# Patient Record
Sex: MALE | Race: WHITE | Employment: FULL TIME | URBAN - METROPOLITAN AREA
[De-identification: names, ages, dates, MRNs, and addresses within clinical notes are randomized per-mention and may not be internally consistent; named-entity substitution may affect disease eponyms.]

---

## 2019-03-12 ENCOUNTER — TRANSFERRED RECORDS (OUTPATIENT)
Dept: HEALTH INFORMATION MANAGEMENT | Facility: CLINIC | Age: 64
End: 2019-03-12

## 2019-03-12 ENCOUNTER — HOSPITAL ENCOUNTER (INPATIENT)
Facility: CLINIC | Age: 64
LOS: 34 days | Discharge: HOME | DRG: 834 | End: 2019-04-15
Admitting: INTERNAL MEDICINE
Payer: COMMERCIAL

## 2019-03-12 DIAGNOSIS — C95.00 ACUTE LEUKEMIA NOT HAVING ACHIEVED REMISSION (H): ICD-10-CM

## 2019-03-12 DIAGNOSIS — C92.00 ACUTE MYELOID LEUKEMIA NOT HAVING ACHIEVED REMISSION (H): ICD-10-CM

## 2019-03-12 LAB
ALBUMIN SERPL-MCNC: 3.5 G/DL (ref 3.4–5)
ALP SERPL-CCNC: 72 U/L (ref 40–150)
ALT SERPL W P-5'-P-CCNC: 20 U/L (ref 0–70)
ANION GAP SERPL CALCULATED.3IONS-SCNC: 8 MMOL/L (ref 3–14)
APTT PPP: 32 SEC (ref 22–37)
AST SERPL W P-5'-P-CCNC: 16 U/L (ref 0–45)
BILIRUB SERPL-MCNC: 0.5 MG/DL (ref 0.2–1.3)
BUN SERPL-MCNC: 16 MG/DL (ref 7–30)
CALCIUM SERPL-MCNC: 8.5 MG/DL (ref 8.5–10.1)
CHLORIDE SERPL-SCNC: 109 MMOL/L (ref 94–109)
CO2 SERPL-SCNC: 22 MMOL/L (ref 20–32)
CREAT SERPL-MCNC: 0.93 MG/DL (ref 0.66–1.25)
FIBRINOGEN PPP-MCNC: 488 MG/DL (ref 200–420)
GFR SERPL CREATININE-BSD FRML MDRD: 86 ML/MIN/{1.73_M2}
GLUCOSE SERPL-MCNC: 105 MG/DL (ref 70–99)
INR PPP: 1.17 (ref 0.86–1.14)
LDH SERPL L TO P-CCNC: 274 U/L (ref 85–227)
MAGNESIUM SERPL-MCNC: 2.1 MG/DL (ref 1.6–2.3)
PHOSPHATE SERPL-MCNC: 3.1 MG/DL (ref 2.5–4.5)
POTASSIUM SERPL-SCNC: 3.7 MMOL/L (ref 3.4–5.3)
PROT SERPL-MCNC: 7.4 G/DL (ref 6.8–8.8)
RETICS # AUTO: 58.4 10E9/L (ref 25–95)
RETICS/RBC NFR AUTO: 2.1 % (ref 0.5–2)
SODIUM SERPL-SCNC: 139 MMOL/L (ref 133–144)
URATE SERPL-MCNC: 4.9 MG/DL (ref 3.5–7.2)

## 2019-03-12 PROCEDURE — 80053 COMPREHEN METABOLIC PANEL: CPT | Performed by: INTERNAL MEDICINE

## 2019-03-12 PROCEDURE — 99221 1ST HOSP IP/OBS SF/LOW 40: CPT | Mod: AI | Performed by: INTERNAL MEDICINE

## 2019-03-12 PROCEDURE — 86695 HERPES SIMPLEX TYPE 1 TEST: CPT | Performed by: INTERNAL MEDICINE

## 2019-03-12 PROCEDURE — 12000001 ZZH R&B MED SURG/OB UMMC

## 2019-03-12 PROCEDURE — 85610 PROTHROMBIN TIME: CPT | Performed by: INTERNAL MEDICINE

## 2019-03-12 PROCEDURE — 85045 AUTOMATED RETICULOCYTE COUNT: CPT | Performed by: INTERNAL MEDICINE

## 2019-03-12 PROCEDURE — 81370 HLA I & II TYPING LR: CPT

## 2019-03-12 PROCEDURE — 86644 CMV ANTIBODY: CPT | Performed by: INTERNAL MEDICINE

## 2019-03-12 PROCEDURE — 83735 ASSAY OF MAGNESIUM: CPT | Performed by: INTERNAL MEDICINE

## 2019-03-12 PROCEDURE — 84550 ASSAY OF BLOOD/URIC ACID: CPT | Performed by: INTERNAL MEDICINE

## 2019-03-12 PROCEDURE — 85730 THROMBOPLASTIN TIME PARTIAL: CPT | Performed by: INTERNAL MEDICINE

## 2019-03-12 PROCEDURE — 81376 HLA II TYPING 1 LOCUS LR: CPT

## 2019-03-12 PROCEDURE — 83615 LACTATE (LD) (LDH) ENZYME: CPT | Performed by: INTERNAL MEDICINE

## 2019-03-12 PROCEDURE — 85384 FIBRINOGEN ACTIVITY: CPT | Performed by: INTERNAL MEDICINE

## 2019-03-12 PROCEDURE — 84100 ASSAY OF PHOSPHORUS: CPT | Performed by: INTERNAL MEDICINE

## 2019-03-12 PROCEDURE — 85025 COMPLETE CBC W/AUTO DIFF WBC: CPT | Performed by: INTERNAL MEDICINE

## 2019-03-12 PROCEDURE — 36415 COLL VENOUS BLD VENIPUNCTURE: CPT | Performed by: INTERNAL MEDICINE

## 2019-03-12 RX ORDER — POTASSIUM CHLORIDE 1.5 G/1.58G
20-40 POWDER, FOR SOLUTION ORAL
Status: DISCONTINUED | OUTPATIENT
Start: 2019-03-12 | End: 2019-04-03

## 2019-03-12 RX ORDER — POTASSIUM CHLORIDE 750 MG/1
20-40 TABLET, EXTENDED RELEASE ORAL
Status: DISCONTINUED | OUTPATIENT
Start: 2019-03-12 | End: 2019-04-03

## 2019-03-12 RX ORDER — ALLOPURINOL 300 MG/1
300 TABLET ORAL DAILY
Status: DISCONTINUED | OUTPATIENT
Start: 2019-03-13 | End: 2019-03-23

## 2019-03-12 RX ORDER — AMOXICILLIN 250 MG
1-2 CAPSULE ORAL
Status: DISCONTINUED | OUTPATIENT
Start: 2019-03-12 | End: 2019-03-16

## 2019-03-12 RX ORDER — ATORVASTATIN CALCIUM 40 MG/1
40 TABLET, FILM COATED ORAL DAILY
Status: ON HOLD | COMMUNITY
End: 2019-04-15

## 2019-03-12 RX ORDER — POTASSIUM CHLORIDE 7.45 MG/ML
10 INJECTION INTRAVENOUS
Status: DISCONTINUED | OUTPATIENT
Start: 2019-03-12 | End: 2019-04-03

## 2019-03-12 RX ORDER — ROPINIROLE 0.25 MG/1
0.75 TABLET, FILM COATED ORAL AT BEDTIME
Status: ON HOLD | COMMUNITY
End: 2019-04-15

## 2019-03-12 RX ORDER — ROPINIROLE 0.25 MG/1
0.75 TABLET, FILM COATED ORAL DAILY
Status: DISCONTINUED | OUTPATIENT
Start: 2019-03-13 | End: 2019-03-13

## 2019-03-12 RX ORDER — POTASSIUM CL/LIDO/0.9 % NACL 10MEQ/0.1L
10 INTRAVENOUS SOLUTION, PIGGYBACK (ML) INTRAVENOUS
Status: DISCONTINUED | OUTPATIENT
Start: 2019-03-12 | End: 2019-04-03

## 2019-03-12 RX ORDER — LORAZEPAM 0.5 MG/1
.5-1 TABLET ORAL EVERY 6 HOURS PRN
Status: DISCONTINUED | OUTPATIENT
Start: 2019-03-12 | End: 2019-04-15 | Stop reason: HOSPADM

## 2019-03-12 RX ORDER — ACYCLOVIR 400 MG/1
400 TABLET ORAL 2 TIMES DAILY
Status: DISCONTINUED | OUTPATIENT
Start: 2019-03-12 | End: 2019-04-15 | Stop reason: HOSPADM

## 2019-03-12 RX ORDER — FLUCONAZOLE 200 MG/1
200 TABLET ORAL DAILY
Status: DISCONTINUED | OUTPATIENT
Start: 2019-03-13 | End: 2019-03-23

## 2019-03-12 RX ORDER — LEVOFLOXACIN 250 MG/1
250 TABLET, FILM COATED ORAL DAILY
Status: DISCONTINUED | OUTPATIENT
Start: 2019-03-13 | End: 2019-03-22

## 2019-03-12 RX ORDER — LIDOCAINE 40 MG/G
CREAM TOPICAL
Status: DISCONTINUED | OUTPATIENT
Start: 2019-03-12 | End: 2019-03-22

## 2019-03-12 RX ORDER — HEPARIN SODIUM,PORCINE 10 UNIT/ML
2-5 VIAL (ML) INTRAVENOUS
Status: COMPLETED | OUTPATIENT
Start: 2019-03-12 | End: 2019-03-19

## 2019-03-12 RX ORDER — PENICILLIN V POTASSIUM 500 MG/1
500 TABLET, FILM COATED ORAL 3 TIMES DAILY
Status: ON HOLD | COMMUNITY
End: 2019-04-15

## 2019-03-12 RX ORDER — SODIUM CHLORIDE 9 MG/ML
INJECTION, SOLUTION INTRAVENOUS CONTINUOUS
Status: DISCONTINUED | OUTPATIENT
Start: 2019-03-12 | End: 2019-03-18

## 2019-03-12 RX ORDER — POTASSIUM CHLORIDE 29.8 MG/ML
20 INJECTION INTRAVENOUS
Status: DISCONTINUED | OUTPATIENT
Start: 2019-03-12 | End: 2019-04-03

## 2019-03-12 RX ORDER — ALFUZOSIN HYDROCHLORIDE 10 MG/1
10 TABLET, EXTENDED RELEASE ORAL DAILY
Status: ON HOLD | COMMUNITY
End: 2019-04-15

## 2019-03-12 RX ORDER — LORAZEPAM 2 MG/ML
.5-1 INJECTION INTRAMUSCULAR EVERY 6 HOURS PRN
Status: DISCONTINUED | OUTPATIENT
Start: 2019-03-12 | End: 2019-04-15 | Stop reason: HOSPADM

## 2019-03-12 RX ORDER — OXYBUTYNIN CHLORIDE 5 MG/1
5 TABLET, EXTENDED RELEASE ORAL DAILY
Status: ON HOLD | COMMUNITY
End: 2019-04-15

## 2019-03-12 RX ORDER — ACETAMINOPHEN 325 MG/1
650 TABLET ORAL EVERY 4 HOURS PRN
Status: DISCONTINUED | OUTPATIENT
Start: 2019-03-12 | End: 2019-04-15 | Stop reason: HOSPADM

## 2019-03-12 RX ORDER — MAGNESIUM SULFATE HEPTAHYDRATE 40 MG/ML
4 INJECTION, SOLUTION INTRAVENOUS EVERY 4 HOURS PRN
Status: DISCONTINUED | OUTPATIENT
Start: 2019-03-12 | End: 2019-04-02

## 2019-03-12 RX ORDER — PANTOPRAZOLE SODIUM 40 MG/1
40 TABLET, DELAYED RELEASE ORAL
Status: DISCONTINUED | OUTPATIENT
Start: 2019-03-13 | End: 2019-03-22

## 2019-03-12 RX ORDER — PROCHLORPERAZINE MALEATE 5 MG
5-10 TABLET ORAL EVERY 6 HOURS PRN
Status: DISCONTINUED | OUTPATIENT
Start: 2019-03-12 | End: 2019-03-22

## 2019-03-12 ASSESSMENT — MIFFLIN-ST. JEOR: SCORE: 1777.83

## 2019-03-12 NOTE — LETTER
Transition Communication Hand-off for Care Transitions to Next Level of Care Provider    Name: El Ace  : 1955  MRN #: [id number]  Primary Care Provider: Bre Cuadra (upcoming appointment)      Primary Clinic: [facility] [address]  [facility] [address]     Reason for Hospitalization:  acute leukemia  Acute leukemia (H)  Septic shock (H)  Admit Date/Time: 3/12/2019  9:58 PM  Discharge Date: 4/15/19  Payor Source: Payor: PREFERREDONE / Plan: PREF[employer] NON [facility] [employer] / Product Type: HMO /     Mr. Ace is a 63 year old male with new diagnosis of acute myeloid leukemia incidentally found during work up of nonspecific chest symptoms after partial root canal intervention.     He was started on induction chemotherapy with 7+3 (cytarabine and daunorubicin) with D1=3/15/19. BMBx on 3/25 (done early due to rising WBC and blast counts) demonstrated persistent disease with 95% blasts by flow, 98% by morphology. Started re-induction with decitabine on 3/26 PM and added venetoclax on 3/29. His course has been complicated by neutropenic sepsis secondary to odontic/soft tissue infection - he completed a course of broad spectrum antibiotics - with linezolid & zosyn (subsequently diagnosed with C diff infection to complete PO vanc course 19). He had extraction of teeth #18 and 19 on 4/3 but subsequently developed septic shock requiring transfer to MICU team 4/3-. Hypotension improved after fluid resuscitated and he did not require intubation or pressor support. He was transferred back to Heme Malignancy service on 19. He's continued to improve & was able to complete Decitabine ( &  had been held r/t ALT & T bili elevation).     Discharge Plan: Home with assist of spouse and follow-up in clinic as recommended.       Discharge Needs Assessment:  Needs      Most Recent Value   Equipment Currently Used at Home  grab bar, toilet           Follow-up plan:    Future Appointments   Date Time Provider Department Center   4/23/2019  8:15 AM  [facility]   4/23/2019  8:50 AM Ryann Pavon PA [facility]   4/23/2019  9:40 AM Ryann Pavon PA [facility]   4/26/2019  9:40 AM Ryann Pavon PA [facility]   5/6/2019  9:15 AM Rivera Cuadra MD [facility]       Any outstanding tests or procedures:        Referrals     Future Labs/Procedures    BMT GENERIC REFERRAL             Gayla De La Garza, RN, BSN, PHN  Care Coordinator       AVS/Discharge Summary is the source of truth; this is a helpful guide for improved communication of patient story

## 2019-03-13 ENCOUNTER — APPOINTMENT (OUTPATIENT)
Dept: CARDIOLOGY | Facility: CLINIC | Age: 64
DRG: 834 | End: 2019-03-13
Attending: INTERNAL MEDICINE
Payer: COMMERCIAL

## 2019-03-13 ENCOUNTER — APPOINTMENT (OUTPATIENT)
Dept: GENERAL RADIOLOGY | Facility: CLINIC | Age: 64
DRG: 834 | End: 2019-03-13
Payer: COMMERCIAL

## 2019-03-13 LAB
ANION GAP SERPL CALCULATED.3IONS-SCNC: 10 MMOL/L (ref 3–14)
ANION GAP SERPL CALCULATED.3IONS-SCNC: 9 MMOL/L (ref 3–14)
APTT PPP: 31 SEC (ref 22–37)
BUN SERPL-MCNC: 12 MG/DL (ref 7–30)
BUN SERPL-MCNC: 12 MG/DL (ref 7–30)
CALCIUM SERPL-MCNC: 7.8 MG/DL (ref 8.5–10.1)
CALCIUM SERPL-MCNC: 7.9 MG/DL (ref 8.5–10.1)
CHLORIDE SERPL-SCNC: 110 MMOL/L (ref 94–109)
CHLORIDE SERPL-SCNC: 111 MMOL/L (ref 94–109)
CMV IGG SERPL QL IA: <0.2 AI (ref 0–0.8)
CO2 SERPL-SCNC: 22 MMOL/L (ref 20–32)
CO2 SERPL-SCNC: 22 MMOL/L (ref 20–32)
COPATH REPORT: NORMAL
COPATH REPORT: NORMAL
CREAT SERPL-MCNC: 0.76 MG/DL (ref 0.66–1.25)
CREAT SERPL-MCNC: 0.82 MG/DL (ref 0.66–1.25)
EBV VCA IGG SER QL IA: >8 AI (ref 0–0.8)
FIBRINOGEN PPP-MCNC: 450 MG/DL (ref 200–420)
GFR SERPL CREATININE-BSD FRML MDRD: >90 ML/MIN/{1.73_M2}
GFR SERPL CREATININE-BSD FRML MDRD: >90 ML/MIN/{1.73_M2}
GLUCOSE SERPL-MCNC: 104 MG/DL (ref 70–99)
GLUCOSE SERPL-MCNC: 132 MG/DL (ref 70–99)
HBV SURFACE AB SERPL IA-ACNC: 15.7 M[IU]/ML
HBV SURFACE AG SERPL QL IA: NONREACTIVE
HCV AB SERPL QL IA: NONREACTIVE
HIV 1+2 AB+HIV1 P24 AG SERPL QL IA: NONREACTIVE
HSV1 IGG SERPL QL IA: <0.2 AI (ref 0–0.8)
HSV2 IGG SERPL QL IA: >8 AI (ref 0–0.8)
INR PPP: 1.2 (ref 0.86–1.14)
INTERPRETATION ECG - MUSE: NORMAL
PHOSPHATE SERPL-MCNC: 2.8 MG/DL (ref 2.5–4.5)
POTASSIUM SERPL-SCNC: 3.5 MMOL/L (ref 3.4–5.3)
POTASSIUM SERPL-SCNC: 3.8 MMOL/L (ref 3.4–5.3)
SODIUM SERPL-SCNC: 142 MMOL/L (ref 133–144)
SODIUM SERPL-SCNC: 142 MMOL/L (ref 133–144)
URATE SERPL-MCNC: 4.3 MG/DL (ref 3.5–7.2)

## 2019-03-13 PROCEDURE — 85610 PROTHROMBIN TIME: CPT | Performed by: STUDENT IN AN ORGANIZED HEALTH CARE EDUCATION/TRAINING PROGRAM

## 2019-03-13 PROCEDURE — 25000132 ZZH RX MED GY IP 250 OP 250 PS 637: Performed by: INTERNAL MEDICINE

## 2019-03-13 PROCEDURE — 3E04305 INTRODUCTION OF OTHER ANTINEOPLASTIC INTO CENTRAL VEIN, PERCUTANEOUS APPROACH: ICD-10-PCS | Performed by: INTERNAL MEDICINE

## 2019-03-13 PROCEDURE — 88280 CHROMOSOME KARYOTYPE STUDY: CPT | Performed by: STUDENT IN AN ORGANIZED HEALTH CARE EDUCATION/TRAINING PROGRAM

## 2019-03-13 PROCEDURE — 40000951 ZZHCL STATISTIC BONE MARROW INTERP TC 85097: Performed by: STUDENT IN AN ORGANIZED HEALTH CARE EDUCATION/TRAINING PROGRAM

## 2019-03-13 PROCEDURE — 25800030 ZZH RX IP 258 OP 636: Performed by: INTERNAL MEDICINE

## 2019-03-13 PROCEDURE — 88184 FLOWCYTOMETRY/ TC 1 MARKER: CPT | Performed by: STUDENT IN AN ORGANIZED HEALTH CARE EDUCATION/TRAINING PROGRAM

## 2019-03-13 PROCEDURE — 87340 HEPATITIS B SURFACE AG IA: CPT | Performed by: INTERNAL MEDICINE

## 2019-03-13 PROCEDURE — 88275 CYTOGENETICS 100-300: CPT | Performed by: STUDENT IN AN ORGANIZED HEALTH CARE EDUCATION/TRAINING PROGRAM

## 2019-03-13 PROCEDURE — 93306 TTE W/DOPPLER COMPLETE: CPT

## 2019-03-13 PROCEDURE — 84100 ASSAY OF PHOSPHORUS: CPT | Performed by: STUDENT IN AN ORGANIZED HEALTH CARE EDUCATION/TRAINING PROGRAM

## 2019-03-13 PROCEDURE — 93306 TTE W/DOPPLER COMPLETE: CPT | Mod: 26 | Performed by: INTERNAL MEDICINE

## 2019-03-13 PROCEDURE — 12000001 ZZH R&B MED SURG/OB UMMC

## 2019-03-13 PROCEDURE — 88185 FLOWCYTOMETRY/TC ADD-ON: CPT | Performed by: STUDENT IN AN ORGANIZED HEALTH CARE EDUCATION/TRAINING PROGRAM

## 2019-03-13 PROCEDURE — 00000058 ZZHCL STATISTIC BONE MARROW ASP PERF TC 38220: Performed by: STUDENT IN AN ORGANIZED HEALTH CARE EDUCATION/TRAINING PROGRAM

## 2019-03-13 PROCEDURE — 86706 HEP B SURFACE ANTIBODY: CPT | Performed by: INTERNAL MEDICINE

## 2019-03-13 PROCEDURE — 80048 BASIC METABOLIC PNL TOTAL CA: CPT | Performed by: INTERNAL MEDICINE

## 2019-03-13 PROCEDURE — 71046 X-RAY EXAM CHEST 2 VIEWS: CPT

## 2019-03-13 PROCEDURE — 93010 ELECTROCARDIOGRAM REPORT: CPT | Performed by: INTERNAL MEDICINE

## 2019-03-13 PROCEDURE — 85025 COMPLETE CBC W/AUTO DIFF WBC: CPT | Performed by: INTERNAL MEDICINE

## 2019-03-13 PROCEDURE — 88161 CYTOPATH SMEAR OTHER SOURCE: CPT | Performed by: STUDENT IN AN ORGANIZED HEALTH CARE EDUCATION/TRAINING PROGRAM

## 2019-03-13 PROCEDURE — 86696 HERPES SIMPLEX TYPE 2 TEST: CPT | Performed by: INTERNAL MEDICINE

## 2019-03-13 PROCEDURE — 86803 HEPATITIS C AB TEST: CPT | Performed by: INTERNAL MEDICINE

## 2019-03-13 PROCEDURE — 00000161 ZZHCL STATISTIC H-SPHEME PROCESS B/S: Performed by: STUDENT IN AN ORGANIZED HEALTH CARE EDUCATION/TRAINING PROGRAM

## 2019-03-13 PROCEDURE — 87389 HIV-1 AG W/HIV-1&-2 AB AG IA: CPT | Performed by: INTERNAL MEDICINE

## 2019-03-13 PROCEDURE — 88311 DECALCIFY TISSUE: CPT | Performed by: STUDENT IN AN ORGANIZED HEALTH CARE EDUCATION/TRAINING PROGRAM

## 2019-03-13 PROCEDURE — 80048 BASIC METABOLIC PNL TOTAL CA: CPT | Performed by: STUDENT IN AN ORGANIZED HEALTH CARE EDUCATION/TRAINING PROGRAM

## 2019-03-13 PROCEDURE — 81450 HL NEO GSAP 5-50DNA/DNA&RNA: CPT | Performed by: STUDENT IN AN ORGANIZED HEALTH CARE EDUCATION/TRAINING PROGRAM

## 2019-03-13 PROCEDURE — 88237 TISSUE CULTURE BONE MARROW: CPT | Performed by: STUDENT IN AN ORGANIZED HEALTH CARE EDUCATION/TRAINING PROGRAM

## 2019-03-13 PROCEDURE — 80076 HEPATIC FUNCTION PANEL: CPT | Performed by: STUDENT IN AN ORGANIZED HEALTH CARE EDUCATION/TRAINING PROGRAM

## 2019-03-13 PROCEDURE — 88305 TISSUE EXAM BY PATHOLOGIST: CPT | Performed by: STUDENT IN AN ORGANIZED HEALTH CARE EDUCATION/TRAINING PROGRAM

## 2019-03-13 PROCEDURE — 36415 COLL VENOUS BLD VENIPUNCTURE: CPT | Performed by: INTERNAL MEDICINE

## 2019-03-13 PROCEDURE — 88264 CHROMOSOME ANALYSIS 20-25: CPT | Performed by: STUDENT IN AN ORGANIZED HEALTH CARE EDUCATION/TRAINING PROGRAM

## 2019-03-13 PROCEDURE — 07DR3ZX EXTRACTION OF ILIAC BONE MARROW, PERCUTANEOUS APPROACH, DIAGNOSTIC: ICD-10-PCS | Performed by: INTERNAL MEDICINE

## 2019-03-13 PROCEDURE — 93005 ELECTROCARDIOGRAM TRACING: CPT

## 2019-03-13 PROCEDURE — 86665 EPSTEIN-BARR CAPSID VCA: CPT | Performed by: INTERNAL MEDICINE

## 2019-03-13 PROCEDURE — 85730 THROMBOPLASTIN TIME PARTIAL: CPT | Performed by: STUDENT IN AN ORGANIZED HEALTH CARE EDUCATION/TRAINING PROGRAM

## 2019-03-13 PROCEDURE — 36415 COLL VENOUS BLD VENIPUNCTURE: CPT | Performed by: STUDENT IN AN ORGANIZED HEALTH CARE EDUCATION/TRAINING PROGRAM

## 2019-03-13 PROCEDURE — 85384 FIBRINOGEN ACTIVITY: CPT | Performed by: STUDENT IN AN ORGANIZED HEALTH CARE EDUCATION/TRAINING PROGRAM

## 2019-03-13 PROCEDURE — 40001005 ZZHCL STATISTIC FLOW >15 ABY TC 88189: Performed by: STUDENT IN AN ORGANIZED HEALTH CARE EDUCATION/TRAINING PROGRAM

## 2019-03-13 PROCEDURE — 82310 ASSAY OF CALCIUM: CPT | Performed by: STUDENT IN AN ORGANIZED HEALTH CARE EDUCATION/TRAINING PROGRAM

## 2019-03-13 PROCEDURE — 40000424 ZZHCL STATISTIC BONE MARROW CORE PERF TC 38221: Performed by: STUDENT IN AN ORGANIZED HEALTH CARE EDUCATION/TRAINING PROGRAM

## 2019-03-13 PROCEDURE — 88271 CYTOGENETICS DNA PROBE: CPT | Performed by: STUDENT IN AN ORGANIZED HEALTH CARE EDUCATION/TRAINING PROGRAM

## 2019-03-13 PROCEDURE — 25000128 H RX IP 250 OP 636: Performed by: PHYSICIAN ASSISTANT

## 2019-03-13 PROCEDURE — 84550 ASSAY OF BLOOD/URIC ACID: CPT | Performed by: STUDENT IN AN ORGANIZED HEALTH CARE EDUCATION/TRAINING PROGRAM

## 2019-03-13 PROCEDURE — 86644 CMV ANTIBODY: CPT | Performed by: INTERNAL MEDICINE

## 2019-03-13 PROCEDURE — 88313 SPECIAL STAINS GROUP 2: CPT | Performed by: STUDENT IN AN ORGANIZED HEALTH CARE EDUCATION/TRAINING PROGRAM

## 2019-03-13 PROCEDURE — 40000611 ZZHCL STATISTIC MORPHOLOGY W/INTERP HEMEPATH TC 85060: Performed by: STUDENT IN AN ORGANIZED HEALTH CARE EDUCATION/TRAINING PROGRAM

## 2019-03-13 PROCEDURE — 86695 HERPES SIMPLEX TYPE 1 TEST: CPT | Performed by: INTERNAL MEDICINE

## 2019-03-13 PROCEDURE — 99232 SBSQ HOSP IP/OBS MODERATE 35: CPT | Mod: GC | Performed by: INTERNAL MEDICINE

## 2019-03-13 PROCEDURE — 88319 ENZYME HISTOCHEMISTRY: CPT | Performed by: STUDENT IN AN ORGANIZED HEALTH CARE EDUCATION/TRAINING PROGRAM

## 2019-03-13 RX ORDER — ATORVASTATIN CALCIUM 40 MG/1
40 TABLET, FILM COATED ORAL EVERY EVENING
Status: DISCONTINUED | OUTPATIENT
Start: 2019-03-13 | End: 2019-03-18

## 2019-03-13 RX ORDER — NALOXONE HYDROCHLORIDE 0.4 MG/ML
.1-.4 INJECTION, SOLUTION INTRAMUSCULAR; INTRAVENOUS; SUBCUTANEOUS
Status: DISCONTINUED | OUTPATIENT
Start: 2019-03-13 | End: 2019-04-03

## 2019-03-13 RX ORDER — POLYETHYLENE GLYCOL 3350 17 G/17G
17 POWDER, FOR SOLUTION ORAL DAILY PRN
Status: DISCONTINUED | OUTPATIENT
Start: 2019-03-13 | End: 2019-04-15 | Stop reason: HOSPADM

## 2019-03-13 RX ORDER — TAMSULOSIN HYDROCHLORIDE 0.4 MG/1
0.8 CAPSULE ORAL DAILY
Status: DISCONTINUED | OUTPATIENT
Start: 2019-03-13 | End: 2019-04-02

## 2019-03-13 RX ORDER — HYDROXYUREA 500 MG/1
1000 CAPSULE ORAL EVERY 6 HOURS
Status: DISCONTINUED | OUTPATIENT
Start: 2019-03-13 | End: 2019-03-16

## 2019-03-13 RX ORDER — OXYCODONE HYDROCHLORIDE 5 MG/1
5 TABLET ORAL EVERY 6 HOURS PRN
Status: DISCONTINUED | OUTPATIENT
Start: 2019-03-13 | End: 2019-04-15 | Stop reason: HOSPADM

## 2019-03-13 RX ADMIN — ACYCLOVIR 400 MG: 400 TABLET ORAL at 20:13

## 2019-03-13 RX ADMIN — MIDAZOLAM HYDROCHLORIDE 2 MG: 2 INJECTION, SOLUTION INTRAMUSCULAR; INTRAVENOUS at 12:51

## 2019-03-13 RX ADMIN — HYDROXYUREA 1000 MG: 500 CAPSULE ORAL at 20:06

## 2019-03-13 RX ADMIN — ROPINIROLE HYDROCHLORIDE 0.75 MG: 0.5 TABLET, FILM COATED ORAL at 20:13

## 2019-03-13 RX ADMIN — PANTOPRAZOLE SODIUM 40 MG: 40 TABLET, DELAYED RELEASE ORAL at 08:37

## 2019-03-13 RX ADMIN — LEVOFLOXACIN 250 MG: 250 TABLET, FILM COATED ORAL at 08:37

## 2019-03-13 RX ADMIN — ACETAMINOPHEN 650 MG: 325 TABLET, FILM COATED ORAL at 04:26

## 2019-03-13 RX ADMIN — ALLOPURINOL 300 MG: 300 TABLET ORAL at 08:37

## 2019-03-13 RX ADMIN — ATORVASTATIN CALCIUM 40 MG: 40 TABLET, FILM COATED ORAL at 20:06

## 2019-03-13 RX ADMIN — ACETAMINOPHEN 650 MG: 325 TABLET, FILM COATED ORAL at 08:33

## 2019-03-13 RX ADMIN — ACETAMINOPHEN 650 MG: 325 TABLET, FILM COATED ORAL at 18:43

## 2019-03-13 RX ADMIN — SODIUM CHLORIDE: 9 INJECTION, SOLUTION INTRAVENOUS at 00:36

## 2019-03-13 RX ADMIN — SODIUM CHLORIDE: 9 INJECTION, SOLUTION INTRAVENOUS at 18:50

## 2019-03-13 RX ADMIN — ACYCLOVIR 400 MG: 400 TABLET ORAL at 00:37

## 2019-03-13 RX ADMIN — HYDROXYUREA 1000 MG: 500 CAPSULE ORAL at 08:41

## 2019-03-13 RX ADMIN — ACYCLOVIR 400 MG: 400 TABLET ORAL at 08:37

## 2019-03-13 RX ADMIN — HYDROXYUREA 1000 MG: 500 CAPSULE ORAL at 15:03

## 2019-03-13 RX ADMIN — ACETAMINOPHEN 650 MG: 325 TABLET, FILM COATED ORAL at 14:12

## 2019-03-13 RX ADMIN — OXYCODONE HYDROCHLORIDE 5 MG: 5 TABLET ORAL at 04:26

## 2019-03-13 RX ADMIN — ACETAMINOPHEN 650 MG: 325 TABLET, FILM COATED ORAL at 00:39

## 2019-03-13 RX ADMIN — TAMSULOSIN HYDROCHLORIDE 0.8 MG: 0.4 CAPSULE ORAL at 08:39

## 2019-03-13 RX ADMIN — HYDROXYUREA 1000 MG: 500 CAPSULE ORAL at 02:51

## 2019-03-13 RX ADMIN — FLUCONAZOLE 200 MG: 200 TABLET ORAL at 08:37

## 2019-03-13 RX ADMIN — SODIUM CHLORIDE: 9 INJECTION, SOLUTION INTRAVENOUS at 08:42

## 2019-03-13 ASSESSMENT — PAIN DESCRIPTION - DESCRIPTORS
DESCRIPTORS: ACHING;THROBBING

## 2019-03-13 ASSESSMENT — ACTIVITIES OF DAILY LIVING (ADL)
ADLS_ACUITY_SCORE: 12
ADLS_ACUITY_SCORE: 12
ADLS_ACUITY_SCORE: 14
ADLS_ACUITY_SCORE: 12

## 2019-03-13 ASSESSMENT — MIFFLIN-ST. JEOR: SCORE: 1769.22

## 2019-03-13 NOTE — PROGRESS NOTES
"Hematology-Oncology Progress note    Assessment and Plan    Assessment:  Patient is a 63 year old male with history of BPH, restless legs syndrome, subclinical coronary atherosclerosis, lumbar spine DJD who was referred to [facility] from OSH for work up and management of acute leukemia incidentally found during work up of nonspecific chest symptoms. Pt also has 1 month symptoms of gen weakness, fatigue,  recent fever, night sweats.     Plan:  #Acute leukemia  Patient presented to [facility] ED in [place], [place] for complaints of nonspecific \"twinges\" of chest discomfort in the setting of root canal treatment which was performed yesterday on 3/11/19. CT C/A/P was negative for PE or other acute findings (there was minimal posterior right lower lobe basilar atelectasis versus less likely consolidation). On OSH labs, his WBC was incidentally noted to be elevated at 71.1 with Hb 9.0 and plts 97 (prior labs have been unremarkable per patient). Smear was suspicious for immature blasts and acute leukemia. No symptoms of hyperviscosity, TLS or DIC on presentation. Smear with no swapnil rods, immature cells, no granules, >90% blasts roughly. (AML vs ALL). Auto mated diff with 97% blasts.   ECHO was normal (EF 60-65%), baseline EKG normal.     - No urgent need for leukapheresis  - Smear interpretation pending.  - Viral studies - HBV-pt is immune, HCV-neg, HIV-neg, EBV IgG +, CMV IgG neg, HSV 1 neg, HSV 2 IgG +   - Ordered HLA typing complete BMT recipient and BMT consult.  - Consult placed for PICC placement (on 3/13/19)  - continue  mg BID, fluconazole 200 mg daily and levofloxacin 250 mg daily for ID prophylaxis.   - Bone marrow biopsy on 3/13/19 for flow, cytogenetics and molecular testing.  - Continue hydrea 1 gram q6h for cytoreduction  and allopurinol 300 mg daily for TLS prophy  -CBC daily  -BMP, TLS and DIC labs Q12 hrs  -Goal Hb >7, Plt >10K, Fibrinogen >100     #S/P root canal treatment  Patient had a root " "canal treatment performed on 3/11/19. Site looks unremarkable with no abscess or drainage. He was started on a 7-day course of oral  mg q6h starting one day prior to the procedure.  - No need to continue PCN (no hx of valvular heart disease).  - APAP PRN for pain control (no NSAIDs).      #Restless legs syndrome  - Continue ropinirole 0.75 mg at bedtime.     #Subclinical coronary atherosclerosis  #Hyperlipidemia  Total Agatston calcium score was elevated at 591 (91st percentile) on CT coronaries performed 6/8/2016. Nuclear stress test was negative for ischemia on 8/10/2016. Life style modification measures were recommended. Patient follows with cardiology as outpatient (last office visit on 11/12/18).   - Continue atorvastatin 40 mg daily.      #BPH  [facility] [place] [place] [place] [place]  - Continue Flomax 0.4 mg 2 tabs daily.      #History of lumbar spine degenerative disc disease  Patient is s/p laminectomy/facetectomy procedures. He is not currently in pain and does not routinely take pain meds.         FEN   - Regular diet as tolerated  - NS at 100 ml/hr  - PRN lyte replacement per standing protocol     Prophylaxis  - No pharmacologic VTE ppx due to TCP  - PRN bowel regimen for constipation ppx  - PPI for GI/PUD ppx       Code Status: FULL     Disposition: Admission to inpatient hematology service, discontinue pending evaluation and treatment of leukemia    Jesus Stephens  [facility] Hem Onc fellow  [phone]                     Subjective:  No new events overnight. No headaches, vision problems. Continues to have some pain in the tooth where he had root canal treatment.        Objective:        Vital signs:  Temp: 97.6  F (36.4  C) Temp src: Oral BP: 112/52 Pulse: 66   Resp: 16 SpO2: 95 % O2 Device: None (Room air)   Height: 177.8 cm (5' 10\") Weight: 97.3 kg (214 lb 8 oz)  Estimated body mass index is 30.78 kg/m  as calculated from the following:    Height as of this encounter: 1.778 m " "(5' 10\").    Weight as of this encounter: 97.3 kg (214 lb 8 oz).      Exam:  Gen: Well built and nourished, not in any acute distress  HEENT: MMM, no oral lesions, no pallor, icterus, some swelling in the lt lower jaw  Neck: supple,   Lymph: no cervical, sc, axillary, inguinal LAD   CV: RRR, no murmurs  Resp: CTA  Abd: Soft, NTND, no HSM   Ext: no edema   Neuro: AAOx4. Cranial nerves grossly intact. Strength 5/5 throughout.  Normal muscle tone. Sensations grossly intact. 3+ symmetric reflexes in Knee jt       Intake/Output Summary (Last 24 hours) at 3/13/2019 1401  Last data filed at 3/13/2019 1333  Gross per 24 hour   Intake 240 ml   Output --   Net 240 ml       Data:    CBC RESULTS:   Recent Labs   Lab Test 03/13/19  0553   WBC 84.3*   RBC 2.60*   HGB 8.7*   HCT 26.8*   *   MCH 33.5*   MCHC 32.5   RDW 16.2*   PLT 95*       Last Basic Metabolic Panel:  Lab Results   Component Value Date     03/13/2019      Lab Results   Component Value Date    POTASSIUM 3.8 03/13/2019     Lab Results   Component Value Date    CHLORIDE 110 03/13/2019     Lab Results   Component Value Date    DEBBIE 7.9 03/13/2019     Lab Results   Component Value Date    CO2 22 03/13/2019     Lab Results   Component Value Date    BUN 12 03/13/2019     Lab Results   Component Value Date    CR 0.82 03/13/2019     Lab Results   Component Value Date     03/13/2019       No results found for this or any previous visit.  "

## 2019-03-13 NOTE — PLAN OF CARE
2778-0620: VSS. Afebrile. Continues with left lower mouth pain where root canal was done. PRN PO Tylenol given x3. Denied nausea and vomiting. 2 mg Versed given before BMBx. Patient tolerated well and dressing is clean, dry and intact. ECHO complete. 2 view chest x-ray in process. UOP adequate. Appetite good. Up ad shelley. Plan for patient to get a PICC placed tomorrow morning and to start chemotherapy once results are back. Patient is also apart of Dr. Brown's microbiota study. Baseline stool sample collected and sent to lab. Neutropenic teaching done and handouts given to patient. Continue with POC.

## 2019-03-13 NOTE — PLAN OF CARE
Patient admitted late afternoon shift and completed  admission now on night shift.Started hydrea and acyclovir on nights.Patient has noted increased left root canal pain after stopping his PCN.To get a TTE and EKG today and BMBX.Pain increased so tylenol ineffective as d/t [probable leukemic diagnosis can not take ibuprofen so oxycodone given along with tylenol and more comfortable.Continue with monitoring

## 2019-03-13 NOTE — H&P
"Heme/Onc History and Physical    El Ace MRN# [id number]   Age: 63 year old YOB: 1955     Date of Admission:  3/12/2019    Primary care provider: No primary care provider on file.          Assessment and Plan:   Assessment:  Patient is a 63 year old male with history of BPH, restless legs syndrome, subclinical coronary atherosclerosis, lumbar spine DJD who was referred to [facility] from OSH for specialized work up and management of acute leukemia incidentally found during work up of nonspecific chest symptoms.     Plan:  #Acute leukemia  Patient presented to [facility] ED in [place], [place] for complaints of nonspecific \"twinges\" of chest discomfort in the setting of root canal treatment which was performed yesterday on 3/11/19. He describes palpitations but denies any fevers, focal chest pain, SOB, or productive cough. CT C/A/P was done which was negative for PE or other acute findings (there was minimal posterior right lower lobe basilar atelectasis versus less likely consolidation). On OSH labs, his WBC was incidentally noted to be elevated at 71.1 with Hb 9.0 and plts 97 (prior labs have been unremarkable per patient). PBS was done and suspicious for acute leukemia. Patient was then transferred to [facility] for specialized work up and management. He endorses fatigue and an episode of mild headache but no vision changes, dizziness or somnolence to suggest hyperviscosity syndrome/leukostasis.   - No urgent need for leukapheresis. WBC increased to 101.3 on admission labs from 71.1 earlier today but otherwise no evidence of TLS or DIC.  - Ordered PBS.   - Ordered viral studies - HBV, HCV, HIV, EBV, CMV, HSV 1 & 2.   - Ordered baseline EKG and TTE.   - Ordered HLA typing complete BMT recipient and BMT consult.  - Consult placed for PICC placement.  - Initiated  mg BID, fluconazole 200 mg daily and levofloxacin 250 mg daily for ID prophylaxis.   - Bone marrow biopsy to be scheduled per " primary team for flow, cytogenetics and molecular testing.  - Initiated hydrea 1 gram q6h for cytoreduction (discussed with Dr. Hutchins) and allopurinol 300 mg daily.     #S/P root canal treatment  Patient had a root canal treatment performed on 3/11/19. Site looks unremarkable with no abscess or drainage. He was started on a 7-day course of oral  mg q6h starting one day prior to the procedure.  - No need to continue PCN (no hx of valvular heart disease).  - APAP PRN for pain control (no NSAIDs).     #Restless legs syndrome  - Continue ropinirole 0.75 mg at bedtime.    #Subclinical coronary atherosclerosis  #Hyperlipidemia  Total Agatston calcium score was elevated at 591 (91st percentile) on CT coronaries performed 6/8/2016. Nuclear stress test was negative for ischemia on 8/10/2016. Life style modification measures were recommended. Patient follows with cardiology as outpatient (last office visit on 11/12/18).   - Continue atorvastatin 40 mg daily.     #BPH  [place] [place] [place]  - Continue Flomax 0.4 mg 2 tabs daily.     #History of lumbar spine degenerative disc disease  Patient is s/p laminectomy/facetectomy procedures. He is not currently in pain and does not routinely take pain meds.       FEN   - Regular diet as tolerated  - NS at 100 ml/hr  - PRN lyte replacement per standing protocol    Prophylaxis  - No pharmacologic VTE ppx due to TCP  - PRN bowel regimen for constipation ppx  - PPI for GI/PUD ppx     Code Status: FULL    Disposition: Admission to inpatient hematology service      Adi Koenig MD  Hospitalist, Hematology and Oncology  03/12/2019              Chief Complaint:   New leukemia         History of Present Illness:   Patient is a 63 year old male with history of BPH, restless legs syndrome, subclinical coronary atherosclerosis, lumbar spine DJD who was referred to [facility] from OS for specialized work up and management of acute leukemia  "incidentally found during work up of nonspecific chest symptoms.    Patient presented to [facility] ED in [place], [place] for complaints of nonspecific \"twinges\" of chest discomfort in the setting of root canal treatment which was performed yesterday on 3/11/19. He describes palpitations but denies any fevers, focal chest pain, SOB, or productive cough. CT C/A/P was done which was negative for PE or other acute findings (there was minimal posterior right lower lobe basilar atelectasis versus less likely consolidation). On OSH labs, his WBC was incidentally noted to be elevated at 71.1 with Hb 9.0 and plts 97 (prior labs have been unremarkable per patient). PBS was done and suspicious for acute leukemia. Patient was then transferred to [facility] for specialized work up and management. He endorses fatigue and an episode of mild headache but no vision changes, dizziness or somnolence to suggest hyperviscosity syndrome.            Review of Systems:     14-point review of systems was otherwise negative except as noted in HPI.          Past Medical History:   Past medical history was reviewed.   No past medical history on file.          Past Surgical History:   Past surgical history was reviewed.   No past surgical history on file.          Social History:   Social history was reviewed.   Social History     Tobacco Use     Smoking status: Not on file   Substance Use Topics     Alcohol use: Not on file             Family History:   Family history was reviewed.  No family history on file.          Allergies:   Allergies not on file          Medications:   Medications Reviewed.   Current Facility-Administered Medications   Medication     acetaminophen (TYLENOL) tablet 650 mg     acyclovir (ZOVIRAX) tablet 400 mg     [START ON 3/13/2019] allopurinol (ZYLOPRIM) tablet 300 mg     [START ON 3/13/2019] fluconazole (DIFLUCAN) tablet 200 mg     LORazepam (ATIVAN) tablet 0.5-1 mg    Or     LORazepam (ATIVAN) injection 0.5-1 mg " "    Medication Instruction     prochlorperazine (COMPAZINE) tablet 5-10 mg    Or     prochlorperazine (COMPAZINE) injection 5-10 mg             Physical Exam:   Vitals were reviewed.  Blood pressure 142/77, pulse 88, temperature 98.1  F (36.7  C), temperature source Oral, resp. rate 16, height 1.778 m (5' 10\"), weight 98.2 kg (216 lb 6.4 oz), SpO2 96 %.    Constitutional: awake, laying in bed, appears comfortable, in NAD  HEENT: MMM, EOM intact, sclerae clear and anicteric  Heart: RRR, no murmurs appreciated  Lungs: Clear to auscultation bilaterally, breathing comfortably on RA, no wheezes, rhonchi  Abd: Soft, non-tender, BS+, no masses appreciated  MSK:  Warm, FROM, no joint swelling  Neuro: CN2-12 grossly intact, no lateralizing symptoms or focal neurologic deficits  Psych: Mood and affect WNL           Data:   No intake/output data recorded.    ROUTINE LABS (Last four results)  CMPNo lab results found in last 7 days.  CBCNo lab results found in last 7 days.  INRNo lab results found in last 7 days.  Arterial Blood GasNo lab results found in last 7 days.        "

## 2019-03-13 NOTE — PROGRESS NOTES
Patient consented to the microbiota study, [id number], a biorepository protocol with no treatment component, collecting blood and stool samples. All questions were answered and the patient was given a copy of the consent.     Soto Brown

## 2019-03-14 ENCOUNTER — APPOINTMENT (OUTPATIENT)
Dept: GENERAL RADIOLOGY | Facility: CLINIC | Age: 64
DRG: 834 | End: 2019-03-14
Attending: INTERNAL MEDICINE
Payer: COMMERCIAL

## 2019-03-14 PROBLEM — C92.00 ACUTE MYELOID LEUKEMIA NOT HAVING ACHIEVED REMISSION (H): Status: ACTIVE | Noted: 2019-03-14

## 2019-03-14 LAB
ALBUMIN SERPL-MCNC: 3.1 G/DL (ref 3.4–5)
ALP SERPL-CCNC: 74 U/L (ref 40–150)
ALT SERPL W P-5'-P-CCNC: 18 U/L (ref 0–70)
AST SERPL W P-5'-P-CCNC: 12 U/L (ref 0–45)
BILIRUB DIRECT SERPL-MCNC: 0.1 MG/DL (ref 0–0.2)
BILIRUB SERPL-MCNC: 0.3 MG/DL (ref 0.2–1.3)
PROT SERPL-MCNC: 6.8 G/DL (ref 6.8–8.8)

## 2019-03-14 PROCEDURE — 25000132 ZZH RX MED GY IP 250 OP 250 PS 637: Performed by: INTERNAL MEDICINE

## 2019-03-14 PROCEDURE — 25800030 ZZH RX IP 258 OP 636: Performed by: INTERNAL MEDICINE

## 2019-03-14 PROCEDURE — 40000986 XR CHEST PORT 1 VW

## 2019-03-14 PROCEDURE — 12000001 ZZH R&B MED SURG/OB UMMC

## 2019-03-14 PROCEDURE — 36569 INSJ PICC 5 YR+ W/O IMAGING: CPT

## 2019-03-14 PROCEDURE — 99356 ZZC PROLONGED SERV,INPATIENT,1ST HR: CPT | Performed by: INTERNAL MEDICINE

## 2019-03-14 PROCEDURE — 25000125 ZZHC RX 250: Performed by: INTERNAL MEDICINE

## 2019-03-14 PROCEDURE — 85025 COMPLETE CBC W/AUTO DIFF WBC: CPT | Performed by: INTERNAL MEDICINE

## 2019-03-14 PROCEDURE — 27211417 ZZ H KIT, VPS RHYTHM STYLET

## 2019-03-14 PROCEDURE — 99233 SBSQ HOSP IP/OBS HIGH 50: CPT | Performed by: INTERNAL MEDICINE

## 2019-03-14 PROCEDURE — C1751 CATH, INF, PER/CENT/MIDLINE: HCPCS

## 2019-03-14 PROCEDURE — 36415 COLL VENOUS BLD VENIPUNCTURE: CPT | Performed by: INTERNAL MEDICINE

## 2019-03-14 RX ADMIN — ACYCLOVIR 400 MG: 400 TABLET ORAL at 20:30

## 2019-03-14 RX ADMIN — SODIUM CHLORIDE: 9 INJECTION, SOLUTION INTRAVENOUS at 15:33

## 2019-03-14 RX ADMIN — ACETAMINOPHEN 650 MG: 325 TABLET, FILM COATED ORAL at 00:59

## 2019-03-14 RX ADMIN — ACETAMINOPHEN 650 MG: 325 TABLET, FILM COATED ORAL at 07:47

## 2019-03-14 RX ADMIN — HYDROXYUREA 1000 MG: 500 CAPSULE ORAL at 03:38

## 2019-03-14 RX ADMIN — HYDROXYUREA 1000 MG: 500 CAPSULE ORAL at 15:23

## 2019-03-14 RX ADMIN — ATORVASTATIN CALCIUM 40 MG: 40 TABLET, FILM COATED ORAL at 20:30

## 2019-03-14 RX ADMIN — TAMSULOSIN HYDROCHLORIDE 0.8 MG: 0.4 CAPSULE ORAL at 07:50

## 2019-03-14 RX ADMIN — HYDROXYUREA 1000 MG: 500 CAPSULE ORAL at 08:54

## 2019-03-14 RX ADMIN — ACYCLOVIR 400 MG: 400 TABLET ORAL at 07:50

## 2019-03-14 RX ADMIN — FLUCONAZOLE 200 MG: 200 TABLET ORAL at 07:50

## 2019-03-14 RX ADMIN — ACETAMINOPHEN 650 MG: 325 TABLET, FILM COATED ORAL at 16:41

## 2019-03-14 RX ADMIN — ROPINIROLE HYDROCHLORIDE 0.75 MG: 0.5 TABLET, FILM COATED ORAL at 22:11

## 2019-03-14 RX ADMIN — ACETAMINOPHEN 650 MG: 325 TABLET, FILM COATED ORAL at 12:08

## 2019-03-14 RX ADMIN — HYDROXYUREA 1000 MG: 500 CAPSULE ORAL at 21:22

## 2019-03-14 RX ADMIN — ACETAMINOPHEN 650 MG: 325 TABLET, FILM COATED ORAL at 21:22

## 2019-03-14 RX ADMIN — PANTOPRAZOLE SODIUM 40 MG: 40 TABLET, DELAYED RELEASE ORAL at 07:50

## 2019-03-14 RX ADMIN — LIDOCAINE HYDROCHLORIDE 2 ML: 10 INJECTION, SOLUTION EPIDURAL; INFILTRATION; INTRACAUDAL; PERINEURAL at 11:20

## 2019-03-14 RX ADMIN — SODIUM CHLORIDE: 9 INJECTION, SOLUTION INTRAVENOUS at 22:14

## 2019-03-14 RX ADMIN — ALLOPURINOL 300 MG: 300 TABLET ORAL at 07:50

## 2019-03-14 RX ADMIN — LEVOFLOXACIN 250 MG: 250 TABLET, FILM COATED ORAL at 07:50

## 2019-03-14 ASSESSMENT — ACTIVITIES OF DAILY LIVING (ADL)
ADLS_ACUITY_SCORE: 12

## 2019-03-14 ASSESSMENT — PAIN DESCRIPTION - DESCRIPTORS
DESCRIPTORS: ACHING

## 2019-03-14 ASSESSMENT — MIFFLIN-ST. JEOR: SCORE: 1787.36

## 2019-03-14 NOTE — PLAN OF CARE
[id number]: VSS. Afebrile. Continues with left lower tooth pain where root canal was done. PRN PO Tylenol given x3 with relief. Denied nausea and vomiting. Appetite good. UOP adequate. Another stool sample collected and sent to lab as a baseline for Dr. Brown's microbiota study. Double lumen PICC placed in left arm without incident and okay to use. BMBx results resulted but patient hasn't heard results yet. Plan for patient to start 7+3 with Daunorubicin tomorrow morning. Showered. Up ad shelley walking the halls. Wife, Bessy at bedside. Continue with POC.

## 2019-03-14 NOTE — PROVIDER NOTIFICATION
03/14/19 1115   PICC Double Lumen 03/14/19 Right Basilic   Placement Date/Time: 03/14/19 1115   Catheter Brand: LSAT Freedom  Size (Fr): 5 Fr  Lot #: [id number]  Full barrier precautions done: Yes, hand hygiene, sterile gown, sterile gloves, mask, cap, full body drape, chlorhexidine scrub  Consent Signed: Yes  Time Ou...   Site Assessment WDL   External Cath Length (cm) 2 cm   Extremity Circumference (cm) 32 cm   Dressing Intervention Chlorhexidine patch;Transparent;Securing device;New dressing   Dressing Change Due 03/21/19   PICC Comment PICC insertion done   Lumen A - Color GRAY   Lumen A - Status blood return noted;saline locked   Lumen A - Cap Change Due 03/18/19   Lumen B - Color PURPLE   Lumen B - Status blood return noted;saline locked   Lumen B - Cap Change Due 03/18/19   Extravasation? No

## 2019-03-14 NOTE — PLAN OF CARE
[id number]:    VSS, afebrile, A&Ox4. Denies nausea, SOB. Tylenol given x 1 for mouth pain. BMBx dressing c,d,i. Up ad shelley, voiding adequately. Plan for PICC placement tomorrow morning. No acute events, continue to monitor & follow POC.

## 2019-03-15 LAB
A* LOCUS: NORMAL
A*: NORMAL
ABTEST METHOD: NORMAL
ALBUMIN SERPL-MCNC: 3.1 G/DL (ref 3.4–5)
ALP SERPL-CCNC: 71 U/L (ref 40–150)
ALT SERPL W P-5'-P-CCNC: 18 U/L (ref 0–70)
ANION GAP SERPL CALCULATED.3IONS-SCNC: 8 MMOL/L (ref 3–14)
ANION GAP SERPL CALCULATED.3IONS-SCNC: 8 MMOL/L (ref 3–14)
ANISOCYTOSIS BLD QL SMEAR: SLIGHT
ANISOCYTOSIS BLD QL SMEAR: SLIGHT
APTT PPP: 30 SEC (ref 22–37)
AST SERPL W P-5'-P-CCNC: 14 U/L (ref 0–45)
B* LOCUS: NORMAL
B*: NORMAL
BASOPHILS # BLD AUTO: 0 10E9/L (ref 0–0.2)
BASOPHILS NFR BLD AUTO: 0 %
BILIRUB DIRECT SERPL-MCNC: <0.1 MG/DL (ref 0–0.2)
BILIRUB SERPL-MCNC: 0.3 MG/DL (ref 0.2–1.3)
BLASTS # BLD: 53.7 10E9/L
BLASTS # BLD: 62.6 10E9/L
BLASTS # BLD: 81.8 10E9/L
BLASTS BLD QL SMEAR: 95.4 %
BLASTS BLD QL SMEAR: 96 %
BLASTS BLD QL SMEAR: 97 %
BUN SERPL-MCNC: 8 MG/DL (ref 7–30)
BUN SERPL-MCNC: 9 MG/DL (ref 7–30)
BW-1: NORMAL
C* LOCUS: NORMAL
C*: NORMAL
CALCIUM SERPL-MCNC: 7.8 MG/DL (ref 8.5–10.1)
CALCIUM SERPL-MCNC: 8.1 MG/DL (ref 8.5–10.1)
CHLORIDE SERPL-SCNC: 110 MMOL/L (ref 94–109)
CHLORIDE SERPL-SCNC: 111 MMOL/L (ref 94–109)
CO2 SERPL-SCNC: 22 MMOL/L (ref 20–32)
CO2 SERPL-SCNC: 22 MMOL/L (ref 20–32)
COPATH REPORT: NORMAL
COPATH REPORT: NORMAL
CREAT SERPL-MCNC: 0.73 MG/DL (ref 0.66–1.25)
CREAT SERPL-MCNC: 0.82 MG/DL (ref 0.66–1.25)
DIFFERENTIAL METHOD BLD: ABNORMAL
DPA1* LOCUS NMDP: NORMAL
DPA1* NMDP: NORMAL
DPA1*: NORMAL
DPA1*LOCUS: NORMAL
DPB1* LOCUS NMDP: NORMAL
DPB1* NMDP: NORMAL
DPB1*: NORMAL
DPB1*LOCUS: NORMAL
DQA1*: NORMAL
DQA1*LOCUS: NORMAL
DQB1* LOCUS: NORMAL
DQB1*: NORMAL
DRB1* LOCUS: NORMAL
DRB1*: NORMAL
DRB4* LOCUS: NORMAL
DRB5*: NORMAL
DRSSO TEST METHOD: NORMAL
EOSINOPHIL # BLD AUTO: 0 10E9/L (ref 0–0.7)
EOSINOPHIL NFR BLD AUTO: 0 %
ERYTHROCYTE [DISTWIDTH] IN BLOOD BY AUTOMATED COUNT: 16.1 % (ref 10–15)
ERYTHROCYTE [DISTWIDTH] IN BLOOD BY AUTOMATED COUNT: 16.2 % (ref 10–15)
ERYTHROCYTE [DISTWIDTH] IN BLOOD BY AUTOMATED COUNT: 16.4 % (ref 10–15)
ERYTHROCYTE [DISTWIDTH] IN BLOOD BY AUTOMATED COUNT: 16.5 % (ref 10–15)
FIBRINOGEN PPP-MCNC: 446 MG/DL (ref 200–420)
GFR SERPL CREATININE-BSD FRML MDRD: >90 ML/MIN/{1.73_M2}
GFR SERPL CREATININE-BSD FRML MDRD: >90 ML/MIN/{1.73_M2}
GLUCOSE SERPL-MCNC: 105 MG/DL (ref 70–99)
GLUCOSE SERPL-MCNC: 142 MG/DL (ref 70–99)
HCT VFR BLD AUTO: 25.8 % (ref 40–53)
HCT VFR BLD AUTO: 26.8 % (ref 40–53)
HCT VFR BLD AUTO: 27.7 % (ref 40–53)
HCT VFR BLD AUTO: 29 % (ref 40–53)
HGB BLD-MCNC: 8.2 G/DL (ref 13.3–17.7)
HGB BLD-MCNC: 8.7 G/DL (ref 13.3–17.7)
HGB BLD-MCNC: 8.9 G/DL (ref 13.3–17.7)
HGB BLD-MCNC: 9.4 G/DL (ref 13.3–17.7)
INR PPP: 1.13 (ref 0.86–1.14)
INR PPP: 1.17 (ref 0.86–1.14)
LDH SERPL L TO P-CCNC: 232 U/L (ref 85–227)
LYMPHOCYTES # BLD AUTO: 1.6 10E9/L (ref 0.8–5.3)
LYMPHOCYTES # BLD AUTO: 1.7 10E9/L (ref 0.8–5.3)
LYMPHOCYTES # BLD AUTO: 2 10E9/L (ref 0.8–5.3)
LYMPHOCYTES # BLD AUTO: 2 10E9/L (ref 0.8–5.3)
LYMPHOCYTES NFR BLD AUTO: 2 %
LYMPHOCYTES NFR BLD AUTO: 2 %
LYMPHOCYTES NFR BLD AUTO: 2.8 %
LYMPHOCYTES NFR BLD AUTO: 3 %
MACROCYTES BLD QL SMEAR: PRESENT
MACROCYTES BLD QL SMEAR: PRESENT
MCH RBC QN AUTO: 33.2 PG (ref 26.5–33)
MCH RBC QN AUTO: 33.3 PG (ref 26.5–33)
MCH RBC QN AUTO: 33.5 PG (ref 26.5–33)
MCH RBC QN AUTO: 33.7 PG (ref 26.5–33)
MCHC RBC AUTO-ENTMCNC: 31.8 G/DL (ref 31.5–36.5)
MCHC RBC AUTO-ENTMCNC: 32.1 G/DL (ref 31.5–36.5)
MCHC RBC AUTO-ENTMCNC: 32.4 G/DL (ref 31.5–36.5)
MCHC RBC AUTO-ENTMCNC: 32.5 G/DL (ref 31.5–36.5)
MCV RBC AUTO: 103 FL (ref 78–100)
MCV RBC AUTO: 103 FL (ref 78–100)
MCV RBC AUTO: 105 FL (ref 78–100)
MCV RBC AUTO: 105 FL (ref 78–100)
MONOCYTES # BLD AUTO: 0 10E9/L (ref 0–1.3)
MONOCYTES NFR BLD AUTO: 0 %
MYELOCYTES # BLD: 0.7 10E9/L
MYELOCYTES # BLD: 0.8 10E9/L
MYELOCYTES NFR BLD MANUAL: 1 %
MYELOCYTES NFR BLD MANUAL: 1 %
NEUTROPHILS # BLD AUTO: 0 10E9/L (ref 1.6–8.3)
NEUTROPHILS # BLD AUTO: 1 10E9/L (ref 1.6–8.3)
NEUTROPHILS # BLD AUTO: 1 10E9/L (ref 1.6–8.3)
NEUTROPHILS NFR BLD AUTO: 0 %
NEUTROPHILS NFR BLD AUTO: 1 %
NEUTROPHILS NFR BLD AUTO: 1.8 %
NRBC # BLD AUTO: 1 10*3/UL
NRBC BLD AUTO-RTO: 2 /100
OTHER CELLS # BLD MANUAL: 98.3 10E9/L
OTHER CELLS NFR BLD MANUAL: 97 %
PHOSPHATE SERPL-MCNC: 1.6 MG/DL (ref 2.5–4.5)
PHOSPHATE SERPL-MCNC: 2.5 MG/DL (ref 2.5–4.5)
PLATELET # BLD AUTO: 88 10E9/L (ref 150–450)
PLATELET # BLD AUTO: 89 10E9/L (ref 150–450)
PLATELET # BLD AUTO: 95 10E9/L (ref 150–450)
PLATELET # BLD AUTO: 99 10E9/L (ref 150–450)
PLATELET # BLD EST: ABNORMAL 10*3/UL
PLATELET # BLD EST: ABNORMAL 10*3/UL
POTASSIUM SERPL-SCNC: 3.9 MMOL/L (ref 3.4–5.3)
POTASSIUM SERPL-SCNC: 4.6 MMOL/L (ref 3.4–5.3)
PROT SERPL-MCNC: 6.7 G/DL (ref 6.8–8.8)
RBC # BLD AUTO: 2.47 10E12/L (ref 4.4–5.9)
RBC # BLD AUTO: 2.6 10E12/L (ref 4.4–5.9)
RBC # BLD AUTO: 2.64 10E12/L (ref 4.4–5.9)
RBC # BLD AUTO: 2.82 10E12/L (ref 4.4–5.9)
SODIUM SERPL-SCNC: 139 MMOL/L (ref 133–144)
SODIUM SERPL-SCNC: 140 MMOL/L (ref 133–144)
URATE SERPL-MCNC: 3.3 MG/DL (ref 3.5–7.2)
URATE SERPL-MCNC: 4.3 MG/DL (ref 3.5–7.2)
WBC # BLD AUTO: 101.3 10E9/L (ref 4–11)
WBC # BLD AUTO: 56.3 10E9/L (ref 4–11)
WBC # BLD AUTO: 65.2 10E9/L (ref 4–11)
WBC # BLD AUTO: 84.3 10E9/L (ref 4–11)

## 2019-03-15 PROCEDURE — 80076 HEPATIC FUNCTION PANEL: CPT | Performed by: INTERNAL MEDICINE

## 2019-03-15 PROCEDURE — 25000128 H RX IP 250 OP 636: Performed by: INTERNAL MEDICINE

## 2019-03-15 PROCEDURE — 84550 ASSAY OF BLOOD/URIC ACID: CPT | Performed by: INTERNAL MEDICINE

## 2019-03-15 PROCEDURE — 25000132 ZZH RX MED GY IP 250 OP 250 PS 637: Performed by: INTERNAL MEDICINE

## 2019-03-15 PROCEDURE — 25800030 ZZH RX IP 258 OP 636: Performed by: INTERNAL MEDICINE

## 2019-03-15 PROCEDURE — 99232 SBSQ HOSP IP/OBS MODERATE 35: CPT | Mod: GC | Performed by: INTERNAL MEDICINE

## 2019-03-15 PROCEDURE — 83615 LACTATE (LD) (LDH) ENZYME: CPT | Performed by: INTERNAL MEDICINE

## 2019-03-15 PROCEDURE — 25000131 ZZH RX MED GY IP 250 OP 636 PS 637: Performed by: INTERNAL MEDICINE

## 2019-03-15 PROCEDURE — 85025 COMPLETE CBC W/AUTO DIFF WBC: CPT | Performed by: INTERNAL MEDICINE

## 2019-03-15 PROCEDURE — 36592 COLLECT BLOOD FROM PICC: CPT | Performed by: INTERNAL MEDICINE

## 2019-03-15 PROCEDURE — 80048 BASIC METABOLIC PNL TOTAL CA: CPT | Performed by: INTERNAL MEDICINE

## 2019-03-15 PROCEDURE — 85610 PROTHROMBIN TIME: CPT | Performed by: INTERNAL MEDICINE

## 2019-03-15 PROCEDURE — 12000001 ZZH R&B MED SURG/OB UMMC

## 2019-03-15 PROCEDURE — 25000125 ZZHC RX 250: Performed by: INTERNAL MEDICINE

## 2019-03-15 PROCEDURE — 85730 THROMBOPLASTIN TIME PARTIAL: CPT | Performed by: INTERNAL MEDICINE

## 2019-03-15 PROCEDURE — 85384 FIBRINOGEN ACTIVITY: CPT | Performed by: INTERNAL MEDICINE

## 2019-03-15 PROCEDURE — 84100 ASSAY OF PHOSPHORUS: CPT | Performed by: INTERNAL MEDICINE

## 2019-03-15 RX ORDER — SODIUM CHLORIDE 9 MG/ML
1000 INJECTION, SOLUTION INTRAVENOUS CONTINUOUS PRN
Status: DISCONTINUED | OUTPATIENT
Start: 2019-03-15 | End: 2019-03-22

## 2019-03-15 RX ORDER — LORAZEPAM 2 MG/ML
.5-1 INJECTION INTRAMUSCULAR EVERY 6 HOURS PRN
Status: DISCONTINUED | OUTPATIENT
Start: 2019-03-15 | End: 2019-03-15

## 2019-03-15 RX ORDER — ONDANSETRON 8 MG/1
8 TABLET, FILM COATED ORAL EVERY 12 HOURS
Status: DISCONTINUED | OUTPATIENT
Start: 2019-03-15 | End: 2019-03-20

## 2019-03-15 RX ORDER — DIPHENHYDRAMINE HYDROCHLORIDE 50 MG/ML
50 INJECTION INTRAMUSCULAR; INTRAVENOUS
Status: DISCONTINUED | OUTPATIENT
Start: 2019-03-15 | End: 2019-03-22

## 2019-03-15 RX ORDER — LORAZEPAM 0.5 MG/1
.5-1 TABLET ORAL EVERY 6 HOURS PRN
Status: DISCONTINUED | OUTPATIENT
Start: 2019-03-15 | End: 2019-03-15

## 2019-03-15 RX ORDER — ALBUTEROL SULFATE 0.83 MG/ML
2.5 SOLUTION RESPIRATORY (INHALATION)
Status: DISCONTINUED | OUTPATIENT
Start: 2019-03-15 | End: 2019-03-22

## 2019-03-15 RX ORDER — DEXAMETHASONE 4 MG/1
12 TABLET ORAL EVERY 24 HOURS
Status: COMPLETED | OUTPATIENT
Start: 2019-03-15 | End: 2019-03-17

## 2019-03-15 RX ORDER — MEPERIDINE HYDROCHLORIDE 25 MG/ML
25 INJECTION INTRAMUSCULAR; INTRAVENOUS; SUBCUTANEOUS EVERY 30 MIN PRN
Status: DISCONTINUED | OUTPATIENT
Start: 2019-03-15 | End: 2019-03-22

## 2019-03-15 RX ORDER — PROCHLORPERAZINE MALEATE 10 MG
10 TABLET ORAL EVERY 6 HOURS PRN
Status: DISCONTINUED | OUTPATIENT
Start: 2019-03-15 | End: 2019-03-15

## 2019-03-15 RX ORDER — EPINEPHRINE 1 MG/ML
0.3 INJECTION, SOLUTION, CONCENTRATE INTRAVENOUS EVERY 5 MIN PRN
Status: DISCONTINUED | OUTPATIENT
Start: 2019-03-15 | End: 2019-03-22

## 2019-03-15 RX ORDER — ALBUTEROL SULFATE 90 UG/1
1-2 AEROSOL, METERED RESPIRATORY (INHALATION)
Status: DISCONTINUED | OUTPATIENT
Start: 2019-03-15 | End: 2019-03-28

## 2019-03-15 RX ORDER — METHYLPREDNISOLONE SODIUM SUCCINATE 125 MG/2ML
125 INJECTION, POWDER, LYOPHILIZED, FOR SOLUTION INTRAMUSCULAR; INTRAVENOUS
Status: DISCONTINUED | OUTPATIENT
Start: 2019-03-15 | End: 2019-03-22

## 2019-03-15 RX ADMIN — HYDROXYUREA 1000 MG: 500 CAPSULE ORAL at 21:36

## 2019-03-15 RX ADMIN — TAMSULOSIN HYDROCHLORIDE 0.8 MG: 0.4 CAPSULE ORAL at 08:10

## 2019-03-15 RX ADMIN — HYDROXYUREA 1000 MG: 500 CAPSULE ORAL at 08:10

## 2019-03-15 RX ADMIN — ACETAMINOPHEN 650 MG: 325 TABLET, FILM COATED ORAL at 13:26

## 2019-03-15 RX ADMIN — ACYCLOVIR 400 MG: 400 TABLET ORAL at 19:41

## 2019-03-15 RX ADMIN — ACYCLOVIR 400 MG: 400 TABLET ORAL at 08:09

## 2019-03-15 RX ADMIN — DAUNORUBICIN HYDROCHLORIDE 133 MG: 5 INJECTION, SOLUTION INTRAVENOUS at 14:12

## 2019-03-15 RX ADMIN — ONDANSETRON HYDROCHLORIDE 8 MG: 8 TABLET, FILM COATED ORAL at 13:26

## 2019-03-15 RX ADMIN — ROPINIROLE HYDROCHLORIDE 0.75 MG: 0.5 TABLET, FILM COATED ORAL at 19:40

## 2019-03-15 RX ADMIN — ALLOPURINOL 300 MG: 300 TABLET ORAL at 08:09

## 2019-03-15 RX ADMIN — POTASSIUM PHOSPHATE, MONOBASIC AND POTASSIUM PHOSPHATE, DIBASIC 20 MMOL: 224; 236 INJECTION, SOLUTION INTRAVENOUS at 20:38

## 2019-03-15 RX ADMIN — LEVOFLOXACIN 250 MG: 250 TABLET, FILM COATED ORAL at 08:10

## 2019-03-15 RX ADMIN — ACETAMINOPHEN 650 MG: 325 TABLET, FILM COATED ORAL at 08:10

## 2019-03-15 RX ADMIN — ATORVASTATIN CALCIUM 40 MG: 40 TABLET, FILM COATED ORAL at 19:41

## 2019-03-15 RX ADMIN — HYDROXYUREA 1000 MG: 500 CAPSULE ORAL at 02:42

## 2019-03-15 RX ADMIN — PANTOPRAZOLE SODIUM 40 MG: 40 TABLET, DELAYED RELEASE ORAL at 08:10

## 2019-03-15 RX ADMIN — FLUCONAZOLE 200 MG: 200 TABLET ORAL at 08:10

## 2019-03-15 RX ADMIN — ACETAMINOPHEN 650 MG: 325 TABLET, FILM COATED ORAL at 21:35

## 2019-03-15 RX ADMIN — HYDROXYUREA 1000 MG: 500 CAPSULE ORAL at 15:43

## 2019-03-15 RX ADMIN — ACETAMINOPHEN 650 MG: 325 TABLET, FILM COATED ORAL at 17:37

## 2019-03-15 RX ADMIN — SODIUM CHLORIDE: 9 INJECTION, SOLUTION INTRAVENOUS at 20:38

## 2019-03-15 RX ADMIN — CYTARABINE 220 MG: 100 INJECTION, SOLUTION INTRATHECAL; INTRAVENOUS; SUBCUTANEOUS at 14:51

## 2019-03-15 RX ADMIN — SODIUM CHLORIDE: 9 INJECTION, SOLUTION INTRAVENOUS at 08:10

## 2019-03-15 RX ADMIN — DEXAMETHASONE 12 MG: 4 TABLET ORAL at 13:26

## 2019-03-15 RX ADMIN — ACETAMINOPHEN 650 MG: 325 TABLET, FILM COATED ORAL at 02:43

## 2019-03-15 ASSESSMENT — ACTIVITIES OF DAILY LIVING (ADL)
ADLS_ACUITY_SCORE: 12

## 2019-03-15 ASSESSMENT — MIFFLIN-ST. JEOR: SCORE: 1781.01

## 2019-03-15 ASSESSMENT — PAIN DESCRIPTION - DESCRIPTORS
DESCRIPTORS: ACHING

## 2019-03-15 NOTE — PLAN OF CARE
11p-7a: VSS. Afebrile. Slept during the night but awoke to his jaw aching. Given prn tylenol x 1. Up independently to the bathroom. Plan to start chemo today.

## 2019-03-15 NOTE — PLAN OF CARE
El is with newly dx AML and started 7+3 this afternoon.  Chemo and side effects teaching done with pt and his wife (via speaker phone).  Written material provided.  Daunorubicin infused over 30 minutes via PICC with + blood return then CIVI Cytarabine started.  Tylenol for left mouth pain with relief.  Instructed on soda/salt rinses.  To continue Hydrea for 1 more day per Dr Cordon.

## 2019-03-15 NOTE — PROVIDER NOTIFICATION
DATE/TIME  (DOT-TD, DOT-NOW) CHEMO CHECK ACTIVITY (REGIMEN & DOSE CHECK, DAY, DOSE #, NAME OF CHEMO #1)  CHEMO DRUG #2  CHEMO DRUG #3 NAME OF RN #1 (USE DOT-ME HERE) NAME OF RN#2 (2ND RN TO LOG IN SEPARATELY)   3/15/2019  9:15 AM   Protocol check   Analy Balbuena    3/15/2019  2:03 PM   Day 1 Cytarabine  Daunorubicin   Analy Bartlett     3/16/2019   1:35 PM  Day 2 Daunorubicin and Cytarabine    Robles Balbuena    3/17/2019  2:12 PM   Day 3  Daunorubicin and Cytarabine   Robles Ford     March 18, 2019  1:25 PM   Day 4 CIVI Cytarabine Double Check   Beckie Best   (Jennifer)   March 19, 2019  10:52 AM   Day 5 CIVI Cytarabine Double Check   Beckie Bartlett     3/20/2019  5:40 PM   Day 6 CIVI Cytarabine Double Check   Beckie Guerra     3/21/2019  8: 10 PM Day 7 CIVI Cytarabine Double Check   Breanna Guerra

## 2019-03-15 NOTE — PLAN OF CARE
[id number]:     VSS, afebrile. Complains of left jaw/mouth pain, tylenol given x1, relief provided. Denies nausea. A&Ox4. CIVI Cytarbine infusing, positive blood return noted, pt tolerating well thus far. Phosphorus was 1.6, awaiting bag to hang from pharmacy. Continue to monitor and w/ POC.

## 2019-03-15 NOTE — PROGRESS NOTES
HEME MALIGNANCY PROGRESS NOTE    Mr. Ace is a 63 year old man who presented with chest discomfort after recent root canal. Also with fatigue and night sweats. Workup was negative for PE or other cardiac problems but revealed modest anemia and thrombocytopenia with marked leukocytosis and circulating blasts. Transferred to [facility] for expedited workup and management. Left jaw betterl. No new issues.    Complete ROS negative other than noted  Meds reviewed    VS reviewed. No distress. No OP lesions. Heart regular. Lungs clear. Abd soft, no TTP. No LE edema. PICC looks OK.  Labs and imaging reviewed.     Bone marrow biopsy confirmed AML. Cytogenetics and molecular studies pending. Discussed diagnosis and plan for treatment as outlined below. Cont hydroxyurea for  now leukocytosis until leukocytosis improves. Continue allopurinol. Monitor for TLS and DIC. Anemia and thrombocytopenia related to leukemia. Transfuse as necessary.  Continue prophylactic antibiotics. Anticipate 4-6 week hospital stay after start of induction chemotherapy.    Reviewed pathology results that confirm AML. Need for additional studies for risk stratification. Discussed general features and treatment approach with induction chemotherapy followed by consolidation treatment in remission that can include additional chemo and possibly BMT that would be required for cure. Reviewed standard induction with cytarabine and daunorubicin 7+3. Possibly including midostaurin if FLT3 mutations are present. Reviewed rationale and risk of side effects/toxicities that can include GI side effects, alopecia, damage to heart/lungs/liver/kidney, need for support and transfusions, prolonged hospital stay, possible fatal side effects. Greatest risk is AML. Possible need for additional treatment for remission or possibility of refractory disease. Plan for prophylactic antibiotics. Importance of keeping active and eating. Possible need for TPN. They asked many good  questions and agree with the plan. Plan to start chemo in the morning. Spent 60 minutes in addition to routine care.     Rivera Cuadra MD, PhD  Heme Malignancy Attending

## 2019-03-16 LAB
ANION GAP SERPL CALCULATED.3IONS-SCNC: 10 MMOL/L (ref 3–14)
ANION GAP SERPL CALCULATED.3IONS-SCNC: 10 MMOL/L (ref 3–14)
ANISOCYTOSIS BLD QL SMEAR: SLIGHT
APTT PPP: 27 SEC (ref 22–37)
APTT PPP: 28 SEC (ref 22–37)
BASOPHILS # BLD AUTO: 0 10E9/L (ref 0–0.2)
BASOPHILS NFR BLD AUTO: 0 %
BLASTS # BLD: 3.7 10E9/L
BLASTS BLD QL SMEAR: 65 %
BUN SERPL-MCNC: 10 MG/DL (ref 7–30)
BUN SERPL-MCNC: 13 MG/DL (ref 7–30)
CALCIUM SERPL-MCNC: 8 MG/DL (ref 8.5–10.1)
CALCIUM SERPL-MCNC: 8.1 MG/DL (ref 8.5–10.1)
CHLORIDE SERPL-SCNC: 109 MMOL/L (ref 94–109)
CHLORIDE SERPL-SCNC: 110 MMOL/L (ref 94–109)
CO2 SERPL-SCNC: 21 MMOL/L (ref 20–32)
CO2 SERPL-SCNC: 21 MMOL/L (ref 20–32)
CREAT SERPL-MCNC: 0.68 MG/DL (ref 0.66–1.25)
CREAT SERPL-MCNC: 0.72 MG/DL (ref 0.66–1.25)
DIFFERENTIAL METHOD BLD: ABNORMAL
EOSINOPHIL # BLD AUTO: 0 10E9/L (ref 0–0.7)
EOSINOPHIL NFR BLD AUTO: 0 %
ERYTHROCYTE [DISTWIDTH] IN BLOOD BY AUTOMATED COUNT: 15.9 % (ref 10–15)
FIBRINOGEN PPP-MCNC: 431 MG/DL (ref 200–420)
FIBRINOGEN PPP-MCNC: 435 MG/DL (ref 200–420)
GFR SERPL CREATININE-BSD FRML MDRD: >90 ML/MIN/{1.73_M2}
GFR SERPL CREATININE-BSD FRML MDRD: >90 ML/MIN/{1.73_M2}
GLUCOSE SERPL-MCNC: 183 MG/DL (ref 70–99)
GLUCOSE SERPL-MCNC: 187 MG/DL (ref 70–99)
HCT VFR BLD AUTO: 26.2 % (ref 40–53)
HGB BLD-MCNC: 8.7 G/DL (ref 13.3–17.7)
INR PPP: 1.11 (ref 0.86–1.14)
INR PPP: 1.12 (ref 0.86–1.14)
LYMPHOCYTES # BLD AUTO: 0.4 10E9/L (ref 0.8–5.3)
LYMPHOCYTES NFR BLD AUTO: 7 %
MCH RBC QN AUTO: 34.3 PG (ref 26.5–33)
MCHC RBC AUTO-ENTMCNC: 33.2 G/DL (ref 31.5–36.5)
MCV RBC AUTO: 103 FL (ref 78–100)
MONOCYTES # BLD AUTO: 0 10E9/L (ref 0–1.3)
MONOCYTES NFR BLD AUTO: 0 %
NEUTROPHILS # BLD AUTO: 1.6 10E9/L (ref 1.6–8.3)
NEUTROPHILS NFR BLD AUTO: 28 %
NRBC # BLD AUTO: 0.1 10*3/UL
NRBC BLD AUTO-RTO: 2 /100
PHOSPHATE SERPL-MCNC: 2 MG/DL (ref 2.5–4.5)
PHOSPHATE SERPL-MCNC: 2.8 MG/DL (ref 2.5–4.5)
PLATELET # BLD AUTO: 74 10E9/L (ref 150–450)
PLATELET # BLD EST: ABNORMAL 10*3/UL
POTASSIUM SERPL-SCNC: 3.8 MMOL/L (ref 3.4–5.3)
POTASSIUM SERPL-SCNC: 4.1 MMOL/L (ref 3.4–5.3)
RBC # BLD AUTO: 2.54 10E12/L (ref 4.4–5.9)
SODIUM SERPL-SCNC: 140 MMOL/L (ref 133–144)
SODIUM SERPL-SCNC: 141 MMOL/L (ref 133–144)
URATE SERPL-MCNC: 3.3 MG/DL (ref 3.5–7.2)
URATE SERPL-MCNC: 4 MG/DL (ref 3.5–7.2)
WBC # BLD AUTO: 5.7 10E9/L (ref 4–11)

## 2019-03-16 PROCEDURE — 85025 COMPLETE CBC W/AUTO DIFF WBC: CPT | Performed by: INTERNAL MEDICINE

## 2019-03-16 PROCEDURE — 85730 THROMBOPLASTIN TIME PARTIAL: CPT | Performed by: INTERNAL MEDICINE

## 2019-03-16 PROCEDURE — 25000131 ZZH RX MED GY IP 250 OP 636 PS 637: Performed by: INTERNAL MEDICINE

## 2019-03-16 PROCEDURE — 99232 SBSQ HOSP IP/OBS MODERATE 35: CPT | Performed by: INTERNAL MEDICINE

## 2019-03-16 PROCEDURE — 85610 PROTHROMBIN TIME: CPT | Performed by: INTERNAL MEDICINE

## 2019-03-16 PROCEDURE — 85384 FIBRINOGEN ACTIVITY: CPT | Performed by: INTERNAL MEDICINE

## 2019-03-16 PROCEDURE — 36592 COLLECT BLOOD FROM PICC: CPT | Performed by: INTERNAL MEDICINE

## 2019-03-16 PROCEDURE — 84550 ASSAY OF BLOOD/URIC ACID: CPT | Performed by: INTERNAL MEDICINE

## 2019-03-16 PROCEDURE — 12000001 ZZH R&B MED SURG/OB UMMC

## 2019-03-16 PROCEDURE — 25000128 H RX IP 250 OP 636: Performed by: INTERNAL MEDICINE

## 2019-03-16 PROCEDURE — 80048 BASIC METABOLIC PNL TOTAL CA: CPT | Performed by: INTERNAL MEDICINE

## 2019-03-16 PROCEDURE — 25000125 ZZHC RX 250: Performed by: INTERNAL MEDICINE

## 2019-03-16 PROCEDURE — 25800030 ZZH RX IP 258 OP 636: Performed by: INTERNAL MEDICINE

## 2019-03-16 PROCEDURE — 25000132 ZZH RX MED GY IP 250 OP 250 PS 637: Performed by: INTERNAL MEDICINE

## 2019-03-16 PROCEDURE — 36415 COLL VENOUS BLD VENIPUNCTURE: CPT | Performed by: INTERNAL MEDICINE

## 2019-03-16 PROCEDURE — 40000802 ZZH SITE CHECK

## 2019-03-16 PROCEDURE — 84100 ASSAY OF PHOSPHORUS: CPT | Performed by: INTERNAL MEDICINE

## 2019-03-16 RX ORDER — AMOXICILLIN 250 MG
1-2 CAPSULE ORAL 2 TIMES DAILY PRN
Status: DISCONTINUED | OUTPATIENT
Start: 2019-03-16 | End: 2019-03-20

## 2019-03-16 RX ADMIN — ACETAMINOPHEN 650 MG: 325 TABLET, FILM COATED ORAL at 02:26

## 2019-03-16 RX ADMIN — ROPINIROLE HYDROCHLORIDE 0.75 MG: 0.5 TABLET, FILM COATED ORAL at 20:58

## 2019-03-16 RX ADMIN — TAMSULOSIN HYDROCHLORIDE 0.8 MG: 0.4 CAPSULE ORAL at 08:00

## 2019-03-16 RX ADMIN — SODIUM CHLORIDE: 9 INJECTION, SOLUTION INTRAVENOUS at 22:50

## 2019-03-16 RX ADMIN — ACETAMINOPHEN 650 MG: 325 TABLET, FILM COATED ORAL at 08:13

## 2019-03-16 RX ADMIN — ONDANSETRON HYDROCHLORIDE 8 MG: 8 TABLET, FILM COATED ORAL at 13:10

## 2019-03-16 RX ADMIN — DAUNORUBICIN HYDROCHLORIDE 133 MG: 5 INJECTION, SOLUTION INTRAVENOUS at 14:51

## 2019-03-16 RX ADMIN — FLUCONAZOLE 200 MG: 200 TABLET ORAL at 08:00

## 2019-03-16 RX ADMIN — ATORVASTATIN CALCIUM 40 MG: 40 TABLET, FILM COATED ORAL at 20:58

## 2019-03-16 RX ADMIN — POTASSIUM PHOSPHATE, MONOBASIC AND POTASSIUM PHOSPHATE, DIBASIC 15 MMOL: 224; 236 INJECTION, SOLUTION INTRAVENOUS at 22:49

## 2019-03-16 RX ADMIN — ACYCLOVIR 400 MG: 400 TABLET ORAL at 08:00

## 2019-03-16 RX ADMIN — DEXAMETHASONE 12 MG: 4 TABLET ORAL at 13:10

## 2019-03-16 RX ADMIN — ACETAMINOPHEN 650 MG: 325 TABLET, FILM COATED ORAL at 12:13

## 2019-03-16 RX ADMIN — ALLOPURINOL 300 MG: 300 TABLET ORAL at 08:00

## 2019-03-16 RX ADMIN — CYTARABINE 220 MG: 100 INJECTION, SOLUTION INTRATHECAL; INTRAVENOUS; SUBCUTANEOUS at 14:50

## 2019-03-16 RX ADMIN — PANTOPRAZOLE SODIUM 40 MG: 40 TABLET, DELAYED RELEASE ORAL at 08:00

## 2019-03-16 RX ADMIN — SODIUM CHLORIDE: 9 INJECTION, SOLUTION INTRAVENOUS at 11:32

## 2019-03-16 RX ADMIN — HYDROXYUREA 1000 MG: 500 CAPSULE ORAL at 02:24

## 2019-03-16 RX ADMIN — ONDANSETRON HYDROCHLORIDE 8 MG: 8 TABLET, FILM COATED ORAL at 02:26

## 2019-03-16 RX ADMIN — ACETAMINOPHEN 650 MG: 325 TABLET, FILM COATED ORAL at 22:48

## 2019-03-16 RX ADMIN — ACYCLOVIR 400 MG: 400 TABLET ORAL at 20:58

## 2019-03-16 RX ADMIN — LEVOFLOXACIN 250 MG: 250 TABLET, FILM COATED ORAL at 08:00

## 2019-03-16 ASSESSMENT — ACTIVITIES OF DAILY LIVING (ADL)
ADLS_ACUITY_SCORE: 12

## 2019-03-16 ASSESSMENT — MIFFLIN-ST. JEOR: SCORE: 1783.25

## 2019-03-16 ASSESSMENT — PAIN DESCRIPTION - DESCRIPTORS
DESCRIPTORS: ACHING

## 2019-03-16 NOTE — PLAN OF CARE
VSS, afebrile. Left jaw pain improving per pt, tylenol given x2. Day 2 CIVI Cytarabine hung w/ brisk blood return, day 2 daunorubicin hung, infusing over 30 min, mirela shift to take down. Denies nausea. Phos 2.8 no further replacement needed. Adequate UOP. Continue to monitor & w/ POC.

## 2019-03-16 NOTE — PROGRESS NOTES
"Hematology-Oncology Progress note    Assessment and Plan    Assessment:  Patient is a 63 year old male with history of BPH, restless legs syndrome, subclinical coronary atherosclerosis, lumbar spine DJD who was referred to [facility] from OSH for work up and management of acute leukemia incidentally found during work up of nonspecific chest symptoms. Pt also has 1 month symptoms of gen weakness, fatigue,  recent fever, night sweats.       #AML-95% blasts in marrow at diagnosis  Patient presented to [facility] ED in [place], [place] for complaints of nonspecific \"twinges\" of chest discomfort in the setting of root canal treatment which was performed yesterday on 3/11/19. CT C/A/P was negative for PE or other acute findings (there was minimal posterior right lower lobe basilar atelectasis versus less likely consolidation). On OSH labs, his WBC was incidentally noted to be elevated at 71.1 with Hb 9.0 and plts 97 (prior labs have been unremarkable per patient). Smear was suspicious for immature blasts and acute leukemia. No symptoms of hyperviscosity, TLS or DIC on presentation. Smear with no swapnil rods, immature cells, no granules, >90% blasts roughly. BM biopsy confirming Acute myeloid leukemia (AML)- Hypercellular marrow (95%) with  and 95% blasts. Flow consistent with AML with 95% blasts with the following immunophenotype:  Positive for CD13, CD33, CD34, CD38, dim to negative CD45, , partial HLA-DR. Further risk stratification with NGS, FISH and cytogenetics pending. ECHO normal. PICC line placed on 3/14. Chemo started 3/15.     Started 7+3 (Day 1 was 3/15/19)  Daunorubicin 60mg/m2 daily for 3 days  Cytarabine 100 mg/m2 for 7 days      - No urgent need for leukapheresis  - Viral studies - HBV-pt is immune, HCV-neg, HIV-neg, EBV IgG +, CMV IgG neg, HSV 1 neg, HSV 2 IgG +   - Ordered HLA typing complete BMT recipient and BMT consult.  - continue  mg BID, fluconazole 200 mg daily and levofloxacin 250 mg " daily for ID prophylaxis.   - Continue hydrea 1 gram q6h for cytoreduction  and allopurinol 300 mg daily for TLS prophy  -CBC daily  -BMP, TLS and DIC labs Q12 hrs  -Goal Hb >7, Plt >10K, Fibrinogen >100     #S/P root canal treatment  Patient had a root canal treatment performed on 3/11/19. Site looks unremarkable with no abscess or drainage. He was started on a 7-day course of oral  mg q6h starting one day prior to the procedure.  - No need to continue PCN (no hx of valvular heart disease).  - APAP PRN for pain control (no NSAIDs).      #Restless legs syndrome  - Continue ropinirole 0.75 mg at bedtime.     #Subclinical coronary atherosclerosis  #Hyperlipidemia  Total Agatston calcium score was elevated at 591 (91st percentile) on CT coronaries performed 6/8/2016. Nuclear stress test was negative for ischemia on 8/10/2016. Life style modification measures were recommended. Patient follows with cardiology as outpatient (last office visit on 11/12/18).   - Continue atorvastatin 40 mg daily.      #BPH  Patient follows with urology ([facility], [place], [place]).  - Continue Flomax 0.4 mg 2 tabs daily.      #History of lumbar spine degenerative disc disease  Patient is s/p laminectomy/facetectomy procedures. He is not currently in pain and does not routinely take pain meds.         FEN   - Regular diet as tolerated  - NS at 100 ml/hr  - PRN lyte replacement per standing protocol     Prophylaxis  - No pharmacologic VTE ppx due to TCP  - PRN bowel regimen for constipation ppx  - PPI for GI/PUD ppx       Code Status: FULL     Disposition: Admission to inpatient hematology service, discontinue pending evaluation and treatment of leukemia    Jesus Stephens  [facility] Hem Onc fellow  [phone]      Subjective:  No new events overnight. Tooth pain better.       Objective:        Vital signs:  Temp: 98.2  F (36.8  C) Temp src: Oral BP: 113/73   Heart Rate: 76 Resp: 20 SpO2: 96 % O2 Device: None (Room air)   Height: 177.8 cm  "(5' 10\") Weight: 98.5 kg (217 lb 1.6 oz)  Estimated body mass index is 31.15 kg/m  as calculated from the following:    Height as of this encounter: 1.778 m (5' 10\").    Weight as of this encounter: 98.5 kg (217 lb 1.6 oz).      Exam:  Gen: Well built and nourished, not in any acute distress  HEENT: MMM, no oral lesions, no pallor, icterus, some swelling in the lt lower jaw  Neck: supple,   Lymph: no cervical, sc, axillary, inguinal LAD   CV: RRR, no murmurs  Resp: CTA  Abd: Soft, NTND, no HSM   Ext: no edema   Neuro: AAOx4. Cranial nerves grossly intact. Strength 5/5 throughout.  Normal muscle tone. Sensations grossly intact. 3+ symmetric reflexes in Knee jt       Intake/Output Summary (Last 24 hours) at 3/13/2019 1401  Last data filed at 3/13/2019 1333  Gross per 24 hour   Intake 240 ml   Output --   Net 240 ml       Data:    CBC RESULTS:   Recent Labs   Lab Test 03/13/19  0553   WBC 84.3*   RBC 2.60*   HGB 8.7*   HCT 26.8*   *   MCH 33.5*   MCHC 32.5   RDW 16.2*   PLT 95*       Last Basic Metabolic Panel:  Lab Results   Component Value Date     03/13/2019      Lab Results   Component Value Date    POTASSIUM 3.8 03/13/2019     Lab Results   Component Value Date    CHLORIDE 110 03/13/2019     Lab Results   Component Value Date    DEBBIE 7.9 03/13/2019     Lab Results   Component Value Date    CO2 22 03/13/2019     Lab Results   Component Value Date    BUN 12 03/13/2019     Lab Results   Component Value Date    CR 0.82 03/13/2019     Lab Results   Component Value Date     03/13/2019       No results found for this or any previous visit.  "

## 2019-03-16 NOTE — PROGRESS NOTES
[facility], [place]  Hematology / Oncology Progress Note    Date of Admission: 3/12/2019  Date of Service (when I saw the patient): 03/16/2019      Assessment:  Mr. Ace is a 63 year old male with new diagnosis of acute myeloid leukemia incidentally found during work up of nonspecific chest symptoms. He was started on induction chemotherapy with 7+3 (cytarabine and daunorubicin) with D1=3/15/19.    #AML  Patient presented to [facility] ED in [place], [place] for complaints of nonspecific chest discomfort after root canal treatment (done 3/11/19). CT C/A/P was negative for PE or other acute findings. On OSH labs, his WBC was incidentally noted to be elevated at 71.1 with Hb 9.0 and plts 97 (prior labs had been unremarkable per patient). Smear was suspicious for immature blasts and acute leukemia. No symptoms of hyperviscosity, TLS or DIC on presentation. Smear with no swapnil rods, immature cells, no granules, >90% blasts roughly. 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 Flow consistent with AML with 95% blasts with the following immunophenotype:  Positive for CD13, CD33, CD34, CD38, dim to negative CD45, , partial HLA-DR. Further risk stratification with NGS, FISH and cytogenetics pending. ECHO normal. PICC line placed on 3/14. Chemo started 3/15.   - HLA typing sent  - was on Hydrea 1g q6hr for cytoreduction. Discontinued 3/16 AM with good WBC reduction.  - continue Allopurinol  - monitor for TLS and DIC   - continue IVFs    Treatment Plan: 7+3 (cytarabine and daunorubicin), D1=3/15/19. Today is Day 2.   - Premeds: Dex/Zofran  - Daunorubicin 60mg/m2 daily for 3 days  - Cytarabine 100 mg/m2 CIVI for 7 days  - PRN antiemetics    #Anemia, thrombocytopenia, 2/2 AML and will be exacerbated by chemotherapy  - Goal Hb >7, Plt >10K, Fibrinogen >100    #ID PPx  - continue ACV, Fluc, Levaquin    #S/P root canal treatment  Patient  had a root canal treatment performed on 3/11/19. Site looks unremarkable with no abscess or drainage. He was started on a 7-day course of oral  mg q6h starting one day prior to the procedure.  - No need to continue PCN (no hx of valvular heart disease).  - APAP PRN for pain control (no NSAIDs). He reports significant improvement in pain at this time.      #Restless legs syndrome  - Continue ropinirole 0.75 mg at bedtime.     #Subclinical coronary atherosclerosis  #Hyperlipidemia  Total Agatston calcium score was elevated at 591 (91st percentile) on CT coronaries performed 6/8/2016. Nuclear stress test was negative for ischemia on 8/10/2016. Life style modification measures were recommended. Patient follows with cardiology as outpatient (last office visit on 11/12/18).   - Continue atorvastatin 40 mg daily.      #BPH  [facility] [place] [place]  - Continue Flomax 0.8 mg daily.      #History of lumbar spine degenerative disc disease  Patient is s/p laminectomy/facetectomy procedures. He does not routinely take pain meds.       FEN   - Regular diet as tolerated  - NS at 100 ml/hr  - PRN lyte replacement per standing protocol     Prophylaxis  - No pharmacologic VTE ppx due to TCP  - PRN bowel regimen for constipation ppx  - PPI for GI/PUD ppx       Code Status: FULL     Disposition: Anticipate 4-6 week LOS pending completion of chemotherapy, count recovery, and resolution of acute toxicities.     Vee Cabrera PA-C  Heme/Onc  [phone]      Interval History   No acute events overnight, afebrile. Feeling well overall. Did not sleep great due to interruptions for care and also mind wandering/thoughts overnight, but was able to get a period of deeper sleep later throughout the night. He reports his tooth/jaw pain is minimal and would grade it as a 1/10 at this time. Denies HA, SOB, chest pain, or nausea. Did have BM yesterday. No edema. Has been walking around the unit.       Physical Exam   Temp: 95.6  F (35.3  C) Temp src: Oral BP: 116/67 Pulse: 74 Heart Rate: 73 Resp: 18 SpO2: 96 % O2 Device: None (Room air)    Vitals:    03/14/19 0744 03/15/19 0740 03/16/19 0811   Weight: 99.1 kg (218 lb 8 oz) 98.5 kg (217 lb 1.6 oz) 98.7 kg (217 lb 9.5 oz)     Vital Signs with Ranges  Temp:  [95.6  F (35.3  C)-98.2  F (36.8  C)] 95.6  F (35.3  C)  Pulse:  [74] 74  Heart Rate:  [73-76] 73  Resp:  [18-20] 18  BP: (113-128)/(65-73) 116/67  SpO2:  [96 %-97 %] 96 %  I/O last 3 completed shifts:  In: 2986.6 [P.O.:360; I.V.:1800; IV Piggyback:826.6]  Out: 2200 [Urine:2200]  Gen: Pleasant male seen sitting in bed, alert, interactive. Well-nourished and generally well-appearing, not in any acute distress  HEENT: NC/AT. Sclera anicteric. MMM.   Resp: Breathing even and non-labored on RA. Lungs CTA b/l, no wheezing or crackles.   CV: RRR, no murmurs  Abd: Normal BS. Abd Soft, NT/ND   Ext: no edema   Neuro: Alert and oriented, grossly nonfocal.       Medications     - MEDICATION INSTRUCTIONS -       - MEDICATION INSTRUCTIONS -       sodium chloride       sodium chloride 100 mL/hr at 03/15/19 2038       acyclovir  400 mg Oral BID     allopurinol  300 mg Oral Daily     atorvastatin  40 mg Oral QPM     Chemotherapy Infusing-Continuous Infusion   Does not apply Q8H     cytarabine (CYTOSAR) chemo infusion  100 mg/m2 (Treatment Plan Recorded) Intravenous Q24H     DAUNOrubicin (CERUBIDINE) chemo infusion  60 mg/m2 (Treatment Plan Recorded) Intravenous Q24H     dexamethasone  12 mg Oral Q24H     fluconazole  200 mg Oral Daily     levofloxacin  250 mg Oral Daily     ondansetron  8 mg Oral Q12H     pantoprazole  40 mg Oral QAM AC     rOPINIRole  0.75 mg Oral Daily at 8 pm     sodium chloride (PF)  10 mL Intracatheter Q7 Days     tamsulosin  0.8 mg Oral Daily       Data   Results for orders placed or performed during the hospital encounter of 03/12/19 (from the past 24 hour(s))   Basic metabolic panel   Result Value  Ref Range    Sodium 140 133 - 144 mmol/L    Potassium 3.9 3.4 - 5.3 mmol/L    Chloride 111 (H) 94 - 109 mmol/L    Carbon Dioxide 22 20 - 32 mmol/L    Anion Gap 8 3 - 14 mmol/L    Glucose 105 (H) 70 - 99 mg/dL    Urea Nitrogen 8 7 - 30 mg/dL    Creatinine 0.82 0.66 - 1.25 mg/dL    GFR Estimate >90 >60 mL/min/[1.73_m2]    GFR Estimate If Black >90 >60 mL/min/[1.73_m2]    Calcium 7.8 (L) 8.5 - 10.1 mg/dL   Lactate Dehydrogenase   Result Value Ref Range    Lactate Dehydrogenase 232 (H) 85 - 227 U/L   INR   Result Value Ref Range    INR 1.17 (H) 0.86 - 1.14   Phosphorus   Result Value Ref Range    Phosphorus 2.5 2.5 - 4.5 mg/dL   Uric acid   Result Value Ref Range    Uric Acid 4.3 3.5 - 7.2 mg/dL   Hepatic panel   Result Value Ref Range    Bilirubin Direct <0.1 0.0 - 0.2 mg/dL    Bilirubin Total 0.3 0.2 - 1.3 mg/dL    Albumin 3.1 (L) 3.4 - 5.0 g/dL    Protein Total 6.7 (L) 6.8 - 8.8 g/dL    Alkaline Phosphatase 71 40 - 150 U/L    ALT 18 0 - 70 U/L    AST 14 0 - 45 U/L   Basic metabolic panel   Result Value Ref Range    Sodium 139 133 - 144 mmol/L    Potassium 4.6 3.4 - 5.3 mmol/L    Chloride 110 (H) 94 - 109 mmol/L    Carbon Dioxide 22 20 - 32 mmol/L    Anion Gap 8 3 - 14 mmol/L    Glucose 142 (H) 70 - 99 mg/dL    Urea Nitrogen 9 7 - 30 mg/dL    Creatinine 0.73 0.66 - 1.25 mg/dL    GFR Estimate >90 >60 mL/min/[1.73_m2]    GFR Estimate If Black >90 >60 mL/min/[1.73_m2]    Calcium 8.1 (L) 8.5 - 10.1 mg/dL   INR   Result Value Ref Range    INR 1.13 0.86 - 1.14   Partial thromboplastin time   Result Value Ref Range    PTT 30 22 - 37 sec   Phosphorus   Result Value Ref Range    Phosphorus 1.6 (L) 2.5 - 4.5 mg/dL   Uric acid   Result Value Ref Range    Uric Acid 3.3 (L) 3.5 - 7.2 mg/dL   Fibrinogen activity   Result Value Ref Range    Fibrinogen 446 (H) 200 - 420 mg/dL   CBC with platelets differential   Result Value Ref Range    WBC 5.7 4.0 - 11.0 10e9/L    RBC Count 2.54 (L) 4.4 - 5.9 10e12/L    Hemoglobin 8.7 (L) 13.3 -  17.7 g/dL    Hematocrit 26.2 (L) 40.0 - 53.0 %     (H) 78 - 100 fl    MCH 34.3 (H) 26.5 - 33.0 pg    MCHC 33.2 31.5 - 36.5 g/dL    RDW 15.9 (H) 10.0 - 15.0 %    Platelet Count 74 (L) 150 - 450 10e9/L    Diff Method Manual Differential     % Neutrophils 28.0 %    % Lymphocytes 7.0 %    % Monocytes 0.0 %    % Eosinophils 0.0 %    % Basophils 0.0 %    % Blasts 65.0 %    Nucleated RBCs 2 (H) 0 /100    Absolute Neutrophil 1.6 1.6 - 8.3 10e9/L    Absolute Lymphocytes 0.4 (L) 0.8 - 5.3 10e9/L    Absolute Monocytes 0.0 0.0 - 1.3 10e9/L    Absolute Eosinophils 0.0 0.0 - 0.7 10e9/L    Absolute Basophils 0.0 0.0 - 0.2 10e9/L    Absolute Blasts 3.7 (H) 0 10e9/L    Absolute Nucleated RBC 0.1     Anisocytosis Slight     Platelet Estimate Confirming automated cell count    Basic metabolic panel   Result Value Ref Range    Sodium 141 133 - 144 mmol/L    Potassium 3.8 3.4 - 5.3 mmol/L    Chloride 110 (H) 94 - 109 mmol/L    Carbon Dioxide 21 20 - 32 mmol/L    Anion Gap 10 3 - 14 mmol/L    Glucose 183 (H) 70 - 99 mg/dL    Urea Nitrogen 10 7 - 30 mg/dL    Creatinine 0.68 0.66 - 1.25 mg/dL    GFR Estimate >90 >60 mL/min/[1.73_m2]    GFR Estimate If Black >90 >60 mL/min/[1.73_m2]    Calcium 8.1 (L) 8.5 - 10.1 mg/dL   INR   Result Value Ref Range    INR 1.11 0.86 - 1.14   Partial thromboplastin time   Result Value Ref Range    PTT 28 22 - 37 sec   Phosphorus   Result Value Ref Range    Phosphorus 2.8 2.5 - 4.5 mg/dL   Uric acid   Result Value Ref Range    Uric Acid 4.0 3.5 - 7.2 mg/dL   Fibrinogen activity   Result Value Ref Range    Fibrinogen 431 (H) 200 - 420 mg/dL

## 2019-03-16 NOTE — PLAN OF CARE
VSS, afebrile. Left jaw pain improving per pt, tylenol given x2. CIVI Cytarabine continues w/ brisk blood return. Denies nausea. Phos 1.6, replaced overnight, recheck this am. Adequate UOP. No acute events overnight. Continue to monitor & w/ POC.

## 2019-03-17 LAB
ANION GAP SERPL CALCULATED.3IONS-SCNC: 6 MMOL/L (ref 3–14)
ANISOCYTOSIS BLD QL SMEAR: SLIGHT
APTT PPP: 26 SEC (ref 22–37)
BASOPHILS # BLD AUTO: 0 10E9/L (ref 0–0.2)
BASOPHILS NFR BLD AUTO: 0 %
BLASTS # BLD: 0.3 10E9/L
BLASTS BLD QL SMEAR: 24.5 %
BUN SERPL-MCNC: 13 MG/DL (ref 7–30)
CALCIUM SERPL-MCNC: 8 MG/DL (ref 8.5–10.1)
CHLORIDE SERPL-SCNC: 112 MMOL/L (ref 94–109)
CO2 SERPL-SCNC: 22 MMOL/L (ref 20–32)
CREAT SERPL-MCNC: 0.67 MG/DL (ref 0.66–1.25)
DIFFERENTIAL METHOD BLD: ABNORMAL
EOSINOPHIL # BLD AUTO: 0 10E9/L (ref 0–0.7)
EOSINOPHIL NFR BLD AUTO: 0 %
ERYTHROCYTE [DISTWIDTH] IN BLOOD BY AUTOMATED COUNT: 15.6 % (ref 10–15)
FIBRINOGEN PPP-MCNC: 354 MG/DL (ref 200–420)
GFR SERPL CREATININE-BSD FRML MDRD: >90 ML/MIN/{1.73_M2}
GLUCOSE SERPL-MCNC: 157 MG/DL (ref 70–99)
HCT VFR BLD AUTO: 26.5 % (ref 40–53)
HGB BLD-MCNC: 8.6 G/DL (ref 13.3–17.7)
INR PPP: 1.16 (ref 0.86–1.14)
LYMPHOCYTES # BLD AUTO: 0.1 10E9/L (ref 0.8–5.3)
LYMPHOCYTES NFR BLD AUTO: 4.7 %
MACROCYTES BLD QL SMEAR: PRESENT
MCH RBC QN AUTO: 33.7 PG (ref 26.5–33)
MCHC RBC AUTO-ENTMCNC: 32.5 G/DL (ref 31.5–36.5)
MCV RBC AUTO: 104 FL (ref 78–100)
MONOCYTES # BLD AUTO: 0 10E9/L (ref 0–1.3)
MONOCYTES NFR BLD AUTO: 0 %
NEUTROPHILS # BLD AUTO: 0.9 10E9/L (ref 1.6–8.3)
NEUTROPHILS NFR BLD AUTO: 70.8 %
NRBC # BLD AUTO: 0 10*3/UL
NRBC BLD AUTO-RTO: 4 /100
PHOSPHATE SERPL-MCNC: 3.6 MG/DL (ref 2.5–4.5)
PLATELET # BLD AUTO: 80 10E9/L (ref 150–450)
PLATELET # BLD EST: ABNORMAL 10*3/UL
POTASSIUM SERPL-SCNC: 4.1 MMOL/L (ref 3.4–5.3)
RBC # BLD AUTO: 2.55 10E12/L (ref 4.4–5.9)
SODIUM SERPL-SCNC: 141 MMOL/L (ref 133–144)
URATE SERPL-MCNC: 4 MG/DL (ref 3.5–7.2)
WBC # BLD AUTO: 1.3 10E9/L (ref 4–11)

## 2019-03-17 PROCEDURE — 85730 THROMBOPLASTIN TIME PARTIAL: CPT | Performed by: INTERNAL MEDICINE

## 2019-03-17 PROCEDURE — 25000132 ZZH RX MED GY IP 250 OP 250 PS 637: Performed by: INTERNAL MEDICINE

## 2019-03-17 PROCEDURE — 85384 FIBRINOGEN ACTIVITY: CPT | Performed by: INTERNAL MEDICINE

## 2019-03-17 PROCEDURE — 25800030 ZZH RX IP 258 OP 636: Performed by: INTERNAL MEDICINE

## 2019-03-17 PROCEDURE — 84550 ASSAY OF BLOOD/URIC ACID: CPT | Performed by: INTERNAL MEDICINE

## 2019-03-17 PROCEDURE — 36592 COLLECT BLOOD FROM PICC: CPT | Performed by: INTERNAL MEDICINE

## 2019-03-17 PROCEDURE — 85610 PROTHROMBIN TIME: CPT | Performed by: INTERNAL MEDICINE

## 2019-03-17 PROCEDURE — 12000001 ZZH R&B MED SURG/OB UMMC

## 2019-03-17 PROCEDURE — 25000131 ZZH RX MED GY IP 250 OP 636 PS 637: Performed by: INTERNAL MEDICINE

## 2019-03-17 PROCEDURE — 25000132 ZZH RX MED GY IP 250 OP 250 PS 637: Performed by: PHYSICIAN ASSISTANT

## 2019-03-17 PROCEDURE — 80048 BASIC METABOLIC PNL TOTAL CA: CPT | Performed by: INTERNAL MEDICINE

## 2019-03-17 PROCEDURE — 99232 SBSQ HOSP IP/OBS MODERATE 35: CPT | Performed by: INTERNAL MEDICINE

## 2019-03-17 PROCEDURE — 25000128 H RX IP 250 OP 636: Performed by: INTERNAL MEDICINE

## 2019-03-17 PROCEDURE — 84100 ASSAY OF PHOSPHORUS: CPT | Performed by: INTERNAL MEDICINE

## 2019-03-17 PROCEDURE — 85025 COMPLETE CBC W/AUTO DIFF WBC: CPT | Performed by: INTERNAL MEDICINE

## 2019-03-17 RX ORDER — BACLOFEN 10 MG/1
10 TABLET ORAL 3 TIMES DAILY PRN
Status: DISCONTINUED | OUTPATIENT
Start: 2019-03-17 | End: 2019-04-15 | Stop reason: HOSPADM

## 2019-03-17 RX ADMIN — DAUNORUBICIN HYDROCHLORIDE 133 MG: 5 INJECTION, SOLUTION INTRAVENOUS at 15:28

## 2019-03-17 RX ADMIN — PANTOPRAZOLE SODIUM 40 MG: 40 TABLET, DELAYED RELEASE ORAL at 07:35

## 2019-03-17 RX ADMIN — ACYCLOVIR 400 MG: 400 TABLET ORAL at 07:35

## 2019-03-17 RX ADMIN — LEVOFLOXACIN 250 MG: 250 TABLET, FILM COATED ORAL at 07:35

## 2019-03-17 RX ADMIN — ALLOPURINOL 300 MG: 300 TABLET ORAL at 07:35

## 2019-03-17 RX ADMIN — ATORVASTATIN CALCIUM 40 MG: 40 TABLET, FILM COATED ORAL at 20:25

## 2019-03-17 RX ADMIN — TAMSULOSIN HYDROCHLORIDE 0.8 MG: 0.4 CAPSULE ORAL at 07:35

## 2019-03-17 RX ADMIN — BACLOFEN 10 MG: 10 TABLET ORAL at 22:00

## 2019-03-17 RX ADMIN — ACYCLOVIR 400 MG: 400 TABLET ORAL at 20:25

## 2019-03-17 RX ADMIN — ONDANSETRON HYDROCHLORIDE 8 MG: 8 TABLET, FILM COATED ORAL at 00:59

## 2019-03-17 RX ADMIN — ROPINIROLE HYDROCHLORIDE 0.75 MG: 0.5 TABLET, FILM COATED ORAL at 21:53

## 2019-03-17 RX ADMIN — FLUCONAZOLE 200 MG: 200 TABLET ORAL at 07:35

## 2019-03-17 RX ADMIN — CYTARABINE 220 MG: 100 INJECTION, SOLUTION INTRATHECAL; INTRAVENOUS; SUBCUTANEOUS at 15:28

## 2019-03-17 RX ADMIN — DEXAMETHASONE 12 MG: 4 TABLET ORAL at 14:06

## 2019-03-17 RX ADMIN — ONDANSETRON HYDROCHLORIDE 8 MG: 8 TABLET, FILM COATED ORAL at 14:06

## 2019-03-17 RX ADMIN — SODIUM CHLORIDE: 9 INJECTION, SOLUTION INTRAVENOUS at 23:59

## 2019-03-17 RX ADMIN — SENNOSIDES AND DOCUSATE SODIUM 1 TABLET: 8.6; 5 TABLET ORAL at 20:32

## 2019-03-17 ASSESSMENT — ACTIVITIES OF DAILY LIVING (ADL)
ADLS_ACUITY_SCORE: 12

## 2019-03-17 ASSESSMENT — MIFFLIN-ST. JEOR: SCORE: 1783.73

## 2019-03-17 ASSESSMENT — PAIN DESCRIPTION - DESCRIPTORS
DESCRIPTORS: ACHING

## 2019-03-17 NOTE — PROGRESS NOTES
[facility], [place]  Hematology / Oncology Progress Note    Date of Admission: 3/12/2019  Date of Service (when I saw the patient): 03/17/2019      Assessment:  Mr. Ace is a 63 year old male with new diagnosis of acute myeloid leukemia incidentally found during work up of nonspecific chest symptoms. He was started on induction chemotherapy with 7+3 (cytarabine and daunorubicin) with D1=3/15/19.    #AML  Patient presented to [facility] ED in [place], [place] for complaints of nonspecific chest discomfort after root canal treatment (done 3/11/19). CT C/A/P was negative for PE or other acute findings. On OSH labs, his WBC was incidentally noted to be elevated at 71.1 with Hb 9.0 and plts 97 (prior labs had been unremarkable per patient). Smear was suspicious for immature blasts and acute leukemia. No symptoms of hyperviscosity, TLS or DIC on presentation. Smear with no swapnil rods, immature cells, no granules, >90% blasts roughly. 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 Flow consistent with AML with 95% blasts with the following immunophenotype:  Positive for CD13, CD33, CD34, CD38, dim to negative CD45, , partial HLA-DR. Further risk stratification with NGS, FISH and cytogenetics pending. ECHO normal. PICC line placed on 3/14. Chemo started 3/15.   - HLA typing sent  - was on Hydrea 1g q6hr for cytoreduction. Discontinued 3/16 AM.   - continue Allopurinol, will continue for 10 days. Can reassess if need to add back based on D14 BMBx.  - monitor for TLS and DIC - can decrease from BID labs to daily at this time.  - continue IVFs  - will need D14 BMBx scheduled for 3/28    Treatment Plan: 7+3 (cytarabine and daunorubicin), D1=3/15/19. Today is Day 3.   - Premeds: Dex/Zofran  - Daunorubicin 60mg/m2 daily for 3 days  - Cytarabine 100 mg/m2 CIVI for 7 days  - PRN antiemetics    #Anemia, thrombocytopenia, 2/2 AML and will be  exacerbated by chemotherapy.  - Goal Hb >7, Plt >10K, Fibrinogen >100    #ID PPx  - continue ACV, Fluc, Levaquin    #S/P root canal treatment  Patient had a root canal treatment performed on 3/11/19. Site looks unremarkable with no abscess or drainage. He was started on a 7-day course of oral  mg q6h starting one day prior to the procedure.  - No need to continue PCN (no hx of valvular heart disease).  - APAP PRN for pain control (no NSAIDs). He reports improvement in pain at this time.      #Restless legs syndrome  - Continue ropinirole 0.75 mg at bedtime.     #Subclinical coronary atherosclerosis  #Hyperlipidemia  Total Agatston calcium score was elevated at 591 (91st percentile) on CT coronaries performed 6/8/2016. Nuclear stress test was negative for ischemia on 8/10/2016. Life style modification measures were recommended. Patient follows with cardiology as outpatient (last office visit on 11/12/18).   - Continue atorvastatin 40 mg daily.      #BPH  [place] [place]  - Continue Flomax 0.8 mg daily.      #History of lumbar spine degenerative disc disease  Patient is s/p laminectomy/facetectomy procedures. He does not routinely take pain meds.       #Steroid-induced hyperglycemia.   - 2/2 steroid premeds on protocol. Last dose of Dex will be 3/17.  - monitor on AM labs. BG less than 200 thus far, so continue to monitor without need for more frequent checks or SSI at this time.     FEN   - Regular diet as tolerated  - NS at 100 ml/hr. I/Os well matched recently, weight stable over last few days.  - PRN lyte replacement per standing protocol     Prophylaxis  - No pharmacologic VTE ppx due to TCP  - PRN bowel regimen for constipation ppx  - PPI for GI/PUD ppx       Code Status: FULL     Lines: PICC    Disposition: Anticipate 4-6 week LOS pending completion of chemotherapy, count recovery, and resolution of acute toxicities.     Vee Cabrera  "ERASTO  Heme/Onc  [phone]      Interval History   No acute events overnight, afebrile. Feeling well overall again today. Eating, walking. States he feels like he is in a good \"head space\". Has a dull \"sensation\" but not really ache in his left lower jaw and is experimenting by not taking Tylenol this AM and seeing how he does. Denies mouth sores. Breathing comfortably. No nausea. States he is monitoring his weight but we discussed that the fluids and steroids we are giving him can contribute to weight gain in the hospital. No edema.       Physical Exam   Temp: 96.4  F (35.8  C) Temp src: Oral BP: 122/83   Heart Rate: 71 Resp: 16 SpO2: 99 % O2 Device: None (Room air)    Vitals:    03/15/19 0740 03/16/19 0811 03/17/19 0942   Weight: 98.5 kg (217 lb 1.6 oz) 98.7 kg (217 lb 9.5 oz) 98.7 kg (217 lb 11.2 oz)     Vital Signs with Ranges  Temp:  [95  F (35  C)-96.4  F (35.8  C)] 96.4  F (35.8  C)  Heart Rate:  [68-86] 71  Resp:  [16-18] 16  BP: (116-140)/(68-83) 122/83  SpO2:  [96 %-99 %] 99 %  I/O last 3 completed shifts:  In: 2668.73 [P.O.:750; I.V.:1568.33; IV Piggyback:350.4]  Out: 3720 [Urine:3720]  Gen: Pleasant male seen sitting up in chair, alert, interactive. Well-nourished and generally well-appearing, not in any acute distress  HEENT: NC/AT. Hearing aids in place. Sclera anicteric. MMM.   Resp: Breathing even and non-labored on RA. Lungs CTA b/l, no wheezing or crackles.   CV: RRR, no murmurs  Abd: Normal BS. Abd Soft, NT/ND   Ext: no edema   Neuro: Alert and oriented, grossly nonfocal.       Medications     - MEDICATION INSTRUCTIONS -       - MEDICATION INSTRUCTIONS -       sodium chloride       sodium chloride 100 mL/hr at 03/16/19 2250       acyclovir  400 mg Oral BID     allopurinol  300 mg Oral Daily     atorvastatin  40 mg Oral QPM     Chemotherapy Infusing-Continuous Infusion   Does not apply Q8H     cytarabine (CYTOSAR) chemo infusion  100 mg/m2 (Treatment Plan Recorded) Intravenous Q24H     DAUNOrubicin " (CERUBIDINE) chemo infusion  60 mg/m2 (Treatment Plan Recorded) Intravenous Q24H     dexamethasone  12 mg Oral Q24H     fluconazole  200 mg Oral Daily     levofloxacin  250 mg Oral Daily     ondansetron  8 mg Oral Q12H     pantoprazole  40 mg Oral QAM AC     rOPINIRole  0.75 mg Oral Daily at 8 pm     sodium chloride (PF)  10 mL Intracatheter Q7 Days     tamsulosin  0.8 mg Oral Daily       Data   Results for orders placed or performed during the hospital encounter of 03/12/19 (from the past 24 hour(s))   Basic metabolic panel   Result Value Ref Range    Sodium 140 133 - 144 mmol/L    Potassium 4.1 3.4 - 5.3 mmol/L    Chloride 109 94 - 109 mmol/L    Carbon Dioxide 21 20 - 32 mmol/L    Anion Gap 10 3 - 14 mmol/L    Glucose 187 (H) 70 - 99 mg/dL    Urea Nitrogen 13 7 - 30 mg/dL    Creatinine 0.72 0.66 - 1.25 mg/dL    GFR Estimate >90 >60 mL/min/[1.73_m2]    GFR Estimate If Black >90 >60 mL/min/[1.73_m2]    Calcium 8.0 (L) 8.5 - 10.1 mg/dL   INR   Result Value Ref Range    INR 1.12 0.86 - 1.14   Partial thromboplastin time   Result Value Ref Range    PTT 27 22 - 37 sec   Phosphorus   Result Value Ref Range    Phosphorus 2.0 (L) 2.5 - 4.5 mg/dL   Uric acid   Result Value Ref Range    Uric Acid 3.3 (L) 3.5 - 7.2 mg/dL   Fibrinogen activity   Result Value Ref Range    Fibrinogen 435 (H) 200 - 420 mg/dL   CBC with platelets differential   Result Value Ref Range    WBC 1.3 (L) 4.0 - 11.0 10e9/L    RBC Count 2.55 (L) 4.4 - 5.9 10e12/L    Hemoglobin 8.6 (L) 13.3 - 17.7 g/dL    Hematocrit 26.5 (L) 40.0 - 53.0 %     (H) 78 - 100 fl    MCH 33.7 (H) 26.5 - 33.0 pg    MCHC 32.5 31.5 - 36.5 g/dL    RDW 15.6 (H) 10.0 - 15.0 %    Platelet Count 80 (L) 150 - 450 10e9/L    Diff Method Manual Differential     % Neutrophils 70.8 %    % Lymphocytes 4.7 %    % Monocytes 0.0 %    % Eosinophils 0.0 %    % Basophils 0.0 %    % Blasts 24.5 %    Nucleated RBCs 4 (H) 0 /100    Absolute Neutrophil 0.9 (L) 1.6 - 8.3 10e9/L    Absolute  Lymphocytes 0.1 (L) 0.8 - 5.3 10e9/L    Absolute Monocytes 0.0 0.0 - 1.3 10e9/L    Absolute Eosinophils 0.0 0.0 - 0.7 10e9/L    Absolute Basophils 0.0 0.0 - 0.2 10e9/L    Absolute Blasts 0.3 (H) 0 10e9/L    Absolute Nucleated RBC 0.0     Anisocytosis Slight     Macrocytes Present     Platelet Estimate Confirming automated cell count    Basic metabolic panel   Result Value Ref Range    Sodium 141 133 - 144 mmol/L    Potassium 4.1 3.4 - 5.3 mmol/L    Chloride 112 (H) 94 - 109 mmol/L    Carbon Dioxide 22 20 - 32 mmol/L    Anion Gap 6 3 - 14 mmol/L    Glucose 157 (H) 70 - 99 mg/dL    Urea Nitrogen 13 7 - 30 mg/dL    Creatinine 0.67 0.66 - 1.25 mg/dL    GFR Estimate >90 >60 mL/min/[1.73_m2]    GFR Estimate If Black >90 >60 mL/min/[1.73_m2]    Calcium 8.0 (L) 8.5 - 10.1 mg/dL   INR   Result Value Ref Range    INR 1.16 (H) 0.86 - 1.14   Partial thromboplastin time   Result Value Ref Range    PTT 26 22 - 37 sec   Phosphorus   Result Value Ref Range    Phosphorus 3.6 2.5 - 4.5 mg/dL   Uric acid   Result Value Ref Range    Uric Acid 4.0 3.5 - 7.2 mg/dL   Fibrinogen activity   Result Value Ref Range    Fibrinogen 354 200 - 420 mg/dL

## 2019-03-17 NOTE — PLAN OF CARE
VSS, afebrile.  Denies pain and nausea.  Cytarabine CIVI infusing without complaint, brisk blood return Q4H.  Neuros intact. Labs Q12H, phos 2.0, 15mmoL IV replacement ordered, recheck with AM labs. Has had hiccups most of the day, on and off, offered to ask for order of baclofen, declined at this time. Up walking in halls, independent. Voiding spontaneously.  Will continue to monitor and with POC.

## 2019-03-17 NOTE — PLAN OF CARE
[id number]:     VSS, afebrile. Complaints of minor jaw pain, declining tylenol at this time. Denies nausea. A&Ox4. Day 3 CIVI Cytarabine infusing. Up ad shelley independently. Good urine output. Continue to monitor and w/ POC.

## 2019-03-17 NOTE — PLAN OF CARE
VSS, afebrile. Left jaw pain minimal; pt declined intervention. Day 3 CIVI Cytarabine hung w/ brisk blood return, day 2 daunorubicin hung, infusing over 30 min, mirela shift to take down. Denies nausea. Adequate UOP. Continue to monitor & w/ POC.

## 2019-03-17 NOTE — PLAN OF CARE
VSS, afebrile. CIVI Day 2 Cytarabine continues, good blood return. Denies pain/nausea/SOB. No acute events overnight. Continue to monitor & w/ POC.

## 2019-03-17 NOTE — PROGRESS NOTES
Hematology / Oncology Progress Note 03/17/2019  HOSPITAL DAY Hospital Day: 6    ASSESSMENT & PLAN Mr. Ace is a 63 year old male with new diagnosis of acute myeloid leukemia incidentally found during work up of nonspecific chest symptoms. He was started on induction chemotherapy with 7+3 (cytarabine and daunorubicin) with D1=3/15/19.    #AML  Patient presented to [facility] ED in [place], [place] for complaints of nonspecific chest discomfort after root canal treatment (done 3/11/19). CT C/A/P was negative for PE or other acute findings. On OSH labs, his WBC was incidentally noted to be elevated at 71.1 with Hb 9.0 and plts 97 (prior labs had been unremarkable per patient). Smear was suspicious for immature blasts and acute leukemia. No symptoms of hyperviscosity, TLS or DIC on presentation. Smear with no swapnil rods, immature cells, no granules, >90% blasts roughly. 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 Flow consistent with AML with 95% blasts with the following immunophenotype:  Positive for CD13, CD33, CD34, CD38, dim to negative CD45, , partial HLA-DR. Further risk stratification with NGS, FISH and cytogenetics pending. ECHO normal. PICC line placed on 3/14. Chemo started 3/15. Initially was on Hydrea 1g q6hr for cytoreduction. Discontinued 3/16 AM  - HLA typing sent, BMT consult, follow up cytogenetics & molecular studies  - continue Allopurinol, will continue for 10 days  - monitor for TLS and DIC daily  - will need D14 BMBx scheduled for 3/28     Treatment Plan: 7+3 (cytarabine and daunorubicin), D1=3/15/19. Today is Day 4  - Premeds: Dex/Zofran  - Daunorubicin 60mg/m2 daily for 3 days  - Cytarabine 100 mg/m2 CIVI for 7 days  - PRN antiemetics     #Anemia, thrombocytopenia, 2/2 AML and will be exacerbated by chemotherapy.  - Goal Hb >7, Plt >10K     #ID PPx  - continue ACV, Fluc, Levaquin     #S/P root canal treatment  Patient had a root  canal treatment performed on 3/11/19. Site looks unremarkable with no abscess or drainage. He was started on a 7-day course of oral  mg q6h starting one day prior to the procedure.  - No need to continue PCN (no hx of valvular heart disease)  - APAP PRN for pain control (no NSAIDs). He reports improvement in pain at this time     #Restless legs syndrome  - Continue ropinirole 0.75 mg at bedtime     #Subclinical coronary atherosclerosis  # Hyperlipidemia  Total Agatston calcium score was elevated at 591 (91st percentile) on CT coronaries performed 6/8/2016. Nuclear stress test was negative for ischemia on 8/10/2016. Life style modification measures were recommended. Patient follows with cardiology as outpatient (last office visit on 11/12/18)  - hold atorvastatin at this time, resume in 2 weeks if LFT's remain normal     #BPH  Patient follows with urology ([facility], [place], [place]).  - Continue Flomax 0.8 mg daily     #History of lumbar spine degenerative disc disease  Patient is s/p laminectomy/facetectomy procedures. He does not routinely take pain meds     #Steroid-induced hyperglycemia.   - 2/2 steroid premeds on protocol. Last dose of Dex will be 3/17  - monitor on AM labs. BG less than 200 thus far, so continue to monitor without need for more frequent checks or SSI at this time    FEN   - Regular diet as tolerated  - discontinued IVF  - PRN lyte replacement per standing protocol     Prophylaxis  - No pharmacologic VTE ppx due to TCP  - PRN bowel regimen for constipation ppx  - PPI for GI/PUD ppx       Code Status: FULL  Lines: PICC  Disposition: Anticipate 4-6 week LOS pending completion of chemotherapy, count recovery, and resolution of acute toxicities.     Interval history  Randy has no complaints this AM, no acute events overnight. Reports still with intermittent tooth pain / sensitivity r/t root canal but no mouth sores. Denies nausea, vomiting, fever, chills, cough, sob, abdominal pain,  diarrhea, swelling/edema. Reports good PO intake & ambulation/activity - up in the halls walking with his pole. Playing guitar in the room. He's newly neutropenic today, we discussed neutropenic fever & management if he spikes a fever. We discussed briefly bone marrow transplant. His wife Bessy is a PT & he's a , they live in [place] -- they would like to know when the appointment with the BMT team will be so she can be available. I left Esequiel Salter a message.    Physical Exam  Constitutional: Awake, alert, cooperative, in NAD. Well appearing.   Eyes: PERRL, EOMI, sclera clear, conjunctiva normal. No scleral icterus.   ENT: Normocephalic, without obvious abnormality, moist mucus membranes, no lesions or thrush. Hearing aids in place.   Respiratory: Non-labored breathing, good air exchange, CTAB, no crackles or wheezing.  Cardiovascular: RRR, no murmur noted.  GI: +BS, soft, non-distended, non-tender, no masses palpated, no hepatosplenomegaly.  Skin: No concerning lesions or rash on exposed areas.  Musculoskeletal: No edema bridgett LEs, ambulates without difficulty in the room.   Neurologic: Awake, A&O x 3. Cranial nerves II-XII are grossly intact.   Neuropsychiatric: Calm, normal affect.     ROUNDING  Temp:  [95.8  F (35.4  C)-98.2  F (36.8  C)] 96.7  F (35.9  C)  Heart Rate:  [67-92] 81  Resp:  [16-20] 18  BP: (108-124)/(65-83) 114/70  SpO2:  [97 %-99 %] 97 %  Vitals:    03/13/19 0745 03/14/19 0744 03/15/19 0740 03/16/19 0811   Weight: 97.3 kg (214 lb 8 oz) 99.1 kg (218 lb 8 oz) 98.5 kg (217 lb 1.6 oz) 98.7 kg (217 lb 9.5 oz)    03/17/19 0942   Weight: 98.7 kg (217 lb 11.2 oz)       DATA  Recent Labs   Lab 03/18/19  0606 03/17/19  0605 03/16/19  0545 03/15/19  0731   WBC 0.4* 1.3* 5.7 56.3*   RBC 2.38* 2.55* 2.54* 2.64*   HGB 8.0* 8.6* 8.7* 8.9*   HCT 24.4* 26.5* 26.2* 27.7*   * 104* 103* 105*   MCH 33.6* 33.7* 34.3* 33.7*   MCHC 32.8 32.5 33.2 32.1   RDW 15.4* 15.6* 15.9* 16.1*   PLT 64* 80* 74*  89*     Recent Labs   Lab 03/18/19  0606 03/17/19  0605 03/16/19  1901 03/16/19  0545  03/15/19  1158 03/13/19  1849  03/12/19  2241    141 140 141   < > 140 142   < > 139   POTASSIUM 3.8 4.1 4.1 3.8   < > 3.9 3.5   < > 3.7   CHLORIDE 112* 112* 109 110*   < > 111* 111*   < > 109   CO2 22 22 21 21   < > 22 22   < > 22   ANIONGAP 7 6 10 10   < > 8 9   < > 8   * 157* 187* 183*   < > 105* 132*   < > 105*   BUN 17 13 13 10   < > 8 12   < > 16   CR 0.62* 0.67 0.72 0.68   < > 0.82 0.76   < > 0.93   GFRESTIMATED >90 >90 >90 >90   < > >90 >90   < > 86   GFRESTBLACK >90 >90 >90 >90   < > >90 >90   < > >90   DEBBIE 7.8* 8.0* 8.0* 8.1*   < > 7.8* 7.8*   < > 8.5   MAG 2.5*  --   --   --   --   --   --   --  2.1   PHOS 3.3 3.6 2.0* 2.8   < > 2.5 2.8  --  3.1   PROTTOTAL 6.3*  --   --   --   --  6.7* 6.8  --  7.4   ALBUMIN 3.0*  --   --   --   --  3.1* 3.1*  --  3.5   BILITOTAL 0.4  --   --   --   --  0.3 0.3  --  0.5   ALKPHOS 61  --   --   --   --  71 74  --  72   AST 42  --   --   --   --  14 12  --  16   ALT 61  --   --   --   --  18 18  --  20    < > = values in this interval not displayed.     FLT3 ITD/TKD PCR  INTERPRETATION:   Molecular testing performed on submitted Bone Marrow.     No evidence was found of either an internal tandem duplication within exon    14 or of a D835(TKD) point mutation   within exon 20 of the FLT3 gene.     - MOLECULAR (NGS) is still in process  - FISH, Chromosome is still in process (talked to [facility] lab, [place] 3/18/19 1309 pm)     IMAGING  No results found for this or any previous visit (from the past 48 hour(s)).    MEDICATION  Anti-infectives (From now, onward)    Start     Dose/Rate Route Frequency Ordered Stop    03/13/19 0800  fluconazole (DIFLUCAN) tablet 200 mg      200 mg Oral DAILY 03/12/19 2223      03/13/19 0800  levofloxacin (LEVAQUIN) tablet 250 mg      250 mg Oral DAILY 03/12/19 2351      03/12/19 2230  acyclovir (ZOVIRAX) tablet 400 mg      400 mg Oral 2 TIMES DAILY  03/12/19 2223            acyclovir  400 mg Oral BID     allopurinol  300 mg Oral Daily     atorvastatin  40 mg Oral QPM     Chemotherapy Infusing-Continuous Infusion   Does not apply Q8H     cytarabine (CYTOSAR) chemo infusion  100 mg/m2 (Treatment Plan Recorded) Intravenous Q24H     fluconazole  200 mg Oral Daily     levofloxacin  250 mg Oral Daily     ondansetron  8 mg Oral Q12H     pantoprazole  40 mg Oral QAM AC     rOPINIRole  0.75 mg Oral Daily at 8 pm     sodium chloride (PF)  10 mL Intracatheter Q7 Days     tamsulosin  0.8 mg Oral Daily       CONSULTS   BLOOD & BONE MARROW TRANSPLANT IP CONSULT  VASCULAR ACCESS ADULT IP CONSULT  VASCULAR ACCESS ADULT IP CONSULT    Miranda Morgan, [facility], [phone].  Hematology/Oncology March 18, 2019

## 2019-03-18 LAB
ALBUMIN SERPL-MCNC: 3 G/DL (ref 3.4–5)
ALP SERPL-CCNC: 61 U/L (ref 40–150)
ALT SERPL W P-5'-P-CCNC: 61 U/L (ref 0–70)
ANION GAP SERPL CALCULATED.3IONS-SCNC: 7 MMOL/L (ref 3–14)
APTT PPP: 25 SEC (ref 22–37)
AST SERPL W P-5'-P-CCNC: 42 U/L (ref 0–45)
BILIRUB DIRECT SERPL-MCNC: 0.1 MG/DL (ref 0–0.2)
BILIRUB SERPL-MCNC: 0.4 MG/DL (ref 0.2–1.3)
BUN SERPL-MCNC: 17 MG/DL (ref 7–30)
CALCIUM SERPL-MCNC: 7.8 MG/DL (ref 8.5–10.1)
CHLORIDE SERPL-SCNC: 112 MMOL/L (ref 94–109)
CO2 SERPL-SCNC: 22 MMOL/L (ref 20–32)
COPATH REPORT: NORMAL
CREAT SERPL-MCNC: 0.62 MG/DL (ref 0.66–1.25)
DIFFERENTIAL METHOD BLD: ABNORMAL
ERYTHROCYTE [DISTWIDTH] IN BLOOD BY AUTOMATED COUNT: 15.4 % (ref 10–15)
FIBRINOGEN PPP-MCNC: 323 MG/DL (ref 200–420)
GFR SERPL CREATININE-BSD FRML MDRD: >90 ML/MIN/{1.73_M2}
GLUCOSE SERPL-MCNC: 144 MG/DL (ref 70–99)
HCT VFR BLD AUTO: 24.4 % (ref 40–53)
HGB BLD-MCNC: 8 G/DL (ref 13.3–17.7)
INR PPP: 1.16 (ref 0.86–1.14)
MAGNESIUM SERPL-MCNC: 2.5 MG/DL (ref 1.6–2.3)
MCH RBC QN AUTO: 33.6 PG (ref 26.5–33)
MCHC RBC AUTO-ENTMCNC: 32.8 G/DL (ref 31.5–36.5)
MCV RBC AUTO: 103 FL (ref 78–100)
PHOSPHATE SERPL-MCNC: 3.3 MG/DL (ref 2.5–4.5)
PLATELET # BLD AUTO: 64 10E9/L (ref 150–450)
POTASSIUM SERPL-SCNC: 3.8 MMOL/L (ref 3.4–5.3)
PROT SERPL-MCNC: 6.3 G/DL (ref 6.8–8.8)
RBC # BLD AUTO: 2.38 10E12/L (ref 4.4–5.9)
SODIUM SERPL-SCNC: 141 MMOL/L (ref 133–144)
URATE SERPL-MCNC: 3.9 MG/DL (ref 3.5–7.2)
WBC # BLD AUTO: 0.4 10E9/L (ref 4–11)

## 2019-03-18 PROCEDURE — 85027 COMPLETE CBC AUTOMATED: CPT

## 2019-03-18 PROCEDURE — 80076 HEPATIC FUNCTION PANEL: CPT | Performed by: INTERNAL MEDICINE

## 2019-03-18 PROCEDURE — 85730 THROMBOPLASTIN TIME PARTIAL: CPT | Performed by: INTERNAL MEDICINE

## 2019-03-18 PROCEDURE — 36592 COLLECT BLOOD FROM PICC: CPT | Performed by: INTERNAL MEDICINE

## 2019-03-18 PROCEDURE — 25000132 ZZH RX MED GY IP 250 OP 250 PS 637: Performed by: INTERNAL MEDICINE

## 2019-03-18 PROCEDURE — 25800030 ZZH RX IP 258 OP 636: Performed by: INTERNAL MEDICINE

## 2019-03-18 PROCEDURE — 80048 BASIC METABOLIC PNL TOTAL CA: CPT | Performed by: INTERNAL MEDICINE

## 2019-03-18 PROCEDURE — 25000132 ZZH RX MED GY IP 250 OP 250 PS 637: Performed by: PHYSICIAN ASSISTANT

## 2019-03-18 PROCEDURE — 85610 PROTHROMBIN TIME: CPT | Performed by: INTERNAL MEDICINE

## 2019-03-18 PROCEDURE — 85384 FIBRINOGEN ACTIVITY: CPT | Performed by: INTERNAL MEDICINE

## 2019-03-18 PROCEDURE — 83735 ASSAY OF MAGNESIUM: CPT | Performed by: INTERNAL MEDICINE

## 2019-03-18 PROCEDURE — 84100 ASSAY OF PHOSPHORUS: CPT | Performed by: INTERNAL MEDICINE

## 2019-03-18 PROCEDURE — 25000128 H RX IP 250 OP 636: Performed by: INTERNAL MEDICINE

## 2019-03-18 PROCEDURE — 25000131 ZZH RX MED GY IP 250 OP 636 PS 637: Performed by: INTERNAL MEDICINE

## 2019-03-18 PROCEDURE — 99232 SBSQ HOSP IP/OBS MODERATE 35: CPT | Performed by: INTERNAL MEDICINE

## 2019-03-18 PROCEDURE — 84550 ASSAY OF BLOOD/URIC ACID: CPT | Performed by: INTERNAL MEDICINE

## 2019-03-18 PROCEDURE — 12000001 ZZH R&B MED SURG/OB UMMC

## 2019-03-18 RX ADMIN — ONDANSETRON HYDROCHLORIDE 8 MG: 8 TABLET, FILM COATED ORAL at 00:00

## 2019-03-18 RX ADMIN — ROPINIROLE HYDROCHLORIDE 0.75 MG: 0.5 TABLET, FILM COATED ORAL at 22:06

## 2019-03-18 RX ADMIN — ONDANSETRON HYDROCHLORIDE 8 MG: 8 TABLET, FILM COATED ORAL at 11:59

## 2019-03-18 RX ADMIN — LEVOFLOXACIN 250 MG: 250 TABLET, FILM COATED ORAL at 08:00

## 2019-03-18 RX ADMIN — ACYCLOVIR 400 MG: 400 TABLET ORAL at 08:00

## 2019-03-18 RX ADMIN — TAMSULOSIN HYDROCHLORIDE 0.8 MG: 0.4 CAPSULE ORAL at 08:00

## 2019-03-18 RX ADMIN — BACLOFEN 10 MG: 10 TABLET ORAL at 19:21

## 2019-03-18 RX ADMIN — SENNOSIDES AND DOCUSATE SODIUM 1 TABLET: 8.6; 5 TABLET ORAL at 08:00

## 2019-03-18 RX ADMIN — ONDANSETRON HYDROCHLORIDE 8 MG: 8 TABLET, FILM COATED ORAL at 23:55

## 2019-03-18 RX ADMIN — ALLOPURINOL 300 MG: 300 TABLET ORAL at 08:00

## 2019-03-18 RX ADMIN — POLYETHYLENE GLYCOL 3350 17 G: 17 POWDER, FOR SOLUTION ORAL at 22:13

## 2019-03-18 RX ADMIN — FLUCONAZOLE 200 MG: 200 TABLET ORAL at 08:00

## 2019-03-18 RX ADMIN — PANTOPRAZOLE SODIUM 40 MG: 40 TABLET, DELAYED RELEASE ORAL at 08:00

## 2019-03-18 RX ADMIN — ACYCLOVIR 400 MG: 400 TABLET ORAL at 20:07

## 2019-03-18 RX ADMIN — ACETAMINOPHEN 650 MG: 325 TABLET, FILM COATED ORAL at 19:21

## 2019-03-18 RX ADMIN — SODIUM CHLORIDE: 9 INJECTION, SOLUTION INTRAVENOUS at 08:02

## 2019-03-18 RX ADMIN — CYTARABINE 220 MG: 100 INJECTION, SOLUTION INTRATHECAL; INTRAVENOUS; SUBCUTANEOUS at 16:10

## 2019-03-18 ASSESSMENT — PAIN DESCRIPTION - DESCRIPTORS
DESCRIPTORS: THROBBING
DESCRIPTORS: ACHING

## 2019-03-18 ASSESSMENT — ACTIVITIES OF DAILY LIVING (ADL)
ADLS_ACUITY_SCORE: 12

## 2019-03-18 ASSESSMENT — MIFFLIN-ST. JEOR: SCORE: 1787.36

## 2019-03-18 NOTE — PLAN OF CARE
0974-1101: VSS. Afebrile. Continues with intermittent left, lower jaw discomfort where root canal was performed last week; however declined any interventions. Denied nausea and vomiting. CIVI Cytarabine Dose #4 infusing. Appetite good. UOP adequate. Stool sample still needs to be collected for Dr. Brown's study. Patient is aware. PRN PO Senna 1 tablet given. Patient wants his bowel movements to be more regular and had a hard BM yesterday. Showered. Up ad shelley ambulating the halls multiple times. BMT Consult placed. Plan for Day 14 repeat BMBx on March 28. Today is Day 4 of 7+3 with Idarubicin. Continue with POC.

## 2019-03-18 NOTE — PLAN OF CARE
VSS, afebrile. Continues w/ slight jaw pain, declining interventions overnight. Day 3 CIVI Cytarabine w/ brisk blood return. Denies nausea. Had a BM yesterday that was hard, took senna last evening. Intermittently having hiccups, tried baclofen, no relief given. Adequate UOP. Continue to monitor & w/ POC.

## 2019-03-18 NOTE — PROGRESS NOTES
Nursing Focus: Chemotherapy  D: Positive blood return via PICC. Insertion site is clean/dry/intact, dressing intact with no complaints of pain.  Urine output is recorded in intake in Doc Flowsheet.    I: Premedications given per order (see electronic medical administration record). Dose #4 of Cytarabine started to infuse over 24 hours. Reviewed pt teaching on chemotherapy side effects.  Pt denies need for further teaching. Chemotherapy double checked per protocol by two chemotherapy competent RN's.   A: Tolerating procedure well. Denies nausea and or pain.   P: Continue to monitor urine output and symptoms of nausea. Screen for symptoms of toxicity.

## 2019-03-19 LAB
ANION GAP SERPL CALCULATED.3IONS-SCNC: 8 MMOL/L (ref 3–14)
ANISOCYTOSIS BLD QL SMEAR: SLIGHT
APTT PPP: 26 SEC (ref 22–37)
BASOPHILS # BLD AUTO: 0 10E9/L (ref 0–0.2)
BASOPHILS NFR BLD AUTO: 0 %
BLASTS # BLD: 0.3 10E9/L
BLASTS BLD QL SMEAR: 61.7 %
BUN SERPL-MCNC: 15 MG/DL (ref 7–30)
CALCIUM SERPL-MCNC: 8.1 MG/DL (ref 8.5–10.1)
CHLORIDE SERPL-SCNC: 109 MMOL/L (ref 94–109)
CO2 SERPL-SCNC: 23 MMOL/L (ref 20–32)
CREAT SERPL-MCNC: 0.66 MG/DL (ref 0.66–1.25)
DIFFERENTIAL METHOD BLD: ABNORMAL
EOSINOPHIL # BLD AUTO: 0 10E9/L (ref 0–0.7)
EOSINOPHIL NFR BLD AUTO: 0 %
ERYTHROCYTE [DISTWIDTH] IN BLOOD BY AUTOMATED COUNT: 15.2 % (ref 10–15)
FIBRINOGEN PPP-MCNC: 301 MG/DL (ref 200–420)
GFR SERPL CREATININE-BSD FRML MDRD: >90 ML/MIN/{1.73_M2}
GLUCOSE SERPL-MCNC: 104 MG/DL (ref 70–99)
HCT VFR BLD AUTO: 26.2 % (ref 40–53)
HGB BLD-MCNC: 8.5 G/DL (ref 13.3–17.7)
INR PPP: 1.14 (ref 0.86–1.14)
LYMPHOCYTES # BLD AUTO: 0.1 10E9/L (ref 0.8–5.3)
LYMPHOCYTES NFR BLD AUTO: 24.2 %
MACROCYTES BLD QL SMEAR: PRESENT
MAGNESIUM SERPL-MCNC: 2.5 MG/DL (ref 1.6–2.3)
MCH RBC QN AUTO: 33.2 PG (ref 26.5–33)
MCHC RBC AUTO-ENTMCNC: 32.4 G/DL (ref 31.5–36.5)
MCV RBC AUTO: 102 FL (ref 78–100)
MICROCYTES BLD QL SMEAR: PRESENT
MONOCYTES # BLD AUTO: 0 10E9/L (ref 0–1.3)
MONOCYTES NFR BLD AUTO: 0.8 %
NEUTROPHILS # BLD AUTO: 0.1 10E9/L (ref 1.6–8.3)
NEUTROPHILS NFR BLD AUTO: 13.3 %
NRBC # BLD AUTO: 0 10*3/UL
NRBC BLD AUTO-RTO: 2 /100
PHOSPHATE SERPL-MCNC: 3 MG/DL (ref 2.5–4.5)
PLATELET # BLD AUTO: 56 10E9/L (ref 150–450)
POTASSIUM SERPL-SCNC: 3.9 MMOL/L (ref 3.4–5.3)
RBC # BLD AUTO: 2.56 10E12/L (ref 4.4–5.9)
SODIUM SERPL-SCNC: 140 MMOL/L (ref 133–144)
URATE SERPL-MCNC: 3.9 MG/DL (ref 3.5–7.2)
WBC # BLD AUTO: 0.5 10E9/L (ref 4–11)

## 2019-03-19 PROCEDURE — 84550 ASSAY OF BLOOD/URIC ACID: CPT | Performed by: INTERNAL MEDICINE

## 2019-03-19 PROCEDURE — 25000131 ZZH RX MED GY IP 250 OP 636 PS 637: Performed by: INTERNAL MEDICINE

## 2019-03-19 PROCEDURE — 25000132 ZZH RX MED GY IP 250 OP 250 PS 637: Performed by: PHYSICIAN ASSISTANT

## 2019-03-19 PROCEDURE — 80048 BASIC METABOLIC PNL TOTAL CA: CPT | Performed by: INTERNAL MEDICINE

## 2019-03-19 PROCEDURE — 25000132 ZZH RX MED GY IP 250 OP 250 PS 637: Performed by: INTERNAL MEDICINE

## 2019-03-19 PROCEDURE — 85384 FIBRINOGEN ACTIVITY: CPT | Performed by: INTERNAL MEDICINE

## 2019-03-19 PROCEDURE — 25800030 ZZH RX IP 258 OP 636: Performed by: INTERNAL MEDICINE

## 2019-03-19 PROCEDURE — 36592 COLLECT BLOOD FROM PICC: CPT | Performed by: INTERNAL MEDICINE

## 2019-03-19 PROCEDURE — 25000128 H RX IP 250 OP 636: Performed by: INTERNAL MEDICINE

## 2019-03-19 PROCEDURE — 85025 COMPLETE CBC W/AUTO DIFF WBC: CPT | Performed by: INTERNAL MEDICINE

## 2019-03-19 PROCEDURE — 12000001 ZZH R&B MED SURG/OB UMMC

## 2019-03-19 PROCEDURE — 99232 SBSQ HOSP IP/OBS MODERATE 35: CPT | Performed by: INTERNAL MEDICINE

## 2019-03-19 PROCEDURE — 83735 ASSAY OF MAGNESIUM: CPT | Performed by: INTERNAL MEDICINE

## 2019-03-19 PROCEDURE — 85730 THROMBOPLASTIN TIME PARTIAL: CPT | Performed by: INTERNAL MEDICINE

## 2019-03-19 PROCEDURE — 84100 ASSAY OF PHOSPHORUS: CPT | Performed by: INTERNAL MEDICINE

## 2019-03-19 PROCEDURE — 85610 PROTHROMBIN TIME: CPT | Performed by: INTERNAL MEDICINE

## 2019-03-19 RX ADMIN — TAMSULOSIN HYDROCHLORIDE 0.8 MG: 0.4 CAPSULE ORAL at 07:47

## 2019-03-19 RX ADMIN — CYTARABINE 220 MG: 100 INJECTION, SOLUTION INTRATHECAL; INTRAVENOUS; SUBCUTANEOUS at 19:33

## 2019-03-19 RX ADMIN — LEVOFLOXACIN 250 MG: 250 TABLET, FILM COATED ORAL at 07:48

## 2019-03-19 RX ADMIN — PANTOPRAZOLE SODIUM 40 MG: 40 TABLET, DELAYED RELEASE ORAL at 07:48

## 2019-03-19 RX ADMIN — ACETAMINOPHEN 650 MG: 325 TABLET, FILM COATED ORAL at 07:47

## 2019-03-19 RX ADMIN — PROCHLORPERAZINE MALEATE 5 MG: 5 TABLET, FILM COATED ORAL at 22:04

## 2019-03-19 RX ADMIN — ONDANSETRON HYDROCHLORIDE 8 MG: 8 TABLET, FILM COATED ORAL at 12:20

## 2019-03-19 RX ADMIN — ACYCLOVIR 400 MG: 400 TABLET ORAL at 07:48

## 2019-03-19 RX ADMIN — ROPINIROLE HYDROCHLORIDE 0.75 MG: 0.5 TABLET, FILM COATED ORAL at 21:59

## 2019-03-19 RX ADMIN — PROCHLORPERAZINE MALEATE 5 MG: 5 TABLET, FILM COATED ORAL at 17:22

## 2019-03-19 RX ADMIN — SENNOSIDES AND DOCUSATE SODIUM 2 TABLET: 8.6; 5 TABLET ORAL at 07:49

## 2019-03-19 RX ADMIN — PROCHLORPERAZINE MALEATE 5 MG: 5 TABLET, FILM COATED ORAL at 14:46

## 2019-03-19 RX ADMIN — FLUCONAZOLE 200 MG: 200 TABLET ORAL at 07:48

## 2019-03-19 RX ADMIN — ALLOPURINOL 300 MG: 300 TABLET ORAL at 07:48

## 2019-03-19 RX ADMIN — Medication 5 ML: at 05:41

## 2019-03-19 RX ADMIN — ACETAMINOPHEN 650 MG: 325 TABLET, FILM COATED ORAL at 14:49

## 2019-03-19 RX ADMIN — ACYCLOVIR 400 MG: 400 TABLET ORAL at 19:24

## 2019-03-19 RX ADMIN — ACETAMINOPHEN 650 MG: 325 TABLET, FILM COATED ORAL at 19:24

## 2019-03-19 ASSESSMENT — ACTIVITIES OF DAILY LIVING (ADL)
ADLS_ACUITY_SCORE: 12

## 2019-03-19 ASSESSMENT — PAIN DESCRIPTION - DESCRIPTORS
DESCRIPTORS: ACHING
DESCRIPTORS: ACHING
DESCRIPTORS: ACHING;CONSTANT

## 2019-03-19 ASSESSMENT — MIFFLIN-ST. JEOR: SCORE: 1766.04

## 2019-03-19 NOTE — PLAN OF CARE
Patient is up independently. Regular diet. Neutropenic. VSS, afebrile. He denies pain, nausea, and SOB at this time. Pt had BM 1x tonight, collected for study.    Will continue to monitor/assess. Sleeping in room between cares.

## 2019-03-19 NOTE — PLAN OF CARE
[id number]: VSS. Afebrile. Complaining of left, lower jaw discomfort where root canal was performed last week and lower back discomfort. PRN PO Tylenol given x2 with relief. Patient starting to have nausea. PRN PO Compazine 5 mg given x2. If patient has nausea again please give 10 mg. CIVI Cytarabine Dose #5 infusing. Appetite good. UOP adequate. Patient had a BM on night shift that was still a little hard. PRN PO Senna 2 tablets given this AM. Next bowel movement to be collected for Dr. Brown's study is on Thursday (3/21) and patient is aware. Up ad shelley walking the halls multiple times. Wife, Bessy at bedside. BMT Consult placed. Plan for Day 14 BMBx on March 28. Today is Day 5. Continue with POC.

## 2019-03-19 NOTE — PLAN OF CARE
[id number]: AVSS on RA. Backache relieved with tylenol x1. Baclofen given for hiccups. Denies nausea, managed adequately with scheduled zofran. Day 4 CIVI cytarabine infusing via PICC with brisk blood return noted q4h, tolerating well. Educated further on neutropenia and expectations for count recovery. Voiding adequately. No BM, still need stool sample for Dr. Brown study. Pt complained of some abdominal discomfort and constipation, miralax given. Up independently, ambulating in halls. Continue with POC.

## 2019-03-19 NOTE — PROGRESS NOTES
Hematology / Oncology Progress Note 03/19/2019  HOSPITAL DAY Hospital Day: 8    ASSESSMENT & PLAN Mr. Ace is a 63 year old male with new diagnosis of acute myeloid leukemia incidentally found during work up of nonspecific chest symptoms. He was started on induction chemotherapy with 7+3 (cytarabine and daunorubicin) with D1=3/15/19.    #AML  Patient presented to [facility] ED in [place], [place] for complaints of nonspecific chest discomfort after root canal treatment (done 3/11/19). CT C/A/P was negative for PE or other acute findings. On OSH labs, his WBC was incidentally noted to be elevated at 71.1 with Hb 9.0 and plts 97 (prior labs had been unremarkable per patient). Smear was suspicious for immature blasts and acute leukemia. No symptoms of hyperviscosity, TLS or DIC on presentation. Smear with no swapnil rods, immature cells, no granules, >90% blasts roughly. 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 Flow consistent with AML with 95% blasts with the following immunophenotype:  Positive for CD13, CD33, CD34, CD38, dim to negative CD45, , partial HLA-DR. Further risk stratification with NGS, FISH and cytogenetics pending. ECHO normal. PICC line placed on 3/14. Chemo started 3/15. Initially was on Hydrea 1g q6hr for cytoreduction. Discontinued 3/16 AM  - HLA typing sent, BMT consult, follow up cytogenetics & molecular studies  - continue Allopurinol, will continue for 10 days  - monitor for TLS and DIC daily  - will need D14 BMBx scheduled for 3/28 (to determine if in remission) - would then either proceed with consolidation treatment or bone marrow transplant (referral has been made they are reaching out to patient)     Treatment Plan: 7+3 (cytarabine and daunorubicin), D1=3/15/19. Today is Day 5  - Premeds: Dex/Zofran  - Daunorubicin 60mg/m2 daily for 3 days  - Cytarabine 100 mg/m2 CIVI for 7 days  - PRN antiemetics     #Anemia,  thrombocytopenia, 2/2 AML and will be exacerbated by chemotherapy.  - Goal Hb >7, Plt >10K     #ID PPx  - continue ACV, Fluc, Levaquin     #S/P root canal treatment  Patient had a root canal treatment performed on 3/11/19. Site looks unremarkable with no abscess or drainage. He was started on a 7-day course of oral  mg q6h starting one day prior to the procedure.  - No need to continue PCN (no hx of valvular heart disease)  - APAP PRN for pain control (no NSAIDs). He reports improvement in pain at this time     #Restless legs syndrome  - Continue ropinirole 0.75 mg at bedtime     #Subclinical coronary atherosclerosis  # Hyperlipidemia  Total Agatston calcium score was elevated at 591 (91st percentile) on CT coronaries performed 6/8/2016. Nuclear stress test was negative for ischemia on 8/10/2016. Life style modification measures were recommended. Patient follows with cardiology as outpatient (last office visit on 11/12/18)  - hold atorvastatin at this time, resume in 2 weeks if LFT's remain normal     #BPH  Patient follows with urology ([facility], [place], [place]).  - Continue Flomax 0.8 mg daily     #History of lumbar spine degenerative disc disease  Patient is s/p laminectomy/facetectomy procedures. He does not routinely take pain meds    FEN   - Regular diet as tolerated  - discontinued IVF  - PRN lyte replacement per standing protocol     Prophylaxis  - No pharmacologic VTE ppx due to TCP  - PRN bowel regimen for constipation ppx  - PPI for GI/PUD ppx       Code Status: FULL  Lines: PICC  Disposition: Anticipate 4-6 week LOS pending completion of chemotherapy, count recovery, and resolution of acute toxicities.     Interval history  Afebrile HD stable on RA, weight stable. 3.350 L urine yesterday & 2.350 urine overnight. No new complaints or concerns. Filling out [place] paperwork. Patient will be limited by need for hospitalizations & close clinical follow up. At risk for infection & bleeding. Will be  pancytopenic requiring blood transfusions. May or may not require bone marrow transplant. Anticipate off of work / limited work schedule for next 6 months to 1 year. Up to patient on self limiting schedule with close clinical follow up, stress, and fatigue. Also will depend on response to chemotherapy. Unable to determine return back to work. Discussed with patient & paperwork faxed.     Physical Exam  Constitutional: Awake, alert, cooperative, in NAD. Well appearing.   Eyes: PERRL, EOMI, sclera clear, conjunctiva normal. No scleral icterus.   ENT: Normocephalic, without obvious abnormality, moist mucus membranes, no lesions or thrush. Hearing aids in place.   Respiratory: Non-labored breathing, good air exchange, CTAB, no crackles or wheezing.  Cardiovascular: RRR, no murmur noted.  GI: +BS, soft, non-distended, non-tender, no masses palpated, no hepatosplenomegaly.  Skin: No concerning lesions or rash on exposed areas.  Musculoskeletal: No edema bridgett LEs, ambulates without difficulty in the room.   Neurologic: Awake, A&O x 3. Cranial nerves II-XII are grossly intact.   Neuropsychiatric: Calm, normal affect.     ROUNDING  Temp:  [95.4  F (35.2  C)-98.8  F (37.1  C)] 97.1  F (36.2  C)  Pulse:  [80-90] 90  Heart Rate:  [76-90] 85  Resp:  [16-20] 20  BP: (119-152)/(65-79) 119/75  SpO2:  [98 %-99 %] 98 %  Vitals:    03/15/19 0740 03/16/19 0811 03/17/19 0942 03/18/19 1047   Weight: 98.5 kg (217 lb 1.6 oz) 98.7 kg (217 lb 9.5 oz) 98.7 kg (217 lb 11.2 oz) 99.1 kg (218 lb 8 oz)    03/19/19 0727   Weight: 97 kg (213 lb 12.8 oz)       DATA  Recent Labs   Lab 03/19/19  0542 03/18/19  0606 03/17/19  0605 03/16/19  0545   WBC 0.5* 0.4* 1.3* 5.7   RBC 2.56* 2.38* 2.55* 2.54*   HGB 8.5* 8.0* 8.6* 8.7*   HCT 26.2* 24.4* 26.5* 26.2*   * 103* 104* 103*   MCH 33.2* 33.6* 33.7* 34.3*   MCHC 32.4 32.8 32.5 33.2   RDW 15.2* 15.4* 15.6* 15.9*   PLT 56* 64* 80* 74*     Recent Labs   Lab 03/19/19  0542 03/18/19  0606 03/17/19  0605  03/16/19  1901  03/15/19  1158 03/13/19  1849  03/12/19  2241    141 141 140   < > 140 142   < > 139   POTASSIUM 3.9 3.8 4.1 4.1   < > 3.9 3.5   < > 3.7   CHLORIDE 109 112* 112* 109   < > 111* 111*   < > 109   CO2 23 22 22 21   < > 22 22   < > 22   ANIONGAP 8 7 6 10   < > 8 9   < > 8   * 144* 157* 187*   < > 105* 132*   < > 105*   BUN 15 17 13 13   < > 8 12   < > 16   CR 0.66 0.62* 0.67 0.72   < > 0.82 0.76   < > 0.93   GFRESTIMATED >90 >90 >90 >90   < > >90 >90   < > 86   GFRESTBLACK >90 >90 >90 >90   < > >90 >90   < > >90   DEBBIE 8.1* 7.8* 8.0* 8.0*   < > 7.8* 7.8*   < > 8.5   MAG 2.5* 2.5*  --   --   --   --   --   --  2.1   PHOS 3.0 3.3 3.6 2.0*   < > 2.5 2.8  --  3.1   PROTTOTAL  --  6.3*  --   --   --  6.7* 6.8  --  7.4   ALBUMIN  --  3.0*  --   --   --  3.1* 3.1*  --  3.5   BILITOTAL  --  0.4  --   --   --  0.3 0.3  --  0.5   ALKPHOS  --  61  --   --   --  71 74  --  72   AST  --  42  --   --   --  14 12  --  16   ALT  --  61  --   --   --  18 18  --  20    < > = values in this interval not displayed.     FLT3 ITD/TKD PCR  INTERPRETATION:   Molecular testing performed on submitted Bone Marrow.   No evidence was found of either an internal tandem duplication within exon    14 or of a D835(TKD) point mutation   within exon 20 of the FLT3 gene.     Chromosome  INTERPRETATION:   No clonal chromosomal abnormality was detected among the 20 metaphase   cells analyzed. As 40-50% of acute   myeloid leukemia have normal karyotypes, the present findings are not   inconsistent with that diagnosis     - MOLECULAR (NGS) is still in process  - FISH, is still in process (talked to [facility] lab, [place] 3/18/19 1309 pm)     IMAGING  No results found for this or any previous visit (from the past 48 hour(s)).    MEDICATION  Anti-infectives (From now, onward)    Start     Dose/Rate Route Frequency Ordered Stop    03/13/19 0800  fluconazole (DIFLUCAN) tablet 200 mg      200 mg Oral DAILY 03/12/19 1891      03/13/19  0800  levofloxacin (LEVAQUIN) tablet 250 mg      250 mg Oral DAILY 03/12/19 2351      03/12/19 2230  acyclovir (ZOVIRAX) tablet 400 mg      400 mg Oral 2 TIMES DAILY 03/12/19 2223            acyclovir  400 mg Oral BID     allopurinol  300 mg Oral Daily     Chemotherapy Infusing-Continuous Infusion   Does not apply Q8H     cytarabine (CYTOSAR) chemo infusion  100 mg/m2 (Treatment Plan Recorded) Intravenous Q24H     fluconazole  200 mg Oral Daily     levofloxacin  250 mg Oral Daily     ondansetron  8 mg Oral Q12H     pantoprazole  40 mg Oral QAM AC     rOPINIRole  0.75 mg Oral Daily at 8 pm     sodium chloride (PF)  10 mL Intracatheter Q7 Days     tamsulosin  0.8 mg Oral Daily       CONSULTS   BLOOD & BONE MARROW TRANSPLANT IP CONSULT  VASCULAR ACCESS ADULT IP CONSULT  VASCULAR ACCESS ADULT IP CONSULT    Miranda Morgan, [facility], [phone].  Hematology/Oncology March 19, 2019

## 2019-03-20 LAB
ANION GAP SERPL CALCULATED.3IONS-SCNC: ABNORMAL MMOL/L (ref 3–14)
ANISOCYTOSIS BLD QL SMEAR: SLIGHT
APTT PPP: 30 SEC (ref 22–37)
BASOPHILS # BLD AUTO: 0 10E9/L (ref 0–0.2)
BASOPHILS NFR BLD AUTO: 0 %
BLASTS # BLD: 0.5 10E9/L
BLASTS BLD QL SMEAR: 68 %
BUN SERPL-MCNC: 13 MG/DL (ref 7–30)
CALCIUM SERPL-MCNC: 7.7 MG/DL (ref 8.5–10.1)
CHLORIDE SERPL-SCNC: 107 MMOL/L (ref 94–109)
CO2 SERPL-SCNC: 23 MMOL/L (ref 20–32)
COPATH REPORT: NORMAL
CREAT SERPL-MCNC: 0.65 MG/DL (ref 0.66–1.25)
DIFFERENTIAL METHOD BLD: ABNORMAL
EOSINOPHIL # BLD AUTO: 0 10E9/L (ref 0–0.7)
EOSINOPHIL NFR BLD AUTO: 0 %
ERYTHROCYTE [DISTWIDTH] IN BLOOD BY AUTOMATED COUNT: 15 % (ref 10–15)
FIBRINOGEN PPP-MCNC: 377 MG/DL (ref 200–420)
GFR SERPL CREATININE-BSD FRML MDRD: >90 ML/MIN/{1.73_M2}
GLUCOSE SERPL-MCNC: 101 MG/DL (ref 70–99)
HCT VFR BLD AUTO: 25 % (ref 40–53)
HGB BLD-MCNC: 8.1 G/DL (ref 13.3–17.7)
INR PPP: 1.13 (ref 0.86–1.14)
LACTATE BLD-SCNC: 0.5 MMOL/L (ref 0.7–2)
LYMPHOCYTES # BLD AUTO: 0.2 10E9/L (ref 0.8–5.3)
LYMPHOCYTES NFR BLD AUTO: 24 %
MACROCYTES BLD QL SMEAR: PRESENT
MAGNESIUM SERPL-MCNC: 2.2 MG/DL (ref 1.6–2.3)
MCH RBC QN AUTO: 33.5 PG (ref 26.5–33)
MCHC RBC AUTO-ENTMCNC: 32.4 G/DL (ref 31.5–36.5)
MCV RBC AUTO: 103 FL (ref 78–100)
MONOCYTES # BLD AUTO: 0 10E9/L (ref 0–1.3)
MONOCYTES NFR BLD AUTO: 0 %
NEUTROPHILS # BLD AUTO: 0.1 10E9/L (ref 1.6–8.3)
NEUTROPHILS NFR BLD AUTO: 8 %
PHOSPHATE SERPL-MCNC: 3.7 MG/DL (ref 2.5–4.5)
PLATELET # BLD AUTO: 45 10E9/L (ref 150–450)
PLATELET # BLD EST: ABNORMAL 10*3/UL
POTASSIUM SERPL-SCNC: 4.5 MMOL/L (ref 3.4–5.3)
POTASSIUM SERPL-SCNC: ABNORMAL MMOL/L (ref 3.4–5.3)
RBC # BLD AUTO: 2.42 10E12/L (ref 4.4–5.9)
SODIUM SERPL-SCNC: 137 MMOL/L (ref 133–144)
URATE SERPL-MCNC: 3.1 MG/DL (ref 3.5–7.2)
WBC # BLD AUTO: 0.7 10E9/L (ref 4–11)

## 2019-03-20 PROCEDURE — 83605 ASSAY OF LACTIC ACID: CPT

## 2019-03-20 PROCEDURE — 25000128 H RX IP 250 OP 636: Performed by: INTERNAL MEDICINE

## 2019-03-20 PROCEDURE — 84295 ASSAY OF SERUM SODIUM: CPT

## 2019-03-20 PROCEDURE — 82310 ASSAY OF CALCIUM: CPT

## 2019-03-20 PROCEDURE — 25000131 ZZH RX MED GY IP 250 OP 636 PS 637: Performed by: NURSE PRACTITIONER

## 2019-03-20 PROCEDURE — 25000131 ZZH RX MED GY IP 250 OP 636 PS 637: Performed by: INTERNAL MEDICINE

## 2019-03-20 PROCEDURE — 85730 THROMBOPLASTIN TIME PARTIAL: CPT | Performed by: INTERNAL MEDICINE

## 2019-03-20 PROCEDURE — 82374 ASSAY BLOOD CARBON DIOXIDE: CPT

## 2019-03-20 PROCEDURE — 84132 ASSAY OF SERUM POTASSIUM: CPT

## 2019-03-20 PROCEDURE — 82565 ASSAY OF CREATININE: CPT

## 2019-03-20 PROCEDURE — 25800030 ZZH RX IP 258 OP 636: Performed by: INTERNAL MEDICINE

## 2019-03-20 PROCEDURE — 84100 ASSAY OF PHOSPHORUS: CPT | Performed by: INTERNAL MEDICINE

## 2019-03-20 PROCEDURE — 83735 ASSAY OF MAGNESIUM: CPT | Performed by: INTERNAL MEDICINE

## 2019-03-20 PROCEDURE — 82435 ASSAY OF BLOOD CHLORIDE: CPT

## 2019-03-20 PROCEDURE — 40000802 ZZH SITE CHECK

## 2019-03-20 PROCEDURE — 85025 COMPLETE CBC W/AUTO DIFF WBC: CPT | Performed by: INTERNAL MEDICINE

## 2019-03-20 PROCEDURE — 25000132 ZZH RX MED GY IP 250 OP 250 PS 637: Performed by: NURSE PRACTITIONER

## 2019-03-20 PROCEDURE — 84520 ASSAY OF UREA NITROGEN: CPT

## 2019-03-20 PROCEDURE — 84550 ASSAY OF BLOOD/URIC ACID: CPT | Performed by: INTERNAL MEDICINE

## 2019-03-20 PROCEDURE — 85384 FIBRINOGEN ACTIVITY: CPT | Performed by: INTERNAL MEDICINE

## 2019-03-20 PROCEDURE — 12000001 ZZH R&B MED SURG/OB UMMC

## 2019-03-20 PROCEDURE — 25000132 ZZH RX MED GY IP 250 OP 250 PS 637: Performed by: INTERNAL MEDICINE

## 2019-03-20 PROCEDURE — 82947 ASSAY GLUCOSE BLOOD QUANT: CPT

## 2019-03-20 PROCEDURE — 25000132 ZZH RX MED GY IP 250 OP 250 PS 637: Performed by: PHYSICIAN ASSISTANT

## 2019-03-20 PROCEDURE — 85610 PROTHROMBIN TIME: CPT | Performed by: INTERNAL MEDICINE

## 2019-03-20 PROCEDURE — 36592 COLLECT BLOOD FROM PICC: CPT | Performed by: INTERNAL MEDICINE

## 2019-03-20 PROCEDURE — 36592 COLLECT BLOOD FROM PICC: CPT

## 2019-03-20 RX ORDER — DEXAMETHASONE 4 MG/1
4 TABLET ORAL DAILY
Status: COMPLETED | OUTPATIENT
Start: 2019-03-20 | End: 2019-03-23

## 2019-03-20 RX ORDER — ONDANSETRON 8 MG/1
8 TABLET, FILM COATED ORAL EVERY 8 HOURS
Status: DISCONTINUED | OUTPATIENT
Start: 2019-03-20 | End: 2019-04-14

## 2019-03-20 RX ORDER — SENNOSIDES 8.6 MG
8.6 TABLET ORAL 2 TIMES DAILY
Status: DISCONTINUED | OUTPATIENT
Start: 2019-03-20 | End: 2019-03-25

## 2019-03-20 RX ADMIN — SENNOSIDES 8.6 MG: 8.6 TABLET, FILM COATED ORAL at 20:00

## 2019-03-20 RX ADMIN — PANTOPRAZOLE SODIUM 40 MG: 40 TABLET, DELAYED RELEASE ORAL at 08:31

## 2019-03-20 RX ADMIN — SENNOSIDES 8.6 MG: 8.6 TABLET, FILM COATED ORAL at 08:29

## 2019-03-20 RX ADMIN — ONDANSETRON HYDROCHLORIDE 8 MG: 8 TABLET, FILM COATED ORAL at 15:45

## 2019-03-20 RX ADMIN — ACETAMINOPHEN 650 MG: 325 TABLET, FILM COATED ORAL at 08:36

## 2019-03-20 RX ADMIN — LEVOFLOXACIN 250 MG: 250 TABLET, FILM COATED ORAL at 08:30

## 2019-03-20 RX ADMIN — ACETAMINOPHEN 650 MG: 325 TABLET, FILM COATED ORAL at 00:05

## 2019-03-20 RX ADMIN — CYTARABINE 220 MG: 100 INJECTION, SOLUTION INTRATHECAL; INTRAVENOUS; SUBCUTANEOUS at 20:28

## 2019-03-20 RX ADMIN — ACETAMINOPHEN 650 MG: 325 TABLET, FILM COATED ORAL at 14:14

## 2019-03-20 RX ADMIN — ALLOPURINOL 300 MG: 300 TABLET ORAL at 08:32

## 2019-03-20 RX ADMIN — ROPINIROLE HYDROCHLORIDE 0.75 MG: 0.5 TABLET, FILM COATED ORAL at 21:53

## 2019-03-20 RX ADMIN — ONDANSETRON HYDROCHLORIDE 8 MG: 8 TABLET, FILM COATED ORAL at 00:05

## 2019-03-20 RX ADMIN — TAMSULOSIN HYDROCHLORIDE 0.8 MG: 0.4 CAPSULE ORAL at 08:31

## 2019-03-20 RX ADMIN — FLUCONAZOLE 200 MG: 200 TABLET ORAL at 08:32

## 2019-03-20 RX ADMIN — DEXAMETHASONE 4 MG: 4 TABLET ORAL at 10:42

## 2019-03-20 RX ADMIN — ACYCLOVIR 400 MG: 400 TABLET ORAL at 20:00

## 2019-03-20 RX ADMIN — ACYCLOVIR 400 MG: 400 TABLET ORAL at 08:32

## 2019-03-20 RX ADMIN — ONDANSETRON HYDROCHLORIDE 8 MG: 8 TABLET, FILM COATED ORAL at 08:36

## 2019-03-20 ASSESSMENT — ACTIVITIES OF DAILY LIVING (ADL)
ADLS_ACUITY_SCORE: 12

## 2019-03-20 ASSESSMENT — PAIN DESCRIPTION - DESCRIPTORS: DESCRIPTORS: ACHING

## 2019-03-20 ASSESSMENT — MIFFLIN-ST. JEOR: SCORE: 1764.68

## 2019-03-20 NOTE — PLAN OF CARE
"BP 94/55 (BP Location: Left arm)   Pulse 91   Temp 96.9  F (36.1  C) (Oral)   Resp 20   Ht 1.778 m (5' 10\")   Wt 96.8 kg (213 lb 8 oz)   SpO2 98%   BMI 30.63 kg/m       HR tachy mid-shift, WBC: 0.7, sepsis protocol triggered. Lactate Acid: 0.5. Orthostatics completed, and hypotension noted, asymptomatic, MD aware. Alert and Oriented. Encouraging fluid intake. Double lumen PICC intact, continuous chemo infusing. Left mouth pain still present, PRN Tylenol given and effective. Mild nausea, new order for Decadron started. Per patient he was able to eat his breakfast without issues. Patient is able to make needs known, will continue to monitor.   "

## 2019-03-20 NOTE — PROGRESS NOTES
Hematology / Oncology Progress Note 03/20/2019  HOSPITAL DAY Hospital Day: 9    ASSESSMENT & PLAN Mr. Ace is a 63 year old male with new diagnosis of acute myeloid leukemia incidentally found during work up of nonspecific chest symptoms. He was started on induction chemotherapy with 7+3 (cytarabine and daunorubicin) with D1=3/15/19.    #AML  Patient presented to [facility] ED in [place], [place] for complaints of nonspecific chest discomfort after root canal treatment (done 3/11/19). CT C/A/P was negative for PE or other acute findings. On OSH labs, his WBC was incidentally noted to be elevated at 71.1 with Hb 9.0 and plts 97 (prior labs had been unremarkable per patient). Smear was suspicious for immature blasts and acute leukemia. No symptoms of hyperviscosity, TLS or DIC on presentation. Smear with no swapnil rods, immature cells, no granules, >90% blasts roughly. 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 Flow consistent with AML with 95% blasts with the following immunophenotype:  Positive for CD13, CD33, CD34, CD38, dim to negative CD45, , partial HLA-DR. Further risk stratification with NGS, FISH and cytogenetics pending. ECHO normal. PICC line placed on 3/14. Chemo started 3/15. Initially was on Hydrea 1g q6hr for cytoreduction. Discontinued 3/16 AM  - HLA typing sent, BMT consult, follow up cytogenetics & molecular studies  - continue Allopurinol, will continue for 10 days  - monitor for TLS and DIC daily  - will need D14 BMBx scheduled for 3/28 (to determine if in remission) - would then either proceed with consolidation treatment or bone marrow transplant (referral has been made they are reaching out to patient)     Treatment Plan: 7+3 (cytarabine and daunorubicin), D1=3/15/19. Today is Day 6  - Premeds: Dex/Zofran  - Daunorubicin 60mg/m2 daily for 3 days  - Cytarabine 100 mg/m2 CIVI for 7 days  - PRN antiemetics     # Chemotherapy induced  nausea & hard stools- used PRN compazine x 3 last evening  - added scheduled senna BID per nursing request reports having BM but they are hard  - changed q 12 hour zofran to q 8 hours  - prn compazine in place  - could consider adding zyprexa (?) at HS, or scheduling the compazine, or increasing PPI for heart burn/reflus     #Anemia, thrombocytopenia, 2/2 AML and will be exacerbated by chemotherapy. Goal Hb >7, Plt >10K, transfuse PRN     #ID PPx continue ACV, Fluc, Levaquin     #S/P root canal treatment Patient had a root canal treatment performed on 3/11/19. Site looks unremarkable with no abscess or drainage. He was started on a 7-day course of oral  mg q6h starting one day prior to the procedure. He reports improvement in pain at this time     #Restless legs syndrome Continue ropinirole 0.75 mg at bedtime     #Subclinical coronary atherosclerosis  # Hyperlipidemia Total Agatston calcium score was elevated at 591 (91st percentile) on CT coronaries performed 6/8/2016. Nuclear stress test was negative for ischemia on 8/10/2016. Life style modification measures were recommended. Patient follows with cardiology as outpatient (last office visit on 11/12/18) - hold atorvastatin at this time, resume in 2 weeks if LFT's remain normal     #BPH Patient follows with urology ([facility], [place], [place]). Continue Flomax 0.8 mg daily     #History of lumbar spine degenerative disc disease Patient is s/p laminectomy/facetectomy procedures. He does not routinely take pain meds    FEN   - Regular diet as tolerated  - discontinued IVF  - PRN lyte replacement per standing protocol     Prophylaxis  - No pharmacologic VTE ppx due to TCP  - PRN bowel regimen for constipation ppx  - PPI for GI/PUD ppx       Code Status: FULL  Lines: PICC  Disposition: Anticipate 4-6 week LOS pending completion of chemotherapy, count recovery, and resolution of acute toxicities.     Interval history  Afebrile, HD stable, walking halls  regularly, good UOP, weight down slightly but good PO intake. No complaints or concerns.     Physical Exam  Constitutional: Awake, alert, cooperative, in NAD. Well appearing.   Eyes: PERRL, EOMI, sclera clear, conjunctiva normal. No scleral icterus.   ENT: Normocephalic, without obvious abnormality, moist mucus membranes, no lesions or thrush. Hearing aids in place.   Respiratory: Non-labored breathing, good air exchange, CTAB, no crackles or wheezing.  Cardiovascular: RRR, no murmur noted.  GI: +BS, soft, non-distended, non-tender, no masses palpated, no hepatosplenomegaly.  Skin: No concerning lesions or rash on exposed areas.  Musculoskeletal: No edema bridgett LEs, ambulates without difficulty in the room.   Neurologic: Awake, A&O x 3. Cranial nerves II-XII are grossly intact.   Neuropsychiatric: Calm, normal affect.     ROUNDING  Temp:  [96.6  F (35.9  C)-99.1  F (37.3  C)] 97.2  F (36.2  C)  Pulse:  [91] 91  Heart Rate:  [81-97] 87  Resp:  [16-20] 20  BP: ()/(54-68) 93/64  SpO2:  [96 %-98 %] 98 %  Vitals:    03/17/19 0942 03/18/19 1047 03/19/19 0727 03/20/19 0843   Weight: 98.7 kg (217 lb 11.2 oz) 99.1 kg (218 lb 8 oz) 97 kg (213 lb 12.8 oz) 96.8 kg (213 lb 8 oz)    03/21/19 0741   Weight: 95.4 kg (210 lb 6.4 oz)       DATA  Recent Labs   Lab 03/20/19  0636 03/19/19  0542 03/18/19  0606 03/17/19  0605   WBC 0.7* 0.5* 0.4* 1.3*   RBC 2.42* 2.56* 2.38* 2.55*   HGB 8.1* 8.5* 8.0* 8.6*   HCT 25.0* 26.2* 24.4* 26.5*   * 102* 103* 104*   MCH 33.5* 33.2* 33.6* 33.7*   MCHC 32.4 32.4 32.8 32.5   RDW 15.0 15.2* 15.4* 15.6*   PLT 45* 56* 64* 80*     Recent Labs   Lab 03/20/19  0636 03/19/19  0542 03/18/19  0606 03/17/19  0605  03/15/19  1158 03/13/19  1849    140 141 141   < > 140 142   POTASSIUM Results questioned - new specimen has been requested 3.9 3.8 4.1   < > 3.9 3.5   CHLORIDE 107 109 112* 112*   < > 111* 111*   CO2 23 23 22 22   < > 22 22   ANIONGAP Not Calculated 8 7 6   < > 8 9   * 104*  144* 157*   < > 105* 132*   BUN 13 15 17 13   < > 8 12   CR 0.65* 0.66 0.62* 0.67   < > 0.82 0.76   GFRESTIMATED >90 >90 >90 >90   < > >90 >90   GFRESTBLACK >90 >90 >90 >90   < > >90 >90   DEBBIE 7.7* 8.1* 7.8* 8.0*   < > 7.8* 7.8*   MAG 2.2 2.5* 2.5*  --   --   --   --    PHOS 3.7 3.0 3.3 3.6   < > 2.5 2.8   PROTTOTAL  --   --  6.3*  --   --  6.7* 6.8   ALBUMIN  --   --  3.0*  --   --  3.1* 3.1*   BILITOTAL  --   --  0.4  --   --  0.3 0.3   ALKPHOS  --   --  61  --   --  71 74   AST  --   --  42  --   --  14 12   ALT  --   --  61  --   --  18 18    < > = values in this interval not displayed.     FLT3 ITD/TKD PCR  INTERPRETATION:   Molecular testing performed on submitted Bone Marrow.   No evidence was found of either an internal tandem duplication within exon    14 or of a D835(TKD) point mutation   within exon 20 of the FLT3 gene.     Chromosome  INTERPRETATION:   No clonal chromosomal abnormality was detected among the 20 metaphase   cells analyzed. As 40-50% of acute   myeloid leukemia have normal karyotypes, the present findings are not   inconsistent with that diagnosis     - MOLECULAR (NGS) is still in process  - FISH, is still in process (talked to [facility] lab, [place] 3/18/19 1309 pm)     IMAGING  No results found for this or any previous visit (from the past 48 hour(s)).    MEDICATION  Anti-infectives (From now, onward)    Start     Dose/Rate Route Frequency Ordered Stop    03/13/19 0800  fluconazole (DIFLUCAN) tablet 200 mg      200 mg Oral DAILY 03/12/19 2223 03/13/19 0800  levofloxacin (LEVAQUIN) tablet 250 mg      250 mg Oral DAILY 03/12/19 2351 03/12/19 2230  acyclovir (ZOVIRAX) tablet 400 mg      400 mg Oral 2 TIMES DAILY 03/12/19 2223            acyclovir  400 mg Oral BID     allopurinol  300 mg Oral Daily     Chemotherapy Infusing-Continuous Infusion   Does not apply Q8H     cytarabine (CYTOSAR) chemo infusion  100 mg/m2 (Treatment Plan Recorded) Intravenous Q24H     fluconazole  200 mg  Oral Daily     levofloxacin  250 mg Oral Daily     ondansetron  8 mg Oral Q12H     pantoprazole  40 mg Oral QAM AC     rOPINIRole  0.75 mg Oral Daily at 8 pm     sennosides  8.6 mg Oral BID     sodium chloride (PF)  10 mL Intracatheter Q7 Days     tamsulosin  0.8 mg Oral Daily       CONSULTS   BLOOD & BONE MARROW TRANSPLANT IP CONSULT  VASCULAR ACCESS ADULT IP CONSULT  VASCULAR ACCESS ADULT IP CONSULT  PHYSICAL THERAPY ADULT IP CONSULT    Miranda Morgan, [facility], [phone].  Hematology/Oncology March 20, 2019

## 2019-03-20 NOTE — PROGRESS NOTES
Nursing Focus: Chemotherapy  D: Positive blood return via PICC. Insertion site is clean/dry/intact, dressing intact with no complaints of pain.  Urine output is recorded in intake in Doc Flowsheet.    I: Premedications given per order (see electronic medical administration record). Dose #5 of Cytarabine started to infuse over 24 hours. Reviewed pt teaching on chemotherapy side effects.  Pt denies need for further teaching. Chemotherapy double checked per protocol by two chemotherapy competent RN's.   A: Tolerating procedure well. Denies nausea and or pain.   P: Continue to monitor urine output and symptoms of nausea. Screen for symptoms of toxicity.

## 2019-03-20 NOTE — PLAN OF CARE
7p-11p: VSS. Afebrile. Up independently. Given tylenol @ 1924 for chronic back pain. Wife here to visit. No complaints otherwise. Continues with day # 5 CIVI cytarabine infusing. Patient requests provider order scheduled senna and a PT consult.

## 2019-03-20 NOTE — PROGRESS NOTES
"SPIRITUAL HEALTH SERVICES  SPIRITUAL ASSESSMENT Progress Note  [facility] ([place] 7D     REFERRAL SOURCE: Length of stay/initial assessment    I shared a reflective conversation with El which incorporated elements of El's illness narrative and spiritual coping. I also affirmed El's spiritual strengths and offered words of peace. El recently received a diagnosis of AML and is at [facility] receiving chemotherapy. He anticipates being here \"30 plus\" days and welcomes spiritual support. El says he doesn;t question \"the whys\" of his situation. Instead he looks for things he is grateful for \"in the situation.\" Another source of coping for him is music and playing his guitar.    PLAN: I will continue to follow for on-going spiritual support while El is on unit 7D.    Debbie Grider  Oncology   Pager [phone]    [facility] remains available 24/7 for emergent requests/referrals, either by having the switchboard page the on-call  or by entering an ASAP/STAT consult in Epic (this will also page the on-call ).      "

## 2019-03-20 NOTE — PROGRESS NOTES
"CLINICAL NUTRITION SERVICES - ASSESSMENT NOTE     Nutrition Prescription    RECOMMENDATIONS FOR MDs/PROVIDERS TO ORDER:  None at this time.    Malnutrition Status:    Patient does not meet two of the above criteria necessary for diagnosing malnutrition    Future/Additional Recommendations:  If patient's intake declines < 75% of intakes, recommend offering nutritional supplements betw meals.     REASON FOR ASSESSMENT  El Ace is a/an 63 year old male assessed by the dietitian for LOS.    NUTRITION HISTORY  Information obtained from pt. El follows a normal diet at home 3 meals a day with a good appetite. A lot of his meals at home are vegetarian because of his wife, and he tends to consume a high intake of vegetables. He has never tried nutritional supplements in the past.     CURRENT NUTRITION ORDERS  Diet: Regular  Intake/Tolerance: Eating 100% BID per flowsheets, nurse reports patient eating 3 full meals a day. During the visit with the pt, he mentioned his appetite is decreasing, not hungry, but is still eating 3 times a day to optimize his nutrition. No nausea today but is concerned about relief from constipation. He is focusing on consuming adequate fiber and water to optimize hydration. Has already drank 1L of water today. Pt recently had a root canal and mentions there is slight, but progressing, pain but has not caused a decreased PO intake. He favors the other side of the mouth while chewing.     LABS  Labs reviewed    MEDICATIONS  Medications reviewed  -IV fluids discontinued 3/18    ANTHROPOMETRICS  Height: 177.8 cm (5' 10\")  Most Recent Weight: 96.8 kg (213 lb 8 oz)  (3/20)  Admin Weight: 98.2 kg   IBW: 75.5 kg  BMI: Obesity Grade I BMI 30-34.9  Weight History:   Vitals:    03/12/19 2150 03/13/19 0745 03/14/19 0744 03/15/19 0740   Weight: 98.2 kg (216 lb 6.4 oz) 97.3 kg (214 lb 8 oz) 99.1 kg (218 lb 8 oz) 98.5 kg (217 lb 1.6 oz)    03/16/19 0811 03/17/19 0942 03/18/19 1047 03/19/19 0727 "   Weight: 98.7 kg (217 lb 9.5 oz) 98.7 kg (217 lb 11.2 oz) 99.1 kg (218 lb 8 oz) 97 kg (213 lb 12.8 oz)    03/20/19 0843   Weight: 96.8 kg (213 lb 8 oz)     Note: Wt loss during admission is likely from the discontinued IV fluids.   Note: Pt reports a normal weight of 212#.     Dosing Weight: 81 kg (adjusted based on lowest wt this admin and IBW)    ASSESSED NUTRITION NEEDS  Estimated Energy Needs: 7873-2072 kcals/day (25 - 30 kcals/kg)  Justification: Maintenance  Estimated Protein Needs:  grams protein/day (1.2 - 1.5 grams of pro/kg)  Justification: Increased needs due to chemotherapy  Estimated Fluid Needs: (1 mL/kcal)   Justification: Per provider pending fluid status    PHYSICAL FINDINGS  See malnutrition section below.  Poor dentition- recent root canal on 3/11 causing tenderness     MALNUTRITION  % Intake: Decreased intake does not meet criteria  % Weight Loss: Weight loss does not meet criteria  Subcutaneous Fat Loss: None observed  Muscle Loss: None observed  Fluid Accumulation/Edema: None noted  Malnutrition Diagnosis: Patient does not meet two of the above criteria necessary for diagnosing malnutrition    NUTRITION DIAGNOSIS  Predicted inadequate energy-protein intake related to currently consuming adequate meals TID, but at risk for po to falter d/t pt noting that his appetite is beginning to decline with chemo.        INTERVENTIONS  Implementation  Nutrition Education: Provided education on nutrition supplement options    Collaboration with other providers - spoke with nurse about intake/appetite    Goals  Patient continue to consume % of nutritionally adequate meal trays TID, or the equivalent with supplements/snacks.     Monitoring/Evaluation  Progress toward goals will be monitored and evaluated per protocol.    Rosaura Srinivasan-Dietetic Intern     Tonya Hanson RD,LD  Unit pager [phone]

## 2019-03-20 NOTE — PROGRESS NOTES
HEME MALIGNANCY ATTENDING ADDENDUM:  The patient has been seen and evaluated by me, and I have reviewed today's vital signs, medications, labs, and imaging results independently. I have discussed the patient and plan with the team, and agree with the findings and plan outlined in this note. Key aspects of my evaluation, impression, and plan are as follows.     Mr. Ace is a 63 year old man with newly diagnosed AML.   Started 7=3 - days #6 today, tolerates treatment well.  No new issues, no fevers or infections.      VS reviewed. No distress. No OP lesions. Heart regular. Lungs clear. Abd soft, no TTP. No LE edema. PICC looks OK.  Labs and imaging reviewed.      AML with normal cytogenetics and no mutations of FLT3. Additional cytogenetics and molecular studies pending.    Plan is to complete cytarabine and daunorubicin 7+3 followed by bone marrow biopsy Day 14 depending on molecular studies. BMT consult pending. Continue allopurinol. Monitor for TLS and DIC.  Transfuse as necessary.  Continue prophylactic antibiotics. Encourage eating and acitivity. Anticipate 4-6 week hospital stay after start of induction chemotherapy.     Rula Hutchins MD

## 2019-03-20 NOTE — PLAN OF CARE
11p-7a: VSS. Afebrile. Given tylenol x 1 for chronic back pain. Slept well during the night. No nausea. On scheduled anti-emetics. Up to the bathroom independently to void x 3. Continues with CIVI cytarabine infusion. Blood return positive from DL picc line.

## 2019-03-21 ENCOUNTER — APPOINTMENT (OUTPATIENT)
Dept: PHYSICAL THERAPY | Facility: CLINIC | Age: 64
DRG: 834 | End: 2019-03-21
Attending: NURSE PRACTITIONER
Payer: COMMERCIAL

## 2019-03-21 LAB
ALBUMIN SERPL-MCNC: 3 G/DL (ref 3.4–5)
ALP SERPL-CCNC: 63 U/L (ref 40–150)
ALT SERPL W P-5'-P-CCNC: 43 U/L (ref 0–70)
ANION GAP SERPL CALCULATED.3IONS-SCNC: 8 MMOL/L (ref 3–14)
APTT PPP: 30 SEC (ref 22–37)
AST SERPL W P-5'-P-CCNC: 16 U/L (ref 0–45)
BILIRUB DIRECT SERPL-MCNC: 0.1 MG/DL (ref 0–0.2)
BILIRUB SERPL-MCNC: 0.4 MG/DL (ref 0.2–1.3)
BUN SERPL-MCNC: 14 MG/DL (ref 7–30)
CALCIUM SERPL-MCNC: 8.2 MG/DL (ref 8.5–10.1)
CHLORIDE SERPL-SCNC: 106 MMOL/L (ref 94–109)
CO2 SERPL-SCNC: 21 MMOL/L (ref 20–32)
CREAT SERPL-MCNC: 0.65 MG/DL (ref 0.66–1.25)
DIFFERENTIAL METHOD BLD: ABNORMAL
ERYTHROCYTE [DISTWIDTH] IN BLOOD BY AUTOMATED COUNT: 14.7 % (ref 10–15)
FIBRINOGEN PPP-MCNC: 375 MG/DL (ref 200–420)
GFR SERPL CREATININE-BSD FRML MDRD: >90 ML/MIN/{1.73_M2}
GLUCOSE SERPL-MCNC: 120 MG/DL (ref 70–99)
HCT VFR BLD AUTO: 23.2 % (ref 40–53)
HGB BLD-MCNC: 7.7 G/DL (ref 13.3–17.7)
INR PPP: 1.1 (ref 0.86–1.14)
MAGNESIUM SERPL-MCNC: 2.4 MG/DL (ref 1.6–2.3)
MCH RBC QN AUTO: 33.5 PG (ref 26.5–33)
MCHC RBC AUTO-ENTMCNC: 33.2 G/DL (ref 31.5–36.5)
MCV RBC AUTO: 101 FL (ref 78–100)
PHOSPHATE SERPL-MCNC: 3.1 MG/DL (ref 2.5–4.5)
PLATELET # BLD AUTO: 43 10E9/L (ref 150–450)
POTASSIUM SERPL-SCNC: 4.4 MMOL/L (ref 3.4–5.3)
PROT SERPL-MCNC: 6.3 G/DL (ref 6.8–8.8)
RBC # BLD AUTO: 2.3 10E12/L (ref 4.4–5.9)
SODIUM SERPL-SCNC: 135 MMOL/L (ref 133–144)
URATE SERPL-MCNC: 2.6 MG/DL (ref 3.5–7.2)
WBC # BLD AUTO: 0.4 10E9/L (ref 4–11)

## 2019-03-21 PROCEDURE — 80076 HEPATIC FUNCTION PANEL: CPT | Performed by: INTERNAL MEDICINE

## 2019-03-21 PROCEDURE — 85610 PROTHROMBIN TIME: CPT | Performed by: INTERNAL MEDICINE

## 2019-03-21 PROCEDURE — 25000132 ZZH RX MED GY IP 250 OP 250 PS 637: Performed by: NURSE PRACTITIONER

## 2019-03-21 PROCEDURE — 85384 FIBRINOGEN ACTIVITY: CPT | Performed by: INTERNAL MEDICINE

## 2019-03-21 PROCEDURE — 25000132 ZZH RX MED GY IP 250 OP 250 PS 637: Performed by: PHYSICIAN ASSISTANT

## 2019-03-21 PROCEDURE — 80048 BASIC METABOLIC PNL TOTAL CA: CPT | Performed by: INTERNAL MEDICINE

## 2019-03-21 PROCEDURE — 25000132 ZZH RX MED GY IP 250 OP 250 PS 637: Performed by: INTERNAL MEDICINE

## 2019-03-21 PROCEDURE — 40000558 ZZH STATISTIC CVC DRESSING CHANGE

## 2019-03-21 PROCEDURE — 84550 ASSAY OF BLOOD/URIC ACID: CPT | Performed by: INTERNAL MEDICINE

## 2019-03-21 PROCEDURE — 97116 GAIT TRAINING THERAPY: CPT | Mod: GP

## 2019-03-21 PROCEDURE — 12000001 ZZH R&B MED SURG/OB UMMC

## 2019-03-21 PROCEDURE — 85730 THROMBOPLASTIN TIME PARTIAL: CPT | Performed by: INTERNAL MEDICINE

## 2019-03-21 PROCEDURE — 84100 ASSAY OF PHOSPHORUS: CPT | Performed by: INTERNAL MEDICINE

## 2019-03-21 PROCEDURE — 85025 COMPLETE CBC W/AUTO DIFF WBC: CPT | Performed by: INTERNAL MEDICINE

## 2019-03-21 PROCEDURE — 25800030 ZZH RX IP 258 OP 636: Performed by: INTERNAL MEDICINE

## 2019-03-21 PROCEDURE — 97161 PT EVAL LOW COMPLEX 20 MIN: CPT | Mod: GP

## 2019-03-21 PROCEDURE — 25000131 ZZH RX MED GY IP 250 OP 636 PS 637: Performed by: NURSE PRACTITIONER

## 2019-03-21 PROCEDURE — 36592 COLLECT BLOOD FROM PICC: CPT | Performed by: INTERNAL MEDICINE

## 2019-03-21 PROCEDURE — 25000128 H RX IP 250 OP 636: Performed by: INTERNAL MEDICINE

## 2019-03-21 PROCEDURE — 83735 ASSAY OF MAGNESIUM: CPT | Performed by: INTERNAL MEDICINE

## 2019-03-21 PROCEDURE — 97110 THERAPEUTIC EXERCISES: CPT | Mod: GP

## 2019-03-21 RX ADMIN — LEVOFLOXACIN 250 MG: 250 TABLET, FILM COATED ORAL at 08:48

## 2019-03-21 RX ADMIN — ALLOPURINOL 300 MG: 300 TABLET ORAL at 08:48

## 2019-03-21 RX ADMIN — ACYCLOVIR 400 MG: 400 TABLET ORAL at 19:59

## 2019-03-21 RX ADMIN — SENNOSIDES 8.6 MG: 8.6 TABLET, FILM COATED ORAL at 08:48

## 2019-03-21 RX ADMIN — DEXAMETHASONE 4 MG: 4 TABLET ORAL at 08:48

## 2019-03-21 RX ADMIN — ROPINIROLE HYDROCHLORIDE 0.75 MG: 0.5 TABLET, FILM COATED ORAL at 22:10

## 2019-03-21 RX ADMIN — ONDANSETRON HYDROCHLORIDE 8 MG: 8 TABLET, FILM COATED ORAL at 02:03

## 2019-03-21 RX ADMIN — ACYCLOVIR 400 MG: 400 TABLET ORAL at 08:48

## 2019-03-21 RX ADMIN — PROCHLORPERAZINE MALEATE 5 MG: 5 TABLET, FILM COATED ORAL at 22:14

## 2019-03-21 RX ADMIN — ONDANSETRON HYDROCHLORIDE 8 MG: 8 TABLET, FILM COATED ORAL at 08:48

## 2019-03-21 RX ADMIN — TAMSULOSIN HYDROCHLORIDE 0.8 MG: 0.4 CAPSULE ORAL at 08:48

## 2019-03-21 RX ADMIN — CYTARABINE 220 MG: 100 INJECTION, SOLUTION INTRATHECAL; INTRAVENOUS; SUBCUTANEOUS at 21:17

## 2019-03-21 RX ADMIN — ONDANSETRON HYDROCHLORIDE 8 MG: 8 TABLET, FILM COATED ORAL at 15:48

## 2019-03-21 RX ADMIN — PANTOPRAZOLE SODIUM 40 MG: 40 TABLET, DELAYED RELEASE ORAL at 08:48

## 2019-03-21 RX ADMIN — FLUCONAZOLE 200 MG: 200 TABLET ORAL at 08:47

## 2019-03-21 RX ADMIN — ACETAMINOPHEN 650 MG: 325 TABLET, FILM COATED ORAL at 08:53

## 2019-03-21 ASSESSMENT — ACTIVITIES OF DAILY LIVING (ADL)
ADLS_ACUITY_SCORE: 12

## 2019-03-21 ASSESSMENT — MIFFLIN-ST. JEOR: SCORE: 1750.62

## 2019-03-21 ASSESSMENT — PAIN DESCRIPTION - DESCRIPTORS: DESCRIPTORS: DULL

## 2019-03-21 NOTE — PROGRESS NOTES
03/21/19 1000   Quick Adds   Type of Visit Initial PT Evaluation   Living Environment   Lives With spouse   Living Arrangements house   Home Accessibility stairs to enter home;stairs within home   Number of Stairs, Main Entrance 2   Stair Railings, Main Entrance none   Number of Stairs, Within Home, Primary other (see comments)  (14)   Stair Railings, Within Home, Primary railing on left side (ascending)   Transportation Anticipated car, drives self   Living Environment Comment Split entry home, walk-in shower, no grab bars   Self-Care   Usual Activity Tolerance excellent   Current Activity Tolerance good   Regular Exercise Yes   Activity/Exercise Type strength training;biking   Exercise Amount/Frequency 3-5 times/wk   Equipment Currently Used at Home none   Activity/Exercise/Self-Care Comment Had left hip labrum injury ~ 1 year.   Functional Level Prior   Ambulation 0-->independent   Transferring 0-->independent   Toileting 0-->independent   Bathing 0-->independent   Communication 0-->understands/communicates without difficulty   Swallowing 0-->swallows foods/liquids without difficulty   Cognition 0 - no cognition issues reported   Fall history within last six months no   Which of the above functional risks had a recent onset or change? none   General Information   Onset of Illness/Injury or Date of Surgery - Date 03/12/19   Referring Physician Miranda Morgan APRN CNP    Patient/Family Goals Statement Return home   Pertinent History of Current Problem (include personal factors and/or comorbidities that impact the POC) Patient is a 63 year old male with history of BPH, restless legs syndrome, subclinical coronary atherosclerosis, lumbar spine DJD who was referred to [facility] from OSH for specialized work up and management of acute leukemia incidentally found during work up of nonspecific chest symptoms.    Precautions/Limitations no known precautions/limitations   General Observations History of left hip  labrum injury, knee arthritis, lumbar DJD.   General Info Comments Activity: up ad shelley   Cognitive Status Examination   Orientation orientation to person, place and time   Level of Consciousness alert   Follows Commands and Answers Questions 100% of the time   Personal Safety and Judgment intact   Memory intact   Pain Assessment   Patient Currently in Pain Yes, see Vital Sign flowsheet   Integumentary/Edema   Integumentary/Edema no deficits were identifed   Posture    Posture Not impaired   Posture Comments Tends toward protracted shoulders at times.   Range of Motion (ROM)   ROM Comment LE ROM is normal bilaterally   Strength   Strength Comments LE strength is normal bilaterally   Bed Mobility   Bed Mobility Comments Independent   Transfer Skills   Transfer Comments Independent; instructed in log rolling technique to avoid stress on back during transfer   Gait   Gait Comments Pt ambulates independently without an assistive device. No increased back or left hip pain during ambulation. He ambulates independently in the hallway several times/day ~ 10 min. Pt ambulated ~ 900' and ascended/descended 16 stairs without difficulty.   Balance   Balance Comments Normal   General Therapy Interventions   Planned Therapy Interventions strengthening;risk factor education;home program guidelines;progressive activity/exercise   Clinical Impression   Criteria for Skilled Therapeutic Intervention yes, treatment indicated   PT Diagnosis Low back and left hip pain   Influenced by the following impairments Acute leukemia, lumbar DJD   Functional limitations due to impairments Decreased functional endurance   Clinical Presentation Evolving/Changing   Clinical Presentation Rationale Back pain recently increased   Clinical Decision Making (Complexity) Low complexity   Therapy Frequency` 2 times/week   Predicted Duration of Therapy Intervention (days/wks) 3 weeks   Anticipated Discharge Disposition Home with Assist   Risk & Benefits of  therapy have been explained Yes   Patient, Family & other staff in agreement with plan of care Yes   Total Evaluation Time   Total Evaluation Time (Minutes) 10

## 2019-03-21 NOTE — PLAN OF CARE
Discharge Planner PT  PT 7D  Patient plan for discharge: Home with spouse  Current status: PT order for evaluation and treatment acknowledged. PT evaluation completed and treatment initiated. Pt reports low grade low back pain without radicular symptoms. LE strength is normal. Pt transfers independently. Pt completed supine, seated, and standing LE exercises. Pt ambulates independently without an assistive device at least 900' with normal balance and gait pattern. Pt was able to ascend/descend 16 stairs with 1 handrail on the right ascending. Low back and left hip pain did not increase during this activity.  Barriers to return to prior living situation: Medical condition  Recommendations for discharge: Home with spouse and home exercise program.  Rationale for recommendations: Pt is independent with functional mobility. HEP needed for education on exercise and cancer pts and to provided a program to progress and maintain LE strength.       Entered by: Remy Pimentel 03/21/2019 12:08 PM

## 2019-03-21 NOTE — PLAN OF CARE
Tachycardic. OVSS. Denied pain, nausea and vomiting. CIVI Cytarabine Dose #6 hung. UOP adequate. Appetite good. Up ad shelley walking the halls multiple times. PT to see patient tomorrow. BMT Consult pending. Repeat BMBx scheduled for March 28th. Continue with POC.

## 2019-03-21 NOTE — PROGRESS NOTES
Nursing Focus: Chemotherapy  D: Positive blood return via PICC. Insertion site is clean/dry/intact, dressing intact with no complaints of pain.  Urine output is recorded in intake in Doc Flowsheet.    I: Premedications given per order (see electronic medical administration record). Dose #6 of Cytarabine started to infuse over 24 hours. Reviewed pt teaching on chemotherapy side effects.  Pt denies need for further teaching. Chemotherapy double checked per protocol by two chemotherapy competent RN's.   A: Tolerating procedure well. Denies nausea and or pain.   P: Continue to monitor urine output and symptoms of nausea. Screen for symptoms of toxicity.

## 2019-03-21 NOTE — PROGRESS NOTES
Hematology / Oncology Progress Note 03/21/2019  HOSPITAL DAY Hospital Day: 10    ASSESSMENT & PLAN Mr. Ace is a 63 year old male with new diagnosis of acute myeloid leukemia incidentally found during work up of nonspecific chest symptoms. He was started on induction chemotherapy with 7+3 (cytarabine and daunorubicin) with D1=3/15/19.    #AML  Patient presented to [facility] ED in [place], [place] for complaints of nonspecific chest discomfort after root canal treatment (done 3/11/19). CT C/A/P was negative for PE or other acute findings. On OSH labs, his WBC was incidentally noted to be elevated at 71.1 with Hb 9.0 and plts 97 (prior labs had been unremarkable per patient). Smear was suspicious for immature blasts and acute leukemia. No symptoms of hyperviscosity, TLS or DIC on presentation. Smear with no swapnil rods, immature cells, no granules, >90% blasts roughly. 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 Flow consistent with AML with 95% blasts with the following immunophenotype:  Positive for CD13, CD33, CD34, CD38, dim to negative CD45, , partial HLA-DR. Further risk stratification with NGS, FISH and cytogenetics pending. ECHO normal. PICC line placed on 3/14. Chemo started 3/15. Initially was on Hydrea 1g q6hr for cytoreduction. Discontinued 3/16 AM  - HLA typing sent, BMT consult, follow up cytogenetics & molecular studies  - continue Allopurinol, will continue for 10 days  - monitor for TLS and DIC daily  - will need D14 BMBx scheduled for 3/28 (to determine if in remission) - would then either proceed with consolidation treatment or bone marrow transplant (referral has been made they are reaching out to patient)     Treatment Plan: 7+3 (cytarabine and daunorubicin), D1=3/15/19. Today is Day 7 (chemo in evenings)  - Premeds: Dex/Zofran  - Daunorubicin 60mg/m2 daily for 3 days  - Cytarabine 100 mg/m2 CIVI for 7 days  - PRN antiemetics     #  Chemotherapy induced nausea & hard stools- used PRN compazine x 3 last evening  - added scheduled senna BID per nursing request reports having BM but they are hard  - changed q 12 hour zofran to q 8 hours  - prn compazine in place  - could consider adding zyprexa (?) at HS, or scheduling the compazine, or increasing PPI for heart burn/reflus     #Anemia, thrombocytopenia, 2/2 AML and will be exacerbated by chemotherapy. Goal Hb >7, Plt >10K, transfuse PRN     #ID PPx continue ACV, Fluc, Levaquin     #S/P root canal treatment Patient had a root canal treatment performed on 3/11/19. Site looks unremarkable with no abscess or drainage. He was started on a 7-day course of oral  mg q6h starting one day prior to the procedure. He reports improvement in pain at this time     #Restless legs syndrome Continue ropinirole 0.75 mg at bedtime     #Subclinical coronary atherosclerosis  # Hyperlipidemia Total Agatston calcium score was elevated at 591 (91st percentile) on CT coronaries performed 6/8/2016. Nuclear stress test was negative for ischemia on 8/10/2016. Life style modification measures were recommended. Patient follows with cardiology as outpatient (last office visit on 11/12/18) - hold atorvastatin at this time, resume in 2 weeks if LFT's remain normal     #BPH Patient follows with urology ([facility], [place], [place]). Continue Flomax 0.8 mg daily     #History of lumbar spine degenerative disc disease Patient is s/p laminectomy/facetectomy procedures. He does not routinely take pain meds    FEN   - Regular diet as tolerated  - discontinued IVF  - PRN lyte replacement per standing protocol     Prophylaxis  - No pharmacologic VTE ppx due to TCP  - PRN bowel regimen for constipation ppx  - PPI for GI/PUD ppx       Code Status: FULL  Lines: PICC  Disposition: Anticipate 4-6 week LOS pending completion of chemotherapy, count recovery, and resolution of acute toxicities.     Interval history  Afebrile HD stable,  good UOP recorded, weight trending down with out IVF but good PO intake reported, not tachycardic or dizzy, able to walk halls. WBC this AM is 400, hgb 7.7, platelet 43 (differential not ran r/t WBC < 500). No evidence of TLS (K 4.4, Cr 0.65, Mg 2.4, Ph 3.1), LFT are normal, lactic acid was 0.5. Coags normal. Met with Randy this morning, he's doing well. Still walking halls frequently, making plan with PT. No fever, chills, cough, chest pain, shortness of breath. Has 2 mouth sores one on R tongue & some ridging left cheek but still able to eat & drink, declines MMW. Having normal bowel movements. No dysuria.     Physical Exam  Constitutional: Awake, alert, cooperative, in NAD. Well appearing.   Eyes: PERRL, EOMI, sclera clear, conjunctiva normal. No scleral icterus.   ENT: Normocephalic, without obvious abnormality, moist mucus membranes, no thrush but mouth sore on R tongue & L cheek. Hearing aids in place.   Respiratory: Non-labored breathing, good air exchange, CTAB, no crackles or wheezing.  Cardiovascular: RRR, no murmur noted.  GI: +BS, soft, non-distended, non-tender, no masses palpated.  Skin: No concerning lesions or rash on exposed areas.  Musculoskeletal: No edema bridgett LEs, ambulates without difficulty in the room.   Neurologic: Awake, A&O x 3. Cranial nerves II-XII are grossly intact.   Neuropsychiatric: Calm, normal affect.     ROUNDING  Temp:  [96.7  F (35.9  C)-98.1  F (36.7  C)] 96.7  F (35.9  C)  Pulse:  [] 75  Heart Rate:  [] 92  Resp:  [16-20] 18  BP: ()/(55-77) 145/77  SpO2:  [97 %-99 %] 99 %  Vitals:    03/17/19 0942 03/18/19 1047 03/19/19 0727 03/20/19 0843   Weight: 98.7 kg (217 lb 11.2 oz) 99.1 kg (218 lb 8 oz) 97 kg (213 lb 12.8 oz) 96.8 kg (213 lb 8 oz)    03/21/19 0741   Weight: 95.4 kg (210 lb 6.4 oz)       DATA  Recent Labs   Lab 03/21/19  0511 03/20/19  0636 03/19/19  0542 03/18/19  0606   WBC 0.4* 0.7* 0.5* 0.4*   RBC 2.30* 2.42* 2.56* 2.38*   HGB 7.7* 8.1* 8.5* 8.0*    HCT 23.2* 25.0* 26.2* 24.4*   * 103* 102* 103*   MCH 33.5* 33.5* 33.2* 33.6*   MCHC 33.2 32.4 32.4 32.8   RDW 14.7 15.0 15.2* 15.4*   PLT 43* 45* 56* 64*     Recent Labs   Lab 03/21/19  0511 03/20/19  0803 03/20/19  0636 03/19/19  0542 03/18/19  0606  03/15/19  1158     --  137 140 141   < > 140   POTASSIUM 4.4 4.5 Results questioned - new specimen has been requested 3.9 3.8   < > 3.9   CHLORIDE 106  --  107 109 112*   < > 111*   CO2 21  --  23 23 22   < > 22   ANIONGAP 8  --  Not Calculated 8 7   < > 8   *  --  101* 104* 144*   < > 105*   BUN 14  --  13 15 17   < > 8   CR 0.65*  --  0.65* 0.66 0.62*   < > 0.82   GFRESTIMATED >90  --  >90 >90 >90   < > >90   GFRESTBLACK >90  --  >90 >90 >90   < > >90   DEBBIE 8.2*  --  7.7* 8.1* 7.8*   < > 7.8*   MAG 2.4*  --  2.2 2.5* 2.5*  --   --    PHOS 3.1  --  3.7 3.0 3.3   < > 2.5   PROTTOTAL 6.3*  --   --   --  6.3*  --  6.7*   ALBUMIN 3.0*  --   --   --  3.0*  --  3.1*   BILITOTAL 0.4  --   --   --  0.4  --  0.3   ALKPHOS 63  --   --   --  61  --  71   AST 16  --   --   --  42  --  14   ALT 43  --   --   --  61  --  18    < > = values in this interval not displayed.     FLT3 ITD/TKD PCR  INTERPRETATION:   Molecular testing performed on submitted Bone Marrow.   No evidence was found of either an internal tandem duplication within exon    14 or of a D835(TKD) point mutation   within exon 20 of the FLT3 gene.     Chromosome  INTERPRETATION:   No clonal chromosomal abnormality was detected among the 20 metaphase   cells analyzed. As 40-50% of acute   myeloid leukemia have normal karyotypes, the present findings are not   inconsistent with that diagnosis     FISH was performed on uncultured specimen with the following probes (200   interphase cells examined with each   probe set):   - NUP98 (11p15.4) (breakapart) - [employer]   - KMT2A (11q23) (breakapart) - [employer]   - GLIS2 (16p13.3) (breakapart) - [employer]     RESULTS:  NEGATIVE for  rearrangement of NUP98, KMT2A or GLIS2     INTERPRETATION:   None (0%) of the interphase cells examined had a signal pattern indicative    of rearrangement of NUP98, KMT2A,   or GLIS2, genes that can be involved in cryptic rearrangements in acute   myeloid leukemia.     - MOLECULAR (NGS) is still in process      IMAGING  No results found for this or any previous visit (from the past 48 hour(s)).    MEDICATION  Anti-infectives (From now, onward)    Start     Dose/Rate Route Frequency Ordered Stop    03/13/19 0800  fluconazole (DIFLUCAN) tablet 200 mg      200 mg Oral DAILY 03/12/19 2223      03/13/19 0800  levofloxacin (LEVAQUIN) tablet 250 mg      250 mg Oral DAILY 03/12/19 2351      03/12/19 2230  acyclovir (ZOVIRAX) tablet 400 mg      400 mg Oral 2 TIMES DAILY 03/12/19 2223            acyclovir  400 mg Oral BID     allopurinol  300 mg Oral Daily     Chemotherapy Infusing-Continuous Infusion   Does not apply Q8H     cytarabine (CYTOSAR) chemo infusion  100 mg/m2 (Treatment Plan Recorded) Intravenous Q24H     dexamethasone  4 mg Oral Daily     fluconazole  200 mg Oral Daily     levofloxacin  250 mg Oral Daily     ondansetron  8 mg Oral Q8H     pantoprazole  40 mg Oral QAM AC     rOPINIRole  0.75 mg Oral Daily at 8 pm     sennosides  8.6 mg Oral BID     sodium chloride (PF)  10 mL Intracatheter Q7 Days     tamsulosin  0.8 mg Oral Daily       CONSULTS   BLOOD & BONE MARROW TRANSPLANT IP CONSULT  VASCULAR ACCESS ADULT IP CONSULT  VASCULAR ACCESS ADULT IP CONSULT  PHYSICAL THERAPY ADULT IP CONSULT    Miranda Morgan, [facility], [phone].  Hematology/Oncology March 21, 2019

## 2019-03-21 NOTE — PLAN OF CARE
[id number]:  Dose #6 CIVI Cytarabine infusing without incident, brisk blood return noted ~q4hrs.  Afebrile.  VSS on RA.  C/o tooth pain r/t root canal, given PRN Tylenol x 1 with a decrease in pain.  Denies nausea/vomiting.  Good PO intake.  SANDOVAL.  Had a BM, stool sample collected for study.  Up ad shelley, ambulated hallways.  Continue to monitor and with POC.

## 2019-03-22 ENCOUNTER — APPOINTMENT (OUTPATIENT)
Dept: GENERAL RADIOLOGY | Facility: CLINIC | Age: 64
DRG: 834 | End: 2019-03-22
Attending: NURSE PRACTITIONER
Payer: COMMERCIAL

## 2019-03-22 LAB
ALBUMIN UR-MCNC: 10 MG/DL
ANION GAP SERPL CALCULATED.3IONS-SCNC: 8 MMOL/L (ref 3–14)
APPEARANCE UR: CLEAR
BASOPHILS # BLD AUTO: 0 10E9/L (ref 0–0.2)
BASOPHILS NFR BLD AUTO: 0 %
BILIRUB UR QL STRIP: NEGATIVE
BLASTS # BLD: 1.2 10E9/L
BLASTS BLD QL SMEAR: 82.6 %
BUN SERPL-MCNC: 15 MG/DL (ref 7–30)
CALCIUM SERPL-MCNC: 8.1 MG/DL (ref 8.5–10.1)
CHLORIDE SERPL-SCNC: 101 MMOL/L (ref 94–109)
CO2 SERPL-SCNC: 24 MMOL/L (ref 20–32)
COLOR UR AUTO: YELLOW
CREAT SERPL-MCNC: 0.72 MG/DL (ref 0.66–1.25)
DIFFERENTIAL METHOD BLD: ABNORMAL
EOSINOPHIL # BLD AUTO: 0 10E9/L (ref 0–0.7)
EOSINOPHIL NFR BLD AUTO: 0 %
ERYTHROCYTE [DISTWIDTH] IN BLOOD BY AUTOMATED COUNT: 14.6 % (ref 10–15)
GFR SERPL CREATININE-BSD FRML MDRD: >90 ML/MIN/{1.73_M2}
GLUCOSE SERPL-MCNC: 117 MG/DL (ref 70–99)
GLUCOSE UR STRIP-MCNC: NEGATIVE MG/DL
HCT VFR BLD AUTO: 23.6 % (ref 40–53)
HGB BLD-MCNC: 7.9 G/DL (ref 13.3–17.7)
HGB UR QL STRIP: NEGATIVE
KETONES UR STRIP-MCNC: NEGATIVE MG/DL
LACTATE BLD-SCNC: 1.1 MMOL/L (ref 0.7–2)
LEUKOCYTE ESTERASE UR QL STRIP: NEGATIVE
LYMPHOCYTES # BLD AUTO: 0.1 10E9/L (ref 0.8–5.3)
LYMPHOCYTES NFR BLD AUTO: 7.3 %
MCH RBC QN AUTO: 33.2 PG (ref 26.5–33)
MCHC RBC AUTO-ENTMCNC: 33.5 G/DL (ref 31.5–36.5)
MCV RBC AUTO: 99 FL (ref 78–100)
MONOCYTES # BLD AUTO: 0 10E9/L (ref 0–1.3)
MONOCYTES NFR BLD AUTO: 0 %
MUCOUS THREADS #/AREA URNS LPF: PRESENT /LPF
NEUTROPHILS # BLD AUTO: 0.1 10E9/L (ref 1.6–8.3)
NEUTROPHILS NFR BLD AUTO: 10.1 %
NITRATE UR QL: NEGATIVE
PH UR STRIP: 7.5 PH (ref 5–7)
PLATELET # BLD AUTO: 33 10E9/L (ref 150–450)
PLATELET # BLD EST: ABNORMAL 10*3/UL
POTASSIUM SERPL-SCNC: 3.7 MMOL/L (ref 3.4–5.3)
RBC # BLD AUTO: 2.38 10E12/L (ref 4.4–5.9)
RBC #/AREA URNS AUTO: 4 /HPF (ref 0–2)
RBC MORPH BLD: NORMAL
SODIUM SERPL-SCNC: 134 MMOL/L (ref 133–144)
SOURCE: ABNORMAL
SP GR UR STRIP: 1.02 (ref 1–1.03)
UROBILINOGEN UR STRIP-MCNC: NORMAL MG/DL (ref 0–2)
WBC # BLD AUTO: 1.4 10E9/L (ref 4–11)
WBC #/AREA URNS AUTO: 2 /HPF (ref 0–5)

## 2019-03-22 PROCEDURE — 80048 BASIC METABOLIC PNL TOTAL CA: CPT | Performed by: INTERNAL MEDICINE

## 2019-03-22 PROCEDURE — 36592 COLLECT BLOOD FROM PICC: CPT | Performed by: INTERNAL MEDICINE

## 2019-03-22 PROCEDURE — 36415 COLL VENOUS BLD VENIPUNCTURE: CPT | Performed by: INTERNAL MEDICINE

## 2019-03-22 PROCEDURE — 71046 X-RAY EXAM CHEST 2 VIEWS: CPT

## 2019-03-22 PROCEDURE — 25800030 ZZH RX IP 258 OP 636: Performed by: INTERNAL MEDICINE

## 2019-03-22 PROCEDURE — 25000131 ZZH RX MED GY IP 250 OP 636 PS 637: Performed by: NURSE PRACTITIONER

## 2019-03-22 PROCEDURE — 25000128 H RX IP 250 OP 636: Performed by: INTERNAL MEDICINE

## 2019-03-22 PROCEDURE — 85025 COMPLETE CBC W/AUTO DIFF WBC: CPT | Performed by: INTERNAL MEDICINE

## 2019-03-22 PROCEDURE — 40000802 ZZH SITE CHECK

## 2019-03-22 PROCEDURE — 25000128 H RX IP 250 OP 636: Performed by: NURSE PRACTITIONER

## 2019-03-22 PROCEDURE — 25000132 ZZH RX MED GY IP 250 OP 250 PS 637: Performed by: PHYSICIAN ASSISTANT

## 2019-03-22 PROCEDURE — 25000132 ZZH RX MED GY IP 250 OP 250 PS 637: Performed by: NURSE PRACTITIONER

## 2019-03-22 PROCEDURE — 12000001 ZZH R&B MED SURG/OB UMMC

## 2019-03-22 PROCEDURE — 36592 COLLECT BLOOD FROM PICC: CPT

## 2019-03-22 PROCEDURE — 81001 URINALYSIS AUTO W/SCOPE: CPT | Performed by: NURSE PRACTITIONER

## 2019-03-22 PROCEDURE — 83605 ASSAY OF LACTIC ACID: CPT

## 2019-03-22 PROCEDURE — 87040 BLOOD CULTURE FOR BACTERIA: CPT | Performed by: INTERNAL MEDICINE

## 2019-03-22 PROCEDURE — 25000132 ZZH RX MED GY IP 250 OP 250 PS 637: Performed by: INTERNAL MEDICINE

## 2019-03-22 RX ORDER — CALCIUM CARBONATE 500 MG/1
500 TABLET, CHEWABLE ORAL DAILY PRN
Status: DISCONTINUED | OUTPATIENT
Start: 2019-03-22 | End: 2019-04-15 | Stop reason: HOSPADM

## 2019-03-22 RX ORDER — SODIUM CHLORIDE 9 MG/ML
INJECTION, SOLUTION INTRAVENOUS CONTINUOUS
Status: DISCONTINUED | OUTPATIENT
Start: 2019-03-22 | End: 2019-03-28

## 2019-03-22 RX ORDER — OLANZAPINE 5 MG/1
5 TABLET, ORALLY DISINTEGRATING ORAL
Status: DISCONTINUED | OUTPATIENT
Start: 2019-03-22 | End: 2019-04-11

## 2019-03-22 RX ORDER — SUCRALFATE ORAL 1 G/10ML
1 SUSPENSION ORAL 3 TIMES DAILY PRN
Status: DISCONTINUED | OUTPATIENT
Start: 2019-03-22 | End: 2019-04-15 | Stop reason: HOSPADM

## 2019-03-22 RX ORDER — PANTOPRAZOLE SODIUM 40 MG/1
40 TABLET, DELAYED RELEASE ORAL
Status: DISCONTINUED | OUTPATIENT
Start: 2019-03-22 | End: 2019-04-15 | Stop reason: HOSPADM

## 2019-03-22 RX ORDER — DIPHENHYDRAMINE HYDROCHLORIDE AND LIDOCAINE HYDROCHLORIDE AND ALUMINUM HYDROXIDE AND MAGNESIUM HYDRO
10 KIT EVERY 6 HOURS PRN
Status: DISCONTINUED | OUTPATIENT
Start: 2019-03-22 | End: 2019-04-15 | Stop reason: HOSPADM

## 2019-03-22 RX ORDER — PROCHLORPERAZINE MALEATE 5 MG
5-10 TABLET ORAL EVERY 6 HOURS
Status: DISCONTINUED | OUTPATIENT
Start: 2019-03-22 | End: 2019-04-11

## 2019-03-22 RX ADMIN — PROCHLORPERAZINE MALEATE 5 MG: 5 TABLET, FILM COATED ORAL at 18:06

## 2019-03-22 RX ADMIN — CEFEPIME 2 G: 2 INJECTION, POWDER, FOR SOLUTION INTRAVENOUS at 16:10

## 2019-03-22 RX ADMIN — ONDANSETRON HYDROCHLORIDE 8 MG: 8 TABLET, FILM COATED ORAL at 16:10

## 2019-03-22 RX ADMIN — ACYCLOVIR 400 MG: 400 TABLET ORAL at 19:34

## 2019-03-22 RX ADMIN — ONDANSETRON HYDROCHLORIDE 8 MG: 8 TABLET, FILM COATED ORAL at 00:35

## 2019-03-22 RX ADMIN — LORAZEPAM 0.5 MG: 2 INJECTION INTRAMUSCULAR; INTRAVENOUS at 11:46

## 2019-03-22 RX ADMIN — FLUCONAZOLE 200 MG: 200 TABLET ORAL at 08:13

## 2019-03-22 RX ADMIN — PROCHLORPERAZINE EDISYLATE 5 MG: 5 INJECTION INTRAMUSCULAR; INTRAVENOUS at 13:39

## 2019-03-22 RX ADMIN — PANTOPRAZOLE SODIUM 40 MG: 40 TABLET, DELAYED RELEASE ORAL at 16:10

## 2019-03-22 RX ADMIN — ALLOPURINOL 300 MG: 300 TABLET ORAL at 08:13

## 2019-03-22 RX ADMIN — ACYCLOVIR 400 MG: 400 TABLET ORAL at 08:13

## 2019-03-22 RX ADMIN — ONDANSETRON HYDROCHLORIDE 8 MG: 8 TABLET, FILM COATED ORAL at 08:13

## 2019-03-22 RX ADMIN — ROPINIROLE HYDROCHLORIDE 0.75 MG: 0.5 TABLET, FILM COATED ORAL at 21:25

## 2019-03-22 RX ADMIN — SENNOSIDES 8.6 MG: 8.6 TABLET, FILM COATED ORAL at 08:13

## 2019-03-22 RX ADMIN — SENNOSIDES 8.6 MG: 8.6 TABLET, FILM COATED ORAL at 19:34

## 2019-03-22 RX ADMIN — ONDANSETRON HYDROCHLORIDE 8 MG: 8 TABLET, FILM COATED ORAL at 23:57

## 2019-03-22 RX ADMIN — SODIUM CHLORIDE: 9 INJECTION, SOLUTION INTRAVENOUS at 22:12

## 2019-03-22 RX ADMIN — CEFEPIME 2 G: 2 INJECTION, POWDER, FOR SOLUTION INTRAVENOUS at 09:22

## 2019-03-22 RX ADMIN — LORAZEPAM 0.5 MG: 2 INJECTION INTRAMUSCULAR; INTRAVENOUS at 22:12

## 2019-03-22 RX ADMIN — ACETAMINOPHEN 650 MG: 325 TABLET, FILM COATED ORAL at 08:13

## 2019-03-22 RX ADMIN — ACETAMINOPHEN 650 MG: 325 TABLET, FILM COATED ORAL at 21:25

## 2019-03-22 RX ADMIN — SODIUM CHLORIDE 500 ML: 9 INJECTION, SOLUTION INTRAVENOUS at 08:11

## 2019-03-22 RX ADMIN — TAMSULOSIN HYDROCHLORIDE 0.8 MG: 0.4 CAPSULE ORAL at 08:13

## 2019-03-22 RX ADMIN — DEXAMETHASONE 4 MG: 4 TABLET ORAL at 08:13

## 2019-03-22 RX ADMIN — PROCHLORPERAZINE MALEATE 5 MG: 5 TABLET, FILM COATED ORAL at 23:57

## 2019-03-22 RX ADMIN — PROCHLORPERAZINE MALEATE 5 MG: 5 TABLET, FILM COATED ORAL at 04:21

## 2019-03-22 RX ADMIN — PANTOPRAZOLE SODIUM 40 MG: 40 TABLET, DELAYED RELEASE ORAL at 08:13

## 2019-03-22 RX ADMIN — LORAZEPAM 0.5 MG: 2 INJECTION INTRAMUSCULAR; INTRAVENOUS at 05:13

## 2019-03-22 ASSESSMENT — ACTIVITIES OF DAILY LIVING (ADL)
ADLS_ACUITY_SCORE: 12
ADLS_ACUITY_SCORE: 14

## 2019-03-22 ASSESSMENT — MIFFLIN-ST. JEOR: SCORE: 1737.92

## 2019-03-22 ASSESSMENT — PAIN DESCRIPTION - DESCRIPTORS: DESCRIPTORS: ACHING

## 2019-03-22 NOTE — PLAN OF CARE
Randy's temp was 101.3 this am with pulse of 110.  Kristy CURRY aware.  BC, CXR, UA/UC, IVF and IVAB ordered.  Pt denies SOB, pain or burning with urination.  Tylenol given and fever down to 99.  Is c/o general fatigue and nausea.  Did have emesis and was given IV Ativan.  Scheduled Compazine started.  Cycle 1 Day #7 Cytarabine running CIVI via PICC with + blood return.

## 2019-03-22 NOTE — PLAN OF CARE
VSS. Afebrile. Day #7 CIVI Cytarabine infusing at 45cc/hr per pharmacy. See chemo note. Brisk blood return q4hrs. Pt reported loose stools x 2. Nightime senna held. PO Compazine given for nausea x 1 at bedtime. Denies pain. Up ad shelley. Voiding adequately. Continue POC.

## 2019-03-22 NOTE — PLAN OF CARE
Nursing Focus: Chemotherapy  D: Positive blood return via PICC. Insertion site is clean/dry/intact, dressing intact with no complaints of pain.  Urine output is recorded in intake in Doc Flowsheet.    I: Premedications given per order (see electronic medical administration record). Day #7 of CIVI Cytarabine started to infuse over 24hrs at 45ml/hr per pharmacy. Reviewed pt teaching on chemotherapy side effects.  Pt denies need for further teaching. Chemotherapy double checked per protocol by two chemotherapy competent RN's.   A: Tolerating procedure well. Denies nausea and or pain.   P: Continue to monitor urine output and symptoms of nausea. Screen for symptoms of toxicity.

## 2019-03-22 NOTE — PROGRESS NOTES
Hematology / Oncology Progress Note 03/22/2019  HOSPITAL DAY Hospital Day: 11    ASSESSMENT & PLAN Mr. Ace is a 63 year old male with new diagnosis of acute myeloid leukemia incidentally found during work up of nonspecific chest symptoms. He was started on induction chemotherapy with 7+3 (cytarabine and daunorubicin) with D1=3/15/19.    # Neutropenic fever was 100.2 at 5 am on 3/22, spike to 101.3 at 0700. No focal infectious symptoms, some abdominal discomfort s/p bowel movements -- soft not watery but softer than per usual on bowel regimen (will hold). Red tip tongue, mouth sore R cheek. No chest discomfort or cough, no dizziness, no dysuria, no rash, no pain at PICC site c/d/i. He reports overall just fatigued. HR up to 100 with fever, stable BP (systolic 110-120). Tooth pain stable.   - 2 view chest XR (in process)  - blood culture x 2 (in process)  - urine microscopic reflex to culture (2 wbc, 4 rbc, no LE, no nitrite)  - switch to cefepime, discontinue levofloxacin  - gave IVF bolus 500 cc    #AML  Patient presented to [facility] ED in [place], [place] for complaints of nonspecific chest discomfort after root canal treatment (done 3/11/19). CT C/A/P was negative for PE or other acute findings. On OSH labs, his WBC was incidentally noted to be elevated at 71.1 with Hb 9.0 and plts 97 (prior labs had been unremarkable per patient). Smear was suspicious for immature blasts and acute leukemia. No symptoms of hyperviscosity, TLS or DIC on presentation. Smear with no swapnil rods, immature cells, no granules, >90% blasts roughly. 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 Flow consistent with AML with 95% blasts with the following immunophenotype:  Positive for CD13, CD33, CD34, CD38, dim to negative CD45, , partial HLA-DR. Further risk stratification with NGS, FISH and cytogenetics pending. ECHO normal. PICC line placed on 3/14. Chemo started 3/15.  Initially was on Hydrea 1g q6hr for cytoreduction. Discontinued 3/16 AM  - HLA typing sent, BMT consult, follow up cytogenetics - normal & molecular studies  - continue Allopurinol, will continue for 10 days  - will need D14 BMBx scheduled for 3/28 (to determine if in remission) - would then either proceed with consolidation treatment or bone marrow transplant (referral has been made they are reaching out to patient) will schedule BMBx for Monday 3/25/19 (early, can always push back to 3/28 based on counts)  - follow up on molecular testing     Treatment Plan: 7+3 (cytarabine and daunorubicin), D1=3/15/19. Today is Day 8 (chemo in evenings)    # Chemotherapy induced nausea & hard stools-  - added scheduled senna BID now with soft stools  - changed q 12 hour zofran to q 8 hours, scheduled compazine, BID IV protonix, 4 mg PO decadron (through 3/23)  - prn ativan, prn sucralfate, prn zyprexa, prn ranitidine     #Pancytopenia, 2/2 AML and will be exacerbated by chemotherapy. Goal Hb >7, Plt >10K, transfuse PRN hgb 7.9 platelet 33 no transfusions needed today     #ID PPx continue ACV, Fluc,     #S/P root canal treatment Patient had a root canal treatment performed on 3/11/19. Site looks unremarkable with no abscess or drainage. He was started on a 7-day course of oral  mg q6h starting one day prior to the procedure. He reports improvement in pain at this time     #Restless legs syndrome Continue ropinirole 0.75 mg at bedtime     #Subclinical coronary atherosclerosis  # Hyperlipidemia Total Agatston calcium score was elevated at 591 (91st percentile) on CT coronaries performed 6/8/2016. Nuclear stress test was negative for ischemia on 8/10/2016. Life style modification measures were recommended. Patient follows with cardiology as outpatient (last office visit on 11/12/18) - hold atorvastatin at this time, resume in 2 weeks if LFT's remain normal     #B[facility]  [place] Continue Flomax 0.8 mg daily     #History of lumbar spine degenerative disc disease Patient is s/p laminectomy/facetectomy procedures. He does not routinely take pain meds    FEN   - Regular diet as tolerated  - discontinued IVF  - PRN lyte replacement per standing protocol     Prophylaxis  - No pharmacologic VTE ppx due to TCP  - PRN bowel regimen for constipation ppx  - PPI for GI/PUD ppx       Code Status: FULL  Lines: PICC  Disposition: Anticipate 4-6 week LOS pending completion of chemotherapy, count recovery, and resolution of acute toxicities.     Interval history  Spiked first NF. Fatigued this AM. No focal symptom other than abdominal discomfort & softer stools (had taken senna BID on 3/20, then daily 3/21 & this AM). Reports an emesis of bile this AM used compazine & ativan this AM. He's on scheduled dex, scheduled zofran, scheduled protonix. No cough, chest pain, shortness of breath, congestion, headache, dysuria, rash, picc line pain. Mouth sores are present new one on tip of tongue. Still able to eat & drink. Will add PRN MMW. WBC is 1.4, hgb 7.9, platelet count 33, lactic acid 1.1, his differential is pending - called lab (, absolute blast 1200).    Physical Exam  Constitutional: Awake, alert, cooperative, in NAD. Fatigued but non-toxic.   Eyes: PERRL, EOMI, sclera clear, conjunctiva normal. No scleral icterus.   ENT: Normocephalic, without obvious abnormality, moist mucus membranes, no thrush but mouth sore on R tongue side, tip of tongue (new only erythema) & L cheek. Hearing aids in place.   Respiratory: Non-labored breathing, good air exchange, CTAB, no crackles or wheezing.  Cardiovascular: RRR - tachycardic, no murmur noted.  GI: +BS, soft, non-distended, diffuse tenderness -mild, no peritoneal signs (rebound tenderness, guarding).  Skin: No concerning lesions or rash on exposed areas. PICC c/d/i. Skin is warm & dry.   Musculoskeletal: No edema bridgett LEs, ambulates without difficulty in  the room.   Neurologic: Awake, A&O x 3. Cranial nerves II-XII are grossly intact.   Neuropsychiatric: Calm, normal affect.     ROUNDING  Temp:  [96.2  F (35.7  C)-101.3  F (38.5  C)] 101.3  F (38.5  C)  Pulse:  [] 110  Heart Rate:  [88-99] 93  Resp:  [16-18] 18  BP: (107-148)/(61-73) 110/69  SpO2:  [93 %-99 %] 97 %  Vitals:    03/17/19 0942 03/18/19 1047 03/19/19 0727 03/20/19 0843   Weight: 98.7 kg (217 lb 11.2 oz) 99.1 kg (218 lb 8 oz) 97 kg (213 lb 12.8 oz) 96.8 kg (213 lb 8 oz)    03/21/19 0741   Weight: 95.4 kg (210 lb 6.4 oz)       DATA  Recent Labs   Lab 03/22/19  0548 03/21/19  0511 03/20/19  0636 03/19/19  0542   WBC 1.4* 0.4* 0.7* 0.5*   RBC 2.38* 2.30* 2.42* 2.56*   HGB 7.9* 7.7* 8.1* 8.5*   HCT 23.6* 23.2* 25.0* 26.2*   MCV 99 101* 103* 102*   MCH 33.2* 33.5* 33.5* 33.2*   MCHC 33.5 33.2 32.4 32.4   RDW 14.6 14.7 15.0 15.2*   PLT 33* 43* 45* 56*     Recent Labs   Lab 03/22/19  0548 03/21/19  0511 03/20/19  0803 03/20/19  0636 03/19/19  0542 03/18/19  0606  03/15/19  1158    135  --  137 140 141   < > 140   POTASSIUM 3.7 4.4 4.5 Results questioned - new specimen has been requested 3.9 3.8   < > 3.9   CHLORIDE 101 106  --  107 109 112*   < > 111*   CO2 24 21  --  23 23 22   < > 22   ANIONGAP 8 8  --  Not Calculated 8 7   < > 8   * 120*  --  101* 104* 144*   < > 105*   BUN 15 14  --  13 15 17   < > 8   CR 0.72 0.65*  --  0.65* 0.66 0.62*   < > 0.82   GFRESTIMATED >90 >90  --  >90 >90 >90   < > >90   GFRESTBLACK >90 >90  --  >90 >90 >90   < > >90   DEBBIE 8.1* 8.2*  --  7.7* 8.1* 7.8*   < > 7.8*   MAG  --  2.4*  --  2.2 2.5* 2.5*  --   --    PHOS  --  3.1  --  3.7 3.0 3.3   < > 2.5   PROTTOTAL  --  6.3*  --   --   --  6.3*  --  6.7*   ALBUMIN  --  3.0*  --   --   --  3.0*  --  3.1*   BILITOTAL  --  0.4  --   --   --  0.4  --  0.3   ALKPHOS  --  63  --   --   --  61  --  71   AST  --  16  --   --   --  42  --  14   ALT  --  43  --   --   --  61  --  18    < > = values in this interval not  displayed.     FLT3 ITD/TKD PCR  INTERPRETATION:   Molecular testing performed on submitted Bone Marrow.   No evidence was found of either an internal tandem duplication within exon    14 or of a D835(TKD) point mutation   within exon 20 of the FLT3 gene.     Chromosome  INTERPRETATION:   No clonal chromosomal abnormality was detected among the 20 metaphase   cells analyzed. As 40-50% of acute   myeloid leukemia have normal karyotypes, the present findings are not   inconsistent with that diagnosis     FISH was performed on uncultured specimen with the following probes (200   interphase cells examined with each   probe set):   - NUP98 (11p15.4) (breakapart) - [employer]ocell   - KMT2A (11q23) (breakapart) - [employer]   - GLIS2 (16p13.3) (breakapart) - [employer]     RESULTS:  NEGATIVE for rearrangement of NUP98, KMT2A or GLIS2     INTERPRETATION:   None (0%) of the interphase cells examined had a signal pattern indicative    of rearrangement of NUP98, KMT2A,   or GLIS2, genes that can be involved in cryptic rearrangements in acute   myeloid leukemia.     - MOLECULAR (NGS) is still in process      IMAGING  No results found for this or any previous visit (from the past 48 hour(s)).    MEDICATION  Anti-infectives (From now, onward)    Start     Dose/Rate Route Frequency Ordered Stop    03/22/19 0745  ceFEPIme (MAXIPIME) 2 g vial to attach to  ml bag for ADULTS or 50 ml bag for PEDS      2 g  over 30 Minutes Intravenous EVERY 8 HOURS 03/22/19 0739      03/13/19 0800  fluconazole (DIFLUCAN) tablet 200 mg      200 mg Oral DAILY 03/12/19 2223      03/12/19 2230  acyclovir (ZOVIRAX) tablet 400 mg      400 mg Oral 2 TIMES DAILY 03/12/19 2223            sodium chloride 0.9%  500 mL Intravenous Once     acyclovir  400 mg Oral BID     allopurinol  300 mg Oral Daily     ceFEPIme (MAXIPIME) IV  2 g Intravenous Q8H     cytarabine (CYTOSAR) chemo infusion  100 mg/m2 (Treatment Plan Recorded) Intravenous Q24H      dexamethasone  4 mg Oral Daily     fluconazole  200 mg Oral Daily     ondansetron  8 mg Oral Q8H     pantoprazole  40 mg Oral QAM AC     rOPINIRole  0.75 mg Oral Daily at 8 pm     sennosides  8.6 mg Oral BID     sodium chloride (PF)  10 mL Intracatheter Q7 Days     tamsulosin  0.8 mg Oral Daily       CONSULTS   BLOOD & BONE MARROW TRANSPLANT IP CONSULT  VASCULAR ACCESS ADULT IP CONSULT  VASCULAR ACCESS ADULT IP CONSULT  PHYSICAL THERAPY ADULT IP CONSULT    Miranda Morgan, [facility], [phone].  Hematology/Oncology March 22, 2019

## 2019-03-22 NOTE — PLAN OF CARE
Tmax: 100.2. Triggered sepsis. Lactic ordered. #7 CIVI Cytarabine infusing to R) port, good blood return. Voiding spontaneously. Scheduled Zofran. Compazine x 1/ IV ativan x 1 for nausea. Up independency. Will continue w/POC.

## 2019-03-23 ENCOUNTER — APPOINTMENT (OUTPATIENT)
Dept: PHYSICAL THERAPY | Facility: CLINIC | Age: 64
DRG: 834 | End: 2019-03-23
Payer: COMMERCIAL

## 2019-03-23 LAB
ANION GAP SERPL CALCULATED.3IONS-SCNC: 11 MMOL/L (ref 3–14)
APTT PPP: 30 SEC (ref 22–37)
BASOPHILS # BLD AUTO: 0 10E9/L (ref 0–0.2)
BASOPHILS NFR BLD AUTO: 0 %
BLASTS # BLD: 6 10E9/L
BLASTS BLD QL SMEAR: 95.7 %
BUN SERPL-MCNC: 16 MG/DL (ref 7–30)
CALCIUM SERPL-MCNC: 8.1 MG/DL (ref 8.5–10.1)
CHLORIDE SERPL-SCNC: 101 MMOL/L (ref 94–109)
CO2 SERPL-SCNC: 21 MMOL/L (ref 20–32)
CREAT SERPL-MCNC: 0.82 MG/DL (ref 0.66–1.25)
DIFFERENTIAL METHOD BLD: ABNORMAL
EOSINOPHIL # BLD AUTO: 0 10E9/L (ref 0–0.7)
EOSINOPHIL NFR BLD AUTO: 0 %
ERYTHROCYTE [DISTWIDTH] IN BLOOD BY AUTOMATED COUNT: 14.6 % (ref 10–15)
FIBRINOGEN PPP-MCNC: 596 MG/DL (ref 200–420)
GFR SERPL CREATININE-BSD FRML MDRD: >90 ML/MIN/{1.73_M2}
GLUCOSE SERPL-MCNC: 124 MG/DL (ref 70–99)
HCT VFR BLD AUTO: 23.2 % (ref 40–53)
HGB BLD-MCNC: 7.7 G/DL (ref 13.3–17.7)
INR PPP: 1.22 (ref 0.86–1.14)
LACTATE BLD-SCNC: 1.2 MMOL/L (ref 0.7–2)
LDH SERPL L TO P-CCNC: 146 U/L (ref 85–227)
LYMPHOCYTES # BLD AUTO: 0.2 10E9/L (ref 0.8–5.3)
LYMPHOCYTES NFR BLD AUTO: 3.4 %
MAGNESIUM SERPL-MCNC: 2.1 MG/DL (ref 1.6–2.3)
MCH RBC QN AUTO: 33.2 PG (ref 26.5–33)
MCHC RBC AUTO-ENTMCNC: 33.2 G/DL (ref 31.5–36.5)
MCV RBC AUTO: 100 FL (ref 78–100)
MONOCYTES # BLD AUTO: 0 10E9/L (ref 0–1.3)
MONOCYTES NFR BLD AUTO: 0 %
NEUTROPHILS # BLD AUTO: 0.1 10E9/L (ref 1.6–8.3)
NEUTROPHILS NFR BLD AUTO: 0.9 %
PHOSPHATE SERPL-MCNC: 2.9 MG/DL (ref 2.5–4.5)
PLATELET # BLD AUTO: 21 10E9/L (ref 150–450)
PLATELET # BLD EST: ABNORMAL 10*3/UL
POTASSIUM SERPL-SCNC: 3.9 MMOL/L (ref 3.4–5.3)
RBC # BLD AUTO: 2.32 10E12/L (ref 4.4–5.9)
RBC MORPH BLD: NORMAL
SODIUM SERPL-SCNC: 132 MMOL/L (ref 133–144)
WBC # BLD AUTO: 6.3 10E9/L (ref 4–11)

## 2019-03-23 PROCEDURE — 87103 BLOOD FUNGUS CULTURE: CPT | Performed by: NURSE PRACTITIONER

## 2019-03-23 PROCEDURE — 80048 BASIC METABOLIC PNL TOTAL CA: CPT | Performed by: INTERNAL MEDICINE

## 2019-03-23 PROCEDURE — 36592 COLLECT BLOOD FROM PICC: CPT

## 2019-03-23 PROCEDURE — 83735 ASSAY OF MAGNESIUM: CPT | Performed by: INTERNAL MEDICINE

## 2019-03-23 PROCEDURE — 87040 BLOOD CULTURE FOR BACTERIA: CPT

## 2019-03-23 PROCEDURE — 25000128 H RX IP 250 OP 636: Performed by: NURSE PRACTITIONER

## 2019-03-23 PROCEDURE — 25800030 ZZH RX IP 258 OP 636: Performed by: INTERNAL MEDICINE

## 2019-03-23 PROCEDURE — 36592 COLLECT BLOOD FROM PICC: CPT | Performed by: INTERNAL MEDICINE

## 2019-03-23 PROCEDURE — 84100 ASSAY OF PHOSPHORUS: CPT | Performed by: INTERNAL MEDICINE

## 2019-03-23 PROCEDURE — 25000132 ZZH RX MED GY IP 250 OP 250 PS 637: Performed by: INTERNAL MEDICINE

## 2019-03-23 PROCEDURE — 85384 FIBRINOGEN ACTIVITY: CPT | Performed by: NURSE PRACTITIONER

## 2019-03-23 PROCEDURE — 12000001 ZZH R&B MED SURG/OB UMMC

## 2019-03-23 PROCEDURE — 97110 THERAPEUTIC EXERCISES: CPT | Mod: GP

## 2019-03-23 PROCEDURE — 25000128 H RX IP 250 OP 636: Performed by: INTERNAL MEDICINE

## 2019-03-23 PROCEDURE — 85610 PROTHROMBIN TIME: CPT | Performed by: NURSE PRACTITIONER

## 2019-03-23 PROCEDURE — 25000132 ZZH RX MED GY IP 250 OP 250 PS 637: Performed by: NURSE PRACTITIONER

## 2019-03-23 PROCEDURE — 87040 BLOOD CULTURE FOR BACTERIA: CPT | Performed by: NURSE PRACTITIONER

## 2019-03-23 PROCEDURE — 85730 THROMBOPLASTIN TIME PARTIAL: CPT | Performed by: NURSE PRACTITIONER

## 2019-03-23 PROCEDURE — 85025 COMPLETE CBC W/AUTO DIFF WBC: CPT | Performed by: INTERNAL MEDICINE

## 2019-03-23 PROCEDURE — 83605 ASSAY OF LACTIC ACID: CPT

## 2019-03-23 PROCEDURE — 83615 LACTATE (LD) (LDH) ENZYME: CPT | Performed by: INTERNAL MEDICINE

## 2019-03-23 PROCEDURE — 97530 THERAPEUTIC ACTIVITIES: CPT | Mod: GP

## 2019-03-23 PROCEDURE — 25000131 ZZH RX MED GY IP 250 OP 636 PS 637: Performed by: NURSE PRACTITIONER

## 2019-03-23 PROCEDURE — 36592 COLLECT BLOOD FROM PICC: CPT | Performed by: NURSE PRACTITIONER

## 2019-03-23 PROCEDURE — 25800030 ZZH RX IP 258 OP 636: Performed by: NURSE PRACTITIONER

## 2019-03-23 RX ORDER — VORICONAZOLE 200 MG/1
200 TABLET, FILM COATED ORAL EVERY 12 HOURS SCHEDULED
Status: DISCONTINUED | OUTPATIENT
Start: 2019-03-23 | End: 2019-03-23

## 2019-03-23 RX ORDER — ALLOPURINOL 300 MG/1
300 TABLET ORAL DAILY
Status: DISCONTINUED | OUTPATIENT
Start: 2019-03-24 | End: 2019-04-07

## 2019-03-23 RX ADMIN — ROPINIROLE HYDROCHLORIDE 0.75 MG: 0.5 TABLET, FILM COATED ORAL at 22:25

## 2019-03-23 RX ADMIN — FLUCONAZOLE 200 MG: 200 TABLET ORAL at 08:41

## 2019-03-23 RX ADMIN — ACYCLOVIR 400 MG: 400 TABLET ORAL at 19:56

## 2019-03-23 RX ADMIN — LORAZEPAM 0.5 MG: 2 INJECTION INTRAMUSCULAR; INTRAVENOUS at 13:46

## 2019-03-23 RX ADMIN — DEXAMETHASONE 4 MG: 4 TABLET ORAL at 08:41

## 2019-03-23 RX ADMIN — PROCHLORPERAZINE MALEATE 10 MG: 5 TABLET, FILM COATED ORAL at 17:52

## 2019-03-23 RX ADMIN — PROCHLORPERAZINE MALEATE 10 MG: 5 TABLET, FILM COATED ORAL at 12:12

## 2019-03-23 RX ADMIN — CEFEPIME 2 G: 2 INJECTION, POWDER, FOR SOLUTION INTRAVENOUS at 17:52

## 2019-03-23 RX ADMIN — ONDANSETRON HYDROCHLORIDE 8 MG: 8 TABLET, FILM COATED ORAL at 23:58

## 2019-03-23 RX ADMIN — SODIUM CHLORIDE: 9 INJECTION, SOLUTION INTRAVENOUS at 20:37

## 2019-03-23 RX ADMIN — PROCHLORPERAZINE MALEATE 5 MG: 5 TABLET, FILM COATED ORAL at 23:58

## 2019-03-23 RX ADMIN — ONDANSETRON HYDROCHLORIDE 8 MG: 8 TABLET, FILM COATED ORAL at 15:52

## 2019-03-23 RX ADMIN — TAMSULOSIN HYDROCHLORIDE 0.8 MG: 0.4 CAPSULE ORAL at 08:42

## 2019-03-23 RX ADMIN — ACYCLOVIR 400 MG: 400 TABLET ORAL at 08:41

## 2019-03-23 RX ADMIN — ACETAMINOPHEN 650 MG: 325 TABLET, FILM COATED ORAL at 06:38

## 2019-03-23 RX ADMIN — PROCHLORPERAZINE MALEATE 5 MG: 5 TABLET, FILM COATED ORAL at 06:38

## 2019-03-23 RX ADMIN — ONDANSETRON HYDROCHLORIDE 8 MG: 8 TABLET, FILM COATED ORAL at 09:39

## 2019-03-23 RX ADMIN — PANTOPRAZOLE SODIUM 40 MG: 40 TABLET, DELAYED RELEASE ORAL at 15:52

## 2019-03-23 RX ADMIN — CEFEPIME 2 G: 2 INJECTION, POWDER, FOR SOLUTION INTRAVENOUS at 08:42

## 2019-03-23 RX ADMIN — CEFEPIME 2 G: 2 INJECTION, POWDER, FOR SOLUTION INTRAVENOUS at 00:02

## 2019-03-23 RX ADMIN — PANTOPRAZOLE SODIUM 40 MG: 40 TABLET, DELAYED RELEASE ORAL at 08:41

## 2019-03-23 RX ADMIN — MICAFUNGIN SODIUM 50 MG: 10 INJECTION, POWDER, LYOPHILIZED, FOR SOLUTION INTRAVENOUS at 15:52

## 2019-03-23 RX ADMIN — SODIUM CHLORIDE: 9 INJECTION, SOLUTION INTRAVENOUS at 07:00

## 2019-03-23 RX ADMIN — CEFEPIME 2 G: 2 INJECTION, POWDER, FOR SOLUTION INTRAVENOUS at 23:57

## 2019-03-23 ASSESSMENT — ACTIVITIES OF DAILY LIVING (ADL)
ADLS_ACUITY_SCORE: 14

## 2019-03-23 ASSESSMENT — MIFFLIN-ST. JEOR: SCORE: 1723.86

## 2019-03-23 NOTE — PLAN OF CARE
Patient remained afebrile overnight with temp max=99.6 OVSS but triggered the sepsis protocol.Patient denied pain and nausea but wishes to stay on top of nausea-and is on scheduled antiemetic of compazine and zofran.Day #7 of chemo completed.Worked up for 1st fever nearly 24 hrs ago- results thus far are negative.Continue to monitor

## 2019-03-23 NOTE — PLAN OF CARE
Tmax 101.7, tachycardic. Moonlighter notified, NS now running at 100mL/hr per orders. Tylenol given for temp, came down to 100.6. Denies pain, SOB. Continues with nausea, PRN ativan given in addition to scheduled antiemetics. Day #7 CIVI cytarabine infusing w/ +blood return. Continue to monitor.

## 2019-03-23 NOTE — PLAN OF CARE
Discharge Planner PT  PT 7D  Patient plan for discharge: Home with spouse  Current status: Pt reported nausea intermittently during the day and felt well enough to participate with this PT session. Spouse present. Pt completed supine, seated, and standing LE exercises. Pt reported mild lightheadedness sitting and standing. Pt ambulates independently without an assistive device. Pt ambulated to elevator and to 6th floor rehab gym. Pt completed NuStep exercise x 17 min at low workload and moderate speed. SpO2 97-98% and -121 during exercise. At end of exercise, BP 98/64, , and SpO2 99% with report of mild lightheadedness.Following an extended seated rest, pt ambulated back to room  Barriers to return to prior living situation: Medical condition  Recommendations for discharge: Home with spouse and home exercise program.  Rationale for recommendations: Pt is functionally independent. HEP needed for pt to progress and maintain LE strength and functional endurance.       Entered by: Remy Pimentel 03/23/2019 5:07 PM

## 2019-03-23 NOTE — PROGRESS NOTES
Hematology / Oncology Progress Note 03/22/2019  HOSPITAL DAY Hospital Day: 11    ASSESSMENT & PLAN Mr. Ace is a 63 year old male with new diagnosis of acute myeloid leukemia incidentally found during work up of nonspecific chest symptoms. He was started on induction chemotherapy with 7+3 (cytarabine and daunorubicin) with D1=3/15/19.    # Neutropenic fever was 100.2 at 5 am on 3/22, spike to 101.3 at 0700. No focal infectious symptoms, some abdominal discomfort s/p bowel movements -- soft not watery but softer than per usual on bowel regimen (will hold). Red tip tongue, mouth sore R cheek. No chest discomfort or cough, no dizziness, no dysuria, no rash, no pain at PICC site c/d/i. He reports overall just fatigued. HR up to 100 with fever, stable BP (systolic 110-120). Tooth pain stable. 3/23 continues to have fevers but HD stable on RA, no new focal symptoms, cultures are negative to date, known persistent disease, did not escalate antibiotics but could consider with new symptom.  - 2 view chest XR (small R pleural effusion, no focal airspace opacity)  - blood culture x 2 (negative to date)  - urine microscopic reflex to culture (2 wbc, 4 rbc, no LE, no nitrite)  - switch to cefepime, discontinue levofloxacin  - will schedule fungal & anaerobic culture with coags today (check for DIC with fevers & persistent AML) & AGA / 1,3 BDF >> transition from fluconazole to voriconazole    #AML  Patient presented to [facility] ED in [place], [place] for complaints of nonspecific chest discomfort after root canal treatment (done 3/11/19). CT C/A/P was negative for PE or other acute findings. On OSH labs, his WBC was incidentally noted to be elevated at 71.1 with Hb 9.0 and plts 97 (prior labs had been unremarkable per patient). Smear was suspicious for immature blasts and acute leukemia. No symptoms of hyperviscosity, TLS or DIC on presentation. Smear with no swapnil rods, immature cells, no granules, >90% blasts roughly.  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 Flow consistent with AML with 95% blasts with the following immunophenotype:  Positive for CD13, CD33, CD34, CD38, dim to negative CD45, , partial HLA-DR. Further risk stratification with NGS, FISH and cytogenetics pending. ECHO normal. PICC line placed on 3/14. Chemo started 3/15. Initially was on Hydrea 1g q6hr for cytoreduction. Discontinued 3/16 AM  - HLA typing sent, BMT consult, follow up cytogenetics - normal & molecular studies  - will need D14 BMBx scheduled for 3/28 (to determine if in remission) - would then either proceed with consolidation treatment or bone marrow transplant (referral has been made they are reaching out to patient) will schedule BMBx for Monday 3/25/19 (at 1 pm, rising WBC & blast count continues)  - add back daily DIC & TLS monitoring with WBC & blast count rising >> normal  - continue Allopurinol & IVF, will continue as likely needs reinduction  - follow up on molecular testing (still in process) - TP53 mutation (?) consider AZA / venetoclax vs MEC     Treatment Plan: 7+3 (cytarabine and daunorubicin), D1=3/15/19. Today is Day 9 (chemo in evenings)    # Chemotherapy induced nausea & hard stools-  - added scheduled senna BID now with soft stools  - changed q 12 hour zofran to q 8 hours, scheduled compazine, BID IV protonix, 4 mg PO decadron (through 3/23)  - prn ativan, prn sucralfate, prn zyprexa, prn ranitidine     #Pancytopenia, 2/2 AML and will be exacerbated by chemotherapy. Goal Hb >7, Plt >10K, transfuse PRN no transfusion needs today   #ID PPx continue ACV, ,, Voriconazole     #S/P root canal treatment Patient had a root canal treatment performed on 3/11/19. Site looks unremarkable with no abscess or drainage. He was started on a 7-day course of oral  mg q6h starting one day prior to the procedure. He reports improvement in pain at this time     #Restless legs syndrome  Continue ropinirole 0.75 mg at bedtime     #Subclinical coronary atherosclerosis  # Hyperlipidemia Total Agatston calcium score was elevated at 591 (91st percentile) on CT coronaries performed 6/8/2016. Nuclear stress test was negative for ischemia on 8/10/2016. Life style modification measures were recommended. Patient follows with cardiology as outpatient (last office visit on 11/12/18) - hold atorvastatin at this time, resume in 2 weeks if LFT's remain normal     #BPH Patient follows with urology ([facility], [place], [place]). Continue Flomax 0.8 mg daily     #History of lumbar spine degenerative disc disease Patient is s/p laminectomy/facetectomy procedures. He does not routinely take pain meds    FEN   - Regular diet as tolerated  - discontinued IVF  - PRN lyte replacement per standing protocol     Prophylaxis  - No pharmacologic VTE ppx due to TCP  - PRN bowel regimen for constipation ppx  - PPI for GI/PUD ppx       Code Status: FULL  Lines: PICC  Disposition: Anticipate 4-6 week LOS pending completion of chemotherapy, count recovery, and resolution of acute toxicities.     Interval history t max 101.8 this AM at 0630,  at that time, SBP stable 100-110, on RA sats mid 90's. His WBC lily to 6.3, hgb 7.7, platelet count of 21,  (95 % blasts) lactic acid 1.2 with fever, glucose normal. Will add back on DIC & TLS labs. Bone Marrow Biopsy Monday. Molecular studies -- are still in process from 3/13/19. Met with Randy this AM reports in a physical state of low, just feels fatigued. No new specific symptoms. No cough, chest pain, shortness of breath, nausea, having regular BM, normal voiding pattern, no increased mouth soreness from prior. We discussed bone marrow biopsy for Monday.     Physical Exam  Constitutional: Awake, alert, cooperative, in NAD. Fatigued but non-toxic. Laying in bed.   Eyes: PERRL, EOMI, sclera clear, conjunctiva normal. No scleral icterus.   ENT: Normocephalic, without obvious  abnormality, moist mucus membranes, no thrush but mouth sore on R tongue side, tip of tongue (new only erythema) & L cheek. Hearing aids in place.   Respiratory: Non-labored breathing, good air exchange, CTAB, no crackles or wheezing.  Cardiovascular: RRR - tachycardic, no murmur noted.  GI: +BS, soft, non-distended, diffuse tenderness -mild, no peritoneal signs (rebound tenderness, guarding).  Skin: No concerning lesions or rash on exposed areas. PICC c/d/i. Skin is warm & dry.   Musculoskeletal: No edema bridgett LEs, ambulates without difficulty in the room.   Neurologic: Awake, A&O x 3. Cranial nerves II-XII are grossly intact.   Neuropsychiatric: Calm, normal affect.     ROUNDING  Temp:  [97.6  F (36.4  C)-101.7  F (38.7  C)] 101.7  F (38.7  C)  Pulse:  [] 101  Heart Rate:  [] 110  Resp:  [16-20] 18  BP: (110-148)/(58-77) 113/58  SpO2:  [93 %-99 %] 95 %  Vitals:    03/18/19 1047 03/19/19 0727 03/20/19 0843 03/21/19 0741   Weight: 99.1 kg (218 lb 8 oz) 97 kg (213 lb 12.8 oz) 96.8 kg (213 lb 8 oz) 95.4 kg (210 lb 6.4 oz)    03/22/19 1100   Weight: 94.2 kg (207 lb 9.6 oz)       DATA  Recent Labs   Lab 03/22/19  0548 03/21/19  0511 03/20/19  0636 03/19/19  0542   WBC 1.4* 0.4* 0.7* 0.5*   RBC 2.38* 2.30* 2.42* 2.56*   HGB 7.9* 7.7* 8.1* 8.5*   HCT 23.6* 23.2* 25.0* 26.2*   MCV 99 101* 103* 102*   MCH 33.2* 33.5* 33.5* 33.2*   MCHC 33.5 33.2 32.4 32.4   RDW 14.6 14.7 15.0 15.2*   PLT 33* 43* 45* 56*     Recent Labs   Lab 03/22/19  0548 03/21/19  0511 03/20/19  0803 03/20/19  0636 03/19/19  0542 03/18/19  0606    135  --  137 140 141   POTASSIUM 3.7 4.4 4.5 Results questioned - new specimen has been requested 3.9 3.8   CHLORIDE 101 106  --  107 109 112*   CO2 24 21  --  23 23 22   ANIONGAP 8 8  --  Not Calculated 8 7   * 120*  --  101* 104* 144*   BUN 15 14  --  13 15 17   CR 0.72 0.65*  --  0.65* 0.66 0.62*   GFRESTIMATED >90 >90  --  >90 >90 >90   GFRESTBLACK >90 >90  --  >90 >90 >90   DEBBIE 8.1*  8.2*  --  7.7* 8.1* 7.8*   MAG  --  2.4*  --  2.2 2.5* 2.5*   PHOS  --  3.1  --  3.7 3.0 3.3   PROTTOTAL  --  6.3*  --   --   --  6.3*   ALBUMIN  --  3.0*  --   --   --  3.0*   BILITOTAL  --  0.4  --   --   --  0.4   ALKPHOS  --  63  --   --   --  61   AST  --  16  --   --   --  42   ALT  --  43  --   --   --  61     Blood culture [[id number]] Collected: 03/23/19 0715   Order Status: Completed Lab Status: In process Updated: 03/23/19 0716   Specimen: Blood    Blood culture [[id number]] Collected: 03/23/19 0708   Order Status: Completed Lab Status: In process Updated: 03/23/19 0710   Specimen: Blood    Blood culture [[id number]] Collected: 03/22/19 0845   Order Status: Completed Lab Status: Preliminary result Updated: 03/23/19 0800   Specimen: Blood     Specimen Description Blood Left Hand    Special Requests Received in aerobic bottle only    Culture Micro No growth after 20 hours   Blood culture [[id number]] Collected: 03/22/19 0841   Order Status: Completed Lab Status: Preliminary result Updated: 03/23/19 0800   Specimen: Blood     Specimen Description Blood Blue port    Special Requests Received in aerobic bottle only    Culture Micro No growth after 20 hours     FLT3 ITD/TKD PCR  INTERPRETATION:   Molecular testing performed on submitted Bone Marrow.   No evidence was found of either an internal tandem duplication within exon    14 or of a D835(TKD) point mutation within exon 20 of the FLT3 gene.     Chromosome  INTERPRETATION:   No clonal chromosomal abnormality was detected among the 20 metaphase   cells analyzed. As 40-50% of acute   myeloid leukemia have normal karyotypes, the present findings are not   inconsistent with that diagnosis     FISH was performed on uncultured specimen with the following probes (200   interphase cells examined with each   probe set):   - NUP98 (11p15.4) (breakapart) - Cytocell   - KMT2A (11q23) (breakapart) - [employer]   - GLIS2 (16p13.3) (breakapart) - [employer]     RESULTS:   NEGATIVE for rearrangement of NUP98, KMT2A or GLIS2     INTERPRETATION:   None (0%) of the interphase cells examined had a signal pattern indicative    of rearrangement of NUP98, KMT2A,   or GLIS2, genes that can be involved in cryptic rearrangements in acute   myeloid leukemia.     - MOLECULAR (NGS) is still in process      IMAGING  No results found for this or any previous visit (from the past 48 hour(s)).    MEDICATION  Anti-infectives (From now, onward)    Start     Dose/Rate Route Frequency Ordered Stop    03/23/19 2000  voriconazole (VFEND) tablet 200 mg      200 mg Oral EVERY 12 HOURS SCHEDULED 03/23/19 1211      03/22/19 0745  ceFEPIme (MAXIPIME) 2 g vial to attach to  ml bag for ADULTS or 50 ml bag for PEDS      2 g  over 30 Minutes Intravenous EVERY 8 HOURS 03/22/19 0739      03/12/19 2230  acyclovir (ZOVIRAX) tablet 400 mg      400 mg Oral 2 TIMES DAILY 03/12/19 2223            acyclovir  400 mg Oral BID     [START ON 3/24/2019] allopurinol  300 mg Oral Daily     ceFEPIme (MAXIPIME) IV  2 g Intravenous Q8H     ondansetron  8 mg Oral Q8H     pantoprazole  40 mg Oral BID AC     prochlorperazine  5-10 mg Intravenous Q6H    Or     prochlorperazine  5-10 mg Oral Q6H     rOPINIRole  0.75 mg Oral Daily at 8 pm     sennosides  8.6 mg Oral BID     sodium chloride (PF)  10 mL Intracatheter Q7 Days     tamsulosin  0.8 mg Oral Daily     voriconazole  200 mg Oral Q12H [facility]       CONSULTS   BLOOD & BONE MARROW TRANSPLANT IP CONSULT  VASCULAR ACCESS ADULT IP CONSULT  VASCULAR ACCESS ADULT IP CONSULT  PHYSICAL THERAPY ADULT IP CONSULT    Miranda Morgan, [facility], [phone].  Hematology/Oncology March 23, 2019

## 2019-03-23 NOTE — PLAN OF CARE
Temp max 99.4 this am, on IVAB and blood cultures done last 3/23 at 700.  In am felt okay and was able to eat breakfast and took a shower.  In afternoon c/o nausea again -Ativan .5mg given IV.  WBC up to 6300 with 95% blasts this am -was 1400 yesterday.  Plan for a BMBX tomorrow.  Wife here and has many questions for MD -Dr Hutchins to see pt this afternoon.

## 2019-03-24 LAB
ANION GAP SERPL CALCULATED.3IONS-SCNC: 9 MMOL/L (ref 3–14)
APTT PPP: 27 SEC (ref 22–37)
BASOPHILS # BLD AUTO: 0 10E9/L (ref 0–0.2)
BASOPHILS NFR BLD AUTO: 0 %
BLASTS # BLD: 2.6 10E9/L
BLASTS BLD QL SMEAR: 94.5 %
BUN SERPL-MCNC: 14 MG/DL (ref 7–30)
CALCIUM SERPL-MCNC: 8 MG/DL (ref 8.5–10.1)
CHLORIDE SERPL-SCNC: 103 MMOL/L (ref 94–109)
CO2 SERPL-SCNC: 21 MMOL/L (ref 20–32)
CREAT SERPL-MCNC: 0.73 MG/DL (ref 0.66–1.25)
DIFFERENTIAL METHOD BLD: ABNORMAL
EOSINOPHIL # BLD AUTO: 0 10E9/L (ref 0–0.7)
EOSINOPHIL NFR BLD AUTO: 0 %
ERYTHROCYTE [DISTWIDTH] IN BLOOD BY AUTOMATED COUNT: 14.5 % (ref 10–15)
FIBRINOGEN PPP-MCNC: 589 MG/DL (ref 200–420)
GFR SERPL CREATININE-BSD FRML MDRD: >90 ML/MIN/{1.73_M2}
GLUCOSE SERPL-MCNC: 104 MG/DL (ref 70–99)
HCT VFR BLD AUTO: 22 % (ref 40–53)
HGB BLD-MCNC: 7.3 G/DL (ref 13.3–17.7)
INR PPP: 1.22 (ref 0.86–1.14)
LACTATE BLD-SCNC: 0.7 MMOL/L (ref 0.7–2)
LDH SERPL L TO P-CCNC: 126 U/L (ref 85–227)
LYMPHOCYTES # BLD AUTO: 0.1 10E9/L (ref 0.8–5.3)
LYMPHOCYTES NFR BLD AUTO: 4.6 %
MAGNESIUM SERPL-MCNC: 2.1 MG/DL (ref 1.6–2.3)
MCH RBC QN AUTO: 32.4 PG (ref 26.5–33)
MCHC RBC AUTO-ENTMCNC: 33.2 G/DL (ref 31.5–36.5)
MCV RBC AUTO: 98 FL (ref 78–100)
MONOCYTES # BLD AUTO: 0 10E9/L (ref 0–1.3)
MONOCYTES NFR BLD AUTO: 0 %
NEUTROPHILS # BLD AUTO: 0 10E9/L (ref 1.6–8.3)
NEUTROPHILS NFR BLD AUTO: 0.9 %
PHOSPHATE SERPL-MCNC: 2.4 MG/DL (ref 2.5–4.5)
PLATELET # BLD AUTO: 12 10E9/L (ref 150–450)
PLATELET # BLD EST: ABNORMAL 10*3/UL
POIKILOCYTOSIS BLD QL SMEAR: SLIGHT
POTASSIUM SERPL-SCNC: 3.6 MMOL/L (ref 3.4–5.3)
RBC # BLD AUTO: 2.25 10E12/L (ref 4.4–5.9)
SODIUM SERPL-SCNC: 133 MMOL/L (ref 133–144)
URATE SERPL-MCNC: 2.4 MG/DL (ref 3.5–7.2)
WBC # BLD AUTO: 2.8 10E9/L (ref 4–11)

## 2019-03-24 PROCEDURE — 85384 FIBRINOGEN ACTIVITY: CPT | Performed by: INTERNAL MEDICINE

## 2019-03-24 PROCEDURE — 25000132 ZZH RX MED GY IP 250 OP 250 PS 637: Performed by: NURSE PRACTITIONER

## 2019-03-24 PROCEDURE — 25000132 ZZH RX MED GY IP 250 OP 250 PS 637: Performed by: INTERNAL MEDICINE

## 2019-03-24 PROCEDURE — 36415 COLL VENOUS BLD VENIPUNCTURE: CPT | Performed by: INTERNAL MEDICINE

## 2019-03-24 PROCEDURE — 83615 LACTATE (LD) (LDH) ENZYME: CPT | Performed by: INTERNAL MEDICINE

## 2019-03-24 PROCEDURE — 85025 COMPLETE CBC W/AUTO DIFF WBC: CPT | Performed by: INTERNAL MEDICINE

## 2019-03-24 PROCEDURE — 25000125 ZZHC RX 250: Performed by: INTERNAL MEDICINE

## 2019-03-24 PROCEDURE — 25800030 ZZH RX IP 258 OP 636: Performed by: INTERNAL MEDICINE

## 2019-03-24 PROCEDURE — 36592 COLLECT BLOOD FROM PICC: CPT

## 2019-03-24 PROCEDURE — 83605 ASSAY OF LACTIC ACID: CPT

## 2019-03-24 PROCEDURE — 83735 ASSAY OF MAGNESIUM: CPT | Performed by: INTERNAL MEDICINE

## 2019-03-24 PROCEDURE — 25000131 ZZH RX MED GY IP 250 OP 636 PS 637: Performed by: NURSE PRACTITIONER

## 2019-03-24 PROCEDURE — 85730 THROMBOPLASTIN TIME PARTIAL: CPT | Performed by: INTERNAL MEDICINE

## 2019-03-24 PROCEDURE — 84550 ASSAY OF BLOOD/URIC ACID: CPT | Performed by: INTERNAL MEDICINE

## 2019-03-24 PROCEDURE — 84100 ASSAY OF PHOSPHORUS: CPT | Performed by: INTERNAL MEDICINE

## 2019-03-24 PROCEDURE — 25800030 ZZH RX IP 258 OP 636: Performed by: NURSE PRACTITIONER

## 2019-03-24 PROCEDURE — 25000128 H RX IP 250 OP 636: Performed by: NURSE PRACTITIONER

## 2019-03-24 PROCEDURE — 85610 PROTHROMBIN TIME: CPT | Performed by: INTERNAL MEDICINE

## 2019-03-24 PROCEDURE — 12000001 ZZH R&B MED SURG/OB UMMC

## 2019-03-24 PROCEDURE — 80048 BASIC METABOLIC PNL TOTAL CA: CPT | Performed by: INTERNAL MEDICINE

## 2019-03-24 PROCEDURE — 87040 BLOOD CULTURE FOR BACTERIA: CPT

## 2019-03-24 RX ADMIN — ONDANSETRON HYDROCHLORIDE 8 MG: 8 TABLET, FILM COATED ORAL at 08:29

## 2019-03-24 RX ADMIN — PROCHLORPERAZINE MALEATE 10 MG: 5 TABLET, FILM COATED ORAL at 13:28

## 2019-03-24 RX ADMIN — ACETAMINOPHEN 650 MG: 325 TABLET, FILM COATED ORAL at 20:01

## 2019-03-24 RX ADMIN — SODIUM CHLORIDE: 9 INJECTION, SOLUTION INTRAVENOUS at 07:10

## 2019-03-24 RX ADMIN — PANTOPRAZOLE SODIUM 40 MG: 40 TABLET, DELAYED RELEASE ORAL at 15:58

## 2019-03-24 RX ADMIN — CEFEPIME 2 G: 2 INJECTION, POWDER, FOR SOLUTION INTRAVENOUS at 08:31

## 2019-03-24 RX ADMIN — PROCHLORPERAZINE MALEATE 5 MG: 5 TABLET, FILM COATED ORAL at 06:27

## 2019-03-24 RX ADMIN — ACYCLOVIR 400 MG: 400 TABLET ORAL at 08:29

## 2019-03-24 RX ADMIN — ONDANSETRON HYDROCHLORIDE 8 MG: 8 TABLET, FILM COATED ORAL at 15:58

## 2019-03-24 RX ADMIN — TAMSULOSIN HYDROCHLORIDE 0.8 MG: 0.4 CAPSULE ORAL at 08:29

## 2019-03-24 RX ADMIN — MICAFUNGIN SODIUM 50 MG: 10 INJECTION, POWDER, LYOPHILIZED, FOR SOLUTION INTRAVENOUS at 14:12

## 2019-03-24 RX ADMIN — PANTOPRAZOLE SODIUM 40 MG: 40 TABLET, DELAYED RELEASE ORAL at 08:29

## 2019-03-24 RX ADMIN — ROPINIROLE HYDROCHLORIDE 0.75 MG: 0.5 TABLET, FILM COATED ORAL at 21:31

## 2019-03-24 RX ADMIN — LORAZEPAM 0.5 MG: 0.5 TABLET ORAL at 02:37

## 2019-03-24 RX ADMIN — SODIUM CHLORIDE: 9 INJECTION, SOLUTION INTRAVENOUS at 23:52

## 2019-03-24 RX ADMIN — POTASSIUM PHOSPHATE, MONOBASIC AND POTASSIUM PHOSPHATE, DIBASIC 15 MMOL: 224; 236 INJECTION, SOLUTION INTRAVENOUS at 18:13

## 2019-03-24 RX ADMIN — PROCHLORPERAZINE MALEATE 10 MG: 5 TABLET, FILM COATED ORAL at 18:13

## 2019-03-24 RX ADMIN — CEFEPIME 2 G: 2 INJECTION, POWDER, FOR SOLUTION INTRAVENOUS at 15:58

## 2019-03-24 RX ADMIN — ACYCLOVIR 400 MG: 400 TABLET ORAL at 19:49

## 2019-03-24 RX ADMIN — ALLOPURINOL 300 MG: 300 TABLET ORAL at 08:29

## 2019-03-24 ASSESSMENT — ACTIVITIES OF DAILY LIVING (ADL)
ADLS_ACUITY_SCORE: 14

## 2019-03-24 ASSESSMENT — MIFFLIN-ST. JEOR: SCORE: 1726.58

## 2019-03-24 NOTE — PLAN OF CARE
Afebrile OVSS.Complaints of upset stomach continue despite scheduled antiemetics so oral ativan 0.5 mg given.Also complaining of feeling restless unrelated to restless legs syndrome.Denies pain.Adequate uop.Continue to monitor

## 2019-03-24 NOTE — PLAN OF CARE
AVSS, afebrile. Denies pain, SOB. Continues with intermittent nausea, scheduled antiemetics controlling. Appetite fair, ate about 50% of dinner. SW consult placed as pt's wife has questions about insurance, pt's wife is able to meet Tuesday 3/26 anytime after 2pm. Plan for possible Bmbx tomorrow. Continue to monitor.

## 2019-03-24 NOTE — PROGRESS NOTES
Hematology / Oncology Progress Note 03/24/2019  HOSPITAL DAY Hospital Day: 13    ASSESSMENT & PLAN Mr. Ace is a 63 year old male with new diagnosis of acute myeloid leukemia incidentally found during work up of nonspecific chest symptoms. He was started on induction chemotherapy with 7+3 (cytarabine and daunorubicin) with D1=3/15/19.    # Neutropenic fever was 100.2 at 5 am on 3/22, spike to 101.3 at 0700. No focal infectious symptoms, some abdominal discomfort s/p bowel movements -- soft not watery but softer than per usual on bowel regimen (will hold). Red tip tongue, mouth sore R cheek. No chest discomfort or cough, no dizziness, no dysuria, no rash, no pain at PICC site c/d/i. He reports overall just fatigued. HR up to 100 with fever, stable BP (systolic 110-120). Tooth pain stable. 3/23 continues to have fevers but HD stable on RA, no new focal symptoms, cultures are negative to date, known persistent disease, did not escalate antibiotics but could consider with new symptom. 3/24 afebrile t max 99.4, HD stable, RA, walking halls in AM, no new symptoms reported.  - 2 view chest XR (small R pleural effusion, no focal airspace opacity)  - blood culture x 2 (negative to date)  - urine microscopic reflex to culture (2 wbc, 4 rbc, no LE, no nitrite)  - switch to cefepime (3/22/19 >> ), discontinue levofloxacin  - will schedule fungal & anaerobic culture with coags today (check for DIC with fevers & persistent AML) & AGA / 1,3 BDF >> transition from fluconazole to ppx Micafungin (3/24/19 prophylactic dosing 50 mg daily >> ) with need for MEC & prolonged neutropenia, AGA & 1,3 BDF are in process, fungal culture NTD, no evidence of DIC    #AML  Patient presented to [facility] ED in [place], [place] for complaints of nonspecific chest discomfort after root canal treatment (done 3/11/19). CT C/A/P was negative for PE or other acute findings. On OSH labs, his WBC was incidentally noted to be elevated at 71.1 with  Hb 9.0 and plts 97 (prior labs had been unremarkable per patient). Smear was suspicious for immature blasts and acute leukemia. No symptoms of hyperviscosity, TLS or DIC on presentation. Smear with no swapnil rods, immature cells, no granules, >90% blasts roughly. 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 Flow consistent with AML with 95% blasts with the following immunophenotype:  Positive for CD13, CD33, CD34, CD38, dim to negative CD45, , partial HLA-DR. Further risk stratification with NGS, FISH and cytogenetics pending. ECHO normal. PICC line placed on 3/14. Chemo started 3/15. Initially was on Hydrea 1g q6hr for cytoreduction. Discontinued 3/16 AM  - HLA typing sent, BMT consult, follow up cytogenetics - normal & molecular studies  - add back daily DIC & TLS monitoring with WBC & blast count rising >> no evidence 3/24  - continue Allopurinol & IVF, will continue as likely needs reinduction >> continue  - follow up on molecular testing (still in process) - TP53 mutation (?) consider AZA / venetoclax vs MEC (favored)     Treatment Plan: 7+3 (cytarabine and daunorubicin), D1=3/15/19. Today is Day 10   - will need D14 BMBx scheduled for 3/28 (to determine if in remission) - would then either proceed with consolidation treatment or bone marrow transplant (referral has been made they are reaching out to patient) will schedule BMBx for Monday 3/25/19 (at 1 pm, rising WBC & blast count continues)  - discussed this with patient (3/23 & 24/19)     # Chemotherapy induced nausea & constipation  - added scheduled senna BID now with soft stools  - s/p additional dex with chemotherapy (3/20-3/23)  - zofran changed to q 8 hours (3/20/19 >> )  - compazine changed to q 6 hours scheduled (3/22/19 >> )  - protonix BID (3/22/19 >> )  - prn ativan, prn sucralfate, prn zyprexa, prn ranitidine, prn tums (all available)     #Pancytopenia, 2/2 AML & associated chemotherapy.  Goal Hb >7, Plt >10K, transfuse PRN no transfusion needs today (hgb 7.3, plt 12)  #ID PPx continue ACV, ,, Micafungin     #S/P root canal treatment Patient had a root canal treatment performed on 3/11/19. Site looks unremarkable with no abscess or drainage. He was started on a 7-day course of oral  mg q6h starting one day prior to the procedure.      #Restless legs syndrome Continue ropinirole 0.75 mg at bedtime, offer PRN ativan for persistent symptoms     #Subclinical coronary atherosclerosis  # Hyperlipidemia Total Agatston calcium score was elevated at 591 (91st percentile) on CT coronaries performed 6/8/2016. Nuclear stress test was negative for ischemia on 8/10/2016. Life style modification measures were recommended. Patient follows with cardiology as outpatient (last office visit on 11/12/18) - hold atorvastatin at this time, resume in 2 weeks if LFT's remain normal     #BPH Patient follows with urology ([facility], [place], [place]). Continue Flomax 0.8 mg daily (on IVF, chemotherapy, deferred voriconazole r/t drug interaction started Micafungin) - vori & flomax interact can raise flomax level in blood & cause hypotension - severe per [facility] PHD     #History of lumbar spine degenerative disc disease Patient is s/p laminectomy/facetectomy procedures. He does not routinely take pain meds    FEN   - Regular diet as tolerated  - regular IVF with TLS  - PRN lyte replacement per standing protocol     Prophylaxis  - No pharmacologic VTE ppx due to TCP  - PRN bowel regimen for constipation ppx  - PPI for GI/PUD ppx       Code Status: FULL  Lines: PICC  Disposition: Anticipate 4-6 week LOS pending completion of chemotherapy, count recovery, and resolution of acute toxicities.     Interval history t max 99.4. Cultures remain NTD. Fungal studies in process. Switched to Micafungin. Patient feeling better up walking the halls at 7 am. No new complaints. Per RN notes nausea persists -- but only used PRN ativan,  didn't try sucralfate, tums ranitidine, or zyprexa. Met with patient again this afternoon. Reviewed plan of care. Still with no new infectious symptoms other than fatigue. Appetite okay this AM.    Physical Exam  Constitutional: Awake, alert, cooperative, in NAD. Fatigued but non-toxic. Laying in bed.   Eyes: PERRL, EOMI, sclera clear, conjunctiva normal. No scleral icterus.   ENT: Normocephalic, without obvious abnormality, moist mucus membranes, no thrush.  Respiratory: Non-labored breathing, good air exchange, CTAB, no crackles or wheezing.  Cardiovascular: RRR - tachycardic, no murmur noted.  GI: +BS, soft, non-distended, no abdominal tenderness.  Skin: No concerning lesions or rash on exposed areas. PICC c/d/i. Skin is warm & dry.   Musculoskeletal: No edema bridgett LEs, ambulates without difficulty in the room & in the hallway.   Neurologic: Awake, A&O x 3. Cranial nerves II-XII are grossly intact.   Neuropsychiatric: Calm, normal affect.     ROUNDING  Temp:  [97  F (36.1  C)-99.4  F (37.4  C)] 98.7  F (37.1  C)  Pulse:  [87-93] 93  Heart Rate:  [] 87  Resp:  [16-18] 18  BP: ()/(57-75) 119/70  SpO2:  [96 %-98 %] 98 %  Vitals:    03/19/19 0727 03/20/19 0843 03/21/19 0741 03/22/19 1100   Weight: 97 kg (213 lb 12.8 oz) 96.8 kg (213 lb 8 oz) 95.4 kg (210 lb 6.4 oz) 94.2 kg (207 lb 9.6 oz)    03/23/19 1111   Weight: 92.8 kg (204 lb 8 oz)       DATA  Recent Labs   Lab 03/24/19  0638 03/23/19  0512 03/22/19  0548 03/21/19  0511   WBC 2.8* 6.3 1.4* 0.4*   RBC 2.25* 2.32* 2.38* 2.30*   HGB 7.3* 7.7* 7.9* 7.7*   HCT 22.0* 23.2* 23.6* 23.2*   MCV 98 100 99 101*   MCH 32.4 33.2* 33.2* 33.5*   MCHC 33.2 33.2 33.5 33.2   RDW 14.5 14.6 14.6 14.7   PLT 12* 21* 33* 43*     Recent Labs   Lab 03/24/19  0638 03/23/19  0512 03/22/19  0548 03/21/19  0511  03/20/19  0636  03/18/19  0606    132* 134 135  --  137   < > 141   POTASSIUM 3.6 3.9 3.7 4.4   < > Results questioned - new specimen has been requested   < > 3.8    CHLORIDE 103 101 101 106  --  107   < > 112*   CO2 21 21 24 21  --  23   < > 22   ANIONGAP 9 11 8 8  --  Not Calculated   < > 7   * 124* 117* 120*  --  101*   < > 144*   BUN 14 16 15 14  --  13   < > 17   CR 0.73 0.82 0.72 0.65*  --  0.65*   < > 0.62*   GFRESTIMATED >90 >90 >90 >90  --  >90   < > >90   GFRESTBLACK >90 >90 >90 >90  --  >90   < > >90   DEBBIE 8.0* 8.1* 8.1* 8.2*  --  7.7*   < > 7.8*   MAG 2.1 2.1  --  2.4*  --  2.2   < > 2.5*   PHOS 2.4* 2.9  --  3.1  --  3.7   < > 3.3   PROTTOTAL  --   --   --  6.3*  --   --   --  6.3*   ALBUMIN  --   --   --  3.0*  --   --   --  3.0*   BILITOTAL  --   --   --  0.4  --   --   --  0.4   ALKPHOS  --   --   --  63  --   --   --  61   AST  --   --   --  16  --   --   --  42   ALT  --   --   --  43  --   --   --  61    < > = values in this interval not displayed.     Blood culture [[id number]] Collected: 03/23/19 0927   Order Status: Completed Lab Status: Preliminary result Updated: 03/24/19 0705   Specimen: Blood     Specimen Description Blood VAD Collection    Culture Micro No growth after 19 hours   Fungus culture blood [[id number]] Collected: 03/23/19 0927   Order Status: Completed Lab Status: Preliminary result Updated: 03/23/19 1041   Specimen: Blood     Specimen Description Blood VAD Collection SPS    Culture Micro PENDING   Aspergillus Galactomannan Antigen    Order Status: No result Lab Status: No result    Specimen: Blood    1,3 Beta D glucan fungitell    Order Status: No result Lab Status: No result    Specimen: Blood    Blood culture [[id number]] Collected: 03/23/19 0715   Order Status: Completed Lab Status: Preliminary result Updated: 03/24/19 0705   Specimen: Blood     Specimen Description Blood PURPLEP    Culture Micro No growth after 22 hours   Blood culture [[id number]] Collected: 03/23/19 0708   Order Status: Completed Lab Status: Preliminary result Updated: 03/24/19 0705   Specimen: Blood     Specimen Description Blood Left Arm    Culture Micro No growth after 22  hours   Blood culture [[id number]] Collected: 03/22/19 0845   Order Status: Completed Lab Status: Preliminary result Updated: 03/24/19 0704   Specimen: Blood     Specimen Description Blood Left Hand    Special Requests Received in aerobic bottle only    Culture Micro No growth after 2 days   Blood culture [[id number]] Collected: 03/22/19 0841   Order Status: Completed Lab Status: Preliminary result Updated: 03/24/19 0704   Specimen: Blood     Specimen Description Blood Blue port    Special Requests Received in aerobic bottle only    Culture Micro No growth after 2 days     FLT3 ITD/TKD PCR  INTERPRETATION:   Molecular testing performed on submitted Bone Marrow.   No evidence was found of either an internal tandem duplication within exon    14 or of a D835(TKD) point mutation within exon 20 of the FLT3 gene.     Chromosome  INTERPRETATION:   No clonal chromosomal abnormality was detected among the 20 metaphase   cells analyzed. As 40-50% of acute   myeloid leukemia have normal karyotypes, the present findings are not   inconsistent with that diagnosis     FISH was performed on uncultured specimen with the following probes (200   interphase cells examined with each   probe set):   - NUP98 (11p15.4) (breakapart) - [employer]   - KMT2A (11q23) (breakapart) - [employer]   - GLIS2 (16p13.3) (breakapart) - [employer]     RESULTS:  NEGATIVE for rearrangement of NUP98, KMT2A or GLIS2     INTERPRETATION:   None (0%) of the interphase cells examined had a signal pattern indicative    of rearrangement of NUP98, KMT2A,   or GLIS2, genes that can be involved in cryptic rearrangements in acute   myeloid leukemia.     - MOLECULAR (NGS) is still in process (3/13/19 >> called lab 3/24/19 AM r/t wondering about TP53 r/t would influence MEC vs venetoclax / AZA)    IMAGING  No results found for this or any previous visit (from the past 48 hour(s)).    MEDICATION  Anti-infectives (From now, onward)    Start     Dose/Rate Route Frequency  Ordered Stop    03/23/19 1400  micafungin (MYCAMINE) 50 mg in sodium chloride 0.9 % 100 mL intermittent infusion      50 mg  100 mL/hr over 60 Minutes Intravenous EVERY 24 HOURS 03/23/19 1242      03/22/19 0745  ceFEPIme (MAXIPIME) 2 g vial to attach to  ml bag for ADULTS or 50 ml bag for PEDS      2 g  over 30 Minutes Intravenous EVERY 8 HOURS 03/22/19 0739      03/12/19 2230  acyclovir (ZOVIRAX) tablet 400 mg      400 mg Oral 2 TIMES DAILY 03/12/19 2223            acyclovir  400 mg Oral BID     allopurinol  300 mg Oral Daily     ceFEPIme (MAXIPIME) IV  2 g Intravenous Q8H     micafungin  50 mg Intravenous Q24H     ondansetron  8 mg Oral Q8H     pantoprazole  40 mg Oral BID AC     prochlorperazine  5-10 mg Intravenous Q6H    Or     prochlorperazine  5-10 mg Oral Q6H     rOPINIRole  0.75 mg Oral Daily at 8 pm     sennosides  8.6 mg Oral BID     sodium chloride (PF)  10 mL Intracatheter Q7 Days     tamsulosin  0.8 mg Oral Daily       CONSULTS   BLOOD & BONE MARROW TRANSPLANT IP CONSULT  VASCULAR ACCESS ADULT IP CONSULT  VASCULAR ACCESS ADULT IP CONSULT  PHYSICAL THERAPY ADULT IP CONSULT  SOCIAL WORK IP CONSULT    Miranda Morgan, [facility], [phone].  Hematology/Oncology March 24, 2019

## 2019-03-24 NOTE — PLAN OF CARE
Randy's temp max 99.2 this afternoon and is on IVAB.  Continues with occasional nausea despite anti-emetics scheduled ATC.  Also c/o fatigue-encouraged pt to take rest periods but also get in chair and walk.  Lots of treatment questions from pt and wife.  Reviewed current plans - BMBX in am and follow up with molecular lab.  He will start more chemo Monday evening, what kind of chemo depends on molecular findings.

## 2019-03-25 LAB
ALBUMIN SERPL-MCNC: 2.4 G/DL (ref 3.4–5)
ALP SERPL-CCNC: 61 U/L (ref 40–150)
ALT SERPL W P-5'-P-CCNC: 26 U/L (ref 0–70)
ANION GAP SERPL CALCULATED.3IONS-SCNC: 10 MMOL/L (ref 3–14)
APTT PPP: 34 SEC (ref 22–37)
AST SERPL W P-5'-P-CCNC: 7 U/L (ref 0–45)
BASOPHILS # BLD AUTO: 0 10E9/L (ref 0–0.2)
BASOPHILS NFR BLD AUTO: 0 %
BILIRUB DIRECT SERPL-MCNC: 0.1 MG/DL (ref 0–0.2)
BILIRUB SERPL-MCNC: 0.5 MG/DL (ref 0.2–1.3)
BLASTS # BLD: 12.3 10E9/L
BLASTS BLD QL SMEAR: 100 %
BLD PROD TYP BPU: NORMAL
BLD UNIT ID BPU: 0
BLD UNIT ID BPU: 0
BLOOD PRODUCT CODE: NORMAL
BLOOD PRODUCT CODE: NORMAL
BPU ID: NORMAL
BPU ID: NORMAL
BUN SERPL-MCNC: 12 MG/DL (ref 7–30)
CALCIUM SERPL-MCNC: 7.9 MG/DL (ref 8.5–10.1)
CHLORIDE SERPL-SCNC: 102 MMOL/L (ref 94–109)
CO2 SERPL-SCNC: 20 MMOL/L (ref 20–32)
COPATH REPORT: NORMAL
CREAT SERPL-MCNC: 0.76 MG/DL (ref 0.66–1.25)
DIFFERENTIAL METHOD BLD: ABNORMAL
EOSINOPHIL # BLD AUTO: 0 10E9/L (ref 0–0.7)
EOSINOPHIL NFR BLD AUTO: 0 %
ERYTHROCYTE [DISTWIDTH] IN BLOOD BY AUTOMATED COUNT: 14.5 % (ref 10–15)
FIBRINOGEN PPP-MCNC: 551 MG/DL (ref 200–420)
GFR SERPL CREATININE-BSD FRML MDRD: >90 ML/MIN/{1.73_M2}
GLUCOSE SERPL-MCNC: 108 MG/DL (ref 70–99)
HCT VFR BLD AUTO: 19.9 % (ref 40–53)
HGB BLD-MCNC: 6.6 G/DL (ref 13.3–17.7)
INR PPP: 1.31 (ref 0.86–1.14)
LACTATE BLD-SCNC: 0.5 MMOL/L (ref 0.7–2)
LDH SERPL L TO P-CCNC: 115 U/L (ref 85–227)
LIPASE SERPL-CCNC: 50 U/L (ref 73–393)
LYMPHOCYTES # BLD AUTO: 0 10E9/L (ref 0.8–5.3)
LYMPHOCYTES NFR BLD AUTO: 0 %
MAGNESIUM SERPL-MCNC: 2 MG/DL (ref 1.6–2.3)
MCH RBC QN AUTO: 32.7 PG (ref 26.5–33)
MCHC RBC AUTO-ENTMCNC: 33.2 G/DL (ref 31.5–36.5)
MCV RBC AUTO: 99 FL (ref 78–100)
MONOCYTES # BLD AUTO: 0 10E9/L (ref 0–1.3)
MONOCYTES NFR BLD AUTO: 0 %
NEUTROPHILS # BLD AUTO: 0 10E9/L (ref 1.6–8.3)
NEUTROPHILS NFR BLD AUTO: 0 %
NUM BPU REQUESTED: 1
PHOSPHATE SERPL-MCNC: 2.7 MG/DL (ref 2.5–4.5)
PLATELET # BLD AUTO: 5 10E9/L (ref 150–450)
PLATELET # BLD EST: ABNORMAL 10*3/UL
POIKILOCYTOSIS BLD QL SMEAR: SLIGHT
POTASSIUM SERPL-SCNC: 3.2 MMOL/L (ref 3.4–5.3)
POTASSIUM SERPL-SCNC: 3.4 MMOL/L (ref 3.4–5.3)
PROT SERPL-MCNC: 5.8 G/DL (ref 6.8–8.8)
RBC # BLD AUTO: 2.02 10E12/L (ref 4.4–5.9)
SODIUM SERPL-SCNC: 132 MMOL/L (ref 133–144)
TRANSFUSION STATUS PATIENT QL: NORMAL
URATE SERPL-MCNC: 2.4 MG/DL (ref 3.5–7.2)
WBC # BLD AUTO: 12.3 10E9/L (ref 4–11)

## 2019-03-25 PROCEDURE — 86901 BLOOD TYPING SEROLOGIC RH(D): CPT

## 2019-03-25 PROCEDURE — 25000128 H RX IP 250 OP 636: Performed by: PHYSICIAN ASSISTANT

## 2019-03-25 PROCEDURE — 83615 LACTATE (LD) (LDH) ENZYME: CPT | Performed by: INTERNAL MEDICINE

## 2019-03-25 PROCEDURE — 84100 ASSAY OF PHOSPHORUS: CPT | Performed by: INTERNAL MEDICINE

## 2019-03-25 PROCEDURE — 87449 NOS EACH ORGANISM AG IA: CPT | Performed by: PHYSICIAN ASSISTANT

## 2019-03-25 PROCEDURE — 86905 BLOOD TYPING RBC ANTIGENS: CPT

## 2019-03-25 PROCEDURE — 25800030 ZZH RX IP 258 OP 636: Performed by: INTERNAL MEDICINE

## 2019-03-25 PROCEDURE — 36592 COLLECT BLOOD FROM PICC: CPT

## 2019-03-25 PROCEDURE — 88184 FLOWCYTOMETRY/ TC 1 MARKER: CPT | Performed by: PHYSICIAN ASSISTANT

## 2019-03-25 PROCEDURE — 85384 FIBRINOGEN ACTIVITY: CPT | Performed by: INTERNAL MEDICINE

## 2019-03-25 PROCEDURE — 40001005 ZZHCL STATISTIC FLOW >15 ABY TC 88189: Performed by: PHYSICIAN ASSISTANT

## 2019-03-25 PROCEDURE — 40000951 ZZHCL STATISTIC BONE MARROW INTERP TC 85097: Performed by: PHYSICIAN ASSISTANT

## 2019-03-25 PROCEDURE — 00000161 ZZHCL STATISTIC H-SPHEME PROCESS B/S: Performed by: PHYSICIAN ASSISTANT

## 2019-03-25 PROCEDURE — P9016 RBC LEUKOCYTES REDUCED: HCPCS

## 2019-03-25 PROCEDURE — 84132 ASSAY OF SERUM POTASSIUM: CPT

## 2019-03-25 PROCEDURE — 40000611 ZZHCL STATISTIC MORPHOLOGY W/INTERP HEMEPATH TC 85060: Performed by: PHYSICIAN ASSISTANT

## 2019-03-25 PROCEDURE — 83735 ASSAY OF MAGNESIUM: CPT | Performed by: INTERNAL MEDICINE

## 2019-03-25 PROCEDURE — P9037 PLATE PHERES LEUKOREDU IRRAD: HCPCS | Performed by: INTERNAL MEDICINE

## 2019-03-25 PROCEDURE — 36592 COLLECT BLOOD FROM PICC: CPT | Performed by: PHYSICIAN ASSISTANT

## 2019-03-25 PROCEDURE — 25800030 ZZH RX IP 258 OP 636: Performed by: NURSE PRACTITIONER

## 2019-03-25 PROCEDURE — 25000131 ZZH RX MED GY IP 250 OP 636 PS 637: Performed by: NURSE PRACTITIONER

## 2019-03-25 PROCEDURE — 84550 ASSAY OF BLOOD/URIC ACID: CPT | Performed by: INTERNAL MEDICINE

## 2019-03-25 PROCEDURE — 86923 COMPATIBILITY TEST ELECTRIC: CPT

## 2019-03-25 PROCEDURE — 00000058 ZZHCL STATISTIC BONE MARROW ASP PERF TC 38220: Performed by: PHYSICIAN ASSISTANT

## 2019-03-25 PROCEDURE — 85610 PROTHROMBIN TIME: CPT | Performed by: INTERNAL MEDICINE

## 2019-03-25 PROCEDURE — 87305 ASPERGILLUS AG IA: CPT | Performed by: PHYSICIAN ASSISTANT

## 2019-03-25 PROCEDURE — 25000128 H RX IP 250 OP 636: Performed by: NURSE PRACTITIONER

## 2019-03-25 PROCEDURE — 86850 RBC ANTIBODY SCREEN: CPT

## 2019-03-25 PROCEDURE — 12000001 ZZH R&B MED SURG/OB UMMC

## 2019-03-25 PROCEDURE — 80048 BASIC METABOLIC PNL TOTAL CA: CPT | Performed by: INTERNAL MEDICINE

## 2019-03-25 PROCEDURE — 88161 CYTOPATH SMEAR OTHER SOURCE: CPT | Performed by: PHYSICIAN ASSISTANT

## 2019-03-25 PROCEDURE — 36592 COLLECT BLOOD FROM PICC: CPT | Performed by: INTERNAL MEDICINE

## 2019-03-25 PROCEDURE — 40000424 ZZHCL STATISTIC BONE MARROW CORE PERF TC 38221: Performed by: PHYSICIAN ASSISTANT

## 2019-03-25 PROCEDURE — 25000132 ZZH RX MED GY IP 250 OP 250 PS 637: Performed by: NURSE PRACTITIONER

## 2019-03-25 PROCEDURE — 85025 COMPLETE CBC W/AUTO DIFF WBC: CPT | Performed by: INTERNAL MEDICINE

## 2019-03-25 PROCEDURE — 25000132 ZZH RX MED GY IP 250 OP 250 PS 637: Performed by: INTERNAL MEDICINE

## 2019-03-25 PROCEDURE — 88311 DECALCIFY TISSUE: CPT | Performed by: PHYSICIAN ASSISTANT

## 2019-03-25 PROCEDURE — 83690 ASSAY OF LIPASE: CPT | Performed by: INTERNAL MEDICINE

## 2019-03-25 PROCEDURE — 85730 THROMBOPLASTIN TIME PARTIAL: CPT | Performed by: INTERNAL MEDICINE

## 2019-03-25 PROCEDURE — 83605 ASSAY OF LACTIC ACID: CPT

## 2019-03-25 PROCEDURE — 25000128 H RX IP 250 OP 636: Performed by: INTERNAL MEDICINE

## 2019-03-25 PROCEDURE — 86900 BLOOD TYPING SEROLOGIC ABO: CPT

## 2019-03-25 PROCEDURE — 25000131 ZZH RX MED GY IP 250 OP 636 PS 637: Performed by: PHYSICIAN ASSISTANT

## 2019-03-25 PROCEDURE — 88305 TISSUE EXAM BY PATHOLOGIST: CPT | Performed by: PHYSICIAN ASSISTANT

## 2019-03-25 PROCEDURE — 88185 FLOWCYTOMETRY/TC ADD-ON: CPT | Performed by: PHYSICIAN ASSISTANT

## 2019-03-25 PROCEDURE — 80076 HEPATIC FUNCTION PANEL: CPT | Performed by: INTERNAL MEDICINE

## 2019-03-25 PROCEDURE — 07DR3ZX EXTRACTION OF ILIAC BONE MARROW, PERCUTANEOUS APPROACH, DIAGNOSTIC: ICD-10-PCS

## 2019-03-25 RX ORDER — SENNOSIDES 8.6 MG
8.6 TABLET ORAL 2 TIMES DAILY PRN
Status: DISCONTINUED | OUTPATIENT
Start: 2019-03-25 | End: 2019-04-15 | Stop reason: HOSPADM

## 2019-03-25 RX ADMIN — ONDANSETRON HYDROCHLORIDE 8 MG: 8 TABLET, FILM COATED ORAL at 17:21

## 2019-03-25 RX ADMIN — MICAFUNGIN SODIUM 50 MG: 10 INJECTION, POWDER, LYOPHILIZED, FOR SOLUTION INTRAVENOUS at 13:00

## 2019-03-25 RX ADMIN — ROPINIROLE HYDROCHLORIDE 0.75 MG: 0.5 TABLET, FILM COATED ORAL at 21:51

## 2019-03-25 RX ADMIN — PROCHLORPERAZINE MALEATE 5 MG: 5 TABLET, FILM COATED ORAL at 00:33

## 2019-03-25 RX ADMIN — ONDANSETRON HYDROCHLORIDE 8 MG: 8 TABLET, FILM COATED ORAL at 08:39

## 2019-03-25 RX ADMIN — SODIUM CHLORIDE: 9 INJECTION, SOLUTION INTRAVENOUS at 17:21

## 2019-03-25 RX ADMIN — PROCHLORPERAZINE MALEATE 10 MG: 5 TABLET, FILM COATED ORAL at 11:57

## 2019-03-25 RX ADMIN — ACYCLOVIR 400 MG: 400 TABLET ORAL at 08:37

## 2019-03-25 RX ADMIN — POTASSIUM CHLORIDE 20 MEQ: 29.8 INJECTION, SOLUTION INTRAVENOUS at 11:56

## 2019-03-25 RX ADMIN — TAMSULOSIN HYDROCHLORIDE 0.8 MG: 0.4 CAPSULE ORAL at 08:37

## 2019-03-25 RX ADMIN — CEFEPIME 2 G: 2 INJECTION, POWDER, FOR SOLUTION INTRAVENOUS at 08:36

## 2019-03-25 RX ADMIN — ACYCLOVIR 400 MG: 400 TABLET ORAL at 19:36

## 2019-03-25 RX ADMIN — PROCHLORPERAZINE MALEATE 10 MG: 5 TABLET, FILM COATED ORAL at 19:37

## 2019-03-25 RX ADMIN — PANTOPRAZOLE SODIUM 40 MG: 40 TABLET, DELAYED RELEASE ORAL at 08:38

## 2019-03-25 RX ADMIN — CEFEPIME 2 G: 2 INJECTION, POWDER, FOR SOLUTION INTRAVENOUS at 00:33

## 2019-03-25 RX ADMIN — MIDAZOLAM 2 MG: 1 INJECTION INTRAMUSCULAR; INTRAVENOUS at 13:00

## 2019-03-25 RX ADMIN — ONDANSETRON HYDROCHLORIDE 8 MG: 8 TABLET, FILM COATED ORAL at 00:33

## 2019-03-25 RX ADMIN — ACETAMINOPHEN 650 MG: 325 TABLET, FILM COATED ORAL at 11:57

## 2019-03-25 RX ADMIN — CEFEPIME 2 G: 2 INJECTION, POWDER, FOR SOLUTION INTRAVENOUS at 17:21

## 2019-03-25 RX ADMIN — ALLOPURINOL 300 MG: 300 TABLET ORAL at 08:36

## 2019-03-25 RX ADMIN — PANTOPRAZOLE SODIUM 40 MG: 40 TABLET, DELAYED RELEASE ORAL at 17:21

## 2019-03-25 RX ADMIN — POTASSIUM CHLORIDE 20 MEQ: 29.8 INJECTION, SOLUTION INTRAVENOUS at 10:41

## 2019-03-25 RX ADMIN — PROCHLORPERAZINE MALEATE 5 MG: 5 TABLET, FILM COATED ORAL at 06:35

## 2019-03-25 ASSESSMENT — ACTIVITIES OF DAILY LIVING (ADL)
ADLS_ACUITY_SCORE: 14

## 2019-03-25 ASSESSMENT — MIFFLIN-ST. JEOR: SCORE: 1728.85

## 2019-03-25 NOTE — PROCEDURES
Bone Marrow Biopsy Procedure Note  Patient's Name: El Ace  Date of Procedure: 3/24/19     PROCEDURE:  Unilateral bone marrow biopsy and unilateral aspirate      INDICATION:  Early D14 BMBx due to rising WBC and peripheral blast counts      PERFORMED BY:  Vee Cabrera PA-C     CONSENT:  Informed consent was obtained from the patient. The risks and benefits of the procedure were explained. The patient agreed to undergo the procedure. The consent form was signed and placed in the paper chart.      PREMEDICATION:  Versed 2mg IV x once      PROCEDURE SUMMARY:  The patient's identification was positively verified by ID band. Patient was laid in the prone position. Premedication with Versed 2mg IV x once was administered. The RIGHT posterior iliac crest (RPIC) was prepped and draped in a sterile manner. The skin, deeper tissues, and periosteum of the RPIC were anesthetized with approximately 20mL 1% lidocaine. Following this, a 3mm incision was made. The trephine needle was advanced into the RPIC bone cavity and a 10 mm core biopsy was obtained. Next, bone marrow aspirates were obtained from the RPIC; approximately 10 ml of marrow were aspirated. Following the procedure, a sterile pressure dressing was applied to the bone marrow biopsy site.      COMPLICATIONS:  None. The patient tolerated the procedure very well with minimal discomfort.      RECOMMENDATIONS:  The patient was placed in the supine position to maintain pressure on the biopsy site.   The patient was instructed to lie flat for 45-60 minutes and not to remove the dressing or get it wet for 24 hours post-procedure.      TESTS ORDERED:  Morphology  Flow cytometry      Vee Cabrera PA-C  Hematology/Oncology  Pager: [phone]

## 2019-03-25 NOTE — PLAN OF CARE
Temp max 99.3, vss. Received plt tx for plt count 5k. Received 1 unit PRBCs for hgb 6.6. Potassium level 3.2 replaced per protocol. Redraw ordered. Pt up in shower. Adequate po intake. Having loose stools. BmBx done. Site c/d/I.

## 2019-03-25 NOTE — PROGRESS NOTES
[facility], [place]    Hematology / Oncology Progress Note    Date of Admission: 3/12/2019  Date of Service (when I saw the patient): 3/26/19     Assessment & Plan   Mr. Ace is a 63 year old male with new diagnosis of acute myeloid leukemia incidentally found during work up of nonspecific chest symptoms. He was started on induction chemotherapy with 7+3 (cytarabine and daunorubicin) with D1=3/15/19. BMBx on 3/25 (done early due to rising WBC and blast counts) demonstrates persistent disease with 95% blasts. Awaiting final results of NGS to determine next treatment plan, but considering venetoclax-based therapy.      Of note, this note was edited from the 3/25 note. The current note reflects the assessment and plan on 3/26.     Today:  - f/u final NGS panel (per molecular lab, should be done today)   - initiate Venetoclax approval process should we proceed with this regimen  - check C.diff given recent neutropenic fevers and loose stools with abdominal cramping in setting of being off of laxatives for >48hrs   - electrolyte repletion  - monitor PO intake, consider calorie counts     HEME:  #AML, refractory   Patient presented to [facility] ED in [place], [place] for complaints of nonspecific chest discomfort after root canal treatment (done 3/11/19). CT C/A/P was negative for PE or other acute findings. On OSH labs, his WBC was incidentally noted to be elevated at 71.1 with Hb 9.0 and plts 97 (prior labs had been unremarkable per patient). Smear was suspicious for immature blasts and acute leukemia. No symptoms of hyperviscosity, TLS or DIC on presentation.  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 Flow consistent with AML with 95% blasts with the following immunophenotype: Positive for CD13, CD33, CD34, CD38, dim to negative CD45, , partial HLA-DR. Baseline ECHO normal, PICC place.   - Initially was on Hydrea 1g q6hr  for cytoreduction. Discontinued 3/16 AM.  - HLA typing sent, BMT consult requested  - s/p induction chemotherapy with 7+3 (cytarabine and daunorubicin). Day 1=3/15/19. Today is Day 12. Unfortunately still with persistent heavy burden of disease (95% blasts) on repeat BMBx done 3/25 due to rising WBC/blast counts. Awaiting final NGS panel to then determine best treatment plan, but favoring venetoclax based therapy.   - added back daily DIC & TLS monitoring with WBC & blast count rising  - continue Allopurinol & IVF, will continue due to need for reinduction    #Pancytopenia  2/2 AML & associated chemotherapy.   - transfuse to maintain Hb >7, Plt >10K    #Mild coagulopathy, in setting of refractory AML. INR currently stable at 1.3, no bleeding.  - monitor daily coags    ID:  #ID PPx   - continue ACV- changed Fluconazole to Micafungin 50mg daily in setting of fevers. Would ultimately plan to transition to Voriconazole ppx dose (200mg q12hr) but will await final reinduction therapy plan.        #Neutropenic fever/SIRS.  First fever on 3/22 with Tmax 101.3. Last temp on 3/24 evening. Low grade temp to 100.1 since, but technically afebrile >24hrs.   - BCx (3/22-3/24) are NGTD  - 3/23 fungal BCx NGTD  - UA negative  - 3/22 CXR with small R pleural effusion, no focal airspace opacity  - continue Cefepime (x 3/22)   - galactomannan and fungitell re-ordered 3/25, pending  - will check C.diff today (3/26) due to loose stools and abd cramping    GI:  #Chemotherapy induced nausea  - scheduled Zofran q8hr  - compazine also scheduled q6hr  - PRN antiemetics    #Constipation-->Diarrhea.   - adjust scheduled senna to PRN  - now with loose stools (>3 in 24hr period) and abdominal cramping. No laxative use for >48hrs. Will check C.diff.     #S/P root canal treatment  Patient had a root canal treatment performed on 3/11/19. Site appeared unremarkable with no abscess or drainage. He was started on a 7-day course of oral  mg q6h  starting one day prior to the procedure. In retrospect, may have also had some leukemic infiltration of gums complicating his course. Pain at site initially improved during chemotherapy, but now returning.   - Tylenol PRN, oxycodone PRN     CV:  #Subclinical coronary atherosclerosis  #Hyperlipidemia  Total Agatston calcium score was elevated at 591 (91st percentile) on CT coronaries performed 6/8/2016. Nuclear stress test was negative for ischemia on 8/10/2016. Life style modification measures were recommended. Patient follows with cardiology as outpatient (last office visit on 11/12/18).  - holding atorvastatin at this time, resume in ~2-4 weeks pending treatment course if LFT's remain normal     NEURO/MSK:  #Restless legs syndrome  - Continue ropinirole 0.75 mg at bedtime, offer PRN ativan for persistent symptoms.    #History of lumbar spine degenerative disc disease   Patient is s/p laminectomy/facetectomy procedures. He does not routinely take pain meds.     :  #BPH   Patient follows with urology ([facility], [place], [place]).   - continue Flomax 0.8 mg daily  - of note, possible drug interaction between voriconazole and flomax (can raise flomax level in blood & cause hypotension). Monitor for this if change to vfend occurs.      RENAL:  #Lyte derangement. Likely 2/2 poor PO intake. Cr ok.   - continue IVFs  - lyte repletion per unit protocol        FEN   - Regular diet as tolerated. Monitor decreased intake.   - continue IVFs, bolus PRN  - PRN lyte replacement per standing protocol     Prophylaxis  - No pharmacologic VTE ppx due to TCP  - PPI for GI/PUD ppx       Code Status: FULL    Disposition: Anticipate 4-6 week LOS pending completion of chemotherapy, count recovery, and resolution of acute toxicities.     Vee Cabrera PA-C  Heme/Onc  [phone]    Interval History   No acute events overnight. Afebrile apart from low grade temp 100.1. He is fatigued. Reports diarrhea with 2 loose stools  "overnight and 1 this AM. Loose stools associated with abdominal cramping. Did have some mild nausea this AM, no vomiting. BMBx site sore but ok. No other new concerns. He is prepared for more chemotherapy, states he is \"not going to give up\" and \"keep going\".        Physical Exam   Temp: 98.3  F (36.8  C) Temp src: Oral BP: 117/63 Pulse: 84 Heart Rate: 119 Resp: 16 SpO2: 98 % O2 Device: None (Room air)    Vitals:    03/23/19 1111 03/24/19 0848 03/25/19 0909   Weight: 92.8 kg (204 lb 8 oz) 93 kg (205 lb 1.6 oz) 93.3 kg (205 lb 9.6 oz)     Vital Signs with Ranges  Temp:  [97.4  F (36.3  C)-100.9  F (38.3  C)] 98.3  F (36.8  C)  Pulse:  [79-84] 84  Heart Rate:  [] 119  Resp:  [16-18] 16  BP: (107-121)/(56-70) 117/63  SpO2:  [95 %-98 %] 98 %  I/O last 3 completed shifts:  In: 1900 [P.O.:400; I.V.:1500]  Out: 1875 [Urine:1875]    Constitutional: Awake, alert, cooperative, in NAD. Fatigued but non-toxic appearing. Sitting up in chair.   HEENT: NC/AT. Sclera clear, conjunctiva normal. No scleral icterus. MMM.   Respiratory: Non-labored breathing, good air exchange, on RA.   GI: +BS, soft, non-distended  Skin: No concerning lesions or rash on exposed areas. BMBx site (RPIC) with dressing that is c/d/i, nontender. Former BMBx site (LPIC) is well healing.   Musculoskeletal: No edema bridgett LEs.   Neurologic: Alert and oriented. Grossly nonfocal.    Neuropsychiatric: Calm, normal affect.         Medications     - MEDICATION INSTRUCTIONS -       - MEDICATION INSTRUCTIONS -       sodium chloride 100 mL/hr at 03/24/19 1792       acyclovir  400 mg Oral BID     allopurinol  300 mg Oral Daily     ceFEPIme (MAXIPIME) IV  2 g Intravenous Q8H     micafungin  50 mg Intravenous Q24H     ondansetron  8 mg Oral Q8H     pantoprazole  40 mg Oral BID AC     prochlorperazine  5-10 mg Intravenous Q6H    Or     prochlorperazine  5-10 mg Oral Q6H     rOPINIRole  0.75 mg Oral Daily at 8 pm     sodium chloride (PF)  10 mL Intracatheter Q7 Days "     tamsulosin  0.8 mg Oral Daily       Data   Results for orders placed or performed during the hospital encounter of 03/12/19 (from the past 24 hour(s))   Blood culture   Result Value Ref Range    Specimen Description Blood PICC     Special Requests Received in aerobic bottle only     Culture Micro No growth after 13 hours    Lactic acid level STAT for sepsis protocol   Result Value Ref Range    Lactate for Sepsis Protocol 0.7 0.7 - 2.0 mmol/L   Blood culture   Result Value Ref Range    Specimen Description Blood Left Arm     Special Requests Received in aerobic bottle only     Culture Micro No growth after 13 hours    CBC with platelets differential   Result Value Ref Range    WBC 12.3 (H) 4.0 - 11.0 10e9/L    RBC Count 2.02 (L) 4.4 - 5.9 10e12/L    Hemoglobin 6.6 (LL) 13.3 - 17.7 g/dL    Hematocrit 19.9 (L) 40.0 - 53.0 %    MCV 99 78 - 100 fl    MCH 32.7 26.5 - 33.0 pg    MCHC 33.2 31.5 - 36.5 g/dL    RDW 14.5 10.0 - 15.0 %    Platelet Count 5 (LL) 150 - 450 10e9/L    Diff Method Manual Differential     % Neutrophils 0.0 %    % Lymphocytes 0.0 %    % Monocytes 0.0 %    % Eosinophils 0.0 %    % Basophils 0.0 %    % Blasts 100.0 %    Absolute Neutrophil 0.0 (LL) 1.6 - 8.3 10e9/L    Absolute Lymphocytes 0.0 (L) 0.8 - 5.3 10e9/L    Absolute Monocytes 0.0 0.0 - 1.3 10e9/L    Absolute Eosinophils 0.0 0.0 - 0.7 10e9/L    Absolute Basophils 0.0 0.0 - 0.2 10e9/L    Absolute Blasts 12.3 (H) 0 10e9/L    Poikilocytosis Slight     Platelet Estimate Confirming automated cell count    Hepatic panel   Result Value Ref Range    Bilirubin Direct 0.1 0.0 - 0.2 mg/dL    Bilirubin Total 0.5 0.2 - 1.3 mg/dL    Albumin 2.4 (L) 3.4 - 5.0 g/dL    Protein Total 5.8 (L) 6.8 - 8.8 g/dL    Alkaline Phosphatase 61 40 - 150 U/L    ALT 26 0 - 70 U/L    AST 7 0 - 45 U/L   Basic metabolic panel   Result Value Ref Range    Sodium 132 (L) 133 - 144 mmol/L    Potassium 3.2 (L) 3.4 - 5.3 mmol/L    Chloride 102 94 - 109 mmol/L    Carbon Dioxide 20 20  - 32 mmol/L    Anion Gap 10 3 - 14 mmol/L    Glucose 108 (H) 70 - 99 mg/dL    Urea Nitrogen 12 7 - 30 mg/dL    Creatinine 0.76 0.66 - 1.25 mg/dL    GFR Estimate >90 >60 mL/min/[1.73_m2]    GFR Estimate If Black >90 >60 mL/min/[1.73_m2]    Calcium 7.9 (L) 8.5 - 10.1 mg/dL   INR   Result Value Ref Range    INR 1.31 (H) 0.86 - 1.14   Magnesium   Result Value Ref Range    Magnesium 2.0 1.6 - 2.3 mg/dL   Partial thromboplastin time   Result Value Ref Range    PTT 34 22 - 37 sec   Phosphorus   Result Value Ref Range    Phosphorus 2.7 2.5 - 4.5 mg/dL   Uric acid   Result Value Ref Range    Uric Acid 2.4 (L) 3.5 - 7.2 mg/dL   Fibrinogen activity   Result Value Ref Range    Fibrinogen 551 (H) 200 - 420 mg/dL   Lactate Dehydrogenase   Result Value Ref Range    Lactate Dehydrogenase 115 85 - 227 U/L   ABO/Rh type and screen   Result Value Ref Range    Units Ordered 1     ABO A     RH(D) Pos     Antibody Screen Neg     Test Valid Only At          [facility]    Specimen Expires 03/28/2019     Crossmatch Red Blood Cells    Blood component   Result Value Ref Range    Unit Number [id number]     Blood Component Type Red Blood Cells Leukocyte Reduced     Division Number 00     Status of Unit Ready for patient 03/25/2019 0939     Blood Product Code [id number]     Unit Status NICOLAS    Platelets prepare order unit conditional   Result Value Ref Range    Blood Component Type PLT Pheresis     Units Ordered 1

## 2019-03-25 NOTE — PLAN OF CARE
Afebrile OVSS.BMBX today .WBC  and blasts increasing unusually so looking into that and anticipate chemo starting today.Awaiting labs and BMBX results.Continues on antiemetic.Continue to monitor

## 2019-03-25 NOTE — PLAN OF CARE
Tmax 100.9F, blood cultures drawn, provider notified. No changes to plan of care at this time. Pt also triggered sepsis, LA 0.7. Continues with nausea, scheduled antiemetics controlling. Poor appetite, pt only able to eat a popsicle this shift. Phos replaced for level of 2.4, recheck with AM labs. Plan for bmbx tomorrow at 1pm. Continue to monitor.

## 2019-03-26 ENCOUNTER — APPOINTMENT (OUTPATIENT)
Dept: PHYSICAL THERAPY | Facility: CLINIC | Age: 64
DRG: 834 | End: 2019-03-26
Payer: COMMERCIAL

## 2019-03-26 LAB
1,3 BETA GLUCAN SER-MCNC: <31 PG/ML
ALBUMIN SERPL-MCNC: 2.3 G/DL (ref 3.4–5)
ALBUMIN SERPL-MCNC: 2.4 G/DL (ref 3.4–5)
ALP SERPL-CCNC: 71 U/L (ref 40–150)
ALP SERPL-CCNC: 73 U/L (ref 40–150)
ALT SERPL W P-5'-P-CCNC: 28 U/L (ref 0–70)
ALT SERPL W P-5'-P-CCNC: 30 U/L (ref 0–70)
ANION GAP SERPL CALCULATED.3IONS-SCNC: 7 MMOL/L (ref 3–14)
ANION GAP SERPL CALCULATED.3IONS-SCNC: 9 MMOL/L (ref 3–14)
ANISOCYTOSIS BLD QL SMEAR: SLIGHT
APTT PPP: 33 SEC (ref 22–37)
AST SERPL W P-5'-P-CCNC: 14 U/L (ref 0–45)
AST SERPL W P-5'-P-CCNC: 14 U/L (ref 0–45)
B-D GLUCAN INTERPRETATION (1,3): NEGATIVE
BASOPHILS # BLD AUTO: 0 10E9/L (ref 0–0.2)
BASOPHILS NFR BLD AUTO: 0 %
BILIRUB DIRECT SERPL-MCNC: 0.2 MG/DL (ref 0–0.2)
BILIRUB SERPL-MCNC: 0.4 MG/DL (ref 0.2–1.3)
BILIRUB SERPL-MCNC: 0.5 MG/DL (ref 0.2–1.3)
BLASTS # BLD: 15.2 10E9/L
BLASTS BLD QL SMEAR: 91 %
BUN SERPL-MCNC: 8 MG/DL (ref 7–30)
BUN SERPL-MCNC: 9 MG/DL (ref 7–30)
C DIFF TOX B STL QL: POSITIVE
CALCIUM SERPL-MCNC: 7.4 MG/DL (ref 8.5–10.1)
CALCIUM SERPL-MCNC: 7.6 MG/DL (ref 8.5–10.1)
CHLORIDE SERPL-SCNC: 101 MMOL/L (ref 94–109)
CHLORIDE SERPL-SCNC: 102 MMOL/L (ref 94–109)
CO2 SERPL-SCNC: 21 MMOL/L (ref 20–32)
CO2 SERPL-SCNC: 21 MMOL/L (ref 20–32)
COPATH REPORT: NORMAL
CREAT SERPL-MCNC: 0.69 MG/DL (ref 0.66–1.25)
CREAT SERPL-MCNC: 0.74 MG/DL (ref 0.66–1.25)
DIFFERENTIAL METHOD BLD: ABNORMAL
EOSINOPHIL # BLD AUTO: 0 10E9/L (ref 0–0.7)
EOSINOPHIL NFR BLD AUTO: 0 %
ERYTHROCYTE [DISTWIDTH] IN BLOOD BY AUTOMATED COUNT: 14.6 % (ref 10–15)
FIBRINOGEN PPP-MCNC: 523 MG/DL (ref 200–420)
GALACTOMANNAN AG SERPL QL IA: NEGATIVE
GALACTOMANNAN AG SERPL-ACNC: 0.03
GFR SERPL CREATININE-BSD FRML MDRD: >90 ML/MIN/{1.73_M2}
GFR SERPL CREATININE-BSD FRML MDRD: >90 ML/MIN/{1.73_M2}
GLUCOSE SERPL-MCNC: 125 MG/DL (ref 70–99)
GLUCOSE SERPL-MCNC: 140 MG/DL (ref 70–99)
HCT VFR BLD AUTO: 21.6 % (ref 40–53)
HGB BLD-MCNC: 7.1 G/DL (ref 13.3–17.7)
INR PPP: 1.31 (ref 0.86–1.14)
LACTATE BLD-SCNC: 0.4 MMOL/L (ref 0.7–2)
LDH SERPL L TO P-CCNC: 126 U/L (ref 85–227)
LYMPHOCYTES # BLD AUTO: 1.5 10E9/L (ref 0.8–5.3)
LYMPHOCYTES NFR BLD AUTO: 9 %
MACROCYTES BLD QL SMEAR: PRESENT
MAGNESIUM SERPL-MCNC: 1.9 MG/DL (ref 1.6–2.3)
MCH RBC QN AUTO: 33 PG (ref 26.5–33)
MCHC RBC AUTO-ENTMCNC: 32.9 G/DL (ref 31.5–36.5)
MCV RBC AUTO: 101 FL (ref 78–100)
MONOCYTES # BLD AUTO: 0 10E9/L (ref 0–1.3)
MONOCYTES NFR BLD AUTO: 0 %
NEUTROPHILS # BLD AUTO: 0 10E9/L (ref 1.6–8.3)
NEUTROPHILS NFR BLD AUTO: 0 %
PHOSPHATE SERPL-MCNC: 1.8 MG/DL (ref 2.5–4.5)
PHOSPHATE SERPL-MCNC: 1.8 MG/DL (ref 2.5–4.5)
PLATELET # BLD AUTO: 27 10E9/L (ref 150–450)
PLATELET # BLD EST: ABNORMAL 10*3/UL
POTASSIUM SERPL-SCNC: 3 MMOL/L (ref 3.4–5.3)
POTASSIUM SERPL-SCNC: 3.3 MMOL/L (ref 3.4–5.3)
PROT SERPL-MCNC: 5.7 G/DL (ref 6.8–8.8)
PROT SERPL-MCNC: 5.8 G/DL (ref 6.8–8.8)
RBC # BLD AUTO: 2.15 10E12/L (ref 4.4–5.9)
SODIUM SERPL-SCNC: 130 MMOL/L (ref 133–144)
SODIUM SERPL-SCNC: 131 MMOL/L (ref 133–144)
SPECIMEN SOURCE: ABNORMAL
URATE SERPL-MCNC: 2 MG/DL (ref 3.5–7.2)
WBC # BLD AUTO: 16.7 10E9/L (ref 4–11)

## 2019-03-26 PROCEDURE — 25000128 H RX IP 250 OP 636: Performed by: NURSE PRACTITIONER

## 2019-03-26 PROCEDURE — 83615 LACTATE (LD) (LDH) ENZYME: CPT | Performed by: INTERNAL MEDICINE

## 2019-03-26 PROCEDURE — 36592 COLLECT BLOOD FROM PICC: CPT

## 2019-03-26 PROCEDURE — 85730 THROMBOPLASTIN TIME PARTIAL: CPT | Performed by: INTERNAL MEDICINE

## 2019-03-26 PROCEDURE — 87040 BLOOD CULTURE FOR BACTERIA: CPT

## 2019-03-26 PROCEDURE — 97530 THERAPEUTIC ACTIVITIES: CPT | Mod: GP

## 2019-03-26 PROCEDURE — 36592 COLLECT BLOOD FROM PICC: CPT | Performed by: INTERNAL MEDICINE

## 2019-03-26 PROCEDURE — 25000132 ZZH RX MED GY IP 250 OP 250 PS 637: Performed by: NURSE PRACTITIONER

## 2019-03-26 PROCEDURE — 85384 FIBRINOGEN ACTIVITY: CPT | Performed by: INTERNAL MEDICINE

## 2019-03-26 PROCEDURE — 25800030 ZZH RX IP 258 OP 636: Performed by: PHYSICIAN ASSISTANT

## 2019-03-26 PROCEDURE — 25800030 ZZH RX IP 258 OP 636: Performed by: INTERNAL MEDICINE

## 2019-03-26 PROCEDURE — 25000132 ZZH RX MED GY IP 250 OP 250 PS 637: Performed by: INTERNAL MEDICINE

## 2019-03-26 PROCEDURE — 84100 ASSAY OF PHOSPHORUS: CPT

## 2019-03-26 PROCEDURE — 25800030 ZZH RX IP 258 OP 636

## 2019-03-26 PROCEDURE — 25000125 ZZHC RX 250: Performed by: INTERNAL MEDICINE

## 2019-03-26 PROCEDURE — 12000001 ZZH R&B MED SURG/OB UMMC

## 2019-03-26 PROCEDURE — 85610 PROTHROMBIN TIME: CPT | Performed by: INTERNAL MEDICINE

## 2019-03-26 PROCEDURE — 80076 HEPATIC FUNCTION PANEL: CPT | Performed by: INTERNAL MEDICINE

## 2019-03-26 PROCEDURE — 85025 COMPLETE CBC W/AUTO DIFF WBC: CPT | Performed by: INTERNAL MEDICINE

## 2019-03-26 PROCEDURE — 25000128 H RX IP 250 OP 636: Performed by: INTERNAL MEDICINE

## 2019-03-26 PROCEDURE — 87493 C DIFF AMPLIFIED PROBE: CPT | Performed by: PHYSICIAN ASSISTANT

## 2019-03-26 PROCEDURE — 83735 ASSAY OF MAGNESIUM: CPT | Performed by: INTERNAL MEDICINE

## 2019-03-26 PROCEDURE — 84550 ASSAY OF BLOOD/URIC ACID: CPT | Performed by: INTERNAL MEDICINE

## 2019-03-26 PROCEDURE — 80048 BASIC METABOLIC PNL TOTAL CA: CPT | Performed by: INTERNAL MEDICINE

## 2019-03-26 PROCEDURE — 25000128 H RX IP 250 OP 636: Performed by: PHYSICIAN ASSISTANT

## 2019-03-26 PROCEDURE — 84100 ASSAY OF PHOSPHORUS: CPT | Performed by: INTERNAL MEDICINE

## 2019-03-26 PROCEDURE — 97110 THERAPEUTIC EXERCISES: CPT | Mod: GP

## 2019-03-26 PROCEDURE — 25000128 H RX IP 250 OP 636

## 2019-03-26 PROCEDURE — 83605 ASSAY OF LACTIC ACID: CPT

## 2019-03-26 PROCEDURE — 80053 COMPREHEN METABOLIC PANEL: CPT

## 2019-03-26 PROCEDURE — 25000131 ZZH RX MED GY IP 250 OP 636 PS 637: Performed by: PHYSICIAN ASSISTANT

## 2019-03-26 RX ORDER — VANCOMYCIN HYDROCHLORIDE 50 MG/ML
125 KIT ORAL 4 TIMES DAILY
Status: DISCONTINUED | OUTPATIENT
Start: 2019-03-26 | End: 2019-04-04

## 2019-03-26 RX ORDER — DIPHENHYDRAMINE HYDROCHLORIDE 50 MG/ML
50 INJECTION INTRAMUSCULAR; INTRAVENOUS
Status: ACTIVE | OUTPATIENT
Start: 2019-03-26 | End: 2019-04-01

## 2019-03-26 RX ORDER — ALBUTEROL SULFATE 90 UG/1
1-2 AEROSOL, METERED RESPIRATORY (INHALATION)
Status: ACTIVE | OUTPATIENT
Start: 2019-03-26 | End: 2019-04-02

## 2019-03-26 RX ORDER — SODIUM CHLORIDE 9 MG/ML
1000 INJECTION, SOLUTION INTRAVENOUS CONTINUOUS PRN
Status: ACTIVE | OUTPATIENT
Start: 2019-03-26 | End: 2019-04-02

## 2019-03-26 RX ORDER — EPINEPHRINE 1 MG/ML
0.3 INJECTION, SOLUTION, CONCENTRATE INTRAVENOUS EVERY 5 MIN PRN
Status: ACTIVE | OUTPATIENT
Start: 2019-03-26 | End: 2019-04-02

## 2019-03-26 RX ORDER — LORAZEPAM 0.5 MG/1
.5-1 TABLET ORAL EVERY 6 HOURS PRN
Status: DISCONTINUED | OUTPATIENT
Start: 2019-03-26 | End: 2019-03-26

## 2019-03-26 RX ORDER — LORAZEPAM 2 MG/ML
.5-1 INJECTION INTRAMUSCULAR EVERY 6 HOURS PRN
Status: DISCONTINUED | OUTPATIENT
Start: 2019-03-26 | End: 2019-03-26

## 2019-03-26 RX ORDER — OXYMETAZOLINE HYDROCHLORIDE 0.05 G/100ML
2 SPRAY NASAL
Status: DISCONTINUED | OUTPATIENT
Start: 2019-03-26 | End: 2019-04-05

## 2019-03-26 RX ORDER — METHYLPREDNISOLONE SODIUM SUCCINATE 125 MG/2ML
125 INJECTION, POWDER, LYOPHILIZED, FOR SOLUTION INTRAMUSCULAR; INTRAVENOUS
Status: ACTIVE | OUTPATIENT
Start: 2019-03-26 | End: 2019-04-02

## 2019-03-26 RX ORDER — PROCHLORPERAZINE MALEATE 10 MG
10 TABLET ORAL EVERY 6 HOURS PRN
Status: DISCONTINUED | OUTPATIENT
Start: 2019-03-26 | End: 2019-03-26

## 2019-03-26 RX ORDER — ALBUTEROL SULFATE 0.83 MG/ML
2.5 SOLUTION RESPIRATORY (INHALATION)
Status: ACTIVE | OUTPATIENT
Start: 2019-03-26 | End: 2019-04-02

## 2019-03-26 RX ORDER — VORICONAZOLE 200 MG/1
200 TABLET, FILM COATED ORAL EVERY 12 HOURS SCHEDULED
Status: DISCONTINUED | OUTPATIENT
Start: 2019-03-26 | End: 2019-03-26

## 2019-03-26 RX ORDER — MEPERIDINE HYDROCHLORIDE 25 MG/ML
25 INJECTION INTRAMUSCULAR; INTRAVENOUS; SUBCUTANEOUS EVERY 30 MIN PRN
Status: ACTIVE | OUTPATIENT
Start: 2019-03-26 | End: 2019-04-01

## 2019-03-26 RX ADMIN — ONDANSETRON HYDROCHLORIDE 8 MG: 8 TABLET, FILM COATED ORAL at 00:03

## 2019-03-26 RX ADMIN — ONDANSETRON HYDROCHLORIDE 8 MG: 8 TABLET, FILM COATED ORAL at 23:48

## 2019-03-26 RX ADMIN — POTASSIUM CHLORIDE 20 MEQ: 29.8 INJECTION, SOLUTION INTRAVENOUS at 23:49

## 2019-03-26 RX ADMIN — PROCHLORPERAZINE MALEATE 10 MG: 5 TABLET, FILM COATED ORAL at 11:52

## 2019-03-26 RX ADMIN — CEFEPIME 2 G: 2 INJECTION, POWDER, FOR SOLUTION INTRAVENOUS at 00:03

## 2019-03-26 RX ADMIN — PANTOPRAZOLE SODIUM 40 MG: 40 TABLET, DELAYED RELEASE ORAL at 16:37

## 2019-03-26 RX ADMIN — MICAFUNGIN SODIUM 50 MG: 10 INJECTION, POWDER, LYOPHILIZED, FOR SOLUTION INTRAVENOUS at 12:56

## 2019-03-26 RX ADMIN — SODIUM CHLORIDE: 9 INJECTION, SOLUTION INTRAVENOUS at 22:02

## 2019-03-26 RX ADMIN — PROCHLORPERAZINE MALEATE 10 MG: 5 TABLET, FILM COATED ORAL at 23:48

## 2019-03-26 RX ADMIN — ROPINIROLE HYDROCHLORIDE 0.75 MG: 0.5 TABLET, FILM COATED ORAL at 22:02

## 2019-03-26 RX ADMIN — POTASSIUM CHLORIDE 20 MEQ: 29.8 INJECTION, SOLUTION INTRAVENOUS at 10:03

## 2019-03-26 RX ADMIN — ONDANSETRON HYDROCHLORIDE 8 MG: 8 TABLET, FILM COATED ORAL at 16:37

## 2019-03-26 RX ADMIN — PANTOPRAZOLE SODIUM 40 MG: 40 TABLET, DELAYED RELEASE ORAL at 08:16

## 2019-03-26 RX ADMIN — ONDANSETRON HYDROCHLORIDE 8 MG: 8 TABLET, FILM COATED ORAL at 08:16

## 2019-03-26 RX ADMIN — VANCOMYCIN HYDROCHLORIDE 125 MG: KIT at 23:49

## 2019-03-26 RX ADMIN — ACYCLOVIR 400 MG: 400 TABLET ORAL at 08:16

## 2019-03-26 RX ADMIN — POTASSIUM CHLORIDE 20 MEQ: 29.8 INJECTION, SOLUTION INTRAVENOUS at 11:22

## 2019-03-26 RX ADMIN — CEFEPIME 2 G: 2 INJECTION, POWDER, FOR SOLUTION INTRAVENOUS at 18:30

## 2019-03-26 RX ADMIN — ALLOPURINOL 300 MG: 300 TABLET ORAL at 08:16

## 2019-03-26 RX ADMIN — ALTEPLASE 2 MG: 2.2 INJECTION, POWDER, LYOPHILIZED, FOR SOLUTION INTRAVENOUS at 17:30

## 2019-03-26 RX ADMIN — ACYCLOVIR 400 MG: 400 TABLET ORAL at 20:46

## 2019-03-26 RX ADMIN — POTASSIUM CHLORIDE 20 MEQ: 29.8 INJECTION, SOLUTION INTRAVENOUS at 22:02

## 2019-03-26 RX ADMIN — CEFEPIME 2 G: 2 INJECTION, POWDER, FOR SOLUTION INTRAVENOUS at 08:13

## 2019-03-26 RX ADMIN — ACETAMINOPHEN 650 MG: 325 TABLET, FILM COATED ORAL at 08:19

## 2019-03-26 RX ADMIN — PROCHLORPERAZINE MALEATE 10 MG: 5 TABLET, FILM COATED ORAL at 00:03

## 2019-03-26 RX ADMIN — OXYMETAZOLINE HYDROCHLORIDE 2 SPRAY: 5 SPRAY NASAL at 17:30

## 2019-03-26 RX ADMIN — PROCHLORPERAZINE MALEATE 10 MG: 5 TABLET, FILM COATED ORAL at 18:26

## 2019-03-26 RX ADMIN — TAMSULOSIN HYDROCHLORIDE 0.8 MG: 0.4 CAPSULE ORAL at 08:16

## 2019-03-26 RX ADMIN — DECITABINE 43 MG: 50 INJECTION, POWDER, LYOPHILIZED, FOR SOLUTION INTRAVENOUS at 20:46

## 2019-03-26 RX ADMIN — PROCHLORPERAZINE MALEATE 10 MG: 5 TABLET, FILM COATED ORAL at 06:40

## 2019-03-26 RX ADMIN — POTASSIUM PHOSPHATE, MONOBASIC AND POTASSIUM PHOSPHATE, DIBASIC 20 MMOL: 224; 236 INJECTION, SOLUTION INTRAVENOUS at 14:17

## 2019-03-26 ASSESSMENT — ACTIVITIES OF DAILY LIVING (ADL)
ADLS_ACUITY_SCORE: 14

## 2019-03-26 ASSESSMENT — PAIN DESCRIPTION - DESCRIPTORS: DESCRIPTORS: ACHING

## 2019-03-26 ASSESSMENT — MIFFLIN-ST. JEOR: SCORE: 1745.25

## 2019-03-26 NOTE — PLAN OF CARE
[id number]:  Triggered SIRS, lactic acid 0.5, tmax 100.1 oral, tachycardic 100's, OVSS on room air.  Denies pain and nausea/vomiting.  Feeling tired, rested in between cares.  Potassium recheck level 3.4, no need for further replacements.  BMBX dressing C/D/I.  Poor appetite, ate some soup.  MIVF 100 mL/hr.  Continues on IV ABX.  Adequate urine output.  Reports BM.  Up ad shelley.  Continue to monitor and with POC.

## 2019-03-26 NOTE — PLAN OF CARE
[id number] hours: Tmax 100.6. Other vital signs stable. On room air. Patient declined tylenol at this time for fever. MD notified of fever spike. Patient does have coarse lungs sounds in upper lobes of lungs. Reports of nasal congestion. MD aware. Afrin ordered and administered for mild epistaxis of right nostril this evening.  Denies pain or nausea. Gray lumen of PICC line occluded. TPA administered with good blood return noted. Per Dr. Hutchins plan to give Decitabine only tonight. Decitabine protocol double checked. Plan to administer this evening. Venetoclax was not released from protocol as we are awaiting insurance approval. Enteric isolation ordered to rule out c.diff. Needs stool sample collected. Potassium and phosphorus rechecks scheduled for 2000 hours this evening. Up in room independently. Voiding spontaneously.

## 2019-03-26 NOTE — PLAN OF CARE
Discharge Planner PT  PT 7D  Patient plan for discharge: Home with spouse  Current status: Pt reports low energy and increased abdominal distress during activity. Hgb and platelets low today but improved since yesterday. Pt completed seated and standing LE exercises. Pt ambulated without an assistive device pushing IV stand with 1 hand ~ 900'. Pt ambulates at moderate pace with normal gait pattern and balance and needs infrequent cue to maintain an erect posture. Pt fatigued at end of walk and needed rest.  Barriers to return to prior living situation: Medical condition, decreased functional endurance  Recommendations for discharge: Home with spouse and home exercise program.  Rationale for recommendations: Pt is functionally independent but with low energy. HEP needed for pt to progress and maintain strength and functional endurance.       Entered by: Remy Pimentel 03/26/2019 10:24 AM

## 2019-03-26 NOTE — PLAN OF CARE
PT afebrile with stable vs.  Potassium level 3.0 replaced per protocol. Redraw ordered. Phos level 1.8 being replaced per protocol. No blood products required today. Loose stools. C Diff cx ordered. No stools to sent yet. Pt taking some po intake. Up in shower and worked with PT, but otherwise quite tired and fatigued.

## 2019-03-26 NOTE — PLAN OF CARE
Patient temp max= 100.1 Awaiting bmbx results and lab values and plan is to start new chemo protocol.Stool sample sent for Live study- watery and per pt report this is 2nd watery stool.Cont to monitor  .

## 2019-03-26 NOTE — CONSULTS
Social Work Services Progress Note    Hospital Day: 15  Date of Initial Social Work Evaluation:  N/A  Collaborated with:  Patient and pt's spouse, Bessy ([phone])    Data:  Pt is a 64 yo male with newly diagnosed AML.  SW received referral that pt's spouse has questions regarding insurance coverage.    Intervention:  SW met with pt and spouse at bedside.  Spouse explained questions were related to insurance CM that was offered, and SW provided education on this service through insurance provider.  She denied any questions/concerns specific to their insurance coverage.  SW explained role of SW and RN CC in case any additional needs arise, and left contact information as needed.    Assessment:  Pt and spouse both pleasant and engaged in conversation.  Pt appears to have strong family support, and appears willing to able to ask for further assistance as needed.    Plan:    Anticipated Disposition:  None anticipated at this time but pending further workup    Barriers to d/c plan:  Medical stability    Follow Up:  SW will remain available as needed.    Beatrice Sellers [address] Hematology/Oncology Social Worker  Phone: [phone]  Pager: [phone]

## 2019-03-26 NOTE — PROGRESS NOTES
"SPIRITUAL HEALTH SERVICES  SPIRITUAL ASSESSMENT Progress Note  [facility] ([place]) 7D     REFERRAL SOURCE: Follow Up Visit    El was in his room visiting with his wife and father in law. El reports \"not feeling as great\" as he would like. El says it \"ebbs and flows\" throughout the day. His  visited this morning at length which helped El process what he has been thinking and feeling. El continues to look for things he is grateful and would benefit from staff providing him encouragement.    PLAN: I will continue to visit for spiritual support during El's stay on unit 7D.    [facility]  Oncology   Pager [phone]    "

## 2019-03-26 NOTE — PROVIDER NOTIFICATION
DATE/TIME  (DOT-TD, DOT-NOW) CHEMO CHECK ACTIVITY (REGIMEN & DOSE CHECK, DAY, DOSE #, NAME OF CHEMO #1)  CHEMO DRUG #2  CHEMO DRUG #3 NAME OF RN #1 (USE DOT-ME HERE) NAME OF RN#2 (2ND RN TO LOG IN SEPARATELY)   3/26/2019  5:58 PM   Decitabine Protocol double check   Nica CHAD Alaniz RN    3/26/2019  8:28 PM   Dose #1 decitabine double check   Johanna Getting Aby R. Guerra     3/27/2019  7:33 PM   Dose #2 Decitabine   Niecy YOUNGMichelle Wilfredo   Lynn   3/28/2019  7:51 PM   Dose #3 decitabine double check   Johanna Getting   Teo Cabello     3/29/2019 5:39 PM  Venetoclax protocol reviewed.  See spring board for dose reductions.   Denise Alaniz RN  Viraphet Xaphaphyliciay Chanthavixajules     3/29/2019 8:03 PM  Decitabine dose #4/10 double check   Denise Best     3/30/2019 8:03 PM  Decitabine dose #5/10 double check   Denise Cota     3/31/2019 7:41 PM  Decitabine dose #6/10 double check   Denise Mendez     April 1, 2019  8:06 PM   Decitabine Dose #7/10 Double Check   Beckie Best   Aby R. Guerra     April 2, 2019  7:45 PM   Decitabine Dose #8/10 Double Check   Beckie Daniels

## 2019-03-27 ENCOUNTER — DOCUMENTATION ONLY (OUTPATIENT)
Dept: PHARMACY | Facility: CLINIC | Age: 64
End: 2019-03-27

## 2019-03-27 LAB
ALBUMIN SERPL-MCNC: 2.4 G/DL (ref 3.4–5)
ANION GAP SERPL CALCULATED.3IONS-SCNC: 9 MMOL/L (ref 3–14)
APTT PPP: 38 SEC (ref 22–37)
BASOPHILS # BLD AUTO: 0 10E9/L (ref 0–0.2)
BASOPHILS NFR BLD AUTO: 0 %
BLASTS # BLD: 35.5 10E9/L
BLASTS BLD QL SMEAR: 96.4 %
BUN SERPL-MCNC: 6 MG/DL (ref 7–30)
CALCIUM SERPL-MCNC: 7.4 MG/DL (ref 8.5–10.1)
CHLORIDE SERPL-SCNC: 100 MMOL/L (ref 94–109)
CO2 SERPL-SCNC: 20 MMOL/L (ref 20–32)
CREAT SERPL-MCNC: 0.71 MG/DL (ref 0.66–1.25)
CREAT UR-MCNC: 59 MG/DL
DIFFERENTIAL METHOD BLD: ABNORMAL
DIFFERENTIAL METHOD BLD: NORMAL
DIFFERENTIAL METHOD BLD: NORMAL
EOSINOPHIL # BLD AUTO: 0 10E9/L (ref 0–0.7)
EOSINOPHIL NFR BLD AUTO: 0 %
ERYTHROCYTE [DISTWIDTH] IN BLOOD BY AUTOMATED COUNT: 14.7 % (ref 10–15)
ERYTHROCYTE [DISTWIDTH] IN BLOOD BY AUTOMATED COUNT: NORMAL % (ref 10–15)
ERYTHROCYTE [DISTWIDTH] IN BLOOD BY AUTOMATED COUNT: NORMAL % (ref 10–15)
FIBRINOGEN PPP-MCNC: 557 MG/DL (ref 200–420)
FRACT EXCRET NA UR+SERPL-RTO: 1 %
GFR SERPL CREATININE-BSD FRML MDRD: >90 ML/MIN/{1.73_M2}
GLUCOSE SERPL-MCNC: 132 MG/DL (ref 70–99)
HCT VFR BLD AUTO: 22.3 % (ref 40–53)
HCT VFR BLD AUTO: NORMAL % (ref 40–53)
HCT VFR BLD AUTO: NORMAL % (ref 40–53)
HGB BLD-MCNC: 7.5 G/DL (ref 13.3–17.7)
HGB BLD-MCNC: NORMAL G/DL (ref 13.3–17.7)
HGB BLD-MCNC: NORMAL G/DL (ref 13.3–17.7)
INR PPP: 1.41 (ref 0.86–1.14)
LDH SERPL L TO P-CCNC: 136 U/L (ref 85–227)
LYMPHOCYTES # BLD AUTO: 1.3 10E9/L (ref 0.8–5.3)
LYMPHOCYTES NFR BLD AUTO: 3.6 %
MAGNESIUM SERPL-MCNC: 1.9 MG/DL (ref 1.6–2.3)
MCH RBC QN AUTO: 33.2 PG (ref 26.5–33)
MCH RBC QN AUTO: NORMAL PG (ref 26.5–33)
MCH RBC QN AUTO: NORMAL PG (ref 26.5–33)
MCHC RBC AUTO-ENTMCNC: 33.6 G/DL (ref 31.5–36.5)
MCHC RBC AUTO-ENTMCNC: NORMAL G/DL (ref 31.5–36.5)
MCHC RBC AUTO-ENTMCNC: NORMAL G/DL (ref 31.5–36.5)
MCV RBC AUTO: 99 FL (ref 78–100)
MCV RBC AUTO: NORMAL FL (ref 78–100)
MCV RBC AUTO: NORMAL FL (ref 78–100)
MONOCYTES # BLD AUTO: 0 10E9/L (ref 0–1.3)
MONOCYTES NFR BLD AUTO: 0 %
NEUTROPHILS # BLD AUTO: 0 10E9/L (ref 1.6–8.3)
NEUTROPHILS NFR BLD AUTO: 0 %
OSMOLALITY UR: 438 MMOL/KG (ref 100–1200)
PHOSPHATE SERPL-MCNC: 2.6 MG/DL (ref 2.5–4.5)
PHOSPHATE SERPL-MCNC: 2.8 MG/DL (ref 2.5–4.5)
PLATELET # BLD AUTO: 24 10E9/L (ref 150–450)
PLATELET # BLD AUTO: NORMAL 10E9/L (ref 150–450)
PLATELET # BLD AUTO: NORMAL 10E9/L (ref 150–450)
PLATELET # BLD EST: ABNORMAL 10*3/UL
POTASSIUM SERPL-SCNC: 3.3 MMOL/L (ref 3.4–5.3)
POTASSIUM SERPL-SCNC: 3.6 MMOL/L (ref 3.4–5.3)
RBC # BLD AUTO: 2.26 10E12/L (ref 4.4–5.9)
RBC # BLD AUTO: NORMAL 10E12/L (ref 4.4–5.9)
RBC # BLD AUTO: NORMAL 10E12/L (ref 4.4–5.9)
RBC MORPH BLD: NORMAL
SODIUM SERPL-SCNC: 130 MMOL/L (ref 133–144)
SODIUM UR-SCNC: 102 MMOL/L
URATE SERPL-MCNC: 2 MG/DL (ref 3.5–7.2)
WBC # BLD AUTO: 36.8 10E9/L (ref 4–11)
WBC # BLD AUTO: NORMAL 10E9/L (ref 4–11)
WBC # BLD AUTO: NORMAL 10E9/L (ref 4–11)

## 2019-03-27 PROCEDURE — 25000128 H RX IP 250 OP 636: Performed by: NURSE PRACTITIONER

## 2019-03-27 PROCEDURE — 80048 BASIC METABOLIC PNL TOTAL CA: CPT | Performed by: INTERNAL MEDICINE

## 2019-03-27 PROCEDURE — 82040 ASSAY OF SERUM ALBUMIN: CPT

## 2019-03-27 PROCEDURE — 25000128 H RX IP 250 OP 636: Performed by: INTERNAL MEDICINE

## 2019-03-27 PROCEDURE — 25000128 H RX IP 250 OP 636: Performed by: PHYSICIAN ASSISTANT

## 2019-03-27 PROCEDURE — 40000802 ZZH SITE CHECK

## 2019-03-27 PROCEDURE — 25800030 ZZH RX IP 258 OP 636

## 2019-03-27 PROCEDURE — 36415 COLL VENOUS BLD VENIPUNCTURE: CPT

## 2019-03-27 PROCEDURE — 83615 LACTATE (LD) (LDH) ENZYME: CPT | Performed by: INTERNAL MEDICINE

## 2019-03-27 PROCEDURE — 84300 ASSAY OF URINE SODIUM: CPT | Performed by: PHYSICIAN ASSISTANT

## 2019-03-27 PROCEDURE — 85610 PROTHROMBIN TIME: CPT | Performed by: INTERNAL MEDICINE

## 2019-03-27 PROCEDURE — 85384 FIBRINOGEN ACTIVITY: CPT | Performed by: INTERNAL MEDICINE

## 2019-03-27 PROCEDURE — 12000001 ZZH R&B MED SURG/OB UMMC

## 2019-03-27 PROCEDURE — 83935 ASSAY OF URINE OSMOLALITY: CPT | Performed by: PHYSICIAN ASSISTANT

## 2019-03-27 PROCEDURE — 25000128 H RX IP 250 OP 636

## 2019-03-27 PROCEDURE — 25000132 ZZH RX MED GY IP 250 OP 250 PS 637: Performed by: PHYSICIAN ASSISTANT

## 2019-03-27 PROCEDURE — 85730 THROMBOPLASTIN TIME PARTIAL: CPT | Performed by: INTERNAL MEDICINE

## 2019-03-27 PROCEDURE — 25000125 ZZHC RX 250: Performed by: INTERNAL MEDICINE

## 2019-03-27 PROCEDURE — 25000131 ZZH RX MED GY IP 250 OP 636 PS 637: Performed by: PHYSICIAN ASSISTANT

## 2019-03-27 PROCEDURE — 25800030 ZZH RX IP 258 OP 636: Performed by: INTERNAL MEDICINE

## 2019-03-27 PROCEDURE — 25000132 ZZH RX MED GY IP 250 OP 250 PS 637: Performed by: NURSE PRACTITIONER

## 2019-03-27 PROCEDURE — 82570 ASSAY OF URINE CREATININE: CPT | Performed by: PHYSICIAN ASSISTANT

## 2019-03-27 PROCEDURE — 84550 ASSAY OF BLOOD/URIC ACID: CPT | Performed by: INTERNAL MEDICINE

## 2019-03-27 PROCEDURE — 84100 ASSAY OF PHOSPHORUS: CPT | Performed by: INTERNAL MEDICINE

## 2019-03-27 PROCEDURE — 36592 COLLECT BLOOD FROM PICC: CPT | Performed by: INTERNAL MEDICINE

## 2019-03-27 PROCEDURE — 36592 COLLECT BLOOD FROM PICC: CPT

## 2019-03-27 PROCEDURE — 83735 ASSAY OF MAGNESIUM: CPT | Performed by: INTERNAL MEDICINE

## 2019-03-27 PROCEDURE — 84100 ASSAY OF PHOSPHORUS: CPT

## 2019-03-27 PROCEDURE — 25000132 ZZH RX MED GY IP 250 OP 250 PS 637: Performed by: INTERNAL MEDICINE

## 2019-03-27 PROCEDURE — 25800030 ZZH RX IP 258 OP 636: Performed by: PHYSICIAN ASSISTANT

## 2019-03-27 PROCEDURE — 85025 COMPLETE CBC W/AUTO DIFF WBC: CPT

## 2019-03-27 PROCEDURE — 84132 ASSAY OF SERUM POTASSIUM: CPT

## 2019-03-27 RX ORDER — HYDROXYUREA 500 MG/1
1000 CAPSULE ORAL 2 TIMES DAILY
Status: DISCONTINUED | OUTPATIENT
Start: 2019-03-27 | End: 2019-03-28

## 2019-03-27 RX ADMIN — HYDROXYUREA 1000 MG: 500 CAPSULE ORAL at 20:06

## 2019-03-27 RX ADMIN — CEFEPIME 2 G: 2 INJECTION, POWDER, FOR SOLUTION INTRAVENOUS at 17:44

## 2019-03-27 RX ADMIN — ALLOPURINOL 300 MG: 300 TABLET ORAL at 08:26

## 2019-03-27 RX ADMIN — PROCHLORPERAZINE MALEATE 10 MG: 5 TABLET, FILM COATED ORAL at 05:19

## 2019-03-27 RX ADMIN — SODIUM CHLORIDE 500 ML: 9 INJECTION, SOLUTION INTRAVENOUS at 16:36

## 2019-03-27 RX ADMIN — VANCOMYCIN HYDROCHLORIDE 125 MG: KIT at 20:08

## 2019-03-27 RX ADMIN — POTASSIUM PHOSPHATE, MONOBASIC AND POTASSIUM PHOSPHATE, DIBASIC 20 MMOL: 224; 236 INJECTION, SOLUTION INTRAVENOUS at 02:15

## 2019-03-27 RX ADMIN — VANCOMYCIN HYDROCHLORIDE 125 MG: KIT at 15:53

## 2019-03-27 RX ADMIN — POTASSIUM CHLORIDE 20 MEQ: 29.8 INJECTION, SOLUTION INTRAVENOUS at 10:35

## 2019-03-27 RX ADMIN — PROCHLORPERAZINE MALEATE 10 MG: 5 TABLET, FILM COATED ORAL at 12:05

## 2019-03-27 RX ADMIN — CEFEPIME 2 G: 2 INJECTION, POWDER, FOR SOLUTION INTRAVENOUS at 10:16

## 2019-03-27 RX ADMIN — VANCOMYCIN HYDROCHLORIDE 125 MG: KIT at 08:26

## 2019-03-27 RX ADMIN — ACYCLOVIR 400 MG: 400 TABLET ORAL at 08:25

## 2019-03-27 RX ADMIN — ONDANSETRON HYDROCHLORIDE 8 MG: 8 TABLET, FILM COATED ORAL at 15:53

## 2019-03-27 RX ADMIN — VANCOMYCIN HYDROCHLORIDE 125 MG: KIT at 12:05

## 2019-03-27 RX ADMIN — PANTOPRAZOLE SODIUM 40 MG: 40 TABLET, DELAYED RELEASE ORAL at 08:24

## 2019-03-27 RX ADMIN — POTASSIUM CHLORIDE 20 MEQ: 29.8 INJECTION, SOLUTION INTRAVENOUS at 08:30

## 2019-03-27 RX ADMIN — ACYCLOVIR 400 MG: 400 TABLET ORAL at 20:08

## 2019-03-27 RX ADMIN — TAMSULOSIN HYDROCHLORIDE 0.8 MG: 0.4 CAPSULE ORAL at 08:25

## 2019-03-27 RX ADMIN — SODIUM CHLORIDE: 9 INJECTION, SOLUTION INTRAVENOUS at 10:37

## 2019-03-27 RX ADMIN — DECITABINE 43 MG: 50 INJECTION, POWDER, LYOPHILIZED, FOR SOLUTION INTRAVENOUS at 20:06

## 2019-03-27 RX ADMIN — HYDROXYUREA 1000 MG: 500 CAPSULE ORAL at 10:16

## 2019-03-27 RX ADMIN — ACETAMINOPHEN 650 MG: 325 TABLET, FILM COATED ORAL at 15:53

## 2019-03-27 RX ADMIN — PROCHLORPERAZINE MALEATE 5 MG: 5 TABLET, FILM COATED ORAL at 17:44

## 2019-03-27 RX ADMIN — ACETAMINOPHEN 650 MG: 325 TABLET, FILM COATED ORAL at 05:23

## 2019-03-27 RX ADMIN — PANTOPRAZOLE SODIUM 40 MG: 40 TABLET, DELAYED RELEASE ORAL at 15:53

## 2019-03-27 RX ADMIN — CEFEPIME 2 G: 2 INJECTION, POWDER, FOR SOLUTION INTRAVENOUS at 02:15

## 2019-03-27 RX ADMIN — ONDANSETRON HYDROCHLORIDE 8 MG: 8 TABLET, FILM COATED ORAL at 08:25

## 2019-03-27 RX ADMIN — MICAFUNGIN SODIUM 50 MG: 10 INJECTION, POWDER, LYOPHILIZED, FOR SOLUTION INTRAVENOUS at 13:58

## 2019-03-27 RX ADMIN — OXYMETAZOLINE HYDROCHLORIDE 2 SPRAY: 5 SPRAY NASAL at 05:19

## 2019-03-27 ASSESSMENT — ACTIVITIES OF DAILY LIVING (ADL)
ADLS_ACUITY_SCORE: 14

## 2019-03-27 ASSESSMENT — PAIN DESCRIPTION - DESCRIPTORS
DESCRIPTORS: ACHING

## 2019-03-27 NOTE — PLAN OF CARE
Temp max 99.5 this am and remains on IVAB.  Feeling well in and -no nausea and ate breakfast.  Up to shower and chair.  In afternoon tired and not feel as well, napping in bed.  K+ 3.3 and supplemented with IV KCL, recheck will be at 1400.  WBC increased to 36.8 this am.  Hydrea restarted and Decitabine extended from 5 to 10 days.  Still waiting to see if Venetoclax is covered.    Cleared nose of clots while in shower this am and doing soda/salt rinses.   and urine sent for osmo  Blood cultures done last 3/26 at 2100

## 2019-03-27 NOTE — PROGRESS NOTES
The patient was discussed on rounds with the nurses, mid level provider, and house staff and seen and examined by me. All labs and imaging were reviewed. I reviewed events over the last 24 hours including vitals and flow sheets. I agree with the AUGIE note and have been responsible for the care plan and interpretation of progress.     Overnight events, vital signs, labs and meds reviewed.  He looks stable.    Mr. Ace is a 63 year old male with new diagnosis of acute myeloid leukemia incidentally found during work up of nonspecific chest symptoms. He was started on induction chemotherapy with 7+3 (cytarabine and daunorubicin) with D1=3/15/19. BMBx on 3/25 D+12 (done early due to rising WBC and blast counts) demonstrates persistent disease with 95% blasts by flow, 98% by morphology. Started re-induction with decitabine on 3/26 PM with plan to add venetoclax.     -Continue hydrurea  -Neutropenic fever- on cefepime  -C diff colitis- on po vanc  -continue OI ppx.     Bharat Leo MD

## 2019-03-27 NOTE — PLAN OF CARE
Pt is c-diff positive, under enteric precautions and is being administered vancomycin. Voiding adequately. Urine sample collected and sent to lab. Had one BM, educated on infection prevention and handwashing. Nausea managed with Zofran and compazine, reports relief of symptoms. Potasium replaced, recheck levels. Lung sounds sound course in RUL and STUART. Patient denies chest pain and dyspnea. No reported pain during shift. V.S.S. Patient is cooperative and pleasant.

## 2019-03-27 NOTE — PROGRESS NOTES
SPIRITUAL HEALTH SERVICES  SPIRITUAL ASSESSMENT Progress Note  [facility] ([place]) 7D     REFERRAL SOURCE: IDT Rounds    Per conversation in IDT rounds, team felt El would benefit from music therapy. I placed a consult order for Music Therapist Clau.    PLAN: I will continue to follow for spiritual support while El is hospitalized on unit 7D.    [place]  Oncology   Pager [phone]    [facility] remains available 24/7 for emergent requests/referrals, either by having the switchboard page the on-call  or by entering an ASAP/STAT consult in Epic (this will also page the on-call ).

## 2019-03-27 NOTE — PROGRESS NOTES
Nursing Focus: Chemotherapy  D: Positive blood return via PICC. Insertion site is clean/dry/intact, dressing intact with no complaints of pain.  Urine output is recorded in intake in Doc Flowsheet.    I: Premedications given per order (see eMAR). Dose #1 of decitabine started to infuse over 1 hour. Reviewed pt teaching on chemotherapy side effects.  Pt denies need for further teaching. Chemotherapy double checked per protocol by two chemotherapy competent RN's.   A: Tolerating infusion well. Denies nausea and or pain.   P: Continue to monitor urine output and symptoms of nausea. Screen for symptoms of toxicity.

## 2019-03-27 NOTE — PROGRESS NOTES
CLINICAL NUTRITION SERVICES - REASSESSMENT NOTE     Nutrition Prescription    RECOMMENDATIONS FOR MDs/PROVIDERS TO ORDER:  Recommend to encourage po intakes    Malnutrition Status:    Unable to determine at this time    Recommendations already ordered by Registered Dietitian (RD):  Order Calorie Counts starting 3/28 x 3 days to help quantify po adequacy    Future/Additional Recommendations:  Need for scheduled ONS vs alternative nutritional support pending results of calorie count collection (goal is for >1500 calories and 70 g PRO).       EVALUATION OF THE PROGRESS TOWARD GOALS   Diet: Regular   - Pt is not utilizing any nutritional supplements at this time.    Intake: Attempted to visit with pt, but very sleepy this am and unable to response to this writer's questions, except for commenting po intakes declining and consuming approximately 75% of meals x 2 per day.   - Per RN/slowsheets, pt's appetite reported to be poor at times. Has had intermittent nausea with resumption of chemo, but controlled with scheduled with compazine and zofran.    NEW FINDINGS   Weight: 94.9 kg (3/26), 96.8 kg (3/20); Wt loss of 1.9 kg (2% loss) over the past week.   - Question if wt loss d/t shifts in fluid status vs inadequate po intakes. Noted pt received bolus flush on 3/22 and MIVF's last given on 3/26.    Labs:  BUN 6 (< normal) today;lowest value this admission, suggestive of poor protein intakes.   K+ 3.3 (low) today; question d/t increase losses secondary to diarrhea. RN replacing per lytes replacement protocol.    GI: positive C.diff which will likely cause shifts in fluid status, electrolyte abnormalities and reduced po intakes.    MALNUTRITION  % Intake: Suspect decreased, but % unknown at this time  % Weight Loss: Up to 1-2% in 1 week (non-severe)  Subcutaneous Fat Loss: Unable to reassess  Muscle Loss: Unable to reassess  Fluid Accumulation/Edema: None noted per chart review  Malnutrition Diagnosis: Unable to determine due  to unable to reassess po intakes and unable to complete nutritional physical reassessment.    Previous Goals   Patient continue to consume % of nutritionally adequate meal trays TID, or the equivalent with supplements/snacks.    Evaluation: Suspect not met d/t pt only consuming 2 meals per day on average over the past week.    Previous Nutrition Diagnosis  Predicted inadequate energy-protein intake related to currently consuming adequate meals TID, but at risk for po to falter d/t pt noting that his appetite is beginning to decline with chemo.      Evaluation: No longer applicable, nutrition diagnosis changed below    CURRENT NUTRITION DIAGNOSIS  Inadequate oral intake related to appetite fluctuations and intermittent nausea likely associated with chemo as evidenced by wt loss of 1.9 kg (2% loss) over the past week and decreased meal consumption from 3 meals to 2 meals per day over the past week.      INTERVENTIONS  Implementation  Initiate calorie counts  Collaboration with Provider and RN regarding pt's po intakes    Goals  1. Ave po intakes per calorie counts x 3 days to meet at least 75% of estimated nutritional needs (>1500 calories and 70 g PRO)  2. Wt not to decline < 94 kg      Monitoring/Evaluation  Progress toward goals will be monitored and evaluated per protocol.    Tonya Hanson RD,LD  Unit pager [phone]

## 2019-03-27 NOTE — PROGRESS NOTES
[facility], [place]  Hematology / Oncology Progress Note    Date of Admission: 3/12/2019  Date of Service (when I saw the patient): 03/27/2019     Assessment & Plan   Mr. Ace is a 63 year old male with new diagnosis of acute myeloid leukemia incidentally found during work up of nonspecific chest symptoms. He was started on induction chemotherapy with 7+3 (cytarabine and daunorubicin) with D1=3/15/19. BMBx on 3/25 (done early due to rising WBC and blast counts) demonstrates persistent disease with 95% blasts by flow, 98% by morphology. Started re-induction with decitabine on 3/26 PM with plan to add venetoclax.      Today:  - continue decitabine, will plan to extend from 5-day course to 10-day course as previously discussed with Dr. Hutchins  - ongoing process to obtain Venetoclax   - WBC count doubled since yesterday and is 36K today (96% blasts). Will start Hydrea 1g BID to try to control white count while awaiting venetoclax.  - C.diff positive, continue PO Vancomycin  - d/w RD as pt due for inpt reassessment today. Monitor lytes in setting of decreased PO intake. He is on MIVFs and net positive yesterday, but consider bolus PRN       HEME:  #AML, refractory   Patient presented to [facility] ED in [place], [place] for complaints of nonspecific chest discomfort after root canal treatment (done 3/11/19). CT C/A/P was negative for PE or other acute findings. On OSH labs, his WBC was incidentally noted to be elevated at 71.1 with Hb 9.0 and plts 97 (prior labs had been unremarkable per patient). Smear was suspicious for immature blasts and acute leukemia. No symptoms of hyperviscosity, TLS or DIC on presentation.  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 Flow consistent with AML with 95% blasts with the following immunophenotype: Positive for CD13, CD33, CD34, CD38, dim to negative CD45, , partial HLA-DR. Baseline  ECHO normal, PICC place.   - Initially was on Hydrea 1g q6hr for cytoreduction. Discontinued 3/16 AM.  - HLA typing sent, BMT consult requested  - s/p induction chemotherapy with 7+3 (cytarabine and daunorubicin). Day 1=3/15/19.   - repeat BMBx done early (3/25) due to rising WBC/blast count. Still with persistent heavy burden of disease (98% blasts by morphology).   - final NGS panel report pending, but results were reviewed with Dr. Hutchins on 3/26 PM  - started reinduction with decitabine/venetoclax. Decitabine D1=3/26. Plan to add venetoclax when available.  - continue Allopurinol and IVFs  - monitor daily DIC and TLS labs.      #Pancytopenia  #Mild epistaxis  2/2 AML & associated chemotherapy.   - transfuse to maintain Hb >7, Plt >10K. If develops ongoing epistaxis, consider increasing plt parameter (though epistaxis occurred with Plt count in 20s)  - Afrin PRN, ocean spray PRN     #Mild coagulopathy, in setting of refractory AML. INR currently stable at 1.3-1.4.  - monitor daily coags  - plasma for INR >1.8, cryo for fibrinogen <100     ID:  #Neutropenic fever/SIRS.  #C.diff diarrhea/colitis.  First fever on 3/22 with Tmax 101.3. Last temp on 3/24 evening. Recurrent Tmax 100.9 on 3/26. Treating C.diff as cause currently.   - BCx (3/22-3/24) are NGTD  - 3/23 fungal BCx NGTD  - UA negative  - 3/22 CXR with small R pleural effusion, no focal airspace opacity  - 3/25 galactomannan and fungitell both negative  - C.diff (3/26) positive  - if continues with fevers over next 24-48 hrs despite C.diff directed treatment, will obtain further imaging with CT Chest +/- A/P pending symptoms    **Anti-infectives:  - continue Cefepime (x 3/22)   - PO Vancomycin 125mg four times daily x 10 days (3/26 late PM)    #ID PPx   - continue ACV  - changed Fluconazole to Micafungin 50mg daily in setting of fevers. Would ultimately plan to transition to Voriconazole ppx dose (200mg q12hr) but will await venetoclax ramp-up prior to  making this change (will need appropriate dose reduction of venetoclax to 50% of final dose once starting voriconazole)        GI:  #Chemotherapy induced nausea  - scheduled Zofran q8hr  - compazine also scheduled q6hr  - PRN antiemetics     #C.diff diarrhea   - treatment as above     #S/P root canal treatment  Patient had a root canal treatment performed on 3/11/19. Site appeared unremarkable with no abscess or drainage. He was started on a 7-day course of oral  mg q6h starting one day prior to the procedure. In retrospect, may have also had some leukemic infiltration of gums complicating his course. Pain at site initially improved during chemotherapy, but did return. Stable/controlled at this time.  - Tylenol PRN, oxycodone PRN     CV:  #Subclinical coronary atherosclerosis  #Hyperlipidemia  Total Agatston calcium score was elevated at 591 (91st percentile) on CT coronaries performed 6/8/2016. Nuclear stress test was negative for ischemia on 8/10/2016. Life style modification measures were recommended. Patient follows with cardiology as outpatient (last office visit on 11/12/18).  - holding atorvastatin at this time, resume in ~2-4 weeks pending treatment course and if LFT's remain normal     NEURO/MSK:  #Restless legs syndrome  - Continue ropinirole 0.75 mg at bedtime, offer PRN ativan for persistent symptoms.     #History of lumbar spine degenerative disc disease   Patient is s/p laminectomy/facetectomy procedures. He does not routinely take pain meds.     :  #BPH   Patient follows with urology ([facility], [place], [place]).   - continue Flomax 0.8 mg daily  - of note, possible drug interaction between voriconazole and flomax (can raise flomax level in blood & cause hypotension). Monitor for this when change to voriconazole occurs.      RENAL:  #Lyte derangement. Likely 2/2 poor PO intake. Cr stable WNL at this time.   - continue IVFs, consider bolus fluid support  - lyte repletion per unit  "protocol  - calcium corrects for albumin (3/26)     FEN   - Regular diet as tolerated. Monitor decreased intake. Appreciate RD assessment inpatient.  - continue IVFs, bolus PRN  - PRN lyte replacement per standing protocol     Prophylaxis  - No pharmacologic VTE ppx due to TCP  - PPI for GI/PUD ppx       Code Status: FULL     Disposition: Anticipate 4-6 week LOS pending completion of chemotherapy, count recovery, and resolution of acute toxicities.      Discussed with Dr. Leo.     Vee Cabrera PA-C  Heme/Onc  [phone]        Interval History   Started on decitabine last evening. Had recurrent fevers with Tmax 100.9 yesterday evening and overnight. Also had mild epistaxis which resolved spontaneously. He had a little bleeding on tissue this morning after blowing his nose and noting that there was dried bloody mucous in his nose. No active epistaxis this AM. Ongoing loose stools, found to be C.diff positive and started on oral vancomycin. This AM he is resting in bed, tired now but has already been up and walked and ate breakfast (cereal and orange). Denies HA, mouth pain sore sore, SOB, or cough. Does have some tooth discomfort but rates this as as \"1\". No extremity edema.     Physical Exam   Temp: 96  F (35.6  C) Temp src: Oral BP: 102/58   Heart Rate: 94 Resp: 17 SpO2: 98 % O2 Device: None (Room air)    Vitals:    03/24/19 0848 03/25/19 0909 03/26/19 0823   Weight: 93 kg (205 lb 1.6 oz) 93.3 kg (205 lb 9.6 oz) 94.9 kg (209 lb 3.5 oz)     Vital Signs with Ranges  Temp:  [96  F (35.6  C)-100.9  F (38.3  C)] 96  F (35.6  C)  Heart Rate:  [] 94  Resp:  [16-18] 17  BP: (102-137)/(58-71) 102/58  SpO2:  [95 %-98 %] 98 %  I/O last 3 completed shifts:  In: 3630 [P.O.:1480; I.V.:2150]  Out: 1925 [Urine:1925]  Constitutional: Awake, alert, cooperative, in NAD. Resting in bed. Fatigued and closes eyes intermittently during assessment.   HEENT: NC/AT. Sclera clear, conjunctiva normal. MMM. "   Respiratory: Non-labored breathing, good air exchange, on RA. In right lateral decubitus position lungs are clear to auscultation bilaterally, no wheezing or crackles.    GI: +BS, abd soft, non-distended, non-tender.   Skin: No concerning lesions or rash on exposed areas.  Musculoskeletal: No edema bridgett LEs.   Neurologic: Alert and oriented. Grossly nonfocal.    Neuropsychiatric: Calm, normal affect.   VAD: PICC line in RUE, c/d/i        Medications     - MEDICATION INSTRUCTIONS -       - MEDICATION INSTRUCTIONS -       - MEDICATION INSTRUCTIONS -       sodium chloride       sodium chloride 100 mL/hr at 03/27/19 1037       acyclovir  400 mg Oral BID     allopurinol  300 mg Oral Daily     ceFEPIme (MAXIPIME) IV  2 g Intravenous Q8H     decitabine (DACOGEN) chemo infusion  20 mg/m2 (Treatment Plan Recorded) Intravenous Q24H     hydroxyurea  1,000 mg Oral BID     micafungin  50 mg Intravenous Q24H     ondansetron  8 mg Oral Q8H     pantoprazole  40 mg Oral BID AC     prochlorperazine  5-10 mg Intravenous Q6H    Or     prochlorperazine  5-10 mg Oral Q6H     rOPINIRole  0.75 mg Oral Daily at 8 pm     sodium chloride (PF)  10 mL Intracatheter Q7 Days     tamsulosin  0.8 mg Oral Daily     vancomycin  125 mg Oral 4x Daily       Data   Results for orders placed or performed during the hospital encounter of 03/12/19 (from the past 24 hour(s))   Clostridium difficile toxin B PCR   Result Value Ref Range    Specimen Description Feces     C Diff Toxin B PCR Positive (A) NEG^Negative   Phosphorus   Result Value Ref Range    Phosphorus 1.8 (L) 2.5 - 4.5 mg/dL   Comprehensive metabolic panel   Result Value Ref Range    Sodium 131 (L) 133 - 144 mmol/L    Potassium 3.3 (L) 3.4 - 5.3 mmol/L    Chloride 101 94 - 109 mmol/L    Carbon Dioxide 21 20 - 32 mmol/L    Anion Gap 9 3 - 14 mmol/L    Glucose 125 (H) 70 - 99 mg/dL    Urea Nitrogen 8 7 - 30 mg/dL    Creatinine 0.69 0.66 - 1.25 mg/dL    GFR Estimate >90 >60 mL/min/[1.73_m2]    GFR  Estimate If Black >90 >60 mL/min/[1.73_m2]    Calcium 7.6 (L) 8.5 - 10.1 mg/dL    Bilirubin Total 0.4 0.2 - 1.3 mg/dL    Albumin 2.4 (L) 3.4 - 5.0 g/dL    Protein Total 5.7 (L) 6.8 - 8.8 g/dL    Alkaline Phosphatase 73 40 - 150 U/L    ALT 30 0 - 70 U/L    AST 14 0 - 45 U/L   Blood culture   Result Value Ref Range    Specimen Description Blood PICC     Special Requests Received in aerobic bottle only     Culture Micro No growth after 9 hours    Blood culture   Result Value Ref Range    Specimen Description Blood Right Hand     Special Requests Received in aerobic bottle only     Culture Micro No growth after 9 hours    Lactic acid level STAT for sepsis protocol   Result Value Ref Range    Lactate for Sepsis Protocol 0.4 (L) 0.7 - 2.0 mmol/L   CBC with platelets differential   Result Value Ref Range    WBC Test canceled - Lab  error 4.0 - 11.0 10e9/L    RBC Count Test canceled - Lab  error 4.4 - 5.9 10e12/L    Hemoglobin Test canceled - Lab  error 13.3 - 17.7 g/dL    Hematocrit Test canceled - Lab  error 40.0 - 53.0 %    MCV Test canceled - Lab  error 78 - 100 fl    MCH Test canceled - Lab  error 26.5 - 33.0 pg    MCHC Test canceled - Lab  error 31.5 - 36.5 g/dL    RDW Test canceled - Lab  error 10.0 - 15.0 %    Platelet Count Test canceled - Lab  error 150 - 450 10e9/L    Diff Method Test canceled - Lab  error    Basic metabolic panel   Result Value Ref Range    Sodium 130 (L) 133 - 144 mmol/L    Potassium 3.3 (L) 3.4 - 5.3 mmol/L    Chloride 100 94 - 109 mmol/L    Carbon Dioxide 20 20 - 32 mmol/L    Anion Gap 9 3 - 14 mmol/L    Glucose 132 (H) 70 - 99 mg/dL    Urea Nitrogen 6 (L) 7 - 30 mg/dL    Creatinine 0.71 0.66 - 1.25 mg/dL    GFR Estimate >90 >60 mL/min/[1.73_m2]    GFR Estimate If Black >90 >60 mL/min/[1.73_m2]    Calcium 7.4 (L) 8.5 - 10.1 mg/dL   INR   Result Value Ref Range    INR 1.41 (H) 0.86 -  1.14   Magnesium   Result Value Ref Range    Magnesium 1.9 1.6 - 2.3 mg/dL   Partial thromboplastin time   Result Value Ref Range    PTT 38 (H) 22 - 37 sec   Phosphorus   Result Value Ref Range    Phosphorus 2.8 2.5 - 4.5 mg/dL   Uric acid   Result Value Ref Range    Uric Acid 2.0 (L) 3.5 - 7.2 mg/dL   Fibrinogen activity   Result Value Ref Range    Fibrinogen 557 (H) 200 - 420 mg/dL   Lactate Dehydrogenase   Result Value Ref Range    Lactate Dehydrogenase 136 85 - 227 U/L   CBC with platelets differential   Result Value Ref Range    WBC  4.0 - 11.0 10e9/L     Results questioned - new specimen has been requested    RBC Count  4.4 - 5.9 10e12/L     Results questioned - new specimen has been requested    Hemoglobin  13.3 - 17.7 g/dL     Results questioned - new specimen has been requested    Hematocrit  40.0 - 53.0 %     Results questioned - new specimen has been requested    MCV  78 - 100 fl     Results questioned - new specimen has been requested    MCH  26.5 - 33.0 pg     Results questioned - new specimen has been requested    MCHC  31.5 - 36.5 g/dL     Results questioned - new specimen has been requested    RDW  10.0 - 15.0 %     Results questioned - new specimen has been requested    Platelet Count  150 - 450 10e9/L     Results questioned - new specimen has been requested    Diff Method       Results questioned - new specimen has been requested   Phosphorus   Result Value Ref Range    Phosphorus 2.6 2.5 - 4.5 mg/dL   CBC with platelets differential   Result Value Ref Range    WBC 36.8 (H) 4.0 - 11.0 10e9/L    RBC Count 2.26 (L) 4.4 - 5.9 10e12/L    Hemoglobin 7.5 (L) 13.3 - 17.7 g/dL    Hematocrit 22.3 (L) 40.0 - 53.0 %    MCV 99 78 - 100 fl    MCH 33.2 (H) 26.5 - 33.0 pg    MCHC 33.6 31.5 - 36.5 g/dL    RDW 14.7 10.0 - 15.0 %    Platelet Count 24 (LL) 150 - 450 10e9/L    Diff Method Manual Differential     % Neutrophils 0.0 %    % Lymphocytes 3.6 %    % Monocytes 0.0 %    % Eosinophils 0.0 %    % Basophils 0.0 %     % Blasts 96.4 %    Absolute Neutrophil 0.0 (LL) 1.6 - 8.3 10e9/L    Absolute Lymphocytes 1.3 0.8 - 5.3 10e9/L    Absolute Monocytes 0.0 0.0 - 1.3 10e9/L    Absolute Eosinophils 0.0 0.0 - 0.7 10e9/L    Absolute Basophils 0.0 0.0 - 0.2 10e9/L    Absolute Blasts 35.5 (H) 0 10e9/L    RBC Morphology Normal     Platelet Estimate Confirming automated cell count    Albumin level   Result Value Ref Range    Albumin 2.4 (L) 3.4 - 5.0 g/dL

## 2019-03-28 ENCOUNTER — DOCUMENTATION ONLY (OUTPATIENT)
Dept: PHARMACY | Facility: CLINIC | Age: 64
End: 2019-03-28

## 2019-03-28 ENCOUNTER — APPOINTMENT (OUTPATIENT)
Dept: PHYSICAL THERAPY | Facility: CLINIC | Age: 64
DRG: 834 | End: 2019-03-28
Payer: COMMERCIAL

## 2019-03-28 LAB
ALBUMIN SERPL-MCNC: 2.2 G/DL (ref 3.4–5)
ALP SERPL-CCNC: 84 U/L (ref 40–150)
ALT SERPL W P-5'-P-CCNC: 29 U/L (ref 0–70)
ANION GAP SERPL CALCULATED.3IONS-SCNC: 11 MMOL/L (ref 3–14)
APTT PPP: 38 SEC (ref 22–37)
AST SERPL W P-5'-P-CCNC: 14 U/L (ref 0–45)
BACTERIA SPEC CULT: NO GROWTH
BACTERIA SPEC CULT: NO GROWTH
BASOPHILS # BLD AUTO: 0 10E9/L (ref 0–0.2)
BASOPHILS NFR BLD AUTO: 0 %
BILIRUB DIRECT SERPL-MCNC: 0.2 MG/DL (ref 0–0.2)
BILIRUB SERPL-MCNC: 0.7 MG/DL (ref 0.2–1.3)
BLASTS # BLD: 49 10E9/L
BLASTS BLD QL SMEAR: 98 %
BLD PROD TYP BPU: NORMAL
BLD UNIT ID BPU: 0
BLOOD PRODUCT CODE: NORMAL
BPU ID: NORMAL
BUN SERPL-MCNC: 8 MG/DL (ref 7–30)
CALCIUM SERPL-MCNC: 7.6 MG/DL (ref 8.5–10.1)
CHLORIDE SERPL-SCNC: 103 MMOL/L (ref 94–109)
CO2 SERPL-SCNC: 19 MMOL/L (ref 20–32)
COPATH REPORT: NORMAL
CREAT SERPL-MCNC: 0.79 MG/DL (ref 0.66–1.25)
DIFFERENTIAL METHOD BLD: ABNORMAL
EOSINOPHIL # BLD AUTO: 0 10E9/L (ref 0–0.7)
EOSINOPHIL NFR BLD AUTO: 0 %
ERYTHROCYTE [DISTWIDTH] IN BLOOD BY AUTOMATED COUNT: 14.8 % (ref 10–15)
FIBRINOGEN PPP-MCNC: 625 MG/DL (ref 200–420)
GFR SERPL CREATININE-BSD FRML MDRD: >90 ML/MIN/{1.73_M2}
GLUCOSE SERPL-MCNC: 110 MG/DL (ref 70–99)
HCT VFR BLD AUTO: 20.2 % (ref 40–53)
HGB BLD-MCNC: 6.5 G/DL (ref 13.3–17.7)
INR PPP: 1.53 (ref 0.86–1.14)
LACTATE BLD-SCNC: 0.6 MMOL/L (ref 0.7–2)
LDH SERPL L TO P-CCNC: 166 U/L (ref 85–227)
LYMPHOCYTES # BLD AUTO: 0.5 10E9/L (ref 0.8–5.3)
LYMPHOCYTES NFR BLD AUTO: 1 %
Lab: NORMAL
Lab: NORMAL
MAGNESIUM SERPL-MCNC: 2 MG/DL (ref 1.6–2.3)
MCH RBC QN AUTO: 32.5 PG (ref 26.5–33)
MCHC RBC AUTO-ENTMCNC: 32.2 G/DL (ref 31.5–36.5)
MCV RBC AUTO: 101 FL (ref 78–100)
MONOCYTES # BLD AUTO: 0.5 10E9/L (ref 0–1.3)
MONOCYTES NFR BLD AUTO: 1 %
NEUTROPHILS # BLD AUTO: 0 10E9/L (ref 1.6–8.3)
NEUTROPHILS NFR BLD AUTO: 0 %
PHOSPHATE SERPL-MCNC: 1.9 MG/DL (ref 2.5–4.5)
PHOSPHATE SERPL-MCNC: 2.1 MG/DL (ref 2.5–4.5)
PLATELET # BLD AUTO: 14 10E9/L (ref 150–450)
POTASSIUM SERPL-SCNC: 3.1 MMOL/L (ref 3.4–5.3)
POTASSIUM SERPL-SCNC: 3.2 MMOL/L (ref 3.4–5.3)
PROT SERPL-MCNC: 5.9 G/DL (ref 6.8–8.8)
RBC # BLD AUTO: 2 10E12/L (ref 4.4–5.9)
RBC MORPH BLD: NORMAL
SODIUM SERPL-SCNC: 133 MMOL/L (ref 133–144)
SPECIMEN SOURCE: NORMAL
SPECIMEN SOURCE: NORMAL
TRANSFUSION STATUS PATIENT QL: NORMAL
TRANSFUSION STATUS PATIENT QL: NORMAL
URATE SERPL-MCNC: 2 MG/DL (ref 3.5–7.2)
WBC # BLD AUTO: 50 10E9/L (ref 4–11)

## 2019-03-28 PROCEDURE — 80048 BASIC METABOLIC PNL TOTAL CA: CPT | Performed by: INTERNAL MEDICINE

## 2019-03-28 PROCEDURE — 97530 THERAPEUTIC ACTIVITIES: CPT | Mod: GP

## 2019-03-28 PROCEDURE — 25000128 H RX IP 250 OP 636: Performed by: INTERNAL MEDICINE

## 2019-03-28 PROCEDURE — 25000132 ZZH RX MED GY IP 250 OP 250 PS 637: Performed by: NURSE PRACTITIONER

## 2019-03-28 PROCEDURE — 25000132 ZZH RX MED GY IP 250 OP 250 PS 637: Performed by: PHYSICIAN ASSISTANT

## 2019-03-28 PROCEDURE — 84100 ASSAY OF PHOSPHORUS: CPT | Performed by: INTERNAL MEDICINE

## 2019-03-28 PROCEDURE — 83735 ASSAY OF MAGNESIUM: CPT | Performed by: INTERNAL MEDICINE

## 2019-03-28 PROCEDURE — 36592 COLLECT BLOOD FROM PICC: CPT | Performed by: INTERNAL MEDICINE

## 2019-03-28 PROCEDURE — 84550 ASSAY OF BLOOD/URIC ACID: CPT | Performed by: INTERNAL MEDICINE

## 2019-03-28 PROCEDURE — 83615 LACTATE (LD) (LDH) ENZYME: CPT | Performed by: INTERNAL MEDICINE

## 2019-03-28 PROCEDURE — 25000128 H RX IP 250 OP 636: Performed by: NURSE PRACTITIONER

## 2019-03-28 PROCEDURE — 85025 COMPLETE CBC W/AUTO DIFF WBC: CPT | Performed by: INTERNAL MEDICINE

## 2019-03-28 PROCEDURE — 25800030 ZZH RX IP 258 OP 636: Performed by: PHYSICIAN ASSISTANT

## 2019-03-28 PROCEDURE — 83605 ASSAY OF LACTIC ACID: CPT | Performed by: INTERNAL MEDICINE

## 2019-03-28 PROCEDURE — 25000132 ZZH RX MED GY IP 250 OP 250 PS 637: Performed by: INTERNAL MEDICINE

## 2019-03-28 PROCEDURE — 25800030 ZZH RX IP 258 OP 636: Performed by: INTERNAL MEDICINE

## 2019-03-28 PROCEDURE — 80076 HEPATIC FUNCTION PANEL: CPT | Performed by: INTERNAL MEDICINE

## 2019-03-28 PROCEDURE — 36415 COLL VENOUS BLD VENIPUNCTURE: CPT | Performed by: INTERNAL MEDICINE

## 2019-03-28 PROCEDURE — P9016 RBC LEUKOCYTES REDUCED: HCPCS

## 2019-03-28 PROCEDURE — 85384 FIBRINOGEN ACTIVITY: CPT | Performed by: INTERNAL MEDICINE

## 2019-03-28 PROCEDURE — 85730 THROMBOPLASTIN TIME PARTIAL: CPT | Performed by: INTERNAL MEDICINE

## 2019-03-28 PROCEDURE — 97110 THERAPEUTIC EXERCISES: CPT | Mod: GP

## 2019-03-28 PROCEDURE — 40000558 ZZH STATISTIC CVC DRESSING CHANGE

## 2019-03-28 PROCEDURE — 87040 BLOOD CULTURE FOR BACTERIA: CPT | Performed by: INTERNAL MEDICINE

## 2019-03-28 PROCEDURE — 25000125 ZZHC RX 250: Performed by: INTERNAL MEDICINE

## 2019-03-28 PROCEDURE — 40000802 ZZH SITE CHECK

## 2019-03-28 PROCEDURE — 85610 PROTHROMBIN TIME: CPT | Performed by: INTERNAL MEDICINE

## 2019-03-28 PROCEDURE — 12000001 ZZH R&B MED SURG/OB UMMC

## 2019-03-28 PROCEDURE — 25000131 ZZH RX MED GY IP 250 OP 636 PS 637: Performed by: PHYSICIAN ASSISTANT

## 2019-03-28 PROCEDURE — 25000128 H RX IP 250 OP 636

## 2019-03-28 PROCEDURE — 84132 ASSAY OF SERUM POTASSIUM: CPT | Performed by: INTERNAL MEDICINE

## 2019-03-28 PROCEDURE — 25800030 ZZH RX IP 258 OP 636

## 2019-03-28 RX ORDER — HYDROXYUREA 500 MG/1
1000 CAPSULE ORAL ONCE
Status: COMPLETED | OUTPATIENT
Start: 2019-03-28 | End: 2019-03-28

## 2019-03-28 RX ORDER — HYDROXYUREA 500 MG/1
2000 CAPSULE ORAL 2 TIMES DAILY
Status: DISCONTINUED | OUTPATIENT
Start: 2019-03-28 | End: 2019-03-31

## 2019-03-28 RX ORDER — VORICONAZOLE 200 MG/1
200 TABLET, FILM COATED ORAL EVERY 12 HOURS SCHEDULED
Status: DISCONTINUED | OUTPATIENT
Start: 2019-03-28 | End: 2019-04-04

## 2019-03-28 RX ORDER — DEXTROSE MONOHYDRATE, SODIUM CHLORIDE, AND POTASSIUM CHLORIDE 50; 1.49; 4.5 G/1000ML; G/1000ML; G/1000ML
INJECTION, SOLUTION INTRAVENOUS CONTINUOUS
Status: DISCONTINUED | OUTPATIENT
Start: 2019-03-28 | End: 2019-03-31

## 2019-03-28 RX ADMIN — CEFEPIME 2 G: 2 INJECTION, POWDER, FOR SOLUTION INTRAVENOUS at 02:10

## 2019-03-28 RX ADMIN — VORICONAZOLE 200 MG: 200 TABLET, FILM COATED ORAL at 10:23

## 2019-03-28 RX ADMIN — ACYCLOVIR 400 MG: 400 TABLET ORAL at 08:28

## 2019-03-28 RX ADMIN — PANTOPRAZOLE SODIUM 40 MG: 40 TABLET, DELAYED RELEASE ORAL at 15:53

## 2019-03-28 RX ADMIN — HYDROXYUREA 1000 MG: 500 CAPSULE ORAL at 08:28

## 2019-03-28 RX ADMIN — PROCHLORPERAZINE MALEATE 10 MG: 5 TABLET, FILM COATED ORAL at 06:41

## 2019-03-28 RX ADMIN — VANCOMYCIN HYDROCHLORIDE 125 MG: KIT at 08:33

## 2019-03-28 RX ADMIN — POTASSIUM CHLORIDE 20 MEQ: 29.8 INJECTION, SOLUTION INTRAVENOUS at 21:54

## 2019-03-28 RX ADMIN — ACETAMINOPHEN 650 MG: 325 TABLET, FILM COATED ORAL at 06:41

## 2019-03-28 RX ADMIN — POTASSIUM CHLORIDE, DEXTROSE MONOHYDRATE AND SODIUM CHLORIDE: 150; 5; 450 INJECTION, SOLUTION INTRAVENOUS at 08:35

## 2019-03-28 RX ADMIN — POTASSIUM PHOSPHATE, MONOBASIC AND POTASSIUM PHOSPHATE, DIBASIC 15 MMOL: 224; 236 INJECTION, SOLUTION INTRAVENOUS at 13:28

## 2019-03-28 RX ADMIN — CEFEPIME 2 G: 2 INJECTION, POWDER, FOR SOLUTION INTRAVENOUS at 09:32

## 2019-03-28 RX ADMIN — VANCOMYCIN HYDROCHLORIDE 125 MG: KIT at 11:57

## 2019-03-28 RX ADMIN — POTASSIUM CHLORIDE 20 MEQ: 29.8 INJECTION, SOLUTION INTRAVENOUS at 11:22

## 2019-03-28 RX ADMIN — ALLOPURINOL 300 MG: 300 TABLET ORAL at 08:28

## 2019-03-28 RX ADMIN — HYDROXYUREA 2000 MG: 500 CAPSULE ORAL at 20:03

## 2019-03-28 RX ADMIN — ACYCLOVIR 400 MG: 400 TABLET ORAL at 20:03

## 2019-03-28 RX ADMIN — ONDANSETRON HYDROCHLORIDE 8 MG: 8 TABLET, FILM COATED ORAL at 08:29

## 2019-03-28 RX ADMIN — PROCHLORPERAZINE MALEATE 10 MG: 5 TABLET, FILM COATED ORAL at 11:57

## 2019-03-28 RX ADMIN — ACETAMINOPHEN 650 MG: 325 TABLET, FILM COATED ORAL at 16:07

## 2019-03-28 RX ADMIN — PANTOPRAZOLE SODIUM 40 MG: 40 TABLET, DELAYED RELEASE ORAL at 08:28

## 2019-03-28 RX ADMIN — ONDANSETRON HYDROCHLORIDE 8 MG: 8 TABLET, FILM COATED ORAL at 15:53

## 2019-03-28 RX ADMIN — ONDANSETRON HYDROCHLORIDE 8 MG: 8 TABLET, FILM COATED ORAL at 00:50

## 2019-03-28 RX ADMIN — VANCOMYCIN HYDROCHLORIDE 125 MG: KIT at 20:09

## 2019-03-28 RX ADMIN — POTASSIUM CHLORIDE 20 MEQ: 29.8 INJECTION, SOLUTION INTRAVENOUS at 10:23

## 2019-03-28 RX ADMIN — PROCHLORPERAZINE MALEATE 10 MG: 5 TABLET, FILM COATED ORAL at 18:02

## 2019-03-28 RX ADMIN — DECITABINE 43 MG: 50 INJECTION, POWDER, LYOPHILIZED, FOR SOLUTION INTRAVENOUS at 20:06

## 2019-03-28 RX ADMIN — VANCOMYCIN HYDROCHLORIDE 125 MG: KIT at 15:53

## 2019-03-28 RX ADMIN — GLYCERIN, PETROLATUM, PHENYLEPHRINE HCL, PRAMOXINE HCL: 144; 2.5; 10; 15 CREAM TOPICAL at 11:58

## 2019-03-28 RX ADMIN — ROPINIROLE HYDROCHLORIDE 0.75 MG: 0.5 TABLET, FILM COATED ORAL at 00:50

## 2019-03-28 RX ADMIN — VORICONAZOLE 200 MG: 200 TABLET, FILM COATED ORAL at 20:03

## 2019-03-28 RX ADMIN — ROPINIROLE HYDROCHLORIDE 0.75 MG: 0.5 TABLET, FILM COATED ORAL at 21:54

## 2019-03-28 RX ADMIN — HYDROXYUREA 1000 MG: 500 CAPSULE ORAL at 10:27

## 2019-03-28 RX ADMIN — PROCHLORPERAZINE MALEATE 10 MG: 5 TABLET, FILM COATED ORAL at 00:50

## 2019-03-28 RX ADMIN — TAMSULOSIN HYDROCHLORIDE 0.8 MG: 0.4 CAPSULE ORAL at 08:29

## 2019-03-28 RX ADMIN — CEFEPIME 2 G: 2 INJECTION, POWDER, FOR SOLUTION INTRAVENOUS at 18:02

## 2019-03-28 ASSESSMENT — PAIN DESCRIPTION - DESCRIPTORS
DESCRIPTORS: DULL;ACHING
DESCRIPTORS: ACHING

## 2019-03-28 ASSESSMENT — ACTIVITIES OF DAILY LIVING (ADL)
ADLS_ACUITY_SCORE: 14

## 2019-03-28 ASSESSMENT — MIFFLIN-ST. JEOR: SCORE: 1740.19

## 2019-03-28 NOTE — PLAN OF CARE
Discharge Planner PT  PT 7D  Patient plan for discharge: Home with spouse  Current status: Hgb was < 7 this AM and received transfusion. Pt completed supine, seated, and standing LE exercises. Short rest breaks provided. Pt ambulated ~ 1200' without assistive device at slower pace with normal gait pattern and balance. SOB during ambulation and fatigued at end of walk.  Barriers to return to prior living situation: Medical condition  Recommendations for discharge: Home with spouse and home exercise program.  Rationale for recommendations: Pt is functionally independent. HEP needed so pt can progress and maintain LE strength and functional endurance.       Entered by: Remy Pimentel 03/28/2019 1:57 PM

## 2019-03-28 NOTE — CONSULTS
Transplant Infectious Diseases Consult Note    Patient: El Ace  MRN: [id number]  Date: 03/28/19  Attending: Rohan Santiago    Impression:  63M with a history of recently diagnosed AML, now s/p induction chemotherapy with 7+3, with persistent blasts on D14 BMBx, now undergoing repeat induction with decitabine and venetoclax, with course complicated by febrile neutropenia and C difficile infection.    Main source of persistent fever appears to be C difficile infection, which is now being treated appropriately. If fever persists despite this, would recommend imaging, and consideration of expanded coverage to include anaerobes.    Recommendations:  - If persistent fever, obtain CT chest/abd/pelvis  - Continue cefepime  - Continue voriconazole  - Continue PO vancomycin    Thank you for involving ID in this patient's care. We will continue to follow along with you.    Rohan Santiago MD, PhD  [phone]    HPI  Mr Ace was diagnosed with AML earlier this month. He had initially presented to an OSH with chest discomfort after a root canal on 3/11, and labs revealed elevated WBCs at 71. Bone marrow biopsy was positive for AML, and he was admitted for induction chemo with cytarabine and daunorubicin 7+3 starting on 3/15. Induction course notable for first fever on 3/22, for which cefepime was started. He was also on fluconazole prophylaxis, which was switched to micafungin 50 mg IV every day given fevers, and then to voriconazole 200 mg BID today.    Fever work up has been notable for multiple.  negative blood cultures, clear CXR, and negative glucan and galactomannan. C difficile assay was sent given diarrhea, and returned positive. PO vancomycin was started yesterday, 3/27.    ROS:10 point ROS neg other than the symptoms noted above in the HPI.    PMH:  Past Medical History:   Diagnosis Date     Acute leukemia (H) 3/12/2019   BPH  Root canal    Current Facility-Administered Medications   Medication     acetaminophen  (TYLENOL) tablet 650 mg     acyclovir (ZOVIRAX) tablet 400 mg     albuterol (PROAIR HFA/PROVENTIL HFA/VENTOLIN HFA) 108 (90 Base) MCG/ACT inhaler 1-2 puff     albuterol (PROVENTIL) neb solution 2.5 mg     allopurinol (ZYLOPRIM) tablet 300 mg     baclofen (LIORESAL) tablet 10 mg     calcium carbonate (TUMS) chewable tablet 500 mg     ceFEPIme (MAXIPIME) 2 g vial to attach to  ml bag for ADULTS or 50 ml bag for PEDS     decitabine (DACOGEN) 43 mg in sodium chloride 0.9 % 119 mL CHEMOTHERAPY     dextrose 5% and 0.45% NaCl + KCl 20 mEq/L infusion     diphenhydrAMINE (BENADRYL) injection 50 mg     EPINEPHrine PF (ADRENALIN) injection 0.3 mg     hydroxyurea (HYDREA) capsule CHEMO 2,000 mg     lidocaine (XYLOCAINE) 2 % topical gel     LORazepam (ATIVAN) tablet 0.5-1 mg    Or     LORazepam (ATIVAN) injection 0.5-1 mg     magic mouthwash suspension (diphenhydramine, lidocaine, aluminum-magnesium & simethicone)     magnesium sulfate 4 g in 100 mL sterile water (premade)     MEDICATION INSTRUCTION     Medication Instruction     meperidine (DEMEROL) injection 25 mg     methylPREDNISolone sodium succinate (solu-MEDROL) injection 125 mg     naloxone (NARCAN) injection 0.1-0.4 mg     OLANZapine zydis (zyPREXA) ODT tab 5 mg     ondansetron (ZOFRAN) tablet 8 mg     oxyCODONE (ROXICODONE) tablet 5 mg     oxymetazoline (AFRIN) 0.05 % spray 2 spray     pantoprazole (PROTONIX) EC tablet 40 mg     polyethylene glycol (MIRALAX/GLYCOLAX) Packet 17 g     potassium chloride (KLOR-CON) Packet 20-40 mEq     potassium chloride 10 mEq in 100 mL intermittent infusion with 10 mg lidocaine     potassium chloride 10 mEq in 100 mL sterile water intermittent infusion (premix)     potassium chloride 20 mEq in 50 mL intermittent infusion     potassium chloride ER (K-DUR/KLOR-CON M) CR tablet 20-40 mEq     potassium phosphate 15 mmol in D5W 250 mL intermittent infusion     potassium phosphate 20 mmol in D5W 250 mL intermittent infusion      "potassium phosphate 20 mmol in D5W 500 mL intermittent infusion     potassium phosphate 25 mmol in D5W 500 mL intermittent infusion     pramox-pe-glycerin-petrolatum (PREPARATION H) cream     prochlorperazine (COMPAZINE) injection 5-10 mg    Or     prochlorperazine (COMPAZINE) tablet 5-10 mg     ranitidine (ZANTAC) tablet 150 mg     rOPINIRole (REQUIP) tablet 0.75 mg     sennosides (SENOKOT) tablet 8.6 mg     sodium chloride (OCEAN) 0.65 % nasal spray 1-2 spray     sodium chloride (PF) 0.9% PF flush 10 mL     sodium chloride (PF) 0.9% PF flush 10-20 mL     sodium chloride 0.9% infusion     sucralfate (CARAFATE) suspension 1 g     tamsulosin (FLOMAX) capsule 0.8 mg     vancomycin (FIRVANQ) oral solution 125 mg     voriconazole (VFEND) tablet 200 mg     Allergies No Known Allergies    Social History: nonsmoker, no IDU    Family History: reviewed and noncontributory    Physical Exam  /62   Pulse 80   Temp 96.1  F (35.6  C) (Oral)   Resp 16   Ht 1.778 m (5' 10\")   Wt 94.4 kg (208 lb 1.6 oz)   SpO2 95%   BMI 29.86 kg/m    Gen: well appearing, tired, comfortable in chair  No oral lesions, no cervical adenopathy  Chest CTAB  CV: RRR, S1S2, No MRG  Abd: soft, not tender, not distended, bowel sounds normal  Ext WWP, no rashes  Neuro: grossly intact    Labs  Last Comprehensive Metabolic Panel:  Sodium   Date Value Ref Range Status   03/28/2019 133 133 - 144 mmol/L Final     Potassium   Date Value Ref Range Status   03/28/2019 3.1 (L) 3.4 - 5.3 mmol/L Final     Chloride   Date Value Ref Range Status   03/28/2019 103 94 - 109 mmol/L Final     Carbon Dioxide   Date Value Ref Range Status   03/28/2019 19 (L) 20 - 32 mmol/L Final     Anion Gap   Date Value Ref Range Status   03/28/2019 11 3 - 14 mmol/L Final     Glucose   Date Value Ref Range Status   03/28/2019 110 (H) 70 - 99 mg/dL Final     Urea Nitrogen   Date Value Ref Range Status   03/28/2019 8 7 - 30 mg/dL Final     Creatinine   Date Value Ref Range Status "   03/28/2019 0.79 0.66 - 1.25 mg/dL Final     GFR Estimate   Date Value Ref Range Status   03/28/2019 >90 >60 mL/min/[1.73_m2] Final     Comment:     Non  GFR Calc  Starting 12/18/2018, serum creatinine based estimated GFR (eGFR) will be   calculated using the Chronic Kidney Disease Epidemiology Collaboration   (CKD-EPI) equation.       Calcium   Date Value Ref Range Status   03/28/2019 7.6 (L) 8.5 - 10.1 mg/dL Final     Lab Results   Component Value Date    WBC 50.0 03/28/2019     Lab Results   Component Value Date    RBC 2.00 03/28/2019     Lab Results   Component Value Date    HGB 6.5 03/28/2019     Lab Results   Component Value Date    HCT 20.2 03/28/2019     No components found for: MCT  Lab Results   Component Value Date     03/28/2019     Lab Results   Component Value Date    MCH 32.5 03/28/2019     Lab Results   Component Value Date    MCHC 32.2 03/28/2019     Lab Results   Component Value Date    RDW 14.8 03/28/2019     Lab Results   Component Value Date    PLT 14 03/28/2019     Radiology  3/22 CXR:  1. Small right pleural effusion.  2. No focal airspace opacity.

## 2019-03-28 NOTE — PLAN OF CARE
Randy with temp max of 98.3 this am.  Denies nausea and eating well today.  Hgb 6.5 and received 1 URBC this am.  Also received KCL and Phos supplements IV today.  C/O hemorrhoid pain - using personal wipes and Tuck pad.  Sitz, Pre-H and Lidocaine gel ordered.  Hydrea dose increased due to rising WBC and Vori started.  Pt up in shower and chair.

## 2019-03-28 NOTE — PROGRESS NOTES
[facility], [place]  Hematology / Oncology Progress Note    Date of Admission: 3/12/2019  Date of Service (when I saw the patient): 03/28/2019     Assessment & Plan   Mr. Ace is a 63 year old male with new diagnosis of acute myeloid leukemia incidentally found during work up of nonspecific chest symptoms. He was started on induction chemotherapy with 7+3 (cytarabine and daunorubicin) with D1=3/15/19. BMBx on 3/25 (done early due to rising WBC and blast counts) demonstrates persistent disease with 95% blasts by flow, 98% by morphology. Started re-induction with decitabine on 3/26 PM with plan to add venetoclax.      Today:  - continue decitabine  - ongoing process to obtain Venetoclax --> pharmacy liaison (Debbie Lilly) working on application for free drug as well (while Dr. Leo will be submitted letter of necessity)  - WBC count continues to rise. Will increase Hydrea to 2g BID today  - ID consult  - change micafungin to Voriconazole prophy dose (200mg q12hr) since we are still awaiting venetoclax   - adjust MIVFs (D5 1/2NS + KCl)        HEME:  #AML, refractory   Patient presented to [facility] ED in [place], [place] for complaints of nonspecific chest discomfort after root canal treatment (done 3/11/19). CT C/A/P was negative for PE or other acute findings. On OSH labs, his WBC was incidentally noted to be elevated at 71.1 with Hb 9.0 and plts 97 (prior labs had been unremarkable per patient). Smear was suspicious for immature blasts and acute leukemia. No symptoms of hyperviscosity, TLS or DIC on presentation.  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 Flow consistent with AML with 95% blasts with the following immunophenotype: Positive for CD13, CD33, CD34, CD38, dim to negative CD45, , partial HLA-DR. Baseline ECHO normal, PICC place.   - Initially was on Hydrea 1g q6hr for cytoreduction. Discontinued 3/16  AM.  - HLA typing sent, BMT consult requested  - s/p induction chemotherapy with 7+3 (cytarabine and daunorubicin). Day 1=3/15/19.   - repeat BMBx done early (3/25) due to rising WBC/blast count. Still with persistent heavy burden of disease (98% blasts by morphology).   - final NGS panel report pending, but results were reviewed with Dr. Hutchins on 3/26 PM  - started reinduction with decitabine/venetoclax. Decitabine D1=3/26. Plan to add venetoclax when available (unfortunately, his insurance excludes venetoclax, so letter of necessity will be submitted by Dr. Leo).  - WBC count continues to rise. Started Hydrea 1g BID (x3/27), will increase to 2g BID (x3/28)  - continue Allopurinol and IVFs  - monitor daily DIC and TLS labs     #Pancytopenia  #Mild epistaxis  2/2 AML & associated chemotherapy.   - transfuse to maintain Hb >7, Plt >10K. If develops ongoing epistaxis, consider increasing plt parameter (though epistaxis occurred with Plt count in 20s)  - Afrin PRN, ocean spray PRN     #Mild coagulopathy, in setting of refractory AML. INR gradually uptrended, toay is 1.5 but fibrinogen stable at this time  - monitor daily coags  - plasma for INR >1.8, cryo for fibrinogen <100     ID:  #Neutropenic fever/SIRS.  #C.diff diarrhea/colitis.  First fever on 3/22 with Tmax 101.3. Last temp on 3/24 evening. Recurrent Tmax 100.9 on 3/26. Treating C.diff as cause currently.   - BCx (3/22-3/24, 3/26, 3/27) are NGTD  - 3/23 fungal BCx NGTD  - UA negative  - 3/22 CXR with small R pleural effusion, no focal airspace opacity  - 3/25 galactomannan and fungitell both negative  - C.diff (3/26) positive  - He had ongoing fevers yesterday afternoon and overnight, though was only just 24hrs on appropriate C.diff treatment. He has no new symptoms and abdominal cramping/nausea currently improved. However, will consult ID to follow along in this immunocompromised pt. Currently afebrile, but if continues to spike fevers, will obtain  CT Chest +/- AP.     **Anti-infectives:  - continue Cefepime (x 3/22)   - PO Vancomycin 125mg four times daily x 10 days (3/26 late PM)    #ID PPx   - continue ACV  - changed Fluconazole to Micafungin 50mg daily in setting of fevers. Escalate antifungal prophy to Voriconazole 200mg q12hr (as still awaiting venetoclax).         GI:  #Chemotherapy induced nausea  - scheduled Zofran q8hr  - compazine also scheduled q6hr  - PRN antiemetics     #C.diff diarrhea   - treatment as above    #Hemorrhoids  - PRN management: TUCKS pads, sitz bath, topical Prep H and/or lidocaine     #S/P root canal treatment  Patient had a root canal treatment performed on 3/11/19. Site appeared unremarkable with no abscess or drainage. He was started on a 7-day course of oral  mg q6h starting one day prior to the procedure. In retrospect, may have also had some leukemic infiltration of gums complicating his course. Pain at site initially improved during chemotherapy, but did return. Stable/controlled at this time.  - Tylenol PRN, oxycodone PRN     CV:  #Subclinical coronary atherosclerosis  #Hyperlipidemia  Total Agatston calcium score was elevated at 591 (91st percentile) on CT coronaries performed 6/8/2016. Nuclear stress test was negative for ischemia on 8/10/2016. Life style modification measures were recommended. Patient follows with cardiology as outpatient (last office visit on 11/12/18).  - holding atorvastatin at this time, resume in ~2-4 weeks pending treatment course and if LFT's remain normal     NEURO/MSK:  #Restless legs syndrome  - Continue ropinirole 0.75 mg at bedtime, offer PRN ativan for persistent symptoms.     #History of lumbar spine degenerative disc disease   Patient is s/p laminectomy/facetectomy procedures. He does not routinely take pain meds.     :  #BPH   Patient follows with urology ([facility], [place], [place]).   - continue Flomax 0.8 mg daily  - of note, possible drug interaction between  "voriconazole and flomax (can raise flomax level in blood & cause hypotension). Monitor for this as changing to voriconazole.      RENAL:  #Lyte derangement. Likely 2/2 poor PO intake. Cr stable WNL at this time.   - continue IVFs, adjusted to include potassium   - lyte repletion per unit protocol  - calcium corrects for albumin (3/28)    PSYCHOSOCIAL:  #Coping.  Pt appears to coping well overall at this time, taking it \"day by day\". Appreciate SW and  involvement.   - pt had requested help with Advance Directive, SW following  - updated pt's wife (Bessy) by phone on 3/28    FEN   - Regular diet as tolerated. Monitor decreased intake. Appreciate RD reassessments inpatient.  - continue IVFs, bolus PRN  - PRN lyte replacement per standing protocol     Prophylaxis  - No pharmacologic VTE ppx due to TCP  - PPI for GI/PUD ppx       Code Status: FULL     Disposition: Anticipate 4-6 week LOS pending completion of chemotherapy, count recovery, and resolution of acute toxicities.      Discussed with Dr. Leo.     Vee Cabrera PA-C  [facility]/Onc  [phone]        Interval History   No acute events overnight, had fevers throughout afternoon and overnight with Tmax 101.3, last fever at 00:52 this AM.  He is feeling a little better today. Reports abdominal cramping and nausea is better at present. Had loose stool this morning but this was not as \"uncontrolled\"/urgent as yesterday. He does have hemorrhoid discomfort, which are more irritated with the diarrhea. Did notice a little blood with wiping last time. Reports noticing some decreased clarity in his vision but no new spots or floaters in his vision. Denies HA, sinus pain or pressure. Denies mouth pain/sores. Denies SOB or cough. Denies any new rashes or skin changes.     Physical Exam   Temp: 96  F (35.6  C) Temp src: Oral BP: 121/69 Pulse: 80 Heart Rate: 81 Resp: 18 SpO2: 95 % O2 Device: None (Room air)    Vitals:    03/25/19 0909 03/26/19 0823 03/28/19 " 0710   Weight: 93.3 kg (205 lb 9.6 oz) 94.9 kg (209 lb 3.5 oz) 94.4 kg (208 lb 1.6 oz)     Vital Signs with Ranges  Temp:  [96  F (35.6  C)-101.3  F (38.5  C)] 96  F (35.6  C)  Pulse:  [80] 80  Heart Rate:  [] 81  Resp:  [16-20] 18  BP: (108-130)/(56-69) 121/69  SpO2:  [94 %-97 %] 95 %  I/O last 3 completed shifts:  In: 3699 [P.O.:480; I.V.:2600; IV Piggyback:619]  Out: 1885 [Urine:1885]  Constitutional: Awake, alert, cooperative, in NAD. Sitting up in chair, moves around in room spontaneously. Appears to be feeling better than yesterday. Less fatigue, non-toxic appearing.    HEENT: NC/AT. Sclera clear, conjunctiva normal. OP pink and moist, no lesions or thrush.   Respiratory: Non-labored breathing, good air exchange, on RA. In upright position, lungs are clear to auscultation bilaterally, no wheezing or crackles.    CV: RRR, no murmur appreciated  GI: +BS, abd soft, non-distended, non-tender.   Skin: No concerning lesions or rash on exposed surfaces. BMBx pressure dressing still in place, c/d/i.  Musculoskeletal: No edema bridgett LEs. Gait in room is normal. No joint edema or erythema.   Neurologic: Alert and oriented. Grossly nonfocal.    Neuropsychiatric: Calm, mentation appears normal, affect congruent.   VAD: PICC line in RUE, c/d/i        Medications     dextrose 5% and 0.45% NaCl + KCl 20 mEq/L 100 mL/hr at 03/28/19 0835     - MEDICATION INSTRUCTIONS -       - MEDICATION INSTRUCTIONS -       sodium chloride         acyclovir  400 mg Oral BID     allopurinol  300 mg Oral Daily     ceFEPIme (MAXIPIME) IV  2 g Intravenous Q8H     decitabine (DACOGEN) chemo infusion  20 mg/m2 (Treatment Plan Recorded) Intravenous Q24H     hydroxyurea  2,000 mg Oral BID     ondansetron  8 mg Oral Q8H     pantoprazole  40 mg Oral BID AC     prochlorperazine  5-10 mg Intravenous Q6H    Or     prochlorperazine  5-10 mg Oral Q6H     rOPINIRole  0.75 mg Oral Daily at 8 pm     sodium chloride (PF)  10 mL Intracatheter Q7 Days      tamsulosin  0.8 mg Oral Daily     vancomycin  125 mg Oral 4x Daily     voriconazole  200 mg Oral Q12H ZUNILDA       Data   Results for orders placed or performed during the hospital encounter of 03/12/19 (from the past 24 hour(s))   Fractional excretion of sodium   Result Value Ref Range    Creatinine Urine 59 mg/dL    Sodium Urine mmol/L 102 mmol/L    %FENA 1.0 %   Osmolality urine   Result Value Ref Range    Urine Osmolality 438 100 - 1,200 mmol/kg   Potassium   Result Value Ref Range    Potassium 3.6 3.4 - 5.3 mmol/L   Lactic acid level STAT for sepsis protocol   Result Value Ref Range    Lactate for Sepsis Protocol 0.6 (L) 0.7 - 2.0 mmol/L   Blood culture   Result Value Ref Range    Specimen Description Blood Left Arm     Special Requests Received in aerobic bottle only     Culture Micro No growth after 4 hours    Blood culture   Result Value Ref Range    Specimen Description Blood PURPLE PORT     Special Requests Received in aerobic bottle only     Culture Micro No growth after 4 hours    CBC with platelets differential   Result Value Ref Range    WBC 50.0 (H) 4.0 - 11.0 10e9/L    RBC Count 2.00 (L) 4.4 - 5.9 10e12/L    Hemoglobin 6.5 (LL) 13.3 - 17.7 g/dL    Hematocrit 20.2 (L) 40.0 - 53.0 %     (H) 78 - 100 fl    MCH 32.5 26.5 - 33.0 pg    MCHC 32.2 31.5 - 36.5 g/dL    RDW 14.8 10.0 - 15.0 %    Platelet Count 14 (LL) 150 - 450 10e9/L    Diff Method Manual Differential     % Neutrophils 0.0 %    % Lymphocytes 1.0 %    % Monocytes 1.0 %    % Eosinophils 0.0 %    % Basophils 0.0 %    % Blasts 98.0 %    Absolute Neutrophil 0.0 (LL) 1.6 - 8.3 10e9/L    Absolute Lymphocytes 0.5 (L) 0.8 - 5.3 10e9/L    Absolute Monocytes 0.5 0.0 - 1.3 10e9/L    Absolute Eosinophils 0.0 0.0 - 0.7 10e9/L    Absolute Basophils 0.0 0.0 - 0.2 10e9/L    Absolute Blasts 49.0 (H) 0 10e9/L    RBC Morphology Normal    Hepatic panel   Result Value Ref Range    Bilirubin Direct 0.2 0.0 - 0.2 mg/dL    Bilirubin Total 0.7 0.2 - 1.3 mg/dL     Albumin 2.2 (L) 3.4 - 5.0 g/dL    Protein Total 5.9 (L) 6.8 - 8.8 g/dL    Alkaline Phosphatase 84 40 - 150 U/L    ALT 29 0 - 70 U/L    AST 14 0 - 45 U/L   Basic metabolic panel   Result Value Ref Range    Sodium 133 133 - 144 mmol/L    Potassium 3.1 (L) 3.4 - 5.3 mmol/L    Chloride 103 94 - 109 mmol/L    Carbon Dioxide 19 (L) 20 - 32 mmol/L    Anion Gap 11 3 - 14 mmol/L    Glucose 110 (H) 70 - 99 mg/dL    Urea Nitrogen 8 7 - 30 mg/dL    Creatinine 0.79 0.66 - 1.25 mg/dL    GFR Estimate >90 >60 mL/min/[1.73_m2]    GFR Estimate If Black >90 >60 mL/min/[1.73_m2]    Calcium 7.6 (L) 8.5 - 10.1 mg/dL   INR   Result Value Ref Range    INR 1.53 (H) 0.86 - 1.14   Magnesium   Result Value Ref Range    Magnesium 2.0 1.6 - 2.3 mg/dL   Partial thromboplastin time   Result Value Ref Range    PTT 38 (H) 22 - 37 sec   Phosphorus   Result Value Ref Range    Phosphorus 2.1 (L) 2.5 - 4.5 mg/dL   Uric acid   Result Value Ref Range    Uric Acid 2.0 (L) 3.5 - 7.2 mg/dL   Fibrinogen activity   Result Value Ref Range    Fibrinogen 625 (H) 200 - 420 mg/dL   Lactate Dehydrogenase   Result Value Ref Range    Lactate Dehydrogenase 166 85 - 227 U/L

## 2019-03-28 NOTE — PLAN OF CARE
[id number]:    Tmax 100.9, blood cultures drawn. Currently afebrile. -120s. OVSS. Sepsis triggered, lactic 0.6. Tylenol given x 1 for tooth pain. Denies nausea, SOB. Up ad shelley, voiding adequately. Continue to monitor & follow POC.

## 2019-03-28 NOTE — PROGRESS NOTES
The patient was discussed on rounds with the nurses, mid level provider, and house staff and seen and examined by me. All labs and imaging were reviewed. I reviewed events over the last 24 hours including vitals and flow sheets. I agree with the AUGIE note and have been responsible for the care plan and interpretation of progress.      Overnight events, vital signs, labs and meds reviewed.  He looks stable.     Mr. Ace is a 63 year old male with new diagnosis of acute myeloid leukemia incidentally found during work up of nonspecific chest symptoms. He was started on induction chemotherapy with 7+3 (cytarabine and daunorubicin) with D1=3/15/19. BMBx on 3/25 D+12 (done early due to rising WBC and blast counts) demonstrates persistent disease with 95% blasts by flow, 98% by morphology. Started re-induction with decitabine on 3/26 PM with plan to add venetoclax.      -Continue hydrurea + decitabine  -Neutropenic fever- on cefepime, -still spiking fevers ?, ID consult.send CMV, fungal markers; Blood cx- NGTD  -C diff colitis- on po vanc  -continue OI ppx.  -will give a letter of support for venetoclax       Bharat Leo MD

## 2019-03-28 NOTE — PROGRESS NOTES
Patient Assistance Enrollment    Pt's insurance does not cover venclexta. Pharmacy liaison provided application for free venclexta through [employer] Patient Assistance Program. Pt agreed to apply. Submitted application 3/28, awaiting determination.    Update 03/29: Pt's insurance approved coverage for venclexta. Will pursue drug from insurance instead of patient assistance program.    Debbie Lilly  Pharmacy Liaison  Ph: [phone] Page: [phone]

## 2019-03-28 NOTE — PLAN OF CARE
T-max 101.3 and HR was 114 at that time. MD notified but it wasn't time to do blood cultures. Tylenol given. Last temp 100.2. Denies nausea- still on scheduled compazine and the lower dose of 5 mg was given and pt reports that it was effective.     Day #2 of Decitabine infusing now over 1 hour via pt's right PICC with great blood return. Hydrea also administered as ordered.

## 2019-03-29 ENCOUNTER — CARE COORDINATION (OUTPATIENT)
Dept: TRANSPLANT | Facility: CLINIC | Age: 64
End: 2019-03-29

## 2019-03-29 ENCOUNTER — APPOINTMENT (OUTPATIENT)
Dept: CT IMAGING | Facility: CLINIC | Age: 64
DRG: 834 | End: 2019-03-29
Attending: STUDENT IN AN ORGANIZED HEALTH CARE EDUCATION/TRAINING PROGRAM
Payer: COMMERCIAL

## 2019-03-29 LAB
ANION GAP SERPL CALCULATED.3IONS-SCNC: 13 MMOL/L (ref 3–14)
ANION GAP SERPL CALCULATED.3IONS-SCNC: 9 MMOL/L (ref 3–14)
ANISOCYTOSIS BLD QL SMEAR: SLIGHT
APTT PPP: 38 SEC (ref 22–37)
BACTERIA SPEC CULT: NO GROWTH
BASOPHILS # BLD AUTO: 0 10E9/L (ref 0–0.2)
BASOPHILS NFR BLD AUTO: 0 %
BLASTS # BLD: 41.6 10E9/L
BLASTS BLD QL SMEAR: 99.1 %
BUN SERPL-MCNC: 6 MG/DL (ref 7–30)
BUN SERPL-MCNC: 7 MG/DL (ref 7–30)
CALCIUM SERPL-MCNC: 7.6 MG/DL (ref 8.5–10.1)
CALCIUM SERPL-MCNC: 7.9 MG/DL (ref 8.5–10.1)
CHLORIDE SERPL-SCNC: 102 MMOL/L (ref 94–109)
CHLORIDE SERPL-SCNC: 104 MMOL/L (ref 94–109)
CO2 SERPL-SCNC: 19 MMOL/L (ref 20–32)
CO2 SERPL-SCNC: 21 MMOL/L (ref 20–32)
CREAT SERPL-MCNC: 0.71 MG/DL (ref 0.66–1.25)
CREAT SERPL-MCNC: 0.73 MG/DL (ref 0.66–1.25)
DIFFERENTIAL METHOD BLD: ABNORMAL
EOSINOPHIL # BLD AUTO: 0 10E9/L (ref 0–0.7)
EOSINOPHIL NFR BLD AUTO: 0 %
ERYTHROCYTE [DISTWIDTH] IN BLOOD BY AUTOMATED COUNT: 16.1 % (ref 10–15)
FIBRINOGEN PPP-MCNC: 666 MG/DL (ref 200–420)
GFR SERPL CREATININE-BSD FRML MDRD: >90 ML/MIN/{1.73_M2}
GFR SERPL CREATININE-BSD FRML MDRD: >90 ML/MIN/{1.73_M2}
GLUCOSE SERPL-MCNC: 128 MG/DL (ref 70–99)
GLUCOSE SERPL-MCNC: 129 MG/DL (ref 70–99)
HCT VFR BLD AUTO: 22.4 % (ref 40–53)
HGB BLD-MCNC: 7.3 G/DL (ref 13.3–17.7)
INR PPP: 1.37 (ref 0.86–1.14)
LACTATE BLD-SCNC: 1.4 MMOL/L (ref 0.7–2)
LDH SERPL L TO P-CCNC: 166 U/L (ref 85–227)
LYMPHOCYTES # BLD AUTO: 0.4 10E9/L (ref 0.8–5.3)
LYMPHOCYTES NFR BLD AUTO: 0.9 %
MAGNESIUM SERPL-MCNC: 2.2 MG/DL (ref 1.6–2.3)
MCH RBC QN AUTO: 32.3 PG (ref 26.5–33)
MCHC RBC AUTO-ENTMCNC: 32.6 G/DL (ref 31.5–36.5)
MCV RBC AUTO: 99 FL (ref 78–100)
MONOCYTES # BLD AUTO: 0 10E9/L (ref 0–1.3)
MONOCYTES NFR BLD AUTO: 0 %
NEUTROPHILS # BLD AUTO: 0 10E9/L (ref 1.6–8.3)
NEUTROPHILS NFR BLD AUTO: 0 %
PHOSPHATE SERPL-MCNC: 2 MG/DL (ref 2.5–4.5)
PHOSPHATE SERPL-MCNC: 2.8 MG/DL (ref 2.5–4.5)
PLATELET # BLD AUTO: 12 10E9/L (ref 150–450)
PLATELET # BLD EST: ABNORMAL 10*3/UL
POIKILOCYTOSIS BLD QL SMEAR: SLIGHT
POTASSIUM SERPL-SCNC: 3.5 MMOL/L (ref 3.4–5.3)
POTASSIUM SERPL-SCNC: 3.5 MMOL/L (ref 3.4–5.3)
RBC # BLD AUTO: 2.26 10E12/L (ref 4.4–5.9)
SODIUM SERPL-SCNC: 134 MMOL/L (ref 133–144)
SODIUM SERPL-SCNC: 134 MMOL/L (ref 133–144)
SPECIMEN SOURCE: NORMAL
URATE SERPL-MCNC: 2 MG/DL (ref 3.5–7.2)
WBC # BLD AUTO: 42 10E9/L (ref 4–11)

## 2019-03-29 PROCEDURE — 25000125 ZZHC RX 250: Performed by: INTERNAL MEDICINE

## 2019-03-29 PROCEDURE — 25000132 ZZH RX MED GY IP 250 OP 250 PS 637: Performed by: NURSE PRACTITIONER

## 2019-03-29 PROCEDURE — 87040 BLOOD CULTURE FOR BACTERIA: CPT | Performed by: INTERNAL MEDICINE

## 2019-03-29 PROCEDURE — 25000128 H RX IP 250 OP 636: Performed by: INTERNAL MEDICINE

## 2019-03-29 PROCEDURE — 25000131 ZZH RX MED GY IP 250 OP 636 PS 637: Performed by: PHYSICIAN ASSISTANT

## 2019-03-29 PROCEDURE — 74177 CT ABD & PELVIS W/CONTRAST: CPT

## 2019-03-29 PROCEDURE — C9399 UNCLASSIFIED DRUGS OR BIOLOG: HCPCS | Performed by: INTERNAL MEDICINE

## 2019-03-29 PROCEDURE — 85384 FIBRINOGEN ACTIVITY: CPT | Performed by: INTERNAL MEDICINE

## 2019-03-29 PROCEDURE — 25000128 H RX IP 250 OP 636

## 2019-03-29 PROCEDURE — 36592 COLLECT BLOOD FROM PICC: CPT | Performed by: INTERNAL MEDICINE

## 2019-03-29 PROCEDURE — 84100 ASSAY OF PHOSPHORUS: CPT | Performed by: INTERNAL MEDICINE

## 2019-03-29 PROCEDURE — 25000132 ZZH RX MED GY IP 250 OP 250 PS 637: Performed by: INTERNAL MEDICINE

## 2019-03-29 PROCEDURE — 12000001 ZZH R&B MED SURG/OB UMMC

## 2019-03-29 PROCEDURE — 85025 COMPLETE CBC W/AUTO DIFF WBC: CPT | Performed by: INTERNAL MEDICINE

## 2019-03-29 PROCEDURE — 85610 PROTHROMBIN TIME: CPT | Performed by: INTERNAL MEDICINE

## 2019-03-29 PROCEDURE — 83605 ASSAY OF LACTIC ACID: CPT | Performed by: INTERNAL MEDICINE

## 2019-03-29 PROCEDURE — 81382 HLA II TYPING 1 LOC HR: CPT

## 2019-03-29 PROCEDURE — 25000125 ZZHC RX 250: Performed by: PHYSICIAN ASSISTANT

## 2019-03-29 PROCEDURE — 25800030 ZZH RX IP 258 OP 636: Performed by: PHYSICIAN ASSISTANT

## 2019-03-29 PROCEDURE — 84100 ASSAY OF PHOSPHORUS: CPT | Performed by: NURSE PRACTITIONER

## 2019-03-29 PROCEDURE — 83735 ASSAY OF MAGNESIUM: CPT | Performed by: INTERNAL MEDICINE

## 2019-03-29 PROCEDURE — 25000132 ZZH RX MED GY IP 250 OP 250 PS 637: Performed by: PHYSICIAN ASSISTANT

## 2019-03-29 PROCEDURE — 25800030 ZZH RX IP 258 OP 636

## 2019-03-29 PROCEDURE — 84550 ASSAY OF BLOOD/URIC ACID: CPT | Performed by: INTERNAL MEDICINE

## 2019-03-29 PROCEDURE — 83615 LACTATE (LD) (LDH) ENZYME: CPT | Performed by: INTERNAL MEDICINE

## 2019-03-29 PROCEDURE — 25800030 ZZH RX IP 258 OP 636: Performed by: INTERNAL MEDICINE

## 2019-03-29 PROCEDURE — 25000128 H RX IP 250 OP 636: Performed by: NURSE PRACTITIONER

## 2019-03-29 PROCEDURE — 80048 BASIC METABOLIC PNL TOTAL CA: CPT | Performed by: INTERNAL MEDICINE

## 2019-03-29 PROCEDURE — 25000128 H RX IP 250 OP 636: Performed by: RADIOLOGY

## 2019-03-29 PROCEDURE — 25000125 ZZHC RX 250: Performed by: RADIOLOGY

## 2019-03-29 PROCEDURE — 81378 HLA I & II TYPING HR: CPT

## 2019-03-29 PROCEDURE — 80048 BASIC METABOLIC PNL TOTAL CA: CPT | Performed by: NURSE PRACTITIONER

## 2019-03-29 PROCEDURE — 85730 THROMBOPLASTIN TIME PARTIAL: CPT | Performed by: INTERNAL MEDICINE

## 2019-03-29 PROCEDURE — 71260 CT THORAX DX C+: CPT

## 2019-03-29 PROCEDURE — 36415 COLL VENOUS BLD VENIPUNCTURE: CPT | Performed by: INTERNAL MEDICINE

## 2019-03-29 RX ORDER — METRONIDAZOLE 500 MG/1
500 TABLET ORAL EVERY 8 HOURS SCHEDULED
Status: DISCONTINUED | OUTPATIENT
Start: 2019-03-29 | End: 2019-04-01

## 2019-03-29 RX ORDER — IOPAMIDOL 755 MG/ML
127 INJECTION, SOLUTION INTRAVASCULAR ONCE
Status: COMPLETED | OUTPATIENT
Start: 2019-03-29 | End: 2019-03-29

## 2019-03-29 RX ADMIN — VANCOMYCIN HYDROCHLORIDE 125 MG: KIT at 15:59

## 2019-03-29 RX ADMIN — ACETAMINOPHEN 650 MG: 325 TABLET, FILM COATED ORAL at 16:27

## 2019-03-29 RX ADMIN — CEFEPIME 2 G: 2 INJECTION, POWDER, FOR SOLUTION INTRAVENOUS at 03:00

## 2019-03-29 RX ADMIN — DECITABINE 43 MG: 50 INJECTION, POWDER, LYOPHILIZED, FOR SOLUTION INTRAVENOUS at 20:35

## 2019-03-29 RX ADMIN — VORICONAZOLE 200 MG: 200 TABLET, FILM COATED ORAL at 20:14

## 2019-03-29 RX ADMIN — POTASSIUM CHLORIDE, DEXTROSE MONOHYDRATE AND SODIUM CHLORIDE: 150; 5; 450 INJECTION, SOLUTION INTRAVENOUS at 21:58

## 2019-03-29 RX ADMIN — ACYCLOVIR 400 MG: 400 TABLET ORAL at 08:26

## 2019-03-29 RX ADMIN — ACYCLOVIR 400 MG: 400 TABLET ORAL at 20:13

## 2019-03-29 RX ADMIN — PROCHLORPERAZINE MALEATE 10 MG: 5 TABLET, FILM COATED ORAL at 18:04

## 2019-03-29 RX ADMIN — ALLOPURINOL 300 MG: 300 TABLET ORAL at 08:26

## 2019-03-29 RX ADMIN — VANCOMYCIN HYDROCHLORIDE 125 MG: KIT at 20:13

## 2019-03-29 RX ADMIN — IOPAMIDOL 127 ML: 755 INJECTION, SOLUTION INTRAVENOUS at 12:55

## 2019-03-29 RX ADMIN — POTASSIUM CHLORIDE, DEXTROSE MONOHYDRATE AND SODIUM CHLORIDE: 150; 5; 450 INJECTION, SOLUTION INTRAVENOUS at 01:58

## 2019-03-29 RX ADMIN — SODIUM CHLORIDE, PRESERVATIVE FREE 82 ML: 5 INJECTION INTRAVENOUS at 12:55

## 2019-03-29 RX ADMIN — PROCHLORPERAZINE MALEATE 10 MG: 5 TABLET, FILM COATED ORAL at 12:08

## 2019-03-29 RX ADMIN — ONDANSETRON HYDROCHLORIDE 8 MG: 8 TABLET, FILM COATED ORAL at 00:13

## 2019-03-29 RX ADMIN — PANTOPRAZOLE SODIUM 40 MG: 40 TABLET, DELAYED RELEASE ORAL at 15:59

## 2019-03-29 RX ADMIN — CEFEPIME 2 G: 2 INJECTION, POWDER, FOR SOLUTION INTRAVENOUS at 18:03

## 2019-03-29 RX ADMIN — POTASSIUM CHLORIDE 20 MEQ: 29.8 INJECTION, SOLUTION INTRAVENOUS at 00:13

## 2019-03-29 RX ADMIN — HYDROXYUREA 2000 MG: 500 CAPSULE ORAL at 20:14

## 2019-03-29 RX ADMIN — ACETAMINOPHEN 650 MG: 325 TABLET, FILM COATED ORAL at 03:09

## 2019-03-29 RX ADMIN — PANTOPRAZOLE SODIUM 40 MG: 40 TABLET, DELAYED RELEASE ORAL at 08:26

## 2019-03-29 RX ADMIN — ONDANSETRON HYDROCHLORIDE 8 MG: 8 TABLET, FILM COATED ORAL at 15:59

## 2019-03-29 RX ADMIN — ROPINIROLE HYDROCHLORIDE 0.75 MG: 0.5 TABLET, FILM COATED ORAL at 22:04

## 2019-03-29 RX ADMIN — HYDROXYUREA 2000 MG: 500 CAPSULE ORAL at 08:26

## 2019-03-29 RX ADMIN — PROCHLORPERAZINE MALEATE 10 MG: 5 TABLET, FILM COATED ORAL at 00:13

## 2019-03-29 RX ADMIN — VENETOCLAX 10 MG: 10 TABLET, FILM COATED ORAL at 18:08

## 2019-03-29 RX ADMIN — PROCHLORPERAZINE MALEATE 10 MG: 5 TABLET, FILM COATED ORAL at 05:59

## 2019-03-29 RX ADMIN — POTASSIUM PHOSPHATE, MONOBASIC AND POTASSIUM PHOSPHATE, DIBASIC 20 MMOL: 224; 236 INJECTION, SOLUTION INTRAVENOUS at 03:01

## 2019-03-29 RX ADMIN — CEFEPIME 2 G: 2 INJECTION, POWDER, FOR SOLUTION INTRAVENOUS at 09:53

## 2019-03-29 RX ADMIN — TAMSULOSIN HYDROCHLORIDE 0.8 MG: 0.4 CAPSULE ORAL at 08:26

## 2019-03-29 RX ADMIN — VORICONAZOLE 200 MG: 200 TABLET, FILM COATED ORAL at 08:26

## 2019-03-29 RX ADMIN — VANCOMYCIN HYDROCHLORIDE 125 MG: KIT at 08:26

## 2019-03-29 RX ADMIN — ONDANSETRON HYDROCHLORIDE 8 MG: 8 TABLET, FILM COATED ORAL at 08:26

## 2019-03-29 RX ADMIN — VANCOMYCIN HYDROCHLORIDE 125 MG: KIT at 12:10

## 2019-03-29 RX ADMIN — METRONIDAZOLE 500 MG: 500 TABLET ORAL at 22:04

## 2019-03-29 RX ADMIN — LIDOCAINE HYDROCHLORIDE: 20 JELLY TOPICAL at 20:39

## 2019-03-29 ASSESSMENT — ACTIVITIES OF DAILY LIVING (ADL)
ADLS_ACUITY_SCORE: 14

## 2019-03-29 ASSESSMENT — PAIN DESCRIPTION - DESCRIPTORS
DESCRIPTORS: ACHING;CONSTANT
DESCRIPTORS: DULL;ACHING
DESCRIPTORS: ACHING;CONSTANT

## 2019-03-29 ASSESSMENT — MIFFLIN-ST. JEOR: SCORE: 1740.19

## 2019-03-29 NOTE — CONSULTS
New BMT consult, inpatient    CC: AML, allo consult   HPI: Dewayne is a 63-year-old man with a relatively unremarkable past medical history who presented with chest pain and upon investigation was found to have AML, WBC 71.  A bone marrow biopsy was performed on 3/13/2019 showing 95% cellularity with 95% blasts and 96% circulating blasts.  He had a normal G-banding, a selected FISH panel was unremarkable.  His next generation sequencing panel showed an isolated IDH 2 mutation.  He received 7+3 starting 3/15/19 but had persistently elevated blasts, confirmed on an early repeat marrow to be NR, followed by decitabine starting 3/2/6/19 now awaiting venetoclax approval. Course complicated by C diff and NF, both under control.    PMH: BPH, RLS, HLP, back DJD  FHx: 2 brothers in 50s and 60s in good health, 2-3 healthy children (youngest 20y), .   SHx: Ex-smoker, little ETOH, no drugs  Allergies: none  ROS: Low energy, diarrhea, ROS otherwise neg on a 10-point review.       acyclovir  400 mg Oral BID     allopurinol  300 mg Oral Daily     ceFEPIme (MAXIPIME) IV  2 g Intravenous Q8H     decitabine (DACOGEN) chemo infusion  20 mg/m2 (Treatment Plan Recorded) Intravenous Q24H     hydroxyurea  2,000 mg Oral BID     ondansetron  8 mg Oral Q8H     pantoprazole  40 mg Oral BID AC     prochlorperazine  5-10 mg Intravenous Q6H    Or     prochlorperazine  5-10 mg Oral Q6H     rOPINIRole  0.75 mg Oral Daily at 8 pm     sodium chloride (PF)  10 mL Intracatheter Q7 Days     tamsulosin  0.8 mg Oral Daily     vancomycin  125 mg Oral 4x Daily     voriconazole  200 mg Oral Q12H ZUNILDA       Lab Results   Component Value Date    WBC 42.0 (H) 03/29/2019    HGB 7.3 (L) 03/29/2019    HCT 22.4 (L) 03/29/2019    PLT 12 (LL) 03/29/2019     03/29/2019    POTASSIUM 3.5 03/29/2019    CHLORIDE 104 03/29/2019    CO2 21 03/29/2019    BUN 7 03/29/2019    CR 0.73 03/29/2019     (H) 03/29/2019    AST 14 03/28/2019    ALT 29 03/28/2019     "ALKPHOS 84 03/28/2019    BILITOTAL 0.7 03/28/2019    INR 1.37 (H) 03/29/2019       Ph/E:   Vitals: /68 (BP Location: Left arm)   Pulse 80   Temp 96.3  F (35.7  C) (Oral)   Resp 18   Ht 1.778 m (5' 10\")   Wt 94.4 kg (208 lb 1.6 oz)   SpO2 100%   BMI 29.86 kg/m    BMI= Body mass index is 29.86 kg/m .  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 Once in CR1, he will need an allograft. Although he has a favorable mutation, he will not likely be salvageable if he relapses, thus another reason for allograft in CR1. We discussed the diagnosis, current treatment goals, curative potential of the available treatments, important next steps, and prognosis. General requirements for a hematopoietic cell transplantation (remission status, adequate organ function, and good performance status) and the steps towards that (finding a suitable donor and the typical timeline, choosing a source for stem cells) were discussed. The concept of conditioning, stem cell collection and infusion, and expected duration of inpatient hospitalization and close outpatient follow up were reviewed. Finally, major causes of transplant failure (relapse and TRM including acute and chronic GvHD) were briefly discussed. For now, the patient will continue with current therapy and further details will be discussed in the clinic upon patient's discharge from the hospital. All questions were answered to patient's satisfaction.  I notified the BMT office today and asked for a search among his siblings, children, and cord blood. If he achieves a CR, I will be willing to use the first " acceptable donor for his transplant.

## 2019-03-29 NOTE — PLAN OF CARE
[id number]: AVSS on RA, afebrile. Dose #3 decitabine infused without incident. Tylenol given for left lower toothache, slight swelling noted on L cheek. Continues to report discomfort r/t hemorrhoids, using preparation H with some relief. Nausea adequately managed with scheduled zofran and compazine. Potassium recheck 3.2, replacement currently infusing. Phosphorus recheck 1.9, awaiting dose from pharmacy. PICC patent, MIVF + KCl infusing at 100 ml/hr. Continues on calorie counts, appetite fair. Voiding adequately. Up independently in room, continues to be fatigued. If pt spikes a fever overnight, MD will order CT CAP. Continue with POC.

## 2019-03-29 NOTE — PLAN OF CARE
[id number]:    Tmax 100.4, MD notified. Routine chest/abd/pelvic CT ordered. OVSS. Denies nausea, SOB. Continues to complain of tooth pain, Tylenol given to partial relief. Watery stool x 1 overnight. Voiding adequately. K+ and phos replaced overnight, recheck w/ AM labs. Up ad shelley, no acute events. Continue to monitor & follow POC.

## 2019-03-29 NOTE — PROGRESS NOTES
Prior Authorization Approval    venclexta 100mg tabs  Date Initiated: 3/27/2019  Date Completed: 3/29/2019  Prior Auth Type: Clinical                Status: Approved    Effective Date: 03/29/2019 - 03/29/2020  Copay: 1696.00     Raymond Filled: No: Must be filled at [facility]. Ph: [phone]    Insurance: [employer] - Phone [phone] Fax [phone]  ID: [id number]  Case Number: [id number]   Submitted Via: Covermymeds     Cost is due to not meeting family out of pocket ($3883.69/$6000).  After OOP is met, copay is zero. Gave pt copay card information to reduce copay. Card will cover up to $92805/year.     Patient name: El Ace  Member ID: [id number]  RxBIN: [id number]  PayerID: [id number]  Ph: [phone] ([phone]).    Debbie Lilly  [facility] Liaison  Ph: [phone] Page: [phone]

## 2019-03-29 NOTE — PROGRESS NOTES
Met with patient and wife. Discussed process for doing HLA typing on his two brothers and three children. His wife provided demographic information on these family members. This was given to BMT office. Discussed plan to do a unrelated cord search while waiting for sibling typing results. Had patient sign PAULINE to speak to his family and search consent. Discussed role of nurse coordinator. Provided contact information (Esequiel Salter and Jaja Mcrae). Answered questions.

## 2019-03-29 NOTE — PROGRESS NOTES
Calorie Count    Intake recorded for: 3/28/2019  Total Kcals: 974 Total Protein: 29g    Kcals from Hospital Food: 974   Protein: 29g    Kcals from Outside Food (average):0 Protein: 0g    # Meals Recorded: 2 meals (first - 100% 1 butter, 8oz orange juice, coffee, 1 boiled egg, peaches, 50% whole wheat English muffin)  (second - 100% raspberry fruit ice, unsweetened iced tea, hot dog on white bun, 50% pears)    # Supplements Recorded: no intake recorded

## 2019-03-29 NOTE — PROGRESS NOTES
[facility], [place]  Hematology / Oncology Progress Note    Date of Admission: 3/12/2019  Date of Service (when I saw the patient): 03/29/2019     Assessment & Plan   Mr. Ace is a 63 year old male with new diagnosis of acute myeloid leukemia incidentally found during work up of nonspecific chest symptoms. He was started on induction chemotherapy with 7+3 (cytarabine and daunorubicin) with D1=3/15/19. BMBx on 3/25 (done early due to rising WBC and blast counts) demonstrates persistent disease with 95% blasts by flow, 98% by morphology. Started re-induction with decitabine on 3/26 PM with plan to add venetoclax.      Today:  - continue decitabine  - ongoing process to obtain Venetoclax --> pharmacy liaison (Debbie Lilly) working on application for free drug as well Pharmacy through [email or web address] Patient Assistance Program.  Application submitted 3/28- awaiting response  - Continue hydrea 2G BID   -  CT C/A/P  - Transplant appt today      HEME:  #AML, refractory   Patient presented to [facility] ED in [place], [place] for complaints of nonspecific chest discomfort after root canal treatment (done 3/11/19). CT C/A/P was negative for PE or other acute findings. On OSH labs, his WBC was incidentally noted to be elevated at 71.1 with Hb 9.0 and plts 97 (prior labs had been unremarkable per patient). Smear was suspicious for immature blasts and acute leukemia. No symptoms of hyperviscosity, TLS or DIC on presentation.  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 Flow consistent with AML with 95% blasts with the following immunophenotype: Positive for CD13, CD33, CD34, CD38, dim to negative CD45, , partial HLA-DR. Baseline ECHO normal, PICC place.   - Initially was on Hydrea 1g q6hr for cytoreduction. Discontinued 3/16 AM.  - HLA typing sent, BMT consult requested  - s/p induction chemotherapy with 7+3 (cytarabine and daunorubicin).  Day 1=3/15/19.   - repeat BMBx done early (3/25) due to rising WBC/blast count. Still with persistent heavy burden of disease (98% blasts by morphology).   - final NGS panel report pending, but results were reviewed with Dr. Hutchins on 3/26 PM  - started reinduction with decitabine/venetoclax. Decitabine D1=3/26. Plan to add venetoclax when available working through patient assistance program  - WBC count continued to rise. Started Hydrea 1g BID (x3/27),  increased to 2g BID (x3/28)  - continue Allopurinol and IVFs  - monitor daily DIC and TLS labs     #Pancytopenia  #Mild epistaxis  2/2 AML & associated chemotherapy.   - transfuse to maintain Hb >7, Plt >10K. If develops ongoing epistaxis, consider increasing plt parameter (though epistaxis occurred with Plt count in 20s)  - Afrin PRN, ocean spray PRN     #Mild coagulopathy, in setting of refractory AML. INR gradually uptrended, today is 1.37 but fibrinogen stable at this time  - monitor daily coags  - plasma for INR >1.8, cryo for fibrinogen <100     ID:  #Neutropenic fever/SIRS.  #C.diff diarrhea/colitis.  First fever on 3/22 with Tmax 101.3. Last temp on 3/24 evening. Recurrent Tmax 100.9 on 3/26. Treating C.diff as cause currently.   - BCx (3/22-3/24, 3/26, 3/27) are NGTD  - 3/23 fungal BCx NGTD  - UA negative  - 3/22 CXR with small R pleural effusion, no focal airspace opacity  - 3/25 galactomannan and fungitell both negative  - C.diff (3/26) positive  - He had ongoing fevers yesterday afternoon and overnight, though was only just 24hrs on appropriate C.diff treatment. He has no new symptoms and abdominal cramping/nausea currently improved. However, will consult ID to follow along in this immunocompromised pt. - Was febrile last night to 100.4,  Ct C/A/P done which shows some thickening in the righ hemicolon along with adjacent fat stranding.      **Anti-infectives:  - continue Cefepime (x 3/22)   - PO Vancomycin 125mg four times daily x 10 days (3/26 late  PM)    #ID PPx   - continue ACV  - changed Fluconazole to Micafungin 50mg daily in setting of fevers. Escalate antifungal prophy to Voriconazole 200mg q12hr (as still awaiting venetoclax).         GI:  #Chemotherapy induced nausea  - scheduled Zofran q8hr  - compazine also scheduled q6hr  - PRN antiemetics     #C.diff diarrhea   - treatment as above    #Hemorrhoids  - PRN management: TUCKS pads, sitz bath, topical Prep H and/or lidocaine     #S/P root canal treatment  Patient had a root canal treatment performed on 3/11/19. Site appeared unremarkable with no abscess or drainage. He was started on a 7-day course of oral  mg q6h starting one day prior to the procedure. In retrospect, may have also had some leukemic infiltration of gums complicating his course. Pain at site initially improved during chemotherapy, but did return. Stable/controlled at this time.  - Tylenol PRN, oxycodone PRN     CV:  #Subclinical coronary atherosclerosis  #Hyperlipidemia  Total Agatston calcium score was elevated at 591 (91st percentile) on CT coronaries performed 6/8/2016. Nuclear stress test was negative for ischemia on 8/10/2016. Life style modification measures were recommended. Patient follows with cardiology as outpatient (last office visit on 11/12/18).  - holding atorvastatin at this time, resume in ~2-4 weeks pending treatment course and if LFT's remain normal     NEURO/MSK:  #Restless legs syndrome  - Continue ropinirole 0.75 mg at bedtime, offer PRN ativan for persistent symptoms.     #History of lumbar spine degenerative disc disease   Patient is s/p laminectomy/facetectomy procedures. He does not routinely take pain meds.     :  #BPH   Patient follows with urology ([facility], [place], [place]).   - continue Flomax 0.8 mg daily  - of note, possible drug interaction between voriconazole and flomax (can raise flomax level in blood & cause hypotension). Monitor for this as changing to voriconazole.  "     RENAL:  #Lyte derangement. Likely 2/2 poor PO intake. Cr stable WNL at this time.   - continue IVFs, adjusted to include potassium   - lyte repletion per unit protocol  - calcium corrects for albumin (3/28)    PSYCHOSOCIAL:  #Coping.  Pt appears to coping well overall at this time, taking it \"day by day\". Appreciate SW and  involvement.   - pt had requested help with Advance Directive, SW following      FEN   - Regular diet as tolerated. Monitor decreased intake. Appreciate RD reassessments inpatient.  - continue IVFs, bolus PRN  - PRN lyte replacement per standing protocol     Prophylaxis  - No pharmacologic VTE ppx due to TCP  - PPI for GI/PUD ppx       Code Status: FULL     Disposition: Anticipate 4-6 week LOS pending completion of chemotherapy, count recovery, and resolution of acute toxicities.      Discussed with Dr. Leo.         Interval History   No acute events overnight. Had transplant meeting today with Dr. Brown with his wife. He reports still some minimal root canal pain. Denies any chest pain, shortness of breath, cough, nausea, vomiting. Still having some slight diarrhea. CT done today.       Physical Exam   Temp: 96.3  F (35.7  C) Temp src: Oral BP: 119/68   Heart Rate: 87 Resp: 18 SpO2: 100 % O2 Device: None (Room air)    Vitals:    03/26/19 0823 03/28/19 0710 03/29/19 0940   Weight: 94.9 kg (209 lb 3.5 oz) 94.4 kg (208 lb 1.6 oz) 94.4 kg (208 lb 1.6 oz)     Vital Signs with Ranges  Temp:  [96.1  F (35.6  C)-100.4  F (38  C)] 96.3  F (35.7  C)  Heart Rate:  [] 87  Resp:  [16-20] 18  BP: (118-132)/(62-72) 119/68  SpO2:  [96 %-100 %] 100 %  I/O last 3 completed shifts:  In: 2700 [I.V.:2700]  Out: 2600 [Urine:2600]  Constitutional: Awake, alert, cooperative, in NAD. Sitting up in chair, moves around in room spontaneously. Appears to be feeling better than yesterday. Less fatigue, non-toxic appearing.    HEENT: NC/AT. Sclera clear, conjunctiva normal. OP pink and moist, no " lesions or thrush.   Respiratory: Non-labored breathing, good air exchange, on RA. In upright position, lungs are clear to auscultation bilaterally, no wheezing or crackles.    CV: RRR, no murmur appreciated  GI: +BS, abd soft, non-distended, non-tender.   Skin: No concerning lesions or rash on exposed surfaces. BMBx pressure dressing still in place, c/d/i.  Musculoskeletal: No edema bridgett LEs. Gait in room is normal. No joint edema or erythema.   Neurologic: Alert and oriented. Grossly nonfocal.    Neuropsychiatric: Calm, mentation appears normal, affect congruent.   VAD: PICC line in RUE, c/d/i        Medications     dextrose 5% and 0.45% NaCl + KCl 20 mEq/L 100 mL/hr at 03/29/19 0158     - MEDICATION INSTRUCTIONS -       - MEDICATION INSTRUCTIONS -       sodium chloride         acyclovir  400 mg Oral BID     allopurinol  300 mg Oral Daily     ceFEPIme (MAXIPIME) IV  2 g Intravenous Q8H     decitabine (DACOGEN) chemo infusion  20 mg/m2 (Treatment Plan Recorded) Intravenous Q24H     hydroxyurea  2,000 mg Oral BID     ondansetron  8 mg Oral Q8H     pantoprazole  40 mg Oral BID AC     prochlorperazine  5-10 mg Intravenous Q6H    Or     prochlorperazine  5-10 mg Oral Q6H     rOPINIRole  0.75 mg Oral Daily at 8 pm     sodium chloride (PF)  10 mL Intracatheter Q7 Days     tamsulosin  0.8 mg Oral Daily     vancomycin  125 mg Oral 4x Daily     voriconazole  200 mg Oral Q12H ZUNILDA       Data   Results for orders placed or performed during the hospital encounter of 03/12/19 (from the past 24 hour(s))   Potassium   Result Value Ref Range    Potassium 3.2 (L) 3.4 - 5.3 mmol/L   Phosphorus   Result Value Ref Range    Phosphorus 1.9 (L) 2.5 - 4.5 mg/dL   CBC with platelets differential   Result Value Ref Range    WBC 42.0 (H) 4.0 - 11.0 10e9/L    RBC Count 2.26 (L) 4.4 - 5.9 10e12/L    Hemoglobin 7.3 (L) 13.3 - 17.7 g/dL    Hematocrit 22.4 (L) 40.0 - 53.0 %    MCV 99 78 - 100 fl    MCH 32.3 26.5 - 33.0 pg    MCHC 32.6 31.5 - 36.5  g/dL    RDW 16.1 (H) 10.0 - 15.0 %    Platelet Count 12 (LL) 150 - 450 10e9/L    Diff Method Manual Differential     % Neutrophils 0.0 %    % Lymphocytes 0.9 %    % Monocytes 0.0 %    % Eosinophils 0.0 %    % Basophils 0.0 %    % Blasts 99.1 %    Absolute Neutrophil 0.0 (LL) 1.6 - 8.3 10e9/L    Absolute Lymphocytes 0.4 (L) 0.8 - 5.3 10e9/L    Absolute Monocytes 0.0 0.0 - 1.3 10e9/L    Absolute Eosinophils 0.0 0.0 - 0.7 10e9/L    Absolute Basophils 0.0 0.0 - 0.2 10e9/L    Absolute Blasts 41.6 (H) 0 10e9/L    Anisocytosis Slight     Poikilocytosis Slight     Platelet Estimate Confirming automated cell count    Basic metabolic panel   Result Value Ref Range    Sodium 134 133 - 144 mmol/L    Potassium 3.5 3.4 - 5.3 mmol/L    Chloride 104 94 - 109 mmol/L    Carbon Dioxide 21 20 - 32 mmol/L    Anion Gap 9 3 - 14 mmol/L    Glucose 129 (H) 70 - 99 mg/dL    Urea Nitrogen 7 7 - 30 mg/dL    Creatinine 0.73 0.66 - 1.25 mg/dL    GFR Estimate >90 >60 mL/min/[1.73_m2]    GFR Estimate If Black >90 >60 mL/min/[1.73_m2]    Calcium 7.9 (L) 8.5 - 10.1 mg/dL   INR   Result Value Ref Range    INR 1.37 (H) 0.86 - 1.14   Magnesium   Result Value Ref Range    Magnesium 2.2 1.6 - 2.3 mg/dL   Partial thromboplastin time   Result Value Ref Range    PTT 38 (H) 22 - 37 sec   Phosphorus   Result Value Ref Range    Phosphorus 2.8 2.5 - 4.5 mg/dL   Uric acid   Result Value Ref Range    Uric Acid 2.0 (L) 3.5 - 7.2 mg/dL   Fibrinogen activity   Result Value Ref Range    Fibrinogen 666 (H) 200 - 420 mg/dL   Lactate Dehydrogenase   Result Value Ref Range    Lactate Dehydrogenase 166 85 - 227 U/L

## 2019-03-29 NOTE — PROGRESS NOTES
Transplant Infectious Diseases Progress Note    Impression:  63M with a history of recently diagnosed AML, now s/p induction chemotherapy with 7+3, with persistent blasts on D14 BMBx, now undergoing repeat induction with decitabine and venetoclax, with course complicated by febrile neutropenia and C difficile infection.    Main source of persistent fever appears to be C difficile infection, which is being treated appropriately. Still with persistent fever today, and CT abdomen showing colitis. Would continue current antibiotics and add anaerobic coverage today with flagyl    Recommendations:  - Start flagyl 500 mg PO q8h  - Continue cefepime  - Continue voriconazole  - Continue PO vancomycin    Thank you for involving ID in this patient's care. We will continue to follow along with you.    Rohan Santiago MD, PhD  [id number]    Interval History  Mr Ace reports that he is still passing some loose stools, but these are more formed than prior to starting C diff therapy. He has a fever last night, but is currently afebrile. He denies abdominal pain.    Micro:  Bcx:   3/28 NGTD  3/26 NGTD   3/23 Negative   3/22 Negative    3/26 C difficile assay positive    3/25 glucan, galactomannan negative       ROS:10 point ROS neg other than the symptoms noted above in the HPI.    PMH:  Past Medical History:   Diagnosis Date     Acute leukemia (H) 3/12/2019   BPH  Root canal    Current Facility-Administered Medications   Medication     acetaminophen (TYLENOL) tablet 650 mg     acyclovir (ZOVIRAX) tablet 400 mg     albuterol (PROAIR HFA/PROVENTIL HFA/VENTOLIN HFA) 108 (90 Base) MCG/ACT inhaler 1-2 puff     albuterol (PROVENTIL) neb solution 2.5 mg     allopurinol (ZYLOPRIM) tablet 300 mg     baclofen (LIORESAL) tablet 10 mg     calcium carbonate (TUMS) chewable tablet 500 mg     ceFEPIme (MAXIPIME) 2 g vial to attach to  ml bag for ADULTS or 50 ml bag for PEDS     decitabine (DACOGEN) 43 mg in sodium chloride 0.9 % 119 mL  CHEMOTHERAPY     dextrose 5% and 0.45% NaCl + KCl 20 mEq/L infusion     diphenhydrAMINE (BENADRYL) injection 50 mg     EPINEPHrine PF (ADRENALIN) injection 0.3 mg     hydroxyurea (HYDREA) capsule CHEMO 2,000 mg     lidocaine (XYLOCAINE) 2 % topical gel     LORazepam (ATIVAN) tablet 0.5-1 mg    Or     LORazepam (ATIVAN) injection 0.5-1 mg     magic mouthwash suspension (diphenhydramine, lidocaine, aluminum-magnesium & simethicone)     magnesium sulfate 4 g in 100 mL sterile water (premade)     MEDICATION INSTRUCTION     Medication Instruction     meperidine (DEMEROL) injection 25 mg     methylPREDNISolone sodium succinate (solu-MEDROL) injection 125 mg     naloxone (NARCAN) injection 0.1-0.4 mg     OLANZapine zydis (zyPREXA) ODT tab 5 mg     ondansetron (ZOFRAN) tablet 8 mg     oxyCODONE (ROXICODONE) tablet 5 mg     oxymetazoline (AFRIN) 0.05 % spray 2 spray     pantoprazole (PROTONIX) EC tablet 40 mg     polyethylene glycol (MIRALAX/GLYCOLAX) Packet 17 g     potassium chloride (KLOR-CON) Packet 20-40 mEq     potassium chloride 10 mEq in 100 mL intermittent infusion with 10 mg lidocaine     potassium chloride 10 mEq in 100 mL sterile water intermittent infusion (premix)     potassium chloride 20 mEq in 50 mL intermittent infusion     potassium chloride ER (K-DUR/KLOR-CON M) CR tablet 20-40 mEq     potassium phosphate 15 mmol in D5W 250 mL intermittent infusion     potassium phosphate 20 mmol in D5W 250 mL intermittent infusion     potassium phosphate 20 mmol in D5W 500 mL intermittent infusion     potassium phosphate 25 mmol in D5W 500 mL intermittent infusion     pramox-pe-glycerin-petrolatum (PREPARATION H) cream     prochlorperazine (COMPAZINE) injection 5-10 mg    Or     prochlorperazine (COMPAZINE) tablet 5-10 mg     ranitidine (ZANTAC) tablet 150 mg     rOPINIRole (REQUIP) tablet 0.75 mg     sennosides (SENOKOT) tablet 8.6 mg     sodium chloride (OCEAN) 0.65 % nasal spray 1-2 spray     sodium chloride (PF) 0.9%  "PF flush 10 mL     sodium chloride (PF) 0.9% PF flush 10-20 mL     sodium chloride 0.9% infusion     sucralfate (CARAFATE) suspension 1 g     tamsulosin (FLOMAX) capsule 0.8 mg     vancomycin (FIRVANQ) oral solution 125 mg     voriconazole (VFEND) tablet 200 mg     Allergies No Known Allergies    Social History: nonsmoker, no IDU    Family History: reviewed and noncontributory    Physical Exam  /69 (BP Location: Left arm)   Pulse 80   Temp 97.6  F (36.4  C) (Oral)   Resp 18   Ht 1.778 m (5' 10\")   Wt 94.4 kg (208 lb 1.6 oz)   SpO2 98%   BMI 29.86 kg/m    Gen: well appearing, tired, comfortable in chair  No oral lesions, no cervical adenopathy  Chest CTAB  CV: RRR, S1S2, No MRG  Abd: soft, not tender, not distended, bowel sounds normal  Ext WWP, no rashes  Neuro: grossly intact    Labs  Last Comprehensive Metabolic Panel:  Sodium   Date Value Ref Range Status   03/29/2019 134 133 - 144 mmol/L Final     Potassium   Date Value Ref Range Status   03/29/2019 3.5 3.4 - 5.3 mmol/L Final     Chloride   Date Value Ref Range Status   03/29/2019 104 94 - 109 mmol/L Final     Carbon Dioxide   Date Value Ref Range Status   03/29/2019 21 20 - 32 mmol/L Final     Anion Gap   Date Value Ref Range Status   03/29/2019 9 3 - 14 mmol/L Final     Glucose   Date Value Ref Range Status   03/29/2019 129 (H) 70 - 99 mg/dL Final     Urea Nitrogen   Date Value Ref Range Status   03/29/2019 7 7 - 30 mg/dL Final     Creatinine   Date Value Ref Range Status   03/29/2019 0.73 0.66 - 1.25 mg/dL Final     GFR Estimate   Date Value Ref Range Status   03/29/2019 >90 >60 mL/min/[1.73_m2] Final     Comment:     Non  GFR Calc  Starting 12/18/2018, serum creatinine based estimated GFR (eGFR) will be   calculated using the Chronic Kidney Disease Epidemiology Collaboration   (CKD-EPI) equation.       Calcium   Date Value Ref Range Status   03/29/2019 7.9 (L) 8.5 - 10.1 mg/dL Final     Lab Results   Component Value Date    WBC " 50.0 03/28/2019     Lab Results   Component Value Date    RBC 2.00 03/28/2019     Lab Results   Component Value Date    HGB 6.5 03/28/2019     Lab Results   Component Value Date    HCT 20.2 03/28/2019     No components found for: MCT  Lab Results   Component Value Date     03/28/2019     Lab Results   Component Value Date    MCH 32.5 03/28/2019     Lab Results   Component Value Date    MCHC 32.2 03/28/2019     Lab Results   Component Value Date    RDW 14.8 03/28/2019     Lab Results   Component Value Date    PLT 14 03/28/2019     Radiology  3/29 CT abdomen  Thickened right hemicolon with adjacent fat stranding favored to be  infectious given history of sepsis.    3/22 CXR:  1. Small right pleural effusion.  2. No focal airspace opacity.

## 2019-03-29 NOTE — PLAN OF CARE
Randy has been afe with IVAB.  Eating well with scheduled Zofran and Compazine, monitoring belle counts.  Up in room and showered.  Wife here for BMT transplant consult. Continues with loose stools, on Vanco for CDiff.

## 2019-03-29 NOTE — PROGRESS NOTES
Nursing Focus: Chemotherapy  D: Positive blood return via PICC. Insertion site is clean/dry/intact, dressing intact with no complaints of pain.  Urine output is recorded in intake in Doc Flowsheet.    I: Premedications given per order (see eMAR). Dose #3 of decitabine started to infuse over 1 hour. Reviewed pt teaching on chemotherapy side effects.  Pt denies need for further teaching. Chemotherapy double checked per protocol by two chemotherapy competent RN's.   A: Tolerating infusion well. Denies nausea and or pain.   P: Continue to monitor urine output and symptoms of nausea. Screen for symptoms of toxicity.

## 2019-03-30 LAB
ALBUMIN UR-MCNC: NEGATIVE MG/DL
ANION GAP SERPL CALCULATED.3IONS-SCNC: 11 MMOL/L (ref 3–14)
ANION GAP SERPL CALCULATED.3IONS-SCNC: 9 MMOL/L (ref 3–14)
ANISOCYTOSIS BLD QL SMEAR: SLIGHT
APPEARANCE UR: CLEAR
APTT PPP: 37 SEC (ref 22–37)
BACTERIA SPEC CULT: NO GROWTH
BACTERIA SPEC CULT: NO GROWTH
BASOPHILS # BLD AUTO: 0 10E9/L (ref 0–0.2)
BASOPHILS NFR BLD AUTO: 0 %
BILIRUB UR QL STRIP: NEGATIVE
BLASTS # BLD: 21.7 10E9/L
BLASTS BLD QL SMEAR: 94.7 %
BLD PROD TYP BPU: NORMAL
BLD UNIT ID BPU: 0
BLD UNIT ID BPU: 0
BLOOD PRODUCT CODE: NORMAL
BLOOD PRODUCT CODE: NORMAL
BPU ID: NORMAL
BPU ID: NORMAL
BUN SERPL-MCNC: 13 MG/DL (ref 7–30)
BUN SERPL-MCNC: 7 MG/DL (ref 7–30)
CALCIUM SERPL-MCNC: 7.4 MG/DL (ref 8.5–10.1)
CALCIUM SERPL-MCNC: 7.6 MG/DL (ref 8.5–10.1)
CHLORIDE SERPL-SCNC: 101 MMOL/L (ref 94–109)
CHLORIDE SERPL-SCNC: 102 MMOL/L (ref 94–109)
CO2 SERPL-SCNC: 21 MMOL/L (ref 20–32)
CO2 SERPL-SCNC: 22 MMOL/L (ref 20–32)
COLOR UR AUTO: ABNORMAL
CREAT SERPL-MCNC: 0.76 MG/DL (ref 0.66–1.25)
CREAT SERPL-MCNC: 0.78 MG/DL (ref 0.66–1.25)
DIFFERENTIAL METHOD BLD: ABNORMAL
EOSINOPHIL # BLD AUTO: 0 10E9/L (ref 0–0.7)
EOSINOPHIL NFR BLD AUTO: 0 %
ERYTHROCYTE [DISTWIDTH] IN BLOOD BY AUTOMATED COUNT: 15.9 % (ref 10–15)
FIBRINOGEN PPP-MCNC: 583 MG/DL (ref 200–420)
GFR SERPL CREATININE-BSD FRML MDRD: >90 ML/MIN/{1.73_M2}
GFR SERPL CREATININE-BSD FRML MDRD: >90 ML/MIN/{1.73_M2}
GLUCOSE SERPL-MCNC: 128 MG/DL (ref 70–99)
GLUCOSE SERPL-MCNC: 235 MG/DL (ref 70–99)
GLUCOSE UR STRIP-MCNC: NEGATIVE MG/DL
HCT VFR BLD AUTO: 21.2 % (ref 40–53)
HGB BLD-MCNC: 6.9 G/DL (ref 13.3–17.7)
HGB UR QL STRIP: NEGATIVE
INR PPP: 1.31 (ref 0.86–1.14)
KETONES UR STRIP-MCNC: NEGATIVE MG/DL
LACTATE SERPL-SCNC: 1 MMOL/L (ref 0.4–2)
LDH SERPL L TO P-CCNC: 201 U/L (ref 85–227)
LEUKOCYTE ESTERASE UR QL STRIP: NEGATIVE
LYMPHOCYTES # BLD AUTO: 1.2 10E9/L (ref 0.8–5.3)
LYMPHOCYTES NFR BLD AUTO: 5.3 %
Lab: NORMAL
Lab: NORMAL
MAGNESIUM SERPL-MCNC: 2 MG/DL (ref 1.6–2.3)
MCH RBC QN AUTO: 32.2 PG (ref 26.5–33)
MCHC RBC AUTO-ENTMCNC: 32.5 G/DL (ref 31.5–36.5)
MCV RBC AUTO: 99 FL (ref 78–100)
MONOCYTES # BLD AUTO: 0 10E9/L (ref 0–1.3)
MONOCYTES NFR BLD AUTO: 0 %
MUCOUS THREADS #/AREA URNS LPF: PRESENT /LPF
NEUTROPHILS # BLD AUTO: 0 10E9/L (ref 1.6–8.3)
NEUTROPHILS NFR BLD AUTO: 0 %
NITRATE UR QL: NEGATIVE
NUM BPU REQUESTED: 1
NUM BPU REQUESTED: 1
PH UR STRIP: 6.5 PH (ref 5–7)
PHOSPHATE SERPL-MCNC: 2.6 MG/DL (ref 2.5–4.5)
PHOSPHATE SERPL-MCNC: 2.8 MG/DL (ref 2.5–4.5)
PLATELET # BLD AUTO: 9 10E9/L (ref 150–450)
PLATELET # BLD EST: ABNORMAL 10*3/UL
POTASSIUM SERPL-SCNC: 3.6 MMOL/L (ref 3.4–5.3)
POTASSIUM SERPL-SCNC: 4.5 MMOL/L (ref 3.4–5.3)
RBC # BLD AUTO: 2.14 10E12/L (ref 4.4–5.9)
RBC #/AREA URNS AUTO: <1 /HPF (ref 0–2)
SODIUM SERPL-SCNC: 132 MMOL/L (ref 133–144)
SODIUM SERPL-SCNC: 134 MMOL/L (ref 133–144)
SOURCE: ABNORMAL
SP GR UR STRIP: 1.01 (ref 1–1.03)
SPECIMEN SOURCE: NORMAL
SPECIMEN SOURCE: NORMAL
TRANSFUSION STATUS PATIENT QL: NORMAL
URATE SERPL-MCNC: 3.3 MG/DL (ref 3.5–7.2)
UROBILINOGEN UR STRIP-MCNC: NORMAL MG/DL (ref 0–2)
WBC # BLD AUTO: 22.9 10E9/L (ref 4–11)
WBC #/AREA URNS AUTO: <1 /HPF (ref 0–5)

## 2019-03-30 PROCEDURE — 85384 FIBRINOGEN ACTIVITY: CPT | Performed by: INTERNAL MEDICINE

## 2019-03-30 PROCEDURE — 12000001 ZZH R&B MED SURG/OB UMMC

## 2019-03-30 PROCEDURE — 84100 ASSAY OF PHOSPHORUS: CPT | Performed by: INTERNAL MEDICINE

## 2019-03-30 PROCEDURE — 36592 COLLECT BLOOD FROM PICC: CPT | Performed by: NURSE PRACTITIONER

## 2019-03-30 PROCEDURE — 83735 ASSAY OF MAGNESIUM: CPT | Performed by: INTERNAL MEDICINE

## 2019-03-30 PROCEDURE — 81001 URINALYSIS AUTO W/SCOPE: CPT | Performed by: INTERNAL MEDICINE

## 2019-03-30 PROCEDURE — 83605 ASSAY OF LACTIC ACID: CPT | Performed by: INTERNAL MEDICINE

## 2019-03-30 PROCEDURE — 80048 BASIC METABOLIC PNL TOTAL CA: CPT | Performed by: NURSE PRACTITIONER

## 2019-03-30 PROCEDURE — 87040 BLOOD CULTURE FOR BACTERIA: CPT | Performed by: INTERNAL MEDICINE

## 2019-03-30 PROCEDURE — 85730 THROMBOPLASTIN TIME PARTIAL: CPT | Performed by: INTERNAL MEDICINE

## 2019-03-30 PROCEDURE — 25000132 ZZH RX MED GY IP 250 OP 250 PS 637: Performed by: NURSE PRACTITIONER

## 2019-03-30 PROCEDURE — 25800030 ZZH RX IP 258 OP 636: Performed by: PHYSICIAN ASSISTANT

## 2019-03-30 PROCEDURE — C9399 UNCLASSIFIED DRUGS OR BIOLOG: HCPCS | Performed by: INTERNAL MEDICINE

## 2019-03-30 PROCEDURE — 86901 BLOOD TYPING SEROLOGIC RH(D): CPT

## 2019-03-30 PROCEDURE — 25000132 ZZH RX MED GY IP 250 OP 250 PS 637: Performed by: INTERNAL MEDICINE

## 2019-03-30 PROCEDURE — 36592 COLLECT BLOOD FROM PICC: CPT | Performed by: INTERNAL MEDICINE

## 2019-03-30 PROCEDURE — 25800030 ZZH RX IP 258 OP 636

## 2019-03-30 PROCEDURE — 25000131 ZZH RX MED GY IP 250 OP 636 PS 637: Performed by: PHYSICIAN ASSISTANT

## 2019-03-30 PROCEDURE — 86850 RBC ANTIBODY SCREEN: CPT

## 2019-03-30 PROCEDURE — 83615 LACTATE (LD) (LDH) ENZYME: CPT | Performed by: INTERNAL MEDICINE

## 2019-03-30 PROCEDURE — 86900 BLOOD TYPING SEROLOGIC ABO: CPT

## 2019-03-30 PROCEDURE — P9016 RBC LEUKOCYTES REDUCED: HCPCS

## 2019-03-30 PROCEDURE — 25000128 H RX IP 250 OP 636: Performed by: NURSE PRACTITIONER

## 2019-03-30 PROCEDURE — 86828 HLA CLASS I&II ANTIBODY QUAL: CPT

## 2019-03-30 PROCEDURE — 86923 COMPATIBILITY TEST ELECTRIC: CPT

## 2019-03-30 PROCEDURE — 85025 COMPLETE CBC W/AUTO DIFF WBC: CPT | Performed by: INTERNAL MEDICINE

## 2019-03-30 PROCEDURE — 25000132 ZZH RX MED GY IP 250 OP 250 PS 637: Performed by: PHYSICIAN ASSISTANT

## 2019-03-30 PROCEDURE — 80048 BASIC METABOLIC PNL TOTAL CA: CPT | Performed by: INTERNAL MEDICINE

## 2019-03-30 PROCEDURE — 84550 ASSAY OF BLOOD/URIC ACID: CPT | Performed by: INTERNAL MEDICINE

## 2019-03-30 PROCEDURE — 85610 PROTHROMBIN TIME: CPT | Performed by: INTERNAL MEDICINE

## 2019-03-30 PROCEDURE — 36415 COLL VENOUS BLD VENIPUNCTURE: CPT | Performed by: INTERNAL MEDICINE

## 2019-03-30 PROCEDURE — 87086 URINE CULTURE/COLONY COUNT: CPT | Performed by: INTERNAL MEDICINE

## 2019-03-30 PROCEDURE — 25000128 H RX IP 250 OP 636

## 2019-03-30 PROCEDURE — 84100 ASSAY OF PHOSPHORUS: CPT | Performed by: NURSE PRACTITIONER

## 2019-03-30 PROCEDURE — P9037 PLATE PHERES LEUKOREDU IRRAD: HCPCS | Performed by: INTERNAL MEDICINE

## 2019-03-30 RX ADMIN — VANCOMYCIN HYDROCHLORIDE 125 MG: KIT at 08:00

## 2019-03-30 RX ADMIN — VENETOCLAX 20 MG: 10 TABLET, FILM COATED ORAL at 18:16

## 2019-03-30 RX ADMIN — ACETAMINOPHEN 650 MG: 325 TABLET, FILM COATED ORAL at 08:00

## 2019-03-30 RX ADMIN — PANTOPRAZOLE SODIUM 40 MG: 40 TABLET, DELAYED RELEASE ORAL at 16:16

## 2019-03-30 RX ADMIN — VANCOMYCIN HYDROCHLORIDE 125 MG: KIT at 20:13

## 2019-03-30 RX ADMIN — HYDROXYUREA 2000 MG: 500 CAPSULE ORAL at 20:23

## 2019-03-30 RX ADMIN — VORICONAZOLE 200 MG: 200 TABLET, FILM COATED ORAL at 20:12

## 2019-03-30 RX ADMIN — HYDROXYUREA 2000 MG: 500 CAPSULE ORAL at 07:59

## 2019-03-30 RX ADMIN — ACETAMINOPHEN 650 MG: 325 TABLET, FILM COATED ORAL at 13:32

## 2019-03-30 RX ADMIN — CEFEPIME 2 G: 2 INJECTION, POWDER, FOR SOLUTION INTRAVENOUS at 02:27

## 2019-03-30 RX ADMIN — ACYCLOVIR 400 MG: 400 TABLET ORAL at 08:00

## 2019-03-30 RX ADMIN — PROCHLORPERAZINE MALEATE 10 MG: 5 TABLET, FILM COATED ORAL at 13:09

## 2019-03-30 RX ADMIN — ALLOPURINOL 300 MG: 300 TABLET ORAL at 08:00

## 2019-03-30 RX ADMIN — PROCHLORPERAZINE MALEATE 10 MG: 5 TABLET, FILM COATED ORAL at 18:11

## 2019-03-30 RX ADMIN — ONDANSETRON HYDROCHLORIDE 8 MG: 8 TABLET, FILM COATED ORAL at 00:43

## 2019-03-30 RX ADMIN — POTASSIUM CHLORIDE, DEXTROSE MONOHYDRATE AND SODIUM CHLORIDE: 150; 5; 450 INJECTION, SOLUTION INTRAVENOUS at 22:05

## 2019-03-30 RX ADMIN — ACYCLOVIR 400 MG: 400 TABLET ORAL at 20:12

## 2019-03-30 RX ADMIN — VORICONAZOLE 200 MG: 200 TABLET, FILM COATED ORAL at 08:00

## 2019-03-30 RX ADMIN — METRONIDAZOLE 500 MG: 500 TABLET ORAL at 15:07

## 2019-03-30 RX ADMIN — PROCHLORPERAZINE MALEATE 10 MG: 5 TABLET, FILM COATED ORAL at 00:43

## 2019-03-30 RX ADMIN — POTASSIUM CHLORIDE, DEXTROSE MONOHYDRATE AND SODIUM CHLORIDE: 150; 5; 450 INJECTION, SOLUTION INTRAVENOUS at 06:23

## 2019-03-30 RX ADMIN — ROPINIROLE HYDROCHLORIDE 0.75 MG: 0.5 TABLET, FILM COATED ORAL at 21:18

## 2019-03-30 RX ADMIN — VANCOMYCIN HYDROCHLORIDE 125 MG: KIT at 13:09

## 2019-03-30 RX ADMIN — TAMSULOSIN HYDROCHLORIDE 0.8 MG: 0.4 CAPSULE ORAL at 08:00

## 2019-03-30 RX ADMIN — CEFEPIME 2 G: 2 INJECTION, POWDER, FOR SOLUTION INTRAVENOUS at 11:01

## 2019-03-30 RX ADMIN — CEFEPIME 2 G: 2 INJECTION, POWDER, FOR SOLUTION INTRAVENOUS at 18:09

## 2019-03-30 RX ADMIN — PANTOPRAZOLE SODIUM 40 MG: 40 TABLET, DELAYED RELEASE ORAL at 08:00

## 2019-03-30 RX ADMIN — ONDANSETRON HYDROCHLORIDE 8 MG: 8 TABLET, FILM COATED ORAL at 08:13

## 2019-03-30 RX ADMIN — ACETAMINOPHEN 650 MG: 325 TABLET, FILM COATED ORAL at 21:07

## 2019-03-30 RX ADMIN — METRONIDAZOLE 500 MG: 500 TABLET ORAL at 21:18

## 2019-03-30 RX ADMIN — PROCHLORPERAZINE MALEATE 10 MG: 5 TABLET, FILM COATED ORAL at 06:23

## 2019-03-30 RX ADMIN — METRONIDAZOLE 500 MG: 500 TABLET ORAL at 06:31

## 2019-03-30 RX ADMIN — ONDANSETRON HYDROCHLORIDE 8 MG: 8 TABLET, FILM COATED ORAL at 16:16

## 2019-03-30 RX ADMIN — DECITABINE 43 MG: 50 INJECTION, POWDER, LYOPHILIZED, FOR SOLUTION INTRAVENOUS at 20:22

## 2019-03-30 RX ADMIN — VANCOMYCIN HYDROCHLORIDE 125 MG: KIT at 16:16

## 2019-03-30 ASSESSMENT — ACTIVITIES OF DAILY LIVING (ADL)
ADLS_ACUITY_SCORE: 14

## 2019-03-30 ASSESSMENT — MIFFLIN-ST. JEOR: SCORE: 1738.37

## 2019-03-30 ASSESSMENT — PAIN DESCRIPTION - DESCRIPTORS: DESCRIPTORS: DISCOMFORT

## 2019-03-30 NOTE — PROGRESS NOTES
The patient was discussed on rounds with the nurses, mid level provider, and house staff and seen and examined by me. All labs and imaging were reviewed. I reviewed events over the last 24 hours including vitals and flow sheets. I agree with the AUGIE note and have been responsible for the care plan and interpretation of progress.      Overnight events, vital signs, labs and meds reviewed.  He looks stable.     Mr. Ace is a 63 year old male with new diagnosis of acute myeloid leukemia incidentally found during work up of nonspecific chest symptoms. He was started on induction chemotherapy with 7+3 (cytarabine and daunorubicin) with D1=3/15/19. BMBx on 3/25 D+12 (done early due to rising WBC and blast counts) demonstrates persistent disease with 95% blasts by flow, 98% by morphology. Started re-induction with decitabine on 3/26 PM with plan to add venetoclax.      -Continue hydrurea + decitabine + venetoclax  -Neutropenic fever- on cefepime, -still spiking fevers ?, ID consult.send CMV, fungal markers; Blood cx- NGTD  -C diff colitis- on po vanc  -continue OI ppx.

## 2019-03-30 NOTE — PROGRESS NOTES
Calorie Count  Intake recorded for: 3/29  Total Kcals: 1460 Total Protein: 45g  Kcals from Hospital Food: 1169  Protein: 45g  Kcals from Outside Food (average):291 Protein: 0g  # Meals Recorded: 3 meals (First - 100% pancake w/ syrup, key strip, coffee, 50% banana)      (Second - 100% chicken noodle soup, cantaloupe, fruit ice, string cheese)      (Third - 100% orange, fruit ice, 75% beans & rice, 50% chicken quesadilla)      (Snack - 100% 3 Root Beers from outside the hospital)  # Supplements Recorded: 0

## 2019-03-30 NOTE — TREATMENT PLAN
Sepsis protocol triggered for 120 heart rate.  Pt reports he had just been up in walk in room prior to check.  Rechecked heart rate 92, sepsis protocol held for activity induced tachycardia.  If reoccurs will continue with protocol.

## 2019-03-30 NOTE — PLAN OF CARE
Patient received 1 unit RBC and 1 unit Platelets this shift. Hgb was 6.9, Platelets 9. Patient comfortable but c/o mild tooth pain 1/10; tylenol given; pain improved. Appetite decreased due to change in taste r/t chemo. Calorie count; 75% breakfast, 50% lunch consumed.  Up independently on unit; up in chair for most of shift. AAOx4. Calm and cooperative. Jo-Ann Reyes RN on 3/30/2019 at 2:34 PM

## 2019-03-30 NOTE — PLAN OF CARE
Neutropenic.  Temp high 100.3. Sepsis protocol triggered VSS, lactic 1.4. Oral flagyl started this evening for continued temps.  Reports wilfredo-rectal pain related to hemorrhoid, doing own wilfredo cares, refused nurse exam or assist.  Also reports left lower jaw pain that he reports is related to dental work he had prior to diagnosis, pain not new.  Took tylenol prior to supper, reporting relief of pain, eating soft foods. Calorie counts ongoing. Up and awake in recliner until HS then to bed.  Day 4 IV chemo, see chemo note.  Day 1 oral venetoclax.  Monitor for tumor lysis.

## 2019-03-30 NOTE — PLAN OF CARE
VSS. Afebrile. Continues with wilfredo-rectal discomfort related to hemorrhoid but declined any interventions. Patient does have Prep H in the bathroom. Denied nausea and vomiting. UOP adequate. Calorie counts continue. Slept between cares. Monitor for TLS due to starting Venetoclax. Continue with POC.

## 2019-03-30 NOTE — PROGRESS NOTES
[facility], [place]  Hematology / Oncology Progress Note    Date of Admission: 3/12/2019  Date of Service (when I saw the patient): 03/30/2019     Assessment & Plan   Mr. Ace is a 63 year old male with new diagnosis of acute myeloid leukemia incidentally found during work up of nonspecific chest symptoms. He was started on induction chemotherapy with 7+3 (cytarabine and daunorubicin) with D1=3/15/19. BMBx on 3/25 (done early due to rising WBC and blast counts) demonstrates persistent disease with 95% blasts by flow, 98% by morphology. Started re-induction with decitabine on 3/26 PM with plan to add venetoclax.      Today:  - continue decitabine  - Venetoclax ramp up started 3/29  - Continue hydrea 2G BID       HEME:  #AML, refractory   Patient presented to [facility] in [place], [place] for complaints of nonspecific chest discomfort after root canal treatment (done 3/11/19). CT C/A/P was negative for PE or other acute findings. On OSH labs, his WBC was incidentally noted to be elevated at 71.1 with Hb 9.0 and plts 97 (prior labs had been unremarkable per patient). Smear was suspicious for immature blasts and acute leukemia. No symptoms of hyperviscosity, TLS or DIC on presentation.  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 Flow consistent with AML with 95% blasts with the following immunophenotype: Positive for CD13, CD33, CD34, CD38, dim to negative CD45, , partial HLA-DR. Baseline ECHO normal, PICC place.   - Initially was on Hydrea 1g q6hr for cytoreduction. Discontinued 3/16 AM.  - HLA typing sent, BMT consult requested  - s/p induction chemotherapy with 7+3 (cytarabine and daunorubicin). Day 1=3/15/19.   - repeat BMBx done early (3/25) due to rising WBC/blast count. Still with persistent heavy burden of disease (98% blasts by morphology).   - final NGS panel report pending, but results were reviewed with   Bachanova on 3/26 PM  - started reinduction with decitabine/venetoclax. Decitabine D1=3/26  Added Venetoclax on 3/29( Prescription was approved with Pharmacy liason help, outpatient prescription sent to mail order pharmacy for home dose of 100mg dialy  -rise. Started Hydrea 1g BID (x3/27),  increased to 2g BID (x3/28), white count now down trending.   - continue Allopurinol and IVFs  - monitor daily DIC and TLS labs     #Pancytopenia  #Mild epistaxis  2/2 AML & associated chemotherapy.   - transfuse to maintain Hb >7, Plt >10K. If develops ongoing epistaxis, consider increasing plt parameter (though epistaxis occurred with Plt count in 20s)  - Afrin PRN, ocean spray PRN     #Mild coagulopathy, in setting of refractory AML. INR gradually uptrended, today is 1.31 but fibrinogen stable at this time  - monitor daily coags  - plasma for INR >1.8, cryo for fibrinogen <100     ID:  #Neutropenic fever/SIRS.  #C.diff diarrhea/colitis.  First fever on 3/22 with Tmax 101.3. Last temp on 3/24 evening. Recurrent Tmax 100.9 on 3/26. Treating C.diff as cause currently.   - BCx (3/22-3/24, 3/26, 3/27) are NGTD  - 3/23 fungal BCx NGTD  - UA negative  - 3/22 CXR with small R pleural effusion, no focal airspace opacity  - 3/25 galactomannan and fungitell both negative  - C.diff (3/26) positive  - He continued to have ongoing fevers on 3/28 afternoon and overnight, though was only just 24hrs on appropriate C.diff treatment. He has no new symptoms and abdominal cramping/nausea currently improved.   -  ID consulted to follow along in this immunocompromised pt. - Had low grade fevers in 24 hours Tmax 100.1  - Ct C/A/P done which shows some thickening in the righ hemicolon along with adjacent fat stranding.  Per ID recs started Flagyl (X3/29)    **Anti-infectives:  - continue Cefepime (x 3/22)   - PO Vancomycin 125mg four times daily x 10 days (3/26 late PM)  - Flagyl (X 3/29)    #ID PPx   - continue ACV  - changed Fluconazole to  Micafungin 50mg daily in setting of fevers.  - Escalate antifungal prophy to Voriconazole 200mg q12hr         GI:  #Chemotherapy induced nausea  - scheduled Zofran q8hr  - compazine also scheduled q6hr  - PRN antiemetics     #C.diff diarrhea   - treatment as above    #Hemorrhoids  - PRN management: TUCKS pads, sitz bath, topical Prep H and/or lidocaine     #S/P root canal treatment  Patient had a root canal treatment performed on 3/11/19. Site appeared unremarkable with no abscess or drainage. He was started on a 7-day course of oral  mg q6h starting one day prior to the procedure. In retrospect, may have also had some leukemic infiltration of gums complicating his course. Pain at site initially improved during chemotherapy, but did return. Stable/controlled at this time.  - Tylenol PRN, oxycodone PRN     CV:  #Subclinical coronary atherosclerosis  #Hyperlipidemia  Total Agatston calcium score was elevated at 591 (91st percentile) on CT coronaries performed 6/8/2016. Nuclear stress test was negative for ischemia on 8/10/2016. Life style modification measures were recommended. Patient follows with cardiology as outpatient (last office visit on 11/12/18).  - holding atorvastatin at this time, resume in ~2-4 weeks pending treatment course and if LFT's remain normal     NEURO/MSK:  #Restless legs syndrome  - Continue ropinirole 0.75 mg at bedtime, offer PRN ativan for persistent symptoms.     #History of lumbar spine degenerative disc disease   Patient is s/p laminectomy/facetectomy procedures. He does not routinely take pain meds.     :  #BPH   Patient follows with urology ([facility], [place], [place]).   - continue Flomax 0.8 mg daily  - of note, possible drug interaction between voriconazole and flomax (can raise flomax level in blood & cause hypotension). Monitor for this as changing to voriconazole.      RENAL:  #Lyte derangement. Likely 2/2 poor PO intake. Cr stable WNL at this time.   - continue  "IVFs, adjusted to include potassium   - lyte repletion per unit protocol  - calcium corrects for albumin (3/28)    PSYCHOSOCIAL:  #Coping.  Pt appears to coping well overall at this time, taking it \"day by day\". Appreciate SW and  involvement.   - pt had requested help with Advance Directive, SW following      FEN   - Regular diet as tolerated. Monitor decreased intake. Appreciate RD reassessments inpatient.  - continue IVFs, bolus PRN  - PRN lyte replacement per standing protocol     Prophylaxis  - No pharmacologic VTE ppx due to TCP  - PPI for GI/PUD ppx       Code Status: FULL     Disposition: Anticipate 4-6 week LOS pending completion of chemotherapy, count recovery, and resolution of acute toxicities.      Discussed with Dr. Leo.         Interval History   No acute events overnight. Encouraged that white blood count is down by almost half.  He reports still some minimal root canal pain. Denies any chest pain, shortness of breath, cough, nausea, vomiting. Diarrhea improving. Continue antibiotics as above. Continue Venetoclax ramp up.     Physical Exam   Temp: 99.7  F (37.6  C) Temp src: Oral BP: 125/68   Heart Rate: 105 Resp: 18 SpO2: 97 % O2 Device: None (Room air)    Vitals:    03/28/19 0710 03/29/19 0940 03/30/19 0838   Weight: 94.4 kg (208 lb 1.6 oz) 94.4 kg (208 lb 1.6 oz) 94.2 kg (207 lb 11.2 oz)     Vital Signs with Ranges  Temp:  [97.6  F (36.4  C)-100.3  F (37.9  C)] 99.7  F (37.6  C)  Heart Rate:  [] 105  Resp:  [18] 18  BP: (109-125)/(64-76) 125/68  SpO2:  [97 %-100 %] 97 %  I/O last 3 completed shifts:  In: 4739 [P.O.:1980; I.V.:2640; IV Piggyback:119]  Out: 3200 [Urine:3200]  Constitutional: Awake, alert, cooperative, in NAD. Sitting up in chair, moves around in room spontaneously. Appears to be feeling better than yesterday. Less fatigue, non-toxic appearing.    HEENT: NC/AT. Sclera clear, conjunctiva normal. OP pink and moist, no lesions or thrush.   Respiratory: Non-labored " breathing, good air exchange, on RA. In upright position, lungs are clear to auscultation bilaterally, no wheezing or crackles.    CV: RRR, no murmur appreciated  GI: +BS, abd soft, non-distended, non-tender.   Skin: No concerning lesions or rash on exposed surfaces.   Musculoskeletal: No edema bridgett LEs. Gait in room is normal. No joint edema or erythema.   Neurologic: Alert and oriented. Grossly nonfocal.    Neuropsychiatric: Calm, mentation appears normal, affect congruent.   VAD: PICC line in RUE, c/d/i        Medications     dextrose 5% and 0.45% NaCl + KCl 20 mEq/L 100 mL/hr at 03/30/19 0623     - MEDICATION INSTRUCTIONS -       - MEDICATION INSTRUCTIONS -       sodium chloride         acyclovir  400 mg Oral BID     allopurinol  300 mg Oral Daily     ceFEPIme (MAXIPIME) IV  2 g Intravenous Q8H     decitabine (DACOGEN) chemo infusion  20 mg/m2 (Treatment Plan Recorded) Intravenous Q24H     hydroxyurea  2,000 mg Oral BID     metroNIDAZOLE  500 mg Oral Q8H ZUNILDA     ondansetron  8 mg Oral Q8H     pantoprazole  40 mg Oral BID AC     prochlorperazine  5-10 mg Intravenous Q6H    Or     prochlorperazine  5-10 mg Oral Q6H     rOPINIRole  0.75 mg Oral Daily at 8 pm     sodium chloride (PF)  10 mL Intracatheter Q7 Days     tamsulosin  0.8 mg Oral Daily     vancomycin  125 mg Oral 4x Daily     [START ON 4/1/2019] venetoclax  100 mg Oral Daily with supper     venetoclax  20 mg Oral Daily with supper     [START ON 3/31/2019] venetoclax  50 mg Oral Daily with supper     voriconazole  200 mg Oral Q12H ZUNILDA       Data   Results for orders placed or performed during the hospital encounter of 03/12/19 (from the past 24 hour(s))   CT Chest/Abdomen/Pelvis w Contrast    Narrative    EXAMINATION: CT CHEST/ABDOMEN/PELVIS W CONTRAST, 3/29/2019 12:56 PM    TECHNIQUE:  Helical CT images from the thoracic inlet through the  symphysis pubis were obtained  with contrast. Contrast dose: 127ml  isovue 370    COMPARISON: 3/22/2018    HISTORY:  Sepsis; ; spiking fevers of unknown source. eval for  infectious fluid collection amenable to drainage    FINDINGS:    Chest: The central tracheobronchial tree is patent. No acute airspace  opacities. Mild bibasilar atelectasis. Subpleural fatty deposition,  likely from prior infections. Right fat-containing Bochdalek hernia.  No suspicious nodules or masses. Heart size is normal. At least  moderate three-vessel coronary calcifications. No pericardial  effusion. Normal caliber of the thoracic great vessels. Right upper  approach PICC with tip in the high SVC. No thoracic adenopathy.    Abdomen and pelvis: Multiple simple cysts in the liver. The  gallbladder, spleen, adrenal glands and pancreas are normal. Thickened  right hemicolon with adjacent fat stranding. The appendix is normal in  caliber. Subcentimeter renal cortical hypodensities, too small to  characterize. No stones or hydronephrosis. Prostatomegaly with  irregular enhancement. No free air/fluid in the abdomen or pelvis.  Abdominal aorta is normal in caliber.    Bones and soft tissues: Transitional L5. No worrisome osseous or soft  tissue abnormality. Bilateral vasectomy changes.      Impression    IMPRESSION:   1. Thickened right hemicolon with adjacent fat stranding favored to be  infectious given history of sepsis.  2. At least moderate three-vessel coronary artery calcifications.     I have personally reviewed the examination and initial interpretation  and I agree with the findings.    TREE CAMPA, DO   Lactic acid level STAT for sepsis protocol   Result Value Ref Range    Lactate for Sepsis Protocol 1.4 0.7 - 2.0 mmol/L   Blood culture   Result Value Ref Range    Specimen Description Blood Left Arm     Special Requests Received in aerobic bottle only     Culture Micro No growth after 9 hours    Phosphorus   Result Value Ref Range    Phosphorus 2.0 (L) 2.5 - 4.5 mg/dL   Basic metabolic panel   Result Value Ref Range    Sodium 134 133 - 144 mmol/L     Potassium 3.5 3.4 - 5.3 mmol/L    Chloride 102 94 - 109 mmol/L    Carbon Dioxide 19 (L) 20 - 32 mmol/L    Anion Gap 13 3 - 14 mmol/L    Glucose 128 (H) 70 - 99 mg/dL    Urea Nitrogen 6 (L) 7 - 30 mg/dL    Creatinine 0.71 0.66 - 1.25 mg/dL    GFR Estimate >90 >60 mL/min/[1.73_m2]    GFR Estimate If Black >90 >60 mL/min/[1.73_m2]    Calcium 7.6 (L) 8.5 - 10.1 mg/dL   Blood culture   Result Value Ref Range    Specimen Description Blood PURPLE PORT     Special Requests Received in aerobic bottle only     Culture Micro No growth after 9 hours    CBC with platelets differential   Result Value Ref Range    WBC 22.9 (H) 4.0 - 11.0 10e9/L    RBC Count 2.14 (L) 4.4 - 5.9 10e12/L    Hemoglobin 6.9 (LL) 13.3 - 17.7 g/dL    Hematocrit 21.2 (L) 40.0 - 53.0 %    MCV 99 78 - 100 fl    MCH 32.2 26.5 - 33.0 pg    MCHC 32.5 31.5 - 36.5 g/dL    RDW 15.9 (H) 10.0 - 15.0 %    Platelet Count 9 (LL) 150 - 450 10e9/L    Diff Method Manual Differential     % Neutrophils 0.0 %    % Lymphocytes 5.3 %    % Monocytes 0.0 %    % Eosinophils 0.0 %    % Basophils 0.0 %    % Blasts 94.7 %    Absolute Neutrophil 0.0 (LL) 1.6 - 8.3 10e9/L    Absolute Lymphocytes 1.2 0.8 - 5.3 10e9/L    Absolute Monocytes 0.0 0.0 - 1.3 10e9/L    Absolute Eosinophils 0.0 0.0 - 0.7 10e9/L    Absolute Basophils 0.0 0.0 - 0.2 10e9/L    Absolute Blasts 21.7 (H) 0 10e9/L    Anisocytosis Slight     Platelet Estimate Confirming automated cell count    INR   Result Value Ref Range    INR 1.31 (H) 0.86 - 1.14   Magnesium   Result Value Ref Range    Magnesium 2.0 1.6 - 2.3 mg/dL   Partial thromboplastin time   Result Value Ref Range    PTT 37 22 - 37 sec   Uric acid   Result Value Ref Range    Uric Acid 3.3 (L) 3.5 - 7.2 mg/dL   Fibrinogen activity   Result Value Ref Range    Fibrinogen 583 (H) 200 - 420 mg/dL   Lactate Dehydrogenase   Result Value Ref Range    Lactate Dehydrogenase 201 85 - 227 U/L   Phosphorus   Result Value Ref Range    Phosphorus 2.8 2.5 - 4.5 mg/dL    Basic metabolic panel   Result Value Ref Range    Sodium 134 133 - 144 mmol/L    Potassium 3.6 3.4 - 5.3 mmol/L    Chloride 102 94 - 109 mmol/L    Carbon Dioxide 21 20 - 32 mmol/L    Anion Gap 11 3 - 14 mmol/L    Glucose 128 (H) 70 - 99 mg/dL    Urea Nitrogen 7 7 - 30 mg/dL    Creatinine 0.76 0.66 - 1.25 mg/dL    GFR Estimate >90 >60 mL/min/[1.73_m2]    GFR Estimate If Black >90 >60 mL/min/[1.73_m2]    Calcium 7.6 (L) 8.5 - 10.1 mg/dL   Platelets prepare order unit conditional   Result Value Ref Range    Blood Component Type PLT Pheresis     Units Ordered 1

## 2019-03-30 NOTE — PLAN OF CARE
D: Day 4/10 Decitabine via picc.  Picc with brisk blood return.  Denies nausea on scheduled antiemetics. Reports fair appetite.  Venetoclax oral started this mirela also, teaching done.    I:  Decitabine IV over 1 hr as ordered.  Encouraged oral fluids with   increased risk of TLS with venetoclax.  IV hydration at 100 ml/hr continues. Evening phos 2.0, MD ok'd  holding replacement tonight due to TLS risk.   A: Treatment continues.  P: Continue treatment as ordered.  Pt will notify nursing if with breakthrough nausea. Monitor labs, phos recheck in am replace if continues low.

## 2019-03-31 ENCOUNTER — APPOINTMENT (OUTPATIENT)
Dept: CT IMAGING | Facility: CLINIC | Age: 64
DRG: 834 | End: 2019-03-31
Attending: NURSE PRACTITIONER
Payer: COMMERCIAL

## 2019-03-31 LAB
ANION GAP SERPL CALCULATED.3IONS-SCNC: 10 MMOL/L (ref 3–14)
ANION GAP SERPL CALCULATED.3IONS-SCNC: 9 MMOL/L (ref 3–14)
APTT PPP: 38 SEC (ref 22–37)
BACTERIA SPEC CULT: NO GROWTH
BASOPHILS # BLD AUTO: 0 10E9/L (ref 0–0.2)
BASOPHILS NFR BLD AUTO: 1 %
BLASTS # BLD: 0.9 10E9/L
BLASTS BLD QL SMEAR: 67 %
BUN SERPL-MCNC: 12 MG/DL (ref 7–30)
BUN SERPL-MCNC: 13 MG/DL (ref 7–30)
CALCIUM SERPL-MCNC: 7.3 MG/DL (ref 8.5–10.1)
CALCIUM SERPL-MCNC: 8 MG/DL (ref 8.5–10.1)
CHLORIDE SERPL-SCNC: 102 MMOL/L (ref 94–109)
CHLORIDE SERPL-SCNC: 103 MMOL/L (ref 94–109)
CO2 SERPL-SCNC: 21 MMOL/L (ref 20–32)
CO2 SERPL-SCNC: 22 MMOL/L (ref 20–32)
CREAT SERPL-MCNC: 0.75 MG/DL (ref 0.66–1.25)
CREAT SERPL-MCNC: 0.75 MG/DL (ref 0.66–1.25)
DIFFERENTIAL METHOD BLD: ABNORMAL
EOSINOPHIL # BLD AUTO: 0 10E9/L (ref 0–0.7)
EOSINOPHIL NFR BLD AUTO: 0 %
ERYTHROCYTE [DISTWIDTH] IN BLOOD BY AUTOMATED COUNT: 17 % (ref 10–15)
FIBRINOGEN PPP-MCNC: 641 MG/DL (ref 200–420)
GFR SERPL CREATININE-BSD FRML MDRD: >90 ML/MIN/{1.73_M2}
GFR SERPL CREATININE-BSD FRML MDRD: >90 ML/MIN/{1.73_M2}
GLUCOSE SERPL-MCNC: 124 MG/DL (ref 70–99)
GLUCOSE SERPL-MCNC: 129 MG/DL (ref 70–99)
HADV AG STL QL IA: NEGATIVE
HBA1C MFR BLD: 6 % (ref 0–5.6)
HCT VFR BLD AUTO: 25 % (ref 40–53)
HGB BLD-MCNC: 8.4 G/DL (ref 13.3–17.7)
INR PPP: 1.35 (ref 0.86–1.14)
LACTATE BLD-SCNC: 0.6 MMOL/L (ref 0.7–2)
LDH SERPL L TO P-CCNC: 436 U/L (ref 85–227)
LYMPHOCYTES # BLD AUTO: 0.4 10E9/L (ref 0.8–5.3)
LYMPHOCYTES NFR BLD AUTO: 30 %
Lab: NORMAL
MAGNESIUM SERPL-MCNC: 2.2 MG/DL (ref 1.6–2.3)
MCH RBC QN AUTO: 32.2 PG (ref 26.5–33)
MCHC RBC AUTO-ENTMCNC: 33.6 G/DL (ref 31.5–36.5)
MCV RBC AUTO: 96 FL (ref 78–100)
MONOCYTES # BLD AUTO: 0 10E9/L (ref 0–1.3)
MONOCYTES NFR BLD AUTO: 2 %
NEUTROPHILS # BLD AUTO: 0 10E9/L (ref 1.6–8.3)
NEUTROPHILS NFR BLD AUTO: 0 %
PHOSPHATE SERPL-MCNC: 2.7 MG/DL (ref 2.5–4.5)
PHOSPHATE SERPL-MCNC: 2.9 MG/DL (ref 2.5–4.5)
PLATELET # BLD AUTO: 27 10E9/L (ref 150–450)
POTASSIUM SERPL-SCNC: 3.6 MMOL/L (ref 3.4–5.3)
POTASSIUM SERPL-SCNC: 3.9 MMOL/L (ref 3.4–5.3)
RBC # BLD AUTO: 2.61 10E12/L (ref 4.4–5.9)
SODIUM SERPL-SCNC: 134 MMOL/L (ref 133–144)
SODIUM SERPL-SCNC: 134 MMOL/L (ref 133–144)
SPECIMEN SOURCE: NORMAL
URATE SERPL-MCNC: 3.7 MG/DL (ref 3.5–7.2)
WBC # BLD AUTO: 1.3 10E9/L (ref 4–11)

## 2019-03-31 PROCEDURE — 80048 BASIC METABOLIC PNL TOTAL CA: CPT | Performed by: INTERNAL MEDICINE

## 2019-03-31 PROCEDURE — 25000132 ZZH RX MED GY IP 250 OP 250 PS 637: Performed by: INTERNAL MEDICINE

## 2019-03-31 PROCEDURE — 93005 ELECTROCARDIOGRAM TRACING: CPT

## 2019-03-31 PROCEDURE — 25000132 ZZH RX MED GY IP 250 OP 250 PS 637: Performed by: PHYSICIAN ASSISTANT

## 2019-03-31 PROCEDURE — 80048 BASIC METABOLIC PNL TOTAL CA: CPT | Performed by: NURSE PRACTITIONER

## 2019-03-31 PROCEDURE — 85384 FIBRINOGEN ACTIVITY: CPT | Performed by: INTERNAL MEDICINE

## 2019-03-31 PROCEDURE — 85025 COMPLETE CBC W/AUTO DIFF WBC: CPT | Performed by: INTERNAL MEDICINE

## 2019-03-31 PROCEDURE — 83605 ASSAY OF LACTIC ACID: CPT | Performed by: INTERNAL MEDICINE

## 2019-03-31 PROCEDURE — 25800030 ZZH RX IP 258 OP 636

## 2019-03-31 PROCEDURE — 84550 ASSAY OF BLOOD/URIC ACID: CPT | Performed by: INTERNAL MEDICINE

## 2019-03-31 PROCEDURE — 85610 PROTHROMBIN TIME: CPT | Performed by: INTERNAL MEDICINE

## 2019-03-31 PROCEDURE — 87206 SMEAR FLUORESCENT/ACID STAI: CPT | Performed by: INTERNAL MEDICINE

## 2019-03-31 PROCEDURE — 83615 LACTATE (LD) (LDH) ENZYME: CPT | Performed by: INTERNAL MEDICINE

## 2019-03-31 PROCEDURE — 83735 ASSAY OF MAGNESIUM: CPT | Performed by: INTERNAL MEDICINE

## 2019-03-31 PROCEDURE — 36592 COLLECT BLOOD FROM PICC: CPT | Performed by: INTERNAL MEDICINE

## 2019-03-31 PROCEDURE — C9399 UNCLASSIFIED DRUGS OR BIOLOG: HCPCS | Performed by: INTERNAL MEDICINE

## 2019-03-31 PROCEDURE — 84100 ASSAY OF PHOSPHORUS: CPT | Performed by: NURSE PRACTITIONER

## 2019-03-31 PROCEDURE — 87040 BLOOD CULTURE FOR BACTERIA: CPT | Performed by: INTERNAL MEDICINE

## 2019-03-31 PROCEDURE — 25800030 ZZH RX IP 258 OP 636: Performed by: INTERNAL MEDICINE

## 2019-03-31 PROCEDURE — 87177 OVA AND PARASITES SMEARS: CPT | Performed by: INTERNAL MEDICINE

## 2019-03-31 PROCEDURE — 25000132 ZZH RX MED GY IP 250 OP 250 PS 637: Performed by: NURSE PRACTITIONER

## 2019-03-31 PROCEDURE — 36592 COLLECT BLOOD FROM PICC: CPT | Performed by: NURSE PRACTITIONER

## 2019-03-31 PROCEDURE — 87799 DETECT AGENT NOS DNA QUANT: CPT | Performed by: INTERNAL MEDICINE

## 2019-03-31 PROCEDURE — 85730 THROMBOPLASTIN TIME PARTIAL: CPT | Performed by: INTERNAL MEDICINE

## 2019-03-31 PROCEDURE — 70486 CT MAXILLOFACIAL W/O DYE: CPT

## 2019-03-31 PROCEDURE — 83036 HEMOGLOBIN GLYCOSYLATED A1C: CPT | Performed by: INTERNAL MEDICINE

## 2019-03-31 PROCEDURE — 25000128 H RX IP 250 OP 636

## 2019-03-31 PROCEDURE — 25000128 H RX IP 250 OP 636: Performed by: NURSE PRACTITIONER

## 2019-03-31 PROCEDURE — 25800030 ZZH RX IP 258 OP 636: Performed by: NURSE PRACTITIONER

## 2019-03-31 PROCEDURE — 87449 NOS EACH ORGANISM AG IA: CPT | Performed by: INTERNAL MEDICINE

## 2019-03-31 PROCEDURE — 84100 ASSAY OF PHOSPHORUS: CPT | Performed by: INTERNAL MEDICINE

## 2019-03-31 PROCEDURE — 12000001 ZZH R&B MED SURG/OB UMMC

## 2019-03-31 PROCEDURE — 87209 SMEAR COMPLEX STAIN: CPT | Performed by: INTERNAL MEDICINE

## 2019-03-31 PROCEDURE — 25000128 H RX IP 250 OP 636: Performed by: INTERNAL MEDICINE

## 2019-03-31 PROCEDURE — 25000131 ZZH RX MED GY IP 250 OP 636 PS 637: Performed by: PHYSICIAN ASSISTANT

## 2019-03-31 PROCEDURE — 93010 ELECTROCARDIOGRAM REPORT: CPT | Performed by: INTERNAL MEDICINE

## 2019-03-31 RX ORDER — SODIUM CHLORIDE 9 MG/ML
INJECTION, SOLUTION INTRAVENOUS CONTINUOUS
Status: DISCONTINUED | OUTPATIENT
Start: 2019-03-31 | End: 2019-04-03

## 2019-03-31 RX ADMIN — ACETAMINOPHEN 650 MG: 325 TABLET, FILM COATED ORAL at 20:03

## 2019-03-31 RX ADMIN — PROCHLORPERAZINE MALEATE 10 MG: 5 TABLET, FILM COATED ORAL at 00:23

## 2019-03-31 RX ADMIN — VANCOMYCIN HYDROCHLORIDE 125 MG: KIT at 16:28

## 2019-03-31 RX ADMIN — VORICONAZOLE 200 MG: 200 TABLET, FILM COATED ORAL at 08:14

## 2019-03-31 RX ADMIN — CEFEPIME 2 G: 2 INJECTION, POWDER, FOR SOLUTION INTRAVENOUS at 09:49

## 2019-03-31 RX ADMIN — DECITABINE 43 MG: 50 INJECTION, POWDER, LYOPHILIZED, FOR SOLUTION INTRAVENOUS at 20:01

## 2019-03-31 RX ADMIN — PROCHLORPERAZINE MALEATE 10 MG: 5 TABLET, FILM COATED ORAL at 11:57

## 2019-03-31 RX ADMIN — VANCOMYCIN HYDROCHLORIDE 125 MG: KIT at 08:15

## 2019-03-31 RX ADMIN — ACETAMINOPHEN 650 MG: 325 TABLET, FILM COATED ORAL at 00:23

## 2019-03-31 RX ADMIN — VENETOCLAX 50 MG: 50 TABLET, FILM COATED ORAL at 18:50

## 2019-03-31 RX ADMIN — VORICONAZOLE 200 MG: 200 TABLET, FILM COATED ORAL at 19:59

## 2019-03-31 RX ADMIN — METRONIDAZOLE 500 MG: 500 TABLET ORAL at 14:57

## 2019-03-31 RX ADMIN — ONDANSETRON HYDROCHLORIDE 8 MG: 8 TABLET, FILM COATED ORAL at 16:28

## 2019-03-31 RX ADMIN — ACETAMINOPHEN 650 MG: 325 TABLET, FILM COATED ORAL at 16:29

## 2019-03-31 RX ADMIN — SODIUM CHLORIDE: 9 INJECTION, SOLUTION INTRAVENOUS at 09:16

## 2019-03-31 RX ADMIN — VANCOMYCIN HYDROCHLORIDE 1250 MG: 10 INJECTION, POWDER, LYOPHILIZED, FOR SOLUTION INTRAVENOUS at 22:16

## 2019-03-31 RX ADMIN — VANCOMYCIN HYDROCHLORIDE 125 MG: KIT at 11:57

## 2019-03-31 RX ADMIN — CEFEPIME 2 G: 2 INJECTION, POWDER, FOR SOLUTION INTRAVENOUS at 02:23

## 2019-03-31 RX ADMIN — METRONIDAZOLE 500 MG: 500 TABLET ORAL at 06:14

## 2019-03-31 RX ADMIN — ACYCLOVIR 400 MG: 400 TABLET ORAL at 08:14

## 2019-03-31 RX ADMIN — ALLOPURINOL 300 MG: 300 TABLET ORAL at 08:14

## 2019-03-31 RX ADMIN — ROPINIROLE HYDROCHLORIDE 0.75 MG: 0.5 TABLET, FILM COATED ORAL at 22:19

## 2019-03-31 RX ADMIN — PROCHLORPERAZINE MALEATE 10 MG: 5 TABLET, FILM COATED ORAL at 06:14

## 2019-03-31 RX ADMIN — VANCOMYCIN HYDROCHLORIDE 1250 MG: 10 INJECTION, POWDER, LYOPHILIZED, FOR SOLUTION INTRAVENOUS at 11:00

## 2019-03-31 RX ADMIN — ACYCLOVIR 400 MG: 400 TABLET ORAL at 19:59

## 2019-03-31 RX ADMIN — ACETAMINOPHEN 650 MG: 325 TABLET, FILM COATED ORAL at 12:03

## 2019-03-31 RX ADMIN — VANCOMYCIN HYDROCHLORIDE 125 MG: KIT at 19:59

## 2019-03-31 RX ADMIN — ONDANSETRON HYDROCHLORIDE 8 MG: 8 TABLET, FILM COATED ORAL at 08:14

## 2019-03-31 RX ADMIN — PANTOPRAZOLE SODIUM 40 MG: 40 TABLET, DELAYED RELEASE ORAL at 16:29

## 2019-03-31 RX ADMIN — PANTOPRAZOLE SODIUM 40 MG: 40 TABLET, DELAYED RELEASE ORAL at 08:14

## 2019-03-31 RX ADMIN — ACETAMINOPHEN 650 MG: 325 TABLET, FILM COATED ORAL at 08:13

## 2019-03-31 RX ADMIN — CEFEPIME 2 G: 2 INJECTION, POWDER, FOR SOLUTION INTRAVENOUS at 18:13

## 2019-03-31 RX ADMIN — ONDANSETRON HYDROCHLORIDE 8 MG: 8 TABLET, FILM COATED ORAL at 00:23

## 2019-03-31 RX ADMIN — PROCHLORPERAZINE MALEATE 10 MG: 5 TABLET, FILM COATED ORAL at 18:13

## 2019-03-31 RX ADMIN — METRONIDAZOLE 500 MG: 500 TABLET ORAL at 22:19

## 2019-03-31 RX ADMIN — TAMSULOSIN HYDROCHLORIDE 0.8 MG: 0.4 CAPSULE ORAL at 08:14

## 2019-03-31 ASSESSMENT — PAIN DESCRIPTION - DESCRIPTORS
DESCRIPTORS: ACHING
DESCRIPTORS: ACHING
DESCRIPTORS: HEADACHE
DESCRIPTORS: ACHING
DESCRIPTORS: HEADACHE
DESCRIPTORS: ACHING

## 2019-03-31 ASSESSMENT — ACTIVITIES OF DAILY LIVING (ADL)
ADLS_ACUITY_SCORE: 14

## 2019-03-31 ASSESSMENT — MIFFLIN-ST. JEOR: SCORE: 1728.85

## 2019-03-31 NOTE — PLAN OF CARE
VSS. Afebrile. Continues with left, lower jaw discomfort. PRN PO Tylenol given. Hemorrhoids improving per pt's report. Diarrhea continues. Denied nausea and vomiting. UOP adequate. Stool sample collected and sent for ova/parasite, adenovirus and crytosporidium. Slept between cares. Today is Day 6. Continue with POC.

## 2019-03-31 NOTE — PLAN OF CARE
HR was 143, T 102.4; MD paged. Now HR down to 121 and temp 98.5. OVSS. Afebrile. Continues with left, lower jaw discomfort--tylenol given x2 (once for fever once for jaw pain) CT to jaw done. Fluids switched to NS @125ml/hr. IV vanco added to regimen  Hemorrhoids improving per pt's report. Diarrhea continues. Denied nausea and vomiting. UOP adequate. Today is Day 6. Continues on calorie counts; eating well. Hydrea discontinued. Continue with POC.

## 2019-03-31 NOTE — PLAN OF CARE
D: Day 5/10 Decitabine via picc.  Picc with brisk blood return.  Denies nausea on scheduled antiemetics. Reports fair appetite.  Venetoclax oral dose to 20 mg this evening, teaching ongoing    I:  Decitabine IV over 1 hr as ordered.  Encouraged oral fluids with   increased risk of TLS with venetoclax.  IV hydration at 100 ml/hr continues.   A: Treatment continues.  TLS labs within parameters.   P: Continue treatment as ordered.  Pt will notify nursing if with breakthrough nausea.

## 2019-03-31 NOTE — PROGRESS NOTES
[facility], [place]  Hematology / Oncology Progress Note    Date of Admission: 3/12/2019  Date of Service (when I saw the patient): 03/31/2019     Assessment & Plan   Mr. Ace is a 63 year old male with new diagnosis of acute myeloid leukemia incidentally found during work up of nonspecific chest symptoms. He was started on induction chemotherapy with 7+3 (cytarabine and daunorubicin) with D1=3/15/19. BMBx on 3/25 (done early due to rising WBC and blast counts) demonstrates persistent disease with 95% blasts by flow, 98% by morphology. Started re-induction with decitabine on 3/26 PM with plan to add venetoclax.      Today:  - Continue Decitabine day 6.   - Venetoclax ramp up started 3/29, day 3  - Discontinue Hydrea   - EKG  - CT dental- maxillofacial  - Start IV vanc     HEME:  #AML, refractory   Patient presented to [facility] ED in [place], [place] for complaints of nonspecific chest discomfort after root canal treatment (done 3/11/19). CT C/A/P was negative for PE or other acute findings. On OSH labs, his WBC was incidentally noted to be elevated at 71.1 with Hb 9.0 and plts 97 (prior labs had been unremarkable per patient). Smear was suspicious for immature blasts and acute leukemia. No symptoms of hyperviscosity, TLS or DIC on presentation.  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 Flow consistent with AML with 95% blasts with the following immunophenotype: Positive for CD13, CD33, CD34, CD38, dim to negative CD45, , partial HLA-DR. Baseline ECHO normal, PICC place.   - Initially was on Hydrea 1g q6hr for cytoreduction. Discontinued 3/16 AM.  - HLA typing sent, BMT consult requested  - s/p induction chemotherapy with 7+3 (cytarabine and daunorubicin). Day 1=3/15/19.   - repeat BMBx done early (3/25) due to rising WBC/blast count. Still with persistent heavy burden of disease (98% blasts by morphology).   - final  NGS panel report pending, but results were reviewed with Dr. Hutchins on 3/26 PM  - Started reinduction with decitabine/venetoclax. Decitabine D1=3/26  Added Venetoclax on 3/29( Prescription was approved with Pharmacy liason help, outpatient prescription sent to mail order pharmacy for home dose of 100mg daily)  -WBC continued to rise Started Hydrea 1g BID (x3/27),  increased to 2g BID (x3/28), white count now down trending to 1.0. Hydrea stopped on ( 3/31)  - continue Allopurinol and IVFs  - monitor daily DIC and TLS labs     #Pancytopenia  #Mild epistaxis  2/2 AML & associated chemotherapy.   - transfuse to maintain Hb >7, Plt >10K. If develops ongoing epistaxis, consider increasing plt parameter (though epistaxis occurred with Plt count in 20s)  - Afrin PRN, ocean spray PRN     #Mild coagulopathy, in setting of refractory AML. INR gradually uptrended, today is 1.35 but fibrinogen stable at this time  - monitor daily coags  - plasma for INR >1.8, cryo for fibrinogen <100     ID:  #Neutropenic fever/SIRS.  #C.diff diarrhea/colitis.  First fever on 3/22 with Tmax 101.3.  Now having daily fevers. Treating C.diff as cause currently.   - BCx (3/22-3/24, 3/26, 3/27) are NGTD  - 3/23 fungal BCx NGTD  - UA negative  - 3/22 CXR with small R pleural effusion, no focal airspace opacity  - 3/25 galactomannan and fungitell both negative  - C.diff (3/26) positive  - Ct C/A/P- 3/29- Right thickened hemicolon with adjacent fat stranding- Per ID recs started Flagyl (X3/29)  -  IV vanc (X3/31-)  - 3/31- Ct facial and dental CT, pain at root canal site- results pending  - 3/31 Continue High temps Tmax 102.3, tachycardia. CT facial added, IV vanc added.   -  ID consulted to follow along in this immunocompromised pt. -    **Anti-infectives:  - continue Cefepime (x 3/22)   - PO Vancomycin 125mg four times daily x 10 days (3/26 -4/4 late pm)  - Flagyl (x3/29)  - IV vanc (x 3/31)    #ID PPx   - continue ACV  - Escalate antifungal prophy  to Voriconazole 200mg q12hr         GI:  #Chemotherapy induced nausea  - scheduled Zofran q8hr  - compazine also scheduled q6hr  - PRN antiemetics     #C.diff diarrhea   - treatment as above    #Hemorrhoids  - PRN management: TUCKS pads, sitz bath, topical Prep H and/or lidocaine     #S/P root canal treatment  Patient had a root canal treatment performed on 3/11/19. Site appeared unremarkable with no abscess or drainage. He was started on a 7-day course of oral  mg q6h starting one day prior to the procedure. In retrospect, may have also had some leukemic infiltration of gums complicating his course. Pain at site initially improved during chemotherapy, but did return. Stable/controlled at this time.  - Tylenol PRN, oxycodone PRN   - Continues to have intermittent pain, given high temperatures, will check CT w contrast for abscess or signs of infection       CV:  #Subclinical coronary atherosclerosis  #Hyperlipidemia  Total Agatston calcium score was elevated at 591 (91st percentile) on CT coronaries performed 6/8/2016. Nuclear stress test was negative for ischemia on 8/10/2016. Life style modification measures were recommended. Patient follows with cardiology as outpatient (last office visit on 11/12/18).  - holding atorvastatin at this time, resume in ~2-4 weeks pending treatment course and if LFT's remain normal     NEURO/MSK:  #Restless legs syndrome  - Continue ropinirole 0.75 mg at bedtime, offer PRN ativan for persistent symptoms.     #History of lumbar spine degenerative disc disease   Patient is s/p laminectomy/facetectomy procedures. He does not routinely take pain meds.     :  #BPH   Patient follows with urology ([facility], [place], [place]).   - continue Flomax 0.8 mg daily  - of note, possible drug interaction between voriconazole and flomax (can raise flomax level in blood & cause hypotension). Monitor for this as changing to voriconazole.      RENAL:  #Lyte derangement. Likely 2/2 poor PO  "intake. Cr stable WNL at this time.   - continue IVFs, adjusted to include potassium   - lyte repletion per unit protocol  - calcium corrects for albumin (3/28)    PSYCHOSOCIAL:  #Coping.  Pt appears to coping well overall at this time, taking it \"day by day\". Appreciate SW and  involvement.   - pt had requested help with Advance Directive, SW following      FEN   - Regular diet as tolerated. Monitor decreased intake. Appreciate RD reassessments inpatient.  - continue IVFs, bolus PRN  - PRN lyte replacement per standing protocol     Prophylaxis  - No pharmacologic VTE ppx due to TCP  - PPI for GI/PUD ppx       Code Status: FULL     Disposition: Anticipate 4-6 week LOS pending completion of chemotherapy, count recovery, and resolution of acute toxicities.      Discussed with Dr. Leo.         Interval History   WBC continuing to decrease today to 1.0. Hydrea stopped. Denies any new symptoms but is still having high fevers. During my visit today he was 102.3 and slight rigors. He reports no abdominal pain, still have some loose stools but they have improved. He had been having some tenderness at where is root canal was, today he reports its actually a bit better, but given the ongoing fevers will check a CT and make sure there is no forming abscess. Also will start IV vanc.       Physical Exam   Temp: 102.4  F (39.1  C) Temp src: Oral BP: 123/69 Pulse: 98 Heart Rate: 143 Resp: 20 SpO2: 98 % O2 Device: None (Room air)    Vitals:    03/29/19 0940 03/30/19 0838 03/31/19 0744   Weight: 94.4 kg (208 lb 1.6 oz) 94.2 kg (207 lb 11.2 oz) 93.3 kg (205 lb 9.6 oz)     Vital Signs with Ranges  Temp:  [97.4  F (36.3  C)-102.4  F (39.1  C)] 102.4  F (39.1  C)  Pulse:  [] 98  Heart Rate:  [] 143  Resp:  [16-20] 20  BP: ()/(54-79) 123/69  SpO2:  [97 %-100 %] 98 %  I/O last 3 completed shifts:  In: 4114 [P.O.:1220; I.V.:2775; IV Piggyback:119]  Out: 4300 [Urine:4300]  Constitutional: Awake, alert, " cooperative, in NAD. Sitting up in chair, moves around in room spontaneously. Appears to be feeling better than yesterday. Less fatigue, non-toxic appearing.    HEENT: NC/AT. Sclera clear, conjunctiva normal. OP pink and moist, no lesions or thrush.   Respiratory: Non-labored breathing, good air exchange, on RA. In upright position, lungs are clear to auscultation bilaterally, no wheezing or crackles.    CV: tachycardic rate, regular rhythm  no murmur appreciated  GI: +BS, abd soft, non-distended, non-tender.   Skin: No concerning lesions or rash on exposed surfaces.   Musculoskeletal: No edema bridgett LEs. Gait in room is normal. No joint edema or erythema.   Neurologic: Alert and oriented. Grossly nonfocal.    Neuropsychiatric: Calm, mentation appears normal, affect congruent.   VAD: PICC line in RUE, c/d/i        Medications     dextrose 5% and 0.45% NaCl + KCl 20 mEq/L 100 mL/hr at 03/31/19 0021     - MEDICATION INSTRUCTIONS -       - MEDICATION INSTRUCTIONS -       sodium chloride         acyclovir  400 mg Oral BID     allopurinol  300 mg Oral Daily     ceFEPIme (MAXIPIME) IV  2 g Intravenous Q8H     decitabine (DACOGEN) chemo infusion  20 mg/m2 (Treatment Plan Recorded) Intravenous Q24H     metroNIDAZOLE  500 mg Oral Q8H ZUNILDA     ondansetron  8 mg Oral Q8H     pantoprazole  40 mg Oral BID AC     prochlorperazine  5-10 mg Intravenous Q6H    Or     prochlorperazine  5-10 mg Oral Q6H     rOPINIRole  0.75 mg Oral Daily at 8 pm     sodium chloride (PF)  10 mL Intracatheter Q7 Days     tamsulosin  0.8 mg Oral Daily     vancomycin  125 mg Oral 4x Daily     venetoclax  100 mg Oral Daily with supper     venetoclax  50 mg Oral Daily with supper     voriconazole  200 mg Oral Q12H ZUNILDA       Data   Results for orders placed or performed during the hospital encounter of 03/12/19 (from the past 24 hour(s))   Platelets prepare order unit conditional   Result Value Ref Range    Blood Component Type PLT Pheresis     Units Ordered 1     Blood component   Result Value Ref Range    Unit Number [id number]     Blood Component Type PlateletPheresis,LeukoRed Irrad (Part 2)     Division Number 00     Status of Unit Released to care unit     Blood Product Code [id number]     Unit Status ISS    Phosphorus   Result Value Ref Range    Phosphorus 2.6 2.5 - 4.5 mg/dL   Basic metabolic panel   Result Value Ref Range    Sodium 132 (L) 133 - 144 mmol/L    Potassium 4.5 3.4 - 5.3 mmol/L    Chloride 101 94 - 109 mmol/L    Carbon Dioxide 22 20 - 32 mmol/L    Anion Gap 9 3 - 14 mmol/L    Glucose 235 (H) 70 - 99 mg/dL    Urea Nitrogen 13 7 - 30 mg/dL    Creatinine 0.78 0.66 - 1.25 mg/dL    GFR Estimate >90 >60 mL/min/[1.73_m2]    GFR Estimate If Black >90 >60 mL/min/[1.73_m2]    Calcium 7.4 (L) 8.5 - 10.1 mg/dL   Blood culture   Result Value Ref Range    Specimen Description Blood Blue port     Special Requests Received in aerobic bottle only     Culture Micro No growth after 8 hours    Lactic acid   Result Value Ref Range    Lactic Acid 1.0 0.4 - 2.0 mmol/L   Blood culture   Result Value Ref Range    Specimen Description Blood Left Arm     Special Requests Received in aerobic bottle only     Culture Micro No growth after 8 hours    Urine Culture Aerobic Bacterial   Result Value Ref Range    Specimen Description Catheterized Urine     Special Requests Specimen received in preservative     Culture Micro PENDING    UA with Microscopic   Result Value Ref Range    Color Urine Light Yellow     Appearance Urine Clear     Glucose Urine Negative NEG^Negative mg/dL    Bilirubin Urine Negative NEG^Negative    Ketones Urine Negative NEG^Negative mg/dL    Specific Gravity Urine 1.008 1.003 - 1.035    Blood Urine Negative NEG^Negative    pH Urine 6.5 5.0 - 7.0 pH    Protein Albumin Urine Negative NEG^Negative mg/dL    Urobilinogen mg/dL Normal 0.0 - 2.0 mg/dL    Nitrite Urine Negative NEG^Negative    Leukocyte Esterase Urine Negative NEG^Negative    Source Clean catch urine      WBC Urine <1 0 - 5 /HPF    RBC Urine <1 0 - 2 /HPF    Mucous Urine Present (A) NEG^Negative /LPF   CBC with platelets differential   Result Value Ref Range    WBC 1.3 (L) 4.0 - 11.0 10e9/L    RBC Count 2.61 (L) 4.4 - 5.9 10e12/L    Hemoglobin 8.4 (L) 13.3 - 17.7 g/dL    Hematocrit 25.0 (L) 40.0 - 53.0 %    MCV 96 78 - 100 fl    MCH 32.2 26.5 - 33.0 pg    MCHC 33.6 31.5 - 36.5 g/dL    RDW 17.0 (H) 10.0 - 15.0 %    Platelet Count 27 (LL) 150 - 450 10e9/L    Diff Method PENDING    INR   Result Value Ref Range    INR 1.35 (H) 0.86 - 1.14   Magnesium   Result Value Ref Range    Magnesium 2.2 1.6 - 2.3 mg/dL   Partial thromboplastin time   Result Value Ref Range    PTT 38 (H) 22 - 37 sec   Uric acid   Result Value Ref Range    Uric Acid 3.7 3.5 - 7.2 mg/dL   Fibrinogen activity   Result Value Ref Range    Fibrinogen 641 (H) 200 - 420 mg/dL   Lactate Dehydrogenase   Result Value Ref Range    Lactate Dehydrogenase 436 (H) 85 - 227 U/L   Phosphorus   Result Value Ref Range    Phosphorus 2.9 2.5 - 4.5 mg/dL   Basic metabolic panel   Result Value Ref Range    Sodium 134 133 - 144 mmol/L    Potassium 3.9 3.4 - 5.3 mmol/L    Chloride 102 94 - 109 mmol/L    Carbon Dioxide 22 20 - 32 mmol/L    Anion Gap 9 3 - 14 mmol/L    Glucose 129 (H) 70 - 99 mg/dL    Urea Nitrogen 12 7 - 30 mg/dL    Creatinine 0.75 0.66 - 1.25 mg/dL    GFR Estimate >90 >60 mL/min/[1.73_m2]    GFR Estimate If Black >90 >60 mL/min/[1.73_m2]    Calcium 8.0 (L) 8.5 - 10.1 mg/dL   Hemoglobin A1c   Result Value Ref Range    Hemoglobin A1C 6.0 (H) 0 - 5.6 %

## 2019-03-31 NOTE — PLAN OF CARE
Neutropenic.  Temp high 101.2, other vitals within parameters.  Reported feeling cold, chilled, prior to temp spike. (Had plts and prbc day shift but beyond 6 hour of infusion). MD notified, BC x 2, lactic done (1.0), ua/uc sent. Stool cx also ordered, need collection, hat in toilet, pt aware of need. Continues to report left lower jaw pain,and hemorrhoid pain. Pt stating both at baseline, no worse. TLS labs within parameters, blood sugar noted to be elevated 235.  MD notified A1C ordered with am labs, no tx this evening.  Blood sugar with am labs. Close watch.  Day 5 chemo, see chemo note.

## 2019-03-31 NOTE — PHARMACY-VANCOMYCIN DOSING SERVICE
Pharmacy Vancomycin Initial Note  Date of Service 2019  Patient's  1955  63 year old, male    Indication: Febrile Neutropenia    Current estimated CrCl = Estimated Creatinine Clearance: 114.1 mL/min (based on SCr of 0.75 mg/dL).    Creatinine for last 3 days  3/29/2019:  7:45 AM Creatinine 0.73 mg/dL;  5:35 PM Creatinine 0.71 mg/dL  3/30/2019:  5:56 AM Creatinine 0.76 mg/dL;  5:26 PM Creatinine 0.78 mg/dL  3/31/2019:  6:26 AM Creatinine 0.75 mg/dL    Recent Vancomycin Level(s) for last 3 days  No results found for requested labs within last 72 hours.      Vancomycin IV Administrations (past 72 hours)      No vancomycin orders with administrations in past 72 hours.                Nephrotoxins and other renal medications (From now, onward)    Start     Dose/Rate Route Frequency Ordered Stop    19 1000  vancomycin 1250 mg in 0.9% NaCl 250 mL intermittent infusion 1,250 mg      1,250 mg  over 90 Minutes Intravenous EVERY 12 HOURS 19 0941      19 2230  vancomycin (FIRVANQ) oral solution 125 mg      125 mg Oral 4 TIMES DAILY 19 2224 19 0729    19 2230  acyclovir (ZOVIRAX) tablet 400 mg      400 mg Oral 2 TIMES DAILY 19 2223            Contrast Orders - past 72 hours (72h ago, onward)    Start     Dose/Rate Route Frequency Ordered Stop    19 1230  iopamidol (ISOVUE-370) solution 127 mL      127 mL Intravenous ONCE 19 1228 19 1255                Plan:  1.  Start vancomycin 1250 mg(13.5mg/kg) IV q12h.   2.  Goal Trough Level: 10-15 mg/L   3.  Pharmacy will check trough levels as appropriate in 1-3 Days.    4. Serum creatinine levels will be ordered daily for the first week of therapy and at least twice weekly for subsequent weeks.    5. [place] method utilized to dose vancomycin therapy: Method 2    Jesus BustamanteD  PGY1 Resident

## 2019-04-01 ENCOUNTER — APPOINTMENT (OUTPATIENT)
Dept: PHYSICAL THERAPY | Facility: CLINIC | Age: 64
DRG: 834 | End: 2019-04-01
Payer: COMMERCIAL

## 2019-04-01 ENCOUNTER — ALLIED HEALTH/NURSE VISIT (OUTPATIENT)
Dept: CARE COORDINATION | Facility: CLINIC | Age: 64
End: 2019-04-01

## 2019-04-01 DIAGNOSIS — Z71.9 VISIT FOR COUNSELING: Primary | ICD-10-CM

## 2019-04-01 LAB
ALBUMIN SERPL-MCNC: 2.3 G/DL (ref 3.4–5)
ALP SERPL-CCNC: 80 U/L (ref 40–150)
ALT SERPL W P-5'-P-CCNC: 31 U/L (ref 0–70)
ANION GAP SERPL CALCULATED.3IONS-SCNC: 9 MMOL/L (ref 3–14)
ANION GAP SERPL CALCULATED.3IONS-SCNC: 9 MMOL/L (ref 3–14)
ANISOCYTOSIS BLD QL SMEAR: SLIGHT
APTT PPP: 37 SEC (ref 22–37)
AST SERPL W P-5'-P-CCNC: 17 U/L (ref 0–45)
BACTERIA SPEC CULT: NO GROWTH
BACTERIA SPEC CULT: NO GROWTH
BASOPHILS # BLD AUTO: 0 10E9/L (ref 0–0.2)
BASOPHILS NFR BLD AUTO: 0 %
BILIRUB DIRECT SERPL-MCNC: 0.3 MG/DL (ref 0–0.2)
BILIRUB SERPL-MCNC: 0.8 MG/DL (ref 0.2–1.3)
BLASTS # BLD: 0.2 10E9/L
BLASTS BLD QL SMEAR: 43 %
BLD PROD TYP BPU: NORMAL
BLD PROD TYP BPU: NORMAL
BLD UNIT ID BPU: 0
BLD UNIT ID BPU: 0
BLOOD PRODUCT CODE: NORMAL
BLOOD PRODUCT CODE: NORMAL
BPU ID: NORMAL
BPU ID: NORMAL
BUN SERPL-MCNC: 11 MG/DL (ref 7–30)
BUN SERPL-MCNC: 11 MG/DL (ref 7–30)
CALCIUM SERPL-MCNC: 7.2 MG/DL (ref 8.5–10.1)
CALCIUM SERPL-MCNC: 7.5 MG/DL (ref 8.5–10.1)
CHLORIDE SERPL-SCNC: 101 MMOL/L (ref 94–109)
CHLORIDE SERPL-SCNC: 102 MMOL/L (ref 94–109)
CMV DNA SPEC NAA+PROBE-ACNC: NORMAL [IU]/ML
CMV DNA SPEC NAA+PROBE-LOG#: NORMAL {LOG_IU}/ML
CO2 SERPL-SCNC: 21 MMOL/L (ref 20–32)
CO2 SERPL-SCNC: 21 MMOL/L (ref 20–32)
CREAT SERPL-MCNC: 0.76 MG/DL (ref 0.66–1.25)
CREAT SERPL-MCNC: 0.83 MG/DL (ref 0.66–1.25)
DIFFERENTIAL METHOD BLD: ABNORMAL
EOSINOPHIL # BLD AUTO: 0 10E9/L (ref 0–0.7)
EOSINOPHIL NFR BLD AUTO: 0 %
ERYTHROCYTE [DISTWIDTH] IN BLOOD BY AUTOMATED COUNT: 16.4 % (ref 10–15)
FIBRINOGEN PPP-MCNC: 523 MG/DL (ref 200–420)
GFR SERPL CREATININE-BSD FRML MDRD: >90 ML/MIN/{1.73_M2}
GFR SERPL CREATININE-BSD FRML MDRD: >90 ML/MIN/{1.73_M2}
GLUCOSE SERPL-MCNC: 111 MG/DL (ref 70–99)
GLUCOSE SERPL-MCNC: 116 MG/DL (ref 70–99)
GRAM STN SPEC: NORMAL
GRAM STN SPEC: NORMAL
HCT VFR BLD AUTO: 22 % (ref 40–53)
HGB BLD-MCNC: 6.9 G/DL (ref 13.3–17.7)
INR PPP: 1.51 (ref 0.86–1.14)
INTERPRETATION ECG - MUSE: NORMAL
LACTATE BLD-SCNC: 0.5 MMOL/L (ref 0.7–2)
LDH SERPL L TO P-CCNC: 305 U/L (ref 85–227)
LYMPHOCYTES # BLD AUTO: 0.2 10E9/L (ref 0.8–5.3)
LYMPHOCYTES NFR BLD AUTO: 49 %
Lab: NORMAL
MAGNESIUM SERPL-MCNC: 2.1 MG/DL (ref 1.6–2.3)
MCH RBC QN AUTO: 31.5 PG (ref 26.5–33)
MCHC RBC AUTO-ENTMCNC: 31.4 G/DL (ref 31.5–36.5)
MCV RBC AUTO: 101 FL (ref 78–100)
MONOCYTES # BLD AUTO: 0 10E9/L (ref 0–1.3)
MONOCYTES NFR BLD AUTO: 8 %
NEUTROPHILS # BLD AUTO: 0 10E9/L (ref 1.6–8.3)
NEUTROPHILS NFR BLD AUTO: 0 %
O+P STL MICRO: NORMAL
O+P STL MICRO: NORMAL
PHOSPHATE SERPL-MCNC: 2.2 MG/DL (ref 2.5–4.5)
PHOSPHATE SERPL-MCNC: 2.7 MG/DL (ref 2.5–4.5)
PLATELET # BLD AUTO: 17 10E9/L (ref 150–450)
POIKILOCYTOSIS BLD QL SMEAR: SLIGHT
POTASSIUM SERPL-SCNC: 3.7 MMOL/L (ref 3.4–5.3)
POTASSIUM SERPL-SCNC: 3.8 MMOL/L (ref 3.4–5.3)
PROT SERPL-MCNC: 6 G/DL (ref 6.8–8.8)
RBC # BLD AUTO: 2.19 10E12/L (ref 4.4–5.9)
SCR 1 TEST METHOD: NORMAL
SCR1 CELL: NORMAL
SCR1 COMMENTS: NORMAL
SCR1 RESULT: NORMAL
SCR2 CELL: NORMAL
SCR2 COMMENTS: NORMAL
SCR2 RESULT: NORMAL
SCR2 TEST METHOD: NORMAL
SODIUM SERPL-SCNC: 131 MMOL/L (ref 133–144)
SODIUM SERPL-SCNC: 132 MMOL/L (ref 133–144)
SPECIMEN SOURCE: NORMAL
TRANSFUSION RXN BLOOD BANK NOTIFICATION: NORMAL
TRANSFUSION STATUS PATIENT QL: NORMAL
URATE SERPL-MCNC: 3 MG/DL (ref 3.5–7.2)
WBC # BLD AUTO: 0.5 10E9/L (ref 4–11)

## 2019-04-01 PROCEDURE — 85610 PROTHROMBIN TIME: CPT | Performed by: INTERNAL MEDICINE

## 2019-04-01 PROCEDURE — 83615 LACTATE (LD) (LDH) ENZYME: CPT | Performed by: INTERNAL MEDICINE

## 2019-04-01 PROCEDURE — 85025 COMPLETE CBC W/AUTO DIFF WBC: CPT | Performed by: INTERNAL MEDICINE

## 2019-04-01 PROCEDURE — C9399 UNCLASSIFIED DRUGS OR BIOLOG: HCPCS | Performed by: INTERNAL MEDICINE

## 2019-04-01 PROCEDURE — 80048 BASIC METABOLIC PNL TOTAL CA: CPT | Performed by: INTERNAL MEDICINE

## 2019-04-01 PROCEDURE — 25000132 ZZH RX MED GY IP 250 OP 250 PS 637: Performed by: INTERNAL MEDICINE

## 2019-04-01 PROCEDURE — 25000132 ZZH RX MED GY IP 250 OP 250 PS 637: Performed by: PHYSICIAN ASSISTANT

## 2019-04-01 PROCEDURE — P9016 RBC LEUKOCYTES REDUCED: HCPCS

## 2019-04-01 PROCEDURE — 12000001 ZZH R&B MED SURG/OB UMMC

## 2019-04-01 PROCEDURE — 36592 COLLECT BLOOD FROM PICC: CPT | Performed by: INTERNAL MEDICINE

## 2019-04-01 PROCEDURE — 36592 COLLECT BLOOD FROM PICC: CPT

## 2019-04-01 PROCEDURE — 36415 COLL VENOUS BLD VENIPUNCTURE: CPT | Performed by: INTERNAL MEDICINE

## 2019-04-01 PROCEDURE — 25000132 ZZH RX MED GY IP 250 OP 250 PS 637: Performed by: NURSE PRACTITIONER

## 2019-04-01 PROCEDURE — 87040 BLOOD CULTURE FOR BACTERIA: CPT | Performed by: PHYSICIAN ASSISTANT

## 2019-04-01 PROCEDURE — 25800030 ZZH RX IP 258 OP 636: Performed by: NURSE PRACTITIONER

## 2019-04-01 PROCEDURE — 84100 ASSAY OF PHOSPHORUS: CPT | Performed by: INTERNAL MEDICINE

## 2019-04-01 PROCEDURE — 25000125 ZZHC RX 250: Performed by: INTERNAL MEDICINE

## 2019-04-01 PROCEDURE — 80048 BASIC METABOLIC PNL TOTAL CA: CPT | Performed by: NURSE PRACTITIONER

## 2019-04-01 PROCEDURE — 40000341 ZZHCL STATISTIC BB TRANSF RXN INVEST

## 2019-04-01 PROCEDURE — 87040 BLOOD CULTURE FOR BACTERIA: CPT | Performed by: INTERNAL MEDICINE

## 2019-04-01 PROCEDURE — 83605 ASSAY OF LACTIC ACID: CPT | Performed by: INTERNAL MEDICINE

## 2019-04-01 PROCEDURE — 36592 COLLECT BLOOD FROM PICC: CPT | Performed by: PHYSICIAN ASSISTANT

## 2019-04-01 PROCEDURE — 80076 HEPATIC FUNCTION PANEL: CPT | Performed by: INTERNAL MEDICINE

## 2019-04-01 PROCEDURE — 85384 FIBRINOGEN ACTIVITY: CPT | Performed by: INTERNAL MEDICINE

## 2019-04-01 PROCEDURE — 85730 THROMBOPLASTIN TIME PARTIAL: CPT | Performed by: INTERNAL MEDICINE

## 2019-04-01 PROCEDURE — 83735 ASSAY OF MAGNESIUM: CPT | Performed by: INTERNAL MEDICINE

## 2019-04-01 PROCEDURE — 25000128 H RX IP 250 OP 636

## 2019-04-01 PROCEDURE — 25000128 H RX IP 250 OP 636: Performed by: PHYSICIAN ASSISTANT

## 2019-04-01 PROCEDURE — 25800030 ZZH RX IP 258 OP 636: Performed by: INTERNAL MEDICINE

## 2019-04-01 PROCEDURE — 25000131 ZZH RX MED GY IP 250 OP 636 PS 637: Performed by: PHYSICIAN ASSISTANT

## 2019-04-01 PROCEDURE — 25000128 H RX IP 250 OP 636: Performed by: INTERNAL MEDICINE

## 2019-04-01 PROCEDURE — 97110 THERAPEUTIC EXERCISES: CPT | Mod: GP

## 2019-04-01 PROCEDURE — 40000802 ZZH SITE CHECK

## 2019-04-01 PROCEDURE — 84550 ASSAY OF BLOOD/URIC ACID: CPT | Performed by: INTERNAL MEDICINE

## 2019-04-01 PROCEDURE — 84100 ASSAY OF PHOSPHORUS: CPT | Performed by: NURSE PRACTITIONER

## 2019-04-01 PROCEDURE — 25000128 H RX IP 250 OP 636: Performed by: NURSE PRACTITIONER

## 2019-04-01 PROCEDURE — 25800030 ZZH RX IP 258 OP 636

## 2019-04-01 RX ORDER — PIPERACILLIN SODIUM, TAZOBACTAM SODIUM 3; .375 G/15ML; G/15ML
3.38 INJECTION, POWDER, LYOPHILIZED, FOR SOLUTION INTRAVENOUS EVERY 6 HOURS
Status: DISCONTINUED | OUTPATIENT
Start: 2019-04-01 | End: 2019-04-03

## 2019-04-01 RX ADMIN — VANCOMYCIN HYDROCHLORIDE 125 MG: KIT at 12:59

## 2019-04-01 RX ADMIN — VORICONAZOLE 200 MG: 200 TABLET, FILM COATED ORAL at 08:13

## 2019-04-01 RX ADMIN — ACETAMINOPHEN 650 MG: 325 TABLET, FILM COATED ORAL at 13:43

## 2019-04-01 RX ADMIN — ACYCLOVIR 400 MG: 400 TABLET ORAL at 20:16

## 2019-04-01 RX ADMIN — DECITABINE 43 MG: 50 INJECTION, POWDER, LYOPHILIZED, FOR SOLUTION INTRAVENOUS at 20:14

## 2019-04-01 RX ADMIN — CEFEPIME 2 G: 2 INJECTION, POWDER, FOR SOLUTION INTRAVENOUS at 02:04

## 2019-04-01 RX ADMIN — ONDANSETRON HYDROCHLORIDE 8 MG: 8 TABLET, FILM COATED ORAL at 00:25

## 2019-04-01 RX ADMIN — TAMSULOSIN HYDROCHLORIDE 0.8 MG: 0.4 CAPSULE ORAL at 08:13

## 2019-04-01 RX ADMIN — PROCHLORPERAZINE MALEATE 10 MG: 5 TABLET, FILM COATED ORAL at 00:25

## 2019-04-01 RX ADMIN — SODIUM CHLORIDE: 9 INJECTION, SOLUTION INTRAVENOUS at 16:29

## 2019-04-01 RX ADMIN — PANTOPRAZOLE SODIUM 40 MG: 40 TABLET, DELAYED RELEASE ORAL at 16:27

## 2019-04-01 RX ADMIN — PROCHLORPERAZINE MALEATE 10 MG: 5 TABLET, FILM COATED ORAL at 23:59

## 2019-04-01 RX ADMIN — SODIUM CHLORIDE 1000 ML: 9 INJECTION, SOLUTION INTRAVENOUS at 10:11

## 2019-04-01 RX ADMIN — OXYCODONE HYDROCHLORIDE 5 MG: 5 TABLET ORAL at 06:14

## 2019-04-01 RX ADMIN — ONDANSETRON HYDROCHLORIDE 8 MG: 8 TABLET, FILM COATED ORAL at 08:13

## 2019-04-01 RX ADMIN — PROCHLORPERAZINE MALEATE 10 MG: 5 TABLET, FILM COATED ORAL at 17:56

## 2019-04-01 RX ADMIN — ONDANSETRON HYDROCHLORIDE 8 MG: 8 TABLET, FILM COATED ORAL at 23:59

## 2019-04-01 RX ADMIN — ALLOPURINOL 300 MG: 300 TABLET ORAL at 08:13

## 2019-04-01 RX ADMIN — ACETAMINOPHEN 650 MG: 325 TABLET, FILM COATED ORAL at 21:56

## 2019-04-01 RX ADMIN — VANCOMYCIN HYDROCHLORIDE 125 MG: KIT at 08:13

## 2019-04-01 RX ADMIN — VANCOMYCIN HYDROCHLORIDE 1250 MG: 10 INJECTION, POWDER, LYOPHILIZED, FOR SOLUTION INTRAVENOUS at 10:06

## 2019-04-01 RX ADMIN — ACETAMINOPHEN 650 MG: 325 TABLET, FILM COATED ORAL at 00:25

## 2019-04-01 RX ADMIN — PROCHLORPERAZINE MALEATE 10 MG: 5 TABLET, FILM COATED ORAL at 12:59

## 2019-04-01 RX ADMIN — METRONIDAZOLE 500 MG: 500 TABLET ORAL at 06:08

## 2019-04-01 RX ADMIN — VANCOMYCIN HYDROCHLORIDE 125 MG: KIT at 16:27

## 2019-04-01 RX ADMIN — ACETAMINOPHEN 650 MG: 325 TABLET, FILM COATED ORAL at 17:56

## 2019-04-01 RX ADMIN — PIPERACILLIN AND TAZOBACTAM 3.38 G: 3; .375 INJECTION, POWDER, FOR SOLUTION INTRAVENOUS at 21:56

## 2019-04-01 RX ADMIN — ACYCLOVIR 400 MG: 400 TABLET ORAL at 08:13

## 2019-04-01 RX ADMIN — ROPINIROLE HYDROCHLORIDE 0.75 MG: 0.5 TABLET, FILM COATED ORAL at 21:57

## 2019-04-01 RX ADMIN — POTASSIUM PHOSPHATE, MONOBASIC AND POTASSIUM PHOSPHATE, DIBASIC 15 MMOL: 224; 236 INJECTION, SOLUTION INTRAVENOUS at 12:12

## 2019-04-01 RX ADMIN — ACETAMINOPHEN 650 MG: 325 TABLET, FILM COATED ORAL at 09:40

## 2019-04-01 RX ADMIN — VENETOCLAX 100 MG: 100 TABLET, FILM COATED ORAL at 17:57

## 2019-04-01 RX ADMIN — VORICONAZOLE 200 MG: 200 TABLET, FILM COATED ORAL at 20:16

## 2019-04-01 RX ADMIN — PANTOPRAZOLE SODIUM 40 MG: 40 TABLET, DELAYED RELEASE ORAL at 08:13

## 2019-04-01 RX ADMIN — ONDANSETRON HYDROCHLORIDE 8 MG: 8 TABLET, FILM COATED ORAL at 16:27

## 2019-04-01 RX ADMIN — PROCHLORPERAZINE MALEATE 10 MG: 5 TABLET, FILM COATED ORAL at 06:08

## 2019-04-01 RX ADMIN — PIPERACILLIN AND TAZOBACTAM 3.38 G: 3; .375 INJECTION, POWDER, FOR SOLUTION INTRAVENOUS at 16:28

## 2019-04-01 RX ADMIN — PIPERACILLIN AND TAZOBACTAM 3.38 G: 3; .375 INJECTION, POWDER, FOR SOLUTION INTRAVENOUS at 09:35

## 2019-04-01 RX ADMIN — VANCOMYCIN HYDROCHLORIDE 125 MG: KIT at 20:18

## 2019-04-01 ASSESSMENT — ACTIVITIES OF DAILY LIVING (ADL)
ADLS_ACUITY_SCORE: 14

## 2019-04-01 ASSESSMENT — PAIN DESCRIPTION - DESCRIPTORS
DESCRIPTORS: ACHING
DESCRIPTORS: ACHING

## 2019-04-01 NOTE — PROGRESS NOTES
Calorie Count  Intake recorded for: 3/30/19  Total Kcals: 1034 Total Protein: 39g  Kcals from Hospital Food: 1034   Protein: 39g  Kcals from Outside Food (average):0 Protein: 0g  # Meals Recorded: 3 meals (first - 100% low-fat strawberry yogurt, 1 boiled egg)  (second - 100% 8oz apple juice, 75% sabiha food cake w/ strawberries, sweet & sour stir moura w/ chicken, vegetables & white rice)  (third - 100% chocolate ice cream, 8oz orange juice, less than 25% meatloaf, mashed potatoes w/ gravy)  # Supplements Recorded: no intake recorded

## 2019-04-01 NOTE — CONSULTS
Dental Service Consultation        El Ace MRN# [id number]   YOB: 1955 Age: 63 year old   Date of Admission: 3/12/2019     Reason for consult: I was asked by PATRICIO Duckworth to evaluate this patient for odontogenic infection in association with acute myeloid leukemia chemotherapy.           Assessment and Plan:   Assessment: Patient reports having root canal therapy completed on LL first molar (#19) third weeks prior, just days prior to hospital admission.  Reports 10/10 pain on LL which has subsided to constant 2/10 pain at this time.  Mild tenderness to palpation of left submandibular lymph node.  No drainable facial swelling noted.  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  #19 displayed very elevated pain response to percussion, indicative of possible odontogenic infection.  #18 displays elevated percussion sensitivity, but patient states that it is not as bad as #19.  UL 2nd molar (#15) and LR 1st molar (#30) no elevated response to percussion.  #30 PA radiolucency noted on CT.    Plan:  Possible odontogenic infection related to tooth #19, as well as #18.  Due to limitations of radiographs and examination in hospital, it is recommended that patient be transferred to dental clinic for full assessment and procedural planning prior to any treatment.  First option is for patient to be transferred to [facility] at [facility] [address] for full dental assessment and treatment in clinic.  If patient can not be transferred to [facility], recommend coordination for patient to be seen at [facility] ([facility] 7th floor) for dental radiographs and assessment on Thursday or Friday of the week when [facility] attendings are present in Urgent Care Clinic, Dr. Javier Pagan or Dr. Ginger Sellers.  Please call [facility] at [phone] to schedule either option.            Chief Complaint:   LL dental pain, AML         History of Present Illness:   This patient is a 63 year old male admitted to [address] with AML              Past Medical History:     Past Medical History:   Diagnosis Date     Acute leukemia (H) 3/12/2019             Past Surgical History:     Past Surgical History:   Procedure Laterality Date     PICC INSERTION Right 03/14/2019    5Fr - 42cm (2cm external), basilic vein, high SVC               Social History:     Social History     Tobacco Use     Smoking status: Not on file   Substance Use Topics     Alcohol use: Not on file             Family History:   No family history on file.          Immunizations:     There is no immunization history on file for this patient.          Allergies:   No Known Allergies          Medications:     Current Facility-Administered Medications Ordered in Epic   Medication Dose Route Frequency Last Rate Last Dose     0.9% sodium chloride BOLUS  1,000 mL Intravenous Once 250 mL/hr at 04/01/19 1011 1,000 mL at 04/01/19 1011     acetaminophen (TYLENOL) tablet 650 mg  650 mg Oral Q4H PRN   650 mg at 04/01/19 0940     acyclovir (ZOVIRAX) tablet 400 mg  400 mg Oral BID   400 mg at 04/01/19 0813     albuterol (PROAIR HFA/PROVENTIL HFA/VENTOLIN HFA) 108 (90 Base) MCG/ACT inhaler 1-2 puff  1-2 puff Inhalation Once PRN         albuterol (PROVENTIL) neb solution 2.5 mg  2.5 mg Nebulization Once PRN         allopurinol (ZYLOPRIM) tablet 300 mg  300 mg Oral Daily   300 mg at 04/01/19 0813     baclofen (LIORESAL) tablet 10 mg  10 mg Oral TID PRN   10 mg at 03/18/19 1921     calcium carbonate (TUMS) chewable tablet 500 mg  500 mg Oral Daily PRN         decitabine (DACOGEN) 43 mg in sodium chloride 0.9 % 119 mL CHEMOTHERAPY  20 mg/m2 (Treatment Plan Recorded) Intravenous Q24H 119 mL/hr at 03/31/19 2001 43 mg at 03/31/19 2001     diphenhydrAMINE (BENADRYL) injection 50 mg  50 mg Intravenous Once PRN         EPINEPHrine PF (ADRENALIN) injection  0.3 mg  0.3 mg Intramuscular Q5 Min PRN         lidocaine (XYLOCAINE) 2 % topical gel   Topical Q4H PRN         LORazepam (ATIVAN) tablet 0.5-1 mg  0.5-1 mg Oral Q6H PRN   0.5 mg at 03/24/19 0237    Or     LORazepam (ATIVAN) injection 0.5-1 mg  0.5-1 mg Intravenous Q6H PRN   0.5 mg at 03/23/19 1346     magic mouthwash suspension (diphenhydramine, lidocaine, aluminum-magnesium & simethicone)  10 mL Swish & Swallow Q6H PRN         magnesium sulfate 4 g in 100 mL sterile water (premade)  4 g Intravenous Q4H PRN         MEDICATION INSTRUCTION   Does not apply Continuous PRN         Medication Instruction   Does not apply Continuous PRN         meperidine (DEMEROL) injection 25 mg  25 mg Intravenous Q30 Min PRN         methylPREDNISolone sodium succinate (solu-MEDROL) injection 125 mg  125 mg Intravenous Once PRN         naloxone (NARCAN) injection 0.1-0.4 mg  0.1-0.4 mg Intravenous Q2 Min PRN         OLANZapine zydis (zyPREXA) ODT tab 5 mg  5 mg Oral At Bedtime PRN         ondansetron (ZOFRAN) tablet 8 mg  8 mg Oral Q8H   8 mg at 04/01/19 0813     oxyCODONE (ROXICODONE) tablet 5 mg  5 mg Oral Q6H PRN   5 mg at 04/01/19 0614     oxymetazoline (AFRIN) 0.05 % spray 2 spray  2 spray Both Nostrils Q1H PRN   2 spray at 03/27/19 0519     pantoprazole (PROTONIX) EC tablet 40 mg  40 mg Oral BID AC   40 mg at 04/01/19 0813     piperacillin-tazobactam (ZOSYN) 3.375 g vial to attach to  mL bag  3.375 g Intravenous Q6H   3.375 g at 04/01/19 0935     polyethylene glycol (MIRALAX/GLYCOLAX) Packet 17 g  17 g Oral Daily PRN   17 g at 03/18/19 2213     potassium chloride (KLOR-CON) Packet 20-40 mEq  20-40 mEq Oral or Feeding Tube Q2H PRN         potassium chloride 10 mEq in 100 mL intermittent infusion with 10 mg lidocaine  10 mEq Intravenous Q1H PRN         potassium chloride 10 mEq in 100 mL sterile water intermittent infusion (premix)  10 mEq Intravenous Q1H PRN         potassium chloride 20 mEq in 50 mL intermittent infusion   20 mEq Intravenous Q1H PRN 50 mL/hr at 03/26/19 2349 20 mEq at 03/29/19 0013     potassium chloride ER (K-DUR/KLOR-CON M) CR tablet 20-40 mEq  20-40 mEq Oral Q2H PRN         potassium phosphate 15 mmol in D5W 250 mL intermittent infusion  15 mmol Intravenous Daily PRN   15 mmol at 04/01/19 1212     potassium phosphate 20 mmol in D5W 250 mL intermittent infusion  20 mmol Intravenous Q6H PRN   20 mmol at 03/29/19 0301     potassium phosphate 20 mmol in D5W 500 mL intermittent infusion  20 mmol Intravenous Q6H PRN         potassium phosphate 25 mmol in D5W 500 mL intermittent infusion  25 mmol Intravenous Q8H PRN         pramox-pe-glycerin-petrolatum (PREPARATION H) cream   Rectal 4x Daily PRN         prochlorperazine (COMPAZINE) injection 5-10 mg  5-10 mg Intravenous Q6H   5 mg at 03/22/19 1339    Or     prochlorperazine (COMPAZINE) tablet 5-10 mg  5-10 mg Oral Q6H   10 mg at 04/01/19 0608     ranitidine (ZANTAC) tablet 150 mg  150 mg Oral BID PRN         rOPINIRole (REQUIP) tablet 0.75 mg  0.75 mg Oral Daily at 8 pm   0.75 mg at 03/31/19 2219     sennosides (SENOKOT) tablet 8.6 mg  8.6 mg Oral BID PRN         sodium chloride (OCEAN) 0.65 % nasal spray 1-2 spray  1-2 spray Both Nostrils Q1H PRN         sodium chloride (PF) 0.9% PF flush 10 mL  10 mL Intracatheter Q7 Days   10 mL at 03/21/19 1220     sodium chloride (PF) 0.9% PF flush 10-20 mL  10-20 mL Intracatheter q1 min prn   20 mL at 04/01/19 1046     sodium chloride 0.9% infusion   Intravenous Continuous 125 mL/hr at 03/31/19 0916       sodium chloride 0.9% infusion  1,000 mL Intravenous Continuous PRN         sucralfate (CARAFATE) suspension 1 g  1 g Oral TID PRN         tamsulosin (FLOMAX) capsule 0.8 mg  0.8 mg Oral Daily   0.8 mg at 04/01/19 0813     vancomycin (FIRVANQ) oral solution 125 mg  125 mg Oral 4x Daily   125 mg at 04/01/19 0813     vancomycin 1250 mg in 0.9% NaCl 250 mL intermittent infusion 1,250 mg  1,250 mg Intravenous Q12H   1,250 mg at  04/01/19 1006     venetoclax (VENCLEXTA) tablet CHEMOTHERAPY 100 mg  100 mg Oral Daily with supper         voriconazole (VFEND) tablet 200 mg  200 mg Oral Q12H ZUNILDA   200 mg at 04/01/19 0813     Current Outpatient Medications Ordered in Epic   Medication     venetoclax (VENCLEXTA) 100 MG tablet CHEMOTHERAPY     venetoclax (VENCLEXTA) 100 MG tablet CHEMOTHERAPY     venetoclax (VENCLEXTA) 100 MG tablet CHEMOTHERAPY             Review of Systems:   The 10 point Review of Systems is negative other than noted in the HPI            Physical Exam:   Vitals were reviewed  Temp: 97.1  F (36.2  C) Temp src: Oral BP: 105/57 Pulse: 102 Heart Rate: 112 Resp: 18 SpO2: 95 % O2 Device: None (Room air)      Head and neck exam: slight tenderness to palpation of L submandibular lymph node    Intraoral exam: LL buccal shelf erythema around LL molars (#18 and #19) 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  #19 displayed very elevated pain response to percussion, indicative of possible odontogenic infection.  #18 displays elevated percussion sensitivity, but patient states that it is not as bad as #19.  UL 2nd molar (#15) and LR 1st molar (#30) no elevated response to percussion.  #30 PA radiolucency noted on CT.       Data:   Radiographic interpretation: Dental CT taken on 3/12/2019  Osseous pathology: none  Pulpal Pathology: previous RCT #19  Periodontal Pathology: #19 fistula at furcation  Caries: not visible  Odontogenic pathology: PA radiolucency #30, possibly also #18 and #19    The patient was discussed with: Dr. Javier Davis, BLAIRES  PGY2  Pager: [phone]

## 2019-04-01 NOTE — PROGRESS NOTES
General acute hospital, [place]  Hematology / Oncology Progress Note    Date of Admission: 3/12/2019  Date of Service (when I saw the patient): 04/01/2019     Assessment & Plan   Mr. Ace is a 63 year old male with new diagnosis of acute myeloid leukemia incidentally found during work up of nonspecific chest symptoms. He was started on induction chemotherapy with 7+3 (cytarabine and daunorubicin) with D1=3/15/19. BMBx on 3/25 (done early due to rising WBC and blast counts) demonstrates persistent disease with 95% blasts by flow, 98% by morphology. Started re-induction with decitabine on 3/26 PM with plan to add venetoclax.      Today:  - Dental consult for cellulitis seen along L hemimandible  - change cefepime/flagyl to Zosyn  - continue PO Vanco/ IV vancomycin  - NS bolus for orthostatic BP/HR and ongoing fevers  - continue decitabine/venetoclax      HEME:  #AML, refractory   Patient presented to [facility] ED in [place], [place] for complaints of nonspecific chest discomfort after root canal treatment (done 3/11/19). CT C/A/P was negative for PE or other acute findings. On OSH labs, his WBC was incidentally noted to be elevated at 71.1 with Hb 9.0 and plts 97 (prior labs had been unremarkable per patient). Smear was suspicious for immature blasts and acute leukemia. No symptoms of hyperviscosity, TLS or DIC on presentation.  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 Flow consistent with AML with 95% blasts with the following immunophenotype: Positive for CD13, CD33, CD34, CD38, dim to negative CD45, , partial HLA-DR. Baseline ECHO normal, PICC place.   - HLA typing sent, BMT consult done 3/29 by Dr. Jeter  - s/p initial Hydrea (dc'd 3/16) followed by induction chemotherapy with 7+3 (cytarabine and daunorubicin). Day 1=3/15/19.   - repeat BMBx done early (3/25) due to rising WBC/blast count. Still with persistent heavy burden  of disease (98% blasts by morphology).   - s/p Hydrea for WBC count management; started 1g BID (3/27), incresaed to 2g BID (x3/28). Hydrea discontinued 3/31.   - Started reinduction with decitabine/venetoclax. Decitabine D1=3/26; added Venetoclax on 3/29. Ramp up dosing accounting for concomitant voriconazole. Final dose will be 100mg daily. Today is D7 from start of reinduction.  - continue Allopurinol and IVFs  - TLS labs daily with venetoclax ramp-up     #Pancytopenia  #Mild epistaxis, resolved  2/2 AML & associated chemotherapy.   - transfuse to maintain Hb >7, Plt >10K.   - Afrin PRN, ocean spray PRN     #Mild coagulopathy, in setting of refractory AML. INR gradually uptrended (ranging 1.3-1.5), fibrinogen stable at this time  - monitor daily coags  - plasma for INR >1.8, cryo for fibrinogen <100     ID:  #Neutropenic fever/SIRS.  #C.diff diarrhea/colitis.  First fever on 3/22. Now having daily fevers with Tmax into the 102s. Sources include c.diff colitis and L mandible cellulitis.  - BCx 3/22 to present are NGTD  - 3/23 fungal BCx NGTD  - 3/22 UA negative, repeat UA/UCx negative on 3/30  - 3/22 CXR with small R pleural effusion, no focal airspace opacity  - 3/25 galactomannan and fungitell both negative  - 3/26 C.diff positive  - 3/29 CT C/A/P demonstrated Right thickened hemicolon with adjacent fat stranding;  - 3/31 CT Dental demonstrated left hemimandible cellulitis  - ID following  - Dental consult for CT findings    **Anti-infectives:  - continue Cefepime (x 3/22). Will stop Cefepime and change to Zosyn (x 4/1) for better anaerobic coverage given concern for mandible cellulitis  - PO Vancomycin 125mg four times daily x 10 days (3/26 -4/4 late pm)  - PO Flagyl (x3/29). Discontinue 4/1 as changing to Zosyn.  - continue IV vanc (x 3/31), started due to ongoing fevers. If cultures negative and fevers improve with start of Zosyn, consider discontinuing in next couple days.     #ID PPx   - continue ACV  -  Escalated antifungal prophy to Voriconazole 200mg q12hr         GI:  #Chemotherapy induced nausea, controlled  - scheduled Zofran q8hr  - compazine also scheduled q6hr  - PRN antiemetics     #C.diff diarrhea   - treatment as above    #Hemorrhoids  - PRN management: TUCKS pads, sitz bath, topical Prep H and/or lidocaine     #S/P root canal treatment  Patient had a root canal treatment performed on 3/11/19. Site appeared unremarkable with no abscess or drainage. He was started on a 7-day course of oral  mg q6h starting one day prior to the procedure. In retrospect, may have also had some leukemic infiltration of gums complicating his course. Pain at site initially improved during chemotherapy, but did return. Pain worsened and with higher temps, checked CT Dental which demonstrated cellulitis along left mandible.   - Tylenol PRN, oxycodone PRN   - abx as above  - Dental consult as above     CV:  #Subclinical coronary atherosclerosis  #Hyperlipidemia  Total Agatston calcium score was elevated at 591 (91st percentile) on CT coronaries performed 6/8/2016. Nuclear stress test was negative for ischemia on 8/10/2016. Life style modification measures were recommended. Patient follows with cardiology as outpatient (last office visit on 11/12/18).  - holding atorvastatin at this time, resume in ~2-4 weeks pending treatment course and if LFT's remain normal     NEURO/MSK:  #Restless legs syndrome  - Continue ropinirole 0.75 mg at bedtime, offer PRN ativan for persistent symptoms.     #History of lumbar spine degenerative disc disease   Patient is s/p laminectomy/facetectomy procedures. He does not routinely take pain meds.     :  #BPH   Patient follows with urology ([facility], [place], [place]).   - continue Flomax 0.8 mg daily  - of note, possible drug interaction between voriconazole and flomax (can raise flomax level in blood & cause hypotension). Monitor for this as now on voriconazole.      RENAL:  #Lyte  "derangement. Likely 2/2 poor PO intake. Cr stable WNL at this time.   - continue IVFs  - lyte repletion per unit protocol  - calcium corrects for albumin (4/1)    PSYCHOSOCIAL:  #Coping.  Pt appears to coping well overall at this time, taking it \"day by day\". Appreciate SW and  involvement.   - pt had requested help with Advance Directive, SW following      FEN   - Regular diet as tolerated. Monitor for decreased intake. Recent calorie counts demonstrate decent PO intake. Appreciate RD reassessments inpatient.  - continue IVFs, bolus PRN  - PRN lyte replacement per standing protocol     Prophylaxis  - No pharmacologic VTE ppx due to TCP  - PPI for GI/PUD ppx       Code Status: FULL     Disposition: Anticipate 4-6 week LOS pending completion of chemotherapy, count recovery, and resolution of acute toxicities.      Discussed with Dr. Leo.     Vee Cabrera PA-C  Heme/Onc  [phone]      Interval History   No acute events overnight, ongoing fevers with Tmax 102.6 this AM. Randy is having more pain at his left jaw today, required oxycodone. States he recently received this and is feeling a little loopy with it, though remains conversant and appropriate. States the pain had been at around a \"1\" for the last few days but is increased at this time. Denies any facial swelling. Denies mouth pain/sores. Denies HA. Denies SOB or cough. Abdominal symptoms are improved overall; no current nausea or abd cramping, hemorrhoids improving. Denies extremity edema.       Physical Exam   Temp: 102.6  F (39.2  C) Temp src: Oral BP: 115/72 Pulse: 129 Heart Rate: 112 Resp: 18 SpO2: 95 % O2 Device: None (Room air)    Vitals:    03/29/19 0940 03/30/19 0838 03/31/19 0744   Weight: 94.4 kg (208 lb 1.6 oz) 94.2 kg (207 lb 11.2 oz) 93.3 kg (205 lb 9.6 oz)     Vital Signs with Ranges  Temp:  [98.7  F (37.1  C)-102.6  F (39.2  C)] 102.6  F (39.2  C)  Pulse:  [] 129  Heart Rate:  [] 112  Resp:  [16-20] 18  BP: " (102-123)/(61-72) 115/72  SpO2:  [95 %-99 %] 95 %  I/O last 3 completed shifts:  In: 3458 [P.O.:850; I.V.:2489; IV Piggyback:119]  Out: 1875 [Urine:1875]  Constitutional: Awake, alert, cooperative, in NAD. Sitting up in chair, appears tired/mildly sedated in setting of recent opioid dose but remains alert and clear.   HEENT: NC/AT. Some mild b/l lower facial fullness (appears slightly more full compared to 3/28 when this provider last saw pt) but no induration or erythema appreciated. Appears slightly more difficulty for pt to fully open mouth and thus difficult to appreciate left mandible area, but no overt erythema or lesions of the OP.   Respiratory: Non-labored breathing, good air exchange, on RA. Lungs are clear to auscultation bilaterally, no wheezing or crackles.    CV: Tachycardic rate, regular rhythm. No murmur appreciated  GI: +BS, abd soft, non-distended, non-tender.   Skin: No concerning lesions or rash on exposed surfaces.   Musculoskeletal: No edema bridgett LEs.   Neurologic: Alert and oriented. Grossly nonfocal.    Neuropsychiatric: Calm, affect congruent.  VAD: PICC line in RUE, c/d/i        Medications     - MEDICATION INSTRUCTIONS -       - MEDICATION INSTRUCTIONS -       sodium chloride 125 mL/hr at 03/31/19 0916     sodium chloride         sodium chloride 0.9%  1,000 mL Intravenous Once     acyclovir  400 mg Oral BID     allopurinol  300 mg Oral Daily     decitabine (DACOGEN) chemo infusion  20 mg/m2 (Treatment Plan Recorded) Intravenous Q24H     ondansetron  8 mg Oral Q8H     pantoprazole  40 mg Oral BID AC     piperacillin-tazobactam  3.375 g Intravenous Q6H     prochlorperazine  5-10 mg Intravenous Q6H    Or     prochlorperazine  5-10 mg Oral Q6H     rOPINIRole  0.75 mg Oral Daily at 8 pm     sodium chloride (PF)  10 mL Intracatheter Q7 Days     tamsulosin  0.8 mg Oral Daily     vancomycin  125 mg Oral 4x Daily     vancomycin (VANCOCIN) IV  1,250 mg Intravenous Q12H     venetoclax  100 mg Oral  Daily with supper     voriconazole  200 mg Oral Q12H ZUNILDA       Data   Results for orders placed or performed during the hospital encounter of 03/12/19 (from the past 24 hour(s))   Phosphorus   Result Value Ref Range    Phosphorus 2.7 2.5 - 4.5 mg/dL   Basic metabolic panel   Result Value Ref Range    Sodium 134 133 - 144 mmol/L    Potassium 3.6 3.4 - 5.3 mmol/L    Chloride 103 94 - 109 mmol/L    Carbon Dioxide 21 20 - 32 mmol/L    Anion Gap 10 3 - 14 mmol/L    Glucose 124 (H) 70 - 99 mg/dL    Urea Nitrogen 13 7 - 30 mg/dL    Creatinine 0.75 0.66 - 1.25 mg/dL    GFR Estimate >90 >60 mL/min/[1.73_m2]    GFR Estimate If Black >90 >60 mL/min/[1.73_m2]    Calcium 7.3 (L) 8.5 - 10.1 mg/dL   Lactic acid level STAT for sepsis protocol   Result Value Ref Range    Lactate for Sepsis Protocol 0.6 (L) 0.7 - 2.0 mmol/L   Blood culture   Result Value Ref Range    Specimen Description Blood Port     Special Requests Received in aerobic bottle only     Culture Micro No growth after 13 hours    CBC with platelets differential   Result Value Ref Range    WBC 0.5 (LL) 4.0 - 11.0 10e9/L    RBC Count 2.19 (L) 4.4 - 5.9 10e12/L    Hemoglobin 6.9 (LL) 13.3 - 17.7 g/dL    Hematocrit 22.0 (L) 40.0 - 53.0 %     (H) 78 - 100 fl    MCH 31.5 26.5 - 33.0 pg    MCHC 31.4 (L) 31.5 - 36.5 g/dL    RDW 16.4 (H) 10.0 - 15.0 %    Platelet Count 17 (LL) 150 - 450 10e9/L    Diff Method Manual Differential     % Neutrophils 0.0 %    % Lymphocytes 49.0 %    % Monocytes 8.0 %    % Eosinophils 0.0 %    % Basophils 0.0 %    % Blasts 43.0 %    Absolute Neutrophil 0.0 (LL) 1.6 - 8.3 10e9/L    Absolute Lymphocytes 0.2 (L) 0.8 - 5.3 10e9/L    Absolute Monocytes 0.0 0.0 - 1.3 10e9/L    Absolute Eosinophils 0.0 0.0 - 0.7 10e9/L    Absolute Basophils 0.0 0.0 - 0.2 10e9/L    Absolute Blasts 0.2 (H) 0 10e9/L    Anisocytosis Slight     Poikilocytosis Slight    Hepatic panel   Result Value Ref Range    Bilirubin Direct 0.3 (H) 0.0 - 0.2 mg/dL    Bilirubin Total  0.8 0.2 - 1.3 mg/dL    Albumin 2.3 (L) 3.4 - 5.0 g/dL    Protein Total 6.0 (L) 6.8 - 8.8 g/dL    Alkaline Phosphatase 80 40 - 150 U/L    ALT 31 0 - 70 U/L    AST 17 0 - 45 U/L   INR   Result Value Ref Range    INR 1.51 (H) 0.86 - 1.14   Magnesium   Result Value Ref Range    Magnesium 2.1 1.6 - 2.3 mg/dL   Partial thromboplastin time   Result Value Ref Range    PTT 37 22 - 37 sec   Uric acid   Result Value Ref Range    Uric Acid 3.0 (L) 3.5 - 7.2 mg/dL   Fibrinogen activity   Result Value Ref Range    Fibrinogen 523 (H) 200 - 420 mg/dL   Lactate Dehydrogenase   Result Value Ref Range    Lactate Dehydrogenase 305 (H) 85 - 227 U/L   Phosphorus   Result Value Ref Range    Phosphorus 2.2 (L) 2.5 - 4.5 mg/dL   Basic metabolic panel   Result Value Ref Range    Sodium 132 (L) 133 - 144 mmol/L    Potassium 3.8 3.4 - 5.3 mmol/L    Chloride 102 94 - 109 mmol/L    Carbon Dioxide 21 20 - 32 mmol/L    Anion Gap 9 3 - 14 mmol/L    Glucose 116 (H) 70 - 99 mg/dL    Urea Nitrogen 11 7 - 30 mg/dL    Creatinine 0.76 0.66 - 1.25 mg/dL    GFR Estimate >90 >60 mL/min/[1.73_m2]    GFR Estimate If Black >90 >60 mL/min/[1.73_m2]    Calcium 7.5 (L) 8.5 - 10.1 mg/dL

## 2019-04-01 NOTE — PLAN OF CARE
Discharge Planner PT  PT 7D  Patient plan for discharge: Home with spouse  Current status: Pt groggy from oxycodone but wanted to exercise. Pt completed supine and seated LE exercises. Following 5 sit to stand exercises, HR increased to 147. With sitting,  and supine 106. Following rest, orthostatic vitals taken: sitting BP 99/66, , standing BP 80/47, ; supine /71, . SpO2 95-97%. Treatment was stopped at this point and RN notified of vital sign changes. Pt did not reports dizziness or nausea during this session.  Barriers to return to prior living situation: Medical condition, decreased functional endurance  Recommendations for discharge: Home with spouse and home exercise program  Rationale for recommendations: Pt is functionally independent. HEP needed for pt to progress and maintain LE strength and functional endurance.       Entered by: Remy Pimentel 04/01/2019 9:22 AM

## 2019-04-01 NOTE — PROGRESS NOTES
BMT Clinical Social Work Inpatient New Transplant Evaluation    Information for this evaluation on 18 was collected via El (pt) and his wife's report, consultation with medical team, and medical chart review.     Present:  Patient: El Ace  Spouse: Bessy Hernandez  Clinical  (CSW):EDI Davidson, [facility]    Medical Team:   Nurse Coordinator: Tonya Atkinson RN  BMT Physician: Soto Brown MD  Referring Physician: Mike Coronado MD    Contact Information:  Pt Phone: [phone]  Pt's wife's Phone:  [phone]      With whom does pt live: His wife, Bessy.     Relocation Requirement: Pt lives in [place], [place] (35 min[place]30[place]01 miles away from [facility]). Per Dr. Brown, pt does not need to relocate. Discussed [facility] and pt expressed interest in staying there for the first few weeks after discharge from the hospital.     Diagnosis: AML    Date of Diagnosis: 19    Transplant Type: Allogeneic    Special Needs:  Pt expressed interest in staying at [facility] for the first few weeks after discharge from the hospital. He is able to discharge home, as he live 35 minutes away from [facility].    Family Constellation:   Spouse: Bessy Hernandez  Children: Jennifer (38 y/o), Manny (35 y/o), Nelli (29 y/o). All pt's children live in [place].   Parents:   Siblings: 2 brothers (Karson and Alexis). Both live in [place].     Employment  Currently employed: Yes  Employer: [employer]  Occupation: .   Length of Employment: 19 years    Source of Income: Paid time off and wife's income    Spouse/Partner Employed: Yes  Employer: [employer]  Occupation: Physical Therapist   Pt's wife also has a contract job that she is able to do from home.     Finances/Insurance:  Pt has submitted the paperwork for STD benefits (6 months) and has LTD benefits available, as well. Pt's       Pt's health insurance is through his employer. CSW encouraged pt to speak with his HR Dept about his  benefits/when Cobra kicks in. CSW provided information regarding the insurance authorization process and the role of the BMT financial case-mangers. CSW provided contact info for the BMT financial case-managers and referred pt to them for future insurance questions.     Caregiver:  CSW discussed the caregiver role and expectation at length. El is agreeable to having a full time caregiver for a minimum of 100 days until cleared by the BMT physician. El's identified caregiver is his wife. Caregiver education and information provided. No caregiver concerns identified.     Healthcare Directive:  No. CSW provided education and forms. CSW encouraged El to have discussions with their family regarding their health care wishes. Let him know that he can have the HCD notarized during this hospitalization.     Resources Provided:  BMT Information Book   BMT Resources Packet:   Healthcare Directive:   Tour of Unit NO   Transplant unit description and information provided.     Identified Concerns:   No concerns identified at this time.     Summary:   CSW met with El on 7D during his hospitalization regarding a consultation for an allogeneic stem cell transplant for a diagnosis of AML. El and his wife, Bessy, asked good questions regarding psychosocial factors related to BMT; all of which were answered. El does not need to relocate (per Dr. Brown) and there are no caregiver concerns. El was fatigued during the meeting. His affect was even and euthymic. His wife presented as supportive and engaged; taking notes throughout the meeting. Both El and Bessy talked about feeling overwhelmed by the information presented today.     Plan:   CSW provided contact information and encouraged El and Bessy to contact CSW with questions, concerns, resources and for support. CSW will continue to follow Pt to provide supportive counseling and assistance with resources as needed.      EDI Davidson,  [facility]  [phone]

## 2019-04-01 NOTE — PLAN OF CARE
Randy with temp of 102.6 this am and + orthostatic.  Vee CURRY aware.  BC done, 1L NS done and IVAB changed.  Tylenol given and temp down to 98.3  1 URBC transfused for hgb of 6.9.  Transfusion was started when pt was afe.  Temp at completion of transfusion was 100.9.  Vee CURRY and Nikhil from Blood Bank notified and a transfusion reaction was started.  Eating okay today, is having a difficult time chewing due to pain and swelling in jaw.  Assessed by dentist this am.

## 2019-04-01 NOTE — PLAN OF CARE
Patient is up independently. Regular diet. Tmax: 100.6. He reports aching jaw pain, PRN Tylenol effective for temporary partial relief. Denies intermittent nausea, scheduled Zofran and Compazine effective per pt.     Will continue to monitor/assess. Sleeping in room between cares.

## 2019-04-01 NOTE — PLAN OF CARE
"D: Day 6/10 Decitabine via picc.  Picc with brisk blood return.  Denies nausea on scheduled antiemetics. Reports diminishing appetite, \"food doesn't taste right\".  Venetoclax oral dose to 50 mg this evening, teaching ongoing    I:  Decitabine IV over 1 hr as ordered.  Increased risk of TLS with venetoclax.  IV hydration at 125 ml/hr continues.   A: Treatment continues.  TLS labs within parameters.   P: Continue treatment as ordered.  Pt will notify nursing if with breakthrough nausea.        "

## 2019-04-01 NOTE — PLAN OF CARE
Neutropenic, temp high 100.8.  IV Vanco added day shift.  Sepsis protocol triggered, VS within parameters, lactic 0.6.  BC done early when drawing blood for sepsis protocol.  Looks and reports being tired today.  Up in chair for supper then to bed, napping between cares. Pt reports diarrhea continues, 3-4 loose stools today, oral vanco continues. Headache noted with fever, tylenol given with some relief.  Jaw pain, left lower continues. Declining oxycodone, reports he may try it at HS. TLS labs within parameters, calcium trending down.  Day 6 chemo, see chemo note.

## 2019-04-02 LAB
ABO + RH BLD: NORMAL
ANION GAP SERPL CALCULATED.3IONS-SCNC: 10 MMOL/L (ref 3–14)
APTT PPP: 23 SEC (ref 22–37)
BLD GP AB SCN SERPL QL: NORMAL
BLD GP AB SCN SERPL QL: NORMAL
BLD PROD TYP BPU: NORMAL
BLD UNIT ID BPU: 0
BLOOD BANK CMNT PATIENT-IMP: NORMAL
BLOOD PRODUCT CODE: NORMAL
BPU ID: NORMAL
BUN SERPL-MCNC: 11 MG/DL (ref 7–30)
CALCIUM SERPL-MCNC: 7.3 MG/DL (ref 8.5–10.1)
CHLORIDE SERPL-SCNC: 102 MMOL/L (ref 94–109)
CO2 SERPL-SCNC: 20 MMOL/L (ref 20–32)
CREAT SERPL-MCNC: 0.95 MG/DL (ref 0.66–1.25)
FIBRINOGEN PPP-MCNC: 364 MG/DL (ref 200–420)
GFR SERPL CREATININE-BSD FRML MDRD: 84 ML/MIN/{1.73_M2}
GLUCOSE SERPL-MCNC: 106 MG/DL (ref 70–99)
GRAM STN SPEC: NORMAL
INR PPP: 1.55 (ref 0.86–1.14)
LACTATE BLD-SCNC: 1.3 MMOL/L (ref 0.7–2)
LDH SERPL L TO P-CCNC: 226 U/L (ref 85–227)
Lab: NORMAL
MAGNESIUM SERPL-MCNC: 1.8 MG/DL (ref 1.6–2.3)
NUM BPU REQUESTED: 1
NUM BPU REQUESTED: 2
NUM BPU REQUESTED: 4
O+P STL CONC: NORMAL
O+P STL CONC: NORMAL
PHOSPHATE SERPL-MCNC: 1.1 MG/DL (ref 2.5–4.5)
PHOSPHATE SERPL-MCNC: 1.6 MG/DL (ref 2.5–4.5)
POTASSIUM SERPL-SCNC: 3.6 MMOL/L (ref 3.4–5.3)
SODIUM SERPL-SCNC: 131 MMOL/L (ref 133–144)
SPECIMEN EXP DATE BLD: NORMAL
SPECIMEN EXP DATE BLD: NORMAL
SPECIMEN SOURCE: NORMAL
TRANSFUSION RXN BLOOD BANK NOTIFICATION: NORMAL
TRANSFUSION STATUS PATIENT QL: NORMAL
URATE SERPL-MCNC: 1.7 MG/DL (ref 3.5–7.2)
VANCOMYCIN SERPL-MCNC: 8.4 MG/L

## 2019-04-02 PROCEDURE — 83615 LACTATE (LD) (LDH) ENZYME: CPT | Performed by: INTERNAL MEDICINE

## 2019-04-02 PROCEDURE — 83735 ASSAY OF MAGNESIUM: CPT | Performed by: INTERNAL MEDICINE

## 2019-04-02 PROCEDURE — 25000132 ZZH RX MED GY IP 250 OP 250 PS 637: Performed by: INTERNAL MEDICINE

## 2019-04-02 PROCEDURE — 40000341 ZZHCL STATISTIC BB TRANSF RXN INVEST

## 2019-04-02 PROCEDURE — P9016 RBC LEUKOCYTES REDUCED: HCPCS

## 2019-04-02 PROCEDURE — 25000132 ZZH RX MED GY IP 250 OP 250 PS 637: Performed by: PHYSICIAN ASSISTANT

## 2019-04-02 PROCEDURE — 85730 THROMBOPLASTIN TIME PARTIAL: CPT | Performed by: INTERNAL MEDICINE

## 2019-04-02 PROCEDURE — 84100 ASSAY OF PHOSPHORUS: CPT | Performed by: INTERNAL MEDICINE

## 2019-04-02 PROCEDURE — 12000001 ZZH R&B MED SURG/OB UMMC

## 2019-04-02 PROCEDURE — 36592 COLLECT BLOOD FROM PICC: CPT | Performed by: INTERNAL MEDICINE

## 2019-04-02 PROCEDURE — 84100 ASSAY OF PHOSPHORUS: CPT | Performed by: PHYSICIAN ASSISTANT

## 2019-04-02 PROCEDURE — 87040 BLOOD CULTURE FOR BACTERIA: CPT | Performed by: PHYSICIAN ASSISTANT

## 2019-04-02 PROCEDURE — 80048 BASIC METABOLIC PNL TOTAL CA: CPT | Performed by: INTERNAL MEDICINE

## 2019-04-02 PROCEDURE — 80202 ASSAY OF VANCOMYCIN: CPT | Performed by: INTERNAL MEDICINE

## 2019-04-02 PROCEDURE — 84550 ASSAY OF BLOOD/URIC ACID: CPT | Performed by: INTERNAL MEDICINE

## 2019-04-02 PROCEDURE — 25000132 ZZH RX MED GY IP 250 OP 250 PS 637: Performed by: NURSE PRACTITIONER

## 2019-04-02 PROCEDURE — 83605 ASSAY OF LACTIC ACID: CPT | Performed by: INTERNAL MEDICINE

## 2019-04-02 PROCEDURE — 25000128 H RX IP 250 OP 636: Performed by: INTERNAL MEDICINE

## 2019-04-02 PROCEDURE — 25000125 ZZHC RX 250: Performed by: INTERNAL MEDICINE

## 2019-04-02 PROCEDURE — 25800030 ZZH RX IP 258 OP 636: Performed by: NURSE PRACTITIONER

## 2019-04-02 PROCEDURE — 83880 ASSAY OF NATRIURETIC PEPTIDE: CPT | Performed by: PHYSICIAN ASSISTANT

## 2019-04-02 PROCEDURE — 25800030 ZZH RX IP 258 OP 636: Performed by: INTERNAL MEDICINE

## 2019-04-02 PROCEDURE — P9037 PLATE PHERES LEUKOREDU IRRAD: HCPCS | Performed by: INTERNAL MEDICINE

## 2019-04-02 PROCEDURE — 85384 FIBRINOGEN ACTIVITY: CPT | Performed by: INTERNAL MEDICINE

## 2019-04-02 PROCEDURE — 25800030 ZZH RX IP 258 OP 636

## 2019-04-02 PROCEDURE — C9399 UNCLASSIFIED DRUGS OR BIOLOG: HCPCS | Performed by: INTERNAL MEDICINE

## 2019-04-02 PROCEDURE — 25000128 H RX IP 250 OP 636: Performed by: PHYSICIAN ASSISTANT

## 2019-04-02 PROCEDURE — 25000128 H RX IP 250 OP 636

## 2019-04-02 PROCEDURE — 25000125 ZZHC RX 250: Performed by: PHYSICIAN ASSISTANT

## 2019-04-02 PROCEDURE — 36415 COLL VENOUS BLD VENIPUNCTURE: CPT | Performed by: PHYSICIAN ASSISTANT

## 2019-04-02 PROCEDURE — 85610 PROTHROMBIN TIME: CPT | Performed by: INTERNAL MEDICINE

## 2019-04-02 PROCEDURE — 25000131 ZZH RX MED GY IP 250 OP 636 PS 637: Performed by: PHYSICIAN ASSISTANT

## 2019-04-02 PROCEDURE — 85025 COMPLETE CBC W/AUTO DIFF WBC: CPT | Performed by: INTERNAL MEDICINE

## 2019-04-02 PROCEDURE — 36592 COLLECT BLOOD FROM PICC: CPT | Performed by: PHYSICIAN ASSISTANT

## 2019-04-02 RX ORDER — TAMSULOSIN HYDROCHLORIDE 0.4 MG/1
0.4 CAPSULE ORAL DAILY
Status: DISCONTINUED | OUTPATIENT
Start: 2019-04-03 | End: 2019-04-15 | Stop reason: HOSPADM

## 2019-04-02 RX ORDER — MEPERIDINE HYDROCHLORIDE 25 MG/ML
25 INJECTION INTRAMUSCULAR; INTRAVENOUS; SUBCUTANEOUS
Status: DISCONTINUED | OUTPATIENT
Start: 2019-04-02 | End: 2019-04-13

## 2019-04-02 RX ORDER — MAGNESIUM SULFATE HEPTAHYDRATE 40 MG/ML
4 INJECTION, SOLUTION INTRAVENOUS EVERY 4 HOURS PRN
Status: DISCONTINUED | OUTPATIENT
Start: 2019-04-02 | End: 2019-04-15 | Stop reason: HOSPADM

## 2019-04-02 RX ORDER — TRAMADOL HYDROCHLORIDE 50 MG/1
50 TABLET ORAL EVERY 6 HOURS PRN
Status: DISCONTINUED | OUTPATIENT
Start: 2019-04-02 | End: 2019-04-15 | Stop reason: HOSPADM

## 2019-04-02 RX ADMIN — POTASSIUM PHOSPHATE, MONOBASIC AND POTASSIUM PHOSPHATE, DIBASIC 20 MMOL: 224; 236 INJECTION, SOLUTION INTRAVENOUS at 09:51

## 2019-04-02 RX ADMIN — VANCOMYCIN HYDROCHLORIDE 1250 MG: 10 INJECTION, POWDER, LYOPHILIZED, FOR SOLUTION INTRAVENOUS at 22:51

## 2019-04-02 RX ADMIN — VANCOMYCIN HYDROCHLORIDE 125 MG: KIT at 20:05

## 2019-04-02 RX ADMIN — PROCHLORPERAZINE MALEATE 5 MG: 5 TABLET, FILM COATED ORAL at 06:26

## 2019-04-02 RX ADMIN — Medication 2 G: at 13:34

## 2019-04-02 RX ADMIN — ONDANSETRON HYDROCHLORIDE 8 MG: 8 TABLET, FILM COATED ORAL at 07:47

## 2019-04-02 RX ADMIN — TAMSULOSIN HYDROCHLORIDE 0.8 MG: 0.4 CAPSULE ORAL at 07:47

## 2019-04-02 RX ADMIN — VANCOMYCIN HYDROCHLORIDE 125 MG: KIT at 11:14

## 2019-04-02 RX ADMIN — VANCOMYCIN HYDROCHLORIDE 1250 MG: 10 INJECTION, POWDER, LYOPHILIZED, FOR SOLUTION INTRAVENOUS at 11:15

## 2019-04-02 RX ADMIN — PIPERACILLIN AND TAZOBACTAM 3.38 G: 3; .375 INJECTION, POWDER, FOR SOLUTION INTRAVENOUS at 03:37

## 2019-04-02 RX ADMIN — ACETAMINOPHEN 650 MG: 325 TABLET, FILM COATED ORAL at 13:45

## 2019-04-02 RX ADMIN — VORICONAZOLE 200 MG: 200 TABLET, FILM COATED ORAL at 20:05

## 2019-04-02 RX ADMIN — PIPERACILLIN AND TAZOBACTAM 3.38 G: 3; .375 INJECTION, POWDER, FOR SOLUTION INTRAVENOUS at 22:13

## 2019-04-02 RX ADMIN — ACYCLOVIR 400 MG: 400 TABLET ORAL at 20:05

## 2019-04-02 RX ADMIN — ACYCLOVIR 400 MG: 400 TABLET ORAL at 07:47

## 2019-04-02 RX ADMIN — ACETAMINOPHEN 650 MG: 325 TABLET, FILM COATED ORAL at 06:26

## 2019-04-02 RX ADMIN — VANCOMYCIN HYDROCHLORIDE 125 MG: KIT at 07:47

## 2019-04-02 RX ADMIN — SODIUM CHLORIDE: 9 INJECTION, SOLUTION INTRAVENOUS at 15:53

## 2019-04-02 RX ADMIN — TRAMADOL HYDROCHLORIDE 50 MG: 50 TABLET, COATED ORAL at 14:01

## 2019-04-02 RX ADMIN — VORICONAZOLE 200 MG: 200 TABLET, FILM COATED ORAL at 07:47

## 2019-04-02 RX ADMIN — PIPERACILLIN AND TAZOBACTAM 3.38 G: 3; .375 INJECTION, POWDER, FOR SOLUTION INTRAVENOUS at 09:19

## 2019-04-02 RX ADMIN — ACETAMINOPHEN 650 MG: 325 TABLET, FILM COATED ORAL at 17:55

## 2019-04-02 RX ADMIN — VANCOMYCIN HYDROCHLORIDE 125 MG: KIT at 15:54

## 2019-04-02 RX ADMIN — ACETAMINOPHEN 650 MG: 325 TABLET, FILM COATED ORAL at 22:13

## 2019-04-02 RX ADMIN — ROPINIROLE HYDROCHLORIDE 0.75 MG: 0.5 TABLET, FILM COATED ORAL at 22:13

## 2019-04-02 RX ADMIN — ONDANSETRON HYDROCHLORIDE 8 MG: 8 TABLET, FILM COATED ORAL at 15:54

## 2019-04-02 RX ADMIN — SODIUM CHLORIDE 1000 ML: 9 INJECTION, SOLUTION INTRAVENOUS at 07:43

## 2019-04-02 RX ADMIN — PROCHLORPERAZINE MALEATE 10 MG: 5 TABLET, FILM COATED ORAL at 11:14

## 2019-04-02 RX ADMIN — SODIUM CHLORIDE: 9 INJECTION, SOLUTION INTRAVENOUS at 03:37

## 2019-04-02 RX ADMIN — PANTOPRAZOLE SODIUM 40 MG: 40 TABLET, DELAYED RELEASE ORAL at 15:54

## 2019-04-02 RX ADMIN — PIPERACILLIN AND TAZOBACTAM 3.38 G: 3; .375 INJECTION, POWDER, FOR SOLUTION INTRAVENOUS at 15:52

## 2019-04-02 RX ADMIN — PROCHLORPERAZINE MALEATE 10 MG: 5 TABLET, FILM COATED ORAL at 17:55

## 2019-04-02 RX ADMIN — ALLOPURINOL 300 MG: 300 TABLET ORAL at 07:47

## 2019-04-02 RX ADMIN — PANTOPRAZOLE SODIUM 40 MG: 40 TABLET, DELAYED RELEASE ORAL at 07:47

## 2019-04-02 RX ADMIN — VANCOMYCIN HYDROCHLORIDE 1250 MG: 10 INJECTION, POWDER, LYOPHILIZED, FOR SOLUTION INTRAVENOUS at 00:00

## 2019-04-02 RX ADMIN — DECITABINE 43 MG: 50 INJECTION, POWDER, LYOPHILIZED, FOR SOLUTION INTRAVENOUS at 20:11

## 2019-04-02 RX ADMIN — POTASSIUM PHOSPHATE, MONOBASIC AND POTASSIUM PHOSPHATE, DIBASIC 20 MMOL: 224; 236 INJECTION, SOLUTION INTRAVENOUS at 21:11

## 2019-04-02 RX ADMIN — VENETOCLAX 100 MG: 100 TABLET, FILM COATED ORAL at 17:56

## 2019-04-02 RX ADMIN — ACETAMINOPHEN 650 MG: 325 TABLET, FILM COATED ORAL at 10:15

## 2019-04-02 ASSESSMENT — PAIN DESCRIPTION - DESCRIPTORS
DESCRIPTORS: ACHING;SORE
DESCRIPTORS: ACHING
DESCRIPTORS: ACHING
DESCRIPTORS: ACHING;SORE
DESCRIPTORS: ACHING;SORE
DESCRIPTORS: ACHING
DESCRIPTORS: ACHING

## 2019-04-02 ASSESSMENT — ACTIVITIES OF DAILY LIVING (ADL)
ADLS_ACUITY_SCORE: 14

## 2019-04-02 NOTE — PLAN OF CARE
Tmax: 101.9 this shift, physician updated, Tylenol administered this AM. Blood cultures to be drawn w/ AM labs. Aching jaw pain continues, declines PRN interventions at this time. Jaw line remains edematous. Denies nausea, scheduled Zofran and Compazine effective per pt. PICC infusing NS and abx per MAR. Enteric and chemo precautions continued.     Will continue to monitor/assess. Sleeping in room between cares.

## 2019-04-02 NOTE — PROGRESS NOTES
[facility]  Transplant Infectious Disease Progress Note    Patient:  El Ace, Date of birth 1955, Medical record number [id number]  Date of Visit:  04/02/2019         Assessment and Recommendations:   Recommendations:  - Continue with zosyn and vancomycin for now  - Continue with voriconazole  - Voriconazole level due 4/4  - Continue with vancomycin PO for CDI  - Awaiting full dental evaluation (removal of molars) for source control     Thank you for the consultation. Transplant ID will follow along.     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     Infectious Disease issues include:  - Neutropenic fevers: Likely odontogenic source. CT c/a/p showed only colitis (patient with known CDI - see below) but CT dental showed a tiny periapical lucency involving the anterior most right mandibular molar and fat stranding with mild soft tissue thickening along the left hemimandible, most consistent with cellulitis. Patient has no neutrophils to form an abscess. Source needs to be controlled. Patient to see dental for full examination/extraction this Thursday. Will continue with zosyn and vancomycin for now. Bcx NGTD so far. UCx neg. Galactomannan and BDG neg.     - C Diff diarrhea: Improving. On vancomycin PO since 3/26.     - PCP prophylaxis: None  - Serostatus: CMV neg, EBV pos, HSV pos  - Immunization status: Needs all pre BMT vaccinations   - Gamma globulin status: Not in records   - Isolation status:  Good hand hygiene. Enteric isolation for CDI     Attestation:  I have reviewed today's vital signs, medications, labs and imaging. I personally reviewed the imaging.    Noemi Garcia MD  Transplant Infectious Diseases   [phone]         Interval History:   Febrile. Still complaining of jaw pain and  swelling, stable or unchanged from yesterday. Pain is severe enough that interferes with eating and talking/speech. He denies dyspnea, CP, cough, n/v, abdominal pain, but has some loose stools. No new skin rashes.       Transplants:  N/A    Review of Systems:   ROS: 10 point ROS neg other than the symptoms noted above in the interval history.       Current Facility-Administered Medications   Medication     acetaminophen (TYLENOL) tablet 650 mg     acyclovir (ZOVIRAX) tablet 400 mg     albuterol (PROAIR HFA/PROVENTIL HFA/VENTOLIN HFA) 108 (90 Base) MCG/ACT inhaler 1-2 puff     albuterol (PROVENTIL) neb solution 2.5 mg     allopurinol (ZYLOPRIM) tablet 300 mg     baclofen (LIORESAL) tablet 10 mg     calcium carbonate (TUMS) chewable tablet 500 mg     decitabine (DACOGEN) 43 mg in sodium chloride 0.9 % 119 mL CHEMOTHERAPY     EPINEPHrine PF (ADRENALIN) injection 0.3 mg     lidocaine (XYLOCAINE) 2 % topical gel     LORazepam (ATIVAN) tablet 0.5-1 mg    Or     LORazepam (ATIVAN) injection 0.5-1 mg     magic mouthwash suspension (diphenhydramine, lidocaine, aluminum-magnesium & simethicone)     magnesium sulfate 2 g in NS intermittent infusion (PharMEDium or FV Cmpd)     magnesium sulfate 4 g in 100 mL sterile water (premade)     MEDICATION INSTRUCTION     Medication Instruction     meperidine (DEMEROL) injection 25 mg     methylPREDNISolone sodium succinate (solu-MEDROL) injection 125 mg     naloxone (NARCAN) injection 0.1-0.4 mg     OLANZapine zydis (zyPREXA) ODT tab 5 mg     ondansetron (ZOFRAN) tablet 8 mg     oxyCODONE (ROXICODONE) tablet 5 mg     oxymetazoline (AFRIN) 0.05 % spray 2 spray     pantoprazole (PROTONIX) EC tablet 40 mg     piperacillin-tazobactam (ZOSYN) 3.375 g vial to attach to  mL bag     polyethylene glycol (MIRALAX/GLYCOLAX) Packet 17 g     potassium chloride (KLOR-CON) Packet 20-40 mEq     potassium chloride 10 mEq in 100 mL intermittent infusion with 10 mg lidocaine     potassium chloride  "10 mEq in 100 mL sterile water intermittent infusion (premix)     potassium chloride 20 mEq in 50 mL intermittent infusion     potassium chloride ER (K-DUR/KLOR-CON M) CR tablet 20-40 mEq     potassium phosphate 15 mmol in D5W 250 mL intermittent infusion     potassium phosphate 20 mmol in D5W 250 mL intermittent infusion     potassium phosphate 20 mmol in D5W 500 mL intermittent infusion     potassium phosphate 25 mmol in D5W 500 mL intermittent infusion     pramox-pe-glycerin-petrolatum (PREPARATION H) cream     prochlorperazine (COMPAZINE) injection 5-10 mg    Or     prochlorperazine (COMPAZINE) tablet 5-10 mg     ranitidine (ZANTAC) tablet 150 mg     rOPINIRole (REQUIP) tablet 0.75 mg     sennosides (SENOKOT) tablet 8.6 mg     sodium chloride (OCEAN) 0.65 % nasal spray 1-2 spray     sodium chloride (PF) 0.9% PF flush 10 mL     sodium chloride (PF) 0.9% PF flush 10-20 mL     sodium chloride 0.9% infusion     sodium chloride 0.9% infusion     sucralfate (CARAFATE) suspension 1 g     [START ON 4/3/2019] tamsulosin (FLOMAX) capsule 0.4 mg     traMADol (ULTRAM) tablet 50 mg     vancomycin (FIRVANQ) oral solution 125 mg     vancomycin 1250 mg in 0.9% NaCl 250 mL intermittent infusion 1,250 mg     venetoclax (VENCLEXTA) tablet CHEMOTHERAPY 100 mg     voriconazole (VFEND) tablet 200 mg       No Known Allergies           Physical Exam:   Vitals were reviewed.  All vitals stable  /68 (BP Location: Left arm)   Pulse 105   Temp 99.6  F (37.6  C)   Resp 20   Ht 1.778 m (5' 10\")   Wt 93.3 kg (205 lb 9.6 oz)   SpO2 100%   BMI 29.50 kg/m      Exam:  GENERAL:  well-developed, well-nourished, alert, oriented, in no acute distress.  HEENT:  Head is normocephalic, atraumatic   EYES:  Eyes have anicteric sclerae.    ENT:  Oropharynx is moist without exudates or ulcers. Bilateral jaw edema. Swollen inferior right gums. No discharge.   NECK:  Supple. No LAD  LUNGS:  Clear to auscultation. No wheezes  CARDIOVASCULAR:  " Regular rate and rhythm with no murmurs, gallops or rubs.  ABDOMEN:  Normal bowel sounds, soft, nontender.  SKIN:  No acute rashes.  Line is in place without any surrounding erythema.  NEUROLOGIC:  Grossly nonfocal.         Laboratory Data:     Metabolic Studies    Recent Labs   Lab Test 04/02/19  0552 04/01/19  1712 04/01/19  0549  03/30/19  2134   * 131* 132*   < >  --    POTASSIUM 3.6 3.7 3.8   < >  --    CHLORIDE 102 101 102   < >  --    CO2 20 21 21   < >  --    ANIONGAP 10 9 9   < >  --    BUN 11 11 11   < >  --    CR 0.95 0.83 0.76   < >  --    GFRESTIMATED 84 >90 >90   < >  --    * 111* 116*   < >  --    DEBBIE 7.3* 7.2* 7.5*   < >  --    PHOS 1.6* 2.7 2.2*   < >  --    MAG 1.8  --  2.1   < >  --    URIC 1.7*  --  3.0*   < >  --    LACT  --   --   --   --  1.0    < > = values in this interval not displayed.       Hepatic Studies    Recent Labs   Lab Test 04/02/19  0552 04/01/19  0549  03/28/19  0634 03/27/19  0736  03/26/19  2050   BILITOTAL  --  0.8  --  0.7  --   --  0.4   DBIL  --  0.3*  --  0.2  --   --   --    ALKPHOS  --  80  --  84  --   --  73   PROTTOTAL  --  6.0*  --  5.9*  --   --  5.7*   ALBUMIN  --  2.3*  --  2.2* 2.4*  --  2.4*   AST  --  17  --  14  --   --  14   ALT  --  31  --  29  --   --  30    305*   < > 166  --    < >  --     < > = values in this interval not displayed.       Hematology Studies     Recent Labs   Lab Test 04/02/19  0552 04/01/19  0549 03/31/19  0626 03/30/19  0556   WBC 0.7* 0.5* 1.3* 22.9*   ABLA 0.2* 0.2* 0.9* 21.7*   BLST 25.0 43.0 67.0 94.7   ANEU 0.0* 0.0* 0.0* 0.0*   ALYM 0.2* 0.2* 0.4* 1.2   TRACIE 0.3 0.0 0.0 0.0   AEOS 0.0 0.0 0.0 0.0   HGB 6.5* 6.9* 8.4* 6.9*   HCT 20.5* 22.0* 25.0* 21.2*   PLT 8* 17* 27* 9*       Urine Studies     Recent Labs   Lab Test 03/30/19  2206 03/22/19  0945   URINEPH 6.5 7.5*   NITRITE Negative Negative   LEUKEST Negative Negative   WBCU <1 2       Microbiology:  Last 6 Culture results with specimen source  Culture Micro    Date Value Ref Range Status   04/02/2019 No growth after 2 hours  Preliminary   04/02/2019 No growth after 2 hours  Preliminary   04/01/2019 Culture negative monitoring continues  Preliminary   04/01/2019 No growth after 1 day  Preliminary   04/01/2019 No growth after 1 day  Preliminary   03/31/2019 No growth after 2 days  Preliminary    Specimen Description   Date Value Ref Range Status   04/02/2019 Blood Right Hand  Final   04/02/2019 Blood Unspecified Site  Final   04/01/2019 Other Transfusion Reaction Donor Unit  Final   04/01/2019 Other Transfusion Reaction Donor Unit  Final   04/01/2019 Blood Left Hand  Final   04/01/2019 Blood PICC  Final          Last check of C difficile  C Diff Toxin B PCR   Date Value Ref Range Status   03/26/2019 Positive (A) NEG^Negative Final     Comment:     Positive: Toxin producing Clostridium difficile target DNA sequences detected,   presumed positive for Clostridium difficile toxin B.  Clostridium difficile (Requires Enteric Isolation)  FDA approved assay performed using [employer] GeneXpert real-time PCR.  Critical Value/Significant Value called to and read back by  JATINDER KAUR, RN [id number] 3.26.19 [facility]         Virology:  CMV viral loads    Recent Labs   Lab Test 03/31/19  0626   CSPEC Plasma   CMVLOG Not Calculated       CMV viral loads    Log IU/mL of CMVQNT   Date Value Ref Range Status   03/31/2019 Not Calculated <2.1 [Log_IU]/mL Final       Hepatitis B Testing     Recent Labs   Lab Test 03/13/19  0553   AUSAB 15.70*   HEPBANG Nonreactive     Was the last Hepatitis B E antigen positive?   No results found for: HBEAGN     Hepatitis C Antibody   Date Value Ref Range Status   03/13/2019 Nonreactive NR^Nonreactive Final     Comment:     Assay performance characteristics have not been established for newborns,   infants, and children         Imaging:  No results found for this or any previous visit (from the past 24 hour(s)).     CT dental: 3/31/19  1. Tiny periapical lucency  involving the anterior most right mandibular molar.  2. Fat stranding and mild soft tissue thickening along the left hemimandible, most consistent with cellulitis. No discreet abscess.    CT c/a/p 3/29/19:  1. Thickened right hemicolon with adjacent fat stranding favored to be infectious given history of sepsis.  2. At least moderate three-vessel coronary artery calcifications

## 2019-04-02 NOTE — CONSULTS
#### FINAL REPORT ####    Laboratory Medicine and Pathology  Transfusion Medicine- Transfusion Reaction    El Ace MRN# [id number]   YOB: 1955 Age: 63 year old   Date of Reaction: 04/01/19     Transfusion Reaction Evaluation   Impression  Definitive febrile non hemolytic transfusion reaction of possible imputability.    The patient's temperature lily 3.7F after a unit of pRBC transfusion. This meets the criteria for a definitive febrile non-hemolytic transfusion reaction (per criteria, a definitive febrile non hemolytic transfusion reaction requires an increase of 1.8F, to a peak of at least 100.4 F within 4 hours of transfusion).    Imputability is possible, as the patient has been intermittently febrile throughout hospitalization, and has been receiving acetaminophen 650 mg PO q4 hours PRN throughout hospital course.    Final culture results are pending.    Recommendation    Transfuse as needed.       ----------------------------------    History  El Ace is a 63 year old male with acute myeloid leukemia. The patient is in hospital for chemotherapy. Hospital course complicated by neutropenic fever. He had a temperature of 102.6 at 0933, and received acetaminophen 650 mg PO at 0940.    Indication for transfusion: Hgb < 7.0 (Hgb 6.9)    One RBC unit was completely transfused, from 1200 to 1330 on 04/01/19.    Post-Transfusion Events  Increase in patient's temperature was noted upon completion of transfusion, and the patient received Acetaminophen 650 mg PO at 1343.  The patient's temperature lily to Tmax 102.2F at 1656, with subsequent dosage of acetaminophen 650 mg PO at 1756 and at 2156.    Reported Symptoms (from B[facility])  Fever    Vital Signs (From EPIC Blood Administration Flowsheet)  Vitals/Pain (last day)      Date/Time Heart Rate Pulse BP Temp Resp O2 Device 0-10 Pain Scale    04/01/19 0616 126 -- 116/72 99.6  F (37.6  C) 18 None (Room air) --    04/01/19 0840 127 -- 102/62 99.8  F  (37.7  C) 18 None (Room air) --    19 0856 112 -- 119/71 -- -- None (Room air) --    19 0933 -- 129 115/72 102.6  F (39.2  C) 18 -- --   Start transfusion 19 1200 -- 106 96/55 98.5  F (36.9  C) 18 -- --    19 1217 -- 102 105/57 97.1  F (36.2  C) 18 -- --   Stop transfusion 19 1330 -- 114 110/68 100.9  F (38.3  C) 18 -- --    19 1539 -- 118 95/60 101  F (38.3  C) 18 None (Room air) --   Tmax 19 1656 123 -- 104/63 102.2  F (39  C) 18 None (Room air) --    19 1858 122 -- 99/53 101.7  F (38.7  C) 18 None (Room air) --    19 0001 121 -- 99/51 101.6  F (38.7  C) 22 None (Room air) 2         Blood Bank Investigation  Product Type: RBC  Unit Number:   [id number]  [id number]   Amount Remainin mL  Transfusion History: Numerous RBC and PLTs  Transfusion Reaction History: Negative  Type and Screen History: Negative  Post-Transfusion Clerical Check: Correct  ABO/Rh: The unit type was A pos  TINA: Negative  Post-Transfusion Plasma: Straw colored    The unit was cultured and Gram stain was performed after adding 10 ml of saline prior to inoculating the blood culture bottles. Gram stain showed no organisms and culture is no growth to date, pending final.    [facility] Hemovigilance  Case Definition: Definitive  febrile non hemolytic transfusion reaction   Severity: Non severe  Imputability: Possible    Antonio Michelle DO, BB/TM Fellow  Transfusion Medicine Service  Pager: [phone]    ATTENDING ATTESTATION  I have personally reviewed the clinical and laboratory features of the reported transfusion reaction. I have discussed the patient with Dr. Antonio Michelle, Transfusion Medicine fellow. I agree with the comments in his note.    The patient experienced a rise in temperature with a transfusion. The symptoms meet the definition of a non-severe febrile non-hemolytic transfusion reaction; however the imputability is only possible as the patient had been having fevers prior to the transfusion. A  contribution from the patient's underlying medical condition can not be excluded.  Recommend transfuse as needed.     Keila Daly M.D., Ph.D.  Attending Physician  Division of Transfusion Medicine  Department of Laboratory Medicine [place] [address]  Pager [phone]

## 2019-04-02 NOTE — PROGRESS NOTES
[facility], [place]  Hematology / Oncology Progress Note    Date of Admission: 3/12/2019  Date of Service (when I saw the patient): 04/02/2019     Assessment & Plan   Mr. Ace is a 63 year old male with new diagnosis of acute myeloid leukemia incidentally found during work up of nonspecific chest symptoms after partial root canal intervention. He was started on induction chemotherapy with 7+3 (cytarabine and daunorubicin) with D1=3/15/19. BMBx on 3/25 (done early due to rising WBC and blast counts) demonstrated persistent disease with 95% blasts by flow, 98% by morphology. Started re-induction with decitabine on 3/26 PM with plan to add venetoclax.      Today:  - continue Zosyn, change was made less than 24hrs ago so monitor for effect on fever curve. Ultimately needs source control.   - appreciate Dental consult, working to get pt in [facility] on Thursday (4/4) as do not currently want to transport him to [facility]. Talked with Dr. Pagan regarding this today. IF decompensates, would plan to call [facility] surgery team overnight.   - continue IV Vancomycin  - pain control with Tylenol vs oxycodone. If oxycodone too sedating for pt, could trial tramadol. He elects to continue Tylenol at this time.  - ice packs, supportive care for jaw swelling/fevers  - continue PO Vanco  - repeat NS bolus today for ongoing fevers/tachycardia.   - he is currently maintaining his airway well but monitor closely for any changes  - updated wife (Bessy) by phone today  - lyte repletions  - decrease flomax from 0.8mg to 0.4mg (soft BPs in setting of neutropenic sepsis and risk of further hypotension due to interaction with voriconazole)      HEME:  #AML, refractory. NGS positive for IDH2 and RUNX1 alterations.  Patient presented to [facility] ED in [place], [place] for complaints of nonspecific chest discomfort after root canal treatment (done 3/11/19). CT C/A/P was negative for PE or other  acute findings. On OSH labs, his WBC was incidentally noted to be elevated at 71.1 with Hb 9.0 and plts 97 (prior labs had been unremarkable per patient). Smear was suspicious for immature blasts and acute leukemia. No symptoms of hyperviscosity, TLS or DIC on presentation. 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 Flow consistent with AML with 95% blasts with the following immunophenotype: Positive for CD13, CD33, CD34, CD38, dim to negative CD45, , partial HLA-DR. Baseline ECHO normal, PICC place.   - HLA typing sent, BMT consult done 3/29 by Dr. Jeter  - s/p initial Hydrea (dc'd 3/16) followed by induction chemotherapy with 7+3 (cytarabine and daunorubicin). Day 1=3/15/19.   - repeat BMBx done early (3/25) due to rising WBC/blast count. Still with persistent heavy burden of disease (98% blasts by morphology).   - s/p Hydrea for WBC count management; started 1g BID (3/27), incresaed to 2g BID (x3/28). Hydrea discontinued 3/31.   - Started reinduction with decitabine/venetoclax. Decitabine D1=3/26; added Venetoclax on 3/29. Ramp up dosing accounting for concomitant voriconazole. Final dose will be 100mg daily. Today is Day 8 from start of reinduction.   - continue Allopurinol and IVFs  - can decrease frequency of TLS labs     #Pancytopenia  #Mild epistaxis, resolved  2/2 AML & associated chemotherapy.   - transfuse to maintain Hb >7, Plt >10K.   - Afrin PRN, ocean spray PRN     #Mild coagulopathy, in setting of refractory AML. INR gradually uptrended (ranging 1.3-1.5), fibrinogen stable at this time  - monitor daily coags  - plasma for INR >1.8, cryo for fibrinogen <100  - eating ok at this time but could consider Vit K if INR continues to uptrend     ID:  #Neutropenic fever/sepsis.  #C.diff diarrhea  #Neutropenic colitis.  First fever on 3/22. Now having daily fevers with Tmax into the 102s. Sources include c.diff colitis and L mandible cellulitis.  -  BCx 3/22 to present are NGTD  - 3/23 fungal BCx NGTD  - 3/22 UA negative, repeat UA/UCx negative on 3/30  - 3/22 CXR with small R pleural effusion, no focal airspace opacity  - 3/25 galactomannan and fungitell both negative  - 3/26 C.diff positive  - 3/29 CT C/A/P demonstrated Right thickened hemicolon with adjacent fat stranding;  - 3/31 CT Dental demonstrated left hemimandible cellulitis  - ID following  - Dental consulted for CT findings, appreciate recs. Working to get pt over to [facility] on 4/4    **Anti-infectives:  - continue Cefepime (x 3/22-4/1). Changed to Zosyn (x 4/1) for better anaerobic coverage given L mandible cellulitis  - PO Vancomycin 125mg four times daily x 10 days (3/26 -4/4 late pm)  - PO Flagyl (x3/29-4/1). Discontinued with change to Zosyn.  - continue IV vanc (x 3/31), started due to ongoing fevers. If cultures remain negative and fevers improve on Zosyn, consider discontinuing in next couple days.     #ID PPx   - continue ACV  - Voriconazole 200mg q12hr         GI:  #Chemotherapy induced nausea, controlled  - scheduled Zofran q8hr  - compazine also scheduled q6hr  - PRN antiemetics     #C.diff diarrhea   #Neutropenic colitis  - treatment as above  - had frequent loose stools this AM. Continue to monitor and ensure fluid balance.    #Hemorrhoids, improved  - PRN management: TUCKS pads, sitz bath, topical Prep H and/or lidocaine     #S/P root canal manipulation  Patient had start of a root canal treatment initiated on 3/11/19 with plan to complete it a few weeks later (per pt's wife and son). He was started on a 7-day course of oral  mg q6h beginning one day prior to the procedure. At time of admission, he was having pain in his left jaw but site appeared unremarkable with no abscess or drainage.  In retrospect, may have also had some leukemic infiltration of gums complicating his course. Pain at site initially improved during chemotherapy, but did return. Pain worsened and  "with higher temps, checked CT Dental which demonstrated cellulitis along left hemimandible.   - Tylenol PRN, oxycodone PRN   - abx as above  - Dental consult as above     CV:  #Subclinical coronary atherosclerosis  #Hyperlipidemia  Total Agatston calcium score was elevated at 591 (91st percentile) on CT coronaries performed 6/8/2016. Nuclear stress test was negative for ischemia on 8/10/2016. Life style modification measures were recommended. Patient follows with cardiology as outpatient (last office visit on 11/12/18).  - holding atorvastatin at this time. Could potentially resume after completion of reinduction if LFTs remain WNL.     NEURO/MSK:  #Restless legs syndrome  - Continue ropinirole 0.75 mg at bedtime, offer PRN ativan for persistent symptoms.     #History of lumbar spine degenerative disc disease   Patient is s/p laminectomy/facetectomy procedures. He does not routinely take pain meds.     :  #BPH   Patient follows with urology ([facility], [place], [place]). On PTA Flomax 0.8 mg daily.  - of note, possible drug interaction between voriconazole and flomax (can raise flomax level in blood & cause hypotension).   - decrease flomax from 0.8mg to 0.4mg today (soft BPs in setting of neutropenic sepsis and risk of further hypotension due to interaction with voriconazole)     RENAL:  #Lyte derangement. Likely to decreased PO intake, fevers. Monitor kidney function.  - lyte repletion per unit protocol  - calcium corrected for albumin (4/1)    PSYCHOSOCIAL:  #Coping.  Pt appears to coping well overall at this time, taking it \"day by day\". Appreciate SW and  involvement.   - pt had requested help with Advance Directive, SW following      FEN   - Regular diet as tolerated. Recent calorie counts (3/29, 3/30) demonstrate decent PO intake, continue to monitor. Appreciate RD reassessments inpatient.  - continue IVFs, bolus PRN  - PRN lyte replacement per standing protocol     Prophylaxis  - No " "pharmacologic VTE ppx due to TCP  - PPI for GI/PUD ppx       Code Status: FULL     Disposition: Anticipate 4-6 week LOS pending completion of chemotherapy, count recovery, and resolution of acute toxicities.      Discussed with Dr. Leo.     Vee Cabrera PA-C  Heme/Onc  [phone]      Interval History   Persistent fevers with Tmax 103.5 this AM. Ongoing left sided lower jaw pain, which is slightly better than yesterday and managed with Tylenol alone at this time. Stable b/l jaw edema, which he feels is worse compared to yesterday. His speech is altered due to not being able to open his jaw fully. He does feel able to manage his secretions and swallow pills/eat. He is breathing comfortable and able to get air in without issue. Declines ice pack to jaw today but using ice around body for high fever this AM. Denies HA. Has dry mouth but denies new lesions. Denies SOB or cough. Denies nausea or abdominal pain. Did have loose stools this morning, no blood in stools. Hemorrhoid pain has nearly \"dissipated\". Denies any other areas of pain, denies new rashes or skin changes.       Physical Exam   Temp: 103.5  F (39.7  C) Temp src: Oral BP: 95/49 Pulse: 118 Heart Rate: 147 Resp: 20 SpO2: 96 % O2 Device: None (Room air)    Vitals:    03/29/19 0940 03/30/19 0838 03/31/19 0744   Weight: 94.4 kg (208 lb 1.6 oz) 94.2 kg (207 lb 11.2 oz) 93.3 kg (205 lb 9.6 oz)     Vital Signs with Ranges  Temp:  [97.1  F (36.2  C)-103.5  F (39.7  C)] 103.5  F (39.7  C)  Pulse:  [102-129] 118  Heart Rate:  [112-147] 147  Resp:  [18-22] 20  BP: ()/(49-72) 95/49  SpO2:  [91 %-98 %] 96 %  I/O last 3 completed shifts:  In: 4359 [P.O.:240; I.V.:3000; IV Piggyback:1119]  Out: 1900 [Urine:1900]  Constitutional: Pleasant male seen reclined in bed. Alert, interactive.   HEENT: NC/AT. Hair starting to fall out on pillow. Stable b/l lower facial fullness but no areas of induration or erythema. Unable to fully open mouth and thus difficult to " appreciate left mandible area, but no overt erythema or lesions of the OP.   Respiratory: Non-labored breathing, good air exchange, on RA. Able to move to EOB for exam. Lungs are clear to auscultation bilaterally, no wheezing or crackles.    CV: Tachycardic rate, regular rhythm. No murmur appreciated  GI: +BS, abd soft, non-distended, non-tender.   Skin: Warm, mildly diaphoretic. No concerning lesions or rash on exposed surfaces.   Musculoskeletal: No edema bridgett LEs.   Neurologic: Alert and oriented. Speech slightly altered by limited jaw ROM. Grossly nonfocal.    Neuropsychiatric: Calm, mentation appears normal, affect congruent.  VAD: PICC line in RUE, c/d/i        Medications     - MEDICATION INSTRUCTIONS -       - MEDICATION INSTRUCTIONS -       sodium chloride 125 mL/hr at 04/02/19 0337     sodium chloride         sodium chloride 0.9%  1,000 mL Intravenous Once     acyclovir  400 mg Oral BID     allopurinol  300 mg Oral Daily     decitabine (DACOGEN) chemo infusion  20 mg/m2 (Treatment Plan Recorded) Intravenous Q24H     ondansetron  8 mg Oral Q8H     pantoprazole  40 mg Oral BID AC     piperacillin-tazobactam  3.375 g Intravenous Q6H     prochlorperazine  5-10 mg Intravenous Q6H    Or     prochlorperazine  5-10 mg Oral Q6H     rOPINIRole  0.75 mg Oral Daily at 8 pm     sodium chloride (PF)  10 mL Intracatheter Q7 Days     tamsulosin  0.8 mg Oral Daily     vancomycin  125 mg Oral 4x Daily     vancomycin (VANCOCIN) IV  1,250 mg Intravenous Q12H     venetoclax  100 mg Oral Daily with supper     voriconazole  200 mg Oral Q12H ZUNILDA       Data   Results for orders placed or performed during the hospital encounter of 03/12/19 (from the past 24 hour(s))   Dentistry Adult IP Consult: Patient to be seen: Routine within 24 hrs; Call back #: [phone] or [phone]; pt with AML on chemotherapy; presented after root canal done prior to diagnosis. Now with persistent left jaw pain and neutropenic fevers; found ...    Narrative     Joe Davis, ZAIRE     4/1/2019  1:06 PM  Dental Service Consultation        El Ace MRN# [id number]   YOB: 1955 Age: 63 year old   Date of Admission: 3/12/2019     Reason for consult: I was asked by PATRICIO Duckworth to   evaluate this patient for odontogenic infection in association   with acute myeloid leukemia chemotherapy.           Assessment and Plan:   Assessment: Patient reports having root canal therapy completed   on LL first molar (#19) third weeks prior, just days prior to   hospital admission.  Reports 10/10 pain on LL which has subsided   to constant 2/10 pain at this time.  Mild tenderness to palpation   of left submandibular lymph node.  No drainable facial swelling   noted.  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  #19   displayed very elevated pain response to percussion, indicative   of possible odontogenic infection.  #18 displays elevated   percussion sensitivity, but patient states that it is not as bad   as #19.  UL 2nd molar (#15) and LR 1st molar (#30) no elevated   response to percussion.  #30 PA radiolucency noted on CT.    Plan:  Possible odontogenic infection related to tooth #19, as   well as #18.  Due to limitations of radiographs and examination   in hospital, it is recommended that patient be transferred to   dental clinic for full assessment and procedural planning prior   to any treatment.  First option is for patient to be transferred   to [facility] at [facility]   [address] for full dental assessment and treatment in clinic.  If   patient can not be transferred to [facility], recommend   coordination for patient to be seen at [facility] ([facility] 7th floor) for dental radiographs and   assessment on Thursday or Friday of the week when [facility] attendings   are present in Urgent Care Clinic, Dr. Javier Pagan or Dr. Miles    Verito.  Please call [facility] at [phone] to   schedule either option.           Chief Complaint:   LL dental pain, AML         History of Present Illness:   This patient is a 63 year old male admitted to [address] with   AML              Past Medical History:     Past Medical History:   Diagnosis Date     Acute leukemia (H) 3/12/2019             Past Surgical History:     Past Surgical History:   Procedure Laterality Date     PICC INSERTION Right 03/14/2019    5Fr - 42cm (2cm external), basilic vein, high SVC               Social History:     Social History     Tobacco Use     Smoking status: Not on file   Substance Use Topics     Alcohol use: Not on file             Family History:   No family history on file.          Immunizations:     There is no immunization history on file for this patient.          Allergies:   No Known Allergies          Medications:     Current Facility-Administered Medications Ordered in Epic   Medication Dose Route Frequency Last Rate Last Dose     0.9% sodium chloride BOLUS  1,000 mL Intravenous Once 250 mL/hr   at 04/01/19 1011 1,000 mL at 04/01/19 1011     acetaminophen (TYLENOL) tablet 650 mg  650 mg Oral Q4H PRN     650 mg at 04/01/19 0940     acyclovir (ZOVIRAX) tablet 400 mg  400 mg Oral BID   400 mg at   04/01/19 0813     albuterol (PROAIR HFA/PROVENTIL HFA/VENTOLIN HFA) 108 (90 Base)   MCG/ACT inhaler 1-2 puff  1-2 puff Inhalation Once PRN         albuterol (PROVENTIL) neb solution 2.5 mg  2.5 mg Nebulization   Once PRN         allopurinol (ZYLOPRIM) tablet 300 mg  300 mg Oral Daily   300   mg at 04/01/19 0813     baclofen (LIORESAL) tablet 10 mg  10 mg Oral TID PRN   10 mg at   03/18/19 1921     calcium carbonate (TUMS) chewable tablet 500 mg  500 mg Oral   Daily PRN         decitabine (DACOGEN) 43 mg in sodium chloride 0.9 % 119 mL   CHEMOTHERAPY  20 mg/m2 (Treatment Plan Recorded) Intravenous Q24H   119 mL/hr at 03/31/19 2001 43 mg at 03/31/19 2001      diphenhydrAMINE (BENADRYL) injection 50 mg  50 mg Intravenous   Once PRN         EPINEPHrine PF (ADRENALIN) injection 0.3 mg  0.3 mg   Intramuscular Q5 Min PRN         lidocaine (XYLOCAINE) 2 % topical gel   Topical Q4H PRN         LORazepam (ATIVAN) tablet 0.5-1 mg  0.5-1 mg Oral Q6H PRN   0.5   mg at 03/24/19 0237    Or     LORazepam (ATIVAN) injection 0.5-1 mg  0.5-1 mg Intravenous Q6H   PRN   0.5 mg at 03/23/19 1346     magic mouthwash suspension (diphenhydramine, lidocaine,   aluminum-magnesium & simethicone)  10 mL Swish & Swallow Q6H PRN           magnesium sulfate 4 g in 100 mL sterile water (premade)  4 g   Intravenous Q4H PRN         MEDICATION INSTRUCTION   Does not apply Continuous PRN         Medication Instruction   Does not apply Continuous PRN         meperidine (DEMEROL) injection 25 mg  25 mg Intravenous Q30 Min   PRN         methylPREDNISolone sodium succinate (solu-MEDROL) injection 125   mg  125 mg Intravenous Once PRN         naloxone (NARCAN) injection 0.1-0.4 mg  0.1-0.4 mg Intravenous   Q2 Min PRN         OLANZapine zydis (zyPREXA) ODT tab 5 mg  5 mg Oral At Bedtime   PRN         ondansetron (ZOFRAN) tablet 8 mg  8 mg Oral Q8H   8 mg at   04/01/19 0813     oxyCODONE (ROXICODONE) tablet 5 mg  5 mg Oral Q6H PRN   5 mg at   04/01/19 0614     oxymetazoline (AFRIN) 0.05 % spray 2 spray  2 spray Both   Nostrils Q1H PRN   2 spray at 03/27/19 0519     pantoprazole (PROTONIX) EC tablet 40 mg  40 mg Oral BID AC   40   mg at 04/01/19 0813     piperacillin-tazobactam (ZOSYN) 3.375 g vial to attach to NS   100 mL bag  3.375 g Intravenous Q6H   3.375 g at 04/01/19 0935     polyethylene glycol (MIRALAX/GLYCOLAX) Packet 17 g  17 g Oral   Daily PRN   17 g at 03/18/19 2213     potassium chloride (KLOR-CON) Packet 20-40 mEq  20-40 mEq Oral   or Feeding Tube Q2H PRN         potassium chloride 10 mEq in 100 mL intermittent infusion with   10 mg lidocaine  10 mEq Intravenous Q1H PRN         potassium chloride  10 mEq in 100 mL sterile water intermittent   infusion (premix)  10 mEq Intravenous Q1H PRN         potassium chloride 20 mEq in 50 mL intermittent infusion  20   mEq Intravenous Q1H PRN 50 mL/hr at 03/26/19 2349 20 mEq at   03/29/19 0013     potassium chloride ER (K-DUR/KLOR-CON M) CR tablet 20-40 mEq    20-40 mEq Oral Q2H PRN         potassium phosphate 15 mmol in D5W 250 mL intermittent infusion    15 mmol Intravenous Daily PRN   15 mmol at 04/01/19 1212     potassium phosphate 20 mmol in D5W 250 mL intermittent infusion    20 mmol Intravenous Q6H PRN   20 mmol at 03/29/19 0301     potassium phosphate 20 mmol in D5W 500 mL intermittent infusion    20 mmol Intravenous Q6H PRN         potassium phosphate 25 mmol in D5W 500 mL intermittent infusion    25 mmol Intravenous Q8H PRN         pramox-pe-glycerin-petrolatum (PREPARATION H) cream   Rectal 4x   Daily PRN         prochlorperazine (COMPAZINE) injection 5-10 mg  5-10 mg   Intravenous Q6H   5 mg at 03/22/19 1339    Or     prochlorperazine (COMPAZINE) tablet 5-10 mg  5-10 mg Oral Q6H     10 mg at 04/01/19 0608     ranitidine (ZANTAC) tablet 150 mg  150 mg Oral BID PRN         rOPINIRole (REQUIP) tablet 0.75 mg  0.75 mg Oral Daily at 8 pm     0.75 mg at 03/31/19 2219     sennosides (SENOKOT) tablet 8.6 mg  8.6 mg Oral BID PRN         sodium chloride (OCEAN) 0.65 % nasal spray 1-2 spray  1-2 spray   Both Nostrils Q1H PRN         sodium chloride (PF) 0.9% PF flush 10 mL  10 mL Intracatheter   Q7 Days   10 mL at 03/21/19 1220     sodium chloride (PF) 0.9% PF flush 10-20 mL  10-20 mL   Intracatheter q1 min prn   20 mL at 04/01/19 1046     sodium chloride 0.9% infusion   Intravenous Continuous 125   mL/hr at 03/31/19 0916       sodium chloride 0.9% infusion  1,000 mL Intravenous Continuous   PRN         sucralfate (CARAFATE) suspension 1 g  1 g Oral TID PRN         tamsulosin (FLOMAX) capsule 0.8 mg  0.8 mg Oral Daily   0.8 mg   at 04/01/19 0813     vancomycin (FIRVANQ)  oral solution 125 mg  125 mg Oral 4x Daily     125 mg at 04/01/19 0813     vancomycin 1250 mg in 0.9% NaCl 250 mL intermittent infusion   1,250 mg  1,250 mg Intravenous Q12H   1,250 mg at 04/01/19 1006     venetoclax (VENCLEXTA) tablet CHEMOTHERAPY 100 mg  100 mg Oral   Daily with supper         voriconazole (VFEND) tablet 200 mg  200 mg Oral Q12H ZUNILDA   200   mg at 04/01/19 0813     Current Outpatient Medications Ordered in Epic   Medication     venetoclax (VENCLEXTA) 100 MG tablet CHEMOTHERAPY     venetoclax (VENCLEXTA) 100 MG tablet CHEMOTHERAPY     venetoclax (VENCLEXTA) 100 MG tablet CHEMOTHERAPY             Review of Systems:   The 10 point Review of Systems is negative other than noted in   the HPI            Physical Exam:   Vitals were reviewed  Temp: 97.1  F (36.2  C) Temp src: Oral BP: 105/57 Pulse: 102   Heart Rate: 112 Resp: 18 SpO2: 95 % O2 Device: None (Room air)      Head and neck exam: slight tenderness to palpation of L   submandibular lymph node    Intraoral exam: LL buccal shelf erythema around LL molars (#18   and #19) 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    #19 displayed very elevated pain response to percussion,   indicative of possible odontogenic infection.  #18 displays   elevated percussion sensitivity, but patient states that it is   not as bad as #19.  UL 2nd molar (#15) and LR 1st molar (#30) no   elevated response to percussion.  #30 PA radiolucency noted on   CT.       Data:   Radiographic interpretation: Dental CT taken on 3/12/2019  Osseous pathology: none  Pulpal Pathology: previous RCT #19  Periodontal Pathology: #19 fistula at furcation  Caries: not visible  Odontogenic pathology: PA radiolucency #30, possibly also #18 and   #19    The patient was discussed with: Dr. Javier Davis DDS  PGY2  Pager: [phone]   Blood culture   Result Value Ref Range    Specimen Description Blood PICC     Special Requests Received in aerobic bottle only      Culture Micro No growth after 17 hours    Blood culture   Result Value Ref Range    Specimen Description Blood Left Hand     Special Requests Received in aerobic bottle only     Culture Micro No growth after 17 hours    Transfusion reaction evaluation   Result Value Ref Range    TRX Interpretation PENDING    Blood component   Result Value Ref Range    Unit Number [id number]     Blood Component Type Red Blood Cells Leukocyte Reduced     Division Number 00     Status of Unit No longer available 04/01/2019 1528     Blood Product Code [id number]     Unit Status RET    Blood culture   Result Value Ref Range    Specimen Description Other Transfusion Reaction Donor Unit     Special Requests       MATHEW ESPINO [id number]  Aerobic and anaerobic bottles received      Culture Micro No growth after 16 hours    Gram stain   Result Value Ref Range    Specimen Description Other Transfusion Reaction Donor Unit     Special Requests       MATHEW ESPINO [id number]  Aerobic and anaerobic bottles received      Gram Stain No organisms seen     Gram Stain       Called to  Breann Ordonez [facility] and Cat Penaloza [place] at Blood Bank [facility] on 4.1.19 at   1502 by SS.     Transfusion Rxn Blood Bank Notification   Result Value Ref Range    Transfusion Rxn Blood Bank Notification Order received    Phosphorus   Result Value Ref Range    Phosphorus 2.7 2.5 - 4.5 mg/dL   Basic metabolic panel   Result Value Ref Range    Sodium 131 (L) 133 - 144 mmol/L    Potassium 3.7 3.4 - 5.3 mmol/L    Chloride 101 94 - 109 mmol/L    Carbon Dioxide 21 20 - 32 mmol/L    Anion Gap 9 3 - 14 mmol/L    Glucose 111 (H) 70 - 99 mg/dL    Urea Nitrogen 11 7 - 30 mg/dL    Creatinine 0.83 0.66 - 1.25 mg/dL    GFR Estimate >90 >60 mL/min/[1.73_m2]    GFR Estimate If Black >90 >60 mL/min/[1.73_m2]    Calcium 7.2 (L) 8.5 - 10.1 mg/dL   Lactic acid level STAT for sepsis protocol   Result Value Ref Range    Lactate for Sepsis Protocol 0.5 (L) 0.7 - 2.0 mmol/L    CBC with platelets differential   Result Value Ref Range    WBC 0.7 (LL) 4.0 - 11.0 10e9/L    RBC Count 2.08 (L) 4.4 - 5.9 10e12/L    Hemoglobin 6.5 (LL) 13.3 - 17.7 g/dL    Hematocrit 20.5 (L) 40.0 - 53.0 %    MCV 99 78 - 100 fl    MCH 31.3 26.5 - 33.0 pg    MCHC 31.7 31.5 - 36.5 g/dL    RDW 16.3 (H) 10.0 - 15.0 %    Platelet Count 8 (LL) 150 - 450 10e9/L    Diff Method PENDING    INR   Result Value Ref Range    INR 1.55 (H) 0.86 - 1.14   Magnesium   Result Value Ref Range    Magnesium 1.8 1.6 - 2.3 mg/dL   Partial thromboplastin time   Result Value Ref Range    PTT 23 22 - 37 sec   Uric acid   Result Value Ref Range    Uric Acid 1.7 (L) 3.5 - 7.2 mg/dL   Fibrinogen activity   Result Value Ref Range    Fibrinogen 364 200 - 420 mg/dL   Lactate Dehydrogenase   Result Value Ref Range    Lactate Dehydrogenase 226 85 - 227 U/L   Phosphorus   Result Value Ref Range    Phosphorus 1.6 (L) 2.5 - 4.5 mg/dL   Basic metabolic panel   Result Value Ref Range    Sodium 131 (L) 133 - 144 mmol/L    Potassium 3.6 3.4 - 5.3 mmol/L    Chloride 102 94 - 109 mmol/L    Carbon Dioxide 20 20 - 32 mmol/L    Anion Gap 10 3 - 14 mmol/L    Glucose 106 (H) 70 - 99 mg/dL    Urea Nitrogen 11 7 - 30 mg/dL    Creatinine 0.95 0.66 - 1.25 mg/dL    GFR Estimate 84 >60 mL/min/[1.73_m2]    GFR Estimate If Black >90 >60 mL/min/[1.73_m2]    Calcium 7.3 (L) 8.5 - 10.1 mg/dL

## 2019-04-02 NOTE — PLAN OF CARE
Tmax 102.2 and tachycardic. OVSS. Sepsis triggered and LA was 0.5. PRN PO Tylenol given x2 for fever and left, lower jaw discomfort. Heat packs also being used for jaw discomfort. Patient having mild rigors with fever. Warm blankets given. Denied nausea and vomiting. Dose #7 Decitabine given without incident along with PO Venetoclax. UOP adeqaute. Stool sample sent for Dr. Brown's study; next sample to be collected on Thursday. Appetite fair. Wife, son and brother-in-law visited. Son flew in from [place] today. Patient seems very tired and resting/sleeping the majority of the shift. Continue with POC.

## 2019-04-02 NOTE — PLAN OF CARE
Temp max 103.5, blood cultures obtained x 2. BP soft (91/55), received a 1 liter NS bolus over 4 hours. Tachycardic in the 140's with fever. OVSS. Denied nausea. C/O mouth discomfort, partially relieved with prn tylenol and tramadol. Jaw/face swelling slightly increased per pt report. Up ad shelley in room and halls. Fair po intake. Voiding spontaneously without difficulty, adequate urine output. Incontinent of stool x 1. Has had 4-5 diarrhea stools this shift. Received 1 unit of platelets and 2 units of pRBC. Experienced chills/rigors post last unit of pRBC. Transfusion reaction workup completed. Magnesium and phosphorus replaced. Magnesium recheck in the morning and phosphorus recheck ordered from 1800 this evening. Son at bedside and attentive to pt. Plan to go to dental clinic on Thursday.

## 2019-04-02 NOTE — PROGRESS NOTES
CLINICAL NUTRITION SERVICES - REASSESSMENT NOTE     Nutrition Prescription    RECOMMENDATIONS FOR MDs/PROVIDERS TO ORDER:  - Recommend switching PO4 sliding scale replacement protocol to high intensity in order to be more aggressive in replacing and correcting hypophosphatemia.   - Also recommend checking Vitamin D level to determine if deficiency may also be contributing to hypophosphatemia.  - Recommend initiating PN support to help ensure adequate nutritional intakes and maintain stable electrolytes until GI status improves and lower jaw swelling resolves.    Malnutrition Status:    Non-severe malnutrition in the context of acute illness based on available data    Recommendations already ordered by Registered Dietitian (RD):  Obtain wt        EVALUATION OF THE PROGRESS TOWARD GOALS   Diet: Regular     Intake: Ave po intakes per calorie counts x 3 days = 1156 kcals and 38 g PRO which is meeting 57% of estimated calorie needs and 38% of estimated protein needs. However, d/t diarrhea, suspect pt with decreased absorptive capabilities, further hindering po adequacy.   - Per RN, pt having difficulty eating d/t poor appetite, decreased ability to chew secondary to pain, lower jaw swelling and limited jaw opening. Per brief visit with pt this afternoon, pt indicated that he is struggling to eat d/t barely able to open his mouth.    NEW FINDINGS   Weight: No new wt x 2 days. Last recorded wt of 93.3 kg on 3/31. Previous wt of 94.9 kg on 3/26. - Wt has been declining despite receiving IVF's over the past week, in addition to IV bolus flush on 4/1, and 3/27 per flowsheets.    Labs:  PO4 1.6 (low); suspect d/t increased losses secondary to diarrhea. On standard PO4 replacement protocol, but may benefit from high intensity d/t risk for ongoing depletion d/t stooling.    Medications:  Receiving scheduled Zofran every 8 hrs and Compazine every 6 hrs.     GI: having frequent loose stools d/t C. Diff positive on  3/26    MALNUTRITION  (Limited d/t pt wearing street clothing)  % Intake: < 75% for > 7 days (non-severe)  % Weight Loss: Unable to reassess  Subcutaneous Fat Loss: Unable to reassess  Muscle Loss: Temporal: Mild   Fluid Accumulation/Edema: None noted per chart review  Malnutrition Diagnosis: Non-severe malnutrition in the context of acute illness based on available data    Previous Goals   1. Ave po intakes per calorie counts x 3 days to meet at least 75% of estimated nutritional needs (>1500 calories and 70 g PRO)    Evaluation: Not met    2. Wt not to decline < 94 kg    Evaluation: Unable to evaluate d/t no new wt x past 2 days.    Previous Nutrition Diagnosis  Inadequate oral intake related to appetite fluctuations and intermittent nausea likely associated with chemo as evidenced by wt loss of 1.9 kg (2% loss) over the past week and decreased meal consumption from 3 meals to 2 meals per day over the past week.      Evaluation: No change    CURRENT NUTRITION DIAGNOSIS  Inadequate oral intake related to poor appetite, chewing difficulty secondary to pain, lower jaw swelling and limited jaw opening and altered GI function secondary to diarrhea as evidenced by ave po intakes per calorie counts meeting only 57% of estimated calorie needs and 38% of estimated protein needs, wt trending downward over past week and abnormal PO4 of 1.6 despite replacement.      INTERVENTIONS  Implementation  Nutrition education for recommended modifications - provided pt with sample menu for Mechanical/Dental Soft diet to assist pt with meal ordering until jaw discomfort improves    Goals  Initiation of nutrition support within 24 to 48 hrs.     Monitoring/Evaluation  Progress toward goals will be monitored and evaluated per protocol.    Tonya Hanson RD,LD  Unit pager [phone]

## 2019-04-03 ENCOUNTER — APPOINTMENT (OUTPATIENT)
Dept: GENERAL RADIOLOGY | Facility: CLINIC | Age: 64
DRG: 834 | End: 2019-04-03
Attending: INTERNAL MEDICINE
Payer: COMMERCIAL

## 2019-04-03 ENCOUNTER — APPOINTMENT (OUTPATIENT)
Dept: CT IMAGING | Facility: CLINIC | Age: 64
DRG: 834 | End: 2019-04-03
Attending: PHYSICIAN ASSISTANT
Payer: COMMERCIAL

## 2019-04-03 PROBLEM — A41.9 SEPTIC SHOCK (H): Status: ACTIVE | Noted: 2019-04-03

## 2019-04-03 PROBLEM — R65.21 SEPTIC SHOCK (H): Status: ACTIVE | Noted: 2019-04-03

## 2019-04-03 LAB
ALBUMIN SERPL-MCNC: 2 G/DL (ref 3.4–5)
ALBUMIN SERPL-MCNC: 2 G/DL (ref 3.4–5)
ALP SERPL-CCNC: 69 U/L (ref 40–150)
ALP SERPL-CCNC: 71 U/L (ref 40–150)
ALT SERPL W P-5'-P-CCNC: 30 U/L (ref 0–70)
ALT SERPL W P-5'-P-CCNC: 32 U/L (ref 0–70)
ANION GAP SERPL CALCULATED.3IONS-SCNC: 10 MMOL/L (ref 3–14)
ANION GAP SERPL CALCULATED.3IONS-SCNC: 12 MMOL/L (ref 3–14)
ANION GAP SERPL CALCULATED.3IONS-SCNC: 12 MMOL/L (ref 3–14)
ANISOCYTOSIS BLD QL SMEAR: SLIGHT
ANISOCYTOSIS BLD QL SMEAR: SLIGHT
AST SERPL W P-5'-P-CCNC: 35 U/L (ref 0–45)
AST SERPL W P-5'-P-CCNC: 38 U/L (ref 0–45)
BACTERIA SPEC CULT: NO GROWTH
BACTERIA SPEC CULT: NO GROWTH
BACTERIA SPEC CULT: NORMAL
BACTERIA SPEC CULT: NORMAL
BASE DEFICIT BLDV-SCNC: 5.6 MMOL/L
BASE DEFICIT BLDV-SCNC: 7.3 MMOL/L
BASOPHILS # BLD AUTO: 0 10E9/L (ref 0–0.2)
BASOPHILS NFR BLD AUTO: 0 %
BILIRUB DIRECT SERPL-MCNC: 3.1 MG/DL (ref 0–0.2)
BILIRUB SERPL-MCNC: 4.1 MG/DL (ref 0.2–1.3)
BILIRUB SERPL-MCNC: 4.2 MG/DL (ref 0.2–1.3)
BLASTS # BLD: 0.1 10E9/L
BLASTS # BLD: 0.3 10E9/L
BLASTS BLD QL SMEAR: 15.9 %
BLASTS BLD QL SMEAR: 19 %
BLD PROD TYP BPU: NORMAL
BLD UNIT ID BPU: 0
BLOOD PRODUCT CODE: NORMAL
BPU ID: NORMAL
BUN SERPL-MCNC: 10 MG/DL (ref 7–30)
BUN SERPL-MCNC: 12 MG/DL (ref 7–30)
BUN SERPL-MCNC: 13 MG/DL (ref 7–30)
CA-I BLD-MCNC: 3.8 MG/DL (ref 4.4–5.2)
CA-I BLD-MCNC: 4.1 MG/DL (ref 4.4–5.2)
CALCIUM SERPL-MCNC: 6.6 MG/DL (ref 8.5–10.1)
CALCIUM SERPL-MCNC: 6.9 MG/DL (ref 8.5–10.1)
CALCIUM SERPL-MCNC: 7.1 MG/DL (ref 8.5–10.1)
CHLORIDE SERPL-SCNC: 106 MMOL/L (ref 94–109)
CHLORIDE SERPL-SCNC: 106 MMOL/L (ref 94–109)
CHLORIDE SERPL-SCNC: 107 MMOL/L (ref 94–109)
CO2 SERPL-SCNC: 16 MMOL/L (ref 20–32)
CO2 SERPL-SCNC: 16 MMOL/L (ref 20–32)
CO2 SERPL-SCNC: 18 MMOL/L (ref 20–32)
CREAT SERPL-MCNC: 1.11 MG/DL (ref 0.66–1.25)
CREAT SERPL-MCNC: 1.33 MG/DL (ref 0.66–1.25)
CREAT SERPL-MCNC: 1.45 MG/DL (ref 0.66–1.25)
DIFFERENTIAL METHOD BLD: ABNORMAL
DIFFERENTIAL METHOD BLD: ABNORMAL
EBV DNA # SPEC NAA+PROBE: 1186 {COPIES}/ML
EBV DNA SPEC NAA+PROBE-LOG#: 3.1 {LOG_COPIES}/ML
EOSINOPHIL # BLD AUTO: 0 10E9/L (ref 0–0.7)
EOSINOPHIL NFR BLD AUTO: 0 %
ERYTHROCYTE [DISTWIDTH] IN BLOOD BY AUTOMATED COUNT: 16.3 % (ref 10–15)
ERYTHROCYTE [DISTWIDTH] IN BLOOD BY AUTOMATED COUNT: 16.4 % (ref 10–15)
ERYTHROCYTE [DISTWIDTH] IN BLOOD BY AUTOMATED COUNT: 16.5 % (ref 10–15)
ERYTHROCYTE [DISTWIDTH] IN BLOOD BY AUTOMATED COUNT: 16.6 % (ref 10–15)
ERYTHROCYTE [DISTWIDTH] IN BLOOD BY AUTOMATED COUNT: 16.7 % (ref 10–15)
FIBRINOGEN PPP-MCNC: 523 MG/DL (ref 200–420)
GFR SERPL CREATININE-BSD FRML MDRD: 50 ML/MIN/{1.73_M2}
GFR SERPL CREATININE-BSD FRML MDRD: 56 ML/MIN/{1.73_M2}
GFR SERPL CREATININE-BSD FRML MDRD: 69 ML/MIN/{1.73_M2}
GLUCOSE SERPL-MCNC: 109 MG/DL (ref 70–99)
GLUCOSE SERPL-MCNC: 133 MG/DL (ref 70–99)
GLUCOSE SERPL-MCNC: 144 MG/DL (ref 70–99)
GRAM STN SPEC: NORMAL
HCO3 BLDV-SCNC: 17 MMOL/L (ref 21–28)
HCO3 BLDV-SCNC: 18 MMOL/L (ref 21–28)
HCT VFR BLD AUTO: 18.5 % (ref 40–53)
HCT VFR BLD AUTO: 19.4 % (ref 40–53)
HCT VFR BLD AUTO: 20.1 % (ref 40–53)
HCT VFR BLD AUTO: 20.5 % (ref 40–53)
HCT VFR BLD AUTO: 21.4 % (ref 40–53)
HGB BLD-MCNC: 6 G/DL (ref 13.3–17.7)
HGB BLD-MCNC: 6.2 G/DL (ref 13.3–17.7)
HGB BLD-MCNC: 6.4 G/DL (ref 13.3–17.7)
HGB BLD-MCNC: 6.5 G/DL (ref 13.3–17.7)
HGB BLD-MCNC: 6.9 G/DL (ref 13.3–17.7)
INR PPP: 1.84 (ref 0.86–1.14)
INR PPP: 1.85 (ref 0.86–1.14)
INTERPRETATION ECG - MUSE: NORMAL
LACTATE BLD-SCNC: 1.5 MMOL/L (ref 0.7–2)
LACTATE BLD-SCNC: 2 MMOL/L (ref 0.7–2)
LYMPHOCYTES # BLD AUTO: 0.2 10E9/L (ref 0.8–5.3)
LYMPHOCYTES # BLD AUTO: 0.2 10E9/L (ref 0.8–5.3)
LYMPHOCYTES NFR BLD AUTO: 25 %
LYMPHOCYTES NFR BLD AUTO: 9.7 %
Lab: NORMAL
MAGNESIUM SERPL-MCNC: 1.9 MG/DL (ref 1.6–2.3)
MAGNESIUM SERPL-MCNC: 2.1 MG/DL (ref 1.6–2.3)
MCH RBC QN AUTO: 30.9 PG (ref 26.5–33)
MCH RBC QN AUTO: 31.3 PG (ref 26.5–33)
MCH RBC QN AUTO: 31.4 PG (ref 26.5–33)
MCH RBC QN AUTO: 31.5 PG (ref 26.5–33)
MCH RBC QN AUTO: 31.5 PG (ref 26.5–33)
MCHC RBC AUTO-ENTMCNC: 31.7 G/DL (ref 31.5–36.5)
MCHC RBC AUTO-ENTMCNC: 31.8 G/DL (ref 31.5–36.5)
MCHC RBC AUTO-ENTMCNC: 32 G/DL (ref 31.5–36.5)
MCHC RBC AUTO-ENTMCNC: 32.2 G/DL (ref 31.5–36.5)
MCHC RBC AUTO-ENTMCNC: 32.4 G/DL (ref 31.5–36.5)
MCV RBC AUTO: 95 FL (ref 78–100)
MCV RBC AUTO: 98 FL (ref 78–100)
MCV RBC AUTO: 99 FL (ref 78–100)
MONOCYTES # BLD AUTO: 0.4 10E9/L (ref 0–1.3)
MONOCYTES # BLD AUTO: 1.4 10E9/L (ref 0–1.3)
MONOCYTES NFR BLD AUTO: 55 %
MONOCYTES NFR BLD AUTO: 73.5 %
MRSA DNA SPEC QL NAA+PROBE: NEGATIVE
NEUTROPHILS # BLD AUTO: 0 10E9/L (ref 1.6–8.3)
NEUTROPHILS # BLD AUTO: 0 10E9/L (ref 1.6–8.3)
NEUTROPHILS NFR BLD AUTO: 0.9 %
NEUTROPHILS NFR BLD AUTO: 1 %
NT-PROBNP SERPL-MCNC: 2031 PG/ML (ref 0–900)
NT-PROBNP SERPL-MCNC: 910 PG/ML (ref 0–900)
NUM BPU REQUESTED: 1
NUM BPU REQUESTED: 2
O2/TOTAL GAS SETTING VFR VENT: ABNORMAL %
O2/TOTAL GAS SETTING VFR VENT: ABNORMAL %
OXYHGB MFR BLDV: 61 %
PCO2 BLDV: 28 MM HG (ref 40–50)
PCO2 BLDV: 29 MM HG (ref 40–50)
PH BLDV: 7.39 PH (ref 7.32–7.43)
PH BLDV: 7.41 PH (ref 7.32–7.43)
PHOSPHATE SERPL-MCNC: 2.1 MG/DL (ref 2.5–4.5)
PHOSPHATE SERPL-MCNC: 2.3 MG/DL (ref 2.5–4.5)
PLATELET # BLD AUTO: 27 10E9/L (ref 150–450)
PLATELET # BLD AUTO: 29 10E9/L (ref 150–450)
PLATELET # BLD AUTO: 36 10E9/L (ref 150–450)
PLATELET # BLD AUTO: 38 10E9/L (ref 150–450)
PLATELET # BLD AUTO: 8 10E9/L (ref 150–450)
PLATELET # BLD EST: ABNORMAL 10*3/UL
PO2 BLDV: 33 MM HG (ref 25–47)
PO2 BLDV: 33 MM HG (ref 25–47)
POIKILOCYTOSIS BLD QL SMEAR: SLIGHT
POTASSIUM SERPL-SCNC: 3.2 MMOL/L (ref 3.4–5.3)
POTASSIUM SERPL-SCNC: 3.4 MMOL/L (ref 3.4–5.3)
POTASSIUM SERPL-SCNC: 3.5 MMOL/L (ref 3.4–5.3)
POTASSIUM SERPL-SCNC: 3.5 MMOL/L (ref 3.4–5.3)
PROCALCITONIN SERPL-MCNC: 18.31 NG/ML
PROT SERPL-MCNC: 5.2 G/DL (ref 6.8–8.8)
PROT SERPL-MCNC: 5.3 G/DL (ref 6.8–8.8)
RBC # BLD AUTO: 1.94 10E12/L (ref 4.4–5.9)
RBC # BLD AUTO: 1.97 10E12/L (ref 4.4–5.9)
RBC # BLD AUTO: 2.04 10E12/L (ref 4.4–5.9)
RBC # BLD AUTO: 2.08 10E12/L (ref 4.4–5.9)
RBC # BLD AUTO: 2.19 10E12/L (ref 4.4–5.9)
SODIUM SERPL-SCNC: 134 MMOL/L (ref 133–144)
SODIUM SERPL-SCNC: 134 MMOL/L (ref 133–144)
SODIUM SERPL-SCNC: 135 MMOL/L (ref 133–144)
SPECIMEN SOURCE: NORMAL
TRANSFUSION STATUS PATIENT QL: NORMAL
TRIGL SERPL-MCNC: 106 MG/DL
TROPONIN I SERPL-MCNC: 0.44 UG/L (ref 0–0.04)
WBC # BLD AUTO: 0.7 10E9/L (ref 4–11)
WBC # BLD AUTO: 1.9 10E9/L (ref 4–11)
WBC # BLD AUTO: 1.9 10E9/L (ref 4–11)
WBC # BLD AUTO: 3.2 10E9/L (ref 4–11)
WBC # BLD AUTO: 3.2 10E9/L (ref 4–11)

## 2019-04-03 PROCEDURE — 85384 FIBRINOGEN ACTIVITY: CPT | Performed by: INTERNAL MEDICINE

## 2019-04-03 PROCEDURE — 85610 PROTHROMBIN TIME: CPT | Performed by: STUDENT IN AN ORGANIZED HEALTH CARE EDUCATION/TRAINING PROGRAM

## 2019-04-03 PROCEDURE — 25000132 ZZH RX MED GY IP 250 OP 250 PS 637: Performed by: PHYSICIAN ASSISTANT

## 2019-04-03 PROCEDURE — 82330 ASSAY OF CALCIUM: CPT | Performed by: PHYSICIAN ASSISTANT

## 2019-04-03 PROCEDURE — 25000128 H RX IP 250 OP 636: Performed by: INTERNAL MEDICINE

## 2019-04-03 PROCEDURE — 84100 ASSAY OF PHOSPHORUS: CPT | Performed by: INTERNAL MEDICINE

## 2019-04-03 PROCEDURE — 84132 ASSAY OF SERUM POTASSIUM: CPT | Performed by: PHYSICIAN ASSISTANT

## 2019-04-03 PROCEDURE — 25000128 H RX IP 250 OP 636: Performed by: HOSPITALIST

## 2019-04-03 PROCEDURE — 83735 ASSAY OF MAGNESIUM: CPT | Performed by: STUDENT IN AN ORGANIZED HEALTH CARE EDUCATION/TRAINING PROGRAM

## 2019-04-03 PROCEDURE — 87103 BLOOD FUNGUS CULTURE: CPT | Performed by: STUDENT IN AN ORGANIZED HEALTH CARE EDUCATION/TRAINING PROGRAM

## 2019-04-03 PROCEDURE — 80053 COMPREHEN METABOLIC PANEL: CPT | Performed by: STUDENT IN AN ORGANIZED HEALTH CARE EDUCATION/TRAINING PROGRAM

## 2019-04-03 PROCEDURE — 93010 ELECTROCARDIOGRAM REPORT: CPT | Performed by: INTERNAL MEDICINE

## 2019-04-03 PROCEDURE — 84100 ASSAY OF PHOSPHORUS: CPT | Performed by: STUDENT IN AN ORGANIZED HEALTH CARE EDUCATION/TRAINING PROGRAM

## 2019-04-03 PROCEDURE — 83735 ASSAY OF MAGNESIUM: CPT | Performed by: INTERNAL MEDICINE

## 2019-04-03 PROCEDURE — 93005 ELECTROCARDIOGRAM TRACING: CPT

## 2019-04-03 PROCEDURE — 87040 BLOOD CULTURE FOR BACTERIA: CPT | Performed by: PHYSICIAN ASSISTANT

## 2019-04-03 PROCEDURE — 25000128 H RX IP 250 OP 636: Performed by: PHYSICIAN ASSISTANT

## 2019-04-03 PROCEDURE — 20000004 ZZH R&B ICU UMMC

## 2019-04-03 PROCEDURE — 86900 BLOOD TYPING SEROLOGIC ABO: CPT

## 2019-04-03 PROCEDURE — P9059 PLASMA, FRZ BETWEEN 8-24HOUR: HCPCS | Performed by: PHYSICIAN ASSISTANT

## 2019-04-03 PROCEDURE — 70491 CT SOFT TISSUE NECK W/DYE: CPT

## 2019-04-03 PROCEDURE — 40000141 ZZH STATISTIC PERIPHERAL IV START W/O US GUIDANCE

## 2019-04-03 PROCEDURE — P9040 RBC LEUKOREDUCED IRRADIATED: HCPCS

## 2019-04-03 PROCEDURE — 40000341 ZZHCL STATISTIC BB TRANSF RXN INVEST

## 2019-04-03 PROCEDURE — 25000132 ZZH RX MED GY IP 250 OP 250 PS 637: Performed by: INTERNAL MEDICINE

## 2019-04-03 PROCEDURE — 25000125 ZZHC RX 250: Performed by: INTERNAL MEDICINE

## 2019-04-03 PROCEDURE — 85027 COMPLETE CBC AUTOMATED: CPT | Performed by: STUDENT IN AN ORGANIZED HEALTH CARE EDUCATION/TRAINING PROGRAM

## 2019-04-03 PROCEDURE — 82330 ASSAY OF CALCIUM: CPT | Performed by: STUDENT IN AN ORGANIZED HEALTH CARE EDUCATION/TRAINING PROGRAM

## 2019-04-03 PROCEDURE — 84484 ASSAY OF TROPONIN QUANT: CPT | Performed by: INTERNAL MEDICINE

## 2019-04-03 PROCEDURE — 25800030 ZZH RX IP 258 OP 636: Performed by: PHYSICIAN ASSISTANT

## 2019-04-03 PROCEDURE — 80048 BASIC METABOLIC PNL TOTAL CA: CPT | Performed by: INTERNAL MEDICINE

## 2019-04-03 PROCEDURE — 83605 ASSAY OF LACTIC ACID: CPT | Performed by: INTERNAL MEDICINE

## 2019-04-03 PROCEDURE — 36592 COLLECT BLOOD FROM PICC: CPT | Performed by: PHYSICIAN ASSISTANT

## 2019-04-03 PROCEDURE — 40000556 ZZH STATISTIC PERIPHERAL IV START W US GUIDANCE

## 2019-04-03 PROCEDURE — 83605 ASSAY OF LACTIC ACID: CPT | Performed by: STUDENT IN AN ORGANIZED HEALTH CARE EDUCATION/TRAINING PROGRAM

## 2019-04-03 PROCEDURE — 85027 COMPLETE CBC AUTOMATED: CPT | Performed by: INTERNAL MEDICINE

## 2019-04-03 PROCEDURE — 85610 PROTHROMBIN TIME: CPT | Performed by: INTERNAL MEDICINE

## 2019-04-03 PROCEDURE — 40000802 ZZH SITE CHECK

## 2019-04-03 PROCEDURE — 85025 COMPLETE CBC W/AUTO DIFF WBC: CPT | Performed by: INTERNAL MEDICINE

## 2019-04-03 PROCEDURE — 82803 BLOOD GASES ANY COMBINATION: CPT | Performed by: INTERNAL MEDICINE

## 2019-04-03 PROCEDURE — 86923 COMPATIBILITY TEST ELECTRIC: CPT

## 2019-04-03 PROCEDURE — 84145 PROCALCITONIN (PCT): CPT | Performed by: INTERNAL MEDICINE

## 2019-04-03 PROCEDURE — 86901 BLOOD TYPING SEROLOGIC RH(D): CPT

## 2019-04-03 PROCEDURE — 36415 COLL VENOUS BLD VENIPUNCTURE: CPT | Performed by: INTERNAL MEDICINE

## 2019-04-03 PROCEDURE — 40000344 ZZHCL STATISTIC THAWING COMPONENT: Performed by: PHYSICIAN ASSISTANT

## 2019-04-03 PROCEDURE — 36415 COLL VENOUS BLD VENIPUNCTURE: CPT | Performed by: PHYSICIAN ASSISTANT

## 2019-04-03 PROCEDURE — 36592 COLLECT BLOOD FROM PICC: CPT | Performed by: INTERNAL MEDICINE

## 2019-04-03 PROCEDURE — 87641 MR-STAPH DNA AMP PROBE: CPT | Performed by: STUDENT IN AN ORGANIZED HEALTH CARE EDUCATION/TRAINING PROGRAM

## 2019-04-03 PROCEDURE — 83880 ASSAY OF NATRIURETIC PEPTIDE: CPT | Performed by: INTERNAL MEDICINE

## 2019-04-03 PROCEDURE — 25000131 ZZH RX MED GY IP 250 OP 636 PS 637: Performed by: PHYSICIAN ASSISTANT

## 2019-04-03 PROCEDURE — 84478 ASSAY OF TRIGLYCERIDES: CPT | Performed by: INTERNAL MEDICINE

## 2019-04-03 PROCEDURE — 86850 RBC ANTIBODY SCREEN: CPT

## 2019-04-03 PROCEDURE — 25000128 H RX IP 250 OP 636: Performed by: STUDENT IN AN ORGANIZED HEALTH CARE EDUCATION/TRAINING PROGRAM

## 2019-04-03 PROCEDURE — 71045 X-RAY EXAM CHEST 1 VIEW: CPT

## 2019-04-03 PROCEDURE — 25000125 ZZHC RX 250: Performed by: PHYSICIAN ASSISTANT

## 2019-04-03 PROCEDURE — 87640 STAPH A DNA AMP PROBE: CPT | Performed by: STUDENT IN AN ORGANIZED HEALTH CARE EDUCATION/TRAINING PROGRAM

## 2019-04-03 PROCEDURE — 80076 HEPATIC FUNCTION PANEL: CPT | Performed by: INTERNAL MEDICINE

## 2019-04-03 PROCEDURE — P9016 RBC LEUKOCYTES REDUCED: HCPCS

## 2019-04-03 PROCEDURE — 25000132 ZZH RX MED GY IP 250 OP 250 PS 637: Performed by: NURSE PRACTITIONER

## 2019-04-03 PROCEDURE — 87040 BLOOD CULTURE FOR BACTERIA: CPT | Performed by: STUDENT IN AN ORGANIZED HEALTH CARE EDUCATION/TRAINING PROGRAM

## 2019-04-03 PROCEDURE — 25800030 ZZH RX IP 258 OP 636: Performed by: INTERNAL MEDICINE

## 2019-04-03 PROCEDURE — 82805 BLOOD GASES W/O2 SATURATION: CPT | Performed by: STUDENT IN AN ORGANIZED HEALTH CARE EDUCATION/TRAINING PROGRAM

## 2019-04-03 PROCEDURE — P9037 PLATE PHERES LEUKOREDU IRRAD: HCPCS | Performed by: PHYSICIAN ASSISTANT

## 2019-04-03 PROCEDURE — 70486 CT MAXILLOFACIAL W/O DYE: CPT

## 2019-04-03 RX ORDER — POTASSIUM CHLORIDE 1.5 G/1.58G
20-40 POWDER, FOR SOLUTION ORAL
Status: DISCONTINUED | OUTPATIENT
Start: 2019-04-03 | End: 2019-04-11

## 2019-04-03 RX ORDER — DEXTROSE MONOHYDRATE 25 G/50ML
25-50 INJECTION, SOLUTION INTRAVENOUS
Status: DISCONTINUED | OUTPATIENT
Start: 2019-04-03 | End: 2019-04-14

## 2019-04-03 RX ORDER — FLUDROCORTISONE ACETATE 0.1 MG/1
100 TABLET ORAL DAILY
Status: DISCONTINUED | OUTPATIENT
Start: 2019-04-04 | End: 2019-04-04

## 2019-04-03 RX ORDER — NALOXONE HYDROCHLORIDE 0.4 MG/ML
.1-.4 INJECTION, SOLUTION INTRAMUSCULAR; INTRAVENOUS; SUBCUTANEOUS
Status: DISCONTINUED | OUTPATIENT
Start: 2019-04-03 | End: 2019-04-15 | Stop reason: HOSPADM

## 2019-04-03 RX ORDER — CLINDAMYCIN PHOSPHATE 900 MG/50ML
900 INJECTION, SOLUTION INTRAVENOUS EVERY 8 HOURS
Status: DISCONTINUED | OUTPATIENT
Start: 2019-04-03 | End: 2019-04-04

## 2019-04-03 RX ORDER — POTASSIUM CL/LIDO/0.9 % NACL 10MEQ/0.1L
10 INTRAVENOUS SOLUTION, PIGGYBACK (ML) INTRAVENOUS
Status: DISCONTINUED | OUTPATIENT
Start: 2019-04-03 | End: 2019-04-11

## 2019-04-03 RX ORDER — POTASSIUM CHLORIDE 7.45 MG/ML
10 INJECTION INTRAVENOUS
Status: DISCONTINUED | OUTPATIENT
Start: 2019-04-03 | End: 2019-04-11

## 2019-04-03 RX ORDER — POTASSIUM CHLORIDE 29.8 MG/ML
20 INJECTION INTRAVENOUS
Status: DISCONTINUED | OUTPATIENT
Start: 2019-04-03 | End: 2019-04-11

## 2019-04-03 RX ORDER — LINEZOLID 2 MG/ML
600 INJECTION, SOLUTION INTRAVENOUS EVERY 12 HOURS
Status: DISCONTINUED | OUTPATIENT
Start: 2019-04-03 | End: 2019-04-07

## 2019-04-03 RX ORDER — ONDANSETRON 2 MG/ML
4 INJECTION INTRAMUSCULAR; INTRAVENOUS EVERY 6 HOURS PRN
Status: DISCONTINUED | OUTPATIENT
Start: 2019-04-03 | End: 2019-04-11

## 2019-04-03 RX ORDER — ONDANSETRON 4 MG/1
4 TABLET, ORALLY DISINTEGRATING ORAL EVERY 6 HOURS PRN
Status: DISCONTINUED | OUTPATIENT
Start: 2019-04-03 | End: 2019-04-11

## 2019-04-03 RX ORDER — NOREPINEPHRINE BITARTRATE/D5W 16MG/250ML
.03-.4 PLASTIC BAG, INJECTION (ML) INTRAVENOUS CONTINUOUS
Status: DISCONTINUED | OUTPATIENT
Start: 2019-04-03 | End: 2019-04-04

## 2019-04-03 RX ORDER — SALIVA STIMULANT COMB. NO.3
1-2 SPRAY, NON-AEROSOL (ML) MUCOUS MEMBRANE 4 TIMES DAILY
Status: DISCONTINUED | OUTPATIENT
Start: 2019-04-03 | End: 2019-04-15 | Stop reason: HOSPADM

## 2019-04-03 RX ORDER — IMIPENEM AND CILASTATIN 500; 500 MG/1; MG/1
500 INJECTION, POWDER, FOR SOLUTION INTRAVENOUS EVERY 6 HOURS
Status: DISCONTINUED | OUTPATIENT
Start: 2019-04-03 | End: 2019-04-08

## 2019-04-03 RX ORDER — IOPAMIDOL 755 MG/ML
100 INJECTION, SOLUTION INTRAVASCULAR ONCE
Status: COMPLETED | OUTPATIENT
Start: 2019-04-03 | End: 2019-04-03

## 2019-04-03 RX ORDER — POTASSIUM CHLORIDE 750 MG/1
20-40 TABLET, EXTENDED RELEASE ORAL
Status: DISCONTINUED | OUTPATIENT
Start: 2019-04-03 | End: 2019-04-11

## 2019-04-03 RX ORDER — NICOTINE POLACRILEX 4 MG
15-30 LOZENGE BUCCAL
Status: DISCONTINUED | OUTPATIENT
Start: 2019-04-03 | End: 2019-04-14

## 2019-04-03 RX ADMIN — VORICONAZOLE 200 MG: 200 TABLET, FILM COATED ORAL at 07:55

## 2019-04-03 RX ADMIN — VORICONAZOLE 200 MG: 200 TABLET, FILM COATED ORAL at 20:30

## 2019-04-03 RX ADMIN — MICAFUNGIN SODIUM 100 MG: 10 INJECTION, POWDER, LYOPHILIZED, FOR SOLUTION INTRAVENOUS at 17:12

## 2019-04-03 RX ADMIN — POTASSIUM CHLORIDE 20 MEQ: 29.8 INJECTION, SOLUTION INTRAVENOUS at 07:53

## 2019-04-03 RX ADMIN — ALLOPURINOL 300 MG: 300 TABLET ORAL at 07:55

## 2019-04-03 RX ADMIN — ACETAMINOPHEN 650 MG: 325 TABLET, FILM COATED ORAL at 10:31

## 2019-04-03 RX ADMIN — SODIUM CHLORIDE 1000 ML: 9 INJECTION, SOLUTION INTRAVENOUS at 01:13

## 2019-04-03 RX ADMIN — VANCOMYCIN HYDROCHLORIDE 125 MG: KIT at 11:36

## 2019-04-03 RX ADMIN — VANCOMYCIN HYDROCHLORIDE 125 MG: KIT at 17:42

## 2019-04-03 RX ADMIN — POTASSIUM CHLORIDE 20 MEQ: 29.8 INJECTION, SOLUTION INTRAVENOUS at 05:20

## 2019-04-03 RX ADMIN — VANCOMYCIN HYDROCHLORIDE 125 MG: KIT at 07:55

## 2019-04-03 RX ADMIN — ACETAMINOPHEN 650 MG: 325 TABLET, FILM COATED ORAL at 02:54

## 2019-04-03 RX ADMIN — TRAMADOL HYDROCHLORIDE 50 MG: 50 TABLET, COATED ORAL at 08:57

## 2019-04-03 RX ADMIN — ACYCLOVIR 400 MG: 400 TABLET ORAL at 07:55

## 2019-04-03 RX ADMIN — POTASSIUM PHOSPHATE, MONOBASIC AND POTASSIUM PHOSPHATE, DIBASIC 15 MMOL: 224; 236 INJECTION, SOLUTION INTRAVENOUS at 10:33

## 2019-04-03 RX ADMIN — ACETAMINOPHEN 650 MG: 325 TABLET, FILM COATED ORAL at 06:52

## 2019-04-03 RX ADMIN — TRAMADOL HYDROCHLORIDE 50 MG: 50 TABLET, COATED ORAL at 03:00

## 2019-04-03 RX ADMIN — ACETAMINOPHEN 650 MG: 325 TABLET, FILM COATED ORAL at 22:10

## 2019-04-03 RX ADMIN — ACYCLOVIR 400 MG: 400 TABLET ORAL at 20:30

## 2019-04-03 RX ADMIN — ACETAMINOPHEN 650 MG: 325 TABLET, FILM COATED ORAL at 17:42

## 2019-04-03 RX ADMIN — Medication 2 SPRAY: at 11:36

## 2019-04-03 RX ADMIN — PROCHLORPERAZINE MALEATE 10 MG: 5 TABLET, FILM COATED ORAL at 05:38

## 2019-04-03 RX ADMIN — PIPERACILLIN AND TAZOBACTAM 3.38 G: 3; .375 INJECTION, POWDER, FOR SOLUTION INTRAVENOUS at 02:56

## 2019-04-03 RX ADMIN — CALCIUM GLUCONATE 2 G: 98 INJECTION, SOLUTION INTRAVENOUS at 08:52

## 2019-04-03 RX ADMIN — VANCOMYCIN HYDROCHLORIDE 125 MG: KIT at 20:48

## 2019-04-03 RX ADMIN — SODIUM CHLORIDE 1000 ML: 9 INJECTION, SOLUTION INTRAVENOUS at 20:51

## 2019-04-03 RX ADMIN — IMIPENEM AND CILASTATIN SODIUM 500 MG: 500; 500 INJECTION, POWDER, FOR SOLUTION INTRAVENOUS at 23:09

## 2019-04-03 RX ADMIN — LINEZOLID 600 MG: 600 INJECTION, SOLUTION INTRAVENOUS at 20:20

## 2019-04-03 RX ADMIN — Medication 2 G: at 07:53

## 2019-04-03 RX ADMIN — HYDROCORTISONE SODIUM SUCCINATE 100 MG: 100 INJECTION, POWDER, FOR SOLUTION INTRAMUSCULAR; INTRAVENOUS at 23:02

## 2019-04-03 RX ADMIN — ONDANSETRON HYDROCHLORIDE 8 MG: 8 TABLET, FILM COATED ORAL at 07:55

## 2019-04-03 RX ADMIN — PROCHLORPERAZINE MALEATE 10 MG: 5 TABLET, FILM COATED ORAL at 11:36

## 2019-04-03 RX ADMIN — PIPERACILLIN AND TAZOBACTAM 3.38 G: 3; .375 INJECTION, POWDER, FOR SOLUTION INTRAVENOUS at 16:53

## 2019-04-03 RX ADMIN — TAMSULOSIN HYDROCHLORIDE 0.4 MG: 0.4 CAPSULE ORAL at 07:56

## 2019-04-03 RX ADMIN — SODIUM CHLORIDE 1000 ML: 9 INJECTION, SOLUTION INTRAVENOUS at 19:42

## 2019-04-03 RX ADMIN — IOPAMIDOL 100 ML: 755 INJECTION, SOLUTION INTRAVENOUS at 16:18

## 2019-04-03 RX ADMIN — Medication 2 SPRAY: at 08:50

## 2019-04-03 RX ADMIN — Medication 10 MG: at 08:50

## 2019-04-03 RX ADMIN — PROCHLORPERAZINE MALEATE 10 MG: 5 TABLET, FILM COATED ORAL at 00:22

## 2019-04-03 RX ADMIN — PIPERACILLIN AND TAZOBACTAM 3.38 G: 3; .375 INJECTION, POWDER, FOR SOLUTION INTRAVENOUS at 08:58

## 2019-04-03 RX ADMIN — PANTOPRAZOLE SODIUM 40 MG: 40 TABLET, DELAYED RELEASE ORAL at 07:56

## 2019-04-03 RX ADMIN — VANCOMYCIN HYDROCHLORIDE 1500 MG: 10 INJECTION, POWDER, LYOPHILIZED, FOR SOLUTION INTRAVENOUS at 11:39

## 2019-04-03 RX ADMIN — CLINDAMYCIN IN 5 PERCENT DEXTROSE 900 MG: 18 INJECTION, SOLUTION INTRAVENOUS at 21:54

## 2019-04-03 RX ADMIN — ONDANSETRON HYDROCHLORIDE 8 MG: 8 TABLET, FILM COATED ORAL at 01:13

## 2019-04-03 RX ADMIN — ONDANSETRON HYDROCHLORIDE 8 MG: 8 TABLET, FILM COATED ORAL at 17:47

## 2019-04-03 RX ADMIN — PANTOPRAZOLE SODIUM 40 MG: 40 TABLET, DELAYED RELEASE ORAL at 17:48

## 2019-04-03 ASSESSMENT — ACTIVITIES OF DAILY LIVING (ADL)
ADLS_ACUITY_SCORE: 14

## 2019-04-03 ASSESSMENT — MIFFLIN-ST. JEOR
SCORE: 1820.02
SCORE: 1794.16

## 2019-04-03 ASSESSMENT — PAIN DESCRIPTION - DESCRIPTORS
DESCRIPTORS: ACHING;SORE
DESCRIPTORS: ACHING
DESCRIPTORS: ACHING;SORE

## 2019-04-03 NOTE — PROGRESS NOTES
[facility], [place]  Hematology / Oncology Progress Note    Date of Admission: 3/12/2019  Date of Service (when I saw the patient): 04/03/2019     Assessment & Plan   Mr. Ace is a 63 year old male with new diagnosis of acute myeloid leukemia incidentally found during work up of nonspecific chest symptoms after partial root canal intervention. He was started on induction chemotherapy with 7+3 (cytarabine and daunorubicin) with D1=3/15/19. BMBx on 3/25 (done early due to rising WBC and blast counts) demonstrated persistent disease with 95% blasts by flow, 98% by morphology. Started re-induction with decitabine on 3/26 PM and added venetoclax on 3/29.     Today:  - will not transport to [facility] for more urgent intervention, difficult for Dental team to get OR time in [place] today, and we do not want to wait until 4/4 for further Dental intervention given worsening of pts symptoms. Thus, discussed case with Oral Surgery team, who will take pt to surgery today.   - will give 1U plts and 2U plasma   - NPO  - he received 1U PRBCs overnight for hgb 6.4  - multiple lyte derangements, working to replete  - given ongoing colitis/diarrhea and worsening oral infection/difficulty with PO, will plan to initiate TPN tonight. Order placed. Will need to ensure lytes adequate to start. High replacement protocols ordered.   - continue IV Zosyn  - change IV Vanco to IV linezolid for more immediate tissue penetration given low level of Vanco  - new FOREST, obtain UA, monitor kidney function in setting of diarrhea, fluid balance, NAGMA  - replete calcium  - Vit K 10mg today, followed by 5mg daily x 2 days   - add Micafungin to cover candida for oral infection per ID recs  - after oral surgery intervention, will also get CT Neck with contrast (discussed with Radiology team and they would give if GRF>30); for renal consideration, we are also stopping the Vanco and adequately hydrating, so will  proceed with contrast.        HEME:  #AML, refractory. NGS positive for IDH2 and RUNX1 alterations.  Patient presented to [facility] ED in [place], [place] for complaints of nonspecific chest discomfort after root canal treatment (done 3/11/19). CT C/A/P was negative for PE or other acute findings. On OSH labs, his WBC was incidentally noted to be elevated at 71.1 with Hb 9.0 and plts 97 (prior labs had been unremarkable per patient). Smear was suspicious for immature blasts and acute leukemia. No symptoms of hyperviscosity, TLS or DIC on presentation. 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 Flow consistent with AML with 95% blasts with the following immunophenotype: Positive for CD13, CD33, CD34, CD38, dim to negative CD45, , partial HLA-DR. Baseline ECHO normal, PICC place.   - HLA typing sent, BMT consult done 3/29 by Dr. Jeter  - s/p initial Hydrea (dc'd 3/16) followed by induction chemotherapy with 7+3 (cytarabine and daunorubicin). Day 1=3/15/19.   - repeat BMBx done early (3/25) due to rising WBC/blast count. Still with persistent heavy burden of disease (98% blasts by morphology).   - s/p Hydrea for WBC count management; started 1g BID (3/27), incresaed to 2g BID (x3/28). Hydrea discontinued 3/31.   - Started reinduction with decitabine/venetoclax. Decitabine D1=3/26; added Venetoclax on 3/29. Ramp up dosing accounting for concomitant voriconazole. Final dose will be 100mg daily. Today is Day 9 from start of reinduction.   - continue Allopurinol      #Pancytopenia  #Mild epistaxis, resolved  2/2 AML & associated chemotherapy.   - transfuse to maintain Hb >7, Plt >10K.   - Afrin PRN, ocean spray PRN     #Mild coagulopathy, in setting of refractory AML. INR has recently been stable (ranging 1.3-1.5), fibrinogen stable. However, INR 1.84 today.   - monitor daily coags  - plasma for INR >1.8, cryo for fibrinogen <100  - Vit K x 3 days  (4/3-4/5)     ID:  #Neutropenic fever/sepsis.  #C.diff diarrhea  #Neutropenic colitis.  #Left mandible cellulitis/oral infection  First fever on 3/22. Now having daily fevers with Tmax into the 102s. Sources include c.diff colitis and L mandible cellulitis.  - BCx 3/22 to present are NGTD  - 3/23 fungal BCx NGTD  - 3/22 UA negative, repeat UA/UCx negative on 3/30  - 3/22 CXR with small R pleural effusion, no focal airspace opacity  - 3/25 galactomannan and fungitell both negative  - 3/26 C.diff positive  - 3/29 CT C/A/P demonstrated Right thickened hemicolon with adjacent fat stranding;  - 3/31 CT Dental demonstrated left hemimandible cellulitis  - ID following  - Dental consulted for CT findings, appreciate recs but unable to intervene as urgently as needed at this time.   - consult Oral Surgery today --> will take pt to OR today for intervention    **Anti-infectives:  - continue Cefepime (x 3/22-4/1). Changed to Zosyn (x 4/1) for better anaerobic coverage given L mandible cellulitis  - PO Vancomycin 125mg four times daily x 10 days (3/26)  - PO Flagyl (x3/29-4/1). Discontinued with change to Zosyn.  - s/p IV vanc (x 3/31-4/3). Change to IV Linezolid today per ID (4/3)  - add micafungin 100mg IV daily per ID (x4/3)    #ID PPx   - continue ACV  - Voriconazole 200mg q12hr. Check level on 4/4 per ID.         GI:  #Chemotherapy induced nausea, controlled  - scheduled Zofran q8hr  - compazine also scheduled q6hr  - PRN antiemetics     #C.diff diarrhea   #Neutropenic colitis, ongoing loose stools  - treatment as above  - will make NPO in setting of procedure/difficulty with PO intake/colitis. Start TPN when lytes adequate. Ordered on 4/3.     #Hemorrhoids, improved  - PRN management: TUCKS pads, sitz bath, topical Prep H and/or lidocaine     #S/P root canal manipulation  Patient had the start of a root canal treatment initiated on 3/11/19 with plan to complete it a few weeks later (per pt's wife and son). He was started  on a 7-day course of oral  mg q6h beginning one day prior to the procedure. At time of admission, he was having pain in his left jaw but site appeared unremarkable with no abscess or drainage.  In retrospect, may have also had some leukemic infiltration of gums complicating his course. Pain at site initially improved during chemotherapy, but did return. Pain worsened and with higher temps, checked CT Dental which demonstrated cellulitis along left hemimandible. Now with oral infection as above.  - Tylenol PRN, oxycodone PRN   - abx as above  - Dental consult as above  - OMFS consult, intervention planned for 4/3 as above     CV:  #Subclinical coronary atherosclerosis  #Hyperlipidemia  Total Agatston calcium score was elevated at 591 (91st percentile) on CT coronaries performed 6/8/2016. Nuclear stress test was negative for ischemia on 8/10/2016. Life style modification measures were recommended. Patient follows with cardiology as outpatient (last office visit on 11/12/18).  - holding atorvastatin at this time. Could potentially resume after completion of reinduction if LFTs remain WNL.     NEURO/MSK:  #Restless legs syndrome  - Continue ropinirole 0.75 mg at bedtime, offer PRN ativan for persistent symptoms.     #History of lumbar spine degenerative disc disease   Patient is s/p laminectomy/facetectomy procedures. He does not routinely take pain meds.     :  #BPH   Patient follows with urology ([facility], [place], [place]). On PTA Flomax 0.8 mg daily.  - of note, possible drug interaction between voriconazole and flomax (can raise flomax level in blood & cause hypotension).   - decreased flomax from 0.8mg to 0.4mg (soft BPs in setting of neutropenic sepsis and risk of further hypotension due to interaction with voriconazole). Continue 0.4mg dose at this time.      RENAL:  #Lyte derangement.  #NAGMA.  Multifactorial with decreased PO intake, fevers, diarrhea.  - lyte repletion per unit protocol (high  scale)  - calcium repletion    #FOREST  Baseline Cr 0.7 (GFR>90). Creatinine climbing up to 1.11 (GFR 69) today.   - Multifactorial in setting of decreased PO intake, fevers, diarrhea, and medications (IV Vancomycin, Zosyn). Will continue antibiotics at this time given significant infection. Reassess need for IV vanco daily.    PSYCHOSOCIAL:  #Coping.  Appreciate SW and  involvement. Pt had requested help with Advance Directive, SW following      FEN   - NPO, start TPN  - continue IVFs, bolus PRN  - PRN lyte replacement per standing protocol     Prophylaxis  - No pharmacologic VTE ppx due to TCP  - PPI for GI/PUD ppx       Code Status: FULL     Disposition: Anticipate 4-6 week LOS pending completion of chemotherapy, count recovery, and resolution of acute toxicities.      Discussed with Dr. Leo.     Vee Caberra PA-C  Heme/Onc  [phone]      Interval History   Ongoing persistent fevers up to 103F. He is feeling worse today with increased facial edema, more difficulty with PO intake, and SOB. He is still able to get air in, no hypoxia, and lungs clear on exam. Mouth is dry and left jaw line is tender. Denies chest pain or chest pressure. Denies abdominal pain or nausea. Ongoing loose stools with some urgency this AM. No edema.       Physical Exam   Temp: 100.4  F (38  C) Temp src: Oral BP: 112/59 Pulse: 140 Heart Rate: 128 Resp: 19 SpO2: 94 % O2 Device: None (Room air) Oxygen Delivery: 2 LPM  Vitals:    03/30/19 0838 03/31/19 0744 04/03/19 0942   Weight: 94.2 kg (207 lb 11.2 oz) 93.3 kg (205 lb 9.6 oz) 99.8 kg (220 lb)     Vital Signs with Ranges  Temp:  [97.2  F (36.2  C)-103.4  F (39.7  C)] 100.4  F (38  C)  Pulse:  [105-140] 140  Heart Rate:  [103-142] 128  Resp:  [16-22] 19  BP: ()/(52-73) 112/59  SpO2:  [92 %-100 %] 94 %  I/O last 3 completed shifts:  In: 5809 [P.O.:240; I.V.:4560; IV Piggyback:119]  Out: 2350 [Urine:2350]  Constitutional: Pleasant male seen reclined in bed. Alert,  interactive.   HEENT: NC/AT. Hair falling out on pillow. Worsened b/l lower facial fullness (L>R), still soft. Tender to palpation along left jaw line. Unable to fully open mouth and thus difficult to appreciate left mandible area, but no overt erythema or lesions of the OP. Dry mouth.    Respiratory: Mild work of breathing on RA as he is settling back into bed. Turns onto left side for exam. Good air exchange, getting breath sounds to b/l bases. No stridor. Lungs are clear to auscultation bilaterally, no wheezing or crackles.    CV: Tachycardic rate, regular rhythm. No murmur appreciated  GI: +BS, abd soft, non-distended, non-tender.   Skin: Warm. No concerning lesions or rash on exposed surfaces.   Musculoskeletal: No edema bridgett LEs.   Neurologic: Alert and oriented. Speech limited by reduced jaw ROM. Grossly nonfocal.    Neuropsychiatric: Calm, mentation appears normal, answering questions appropriately. Affect congruent with situation.  VAD: PICC line in RUE, c/d/i        Medications     - MEDICATION INSTRUCTIONS -       sodium chloride 125 mL/hr at 04/02/19 1553       acyclovir  400 mg Oral BID     allopurinol  300 mg Oral Daily     artificial saliva  1-2 spray Swish & Spit 4x Daily     decitabine (DACOGEN) chemo infusion  20 mg/m2 (Treatment Plan Recorded) Intravenous Q24H     ondansetron  8 mg Oral Q8H     pantoprazole  40 mg Oral BID AC     [START ON 4/4/2019] phytonadione  5 mg Oral Daily     piperacillin-tazobactam  3.375 g Intravenous Q6H     prochlorperazine  5-10 mg Intravenous Q6H    Or     prochlorperazine  5-10 mg Oral Q6H     rOPINIRole  0.75 mg Oral Daily at 8 pm     sodium chloride (PF)  10 mL Intracatheter Q7 Days     tamsulosin  0.4 mg Oral Daily     vancomycin  125 mg Oral 4x Daily     vancomycin (VANCOCIN) IV  1,500 mg Intravenous Q12H     venetoclax  100 mg Oral Daily with supper     voriconazole  200 mg Oral Q12H ZUNILDA       Data   Results for orders placed or performed during the hospital  encounter of 03/12/19 (from the past 24 hour(s))   Transfusion Rxn Blood Bank Notification   Result Value Ref Range    Transfusion Rxn Blood Bank Notification Order received    Transfusion reaction evaluation   Result Value Ref Range    TRX Interpretation PENDING    Transfusion reaction evaluation   Result Value Ref Range    TRX Interpretation PENDING    Blood component   Result Value Ref Range    Unit Number [id number]     Blood Component Type PlateletPheresis LeukoReduced Irradiated     Division Number 00     Status of Unit No longer available 04/02/2019 1635     Blood Product Code [id number]     Unit Status RET    Blood component   Result Value Ref Range    Unit Number [id number]     Blood Component Type Red Blood Cells Leukocyte Reduced     Division Number 00     Status of Unit No longer available 04/02/2019 1641     Blood Product Code [id number]     Unit Status RET    Blood component   Result Value Ref Range    Unit Number [id number]     Blood Component Type Red Blood Cells Leukocyte Reduced     Division Number 00     Status of Unit No longer available 04/02/2019 1642     Blood Product Code [id number]     Unit Status RET    Blood culture   Result Value Ref Range    Specimen Description Other Transfusion Reaction Donor Unit     Culture Micro No growth after 4 hours    Gram stain   Result Value Ref Range    Specimen Description Other Transfusion Reaction Donor Unit     Special Requests       MATHEW ESPINO,[id number]  Aerobic and anaerobic bottles received      Gram Stain No organisms seen     Gram Stain No WBC's seen     Gram Stain       Critical Value/Significant Value called to and read back by  Julian Reed at  on 4.2.19 @ 1617, cn.     Blood culture   Result Value Ref Range    Specimen Description Other Transfusion Reaction Donor Unit     Culture Micro Culture negative monitoring continues    Gram stain   Result Value Ref Range    Specimen Description Other Transfusion Reaction Donor Unit     Special  Requests       MATHEW ESPINO,[id number]  Aerobic and anaerobic bottles received      Gram Stain No organisms seen     Gram Stain No WBC's seen     Gram Stain       Critical Value/Significant Value called to and read back by  Julian Reed at  on 4.2.19 @ 1654, cn.     Blood culture   Result Value Ref Range    Specimen Description Other Transfusion Reaction Donor Unit     Culture Micro Culture negative monitoring continues    Gram stain   Result Value Ref Range    Specimen Description Other Transfusion Reaction Donor Unit     Special Requests       MATHEW ESPINO,[id number]  Aerobic and anaerobic bottles received      Gram Stain No organisms seen     Gram Stain No WBC's seen     Gram Stain       Critical Value/Significant Value called to and read back by  Julian Reed at  on 4.2.19 @ 1654, cn.     Lactic acid level STAT for sepsis protocol   Result Value Ref Range    Lactate for Sepsis Protocol 1.3 0.7 - 2.0 mmol/L   Phosphorus   Result Value Ref Range    Phosphorus 1.1 (L) 2.5 - 4.5 mg/dL   Nt probnp inpatient   Result Value Ref Range    N-Terminal Pro BNP Inpatient 910 (H) 0 - 900 pg/mL   Vancomycin level   Result Value Ref Range    Vancomycin Level 8.4 mg/L   EKG 12-lead, tracing only   Result Value Ref Range    Interpretation ECG Click View Image link to view waveform and result    CBC with platelets   Result Value Ref Range    WBC 1.9 (L) 4.0 - 11.0 10e9/L    RBC Count 2.19 (L) 4.4 - 5.9 10e12/L    Hemoglobin 6.9 (LL) 13.3 - 17.7 g/dL    Hematocrit 21.4 (L) 40.0 - 53.0 %    MCV 98 78 - 100 fl    MCH 31.5 26.5 - 33.0 pg    MCHC 32.2 31.5 - 36.5 g/dL    RDW 16.4 (H) 10.0 - 15.0 %    Platelet Count 29 (LL) 150 - 450 10e9/L   ABO/Rh type and screen   Result Value Ref Range    Units Ordered 1     ABO A     RH(D) Pos     Antibody Screen Neg     Test Valid Only At          [facility]    Specimen Expires 04/06/2019     Crossmatch Red Blood Cells    Blood component   Result  Value Ref Range    Unit Number [id number]     Blood Component Type Red Blood Cells LeukoReduced (Part 2)     Division Number 00     Status of Unit No longer available 04/03/2019 0501     Blood Product Code [id number]     Unit Status RET    Blood component   Result Value Ref Range    Unit Number [id number]     Blood Component Type Red Blood Cells Leukocyte Reduced     Division Number 00     Status of Unit Released to care unit 04/03/2019 0504     Blood Product Code [id number]     Unit Status ISS    CBC with platelets differential   Result Value Ref Range    WBC 1.9 (L) 4.0 - 11.0 10e9/L    RBC Count 2.04 (L) 4.4 - 5.9 10e12/L    Hemoglobin 6.4 (LL) 13.3 - 17.7 g/dL    Hematocrit 20.1 (L) 40.0 - 53.0 %    MCV 99 78 - 100 fl    MCH 31.4 26.5 - 33.0 pg    MCHC 31.8 31.5 - 36.5 g/dL    RDW 16.5 (H) 10.0 - 15.0 %    Platelet Count 27 (LL) 150 - 450 10e9/L    Diff Method PENDING    Basic metabolic panel   Result Value Ref Range    Sodium 134 133 - 144 mmol/L    Potassium 3.2 (L) 3.4 - 5.3 mmol/L    Chloride 106 94 - 109 mmol/L    Carbon Dioxide 18 (L) 20 - 32 mmol/L    Anion Gap 10 3 - 14 mmol/L    Glucose 109 (H) 70 - 99 mg/dL    Urea Nitrogen 10 7 - 30 mg/dL    Creatinine 1.11 0.66 - 1.25 mg/dL    GFR Estimate 69 >60 mL/min/[1.73_m2]    GFR Estimate If Black 81 >60 mL/min/[1.73_m2]    Calcium 6.6 (L) 8.5 - 10.1 mg/dL   Fibrinogen activity   Result Value Ref Range    Fibrinogen 523 (H) 200 - 420 mg/dL   INR   Result Value Ref Range    INR 1.84 (H) 0.86 - 1.14   Magnesium   Result Value Ref Range    Magnesium 1.9 1.6 - 2.3 mg/dL   Phosphorus   Result Value Ref Range    Phosphorus 2.1 (L) 2.5 - 4.5 mg/dL   Calcium ionized whole blood   Result Value Ref Range    Calcium Ionized Whole Blood 3.8 (L) 4.4 - 5.2 mg/dL   Blood culture   Result Value Ref Range    Specimen Description Blood PICC     Special Requests Received in aerobic bottle only     Culture Micro PENDING    Blood culture   Result Value Ref Range    Specimen  Description Blood Left Hand     Special Requests Received in aerobic bottle only     Culture Micro PENDING    Plasma prepare order unit conditional   Result Value Ref Range    Blood Component Type Plasma     Units Ordered 2    Blood component   Result Value Ref Range    Unit Number [id number]     Blood Component Type Plasma, Thawed     Division Number 00     Status of Unit Released to care unit 04/03/2019 0943     Blood Product Code [id number]     Unit Status ISS    Blood component   Result Value Ref Range    Unit Number [id number]     Blood Component Type Plasma, Thawed     Division Number 00     Status of Unit Ready for patient 04/03/2019 1014     Blood Product Code [id number]     Unit Status NICOLAS    CT Facial Bones without Contrast    Narrative    CT FACIAL BONES WITHOUT CONTRAST 4/3/2019 9:41 AM    History:  Mass, lump or swelling, maxface; neutropenic sepsis    Comparison:  3/31/2019      Technique: Using thin collimation multidetector helical acquisition  technique, axial and coronal thin section CT images were reconstructed  through the facial bones. Images were reviewed in bone and soft tissue  windows.    Findings:  On the prior examination, there was a question of  cellulitis along the left hemimandible, which is still present to a  lesser degree, but improved, without focal fluid collection. There  continues to be a tiny right apical molar mandibular lucency. There is  no evident fracture of the facial bones. The cribriform plate appears  intact. Alignment of the facial bones appears normal.   No focal abscess. Overall there does not appear to be osteomyelitis.  Incidental bone island again present left hemimandible more  anteriorly, unchanged.  There is no hematoma, soft tissue mass or gas visualized within the  orbits. Mildly debris right frontoethmoidal recess at the top of the  images ethmoid air cells. Mild sphenoid sinus inflammatory findings,  unchanged.      Impression    Impression:   1. Soft  tissue swelling along the left hemimandible is slightly  improved although still present, without focal abscess, and no  underlying osteomyelitis.  2. Continued tiny periapical lucency anterior most right mandibular  molar, and again cannot exclude early periapical abscess, grossly  unchanged.  3. Small debris within the right frontoethmoidal recesses may  represent early sinus inflammatory disease, new.          STEPHAN TERRY MD

## 2019-04-03 NOTE — PROGRESS NOTES
CLINICAL NUTRITION SERVICES - REASSESSMENT NOTE     Nutrition Prescription    RECOMMENDATIONS FOR MDs/PROVIDERS TO ORDER:  None at this time    Malnutrition Status:    Unable to determine due to at this time    Recommendations already ordered by Registered Dietitian (RD):  Orders entered to initiate CPN as follows: goal volume 1200 ml/day with 150 g Dex daily (510 kcal), 120 g AA daily (480 kcal) and 250 ml of 20% IV lipids daily = 1490 kcals/day (19 kcal/kg/day), 1.5 g PRO/kg/day, GIR 1.3 with 34 % kcals from Fat. Goal dex = 300 g (Dosing wt = 80 kg)  - Add Zinc 5 ml to PN bag d/t suspect depletion secondary to diarrhea    Future/Additional Recommendations:  None at this time     EVALUATION OF THE PROGRESS TOWARD GOALS   Diet: Previously on Regular diet, but changed to NPO this am for OR    Intake: Unable to obtain from pt secondary to off floor. Continued difficulty with po d/t increased facial edema, decreased appetite and mouth pain per chart review.     NEW FINDINGS   Provider consult received for Pharmacy/Nutrition to start & manage PN - d/t neutropenic colitis and poor po intakes insetting of oral infection with significant edema.    Weight: 99.8 kg (today), 93.3 kg (3/31); significant increase over past 2 days, suspect d/t increased in IVF's per review of MAR and flowsheets.     Labs:  K+ 3.2 (low) today; suspect d/t increased losses secondary to diarrhea. Per RN, pt received IV replacement prior to going to OR today.  PO4 2.1 (low) today, trending upward from 1.1 (low) yesterday. Replacement given per review of MAR   (WNL) today      ASSESSED NUTRITION NEEDS (Dosing Weight changed to 80 kg - adjusted based on lowest wt this admin (93.3 kg on 3/31) and IBW (75.5 kg)  Estimated Energy Needs: 0580-5659 kcals/day (25 - 30 kcals/kg)  Justification: Maintenance  Estimated Protein Needs:  grams protein/day (1.2 - 1.5 grams of pro/kg)  Justification: Increased needs due to  chemotherapy    MALNUTRITION  % Intake: < 75% for > 7 days (non-severe)  % Weight Loss: Unable to reassess d/t pt fluid wt up  Subcutaneous Fat Loss: Unable to reassess  Muscle Loss: Unable to reassess  Fluid Accumulation/Edema: None noted per chart review  Malnutrition Diagnosis: Unable to determine due to at this time    Previous Goals   Initiation of nutrition support within 24 to 48 hrs.     Evaluation: Met    Previous Nutrition Diagnosis  Inadequate oral intake related to continued difficulty with eating d/t increased facial edema, decreased appetite, mouth pain, jaw swelling and limited jaw opening and nealtered GI function secondary to diarrhea as evidenced by ave po intakes per calorie counts meeting only 57% of estimated calorie needs and 38% of estimated protein needs, wt trending downward over past week and abnormal PO4 of 1.6 despite replacement.      Evaluation: No longer applicable, nutrition diagnosis changed below    CURRENT NUTRITION DIAGNOSIS  Inadequate protein-energy intake related to neutropenic colitis and poor po intakes insetting of oral infection with significant edema, inability to take po at this time d/t surgery and PN therapy ordered, but not yet initiated as evidenced by NPO status now and no PN intakes received at this time.      INTERVENTIONS  Implementation  Parenteral Nutrition/IV Fluids - Initiate  No education for PN therapy provided to pt at this time d/t pt in OR    Goals  Total avg nutritional intake to meet a minimum of 25 kcal/kg and 1.2 g PRO/kg daily (per dosing wt 80 kg).    Monitoring/Evaluation  Progress toward goals will be monitored and evaluated per protocol.      Tonya Hanson RD,LD  Unit pager [phone]

## 2019-04-03 NOTE — PROGRESS NOTES
Nursing Focus: Chemotherapy  D: Positive blood return via PICC. Insertion site is clean/dry/intact, dressing intact with no complaints of pain.  Urine output is recorded in intake in Doc Flowsheet.    I: Premedications given per order (see electronic medical administration record). Dose #8 of Decitabine started to infuse over 1 hour. Reviewed pt teaching on chemotherapy side effects.  Pt denies need for further teaching. Chemotherapy double checked per protocol by two chemotherapy competent RN's.   A: Tolerating procedure well. Denies nausea and or pain.   P: Continue to monitor urine output and symptoms of nausea. Screen for symptoms of toxicity.

## 2019-04-03 NOTE — PROGRESS NOTES
- Called to see  for shortness of breath, temp of 103 and HR in 140s  -  Per chart review, appears he has an uncontrolled source of infection with in his jaw leading to recurrent fevers  - Upon my exam, stated that he got FERNANDES upon trying to get out of bed as he had a lot of cords and he was hurrying to the bathroom  - Lungs clear to auscaltation  Plan:  - Will check Hgb now to see if this lead to exertional dyspnea  - Will give fluids for temp of 103 and orthostatic hypotension  - CTM for now

## 2019-04-03 NOTE — CONSULTS
#### FINAL REPORT ####    This transfusion reaction investigation has been finalized.  All cultures from the unit bag were negative (no growth, final).  There is no change to the preliminary diagnosis.    Moira Chatman MD,    Transfusion Medicine Attending  Pager [phone]    Laboratory Medicine and Pathology  Transfusion Medicine- Transfusion Reaction    El Ace MRN# [id number]   YOB: 1955 Age: 63 year old   Date of Reaction: 04/02/19     Transfusion Reaction Evaluation   Impression  Definitive febrile non hemolytic transfusion reaction of possible imputability.    The patient experienced chills/rigors after transfusion. This meets the criteria for a definitive febrile non-hemolytic transfusion reaction (per criteria, a definitive febrile non hemolytic transfusion reaction requires an increase of 1.8F, to a peak of at least 100.4 F, or chills/rigors, developing within 4 hours of transfusion).    Imputability is possible, as the patient has been intermittently febrile throughout hospitalization and has been receiving acetaminophen 650 mg PO q4 hours PRN.    Final culture results are pending.    Recommendation    Transfuse as needed.       ----------------------------------    History  El Ace is a 63 year old male with history of acute myeloid leukemia (AML). The patient is in hospital for chemotherapy, with hospital course complicated by neutropenic fever. He remains on antibiotic coverage. The patient has been receiving Acetaminophen 650 mg PO q4h PRN fever, with administration at 0626, 1015, 1345, 1755, and 2213.    Indication for transfusion: Hgb < 7 (Hgb 6.5) and PLT < 10 (PLT 8)    One PLT unit and two RBC units were completely transfused in the time period of 0930 - 1334 on 04/02/19.    Post-Transfusion Events  Chills/rigors reported at 1403.    Reported Symptoms (from BAM)  Chills, rigors.    Vital Signs (From EPIC Blood Administration Flowsheet)  Vitals/Pain (last day)       Date/Time Heart Rate Pulse BP Temp Resp O2 Device    19 0600 127 -- 98/52 101.9  F (38.8  C) 20 None (Room air)    19 0725 147 -- 95/49 103.5  F (39.7  C) 20 None (Room air)   Before PLT transfusion 19 0907 118 -- 107/59 99.5  F (37.5  C) 20 None (Room air)   During PLT transfusion 19 0947 108 -- 119/64 98.9  F (37.2  C) 19 None (Room air)    19 1047 120 -- 116/64 98.2  F (36.8  C) 20 None (Room air)   After PLT completion; RBC started 19 1122 103 -- 91/55 98.8  F (37.1  C) 20 None (Room air)    19 1222 109 -- 110/60 99.6  F (37.6  C) 20 None (Room air)   After 1st RBC unit completed, 2nd RBC unit started 19 1235 -- 105 112/63 98  F (36.7  C) 16 None (Room air)    19 1332 124 -- 119/68 99.9  F (37.7  C) 20 None (Room air)   Chills / Rigors reported 19 1403 -- -- -- 99.6  F (37.6  C) -- --    19 1416 -- -- -- 100.9  F (38.3  C) -- --    19 1550 124 -- 109/59 102.2  F (39  C) 20 None (Room air)    19 1630 -- 134 118/59 103.2  F (39.6  C) 20 None (Room air)    19 1912 -- 127 106/52 102.1  F (38.9  C) 22 None (Room air)    194 -- 132 104/55 100.3  F (37.9  C) 20 None (Room air)            Blood Bank Investigation  Product Type: PLT  Unit Number:   [id number]  Amount Remainin mL  ABO/Rh: The unit type was A Positive.  Transfusion time: 1784-9494 on 19    Product Type: RBC  Unit Number:   [id number]  [id number]  Amount Remainin mL  ABO/Rh: The unit type was O Positive.  Transfusion time: 4255-1446 on 19    Product Type: RBC  Unit Number:   [id number]  [id number]  Amount Remainin mL  ABO/Rh: The unit type was O Positive.  Transfusion time: 8326-5906 on 19    Transfusion History: Numerous RBC and PLTs  Transfusion Reaction History: [facility]  Type and Screen History: Negative  Post-Transfusion Clerical Check: Correct    TINA: Negative  Post-Transfusion Plasma: Straw colored    The units were cultured and Gram stain  was performed after adding 10 ml of saline prior to inoculating the blood culture bottles. Segment from [id number] used for gram stain and culture secondary to difficulty accessing the bag.    Gram stain showed no organisms and culture is no growth to date, pending final.    [facility] Hemovigilance  Case Definition: Definitive  febrile non hemolytic transfusion reaction   Severity: Non severe  Imputability: Possible    Antonio Michelle DO, BB/TM Fellow  Transfusion Medicine Service  Pager: [phone]    Attending Attestation:  I have personally reviewed the clinical and laboratory features of the reported transfusion reactions. I have discussed the patient with Dr. Antonio Michelle, Transfusion Medicine Fellow, and I agree with the comments in his note.    The patient experienced chills/rigors after transfusion of multiple units. This alone would meet the definition of a non-severe febrile non-hemolytic transfusion reaction. The patient also had rise in temperature further supporting this diagnosis. The imputability is only possible as the patient had been having fevers immediately prior to transfusion, thus a contribution from the underlying medical condition can not be excluded.  Recommend transfuse as needed.  Keila Daly M.D., Ph.D.  Attending Physician  Division of Transfusion Medicine  Department of Laboratory Medicine [place] [address]  Pager [phone]

## 2019-04-03 NOTE — PLAN OF CARE
Temp max 103.4 axillary, blood cultures x 2 obtained. Tachycardic to 140. BP soft 90's/50's. Oxygen saturations at/above 92% on 2 liters of supplemental oxygen via oxy plus mask. Denied nausea. C/O left jaw/tooth pain, tolerable with prn tylenol and tramadol. Up ad shelley in room. Poor po intake r/t decreased appetite and mouth pain. Voiding spontaneously without difficulty. Incontinent of stool x 1, continues to have diarrhea. Potassium, calcium and magnesium replaced. Received 2 units of plasma for INR of 1.84. Platelets transfusing prior to pt leaving for dental clinic. Will need phosphorus replaced this evening. Plan to start TPN this evening if electrolytes are WDL. UA/UC needs to be obtained and sent to lab. Vancomycin switch to Zyvox. CT of neck ordered for this evening, will need to call CT when pt gets back on unit from dental clinic. Wife and son at bedside and attentive to pt.

## 2019-04-03 NOTE — PLAN OF CARE
Tmax 103.2 and tachycardic. OVSS. Sepsis triggered and LA was 1.3. PRN PO Tylenol given Q4h (2x) for fevers and left, lower jaw discomfort. Mild rigors still present with fevers. Warm blankets applied. Denied nausea and vomiting. Dose #8 Decitabine given without incident along with PO Venetoclax. UOP adequate. BMs have slowed down from this morning. Appetite fair. Phosphorus recheck was 1.1 and currently being replaced with a recheck scheduled for 0330. Plan for patient to hopefully be seen in [facility] this Thursday by the Dentist. Continue with POC.

## 2019-04-03 NOTE — PLAN OF CARE
C/O SOB and dizziness after ambulating to bathroom. Tachycardic, HR regular. Tmax 103.1, down to 97.2 with tylenol x 1 and ice packs, but suspect pt recently drank water as back up to 101.6. MD notified. Given 500 ml bolus. CBC ordered. Hgb 6.4, 1 unit pRBCs infusing, per MD ok to infuse with fever. Tramadol and tylenol given for jaw pain, with some relief. Plan for dental clinic on Thursday. Bag 1 of 2 of potassium infusing. Will need phos replaced, ordered from pharmacy. Continue with POC.

## 2019-04-03 NOTE — PROGRESS NOTES
[facility]  Transplant Infectious Disease Progress Note    Patient:  El Ace, Date of birth 1955, Medical record number [id number]  Date of Visit:  04/03/2019         Assessment and Recommendations:   Recommendations:  - Continue with zosyn for now  - Change vancomycin to linezolid 600 mg IV BID   - Continue with voriconazole  - Voriconazole level due 4/4  - If continues to be febrile, consider adding micafungin  - Continue with vancomycin PO for CDI beyond 14 days since patient is still on broad spectrum antibiotics.  - Agree with CT neck to evaluate for deep neck infection      Thank you for the consultation. Transplant ID will follow along.     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     Infectious Disease issues include:  - Neutropenic fevers: Likely odontogenic source. CT c/a/p showed only colitis (patient with known CDI - see below) but CT dental showed a tiny periapical lucency involving the anterior most right mandibular molar and fat stranding with mild soft tissue thickening along the left hemimandible, most consistent with cellulitis. Patient has no neutrophils to form abscesses. Source needs to be controlled. OMFS to remove molars today. Bcx NGTD so far. UCx neg. Galactomannan and BDG neg. Patient continues to be febrile and now with worsening face/neck swelling. Vancomycin trough was 8.4 this AM. Given rapid progression of edema/infection, will change vancomycin to linezolid since it penetrates better into deep tissues. Will continue with zosyn for now. Voriconazole level pending. If continues to be febrile with worsening symptoms in spite of source control and broad antibacterials, consider adding micafungin. Agree with CT neck to evaluate for deep neck infection.     - C Diff  diarrhea: Improving. On vancomycin PO since 3/26.     - PCP prophylaxis: None  - Serostatus: CMV neg, EBV pos, HSV pos  - Immunization status: Needs all pre BMT vaccinations   - Gamma globulin status: Not in records   - Isolation status:  Good hand hygiene. Enteric isolation for CDI     Attestation:  I have reviewed today's vital signs, medications, labs and imaging. I personally reviewed the imaging.    Noemi Garcia MD  Transplant Infectious Diseases   [phone]         Interval History:   Still febrile, getting worse, feeling more swollen, swelling interfering with his ability to talk, eat and feels short of breath. No stridors or wheezes. No cough. No n/v. No abdominal pain. Continues to have loose stools but 2-3 a day. No new skin rashes. Ill appearing.       Transplants:  N/A    Review of Systems:   ROS: 10 point ROS neg other than the symptoms noted above in the interval history.       Current Facility-Administered Medications   Medication     acetaminophen (TYLENOL) tablet 650 mg     acyclovir (ZOVIRAX) tablet 400 mg     allopurinol (ZYLOPRIM) tablet 300 mg     artificial saliva (BIOTENE MT) solution 1-2 spray     baclofen (LIORESAL) tablet 10 mg     calcium carbonate (TUMS) chewable tablet 500 mg     decitabine (DACOGEN) 43 mg in sodium chloride 0.9 % 119 mL CHEMOTHERAPY     lidocaine (XYLOCAINE) 2 % topical gel     linezolid (ZYVOX) infusion 600 mg     LORazepam (ATIVAN) tablet 0.5-1 mg    Or     LORazepam (ATIVAN) injection 0.5-1 mg     magic mouthwash suspension (diphenhydramine, lidocaine, aluminum-magnesium & simethicone)     magnesium sulfate 2 g in NS intermittent infusion (PharMEDium or [facility] Cmpd)     magnesium sulfate 4 g in 100 mL sterile water (premade)     Medication Instruction     meperidine (DEMEROL) injection 25 mg     micafungin (MYCAMINE) 100 mg in sodium chloride 0.9 % 100 mL intermittent infusion     naloxone (NARCAN) injection 0.1-0.4 mg     OLANZapine zydis (zyPREXA) ODT tab 5 mg      ondansetron (ZOFRAN) tablet 8 mg     oxyCODONE (ROXICODONE) tablet 5 mg     oxymetazoline (AFRIN) 0.05 % spray 2 spray     pantoprazole (PROTONIX) EC tablet 40 mg     [START ON 4/4/2019] phytonadione (MEPHYTON/VITAMIN K) 1 MG/ML oral suspension 5 mg     piperacillin-tazobactam (ZOSYN) 3.375 g vial to attach to  mL bag     polyethylene glycol (MIRALAX/GLYCOLAX) Packet 17 g     potassium chloride (KLOR-CON) Packet 20-40 mEq     potassium chloride 10 mEq in 100 mL intermittent infusion with 10 mg lidocaine     potassium chloride 10 mEq in 100 mL sterile water intermittent infusion (premix)     potassium chloride 20 mEq in 50 mL intermittent infusion     potassium chloride ER (K-DUR/KLOR-CON M) CR tablet 20-40 mEq     potassium phosphate 10 mmol in D5W 250 mL intermittent infusion     potassium phosphate 15 mmol in D5W 250 mL intermittent infusion     potassium phosphate 20 mmol in D5W 250 mL intermittent infusion     potassium phosphate 20 mmol in D5W 500 mL intermittent infusion     potassium phosphate 25 mmol in D5W 500 mL intermittent infusion     pramox-pe-glycerin-petrolatum (PREPARATION H) cream     prochlorperazine (COMPAZINE) injection 5-10 mg    Or     prochlorperazine (COMPAZINE) tablet 5-10 mg     ranitidine (ZANTAC) tablet 150 mg     rOPINIRole (REQUIP) tablet 0.75 mg     sennosides (SENOKOT) tablet 8.6 mg     sodium chloride (OCEAN) 0.65 % nasal spray 1-2 spray     sodium chloride (PF) 0.9% PF flush 10 mL     sodium chloride (PF) 0.9% PF flush 10-20 mL     sodium chloride 0.9% infusion     sucralfate (CARAFATE) suspension 1 g     tamsulosin (FLOMAX) capsule 0.4 mg     traMADol (ULTRAM) tablet 50 mg     vancomycin (FIRVANQ) oral solution 125 mg     vancomycin 1250 mg in 0.9% NaCl 250 mL intermittent infusion 1,250 mg     venetoclax (VENCLEXTA) tablet CHEMOTHERAPY 100 mg     voriconazole (VFEND) tablet 200 mg       No Known Allergies           Physical Exam:   Vitals were reviewed.  All vitals  "stable  BP 90/52 (BP Location: Left arm)   Pulse 130   Temp 100.7  F (38.2  C) (Oral)   Resp 25   Ht 1.778 m (5' 10\")   Wt 99.8 kg (220 lb)   SpO2 96%   BMI 31.57 kg/m      Exam:  GENERAL:  well-developed, well-nourished, ill appearing male alert, oriented, in no acute distress.  HEENT:  Head is normocephalic, atraumatic   EYES:  Eyes have anicteric sclerae.    ENT:  Oropharynx is moist without exudates or ulcers. Bilateral jaw edema. Swollen inferior right gums.  No discharge.   NECK:  Supple but swollen. No LAD  LUNGS:  Clear to auscultation. No wheezes  CARDIOVASCULAR:  Regular rate and rhythm with no murmurs, gallops or rubs.  ABDOMEN:  Normal bowel sounds, soft, nontender.  SKIN:  No acute rashes.  Line is in place without any surrounding erythema.  NEUROLOGIC:  Grossly nonfocal.         Laboratory Data:     Metabolic Studies    Recent Labs   Lab Test 04/03/19  0330  04/02/19  0552 04/01/19  1712  03/30/19  2134     --  131* 131*   < >  --    POTASSIUM 3.2*  --  3.6 3.7   < >  --    CHLORIDE 106  --  102 101   < >  --    CO2 18*  --  20 21   < >  --    ANIONGAP 10  --  10 9   < >  --    BUN 10  --  11 11   < >  --    CR 1.11  --  0.95 0.83   < >  --    GFRESTIMATED 69  --  84 >90   < >  --    *  --  106* 111*   < >  --    DEBBIE 6.6*  --  7.3* 7.2*   < >  --    PHOS 2.1*   < > 1.6* 2.7   < >  --    MAG 1.9  --  1.8  --    < >  --    URIC  --   --  1.7*  --    < >  --    LACT  --   --   --   --   --  1.0    < > = values in this interval not displayed.       Hepatic Studies    Recent Labs   Lab Test 04/02/19  0552 04/01/19  0549  03/28/19  0634 03/27/19  0736  03/26/19 2050   BILITOTAL  --  0.8  --  0.7  --   --  0.4   DBIL  --  0.3*  --  0.2  --   --   --    ALKPHOS  --  80  --  84  --   --  73   PROTTOTAL  --  6.0*  --  5.9*  --   --  5.7*   ALBUMIN  --  2.3*  --  2.2* 2.4*  --  2.4*   AST  --  17  --  14  --   --  14   ALT  --  31  --  29  --   --  30    305*   < > 166  --    < >  --  "    < > = values in this interval not displayed.       Hematology Studies     Recent Labs   Lab Test 04/03/19  0330 04/03/19  0206 04/02/19  0552 04/01/19  0549   WBC 1.9* 1.9* 0.7* 0.5*   ABLA 0.3*  --  0.1* 0.2*   BLST 15.9  --  19.0 43.0   ANEU 0.0*  --  0.0* 0.0*   ALYM 0.2*  --  0.2* 0.2*   TRACIE 1.4*  --  0.4 0.0   AEOS 0.0  --   --  0.0   HGB 6.4* 6.9* 6.5* 6.9*   HCT 20.1* 21.4* 20.5* 22.0*   PLT 27* 29* 8* 17*       Urine Studies     Recent Labs   Lab Test 03/30/19  2206 03/22/19  0945   URINEPH 6.5 7.5*   NITRITE Negative Negative   LEUKEST Negative Negative   WBCU <1 2       Microbiology:  Last 6 Culture results with specimen source  Culture Micro   Date Value Ref Range Status   04/03/2019 No growth after 4 hours  Preliminary   04/03/2019 No growth after 4 hours  Preliminary   04/02/2019 No growth after 21 hours  Preliminary   04/02/2019 Culture negative monitoring continues  Preliminary   04/02/2019 Culture negative monitoring continues  Preliminary   04/02/2019 No growth after 1 day  Preliminary    Specimen Description   Date Value Ref Range Status   04/03/2019 Blood Left Hand  Final   04/03/2019 Blood PICC  Final   04/02/2019 Other Transfusion Reaction Donor Unit  Final   04/02/2019 Other Transfusion Reaction Donor Unit  Final   04/02/2019 Other Transfusion Reaction Donor Unit  Final   04/02/2019 Other Transfusion Reaction Donor Unit  Final   04/02/2019 Other Transfusion Reaction Donor Unit  Final   04/02/2019 Other Transfusion Reaction Donor Unit  Final          Last check of C difficile  C Diff Toxin B PCR   Date Value Ref Range Status   03/26/2019 Positive (A) NEG^Negative Final     Comment:     Positive: Toxin producing Clostridium difficile target DNA sequences detected,   presumed positive for Clostridium difficile toxin B.  Clostridium difficile (Requires Enteric Isolation)  FDA approved assay performed using [place] GeneXpert real-time PCR.  Critical Value/Significant Value called to and read  back by  JATINDER KAUR, RN [id number] 3.26.19 [facility]         Virology:  CMV viral loads    Recent Labs   Lab Test 03/31/19  0626   CSPEC Plasma   CMVLOG Not Calculated       CMV viral loads    Log IU/mL of CMVQNT   Date Value Ref Range Status   03/31/2019 Not Calculated <2.1 [Log_IU]/mL Final       Hepatitis B Testing     Recent Labs   Lab Test 03/13/19  0553   AUSAB 15.70*   HEPBANG Nonreactive     Was the last Hepatitis B E antigen positive?   No results found for: HBEAGN     Hepatitis C Antibody   Date Value Ref Range Status   03/13/2019 Nonreactive NR^Nonreactive Final     Comment:     Assay performance characteristics have not been established for newborns,   infants, and children         Imaging:  Recent Results (from the past 24 hour(s))   CT Facial Bones without Contrast    Narrative    CT FACIAL BONES WITHOUT CONTRAST 4/3/2019 9:41 AM    History:  Mass, lump or swelling, maxface; neutropenic sepsis    Comparison:  3/31/2019      Technique: Using thin collimation multidetector helical acquisition  technique, axial and coronal thin section CT images were reconstructed  through the facial bones. Images were reviewed in bone and soft tissue  windows.    Findings:  On the prior examination, there was a question of  cellulitis along the left hemimandible, which is still present to a  lesser degree, but improved, without focal fluid collection. There  continues to be a tiny right apical molar mandibular lucency. There is  no evident fracture of the facial bones. The cribriform plate appears  intact. Alignment of the facial bones appears normal.   No focal abscess. Overall there does not appear to be osteomyelitis.  Incidental bone island again present left hemimandible more  anteriorly, unchanged.  There is no hematoma, soft tissue mass or gas visualized within the  orbits. Mildly debris right frontoethmoidal recess at the top of the  images ethmoid air cells. Mild sphenoid sinus inflammatory findings,  unchanged.       Impression    Impression:   1. Soft tissue swelling along the left hemimandible is slightly  improved although still present, without focal abscess, and no  underlying osteomyelitis.  2. Continued tiny periapical lucency anterior most right mandibular  molar, and again cannot exclude early periapical abscess, grossly  unchanged.  3. Small debris within the right frontoethmoidal recesses may  represent early sinus inflammatory disease, new.          STEPHAN TERRY MD        CT dental: 3/31/19  1. Tiny periapical lucency involving the anterior most right mandibular molar.  2. Fat stranding and mild soft tissue thickening along the left hemimandible, most consistent with cellulitis. No discreet abscess.    CT c/a/p 3/29/19:  1. Thickened right hemicolon with adjacent fat stranding favored to be infectious given history of sepsis.  2. At least moderate three-vessel coronary artery calcifications

## 2019-04-03 NOTE — PHARMACY-VANCOMYCIN DOSING SERVICE
Pharmacy Vancomycin Note  Date of Service April 3, 2019  Patient's  1955   63 year old, male    Indication: Febrile Neutropenia  Goal Trough Level: 10-15 mg/L  Day of Therapy: 3 days complete  Current Vancomycin regimen:  1250 mg IV q12h    Current estimated CrCl = Estimated Creatinine Clearance: 77.1 mL/min (based on SCr of 1.11 mg/dL).    Creatinine for last 3 days  3/31/2019:  5:20 PM Creatinine 0.75 mg/dL  2019:  5:49 AM Creatinine 0.76 mg/dL;  5:12 PM Creatinine 0.83 mg/dL  2019:  5:52 AM Creatinine 0.95 mg/dL  4/3/2019:  3:30 AM Creatinine 1.11 mg/dL    Recent Vancomycin Levels (past 3 days)  2019:  9:42 PM Vancomycin Level 8.4 mg/L    Vancomycin IV Administrations (past 72 hours)      No vancomycin orders with administrations in past 72 hours.                Nephrotoxins and other renal medications (From now, onward)    Start     Dose/Rate Route Frequency Ordered Stop    19 0730  vancomycin 1250 mg in 0.9% NaCl 250 mL intermittent infusion 1,250 mg      1,250 mg  over 90 Minutes Intravenous HOLD 19 0726      19 0930  piperacillin-tazobactam (ZOSYN) 3.375 g vial to attach to  mL bag      3.375 g  over 30 Minutes Intravenous EVERY 6 HOURS 19 0907      19 2230  vancomycin (FIRVANQ) oral solution 125 mg      125 mg Oral 4 TIMES DAILY 19 2224 19 0729    19 2230  acyclovir (ZOVIRAX) tablet 400 mg      400 mg Oral 2 TIMES DAILY 19 2223               Contrast Orders - past 72 hours (72h ago, onward)    None          Interpretation of levels and current regimen:  Trough level is  Subtherapeutic    Has serum creatinine changed > 50% in last 72 hours: Yes (0.75 to 1.11)  Of note, Zosyn also added on .    Urine output:  good urine output    Renal Function: Worsening    Plan:  1.  Vancomycin currently on hold due to rising Scr and worsening renal function in the last 72 hours. If plan to resume, recommend increasing dose to 1500 mg Q12 hours  to attain therapeutic levels and closely monitor Scr soon given concomitant zosyn therapy.   2.  Pharmacy will check trough levels as appropriate in 1-3 Days.    3. Serum creatinine levels will be ordered daily for the first week of therapy and at least twice weekly for subsequent weeks.      Kristen Weiler, PharmD. IV Student        .

## 2019-04-04 ENCOUNTER — APPOINTMENT (OUTPATIENT)
Dept: ULTRASOUND IMAGING | Facility: CLINIC | Age: 64
DRG: 834 | End: 2019-04-04
Payer: COMMERCIAL

## 2019-04-04 ENCOUNTER — APPOINTMENT (OUTPATIENT)
Dept: GENERAL RADIOLOGY | Facility: CLINIC | Age: 64
DRG: 834 | End: 2019-04-04
Attending: STUDENT IN AN ORGANIZED HEALTH CARE EDUCATION/TRAINING PROGRAM
Payer: COMMERCIAL

## 2019-04-04 LAB
ABO + RH BLD: NORMAL
ABO + RH BLD: NORMAL
ALBUMIN SERPL-MCNC: 2.1 G/DL (ref 3.4–5)
ALBUMIN UR-MCNC: 100 MG/DL
ALP SERPL-CCNC: 77 U/L (ref 40–150)
ALT SERPL W P-5'-P-CCNC: 78 U/L (ref 0–70)
ANION GAP SERPL CALCULATED.3IONS-SCNC: 12 MMOL/L (ref 3–14)
APPEARANCE UR: ABNORMAL
AST SERPL W P-5'-P-CCNC: 143 U/L (ref 0–45)
BACTERIA SPEC CULT: NO GROWTH
BACTERIA SPEC CULT: NO GROWTH
BASE DEFICIT BLDV-SCNC: 7.5 MMOL/L
BASOPHILS # BLD AUTO: 0 10E9/L (ref 0–0.2)
BASOPHILS NFR BLD AUTO: 0 %
BILIRUB SERPL-MCNC: 4.9 MG/DL (ref 0.2–1.3)
BILIRUB UR QL STRIP: ABNORMAL
BLASTS # BLD: 0.4 10E9/L
BLASTS BLD QL SMEAR: 10.6 %
BLD GP AB SCN SERPL QL: NORMAL
BLD PROD TYP BPU: NORMAL
BLD PROD TYP BPU: NORMAL
BLD UNIT ID BPU: 0
BLOOD BANK CMNT PATIENT-IMP: NORMAL
BLOOD PRODUCT CODE: NORMAL
BPU ID: NORMAL
BUN SERPL-MCNC: 15 MG/DL (ref 7–30)
CALCIUM SERPL-MCNC: 7.4 MG/DL (ref 8.5–10.1)
CHLORIDE SERPL-SCNC: 107 MMOL/L (ref 94–109)
CO2 SERPL-SCNC: 17 MMOL/L (ref 20–32)
COLOR UR AUTO: YELLOW
CREAT SERPL-MCNC: 1.34 MG/DL (ref 0.66–1.25)
DIFFERENTIAL METHOD BLD: ABNORMAL
EOSINOPHIL # BLD AUTO: 0 10E9/L (ref 0–0.7)
EOSINOPHIL NFR BLD AUTO: 0 %
ERYTHROCYTE [DISTWIDTH] IN BLOOD BY AUTOMATED COUNT: 16.3 % (ref 10–15)
GFR SERPL CREATININE-BSD FRML MDRD: 55 ML/MIN/{1.73_M2}
GLUCOSE SERPL-MCNC: 139 MG/DL (ref 70–99)
GLUCOSE UR STRIP-MCNC: NEGATIVE MG/DL
GRAN CASTS #/AREA URNS LPF: 1 /LPF
HCO3 BLDV-SCNC: 18 MMOL/L (ref 21–28)
HCT VFR BLD AUTO: 19.9 % (ref 40–53)
HGB BLD-MCNC: 6.4 G/DL (ref 13.3–17.7)
HGB BLD-MCNC: 7.6 G/DL (ref 13.3–17.7)
HGB BLD-MCNC: 8 G/DL (ref 13.3–17.7)
HGB UR QL STRIP: ABNORMAL
INTERPRETATION ECG - MUSE: NORMAL
KETONES UR STRIP-MCNC: NEGATIVE MG/DL
LACTATE BLD-SCNC: 2.6 MMOL/L (ref 0.7–2)
LACTATE BLD-SCNC: 3.2 MMOL/L (ref 0.7–2)
LDH SERPL L TO P-CCNC: 341 U/L (ref 85–227)
LEUKOCYTE ESTERASE UR QL STRIP: NEGATIVE
LYMPHOCYTES # BLD AUTO: 0.4 10E9/L (ref 0.8–5.3)
LYMPHOCYTES NFR BLD AUTO: 9.8 %
Lab: NORMAL
Lab: NORMAL
MAGNESIUM SERPL-MCNC: 2.4 MG/DL (ref 1.6–2.3)
MCH RBC QN AUTO: 30.6 PG (ref 26.5–33)
MCHC RBC AUTO-ENTMCNC: 32.2 G/DL (ref 31.5–36.5)
MCV RBC AUTO: 95 FL (ref 78–100)
MIXED CELL CASTS #/AREA URNS LPF: 1 /LPF
MONOCYTES # BLD AUTO: 3.1 10E9/L (ref 0–1.3)
MONOCYTES NFR BLD AUTO: 76.1 %
MUCOUS THREADS #/AREA URNS LPF: PRESENT /LPF
NEUTROPHILS # BLD AUTO: 0.1 10E9/L (ref 1.6–8.3)
NEUTROPHILS NFR BLD AUTO: 3.5 %
NITRATE UR QL: NEGATIVE
NUM BPU REQUESTED: 3
O2/TOTAL GAS SETTING VFR VENT: 4 %
OVALOCYTES BLD QL SMEAR: SLIGHT
PCO2 BLDV: 33 MM HG (ref 40–50)
PH BLDV: 7.33 PH (ref 7.32–7.43)
PH UR STRIP: 6 PH (ref 5–7)
PHOSPHATE SERPL-MCNC: 3.4 MG/DL (ref 2.5–4.5)
PLATELET # BLD AUTO: 36 10E9/L (ref 150–450)
PLATELET # BLD EST: ABNORMAL 10*3/UL
PO2 BLDV: 29 MM HG (ref 25–47)
POIKILOCYTOSIS BLD QL SMEAR: SLIGHT
POTASSIUM SERPL-SCNC: 3.7 MMOL/L (ref 3.4–5.3)
PROT SERPL-MCNC: 5.5 G/DL (ref 6.8–8.8)
RBC # BLD AUTO: 2.09 10E12/L (ref 4.4–5.9)
RBC #/AREA URNS AUTO: 2 /HPF (ref 0–2)
SODIUM SERPL-SCNC: 136 MMOL/L (ref 133–144)
SOURCE: ABNORMAL
SP GR UR STRIP: 1.02 (ref 1–1.03)
SPECIMEN EXP DATE BLD: NORMAL
SPECIMEN SOURCE: NORMAL
SPECIMEN SOURCE: NORMAL
TRANSFUSION STATUS PATIENT QL: NORMAL
TRANSFUSION STATUS PATIENT QL: NORMAL
TROPONIN I SERPL-MCNC: 0.41 UG/L (ref 0–0.04)
URATE SERPL-MCNC: 1.6 MG/DL (ref 3.5–7.2)
UROBILINOGEN UR STRIP-MCNC: NORMAL MG/DL (ref 0–2)
VORICONAZOLE SERPL-MCNC: 5.8 UG/ML (ref 1–5.5)
WBC # BLD AUTO: 4.1 10E9/L (ref 4–11)
WBC #/AREA URNS AUTO: 4 /HPF (ref 0–5)

## 2019-04-04 PROCEDURE — 25000128 H RX IP 250 OP 636: Performed by: PHYSICIAN ASSISTANT

## 2019-04-04 PROCEDURE — 99233 SBSQ HOSP IP/OBS HIGH 50: CPT | Mod: GC | Performed by: INTERNAL MEDICINE

## 2019-04-04 PROCEDURE — 25000132 ZZH RX MED GY IP 250 OP 250 PS 637: Performed by: INTERNAL MEDICINE

## 2019-04-04 PROCEDURE — 83615 LACTATE (LD) (LDH) ENZYME: CPT | Performed by: INTERNAL MEDICINE

## 2019-04-04 PROCEDURE — 71045 X-RAY EXAM CHEST 1 VIEW: CPT

## 2019-04-04 PROCEDURE — 25000131 ZZH RX MED GY IP 250 OP 636 PS 637: Performed by: PHYSICIAN ASSISTANT

## 2019-04-04 PROCEDURE — 80053 COMPREHEN METABOLIC PANEL: CPT | Performed by: INTERNAL MEDICINE

## 2019-04-04 PROCEDURE — P9040 RBC LEUKOREDUCED IRRADIATED: HCPCS

## 2019-04-04 PROCEDURE — 93005 ELECTROCARDIOGRAM TRACING: CPT

## 2019-04-04 PROCEDURE — 12000012 ZZH R&B MS OVERFLOW UMMC

## 2019-04-04 PROCEDURE — 83605 ASSAY OF LACTIC ACID: CPT | Performed by: INTERNAL MEDICINE

## 2019-04-04 PROCEDURE — 25000128 H RX IP 250 OP 636: Performed by: NURSE PRACTITIONER

## 2019-04-04 PROCEDURE — 25000125 ZZHC RX 250: Performed by: STUDENT IN AN ORGANIZED HEALTH CARE EDUCATION/TRAINING PROGRAM

## 2019-04-04 PROCEDURE — 76705 ECHO EXAM OF ABDOMEN: CPT

## 2019-04-04 PROCEDURE — 81001 URINALYSIS AUTO W/SCOPE: CPT | Performed by: PHYSICIAN ASSISTANT

## 2019-04-04 PROCEDURE — 84550 ASSAY OF BLOOD/URIC ACID: CPT | Performed by: INTERNAL MEDICINE

## 2019-04-04 PROCEDURE — 25000132 ZZH RX MED GY IP 250 OP 250 PS 637: Performed by: PHYSICIAN ASSISTANT

## 2019-04-04 PROCEDURE — 25000132 ZZH RX MED GY IP 250 OP 250 PS 637: Performed by: NURSE PRACTITIONER

## 2019-04-04 PROCEDURE — 85018 HEMOGLOBIN: CPT | Performed by: STUDENT IN AN ORGANIZED HEALTH CARE EDUCATION/TRAINING PROGRAM

## 2019-04-04 PROCEDURE — 25000125 ZZHC RX 250: Performed by: INTERNAL MEDICINE

## 2019-04-04 PROCEDURE — 25000132 ZZH RX MED GY IP 250 OP 250 PS 637: Performed by: STUDENT IN AN ORGANIZED HEALTH CARE EDUCATION/TRAINING PROGRAM

## 2019-04-04 PROCEDURE — 93010 ELECTROCARDIOGRAM REPORT: CPT | Performed by: INTERNAL MEDICINE

## 2019-04-04 PROCEDURE — 84100 ASSAY OF PHOSPHORUS: CPT | Performed by: INTERNAL MEDICINE

## 2019-04-04 PROCEDURE — 80299 QUANTITATIVE ASSAY DRUG: CPT | Performed by: INTERNAL MEDICINE

## 2019-04-04 PROCEDURE — C9399 UNCLASSIFIED DRUGS OR BIOLOG: HCPCS | Performed by: STUDENT IN AN ORGANIZED HEALTH CARE EDUCATION/TRAINING PROGRAM

## 2019-04-04 PROCEDURE — 25000128 H RX IP 250 OP 636: Performed by: STUDENT IN AN ORGANIZED HEALTH CARE EDUCATION/TRAINING PROGRAM

## 2019-04-04 PROCEDURE — 25800030 ZZH RX IP 258 OP 636: Performed by: STUDENT IN AN ORGANIZED HEALTH CARE EDUCATION/TRAINING PROGRAM

## 2019-04-04 PROCEDURE — 82803 BLOOD GASES ANY COMBINATION: CPT | Performed by: STUDENT IN AN ORGANIZED HEALTH CARE EDUCATION/TRAINING PROGRAM

## 2019-04-04 PROCEDURE — 83605 ASSAY OF LACTIC ACID: CPT | Performed by: STUDENT IN AN ORGANIZED HEALTH CARE EDUCATION/TRAINING PROGRAM

## 2019-04-04 PROCEDURE — 84484 ASSAY OF TROPONIN QUANT: CPT | Performed by: INTERNAL MEDICINE

## 2019-04-04 PROCEDURE — 83735 ASSAY OF MAGNESIUM: CPT | Performed by: INTERNAL MEDICINE

## 2019-04-04 PROCEDURE — 3E0436Z INTRODUCTION OF NUTRITIONAL SUBSTANCE INTO CENTRAL VEIN, PERCUTANEOUS APPROACH: ICD-10-PCS | Performed by: INTERNAL MEDICINE

## 2019-04-04 PROCEDURE — 85025 COMPLETE CBC W/AUTO DIFF WBC: CPT | Performed by: INTERNAL MEDICINE

## 2019-04-04 RX ORDER — VANCOMYCIN HYDROCHLORIDE 50 MG/ML
125 KIT ORAL 4 TIMES DAILY
Status: DISCONTINUED | OUTPATIENT
Start: 2019-04-04 | End: 2019-04-15 | Stop reason: HOSPADM

## 2019-04-04 RX ORDER — VORICONAZOLE 50 MG/1
150 TABLET, FILM COATED ORAL EVERY 12 HOURS SCHEDULED
Status: DISCONTINUED | OUTPATIENT
Start: 2019-04-04 | End: 2019-04-05

## 2019-04-04 RX ADMIN — VENETOCLAX 100 MG: 100 TABLET, FILM COATED ORAL at 18:37

## 2019-04-04 RX ADMIN — IMIPENEM AND CILASTATIN SODIUM 500 MG: 500; 500 INJECTION, POWDER, FOR SOLUTION INTRAVENOUS at 15:38

## 2019-04-04 RX ADMIN — IMIPENEM AND CILASTATIN SODIUM 500 MG: 500; 500 INJECTION, POWDER, FOR SOLUTION INTRAVENOUS at 23:29

## 2019-04-04 RX ADMIN — PANTOPRAZOLE SODIUM 40 MG: 40 TABLET, DELAYED RELEASE ORAL at 15:39

## 2019-04-04 RX ADMIN — CLINDAMYCIN IN 5 PERCENT DEXTROSE 900 MG: 18 INJECTION, SOLUTION INTRAVENOUS at 05:49

## 2019-04-04 RX ADMIN — CLINDAMYCIN IN 5 PERCENT DEXTROSE 900 MG: 18 INJECTION, SOLUTION INTRAVENOUS at 12:27

## 2019-04-04 RX ADMIN — ACYCLOVIR 400 MG: 400 TABLET ORAL at 08:24

## 2019-04-04 RX ADMIN — POTASSIUM CHLORIDE: 2 INJECTION, SOLUTION, CONCENTRATE INTRAVENOUS at 19:57

## 2019-04-04 RX ADMIN — VANCOMYCIN HYDROCHLORIDE 125 MG: KIT at 19:50

## 2019-04-04 RX ADMIN — ASCORBIC ACID 1500 MG: 500 INJECTION, SOLUTION INTRAMUSCULAR; INTRAVENOUS; SUBCUTANEOUS at 11:36

## 2019-04-04 RX ADMIN — LINEZOLID 600 MG: 600 INJECTION, SOLUTION INTRAVENOUS at 08:25

## 2019-04-04 RX ADMIN — ALLOPURINOL 300 MG: 300 TABLET ORAL at 08:24

## 2019-04-04 RX ADMIN — Medication 2 SPRAY: at 08:52

## 2019-04-04 RX ADMIN — Medication 1 SPRAY: at 19:51

## 2019-04-04 RX ADMIN — ONDANSETRON HYDROCHLORIDE 8 MG: 8 TABLET, FILM COATED ORAL at 08:26

## 2019-04-04 RX ADMIN — CALCIUM GLUCONATE 2 G: 98 INJECTION, SOLUTION INTRAVENOUS at 00:13

## 2019-04-04 RX ADMIN — POTASSIUM CHLORIDE: 2 INJECTION, SOLUTION, CONCENTRATE INTRAVENOUS at 09:56

## 2019-04-04 RX ADMIN — TAMSULOSIN HYDROCHLORIDE 0.4 MG: 0.4 CAPSULE ORAL at 08:24

## 2019-04-04 RX ADMIN — PROCHLORPERAZINE EDISYLATE 10 MG: 5 INJECTION INTRAMUSCULAR; INTRAVENOUS at 18:37

## 2019-04-04 RX ADMIN — THIAMINE HYDROCHLORIDE 200 MG: 100 INJECTION, SOLUTION INTRAMUSCULAR; INTRAVENOUS at 00:31

## 2019-04-04 RX ADMIN — ASCORBIC ACID 1500 MG: 500 INJECTION, SOLUTION INTRAMUSCULAR; INTRAVENOUS; SUBCUTANEOUS at 01:20

## 2019-04-04 RX ADMIN — ACYCLOVIR 400 MG: 400 TABLET ORAL at 19:50

## 2019-04-04 RX ADMIN — ONDANSETRON HYDROCHLORIDE 8 MG: 8 TABLET, FILM COATED ORAL at 15:39

## 2019-04-04 RX ADMIN — THIAMINE HYDROCHLORIDE 200 MG: 100 INJECTION, SOLUTION INTRAMUSCULAR; INTRAVENOUS at 08:25

## 2019-04-04 RX ADMIN — PANTOPRAZOLE SODIUM 40 MG: 40 TABLET, DELAYED RELEASE ORAL at 08:24

## 2019-04-04 RX ADMIN — FLUDROCORTISONE ACETATE 100 MCG: 0.1 TABLET ORAL at 08:24

## 2019-04-04 RX ADMIN — ROPINIROLE HYDROCHLORIDE 0.75 MG: 0.5 TABLET, FILM COATED ORAL at 23:09

## 2019-04-04 RX ADMIN — Medication 2 SPRAY: at 11:34

## 2019-04-04 RX ADMIN — VORICONAZOLE 200 MG: 200 TABLET, FILM COATED ORAL at 08:24

## 2019-04-04 RX ADMIN — IMIPENEM AND CILASTATIN SODIUM 500 MG: 500; 500 INJECTION, POWDER, FOR SOLUTION INTRAVENOUS at 03:46

## 2019-04-04 RX ADMIN — THIAMINE HYDROCHLORIDE 200 MG: 100 INJECTION, SOLUTION INTRAMUSCULAR; INTRAVENOUS at 20:33

## 2019-04-04 RX ADMIN — VANCOMYCIN HYDROCHLORIDE 125 MG: KIT at 15:38

## 2019-04-04 RX ADMIN — VANCOMYCIN HYDROCHLORIDE 125 MG: KIT at 08:24

## 2019-04-04 RX ADMIN — VORICONAZOLE 150 MG: 50 TABLET ORAL at 19:51

## 2019-04-04 RX ADMIN — VANCOMYCIN HYDROCHLORIDE 125 MG: KIT at 11:37

## 2019-04-04 RX ADMIN — LINEZOLID 600 MG: 600 INJECTION, SOLUTION INTRAVENOUS at 19:54

## 2019-04-04 RX ADMIN — ONDANSETRON HYDROCHLORIDE 8 MG: 8 TABLET, FILM COATED ORAL at 23:29

## 2019-04-04 RX ADMIN — PROCHLORPERAZINE EDISYLATE 5 MG: 5 INJECTION INTRAMUSCULAR; INTRAVENOUS at 01:24

## 2019-04-04 RX ADMIN — HYDROCORTISONE SODIUM SUCCINATE 50 MG: 100 INJECTION, POWDER, FOR SOLUTION INTRAMUSCULAR; INTRAVENOUS at 03:51

## 2019-04-04 RX ADMIN — Medication 2 SPRAY: at 15:38

## 2019-04-04 RX ADMIN — IMIPENEM AND CILASTATIN SODIUM 500 MG: 500; 500 INJECTION, POWDER, FOR SOLUTION INTRAVENOUS at 09:59

## 2019-04-04 RX ADMIN — I.V. FAT EMULSION 250 ML: 20 EMULSION INTRAVENOUS at 20:01

## 2019-04-04 RX ADMIN — ASCORBIC ACID 1500 MG: 500 INJECTION, SOLUTION INTRAMUSCULAR; INTRAVENOUS; SUBCUTANEOUS at 18:37

## 2019-04-04 RX ADMIN — ASCORBIC ACID 1500 MG: 500 INJECTION, SOLUTION INTRAMUSCULAR; INTRAVENOUS; SUBCUTANEOUS at 06:56

## 2019-04-04 RX ADMIN — PROCHLORPERAZINE EDISYLATE 10 MG: 5 INJECTION INTRAMUSCULAR; INTRAVENOUS at 11:36

## 2019-04-04 ASSESSMENT — ACTIVITIES OF DAILY LIVING (ADL)
ADLS_ACUITY_SCORE: 17

## 2019-04-04 ASSESSMENT — PAIN DESCRIPTION - DESCRIPTORS
DESCRIPTORS: DISCOMFORT
DESCRIPTORS: DISCOMFORT

## 2019-04-04 NOTE — PLAN OF CARE
Transfer to :  Pt with tooth extraction #18,19 left lower jaw late day shift.  When pt returned to 7d at about 16:30 (post CT after tooth extraction)   Had been febrile  and tachycardic all day (seee VS flow).  About 30 minutes after arriving on 7d temp to 104, tachycardic, BP within parameters, RR 28-30 02 sats 94-95% on room air. Platelets had just finished.  Had received prbc, and FFP earlier today.  Blood bank and MD notified, possible transfusion reaction work up done (all blood products sent).  Randy lethargic but woke easily.  Oriented to self place and situation. Sleeping between cares.  When pt up to bathroom earlier very unsteady, no longer safe to be up unassisted. Reports feeling sick.  Mid shift 02 needs increased,needing 02 4 LPM/NC to maintain sats >90% and heart rate to 140.  MD notified we also asked float RN to assess pt to see if need for higher level of care warranted.   As they arrived Randy's blood pressure began dropping <80 systolic, heart rate continued 130's to 140.  MD assessing pt 1 Liter NS infusion with BP to 90 systolic at completion .  Second fluid bolus started.    Plan transfer to .    Pt staff notified, plan to hold chemo tonight.  Pt wife with pt, aware of need to transfer to , she called her son to come and be with her.   Report called to .  Mehdi RN transferred pt.

## 2019-04-04 NOTE — PHARMACY-AMINOGLYCOSIDE DOSING SERVICE
Pharmacy Aminoglycoside Initial Note  Date of Service April 3, 2019  Patient's  1955  63 year old, male    Weight (Adjusted): 84 kg    Indication: Sepsis    Current estimated CrCl = Estimated Creatinine Clearance: 79.6 mL/min (based on SCr of 1.11 mg/dL).    Creatinine for last 3 days  2019:  5:49 AM Creatinine 0.76 mg/dL;  5:12 PM Creatinine 0.83 mg/dL  2019:  5:52 AM Creatinine 0.95 mg/dL  4/3/2019:  3:30 AM Creatinine 1.11 mg/dL     Nephrotoxins and other renal medications (From now, onward)    Start     Dose/Rate Route Frequency Ordered Stop    19 0930  piperacillin-tazobactam (ZOSYN) 3.375 g vial to attach to  mL bag      3.375 g  over 30 Minutes Intravenous EVERY 6 HOURS 19 0907      19 2230  vancomycin (FIRVANQ) oral solution 125 mg      125 mg Oral 4 TIMES DAILY 19 2224 19 0729    19 2230  acyclovir (ZOVIRAX) tablet 400 mg      400 mg Oral 2 TIMES DAILY 19 2223            Contrast Orders - past 72 hours (72h ago, onward)    Start     Dose/Rate Route Frequency Ordered Stop    19 1600  iopamidol (ISOVUE-370) solution 100 mL      100 mL Intravenous ONCE 19 1552 19 1618          Aminoglycoside Levels - past 2 days  No results found for requested labs within last 48 hours.    Aminoglycosides IV Administrations (past 72 hours)      No aminoglycosides orders with administrations in past 72 hours.                    Plan:  1.  Start Tobramycin 400 mg (4.7 mg/kg) IV q24h.   2.  Target goals based on extended interval dosing  3.  Goal peak level: 17-24 mg/L  4.  Goal trough level: <0.5mg/L  5.  Pharmacy will continue to follow and check first dose levels tomorrow.    Iva Fernando, PharmD  [phone] (text capable)

## 2019-04-04 NOTE — PLAN OF CARE
D: Pt admitted to ICU for septic shock and increased oxygen demands.  I/A: A/O x 4 with occasional confusion and reports of visual hallucinations. Pt reports that he is aware what he is seeing is not real and is easily redirectable, following all commands. Afebrile and c/o pain in jaw and discomfort with swallowing, no pain med req'd. Swelling remains throughout jaw but pt's airway is patent. Sinus rhythm and MAP's >65. Pt reported more difficulty breathing and shortness of breath with turns, remains tachypneic. Chest xray reviewed by MD with no significant changes. Pt is able to void using urinal and had one small BM, sample was collected. TPN started at 0900 at 50 mL/hr, lipids will begin tonight. Lactic acid remains elevated at 3.2, MICU team was made aware and suggested no interventions at this point.   P: Continue plan of care and prepare pt for transfer to . Begin lipids per order. Continue to closely monitor pt's work of breathing and encourage frequent rests. Pt will continue chemo regimen starting tonight.

## 2019-04-04 NOTE — CONSULTS
OMS Consultation   El Ace   : 1955 Sex: male MRN: [id number]    Consultation regarding facial infection requested by Vee Cabrera PA-C    ASSESSMENT:  63 year old male with AML who presents with left facial cellulitis, likely due to carious tooth #s 18 and 19. He underwent extraction of #s 18 and 19 on 4/3/2019 with hemostatic control with local measures.     PLAN/RECOMMENDATIONS:  - Extraction of #s 18 and 19 today with resorbable collaplug, surgicel, and 3-0 chromic gut suture.   - Appreciate transfusion services provided by Oncology  - Bite down on moist gauze with firm pressure x30 minutes if bleeding  - Recommended Amicar soaked gauze for additional anti-fibrinolytic control  - Appreciate antibiotic recommendations per ID    Please do not hesitate to contact the oral surgery resident on call with questions.    Patient's findings, assessment and plan discussed with chief resident, Dr. Marilyn Key, and the staff surgeon, Dr. Luan Garcia.     Charly Marie, BLAIRES  Oral & Maxillofacial Surgery Resident  SUBJECTIVE  History of Present Illness:  El Ace is a 63 year old male admitted on 3/12/2019 with AML, who had just completed endodontic therapy on tooth #19 on 3/11/2019. He was started on antibiotic prophylaxis per ID recommendations and treatment planned for bone marrow transplant. He was seen by dentistry who recommended comprehensive dental exam on 2019. The oncology team did not feel that he was stable enough for transport to [place]. He has been suffering from neutropenic fevers (Tmax 104.0) and swelling of the left jaw.     At the request of Bharat Leo MD, the OMS service was consulted to expedite extraction due to the concern that the patient was reporting difficulty breathing, increased swelling, and trouble swallowing. Dr. Leo specifically voiced concern that the patient would become hemodynamically unstable and  later this evening. Spoke with   Susan of the Dentistry service who confirmed tooth #s 18 and 19 to be extracted.    Medical/Surgical History :  Past Medical History:   Diagnosis Date     Acute leukemia (H) 3/12/2019     Past Surgical History:   Procedure Laterality Date     PICC INSERTION Right 03/14/2019    5Fr - 42cm (2cm external), basilic vein, high SVC     Problem List:  Patient Active Problem List   Diagnosis     Acute leukemia (H)     Acute myeloid leukemia not having achieved remission (H)     Medications:  Current Facility-Administered Medications   Medication     0.9% sodium chloride BOLUS     0.9% sodium chloride BOLUS     acetaminophen (TYLENOL) tablet 650 mg     acyclovir (ZOVIRAX) tablet 400 mg     allopurinol (ZYLOPRIM) tablet 300 mg     artificial saliva (BIOTENE MT) solution 1-2 spray     baclofen (LIORESAL) tablet 10 mg     calcium carbonate (TUMS) chewable tablet 500 mg     clindamycin (CLEOCIN) infusion 900 mg     [Rx hold ] decitabine (DACOGEN) 43 mg in sodium chloride 0.9 % 119 mL CHEMOTHERAPY     dextrose 10 % 1,000 mL infusion     HOLD MEDICATION     lidocaine (XYLOCAINE) 2 % topical gel     linezolid (ZYVOX) infusion 600 mg     lipids (INTRALIPID) 20 % infusion 250 mL     LORazepam (ATIVAN) tablet 0.5-1 mg    Or     LORazepam (ATIVAN) injection 0.5-1 mg     magic mouthwash suspension (diphenhydramine, lidocaine, aluminum-magnesium & simethicone)     magnesium sulfate 2 g in NS intermittent infusion ([facility] or [facility])     magnesium sulfate 4 g in 100 mL sterile water (premade)     Medication Instruction     meperidine (DEMEROL) injection 25 mg     micafungin (MYCAMINE) 100 mg in sodium chloride 0.9 % 100 mL intermittent infusion     naloxone (NARCAN) injection 0.1-0.4 mg     OLANZapine zydis (zyPREXA) ODT tab 5 mg     ondansetron (ZOFRAN) tablet 8 mg     oxyCODONE (ROXICODONE) tablet 5 mg     oxymetazoline (AFRIN) 0.05 % spray 2 spray     pantoprazole (PROTONIX) EC tablet 40 mg     parenteral nutrition - ADULT  compounded formula     [START ON 4/4/2019] phytonadione (MEPHYTON/VITAMIN K) 1 MG/ML oral suspension 5 mg     piperacillin-tazobactam (ZOSYN) 3.375 g vial to attach to  mL bag     polyethylene glycol (MIRALAX/GLYCOLAX) Packet 17 g     potassium chloride (KLOR-CON) Packet 20-40 mEq     potassium chloride 10 mEq in 100 mL intermittent infusion with 10 mg lidocaine     potassium chloride 10 mEq in 100 mL sterile water intermittent infusion (premix)     potassium chloride 20 mEq in 50 mL intermittent infusion     potassium chloride ER (K-DUR/KLOR-CON M) CR tablet 20-40 mEq     potassium phosphate 10 mmol in D5W 250 mL intermittent infusion     potassium phosphate 15 mmol in D5W 250 mL intermittent infusion     potassium phosphate 20 mmol in D5W 250 mL intermittent infusion     potassium phosphate 20 mmol in D5W 500 mL intermittent infusion     potassium phosphate 25 mmol in D5W 500 mL intermittent infusion     pramox-pe-glycerin-petrolatum (PREPARATION H) cream     prochlorperazine (COMPAZINE) injection 5-10 mg    Or     prochlorperazine (COMPAZINE) tablet 5-10 mg     ranitidine (ZANTAC) tablet 150 mg     rOPINIRole (REQUIP) tablet 0.75 mg     sennosides (SENOKOT) tablet 8.6 mg     sodium chloride (OCEAN) 0.65 % nasal spray 1-2 spray     sodium chloride (PF) 0.9% PF flush 10 mL     sodium chloride (PF) 0.9% PF flush 10-20 mL     sodium chloride 0.9% infusion     sucralfate (CARAFATE) suspension 1 g     tamsulosin (FLOMAX) capsule 0.4 mg     tobramycin (NEBCIN) 400 mg in sodium chloride 0.9 % intermittent infusion     traMADol (ULTRAM) tablet 50 mg     vancomycin (FIRVANQ) oral solution 125 mg     [Rx hold ] venetoclax (VENCLEXTA) tablet CHEMOTHERAPY 100 mg     voriconazole (VFEND) tablet 200 mg     Allergies:    No Known Allergies  Review of Systems:   ROS: 10 point ROS neg other than the symptoms noted above in the HPI.    OBJECTIVE    BP 90/52   Pulse 130   Temp 102.4  F (39.1  C) (Axillary)   Resp 28   Ht  "1.778 m (5' 10\")   Wt 99.8 kg (220 lb)   SpO2 92%   BMI 31.57 kg/m    Physical Exam:   Constitutional: Ill-appearing male, mild distress, somewhat somnolent  HEENT: There are minimal signs of infection associated with the head and neck, with mild swelling associated with the left mandible. The inferior border of the mandible is palpable in its entirety. Intraorally, there is erythema associated with the buccal and lingual gingiva associated with #s 18 and 19. His oral hygiene is fair. Tooth #s 18 and 19 are tender to percussion. No drainage or en edema of the floor of mouth.   Neck: Soft, supple, no lymphadenopathy.   Pulmonary: Somewhat labored respirations on 4L oxygen via NC    LABS:   CBC RESULTS:   Recent Labs   Lab Test 04/03/19  0330   WBC 1.9*   RBC 2.04*   HGB 6.4*   HCT 20.1*   MCV 99   MCH 31.4   MCHC 31.8   RDW 16.5*   PLT 27*         RADIOLOGY:  XR Chest Port 1 View   Preliminary Result   IMPRESSION: New bilateral patchy pulmonary opacities, most pronounced   in the left retrocardiac region. Findings suspicious for infection..      CT Soft Tissue Neck w Contrast   Preliminary Result   Impression:   1. Postoperative changes left mandibular molar extraction. Mild   layering secretions of the hypopharynx. Deep cervical spaces are   unremarkable.   2. Left submandibular soft tissue swelling concerning for soft tissue   infection/cellulitis. No drainable fluid collection. Associated left   predominant cervical lymphadenopathy, presumably reactive.   3. Mild unchanged pansinusitis.      CT Facial Bones without Contrast   Final Result   Impression:    1. Soft tissue swelling along the left hemimandible is slightly   improved although still present, without focal abscess, and no   underlying osteomyelitis.   2. Continued tiny periapical lucency anterior most right mandibular   molar, and again cannot exclude early periapical abscess, grossly   unchanged.   3. Small debris within the right frontoethmoidal " recesses may   represent early sinus inflammatory disease, new.              STEPHAN TERRY MD      CT Dental wo Contrast   Final Result   IMPRESSION:      1. Tiny periapical lucency involving the anterior most right   mandibular molar.   2. Fat stranding and mild soft tissue thickening along the left   hemimandible, most consistent with cellulitis.  No discreet abscess.      The preliminary report was no periapical lucency. After review by the   staff radiologist, the final interpretation of small periapical   lucency involving the anterior most right mandibular molar was   provided by Dr. Flores and communicated to Tricia Quesada CNP at   1237 on 3/31/2019.      I have personally reviewed the examination and initial interpretation   and I agree with the findings.      JUD RUCKER MD      CT Chest/Abdomen/Pelvis w Contrast   Final Result   IMPRESSION:    1. Thickened right hemicolon with adjacent fat stranding favored to be   infectious given history of sepsis.   2. At least moderate three-vessel coronary artery calcifications.       I have personally reviewed the examination and initial interpretation   and I agree with the findings.      TREE CAMPA, DO      XR Chest 2 Views   Final Result   IMPRESSION:    1. Small right pleural effusion.   2. No focal airspace opacity.      I have personally reviewed the examination and initial interpretation   and I agree with the findings.      IRIS JALLOH MD      XR Chest Port 1 View   Final Result   Impression:    1. Interval placement of right PICC, terminating in the high SVC.   2. Ill-defined airspace opacity projecting over the left lateral lung,   possibly superimposed soft tissue. Attention on follow-up.      I have personally reviewed the examination and initial interpretation   and I agree with the findings.      IRIS JALLOH MD      XR Chest 2 Views   Final Result   IMPRESSION: No focal airspace disease.      I have personally reviewed the  examination and initial interpretation   and I agree with the findings.      ROSA SPAULDING MD

## 2019-04-04 NOTE — PROGRESS NOTES
[facility]  Transplant Infectious Disease Progress Note    Patient:  El Ace, Date of birth 1955, Medical record number [id number]  Date of Visit:  04/04/2019         Assessment and Recommendations:   Recommendations:  - Continue with imipenem and linezolid  - No need for clindamycin  - Continue with micafungin for now  - Pending voriconazole level, if therapeutic discontinue micafungin   - Continue with voriconazole  - Continue with vancomycin PO for CDI    Thank you for the consultation. Transplant ID will follow along.     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     Infectious Disease issues include:  - Neutropenic fevers: Likely odontogenic source. CT c/a/p showed only colitis (patient with known CDI - see below) but CT dental showed a tiny periapical lucency involving the anterior most right mandibular molar and fat stranding with mild soft tissue thickening along the left hemimandible, most consistent with cellulitis. CT neck was negative. Patient has no neutrophils to form abscesses. OMFS removed source on 4/3. Bcx NGTD so far. UCx neg. Galactomannan and BDG neg. Patient had acute decompensation after molar extraction likely from transient bacteremia. Now improving.   Zosyn changed to Imipenem for concerns of nocardia infection, although this is less likely will continue with Imipenem for now. Will continue with linezolid as well. No need for clindamycin and patient has already C diff infection.   Will continue with micafungin until voriconazole is therapeutic given persistent febrile neutropenia.     - C Diff diarrhea: Improving. On vancomycin PO since 3/26.     - PCP prophylaxis: None  - Serostatus: CMV neg, EBV pos, HSV pos  - Immunization status: Needs all pre BMT vaccinations    - Gamma globulin status: Not in records   - Isolation status:  Good hand hygiene. Enteric isolation for CDI     Attestation:  I have reviewed today's vital signs, medications, labs and imaging. I personally reviewed the imaging.    Noemi Garcia MD  Transplant Infectious Diseases   [phone]         Interval History:   Patient was transferred to the ICU for hypotension and increased O2 requirements. Responded to fluids. No pressors were needed. Still febrile. Felt a little better this morning, still with facial swelling and difficulties with swallowing and speech but he feels that its improving. Pain has improved significantly since extraction. No new skin rashes. No BMs in the past 24 hrs. No nausea or abdominal pain.     Transplants:  N/A    Review of Systems:   ROS: 10 point ROS neg other than the symptoms noted above in the interval history.       Current Facility-Administered Medications   Medication     acetaminophen (TYLENOL) tablet 650 mg     acyclovir (ZOVIRAX) tablet 400 mg     allopurinol (ZYLOPRIM) tablet 300 mg     artificial saliva (BIOTENE MT) solution 1-2 spray     ascorbic acid 1,500 mg in sodium chloride 0.9 % 100 mL intermittent infusion     baclofen (LIORESAL) tablet 10 mg     calcium carbonate (TUMS) chewable tablet 500 mg     clindamycin (CLEOCIN) infusion 900 mg     [Rx hold ] decitabine (DACOGEN) 43 mg in sodium chloride 0.9 % 119 mL CHEMOTHERAPY     dextrose 10 % 1,000 mL infusion     glucose gel 15-30 g    Or     dextrose 50 % injection 25-50 mL    Or     glucagon injection 1 mg     HOLD MEDICATION     imipenem-cilastatin (PRIMAXIN) 500 mg vial to attach to  ml bag     lidocaine (XYLOCAINE) 2 % topical gel     linezolid (ZYVOX) infusion 600 mg     lipids (INTRALIPID) 20 % infusion 250 mL     LORazepam (ATIVAN) tablet 0.5-1 mg    Or     LORazepam (ATIVAN) injection 0.5-1 mg     magic mouthwash suspension (diphenhydramine, lidocaine, aluminum-magnesium & simethicone)     magnesium  sulfate 2 g in NS intermittent infusion ([employer] or [facility] Cmpd)     magnesium sulfate 4 g in 100 mL sterile water (premade)     Medication Instruction     meperidine (DEMEROL) injection 25 mg     micafungin (MYCAMINE) 100 mg in sodium chloride 0.9 % 100 mL intermittent infusion     naloxone (NARCAN) injection 0.1-0.4 mg     norepinephrine (LEVOPHED) 16 mg in D5W 250 mL infusion     OLANZapine zydis (zyPREXA) ODT tab 5 mg     ondansetron (ZOFRAN-ODT) ODT tab 4 mg    Or     ondansetron (ZOFRAN) injection 4 mg     ondansetron (ZOFRAN) tablet 8 mg     oxyCODONE (ROXICODONE) tablet 5 mg     oxymetazoline (AFRIN) 0.05 % spray 2 spray     pantoprazole (PROTONIX) EC tablet 40 mg     parenteral nutrition - ADULT compounded formula     phytonadione (MEPHYTON/VITAMIN K) 1 MG/ML oral suspension 5 mg     polyethylene glycol (MIRALAX/GLYCOLAX) Packet 17 g     potassium chloride (KLOR-CON) Packet 20-40 mEq     potassium chloride 10 mEq in 100 mL intermittent infusion with 10 mg lidocaine     potassium chloride 10 mEq in 100 mL sterile water intermittent infusion (premix)     potassium chloride 20 mEq in 50 mL intermittent infusion     potassium chloride ER (K-DUR/KLOR-CON M) CR tablet 20-40 mEq     potassium phosphate 10 mmol in D5W 250 mL intermittent infusion     potassium phosphate 15 mmol in D5W 250 mL intermittent infusion     potassium phosphate 20 mmol in D5W 250 mL intermittent infusion     potassium phosphate 20 mmol in D5W 500 mL intermittent infusion     potassium phosphate 25 mmol in D5W 500 mL intermittent infusion     pramox-pe-glycerin-petrolatum (PREPARATION H) cream     prochlorperazine (COMPAZINE) injection 5-10 mg    Or     prochlorperazine (COMPAZINE) tablet 5-10 mg     ranitidine (ZANTAC) tablet 150 mg     rOPINIRole (REQUIP) tablet 0.75 mg     sennosides (SENOKOT) tablet 8.6 mg     sodium chloride (OCEAN) 0.65 % nasal spray 1-2 spray     sodium chloride (PF) 0.9% PF flush 10 mL     sodium chloride (PF) 0.9%  "PF flush 10-20 mL     sucralfate (CARAFATE) suspension 1 g     tamsulosin (FLOMAX) capsule 0.4 mg     thiamine (B-1) 200 mg in sodium chloride 0.9 % 50 mL intermittent infusion     traMADol (ULTRAM) tablet 50 mg     vancomycin (FIRVANQ) oral solution 125 mg     [Rx hold ] venetoclax (VENCLEXTA) tablet CHEMOTHERAPY 100 mg     voriconazole (VFEND) tablet 200 mg       No Known Allergies           Physical Exam:   Vitals were reviewed.  All vitals stable  BP 94/72   Pulse 87   Temp 96.4  F (35.8  C) (Tympanic)   Resp 22   Ht 1.778 m (5' 10\")   Wt 102.4 kg (225 lb 11.2 oz)   SpO2 97%   BMI 32.38 kg/m      Exam:  GENERAL:  well-developed, well-nourished, ill appearing male alert, oriented, in no acute distress.  HEENT:  Head is normocephalic, atraumatic   EYES:  Eyes have anicteric sclerae.    ENT:  Oropharynx is moist without exudates or ulcers. Bilateral jaw edema. Swollen inferior right gums.  No discharge.   NECK:  Supple but swollen. No LAD  LUNGS:  Clear to auscultation. No wheezes  CARDIOVASCULAR:  Regular rate and rhythm with no murmurs, gallops or rubs.  ABDOMEN:  Normal bowel sounds, soft, nontender.  SKIN:  No acute rashes.  Line is in place without any surrounding erythema.  NEUROLOGIC:  Grossly nonfocal.         Laboratory Data:     Metabolic Studies    Recent Labs   Lab Test 04/04/19  1130  04/04/19  0400  04/03/19  2204 04/03/19  2037   NA  --   --  136  --  135 134   POTASSIUM  --   --  3.7  --  3.5 3.4   CHLORIDE  --   --  107  --  107 106   CO2  --   --  17*  --  16* 16*   ANIONGAP  --   --  12  --  12 12   BUN  --   --  15  --  13 12   CR  --   --  1.34*  --  1.33* 1.45*   GFRESTIMATED  --   --  55*  --  56* 50*   GLC  --   --  139*  --  144* 133*   DEBBIE  --   --  7.4*  --  6.9* 7.1*   PHOS  --   --  3.4  --  2.3*  --    MAG  --   --  2.4*  --  2.1  --    URIC  --   --  1.6*  --   --   --    LACT 3.2*   < >  --    < > 2.0  --     < > = values in this interval not displayed.       Hepatic Studies  "   Recent Labs   Lab Test 04/04/19 0400 04/03/19 2204 04/03/19 2037 04/02/19  0552   BILITOTAL 4.9* 4.2* 4.1*  --    DBIL  --   --  3.1*  --    ALKPHOS 77 71 69  --    PROTTOTAL 5.5* 5.3* 5.2*  --    ALBUMIN 2.1* 2.0* 2.0*  --    * 38 35  --    ALT 78* 32 30  --    *  --   --  226       Hematology Studies     Recent Labs   Lab Test 04/04/19  0848 04/04/19 0400 04/03/19 2204 04/03/19 2037 04/03/19  0330   WBC  --  4.1 3.2* 3.2* 1.9*   ABLA  --  0.4*  --   --  0.3*   BLST  --  10.6  --   --  15.9   ANEU  --  0.1*  --   --  0.0*   ALYM  --  0.4*  --   --  0.2*   TRACIE  --  3.1*  --   --  1.4*   AEOS  --  0.0  --   --  0.0   HGB 7.6* 6.4* 6.0* 6.2* 6.4*   HCT  --  19.9* 18.5* 19.4* 20.1*   PLT  --  36* 36* 38* 27*       Urine Studies     Recent Labs   Lab Test 04/04/19  0523 03/30/19 2206 03/22/19  0945   URINEPH 6.0 6.5 7.5*   NITRITE Negative Negative Negative   LEUKEST Negative Negative Negative   WBCU 4 <1 2       Microbiology:  Last 6 Culture results with specimen source  Culture Micro   Date Value Ref Range Status   04/03/2019 No growth after 12 hours  Preliminary   04/03/2019 No growth after 15 hours  Preliminary   04/03/2019 No growth after 15 hours  Preliminary   04/03/2019 No growth after 15 hours  Preliminary   04/03/2019 No growth after 13 hours  Preliminary   04/03/2019 No growth after 13 hours  Preliminary    Specimen Description   Date Value Ref Range Status   04/03/2019 Blood Left Hand  Final   04/03/2019 Nares  Final   04/03/2019 Other Transfusion Reaction Donor Unit  Final   04/03/2019 Other Transfusion Reaction Donor Unit  Final   04/03/2019 Other Transfusion Reaction Donor Unit  Final   04/03/2019 Other Transfusion Reaction Donor Unit  Final          Last check of C difficile  C Diff Toxin B PCR   Date Value Ref Range Status   03/26/2019 Positive (A) NEG^Negative Final     Comment:     Positive: Toxin producing Clostridium difficile target DNA sequences detected,   presumed  positive for Clostridium difficile toxin B.  Clostridium difficile (Requires Enteric Isolation)  FDA approved assay performed using [employer] GeneXpert real-time PCR.  Critical Value/Significant Value called to and read back by  JATINDER KAUR, RN [id number] 3.26.19 NDP         Virology:  CMV viral loads    Recent Labs   Lab Test 03/31/19  0626   CSPEC Plasma   CMVLOG Not Calculated       CMV viral loads    Log IU/mL of CMVQNT   Date Value Ref Range Status   03/31/2019 Not Calculated <2.1 [Log_IU]/mL Final       Hepatitis B Testing     Recent Labs   Lab Test 03/13/19  0553   AUSAB 15.70*   HEPBANG Nonreactive     Was the last Hepatitis B E antigen positive?   No results found for: HBEAGN     Hepatitis C Antibody   Date Value Ref Range Status   03/13/2019 Nonreactive NR^Nonreactive Final     Comment:     Assay performance characteristics have not been established for newborns,   infants, and children         Imaging:  Recent Results (from the past 24 hour(s))   CT Soft Tissue Neck w Contrast    Narrative    CT SOFT TISSUE NECK W CONTRAST 4/3/2019 4:28 PM    History:  oral infection with concern for infection extending into  neck in pt with neutropenic sepsis  ICD-10:      Comparison:  CT face 4/3/2019, CT head 3/31/2019     Technique: Following intravenous administration of nonionic iodinated  contrast medium, thin section helical CT images were obtained from the  skull base down to the level of the aortic arch.  Axial, coronal and  sagittal reformations were performed with 2-3 mm slice thickness  reconstruction. Images were reviewed in soft tissue, lung and bone  windows.    Contrast: 100ml isovue 370    Findings:   Findings: Right upper extremity PICC tip in the low SVC.    Status post left mandibular molar dental extractions x2. Packing  within the left side of the oral cavity. Layering secretions within  the hypopharynx (series 2, image 59). Unchanged lucency associated  with the right mandibular molar.      Predominantly left submandibular soft tissue thickening of the  posterior and trace ill-defined adjacent edema (series 4, image 81).  No well-formed fluid collection. Associated lymphadenopathy, for  example left level 1B lymph node measuring 8 mm (series 2, image 53)  left level 2A lymph node measuring 9 mm (series 2, image 47), left  level 2/3 lymph node measuring 8 mm (series 2, image 61). Additional  scattered cervical lymph nodes bilaterally.     Evaluation of the mucosal space demonstrates no evident abnormality in  the nasopharynx, oropharynx, hypopharynx or the glottis. The tongue  base appears normal. The major salivary glands appear unremarkable.  The thyroid gland appears normal.    The major vascular structures in the neck appear unremarkable.    Evaluation of the osseous structures demonstrate no worrisome lytic or  sclerotic lesion. Stable degenerative changes of the cervical spine  with straightening of the normal cervical lordosis. Trace grade 1 C3  on C4 retrolisthesis and C4 on C5 retrolisthesis. No significant  spinal canal narrowing. No aggressive osseous lesion.. Mild mucosal  thickening of the paranasal sinuses. The mastoid air cells are clear.     The visualized lung apices are clear. Main pulmonary artery measures  up to 3.4 cm.      Impression    Impression:  1. Postoperative changes left mandibular molar extractions. Mild  layering secretions of the hypopharynx. Deep cervical spaces are  unremarkable.  2. Left submandibular soft tissue swelling concerning for soft tissue  infection/cellulitis. No drainable fluid collection. Associated left  predominant cervical lymphadenopathy, presumably reactive.  3. Mild unchanged pansinusitis.    I have personally reviewed the examination and initial interpretation  and I agree with the findings.    JUD RUCKER MD   XR Chest Port 1 View    Narrative    EXAM: XR CHEST PORT 1 VW  4/3/2019     HISTORY: Hypoxia with sepsis    COMPARISON: Chest  radiograph 3/22/2019    FINDINGS:   Single view of the chest. Right arm PICC tip near the superior  cavoatrial junction. The cardiomediastinal silhouette is within normal  limits. New patchy pulmonary opacities, most pronounced in the left  retrocardiac region. No significant pleural effusion. No pneumothorax.        Impression    IMPRESSION: New bilateral patchy pulmonary opacities, most pronounced  in the left retrocardiac region. Findings suspicious for edema or  infection.    I have personally reviewed the examination and initial interpretation  and I agree with the findings.    SANTIAGO NAGEL MD   US Abdomen Limited    Narrative    EXAMINATION: US ABDOMEN LIMITED, 4/4/2019 1:21 AM     COMPARISON: CT chest abdomen pelvis 3/29/2019    HISTORY: Elevated bilirubin, evaluate for cholecystitis.    TECHNIQUE: The abdomen was scanned in standard fashion with  specialized ultrasound transducer(s) using both grey scale and limited  color Doppler techniques.    Findings:  Liver: The liver demonstrates normal homogeneous echotexture,  measuring 19.1 cm. No evidence of a focal hepatic solid mass or  intrahepatic biliary ductal dilatation. Multiple simple cysts  measuring 2.8 x 2.4 x 2.9 cm and 4.0 x 2.5 x 2.1 cm near the right  hepatic dome.    Gallbladder: The gallbladder is well distended and of normal  morphology. There is no wall thickening. Multiple tiny radiodense  gallstones. Trace pericholecystic fluid.    Bile Ducts: Both the intra- and extrahepatic biliary system are of  normal caliber.  The common bile duct measures 4 mm in diameter.    Pancreas: Poorly visualized due to overlying bowel gas.    Right kidney: Demonstrates normal echotexture without focal mass. No  hydronephrosis or nephrolithiasis. The craniocaudal dimensions 13.6  cm.      Impression    Impression:   1.  Cholelithiasis without evidence of cholecystitis.  2.  Simple hepatic cysts    I have personally reviewed the examination and initial  interpretation  and I agree with the findings.    SAY BECERRA MD        CT dental: 3/31/19  1. Tiny periapical lucency involving the anterior most right mandibular molar.  2. Fat stranding and mild soft tissue thickening along the left hemimandible, most consistent with cellulitis. No discreet abscess.    CT c/a/p 3/29/19:  1. Thickened right hemicolon with adjacent fat stranding favored to be infectious given history of sepsis.  2. At least moderate three-vessel coronary artery calcifications

## 2019-04-04 NOTE — PROGRESS NOTES
"HOT Hospitalist    CTSP re: Returns from OR tachycardic, increased O2 needs from RA to 4L today and satting 93%. Initially improved but now worse and hypotensive. Unsteady on feet. Lethargic. Wheezing when tries to walk.    BP (!) 79/48   Pulse 130   Temp 102.5  F (39.2  C) (Axillary)   Resp 28   Ht 1.778 m (5' 10\")   Wt 99.8 kg (220 lb)   SpO2 93%   BMI 31.57 kg/m      Appears lethargic but wakes up to voice and answers questions, sleeps through most of exam and discussion with family.  Left lungs with rhonchi right lung decreased BS  No stridor  Heart hyperdynamic tachycardic  No LE edema  Abd non tender  Cap refill <2 sec    64 yo M new dx AML, started chemo 2 weeks ago, now prolonged neutropenia, and neutropenic fever from likely dental source/mandibular cellulitis starting 4/1, now s/p OMFS procedure today in OR.   - Tachycardia, hypotension most likely from sepsis, possibly from reseeding bloodstream post operatively  - Increased O2 needs - diff dx is ARDS, fluid overload (patient had elevated BNP today, but no history of decreased EF on ECHO last month)    IV NS bolus 1L/hr, with slight improvement to lower 80s, on further fluids he continues to be lower 80s. Sats downtrending a bit to 91%.     Discussed with radiology for stat prelim read on Neck CT - no abscess, no inflammation in airway, does look like there are secretions from dental source which may explain his previous stridor/symptoms.   Check CXR - appears to have developed some pulmonary edema.  Check EKG - no significant changes  Troponin ordered  May need repeat Echo to work up elevated BNP  Lactate done earlier today was 1.5  BMP, VBG ordered  CBC ordered  Transfuse blood for Hgb likely to be under 8  Current Linezolid, Zosyn, Micafungin  Add Tobra and clindamycin  Consider Amphotericin, ID has been following  Also on prophy ACV and prophy vori   Hold venetoclax and decitabine, cleared with staff    Discussed with family he is FULL CODE. He " may need ICU for pressors and/or intubation if he develops pulmonary edema from fluid resuscitation.   Discussed with MICU staff and MICU residents over speakerphone. They will accept patient and see patient on arrival, and bed is available per 4C.    NS 2nd Liter now infusing.    Marlon Oliveira MD  [id number]

## 2019-04-04 NOTE — PROVIDER NOTIFICATION
04/03/19 2000   Call Information   Date of Call 04/03/19   Time of Call 1925   Name of person requesting the team Denise HELLER   Title of person requesting team RN   RRT Arrival time 1928   Time RRT ended 2115   Reason for call   Type of RRT Adult   Primary reason for call Cardiovascular;Sepsis suspected   Cardiovascular SBP less than 90;EKG changes   Sepsis Suspected Temperature <95 or >101;SPB <90 or MAP 40mmHG;Heart Rate > 100;RR > 20, SaO2 <90% OR increasing O2 need;WBC <4 or >12;BMT/Oncology patient with WBC<0.5 or >12 or ANC <500   Was patient transferred from the ED, ICU, or PACU within last 24 hours prior to RRT call? Yes   SBAR   Situation Pt was tooth extraction today in dental clinic for possible infected root canal. Now having increased O2 needs, hypotension, tachycardia   Background Hx: AML, partial root canal intervention. Left mandible cellulitis/oral infection   Notable History/Conditions Cancer;Recent surgery   Assessment Pt is lethargic but rousable to verbal command. Oriented x4. Blood pressures 70's-80's over 50's. Heart rates in the 130's, ST on 12 lead EKG. O2 sats in the low 90's on 4L NC. Tmax 102.5. BNP in the 2000's this morning. Lactic Acid 1.5 at 1600.   Interventions Fluid bolus;Labs;CXR;ECG;Meds  (Start Lineazolid, 1L fluid bolus, BMP, CBC, troponin, VBG)   Adjustments to Recommend Transfer to higher level of care   Patient Outcome   Patient Outcome Transferred to  (4C)   RRT Team   Date Attending Physician notified 04/03/19   Time Attending Physician notified 1925   Physician(s) Dr. Marlon Oliveira   Lead RN Mary HELLER

## 2019-04-04 NOTE — PLAN OF CARE
OT-4C: Cancel. Pt not medically appropriate for therapy today. Will reschedule and initiate as indicated.

## 2019-04-04 NOTE — PLAN OF CARE
4C: Cancel, pt with low hemoglobin this morning, upon afternoon check in pt not appropriate for therapy due to SOB with turning in bed. PT will reschedule and check back tomorrow.

## 2019-04-04 NOTE — PROGRESS NOTES
Hematology-Oncology Progress note    Assessment and Plan    Mr. Ace is a 63 year old male with new diagnosis of acute myeloid leukemia incidentally found during work up of nonspecific chest symptoms after partial root canal intervention. He was started on induction chemotherapy with 7+3 (cytarabine and daunorubicin) with D1=3/15/19. BMBx on 3/25 (done early due to rising WBC and blast counts) demonstrated persistent disease with 95% blasts by flow, 98% by morphology. Started re-induction with decitabine on 3/26 PM and added venetoclax on 3/29.    Pt transferred to ICU on 4/3 due to hemodynamically unstable (tachycardia, hypotension) after tooth extraction presumably from transient bacteremia post procedure.      Today:  - s/p tooth extraction yesterday  - Pt in ICU for hypotension and fever- continue supportive measures per ICU and   - Continue to hold decitabine (Day 9 yesterday) due to Bulirubin elevation  - Would continue Ventoclax at 100 mg daily  - Continue broad spectrum antibiotics per ICU (currently clindamycin, ACV, Imipenem, Micafungin, Linezolid, PO Vancomycin)-ID recommends stopping clindamycin    Jesus Stephens  [facility] Hem Onc fellow  [phone]          HEME:  #AML, refractory. NGS positive for IDH2 and RUNX1 alterations.  Patient presented to [facility] ED in [place], [place] for complaints of nonspecific chest discomfort after root canal treatment (done 3/11/19). CT C/A/P was negative for PE or other acute findings. On OSH labs, his WBC was incidentally noted to be elevated at 71.1 with Hb 9.0 and plts 97 (prior labs had been unremarkable per patient). Smear was suspicious for immature blasts and acute leukemia. No symptoms of hyperviscosity, TLS or DIC on presentation. 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 Flow consistent with AML with 95% blasts with the following immunophenotype: Positive for CD13, CD33, CD34, CD38, dim to negative  CD45, , partial HLA-DR. Baseline ECHO normal, PICC place.   - HLA typing sent, BMT consult done 3/29 by Dr. Jeter  - s/p initial Hydrea (dc'd 3/16) followed by induction chemotherapy with 7+3 (cytarabine and daunorubicin). Day 1=3/15/19.   - repeat BMBx done early (3/25) due to rising WBC/blast count. Still with persistent heavy burden of disease (98% blasts by morphology).   - s/p Hydrea for WBC count management; started 1g BID (3/27), incresaed to 2g BID (x3/28). Hydrea discontinued 3/31.   - Started reinduction with decitabine/venetoclax. Decitabine D1=3/26; added Venetoclax on 3/29. Ramp up dosing accounting for concomitant voriconazole. Final dose will be 100mg daily. Today is Day 10 from start of reinduction.   - continue Allopurinol   -Held Decitabine and Ventoclax on 4/3 (Day 9)     #Pancytopenia  #Mild epistaxis, resolved  2/2 AML & associated chemotherapy.   - transfuse to maintain Hb >7, Plt >10K.   - Afrin PRN, ocean spray PRN     #Mild coagulopathy, in setting of refractory AML. INR has recently been stable (ranging 1.3-1.5), fibrinogen stable. However, INR 1.84 today.   - monitor daily coags  - plasma for INR >1.8, cryo for fibrinogen <100  - Vit K x 3 days (4/3-4/5)     ID:  #Neutropenic fever/sepsis.  #C.diff diarrhea  #Neutropenic colitis.  #Left mandible cellulitis/oral infection  First fever on 3/22. Now having daily fevers with Tmax into the 102s. Sources include c.diff colitis and L mandible cellulitis.  - BCx 3/22 to present are NGTD  - 3/23 fungal BCx NGTD  - 3/22 UA negative, repeat UA/UCx negative on 3/30  - 3/22 CXR with small R pleural effusion, no focal airspace opacity  - 3/25 galactomannan and fungitell both negative  - 3/26 C.diff positive  - 3/29 CT C/A/P demonstrated Right thickened hemicolon with adjacent fat stranding;  - 3/31 CT Dental demonstrated left hemimandible cellulitis  - ID following  - Dental consulted for CT findings, appreciate recs but unable to intervene as  urgently as needed at this time.   - consult Oral Surgery today --> will take pt to OR today for intervention     **Anti-infectives:  - continue Cefepime (x 3/22-4/1). Changed to Zosyn (x 4/1) for better anaerobic coverage given L mandible cellulitis  - PO Vancomycin 125mg four times daily x 10 days (3/26)  - PO Flagyl (x3/29-4/1). Discontinued with change to Zosyn.  - s/p IV vanc (x 3/31-4/3). Change to IV Linezolid today per ID (4/3)  - add micafungin 100mg IV daily per ID (x4/3)     #ID PPx   - continue ACV  - Voriconazole 200mg q12hr. Check level on 4/4 per ID.         GI:  #Chemotherapy induced nausea, controlled  - scheduled Zofran q8hr  - compazine also scheduled q6hr  - PRN antiemetics     #C.diff diarrhea   #Neutropenic colitis, ongoing loose stools  - treatment as above  - will make NPO in setting of procedure/difficulty with PO intake/colitis. Start TPN when lytes adequate. Ordered on 4/3.      #Hemorrhoids, improved  - PRN management: TUCKS pads, sitz bath, topical Prep H and/or lidocaine     #S/P root canal manipulation  Patient had the start of a root canal treatment initiated on 3/11/19 with plan to complete it a few weeks later (per pt's wife and son). He was started on a 7-day course of oral  mg q6h beginning one day prior to the procedure. At time of admission, he was having pain in his left jaw but site appeared unremarkable with no abscess or drainage.  In retrospect, may have also had some leukemic infiltration of gums complicating his course. Pain at site initially improved during chemotherapy, but did return. Pain worsened and with higher temps, checked CT Dental which demonstrated cellulitis along left hemimandible. Now with oral infection as above.  - Tylenol PRN, oxycodone PRN   - abx as above  - Dental consult as above  - OMFS consult, intervention planned for 4/3 as above     CV:  #Subclinical coronary atherosclerosis  #Hyperlipidemia  Total Agatston calcium score was elevated at  591 (91st percentile) on CT coronaries performed 6/8/2016. Nuclear stress test was negative for ischemia on 8/10/2016. Life style modification measures were recommended. Patient follows with cardiology as outpatient (last office visit on 11/12/18).  - holding atorvastatin at this time. Could potentially resume after completion of reinduction if LFTs remain WNL.     NEURO/MSK:  #Restless legs syndrome  - Continue ropinirole 0.75 mg at bedtime, offer PRN ativan for persistent symptoms.     #History of lumbar spine degenerative disc disease   Patient is s/p laminectomy/facetectomy procedures. He does not routinely take pain meds.     :  #BPH   Patient follows with urology ([facility], [place], [place]). On PTA Flomax 0.8 mg daily.  - of note, possible drug interaction between voriconazole and flomax (can raise flomax level in blood & cause hypotension).   - decreased flomax from 0.8mg to 0.4mg (soft BPs in setting of neutropenic sepsis and risk of further hypotension due to interaction with voriconazole). Continue 0.4mg dose at this time.      RENAL:  #Lyte derangement.  #NAGMA.  Multifactorial with decreased PO intake, fevers, diarrhea.  - lyte repletion per unit protocol (high scale)  - calcium repletion     #FOREST  Baseline Cr 0.7 (GFR>90). Creatinine climbing up to 1.11 (GFR 69) today.   - Multifactorial in setting of decreased PO intake, fevers, diarrhea, and medications (IV Vancomycin, Zosyn). Will continue antibiotics at this time given significant infection. Reassess need for IV vanco daily.     PSYCHOSOCIAL:  #Coping.  Appreciate SW and  involvement. Pt had requested help with Advance Directive, SW following        FEN   - NPO, start TPN  - continue IVFs, bolus PRN  - PRN lyte replacement per standing protocol     Prophylaxis  - No pharmacologic VTE ppx due to TCP  - PPI for GI/PUD ppx       Code Status: FULL     Disposition: Anticipate 4-6 week LOS pending completion of chemotherapy, count  "recovery, and resolution of acute toxicities.     Staffed with Dr. Lambert.    Jesus Kat  [phone]    Subjective:    Pt transferred to ICU on 4/3 due to hemodynamically unstable (tachycardia, hypotension) after tooth extraction presumably from transient bacteremia post procedure. He rcd 3 L Bolus and 2 uits of PRBC this AM. No pressors were started. He was very tired today, and less interactive. Had swelling int he b/l mandible. Tongue dry. No LE edema.       Objective:        Vital signs:  Temp: 97.6  F (36.4  C) Temp src: Axillary BP: 90/69 Pulse: 89 Heart Rate: 89 Resp: 20 SpO2: 98 % O2 Device: Oxymask Oxygen Delivery: 3 LPM Height: 177.8 cm (5' 10\") Weight: 102.4 kg (225 lb 11.2 oz)  Estimated body mass index is 32.38 kg/m  as calculated from the following:    Height as of this encounter: 1.778 m (5' 10\").    Weight as of this encounter: 102.4 kg (225 lb 11.2 oz).      Exam:  Gen: Well built and nourished, not in any acute distress  HEENT: MMM, no oral lesions, no pallor, icterus  Neck: supple,   Lymph: no cervical, sc, axillary LAD   CV: RRR, no murmurs  Resp: CTA  Abd: Soft, NTND, no HSM   Ext: no edema   Neuro: AAOx4. Cranial nerves grossly intact. Strength 5/5 throughout.  Normal muscle tone. Sensations grossly intact. 3+ symmetric reflexes in Knee jt       Intake/Output Summary (Last 24 hours) at 4/4/2019 0824  Last data filed at 4/4/2019 0800  Gross per 24 hour   Intake 8003 ml   Output 720 ml   Net 7283 ml       Data:    CBC RESULTS:   Recent Labs   Lab Test 04/04/19  0400   WBC 4.1   RBC 2.09*   HGB 6.4*   HCT 19.9*   MCV 95   MCH 30.6   MCHC 32.2   RDW 16.3*   PLT 36*       Last Basic Metabolic Panel:  Lab Results   Component Value Date     04/04/2019      Lab Results   Component Value Date    POTASSIUM 3.7 04/04/2019     Lab Results   Component Value Date    CHLORIDE 107 04/04/2019     Lab Results   Component Value Date    DEBBIE 7.4 04/04/2019     Lab Results   Component Value Date    CO2 17 " 04/04/2019     Lab Results   Component Value Date    BUN 15 04/04/2019     Lab Results   Component Value Date    CR 1.34 04/04/2019     Lab Results   Component Value Date     04/04/2019       Results for orders placed or performed during the hospital encounter of 03/12/19   XR Chest 2 Views    Narrative    EXAM: XR CHEST 2 VW  3/13/2019 3:30 PM     HISTORY:  baseline CXR. Acute leukemia.       COMPARISON:  None    FINDINGS: PA and lateral radiographs of the chest.     The trachea is midline. The mediastinal border, cardiac silhouette,  and pulmonary vasculature are within normal limits. No focal airspace  opacities. No pneumothorax. No pleural effusion.      Impression    IMPRESSION: No focal airspace disease.    I have personally reviewed the examination and initial interpretation  and I agree with the findings.    ROSA SPAULDING MD   XR Chest Port 1 View    Narrative    Exam: XR CHEST PORT 1 VW, 3/14/2019 11:50 AM    Indication: S/P PICC; confirm placement    Comparison: 2/13/2019    Findings: Single AP chest radiograph. Right PICC terminating in the  high SVC. Trachea is midline. Cardiomediastinal silhouette within  normal limits. Ill-defined airspace opacity projecting over the left  lateral lung, possibly superimposed soft tissue. No pleural effusion  or pneumothorax. Upper abdomen is unremarkable.      Impression    Impression:   1. Interval placement of right PICC, terminating in the high SVC.  2. Ill-defined airspace opacity projecting over the left lateral lung,  possibly superimposed soft tissue. Attention on follow-up.    I have personally reviewed the examination and initial interpretation  and I agree with the findings.    IRIS JALLOH MD   XR Chest 2 Views    Narrative    EXAM: XR CHEST 2 VW  3/22/2019 11:05 AM     HISTORY:  new neutropenic fever, AML 1st spike       COMPARISON:  3/14/2019    FINDINGS: PA and lateral radiographs of the chest. Right PICC line tip  projects over the expected  location of the mid SVC.    The trachea is midline. The mediastinal border, cardiac silhouette,  and pulmonary vasculature are within normal limits. No focal airspace  opacities. No pneumothorax. Small right pleural effusion.      Impression    IMPRESSION:   1. Small right pleural effusion.  2. No focal airspace opacity.    I have personally reviewed the examination and initial interpretation  and I agree with the findings.    IRIS JALLOH MD   CT Chest/Abdomen/Pelvis w Contrast    Narrative    EXAMINATION: CT CHEST/ABDOMEN/PELVIS W CONTRAST, 3/29/2019 12:56 PM    TECHNIQUE:  Helical CT images from the thoracic inlet through the  symphysis pubis were obtained  with contrast. Contrast dose: 127ml  isovue 370    COMPARISON: 3/22/2018    HISTORY: Sepsis; ; spiking fevers of unknown source. eval for  infectious fluid collection amenable to drainage    FINDINGS:    Chest: The central tracheobronchial tree is patent. No acute airspace  opacities. Mild bibasilar atelectasis. Subpleural fatty deposition,  likely from prior infections. Right fat-containing Bochdalek hernia.  No suspicious nodules or masses. Heart size is normal. At least  moderate three-vessel coronary calcifications. No pericardial  effusion. Normal caliber of the thoracic great vessels. Right upper  approach PICC with tip in the high SVC. No thoracic adenopathy.    Abdomen and pelvis: Multiple simple cysts in the liver. The  gallbladder, spleen, adrenal glands and pancreas are normal. Thickened  right hemicolon with adjacent fat stranding. The appendix is normal in  caliber. Subcentimeter renal cortical hypodensities, too small to  characterize. No stones or hydronephrosis. Prostatomegaly with  irregular enhancement. No free air/fluid in the abdomen or pelvis.  Abdominal aorta is normal in caliber.    Bones and soft tissues: Transitional L5. No worrisome osseous or soft  tissue abnormality. Bilateral vasectomy changes.      Impression    IMPRESSION:   1.  Thickened right hemicolon with adjacent fat stranding favored to be  infectious given history of sepsis.  2. At least moderate three-vessel coronary artery calcifications.     I have personally reviewed the examination and initial interpretation  and I agree with the findings.    TREE CAMPA, DO   CT Dental wo Contrast    Narrative    CT DENTAL WO CONTRAST 3/31/2019 11:02 AM    History:  uncontrolled leukemia, recent root canal, high fevers , rule  out abscess. Can use contrast    Comparison:  None      TECHNIQUE: 3-D reconstruction by the technologists, with curved  multiplanar reformat of thin section imaging through the mandible and  maxilla obtained without intravenous contrast.    FINDINGS:  3-D reconstructions of a limited coverage dental CT demonstrate  Fat  stranding and mild soft tissue thickening along the left hemimandible.  Artifact from dental amalgam and numerous root canals. Tiny periapical  lucency involving the right anterior most mandibular molar (series 5  image 48). Alignment of the visualized portions of the facial bones  appears normal.   Regarding the visualized portions of the paranasal sinuses, they  appear clear. No overt abnormality of the temporomandibular joints  identified.      Impression    IMPRESSION:    1. Tiny periapical lucency involving the anterior most right  mandibular molar.  2. Fat stranding and mild soft tissue thickening along the left  hemimandible, most consistent with cellulitis.  No discreet abscess.    The preliminary report was no periapical lucency. After review by the  staff radiologist, the final interpretation of small periapical  lucency involving the anterior most right mandibular molar was  provided by Dr. Flores and communicated to Tricia Quesada CNP at  1237 on 3/31/2019.    I have personally reviewed the examination and initial interpretation  and I agree with the findings.    JUD RUCKER MD   CT Facial Bones without Contrast    Narrative    CT  FACIAL BONES WITHOUT CONTRAST 4/3/2019 9:41 AM    History:  Mass, lump or swelling, maxface; neutropenic sepsis    Comparison:  3/31/2019      Technique: Using thin collimation multidetector helical acquisition  technique, axial and coronal thin section CT images were reconstructed  through the facial bones. Images were reviewed in bone and soft tissue  windows.    Findings:  On the prior examination, there was a question of  cellulitis along the left hemimandible, which is still present to a  lesser degree, but improved, without focal fluid collection. There  continues to be a tiny right apical molar mandibular lucency. There is  no evident fracture of the facial bones. The cribriform plate appears  intact. Alignment of the facial bones appears normal.   No focal abscess. Overall there does not appear to be osteomyelitis.  Incidental bone island again present left hemimandible more  anteriorly, unchanged.  There is no hematoma, soft tissue mass or gas visualized within the  orbits. Mildly debris right frontoethmoidal recess at the top of the  images ethmoid air cells. Mild sphenoid sinus inflammatory findings,  unchanged.      Impression    Impression:   1. Soft tissue swelling along the left hemimandible is slightly  improved although still present, without focal abscess, and no  underlying osteomyelitis.  2. Continued tiny periapical lucency anterior most right mandibular  molar, and again cannot exclude early periapical abscess, grossly  unchanged.  3. Small debris within the right frontoethmoidal recesses may  represent early sinus inflammatory disease, new.          STEPHAN TERRY MD   CT Soft Tissue Neck w Contrast    Narrative    CT SOFT TISSUE NECK W CONTRAST 4/3/2019 4:28 PM    History:  oral infection with concern for infection extending into  neck in pt with neutropenic sepsis  ICD-10:      Comparison:  CT face 4/3/2019, CT head 3/31/2019     Technique: Following intravenous administration of  nonionic iodinated  contrast medium, thin section helical CT images were obtained from the  skull base down to the level of the aortic arch.  Axial, coronal and  sagittal reformations were performed with 2-3 mm slice thickness  reconstruction. Images were reviewed in soft tissue, lung and bone  windows.    Contrast: 100ml isovue 370    Findings:   Findings: Right upper extremity PICC tip in the low SVC.    Status post left mandibular molar dental extractions x2. Packing  within the left side of the oral cavity. Layering secretions within  the hypopharynx (series 2, image 59). Unchanged lucency associated  with the right mandibular molar.     Predominantly left submandibular soft tissue thickening of the  posterior and trace ill-defined adjacent edema (series 4, image 81).  No well-formed fluid collection. Associated lymphadenopathy, for  example left level 1B lymph node measuring 8 mm (series 2, image 53)  left level 2A lymph node measuring 9 mm (series 2, image 47), left  level 2/3 lymph node measuring 8 mm (series 2, image 61). Additional  scattered cervical lymph nodes bilaterally.     Evaluation of the mucosal space demonstrates no evident abnormality in  the nasopharynx, oropharynx, hypopharynx or the glottis. The tongue  base appears normal. The major salivary glands appear unremarkable.  The thyroid gland appears normal.    The major vascular structures in the neck appear unremarkable.    Evaluation of the osseous structures demonstrate no worrisome lytic or  sclerotic lesion. Stable degenerative changes of the cervical spine  with straightening of the normal cervical lordosis. Trace grade 1 C3  on C4 retrolisthesis and C4 on C5 retrolisthesis. No significant  spinal canal narrowing. No aggressive osseous lesion.. Mild mucosal  thickening of the paranasal sinuses. The mastoid air cells are clear.     The visualized lung apices are clear. Main pulmonary artery measures  up to 3.4 cm.      Impression     Impression:  1. Postoperative changes left mandibular molar extractions. Mild  layering secretions of the hypopharynx. Deep cervical spaces are  unremarkable.  2. Left submandibular soft tissue swelling concerning for soft tissue  infection/cellulitis. No drainable fluid collection. Associated left  predominant cervical lymphadenopathy, presumably reactive.  3. Mild unchanged pansinusitis.    I have personally reviewed the examination and initial interpretation  and I agree with the findings.    JUD RUCKER MD   XR Chest Port 1 View    Narrative    EXAM: XR CHEST PORT 1 VW  4/3/2019     HISTORY: Hypoxia with sepsis    COMPARISON: Chest radiograph 3/22/2019    FINDINGS:   Single view of the chest. Right arm PICC tip near the superior  cavoatrial junction. The cardiomediastinal silhouette is within normal  limits. New patchy pulmonary opacities, most pronounced in the left  retrocardiac region. No significant pleural effusion. No pneumothorax.        Impression    IMPRESSION: New bilateral patchy pulmonary opacities, most pronounced  in the left retrocardiac region. Findings suspicious for edema or  infection.    I have personally reviewed the examination and initial interpretation  and I agree with the findings.    SANTIAGO NAGEL MD   US Abdomen Limited    Narrative    EXAMINATION: US ABDOMEN LIMITED, 4/4/2019 1:21 AM     COMPARISON: CT chest abdomen pelvis 3/29/2019    HISTORY: Elevated bilirubin, evaluate for cholecystitis.    TECHNIQUE: The abdomen was scanned in standard fashion with  specialized ultrasound transducer(s) using both grey scale and limited  color Doppler techniques.    Findings:  Liver: The liver demonstrates normal homogeneous echotexture,  measuring 19.1 cm. No evidence of a focal hepatic solid mass or  intrahepatic biliary ductal dilatation. Multiple simple cysts  measuring 2.8 x 2.4 x 2.9 cm and 4.0 x 2.5 x 2.1 cm near the right  hepatic dome.    Gallbladder: The gallbladder is well  distended and of normal  morphology. There is no wall thickening. Multiple tiny radiodense  gallstones. Trace pericholecystic fluid.    Bile Ducts: Both the intra- and extrahepatic biliary system are of  normal caliber.  The common bile duct measures 4 mm in diameter.    Pancreas: Poorly visualized due to overlying bowel gas.    Right kidney: Demonstrates normal echotexture without focal mass. No  hydronephrosis or nephrolithiasis. The craniocaudal dimensions 13.6  cm.      Impression    Impression:   1.  Cholelithiasis without evidence of cholecystitis.  2.  Simple hepatic cysts    I have personally reviewed the examination and initial interpretation  and I agree with the findings.    SAY BECERRA MD

## 2019-04-04 NOTE — H&P
History and Physical  El Ace MRN: [id number]  Age: 63 year old, : 1955  Primary care provider: Bre Vizcaino           Chief Complaint:     Septic shock           History of Present Illness:     HISTORY OF PRESENT ILLNESS:  El Ace is a 63 year old with AML s/p induction chemotherapy who is being transferred from hem/onc for septic shock.    Patient has been hospitalized since 3/12. Bone marrow biopsy positive for AML. He underwent 7+3 (cytarabine and daunorubicin) D1=3/15/19 and reinduction with decitabaine and venetoclax 3/29   Induction course notable for fever on 3/22 and was started on Cefepime , he was on fluconazole and transitioned to Micafungin then voriconazole. Source of fevers is felt to be 2/2 odontogenic source (mandibular cellulitis) s/p OMFS procedure. Today he became hypotensive , hypoxic not responsive to 1 L of IVF. Patient was transferred to the MICU for further evaluation. Off note patient also tested positive for C.dfif on 3/27 and has been on oral vancomycin.      Upon arrival to the unit patient was alert and awake. Vitals were , /64, spo2 92% on 4 LPM.             Past Medical History:     Reviewed with patient on 2019   Past Medical History:   Diagnosis Date     Acute leukemia (H) 3/12/2019       Past Surgical History:   Procedure Laterality Date     PICC INSERTION Right 2019    5Fr - 42cm (2cm external), basilic vein, high SVC               Social History:     Social History     Tobacco Use     Smoking status: Not on file   Substance Use Topics     Alcohol use: Not on file              Family History:     No family history on file.  Family history           Allergies:     No Known Allergies         ROS : 10 point review of systems was performed and was negative other than HPI         Medications:     PTA Meds  Prior to Admission medications    Medication Sig Last Dose Taking? Auth Provider   alfuzosin ER (UROXATRAL) 10 MG 24 hr  tablet Take 10 mg by mouth daily  Yes Reported, Patient   atorvastatin (LIPITOR) 40 MG tablet Take 40 mg by mouth daily 3/12/2019 at Unknown time Yes Reported, Patient   oxybutynin ER (DITROPAN-XL) 5 MG 24 hr tablet Take 5 mg by mouth daily Past Month at Unknown time Yes Reported, Patient   penicillin V (VEETID) 500 MG tablet Take 500 mg by mouth 3 times daily 3/12/2019 at Unknown time Yes Reported, Patient   rOPINIRole (REQUIP) 0.25 MG tablet Take 0.75 mg by mouth At Bedtime 3/11/2019 at 2000 Yes Reported, Patient   venetoclax (VENCLEXTA) 100 MG tablet CHEMOTHERAPY Take 1 tablet (100 mg) by mouth daily  Yes Tricia Ordonez APRN CNP   venetoclax (VENCLEXTA) 100 MG tablet CHEMOTHERAPY Take 1 tablet (100 mg) by mouth daily   Bharat Leo MD   venetoclax (VENCLEXTA) 100 MG tablet CHEMOTHERAPY Take 4 tablets (400 mg) by mouth daily   Rula Hutchins MD      Current Meds    sodium chloride 0.9%  1,000 mL Intravenous Once     acyclovir  400 mg Oral BID     allopurinol  300 mg Oral Daily     artificial saliva  1-2 spray Swish & Spit 4x Daily     clindamycin  900 mg Intravenous Q8H     [Rx hold ] decitabine (DACOGEN) chemo infusion  20 mg/m2 (Treatment Plan Recorded) Intravenous Q24H     linezolid  600 mg Intravenous Q12H     lipids  250 mL Intravenous Q24H     micafungin  100 mg Intravenous Q24H     ondansetron  8 mg Oral Q8H     pantoprazole  40 mg Oral BID AC     [START ON 4/4/2019] phytonadione  5 mg Oral Daily     piperacillin-tazobactam  3.375 g Intravenous Q6H     prochlorperazine  5-10 mg Intravenous Q6H    Or     prochlorperazine  5-10 mg Oral Q6H     rOPINIRole  0.75 mg Oral Daily at 8 pm     sodium chloride (PF)  10 mL Intracatheter Q7 Days     tamsulosin  0.4 mg Oral Daily     tobramycin  400 mg Intravenous Q24H     vancomycin  125 mg Oral 4x Daily     [Rx hold ] venetoclax  100 mg Oral Daily with supper     voriconazole  200 mg Oral Q12H ZUNILDA     Infusion Meds    IV fluid REPLACEMENT ONLY       -  MEDICATION INSTRUCTIONS -       parenteral nutrition - ADULT compounded formula       sodium chloride 125 mL/hr at 04/02/19 1553       ALLERGIES:    No Known Allergies              Physical Exam:     B/P: 83/52, T: 102.5, P: 130, R: 28    Wt Readings from Last 4 Encounters:   04/03/19 99.8 kg (220 lb)         Intake/Output Summary (Last 24 hours) at 4/3/2019 2044  Last data filed at 4/3/2019 2000  Gross per 24 hour   Intake 6506 ml   Output 375 ml   Net 6131 ml     General: mildly distressed, pleasant and cooperative, AAOx3.  HEENT: NC/AT, pale Conjunctiva, anicteric sclera, EOMI; PERRL - oral / tongue swelling   Neck: Trachea midline; supple, good tone  Chest: anterior fields with bilateral expiratory wheezing   CV: tachycardic; nl S1/S2, no murmurs  Abd: Soft, distended, no HSM, (+) BS;   Ext: Warm/well perfused; no Edema.  Skin: Clear; unbroken; no new rashes  Neuro: - MS: AAO x3 - no focal deficit     Bedside US:  Hyperdynamic LV   Poor RV window             Data:       DIAGNOSTIC STUDIES  ROUTINE ICU LABS (Last four results)  CMP  Recent Labs   Lab 04/03/19  2037 04/03/19  1755 04/03/19  0330 04/02/19  1827 04/02/19  0552 04/01/19  1712 04/01/19  0549  03/31/19  0626  03/28/19  0634     --  134  --  131* 131* 132*   < > 134   < > 133   POTASSIUM 3.4 3.5 3.2*  --  3.6 3.7 3.8   < > 3.9   < > 3.1*   CHLORIDE 106  --  106  --  102 101 102   < > 102   < > 103   CO2 16*  --  18*  --  20 21 21   < > 22   < > 19*   ANIONGAP 12  --  10  --  10 9 9   < > 9   < > 11   *  --  109*  --  106* 111* 116*   < > 129*   < > 110*   BUN 12  --  10  --  11 11 11   < > 12   < > 8   CR 1.45*  --  1.11  --  0.95 0.83 0.76   < > 0.75   < > 0.79   GFRESTIMATED 50*  --  69  --  84 >90 >90   < > >90   < > >90   GFRESTBLACK 58*  --  81  --  >90 >90 >90   < > >90   < > >90   DEBBIE 7.1*  --  6.6*  --  7.3* 7.2* 7.5*   < > 8.0*   < > 7.6*   MAG  --   --  1.9  --  1.8  --  2.1  --  2.2   < > 2.0   PHOS  --   --  2.1* 1.1* 1.6* 2.7  2.2*   < > 2.9   < > 2.1*   PROTTOTAL 5.2*  --   --   --   --   --  6.0*  --   --   --  5.9*   ALBUMIN 2.0*  --   --   --   --   --  2.3*  --   --   --  2.2*   BILITOTAL 4.1*  --   --   --   --   --  0.8  --   --   --  0.7   ALKPHOS 69  --   --   --   --   --  80  --   --   --  84   AST 35  --   --   --   --   --  17  --   --   --  14   ALT 30  --   --   --   --   --  31  --   --   --  29    < > = values in this interval not displayed.     CBC  Recent Labs   Lab 04/03/19 2204 04/03/19 2037 04/03/19 0330 04/03/19  0206   WBC 3.2* 3.2* 1.9* 1.9*   RBC 1.94* 1.97* 2.04* 2.19*   HGB 6.0* 6.2* 6.4* 6.9*   HCT 18.5* 19.4* 20.1* 21.4*   MCV 95 99 99 98   MCH 30.9 31.5 31.4 31.5   MCHC 32.4 32.0 31.8 32.2   RDW 16.6* 16.7* 16.5* 16.4*   PLT 36* 38* 27* 29*     INR  Recent Labs   Lab 04/03/19 2204 04/03/19 0330 04/02/19  0552 04/01/19  0549   INR 1.85* 1.84* 1.55* 1.51*     Arterial Blood Gas  Recent Labs   Lab 04/03/19 2204 04/03/19 2037   O2PER 4L 4L       MICROBIOLOGY  Blood Culture 4/3 NGTD   Blood culture 4/2 NGTD     IMAGING  CXR: bilateral interstitial infiltrates   EKG: Sinus tachy - no change     CT soft tissue neck 4/3:   1. Postoperative changes left mandibular molar extraction. Mild  layering secretions of the hypopharynx. Deep cervical spaces are  unremarkable.  2. Left submandibular soft tissue swelling concerning for soft tissue  infection/cellulitis. No drainable fluid collection. Associated left  predominant cervical lymphadenopathy, presumably reactive.  3. Mild unchanged pansinusitis.    Echo  TTE 3/13:  Global and regional left ventricular function is normal with an EF of 60-65%.  Global peak LV longitudinal strain is averaged at -24%. This is within  reported normal limits (normal <-18%).  Right ventricular function, chamber size, wall motion, and thickness are  normal.  No pericardial effusion is present.            Assessment and Plan:     El De Oliveiramez is a 63 year old with AML s/p induction  chemotherapy who is being transferred from hem/onc for septic shock.    ===NEURO===  No active issues    #RLS  - Continue Ropinirole 0.75 mg qday    # Sedation  None     ===CARDIOVASCULAR===  # Shock   Likely septic from ongoing odontic infection / Neutropenic fevers (Calculated CI 5 , c/w desprutive shock).      - s/p 3L of NS , hold off further IVF (will also get 1 U of PRBC)  - Levophed to keep MAP goal >65   - Goal Lactate <2   - Start Hydrocortisone 100 mg once ---> 50 mg Q6h + Fludrocortisone 1mcg qday   - Start Imipenem , Clindamycin (see ID)   - UA, sputum culture, fungal/yeast blood cultures - aerobic blood cultures drawn in the AM  - If procalcitonin >2 will start Thiamine + Vitamin C     #NSTEMI , Type II   #HLD   Likely demand in the setting of shock. Patient with no non obstructive CAD , Coronary CT done on 7/2016 (score 591). Last stress test 8/2016 with no ischemia    - Trend Troponin   - Last TTE 3/13 with normal EF --> consider repeat TTE in the AM   - Hold resuming atorvastatin for now (drug drug interaction with Voriconazole)     ===PULMONARY===  # Acute hypoxic respiratory failure  Bilateral interstitial infiltrates most concerning for budding ARDS vs Cardiogenic pulmonary edema from fluid resuscitation vs infectious etiology (PJP pneumonia)    - Treat shock as above  - PJP less likely as patient has been hospitalized since chemotherapy     #NAGMA  Likely 2/2 saline     #Vent settings  N/A     ===GASTROINTESTINAL===  # Chemotherapy induced nausea  - Antiemetic PRN     #Elevated bilirubin   Likely 2/2 cholestasis in the setting of septic shock. ALT/AST normal  -Check RUQ US     # Nutrition:   Hold TPN overnight     ===RENAL===  # Acute kidney injury   Likely pre-renal in the setting of above   - Treat shock as above  - UA     #HypoCa  #HypoMg  Replace     ===HEME/ONC===  # Pancytopenia   Anemia from chemotherapy and BM suppression. Acute drop likely 2/2 OR blood loss as well as possible low grade  DIC from ongoing infection.  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   - S/P Cytarabine and daunorubicin 3/15/19 BM Bx after with heavy burden disease 98%  S/p re induction decitabine/venetoclax 3/26 (4/3 day 9 of reinduction    - Continue Allupurinol   Serious adverse effects of chemotherapy:  - Cytarabine; pericarditis, asetptic meningitis, rhabdo,   - Daunorubicin; heart failure , ST changes  - Decitabine; edema, heart murmur , headaches, hyperbili , increase Alp   -VEnetoclax: hypo Ca , increase alp     #Coagulopathy   -Daily INR   - s/p 3 day vitamin K 4/3 - 4/5   - Keep fibrinogen >100     ===ENDOCRINE===  No active issues     ===INFECTIOUS DISEASE===  # Neutropenic Fever  # Mandibular Cellulitis s/p extraction of #18,19   Patient with extensive antimicrobial coverage as  Below. Holes in coverage include Filamentous organism (Nocardia can cause cellulitis in IC patients) , ESBL otherwise patient was covered for bacterial , mold/yeast infections.    Other than oral cavity no clear source of infection.     - Broaden to Imipenem (cover Nocardia + ESBL)  - Continue Linezolid   - UA, sputum culture, fungal/yeast blood cultures - aerobic blood cultures drawn in the AM  - Check IgG levels   - Holding off Neupogen given AML     # Antimicrobials:  Clindamycin 4/3 -   Linezolid 4/3 -   Imipenem 4/3 -   Acyclovir  3/13 -   Voriconazole 3/28 -   Micafungin 4/3 -   Oral Vancomycin 3/26 -     Previous Abx:   Pip-Tazo 4/1 - 4/3   Cefepime 3/22 - 4/1   Levofloxacin 3/13 - 3/21   IV Vancomycin 3/31 - 4/3   ---  Micafungin 3/23 - 3/27  Fluconazole 3/13 - 3/23   ---  - PCP prophylaxis: None  - Serostatus: CMV neg, EBV pos, HSV pos  - Immunization status: Needs all pre BMT vaccinations   - Gamma globulin status: Not in records     #C.diff colitis  - Continue PO vancomycin while on broad spectrum Abx      ===SKIN/MSK===  #Oral swelling   Noted post operative tongue swelling.  - Hydrocortisone 100 mg ---> 50 Q6h     Prophylaxis:  DVT: SCDs GI: None   Family: Not updated   Disposition: Critically ill  Code Status: Full    Patient was seen and discussed with attending physician Dr. Stanley, who agrees with above assessment and plan.    Kyle Smith MD  Internal Medicine, PGY-3  Pager [phone]    Attestation:  I, Aleta Stanley MD, saw this patient with the resident and agree with the resident and/or medical student's findings and plan of care as documented in the note.       I personally reviewed vital signs, medications, labs and imaging.     Key findings:   Hypotensive secondary to neutropenic sepsis-2 weeks s/p chemo for AML     Evaluation and management time exclusive of procedures was 30 minutes critical care time including:  examination with the ICU team, discussion of the patient's condition with other physicians and members of the care team, reviewing all data related to the patient, and time utilizing the EMR for documentation of this patient's care.     Aleta Stanley MD  Date of Service (when I saw the patient): April 13, 2019

## 2019-04-04 NOTE — PLAN OF CARE
T transferred from 7D at approx 2145 for hypotension and increasing O2 needs. Upon arrival TMax 100.8, pt diaphoretic with increased WOB. WOB improved with temp control and BPs improved with fluids, abx and bedrest. On 4L via oxymask, sats >94%, RR initially 28-30, now 20. LS dim. Mouth/jaw swollen, however continues to protect airway. Swabs and sips of water given for comfort. Initially hypotensive upon arrival, now MAPs >70, levophed never started. Sinus tach 130s on arrival, resolving to 90s sinus rhythm with temperature control. Pt A&Ox4, however sleepy throughout shift. Difficult to understand at times d/t oral swelling. Voiding in urinal. No BMs overnight. TPN and lipids held per provider order. Wife Bessy and son Manny at bedside and updated on POC. Given 2u RBCs for hgb <7.0. Plan for K+ replacement today. Provider notified as needed throughout shift. Continue with POC.

## 2019-04-04 NOTE — PROGRESS NOTES
3/26/2019    HEME MALIGNANCY ATTENDING ADDENDUM:  The patient has been seen and evaluated by me, and I have reviewed today's vital signs, medications, labs, and imaging results independently. I have discussed the patient and plan with the team, and agree with the findings and plan outlined in this note. Key aspects of my evaluation, impression, and plan are as follows.     Mr. Ace is a 63 year old man with newly diagnosed AML.   Started 7+3 - days #11 today, tolerated treatment well.  New neutropenic fever 3/22.      Now afebrile, nausea, still feeling poorly, tired, no appetite.      VS reviewed. No distress. No OP lesions. Heart regular. Lungs clear. Abd soft, no TTP. No LE edema. PICC looks OK.  Labs and imaging reviewed. WBC rising with 96% blasts      AML with normal cytogenetics and no mutations of FLT3. NGS with IDH2 and RUNX mutations.     Patient completed cytarabine and daunorubicin 7+3;  WBC is still elevated with >90% blasts.  Marrow completely effaced.     Start decitabine x 10days /Venclexta today. I spent 30 mnutes explaining the rationale and plan going forward. BMT consult this Friday. Also should include NK cell therapy possibility.    Continue Cefepim for neutropenic fevers.   Continue allopurinol. No TLS and DIC. Encourage eating and acitivity.   I discussed these developments with El and his wife today.    Rula Hutchins MD

## 2019-04-04 NOTE — PROGRESS NOTES
Per verbal order with repeat back from Attending Ahmet, holding both PO Venetoclex and IV Decitabine this evening d/t pt decompensating. Moonlighter aware and putting order/note in.

## 2019-04-04 NOTE — PLAN OF CARE
Admitted/transferred from: 7D  Reason for admission/transfer: hypotension, increasing O2 needs  Patient status upon admission/transfer: stable  Interventions: Abx broadened, temperature control, close BP monitoring, abd ultrasound, PIV placement  Plan: Continue abx, closely monitor BP and levophed if needed  2 RN skin assessment: completed by Miriam Rao  Result of skin assessment and interventions/actions: Skin intact, mild bruising throughout.  Height, weight, drug calc weight: done  Patient belongings (see Flowsheet - Adult Profile for details): With family  MDRO education (if applicable):

## 2019-04-04 NOTE — CONSULTS
##### FINAL REPORT #####  This transfusion reaction investigation has been finalized.  All cultures from the unit bag were negative (no growth, final).  There is no change to the preliminary diagnosis.    Moira Chatman MD,    Transfusion Medicine Attending  Pager [phone]    Laboratory Medicine and Pathology  Transfusion Medicine- Transfusion Reaction    El Ace MRN# [id number]   YOB: 1955 Age: 63 year old   Date of Reaction: 4/3/2019     History    Patient is a 63 year old year old Male patient with a history of AML. He is currently admitted for chemotherapy complicated by neutropenic fever. Transfusion was platelets for plt< 50,000. At 1702 his temperature spiked to 104 F up from 101.9 F.     04/03/19 1021 -- 140 126/61 100.7  F (38.2  C) 20 -- None (Room air)   04/03/19 1041 128 -- 112/59 100.4  F (38  C) 19 -- None (Room air)   04/03/19 1129 -- 130 96/54 101.5  F (38.6  C) 20 -- --   04/03/19 1143 130 -- 98/53 101.5  F (38.6  C) 20 -- None (Room air)   04/03/19 1247 130 -- 93/53 99.9  F (37.7  C) 24 -- None (Room air)   04/03/19 1255 -- -- -- -- -- 2 LPM Oxymask   04/03/19 1300 128 -- 90/52 100.7  F (38.2  C) 25 2 LPM Oxymask   04/03/19 1636 142 -- 112/56 101.9  F (38.8  C) 22 3 LPM Nasal cannula   04/03/19 1702 134 -- 94/55 104  F (40  C) 26 3 LPM Nasal cannula   04/03/19 1735 136 -- 99/52 102.8  F (39.3  C) 22 3 LPM Nasal cannula   04/03/19 1830 140 -- 101/57 101.8  F (38.8  C) 28 -- --   04/03/19 1925 136 -- -- 102.5  F (39.2  C) 26 3 LPM Nasal cannula   04/03/19 1926 -- -- -- -- -- 4 LPM Nasal cannula   04/03/19 1938 -- -- 81/51 Abnormal  -- 28 -- --   04/03/19 1940 132 -- 82/53 Abnormal  102.5  F (39.2  C) 28 4 LPM --   04/03/19 1951 132 -- 79/48 Abnormal  -- 28 -- --   04/03/19 2002 129 -- 79/48 Abnormal  -- 28 4 LPM Nasal cannula   04/03/19 2020 128 -- 83/52 Abnormal  -- 28 4 LPM --   04/03/19 2043 130 -- 90/52 102.4  F (39.1  C) 28 4 LPM Nasal cannula   04/03/19 2118 130 -- 98/58  102.2  F (39  C) 28 4 LPM --   04/03/19 2145 133 134 101/59 100.8  F (38.2  C) -- 4 LPM Oxymask   04/03/19 2200 129 130 91/61 -- -- -- --   04/03/19 2215 135 134 116/67 -- -- -- --   04/03/19 2230 131 131 103/64 -- -- -- --   04/03/19 2245 131 125 99/60 -- -- -- --   04/03/19 2252 131 129 94/60 100.8  F (38.2  C) 26 -- --   04/03/19 2300 128 130 91/59 99.9  F (37.7  C) 26 4 LPM Oxymask   04/03/19 2315 129 126 86/56 Abnormal  -- -- -- --   04/03/19 2330 126 126 91/59 -- -- -- --   04/03/19 2345 126 126 102/63 -- -- -- --   04/04/19 0000 123 123 89/62 Abnormal  99.3  F (37.4  C) 24 4 LPM Oxymask      Patient received 2 units of plasma and 1 unit of PLT for Plt <50,000. The units of plasma ran from 1021 to 1135 and the second unit of plasma ran from 1129 to 1255  The unit of platelet was started at 1247 and was given until completion (no documented completion time)  351 mL of a total volume of 351 mL was transfused when the patient began to be febrile at 1702 with a temp of 104.  The transfusion was halted and a transfusion reaction work-up was ordered.    The patient's post-transfusion vital signs were notable for Fever of 104.  He also later developed hypotension and increase in O2 support for which he was transferred to the ICU.  Of note the patient had emergent extraction of teeth following the transfusions for suspected infection.      Unit# Platelet [id number] exp 04/03/19 Blood Type AB+  Unit# Plasma [id number] exp 04/05/19 Blood type AB-  Unit# Plasma [id number] exp 04/05/19 Blood type AB+  Patient blood type A+    Clerical inspection: The units were compatible and the intended units for the intended patient.  TINA: Neg  Plasma: straw colored  The units were cultured. Culture was drawn with 10 mL of normal saline. No organisms seen on gram stain.     The patient's temperature lily 2.1 F after a unit of PLT transfusion. This meets the criteria for a definitive febrile non-hemolytic transfusion reaction  (per criteria, a definitive febrile non hemolytic transfusion reaction requires an increase of 1.8F, to a peak of at least 100.4 F within 4 hours of transfusion). Given the timing of the symptoms, the plasma units were transfused greater than 4 hours to the symptoms, so are not clearly implicated in this reaction. The hypotension and change in respiratory occurred later and appears to have occurred during/after the removal of the teeth and may likely have caused these symptoms given an infection in the area was suspected.      Impression:  The patient has a Definitive Febrile Non Hemolytic Transfusion Reaction.  Severity: Non severe as the subsequent hypotension, change in respiratory status, and transfer to ICU occurred later and are more likely attributed to the surgical procedure.  Imputability: Possible, as a contribution from the patient's underlying medical condition likely contributed to the symptoms.    Recommendation:  Transfuse as needed.      Lamonte Medina, MS4.    ATTENDING ATTESTATION  I have personally reviewed the clinical and laboratory of the transfusion reaction. The above note was initially scribed by the medical student, Lamonte Medina, to which I have made revisions and edits to the note.      Keila Daly M.D., Ph.D.  Attending Physician  Division of Transfusion Medicine  Department of Laboratory Medicine [place] [address]  Pager [phone]       .

## 2019-04-04 NOTE — H&P
MICU Brief Update/Transfer Note  [facility], [place]  Date of Admission: April 4, 2019  Chief Complaint: shock  -----------------------------------------------------------------  Assessment & Plan     El Ace is a 63 year old with AML s/p induction chemotherapy who is being transferred from hem/onc for septic shock.     Brief Day-team Update  - Good MAPs, no AMS, ready to transfer off the unit  - Some swelling but no signs of airway compromise. No oral cavity purulence or active bleeding.  -- Stop brief stress dose steroids  -- Continue with abx broadening but touchbase with transplant ID for recs  -- ANC 1000  -- Restart tube feeds    ===NEURO===  No active issues     #RLS  - Continue Ropinirole 0.75 mg qday     # Sedation  None      ===CARDIOVASCULAR===  # Shock   Likely septic from ongoing odontic infection / Neutropenic fevers (Calculated CI 5 , c/w desprutive shock).       - s/p 3L of NS , hold off further IVF (will also get 1 U of PRBC)  - Levophed to keep MAP goal >65   - Goal Lactate <2   - Stop brief stress dose steroids  - Start Imipenem , Clindamycin (see ID)   - UA, sputum culture, fungal/yeast blood cultures - aerobic blood cultures drawn in the AM  - If procalcitonin >2 will start Thiamine + Vitamin C      #NSTEMI , Type II   #HLD   Likely demand in the setting of shock. Patient with no non obstructive CAD , Coronary CT done on 7/2016 (score 591). Last stress test 8/2016 with no ischemia     - Trend Troponin   - Last TTE 3/13 with normal EF --> consider repeat TTE in the AM   - Hold resuming atorvastatin for now (drug drug interaction with Voriconazole)      ===PULMONARY===  # Acute hypoxic respiratory failure  Bilateral interstitial infiltrates most concerning for budding ARDS vs Cardiogenic pulmonary edema from fluid resuscitation vs infectious etiology (PJP pneumonia)     - Treat shock as above  - PJP less likely as patient has been hospitalized since chemotherapy       #NAGMA  Likely 2/2 saline      #Vent settings  N/A      ===GASTROINTESTINAL===  # Chemotherapy induced nausea  - Antiemetic PRN      #Elevated bilirubin   Likely 2/2 cholestasis in the setting of septic shock. ALT/AST normal  -Check RUQ US      # Nutrition:   Hold TPN overnight      ===RENAL===  # Acute kidney injury   Likely pre-renal in the setting of above   - Treat shock as above  - UA      #HypoCa  #HypoMg  Replace      ===HEME/ONC===  # Pancytopenia   Anemia from chemotherapy and BM suppression. Acute drop likely 2/2 OR blood loss as well as possible low grade DIC from ongoing infection.  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   - S/P Cytarabine and daunorubicin 3/15/19 BM Bx after with heavy burden disease 98%  S/p re induction decitabine/venetoclax 3/26 (4/3 day 9 of reinduction     - Continue Allupurinol   Serious adverse effects of chemotherapy:  - Cytarabine; pericarditis, asetptic meningitis, rhabdo,   - Daunorubicin; heart failure , ST changes  - Decitabine; edema, heart murmur , headaches, hyperbili , increase Alp   -VEnetoclax: hypo Ca , increase alp      #Coagulopathy   -Daily INR   - s/p 3 day vitamin K 4/3 - 4/5   - Keep fibrinogen >100      ===ENDOCRINE===  No active issues      ===INFECTIOUS DISEASE===  # Neutropenic Fever  # Mandibular Cellulitis s/p extraction of #18,19   Patient with extensive antimicrobial coverage as  Below. Holes in coverage include Filamentous organism (Nocardia can cause cellulitis in IC patients) , ESBL otherwise patient was covered for bacterial , mold/yeast infections.     Other than oral cavity no clear source of infection.      - Broaden to Imipenem (cover Nocardia + ESBL)  - Continue Linezolid   - UA, sputum culture, fungal/yeast blood cultures - aerobic blood cultures drawn in the AM  - Check IgG levels   - Holding off Neupogen given AML      #  Antimicrobials:  Clindamycin 4/3 -   Linezolid 4/3 -   Imipenem 4/3 -   Acyclovir  3/13 -   Voriconazole 3/28 -   Micafungin 4/3 -   Oral Vancomycin 3/26 -      Previous Abx:   Pip-Tazo 4/1 - 4/3   Cefepime 3/22 - 4/1   Levofloxacin 3/13 - 3/21   IV Vancomycin 3/31 - 4/3   ---  Micafungin 3/23 - 3/27  Fluconazole 3/13 - 3/23   ---  - PCP prophylaxis: None  - Serostatus: CMV neg, EBV pos, HSV pos  - Immunization status: Needs all pre BMT vaccinations   - Gamma globulin status: Not in records      #C.diff colitis  - Continue PO vancomycin while on broad spectrum Abx      ===SKIN/MSK===  #Oral swelling   Noted post operative tongue swelling.  - Hydrocortisone 100 mg ---> 50 Q6h      Prophylaxis:  DVT: SCDs GI: None   Family: Not updated   Disposition: Critically ill  Code Status: Full    ===NEURO===  No active issues     #RLS  - Continue Ropinirole 0.75 mg qday     # Sedation  None      ===CARDIOVASCULAR===  # Shock   Likely septic from ongoing odontic infection / Neutropenic fevers (Calculated CI 5 , c/w desprutive shock).       - s/p 3L of NS , hold off further IVF (will also get 1 U of PRBC)  - Not needing levophed to keep MAP goal >65   - Goal Lactate <2   - Start Hydrocortisone 100 mg once ---> 50 mg Q6h + Fludrocortisone 1mcg qday   - Start Imipenem , Clindamycin (see ID)   - UA, sputum culture, fungal/yeast blood cultures - aerobic blood cultures drawn in the AM  - If procalcitonin >2 will start Thiamine + Vitamin C      #NSTEMI , Type II   #HLD   Likely demand in the setting of shock. Patient with no non obstructive CAD , Coronary CT done on 7/2016 (score 591). Last stress test 8/2016 with no ischemia     - Trend Troponin   - Last TTE 3/13 with normal EF --> consider repeat TTE in the AM   - Hold resuming atorvastatin for now (drug drug interaction with Voriconazole)      ===PULMONARY===  # Acute hypoxic respiratory failure  Bilateral interstitial infiltrates most concerning for budding ARDS vs Cardiogenic  pulmonary edema from fluid resuscitation vs infectious etiology (PJP pneumonia)     - Treat shock as above  - PJP less likely as patient has been hospitalized since chemotherapy      #NAGMA  Likely 2/2 saline      #Vent settings  N/A      ===GASTROINTESTINAL===  # Chemotherapy induced nausea  - Antiemetic PRN      #Elevated bilirubin   Likely 2/2 cholestasis in the setting of septic shock. ALT/AST normal  -Check RUQ US      # Nutrition:   Hold TPN overnight      ===RENAL===  # Acute kidney injury   Likely pre-renal in the setting of above   - Treat shock as above  - UA      #HypoCa  #HypoMg  Replace      ===HEME/ONC===  # Pancytopenia   Anemia from chemotherapy and BM suppression. Acute drop likely 2/2 OR blood loss as well as possible low grade DIC from ongoing infection.  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   - S/P Cytarabine and daunorubicin 3/15/19 BM Bx after with heavy burden disease 98%  S/p re induction decitabine/venetoclax 3/26 (4/3 day 9 of reinduction     - Continue Allupurinol   Serious adverse effects of chemotherapy:  - Cytarabine; pericarditis, asetptic meningitis, rhabdo,   - Daunorubicin; heart failure , ST changes  - Decitabine; edema, heart murmur , headaches, hyperbili , increase Alp   -VEnetoclax: hypo Ca , increase alp      #Coagulopathy   -Daily INR   - s/p 3 day vitamin K 4/3 - 4/5   - Keep fibrinogen >100      ===ENDOCRINE===  No active issues      ===INFECTIOUS DISEASE===  # Neutropenic Fever  # Mandibular Cellulitis s/p extraction of #18,19   Patient with extensive antimicrobial coverage as  Below. Holes in coverage include Filamentous organism (Nocardia can cause cellulitis in IC patients) , ESBL otherwise patient was covered for bacterial , mold/yeast infections.     Other than oral cavity no clear source of infection.      - Broaden to Imipenem  (cover Nocardia + ESBL)  - Continue Linezolid   - UA, sputum culture, fungal/yeast blood cultures - aerobic blood cultures drawn in the AM  - Check IgG levels   - Holding off Neupogen given AML      # Antimicrobials:  Clindamycin 4/3 -   Linezolid 4/3 -   Imipenem 4/3 -   Acyclovir  3/13 -   Voriconazole 3/28 -   Micafungin 4/3 -   Oral Vancomycin 3/26 -      Previous Abx:   Pip-Tazo 4/1 - 4/3   Cefepime 3/22 - 4/1   Levofloxacin 3/13 - 3/21   IV Vancomycin 3/31 - 4/3   ---  Micafungin 3/23 - 3/27  Fluconazole 3/13 - 3/23   ---  - PCP prophylaxis: None  - Serostatus: CMV neg, EBV pos, HSV pos  - Immunization status: Needs all pre BMT vaccinations   - Gamma globulin status: Not in records      #C.diff colitis  - Continue PO vancomycin while on broad spectrum Abx      ===SKIN/MSK===  #Oral swelling   Noted post operative tongue swelling.  - Hydrocortisone 100 mg ---> 50 Q6h      Prophylaxis:  DVT: SCDs GI: None   Family: Not updated   Disposition: Critically ill  Code Status: Full    Emiliano Hanson MD (PGY-1 Internal Medicine)  [facility]  Pager: [phone]  -----------------------------------------------------------------  History of Present Illness   El Ace is a 63 year old with AML s/p induction chemotherapy who is being transferred from hem/onc for septic shock.     Patient has been hospitalized since 3/12. Bone marrow biopsy positive for AML. He underwent 7+3 (cytarabine and daunorubicin) D1=3/15/19 and reinduction with decitabaine and venetoclax 3/29   Induction course notable for fever on 3/22 and was started on Cefepime , he was on fluconazole and transitioned to Micafungin then voriconazole. Source of fevers is felt to be 2/2 odontogenic source (mandibular cellulitis) s/p OMFS procedure. Today he became hypotensive , hypoxic not responsive to 1 L of IVF. Patient was transferred to the MICU for further evaluation. Off note patient also tested positive for C.dfif on 3/27 and has been on  "oral vancomycin.      Upon arrival to the unit patient was alert and awake. Vitals were , /64, spo2 92% on 4 LPM.      -----------------------------------------------------------------  Physical Exam   BP 94/74   Pulse 90   Temp 97.5  F (36.4  C) (Axillary)   Resp 22   Ht 1.778 m (5' 10\")   Wt 102.4 kg (225 lb 11.2 oz)   SpO2 97%   BMI 32.38 kg/m    General: not in distress, pleasant and cooperative, AAOx3, on oxygen mask  HEENT: NC/AT, pale Conjunctiva, anicteric sclera, EOMI; PERRL - oral / tongue swelling   Neck: Trachea midline; supple, good tone  Chest: anterior fields with bilateral expiratory wheezing   CV: tachycardic; nl S1/S2, no murmurs  Abd: Soft, distended, no HSM, (+) BS;   Ext: Warm/well perfused; no Edema.  Skin: Clear; unbroken; no new rashes  Neuro: - MS: AAO x3 - no focal deficit     Bedside US:  Hyperdynamic LV   Poor RV window     Resp: 22    -----------------------------------------------------------------  Additional Patient History  Review of Systems   The 10 point Review of Systems is negative other than noted in the HPI or here.    Past Medical History    I have reviewed this patient's medical history and updated it with pertinent information if needed.   Past Medical History:   Diagnosis Date     Acute leukemia (H) 3/12/2019       Past Surgical History   I have reviewed this patient's surgical history and updated it with pertinent information if needed.  Past Surgical History:   Procedure Laterality Date     PICC INSERTION Right 03/14/2019    5Fr - 42cm (2cm external), basilic vein, high SVC       Social History   Social History     Tobacco Use     Smoking status: Not on file   Substance Use Topics     Alcohol use: Not on file     Drug use: Not on file       Family History   I have reviewed this patient's family history and updated it with pertinent information if needed.   No family history on file.    Prior to Admission Medications     No current facility-administered " medications on file prior to encounter.   Current Outpatient Medications on File Prior to Encounter:  alfuzosin ER (UROXATRAL) 10 MG 24 hr tablet Take 10 mg by mouth daily   atorvastatin (LIPITOR) 40 MG tablet Take 40 mg by mouth daily   oxybutynin ER (DITROPAN-XL) 5 MG 24 hr tablet Take 5 mg by mouth daily   penicillin V (VEETID) 500 MG tablet Take 500 mg by mouth 3 times daily   rOPINIRole (REQUIP) 0.25 MG tablet Take 0.75 mg by mouth At Bedtime       Allergies    No Known Allergies    Data: Reviewed in Epic and commented on above.

## 2019-04-04 NOTE — PROGRESS NOTES
HEME MALIGNANCY ATTENDING ADDENDUM:  The patient has been seen and evaluated by me, and I have reviewed today's vital signs, medications, labs, and imaging results independently. I have discussed the patient and plan with the team, and agree with the findings and plan outlined in this note. Key aspects of my evaluation, impression, and plan are as follows.     Mr. Ace is a 63 year old man with newly diagnosed AML.   Started 7+3 - days #11 today, tolerated treatment well.  New neutropenic fever 3/22.      Now afebrile, nausea, still feeling poorly, tired, no appetite.      VS reviewed. No distress. No OP lesions. Heart regular. Lungs clear. Abd soft, no TTP. No LE edema. PICC looks OK.  Labs and imaging reviewed. WBC rising with 96% blasts      AML with normal cytogenetics and no mutations of FLT3. NGS with IDH2 and RUNX mutations.     Patient completed cytarabine and daunorubicin 7+3;  WBC is still elevated with >90% blasts.  Marrow completely effaced.      Start decitabine x 10days /Venclexta today. I spent 30 mnutes explaining the rationale and plan going forward. BMT consult this Friday. Also should include NK cell therapy possibility.    Continue Cefepim for neutropenic fevers.   Continue allopurinol. No TLS and DIC. Encourage eating and acitivity.   I discussed these developments with El and his wife today.    Rula Hutchins MD

## 2019-04-04 NOTE — PROGRESS NOTES
[facility], [place]  Heme/Onc Transfer Accept Note    Mr. Ace is transferring from MICU service back to Heme Malignancy service on 4/4 afternoon.     - daily progress note written by Heme/Onc Fellow, Dr. Stephens (pending) after MD rounds when pt was still in MICU  - pt stabilized and MAPs improved spontaneously, will transfer to our service at this time  - continue venetoclax with next dose 4/4 evening. Discussed with evening RPH.  - HOLD decitabine  - discussed with ID and reviewed their note:     Vfend level returned at 5.8. Will decrease dose to 150mg BID (from 200mg BID    Can stop micafungin given voriconazole level therapeutic    Discontinue clindamycin     Continue remainder of current antibiotics  - continue Vit C and Thiamine today, reassess need to continue this tomorrow  - plan to initiate TPN (not initiated due to change in status overnight 4/3-4/4)  - transfer to intermediate care when bed available    Vee Cabrera PA-C  Heme/Onc  [phone]

## 2019-04-04 NOTE — PROGRESS NOTES
CLINICAL NUTRITION SERVICES - BRIEF NOTE    Pt had teeth removed yesterday, decompensated and came to MICU after procedure. PN plan held d/t this change in pt's status. PN bag was made and is in pt's room at this time, spiked but not infusing.    INTERVENTIONS  Recommendations / Nutrition Prescription  Discussed PN plan with MD, PharmD, and RN. Plan to start this PN regimen (see RD note from 4/3 for provisions and reassessment details).    Implementation  Parenteral Nutrition/IV Fluids - Initiate      Svetlana Berg, CHELO, LD  (MICU dietitian, pgr- [id number])

## 2019-04-05 ENCOUNTER — APPOINTMENT (OUTPATIENT)
Dept: CARDIOLOGY | Facility: CLINIC | Age: 64
DRG: 834 | End: 2019-04-05
Attending: PHYSICIAN ASSISTANT
Payer: COMMERCIAL

## 2019-04-05 ENCOUNTER — APPOINTMENT (OUTPATIENT)
Dept: OCCUPATIONAL THERAPY | Facility: CLINIC | Age: 64
DRG: 834 | End: 2019-04-05
Payer: COMMERCIAL

## 2019-04-05 LAB
ALBUMIN SERPL-MCNC: 2 G/DL (ref 3.4–5)
ALP SERPL-CCNC: 75 U/L (ref 40–150)
ALT SERPL W P-5'-P-CCNC: 302 U/L (ref 0–70)
ANION GAP SERPL CALCULATED.3IONS-SCNC: 14 MMOL/L (ref 3–14)
ANISOCYTOSIS BLD QL SMEAR: SLIGHT
AST SERPL W P-5'-P-CCNC: 422 U/L (ref 0–45)
BACTERIA SPEC CULT: NO GROWTH
BACTERIA SPEC CULT: NO GROWTH
BASOPHILS # BLD AUTO: 0 10E9/L (ref 0–0.2)
BASOPHILS NFR BLD AUTO: 0 %
BILIRUB DIRECT SERPL-MCNC: 1.5 MG/DL (ref 0–0.2)
BILIRUB SERPL-MCNC: 1.8 MG/DL (ref 0.2–1.3)
BUN SERPL-MCNC: 34 MG/DL (ref 7–30)
CALCIUM SERPL-MCNC: 7.5 MG/DL (ref 8.5–10.1)
CHLORIDE SERPL-SCNC: 109 MMOL/L (ref 94–109)
CO2 SERPL-SCNC: 16 MMOL/L (ref 20–32)
CREAT SERPL-MCNC: 1.19 MG/DL (ref 0.66–1.25)
DIFFERENTIAL METHOD BLD: ABNORMAL
EOSINOPHIL # BLD AUTO: 0 10E9/L (ref 0–0.7)
EOSINOPHIL NFR BLD AUTO: 0 %
ERYTHROCYTE [DISTWIDTH] IN BLOOD BY AUTOMATED COUNT: 16.7 % (ref 10–15)
FIBRINOGEN PPP-MCNC: 596 MG/DL (ref 200–420)
GFR SERPL CREATININE-BSD FRML MDRD: 64 ML/MIN/{1.73_M2}
GLUCOSE SERPL-MCNC: 189 MG/DL (ref 70–99)
HCT VFR BLD AUTO: 24.8 % (ref 40–53)
HGB BLD-MCNC: 8.4 G/DL (ref 13.3–17.7)
INR PPP: 1.6 (ref 0.86–1.14)
INTERPRETATION ECG - MUSE: NORMAL
LDH SERPL L TO P-CCNC: 344 U/L (ref 85–227)
LYMPHOCYTES # BLD AUTO: 0.4 10E9/L (ref 0.8–5.3)
LYMPHOCYTES NFR BLD AUTO: 5.4 %
Lab: NORMAL
Lab: NORMAL
MAGNESIUM SERPL-MCNC: 2.8 MG/DL (ref 1.6–2.3)
MCH RBC QN AUTO: 31.8 PG (ref 26.5–33)
MCHC RBC AUTO-ENTMCNC: 33.9 G/DL (ref 31.5–36.5)
MCV RBC AUTO: 94 FL (ref 78–100)
MISCELLANEOUS TEST: NORMAL
MONOCYTES # BLD AUTO: 6 10E9/L (ref 0–1.3)
MONOCYTES NFR BLD AUTO: 89.2 %
NEUTROPHILS # BLD AUTO: 0.4 10E9/L (ref 1.6–8.3)
NEUTROPHILS NFR BLD AUTO: 5.4 %
PHOSPHATE SERPL-MCNC: 2.4 MG/DL (ref 2.5–4.5)
PLATELET # BLD AUTO: 34 10E9/L (ref 150–450)
PLATELET # BLD EST: ABNORMAL 10*3/UL
POIKILOCYTOSIS BLD QL SMEAR: ABNORMAL
POTASSIUM SERPL-SCNC: 3.2 MMOL/L (ref 3.4–5.3)
POTASSIUM SERPL-SCNC: 3.3 MMOL/L (ref 3.4–5.3)
PROT SERPL-MCNC: 5.6 G/DL (ref 6.8–8.8)
RBC # BLD AUTO: 2.64 10E12/L (ref 4.4–5.9)
RBC AGGLUT BLD QL: PRESENT
SODIUM SERPL-SCNC: 138 MMOL/L (ref 133–144)
SPECIMEN SOURCE: NORMAL
SPECIMEN SOURCE: NORMAL
WBC # BLD AUTO: 6.7 10E9/L (ref 4–11)

## 2019-04-05 PROCEDURE — 25000132 ZZH RX MED GY IP 250 OP 250 PS 637: Performed by: NURSE PRACTITIONER

## 2019-04-05 PROCEDURE — 25000128 H RX IP 250 OP 636: Performed by: PHYSICIAN ASSISTANT

## 2019-04-05 PROCEDURE — 85384 FIBRINOGEN ACTIVITY: CPT | Performed by: INTERNAL MEDICINE

## 2019-04-05 PROCEDURE — 25000128 H RX IP 250 OP 636: Performed by: STUDENT IN AN ORGANIZED HEALTH CARE EDUCATION/TRAINING PROGRAM

## 2019-04-05 PROCEDURE — 00000146 ZZHCL STATISTIC GLUCOSE BY METER IP

## 2019-04-05 PROCEDURE — 25000125 ZZHC RX 250: Performed by: PHYSICIAN ASSISTANT

## 2019-04-05 PROCEDURE — 40000264 ECHOCARDIOGRAM COMPLETE

## 2019-04-05 PROCEDURE — C9399 UNCLASSIFIED DRUGS OR BIOLOG: HCPCS | Performed by: STUDENT IN AN ORGANIZED HEALTH CARE EDUCATION/TRAINING PROGRAM

## 2019-04-05 PROCEDURE — 25000131 ZZH RX MED GY IP 250 OP 636 PS 637: Performed by: PHYSICIAN ASSISTANT

## 2019-04-05 PROCEDURE — 80048 BASIC METABOLIC PNL TOTAL CA: CPT | Performed by: INTERNAL MEDICINE

## 2019-04-05 PROCEDURE — 97535 SELF CARE MNGMENT TRAINING: CPT | Mod: GO

## 2019-04-05 PROCEDURE — 25500064 ZZH RX 255 OP 636: Performed by: INTERNAL MEDICINE

## 2019-04-05 PROCEDURE — 25000125 ZZHC RX 250: Performed by: STUDENT IN AN ORGANIZED HEALTH CARE EDUCATION/TRAINING PROGRAM

## 2019-04-05 PROCEDURE — 80076 HEPATIC FUNCTION PANEL: CPT | Performed by: INTERNAL MEDICINE

## 2019-04-05 PROCEDURE — 12000012 ZZH R&B MS OVERFLOW UMMC

## 2019-04-05 PROCEDURE — 81370 HLA I & II TYPING LR: CPT | Performed by: INTERNAL MEDICINE

## 2019-04-05 PROCEDURE — 93306 TTE W/DOPPLER COMPLETE: CPT | Mod: 26 | Performed by: INTERNAL MEDICINE

## 2019-04-05 PROCEDURE — 83735 ASSAY OF MAGNESIUM: CPT | Performed by: INTERNAL MEDICINE

## 2019-04-05 PROCEDURE — 25800030 ZZH RX IP 258 OP 636: Performed by: STUDENT IN AN ORGANIZED HEALTH CARE EDUCATION/TRAINING PROGRAM

## 2019-04-05 PROCEDURE — 25000125 ZZHC RX 250: Performed by: INTERNAL MEDICINE

## 2019-04-05 PROCEDURE — 85610 PROTHROMBIN TIME: CPT | Performed by: INTERNAL MEDICINE

## 2019-04-05 PROCEDURE — 25000128 H RX IP 250 OP 636: Performed by: NURSE PRACTITIONER

## 2019-04-05 PROCEDURE — 25000132 ZZH RX MED GY IP 250 OP 250 PS 637: Performed by: PHYSICIAN ASSISTANT

## 2019-04-05 PROCEDURE — 83615 LACTATE (LD) (LDH) ENZYME: CPT | Performed by: INTERNAL MEDICINE

## 2019-04-05 PROCEDURE — 99233 SBSQ HOSP IP/OBS HIGH 50: CPT | Performed by: INTERNAL MEDICINE

## 2019-04-05 PROCEDURE — 25000132 ZZH RX MED GY IP 250 OP 250 PS 637: Performed by: STUDENT IN AN ORGANIZED HEALTH CARE EDUCATION/TRAINING PROGRAM

## 2019-04-05 PROCEDURE — 40000275 ZZH STATISTIC RCP TIME EA 10 MIN

## 2019-04-05 PROCEDURE — 84100 ASSAY OF PHOSPHORUS: CPT | Performed by: INTERNAL MEDICINE

## 2019-04-05 PROCEDURE — 97165 OT EVAL LOW COMPLEX 30 MIN: CPT | Mod: GO

## 2019-04-05 PROCEDURE — 84132 ASSAY OF SERUM POTASSIUM: CPT | Performed by: INTERNAL MEDICINE

## 2019-04-05 PROCEDURE — 25800030 ZZH RX IP 258 OP 636: Performed by: PHYSICIAN ASSISTANT

## 2019-04-05 PROCEDURE — 94640 AIRWAY INHALATION TREATMENT: CPT

## 2019-04-05 PROCEDURE — 25000132 ZZH RX MED GY IP 250 OP 250 PS 637: Performed by: INTERNAL MEDICINE

## 2019-04-05 PROCEDURE — 84100 ASSAY OF PHOSPHORUS: CPT | Performed by: PHYSICIAN ASSISTANT

## 2019-04-05 PROCEDURE — 36592 COLLECT BLOOD FROM PICC: CPT | Performed by: INTERNAL MEDICINE

## 2019-04-05 PROCEDURE — 85025 COMPLETE CBC W/AUTO DIFF WBC: CPT | Performed by: INTERNAL MEDICINE

## 2019-04-05 PROCEDURE — 81376 HLA II TYPING 1 LOCUS LR: CPT | Performed by: INTERNAL MEDICINE

## 2019-04-05 PROCEDURE — 25000125 ZZHC RX 250

## 2019-04-05 RX ORDER — VORICONAZOLE 50 MG/1
150 TABLET, FILM COATED ORAL
Status: DISCONTINUED | OUTPATIENT
Start: 2019-04-05 | End: 2019-04-11

## 2019-04-05 RX ORDER — FUROSEMIDE 10 MG/ML
20 INJECTION INTRAMUSCULAR; INTRAVENOUS ONCE
Status: COMPLETED | OUTPATIENT
Start: 2019-04-05 | End: 2019-04-05

## 2019-04-05 RX ORDER — ALBUTEROL SULFATE 0.83 MG/ML
SOLUTION RESPIRATORY (INHALATION)
Status: COMPLETED
Start: 2019-04-05 | End: 2019-04-05

## 2019-04-05 RX ORDER — ALBUTEROL SULFATE 5 MG/ML
2.5 SOLUTION RESPIRATORY (INHALATION) EVERY 6 HOURS PRN
Status: DISCONTINUED | OUTPATIENT
Start: 2019-04-05 | End: 2019-04-14

## 2019-04-05 RX ADMIN — MICAFUNGIN SODIUM 100 MG: 10 INJECTION, POWDER, LYOPHILIZED, FOR SOLUTION INTRAVENOUS at 12:35

## 2019-04-05 RX ADMIN — PROCHLORPERAZINE MALEATE 10 MG: 5 TABLET, FILM COATED ORAL at 12:35

## 2019-04-05 RX ADMIN — ALLOPURINOL 300 MG: 300 TABLET ORAL at 08:58

## 2019-04-05 RX ADMIN — VANCOMYCIN HYDROCHLORIDE 125 MG: KIT at 21:07

## 2019-04-05 RX ADMIN — PROCHLORPERAZINE MALEATE 10 MG: 5 TABLET, FILM COATED ORAL at 23:39

## 2019-04-05 RX ADMIN — IMIPENEM AND CILASTATIN SODIUM 500 MG: 500; 500 INJECTION, POWDER, FOR SOLUTION INTRAVENOUS at 23:39

## 2019-04-05 RX ADMIN — ROPINIROLE HYDROCHLORIDE 0.75 MG: 0.5 TABLET, FILM COATED ORAL at 21:07

## 2019-04-05 RX ADMIN — THIAMINE HYDROCHLORIDE 200 MG: 100 INJECTION, SOLUTION INTRAMUSCULAR; INTRAVENOUS at 08:59

## 2019-04-05 RX ADMIN — POTASSIUM CHLORIDE 20 MEQ: 750 TABLET, EXTENDED RELEASE ORAL at 21:07

## 2019-04-05 RX ADMIN — ACYCLOVIR 400 MG: 400 TABLET ORAL at 08:59

## 2019-04-05 RX ADMIN — ACETAMINOPHEN 650 MG: 325 TABLET, FILM COATED ORAL at 23:41

## 2019-04-05 RX ADMIN — PROCHLORPERAZINE EDISYLATE 10 MG: 5 INJECTION INTRAMUSCULAR; INTRAVENOUS at 06:07

## 2019-04-05 RX ADMIN — ASCORBIC ACID 1500 MG: 500 INJECTION, SOLUTION INTRAMUSCULAR; INTRAVENOUS; SUBCUTANEOUS at 06:07

## 2019-04-05 RX ADMIN — ACYCLOVIR 400 MG: 400 TABLET ORAL at 21:07

## 2019-04-05 RX ADMIN — POTASSIUM PHOSPHATE, MONOBASIC AND POTASSIUM PHOSPHATE, DIBASIC 15 MMOL: 224; 236 INJECTION, SOLUTION INTRAVENOUS at 09:23

## 2019-04-05 RX ADMIN — PROCHLORPERAZINE EDISYLATE 10 MG: 5 INJECTION INTRAMUSCULAR; INTRAVENOUS at 00:30

## 2019-04-05 RX ADMIN — TAMSULOSIN HYDROCHLORIDE 0.4 MG: 0.4 CAPSULE ORAL at 08:59

## 2019-04-05 RX ADMIN — IMIPENEM AND CILASTATIN SODIUM 500 MG: 500; 500 INJECTION, POWDER, FOR SOLUTION INTRAVENOUS at 04:28

## 2019-04-05 RX ADMIN — IMIPENEM AND CILASTATIN SODIUM 500 MG: 500; 500 INJECTION, POWDER, FOR SOLUTION INTRAVENOUS at 10:12

## 2019-04-05 RX ADMIN — LINEZOLID 600 MG: 600 INJECTION, SOLUTION INTRAVENOUS at 09:10

## 2019-04-05 RX ADMIN — PANTOPRAZOLE SODIUM 40 MG: 40 TABLET, DELAYED RELEASE ORAL at 16:02

## 2019-04-05 RX ADMIN — ALBUTEROL SULFATE 2.5 MG: 2.5 SOLUTION RESPIRATORY (INHALATION) at 09:14

## 2019-04-05 RX ADMIN — VENETOCLAX 100 MG: 100 TABLET, FILM COATED ORAL at 18:15

## 2019-04-05 RX ADMIN — ONDANSETRON HYDROCHLORIDE 8 MG: 8 TABLET, FILM COATED ORAL at 16:02

## 2019-04-05 RX ADMIN — POTASSIUM CHLORIDE 20 MEQ: 29.8 INJECTION, SOLUTION INTRAVENOUS at 11:07

## 2019-04-05 RX ADMIN — VANCOMYCIN HYDROCHLORIDE 125 MG: KIT at 08:59

## 2019-04-05 RX ADMIN — PANTOPRAZOLE SODIUM 40 MG: 40 TABLET, DELAYED RELEASE ORAL at 08:58

## 2019-04-05 RX ADMIN — Medication 5 MG: at 09:31

## 2019-04-05 RX ADMIN — ONDANSETRON HYDROCHLORIDE 8 MG: 8 TABLET, FILM COATED ORAL at 23:39

## 2019-04-05 RX ADMIN — I.V. FAT EMULSION 250 ML: 20 EMULSION INTRAVENOUS at 21:07

## 2019-04-05 RX ADMIN — VANCOMYCIN HYDROCHLORIDE 125 MG: KIT at 12:35

## 2019-04-05 RX ADMIN — ALBUTEROL SULFATE 2.5 MG: 2.5 SOLUTION RESPIRATORY (INHALATION) at 16:14

## 2019-04-05 RX ADMIN — POTASSIUM CHLORIDE 40 MEQ: 750 TABLET, EXTENDED RELEASE ORAL at 18:34

## 2019-04-05 RX ADMIN — FUROSEMIDE 20 MG: 10 INJECTION, SOLUTION INTRAVENOUS at 17:29

## 2019-04-05 RX ADMIN — ONDANSETRON HYDROCHLORIDE 8 MG: 8 TABLET, FILM COATED ORAL at 08:58

## 2019-04-05 RX ADMIN — Medication 2 SPRAY: at 16:08

## 2019-04-05 RX ADMIN — HUMAN ALBUMIN MICROSPHERES AND PERFLUTREN 6 ML: 10; .22 INJECTION, SOLUTION INTRAVENOUS at 10:15

## 2019-04-05 RX ADMIN — PROCHLORPERAZINE MALEATE 10 MG: 5 TABLET, FILM COATED ORAL at 17:26

## 2019-04-05 RX ADMIN — ASCORBIC ACID 1500 MG: 500 INJECTION, SOLUTION INTRAMUSCULAR; INTRAVENOUS; SUBCUTANEOUS at 00:30

## 2019-04-05 RX ADMIN — FUROSEMIDE 20 MG: 10 INJECTION, SOLUTION INTRAVENOUS at 12:05

## 2019-04-05 RX ADMIN — VANCOMYCIN HYDROCHLORIDE 125 MG: KIT at 16:02

## 2019-04-05 RX ADMIN — POTASSIUM CHLORIDE 20 MEQ: 29.8 INJECTION, SOLUTION INTRAVENOUS at 06:41

## 2019-04-05 RX ADMIN — LINEZOLID 600 MG: 600 INJECTION, SOLUTION INTRAVENOUS at 21:07

## 2019-04-05 RX ADMIN — Medication 1 SPRAY: at 21:07

## 2019-04-05 RX ADMIN — IMIPENEM AND CILASTATIN SODIUM 500 MG: 500; 500 INJECTION, POWDER, FOR SOLUTION INTRAVENOUS at 16:05

## 2019-04-05 RX ADMIN — CALCIUM GLUCONATE: 98 INJECTION, SOLUTION INTRAVENOUS at 21:06

## 2019-04-05 ASSESSMENT — ACTIVITIES OF DAILY LIVING (ADL)
ADLS_ACUITY_SCORE: 17
PREVIOUS_RESPONSIBILITIES: MEAL PREP;HOUSEKEEPING;LAUNDRY;SHOPPING;YARDWORK;MEDICATION MANAGEMENT;FINANCES;DRIVING;WORK

## 2019-04-05 ASSESSMENT — PAIN DESCRIPTION - DESCRIPTORS: DESCRIPTORS: DISCOMFORT

## 2019-04-05 ASSESSMENT — MIFFLIN-ST. JEOR: SCORE: 1840.25

## 2019-04-05 NOTE — PROGRESS NOTES
[facility]  Transplant Infectious Disease Progress Note    Patient:  El Ace, Date of birth 1955, Medical record number [id number]  Date of Visit:  04/05/2019         Assessment and Recommendations:   Recommendations:  - Continue with imipenem and linezolid  - Agree to hold voriconazole until LFTs normalized  - Use micafungin 100 mg IV daily for prophylaxis until able to re start voriconazole  - Once LFTs normalizes, restart voriconazole at 150 mg PO BID  - If patient ok over the weekend, will likely transition to PO antibiotics on Monday  - Plan to treat 7-10 days from day of source was controlled (4/3) if patient does well in the next few days  - Continue with vancomycin PO for CDI - will plan to treat until a week after patient completes GN coverage.    Thank you for the consultation. Transplant ID will follow along.  Dr. Ugalde will be on for the weekend and I will return on Monday. Please call if any questions.    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     Infectious Disease issues include:  - Neutropenic fevers: Likely odontogenic source. CT c/a/p showed only colitis (patient with known CDI - see below) but CT dental showed a tiny periapical lucency involving the anterior most right mandibular molar and fat stranding with mild soft tissue thickening along the left hemimandible, most consistent with cellulitis. CT neck was negative. Patient had no neutrophils to form abscesses. OMFS removed source on 4/3. Bcx NGTD so far. UCx neg. Galactomannan and BDG neg. Patient had acute decompensation after molar extraction likely from transient bacteremia. Now improving.   Zosyn changed to Imipenem for concerns of nocardia infection/MDROs, although this is less likely will continue with Imipenem  for now. Will continue with linezolid as well.  Voriconazole level was supratherapeutic and patient started to have hallucinations and transaminitis. Will hold voriconazole until LFTs normalizes. Will use micafungin for prophylaxis instead.    - C Diff diarrhea: Improving. On vancomycin PO since 3/26. Will continue until a week after patient completes GN course.    - New dyspnea on exertion: Likely from volume overload. Patient has been afebrile and improving since odontogenic infection is under control. No signs of pulmonary infection at this point. If worsens, consider repeating CXR.     - PCP prophylaxis: None  - Serostatus: CMV neg, EBV pos, HSV pos  - Immunization status: Needs all pre BMT vaccinations   - Gamma globulin status: Not in records   - Isolation status:  Good hand hygiene. Enteric isolation for CDI     Attestation:  I have reviewed today's vital signs, medications, labs and imaging. I personally reviewed the imaging.    Noemi Garcia MD  Transplant Infectious Diseases   [phone]         Interval History:   Back to the BMT unit from ICU. Patient has been afebrile since the night of 4/3 after source was controlled. Feeling better, face/neck swelling is improving, pain is 100% better, able to communicate and no stridors or wheezes. He is having visual hallucinations which are not distressing to him. He can tell they are not real, but they are very vivid. He now is complaining of dyspnea on exertion and some dry cough, he has LE and UE swelling for all the fluids that were given to him on 4/3-4/4. No diarrhea. No new skin rashes. No n/v. No abdominal pain.     Transplants:  N/A    Review of Systems:   ROS: 10 point ROS neg other than the symptoms noted above in the interval history.       Current Facility-Administered Medications   Medication     acetaminophen (TYLENOL) tablet 650 mg     acyclovir (ZOVIRAX) tablet 400 mg     albuterol (PROVENTIL) neb solution 2.5 mg     allopurinol (ZYLOPRIM) tablet  300 mg     artificial saliva (BIOTENE MT) solution 1-2 spray     baclofen (LIORESAL) tablet 10 mg     calcium carbonate (TUMS) chewable tablet 500 mg     [Rx hold ] decitabine (DACOGEN) 43 mg in sodium chloride 0.9 % 119 mL CHEMOTHERAPY     dextrose 10 % 1,000 mL infusion     glucose gel 15-30 g    Or     dextrose 50 % injection 25-50 mL    Or     glucagon injection 1 mg     imipenem-cilastatin (PRIMAXIN) 500 mg vial to attach to  ml bag     lidocaine (XYLOCAINE) 2 % topical gel     linezolid (ZYVOX) infusion 600 mg     lipids (INTRALIPID) 20 % infusion 250 mL     LORazepam (ATIVAN) tablet 0.5-1 mg    Or     LORazepam (ATIVAN) injection 0.5-1 mg     magic mouthwash suspension (diphenhydramine, lidocaine, aluminum-magnesium & simethicone)     magnesium sulfate 2 g in NS intermittent infusion (PharMEDium or [employer] Cmpd)     magnesium sulfate 4 g in 100 mL sterile water (premade)     Medication Instruction     meperidine (DEMEROL) injection 25 mg     micafungin (MYCAMINE) 100 mg in sodium chloride 0.9 % 100 mL intermittent infusion     naloxone (NARCAN) injection 0.1-0.4 mg     OLANZapine zydis (zyPREXA) ODT tab 5 mg     ondansetron (ZOFRAN-ODT) ODT tab 4 mg    Or     ondansetron (ZOFRAN) injection 4 mg     ondansetron (ZOFRAN) tablet 8 mg     oxyCODONE (ROXICODONE) tablet 5 mg     oxymetazoline (AFRIN) 0.05 % spray 2 spray     pantoprazole (PROTONIX) EC tablet 40 mg     parenteral nutrition - ADULT compounded formula     parenteral nutrition - ADULT compounded formula     phytonadione (MEPHYTON/VITAMIN K) 1 MG/ML oral suspension 5 mg     polyethylene glycol (MIRALAX/GLYCOLAX) Packet 17 g     potassium chloride (KLOR-CON) Packet 20-40 mEq     potassium chloride 10 mEq in 100 mL intermittent infusion with 10 mg lidocaine     potassium chloride 10 mEq in 100 mL sterile water intermittent infusion (premix)     potassium chloride 20 mEq in 50 mL intermittent infusion     potassium chloride ER (K-DUR/KLOR-CON M) CR tablet  "20-40 mEq     potassium phosphate 10 mmol in D5W 250 mL intermittent infusion     potassium phosphate 15 mmol in D5W 250 mL intermittent infusion     potassium phosphate 20 mmol in D5W 250 mL intermittent infusion     potassium phosphate 20 mmol in D5W 500 mL intermittent infusion     potassium phosphate 25 mmol in D5W 500 mL intermittent infusion     pramox-pe-glycerin-petrolatum (PREPARATION H) cream     prochlorperazine (COMPAZINE) injection 5-10 mg    Or     prochlorperazine (COMPAZINE) tablet 5-10 mg     ranitidine (ZANTAC) tablet 150 mg     rOPINIRole (REQUIP) tablet 0.75 mg     sennosides (SENOKOT) tablet 8.6 mg     sodium chloride (OCEAN) 0.65 % nasal spray 1-2 spray     sodium chloride (PF) 0.9% PF flush 10 mL     sodium chloride (PF) 0.9% PF flush 10 mL     sodium chloride (PF) 0.9% PF flush 10-20 mL     sucralfate (CARAFATE) suspension 1 g     tamsulosin (FLOMAX) capsule 0.4 mg     traMADol (ULTRAM) tablet 50 mg     vancomycin (FIRVANQ) oral solution 125 mg     venetoclax (VENCLEXTA) tablet CHEMOTHERAPY 100 mg     voriconazole (VFEND) tablet 150 mg       No Known Allergies           Physical Exam:   Vitals were reviewed.  All vitals stable  /78 (BP Location: Right arm)   Pulse 95   Temp 96.8  F (36  C) (Axillary)   Resp 20   Ht 1.778 m (5' 10\")   Wt 104.4 kg (230 lb 2.6 oz)   SpO2 97%   BMI 33.02 kg/m      Exam:  GENERAL:  well-developed, well-nourished, ill appearing male alert, oriented, in no acute distress.  HEENT:  Head is normocephalic, atraumatic   EYES:  Eyes have anicteric sclerae.    ENT:  Oropharynx is moist without exudates or ulcers. Bilateral jaw edema - improving. Swollen inferior right gums.  No discharge.   NECK:  Supple but swollen - improved. No LAD  LUNGS:  Some crackles at bases. No wheezes  CARDIOVASCULAR:  Regular rate and rhythm with no murmurs, gallops or rubs.  ABDOMEN:  Normal bowel sounds, soft, nontender.  SKIN:  No acute rashes.  Line is in place without any " surrounding erythema.  LE: 2+ edema.   NEUROLOGIC:  Grossly nonfocal. Hallucinations but alert and oriented to self, place and year/month.          Laboratory Data:     Metabolic Studies    Recent Labs   Lab Test 04/05/19 0312 04/04/19 1526 04/04/19 0400 04/03/19 2204     --   --  136  --  135   POTASSIUM 3.3*  --   --  3.7  --  3.5   CHLORIDE 109  --   --  107  --  107   CO2 16*  --   --  17*  --  16*   ANIONGAP 14  --   --  12  --  12   BUN 34*  --   --  15  --  13   CR 1.19  --   --  1.34*  --  1.33*   GFRESTIMATED 64  --   --  55*  --  56*   *  --   --  139*  --  144*   DEBBIE 7.5*  --   --  7.4*  --  6.9*   PHOS 2.4*  --   --  3.4  --  2.3*   MAG 2.8*  --   --  2.4*  --  2.1   URIC  --   --   --  1.6*  --   --    LACT  --  3.2*   < >  --    < > 2.0    < > = values in this interval not displayed.       Hepatic Studies    Recent Labs   Lab Test 04/05/19 0312 04/04/19 0400 04/03/19 2204   BILITOTAL 1.8* 4.9* 4.2*   DBIL 1.5*  --   --    ALKPHOS 75 77 71   PROTTOTAL 5.6* 5.5* 5.3*   ALBUMIN 2.0* 2.1* 2.0*   * 143* 38   * 78* 32   * 341*  --        Hematology Studies     Recent Labs   Lab Test 04/05/19 0312 04/04/19 1526 04/04/19 0400 04/03/19 2204 04/03/19 2037   WBC 6.7  --   --  4.1 3.2* 3.2*   ABLA  --   --   --  0.4*  --   --    BLST  --   --   --  10.6  --   --    ANEU 0.4*  --   --  0.1*  --   --    ALYM 0.4*  --   --  0.4*  --   --    TRACIE 6.0*  --   --  3.1*  --   --    AEOS 0.0  --   --  0.0  --   --    HGB 8.4* 8.0*   < > 6.4* 6.0* 6.2*   HCT 24.8*  --   --  19.9* 18.5* 19.4*   PLT 34*  --   --  36* 36* 38*    < > = values in this interval not displayed.       Urine Studies     Recent Labs   Lab Test 04/04/19  0523 03/30/19  2206 03/22/19  0945   URINEPH 6.0 6.5 7.5*   NITRITE Negative Negative Negative   LEUKEST Negative Negative Negative   WBCU 4 <1 2       Microbiology:  Last 6 Culture results with specimen source  Culture Micro   Date Value Ref Range  Status   04/03/2019 No growth after 2 days  Preliminary   04/03/2019 No growth after 2 days  Preliminary   04/03/2019 Culture negative monitoring continues  Preliminary   04/03/2019 Culture negative monitoring continues  Preliminary   04/03/2019 Culture negative monitoring continues  Preliminary   04/03/2019 No growth after 2 days  Preliminary   04/03/2019 No growth after 2 days  Preliminary    Specimen Description   Date Value Ref Range Status   04/03/2019 Blood  Final   04/03/2019 Blood Left Hand  Final   04/03/2019 Nares  Final   04/03/2019 Other Transfusion Reaction Donor Unit  Final   04/03/2019 Other Transfusion Reaction Donor Unit  Final   04/03/2019 Other Transfusion Reaction Donor Unit  Final   04/03/2019 Other Transfusion Reaction Donor Unit  Final          Last check of C difficile  C Diff Toxin B PCR   Date Value Ref Range Status   03/26/2019 Positive (A) NEG^Negative Final     Comment:     Positive: Toxin producing Clostridium difficile target DNA sequences detected,   presumed positive for Clostridium difficile toxin B.  Clostridium difficile (Requires Enteric Isolation)  FDA approved assay performed using [employer] / [employer] GeneXpert real-time PCR.  Critical Value/Significant Value called to and read back by  JATINDER KAUR RN [id number] 3.26.19 ND         Virology:  CMV viral loads    Recent Labs   Lab Test 03/31/19  0626   CSPEC Plasma   CMVLOG Not Calculated       CMV viral loads    Log IU/mL of CMVQNT   Date Value Ref Range Status   03/31/2019 Not Calculated <2.1 [Log_IU]/mL Final       Hepatitis B Testing     Recent Labs   Lab Test 03/13/19  0553   AUSAB 15.70*   HEPBANG Nonreactive     Was the last Hepatitis B E antigen positive?   No results found for: HBEAGN     Hepatitis C Antibody   Date Value Ref Range Status   03/13/2019 Nonreactive NR^Nonreactive Final     Comment:     Assay performance characteristics have not been established for newborns,   infants, and children         Imaging:  Recent Results  (from the past 24 hour(s))   XR Chest Port 1 View    Narrative    EXAM: XR CHEST PORT 1 VW  4/4/2019 4:02 PM     HISTORY:  shortness of breath, dental procedure yesterday   ?62 yo  male with new diagnosis of AML found incidentally after partial root  canal intervention, s/p 7+3 induction on 3/15 and re induction with  decitabine/venetoclax 3/26 who presents with neutropenic fevers.     COMPARISON:  4/3/2019    FINDINGS: AP radiograph of the chest. Right PICC tip projects over the  mid SVC.    The trachea is midline. Low lung volumes. The mediastinal border,  cardiac silhouette, and pulmonary vasculature are within normal  limits. Decreased interstitial and bibasilar opacities. No  pneumothorax. No pleural effusion.      Impression    IMPRESSION: Decreased interstitial and bibasilar opacities.    I have personally reviewed the examination and initial interpretation  and I agree with the findings.    IRIS JALLOH MD        Abdominal US: 4/4/19:  1.  Cholelithiasis without evidence of cholecystitis.  2.  Simple hepatic cysts    CT neck 4/3/19:  1. Postoperative changes left mandibular molar extractions. Mild layering secretions of the hypopharynx. Deep cervical spaces are unremarkable.   2. Left submandibular soft tissue swelling concerning for soft tissue infection/cellulitis. No drainable fluid collection. Associated left predominant cervical lymphadenopathy, presumably reactive.  3. Mild unchanged pansinusitis.    CT dental: 3/31/19  1. Tiny periapical lucency involving the anterior most right mandibular molar.  2. Fat stranding and mild soft tissue thickening along the left hemimandible, most consistent with cellulitis. No discreet abscess.    CT c/a/p 3/29/19:  1. Thickened right hemicolon with adjacent fat stranding favored to be infectious given history of sepsis.  2. At least moderate three-vessel coronary artery calcifications

## 2019-04-05 NOTE — PROGRESS NOTES
"SPIRITUAL HEALTH SERVICES  SPIRITUAL ASSESSMENT Progress Note  [facility] 5C     REFERRAL SOURCE: Follow Up Visit    I met with El \"Randy\" Db and his son. Randy's son reports his dad is \"looking better\" and \"feeling better.\" Randy's  came to see him yesterday and at this time they have no other spiritual needs. I explained how Randy and his family could request a  visit if desired in the future.     PLAN: I will monitor spiritual needs through IDT and remain available to offer spiritual support as needed.    Debbie Grider  Oncology   Pager [phone]    "

## 2019-04-05 NOTE — PLAN OF CARE
PT5C: New Orders received for updated eval following pt's transfer to<>from ICU. Discussed with OT who saw pt for eval this am and reports pt much more fatigued and less tolerant for therapy (only able to perform static standing). Discussed this with pt who does report declined function since being in ICU. However, declines re-eval this pm 2/2 to fatigue despite PT encouragement. He is agreeable for tomorrow 4/6 as he feels tomorrow he will be moving/feeling better to allow for more accurate eval of abilities.

## 2019-04-05 NOTE — PROGRESS NOTES
04/05/19 1100   Quick Adds   Type of Visit Initial Occupational Therapy Evaluation   Living Environment   Lives With spouse   Living Arrangements house   Home Accessibility stairs to enter home;stairs within home   Number of Stairs, Main Entrance 2   Stair Railings, Main Entrance none   Number of Stairs, Within Home, Primary (7 up and 7 down)   Stair Railings, Within Home, Primary railing on left side (ascending)   Transportation Anticipated car, drives self   Living Environment Comment Pt lives in a split entry home with his spouse. He as a walk in shower with a built in shower chair and grab bars by the toilet   Self-Care   Usual Activity Tolerance good   Current Activity Tolerance poor   Regular Exercise Yes   Activity/Exercise Type strength training   Exercise Amount/Frequency 3-5 times/wk   Equipment Currently Used at Home grab bar, toilet   Activity/Exercise/Self-Care Comment pt was previously independent with ADL completion   Functional Level   Ambulation 0-->independent   Transferring 0-->independent   Toileting 0-->independent   Bathing 0-->independent   Dressing 0-->independent   Eating 0-->independent   Communication 0-->understands/communicates without difficulty   Cognition 0 - no cognition issues reported   Fall history within last six months no   Which of the above functional risks had a recent onset or change? 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     Precautions/Limitations immunosuppressed   Weight-Bearing Status - LUE full weight-bearing   Weight-Bearing Status - RUE full weight-bearing   Weight-Bearing Status - LLE full weight-bearing   Weight-Bearing Status - RLE full weight-bearing   Heart Disease Risk Factors Overweight   General Observations Pt is pleasant and agreeable to therapy   General Info Comments Activity: up ad shelley   Cognitive Status Examination   Orientation orientation to person, place and time   Level of Consciousness alert   Follows Commands (Cognition) WNL   Memory intact   Attention No deficits were identified   Organization/Problem Solving No deficits were identified   Executive Function No deficits were identified   Cognitive Comment Pt is alert, oriented and generally appropriate in conversation. Will continue to monitor   Visual Perception   Visual Perception Comments Pt stated he recently has been experiencing blurry vision due to one of his medications   Sensory Examination   Sensory Quick Adds No deficits were identified   Integumentary/Edema   Integumentary/Edema no deficits were identifed   Range of Motion (ROM)   ROM Comment BUE ROM WFL   Strength   Strength Comments Not formally assessed, per observation BUE grossly 5/5, however generalized weakness   Hand Strength   Hand Strength Comments Bilateral  strengthen WNL   Mobility   Bed Mobility Bed mobility skill: Sit to supine;Bed mobility skill: Supine to sit   Bed Mobility Skill: Sit to Supine   Level of [place]: Sit/Supine minimum assist (75% patients effort)   Physical Assist/Nonphysical Assist: Sit/Supine 1 person assist   Bed Mobility Skill: Supine to Sit   Level of [place]: Supine/Sit minimum assist (75% patients effort)   Physical Assist/Nonphysical Assist: Supine/Sit 1 person assist   Lower Body Dressing   Level of [place]: Dress Lower Body moderate assist (50% patients effort)   Physical Assist/Nonphysical Assist: Dress Lower Body 1 person assist  "  Instrumental Activities of Daily Living (IADL)   Previous Responsibilities meal prep;housekeeping;laundry;shopping;yardwork;medication management;finances;driving;work   IADL Comments Pt was previously working and independent with IADL completion   Activities of Daily Living Analysis   Impairments Contributing to Impaired Activities of Daily Living balance impaired;strength decreased   General Therapy Interventions   Planned Therapy Interventions ADL retraining;IADL retraining;ROM;strengthening;home program guidelines;progressive activity/exercise   Clinical Impression   Criteria for Skilled Therapeutic Interventions Met yes, treatment indicated   OT Diagnosis decreased activity tolerance and independence with ADLs   Influenced by the following impairments fatigue, deconditioning, weakness, SOB   Assessment of Occupational Performance 5 or more Performance Deficits   Identified Performance Deficits ADLs, IADLs, functional mobility   Clinical Decision Making (Complexity) Low complexity   Therapy Frequency other (see comments)  (6x per week)   Predicted Duration of Therapy Intervention (days/wks) 4/20/19   Anticipated Equipment Needs at Discharge other (see comments)  (TBD)   Anticipated Discharge Disposition Transitional Care Facility   Risks and Benefits of Treatment have been explained. Yes   Patient, Family & other staff in agreement with plan of care Yes   [employer] TM \"6 Clicks\"   2016, Trustees of [employer], under license to [employer].  All rights reserved.   6 Clicks Short Forms Daily Activity Inpatient Short Form   [employer] AM-PAC  \"6 Clicks\" Daily Activity Inpatient Short Form   1. Putting on and taking off regular lower body clothing? 2 - A Lot   2. Bathing (including washing, rinsing, drying)? 2 - A Lot   3. Toileting, which includes using toilet, bedpan or urinal? 2 - A Lot   4. Putting on and taking off regular upper body clothing? 3 - A Little   5. Taking care of " personal grooming such as brushing teeth? 3 - A Little   6. Eating meals? 4 - None   Daily Activity Raw Score (Score out of 24.Lower scores equate to lower levels of function) 16   Total Evaluation Time   Total Evaluation Time (Minutes) 5

## 2019-04-05 NOTE — PLAN OF CARE
OT 5C  Discharge Planner OT   Patient plan for discharge: Not stated this session  Current status: Eval complete, treatment indicated. Min A supine<>EOB with HOB slightly elevated. Mod A donning socks using figure 4 technique. Pt with increased SOB and fatigue with activity. Pt declining further activity at this time due to fatigue. RN made aware  Barriers to return to prior living situation: fatigue, weakness, deconditioning, acute medical needs  Recommendations for discharge: TCU due to limited tolerance for therapy at this time  Rationale for recommendations: Pt is below baseline and would benefit from continued skilled therapy to increase activity tolerance and independence with ADLs       Entered by: Shanthi Ryan 04/05/2019 12:02 PM

## 2019-04-05 NOTE — PROGRESS NOTES
"Attending note:    I have reviewed today's vital signs, medications, labs and imaging results. I have seen, evaluated and examined the patient independently and discussed the plan with the patient and team. The associated note has been read and corrected by me.  My independent findings and assessment are below.    Subjective:  He had some hallucinations overnight.   /78 (BP Location: Right arm)   Pulse 95   Temp 96.8  F (36  C) (Axillary)   Resp 20   Ht 1.778 m (5' 10\")   Wt 104.4 kg (230 lb 2.6 oz)   SpO2 97%   BMI 33.02 kg/m    General: pleasant man not in distress  Lungs: Clear to auscultation  Abdomen: Soft, non tender    Assessment and plan: 64 yo man with AML s/p 7+3 with refractory disease s/p re-induction with  decitabine and venetoclax, today 11. Decitabine was held on day 9 due to elevated bilirubin. Also hospital course complicated by C.dif diarrhea. Will transition to micafungin and discontinue voriconazole due to elevated transaminase. Will continue imipenem, linezolid and po vancomycin. Remainder of plan as per NP note.  Mike Lambert MD  Pager:[phone]  "

## 2019-04-05 NOTE — PLAN OF CARE
"/78 (BP Location: Right arm)   Pulse 95   Temp 96.8  F (36  C) (Axillary)   Resp 20   Ht 1.778 m (5' 10\")   Wt 104.4 kg (230 lb 2.6 oz)   SpO2 97%   BMI 33.02 kg/m       El is a 63 y.o. male dx with AML. VSS with soft BPS. Pt is on 2L NC. Dyspnea on exertion. Pt given albuterol neb X 1 due to audible wheezes. A&Ox3. Disoriented to time. Pt reported occasional visual hallucinations. Denies pain. Pt denies nausea but does have diarrhea. TPN running in purple line. Purple and grey caps changed. Right PIV in forearm C/D/I, saline locked. Potassium and Phos replaced. Thiamine discontinued. VFEND changed to micofungin. Pt is assist of one in room due to weakness. Bed and chair alarm need to be on due to pt confusion. Tolerated shower and had head and face shaved today. Ate medium amount of lunch. Continue with POC.   No Change  Adult Inpatient Plan of Care  Plan of Care Review  4/5/2019 1433 - No Change by Kym Mullins  Patient-Specific Goal (Individualization)  4/5/2019 0726 - No Change by Maryjane Back RN  Absence of Hospital-Acquired Illness or Injury  4/5/2019 1433 - No Change by Kym Mullins  4/5/2019 0726 - No Change by Maryjane Back RN  Optimal Comfort and Wellbeing  4/5/2019 1433 - No Change by Kym Mullins  4/5/2019 0726 - No Change by Maryjane Back RN  Readiness for Transition of Care  4/5/2019 0726 - No Change by Maryjane Back RN  Rounds/Family Conference  4/5/2019 0726 - No Change by Maryjane Back RN  Bleeding Risk or Actual  Absence of Bleeding  4/5/2019 1433 - No Change by Kym Mullins  4/5/2019 0726 - No Change by Maryjane Back RN  Anemia (Chemotherapy Effects)  Anemia Symptom Improvement  4/5/2019 1433 - No Change by Kym Mullins  4/5/2019 0726 - No Change by Maryjane Back, RN  Nausea and Vomiting (Chemotherapy Effects)  Fluid and Electrolyte Balance  4/5/2019 1433 - No Change by Kym Mullins  4/5/2019 0726 - No Change by Maryjane Back, " RN  Neutropenia (Chemotherapy Effects)  Absence of Infection  4/5/2019 1433 - No Change by Kym Mullins  4/5/2019 0726 - No Change by Maryjane Back RN  Oral Mucositis (Chemotherapy Effects)  Improved Oral Mucous Membrane Integrity  4/5/2019 1433 - No Change by Kym Mullins  4/5/2019 0726 - No Change by Maryjane Back RN  Thrombocytopenia Bleeding Risk (Chemotherapy Effects)  Absence of Bleeding  4/5/2019 1433 - No Change by Kym Mullins  4/5/2019 0726 - No Change by Maryjane Back RN  Hematological Disorder  Hematological Disorder  Description  Patient comorbidity will be monitored for signs and symptoms of Hematogical condition.  Problems will be absent, minimized or managed by discharge/transition of care.  4/5/2019 1433 - No Change by Kym Mullins  4/5/2019 0726 - No Change by Maryjane Back RN

## 2019-04-06 ENCOUNTER — APPOINTMENT (OUTPATIENT)
Dept: PHYSICAL THERAPY | Facility: CLINIC | Age: 64
DRG: 834 | End: 2019-04-06
Payer: COMMERCIAL

## 2019-04-06 LAB
ALBUMIN SERPL-MCNC: 1.8 G/DL (ref 3.4–5)
ALP SERPL-CCNC: 66 U/L (ref 40–150)
ALT SERPL W P-5'-P-CCNC: 185 U/L (ref 0–70)
ANION GAP SERPL CALCULATED.3IONS-SCNC: 8 MMOL/L (ref 3–14)
AST SERPL W P-5'-P-CCNC: 113 U/L (ref 0–45)
BACTERIA SPEC CULT: NO GROWTH
BASOPHILS # BLD AUTO: 0 10E9/L (ref 0–0.2)
BASOPHILS NFR BLD AUTO: 0 %
BILIRUB DIRECT SERPL-MCNC: 0.7 MG/DL (ref 0–0.2)
BILIRUB SERPL-MCNC: 0.9 MG/DL (ref 0.2–1.3)
BUN SERPL-MCNC: 35 MG/DL (ref 7–30)
CALCIUM SERPL-MCNC: 7.3 MG/DL (ref 8.5–10.1)
CHLORIDE SERPL-SCNC: 113 MMOL/L (ref 94–109)
CO2 SERPL-SCNC: 20 MMOL/L (ref 20–32)
CREAT SERPL-MCNC: 0.98 MG/DL (ref 0.66–1.25)
DIFFERENTIAL METHOD BLD: ABNORMAL
EOSINOPHIL # BLD AUTO: 0 10E9/L (ref 0–0.7)
EOSINOPHIL NFR BLD AUTO: 0 %
ERYTHROCYTE [DISTWIDTH] IN BLOOD BY AUTOMATED COUNT: 16.2 % (ref 10–15)
GFR SERPL CREATININE-BSD FRML MDRD: 81 ML/MIN/{1.73_M2}
GLUCOSE BLDC GLUCOMTR-MCNC: 148 MG/DL (ref 70–99)
GLUCOSE SERPL-MCNC: 134 MG/DL (ref 70–99)
HCT VFR BLD AUTO: 26.1 % (ref 40–53)
HGB BLD-MCNC: 8.8 G/DL (ref 13.3–17.7)
LYMPHOCYTES # BLD AUTO: 1.2 10E9/L (ref 0.8–5.3)
LYMPHOCYTES NFR BLD AUTO: 39.1 %
Lab: NORMAL
MAGNESIUM SERPL-MCNC: 2.2 MG/DL (ref 1.6–2.3)
MCH RBC QN AUTO: 31.4 PG (ref 26.5–33)
MCHC RBC AUTO-ENTMCNC: 33.7 G/DL (ref 31.5–36.5)
MCV RBC AUTO: 93 FL (ref 78–100)
MONOCYTES # BLD AUTO: 1.3 10E9/L (ref 0–1.3)
MONOCYTES NFR BLD AUTO: 42.7 %
NEUTROPHILS # BLD AUTO: 0.5 10E9/L (ref 1.6–8.3)
NEUTROPHILS NFR BLD AUTO: 18.2 %
NRBC # BLD AUTO: 0.1 10*3/UL
NRBC BLD AUTO-RTO: 3 /100
PHOSPHATE SERPL-MCNC: 3.3 MG/DL (ref 2.5–4.5)
PLATELET # BLD AUTO: 23 10E9/L (ref 150–450)
POTASSIUM SERPL-SCNC: 3.5 MMOL/L (ref 3.4–5.3)
POTASSIUM SERPL-SCNC: 3.8 MMOL/L (ref 3.4–5.3)
PROT SERPL-MCNC: 5 G/DL (ref 6.8–8.8)
RBC # BLD AUTO: 2.8 10E12/L (ref 4.4–5.9)
SODIUM SERPL-SCNC: 141 MMOL/L (ref 133–144)
SPECIMEN SOURCE: NORMAL
WBC # BLD AUTO: 3 10E9/L (ref 4–11)

## 2019-04-06 PROCEDURE — 94640 AIRWAY INHALATION TREATMENT: CPT

## 2019-04-06 PROCEDURE — 25000132 ZZH RX MED GY IP 250 OP 250 PS 637: Performed by: INTERNAL MEDICINE

## 2019-04-06 PROCEDURE — 80076 HEPATIC FUNCTION PANEL: CPT | Performed by: INTERNAL MEDICINE

## 2019-04-06 PROCEDURE — 25800030 ZZH RX IP 258 OP 636: Performed by: PHYSICIAN ASSISTANT

## 2019-04-06 PROCEDURE — 97164 PT RE-EVAL EST PLAN CARE: CPT | Mod: GP

## 2019-04-06 PROCEDURE — 25000128 H RX IP 250 OP 636: Performed by: STUDENT IN AN ORGANIZED HEALTH CARE EDUCATION/TRAINING PROGRAM

## 2019-04-06 PROCEDURE — 84100 ASSAY OF PHOSPHORUS: CPT | Performed by: INTERNAL MEDICINE

## 2019-04-06 PROCEDURE — 80048 BASIC METABOLIC PNL TOTAL CA: CPT | Performed by: INTERNAL MEDICINE

## 2019-04-06 PROCEDURE — C9399 UNCLASSIFIED DRUGS OR BIOLOG: HCPCS | Performed by: INTERNAL MEDICINE

## 2019-04-06 PROCEDURE — 25000132 ZZH RX MED GY IP 250 OP 250 PS 637: Performed by: NURSE PRACTITIONER

## 2019-04-06 PROCEDURE — 97116 GAIT TRAINING THERAPY: CPT | Mod: GP

## 2019-04-06 PROCEDURE — 99233 SBSQ HOSP IP/OBS HIGH 50: CPT | Mod: GC | Performed by: INTERNAL MEDICINE

## 2019-04-06 PROCEDURE — 85025 COMPLETE CBC W/AUTO DIFF WBC: CPT | Performed by: INTERNAL MEDICINE

## 2019-04-06 PROCEDURE — 25000131 ZZH RX MED GY IP 250 OP 636 PS 637: Performed by: PHYSICIAN ASSISTANT

## 2019-04-06 PROCEDURE — 25000125 ZZHC RX 250: Performed by: INTERNAL MEDICINE

## 2019-04-06 PROCEDURE — 94640 AIRWAY INHALATION TREATMENT: CPT | Mod: 76

## 2019-04-06 PROCEDURE — 25000132 ZZH RX MED GY IP 250 OP 250 PS 637: Performed by: PHYSICIAN ASSISTANT

## 2019-04-06 PROCEDURE — 12000012 ZZH R&B MS OVERFLOW UMMC

## 2019-04-06 PROCEDURE — 40000275 ZZH STATISTIC RCP TIME EA 10 MIN

## 2019-04-06 PROCEDURE — 83735 ASSAY OF MAGNESIUM: CPT | Performed by: INTERNAL MEDICINE

## 2019-04-06 PROCEDURE — 25000128 H RX IP 250 OP 636: Performed by: PHYSICIAN ASSISTANT

## 2019-04-06 PROCEDURE — 97530 THERAPEUTIC ACTIVITIES: CPT | Mod: GP

## 2019-04-06 PROCEDURE — 25000125 ZZHC RX 250: Performed by: STUDENT IN AN ORGANIZED HEALTH CARE EDUCATION/TRAINING PROGRAM

## 2019-04-06 RX ORDER — IPRATROPIUM BROMIDE AND ALBUTEROL SULFATE 2.5; .5 MG/3ML; MG/3ML
3 SOLUTION RESPIRATORY (INHALATION) 2 TIMES DAILY
Status: DISCONTINUED | OUTPATIENT
Start: 2019-04-06 | End: 2019-04-09

## 2019-04-06 RX ORDER — FUROSEMIDE 10 MG/ML
20 INJECTION INTRAMUSCULAR; INTRAVENOUS ONCE
Status: COMPLETED | OUTPATIENT
Start: 2019-04-06 | End: 2019-04-06

## 2019-04-06 RX ADMIN — TAMSULOSIN HYDROCHLORIDE 0.4 MG: 0.4 CAPSULE ORAL at 08:59

## 2019-04-06 RX ADMIN — ACYCLOVIR 400 MG: 400 TABLET ORAL at 08:59

## 2019-04-06 RX ADMIN — PANTOPRAZOLE SODIUM 40 MG: 40 TABLET, DELAYED RELEASE ORAL at 08:58

## 2019-04-06 RX ADMIN — PROCHLORPERAZINE MALEATE 10 MG: 5 TABLET, FILM COATED ORAL at 18:10

## 2019-04-06 RX ADMIN — ONDANSETRON HYDROCHLORIDE 8 MG: 8 TABLET, FILM COATED ORAL at 23:03

## 2019-04-06 RX ADMIN — PROCHLORPERAZINE MALEATE 10 MG: 5 TABLET, FILM COATED ORAL at 05:27

## 2019-04-06 RX ADMIN — ONDANSETRON HYDROCHLORIDE 8 MG: 8 TABLET, FILM COATED ORAL at 16:19

## 2019-04-06 RX ADMIN — VANCOMYCIN HYDROCHLORIDE 125 MG: KIT at 16:19

## 2019-04-06 RX ADMIN — PROCHLORPERAZINE MALEATE 10 MG: 5 TABLET, FILM COATED ORAL at 13:42

## 2019-04-06 RX ADMIN — ACETAMINOPHEN 650 MG: 325 TABLET, FILM COATED ORAL at 09:09

## 2019-04-06 RX ADMIN — ALLOPURINOL 300 MG: 300 TABLET ORAL at 08:59

## 2019-04-06 RX ADMIN — ACYCLOVIR 400 MG: 400 TABLET ORAL at 20:05

## 2019-04-06 RX ADMIN — VANCOMYCIN HYDROCHLORIDE 125 MG: KIT at 20:05

## 2019-04-06 RX ADMIN — FUROSEMIDE 20 MG: 10 INJECTION, SOLUTION INTRAVENOUS at 13:43

## 2019-04-06 RX ADMIN — IPRATROPIUM BROMIDE AND ALBUTEROL SULFATE 3 ML: .5; 3 SOLUTION RESPIRATORY (INHALATION) at 09:00

## 2019-04-06 RX ADMIN — CALCIUM GLUCONATE: 98 INJECTION, SOLUTION INTRAVENOUS at 20:05

## 2019-04-06 RX ADMIN — ACETAMINOPHEN 650 MG: 325 TABLET, FILM COATED ORAL at 13:51

## 2019-04-06 RX ADMIN — IMIPENEM AND CILASTATIN SODIUM 500 MG: 500; 500 INJECTION, POWDER, FOR SOLUTION INTRAVENOUS at 16:19

## 2019-04-06 RX ADMIN — LINEZOLID 600 MG: 600 INJECTION, SOLUTION INTRAVENOUS at 08:59

## 2019-04-06 RX ADMIN — IPRATROPIUM BROMIDE AND ALBUTEROL SULFATE 3 ML: .5; 3 SOLUTION RESPIRATORY (INHALATION) at 20:24

## 2019-04-06 RX ADMIN — IMIPENEM AND CILASTATIN SODIUM 500 MG: 500; 500 INJECTION, POWDER, FOR SOLUTION INTRAVENOUS at 10:25

## 2019-04-06 RX ADMIN — VANCOMYCIN HYDROCHLORIDE 125 MG: KIT at 08:59

## 2019-04-06 RX ADMIN — IMIPENEM AND CILASTATIN SODIUM 500 MG: 500; 500 INJECTION, POWDER, FOR SOLUTION INTRAVENOUS at 23:03

## 2019-04-06 RX ADMIN — MICAFUNGIN SODIUM 100 MG: 10 INJECTION, POWDER, LYOPHILIZED, FOR SOLUTION INTRAVENOUS at 10:25

## 2019-04-06 RX ADMIN — POTASSIUM CHLORIDE 20 MEQ: 29.8 INJECTION, SOLUTION INTRAVENOUS at 06:08

## 2019-04-06 RX ADMIN — VENETOCLAX 200 MG: 100 TABLET, FILM COATED ORAL at 18:14

## 2019-04-06 RX ADMIN — ROPINIROLE HYDROCHLORIDE 0.75 MG: 0.5 TABLET, FILM COATED ORAL at 23:05

## 2019-04-06 RX ADMIN — ONDANSETRON HYDROCHLORIDE 8 MG: 8 TABLET, FILM COATED ORAL at 08:59

## 2019-04-06 RX ADMIN — PANTOPRAZOLE SODIUM 40 MG: 40 TABLET, DELAYED RELEASE ORAL at 16:19

## 2019-04-06 RX ADMIN — IMIPENEM AND CILASTATIN SODIUM 500 MG: 500; 500 INJECTION, POWDER, FOR SOLUTION INTRAVENOUS at 05:27

## 2019-04-06 RX ADMIN — VANCOMYCIN HYDROCHLORIDE 125 MG: KIT at 13:43

## 2019-04-06 RX ADMIN — LINEZOLID 600 MG: 600 INJECTION, SOLUTION INTRAVENOUS at 20:05

## 2019-04-06 RX ADMIN — I.V. FAT EMULSION 250 ML: 20 EMULSION INTRAVENOUS at 20:05

## 2019-04-06 RX ADMIN — PROCHLORPERAZINE MALEATE 10 MG: 5 TABLET, FILM COATED ORAL at 23:03

## 2019-04-06 RX ADMIN — POTASSIUM CHLORIDE 20 MEQ: 29.8 INJECTION, SOLUTION INTRAVENOUS at 02:30

## 2019-04-06 ASSESSMENT — ACTIVITIES OF DAILY LIVING (ADL)
ADLS_ACUITY_SCORE: 17

## 2019-04-06 ASSESSMENT — MIFFLIN-ST. JEOR: SCORE: 1816.84

## 2019-04-06 NOTE — PLAN OF CARE
Afebrile, all other vital signs stable thus far. On 2L O2 via NC w/sats in the mid 90's. Disoriented to time. No complaints of dizziness or N/V. Some SOB/dyspnea on exertion noted. Complained of jaw/tooth pain- PRN Tylenol x1 given x/relief, see MAR. PRN Potassium being replaced throughout the night/this AM, see recheck results and MAR. TPN and lipids infusing. Bed alarm in place for safety. Patient using urinal at bedside. 1 loose, watery BM noted overnight. Appears to have slept well. No further complaints. Will continue to monitor.     Adult Inpatient Plan of Care  Plan of Care Review  4/6/2019 0606 - No Change by Bruna Villarreal, RN     Adult Inpatient Plan of Care  Optimal Comfort and Wellbeing  4/6/2019 0606 - No Change by Bruna Villarreal, RN     Adult Inpatient Plan of Care  Readiness for Transition of Care  4/6/2019 0606 - No Change by Bruna Villarreal, RN     Bleeding Risk or Actual  Absence of Bleeding  4/6/2019 0606 - No Change by Bruna Villarreal, RN     Nausea and Vomiting (Chemotherapy Effects)  Fluid and Electrolyte Balance  4/6/2019 0606 - No Change by Bruna Villarreal, RN     Neutropenia (Chemotherapy Effects)  Absence of Infection  4/6/2019 0606 - No Change by Bruna Villarreal, RN

## 2019-04-06 NOTE — PROGRESS NOTES
[facility], [place]    Hematology / Oncology Progress Note    Date of Admission: 3/12/2019    Date of Service (when I saw the patient): 04/06/2019     Assessment & Plan    Mr. Ace is a 63 year old male with new diagnosis of acute myeloid leukemia incidentally found during work up of nonspecific chest symptoms after partial root canal intervention. He was started on induction chemotherapy with 7+3 (cytarabine and daunorubicin) with D1=3/15/19. BMBx on 3/25 (done early due to rising WBC and blast counts) demonstrated persistent disease with 95% blasts by flow, 98% by morphology. Started re-induction with decitabine on 3/26 PM and added venetoclax on 3/29. His course has been complicated by neutropenic sepsis secondary to odontic/soft tissue infection. He had extraction of teeth #18 and 19 on 4/3 but subsequently developed septic shock requiring transfer to MICU team 4/3-4/4. Hypotension improved after fluid resuscitated and he did not require intubation or pressor support. He was transferred back to Heme Malignancy service on 4/4/19.     Changes today:  Increase Ventoclax to 200 mg dose (not on V fend, CYP interaction)  Continue Linezolid, Imipenem, PO Vancomycin, Micafungin  Hold Decitabine  1 dose of 20 mg Iv lasix  Consider stopping TPN tomorrow       HEME:  #AML, refractory. NGS positive for IDH2 and RUNX1 alterations.  Patient presented to [facility] ED in [place], [place] for complaints of nonspecific chest discomfort after partial root canal treatment (done 3/11/19). CT C/A/P was negative for PE or other acute findings. On OSH labs, his WBC was incidentally noted to be elevated at 71.1 with Hb 9.0 and plts 97K (prior labs had been unremarkable per patient). Smear was suspicious for immature blasts and acute leukemia. No symptoms of hyperviscosity, TLS or DIC on presentation. 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  Flow consistent with AML with 95% blasts with the following immunophenotype: Positive for CD13, CD33, CD34, CD38, dim to negative CD45, , partial HLA-DR. Baseline ECHO normal, PICC place.   - HLA typing sent, BMT consult done 3/29 by Dr. Brown  - s/p initial Hydrea (dc'd 3/16)   - received induction chemotherapy with 7+3 (cytarabine and daunorubicin). Day 1=3/15/19.   - repeat BMBx done early (3/25) due to rising WBC/blast count. Still with persistent heavy burden of disease (98% blasts by morphology).   - s/p Hydrea for WBC count management; started 1g BID (3/27), incresaed to 2g BID (x3/28). Hydrea discontinued 3/31.   - Started reinduction with decitabine/venetoclax. Decitabine D1=3/26; added Venetoclax on 3/29. Ramp up dosing accounting for concomitant voriconazole. Final dose will be 100mg daily. Today is Day 12 from start of reinduction.  Of note, decitabine D9 and D10 held 4/3 and 4/4, respectively, due to acute illness and elevated Tbili/ALT. Now improving LFT. Will consider completing the final two doses when LFTs further recover (<2UNL).   Continue Venetoclax at this time, now at 100 mg dose, as Voriconazole is off, will consider increasing to 200 mg today and then 400 mg until pt on micafungin,   - continue Allopurinol      #Pancytopenia  2/2 AML & associated chemotherapy.   - transfuse to maintain Hb >7, Plt >10K.      #Coagulopathy, in setting of refractory AML and likely exacerbated by poor PO intake. INR elevated but fibrinogen stable.  - plasma for INR >1.8, cryo for fibrinogen <100  - s/p Vit K x 3 days (4/3-4/5)  - started TPN (x4/4)     ID:  #Neutropenic fever/sepsis with septic shock (4/3-4/4)  #C.diff diarrhea  #Neutropenic colitis.  #Left mandible cellulitis/odontic infection. S/p extraction of teeth #18 and 19 on 4/3.   First fever on 3/22. Sources include c.diff colitis and odontic/soft tissue infection.   - ID following  - BCx 3/22 to present are NGTD  - 3/23 fungal BCx NGTD  - 3/22  UA negative, repeat UA/UCx negative on 3/30  - 3/22 CXR with small R pleural effusion, no focal airspace opacity  - 3/25 galactomannan and fungitell both negative  - 3/26 C.diff positive  - 3/29 CT C/A/P demonstrated Right thickened hemicolon with adjacent fat stranding  - 3/31 CT Dental demonstrated left hemimandible cellulitis  - Dental consulted for CT findings, unable to intervene as urgently as needed at this time due to acute worsening of clinic status. Thus, ultimately consulted Oral Surgery on 4/3; they performed extraction of teeth #18 and 19 on 4/3.   - pt has now been afebrile since 4/4 with significant improvement in his left jaw pain and swelling  - in MICU, started on stress dose steroids, Vit C, and thiamine. D/c'd steroids 4/4, discontinued Vit C and thiamine on 4/5.      **Anti-infectives:  - s/p Cefepime (x 3/22-4/1). Changed to Zosyn (x 4/1-4/3). With septic shock changed to clindamycin (4/3-4/4) and imipenem (x4/3-present)-plan for total 10 days per ID  - s/p PO Flagyl (x3/29-4/1).   - s/p IV vanc (x 3/31-4/3). Changed to IV Linezolid (x 4/3- present)-plan for total 10 days per ID   - PO Vancomycin 125mg four times daily. Plan to continue for an additional week after antibiotic therapy is completed.   - will continue to f/u with ID regarding plan for de-escalating IV antibiotics; see ID 4/5 note- ON Monday switch to PO antibiotics if afebrile over weekend    #ID PPx   - continue ACV  - prophy Voriconazole 200mg q12hr. Level on 4/4 was elevated. Pt also having visual hallucinations. Dose reduced voriconazole to 150mg q12hr (x 4/4 PM). Acute elevation in transaminases on 4/5 AM, so HOLDING Voriconazole today. Trend LFTs and add back Vfend when LFTs normalize. Cover with IV micafungin 100mg daily until can resume Vfend.         GI:  #Hyperbilirubinemia, improving.  #Transaminitis.  Acute elevation in Tbili (primarily direct) on 4/3 PM, improving by 4/5. Likely related to septic shock. As Tbili  improving, noted acute elevation in transaminases on 4/5. Again likely related to sequelae from shock, but Vfend could be contributing. No acute finding on 4/4 Abd US. Denies abdominal/RUQ pain.  - held decitabine chemo as above  - hold Vfend starting 4/5, restart when LFTs improve    #C.diff diarrhea   #Neutropenic colitis, ongoing loose stools  - c.diff treatment as above  - started TPN (x4/4). ADAT, but if ongoing loose stools/abdominal bloating, may need to back off to CLD or even NPO.      #Hemorrhoids, improved  - PRN management: TUCKS pads, sitz bath, topical Prep H and/or lidocaine    #Chemotherapy induced nausea, controlled  - scheduled Zofran q8hr  - compazine also scheduled q6hr  - PRN antiemetics     CV:  #Type 2 NSTEMI 2/2 demand ischemia/septic shock.   Troponin elevated, stable at 0.436-->0.405. Will not further trend. Repeat ECHO (4/5) demonstrates stable EF 55-60%. LV/RV size and function normal. New mild-mod mitral insufficiency. Trivial pericardial effusion. IVC dilated c/w overload.     #Hypervolemia. 2/2 aggressive fluid resuscitation. Weight up 25 lbs (since 3/31)  - will give Lasix 20mg IV x once this AM. Tolerated this well, so will repeat additional 20mg IV dose later today.     #Subclinical coronary atherosclerosis  #Hyperlipidemia  Total Agatston calcium score was elevated at 591 (91st percentile) on CT coronaries performed 6/8/2016. Nuclear stress test was negative for ischemia on 8/10/2016. Life style modification measures were recommended. Patient follows with cardiology as outpatient (last office visit on 11/12/18).  - holding atorvastatin at this time. Could potentially resume after completion of reinduction if LFTs remain WNL.    PULM:  #Acute hypoxic respiratory failure.   Most likely related to edema.   - PRN supplemental O2  - repeat ECHO as above  - diuresis PRN     RENAL:  #Lyte derangement.  #NAGMA.  Multifactorial with decreased PO intake, fevers, diarrhea. Now with slightly  worse NAGMA.  - lyte repletion per unit protocol (high scale)  - calcium repletion PRN  - d/w RPH, will increase bicarb in TPN (4/5)     #FOREST, improving  Baseline Cr 0.7 (GFR>90). Creatinine uptrended with Cr max 1.45 (GFR 50), now downtrending  - Multifactorial in setting of decreased PO intake, fevers, diarrhea, and medications (IV Vancomycin, Zosyn, contrast), and development of septic shock.      :  #BPH   Patient follows with urology ([facility], [place], [place]). On PTA Flomax 0.8 mg daily.  - of note, possible drug interaction between voriconazole and flomax (can raise flomax level in blood & cause hypotension).   - decreased flomax from 0.8mg to 0.4mg (soft BPs in setting of neutropenic sepsis and risk of further hypotension due to interaction with voriconazole). Continue 0.4mg dose at this time.       NEURO/MSK:  #Restless legs syndrome  - Continue ropinirole 0.75 mg at bedtime, offer PRN ativan for persistent symptoms.     #History of lumbar spine degenerative disc disease   Patient is s/p laminectomy/facetectomy procedures. He does not routinely take pain meds.     DENTAL:  #S/P root canal manipulation  #Odontic infection as above  Patient had the start of a root canal treatment initiated on 3/11/19 with plan to complete it a few weeks later (per pt's wife and son). He was started on a 7-day course of oral  mg q6h beginning one day prior to the procedure. At time of admission, he was having pain in his left jaw but site appeared unremarkable with no abscess or drainage.  In retrospect, may have also had some leukemic infiltration of gums complicating his course. Pain at site initially improved during chemotherapy, but did return. Pain worsened and with higher temps, checked CT Dental which demonstrated cellulitis along left hemimandible. Now with oral infection as above.  - Tylenol PRN, tramadol vs oxycodone PRN   - abx as above  - Dental consult as above  - OMFS consult, intervention on 4/3  as above     PSYCHOSOCIAL:  #Coping.  Appreciate SW and  involvement. Pt had requested help with Advance Directive, SW following     FEN   - no current MIVFs.   - continue IVFs, bolus PRN  - PRN lyte replacement per standing protocol     Prophylaxis  - No pharmacologic VTE ppx due to TCP  - PPI for GI/PUD ppx       Code Status: FULL     Disposition: Anticipate 4-6 week LOS pending completion of chemotherapy, count recovery, and resolution of acute toxicities.      Discussed with Dr. Lambert.     Vee Cabrera PA-C  Heme/Onc  [phone]        Interval History   Afebrile, all other vital signs stable thus far. On 2L O2 via NC w/sats in the mid 90's. Disoriented to time. No complaints of dizziness or N/V. Some SOB/dyspnea on exertion noted. Complained of jaw/tooth pain- PRN Tylenol x1 given x/relief, see MAR. PRN Potassium being replaced throughout the night/this AM, see recheck results and MAR. TPN and lipids infusing. Bed alarm in place for safety. Patient using urinal at bedside. 1 loose, watery BM noted overnight.          Physical Exam   Temp: 97.5  F (36.4  C) Temp src: Axillary BP: 104/74 Pulse: 99 Heart Rate: 81 Resp: 20 SpO2: 99 % O2 Device: Nasal cannula Oxygen Delivery: 2 LPM  Vitals:    04/03/19 0942 04/03/19 2145 04/05/19 1159   Weight: 99.8 kg (220 lb) 102.4 kg (225 lb 11.2 oz) 104.4 kg (230 lb 2.6 oz)     Vital Signs with Ranges  Temp:  [96.4  F (35.8  C)-98.5  F (36.9  C)] 97.5  F (36.4  C)  Pulse:  [92-99] 99  Heart Rate:  [81-90] 81  Resp:  [18-22] 20  BP: (104-122)/(74-80) 104/74  SpO2:  [97 %-99 %] 99 %  I/O last 3 completed shifts:  In: 3849.6 [P.O.:1200; I.V.:1300]  Out: 4650 [Urine:3950; Other:500; Stool:200]    Constitutional: Seen lying in bed, on 2L via oxymask. Pleasant, alert, interactive. Answering questions appropriately.   HEENT: NC/AT. Facial edema improved, particularly on left side.  Respiratory: work of breathing with O2 sat 89% was placed on 2L oxygen via oxymask. Lungs clear  anterolaterally without crackles. No wheezing.   CV: RRR. No murmur appreciated  GI: +BS. Abdomen is distended but soft. No tenderness to palpation, particularly over RUQ.   Skin: Warm. No concerning lesions or rash on exposed surfaces.   Musculoskeletal: Trace to mild edema of b/l LEs.   Neurologic: Alert and oriented. Grossly nonfocal.  Neuropsychiatric: Calm, mentation appears normal, answering questions appropriately. Describes visual hallucinations he has been having but acknowledges that he understands these are not actually present.   VAD: PICC line in RUE, c/d/i       Medications     IV fluid REPLACEMENT ONLY       - MEDICATION INSTRUCTIONS -       parenteral nutrition - ADULT compounded formula 50 mL/hr at 19       acyclovir  400 mg Oral BID     allopurinol  300 mg Oral Daily     artificial saliva  1-2 spray Swish & Spit 4x Daily     imipenem-cilastatin (PRIMAXIN) IV  500 mg Intravenous Q6H     linezolid  600 mg Intravenous Q12H     lipids  250 mL Intravenous Q24H     micafungin  100 mg Intravenous Q24H     ondansetron  8 mg Oral Q8H     pantoprazole  40 mg Oral BID AC     prochlorperazine  5-10 mg Intravenous Q6H    Or     prochlorperazine  5-10 mg Oral Q6H     rOPINIRole  0.75 mg Oral Daily at 8 pm     sodium chloride (PF)  10 mL Intravenous Once     sodium chloride (PF)  10 mL Intracatheter Q7 Days     tamsulosin  0.4 mg Oral Daily     vancomycin  125 mg Oral 4x Daily     venetoclax  100 mg Oral Daily with supper       Data   Results for orders placed or performed during the hospital encounter of 19 (from the past 24 hour(s))   Echocardiogram Complete    Narrative    [id number]  [id number]  [id number]  [id number]^SHABBIR^MORIAH^[facility],[place]  Echocardiography Laboratory  [address] [place] [id number]     Name: MATHEW ESPINO  MRN: [id number]  : 1955  Study Date: 2019 09:38 AM  Age: 63 yrs  Gender: Male  Patient  [facility]: [facility]  Reason For Study: Shock, Dyspnea  Ordering Physician: MORIAH SHEA  Referring Physician: SELF, REFERRED  Performed By: El Bateman RDCS     BSA: 2.2 m2  Height: 70 in  Weight: 225 lb  _____________________________________________________________________________  __        Procedure  Complete Portable Echo Adult. Contrast Optison. Optison (NDC #2081-2533-53)  given intravenously. Patient was given 6 ml mixture of 3 ml Optison and 6 ml  saline. 3 ml wasted.  _____________________________________________________________________________  __        Interpretation Summary  Global and regional left ventricular function is normal with an EF of 55-60%.  Biplane 56%.  The right ventricle is normal size. Global right ventricular function is  normal.  The inferior vena cava was dilated at 3.0 cm without respiratory variability,  consistent with increased right atrial pressure.  Estimated mean right atrial pressure is 15 mmHg (significantly elevated).  Mild to moderate mitral insufficiency is present (2 seperate jets).  Trivial pericardial effusion is present.     This study was compared with the study from 3/13/2019. IVC is dilated and RA  pressure has increased. Mitral insufficiency is new finding in this study.        _____________________________________________________________________________  __        Left Ventricle  Left ventricular size is normal. Left ventricular wall thickness is normal.  Global and regional left ventricular function is normal with an EF of 55-60%.  Left ventricular diastolic function is normal.     Right Ventricle  The right ventricle is normal size. Global right ventricular function is  normal.     Atria  The right atria appears normal. Severe left atrial enlargement is present.        Mitral Valve  Mild to moderate mitral insufficiency is present.     Aortic Valve  Aortic valve is normal in structure and function. The aortic valve is  tricuspid. Trace aortic insufficiency  is present.     Tricuspid Valve  The tricuspid valve is normal. Trace tricuspid insufficiency is present. The  right ventricular systolic pressure is approximated at 20.8 mmHg plus the  right atrial pressure. Pulmonary artery systolic pressure is normal.     Pulmonic Valve  The pulmonic valve is normal. Trace pulmonic insufficiency is present.     Vessels  The aorta root is normal. The inferior vena cava was dilated at 3.0 cm without  respiratory variability, consistent with increased right atrial pressure.  Estimated mean right atrial pressure is 15 mmHg (significantly elevated).     Pericardium  Trivial pericardial effusion is present.        Compared to Previous Study  This study was compared with the study from 3/13/2019 . IVC is dilated and  central venous pressure has increased. Mitral insufficiency is new finding in  this study.  _____________________________________________________________________________  __     MMode/2D Measurements & Calculations  asc Aorta Diam: 3.3 cm     EF(MOD-bp): 55.9 %  LA Volume Index (BP): 73.4 ml/m2  TAPSE: 1.6 cm        Doppler Measurements & Calculations  MV E max jenny: 79.0 cm/sec  MV A max jenny: 53.3 cm/sec  MV E/A: 1.5  MV dec slope: 529.5 cm/sec2  MV dec time: 0.15 sec  TV max P.8 mmHg     TR max jenny: 228.1 cm/sec  TR max P.8 mmHg  E/E' av.1  Lateral E/e': 5.9  Medial E/e': 10.2     _____________________________________________________________________________  __           Report approved by: Saray DUKES 2019 11:08 AM      Potassium   Result Value Ref Range    Potassium 3.2 (L) 3.4 - 5.3 mmol/L   Potassium   Result Value Ref Range    Potassium 3.5 3.4 - 5.3 mmol/L   Glucose by meter   Result Value Ref Range    Glucose 148 (H) 70 - 99 mg/dL   Hepatic panel   Result Value Ref Range    Bilirubin Direct 0.7 (H) 0.0 - 0.2 mg/dL    Bilirubin Total 0.9 0.2 - 1.3 mg/dL    Albumin 1.8 (L) 3.4 - 5.0 g/dL    Protein Total 5.0 (L) 6.8 - 8.8 g/dL    Alkaline  Phosphatase 66 40 - 150 U/L     (H) 0 - 70 U/L     (H) 0 - 45 U/L   CBC with platelets differential   Result Value Ref Range    WBC 3.0 (L) 4.0 - 11.0 10e9/L    RBC Count 2.80 (L) 4.4 - 5.9 10e12/L    Hemoglobin 8.8 (L) 13.3 - 17.7 g/dL    Hematocrit 26.1 (L) 40.0 - 53.0 %    MCV 93 78 - 100 fl    MCH 31.4 26.5 - 33.0 pg    MCHC 33.7 31.5 - 36.5 g/dL    RDW 16.2 (H) 10.0 - 15.0 %    Platelet Count 23 (LL) 150 - 450 10e9/L    Diff Method Manual Differential     % Neutrophils 18.2 %    % Lymphocytes 39.1 %    % Monocytes 42.7 %    % Eosinophils 0.0 %    % Basophils 0.0 %    Nucleated RBCs 3 (H) 0 /100    Absolute Neutrophil 0.5 (L) 1.6 - 8.3 10e9/L    Absolute Lymphocytes 1.2 0.8 - 5.3 10e9/L    Absolute Monocytes 1.3 0.0 - 1.3 10e9/L    Absolute Eosinophils 0.0 0.0 - 0.7 10e9/L    Absolute Basophils 0.0 0.0 - 0.2 10e9/L    Absolute Nucleated RBC 0.1    Basic metabolic panel   Result Value Ref Range    Sodium 141 133 - 144 mmol/L    Potassium 3.8 3.4 - 5.3 mmol/L    Chloride 113 (H) 94 - 109 mmol/L    Carbon Dioxide 20 20 - 32 mmol/L    Anion Gap 8 3 - 14 mmol/L    Glucose 134 (H) 70 - 99 mg/dL    Urea Nitrogen 35 (H) 7 - 30 mg/dL    Creatinine 0.98 0.66 - 1.25 mg/dL    GFR Estimate 81 >60 mL/min/[1.73_m2]    GFR Estimate If Black >90 >60 mL/min/[1.73_m2]    Calcium 7.3 (L) 8.5 - 10.1 mg/dL   Phosphorus   Result Value Ref Range    Phosphorus 3.3 2.5 - 4.5 mg/dL   Magnesium   Result Value Ref Range    Magnesium 2.2 1.6 - 2.3 mg/dL

## 2019-04-06 NOTE — PLAN OF CARE
Afebrile, VSS. Intermittently confused, disoriented to time. Veclexta oral chemo given. 20mg Lasix given for a total of 40mg lasix today, having good urine response to lasix. Has urinary urgency, incontinent of urine and stool multiple times. Pt is assist of 1, unsteady on feet, bed alarm on. Given 40mEq oral potassium for potassium level of 3.2, will need 20 mEq Potassium at 2030 and recheck at 0030.

## 2019-04-06 NOTE — PLAN OF CARE
Afebrile, VSS, weaned down to 1L O2. Continues to have dyspnea on exertion, given 20 mg IV Lasix to good urine output. Alert and oriented x4, reports no visual hallucinations. Left lower gums continue to be swollen, complaints of pain with chewing, given tylenol before meals. No nausea with eating, pt wondering if it is possible to stop TPN as he reports it decreases his appetite and desire to eat. Plan to increase ventoclax chemo dose to 200 mg tonight. Two loose bowel movements. Up with stand by assist, pt is more steady on his feet than previous day but requires assistance with IV lines. Continue plan of care.         Adult Inpatient Plan of Care  Plan of Care Review  4/6/2019 1709 - Improving by Debbie Chance RN     Nausea and Vomiting (Chemotherapy Effects)  Fluid and Electrolyte Balance  4/6/2019 1709 - Improving by Debbie Chanec RN     Neutropenia (Chemotherapy Effects)  Absence of Infection  4/6/2019 1709 - Improving by Debbie Chance, RN

## 2019-04-07 ENCOUNTER — APPOINTMENT (OUTPATIENT)
Dept: OCCUPATIONAL THERAPY | Facility: CLINIC | Age: 64
DRG: 834 | End: 2019-04-07
Payer: COMMERCIAL

## 2019-04-07 LAB
ALBUMIN SERPL-MCNC: 1.9 G/DL (ref 3.4–5)
ALP SERPL-CCNC: 76 U/L (ref 40–150)
ALT SERPL W P-5'-P-CCNC: 141 U/L (ref 0–70)
ANION GAP SERPL CALCULATED.3IONS-SCNC: 8 MMOL/L (ref 3–14)
ANISOCYTOSIS BLD QL SMEAR: SLIGHT
ASBT COMMENTS: NORMAL
ASBTTEST METHOD: NORMAL
AST SERPL W P-5'-P-CCNC: 56 U/L (ref 0–45)
BACTERIA SPEC CULT: NO GROWTH
BACTERIA SPEC CULT: NO GROWTH
BASOPHILS # BLD AUTO: 0 10E9/L (ref 0–0.2)
BASOPHILS NFR BLD AUTO: 0 %
BILIRUB DIRECT SERPL-MCNC: 0.6 MG/DL (ref 0–0.2)
BILIRUB SERPL-MCNC: 1.1 MG/DL (ref 0.2–1.3)
BUN SERPL-MCNC: 24 MG/DL (ref 7–30)
CALCIUM SERPL-MCNC: 7.3 MG/DL (ref 8.5–10.1)
CHLORIDE SERPL-SCNC: 108 MMOL/L (ref 94–109)
CO2 SERPL-SCNC: 24 MMOL/L (ref 20–32)
CREAT SERPL-MCNC: 0.68 MG/DL (ref 0.66–1.25)
DIFFERENTIAL METHOD BLD: ABNORMAL
DRSBT COMMENTS: NORMAL
DRSBTTEST METHOD: NORMAL
EOSINOPHIL # BLD AUTO: 0 10E9/L (ref 0–0.7)
EOSINOPHIL NFR BLD AUTO: 0 %
ERYTHROCYTE [DISTWIDTH] IN BLOOD BY AUTOMATED COUNT: 15.8 % (ref 10–15)
GFR SERPL CREATININE-BSD FRML MDRD: >90 ML/MIN/{1.73_M2}
GLUCOSE SERPL-MCNC: 139 MG/DL (ref 70–99)
HCT VFR BLD AUTO: 26.9 % (ref 40–53)
HGB BLD-MCNC: 8.9 G/DL (ref 13.3–17.7)
LYMPHOCYTES # BLD AUTO: 1.1 10E9/L (ref 0.8–5.3)
LYMPHOCYTES NFR BLD AUTO: 58.8 %
Lab: NORMAL
Lab: NORMAL
MCH RBC QN AUTO: 31.4 PG (ref 26.5–33)
MCHC RBC AUTO-ENTMCNC: 33.1 G/DL (ref 31.5–36.5)
MCV RBC AUTO: 95 FL (ref 78–100)
MONOCYTES # BLD AUTO: 0.2 10E9/L (ref 0–1.3)
MONOCYTES NFR BLD AUTO: 10.5 %
NEUTROPHILS # BLD AUTO: 0.6 10E9/L (ref 1.6–8.3)
NEUTROPHILS NFR BLD AUTO: 30.7 %
NRBC # BLD AUTO: 0 10*3/UL
NRBC BLD AUTO-RTO: 1 /100
PLATELET # BLD AUTO: 17 10E9/L (ref 150–450)
PLATELET # BLD EST: ABNORMAL 10*3/UL
POIKILOCYTOSIS BLD QL SMEAR: SLIGHT
POTASSIUM SERPL-SCNC: 3.9 MMOL/L (ref 3.4–5.3)
PROT SERPL-MCNC: 5.3 G/DL (ref 6.8–8.8)
RBC # BLD AUTO: 2.83 10E12/L (ref 4.4–5.9)
SBTA* LOCUS: NORMAL
SBTA* NMDP - FCIS: NORMAL
SBTA*: NORMAL
SBTB* LOCUS: NORMAL
SBTB*: NORMAL
SBTC* LOCUS: NORMAL
SBTC*: NORMAL
SBTDQB1*: NORMAL
SBTDQB1*LOCUS: NORMAL
SBTDRB1* LOCUS - FCIS: NORMAL
SBTDRB1*: NORMAL
SBTDRB4*LOCUS NMDP: NORMAL
SBTDRB4*LOCUS: NORMAL
SBTDRB5*LOCUS NMDP: NORMAL
SBTDRB5*LOCUS: NORMAL
SODIUM SERPL-SCNC: 140 MMOL/L (ref 133–144)
SPECIMEN SOURCE: NORMAL
SPECIMEN SOURCE: NORMAL
WBC # BLD AUTO: 1.8 10E9/L (ref 4–11)

## 2019-04-07 PROCEDURE — 25000132 ZZH RX MED GY IP 250 OP 250 PS 637: Performed by: INTERNAL MEDICINE

## 2019-04-07 PROCEDURE — 25000128 H RX IP 250 OP 636: Performed by: STUDENT IN AN ORGANIZED HEALTH CARE EDUCATION/TRAINING PROGRAM

## 2019-04-07 PROCEDURE — 25000132 ZZH RX MED GY IP 250 OP 250 PS 637: Performed by: STUDENT IN AN ORGANIZED HEALTH CARE EDUCATION/TRAINING PROGRAM

## 2019-04-07 PROCEDURE — 25000132 ZZH RX MED GY IP 250 OP 250 PS 637: Performed by: NURSE PRACTITIONER

## 2019-04-07 PROCEDURE — 80048 BASIC METABOLIC PNL TOTAL CA: CPT | Performed by: INTERNAL MEDICINE

## 2019-04-07 PROCEDURE — 97535 SELF CARE MNGMENT TRAINING: CPT | Mod: GO | Performed by: OCCUPATIONAL THERAPIST

## 2019-04-07 PROCEDURE — 25800030 ZZH RX IP 258 OP 636: Performed by: PHYSICIAN ASSISTANT

## 2019-04-07 PROCEDURE — C9399 UNCLASSIFIED DRUGS OR BIOLOG: HCPCS | Performed by: INTERNAL MEDICINE

## 2019-04-07 PROCEDURE — 25000131 ZZH RX MED GY IP 250 OP 636 PS 637: Performed by: PHYSICIAN ASSISTANT

## 2019-04-07 PROCEDURE — 99233 SBSQ HOSP IP/OBS HIGH 50: CPT | Mod: GC | Performed by: INTERNAL MEDICINE

## 2019-04-07 PROCEDURE — 85025 COMPLETE CBC W/AUTO DIFF WBC: CPT | Performed by: INTERNAL MEDICINE

## 2019-04-07 PROCEDURE — 80076 HEPATIC FUNCTION PANEL: CPT | Performed by: INTERNAL MEDICINE

## 2019-04-07 PROCEDURE — 25000128 H RX IP 250 OP 636: Performed by: PHYSICIAN ASSISTANT

## 2019-04-07 PROCEDURE — 25000132 ZZH RX MED GY IP 250 OP 250 PS 637: Performed by: PHYSICIAN ASSISTANT

## 2019-04-07 PROCEDURE — 12000012 ZZH R&B MS OVERFLOW UMMC

## 2019-04-07 RX ORDER — LINEZOLID 600 MG/1
600 TABLET, FILM COATED ORAL EVERY 12 HOURS SCHEDULED
Status: COMPLETED | OUTPATIENT
Start: 2019-04-07 | End: 2019-04-13

## 2019-04-07 RX ADMIN — ONDANSETRON HYDROCHLORIDE 8 MG: 8 TABLET, FILM COATED ORAL at 16:50

## 2019-04-07 RX ADMIN — VANCOMYCIN HYDROCHLORIDE 125 MG: KIT at 20:30

## 2019-04-07 RX ADMIN — TAMSULOSIN HYDROCHLORIDE 0.4 MG: 0.4 CAPSULE ORAL at 08:43

## 2019-04-07 RX ADMIN — VANCOMYCIN HYDROCHLORIDE 125 MG: KIT at 08:43

## 2019-04-07 RX ADMIN — LINEZOLID 600 MG: 600 TABLET, FILM COATED ORAL at 20:30

## 2019-04-07 RX ADMIN — IMIPENEM AND CILASTATIN SODIUM 500 MG: 500; 500 INJECTION, POWDER, FOR SOLUTION INTRAVENOUS at 23:05

## 2019-04-07 RX ADMIN — PANTOPRAZOLE SODIUM 40 MG: 40 TABLET, DELAYED RELEASE ORAL at 08:43

## 2019-04-07 RX ADMIN — POTASSIUM CHLORIDE 20 MEQ: 29.8 INJECTION, SOLUTION INTRAVENOUS at 06:47

## 2019-04-07 RX ADMIN — PROCHLORPERAZINE MALEATE 10 MG: 5 TABLET, FILM COATED ORAL at 23:05

## 2019-04-07 RX ADMIN — IMIPENEM AND CILASTATIN SODIUM 500 MG: 500; 500 INJECTION, POWDER, FOR SOLUTION INTRAVENOUS at 16:48

## 2019-04-07 RX ADMIN — PANTOPRAZOLE SODIUM 40 MG: 40 TABLET, DELAYED RELEASE ORAL at 16:49

## 2019-04-07 RX ADMIN — PROCHLORPERAZINE MALEATE 5 MG: 5 TABLET, FILM COATED ORAL at 11:32

## 2019-04-07 RX ADMIN — ONDANSETRON HYDROCHLORIDE 8 MG: 8 TABLET, FILM COATED ORAL at 08:43

## 2019-04-07 RX ADMIN — LINEZOLID 600 MG: 600 INJECTION, SOLUTION INTRAVENOUS at 08:41

## 2019-04-07 RX ADMIN — VANCOMYCIN HYDROCHLORIDE 125 MG: KIT at 16:49

## 2019-04-07 RX ADMIN — IMIPENEM AND CILASTATIN SODIUM 500 MG: 500; 500 INJECTION, POWDER, FOR SOLUTION INTRAVENOUS at 04:30

## 2019-04-07 RX ADMIN — ALLOPURINOL 300 MG: 300 TABLET ORAL at 08:43

## 2019-04-07 RX ADMIN — IMIPENEM AND CILASTATIN SODIUM 500 MG: 500; 500 INJECTION, POWDER, FOR SOLUTION INTRAVENOUS at 10:04

## 2019-04-07 RX ADMIN — ROPINIROLE HYDROCHLORIDE 0.75 MG: 0.5 TABLET, FILM COATED ORAL at 23:05

## 2019-04-07 RX ADMIN — ACYCLOVIR 400 MG: 400 TABLET ORAL at 20:30

## 2019-04-07 RX ADMIN — VENETOCLAX 200 MG: 100 TABLET, FILM COATED ORAL at 18:12

## 2019-04-07 RX ADMIN — VANCOMYCIN HYDROCHLORIDE 125 MG: KIT at 11:32

## 2019-04-07 RX ADMIN — ACYCLOVIR 400 MG: 400 TABLET ORAL at 08:43

## 2019-04-07 RX ADMIN — MICAFUNGIN SODIUM 100 MG: 10 INJECTION, POWDER, LYOPHILIZED, FOR SOLUTION INTRAVENOUS at 10:05

## 2019-04-07 RX ADMIN — PROCHLORPERAZINE MALEATE 10 MG: 5 TABLET, FILM COATED ORAL at 06:47

## 2019-04-07 RX ADMIN — ACETAMINOPHEN 650 MG: 325 TABLET, FILM COATED ORAL at 08:39

## 2019-04-07 RX ADMIN — ACETAMINOPHEN 650 MG: 325 TABLET, FILM COATED ORAL at 13:42

## 2019-04-07 RX ADMIN — ONDANSETRON HYDROCHLORIDE 8 MG: 8 TABLET, FILM COATED ORAL at 23:05

## 2019-04-07 ASSESSMENT — ACTIVITIES OF DAILY LIVING (ADL)
ADLS_ACUITY_SCORE: 17

## 2019-04-07 ASSESSMENT — MIFFLIN-ST. JEOR: SCORE: 1782.82

## 2019-04-07 ASSESSMENT — PAIN DESCRIPTION - DESCRIPTORS: DESCRIPTORS: DISCOMFORT

## 2019-04-07 NOTE — PROGRESS NOTES
[facility], [place]    Hematology / Oncology Progress Note    Date of Admission: 3/12/2019    Date of Service (when I saw the patient): 04/07/2019     Assessment & Plan    Mr. Ace is a 63 year old male with new diagnosis of acute myeloid leukemia incidentally found during work up of nonspecific chest symptoms after partial root canal intervention. He was started on induction chemotherapy with 7+3 (cytarabine and daunorubicin) with D1=3/15/19. BMBx on 3/25 (done early due to rising WBC and blast counts) demonstrated persistent disease with 95% blasts by flow, 98% by morphology. Started re-induction with decitabine on 3/26 PM and added venetoclax on 3/29. His course has been complicated by neutropenic sepsis secondary to odontic/soft tissue infection. He had extraction of teeth #18 and 19 on 4/3 but subsequently developed septic shock requiring transfer to MICU team 4/3-4/4. Hypotension improved after fluid resuscitated and he did not require intubation or pressor support. He was transferred back to [facility] Mal[facility] service on 4/4/19.     Changes today:  Continue Ventoclax to 200 mg dose (not on V fend, CYP interaction)  Continue Linezolid (iv to PO today), Imipenem, PO Vancomycin, Micafungin  Decitabine hold (until ALT<2ULN), today is 141 (normal UL 70)  Stopping TPN today   discontinue allopurinol      HEME:  #AML, refractory. NGS positive for IDH2 and RUNX1 alterations.  Patient presented to [facility] ED in [place], [place] for complaints of nonspecific chest discomfort after partial root canal treatment (done 3/11/19). CT C/A/P was negative for PE or other acute findings. On OSH labs, his WBC was incidentally noted to be elevated at 71.1 with Hb 9.0 and plts 97K (prior labs had been unremarkable per patient). Smear was suspicious for immature blasts and acute leukemia. No symptoms of hyperviscosity, TLS or DIC on presentation. Underwent BM biopsy (3/13) which confirmed acute  myeloid leukemia 95% cellularity with 95% blasts. Flow consistent with AML with 95% blasts with the following immunophenotype: Positive for CD13, CD33, CD34, CD38, dim to negative CD45, , partial HLA-DR. Baseline ECHO normal, PICC place.   - HLA typing sent, BMT consult done 3/29 by Dr. Brown  - s/p initial Hydrea (dc'd 3/16)   - received induction chemotherapy with 7+3 (cytarabine and daunorubicin). Day 1=3/15/19.   - repeat BMBx done early (3/25) due to rising WBC/blast count. Still with persistent heavy burden of disease (98% blasts by morphology).   - s/p Hydrea for WBC count management; started 1g BID (3/27), incresaed to 2g BID (x3/28). Hydrea discontinued 3/31.   - Started reinduction with decitabine/venetoclax. Decitabine D1=3/26; added Venetoclax on 3/29. Ramp up dosing accounting for concomitant voriconazole. Final dose will be 100mg daily. Today is Day 13 from start of reinduction.  Of note, decitabine D9 and D10 held 4/3 and 4/4, respectively, due to acute illness and elevated Tbili/ALT. Now improving LFT. Will consider completing the final two doses when LFTs further recover (<2UNL).   Continue Venetoclax at this time, initially at 100 mg dose, as Voriconazole is off, increased to 200 mg (on 4/6)-will probably stay at 200 mg as effect of CYP inhibition lasts for 1 week   - DC Allopurinol      #Pancytopenia  2/2 AML & associated chemotherapy.   - transfuse to maintain Hb >7, Plt >10K (s/p tooth excraction-minor bleeding but dried blood now)       #Coagulopathy, in setting of refractory AML and likely exacerbated by poor PO intake. INR elevated but fibrinogen stable.  - plasma for INR >1.8, cryo for fibrinogen <100  - s/p Vit K x 3 days (4/3-4/5)     ID:  #Neutropenic fever/sepsis with septic shock (4/3-4/4)  #C.diff diarrhea  #Neutropenic colitis.  #Left mandible cellulitis/odontic infection. S/p extraction of teeth #18 and 19 on 4/3.   First fever on 3/22. Sources include c.diff colitis and  odontic/soft tissue infection.   - ID following  - BCx 3/22 to present are NGTD  - 3/23 fungal BCx NGTD  - 3/22 UA negative, repeat UA/UCx negative on 3/30  - 3/22 CXR with small R pleural effusion, no focal airspace opacity  - 3/25 galactomannan and fungitell both negative  - 3/26 C.diff positive  - 3/29 CT C/A/P demonstrated Right thickened hemicolon with adjacent fat stranding  - 3/31 CT Dental demonstrated left hemimandible cellulitis  - Dental consulted for CT findings, unable to intervene as urgently as needed at this time due to acute worsening of clinic status. Thus, ultimately consulted Oral Surgery on 4/3; they performed extraction of teeth #18 and 19 on 4/3.   - pt has now been afebrile since 4/4 with significant improvement in his left jaw pain and swelling  - in MICU, started on stress dose steroids, Vit C, and thiamine. D/c'd steroids 4/4, discontinued Vit C and thiamine on 4/5.      **Anti-infectives:  - s/p Cefepime (x 3/22-4/1). Changed to Zosyn (x 4/1-4/3). With septic shock changed to clindamycin (4/3-4/4) and imipenem (x4/3-present)-plan for total 10 days per ID  - s/p PO Flagyl (x3/29-4/1).   - s/p IV vanc (x 3/31-4/3). Changed to IV Linezolid (x 4/3- present)-plan for total 10 days per ID , will convert to PO Linezolid today 4/7  - PO Vancomycin 125mg four times daily. Plan to continue for an additional week after antibiotic therapy is completed.   - will continue to f/u with ID regarding plan for de-escalating IV antibiotics; see ID 4/5 note- ON Monday switch to PO antibiotics if afebrile over weekend    #ID PPx   - continue ACV  - prophy Voriconazole 200mg q12hr. Level on 4/4 was elevated. Pt also having visual hallucinations. Dose reduced voriconazole to 150mg q12hr (x 4/4 PM). Acute elevation in transaminases on 4/5 AM, so HOLDING Voriconazole today. Trend LFTs and add back Vfend when LFTs normalize. Cover with IV micafungin 100mg daily until can resume Vfend.          GI:  #Hyperbilirubinemia, improving.  #Transaminitis.  Acute elevation in Tbili (primarily direct) on 4/3 PM, improving by 4/5. Likely related to septic shock. As Tbili improving, noted acute elevation in transaminases on 4/5. Again likely related to sequelae from shock, but Vfend could be contributing. No acute finding on 4/4 Abd US. Denies abdominal/RUQ pain.  - held decitabine chemo as above  - hold Vfend starting 4/5, restart when LFTs improve    #C.diff diarrhea   #Neutropenic colitis, ongoing loose stools  - c.diff treatment as above  - started TPN (x4/4). ADAT, but if ongoing loose stools/abdominal bloating, may need to back off to CLD or even NPO.      #Hemorrhoids, improved  - PRN management: TUCKS pads, sitz bath, topical Prep H and/or lidocaine    #Chemotherapy induced nausea, controlled  - scheduled Zofran q8hr  - compazine also scheduled q6hr  - PRN antiemetics     CV:  #Type 2 NSTEMI 2/2 demand ischemia/septic shock.   Troponin elevated, stable at 0.436-->0.405. Will not further trend. Repeat ECHO (4/5) demonstrates stable EF 55-60%. LV/RV size and function normal. New mild-mod mitral insufficiency. Trivial pericardial effusion. IVC dilated c/w overload.     #Hypervolemia. 2/2 aggressive fluid resuscitation. Weight up 25 lbs (since 3/31)  - will give Lasix 20mg IV x once this AM. Tolerated this well, so will repeat additional 20mg IV dose later today.     #Subclinical coronary atherosclerosis  #Hyperlipidemia  Total Agatston calcium score was elevated at 591 (91st percentile) on CT coronaries performed 6/8/2016. Nuclear stress test was negative for ischemia on 8/10/2016. Life style modification measures were recommended. Patient follows with cardiology as outpatient (last office visit on 11/12/18).  - holding atorvastatin at this time. Could potentially resume after completion of reinduction if LFTs remain WNL.    PULM:  #Acute hypoxic respiratory failure.   Most likely related to edema.   -  PRN supplemental O2  - repeat ECHO as above  - diuresis PRN     RENAL:  #Lyte derangement.  #NAGMA.  Multifactorial with decreased PO intake, fevers, diarrhea. Now with slightly worse NAGMA.  - lyte repletion per unit protocol (high scale)  - calcium repletion PRN  - d/w RPH, will increase bicarb in TPN (4/5)     #FOREST, improving  Baseline Cr 0.7 (GFR>90). Creatinine uptrended with Cr max 1.45 (GFR 50), now downtrending  - Multifactorial in setting of decreased PO intake, fevers, diarrhea, and medications (IV Vancomycin, Zosyn, contrast), and development of septic shock.      :  #BPH   Patient follows with urology ([facility], [place], [place]). On PTA Flomax 0.8 mg daily.  - of note, possible drug interaction between voriconazole and flomax (can raise flomax level in blood & cause hypotension).   - decreased flomax from 0.8mg to 0.4mg (soft BPs in setting of neutropenic sepsis and risk of further hypotension due to interaction with voriconazole). Continue 0.4mg dose at this time.       NEURO/MSK:  #Restless legs syndrome  - Continue ropinirole 0.75 mg at bedtime, offer PRN ativan for persistent symptoms.     #History of lumbar spine degenerative disc disease   Patient is s/p laminectomy/facetectomy procedures. He does not routinely take pain meds.     DENTAL:  #S/P root canal manipulation  #Odontic infection as above  Patient had the start of a root canal treatment initiated on 3/11/19 with plan to complete it a few weeks later (per pt's wife and son). He was started on a 7-day course of oral  mg q6h beginning one day prior to the procedure. At time of admission, he was having pain in his left jaw but site appeared unremarkable with no abscess or drainage.  In retrospect, may have also had some leukemic infiltration of gums complicating his course. Pain at site initially improved during chemotherapy, but did return. Pain worsened and with higher temps, checked CT Dental which demonstrated cellulitis  along left hemimandible. Now with oral infection as above.  - Tylenol PRN, tramadol vs oxycodone PRN   - abx as above  - Dental consult as above  - OMFS consult, intervention on 4/3 as above     PSYCHOSOCIAL:  #Coping.  Appreciate SW and  involvement. Pt had requested help with Advance Directive, SW following     FEN   - no current MIVFs.   - continue IVFs, bolus PRN  - PRN lyte replacement per standing protocol     Prophylaxis  - No pharmacologic VTE ppx due to TCP  - PPI for GI/PUD ppx       Code Status: FULL     Disposition: Anticipate 4-6 week LOS pending completion of chemotherapy, count recovery, and resolution of acute toxicities.      Discussed with Dr. Lambert.     Jesus Stephens  Heme/Onc  [phone]        Interval History   Afebrile, VSS, weaned down to 1L O2. Yesterday given 20 mg IV Lasix to good urine output. Alert and oriented x4. Left lower gums continue to be swollen, complaints of pain with chewing, relief with tylenol. No nausea with eating, Two loose bowel movements.             Physical Exam   Temp: 97.8  F (36.6  C) Temp src: Axillary BP: 120/86 Pulse: 72 Heart Rate: 96 Resp: 20 SpO2: 98 % O2 Device: Nasal cannula Oxygen Delivery: 1 LPM  Vitals:    04/03/19 2145 04/05/19 1159 04/06/19 0738   Weight: 102.4 kg (225 lb 11.2 oz) 104.4 kg (230 lb 2.6 oz) 102.1 kg (225 lb)     Vital Signs with Ranges  Temp:  [96.7  F (35.9  C)-97.8  F (36.6  C)] 97.8  F (36.6  C)  Pulse:  [72-87] 72  Heart Rate:  [66-99] 96  Resp:  [18-20] 20  BP: (108-138)/(78-96) 120/86  SpO2:  [98 %-99 %] 98 %  I/O last 3 completed shifts:  In: 3740.4 [P.O.:1200; I.V.:1120]  Out: 4975 [Urine:4975]    Constitutional: Seen lying in bed, on 2L via oxymask. Pleasant, alert, interactive. Answering questions appropriately.   HEENT: NC/AT. Facial edema improved, particularly on left side.  Respiratory: work of breathing with O2 sat 89% was placed on 2L oxygen via oxymask. Lungs clear anterolaterally without crackles. No wheezing.    CV: RRR. No murmur appreciated  GI: +BS. Abdomen is distended but soft. No tenderness to palpation, particularly over RUQ.   Skin: Warm. No concerning lesions or rash on exposed surfaces.   Musculoskeletal: Trace to mild edema of b/l LEs.   Neurologic: Alert and oriented. Grossly nonfocal.  Neuropsychiatric: Calm, mentation appears normal, answering questions appropriately. Describes visual hallucinations he has been having but acknowledges that he understands these are not actually present.   VAD: PICC line in RUE, c/d/i       Medications     IV fluid REPLACEMENT ONLY       - MEDICATION INSTRUCTIONS -       parenteral nutrition - ADULT compounded formula 50 mL/hr at 04/06/19 2005       acyclovir  400 mg Oral BID     allopurinol  300 mg Oral Daily     artificial saliva  1-2 spray Swish & Spit 4x Daily     imipenem-cilastatin (PRIMAXIN) IV  500 mg Intravenous Q6H     ipratropium - albuterol 0.5 mg/2.5 mg/3 mL  3 mL Nebulization BID     linezolid  600 mg Intravenous Q12H     lipids  250 mL Intravenous Q24H     micafungin  100 mg Intravenous Q24H     ondansetron  8 mg Oral Q8H     pantoprazole  40 mg Oral BID AC     prochlorperazine  5-10 mg Intravenous Q6H    Or     prochlorperazine  5-10 mg Oral Q6H     rOPINIRole  0.75 mg Oral Daily at 8 pm     sodium chloride (PF)  10 mL Intravenous Once     sodium chloride (PF)  10 mL Intracatheter Q7 Days     tamsulosin  0.4 mg Oral Daily     vancomycin  125 mg Oral 4x Daily     venetoclax  200 mg Oral Daily with supper       Data   Results for orders placed or performed during the hospital encounter of 03/12/19 (from the past 24 hour(s))   Hepatic panel   Result Value Ref Range    Bilirubin Direct 0.6 (H) 0.0 - 0.2 mg/dL    Bilirubin Total 1.1 0.2 - 1.3 mg/dL    Albumin 1.9 (L) 3.4 - 5.0 g/dL    Protein Total 5.3 (L) 6.8 - 8.8 g/dL    Alkaline Phosphatase 76 40 - 150 U/L     (H) 0 - 70 U/L    AST 56 (H) 0 - 45 U/L   CBC with platelets differential   Result Value Ref  Range    WBC 1.8 (L) 4.0 - 11.0 10e9/L    RBC Count 2.83 (L) 4.4 - 5.9 10e12/L    Hemoglobin 8.9 (L) 13.3 - 17.7 g/dL    Hematocrit 26.9 (L) 40.0 - 53.0 %    MCV 95 78 - 100 fl    MCH 31.4 26.5 - 33.0 pg    MCHC 33.1 31.5 - 36.5 g/dL    RDW 15.8 (H) 10.0 - 15.0 %    Platelet Count 17 (LL) 150 - 450 10e9/L    Diff Method Manual Differential     % Neutrophils 30.7 %    % Lymphocytes 58.8 %    % Monocytes 10.5 %    % Eosinophils 0.0 %    % Basophils 0.0 %    Nucleated RBCs 1 (H) 0 /100    Absolute Neutrophil 0.6 (L) 1.6 - 8.3 10e9/L    Absolute Lymphocytes 1.1 0.8 - 5.3 10e9/L    Absolute Monocytes 0.2 0.0 - 1.3 10e9/L    Absolute Eosinophils 0.0 0.0 - 0.7 10e9/L    Absolute Basophils 0.0 0.0 - 0.2 10e9/L    Absolute Nucleated RBC 0.0     Anisocytosis Slight     Poikilocytosis Slight     Platelet Estimate Confirming automated cell count    Basic metabolic panel   Result Value Ref Range    Sodium 140 133 - 144 mmol/L    Potassium 3.9 3.4 - 5.3 mmol/L    Chloride 108 94 - 109 mmol/L    Carbon Dioxide 24 20 - 32 mmol/L    Anion Gap 8 3 - 14 mmol/L    Glucose 139 (H) 70 - 99 mg/dL    Urea Nitrogen 24 7 - 30 mg/dL    Creatinine 0.68 0.66 - 1.25 mg/dL    GFR Estimate >90 >60 mL/min/[1.73_m2]    GFR Estimate If Black >90 >60 mL/min/[1.73_m2]    Calcium 7.3 (L) 8.5 - 10.1 mg/dL

## 2019-04-07 NOTE — PLAN OF CARE
Afebrile, VSS on room air with O2 saturation in upper 90%. Dyspnea with exertion. Pt more steady on feet, up walking in hallway x2. Calling out appropriately for help. Tylenol x2 for pain in left jaw related to teeth extraction. Linezolid switched to PO, stopped TPN, and discontinued allopurinol. Continue plan of care.           Problem: Adult Inpatient Plan of Care  Goal: Plan of Care Review  4/7/2019 1820 by Debbie Chance RN  Outcome: No Change     Problem: Adult Inpatient Plan of Care  Goal: Rounds/Family Conference  Outcome: No Change     Problem: Anemia (Chemotherapy Effects)  Goal: Anemia Symptom Improvement  Outcome: No Change

## 2019-04-07 NOTE — PLAN OF CARE
Discharge Planner PT   Patient plan for discharge: home with wife  Current status: PT Re-eval completed with pt after change in medical status and stay in ICU, see MD notes. Pt transfers with min A sit<>stand and requires assist for stability. Pt ambulates 300' with heavy min A and frequent lateral deviations, mild to moderate LOBs requiring mod A for recovery at times. While walking pt expresses minimal concern for unsteadiness, but when agreeable to trial 4 wh walker with PT does report feels better using walker at this time. Pt ambulates 150' min A with 4ww and some deviations but not overt LOBs. Pt quite fatigued after gait and room management, seated in chair with all needs in reach, chair alarm on and eating breakfast.   Barriers to return to prior living situation: functional safety, medical status  Recommendations for discharge: home with PT vs TCU depending on course during stay  Rationale for recommendations: based on functional evaluation today, will continue to assess       Entered by: Rosemary Dumas 04/07/2019 6:35 PM

## 2019-04-07 NOTE — PLAN OF CARE
Discharge Planner OT   Patient plan for discharge: not stated  Current status: OT educated pt on PLT/HGB/WBC levels and implications for ADLS/therex. OT provided pt w/ handout   Barriers to return to prior living situation: medical status  Recommendations for discharge: TCU  Rationale for recommendations: to increase ind in ADLS/IADLS       Entered by: Sue Gallagher 04/07/2019 11:20 AM

## 2019-04-07 NOTE — PLAN OF CARE
Afebrile, all other vital signs stable thus far. On 1L O2 via NC, sats in the mid to upper 90's- more so being used for comfort. TPN and lipids infusing, see MAR. Using urinal at bedside- voiding adequately. No BM's noted overnight thus far. Needing 20 mEq of Potassium replaced this AM. Will continue to monitor closely.     Problem: Adult Inpatient Plan of Care  Goal: Plan of Care Review  4/7/2019 0606 by Bruna Villarreal, RN  Outcome: No Change     Problem: Adult Inpatient Plan of Care  Goal: Readiness for Transition of Care  Outcome: No Change     Problem: Bleeding Risk or Actual  Goal: Absence of Bleeding  Outcome: No Change     Problem: Nausea and Vomiting (Chemotherapy Effects)  Goal: Fluid and Electrolyte Balance  4/7/2019 0606 by Bruna Villarreal, RN  Outcome: No Change     Problem: Neutropenia (Chemotherapy Effects)  Goal: Absence of Infection  4/7/2019 0606 by Bruna Villarreal, RN  Outcome: No Change     Problem: Oral Mucositis (Chemotherapy Effects)  Goal: Improved Oral Mucous Membrane Integrity  Outcome: No Change

## 2019-04-08 ENCOUNTER — APPOINTMENT (OUTPATIENT)
Dept: PHYSICAL THERAPY | Facility: CLINIC | Age: 64
DRG: 834 | End: 2019-04-08
Payer: COMMERCIAL

## 2019-04-08 LAB
ALBUMIN SERPL-MCNC: 2.1 G/DL (ref 3.4–5)
ALP SERPL-CCNC: 85 U/L (ref 40–150)
ALT SERPL W P-5'-P-CCNC: 117 U/L (ref 0–70)
ANION GAP SERPL CALCULATED.3IONS-SCNC: 5 MMOL/L (ref 3–14)
AST SERPL W P-5'-P-CCNC: 42 U/L (ref 0–45)
BACTERIA SPEC CULT: NO GROWTH
BASOPHILS # BLD AUTO: 0 10E9/L (ref 0–0.2)
BASOPHILS NFR BLD AUTO: 0 %
BILIRUB DIRECT SERPL-MCNC: 0.8 MG/DL (ref 0–0.2)
BILIRUB SERPL-MCNC: 1.8 MG/DL (ref 0.2–1.3)
BUN SERPL-MCNC: 19 MG/DL (ref 7–30)
CALCIUM SERPL-MCNC: 7.6 MG/DL (ref 8.5–10.1)
CHLORIDE SERPL-SCNC: 107 MMOL/L (ref 94–109)
CO2 SERPL-SCNC: 26 MMOL/L (ref 20–32)
CREAT SERPL-MCNC: 0.67 MG/DL (ref 0.66–1.25)
DIFFERENTIAL METHOD BLD: ABNORMAL
EOSINOPHIL # BLD AUTO: 0 10E9/L (ref 0–0.7)
EOSINOPHIL NFR BLD AUTO: 0 %
ERYTHROCYTE [DISTWIDTH] IN BLOOD BY AUTOMATED COUNT: 15.4 % (ref 10–15)
FIBRINOGEN PPP-MCNC: 442 MG/DL (ref 200–420)
GFR SERPL CREATININE-BSD FRML MDRD: >90 ML/MIN/{1.73_M2}
GLUCOSE SERPL-MCNC: 97 MG/DL (ref 70–99)
HCT VFR BLD AUTO: 27.3 % (ref 40–53)
HGB BLD-MCNC: 8.7 G/DL (ref 13.3–17.7)
INR PPP: 1.22 (ref 0.86–1.14)
LDH SERPL L TO P-CCNC: 215 U/L (ref 85–227)
LYMPHOCYTES # BLD AUTO: 1 10E9/L (ref 0.8–5.3)
LYMPHOCYTES NFR BLD AUTO: 60 %
Lab: NORMAL
Lab: NORMAL
MAGNESIUM SERPL-MCNC: 2.1 MG/DL (ref 1.6–2.3)
MCH RBC QN AUTO: 30.7 PG (ref 26.5–33)
MCHC RBC AUTO-ENTMCNC: 31.9 G/DL (ref 31.5–36.5)
MCV RBC AUTO: 97 FL (ref 78–100)
MONOCYTES # BLD AUTO: 0.1 10E9/L (ref 0–1.3)
MONOCYTES NFR BLD AUTO: 5 %
NEUTROPHILS # BLD AUTO: 0.6 10E9/L (ref 1.6–8.3)
NEUTROPHILS NFR BLD AUTO: 35 %
PHOSPHATE SERPL-MCNC: 3.2 MG/DL (ref 2.5–4.5)
PLATELET # BLD AUTO: 12 10E9/L (ref 150–450)
POTASSIUM SERPL-SCNC: 4 MMOL/L (ref 3.4–5.3)
PROT SERPL-MCNC: 5.3 G/DL (ref 6.8–8.8)
RBC # BLD AUTO: 2.83 10E12/L (ref 4.4–5.9)
RBC MORPH BLD: NORMAL
SODIUM SERPL-SCNC: 139 MMOL/L (ref 133–144)
SPECIMEN SOURCE: NORMAL
TRIGL SERPL-MCNC: 203 MG/DL
WBC # BLD AUTO: 1.7 10E9/L (ref 4–11)

## 2019-04-08 PROCEDURE — 25800030 ZZH RX IP 258 OP 636: Performed by: INTERNAL MEDICINE

## 2019-04-08 PROCEDURE — 85610 PROTHROMBIN TIME: CPT | Performed by: INTERNAL MEDICINE

## 2019-04-08 PROCEDURE — 97110 THERAPEUTIC EXERCISES: CPT | Mod: GP | Performed by: REHABILITATION PRACTITIONER

## 2019-04-08 PROCEDURE — 84478 ASSAY OF TRIGLYCERIDES: CPT | Performed by: INTERNAL MEDICINE

## 2019-04-08 PROCEDURE — 25800030 ZZH RX IP 258 OP 636: Performed by: PHYSICIAN ASSISTANT

## 2019-04-08 PROCEDURE — 25000128 H RX IP 250 OP 636: Performed by: PHYSICIAN ASSISTANT

## 2019-04-08 PROCEDURE — 85384 FIBRINOGEN ACTIVITY: CPT | Performed by: INTERNAL MEDICINE

## 2019-04-08 PROCEDURE — 25000131 ZZH RX MED GY IP 250 OP 636 PS 637: Performed by: PHYSICIAN ASSISTANT

## 2019-04-08 PROCEDURE — 83735 ASSAY OF MAGNESIUM: CPT | Performed by: INTERNAL MEDICINE

## 2019-04-08 PROCEDURE — 25000132 ZZH RX MED GY IP 250 OP 250 PS 637: Performed by: STUDENT IN AN ORGANIZED HEALTH CARE EDUCATION/TRAINING PROGRAM

## 2019-04-08 PROCEDURE — 25000132 ZZH RX MED GY IP 250 OP 250 PS 637: Performed by: INTERNAL MEDICINE

## 2019-04-08 PROCEDURE — 40000275 ZZH STATISTIC RCP TIME EA 10 MIN

## 2019-04-08 PROCEDURE — 25000132 ZZH RX MED GY IP 250 OP 250 PS 637: Performed by: PHYSICIAN ASSISTANT

## 2019-04-08 PROCEDURE — 80076 HEPATIC FUNCTION PANEL: CPT | Performed by: INTERNAL MEDICINE

## 2019-04-08 PROCEDURE — 25000128 H RX IP 250 OP 636: Performed by: INTERNAL MEDICINE

## 2019-04-08 PROCEDURE — 80048 BASIC METABOLIC PNL TOTAL CA: CPT | Performed by: INTERNAL MEDICINE

## 2019-04-08 PROCEDURE — 97116 GAIT TRAINING THERAPY: CPT | Mod: GP | Performed by: REHABILITATION PRACTITIONER

## 2019-04-08 PROCEDURE — C9399 UNCLASSIFIED DRUGS OR BIOLOG: HCPCS | Performed by: INTERNAL MEDICINE

## 2019-04-08 PROCEDURE — 84100 ASSAY OF PHOSPHORUS: CPT | Performed by: INTERNAL MEDICINE

## 2019-04-08 PROCEDURE — 12000012 ZZH R&B MS OVERFLOW UMMC

## 2019-04-08 PROCEDURE — 25000128 H RX IP 250 OP 636: Performed by: STUDENT IN AN ORGANIZED HEALTH CARE EDUCATION/TRAINING PROGRAM

## 2019-04-08 PROCEDURE — 99233 SBSQ HOSP IP/OBS HIGH 50: CPT | Performed by: INTERNAL MEDICINE

## 2019-04-08 PROCEDURE — 83615 LACTATE (LD) (LDH) ENZYME: CPT | Performed by: INTERNAL MEDICINE

## 2019-04-08 PROCEDURE — 25000132 ZZH RX MED GY IP 250 OP 250 PS 637: Performed by: NURSE PRACTITIONER

## 2019-04-08 PROCEDURE — 85025 COMPLETE CBC W/AUTO DIFF WBC: CPT | Performed by: INTERNAL MEDICINE

## 2019-04-08 RX ORDER — PIPERACILLIN SODIUM, TAZOBACTAM SODIUM 3; .375 G/15ML; G/15ML
3.38 INJECTION, POWDER, LYOPHILIZED, FOR SOLUTION INTRAVENOUS EVERY 6 HOURS
Status: COMPLETED | OUTPATIENT
Start: 2019-04-08 | End: 2019-04-13

## 2019-04-08 RX ADMIN — VANCOMYCIN HYDROCHLORIDE 125 MG: KIT at 08:20

## 2019-04-08 RX ADMIN — ONDANSETRON HYDROCHLORIDE 8 MG: 8 TABLET, FILM COATED ORAL at 22:53

## 2019-04-08 RX ADMIN — PROCHLORPERAZINE MALEATE 10 MG: 5 TABLET, FILM COATED ORAL at 22:53

## 2019-04-08 RX ADMIN — LINEZOLID 600 MG: 600 TABLET, FILM COATED ORAL at 08:27

## 2019-04-08 RX ADMIN — PIPERACILLIN AND TAZOBACTAM 3.38 G: 3; .375 INJECTION, POWDER, FOR SOLUTION INTRAVENOUS at 17:20

## 2019-04-08 RX ADMIN — ONDANSETRON HYDROCHLORIDE 8 MG: 8 TABLET, FILM COATED ORAL at 08:20

## 2019-04-08 RX ADMIN — DECITABINE 43 MG: 50 INJECTION, POWDER, LYOPHILIZED, FOR SOLUTION INTRAVENOUS at 15:53

## 2019-04-08 RX ADMIN — ROPINIROLE HYDROCHLORIDE 0.75 MG: 0.5 TABLET, FILM COATED ORAL at 21:35

## 2019-04-08 RX ADMIN — ACYCLOVIR 400 MG: 400 TABLET ORAL at 08:19

## 2019-04-08 RX ADMIN — PANTOPRAZOLE SODIUM 40 MG: 40 TABLET, DELAYED RELEASE ORAL at 16:00

## 2019-04-08 RX ADMIN — POTASSIUM CHLORIDE 20 MEQ: 29.8 INJECTION, SOLUTION INTRAVENOUS at 06:46

## 2019-04-08 RX ADMIN — LINEZOLID 600 MG: 600 TABLET, FILM COATED ORAL at 19:42

## 2019-04-08 RX ADMIN — IMIPENEM AND CILASTATIN SODIUM 500 MG: 500; 500 INJECTION, POWDER, FOR SOLUTION INTRAVENOUS at 10:02

## 2019-04-08 RX ADMIN — PANTOPRAZOLE SODIUM 40 MG: 40 TABLET, DELAYED RELEASE ORAL at 08:20

## 2019-04-08 RX ADMIN — PROCHLORPERAZINE MALEATE 10 MG: 5 TABLET, FILM COATED ORAL at 11:37

## 2019-04-08 RX ADMIN — TAMSULOSIN HYDROCHLORIDE 0.4 MG: 0.4 CAPSULE ORAL at 08:20

## 2019-04-08 RX ADMIN — Medication 1 SPRAY: at 08:28

## 2019-04-08 RX ADMIN — VANCOMYCIN HYDROCHLORIDE 125 MG: KIT at 19:42

## 2019-04-08 RX ADMIN — ACYCLOVIR 400 MG: 400 TABLET ORAL at 19:42

## 2019-04-08 RX ADMIN — VANCOMYCIN HYDROCHLORIDE 125 MG: KIT at 16:00

## 2019-04-08 RX ADMIN — PROCHLORPERAZINE MALEATE 10 MG: 5 TABLET, FILM COATED ORAL at 06:46

## 2019-04-08 RX ADMIN — VENETOCLAX 200 MG: 100 TABLET, FILM COATED ORAL at 17:23

## 2019-04-08 RX ADMIN — MICAFUNGIN SODIUM 100 MG: 10 INJECTION, POWDER, LYOPHILIZED, FOR SOLUTION INTRAVENOUS at 08:19

## 2019-04-08 RX ADMIN — VANCOMYCIN HYDROCHLORIDE 125 MG: KIT at 11:37

## 2019-04-08 RX ADMIN — IMIPENEM AND CILASTATIN SODIUM 500 MG: 500; 500 INJECTION, POWDER, FOR SOLUTION INTRAVENOUS at 03:44

## 2019-04-08 RX ADMIN — ONDANSETRON HYDROCHLORIDE 8 MG: 8 TABLET, FILM COATED ORAL at 16:00

## 2019-04-08 RX ADMIN — PIPERACILLIN AND TAZOBACTAM 3.38 G: 3; .375 INJECTION, POWDER, FOR SOLUTION INTRAVENOUS at 22:53

## 2019-04-08 RX ADMIN — Medication 2 SPRAY: at 19:42

## 2019-04-08 ASSESSMENT — ACTIVITIES OF DAILY LIVING (ADL)
ADLS_ACUITY_SCORE: 16

## 2019-04-08 ASSESSMENT — MIFFLIN-ST. JEOR: SCORE: 1758.33

## 2019-04-08 NOTE — PROGRESS NOTES
[facility], [place]    Hematology / Oncology Progress Note    Date of Admission: 3/12/2019    Date of Service (when I saw the patient): 04/08/2019     Assessment & Plan    Mr. Ace is a 63 year old male with new diagnosis of acute myeloid leukemia incidentally found during work up of nonspecific chest symptoms after partial root canal intervention. He was started on induction chemotherapy with 7+3 (cytarabine and daunorubicin) with D1=3/15/19. BMBx on 3/25 (done early due to rising WBC and blast counts) demonstrated persistent disease with 95% blasts by flow, 98% by morphology. Started re-induction with decitabine on 3/26 PM and added venetoclax on 3/29. His course has been complicated by neutropenic sepsis secondary to odontic/soft tissue infection. He had extraction of teeth #18 and 19 on 4/3 but subsequently developed septic shock requiring transfer to MICU team 4/3-4/4. Hypotension improved after fluid resuscitated and he did not require intubation or pressor support. He was transferred back to [facility] Malignancy service on 4/4/19.     Changes today:  - change imipenem to Zosyn per d/w ID. Continue PO Linezolid. Plan for 10 days from source management (from 4/4; teeth removed late on 4/3)   - resume decitabine for last 2 doses (4/8 and 4/9) given further improvement in ALT  - continue Micafungin, holding Vfend until LFTs further normalize.   - pt's wife (Bessy) updated by phone today      HEME:  #AML, refractory. NGS positive for IDH2 and RUNX1 alterations.  Patient presented to [facility] ED in [place], [place] for complaints of nonspecific chest discomfort after partial root canal treatment (done 3/11/19). CT C/A/P was negative for PE or other acute findings. On OSH labs, his WBC was incidentally noted to be elevated at 71.1 with Hb 9.0 and plts 97K (prior labs had been unremarkable per patient). Smear was suspicious for immature blasts and acute leukemia. No symptoms of  hyperviscosity, TLS or DIC on presentation. 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 Flow consistent with AML with 95% blasts with the following immunophenotype: Positive for CD13, CD33, CD34, CD38, dim to negative CD45, , partial HLA-DR. Baseline ECHO normal, PICC place.   - HLA typing sent, BMT consult done 3/29 by Dr. Brown  - s/p initial Hydrea (dc'd 3/16)   - received induction chemotherapy with 7+3 (cytarabine and daunorubicin). Day 1=3/15/19.   - repeat BMBx done early (3/25) due to rising WBC/blast count. Still with persistent heavy burden of disease (98% blasts by morphology).   - s/p Hydrea for WBC count management; started 1g BID (3/27), incresaed to 2g BID (x3/28). Hydrea discontinued 3/31.   - Started reinduction with decitabine/venetoclax. Decitabine D1=3/26; added Venetoclax on 3/29. Ramp up dosing accounting for concomitant voriconazole. Final dose will be 100mg daily. Today is Day 14 from start of reinduction.  Of note, decitabine D9 and D10 held 4/3 and 4/4, respectively, due to acute illness and elevated Tbili/ALT. With improvement in ALT and Tbili, will resume decitabine for last 2 doses (4/8 and 4/9).   - Continue Venetoclax at this time, initially at 100 mg dose but with Voriconazole now on hold, venetoclax dose, increased to 200 mg (on 4/6) -will stay at 200 mg as effect of CYP inhibition lasts for 1 week. When resuming Vfend, will reduce venetoclax back to 100mg dose.   - now off Allopurinol   - peripheral blasts have not been identified on diff since 4/5. WBC/ANC down appropriately.     #Pancytopenia  2/2 AML & associated chemotherapy.   - transfuse to maintain Hb >7, Plt >10K     #Coagulopathy, in setting of refractory AML and likely exacerbated by poor PO intake. INR elevated, now downtrending, fibrinogen stable.  - plasma for INR >1.8, cryo for fibrinogen <100  - s/p Vit K x 3 days (4/3-4/5)     ID:  #Neutropenic  fever/sepsis with septic shock (4/3-4/4)  #C.diff diarrhea  #Neutropenic colitis.  #Left mandible cellulitis/odontic infection. S/p extraction of teeth #18 and 19 on 4/3.   First fever on 3/22. Sources include c.diff colitis and odontic/soft tissue infection.   - ID following  - BCx 3/22 to present are NGTD  - 3/23 fungal BCx NGTD  - 3/22 UA negative, repeat UA/UCx negative on 3/30  - 3/22 CXR with small R pleural effusion, no focal airspace opacity  - 3/25 galactomannan and fungitell both negative  - 3/26 C.diff positive  - 3/29 CT C/A/P demonstrated Right thickened hemicolon with adjacent fat stranding  - 3/31 CT Dental demonstrated left hemimandible cellulitis  - Dental consulted for CT findings, unable to intervene as urgently as needed at this time due to acute worsening of clinic status. Thus, ultimately consulted Oral Surgery on 4/3; they performed extraction of teeth #18 and 19 on 4/3.   - pt has now been afebrile since 4/4 with significant improvement in his left jaw pain and swelling  - in MICU, started on stress dose steroids, Vit C, and thiamine. D/c'd steroids 4/4, discontinued Vit C and thiamine on 4/5.      **Anti-infectives:  - s/p Cefepime (x 3/22-4/1). Changed to Zosyn (x 4/1-4/3). With septic shock changed to clindamycin (4/3-4/4) and imipenem (x4/3-4/8. Change back to IV Zosyn (x4/8) per d/w ID.  - s/p PO Flagyl (x3/29-4/1).   - s/p IV vanc (x 3/31-4/3). Changed to IV Linezolid (x 4/3- present); changed to PO dosing on 4/7.   - continue PO Vancomycin 125mg four times daily. Plan to continue for an additional week after Zosyn/Linezolid antibiotic therapy is completed.     #ID PPx   - continue ACV  - prophy Voriconazole 200mg q12hr. Level on 4/4 was elevated. Pt also having visual hallucinations (now resolved). Dose reduced voriconazole to 150mg q12hr (x 4/4 PM). Acute elevation in transaminases on 4/5 AM, so HOLDING Voriconazole. Trend LFTs and add back Vfend when LFTs normalize. Cover with IV  micafungin 100mg daily until can resume Vfend.         GI:  #Hyperbilirubinemia, improving.  #Transaminitis, improving.  Acute elevation in Tbili (primarily direct) on 4/3 PM, improving by 4/5. Likely related to septic shock. As Tbili improving, noted acute elevation in transaminases on 4/5. Again likely related to sequelae from shock, but Vfend could be contributing. No acute finding on 4/4 Abd US. Denies abdominal/RUQ pain.  - held decitabine chemo, resume as above  - holding Vfend starting 4/5, restart when LFTs improve    #C.diff diarrhea   #Neutropenic colitis, ongoing loose stools  - c.diff treatment as above  - s/p TPN (x4/4-4/7). With improvement in clinical status and stable GI symptoms, ADAT.      #Hemorrhoids, improved  - PRN management: TUCKS pads, sitz bath, topical Prep H and/or lidocaine    #Chemotherapy induced nausea, controlled  - scheduled Zofran q8hr  - compazine also scheduled q6hr  - PRN antiemetics     CV:  #Type 2 NSTEMI 2/2 demand ischemia/septic shock.   Troponin elevated, stable at 0.436-->0.405. Will not further trend. Repeat ECHO (4/5) demonstrates stable EF 55-60%. LV/RV size and function normal. New mild-mod mitral insufficiency. Trivial pericardial effusion. IVC dilated c/w overload.     #Hypervolemia, improving. 2/2 aggressive fluid resuscitation. Weight up max 25 lbs (since 3/31).  - s/p Lasix total 40mg IV (4/5), 20mg IV (4/6). Weight now downtrending, so hold off on further Lasix at this time.     #Subclinical coronary atherosclerosis  #Hyperlipidemia  Total Agatston calcium score was elevated at 591 (91st percentile) on CT coronaries performed 6/8/2016. Nuclear stress test was negative for ischemia on 8/10/2016. Life style modification measures were recommended. Patient follows with cardiology as outpatient (last office visit on 11/12/18).  - holding atorvastatin at this time. Could potentially resume after completion of reinduction if LFTs improve and remain  WNL.    PULM:  #Acute hypoxic respiratory failure. Improved.   Most likely related to edema.   - PRN supplemental O2  - repeat ECHO as above  - diuresis PRN     RENAL:  #Lyte derangement, improved.   Multifactorial with decreased PO intake, fevers, diarrhea.    - lyte repletion per unit protocol (high scale with plan for TPN). If remain stable, consider backing down to standard replacement.  - calcium repletion PRN     #FOREST, resolved.  Baseline Cr 0.7 (GFR>90). Creatinine uptrended with Cr max 1.45 (GFR 50), now improved/resolved. Multifactorial in setting of decreased PO intake, fevers, diarrhea, and medications (IV Vancomycin, Zosyn, contrast), and development of septic shock.       :  #BPH   Patient follows with urology ([facility], [place], [place]). On PTA Flomax 0.8 mg daily.  - of note, possible drug interaction between voriconazole and flomax (can raise flomax level in blood & cause hypotension).   - decreased flomax from 0.8mg to 0.4mg (soft BPs in setting of neutropenic sepsis and risk of further hypotension due to interaction with voriconazole). Continue 0.4mg dose at this time.       NEURO/MSK:  #Restless legs syndrome  - Continue ropinirole 0.75 mg at bedtime, offer PRN ativan for persistent symptoms.     #History of lumbar spine degenerative disc disease   Patient is s/p laminectomy/facetectomy procedures. He does not routinely take pain meds.     DENTAL:  #S/P root canal manipulation  #Odontic infection as above  Patient had the start of a root canal treatment initiated on 3/11/19 with plan to complete it a few weeks later (per pt's wife and son). He was started on a 7-day course of oral  mg q6h beginning one day prior to the procedure. At time of admission, he was having pain in his left jaw but site appeared unremarkable with no abscess or drainage.  In retrospect, may have also had some leukemic infiltration of gums complicating his course. Pain at site initially improved during  chemotherapy, but did return. Pain worsened and with higher temps, checked CT Dental which demonstrated cellulitis along left hemimandible. Now with oral infection as above.  - Tylenol PRN, tramadol vs oxycodone PRN   - abx as above  - Dental consult as above  - OMFS consult, intervention on 4/3 as above     PSYCHOSOCIAL:  #Coping.  Appreciate SW and  involvement. Pt had requested help with Advance Directive, SW following     FEN   - no current MIVFs.   - PRN lyte replacement per standing protocol     Prophylaxis  - No pharmacologic VTE ppx due to TCP  - PPI for GI/PUD ppx       Code Status: FULL     Disposition: Anticipate 4-6 week LOS pending completion of chemotherapy, count recovery, and resolution of acute toxicities.      Discussed with Dr. Lambert.     Vee Cabrera PA-C  Heme/Onc  [phone]        Interval History   Afebrile, VSS, currently on RA. Left lower gum line at site of teeth extraction painful (particularly with eating/biting down) and with ongoing edema, but getting better overall. He was able to eat breakfast and states his appetite is returning. Denies any nausea or increase in bloating/loose stools with increase in PO intake. States his breathing feels ok, no cough. Denies HA, vision change, visual hallucinations, or oral lesions/sores.        Physical Exam   Temp: 96.2  F (35.7  C) Temp src: Axillary BP: (!) 132/94   Heart Rate: 99 Resp: 18 SpO2: 98 % O2 Device: None (Room air)    Vitals:    04/06/19 0738 04/07/19 0824 04/08/19 0824   Weight: 102.1 kg (225 lb) 98.7 kg (217 lb 8 oz) 96.2 kg (212 lb 1.6 oz)     Vital Signs with Ranges  Temp:  [96.2  F (35.7  C)-97.7  F (36.5  C)] 96.2  F (35.7  C)  Heart Rate:  [80-99] 99  Resp:  [18] 18  BP: (120-135)/(88-96) 132/94  SpO2:  [96 %-98 %] 98 %  I/O last 3 completed shifts:  In: 1704.1 [P.O.:720; I.V.:800]  Out: 3625 [Urine:3625]    Constitutional: Seen lying in bed, on RA. Pleasant. Appears tired/run down but alert and answering questions  appropriately.   HEENT: NC/AT. Facial edema present but overall improved, particularly on left side.  Respiratory: Breathing even and non-labored on RA. Able to sit up to EOB. Lungs clear  without crackles. No wheezing.   CV: RRR. No murmur appreciated.  GI: +BS. Abdomen is mildly distended (improved) but soft. No tenderness to palpation throughout.  Skin: Warm. No concerning lesions or rash on exposed surfaces.   Musculoskeletal: No edema.   Neurologic: Alert and oriented. Grossly nonfocal.  Neuropsychiatric: Calm, mentation appears normal, answering questions appropriately. VAD: PICC line in RUE, c/d/i       Medications     IV fluid REPLACEMENT ONLY       - MEDICATION INSTRUCTIONS -         acyclovir  400 mg Oral BID     artificial saliva  1-2 spray Swish & Spit 4x Daily     decitabine (DACOGEN) chemo infusion  20 mg/m2 (Treatment Plan Recorded) Intravenous Q24H     ipratropium - albuterol 0.5 mg/2.5 mg/3 mL  3 mL Nebulization BID     linezolid  600 mg Oral Q12H ZNUILDA     micafungin  100 mg Intravenous Q24H     ondansetron  8 mg Oral Q8H     pantoprazole  40 mg Oral BID AC     piperacillin-tazobactam  3.375 g Intravenous Q6H     prochlorperazine  5-10 mg Intravenous Q6H    Or     prochlorperazine  5-10 mg Oral Q6H     Resume a HELD Medication   Does not apply See Admin Instructions     rOPINIRole  0.75 mg Oral Daily at 8 pm     sodium chloride (PF)  10 mL Intravenous Once     sodium chloride (PF)  10 mL Intracatheter Q7 Days     tamsulosin  0.4 mg Oral Daily     vancomycin  125 mg Oral 4x Daily     venetoclax  200 mg Oral Daily with supper       Data   Results for orders placed or performed during the hospital encounter of 03/12/19 (from the past 24 hour(s))   Triglycerides   Result Value Ref Range    Triglycerides 203 (H) <150 mg/dL   Phosphorus   Result Value Ref Range    Phosphorus 3.2 2.5 - 4.5 mg/dL   Magnesium   Result Value Ref Range    Magnesium 2.1 1.6 - 2.3 mg/dL   Lactate Dehydrogenase   Result Value Ref  Range    Lactate Dehydrogenase 215 85 - 227 U/L   INR   Result Value Ref Range    INR 1.22 (H) 0.86 - 1.14   Hepatic panel   Result Value Ref Range    Bilirubin Direct 0.8 (H) 0.0 - 0.2 mg/dL    Bilirubin Total 1.8 (H) 0.2 - 1.3 mg/dL    Albumin 2.1 (L) 3.4 - 5.0 g/dL    Protein Total 5.3 (L) 6.8 - 8.8 g/dL    Alkaline Phosphatase 85 40 - 150 U/L     (H) 0 - 70 U/L    AST 42 0 - 45 U/L   Fibrinogen activity   Result Value Ref Range    Fibrinogen 442 (H) 200 - 420 mg/dL   CBC with platelets differential   Result Value Ref Range    WBC 1.7 (L) 4.0 - 11.0 10e9/L    RBC Count 2.83 (L) 4.4 - 5.9 10e12/L    Hemoglobin 8.7 (L) 13.3 - 17.7 g/dL    Hematocrit 27.3 (L) 40.0 - 53.0 %    MCV 97 78 - 100 fl    MCH 30.7 26.5 - 33.0 pg    MCHC 31.9 31.5 - 36.5 g/dL    RDW 15.4 (H) 10.0 - 15.0 %    Platelet Count 12 (LL) 150 - 450 10e9/L    Diff Method Manual Differential     % Neutrophils 35.0 %    % Lymphocytes 60.0 %    % Monocytes 5.0 %    % Eosinophils 0.0 %    % Basophils 0.0 %    Absolute Neutrophil 0.6 (L) 1.6 - 8.3 10e9/L    Absolute Lymphocytes 1.0 0.8 - 5.3 10e9/L    Absolute Monocytes 0.1 0.0 - 1.3 10e9/L    Absolute Eosinophils 0.0 0.0 - 0.7 10e9/L    Absolute Basophils 0.0 0.0 - 0.2 10e9/L    RBC Morphology Normal    Basic metabolic panel   Result Value Ref Range    Sodium 139 133 - 144 mmol/L    Potassium 4.0 3.4 - 5.3 mmol/L    Chloride 107 94 - 109 mmol/L    Carbon Dioxide 26 20 - 32 mmol/L    Anion Gap 5 3 - 14 mmol/L    Glucose 97 70 - 99 mg/dL    Urea Nitrogen 19 7 - 30 mg/dL    Creatinine 0.67 0.66 - 1.25 mg/dL    GFR Estimate >90 >60 mL/min/[1.73_m2]    GFR Estimate If Black >90 >60 mL/min/[1.73_m2]    Calcium 7.6 (L) 8.5 - 10.1 mg/dL

## 2019-04-08 NOTE — PLAN OF CARE
PT -   Discharge Planner PT   Patient plan for discharge: home with wife  Current status: transfers with SBA to CGA.  Gait with CGA without AD without LOB with balance challenges, thus improved from last session.  Fatigued and SOB with standing LE ex  Barriers to return to prior living situation: Stairs  Recommendations for discharge: TCU at the moment due to fatigue, but anticipate pt will disch home with A of wife, possible ambulatory AD such as cane and likely home or outpatient PT to progresss activity and decrease fall risk  Rationale for recommendations: to progress transfers and gait.  Entered by: Angelita Zurita 04/08/2019 4:41 PM

## 2019-04-08 NOTE — PLAN OF CARE
"Vital signs:  Temp: 96.2  F (35.7  C) Temp src: Axillary BP: (!) 132/94   Heart Rate: 99 Resp: 18 SpO2: 98 % O2 Device: None (Room air) Oxygen Delivery: 1 LPM Height: 177.8 cm (5' 10\") Weight: 96.2 kg (212 lb 1.6 oz)  Estimated body mass index is 30.43 kg/m  as calculated from the following:    Height as of this encounter: 1.778 m (5' 10\").    Weight as of this encounter: 96.2 kg (212 lb 1.6 oz).     Randy has had an uneventful shift.  He has denied having any nausea and has denied feeling short of breath at rest or with activity.  His cheek is still feeling swollen and tender but he denied having any pain with swallowing.  Team would like to restart his decitabine this afternoon.  Currently he is saline locked and has been up and independent in his room.  His overall mood is quiet.  To continue his plan of care.       Problem: Adult Inpatient Plan of Care  Goal: Plan of Care Review  4/8/2019 1300 by Valentina Mendez RN  Flowsheets (Taken 4/8/2019 1300)  Plan of Care Reviewed With: patient  Progress: no change     Problem: Adult Inpatient Plan of Care  Goal: Optimal Comfort and Wellbeing  4/8/2019 1300 by Valentina Mendez RN  Outcome: No Change     Problem: Bleeding Risk or Actual  Goal: Absence of Bleeding  4/8/2019 1300 by Valentina Mendez RN  Outcome: No Change     Problem: Anemia (Chemotherapy Effects)  Goal: Anemia Symptom Improvement  4/8/2019 1300 by Valentina Mendez RN  Outcome: No Change     Problem: Nausea and Vomiting (Chemotherapy Effects)  Goal: Fluid and Electrolyte Balance  4/8/2019 1300 by Valentina Mendez RN  Outcome: No Change     Problem: Neutropenia (Chemotherapy Effects)  Goal: Absence of Infection  4/8/2019 1300 by Valentina Mendez RN  Outcome: No Change     Problem: Oral Mucositis (Chemotherapy Effects)  Goal: Improved Oral Mucous Membrane Integrity  4/8/2019 1300 by Valentina Mendez RN  Outcome: No Change     Problem: Thrombocytopenia Bleeding Risk " (Chemotherapy Effects)  Goal: Absence of Bleeding  Outcome: No Change

## 2019-04-08 NOTE — PROGRESS NOTES
[facility]  Transplant Infectious Disease Progress Note    Patient:  El Ace, Date of birth 1955, Medical record number [id number]  Date of Visit:  04/08/2019         Assessment and Recommendations:   Recommendations:  - Change Imipenem to Zosyn (for anaerobic coverage given odontogenic source of infection), will continue for 10 days counting from day of source control, patient with ANC >500 for more than 48 hrs.   - Continue with linezolid, will treat for 10 days counting from day of source control (4/3)  - Continue with micafungin prophylaxis for now and agree to hold voriconazole until LFTs normalized  - Once LFTs normalizes, restart voriconazole at 150 mg PO BID, repeat a voriconazole level 7 days after restarting the medication.  - Continue with vancomycin PO for CDI - will plan to treat until a week after patient completes GN coverage.     Thank you for the consultation. Transplant ID will sign off. Please call if any questions or concerns arise.    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     Infectious Disease issues include:  - Neutropenic fevers: Likely odontogenic source. CT c/a/p showed only colitis (patient with known CDI - see below) but CT dental showed a tiny periapical lucency involving the anterior most right mandibular molar and fat stranding with mild soft tissue thickening along the left hemimandible, most consistent with cellulitis. CT neck was negative. Patient had no neutrophils to form abscesses. OMFS removed source on 4/3. Bcx NGTD so far. UCx neg. Galactomannan and BDG neg. Patient had acute decompensation after molar extraction likely from transient bacteremia. Now improving.   Zosyn changed to Imipenem for concerns of nocardia infection/MDROs, although this is  less likely will continue with Imipenem for now. Will continue with linezolid as well.  Voriconazole level was supratherapeutic and patient started to have hallucinations and transaminitis. Will hold voriconazole until LFTs normalizes. Will use micafungin for prophylaxis instead.    - C Diff diarrhea: Improving. On vancomycin PO since 3/26. Will continue until a week after patient completes GN course.    - PCP prophylaxis: None  - Serostatus: CMV neg, EBV pos, HSV pos  - Immunization status: Needs all pre BMT vaccinations   - Gamma globulin status: Not in records   - Isolation status:  Good hand hygiene. Enteric isolation for CDI     Attestation:  I have reviewed today's vital signs, medications, labs and imaging. I personally reviewed the imaging.    Noemi Garcia MD  Transplant Infectious Diseases   [phone]         Interval History:   Since last seen by ID, El feels much better, facial swelling has come down significantly, still having some discomfort while eating but much better. Afebrile since 4/4. Hallucinations have also resolved. No HAs. Still slightly confused but overall improving. No diarrhea. No n/v. No abdominal pain.     Transplants:  N/A    Review of Systems:   ROS: 10 point ROS neg other than the symptoms noted above in the interval history.       Current Facility-Administered Medications   Medication     acetaminophen (TYLENOL) tablet 650 mg     acyclovir (ZOVIRAX) tablet 400 mg     albuterol (PROVENTIL) neb solution 2.5 mg     artificial saliva (BIOTENE MT) solution 1-2 spray     baclofen (LIORESAL) tablet 10 mg     calcium carbonate (TUMS) chewable tablet 500 mg     decitabine (DACOGEN) 43 mg in sodium chloride 0.9 % 119 mL CHEMOTHERAPY     dextrose 10 % 1,000 mL infusion     glucose gel 15-30 g    Or     dextrose 50 % injection 25-50 mL    Or     glucagon injection 1 mg     ipratropium - albuterol 0.5 mg/2.5 mg/3 mL (DUONEB) neb solution 3 mL     lidocaine (XYLOCAINE) 2 % topical gel      linezolid (ZYVOX) tablet 600 mg     LORazepam (ATIVAN) tablet 0.5-1 mg    Or     LORazepam (ATIVAN) injection 0.5-1 mg     magic mouthwash suspension (diphenhydramine, lidocaine, aluminum-magnesium & simethicone)     magnesium sulfate 2 g in NS intermittent infusion ([employer]arMEDium or [facility] Cmpd)     magnesium sulfate 4 g in 100 mL sterile water (premade)     Medication Instruction     meperidine (DEMEROL) injection 25 mg     micafungin (MYCAMINE) 100 mg in sodium chloride 0.9 % 100 mL intermittent infusion     naloxone (NARCAN) injection 0.1-0.4 mg     OLANZapine zydis (zyPREXA) ODT tab 5 mg     ondansetron (ZOFRAN-ODT) ODT tab 4 mg    Or     ondansetron (ZOFRAN) injection 4 mg     ondansetron (ZOFRAN) tablet 8 mg     oxyCODONE (ROXICODONE) tablet 5 mg     pantoprazole (PROTONIX) EC tablet 40 mg     piperacillin-tazobactam (ZOSYN) 3.375 g vial to attach to  mL bag     polyethylene glycol (MIRALAX/GLYCOLAX) Packet 17 g     potassium chloride (KLOR-CON) Packet 20-40 mEq     potassium chloride 10 mEq in 100 mL intermittent infusion with 10 mg lidocaine     potassium chloride 10 mEq in 100 mL sterile water intermittent infusion (premix)     potassium chloride 20 mEq in 50 mL intermittent infusion     potassium chloride ER (K-DUR/KLOR-CON M) CR tablet 20-40 mEq     potassium phosphate 10 mmol in D5W 250 mL intermittent infusion     potassium phosphate 15 mmol in D5W 250 mL intermittent infusion     potassium phosphate 20 mmol in D5W 250 mL intermittent infusion     potassium phosphate 20 mmol in D5W 500 mL intermittent infusion     potassium phosphate 25 mmol in D5W 500 mL intermittent infusion     pramox-pe-glycerin-petrolatum (PREPARATION H) cream     prochlorperazine (COMPAZINE) injection 5-10 mg    Or     prochlorperazine (COMPAZINE) tablet 5-10 mg     RESUME A HELD MEDICATION     rOPINIRole (REQUIP) tablet 0.75 mg     sennosides (SENOKOT) tablet 8.6 mg     sodium chloride (OCEAN) 0.65 % nasal spray 1-2 spray      "sodium chloride (PF) 0.9% PF flush 10 mL     sodium chloride (PF) 0.9% PF flush 10 mL     sodium chloride (PF) 0.9% PF flush 10-20 mL     sucralfate (CARAFATE) suspension 1 g     tamsulosin (FLOMAX) capsule 0.4 mg     traMADol (ULTRAM) tablet 50 mg     vancomycin (FIRVANQ) oral solution 125 mg     venetoclax (VENCLEXTA) tablet CHEMOTHERAPY 200 mg     voriconazole (VFEND) tablet 150 mg       No Known Allergies           Physical Exam:   Vitals were reviewed.  All vitals stable  BP (!) 132/94   Pulse 72   Temp 96.2  F (35.7  C) (Axillary)   Resp 18   Ht 1.778 m (5' 10\")   Wt 96.2 kg (212 lb 1.6 oz)   SpO2 98%   BMI 30.43 kg/m      Exam:  GENERAL:  well-developed, well-nourished, ill appearing male alert, oriented, in no acute distress.  HEENT:  Head is normocephalic, atraumatic   EYES:  Eyes have anicteric sclerae.    ENT:  Oropharynx is moist without exudates or ulcers. Bilateral jaw edema - improving.  No discharge.   NECK:  Supple but swollen - improved. No LAD  LUNGS:  CTA bilaterally. No wheezes  CARDIOVASCULAR:  Regular rate and rhythm with no murmurs, gallops or rubs.  ABDOMEN:  Normal bowel sounds, soft, nontender.  SKIN:  No acute rashes.  Line is in place without any surrounding erythema.  LE: 1+ edema.   NEUROLOGIC:  Grossly nonfocal. Alert and oriented to self, place but no time.          Laboratory Data:     Metabolic Studies    Recent Labs   Lab Test 04/08/19  0352 04/07/19  0432 04/06/19  0402  04/04/19  1526  04/04/19  0400    140 141   < >  --   --  136   POTASSIUM 4.0 3.9 3.8   < >  --   --  3.7   CHLORIDE 107 108 113*   < >  --   --  107   CO2 26 24 20   < >  --   --  17*   ANIONGAP 5 8 8   < >  --   --  12   BUN 19 24 35*   < >  --   --  15   CR 0.67 0.68 0.98   < >  --   --  1.34*   GFRESTIMATED >90 >90 81   < >  --   --  55*   GLC 97 139* 134*   < >  --   --  139*   DEBBIE 7.6* 7.3* 7.3*   < >  --   --  7.4*   PHOS 3.2  --  3.3   < >  --   --  3.4   MAG 2.1  --  2.2   < >  --   --  2.4* "   URIC  --   --   --   --   --   --  1.6*   LACT  --   --   --   --  3.2*   < >  --     < > = values in this interval not displayed.       Hepatic Studies    Recent Labs   Lab Test 04/08/19 0352 04/07/19 0432 04/06/19 0402 04/05/19 0312   BILITOTAL 1.8* 1.1 0.9 1.8*   DBIL 0.8* 0.6* 0.7* 1.5*   ALKPHOS 85 76 66 75   PROTTOTAL 5.3* 5.3* 5.0* 5.6*   ALBUMIN 2.1* 1.9* 1.8* 2.0*   AST 42 56* 113* 422*   * 141* 185* 302*     --   --  344*       Hematology Studies     Recent Labs   Lab Test 04/08/19 0352 04/07/19 0432 04/06/19 0402 04/05/19 0312 04/04/19  0400   WBC 1.7* 1.8* 3.0* 6.7  --  4.1   ABLA  --   --   --   --   --  0.4*   BLST  --   --   --   --   --  10.6   ANEU 0.6* 0.6* 0.5* 0.4*  --  0.1*   ALYM 1.0 1.1 1.2 0.4*  --  0.4*   TRACIE 0.1 0.2 1.3 6.0*  --  3.1*   AEOS 0.0 0.0 0.0 0.0  --  0.0   HGB 8.7* 8.9* 8.8* 8.4*   < > 6.4*   HCT 27.3* 26.9* 26.1* 24.8*  --  19.9*   PLT 12* 17* 23* 34*  --  36*    < > = values in this interval not displayed.       Urine Studies     Recent Labs   Lab Test 04/04/19  0523 03/30/19  2206 03/22/19  0945   URINEPH 6.0 6.5 7.5*   NITRITE Negative Negative Negative   LEUKEST Negative Negative Negative   WBCU 4 <1 2       Microbiology:  Last 6 Culture results with specimen source  Culture Micro   Date Value Ref Range Status   04/03/2019 No growth after 2 days  Preliminary   04/03/2019 No growth after 5 days  Preliminary   04/03/2019 Culture negative monitoring continues  Preliminary   04/03/2019 Culture negative monitoring continues  Preliminary   04/03/2019 Culture negative monitoring continues  Preliminary   04/03/2019 No growth after 5 days  Preliminary   04/03/2019 No growth after 5 days  Preliminary    Specimen Description   Date Value Ref Range Status   04/03/2019 Blood  Final   04/03/2019 Blood Left Hand  Final   04/03/2019 Nares  Final   04/03/2019 Other Transfusion Reaction Donor Unit  Final   04/03/2019 Other Transfusion Reaction Donor Unit  Final    04/03/2019 Other Transfusion Reaction Donor Unit  Final   04/03/2019 Other Transfusion Reaction Donor Unit  Final          Last check of C difficile  C Diff Toxin B PCR   Date Value Ref Range Status   03/26/2019 Positive (A) NEG^Negative Final     Comment:     Positive: Toxin producing Clostridium difficile target DNA sequences detected,   presumed positive for Clostridium difficile toxin B.  Clostridium difficile (Requires Enteric Isolation)  FDA approved assay performed using [facility] GeneXpert real-time PCR.  Critical Value/Significant Value called to and read back by  JATINDER KAUR RN [id number] 3.26.19 ND         Virology:  CMV viral loads    Recent Labs   Lab Test 03/31/19  0626   CSPEC Plasma   CMVLOG Not Calculated       CMV viral loads    Log IU/mL of CMVQNT   Date Value Ref Range Status   03/31/2019 Not Calculated <2.1 [Log_IU]/mL Final       Hepatitis B Testing     Recent Labs   Lab Test 03/13/19  0553   AUSAB 15.70*   HEPBANG Nonreactive     Was the last Hepatitis B E antigen positive?   No results found for: HBEAGN     Hepatitis C Antibody   Date Value Ref Range Status   03/13/2019 Nonreactive NR^Nonreactive Final     Comment:     Assay performance characteristics have not been established for newborns,   infants, and children         Imaging:  No results found for this or any previous visit (from the past 24 hour(s)).     Abdominal US: 4/4/19:  1.  Cholelithiasis without evidence of cholecystitis.  2.  Simple hepatic cysts    CT neck 4/3/19:  1. Postoperative changes left mandibular molar extractions. Mild layering secretions of the hypopharynx. Deep cervical spaces are unremarkable.   2. Left submandibular soft tissue swelling concerning for soft tissue infection/cellulitis. No drainable fluid collection. Associated left predominant cervical lymphadenopathy, presumably reactive.  3. Mild unchanged pansinusitis.    CT dental: 3/31/19  1. Tiny periapical lucency involving the anterior most right  mandibular molar.  2. Fat stranding and mild soft tissue thickening along the left hemimandible, most consistent with cellulitis. No discreet abscess.    CT c/a/p 3/29/19:  1. Thickened right hemicolon with adjacent fat stranding favored to be infectious given history of sepsis.  2. At least moderate three-vessel coronary artery calcifications

## 2019-04-08 NOTE — PROGRESS NOTES
"  Attending note:    I have reviewed today's vital signs, medications, labs and imaging results. I have seen, evaluated and examined the patient independently and discussed the plan with the patient and team. The associated note has been read and corrected by me.  My independent findings and assessment are below.     Subjective:  He feels well and denies any complaints  BP (!) 122/96   Pulse 72   Temp 97.3  F (36.3  C) (Axillary)   Resp 18   Ht 1.778 m (5' 10\")   Wt 96.2 kg (212 lb 1.6 oz)   SpO2 98%   BMI 30.43 kg/m    General: pleasant man not in distress  Lungs: Clear to auscultation  Abdomen: Soft, non tender     Assessment and plan: 62 yo man with AML s/p 7+3 with refractory disease s/p re-induction with  decitabine and venetoclax, today 14. Also hospital course complicated by C.dif diarrhea and neutropenic fever.  Decitabine was held on day 9 due to elevated liver enzymes. Will continue venetoclax 200 mg daily and will re-start on decitabine given LFT <2UNL with plan to complete the remaining 2 days of decitabine treatment.  Will continue micafungin, linezolid and po vancomycin. Will discuss with ID about switching of imipenem to cefepime. Remainder of supportive care as per note below.  Mike Lambert MD  Pager:[phone]  "

## 2019-04-09 ENCOUNTER — APPOINTMENT (OUTPATIENT)
Dept: OCCUPATIONAL THERAPY | Facility: CLINIC | Age: 64
DRG: 834 | End: 2019-04-09
Payer: COMMERCIAL

## 2019-04-09 LAB
ALBUMIN SERPL-MCNC: 2.3 G/DL (ref 3.4–5)
ALP SERPL-CCNC: 98 U/L (ref 40–150)
ALT SERPL W P-5'-P-CCNC: 96 U/L (ref 0–70)
ANION GAP SERPL CALCULATED.3IONS-SCNC: 8 MMOL/L (ref 3–14)
AST SERPL W P-5'-P-CCNC: 36 U/L (ref 0–45)
BACTERIA SPEC CULT: NO GROWTH
BACTERIA SPEC CULT: NO GROWTH
BASOPHILS # BLD AUTO: 0 10E9/L (ref 0–0.2)
BASOPHILS NFR BLD AUTO: 0 %
BILIRUB DIRECT SERPL-MCNC: 0.7 MG/DL (ref 0–0.2)
BILIRUB SERPL-MCNC: 1.8 MG/DL (ref 0.2–1.3)
BLD PROD TYP BPU: NORMAL
BLD PROD TYP BPU: NORMAL
BLD UNIT ID BPU: 0
BLOOD PRODUCT CODE: NORMAL
BPU ID: NORMAL
BUN SERPL-MCNC: 13 MG/DL (ref 7–30)
CALCIUM SERPL-MCNC: 8 MG/DL (ref 8.5–10.1)
CHLORIDE SERPL-SCNC: 106 MMOL/L (ref 94–109)
CO2 SERPL-SCNC: 23 MMOL/L (ref 20–32)
CREAT SERPL-MCNC: 0.64 MG/DL (ref 0.66–1.25)
DIFFERENTIAL METHOD BLD: ABNORMAL
EOSINOPHIL # BLD AUTO: 0 10E9/L (ref 0–0.7)
EOSINOPHIL NFR BLD AUTO: 0 %
ERYTHROCYTE [DISTWIDTH] IN BLOOD BY AUTOMATED COUNT: 14.9 % (ref 10–15)
GFR SERPL CREATININE-BSD FRML MDRD: >90 ML/MIN/{1.73_M2}
GLUCOSE SERPL-MCNC: 107 MG/DL (ref 70–99)
HCT VFR BLD AUTO: 25.8 % (ref 40–53)
HGB BLD-MCNC: 8.1 G/DL (ref 13.3–17.7)
IMM GRANULOCYTES # BLD: 0.1 10E9/L (ref 0–0.4)
IMM GRANULOCYTES NFR BLD: 4.4 %
LYMPHOCYTES # BLD AUTO: 0.7 10E9/L (ref 0.8–5.3)
LYMPHOCYTES NFR BLD AUTO: 45.6 %
Lab: NORMAL
Lab: NORMAL
MCH RBC QN AUTO: 30.7 PG (ref 26.5–33)
MCHC RBC AUTO-ENTMCNC: 31.4 G/DL (ref 31.5–36.5)
MCV RBC AUTO: 98 FL (ref 78–100)
MONOCYTES # BLD AUTO: 0.1 10E9/L (ref 0–1.3)
MONOCYTES NFR BLD AUTO: 8.9 %
NEUTROPHILS # BLD AUTO: 0.7 10E9/L (ref 1.6–8.3)
NEUTROPHILS NFR BLD AUTO: 41.1 %
NRBC # BLD AUTO: 0 10*3/UL
NRBC BLD AUTO-RTO: 0 /100
NUM BPU REQUESTED: 1
PLATELET # BLD AUTO: 7 10E9/L (ref 150–450)
POTASSIUM SERPL-SCNC: 4 MMOL/L (ref 3.4–5.3)
PROT SERPL-MCNC: 5.6 G/DL (ref 6.8–8.8)
RBC # BLD AUTO: 2.64 10E12/L (ref 4.4–5.9)
SODIUM SERPL-SCNC: 138 MMOL/L (ref 133–144)
SPECIMEN SOURCE: NORMAL
SPECIMEN SOURCE: NORMAL
TRANSFUSION STATUS PATIENT QL: NORMAL
TRANSFUSION STATUS PATIENT QL: NORMAL
WBC # BLD AUTO: 1.6 10E9/L (ref 4–11)

## 2019-04-09 PROCEDURE — 25000132 ZZH RX MED GY IP 250 OP 250 PS 637: Performed by: INTERNAL MEDICINE

## 2019-04-09 PROCEDURE — C9399 UNCLASSIFIED DRUGS OR BIOLOG: HCPCS | Performed by: INTERNAL MEDICINE

## 2019-04-09 PROCEDURE — 80076 HEPATIC FUNCTION PANEL: CPT | Performed by: INTERNAL MEDICINE

## 2019-04-09 PROCEDURE — P9037 PLATE PHERES LEUKOREDU IRRAD: HCPCS | Performed by: INTERNAL MEDICINE

## 2019-04-09 PROCEDURE — 25000128 H RX IP 250 OP 636: Performed by: INTERNAL MEDICINE

## 2019-04-09 PROCEDURE — 25800030 ZZH RX IP 258 OP 636: Performed by: PHYSICIAN ASSISTANT

## 2019-04-09 PROCEDURE — 25800030 ZZH RX IP 258 OP 636: Performed by: INTERNAL MEDICINE

## 2019-04-09 PROCEDURE — 80048 BASIC METABOLIC PNL TOTAL CA: CPT | Performed by: INTERNAL MEDICINE

## 2019-04-09 PROCEDURE — 25000132 ZZH RX MED GY IP 250 OP 250 PS 637: Performed by: STUDENT IN AN ORGANIZED HEALTH CARE EDUCATION/TRAINING PROGRAM

## 2019-04-09 PROCEDURE — 85025 COMPLETE CBC W/AUTO DIFF WBC: CPT | Performed by: INTERNAL MEDICINE

## 2019-04-09 PROCEDURE — 25000128 H RX IP 250 OP 636: Performed by: PHYSICIAN ASSISTANT

## 2019-04-09 PROCEDURE — 25000128 H RX IP 250 OP 636: Performed by: NURSE PRACTITIONER

## 2019-04-09 PROCEDURE — 25000132 ZZH RX MED GY IP 250 OP 250 PS 637: Performed by: PHYSICIAN ASSISTANT

## 2019-04-09 PROCEDURE — 25000132 ZZH RX MED GY IP 250 OP 250 PS 637: Performed by: NURSE PRACTITIONER

## 2019-04-09 PROCEDURE — 12000012 ZZH R&B MS OVERFLOW UMMC

## 2019-04-09 PROCEDURE — 97535 SELF CARE MNGMENT TRAINING: CPT | Mod: GO

## 2019-04-09 PROCEDURE — 25000131 ZZH RX MED GY IP 250 OP 636 PS 637: Performed by: PHYSICIAN ASSISTANT

## 2019-04-09 PROCEDURE — 99232 SBSQ HOSP IP/OBS MODERATE 35: CPT | Performed by: INTERNAL MEDICINE

## 2019-04-09 PROCEDURE — 97110 THERAPEUTIC EXERCISES: CPT | Mod: GO

## 2019-04-09 RX ORDER — IPRATROPIUM BROMIDE AND ALBUTEROL SULFATE 2.5; .5 MG/3ML; MG/3ML
3 SOLUTION RESPIRATORY (INHALATION) EVERY 4 HOURS PRN
Status: DISCONTINUED | OUTPATIENT
Start: 2019-04-09 | End: 2019-04-15 | Stop reason: HOSPADM

## 2019-04-09 RX ADMIN — VANCOMYCIN HYDROCHLORIDE 125 MG: KIT at 19:43

## 2019-04-09 RX ADMIN — LINEZOLID 600 MG: 600 TABLET, FILM COATED ORAL at 19:43

## 2019-04-09 RX ADMIN — Medication 2 SPRAY: at 07:41

## 2019-04-09 RX ADMIN — VENETOCLAX 200 MG: 100 TABLET, FILM COATED ORAL at 17:24

## 2019-04-09 RX ADMIN — ACYCLOVIR 400 MG: 400 TABLET ORAL at 19:43

## 2019-04-09 RX ADMIN — PIPERACILLIN AND TAZOBACTAM 3.38 G: 3; .375 INJECTION, POWDER, FOR SOLUTION INTRAVENOUS at 04:46

## 2019-04-09 RX ADMIN — TAMSULOSIN HYDROCHLORIDE 0.4 MG: 0.4 CAPSULE ORAL at 07:38

## 2019-04-09 RX ADMIN — PROCHLORPERAZINE MALEATE 10 MG: 5 TABLET, FILM COATED ORAL at 04:46

## 2019-04-09 RX ADMIN — ACETAMINOPHEN 650 MG: 325 TABLET, FILM COATED ORAL at 19:44

## 2019-04-09 RX ADMIN — DECITABINE 43 MG: 50 INJECTION, POWDER, LYOPHILIZED, FOR SOLUTION INTRAVENOUS at 15:17

## 2019-04-09 RX ADMIN — PROCHLORPERAZINE EDISYLATE 10 MG: 5 INJECTION INTRAMUSCULAR; INTRAVENOUS at 11:17

## 2019-04-09 RX ADMIN — ONDANSETRON HYDROCHLORIDE 8 MG: 8 TABLET, FILM COATED ORAL at 07:38

## 2019-04-09 RX ADMIN — ACYCLOVIR 400 MG: 400 TABLET ORAL at 07:38

## 2019-04-09 RX ADMIN — VANCOMYCIN HYDROCHLORIDE 125 MG: KIT at 11:17

## 2019-04-09 RX ADMIN — PIPERACILLIN AND TAZOBACTAM 3.38 G: 3; .375 INJECTION, POWDER, FOR SOLUTION INTRAVENOUS at 23:27

## 2019-04-09 RX ADMIN — VANCOMYCIN HYDROCHLORIDE 125 MG: KIT at 15:19

## 2019-04-09 RX ADMIN — MICAFUNGIN SODIUM 100 MG: 10 INJECTION, POWDER, LYOPHILIZED, FOR SOLUTION INTRAVENOUS at 07:45

## 2019-04-09 RX ADMIN — LINEZOLID 600 MG: 600 TABLET, FILM COATED ORAL at 07:38

## 2019-04-09 RX ADMIN — PANTOPRAZOLE SODIUM 40 MG: 40 TABLET, DELAYED RELEASE ORAL at 07:38

## 2019-04-09 RX ADMIN — ROPINIROLE HYDROCHLORIDE 0.75 MG: 0.5 TABLET, FILM COATED ORAL at 19:43

## 2019-04-09 RX ADMIN — ONDANSETRON HYDROCHLORIDE 8 MG: 8 TABLET, FILM COATED ORAL at 15:19

## 2019-04-09 RX ADMIN — PIPERACILLIN AND TAZOBACTAM 3.38 G: 3; .375 INJECTION, POWDER, FOR SOLUTION INTRAVENOUS at 16:23

## 2019-04-09 RX ADMIN — VANCOMYCIN HYDROCHLORIDE 125 MG: KIT at 07:38

## 2019-04-09 RX ADMIN — PANTOPRAZOLE SODIUM 40 MG: 40 TABLET, DELAYED RELEASE ORAL at 15:32

## 2019-04-09 RX ADMIN — PIPERACILLIN AND TAZOBACTAM 3.38 G: 3; .375 INJECTION, POWDER, FOR SOLUTION INTRAVENOUS at 11:17

## 2019-04-09 RX ADMIN — PROCHLORPERAZINE MALEATE 10 MG: 5 TABLET, FILM COATED ORAL at 23:27

## 2019-04-09 RX ADMIN — PROCHLORPERAZINE EDISYLATE 10 MG: 5 INJECTION INTRAMUSCULAR; INTRAVENOUS at 17:26

## 2019-04-09 RX ADMIN — ONDANSETRON HYDROCHLORIDE 8 MG: 8 TABLET, FILM COATED ORAL at 23:27

## 2019-04-09 RX ADMIN — ACETAMINOPHEN 650 MG: 325 TABLET, FILM COATED ORAL at 05:52

## 2019-04-09 ASSESSMENT — ACTIVITIES OF DAILY LIVING (ADL)
ADLS_ACUITY_SCORE: 16

## 2019-04-09 ASSESSMENT — MIFFLIN-ST. JEOR: SCORE: 1730.21

## 2019-04-09 NOTE — PLAN OF CARE
No complaints overnight, slept soundly. He is getting platelets as ordered this morning. Continue with POC.

## 2019-04-09 NOTE — PROGRESS NOTES
Social Work Services Progress Note    Hospital Day: 29  Date of Initial Social Work Evaluation:  N/A  Collaborated with:  Patient and pt's spouse, Bessy ([phone])    Data:  Pt is a 62 yo male with AML.  SW received call from pt's spouse requesting to meet regarding a HCD.    Intervention:  SW met with pt and pt's spouse, Bessy, at bedside.  Pt momentarily participated in conversation, and fell asleep for most of it.  SW provided blank HCD and explained how to complete.  Bessy reported that she will discuss with pt when he wakes up.    SW checked in regarding support system, coping, and other needs Bessy might have, and offered encouragement and support.    Assessment:  Pt minimally engaged in conversation, but spouse conversant, and appears to be coping well.  Pt appears to have strong pt support, and Bessy seems hopeful about pt's brother's visiting soon followed by her daughter in [place] the following week.    Plan:    Anticipated Disposition:  HHC vs. TCU.  Current PT recs indicate TCU, however it is anticipated that pt will likely progress to d/c home.     Barriers to d/c plan:  Medical stabilty    Follow Up:  SW will continue to follow, and as pt becomes closer to discharge, will initiate TCU referrals if appropriate.    Beatrice Sellers, [address] Hematology/Oncology Social Worker  Phone: [phone]  Pager: [phone]

## 2019-04-09 NOTE — PROGRESS NOTES
[facility], [place]    Hematology / Oncology Progress Note    Date of Admission: 3/12/2019    Date of Service (when I saw the patient): 04/09/2019     Assessment & Plan    Mr. Ace is a 63 year old male with new diagnosis of acute myeloid leukemia incidentally found during work up of nonspecific chest symptoms after partial root canal intervention. He was started on induction chemotherapy with 7+3 (cytarabine and daunorubicin) with D1=3/15/19. BMBx on 3/25 (done early due to rising WBC and blast counts) demonstrated persistent disease with 95% blasts by flow, 98% by morphology. Started re-induction with decitabine on 3/26 PM and added venetoclax on 3/29. His course has been complicated by neutropenic sepsis secondary to odontic/soft tissue infection. He had extraction of teeth #18 and 19 on 4/3 but subsequently developed septic shock requiring transfer to MICU team 4/3-4/4. Hypotension improved after fluid resuscitated and he did not require intubation or pressor support. He was transferred back to Heme Malignancy service on 4/4/19.     Today:  - continue Zosyn, PO Linezolid (plan through 4/13)  - continue PO Vancomycin  - last dose of decitabine due today  - continue venetoclax  - continue micafungin and allow further normalization of transaminases      HEME:  #AML, refractory. NGS positive for IDH2 and RUNX1 alterations.  Patient presented to [facility] ED in [place], [place] for complaints of nonspecific chest discomfort after partial root canal treatment (done 3/11/19). CT C/A/P was negative for PE or other acute findings. On OSH labs, his WBC was incidentally noted to be elevated at 71.1 with Hb 9.0 and plts 97K (prior labs had been unremarkable per patient). Smear was suspicious for immature blasts and acute leukemia. No symptoms of hyperviscosity, TLS or DIC on presentation. 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  Flow consistent with AML with 95% blasts with the following immunophenotype: Positive for CD13, CD33, CD34, CD38, dim to negative CD45, , partial HLA-DR. Baseline ECHO normal, PICC place.   - HLA typing sent, BMT consult done 3/29 by Dr. Brown  - s/p initial Hydrea (dc'd 3/16)   - received induction chemotherapy with 7+3 (cytarabine and daunorubicin). Day 1=3/15/19.   - repeat BMBx done early (3/25) due to rising WBC/blast count. Still with persistent heavy burden of disease (98% blasts by morphology).   - s/p Hydrea for WBC count management; started 1g BID (3/27), incresaed to 2g BID (x3/28). Hydrea discontinued 3/31.   - Started reinduction with decitabine/venetoclax. Decitabine D1=3/26; added Venetoclax on 3/29. Ramp up dosing accounting for concomitant voriconazole. Final dose will be 100mg daily. Today is Day 15 from start of reinduction.  Of note, decitabine D9 and D10 held 4/3 and 4/4, respectively, due to acute illness and elevated Tbili/ALT. With improvement in ALT and Tbili, we resumed decitabine for last 2 doses (4/8 and 4/9).   - Continue Venetoclax at this time, initially at 100 mg dose but with Voriconazole now on hold, venetoclax dose, increased to 200 mg (on 4/6) - currently we will stay at 200 mg as effect of CYP inhibition lasts for 1 week. If/when resuming Vfend, will reduce venetoclax back to 100mg dose.   - now off Allopurinol   - peripheral blasts have not been identified on diff since 4/5.      #Pancytopenia  2/2 AML & associated chemotherapy.   - transfuse to maintain Hb >7, Plt >10K. Did have mild epistaxis today with Plt count 7, currently receiving 1U Plts     #Coagulopathy, in setting of refractory AML and likely exacerbated by poor PO intake. INR elevated, but now downtrending, fibrinogen stable.  - plasma for INR >1.8, cryo for fibrinogen <100  - s/p Vit K x 3 days (4/3-4/5)     ID:  #Neutropenic fever/sepsis with septic shock (4/3-4/4)  #C.diff diarrhea  #Neutropenic  colitis.  #Left mandible cellulitis/odontic infection. S/p extraction of teeth #18 and 19 on 4/3.   First fever on 3/22. Sources include c.diff colitis and odontic/soft tissue infection.   - ID following  - BCx 3/22 to present are NGTD  - 3/23 fungal BCx NGTD  - 3/22 UA negative, repeat UA/UCx negative on 3/30  - 3/22 CXR with small R pleural effusion, no focal airspace opacity  - 3/25 galactomannan and fungitell both negative  - 3/26 C.diff positive  - 3/29 CT C/A/P demonstrated Right thickened hemicolon with adjacent fat stranding  - 3/31 CT Dental demonstrated left hemimandible cellulitis  - Dental consulted for CT findings, unable to intervene as urgently as needed at this time due to acute worsening of clinic status. Thus, ultimately consulted Oral Surgery on 4/3; they performed extraction of teeth #18 and 19 on 4/3.   - pt has now been afebrile since 4/4   - in MICU, started on stress dose steroids, Vit C, and thiamine. D/c'd steroids 4/4, discontinued Vit C and thiamine on 4/5.      **Anti-infectives:  - s/p Cefepime (x 3/22-4/1). Changed to Zosyn (x 4/1-4/3). With septic shock changed to clindamycin (4/3-4/4) and imipenem (x4/3-4/8). Changed back to IV Zosyn (x4/8). Continue through 4/13.  - s/p PO Flagyl (x3/29-4/1).   - s/p IV vanc (x 3/31-4/3). Changed to IV Linezolid (x 4/3- present); changed to PO dosing on 4/7. Continue through 4/13  - continue PO Vancomycin 125mg four times daily. Plan to continue for an additional week after Zosyn/Linezolid antibiotic therapy is completed.     #ID PPx   - continue ACV  - prophy Voriconazole 200mg q12hr. Level on 4/4 was elevated. Pt also having visual hallucinations (now resolved). Dose reduced voriconazole to 150mg q12hr (x 4/4 PM). Acute elevation in transaminases on 4/5 AM, so HOLDING Voriconazole. Trend LFTs and add back Vfend when LFTs normalize. Cover with IV micafungin 100mg daily until can resume Vfend.         GI:  #Hyperbilirubinemia,  improving.  #Transaminitis, improving.  Acute elevation in Tbili (primarily direct) on 4/3 PM, improving by 4/5. Likely related to septic shock. As Tbili improving, noted acute elevation in transaminases on 4/5. Again likely related to sequelae from shock, but Vfend could be contributing. No acute finding on 4/4 Abd US. Denies abdominal/RUQ pain.  - held decitabine chemo, resume as above  - holding Vfend starting 4/5, restart when LFTs improve    #C.diff diarrhea   #Neutropenic colitis  - c.diff treatment as above  - s/p TPN (x4/4-4/7). With improvement in clinical status and stable GI symptoms, ADAT.   - pt reports stool urgency and consistency are slightly improved (4/9)     #Hemorrhoids, improved  - PRN management: TUCKS pads, sitz bath, topical Prep H and/or lidocaine    #Chemotherapy induced nausea, controlled. Denies nausea today (4/9)  - scheduled Zofran q8hr  - compazine also scheduled q6hr  - PRN antiemetics     CV:  #Type 2 NSTEMI 2/2 demand ischemia/septic shock.   Troponin elevated, stable at 0.436-->0.405. Will not further trend. Repeat ECHO (4/5) demonstrates stable EF 55-60%. LV/RV size and function normal. New mild-mod mitral insufficiency. Trivial pericardial effusion. IVC dilated c/w overload.     #Hypervolemia, improving. 2/2 aggressive fluid resuscitation. Weight up max 25 lbs (since 3/31).  - s/p Lasix total 40mg IV (4/5), 20mg IV (4/6). Weight now downtrended, so hold off on further Lasix at this time.     #Subclinical coronary atherosclerosis  #Hyperlipidemia  Total Agatston calcium score was elevated at 591 (91st percentile) on CT coronaries performed 6/8/2016. Nuclear stress test was negative for ischemia on 8/10/2016. Life style modification measures were recommended. Patient follows with cardiology as outpatient (last office visit on 11/12/18).  - holding atorvastatin at this time. Could potentially resume after completion of reinduction if LFTs improve and remain WNL.    PULM:  #Acute  hypoxic respiratory failure. Improved.   Most likely related to edema.   - PRN supplemental O2  - repeat ECHO as above  - diuresis PRN     RENAL:  #Lyte derangement, improved.   Multifactorial with decreased PO intake, fevers, diarrhea.    - lyte repletion per unit protocol (high scale with plan for TPN). If stable tomorrow (4/10), consider backing down to standard replacement.  - calcium repletion PRN     #FOREST, resolved.  Baseline Cr 0.7 (GFR>90). Creatinine uptrended with Cr max 1.45 (GFR 50), now improved/resolved. Multifactorial in setting of decreased PO intake, fevers, diarrhea, and medications (IV Vancomycin, Zosyn, contrast), and development of septic shock.  He describes significant urine output with some urgency, which may related to autodiuresis with kidney recovery, monitor.       :  #BPH   Patient follows with urology ([facility], [place], [place]). On PTA Flomax 0.8 mg daily.  - of note, possible drug interaction between voriconazole and flomax (can raise flomax level in blood & cause hypotension).   - decreased flomax from 0.8mg to 0.4mg (soft BPs in setting of neutropenic sepsis and risk of further hypotension due to interaction with voriconazole). Continue 0.4mg dose at this time.       NEURO/MSK:  #Restless legs syndrome  - Continue ropinirole 0.75 mg at bedtime, offer PRN ativan for persistent symptoms.     #History of lumbar spine degenerative disc disease   Patient is s/p laminectomy/facetectomy procedures. He does not routinely take pain meds.     DENTAL:  #S/P root canal manipulation  #Odontic infection as above  Patient had the start of a root canal treatment initiated on 3/11/19 with plan to complete it a few weeks later (per pt's wife and son). He was started on a 7-day course of oral  mg q6h beginning one day prior to the procedure. At time of admission, he was having pain in his left jaw but site appeared unremarkable with no abscess or drainage.  In retrospect, may have also  had some leukemic infiltration of gums complicating his course. Pain at site initially improved during chemotherapy, but did return. Pain worsened and with higher temps, checked CT Dental which demonstrated cellulitis along left hemimandible. Now with oral infection as above.  - Tylenol PRN, tramadol vs oxycodone PRN   - abx as above  - Dental consult as above  - OMFS consult, intervention on 4/3 as above     PSYCHOSOCIAL:  #Coping.  Appreciate SW and  involvement. Pt had requested help with Advance Directive, SW following     FEN   - no current MIVFs.   - PRN lyte replacement per protocol  - regular diet as tolerated     Prophylaxis  - No pharmacologic VTE ppx due to TCP  - PPI for GI/PUD ppx       Code Status: FULL     Disposition: Anticipate 4-6 week LOS pending completion of chemotherapy, count recovery, and resolution of acute toxicities. Anticipate pt will remain admitted for at least another 1 week for ongoing monitoring of blood counts/transfusion needs, IV antibiotics, and further therapy needs for acute deconditioning.       Discussed with Dr. Lambert.     Vee Cabrera PA-C  Heme/Onc  [phone]        Interval History   Afebrile, VSS, currently on RA. He reported not sleeping well last night due to pain of his left lower jaw area. Did not ask for anything for pain, but reports a throbbing pain. Pain is better this AM (appears he did receive Tylenol at 05:52 AM). Thinks the site just needs time to heal. Planning to order breakfast this AM. He is up and moving about his room, planning to read the paper. He reports breathing comfortably and does not feel winded with moving around his room. Had a mild cough yesterday but does not note this today. Did have start of mild epistaxis this AM, wiped away with tissue and did not continue. Getting Plt unit during this assessment. Denies HA, vision changes, oral lesions, feeling rapid heart beat/palpitations, chest pain, or nausea. He thinks stool urgency and  consistency are getting a little better. He does not increased UOP with some urgency. We discussed this could be related to his kidney function recovering or the recent change in his Flomax dose. Notes feeling a little edematous overall but weight is downtrending now since the aggressive fluid resuscitation.        Physical Exam   Temp: 97.8  F (36.6  C) Temp src: Axillary BP: 126/89 Pulse: 95 Heart Rate: 94 Resp: 16 SpO2: 98 % O2 Device: None (Room air)    Vitals:    04/07/19 0824 04/08/19 0824 04/09/19 0826   Weight: 98.7 kg (217 lb 8 oz) 96.2 kg (212 lb 1.6 oz) 93.4 kg (205 lb 14.4 oz)     Vital Signs with Ranges  Temp:  [96.2  F (35.7  C)-98.2  F (36.8  C)] 97.8  F (36.6  C)  Pulse:  [95] 95  Heart Rate:  [88-99] 94  Resp:  [16-18] 16  BP: (120-132)/(76-94) 126/89  SpO2:  [95 %-98 %] 98 %  I/O last 3 completed shifts:  In: 1268 [I.V.:710; IV Piggyback:119]  Out: 4125 [Urine:4125]    Constitutional: Seen up ad shelley in room, on RA. Pleasant. Appears less tired and to be feeling better today compared to yesterday.   HEENT: NC/AT. Lower jaw edema present but overall improved on left side. OP pink and moist, no additional lesions or thrush seen. Difficult to visualize back of left gum line due to difficulty opening mouth.   Respiratory: Breathing even and non-labored on RA. Lungs clear  without crackles. No wheezing.   CV: RRR. No murmur appreciated.  GI: +BS. Abdomen is mildly distended (further improved today) but remains soft. No tenderness to palpation.  Skin: Warm. No concerning lesions or rash on exposed surfaces.   Musculoskeletal: Trace b/l LE edema.   Neurologic: Alert and oriented. Grossly nonfocal.  Neuropsychiatric: Calm, mentation appears normal, answering questions appropriately.        Medications     IV fluid REPLACEMENT ONLY       - MEDICATION INSTRUCTIONS -         acyclovir  400 mg Oral BID     artificial saliva  1-2 spray Swish & Spit 4x Daily     decitabine (DACOGEN) chemo infusion  20 mg/m2  (Treatment Plan Recorded) Intravenous Q24H     linezolid  600 mg Oral Q12H ZUNILDA     micafungin  100 mg Intravenous Q24H     ondansetron  8 mg Oral Q8H     pantoprazole  40 mg Oral BID AC     piperacillin-tazobactam  3.375 g Intravenous Q6H     prochlorperazine  5-10 mg Intravenous Q6H    Or     prochlorperazine  5-10 mg Oral Q6H     Resume a HELD Medication   Does not apply See Admin Instructions     rOPINIRole  0.75 mg Oral Daily at 8 pm     sodium chloride (PF)  10 mL Intravenous Once     sodium chloride (PF)  10 mL Intracatheter Q7 Days     tamsulosin  0.4 mg Oral Daily     vancomycin  125 mg Oral 4x Daily     venetoclax  200 mg Oral Daily with supper       Data   Results for orders placed or performed during the hospital encounter of 03/12/19 (from the past 24 hour(s))   Hepatic panel   Result Value Ref Range    Bilirubin Direct 0.7 (H) 0.0 - 0.2 mg/dL    Bilirubin Total 1.8 (H) 0.2 - 1.3 mg/dL    Albumin 2.3 (L) 3.4 - 5.0 g/dL    Protein Total 5.6 (L) 6.8 - 8.8 g/dL    Alkaline Phosphatase 98 40 - 150 U/L    ALT 96 (H) 0 - 70 U/L    AST 36 0 - 45 U/L   CBC with platelets differential   Result Value Ref Range    WBC 1.6 (L) 4.0 - 11.0 10e9/L    RBC Count 2.64 (L) 4.4 - 5.9 10e12/L    Hemoglobin 8.1 (L) 13.3 - 17.7 g/dL    Hematocrit 25.8 (L) 40.0 - 53.0 %    MCV 98 78 - 100 fl    MCH 30.7 26.5 - 33.0 pg    MCHC 31.4 (L) 31.5 - 36.5 g/dL    RDW 14.9 10.0 - 15.0 %    Platelet Count 7 (LL) 150 - 450 10e9/L    Diff Method Automated Method     % Neutrophils 41.1 %    % Lymphocytes 45.6 %    % Monocytes 8.9 %    % Eosinophils 0.0 %    % Basophils 0.0 %    % Immature Granulocytes 4.4 %    Nucleated RBCs 0 0 /100    Absolute Neutrophil 0.7 (L) 1.6 - 8.3 10e9/L    Absolute Lymphocytes 0.7 (L) 0.8 - 5.3 10e9/L    Absolute Monocytes 0.1 0.0 - 1.3 10e9/L    Absolute Eosinophils 0.0 0.0 - 0.7 10e9/L    Absolute Basophils 0.0 0.0 - 0.2 10e9/L    Abs Immature Granulocytes 0.1 0 - 0.4 10e9/L    Absolute Nucleated RBC 0.0     Basic metabolic panel   Result Value Ref Range    Sodium 138 133 - 144 mmol/L    Potassium 4.0 3.4 - 5.3 mmol/L    Chloride 106 94 - 109 mmol/L    Carbon Dioxide 23 20 - 32 mmol/L    Anion Gap 8 3 - 14 mmol/L    Glucose 107 (H) 70 - 99 mg/dL    Urea Nitrogen 13 7 - 30 mg/dL    Creatinine 0.64 (L) 0.66 - 1.25 mg/dL    GFR Estimate >90 >60 mL/min/[1.73_m2]    GFR Estimate If Black >90 >60 mL/min/[1.73_m2]    Calcium 8.0 (L) 8.5 - 10.1 mg/dL   Platelets prepare order unit conditional   Result Value Ref Range    Blood Component Type PLT Pheresis     Units Ordered 1    Blood component   Result Value Ref Range    Unit Number [id number]     Blood Component Type PlateletPheresis,LeukoRed Irrad (Part 2)     Division Number 00     Status of Unit Released to care unit 04/09/2019 0615     Blood Product Code [id number]     Unit Status ISS

## 2019-04-09 NOTE — PLAN OF CARE
Problem: Adult Inpatient Plan of Care  Goal: Plan of Care Review  4/9/2019 1757 by Tonya Moon RN  Outcome: No Change     Problem: Adult Inpatient Plan of Care  Goal: Patient-Specific Goal (Individualization)  4/9/2019 1757 by Tonya Moon RN  Outcome: No Change     Problem: Adult Inpatient Plan of Care  Goal: Absence of Hospital-Acquired Illness or Injury  4/9/2019 1757 by Tonya Moon RN  Outcome: No Change     Problem: Adult Inpatient Plan of Care  Goal: Optimal Comfort and Wellbeing  4/9/2019 1757 by Tonya Moon RN  Outcome: No Change     Problem: Nausea and Vomiting (Chemotherapy Effects)  Goal: Fluid and Electrolyte Balance  4/9/2019 1757 by Tonya Moon RN  Outcome: No Change     Problem: Neutropenia (Chemotherapy Effects)  Goal: Absence of Infection  4/9/2019 1757 by Tonya Moon RN  Outcome: No Change   Patient has no complaints of pain or nausea today.  He worked with physical therapy today but has sleeping most of the day.  He is eating 75 % of his meals. Blood pressure increased with activity but returned to baseline shortly after.  Continue to encourage activity.

## 2019-04-09 NOTE — PLAN OF CARE
Discharge Planner OT   Patient plan for discharge: Home w/A  Current status: Pt ambulated ~400' with FWW and SBA, pt with increased fatigue and therefore used assistive device. Pt toileted I. Pt unable to recall lab values and implications for activity, re-educated. Plan to attempt shower w/OT tomorrow. BP was hypertensive pre/post, informed RN (149/97 pre, 159/110 post).  Barriers to return to prior living situation: Medical status, cognition.   Recommendations for discharge: Anticipate home w/A  Rationale for recommendations: For heavy ADLs, supervision with IADLs.        Entered by: Tita Rojas 04/09/2019 3:04 PM     OT 5C

## 2019-04-10 ENCOUNTER — APPOINTMENT (OUTPATIENT)
Dept: OCCUPATIONAL THERAPY | Facility: CLINIC | Age: 64
DRG: 834 | End: 2019-04-10
Payer: COMMERCIAL

## 2019-04-10 ENCOUNTER — APPOINTMENT (OUTPATIENT)
Dept: PHYSICAL THERAPY | Facility: CLINIC | Age: 64
DRG: 834 | End: 2019-04-10
Payer: COMMERCIAL

## 2019-04-10 LAB
ALBUMIN SERPL-MCNC: 2.4 G/DL (ref 3.4–5)
ALP SERPL-CCNC: 104 U/L (ref 40–150)
ALT SERPL W P-5'-P-CCNC: 84 U/L (ref 0–70)
ANION GAP SERPL CALCULATED.3IONS-SCNC: 8 MMOL/L (ref 3–14)
AST SERPL W P-5'-P-CCNC: 25 U/L (ref 0–45)
BASOPHILS # BLD AUTO: 0 10E9/L (ref 0–0.2)
BASOPHILS NFR BLD AUTO: 0 %
BILIRUB DIRECT SERPL-MCNC: 0.6 MG/DL (ref 0–0.2)
BILIRUB SERPL-MCNC: 1.2 MG/DL (ref 0.2–1.3)
BUN SERPL-MCNC: 11 MG/DL (ref 7–30)
CALCIUM SERPL-MCNC: 8.2 MG/DL (ref 8.5–10.1)
CHLORIDE SERPL-SCNC: 108 MMOL/L (ref 94–109)
CO2 SERPL-SCNC: 24 MMOL/L (ref 20–32)
CREAT SERPL-MCNC: 0.69 MG/DL (ref 0.66–1.25)
DIFFERENTIAL METHOD BLD: ABNORMAL
EOSINOPHIL # BLD AUTO: 0 10E9/L (ref 0–0.7)
EOSINOPHIL NFR BLD AUTO: 0 %
ERYTHROCYTE [DISTWIDTH] IN BLOOD BY AUTOMATED COUNT: 14.8 % (ref 10–15)
FIBRINOGEN PPP-MCNC: 357 MG/DL (ref 200–420)
GFR SERPL CREATININE-BSD FRML MDRD: >90 ML/MIN/{1.73_M2}
GLUCOSE SERPL-MCNC: 100 MG/DL (ref 70–99)
HCT VFR BLD AUTO: 24.6 % (ref 40–53)
HGB BLD-MCNC: 7.8 G/DL (ref 13.3–17.7)
INR PPP: 1.15 (ref 0.86–1.14)
LYMPHOCYTES # BLD AUTO: 1.1 10E9/L (ref 0.8–5.3)
LYMPHOCYTES NFR BLD AUTO: 61.7 %
MAGNESIUM SERPL-MCNC: 2.2 MG/DL (ref 1.6–2.3)
MCH RBC QN AUTO: 30.8 PG (ref 26.5–33)
MCHC RBC AUTO-ENTMCNC: 31.7 G/DL (ref 31.5–36.5)
MCV RBC AUTO: 97 FL (ref 78–100)
MONOCYTES # BLD AUTO: 0 10E9/L (ref 0–1.3)
MONOCYTES NFR BLD AUTO: 0.9 %
NEUTROPHILS # BLD AUTO: 0.7 10E9/L (ref 1.6–8.3)
NEUTROPHILS NFR BLD AUTO: 37.4 %
PHOSPHATE SERPL-MCNC: 3.8 MG/DL (ref 2.5–4.5)
PLATELET # BLD AUTO: 17 10E9/L (ref 150–450)
PLATELET # BLD EST: ABNORMAL 10*3/UL
POTASSIUM SERPL-SCNC: 4.1 MMOL/L (ref 3.4–5.3)
PROT SERPL-MCNC: 5.8 G/DL (ref 6.8–8.8)
RBC # BLD AUTO: 2.53 10E12/L (ref 4.4–5.9)
RBC MORPH BLD: NORMAL
SODIUM SERPL-SCNC: 140 MMOL/L (ref 133–144)
WBC # BLD AUTO: 1.8 10E9/L (ref 4–11)

## 2019-04-10 PROCEDURE — 25000132 ZZH RX MED GY IP 250 OP 250 PS 637: Performed by: PHYSICIAN ASSISTANT

## 2019-04-10 PROCEDURE — 97116 GAIT TRAINING THERAPY: CPT | Mod: GP

## 2019-04-10 PROCEDURE — 25000132 ZZH RX MED GY IP 250 OP 250 PS 637: Performed by: INTERNAL MEDICINE

## 2019-04-10 PROCEDURE — 25000132 ZZH RX MED GY IP 250 OP 250 PS 637: Performed by: STUDENT IN AN ORGANIZED HEALTH CARE EDUCATION/TRAINING PROGRAM

## 2019-04-10 PROCEDURE — 83735 ASSAY OF MAGNESIUM: CPT | Performed by: INTERNAL MEDICINE

## 2019-04-10 PROCEDURE — 84100 ASSAY OF PHOSPHORUS: CPT | Performed by: INTERNAL MEDICINE

## 2019-04-10 PROCEDURE — 80048 BASIC METABOLIC PNL TOTAL CA: CPT | Performed by: INTERNAL MEDICINE

## 2019-04-10 PROCEDURE — 97750 PHYSICAL PERFORMANCE TEST: CPT | Mod: GP

## 2019-04-10 PROCEDURE — 85384 FIBRINOGEN ACTIVITY: CPT | Performed by: INTERNAL MEDICINE

## 2019-04-10 PROCEDURE — C9399 UNCLASSIFIED DRUGS OR BIOLOG: HCPCS | Performed by: INTERNAL MEDICINE

## 2019-04-10 PROCEDURE — 12000012 ZZH R&B MS OVERFLOW UMMC

## 2019-04-10 PROCEDURE — 99232 SBSQ HOSP IP/OBS MODERATE 35: CPT | Performed by: INTERNAL MEDICINE

## 2019-04-10 PROCEDURE — 85025 COMPLETE CBC W/AUTO DIFF WBC: CPT | Performed by: INTERNAL MEDICINE

## 2019-04-10 PROCEDURE — 80076 HEPATIC FUNCTION PANEL: CPT | Performed by: INTERNAL MEDICINE

## 2019-04-10 PROCEDURE — 25000131 ZZH RX MED GY IP 250 OP 636 PS 637: Performed by: PHYSICIAN ASSISTANT

## 2019-04-10 PROCEDURE — 25000132 ZZH RX MED GY IP 250 OP 250 PS 637: Performed by: NURSE PRACTITIONER

## 2019-04-10 PROCEDURE — 85610 PROTHROMBIN TIME: CPT | Performed by: INTERNAL MEDICINE

## 2019-04-10 PROCEDURE — 25800030 ZZH RX IP 258 OP 636: Performed by: PHYSICIAN ASSISTANT

## 2019-04-10 PROCEDURE — 97535 SELF CARE MNGMENT TRAINING: CPT | Mod: GO

## 2019-04-10 PROCEDURE — 97530 THERAPEUTIC ACTIVITIES: CPT | Mod: GO

## 2019-04-10 PROCEDURE — 25000128 H RX IP 250 OP 636: Performed by: PHYSICIAN ASSISTANT

## 2019-04-10 RX ADMIN — ONDANSETRON HYDROCHLORIDE 8 MG: 8 TABLET, FILM COATED ORAL at 17:11

## 2019-04-10 RX ADMIN — TAMSULOSIN HYDROCHLORIDE 0.4 MG: 0.4 CAPSULE ORAL at 08:21

## 2019-04-10 RX ADMIN — VENETOCLAX 200 MG: 100 TABLET, FILM COATED ORAL at 18:38

## 2019-04-10 RX ADMIN — PROCHLORPERAZINE MALEATE 5 MG: 5 TABLET, FILM COATED ORAL at 04:39

## 2019-04-10 RX ADMIN — PIPERACILLIN AND TAZOBACTAM 3.38 G: 3; .375 INJECTION, POWDER, FOR SOLUTION INTRAVENOUS at 17:11

## 2019-04-10 RX ADMIN — Medication 2 SPRAY: at 11:51

## 2019-04-10 RX ADMIN — LINEZOLID 600 MG: 600 TABLET, FILM COATED ORAL at 20:00

## 2019-04-10 RX ADMIN — VANCOMYCIN HYDROCHLORIDE 125 MG: KIT at 17:13

## 2019-04-10 RX ADMIN — MICAFUNGIN SODIUM 100 MG: 10 INJECTION, POWDER, LYOPHILIZED, FOR SOLUTION INTRAVENOUS at 09:21

## 2019-04-10 RX ADMIN — LINEZOLID 600 MG: 600 TABLET, FILM COATED ORAL at 08:21

## 2019-04-10 RX ADMIN — PIPERACILLIN AND TAZOBACTAM 3.38 G: 3; .375 INJECTION, POWDER, FOR SOLUTION INTRAVENOUS at 04:39

## 2019-04-10 RX ADMIN — ACYCLOVIR 400 MG: 400 TABLET ORAL at 20:00

## 2019-04-10 RX ADMIN — ACYCLOVIR 400 MG: 400 TABLET ORAL at 08:21

## 2019-04-10 RX ADMIN — PANTOPRAZOLE SODIUM 40 MG: 40 TABLET, DELAYED RELEASE ORAL at 08:21

## 2019-04-10 RX ADMIN — VANCOMYCIN HYDROCHLORIDE 125 MG: KIT at 08:21

## 2019-04-10 RX ADMIN — PROCHLORPERAZINE MALEATE 5 MG: 5 TABLET, FILM COATED ORAL at 11:53

## 2019-04-10 RX ADMIN — Medication 2 SPRAY: at 08:22

## 2019-04-10 RX ADMIN — VANCOMYCIN HYDROCHLORIDE 125 MG: KIT at 20:00

## 2019-04-10 RX ADMIN — PIPERACILLIN AND TAZOBACTAM 3.38 G: 3; .375 INJECTION, POWDER, FOR SOLUTION INTRAVENOUS at 23:23

## 2019-04-10 RX ADMIN — VANCOMYCIN HYDROCHLORIDE 125 MG: KIT at 11:50

## 2019-04-10 RX ADMIN — PANTOPRAZOLE SODIUM 40 MG: 40 TABLET, DELAYED RELEASE ORAL at 17:11

## 2019-04-10 RX ADMIN — ROPINIROLE HYDROCHLORIDE 0.75 MG: 0.5 TABLET, FILM COATED ORAL at 20:00

## 2019-04-10 RX ADMIN — ONDANSETRON HYDROCHLORIDE 8 MG: 8 TABLET, FILM COATED ORAL at 08:21

## 2019-04-10 RX ADMIN — PROCHLORPERAZINE MALEATE 5 MG: 5 TABLET, FILM COATED ORAL at 17:11

## 2019-04-10 RX ADMIN — PIPERACILLIN AND TAZOBACTAM 3.38 G: 3; .375 INJECTION, POWDER, FOR SOLUTION INTRAVENOUS at 11:10

## 2019-04-10 RX ADMIN — PROCHLORPERAZINE MALEATE 5 MG: 5 TABLET, FILM COATED ORAL at 08:21

## 2019-04-10 ASSESSMENT — ACTIVITIES OF DAILY LIVING (ADL)
ADLS_ACUITY_SCORE: 16

## 2019-04-10 ASSESSMENT — MIFFLIN-ST. JEOR: SCORE: 1701.63

## 2019-04-10 NOTE — PROGRESS NOTES
[facility] [place]    Hematology / Oncology Progress Note    Date of Admission: 3/12/2019    Date of Service (when I saw the patient): 04/10/2019     Assessment & Plan    Mr. Ace is a 63 year old male with new diagnosis of acute myeloid leukemia incidentally found during work up of nonspecific chest symptoms after partial root canal intervention. He was started on induction chemotherapy with 7+3 (cytarabine and daunorubicin) with D1=3/15/19. BMBx on 3/25 (done early due to rising WBC and blast counts) demonstrated persistent disease with 95% blasts by flow, 98% by morphology. Started re-induction with decitabine on 3/26 PM and added venetoclax on 3/29. His course has been complicated by neutropenic sepsis secondary to odontic/soft tissue infection. He had extraction of teeth #18 and 19 on 4/3 but subsequently developed septic shock requiring transfer to MICU team 4/3-4/4. Hypotension improved after fluid resuscitated and he did not require intubation or pressor support. He was transferred back to [facility] service on 4/4/19.     Today:  - continue Zosyn, PO Linezolid (plan through 4/13)  - continue PO Vancomycin  - continue venetoclax at current dose  - continue micafungin and allow further normalization of transaminases before restarting voriconazole  - called and left message for pt's wife (Bessy)      HEME:  #AML, refractory. NGS positive for IDH2 and RUNX1 alterations.  Patient presented to [facility] ED in [place], [place] for complaints of nonspecific chest discomfort after partial root canal treatment (done 3/11/19). CT C/A/P was negative for PE or other acute findings. On OSH labs, his WBC was incidentally noted to be elevated at 71.1 with Hb 9.0 and plts 97K (prior labs had been unremarkable per patient). Smear was suspicious for immature blasts and acute leukemia. No symptoms of hyperviscosity, TLS or DIC on presentation. Underwent BM biopsy (3/13) which confirmed  acute myeloid leukemia 95% cellularity with 95% blasts. Flow consistent with AML with 95% blasts with the following immunophenotype: Positive for CD13, CD33, CD34, CD38, dim to negative CD45, , partial HLA-DR. Baseline ECHO normal, PICC place.   - HLA typing sent, BMT consult done 3/29 by Dr. Brown  - s/p initial Hydrea (dc'd 3/16)   - received induction chemotherapy with 7+3 (cytarabine and daunorubicin). Day 1=3/15/19.   - repeat BMBx done early (3/25) due to rising WBC/blast count. Still with persistent heavy burden of disease (98% blasts by morphology).   - s/p Hydrea for WBC count management; started 1g BID (3/27), incresaed to 2g BID (x3/28). Hydrea discontinued 3/31.   - Started reinduction with decitabine/venetoclax. Decitabine D1=3/26; added Venetoclax on 3/29. Ramp up dosing accounting for concomitant voriconazole. Final dose will be 100mg daily. Today is Day 16 from start of reinduction.  Of note, decitabine D9 and D10 held 4/3 and 4/4, respectively, due to acute illness and elevated Tbili/ALT. With improvement in ALT and Tbili, we resumed decitabine for last 2 doses (4/8 and 4/9).   - Continue Venetoclax at this time, initially at 100 mg dose but with Voriconazole now on hold, venetoclax dose, increased to 200 mg (on 4/6) - currently we will stay at 200 mg as effect of CYP inhibition lasts for 1 week. If/when resuming Vfend, will reduce venetoclax back to 100mg dose.   - now off Allopurinol   - peripheral blasts have not been identified on diff since 4/5.      #Pancytopenia  2/2 AML & associated chemotherapy.   - transfuse to maintain Hb >7, Plt >10K. Did have mild epistaxis on 4/9 with Plt count 7. None further after 1U Plts.     #Coagulopathy, improving.  in setting of refractory AML and likely exacerbated by prior poor PO intake. INR elevated, but now downtrending. Fibrinogen stable.  - plasma for INR >1.8, cryo for fibrinogen <100  - s/p Vit K x 3 days (4/3-4/5)     ID:  #Neutropenic  fever/sepsis with septic shock (4/3-4/4)  #C.diff diarrhea  #Neutropenic colitis.  #Left mandible cellulitis/odontic infection. S/p extraction of teeth #18 and 19 on 4/3.   First fever on 3/22. Sources include c.diff colitis and odontic/soft tissue infection.   - ID following  - BCx 3/22 to present are NGTD  - 3/23 fungal BCx NGTD  - 3/22 UA negative, repeat UA/UCx negative on 3/30  - 3/22 CXR with small R pleural effusion, no focal airspace opacity  - 3/25 galactomannan and fungitell both negative  - 3/26 C.diff positive  - 3/29 CT C/A/P demonstrated Right thickened hemicolon with adjacent fat stranding  - 3/31 CT Dental demonstrated left hemimandible cellulitis  - Dental consulted for CT findings, unable to intervene as urgently as needed at this time due to acute worsening of clinic status. Thus, ultimately consulted Oral Surgery on 4/3; they performed extraction of teeth #18 and 19 on 4/3.   - pt has now been afebrile since 4/4   - in MICU, started on stress dose steroids, Vit C, and thiamine. D/c'd steroids 4/4, discontinued Vit C and thiamine on 4/5.      **Anti-infectives:  - s/p Cefepime (x 3/22-4/1). Changed to Zosyn (x 4/1-4/3). With septic shock changed to clindamycin (4/3-4/4) and imipenem (x4/3-4/8). Changed back to IV Zosyn (x4/8). Continue through 4/13.  - s/p PO Flagyl (x3/29-4/1).   - s/p IV vanc (x 3/31-4/3). Changed to IV Linezolid (x 4/3- present); changed to PO dosing on 4/7. Continue through 4/13  - continue PO Vancomycin 125mg four times daily. Plan to continue for an additional week after Zosyn/Linezolid antibiotic therapy is completed.     #ID PPx   - continue ACV  - prophy Voriconazole 200mg q12hr. Level on 4/4 was elevated. Pt also having visual hallucinations (now resolved). Dose reduced voriconazole to 150mg q12hr (x 4/4 PM). Acute elevation in transaminases on 4/5 AM, so HOLDING Voriconazole. Trend LFTs and add back Vfend when LFTs normalize. Cover with IV micafungin 100mg daily until  can resume Vfend.         GI:  #Hyperbilirubinemia, improving.  #Transaminitis, improving.  Acute elevation in Tbili (primarily direct) on 4/3 PM, improving by 4/5. Likely related to septic shock. As Tbili improving, noted acute elevation in transaminases on 4/5. Again likely related to sequelae from shock, but Vfend could be contributing. No acute finding on 4/4 Abd US. Denies abdominal/RUQ pain.  - held decitabine chemo, resume as above  - holding Vfend starting 4/5, restart when LFTs improve    #C.diff diarrhea   #Neutropenic colitis  - c.diff treatment as above  - s/p TPN (x4/4-4/7). With improvement in clinical status and stable GI symptoms, ADAT.   - improving     #Hemorrhoids, improved  - PRN management: TUCKS pads, sitz bath, topical Prep H and/or lidocaine    #Chemotherapy induced nausea, controlled. Denies nausea today (4/9)  - scheduled Zofran q8hr  - compazine also scheduled q6hr  - PRN antiemetics     CV:  #Type 2 NSTEMI 2/2 demand ischemia/septic shock.   Troponin elevated, stable at 0.436-->0.405. Will not further trend. Repeat ECHO (4/5) demonstrates stable EF 55-60%. LV/RV size and function normal. New mild-mod mitral insufficiency. Trivial pericardial effusion. IVC dilated c/w overload.     #Hypervolemia, improving. 2/2 aggressive fluid resuscitation. Weight up max 25 lbs (since 3/31).  - s/p Lasix total 40mg IV (4/5), 20mg IV (4/6). Weight now downtrended, so hold off on further Lasix at this time.     #Subclinical coronary atherosclerosis  #Hyperlipidemia  Total Agatston calcium score was elevated at 591 (91st percentile) on CT coronaries performed 6/8/2016. Nuclear stress test was negative for ischemia on 8/10/2016. Life style modification measures were recommended. Patient follows with cardiology as outpatient (last office visit on 11/12/18).  - holding atorvastatin at this time. Could potentially resume after completion of reinduction if LFTs improve and remain WNL.    PULM:  #Acute hypoxic  respiratory failure. Resolved.   Most likely related to edema.   - PRN supplemental O2  - repeat ECHO as above  - diuresis PRN     RENAL:  #Lyte derangement, improved.   Multifactorial with decreased PO intake, fevers, diarrhea.    - lyte repletion per unit protocol   - calcium repletion PRN     #FOREST, resolved.  Baseline Cr 0.7 (GFR>90). Creatinine uptrended with Cr max 1.45 (GFR 50), now improved/resolved. Multifactorial in setting of decreased PO intake, fevers, diarrhea, and medications (IV Vancomycin, Zosyn, contrast), and development of septic shock.    - Ongoing increased urine output with some urgency, which may be related to autodiuresis with kidney recovery, monitor.       :  #BPH   Patient follows with urology ([facility], [place], [place]). On PTA Flomax 0.8 mg daily.  - of note, possible drug interaction between voriconazole and flomax (can raise flomax level in blood & cause hypotension).   - decreased flomax from 0.8mg to 0.4mg (soft BPs in setting of neutropenic sepsis and risk of further hypotension due to interaction with voriconazole). Continue 0.4mg dose at this time.       NEURO/MSK:  #Restless legs syndrome  - Continue ropinirole 0.75 mg at bedtime, offer PRN ativan for persistent symptoms.     #History of lumbar spine degenerative disc disease   Patient is s/p laminectomy/facetectomy procedures. He does not routinely take pain meds.     DENTAL:  #S/P root canal manipulation  #Odontic infection as above  Patient had the start of a root canal treatment initiated on 3/11/19 with plan to complete it a few weeks later (per pt's wife and son). He was started on a 7-day course of oral  mg q6h beginning one day prior to the procedure. At time of admission, he was having pain in his left jaw but site appeared unremarkable with no abscess or drainage.  In retrospect, may have also had some leukemic infiltration of gums complicating his course. Pain at site initially improved during  "chemotherapy, but did return. Pain worsened and with higher temps, checked CT Dental which demonstrated cellulitis along left hemimandible. Developed worsening oral infection as above.  - Tylenol PRN, tramadol vs oxycodone PRN   - abx as above  - Dental consult as above  - OMFS consult, intervention on 4/3 as above     PSYCHOSOCIAL:  #Coping.  Appreciate SW and  involvement. Pt had requested help with Advance Directive, SW following     FEN   - no current MIVFs.   - PRN lyte replacement per protocol  - regular diet as tolerated     Prophylaxis  - No pharmacologic VTE ppx due to TCP  - PPI for GI/PUD ppx       Code Status: FULL     Disposition: Anticipate 4-6 week LOS pending completion of chemotherapy, count recovery, and resolution of acute toxicities. Anticipate pt will remain admitted for at least another 1 week for ongoing monitoring of blood counts/transfusion needs, IV antibiotics, and further therapy needs for acute deconditioning.       Discussed with Dr. Lambert.     Vee Cabrera PA-C  Heme/Onc  [phone]        Interval History   Afebrile. No acute events overnight. Reports left lower jaw pain is gradually improving, not throbbing today. Has been able to eat some more substantial foods. No further epistaxis but does have some rhinorrhea. He is otherwise doing ok, feeling a little \"bored\". Anticipating visit from more family members (brothers) coming from out of town tomorrow. Asks that his wife, Bessy, be updated today.          Physical Exam   Temp: 97.3  F (36.3  C) Temp src: Axillary BP: 118/87 Pulse: 97 Heart Rate: 94 Resp: 14 SpO2: 99 % O2 Device: None (Room air)    Vitals:    04/08/19 0824 04/09/19 0826 04/10/19 0903   Weight: 96.2 kg (212 lb 1.6 oz) 93.4 kg (205 lb 14.4 oz) 90.5 kg (199 lb 9.6 oz)     Vital Signs with Ranges  Temp:  [96.4  F (35.8  C)-98.2  F (36.8  C)] 97.3  F (36.3  C)  Pulse:  [91-97] 97  Heart Rate:  [] 94  Resp:  [14-16] 14  BP: (118-149)/(86-96) 118/87  SpO2:  [96 " %-99 %] 99 %  I/O last 3 completed shifts:  In: 1660 [P.O.:1020; I.V.:220]  Out: 4550 [Urine:4550]    Constitutional: Seen up ad shelley in room, on RA. Pleasant. Appears less tired and to be feeling better today compared to yesterday.   HEENT: NC/AT. Lower jaw edema present but overall improved on left side. OP pink and moist, no additional lesions or thrush seen. Difficult to visualize back of left gum line due to difficulty opening mouth.   Respiratory: Breathing even and non-labored on RA. Lungs clear  without crackles. No wheezing.   CV: RRR. No murmur appreciated.  GI: +BS. Abdomen is mildly distended (further improved today) but remains soft. No tenderness to palpation.  Skin: Warm. No concerning lesions or rash on exposed surfaces.   Musculoskeletal: Trace b/l LE edema.   Neurologic: Alert and oriented. Grossly nonfocal.  Neuropsychiatric: Calm, mentation appears normal, answering questions appropriately.        Medications     IV fluid REPLACEMENT ONLY       - MEDICATION INSTRUCTIONS -         acyclovir  400 mg Oral BID     artificial saliva  1-2 spray Swish & Spit 4x Daily     linezolid  600 mg Oral Q12H ZUNILDA     micafungin  100 mg Intravenous Q24H     ondansetron  8 mg Oral Q8H     pantoprazole  40 mg Oral BID AC     piperacillin-tazobactam  3.375 g Intravenous Q6H     prochlorperazine  5-10 mg Intravenous Q6H    Or     prochlorperazine  5-10 mg Oral Q6H     Resume a HELD Medication   Does not apply See Admin Instructions     rOPINIRole  0.75 mg Oral Daily at 8 pm     sodium chloride (PF)  10 mL Intravenous Once     sodium chloride (PF)  10 mL Intracatheter Q7 Days     tamsulosin  0.4 mg Oral Daily     vancomycin  125 mg Oral 4x Daily     venetoclax  200 mg Oral Daily with supper       Data   Results for orders placed or performed during the hospital encounter of 03/12/19 (from the past 24 hour(s))   Phosphorus   Result Value Ref Range    Phosphorus 3.8 2.5 - 4.5 mg/dL   Magnesium   Result Value Ref Range     Magnesium 2.2 1.6 - 2.3 mg/dL   INR   Result Value Ref Range    INR 1.15 (H) 0.86 - 1.14   Hepatic panel   Result Value Ref Range    Bilirubin Direct 0.6 (H) 0.0 - 0.2 mg/dL    Bilirubin Total 1.2 0.2 - 1.3 mg/dL    Albumin 2.4 (L) 3.4 - 5.0 g/dL    Protein Total 5.8 (L) 6.8 - 8.8 g/dL    Alkaline Phosphatase 104 40 - 150 U/L    ALT 84 (H) 0 - 70 U/L    AST 25 0 - 45 U/L   Fibrinogen activity   Result Value Ref Range    Fibrinogen 357 200 - 420 mg/dL   CBC with platelets differential   Result Value Ref Range    WBC 1.8 (L) 4.0 - 11.0 10e9/L    RBC Count 2.53 (L) 4.4 - 5.9 10e12/L    Hemoglobin 7.8 (L) 13.3 - 17.7 g/dL    Hematocrit 24.6 (L) 40.0 - 53.0 %    MCV 97 78 - 100 fl    MCH 30.8 26.5 - 33.0 pg    MCHC 31.7 31.5 - 36.5 g/dL    RDW 14.8 10.0 - 15.0 %    Platelet Count 17 (LL) 150 - 450 10e9/L    Diff Method Manual Differential     % Neutrophils 37.4 %    % Lymphocytes 61.7 %    % Monocytes 0.9 %    % Eosinophils 0.0 %    % Basophils 0.0 %    Absolute Neutrophil 0.7 (L) 1.6 - 8.3 10e9/L    Absolute Lymphocytes 1.1 0.8 - 5.3 10e9/L    Absolute Monocytes 0.0 0.0 - 1.3 10e9/L    Absolute Eosinophils 0.0 0.0 - 0.7 10e9/L    Absolute Basophils 0.0 0.0 - 0.2 10e9/L    RBC Morphology Normal     Platelet Estimate Confirming automated cell count    Basic metabolic panel   Result Value Ref Range    Sodium 140 133 - 144 mmol/L    Potassium 4.1 3.4 - 5.3 mmol/L    Chloride 108 94 - 109 mmol/L    Carbon Dioxide 24 20 - 32 mmol/L    Anion Gap 8 3 - 14 mmol/L    Glucose 100 (H) 70 - 99 mg/dL    Urea Nitrogen 11 7 - 30 mg/dL    Creatinine 0.69 0.66 - 1.25 mg/dL    GFR Estimate >90 >60 mL/min/[1.73_m2]    GFR Estimate If Black >90 >60 mL/min/[1.73_m2]    Calcium 8.2 (L) 8.5 - 10.1 mg/dL

## 2019-04-10 NOTE — PLAN OF CARE
OT 5C:  Discharge Planner OT   Patient plan for discharge: home  Current status: Pt completed full shower and dressing routine with set-up A and supervision.  Pt alternating sit<>stand, needs grab bar for balance when attempting LB dressing from standing; encouraged pt to complete seated for increase safety and pt then able to complete mod I.  Pt receptive to EC strategies for bathing.  Barriers to return to prior living situation: medical status, deconditioning, cognition  Recommendations for discharge: Home with A, recommend shower chair for bathing  Rationale for recommendations: Pt demonstrating improvement with ADL, anticipate will be safe to discharge home with supervision 2/2 cognition and A for heavy IADL pending lab values       Entered by: Shoshana Strickland 04/10/2019 9:20 AM

## 2019-04-10 NOTE — PROGRESS NOTES
"Attending note:    I have reviewed today's vital signs, medications, labs and imaging results. I have seen, evaluated and examined the patient independently and discussed the plan with the patient and team. The associated note has been read and corrected by me.  My independent findings and assessment are below.      Subjective: The left lower jaw pain is better today. He feels weak. He denies other complaints.     /87 (BP Location: Left arm)   Pulse 97   Temp 97.3  F (36.3  C) (Axillary)   Resp 14   Ht 1.778 m (5' 10\")   Wt 90.5 kg (199 lb 9.6 oz)   SpO2 99%   BMI 28.64 kg/m       General: frail man, not in distress  Lungs: Clear to auscultation  Abdomen: Soft, non tender      Assessment and plan: 64 yo man with AML s/p 7+3 with refractory disease s/p re-induction with  decitabine and venetoclax, today 16. Also hospital course complicated by C.dif diarrhea and neutropenic fever.  Decitabine was held on on 4/3-4 due to elevated liver enzymes and was restarted 4/8-9. Will continue venetoclax 200 mg daily.  Will continue micafungin, PO linezolid, zosyn and po vancomycin. Will switch micafungin to voriconazole when LFT normalize. Remainder of supportive care as per note below.  Mike Lambert MD  Pager:[phone]    "

## 2019-04-10 NOTE — PLAN OF CARE
Discharge Planner PT   Patient plan for discharge: home with spouse assist  Current status: pt IND sit<>stand, IND ambulation in room, ambulates x800' with supervision and noted mild high level balance deficits, navigates x12 steps with single R rail Td. Balance tested with BAEZA assessment, scoring 53/56 indicated reduced falls risk, pt okay to be up IND in room.  Barriers to return to prior living situation: medical needs  Recommendations for discharge: home with assist  Rationale for recommendations: pt slightly below baseline with high level balance, strength, and activity tolerance deficits compared to baseline, but demos adequate safe mobility for return home without AD and wife assist.       Entered by: Ashley Narayanan 04/10/2019 11:41 AM       Baeza Balance Scale (BBS) Cutoff Scores: A score of ? 45/56 indicates an increased risk for falls.   Patient Score: 53/56    The BBS is a measure of static and dynamic standing balance that has been validated in community dwelling elderly individuals and individuals who have Parkinson's Disease, MS, and those who are s/p CVA and TBI. The test is administered without an assistive device. Scores from the Baeza are used to determine the probability of falling based on the patient's previous history of falls and their test performance.     Minimal Detectable Change = 6.5   & Minimal Detectable Change (Parkinson's Disease) = 5 according to [employer] 2008    Assessment (rationale for performing, application to patient?s function & care plan): impaired balance during admission, determining need for AD at discharge  Minutes billed as physical performance test: 8

## 2019-04-10 NOTE — PLAN OF CARE
"/90 (BP Location: Right arm)   Pulse 100   Temp 97.5  F (36.4  C) (Axillary)   Resp 18   Ht 1.778 m (5' 10\")   Wt 90.5 kg (199 lb 9.6 oz)   SpO2 98%   BMI 28.64 kg/m    VSS, AOx4, offered no complaints this shift, up with SBA and strong on his feet.  Streaks of blood in his PICC line that appear stringy, possibly need TPA? Lines infuse fine.  Continue POC.  Problem: Adult Inpatient Plan of Care  Goal: Plan of Care Review  4/10/2019 1831 by Elan Helms, RN  Outcome: No Change     Problem: Nausea and Vomiting (Chemotherapy Effects)  Goal: Fluid and Electrolyte Balance  4/10/2019 1831 by Elan Helms, RN  Outcome: No Change     "

## 2019-04-10 NOTE — PROGRESS NOTES
CLINICAL NUTRITION SERVICES - REASSESSMENT NOTE     Nutrition Prescription    RECOMMENDATIONS FOR MDs/PROVIDERS TO ORDER:  None at this time     Malnutrition Status:    Patient does not meet two of the criteria necessary for diagnosing malnutrition but is at risk for malnutrition    Recommendations already ordered by Registered Dietitian (RD):  None at this time     Future/Additional Recommendations:  1. Continue regular diet as ordered; encouraged continued good oral intakes at meals. Patient declines wanting to receive snacks/supplements between meals at this time.     2. Continue to monitor intakes/weight trends as available, to assess need for future nutrition intervention if warranted.      EVALUATION OF THE PROGRESS TOWARD GOALS   Diet: Regular     Intake: Per flow-sheet, patient has been consuming % of meals over the past week. Per review of room-service orders, he has been consistently ordering three meals per day, which have range between 3549-7957 calories per day, assuming 100% of each meal is being consumed. Pt reports that his appetite has been improving since his tooth extraction. He reports having no nutrition-related concerns or questions at this time.     NEW FINDINGS   -Tooth extraction on 4/3 (2 teeth from lower left jaw); pt was transferred to ICU s/p extraction d/t acute decompensation (neutropenic sepsis)    -Planned to initiate PN on 4/3, but did not initiate until 4/4 d/t change in patient's status overnight s/p tooth extraction. PN discontinued on 4/6, as pt thought PN was decreasing his appetite/desire to eat.     -Weight down 9 kg over the past week (likely at least somewhat attributed to changes in fluid status); weight is overall down 4.8 kg since admission (4.9% loss over ~1 month).      MALNUTRITION  % Intake: Decreased intake does not meet criteria  % Weight Loss: Up to 5% in 1 month (non-severe)  Subcutaneous Fat Loss: None observed  Muscle Loss: None observed  Fluid  Accumulation/Edema: None noted  Malnutrition Diagnosis: Patient does not meet two of the above criteria necessary for diagnosing malnutrition but is at risk for malnutrition    Previous Goals (4/3)   Total avg nutritional intake to meet a minimum of 25 kcal/kg and 1.2 g PRO/kg daily (per dosing wt 80 kg).  Evaluation: Likely met with consistent intakes of % of meals TID.     Previous Nutrition Diagnosis (4/3)   Inadequate protein-energy intake related to neutropenic colitis and poor po intakes insetting of oral infection with significant edema, inability to take po at this time d/t surgery and PN therapy ordered, but not yet initiated as evidenced by NPO status now and no PN intakes received at this time.    Evaluation: Improving; No longer applicable, nutrition diagnosis changed below    CURRENT NUTRITION DIAGNOSIS  Inadequate oral intake related to reduced oral intakes s/p tooth extraction followed by acute decompensation requiring tx to ICU and initiation of PN  as evidenced by short-term PN during the course of hospitalization now discontinued with now meeting nutrition needs via oral intakes only.       INTERVENTIONS  Implementation  No nutrition interventions at this time; pt wishes to meet nutrition needs by ordering TID meals and declined a desire to receive snacks/supplements between meals at this time.      Goals  Patient to consume % of nutritionally adequate meal trays TID, or the equivalent with supplements/snacks.    Monitoring/Evaluation  Progress toward goals will be monitored and evaluated per protocol.    Carrie Galicia, Dietetic Intern    I agree with the above recommendations, goals, and interventions.    Abraham Puga RD, LD  5C/BMT Dietitian  Pager: [phone]

## 2019-04-11 ENCOUNTER — APPOINTMENT (OUTPATIENT)
Dept: PHYSICAL THERAPY | Facility: CLINIC | Age: 64
DRG: 834 | End: 2019-04-11
Payer: COMMERCIAL

## 2019-04-11 LAB
ALBUMIN SERPL-MCNC: 2.5 G/DL (ref 3.4–5)
ALP SERPL-CCNC: 106 U/L (ref 40–150)
ALT SERPL W P-5'-P-CCNC: 74 U/L (ref 0–70)
ANION GAP SERPL CALCULATED.3IONS-SCNC: 5 MMOL/L (ref 3–14)
AST SERPL W P-5'-P-CCNC: 24 U/L (ref 0–45)
BACTERIA SPEC CULT: NO GROWTH
BASOPHILS # BLD AUTO: 0 10E9/L (ref 0–0.2)
BASOPHILS NFR BLD AUTO: 0 %
BILIRUB DIRECT SERPL-MCNC: 0.6 MG/DL (ref 0–0.2)
BILIRUB SERPL-MCNC: 1.3 MG/DL (ref 0.2–1.3)
BLASTS # BLD: 0.2 10E9/L
BLASTS BLD QL SMEAR: 13 %
BUN SERPL-MCNC: 11 MG/DL (ref 7–30)
CALCIUM SERPL-MCNC: 8.3 MG/DL (ref 8.5–10.1)
CHLORIDE SERPL-SCNC: 107 MMOL/L (ref 94–109)
CO2 SERPL-SCNC: 25 MMOL/L (ref 20–32)
CREAT SERPL-MCNC: 0.68 MG/DL (ref 0.66–1.25)
DIFFERENTIAL METHOD BLD: ABNORMAL
EOSINOPHIL # BLD AUTO: 0 10E9/L (ref 0–0.7)
EOSINOPHIL NFR BLD AUTO: 0 %
ERYTHROCYTE [DISTWIDTH] IN BLOOD BY AUTOMATED COUNT: 14.6 % (ref 10–15)
GFR SERPL CREATININE-BSD FRML MDRD: >90 ML/MIN/{1.73_M2}
GLUCOSE SERPL-MCNC: 100 MG/DL (ref 70–99)
HCT VFR BLD AUTO: 24.2 % (ref 40–53)
HGB BLD-MCNC: 7.8 G/DL (ref 13.3–17.7)
LYMPHOCYTES # BLD AUTO: 1 10E9/L (ref 0.8–5.3)
LYMPHOCYTES NFR BLD AUTO: 54 %
Lab: NORMAL
MCH RBC QN AUTO: 31.3 PG (ref 26.5–33)
MCHC RBC AUTO-ENTMCNC: 32.2 G/DL (ref 31.5–36.5)
MCV RBC AUTO: 97 FL (ref 78–100)
MONOCYTES # BLD AUTO: 0.1 10E9/L (ref 0–1.3)
MONOCYTES NFR BLD AUTO: 5 %
NEUTROPHILS # BLD AUTO: 0.5 10E9/L (ref 1.6–8.3)
NEUTROPHILS NFR BLD AUTO: 28 %
PLATELET # BLD AUTO: 12 10E9/L (ref 150–450)
POTASSIUM SERPL-SCNC: 3.8 MMOL/L (ref 3.4–5.3)
PROT SERPL-MCNC: 6.2 G/DL (ref 6.8–8.8)
RBC # BLD AUTO: 2.49 10E12/L (ref 4.4–5.9)
SODIUM SERPL-SCNC: 137 MMOL/L (ref 133–144)
SPECIMEN SOURCE: NORMAL
WBC # BLD AUTO: 1.8 10E9/L (ref 4–11)

## 2019-04-11 PROCEDURE — 85025 COMPLETE CBC W/AUTO DIFF WBC: CPT | Performed by: INTERNAL MEDICINE

## 2019-04-11 PROCEDURE — 25000128 H RX IP 250 OP 636: Performed by: NURSE PRACTITIONER

## 2019-04-11 PROCEDURE — 97116 GAIT TRAINING THERAPY: CPT | Mod: GP

## 2019-04-11 PROCEDURE — 80076 HEPATIC FUNCTION PANEL: CPT | Performed by: INTERNAL MEDICINE

## 2019-04-11 PROCEDURE — 12000012 ZZH R&B MS OVERFLOW UMMC

## 2019-04-11 PROCEDURE — 25000132 ZZH RX MED GY IP 250 OP 250 PS 637: Performed by: NURSE PRACTITIONER

## 2019-04-11 PROCEDURE — 99232 SBSQ HOSP IP/OBS MODERATE 35: CPT | Performed by: INTERNAL MEDICINE

## 2019-04-11 PROCEDURE — 25000132 ZZH RX MED GY IP 250 OP 250 PS 637: Performed by: INTERNAL MEDICINE

## 2019-04-11 PROCEDURE — 97110 THERAPEUTIC EXERCISES: CPT | Mod: GP

## 2019-04-11 PROCEDURE — C9399 UNCLASSIFIED DRUGS OR BIOLOG: HCPCS | Performed by: PHYSICIAN ASSISTANT

## 2019-04-11 PROCEDURE — 25000132 ZZH RX MED GY IP 250 OP 250 PS 637: Performed by: PHYSICIAN ASSISTANT

## 2019-04-11 PROCEDURE — 25000132 ZZH RX MED GY IP 250 OP 250 PS 637: Performed by: STUDENT IN AN ORGANIZED HEALTH CARE EDUCATION/TRAINING PROGRAM

## 2019-04-11 PROCEDURE — 80048 BASIC METABOLIC PNL TOTAL CA: CPT | Performed by: INTERNAL MEDICINE

## 2019-04-11 PROCEDURE — 25000128 H RX IP 250 OP 636: Performed by: PHYSICIAN ASSISTANT

## 2019-04-11 PROCEDURE — 25800030 ZZH RX IP 258 OP 636: Performed by: PHYSICIAN ASSISTANT

## 2019-04-11 PROCEDURE — 25000131 ZZH RX MED GY IP 250 OP 636 PS 637: Performed by: PHYSICIAN ASSISTANT

## 2019-04-11 RX ORDER — POTASSIUM CHLORIDE 7.45 MG/ML
10 INJECTION INTRAVENOUS
Status: DISCONTINUED | OUTPATIENT
Start: 2019-04-11 | End: 2019-04-15 | Stop reason: HOSPADM

## 2019-04-11 RX ORDER — PROCHLORPERAZINE MALEATE 5 MG
5-10 TABLET ORAL EVERY 6 HOURS PRN
Status: DISCONTINUED | OUTPATIENT
Start: 2019-04-11 | End: 2019-04-15 | Stop reason: HOSPADM

## 2019-04-11 RX ORDER — POTASSIUM CL/LIDO/0.9 % NACL 10MEQ/0.1L
10 INTRAVENOUS SOLUTION, PIGGYBACK (ML) INTRAVENOUS
Status: DISCONTINUED | OUTPATIENT
Start: 2019-04-11 | End: 2019-04-15 | Stop reason: HOSPADM

## 2019-04-11 RX ORDER — POTASSIUM CHLORIDE 750 MG/1
20-40 TABLET, EXTENDED RELEASE ORAL
Status: DISCONTINUED | OUTPATIENT
Start: 2019-04-11 | End: 2019-04-15 | Stop reason: HOSPADM

## 2019-04-11 RX ORDER — POTASSIUM CHLORIDE 1.5 G/1.58G
20-40 POWDER, FOR SOLUTION ORAL
Status: DISCONTINUED | OUTPATIENT
Start: 2019-04-11 | End: 2019-04-15 | Stop reason: HOSPADM

## 2019-04-11 RX ORDER — VORICONAZOLE 50 MG/1
150 TABLET, FILM COATED ORAL EVERY 12 HOURS SCHEDULED
Status: DISCONTINUED | OUTPATIENT
Start: 2019-04-11 | End: 2019-04-15 | Stop reason: HOSPADM

## 2019-04-11 RX ORDER — POTASSIUM CHLORIDE 29.8 MG/ML
20 INJECTION INTRAVENOUS
Status: DISCONTINUED | OUTPATIENT
Start: 2019-04-11 | End: 2019-04-15 | Stop reason: HOSPADM

## 2019-04-11 RX ADMIN — ONDANSETRON HYDROCHLORIDE 8 MG: 8 TABLET, FILM COATED ORAL at 15:52

## 2019-04-11 RX ADMIN — MICAFUNGIN SODIUM 100 MG: 10 INJECTION, POWDER, LYOPHILIZED, FOR SOLUTION INTRAVENOUS at 08:53

## 2019-04-11 RX ADMIN — ROPINIROLE HYDROCHLORIDE 0.75 MG: 0.5 TABLET, FILM COATED ORAL at 19:59

## 2019-04-11 RX ADMIN — PANTOPRAZOLE SODIUM 40 MG: 40 TABLET, DELAYED RELEASE ORAL at 15:52

## 2019-04-11 RX ADMIN — VANCOMYCIN HYDROCHLORIDE 125 MG: KIT at 19:58

## 2019-04-11 RX ADMIN — PROCHLORPERAZINE EDISYLATE 10 MG: 5 INJECTION INTRAMUSCULAR; INTRAVENOUS at 04:54

## 2019-04-11 RX ADMIN — VORICONAZOLE 150 MG: 50 TABLET ORAL at 19:58

## 2019-04-11 RX ADMIN — ONDANSETRON HYDROCHLORIDE 8 MG: 8 TABLET, FILM COATED ORAL at 08:55

## 2019-04-11 RX ADMIN — VANCOMYCIN HYDROCHLORIDE 125 MG: KIT at 12:03

## 2019-04-11 RX ADMIN — TAMSULOSIN HYDROCHLORIDE 0.4 MG: 0.4 CAPSULE ORAL at 08:55

## 2019-04-11 RX ADMIN — PIPERACILLIN AND TAZOBACTAM 3.38 G: 3; .375 INJECTION, POWDER, FOR SOLUTION INTRAVENOUS at 12:03

## 2019-04-11 RX ADMIN — PIPERACILLIN AND TAZOBACTAM 3.38 G: 3; .375 INJECTION, POWDER, FOR SOLUTION INTRAVENOUS at 04:54

## 2019-04-11 RX ADMIN — ONDANSETRON HYDROCHLORIDE 8 MG: 8 TABLET, FILM COATED ORAL at 23:25

## 2019-04-11 RX ADMIN — PIPERACILLIN AND TAZOBACTAM 3.38 G: 3; .375 INJECTION, POWDER, FOR SOLUTION INTRAVENOUS at 23:25

## 2019-04-11 RX ADMIN — VANCOMYCIN HYDROCHLORIDE 125 MG: KIT at 15:52

## 2019-04-11 RX ADMIN — ACYCLOVIR 400 MG: 400 TABLET ORAL at 19:58

## 2019-04-11 RX ADMIN — PANTOPRAZOLE SODIUM 40 MG: 40 TABLET, DELAYED RELEASE ORAL at 08:55

## 2019-04-11 RX ADMIN — LINEZOLID 600 MG: 600 TABLET, FILM COATED ORAL at 08:55

## 2019-04-11 RX ADMIN — PIPERACILLIN AND TAZOBACTAM 3.38 G: 3; .375 INJECTION, POWDER, FOR SOLUTION INTRAVENOUS at 15:53

## 2019-04-11 RX ADMIN — ACYCLOVIR 400 MG: 400 TABLET ORAL at 08:55

## 2019-04-11 RX ADMIN — VANCOMYCIN HYDROCHLORIDE 125 MG: KIT at 08:55

## 2019-04-11 RX ADMIN — POTASSIUM CHLORIDE 20 MEQ: 750 TABLET, EXTENDED RELEASE ORAL at 08:54

## 2019-04-11 RX ADMIN — LINEZOLID 600 MG: 600 TABLET, FILM COATED ORAL at 19:58

## 2019-04-11 RX ADMIN — VENETOCLAX 100 MG: 100 TABLET, FILM COATED ORAL at 17:32

## 2019-04-11 RX ADMIN — PROCHLORPERAZINE MALEATE 10 MG: 5 TABLET, FILM COATED ORAL at 08:55

## 2019-04-11 ASSESSMENT — MIFFLIN-ST. JEOR: SCORE: 1683.03

## 2019-04-11 ASSESSMENT — ACTIVITIES OF DAILY LIVING (ADL)
ADLS_ACUITY_SCORE: 16

## 2019-04-11 NOTE — PLAN OF CARE
Problem: Adult Inpatient Plan of Care  Goal: Plan of Care Review  4/11/2019 1854 by Tonya Moon RN  Outcome: No Change     Problem: Adult Inpatient Plan of Care  Goal: Patient-Specific Goal (Individualization)  4/11/2019 1854 by Tonya Moon RN  Outcome: No Change     Problem: Adult Inpatient Plan of Care  Goal: Absence of Hospital-Acquired Illness or Injury  4/11/2019 1854 by Tonya Moon RN  Outcome: No Change     Problem: Adult Inpatient Plan of Care  Goal: Optimal Comfort and Wellbeing  4/11/2019 1854 by Tonya Moon RN  Outcome: No Change     Problem: Adult Inpatient Plan of Care  Goal: Readiness for Transition of Care  4/11/2019 1854 by Tonya Moon RN  Outcome: No Change   Patient has no complaints of pain or nausea.  He is eating full meals.  He showered today independently and walked in the halls.  AVSS

## 2019-04-11 NOTE — PLAN OF CARE
Discharge Planner PT 5C  Patient plan for discharge: home  Current status: pt encountered up IND in room. Ambulates 2 x 200' with unilateral support on IV pole; tolerates x 10 minutes Nustep activity and standing LE HEP in room. VSS on RA throughout.   Barriers to return to prior living situation: medical status  Recommendations for discharge: home with assist; potential benefit of OP CR  Rationale for recommendations: pt below baseline with regard to activity tolerance, balance and strength; may benefit from ongoing therapy in OP setting to improve deficits vs use of HEP.        Entered by: Brian Thomason 04/11/2019 9:42 AM

## 2019-04-11 NOTE — PROGRESS NOTES
[facility], [place]    Hematology / Oncology Progress Note    Date of Admission: 3/12/2019    Date of Service (when I saw the patient): 04/11/2019     Assessment & Plan    Mr. Ace is a 63 year old male with new diagnosis of acute myeloid leukemia incidentally found during work up of nonspecific chest symptoms after partial root canal intervention. He was started on induction chemotherapy with 7+3 (cytarabine and daunorubicin) with D1=3/15/19. BMBx on 3/25 (done early due to rising WBC and blast counts) demonstrated persistent disease with 95% blasts by flow, 98% by morphology. Started re-induction with decitabine on 3/26 PM and added venetoclax on 3/29. His course has been complicated by neutropenic sepsis secondary to odontic/soft tissue infection. He had extraction of teeth #18 and 19 on 4/3 but subsequently developed septic shock requiring transfer to MICU team 4/3-4/4. Hypotension improved after fluid resuscitated and he did not require intubation or pressor support. He was transferred back to [facility] service on 4/4/19.     Today:  - continue Zosyn, PO Linezolid (plan through 4/13)  - continue PO Vancomycin  - discontinue micafungin, resume voriconazole ppx (150mg BID)  - continue venetoclax, reduce to 100mg dose with resumption of voriconazole  - recurrent blasts in peripheral blood (13%, 0.2 absolute). Monitor with next AM labs but will reach out to outpt provider about possible next steps.      HEME:  #AML, refractory. NGS positive for IDH2 and RUNX1 alterations.  Patient presented to [facility] ED in [place], [place] for complaints of nonspecific chest discomfort after partial root canal treatment (done 3/11/19). CT C/A/P was negative for PE or other acute findings. On OSH labs, his WBC was incidentally noted to be elevated at 71.1 with Hb 9.0 and plts 97K (prior labs had been unremarkable per patient). Smear was suspicious for immature blasts and acute leukemia.  No symptoms of hyperviscosity, TLS or DIC on presentation. 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 Flow consistent with AML with 95% blasts with the following immunophenotype: Positive for CD13, CD33, CD34, CD38, dim to negative CD45, , partial HLA-DR. Baseline ECHO normal, PICC place.   - HLA typing sent, BMT consult done 3/29 by Dr. Brown  - s/p initial Hydrea (dc'd 3/16)   - received induction chemotherapy with 7+3 (cytarabine and daunorubicin). Day 1=3/15/19.   - repeat BMBx done early (3/25) due to rising WBC/blast count. Still with persistent heavy burden of disease (98% blasts by morphology).   - s/p Hydrea for WBC count management; started 1g BID (3/27), incresaed to 2g BID (x3/28). Hydrea discontinued 3/31.   - Started reinduction with decitabine/venetoclax. Decitabine D1=3/26; added Venetoclax on 3/29. Ramp up dosing accounting for concomitant voriconazole. Final dose will be 100mg daily. Today is Day 17 from start of reinduction.  Of note, decitabine D9 and D10 held 4/3 and 4/4, respectively, due to acute illness and elevated Tbili/ALT. With improvement in ALT and Tbili, we resumed decitabine for last 2 doses (4/8 and 4/9).   - Continue Venetoclax at this time  - now off Allopurinol, monitor blasts and may need to add back pending trend.  - peripheral blasts absent on diff since 4/5. However, today pt has 13% (abs 0.2) peripheral blasts.      #Pancytopenia  2/2 AML & associated chemotherapy.   - transfuse to maintain Hb >7, Plt >10K.      #Coagulopathy, improved.  in setting of refractory AML and likely exacerbated by prior poor PO intake. INR elevated, but now downtrended to WNL. Fibrinogen stable.  - plasma for INR >1.8, cryo for fibrinogen <100  - s/p Vit K x 3 days (4/3-4/5)     ID:  #Neutropenic fever/sepsis with septic shock (4/3-4/4), improved  #C.diff diarrhea, improved  #Neutropenic colitis, improved  #Left mandible  cellulitis/odontic infection. S/p extraction of teeth #18 and 19 on 4/3.   First fever on 3/22. Sources include c.diff colitis and odontic/soft tissue infection.   - ID following  - BCx 3/22 to present are NGTD  - 3/23 fungal BCx NGTD  - 3/22 UA negative, repeat UA/UCx negative on 3/30  - 3/22 CXR with small R pleural effusion, no focal airspace opacity  - 3/25 galactomannan and fungitell both negative  - 3/26 C.diff positive  - 3/29 CT C/A/P demonstrated Right thickened hemicolon with adjacent fat stranding  - 3/31 CT Dental demonstrated left hemimandible cellulitis  - Dental consulted for CT findings, unable to intervene as urgently as needed at this time due to acute worsening of clinic status. Thus, ultimately consulted Oral Surgery on 4/3; they performed extraction of teeth #18 and 19 on 4/3.   - pt has now been afebrile since 4/4   - in MICU, started on stress dose steroids, Vit C, and thiamine. D/c'd steroids 4/4, discontinued Vit C and thiamine on 4/5.      **Anti-infectives:  - s/p Cefepime (x 3/22-4/1). Changed to Zosyn (x 4/1-4/3). With septic shock changed to clindamycin (4/3-4/4) and imipenem (x4/3-4/8). Changed back to IV Zosyn (x4/8). Continue through 4/13.  - s/p PO Flagyl (x3/29-4/1).   - s/p IV vanc (x 3/31-4/3). Changed to IV Linezolid (x 4/3- present); changed to PO dosing on 4/7. Continue through 4/13  - continue PO Vancomycin 125mg four times daily. Plan to continue for an additional week after Zosyn/Linezolid antibiotic therapy is completed.     #ID PPx   - continue ACV  - resume ppx Vfend 150mg BID        GI:  #Hyperbilirubinemia, improving.  #Transaminitis, improving.  Acute elevation in Tbili (primarily direct) on 4/3 PM, improving by 4/5. Likely related to septic shock. As Tbili improving, noted acute elevation in transaminases on 4/5. Again likely related to sequelae from shock, but Vfend could be contributing. No acute finding on 4/4 Abd US. Denies abdominal/RUQ pain.  - LFTs  improving    #C.diff diarrhea   #Neutropenic colitis  - c.diff treatment as above  - s/p TPN (x4/4-4/7). With improvement in clinical status and stable GI symptoms, ADAT.   - stools improving     #Hemorrhoids, improved  - PRN management: TUCKS pads, sitz bath, topical Prep H and/or lidocaine    #Chemotherapy induced nausea, controlled/improved.  - scheduled Zofran q8hr  - will change scheduled compazine to PRN and monitor  - PRN antiemetics     CV:  #Type 2 NSTEMI 2/2 demand ischemia/septic shock.   Troponin elevated, stable at 0.436-->0.405. Will not further trend. Repeat ECHO (4/5) demonstrates stable EF 55-60%. LV/RV size and function normal. New mild-mod mitral insufficiency. Trivial pericardial effusion. IVC dilated c/w overload.     #Hypervolemia, improved. 2/2 aggressive fluid resuscitation. Weight up max 25 lbs (since 3/31). Now downtrended.  - s/p Lasix total 40mg IV (4/5), 20mg IV (4/6). Now with autodiuresis.     #Subclinical coronary atherosclerosis  #Hyperlipidemia  Total Agatston calcium score was elevated at 591 (91st percentile) on CT coronaries performed 6/8/2016. Nuclear stress test was negative for ischemia on 8/10/2016. Life style modification measures were recommended. Patient follows with cardiology as outpatient (last office visit on 11/12/18).  - holding atorvastatin at this time. Could potentially resume after completion of reinduction if LFTs improve and remain WNL.    PULM:  #Acute hypoxic respiratory failure. Resolved.   Most likely related to edema.   - PRN supplemental O2  - repeat ECHO as above  - diuresis PRN     RENAL:  #Lyte derangement, improved.   Multifactorial with decreased PO intake, fevers, diarrhea.    - lyte repletion per unit protocol   - calcium repletion PRN     #FOREST, resolved.  Baseline Cr 0.7 (GFR>90). Creatinine uptrended with Cr max 1.45 (GFR 50), now improved/resolved. Multifactorial in setting of decreased PO intake, fevers, diarrhea, and medications (IV  Vancomycin, Zosyn, contrast), and development of septic shock.    - Ongoing increased urine output with some urgency, which may be related to autodiuresis with kidney recovery, monitor.       :  #BPH   Patient follows with urology ([facility], [place], [place]). On PTA Flomax 0.8 mg daily.  - of note, possible drug interaction between voriconazole and flomax (can raise flomax level in blood & cause hypotension).   - decreased flomax from 0.8mg to 0.4mg (soft BPs in setting of neutropenic sepsis and risk of further hypotension due to interaction with voriconazole). Continue 0.4mg dose at this time.       NEURO/MSK:  #Restless legs syndrome  - Continue ropinirole 0.75 mg at bedtime, offer PRN ativan for persistent symptoms.     #History of lumbar spine degenerative disc disease   Patient is s/p laminectomy/facetectomy procedures. He does not routinely take pain meds.     DENTAL:  #S/P root canal manipulation  #Odontic infection as above  Patient had the start of a root canal treatment initiated on 3/11/19 with plan to complete it a few weeks later (per pt's wife and son). He was started on a 7-day course of oral  mg q6h beginning one day prior to the procedure. At time of admission, he was having pain in his left jaw but site appeared unremarkable with no abscess or drainage.  In retrospect, may have also had some leukemic infiltration of gums complicating his course. Pain at site initially improved during chemotherapy, but did return. Pain worsened and with higher temps, checked CT Dental which demonstrated cellulitis along left hemimandible. Developed worsening oral infection as above.  - Tylenol PRN, tramadol vs oxycodone PRN   - abx as above  - Dental consult as above  - OMFS consult, intervention on 4/3 as above     PSYCHOSOCIAL:  #Coping.  Appreciate SW and  involvement. Pt had requested help with Advance Directive, SW following     FEN   - no current MIVFs.   - PRN lyte replacement per  protocol  - regular diet as tolerated     Prophylaxis  - No pharmacologic VTE ppx due to TCP  - PPI for GI/PUD ppx       Code Status: FULL     Disposition: Pending at this time. Anticipate pt will remain admitted for at least another 1 week for ongoing monitoring of blood counts/transfusion needs, IV antibiotics, and further therapy needs for acute deconditioning.       Discussed with Dr. Lambert.     Vee Cabrera PA-C  Heme/Onc  [phone]        Interval History   Afebrile. No acute events overnight. Randy is awake, ordering breakfast. He is feeling ok. Denies HA, vision changes, throbbing left lower gum pain, SOB, cough, or nausea. He is ok with stopping one of the scheduled antiemetics and seeing how he feels. Loose stools improving, states there is maybe some form to them, having 1-2 per day. Ongoing high UOP. Noticed some LE edema yesterday but states this looks a little better today.       Physical Exam   Temp: 96.3  F (35.7  C) Temp src: Axillary BP: 115/82 Pulse: 88 Heart Rate: 88 Resp: 18 SpO2: 99 % O2 Device: None (Room air)    Vitals:    04/09/19 0826 04/10/19 0903 04/11/19 0901   Weight: 93.4 kg (205 lb 14.4 oz) 90.5 kg (199 lb 9.6 oz) 88.7 kg (195 lb 8 oz)     Vital Signs with Ranges  Temp:  [96.3  F (35.7  C)-98  F (36.7  C)] 96.3  F (35.7  C)  Pulse:  [] 88  Heart Rate:  [] 88  Resp:  [16-18] 18  BP: (115-134)/(78-92) 115/82  SpO2:  [95 %-99 %] 99 %  I/O last 3 completed shifts:  In: 200 [I.V.:200]  Out: 3100 [Urine:3100]    Constitutional: Seen up ad shelley in room, ordering breakfast, on RA. Pleasant. Deconditioned.   HEENT: NC/AT. OP pink and moist, no additional lesions or thrush seen. Difficult to visualize back of left gum line.   Respiratory: Breathing even and non-labored on RA. Lungs clear  without crackles. No wheezing.   CV: RRR. No murmur appreciated.  GI: +BS. Abdomen soft. No tenderness to palpation.  Skin: Warm. No concerning lesions or rash on exposed surfaces.    Musculoskeletal: Trace nonpitting b/l LE edema.   Neurologic: Alert and oriented. Grossly nonfocal.  Neuropsychiatric: Calm, mentation appears normal, answering questions appropriately.        Medications     IV fluid REPLACEMENT ONLY       - MEDICATION INSTRUCTIONS -         acyclovir  400 mg Oral BID     artificial saliva  1-2 spray Swish & Spit 4x Daily     linezolid  600 mg Oral Q12H ZUNILDA     ondansetron  8 mg Oral Q8H     pantoprazole  40 mg Oral BID AC     piperacillin-tazobactam  3.375 g Intravenous Q6H     Resume a HELD Medication   Does not apply See Admin Instructions     rOPINIRole  0.75 mg Oral Daily at 8 pm     sodium chloride (PF)  10 mL Intravenous Once     sodium chloride (PF)  10 mL Intracatheter Q7 Days     tamsulosin  0.4 mg Oral Daily     vancomycin  125 mg Oral 4x Daily     venetoclax  100 mg Oral Daily with supper     voriconazole  150 mg Oral Q12H ZUNILDA       Data   Results for orders placed or performed during the hospital encounter of 03/12/19 (from the past 24 hour(s))   Hepatic panel   Result Value Ref Range    Bilirubin Direct 0.6 (H) 0.0 - 0.2 mg/dL    Bilirubin Total 1.3 0.2 - 1.3 mg/dL    Albumin 2.5 (L) 3.4 - 5.0 g/dL    Protein Total 6.2 (L) 6.8 - 8.8 g/dL    Alkaline Phosphatase 106 40 - 150 U/L    ALT 74 (H) 0 - 70 U/L    AST 24 0 - 45 U/L   CBC with platelets differential   Result Value Ref Range    WBC 1.8 (L) 4.0 - 11.0 10e9/L    RBC Count 2.49 (L) 4.4 - 5.9 10e12/L    Hemoglobin 7.8 (L) 13.3 - 17.7 g/dL    Hematocrit 24.2 (L) 40.0 - 53.0 %    MCV 97 78 - 100 fl    MCH 31.3 26.5 - 33.0 pg    MCHC 32.2 31.5 - 36.5 g/dL    RDW 14.6 10.0 - 15.0 %    Platelet Count 12 (LL) 150 - 450 10e9/L    Diff Method Manual Differential     % Neutrophils 28.0 %    % Lymphocytes 54.0 %    % Monocytes 5.0 %    % Eosinophils 0.0 %    % Basophils 0.0 %    % Blasts 13.0 %    Absolute Neutrophil 0.5 (L) 1.6 - 8.3 10e9/L    Absolute Lymphocytes 1.0 0.8 - 5.3 10e9/L    Absolute Monocytes 0.1 0.0 - 1.3  10e9/L    Absolute Eosinophils 0.0 0.0 - 0.7 10e9/L    Absolute Basophils 0.0 0.0 - 0.2 10e9/L    Absolute Blasts 0.2 (H) 0 10e9/L   Basic metabolic panel   Result Value Ref Range    Sodium 137 133 - 144 mmol/L    Potassium 3.8 3.4 - 5.3 mmol/L    Chloride 107 94 - 109 mmol/L    Carbon Dioxide 25 20 - 32 mmol/L    Anion Gap 5 3 - 14 mmol/L    Glucose 100 (H) 70 - 99 mg/dL    Urea Nitrogen 11 7 - 30 mg/dL    Creatinine 0.68 0.66 - 1.25 mg/dL    GFR Estimate >90 >60 mL/min/[1.73_m2]    GFR Estimate If Black >90 >60 mL/min/[1.73_m2]    Calcium 8.3 (L) 8.5 - 10.1 mg/dL

## 2019-04-11 NOTE — PLAN OF CARE
No complaints overnight, he seemed to sleep well. He will get some oral potasium this morning, other labs were ok. Continue to monitor and with POC.

## 2019-04-12 ENCOUNTER — APPOINTMENT (OUTPATIENT)
Dept: PHYSICAL THERAPY | Facility: CLINIC | Age: 64
DRG: 834 | End: 2019-04-12
Payer: COMMERCIAL

## 2019-04-12 ENCOUNTER — APPOINTMENT (OUTPATIENT)
Dept: OCCUPATIONAL THERAPY | Facility: CLINIC | Age: 64
DRG: 834 | End: 2019-04-12
Payer: COMMERCIAL

## 2019-04-12 LAB
ALBUMIN SERPL-MCNC: 2.7 G/DL (ref 3.4–5)
ALP SERPL-CCNC: 116 U/L (ref 40–150)
ALT SERPL W P-5'-P-CCNC: 70 U/L (ref 0–70)
ANION GAP SERPL CALCULATED.3IONS-SCNC: 8 MMOL/L (ref 3–14)
AST SERPL W P-5'-P-CCNC: 25 U/L (ref 0–45)
BACTERIA SPEC CULT: NO GROWTH
BACTERIA SPEC CULT: NO GROWTH
BASOPHILS # BLD AUTO: 0 10E9/L (ref 0–0.2)
BASOPHILS NFR BLD AUTO: 0.9 %
BILIRUB DIRECT SERPL-MCNC: 0.7 MG/DL (ref 0–0.2)
BILIRUB SERPL-MCNC: 1.3 MG/DL (ref 0.2–1.3)
BLD PROD TYP BPU: NORMAL
BLD PROD TYP BPU: NORMAL
BLD UNIT ID BPU: 0
BLOOD PRODUCT CODE: NORMAL
BPU ID: NORMAL
BUN SERPL-MCNC: 14 MG/DL (ref 7–30)
CALCIUM SERPL-MCNC: 8.6 MG/DL (ref 8.5–10.1)
CHLORIDE SERPL-SCNC: 105 MMOL/L (ref 94–109)
CO2 SERPL-SCNC: 23 MMOL/L (ref 20–32)
CREAT SERPL-MCNC: 0.68 MG/DL (ref 0.66–1.25)
DIFFERENTIAL METHOD BLD: ABNORMAL
EOSINOPHIL # BLD AUTO: 0 10E9/L (ref 0–0.7)
EOSINOPHIL NFR BLD AUTO: 0 %
ERYTHROCYTE [DISTWIDTH] IN BLOOD BY AUTOMATED COUNT: 14.4 % (ref 10–15)
FIBRINOGEN PPP-MCNC: 386 MG/DL (ref 200–420)
GFR SERPL CREATININE-BSD FRML MDRD: >90 ML/MIN/{1.73_M2}
GLUCOSE SERPL-MCNC: 103 MG/DL (ref 70–99)
HCT VFR BLD AUTO: 24.3 % (ref 40–53)
HGB BLD-MCNC: 7.9 G/DL (ref 13.3–17.7)
INR PPP: 1.18 (ref 0.86–1.14)
LACTATE BLD-SCNC: 0.6 MMOL/L (ref 0.7–2)
LYMPHOCYTES # BLD AUTO: 1 10E9/L (ref 0.8–5.3)
LYMPHOCYTES NFR BLD AUTO: 46 %
MAGNESIUM SERPL-MCNC: 2.1 MG/DL (ref 1.6–2.3)
MCH RBC QN AUTO: 31.2 PG (ref 26.5–33)
MCHC RBC AUTO-ENTMCNC: 32.5 G/DL (ref 31.5–36.5)
MCV RBC AUTO: 96 FL (ref 78–100)
MONOCYTES # BLD AUTO: 0 10E9/L (ref 0–1.3)
MONOCYTES NFR BLD AUTO: 0 %
NEUTROPHILS # BLD AUTO: 1.2 10E9/L (ref 1.6–8.3)
NEUTROPHILS NFR BLD AUTO: 53.1 %
NUM BPU REQUESTED: 1
PHOSPHATE SERPL-MCNC: 4.3 MG/DL (ref 2.5–4.5)
PLATELET # BLD AUTO: 8 10E9/L (ref 150–450)
PLATELET # BLD EST: ABNORMAL 10*3/UL
POTASSIUM SERPL-SCNC: 4.1 MMOL/L (ref 3.4–5.3)
PROT SERPL-MCNC: 6.2 G/DL (ref 6.8–8.8)
RBC # BLD AUTO: 2.53 10E12/L (ref 4.4–5.9)
RBC MORPH BLD: NORMAL
SODIUM SERPL-SCNC: 136 MMOL/L (ref 133–144)
SPECIMEN SOURCE: NORMAL
SPECIMEN SOURCE: NORMAL
TRANSFUSION STATUS PATIENT QL: NORMAL
TRANSFUSION STATUS PATIENT QL: NORMAL
URATE SERPL-MCNC: 1.6 MG/DL (ref 3.5–7.2)
WBC # BLD AUTO: 2.2 10E9/L (ref 4–11)

## 2019-04-12 PROCEDURE — 85610 PROTHROMBIN TIME: CPT | Performed by: INTERNAL MEDICINE

## 2019-04-12 PROCEDURE — 25000132 ZZH RX MED GY IP 250 OP 250 PS 637: Performed by: STUDENT IN AN ORGANIZED HEALTH CARE EDUCATION/TRAINING PROGRAM

## 2019-04-12 PROCEDURE — 84100 ASSAY OF PHOSPHORUS: CPT | Performed by: INTERNAL MEDICINE

## 2019-04-12 PROCEDURE — 83605 ASSAY OF LACTIC ACID: CPT | Performed by: INTERNAL MEDICINE

## 2019-04-12 PROCEDURE — 25000132 ZZH RX MED GY IP 250 OP 250 PS 637: Performed by: PHYSICIAN ASSISTANT

## 2019-04-12 PROCEDURE — 80048 BASIC METABOLIC PNL TOTAL CA: CPT | Performed by: INTERNAL MEDICINE

## 2019-04-12 PROCEDURE — 97530 THERAPEUTIC ACTIVITIES: CPT | Mod: GP

## 2019-04-12 PROCEDURE — 12000012 ZZH R&B MS OVERFLOW UMMC

## 2019-04-12 PROCEDURE — 84550 ASSAY OF BLOOD/URIC ACID: CPT | Performed by: INTERNAL MEDICINE

## 2019-04-12 PROCEDURE — 25000132 ZZH RX MED GY IP 250 OP 250 PS 637: Performed by: INTERNAL MEDICINE

## 2019-04-12 PROCEDURE — 25000128 H RX IP 250 OP 636: Performed by: PHYSICIAN ASSISTANT

## 2019-04-12 PROCEDURE — 97110 THERAPEUTIC EXERCISES: CPT | Mod: GP

## 2019-04-12 PROCEDURE — P9037 PLATE PHERES LEUKOREDU IRRAD: HCPCS | Performed by: INTERNAL MEDICINE

## 2019-04-12 PROCEDURE — 25000132 ZZH RX MED GY IP 250 OP 250 PS 637: Performed by: NURSE PRACTITIONER

## 2019-04-12 PROCEDURE — C9399 UNCLASSIFIED DRUGS OR BIOLOG: HCPCS | Performed by: PHYSICIAN ASSISTANT

## 2019-04-12 PROCEDURE — 80076 HEPATIC FUNCTION PANEL: CPT | Performed by: INTERNAL MEDICINE

## 2019-04-12 PROCEDURE — 25000131 ZZH RX MED GY IP 250 OP 636 PS 637: Performed by: PHYSICIAN ASSISTANT

## 2019-04-12 PROCEDURE — 99232 SBSQ HOSP IP/OBS MODERATE 35: CPT | Performed by: INTERNAL MEDICINE

## 2019-04-12 PROCEDURE — 83735 ASSAY OF MAGNESIUM: CPT | Performed by: INTERNAL MEDICINE

## 2019-04-12 PROCEDURE — 85025 COMPLETE CBC W/AUTO DIFF WBC: CPT | Performed by: INTERNAL MEDICINE

## 2019-04-12 PROCEDURE — 97110 THERAPEUTIC EXERCISES: CPT | Mod: GO

## 2019-04-12 PROCEDURE — 85384 FIBRINOGEN ACTIVITY: CPT | Performed by: INTERNAL MEDICINE

## 2019-04-12 RX ADMIN — ROPINIROLE HYDROCHLORIDE 0.75 MG: 0.5 TABLET, FILM COATED ORAL at 19:38

## 2019-04-12 RX ADMIN — VORICONAZOLE 150 MG: 50 TABLET ORAL at 19:39

## 2019-04-12 RX ADMIN — PIPERACILLIN AND TAZOBACTAM 3.38 G: 3; .375 INJECTION, POWDER, FOR SOLUTION INTRAVENOUS at 12:12

## 2019-04-12 RX ADMIN — ACYCLOVIR 400 MG: 400 TABLET ORAL at 19:39

## 2019-04-12 RX ADMIN — PANTOPRAZOLE SODIUM 40 MG: 40 TABLET, DELAYED RELEASE ORAL at 16:21

## 2019-04-12 RX ADMIN — ACETAMINOPHEN 650 MG: 325 TABLET, FILM COATED ORAL at 05:23

## 2019-04-12 RX ADMIN — VANCOMYCIN HYDROCHLORIDE 125 MG: KIT at 16:21

## 2019-04-12 RX ADMIN — Medication 2 SPRAY: at 16:22

## 2019-04-12 RX ADMIN — ONDANSETRON HYDROCHLORIDE 8 MG: 8 TABLET, FILM COATED ORAL at 16:21

## 2019-04-12 RX ADMIN — VORICONAZOLE 150 MG: 50 TABLET ORAL at 08:37

## 2019-04-12 RX ADMIN — Medication 2 SPRAY: at 12:17

## 2019-04-12 RX ADMIN — ACYCLOVIR 400 MG: 400 TABLET ORAL at 08:37

## 2019-04-12 RX ADMIN — VANCOMYCIN HYDROCHLORIDE 125 MG: KIT at 12:13

## 2019-04-12 RX ADMIN — PIPERACILLIN AND TAZOBACTAM 3.38 G: 3; .375 INJECTION, POWDER, FOR SOLUTION INTRAVENOUS at 23:46

## 2019-04-12 RX ADMIN — Medication 1 SPRAY: at 19:40

## 2019-04-12 RX ADMIN — PIPERACILLIN AND TAZOBACTAM 3.38 G: 3; .375 INJECTION, POWDER, FOR SOLUTION INTRAVENOUS at 04:40

## 2019-04-12 RX ADMIN — PANTOPRAZOLE SODIUM 40 MG: 40 TABLET, DELAYED RELEASE ORAL at 08:37

## 2019-04-12 RX ADMIN — VANCOMYCIN HYDROCHLORIDE 125 MG: KIT at 08:37

## 2019-04-12 RX ADMIN — VENETOCLAX 100 MG: 100 TABLET, FILM COATED ORAL at 18:08

## 2019-04-12 RX ADMIN — LINEZOLID 600 MG: 600 TABLET, FILM COATED ORAL at 19:39

## 2019-04-12 RX ADMIN — ONDANSETRON HYDROCHLORIDE 8 MG: 8 TABLET, FILM COATED ORAL at 08:37

## 2019-04-12 RX ADMIN — VANCOMYCIN HYDROCHLORIDE 125 MG: KIT at 19:40

## 2019-04-12 RX ADMIN — Medication 2 SPRAY: at 08:43

## 2019-04-12 RX ADMIN — LINEZOLID 600 MG: 600 TABLET, FILM COATED ORAL at 08:37

## 2019-04-12 RX ADMIN — ONDANSETRON HYDROCHLORIDE 8 MG: 8 TABLET, FILM COATED ORAL at 23:46

## 2019-04-12 RX ADMIN — PIPERACILLIN AND TAZOBACTAM 3.38 G: 3; .375 INJECTION, POWDER, FOR SOLUTION INTRAVENOUS at 18:11

## 2019-04-12 RX ADMIN — TAMSULOSIN HYDROCHLORIDE 0.4 MG: 0.4 CAPSULE ORAL at 08:37

## 2019-04-12 ASSESSMENT — ACTIVITIES OF DAILY LIVING (ADL)
ADLS_ACUITY_SCORE: 14
ADLS_ACUITY_SCORE: 14
ADLS_ACUITY_SCORE: 16
ADLS_ACUITY_SCORE: 16
ADLS_ACUITY_SCORE: 14
ADLS_ACUITY_SCORE: 16

## 2019-04-12 ASSESSMENT — MIFFLIN-ST. JEOR: SCORE: 1681.67

## 2019-04-12 NOTE — PLAN OF CARE
AVSS. Patient denies pain overnight, sleeping intermittently. Reported some shortness of breath when up to the bathroom, called RN for standby assist when up after this incident. Did not reoccur with assistance. Receiving one unit of platelets, received tylenol premeds for this. No other replacements required. Will continue to monitor and follow plan of care.  Problem: Adult Inpatient Plan of Care  Goal: Plan of Care Review  4/12/2019 0602 by Colleen Tilley, RN  Outcome: No Change

## 2019-04-12 NOTE — PLAN OF CARE
Discharge Planner PT   Patient plan for discharge: Home  Current status: Patient seen for progression of strength and activity tolerance. Patient performs standing TE with rest breaks throughout 2/2 fatigue. Ambulates 2x150' with no AD, IND. Engaged in standing balance activities showing good tolerance and stability. Patient rode NuStep for 10 minutes, level 1. VSS on RA. Patient self-limits activity.Patient encouraged to ambulated with family later today.  Barriers to return to prior living situation: Medical, decreased activity tolerance, fatigue, LE weakness.  Recommendations for discharge: Home , possibly with OP CR  Rationale for recommendations: Patient may benefit from OP CR to progress activity tolerance  and other deficits.       Entered by: Wilma Treviño 04/12/2019 12:35 PM

## 2019-04-12 NOTE — PLAN OF CARE
Discharge Planner OT   Patient plan for discharge: Home  Current status: Facilitated BUE exercises for increased functional strength to complete ADLs. Pt completed x 9 BUE exercises with VCs for proper positioning, good tolerance of activity. Handout left with pt for carryover, educated on signs/symptoms of physical activity intolerance and exercise schedule, pt verbalized understanding of all education.  Barriers to return to prior living situation: medical status, deconditioning, fatigue  Recommendations for discharge: Home with assist as needed, shower chair for bathing as needed  Rationale for recommendations: Pt demonstrating all basic ADLs with SBA including functional transfers and mobility, may benefit from shower chair for fatigue management and energy conservation at discharge.       Entered by: Marielena Paul 04/12/2019 2:37 PM

## 2019-04-12 NOTE — PLAN OF CARE
A/VSS, afebrile. Pt denies pain. Randy is eating well, denies nausea. IV abx as ordered. Up ad shelley in room, makes needs known. PICC is saline locked. Will continue to monitor per plan of care.       Problem: Adult Inpatient Plan of Care  Goal: Plan of Care Review  4/12/2019 1350 by Odette Grey, RN  Outcome: No Change     Problem: Bleeding Risk or Actual  Goal: Absence of Bleeding  4/12/2019 1350 by Odette Grey, RN  Outcome: No Change     Problem: Oral Mucositis (Chemotherapy Effects)  Goal: Improved Oral Mucous Membrane Integrity  4/12/2019 1350 by Odette Grey, RN  Outcome: Improving

## 2019-04-12 NOTE — PROGRESS NOTES
[facility], [place]    Hematology / Oncology Progress Note    Date of Admission: 3/12/2019    Date of Service (when I saw the patient): 04/12/2019     Assessment & Plan    Mr. Ace is a 63 year old male with new diagnosis of acute myeloid leukemia incidentally found during work up of nonspecific chest symptoms after partial root canal intervention. He was started on induction chemotherapy with 7+3 (cytarabine and daunorubicin) with D1=3/15/19. BMBx on 3/25 (done early due to rising WBC and blast counts) demonstrated persistent disease with 95% blasts by flow, 98% by morphology. Started re-induction with decitabine on 3/26 PM and added venetoclax on 3/29. His course has been complicated by neutropenic sepsis secondary to odontic/soft tissue infection. He had extraction of teeth #18 and 19 on 4/3 but subsequently developed septic shock requiring transfer to MICU team 4/3-4/4. Hypotension improved after fluid resuscitated and he did not require intubation or pressor support. He was transferred back to Heme Malignancy service on 4/4/19.     Today:  - no blasts on diff today, continue to monitor counts  - continue all anti-infectives at this time      HEME:  #AML, refractory. NGS positive for IDH2 and RUNX1 alterations.  Patient presented to [facility] ED in [place], [place] for complaints of nonspecific chest discomfort after partial root canal treatment (done 3/11/19). CT C/A/P was negative for PE or other acute findings. On OSH labs, his WBC was incidentally noted to be elevated at 71.1 with Hb 9.0 and plts 97K (prior labs had been unremarkable per patient). Smear was suspicious for immature blasts and acute leukemia. No symptoms of hyperviscosity, TLS or DIC on presentation. 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 Flow consistent with AML with 95% blasts with the following immunophenotype: Positive for CD13, CD33, CD34,  CD38, dim to negative CD45, , partial HLA-DR. Baseline ECHO normal, PICC place.   - HLA typing sent, BMT consult done 3/29 by Dr. Brown  - s/p initial Hydrea (dc'd 3/16)   - received induction chemotherapy with 7+3 (cytarabine and daunorubicin). Day 1=3/15/19.   - repeat BMBx done early (3/25) due to rising WBC/blast count. Still with persistent heavy burden of disease (98% blasts by morphology).   - s/p Hydrea for WBC count management; started 1g BID (3/27), incresaed to 2g BID (x3/28). Hydrea discontinued 3/31.   - Started reinduction with decitabine/venetoclax. Decitabine D1=3/26; added Venetoclax on 3/29. Ramp up dosing accounting for concomitant voriconazole. Final dose will be 100mg daily. Today is Day 18 from start of reinduction.  Of note, decitabine D9 and D10 held 4/3 and 4/4, respectively, due to acute illness and elevated Tbili/ALT. With improvement in ALT and Tbili, we resumed decitabine for last 2 doses (4/8 and 4/9).   - Continue Venetoclax at this time, dose now at 100mg daily (due to interaction to Vfend)  - now off Allopurinol, monitor blasts and may need to add back pending trend.  - peripheral blasts absent on diff since 4/5. Did have 13% (abs 0.2) on 4/11, none on 4/12     #Pancytopenia  2/2 AML & associated chemotherapy.   - transfuse to maintain Hb >7, Plt >10K.      #Coagulopathy, improved.  in setting of refractory AML and likely exacerbated by prior poor PO intake. INR elevated, but now downtrended to WNL. Fibrinogen stable.  - plasma for INR >1.8, cryo for fibrinogen <100  - s/p Vit K x 3 days (4/3-4/5)     ID:  #Neutropenic fever/sepsis with septic shock (4/3-4/4), improved  #C.diff diarrhea, improved  #Neutropenic colitis, improved  #Left mandible cellulitis/odontic infection. S/p extraction of teeth #18 and 19 on 4/3.   First fever on 3/22. Sources include c.diff colitis and odontic/soft tissue infection.   - ID following  - BCx 3/22 to present are NGTD  - 3/23 fungal BCx NGTD  -  3/22 UA negative, repeat UA/UCx negative on 3/30  - 3/22 CXR with small R pleural effusion, no focal airspace opacity  - 3/25 galactomannan and fungitell both negative  - 3/26 C.diff positive  - 3/29 CT C/A/P demonstrated Right thickened hemicolon with adjacent fat stranding  - 3/31 CT Dental demonstrated left hemimandible cellulitis  - Dental consulted for CT findings, unable to intervene as urgently as needed at this time due to acute worsening of clinic status. Thus, ultimately consulted Oral Surgery on 4/3; they performed extraction of teeth #18 and 19 on 4/3.   - pt has now been afebrile since 4/4   - in MICU, started on stress dose steroids, Vit C, and thiamine. D/c'd steroids 4/4, discontinued Vit C and thiamine on 4/5.      **Anti-infectives:  - s/p Cefepime (x 3/22-4/1). Changed to Zosyn (x 4/1-4/3). With septic shock changed to clindamycin (4/3-4/4) and imipenem (x4/3-4/8). Changed back to IV Zosyn (x4/8). Continue through 4/13.  - s/p PO Flagyl (x3/29-4/1).   - s/p IV vanc (x 3/31-4/3). Changed to IV Linezolid (x 4/3- present); changed to PO dosing on 4/7. Continue through 4/13  - continue PO Vancomycin 125mg four times daily. Plan to continue for an additional week after Zosyn/Linezolid antibiotic therapy is completed.     #ID PPx   - continue ACV  - resumed ppx Vfend 150mg BID        GI:  #Hyperbilirubinemia, improving.  #Transaminitis, improving.  Acute elevation in Tbili (primarily direct) on 4/3 PM, improving by 4/5. Likely related to septic shock. As Tbili improving, noted acute elevation in transaminases on 4/5. Again likely related to sequelae from shock, but Vfend could be contributing. No acute finding on 4/4 Abd US. Denies abdominal/RUQ pain.  - LFTs improving    #C.diff diarrhea   #Neutropenic colitis  - c.diff treatment as above  - s/p TPN (x4/4-4/7). With improvement in clinical status and stable GI symptoms, ADAT.   - stools improving     #Hemorrhoids, improved  - PRN management: TUCKS  pads, sitz bath, topical Prep H and/or lidocaine    #Chemotherapy induced nausea, controlled/improved.  - scheduled Zofran q8hr, consider adjusting to PRN in next 1-2 days as nausea remains absent after discontinuation of scheduled compazine  - PRN antiemetics     CV:  #Type 2 NSTEMI 2/2 demand ischemia/septic shock.   Troponin elevated, stable at 0.436-->0.405. Will not further trend. Repeat ECHO (4/5) demonstrates stable EF 55-60%. LV/RV size and function normal. New mild-mod mitral insufficiency. Trivial pericardial effusion. IVC dilated c/w overload.     #Hypervolemia, improved. 2/2 aggressive fluid resuscitation. Weight up max 25 lbs (since 3/31). Now downtrended.  - s/p Lasix total 40mg IV (4/5), 20mg IV (4/6). Now with autodiuresis.     #Subclinical coronary atherosclerosis  #Hyperlipidemia  Total Agatston calcium score was elevated at 591 (91st percentile) on CT coronaries performed 6/8/2016. Nuclear stress test was negative for ischemia on 8/10/2016. Life style modification measures were recommended. Patient follows with cardiology as outpatient (last office visit on 11/12/18).  - holding atorvastatin at this time. Could potentially resume after completion of reinduction if LFTs further improve and remain WNL.    PULM:  #Acute hypoxic respiratory failure. Resolved.   Most likely related to edema.   - PRN supplemental O2  - repeat ECHO as above  - diuresis PRN     RENAL:  #Lyte derangement, improved.   Multifactorial with decreased PO intake, fevers, diarrhea.    - lyte repletion per unit protocol   - calcium repletion PRN     #FOREST, resolved.  Baseline Cr 0.7 (GFR>90). Creatinine uptrended with Cr max 1.45 (GFR 50), now improved/resolved. Multifactorial in setting of decreased PO intake, fevers, diarrhea, and medications (IV Vancomycin, Zosyn, contrast), and development of septic shock. Now resolved.      :  #BPH   Patient follows with urology ([facility], [place], [place]). On PTA Flomax 0.8 mg  daily.  - of note, possible drug interaction between voriconazole and flomax (can raise flomax level in blood & cause hypotension).   - decreased flomax from 0.8mg to 0.4mg (soft BPs in setting of neutropenic sepsis and risk of further hypotension due to interaction with voriconazole). Continue 0.4mg dose at this time.       NEURO/MSK:  #Restless legs syndrome  - Continue ropinirole 0.75 mg at bedtime, offer PRN ativan for persistent symptoms.     #History of lumbar spine degenerative disc disease   Patient is s/p laminectomy/facetectomy procedures. He does not routinely take pain meds.     DENTAL:  #S/P root canal manipulation  #Odontic infection as above  Patient had the start of a root canal treatment initiated on 3/11/19 with plan to complete it a few weeks later (per pt's wife and son). He was started on a 7-day course of oral  mg q6h beginning one day prior to the procedure. At time of admission, he was having pain in his left jaw but site appeared unremarkable with no abscess or drainage.  In retrospect, may have also had some leukemic infiltration of gums complicating his course. Pain at site initially improved during chemotherapy, but did return. Pain worsened and with higher temps, checked CT Dental which demonstrated cellulitis along left hemimandible. Developed worsening oral infection as above.  - Tylenol PRN, tramadol vs oxycodone PRN   - abx as above  - Dental consult as above  - OMFS consult, intervention on 4/3 as above     PSYCHOSOCIAL:  #Coping.  Appreciate SW and  involvement. Pt had requested help with Advance Directive, SW following     FEN   - no current MIVFs.   - PRN lyte replacement per protocol  - regular diet as tolerated     Prophylaxis  - No pharmacologic VTE ppx due to TCP  - PPI for GI/PUD ppx       Code Status: FULL     Disposition: Pending at this time. Anticipate pt will remain admitted through the weekend for ongoing monitoring of blood counts/transfusion needs, IV  antibiotics, and further therapy needs for acute deconditioning.  Pending progress over weekend and trend of counts, could potentially discharge to home next week.      Discussed with Dr. Lambert.     Vee Cabrera PA-C  Heme/Onc  [phone]        Interval History   Afebrile. No acute events overnight. Randy is awake, eating breakfast. He is feeling ok. Did have some SOB overnight when getting up to the bathroom. Felt like he couldn't catch his breath. No hypoxia. Did not require supplemental O2. This AM his breathing is feeling better. Denies HA, vision changes, cough, or nausea. Stool frequency/consistency improved. Brothers in town and will visit today.    Physical Exam   Temp: 97.5  F (36.4  C) Temp src: Axillary BP: 115/82 Pulse: 98 Heart Rate: 98 Resp: 18 SpO2: 98 % O2 Device: None (Room air)    Vitals:    04/10/19 0903 04/11/19 0901 04/12/19 0836   Weight: 90.5 kg (199 lb 9.6 oz) 88.7 kg (195 lb 8 oz) 88.5 kg (195 lb 3.2 oz)     Vital Signs with Ranges  Temp:  [96.7  F (35.9  C)-97.5  F (36.4  C)] 97.5  F (36.4  C)  Pulse:  [85-98] 98  Heart Rate:  [98] 98  Resp:  [16-20] 18  BP: ()/(65-88) 115/82  SpO2:  [96 %-100 %] 98 %  I/O last 3 completed shifts:  In: 1040 [P.O.:840; I.V.:200]  Out: 2525 [Urine:1725; Stool:800]    Constitutional: Seen sitting up in chair, eating breakfast, on RA. Pleasant. Deconditioned.   HEENT: NC/AT. MMM.   Respiratory: Breathing even and non-labored on RA. Lungs clear without crackles. No wheezing.   CV: RRR. No murmur appreciated.  GI: +BS. Abdomen soft. No tenderness to palpation.  Skin: Warm. No concerning lesions or rash on exposed surfaces.   Musculoskeletal: No LE edema.  Neurologic: Alert and oriented. Grossly nonfocal.  Neuropsychiatric: Calm, mentation appears normal, answering questions appropriately.        Medications     IV fluid REPLACEMENT ONLY       - MEDICATION INSTRUCTIONS -         acyclovir  400 mg Oral BID     artificial saliva  1-2 spray Swish & Spit 4x  Daily     linezolid  600 mg Oral Q12H ZUNILDA     ondansetron  8 mg Oral Q8H     pantoprazole  40 mg Oral BID AC     piperacillin-tazobactam  3.375 g Intravenous Q6H     Resume a HELD Medication   Does not apply See Admin Instructions     rOPINIRole  0.75 mg Oral Daily at 8 pm     sodium chloride (PF)  10 mL Intracatheter Q7 Days     tamsulosin  0.4 mg Oral Daily     vancomycin  125 mg Oral 4x Daily     venetoclax  100 mg Oral Daily with supper     voriconazole  150 mg Oral Q12H [facility]       Data   Results for orders placed or performed during the hospital encounter of 03/12/19 (from the past 24 hour(s))   Phosphorus   Result Value Ref Range    Phosphorus 4.3 2.5 - 4.5 mg/dL   Magnesium   Result Value Ref Range    Magnesium 2.1 1.6 - 2.3 mg/dL   INR   Result Value Ref Range    INR 1.18 (H) 0.86 - 1.14   Hepatic panel   Result Value Ref Range    Bilirubin Direct 0.7 (H) 0.0 - 0.2 mg/dL    Bilirubin Total 1.3 0.2 - 1.3 mg/dL    Albumin 2.7 (L) 3.4 - 5.0 g/dL    Protein Total 6.2 (L) 6.8 - 8.8 g/dL    Alkaline Phosphatase 116 40 - 150 U/L    ALT 70 0 - 70 U/L    AST 25 0 - 45 U/L   Fibrinogen activity   Result Value Ref Range    Fibrinogen 386 200 - 420 mg/dL   CBC with platelets differential   Result Value Ref Range    WBC 2.2 (L) 4.0 - 11.0 10e9/L    RBC Count 2.53 (L) 4.4 - 5.9 10e12/L    Hemoglobin 7.9 (L) 13.3 - 17.7 g/dL    Hematocrit 24.3 (L) 40.0 - 53.0 %    MCV 96 78 - 100 fl    MCH 31.2 26.5 - 33.0 pg    MCHC 32.5 31.5 - 36.5 g/dL    RDW 14.4 10.0 - 15.0 %    Platelet Count 8 (LL) 150 - 450 10e9/L    Diff Method Manual Differential     % Neutrophils 53.1 %    % Lymphocytes 46.0 %    % Monocytes 0.0 %    % Eosinophils 0.0 %    % Basophils 0.9 %    Absolute Neutrophil 1.2 (L) 1.6 - 8.3 10e9/L    Absolute Lymphocytes 1.0 0.8 - 5.3 10e9/L    Absolute Monocytes 0.0 0.0 - 1.3 10e9/L    Absolute Eosinophils 0.0 0.0 - 0.7 10e9/L    Absolute Basophils 0.0 0.0 - 0.2 10e9/L    RBC Morphology Normal     Platelet Estimate  Confirming automated cell count    Basic metabolic panel   Result Value Ref Range    Sodium 136 133 - 144 mmol/L    Potassium 4.1 3.4 - 5.3 mmol/L    Chloride 105 94 - 109 mmol/L    Carbon Dioxide 23 20 - 32 mmol/L    Anion Gap 8 3 - 14 mmol/L    Glucose 103 (H) 70 - 99 mg/dL    Urea Nitrogen 14 7 - 30 mg/dL    Creatinine 0.68 0.66 - 1.25 mg/dL    GFR Estimate >90 >60 mL/min/[1.73_m2]    GFR Estimate If Black >90 >60 mL/min/[1.73_m2]    Calcium 8.6 8.5 - 10.1 mg/dL   Uric acid   Result Value Ref Range    Uric Acid 1.6 (L) 3.5 - 7.2 mg/dL   Platelets prepare order unit conditional   Result Value Ref Range    Blood Component Type PLT Pheresis     Units Ordered 1    Blood component   Result Value Ref Range    Unit Number [id number]     Blood Component Type PlateletPheresis,LeukoRed Irrad (Part 3)     Division Number 00     Status of Unit Released to care unit 04/12/2019 0546     Blood Product Code [id number]     Unit Status ISS    Lactic acid level STAT for sepsis protocol   Result Value Ref Range    Lactate for Sepsis Protocol 0.6 (L) 0.7 - 2.0 mmol/L

## 2019-04-13 ENCOUNTER — APPOINTMENT (OUTPATIENT)
Dept: PHYSICAL THERAPY | Facility: CLINIC | Age: 64
DRG: 834 | End: 2019-04-13
Payer: COMMERCIAL

## 2019-04-13 LAB
ALBUMIN SERPL-MCNC: 2.7 G/DL (ref 3.4–5)
ALP SERPL-CCNC: 122 U/L (ref 40–150)
ALT SERPL W P-5'-P-CCNC: 64 U/L (ref 0–70)
ANION GAP SERPL CALCULATED.3IONS-SCNC: 8 MMOL/L (ref 3–14)
AST SERPL W P-5'-P-CCNC: 20 U/L (ref 0–45)
BACTERIA SPEC CULT: NO GROWTH
BASOPHILS # BLD AUTO: 0 10E9/L (ref 0–0.2)
BASOPHILS NFR BLD AUTO: 0 %
BILIRUB DIRECT SERPL-MCNC: 0.5 MG/DL (ref 0–0.2)
BILIRUB SERPL-MCNC: 0.9 MG/DL (ref 0.2–1.3)
BLASTS # BLD: 0.1 10E9/L
BLASTS BLD QL SMEAR: 5.4 %
BUN SERPL-MCNC: 16 MG/DL (ref 7–30)
CALCIUM SERPL-MCNC: 8.4 MG/DL (ref 8.5–10.1)
CHLORIDE SERPL-SCNC: 106 MMOL/L (ref 94–109)
CO2 SERPL-SCNC: 23 MMOL/L (ref 20–32)
CREAT SERPL-MCNC: 0.77 MG/DL (ref 0.66–1.25)
DIFFERENTIAL METHOD BLD: ABNORMAL
EOSINOPHIL # BLD AUTO: 0 10E9/L (ref 0–0.7)
EOSINOPHIL NFR BLD AUTO: 0 %
ERYTHROCYTE [DISTWIDTH] IN BLOOD BY AUTOMATED COUNT: 14.3 % (ref 10–15)
GFR SERPL CREATININE-BSD FRML MDRD: >90 ML/MIN/{1.73_M2}
GLUCOSE SERPL-MCNC: 96 MG/DL (ref 70–99)
HCT VFR BLD AUTO: 23.2 % (ref 40–53)
HGB BLD-MCNC: 7.7 G/DL (ref 13.3–17.7)
LACTATE BLD-SCNC: 1 MMOL/L (ref 0.7–2)
LYMPHOCYTES # BLD AUTO: 1.2 10E9/L (ref 0.8–5.3)
LYMPHOCYTES NFR BLD AUTO: 51.4 %
Lab: NORMAL
MCH RBC QN AUTO: 31.6 PG (ref 26.5–33)
MCHC RBC AUTO-ENTMCNC: 33.2 G/DL (ref 31.5–36.5)
MCV RBC AUTO: 95 FL (ref 78–100)
MONOCYTES # BLD AUTO: 0 10E9/L (ref 0–1.3)
MONOCYTES NFR BLD AUTO: 0.9 %
NEUTROPHILS # BLD AUTO: 1 10E9/L (ref 1.6–8.3)
NEUTROPHILS NFR BLD AUTO: 42.3 %
PLATELET # BLD AUTO: 23 10E9/L (ref 150–450)
POTASSIUM SERPL-SCNC: 3.9 MMOL/L (ref 3.4–5.3)
PROT SERPL-MCNC: 6.4 G/DL (ref 6.8–8.8)
RBC # BLD AUTO: 2.44 10E12/L (ref 4.4–5.9)
RBC MORPH BLD: NORMAL
SODIUM SERPL-SCNC: 138 MMOL/L (ref 133–144)
SPECIMEN SOURCE: NORMAL
WBC # BLD AUTO: 2.4 10E9/L (ref 4–11)

## 2019-04-13 PROCEDURE — 97110 THERAPEUTIC EXERCISES: CPT | Mod: GP

## 2019-04-13 PROCEDURE — 25000132 ZZH RX MED GY IP 250 OP 250 PS 637: Performed by: STUDENT IN AN ORGANIZED HEALTH CARE EDUCATION/TRAINING PROGRAM

## 2019-04-13 PROCEDURE — 25000128 H RX IP 250 OP 636: Performed by: PHYSICIAN ASSISTANT

## 2019-04-13 PROCEDURE — 85025 COMPLETE CBC W/AUTO DIFF WBC: CPT | Performed by: INTERNAL MEDICINE

## 2019-04-13 PROCEDURE — 25000132 ZZH RX MED GY IP 250 OP 250 PS 637: Performed by: PHYSICIAN ASSISTANT

## 2019-04-13 PROCEDURE — 80048 BASIC METABOLIC PNL TOTAL CA: CPT | Performed by: INTERNAL MEDICINE

## 2019-04-13 PROCEDURE — C9399 UNCLASSIFIED DRUGS OR BIOLOG: HCPCS | Performed by: PHYSICIAN ASSISTANT

## 2019-04-13 PROCEDURE — 12000012 ZZH R&B MS OVERFLOW UMMC

## 2019-04-13 PROCEDURE — 80076 HEPATIC FUNCTION PANEL: CPT | Performed by: INTERNAL MEDICINE

## 2019-04-13 PROCEDURE — 83605 ASSAY OF LACTIC ACID: CPT | Performed by: INTERNAL MEDICINE

## 2019-04-13 PROCEDURE — 25000132 ZZH RX MED GY IP 250 OP 250 PS 637: Performed by: NURSE PRACTITIONER

## 2019-04-13 PROCEDURE — 25000131 ZZH RX MED GY IP 250 OP 636 PS 637: Performed by: PHYSICIAN ASSISTANT

## 2019-04-13 PROCEDURE — 99232 SBSQ HOSP IP/OBS MODERATE 35: CPT | Performed by: INTERNAL MEDICINE

## 2019-04-13 PROCEDURE — 25000132 ZZH RX MED GY IP 250 OP 250 PS 637: Performed by: INTERNAL MEDICINE

## 2019-04-13 RX ADMIN — VANCOMYCIN HYDROCHLORIDE 125 MG: KIT at 09:04

## 2019-04-13 RX ADMIN — PIPERACILLIN AND TAZOBACTAM 3.38 G: 3; .375 INJECTION, POWDER, FOR SOLUTION INTRAVENOUS at 06:51

## 2019-04-13 RX ADMIN — ROPINIROLE HYDROCHLORIDE 0.75 MG: 0.5 TABLET, FILM COATED ORAL at 19:47

## 2019-04-13 RX ADMIN — VANCOMYCIN HYDROCHLORIDE 125 MG: KIT at 15:35

## 2019-04-13 RX ADMIN — TAMSULOSIN HYDROCHLORIDE 0.4 MG: 0.4 CAPSULE ORAL at 09:04

## 2019-04-13 RX ADMIN — PANTOPRAZOLE SODIUM 40 MG: 40 TABLET, DELAYED RELEASE ORAL at 15:35

## 2019-04-13 RX ADMIN — LINEZOLID 600 MG: 600 TABLET, FILM COATED ORAL at 09:04

## 2019-04-13 RX ADMIN — VANCOMYCIN HYDROCHLORIDE 125 MG: KIT at 19:47

## 2019-04-13 RX ADMIN — Medication 2 SPRAY: at 09:05

## 2019-04-13 RX ADMIN — VORICONAZOLE 150 MG: 50 TABLET ORAL at 09:04

## 2019-04-13 RX ADMIN — VANCOMYCIN HYDROCHLORIDE 125 MG: KIT at 12:07

## 2019-04-13 RX ADMIN — PIPERACILLIN AND TAZOBACTAM 3.38 G: 3; .375 INJECTION, POWDER, FOR SOLUTION INTRAVENOUS at 17:46

## 2019-04-13 RX ADMIN — Medication 2 SPRAY: at 15:36

## 2019-04-13 RX ADMIN — ACYCLOVIR 400 MG: 400 TABLET ORAL at 09:04

## 2019-04-13 RX ADMIN — PANTOPRAZOLE SODIUM 40 MG: 40 TABLET, DELAYED RELEASE ORAL at 09:04

## 2019-04-13 RX ADMIN — ONDANSETRON HYDROCHLORIDE 8 MG: 8 TABLET, FILM COATED ORAL at 23:07

## 2019-04-13 RX ADMIN — LINEZOLID 600 MG: 600 TABLET, FILM COATED ORAL at 19:47

## 2019-04-13 RX ADMIN — VENETOCLAX 100 MG: 100 TABLET, FILM COATED ORAL at 17:43

## 2019-04-13 RX ADMIN — VORICONAZOLE 150 MG: 50 TABLET ORAL at 19:47

## 2019-04-13 RX ADMIN — ACYCLOVIR 400 MG: 400 TABLET ORAL at 19:47

## 2019-04-13 RX ADMIN — PIPERACILLIN AND TAZOBACTAM 3.38 G: 3; .375 INJECTION, POWDER, FOR SOLUTION INTRAVENOUS at 23:08

## 2019-04-13 RX ADMIN — ONDANSETRON HYDROCHLORIDE 8 MG: 8 TABLET, FILM COATED ORAL at 09:04

## 2019-04-13 RX ADMIN — ONDANSETRON HYDROCHLORIDE 8 MG: 8 TABLET, FILM COATED ORAL at 15:36

## 2019-04-13 RX ADMIN — Medication 1 SPRAY: at 19:47

## 2019-04-13 RX ADMIN — PIPERACILLIN AND TAZOBACTAM 3.38 G: 3; .375 INJECTION, POWDER, FOR SOLUTION INTRAVENOUS at 12:07

## 2019-04-13 ASSESSMENT — ACTIVITIES OF DAILY LIVING (ADL)
ADLS_ACUITY_SCORE: 14

## 2019-04-13 ASSESSMENT — MIFFLIN-ST. JEOR: SCORE: 1660.81

## 2019-04-13 NOTE — PLAN OF CARE
"/85 (BP Location: Left arm)   Pulse 103   Temp 96.7  F (35.9  C) (Axillary)   Resp 16   Ht 1.778 m (5' 10\")   Wt 88.5 kg (195 lb 3.2 oz)   SpO2 99%   BMI 28.01 kg/m      VSS, no complaints of pain or N/V/D. Pt is AOx4 and up ad shelley. Abx infusing per orders.       Problem: Adult Inpatient Plan of Care  Goal: Absence of Hospital-Acquired Illness or Injury  4/13/2019 0639 by Beckie Ferrara, RN  Outcome: No Change  4/12/2019 2225 by Liberty Baker, RN  Outcome: No Change    Problem: Adult Inpatient Plan of Care  Goal: Optimal Comfort and Wellbeing  Outcome: No Change        "

## 2019-04-13 NOTE — PLAN OF CARE
A/VSS, afebrile. El had several visitors today. El ate well for all three meals, and showered this am. Will continue to monitor per plan of care.       Problem: Adult Inpatient Plan of Care  Goal: Plan of Care Review  4/13/2019 1815 by Odette Grey, RN  Outcome: No Change     Problem: Adult Inpatient Plan of Care  Goal: Patient-Specific Goal (Individualization)  4/13/2019 1815 by Odette Grey RN  Outcome: No Change     Problem: Adult Inpatient Plan of Care  Goal: Absence of Hospital-Acquired Illness or Injury  4/13/2019 1815 by Odette Grey, RN  Outcome: No Change     Problem: Bleeding Risk or Actual  Goal: Absence of Bleeding  4/13/2019 1815 by Odette Grey, RN  Outcome: No Change

## 2019-04-13 NOTE — PROGRESS NOTES
[facility], [place]    Hematology / Oncology Progress Note    Date of Admission: 3/12/2019    Date of Service (when I saw the patient): 04/13/2019     Assessment & Plan    Mr. Ace is a 63 year old male with new diagnosis of acute myeloid leukemia incidentally found during work up of nonspecific chest symptoms after partial root canal intervention. He was started on induction chemotherapy with 7+3 (cytarabine and daunorubicin) with D1=3/15/19. BMBx on 3/25 (done early due to rising WBC and blast counts) demonstrated persistent disease with 95% blasts by flow, 98% by morphology. Started re-induction with decitabine on 3/26 PM and added venetoclax on 3/29. His course has been complicated by neutropenic sepsis secondary to odontic/soft tissue infection. He had extraction of teeth #18 and 19 on 4/3 but subsequently developed septic shock requiring transfer to MICU team 4/3-4/4. Hypotension improved after fluid resuscitated and he did not require intubation or pressor support. He was transferred back to [facility] service on 4/4/19.     Today:  - continue venetoclax  - trending counts daily, ANC currently 1.0, intermittent minimal blasts (0.1) on diff but not increasing and not unexpected at this time  - no transfusion needs today  - continue Linezolid and Zosyn through tonight. Pending ANC tomorrow, will determine need for ppx antibiotics        HEME:  #AML, refractory. NGS positive for IDH2 and RUNX1 alterations.  Patient presented to [facility] ED in [place], [place] for complaints of nonspecific chest discomfort after partial root canal treatment (done 3/11/19). CT C/A/P was negative for PE or other acute findings. On OSH labs, his WBC was incidentally noted to be elevated at 71.1 with Hb 9.0 and plts 97K (prior labs had been unremarkable per patient). Smear was suspicious for immature blasts and acute leukemia. No symptoms of hyperviscosity, TLS or DIC on presentation.  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 Flow consistent with AML with 95% blasts with the following immunophenotype: Positive for CD13, CD33, CD34, CD38, dim to negative CD45, , partial HLA-DR. Baseline ECHO normal, PICC place.   - HLA typing sent, BMT consult done 3/29 by Dr. Brown  - s/p initial Hydrea (dc'd 3/16)   - received induction chemotherapy with 7+3 (cytarabine and daunorubicin). Day 1=3/15/19.   - repeat BMBx done early (3/25) due to rising WBC/blast count. Still with persistent heavy burden of disease (98% blasts by morphology).   - s/p Hydrea for WBC count management; started 1g BID (3/27), incresaed to 2g BID (x3/28). Hydrea discontinued 3/31.   - Started reinduction with decitabine/venetoclax. Decitabine D1=3/26; added Venetoclax on 3/29. Ramp up dosing accounting for concomitant voriconazole. Final dose will be 100mg daily. Today is Day 19 from start of reinduction.  Of note, decitabine D9 and D10 held 4/3 and 4/4, respectively, due to acute illness and elevated Tbili/ALT. With improvement in ALT and Tbili, we resumed decitabine for last 2 doses (4/8 and 4/9).   - Continue Venetoclax at this time, dose now at 100mg daily (due to interaction to Vfend)  - now off Allopurinol    #Pancytopenia  2/2 AML & associated chemotherapy.   - transfuse to maintain Hb >7, Plt >10K. Needed Plts 4/9 and 4/12, so not needing daily transfusions. Could likely have labs/transfusions twice weekly in clinic.     #Coagulopathy, improved.  In setting of refractory AML and likely exacerbated by prior poor PO intake. INR elevated, but now downtrended to WNL. Fibrinogen stable.  - s/p Vit K x 3 days (4/3-4/5)     ID:  #Neutropenic fever/sepsis with septic shock (4/3-4/4), improved  #C.diff diarrhea, improved  #Neutropenic colitis, improved  #Left mandible cellulitis/odontic infection. S/p extraction of teeth #18 and 19 on 4/3.   First fever on 3/22. Sources  include c.diff colitis and odontic/soft tissue infection.   - ID following  - BCx 3/22 to present are NGTD  - 3/23 fungal BCx NGTD  - 3/22 UA negative, repeat UA/UCx negative on 3/30  - 3/22 CXR with small R pleural effusion, no focal airspace opacity  - 3/25 galactomannan and fungitell both negative  - 3/26 C.diff positive  - 3/29 CT C/A/P demonstrated Right thickened hemicolon with adjacent fat stranding  - 3/31 CT Dental demonstrated left hemimandible cellulitis  - Dental consulted for CT findings, unable to intervene as urgently as needed at this time due to acute worsening of clinic status. Thus, ultimately consulted Oral Surgery on 4/3; they performed extraction of teeth #18 and 19 on 4/3.   - pt has now been afebrile since 4/4   - in MICU, started on stress dose steroids, Vit C, and thiamine. D/c'd steroids 4/4, discontinued Vit C and thiamine on 4/5.      **Anti-infectives:  - s/p Cefepime (x 3/22-4/1). Changed to Zosyn (x 4/1-4/3). With septic shock changed to clindamycin (4/3-4/4) and imipenem (x4/3-4/8). Changed back to IV Zosyn (x4/8). Continue through 4/13.  - s/p PO Flagyl (x3/29-4/1).   - s/p IV vanc (x 3/31-4/3). Changed to IV Linezolid (x 4/3- present); changed to PO dosing on 4/7. Continue through 4/13.  - continue PO Vancomycin 125mg four times daily. Plan to continue for an additional week after Zosyn/Linezolid antibiotic therapy is completed.     #ID PPx   - continue ACV  - resumed ppx Vfend 150mg BID        GI:  #Hyperbilirubinemia, improving.  #Transaminitis, improving.  Acute elevation in Tbili (primarily direct) on 4/3 PM, improving by 4/5. Likely related to septic shock. As Tbili improving, noted acute elevation in transaminases on 4/5. Again likely related to sequelae from shock, but Vfend could be contributing. No acute finding on 4/4 Abd US. Denies abdominal/RUQ pain.  - LFTs improving    #C.diff diarrhea, improving  #Neutropenic colitis, improving  - c.diff treatment as above  - s/p TPN  (x4/4-4/7). With improvement in clinical status and stable GI symptoms, ADAT.   - stools improving     #Hemorrhoids, improved  - PRN management: TUCKS pads, sitz bath, topical Prep H and/or lidocaine    #Chemotherapy induced nausea, controlled/improved.  - scheduled Zofran q8hr, consider adjusting to PRN in next 1-2 days as nausea remains absent after discontinuation of scheduled compazine  - PRN antiemetics     CV:  #Type 2 NSTEMI 2/2 demand ischemia/septic shock.   Troponin elevated, stable at 0.436-->0.405. Will not further trend. Repeat ECHO (4/5) demonstrates stable EF 55-60%. LV/RV size and function normal. New mild-mod mitral insufficiency. Trivial pericardial effusion. IVC dilated c/w overload.     #Hypervolemia, resolved. 2/2 aggressive fluid resuscitation.   - s/p Lasix total 40mg IV (4/5), 20mg IV (4/6). Now with autodiuresis.     #Subclinical coronary atherosclerosis  #Hyperlipidemia  Total Agatston calcium score was elevated at 591 (91st percentile) on CT coronaries performed 6/8/2016. Nuclear stress test was negative for ischemia on 8/10/2016. Life style modification measures were recommended. Patient follows with cardiology as outpatient (last office visit on 11/12/18).  - holding atorvastatin at this time. Could potentially resume after completion of reinduction if LFTs remain WNL.    PULM:  #Acute hypoxic respiratory failure. Resolved.   Most likely related to edema.   - PRN supplemental O2  - repeat ECHO as above  - diuresis PRN     RENAL:  #Lyte derangement, improved.   Multifactorial with decreased PO intake, fevers, diarrhea.    - lyte repletion per unit protocol  - calcium repletion PRN     #FOREST, resolved.  Baseline Cr 0.7 (GFR>90). Creatinine uptrended with Cr max 1.45 (GFR 50), now improved/resolved. Multifactorial in setting of decreased PO intake, fevers, diarrhea, and medications (IV Vancomycin, Zosyn, contrast), and development of septic shock. Now resolved.      :  #BPH   Patient  follows with urology ([facility], [place], [place]). On PTA Flomax 0.8 mg daily.  - of note, possible drug interaction between voriconazole and flomax (can raise flomax level in blood & cause hypotension).   - decreased flomax from 0.8mg to 0.4mg (soft BPs in setting of neutropenic sepsis and risk of further hypotension due to interaction with voriconazole). Continue 0.4mg dose at this time.       NEURO/MSK:  #Restless legs syndrome  - Continue ropinirole 0.75 mg at bedtime, offer PRN ativan for persistent symptoms.     #History of lumbar spine degenerative disc disease   Patient is s/p laminectomy/facetectomy procedures. He does not routinely take pain meds.     DENTAL:  #S/P root canal manipulation  #Odontic infection as above  Patient had the start of a root canal treatment initiated on 3/11/19 with plan to complete it a few weeks later (per pt's wife and son). He was started on a 7-day course of oral  mg q6h beginning one day prior to the procedure. At time of admission, he was having pain in his left jaw but site appeared unremarkable with no abscess or drainage.  In retrospect, may have also had some leukemic infiltration of gums complicating his course. Pain at site initially improved during chemotherapy, but did return. Pain worsened and with higher temps, checked CT Dental which demonstrated cellulitis along left hemimandible. Developed worsening oral infection as above.  - Tylenol PRN, tramadol vs oxycodone PRN   - abx as above  - Dental consult as above  - OMFS consult, intervention on 4/3 as above     PSYCHOSOCIAL:  #Coping.  Appreciate SW and  involvement. Pt had requested help with Advance Directive, SW following     FEN   - no current MIVFs.   - PRN lyte replacement per protocol  - regular diet as tolerated     Prophylaxis  - No pharmacologic VTE ppx due to TCP  - PPI for GI/PUD ppx       Code Status: FULL     Disposition: Pending at this time. Anticipate pt will remain admitted  through the weekend for ongoing monitoring of blood counts/transfusion needs and completion of IV antibiotics. PT currently rec home and possible OP Cancer rehab.  Pending progress over weekend and trend of counts, could potentially discharge to home next week.   - would discuss final discharge plan with Dr. Cuadra  - would anticipate twice weekly labs/transfusions      Discussed with Dr. Lambert.     Vee Cabrera PA-C  Heme/Onc  [phone]        Interval History   Afebrile. No acute events overnight. Randy is awake but still resting in bed. Did not sleep great as he states he is thinking about when he might possibly discharge. Had an episode overnight where he felt like he couldn't quite catch his breath when getting up to BR. Again, no hypoxia, did not require supplemental O2. No current c/o SOB.  Denies HA, vision changes, cough, or nausea. Stools overall improved. Today he is anticipating a shower and working with PT/OT.      Physical Exam   Temp: 96.6  F (35.9  C) Temp src: Axillary BP: 105/76 Pulse: 103 Heart Rate: 94 Resp: 16 SpO2: 98 % O2 Device: None (Room air)    Vitals:    04/11/19 0901 04/12/19 0836 04/13/19 0858   Weight: 88.7 kg (195 lb 8 oz) 88.5 kg (195 lb 3.2 oz) 86.5 kg (190 lb 9.6 oz)     Vital Signs with Ranges  Temp:  [96.6  F (35.9  C)-97.9  F (36.6  C)] 96.6  F (35.9  C)  Pulse:  [103] 103  Heart Rate:  [] 94  Resp:  [16-18] 16  BP: (103-124)/(71-85) 105/76  SpO2:  [98 %-99 %] 98 %  I/O last 3 completed shifts:  In: 512 [I.V.:320]  Out: 500 [Urine:500]    Constitutional: Seen resting in bed. Pleasant, alert, interactive.  HEENT: NC/AT. MMM.   Respiratory: Breathing even and non-labored on RA.   GI: Abd ND.  Skin: No concerning lesions or rash on exposed surfaces.   Musculoskeletal: No LE edema.  Neurologic: Alert and oriented. Grossly nonfocal.  Neuropsychiatric: Calm, mentation appears normal, answering questions appropriately.        Medications     IV fluid REPLACEMENT ONLY       -  MEDICATION INSTRUCTIONS -         acyclovir  400 mg Oral BID     artificial saliva  1-2 spray Swish & Spit 4x Daily     linezolid  600 mg Oral Q12H ZUNILDA     ondansetron  8 mg Oral Q8H     pantoprazole  40 mg Oral BID AC     piperacillin-tazobactam  3.375 g Intravenous Q6H     Resume a HELD Medication   Does not apply See Admin Instructions     rOPINIRole  0.75 mg Oral Daily at 8 pm     sodium chloride (PF)  10 mL Intracatheter Q7 Days     tamsulosin  0.4 mg Oral Daily     vancomycin  125 mg Oral 4x Daily     venetoclax  100 mg Oral Daily with supper     voriconazole  150 mg Oral Q12H ZUNILDA       Data   Results for orders placed or performed during the hospital encounter of 03/12/19 (from the past 24 hour(s))   Hepatic panel   Result Value Ref Range    Bilirubin Direct 0.5 (H) 0.0 - 0.2 mg/dL    Bilirubin Total 0.9 0.2 - 1.3 mg/dL    Albumin 2.7 (L) 3.4 - 5.0 g/dL    Protein Total 6.4 (L) 6.8 - 8.8 g/dL    Alkaline Phosphatase 122 40 - 150 U/L    ALT 64 0 - 70 U/L    AST 20 0 - 45 U/L   CBC with platelets differential   Result Value Ref Range    WBC 2.4 (L) 4.0 - 11.0 10e9/L    RBC Count 2.44 (L) 4.4 - 5.9 10e12/L    Hemoglobin 7.7 (L) 13.3 - 17.7 g/dL    Hematocrit 23.2 (L) 40.0 - 53.0 %    MCV 95 78 - 100 fl    MCH 31.6 26.5 - 33.0 pg    MCHC 33.2 31.5 - 36.5 g/dL    RDW 14.3 10.0 - 15.0 %    Platelet Count 23 (LL) 150 - 450 10e9/L    Diff Method Manual Differential     % Neutrophils 42.3 %    % Lymphocytes 51.4 %    % Monocytes 0.9 %    % Eosinophils 0.0 %    % Basophils 0.0 %    % Blasts 5.4 %    Absolute Neutrophil 1.0 (L) 1.6 - 8.3 10e9/L    Absolute Lymphocytes 1.2 0.8 - 5.3 10e9/L    Absolute Monocytes 0.0 0.0 - 1.3 10e9/L    Absolute Eosinophils 0.0 0.0 - 0.7 10e9/L    Absolute Basophils 0.0 0.0 - 0.2 10e9/L    Absolute Blasts 0.1 (H) 0 10e9/L    RBC Morphology Normal    Basic metabolic panel   Result Value Ref Range    Sodium 138 133 - 144 mmol/L    Potassium 3.9 3.4 - 5.3 mmol/L    Chloride 106 94 - 109  mmol/L    Carbon Dioxide 23 20 - 32 mmol/L    Anion Gap 8 3 - 14 mmol/L    Glucose 96 70 - 99 mg/dL    Urea Nitrogen 16 7 - 30 mg/dL    Creatinine 0.77 0.66 - 1.25 mg/dL    GFR Estimate >90 >60 mL/min/[1.73_m2]    GFR Estimate If Black >90 >60 mL/min/[1.73_m2]    Calcium 8.4 (L) 8.5 - 10.1 mg/dL

## 2019-04-13 NOTE — PLAN OF CARE
Pt is feb, vital signs are stable; denied any pain ; no nausea and eating well; up in the room independently; PICC line is saline lock after his abx; monitor pt closely and continue plan of care.      Problem: Adult Inpatient Plan of Care  Goal: Plan of Care Review  4/12/2019 2225 by Liberty Baker, RN  Outcome: No Change     Problem: Adult Inpatient Plan of Care  Goal: Patient-Specific Goal (Individualization)  Outcome: No Change     Problem: Adult Inpatient Plan of Care  Goal: Absence of Hospital-Acquired Illness or Injury  Outcome: No Change     Problem: Neutropenia (Chemotherapy Effects)  Goal: Absence of Infection  Outcome: No Change     Problem: Oral Mucositis (Chemotherapy Effects)  Goal: Improved Oral Mucous Membrane Integrity  4/12/2019 2225 by Liberty Baker, RN  Outcome: No Change

## 2019-04-13 NOTE — PLAN OF CARE
Discharge Planner PT   Patient plan for discharge: home  Current status: Pt IND in room, performed standing LE exercises and issued HEP with intention of IND performance in room. Pt progressed in Nustep, tolerating 10 min at Lvl2 w/ 4/10 OMNI scale fatigue. Vitals stable throughout session.  Barriers to return to prior living situation: Medical, decreased activity tolerance, fatigue, LE weakness.  Recommendations for discharge: Home , possibly with OP CR  Rationale for recommendations: Patient may benefit from OP CR to progress activity tolerance  and other deficits. Pt agreeable to CR w/ education this session.         Entered by: Lluvia Ordonez 04/13/2019 11:56 AM

## 2019-04-14 LAB
ABO + RH BLD: NORMAL
ABO + RH BLD: NORMAL
ALBUMIN SERPL-MCNC: 2.8 G/DL (ref 3.4–5)
ALP SERPL-CCNC: 130 U/L (ref 40–150)
ALT SERPL W P-5'-P-CCNC: 61 U/L (ref 0–70)
ANION GAP SERPL CALCULATED.3IONS-SCNC: 8 MMOL/L (ref 3–14)
AST SERPL W P-5'-P-CCNC: 22 U/L (ref 0–45)
BASOPHILS # BLD AUTO: 0 10E9/L (ref 0–0.2)
BASOPHILS NFR BLD AUTO: 0 %
BILIRUB DIRECT SERPL-MCNC: 0.4 MG/DL (ref 0–0.2)
BILIRUB SERPL-MCNC: 0.9 MG/DL (ref 0.2–1.3)
BLD GP AB SCN SERPL QL: NORMAL
BLD PROD TYP BPU: NORMAL
BLD PROD TYP BPU: NORMAL
BLD UNIT ID BPU: 0
BLOOD BANK CMNT PATIENT-IMP: NORMAL
BLOOD PRODUCT CODE: NORMAL
BPU ID: NORMAL
BUN SERPL-MCNC: 13 MG/DL (ref 7–30)
CALCIUM SERPL-MCNC: 8.6 MG/DL (ref 8.5–10.1)
CHLORIDE SERPL-SCNC: 104 MMOL/L (ref 94–109)
CO2 SERPL-SCNC: 23 MMOL/L (ref 20–32)
CREAT SERPL-MCNC: 0.74 MG/DL (ref 0.66–1.25)
DIFFERENTIAL METHOD BLD: ABNORMAL
EOSINOPHIL # BLD AUTO: 0 10E9/L (ref 0–0.7)
EOSINOPHIL NFR BLD AUTO: 0 %
ERYTHROCYTE [DISTWIDTH] IN BLOOD BY AUTOMATED COUNT: 14.1 % (ref 10–15)
GFR SERPL CREATININE-BSD FRML MDRD: >90 ML/MIN/{1.73_M2}
GLUCOSE SERPL-MCNC: 99 MG/DL (ref 70–99)
HCT VFR BLD AUTO: 23.7 % (ref 40–53)
HGB BLD-MCNC: 7.7 G/DL (ref 13.3–17.7)
LYMPHOCYTES # BLD AUTO: 1.3 10E9/L (ref 0.8–5.3)
LYMPHOCYTES NFR BLD AUTO: 65 %
MCH RBC QN AUTO: 30.9 PG (ref 26.5–33)
MCHC RBC AUTO-ENTMCNC: 32.5 G/DL (ref 31.5–36.5)
MCV RBC AUTO: 95 FL (ref 78–100)
MONOCYTES # BLD AUTO: 0 10E9/L (ref 0–1.3)
MONOCYTES NFR BLD AUTO: 0 %
NEUTROPHILS # BLD AUTO: 0.7 10E9/L (ref 1.6–8.3)
NEUTROPHILS NFR BLD AUTO: 35 %
NRBC # BLD AUTO: 0 10*3/UL
NRBC BLD AUTO-RTO: 1 /100
NUM BPU REQUESTED: 1
PLATELET # BLD AUTO: 19 10E9/L (ref 150–450)
POTASSIUM SERPL-SCNC: 3.9 MMOL/L (ref 3.4–5.3)
PROT SERPL-MCNC: 6.4 G/DL (ref 6.8–8.8)
RBC # BLD AUTO: 2.49 10E12/L (ref 4.4–5.9)
SODIUM SERPL-SCNC: 135 MMOL/L (ref 133–144)
SPECIMEN EXP DATE BLD: NORMAL
TRANSFUSION STATUS PATIENT QL: NORMAL
TRANSFUSION STATUS PATIENT QL: NORMAL
WBC # BLD AUTO: 2 10E9/L (ref 4–11)

## 2019-04-14 PROCEDURE — 85025 COMPLETE CBC W/AUTO DIFF WBC: CPT | Performed by: INTERNAL MEDICINE

## 2019-04-14 PROCEDURE — C9399 UNCLASSIFIED DRUGS OR BIOLOG: HCPCS | Performed by: PHYSICIAN ASSISTANT

## 2019-04-14 PROCEDURE — 25000132 ZZH RX MED GY IP 250 OP 250 PS 637: Performed by: NURSE PRACTITIONER

## 2019-04-14 PROCEDURE — 25000132 ZZH RX MED GY IP 250 OP 250 PS 637: Performed by: INTERNAL MEDICINE

## 2019-04-14 PROCEDURE — 80076 HEPATIC FUNCTION PANEL: CPT | Performed by: INTERNAL MEDICINE

## 2019-04-14 PROCEDURE — 80048 BASIC METABOLIC PNL TOTAL CA: CPT | Performed by: INTERNAL MEDICINE

## 2019-04-14 PROCEDURE — 86850 RBC ANTIBODY SCREEN: CPT | Performed by: PHYSICIAN ASSISTANT

## 2019-04-14 PROCEDURE — 99232 SBSQ HOSP IP/OBS MODERATE 35: CPT | Performed by: INTERNAL MEDICINE

## 2019-04-14 PROCEDURE — 25000131 ZZH RX MED GY IP 250 OP 636 PS 637: Performed by: PHYSICIAN ASSISTANT

## 2019-04-14 PROCEDURE — 12000012 ZZH R&B MS OVERFLOW UMMC

## 2019-04-14 PROCEDURE — P9040 RBC LEUKOREDUCED IRRADIATED: HCPCS | Performed by: PHYSICIAN ASSISTANT

## 2019-04-14 PROCEDURE — 86901 BLOOD TYPING SEROLOGIC RH(D): CPT | Performed by: PHYSICIAN ASSISTANT

## 2019-04-14 PROCEDURE — 86900 BLOOD TYPING SEROLOGIC ABO: CPT | Performed by: PHYSICIAN ASSISTANT

## 2019-04-14 PROCEDURE — 25000132 ZZH RX MED GY IP 250 OP 250 PS 637: Performed by: PHYSICIAN ASSISTANT

## 2019-04-14 PROCEDURE — 86923 COMPATIBILITY TEST ELECTRIC: CPT | Performed by: PHYSICIAN ASSISTANT

## 2019-04-14 RX ORDER — ONDANSETRON 8 MG/1
8 TABLET, FILM COATED ORAL EVERY 8 HOURS PRN
Status: DISCONTINUED | OUTPATIENT
Start: 2019-04-14 | End: 2019-04-15 | Stop reason: HOSPADM

## 2019-04-14 RX ORDER — LEVOFLOXACIN 250 MG/1
250 TABLET, FILM COATED ORAL
Status: DISCONTINUED | OUTPATIENT
Start: 2019-04-14 | End: 2019-04-15 | Stop reason: HOSPADM

## 2019-04-14 RX ADMIN — Medication 1 SPRAY: at 19:36

## 2019-04-14 RX ADMIN — VENETOCLAX 100 MG: 100 TABLET, FILM COATED ORAL at 17:45

## 2019-04-14 RX ADMIN — VANCOMYCIN HYDROCHLORIDE 125 MG: KIT at 19:36

## 2019-04-14 RX ADMIN — VANCOMYCIN HYDROCHLORIDE 125 MG: KIT at 08:00

## 2019-04-14 RX ADMIN — VORICONAZOLE 150 MG: 50 TABLET ORAL at 07:59

## 2019-04-14 RX ADMIN — ROPINIROLE HYDROCHLORIDE 0.75 MG: 0.5 TABLET, FILM COATED ORAL at 19:36

## 2019-04-14 RX ADMIN — VANCOMYCIN HYDROCHLORIDE 125 MG: KIT at 16:03

## 2019-04-14 RX ADMIN — Medication 2 SPRAY: at 16:03

## 2019-04-14 RX ADMIN — VANCOMYCIN HYDROCHLORIDE 125 MG: KIT at 11:45

## 2019-04-14 RX ADMIN — LEVOFLOXACIN 250 MG: 250 TABLET, FILM COATED ORAL at 11:45

## 2019-04-14 RX ADMIN — PANTOPRAZOLE SODIUM 40 MG: 40 TABLET, DELAYED RELEASE ORAL at 07:59

## 2019-04-14 RX ADMIN — ACETAMINOPHEN 650 MG: 325 TABLET, FILM COATED ORAL at 10:34

## 2019-04-14 RX ADMIN — VORICONAZOLE 150 MG: 50 TABLET ORAL at 19:36

## 2019-04-14 RX ADMIN — ONDANSETRON HYDROCHLORIDE 8 MG: 8 TABLET, FILM COATED ORAL at 08:00

## 2019-04-14 RX ADMIN — ACYCLOVIR 400 MG: 400 TABLET ORAL at 19:36

## 2019-04-14 RX ADMIN — TAMSULOSIN HYDROCHLORIDE 0.4 MG: 0.4 CAPSULE ORAL at 08:00

## 2019-04-14 RX ADMIN — ACYCLOVIR 400 MG: 400 TABLET ORAL at 07:59

## 2019-04-14 RX ADMIN — PANTOPRAZOLE SODIUM 40 MG: 40 TABLET, DELAYED RELEASE ORAL at 16:02

## 2019-04-14 ASSESSMENT — MIFFLIN-ST. JEOR: SCORE: 1664.89

## 2019-04-14 ASSESSMENT — ACTIVITIES OF DAILY LIVING (ADL)
ADLS_ACUITY_SCORE: 14

## 2019-04-14 NOTE — PROGRESS NOTES
"Attending note:    I have reviewed today's vital signs, medications, labs and imaging results. I have seen, evaluated and examined the patient independently and discussed the plan with the patient and team. The associated note has been read and corrected by me.  My independent findings and assessment are below.    Subjective: He feels well today. He reports mild SOB and FERNANDES and denies any cough    /81   Pulse 97   Temp 97  F (36.1  C) (Axillary)   Resp 18   Ht 1.778 m (5' 10\")   Wt 86.9 kg (191 lb 8 oz)   SpO2 99%   BMI 27.48 kg/m      General: frail man, not in distress  Lungs: Clear to auscultation  Abdomen: Soft, non tender      Assessment and plan: 62 yo man with AML s/p 7+3 with refractory disease s/p re-induction with  decitabine and venetoclax, today 20. Also hospital course complicated by C.dif diarrhea and neutropenic fever. He is currently afebrile on po vancomycin, acyclovir and voriconazole. Today will start on prophylactic levaquin given the neutropenia. Bone marrow biopsy will be repeated on day 28.  Plan for discharge early this week depending on count recovery and presence of blasts.   Remainder of supportive care as per PA/NP  note.    Mike Lambert MD  Pager:[phone]  "

## 2019-04-14 NOTE — PLAN OF CARE
"/85 (BP Location: Left arm)   Pulse 97   Temp 96.6  F (35.9  C) (Axillary)   Resp 18   Ht 1.778 m (5' 10\")   Wt 86.5 kg (190 lb 9.6 oz)   SpO2 97%   BMI 27.35 kg/m      Pt afebrile, alert and oriented. Denies pain or nausea. After using bathroom before bed pt dyspneic. O2 sats % on room air. Relief of symptoms after about 3 minutes of slow deep breathing sitting on side of bed. Pt calling appropriately for SBA to bathroom. Pt tolerating subsequent trips to bathroom with marked improvement. No replacements required this AM. Continue to monitor and plan of care.    Problem: Adult Inpatient Plan of Care  Goal: Plan of Care Review  4/14/2019 0540 by Vee Luu RN  Outcome: No Change     Problem: Bleeding Risk or Actual  Goal: Absence of Bleeding  4/14/2019 0540 by Vee Luu RN  Outcome: No Change     Problem: Anemia (Chemotherapy Effects)  Goal: Anemia Symptom Improvement  Outcome: No Change     Problem: Nausea and Vomiting (Chemotherapy Effects)  Goal: Fluid and Electrolyte Balance  4/14/2019 0540 by Vee Luu, RN  Outcome: No Change     Problem: Neutropenia (Chemotherapy Effects)  Goal: Absence of Infection  Outcome: No Change     Problem: Oral Mucositis (Chemotherapy Effects)  Goal: Improved Oral Mucous Membrane Integrity  Outcome: No Change     Problem: Thrombocytopenia Bleeding Risk (Chemotherapy Effects)  Goal: Absence of Bleeding  Outcome: No Change     Problem: Hematological Disorder  Goal: Hematological Disorder  Description  Patient comorbidity will be monitored for signs and symptoms of Hematogical condition.  Problems will be absent, minimized or managed by discharge/transition of care.  4/14/2019 0540 by Vee Luu, RN  Outcome: No Change     "

## 2019-04-14 NOTE — PROGRESS NOTES
[facility], [place]    Hematology / Oncology Progress Note    Date of Admission: 3/12/2019    Date of Service (when I saw the patient): 04/14/2019     Assessment & Plan    Mr. Ace is a 63 year old male with new diagnosis of acute myeloid leukemia incidentally found during work up of nonspecific chest symptoms after partial root canal intervention. He was started on induction chemotherapy with 7+3 (cytarabine and daunorubicin) with D1=3/15/19. BMBx on 3/25 (done early due to rising WBC and blast counts) demonstrated persistent disease with 95% blasts by flow, 98% by morphology. Started re-induction with decitabine on 3/26 PM and added venetoclax on 3/29. His course has been complicated by neutropenic sepsis secondary to odontic infection. He had extraction of teeth #18 and 19 on 4/3 but subsequently developed septic shock requiring transfer to MICU team 4/3-4/4. Hypotension improved after fluid resuscitated and he did not require intubation or pressor support. He was transferred back to [facility] Mal[facility] service on 4/4/19.     Today:  - continue venetoclax  - trending counts daily: ANC 0.7 today. Intermittent minimal blasts on diff (4/11 and 4/13) but none today. Not rapidly increasing and not unexpected at this time  - we discussed giving 1U PRBCs for Hgb 7.7 (has been stable in mid-upper 7s) to see if this helps with intermittent mild tachycardia and dyspnea.   - has now completed course of Linezolid and Zosyn. Will start prophylactic Levaquin 250mg daily (4/14) given ANC <1.0 today.  - continue prophylactic voriconazole given ANC <1.0  - continue PO Vancomycin for 1 week after completion of treatment-antibiotic course (through 4/20), then can discontinue  - anticipate discharge to home early this coming week. We have started to set the expectation for discharge to home on Tuesday (4/16):     Primary team to touch base with Dr. Cuadra on Monday 4/15 to finalize plan. Per   Venecia's staff message to this provider, he would prefer a BMBx around D28 and will then figure out a date to see pt after timing of marrow is finalized.    Anticipate removing the PICC line on discharge (no daily IV needs and to limit care at home/risk of infection)    If discharge Tuesday, anticipate labs/possible transfusion and possibly AUGIE visit on Friday (4/19)    Anticipate BMBx on Monday (4/22) with appointment with Dr. Cuadra a few days after to review results    D/w pt and wife that we will have these first few appointments scheduled prior to discharge (they have not yet been requested as plan to confirm the plan with Dr. Cuadra).    D/w pt and wife that we will provide all needed prescriptions (from Discharge Rx) on discharge, the exception being venetoclax which will come from a specialty Rx    For pt's Venetoclax: will need to call [facility] to schedule shipment of drug on discharge. I spoke with [facility] covering today and she states we did not get a shipment of drug sent to hospital for this patient; we have only been using hospital supply. Thus, he will need a prescription called into [facility] as soon as possible to get a home supply sent to him. This provider called today but the Specialty group is not open on Sunday. Team to call on Monday; [facility] phone number is [phone] (extension [phone]) per the [facility].         HEME:  #AML, refractory. NGS positive for IDH2 and RUNX1 alterations.  Patient presented to [facility] ED in [place], [place] for complaints of nonspecific chest discomfort after partial root canal treatment (done 3/11/19). CT C/A/P was negative for PE or other acute findings. On OSH labs, his WBC was incidentally noted to be elevated at 71.1 with Hb 9.0 and plts 97K (prior labs had been unremarkable per patient). Smear was suspicious for immature blasts and acute leukemia. No symptoms of hyperviscosity, TLS or DIC on  presentation. 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 Flow consistent with AML with 95% blasts with the following immunophenotype: Positive for CD13, CD33, CD34, CD38, dim to negative CD45, , partial HLA-DR. Baseline ECHO normal, PICC place.   - HLA typing sent, BMT consult done 3/29 by Dr. Brown  - s/p initial Hydrea (dc'd 3/16)   - received induction chemotherapy with 7+3 (cytarabine and daunorubicin). Day 1=3/15/19.   - repeat BMBx done early (3/25) due to rising WBC/blast count. Still with persistent heavy burden of disease (98% blasts by morphology).   - s/p Hydrea for WBC count management; started 1g BID (3/27), incresaed to 2g BID (x3/28). Hydrea discontinued 3/31.   - Started reinduction with decitabine/venetoclax. Decitabine D1=3/26; added Venetoclax on 3/29. Today is Day 20 from start of reinduction.  Of note, decitabine D9 and D10 held 4/3 and 4/4, respectively, due to acute illness and elevated Tbili/ALT. With improvement in ALT and Tbili, we resumed decitabine for last 2 doses (4/8 and 4/9).   - Continue Venetoclax at this time, dose now at 100mg daily (due to interaction to voriconazole). Note, if ANC recovers enough that pt can be off of voriconazole, could increase venetoclax dose to 400mg daily.   - now off Allopurinol    #Pancytopenia  2/2 AML & associated chemotherapy.   - transfuse to maintain Hb >7, Plt >10K. He is not needing daily transfusions so could likely have labs/transfusions twice weekly in clinic.   - 4/14: Will give 1U PRBCs for Hgb 7.7 (has recently been stable in upper 7s) to see if this helps symptoms (intermittent tachycardia and dyspnea).      #Coagulopathy, improved.  In setting of refractory AML and likely exacerbated by prior poor PO intake. INR now downtrended, fibrinogen stable.  - s/p Vit K x 3 days (4/3-4/5)     ID:  #Neutropenic fever/sepsis with septic shock (4/3-4/4), improved  #C.diff diarrhea,  improved  #Neutropenic colitis, improved  #Left mandible cellulitis/odontic infection. Improved s/p extraction of teeth #18 and 19 on 4/3.   First fever on 3/22. Sources include c.diff colitis and odontic/soft tissue infection.   - ID following  - BCx 3/22 to present are NGTD  - 3/23 fungal BCx NGTD  - 3/22 UA negative, repeat UA/UCx negative on 3/30  - 3/22 CXR with small R pleural effusion, no focal airspace opacity  - 3/25 galactomannan and fungitell both negative  - 3/26 C.diff positive  - 3/29 CT C/A/P demonstrated Right thickened hemicolon with adjacent fat stranding  - 3/31 CT Dental demonstrated left hemimandible cellulitis  - Dental consulted for CT findings, unable to intervene as urgently as needed at this time due to acute worsening of clinic status. Thus, ultimately consulted Oral Surgery on 4/3; they performed extraction of teeth #18 and 19 on 4/3.   - pt has now been afebrile since 4/4   - in MICU, started on stress dose steroids, Vit C, and thiamine. D/c'd steroids 4/4, discontinued Vit C and thiamine on 4/5.      **Anti-infectives:  - s/p Cefepime (x 3/22-4/1). Changed to Zosyn (x 4/1-4/3). With septic shock changed to clindamycin (4/3-4/4) and imipenem (x4/3-4/8). Changed back to IV Zosyn (4/8-4/13).  - s/p PO Flagyl (x3/29-4/1).   - s/p IV vanc (x 3/31-4/3). Changed to IV Linezolid (4/3-4/13).  - continue PO Vancomycin 125mg four times daily. Plan to continue for an additional week after Zosyn/Linezolid antibiotic therapy is completed (thus, through 4/20).     #ID PPx   - continue ACV  - ppx Vfend 150mg BID. Continue with ANC <1.0 today (4/14)  - resume prophylactic Levaquin 250mg daily (x4/14), given than ANC is <1.0        GI:  #Hyperbilirubinemia, resolved.  #Transaminitis, resolved.  Acute elevation in Tbili (primarily direct) on 4/3 PM, improving by 4/5. Likely related to septic shock. As Tbili improving, noted acute elevation in transaminases on 4/5. Again likely related to sequelae from  shock, but Vfend could be contributing. No acute finding on 4/4 Abd US. Denies abdominal/RUQ pain.  - LFTs improved, can adjust monitoring of hepatic panel to qM/Th    #C.diff diarrhea, improved  #Neutropenic colitis, improved  - c.diff treatment as above  - s/p TPN (x4/4-4/7). With improvement in clinical status and stable GI symptoms, ADAT.      #Chemotherapy induced nausea, resolved.  - s/p scheduled Zofran and compazine, now PRN antiemetics     CV:  #Type 2 NSTEMI 2/2 demand ischemia/septic shock.   Troponin elevated, stable at 0.436-->0.405. Will not further trend. Repeat ECHO (4/5) demonstrates stable EF 55-60%. LV/RV size and function normal. New mild-mod mitral insufficiency. Trivial pericardial effusion. IVC dilated c/w overload.     #Hypervolemia, resolved.  2/2 aggressive fluid resuscitation. S/p Lasix total 40mg IV (4/5), 20mg IV (4/6). Then autodiuresed.     #Subclinical coronary atherosclerosis  #Hyperlipidemia  Total Agatston calcium score was elevated at 591 (91st percentile) on CT coronaries performed 6/8/2016. Nuclear stress test was negative for ischemia on 8/10/2016. Life style modification measures were recommended. Patient follows with cardiology as outpatient (last office visit on 11/12/18).  - holding atorvastatin at this time. Could potentially resume for discharge if LFTs remain WNL (vs continue to hold until further treatment plan is made).     PULM:  #Acute hypoxic respiratory failure. Resolved.   Most likely related to edema.   - PRN supplemental O2  - repeated ECHO as above  - diuresis PRN     RENAL:  #Lyte derangement, resolved.   Multifactorial with decreased PO intake, fevers, diarrhea.    - lyte repletion per unit protocol     #FOREST, resolved.  Baseline Cr 0.7 (GFR>90). Creatinine uptrended with Cr max 1.45 (GFR 50), now improved/resolved. Multifactorial in setting of decreased PO intake, fevers, diarrhea, and medications (IV Vancomycin, Zosyn, contrast), and development of septic  shock. Now resolved.      :  #BPH   Patient follows with urology ([facility], [place], [place]). On PTA Flomax 0.8 mg daily.  - of note, possible drug interaction between voriconazole and flomax (can raise flomax level in blood & cause hypotension).   - decreased flomax from 0.8mg to 0.4mg (soft BPs in setting of neutropenic sepsis and risk of further hypotension due to interaction with voriconazole). Continue 0.4mg dose at this time; if able to stop voriconazole pending ANC, could increase back to home dose.       NEURO/MSK:  #Restless legs syndrome  - Continue ropinirole 0.75 mg at bedtime, offer PRN ativan for persistent symptoms.     #History of lumbar spine degenerative disc disease   Patient is s/p laminectomy/facetectomy procedures. He does not routinely take pain meds.     DENTAL:  #S/P root canal manipulation  #Odontic infection as above  Patient had the start of a root canal treatment initiated on 3/11/19 with plan to complete it a few weeks later (per pt's wife and son). He was started on a 7-day course of oral  mg q6h beginning one day prior to the procedure. At time of admission, he was having pain in his left jaw but site appeared unremarkable with no abscess or drainage.  In retrospect, may have also had some leukemic infiltration of gums complicating his course. Pain at site initially improved during chemotherapy, but did return. Pain worsened and with higher temps, checked CT Dental which demonstrated cellulitis along left hemimandible. Developed worsening oral infection as above.  - Tylenol PRN, tramadol vs oxycodone PRN   - abx as above  - Dental consult as above  - OMFS consult, intervention on 4/3 as above     PSYCHOSOCIAL:  #Coping.  Appreciate SW and  involvement. Pt had requested help with Advance Directive, SW following     FEN   - no current MIVFs.   - PRN lyte replacement per protocol  - regular diet as tolerated     Prophylaxis  - No pharmacologic VTE ppx due to TCP  -  PPI for GI/PUD ppx       Code Status: FULL     Disposition: Anticipate discharge to home early this coming week, likely Tuesday (4/16).     Discussed with Dr. Lambert.     Vee Cabrera PA-C  Heme/Onc  [phone]        Interval History   Afebrile. No acute events overnight. Randy is sitting up, reading the paper. Reports feeling pretty well this morning. He is more talkative and interactive, closer to his baseline self known to this provider. Still having episodes of SOB when he gets up overnight to go to the bathroom. States it feels like he can't catch his breath, but he reminds himself to breathe and re-centers himself and is then ok. No documented hypoxia, has not required any supplemental O2. No c/o SOB this AM or during the day. He knows he is deconditioned but plans to continue his PT/OT exercises. Denies nausea. Denies abdominal discomfort, stools have overall improved. We talked about giving a unit of blood to see how he feels after this. We also discussed with his family the general plan for likely discharge early this week, anticipating Tuesday. Randy is excited about the prospect of going home and particularly celebrating [facility] out of the hospital.       Physical Exam   Temp: 96.6  F (35.9  C) Temp src: Axillary BP: 104/80 Pulse: 96 Heart Rate: 115 Resp: 18 SpO2: 97 % O2 Device: None (Room air)    Vitals:    04/12/19 0836 04/13/19 0858 04/14/19 0800   Weight: 88.5 kg (195 lb 3.2 oz) 86.5 kg (190 lb 9.6 oz) 86.9 kg (191 lb 8 oz)     Vital Signs with Ranges  Temp:  [96  F (35.6  C)-96.9  F (36.1  C)] 96.6  F (35.9  C)  Pulse:  [] 96  Heart Rate:  [115] 115  Resp:  [16-18] 18  BP: ()/(65-93) 104/80  SpO2:  [97 %-99 %] 97 %  I/O last 3 completed shifts:  In: 1750 [P.O.:1350; I.V.:400]  Out: 1250 [Urine:1250]    Constitutional: Seen sitting up in room reading the paper, gets up ad shelley during assessment. Pleasant, alert, interactive.  HEENT: NC/AT. OP pink and moist, no lesions or  thrush.  Respiratory: Breathing even and non-labored on RA. Lungs CTA b/l, no wheezing or crackles.  CV: RRR  GI: Normal BS. Abd soft, NT/ND. .  Skin: Dry. No concerning lesions or rash on exposed surfaces.   Musculoskeletal: Extremities grossly normal in appearance, freely moves about in room. No LE edema.  Neurologic: Alert and oriented. Grossly nonfocal.  Neuropsychiatric: Calm, mentation appears normal, answering questions appropriately.   VAD: PICC in RUE, c/d/i.        Medications     IV fluid REPLACEMENT ONLY       - MEDICATION INSTRUCTIONS -         acyclovir  400 mg Oral BID     artificial saliva  1-2 spray Swish & Spit 4x Daily     levofloxacin  250 mg Oral Daily     ondansetron  8 mg Oral Q8H     pantoprazole  40 mg Oral BID AC     Resume a HELD Medication   Does not apply See Admin Instructions     rOPINIRole  0.75 mg Oral Daily at 8 pm     sodium chloride (PF)  10 mL Intracatheter Q7 Days     tamsulosin  0.4 mg Oral Daily     vancomycin  125 mg Oral 4x Daily     venetoclax  100 mg Oral Daily with supper     voriconazole  150 mg Oral Q12H ZUNILDA       Data   Results for orders placed or performed during the hospital encounter of 03/12/19 (from the past 24 hour(s))   Lactic acid level STAT for sepsis protocol   Result Value Ref Range    Lactate for Sepsis Protocol 1.0 0.7 - 2.0 mmol/L   Hepatic panel   Result Value Ref Range    Bilirubin Direct 0.4 (H) 0.0 - 0.2 mg/dL    Bilirubin Total 0.9 0.2 - 1.3 mg/dL    Albumin 2.8 (L) 3.4 - 5.0 g/dL    Protein Total 6.4 (L) 6.8 - 8.8 g/dL    Alkaline Phosphatase 130 40 - 150 U/L    ALT 61 0 - 70 U/L    AST 22 0 - 45 U/L   CBC with platelets differential   Result Value Ref Range    WBC 2.0 (L) 4.0 - 11.0 10e9/L    RBC Count 2.49 (L) 4.4 - 5.9 10e12/L    Hemoglobin 7.7 (L) 13.3 - 17.7 g/dL    Hematocrit 23.7 (L) 40.0 - 53.0 %    MCV 95 78 - 100 fl    MCH 30.9 26.5 - 33.0 pg    MCHC 32.5 31.5 - 36.5 g/dL    RDW 14.1 10.0 - 15.0 %    Platelet Count 19 (LL) 150 - 450  10e9/L    Diff Method Manual Differential     % Neutrophils 35.0 %    % Lymphocytes 65.0 %    % Monocytes 0.0 %    % Eosinophils 0.0 %    % Basophils 0.0 %    Nucleated RBCs 1 (H) 0 /100    Absolute Neutrophil 0.7 (L) 1.6 - 8.3 10e9/L    Absolute Lymphocytes 1.3 0.8 - 5.3 10e9/L    Absolute Monocytes 0.0 0.0 - 1.3 10e9/L    Absolute Eosinophils 0.0 0.0 - 0.7 10e9/L    Absolute Basophils 0.0 0.0 - 0.2 10e9/L    Absolute Nucleated RBC 0.0    Basic metabolic panel   Result Value Ref Range    Sodium 135 133 - 144 mmol/L    Potassium 3.9 3.4 - 5.3 mmol/L    Chloride 104 94 - 109 mmol/L    Carbon Dioxide 23 20 - 32 mmol/L    Anion Gap 8 3 - 14 mmol/L    Glucose 99 70 - 99 mg/dL    Urea Nitrogen 13 7 - 30 mg/dL    Creatinine 0.74 0.66 - 1.25 mg/dL    GFR Estimate >90 >60 mL/min/[1.73_m2]    GFR Estimate If Black >90 >60 mL/min/[1.73_m2]    Calcium 8.6 8.5 - 10.1 mg/dL   ABO/Rh type and screen   Result Value Ref Range    Units Ordered 1     ABO A     RH(D) Pos     Antibody Screen Neg     Test Valid Only At          [facility]    Specimen Expires 04/17/2019     Crossmatch Red Blood Cells    Blood component   Result Value Ref Range    Unit Number [id number]     Blood Component Type Red Blood Cells LeukoReduced Irradiated     Division Number 00     Status of Unit Released to care unit 04/14/2019 1037     Blood Product Code [id number]     Unit Status ISS

## 2019-04-14 NOTE — PLAN OF CARE
A/VSS, afebrile. Pt doing well, family visiting all day. 1 unit of RBC given today. Plan for discharge Monday.     Problem: Adult Inpatient Plan of Care  Goal: Patient-Specific Goal (Individualization)  4/14/2019 1702 by Odette Grey, RN  Outcome: Improving     Problem: Adult Inpatient Plan of Care  Goal: Plan of Care Review  4/14/2019 1702 by Odette Grey, RN  Outcome: Improving     Problem: Adult Inpatient Plan of Care  Goal: Optimal Comfort and Wellbeing  4/14/2019 1702 by Odette Grey, RN  Outcome: Improving

## 2019-04-15 ENCOUNTER — APPOINTMENT (OUTPATIENT)
Dept: OCCUPATIONAL THERAPY | Facility: CLINIC | Age: 64
DRG: 834 | End: 2019-04-15
Payer: COMMERCIAL

## 2019-04-15 VITALS
OXYGEN SATURATION: 100 % | TEMPERATURE: 96.6 F | WEIGHT: 189.2 LBS | SYSTOLIC BLOOD PRESSURE: 112 MMHG | BODY MASS INDEX: 27.09 KG/M2 | HEIGHT: 70 IN | RESPIRATION RATE: 18 BRPM | DIASTOLIC BLOOD PRESSURE: 80 MMHG | HEART RATE: 86 BPM

## 2019-04-15 LAB
ALBUMIN SERPL-MCNC: 2.9 G/DL (ref 3.4–5)
ALP SERPL-CCNC: 138 U/L (ref 40–150)
ALT SERPL W P-5'-P-CCNC: 56 U/L (ref 0–70)
ANION GAP SERPL CALCULATED.3IONS-SCNC: 9 MMOL/L (ref 3–14)
AST SERPL W P-5'-P-CCNC: 22 U/L (ref 0–45)
BASOPHILS # BLD AUTO: 0 10E9/L (ref 0–0.2)
BASOPHILS NFR BLD AUTO: 0 %
BILIRUB DIRECT SERPL-MCNC: 0.5 MG/DL (ref 0–0.2)
BILIRUB SERPL-MCNC: 1.1 MG/DL (ref 0.2–1.3)
BLD PROD TYP BPU: NORMAL
BLD PROD TYP BPU: NORMAL
BLD UNIT ID BPU: 0
BLOOD PRODUCT CODE: NORMAL
BPU ID: NORMAL
BUN SERPL-MCNC: 17 MG/DL (ref 7–30)
CALCIUM SERPL-MCNC: 8.8 MG/DL (ref 8.5–10.1)
CHLORIDE SERPL-SCNC: 106 MMOL/L (ref 94–109)
CO2 SERPL-SCNC: 23 MMOL/L (ref 20–32)
CREAT SERPL-MCNC: 0.72 MG/DL (ref 0.66–1.25)
DIFFERENTIAL METHOD BLD: ABNORMAL
EOSINOPHIL # BLD AUTO: 0 10E9/L (ref 0–0.7)
EOSINOPHIL NFR BLD AUTO: 0 %
ERYTHROCYTE [DISTWIDTH] IN BLOOD BY AUTOMATED COUNT: 13.9 % (ref 10–15)
GFR SERPL CREATININE-BSD FRML MDRD: >90 ML/MIN/{1.73_M2}
GLUCOSE SERPL-MCNC: 94 MG/DL (ref 70–99)
HCT VFR BLD AUTO: 26.6 % (ref 40–53)
HGB BLD-MCNC: 8.7 G/DL (ref 13.3–17.7)
LACTATE BLD-SCNC: 1 MMOL/L (ref 0.7–2)
LACTATE BLD-SCNC: 2 MMOL/L (ref 0.7–2)
LYMPHOCYTES # BLD AUTO: 1.3 10E9/L (ref 0.8–5.3)
LYMPHOCYTES NFR BLD AUTO: 57 %
MAGNESIUM SERPL-MCNC: 2 MG/DL (ref 1.6–2.3)
MCH RBC QN AUTO: 30.9 PG (ref 26.5–33)
MCHC RBC AUTO-ENTMCNC: 32.7 G/DL (ref 31.5–36.5)
MCV RBC AUTO: 94 FL (ref 78–100)
MONOCYTES # BLD AUTO: 0 10E9/L (ref 0–1.3)
MONOCYTES NFR BLD AUTO: 1 %
NEUTROPHILS # BLD AUTO: 0.9 10E9/L (ref 1.6–8.3)
NEUTROPHILS NFR BLD AUTO: 42 %
NUM BPU REQUESTED: 1
PHOSPHATE SERPL-MCNC: 4.3 MG/DL (ref 2.5–4.5)
PLATELET # BLD AUTO: 11 10E9/L (ref 150–450)
POTASSIUM SERPL-SCNC: 3.8 MMOL/L (ref 3.4–5.3)
PROT SERPL-MCNC: 6.4 G/DL (ref 6.8–8.8)
RBC # BLD AUTO: 2.82 10E12/L (ref 4.4–5.9)
RBC MORPH BLD: NORMAL
SODIUM SERPL-SCNC: 138 MMOL/L (ref 133–144)
TRANSFUSION STATUS PATIENT QL: NORMAL
TRANSFUSION STATUS PATIENT QL: NORMAL
WBC # BLD AUTO: 2.2 10E9/L (ref 4–11)

## 2019-04-15 PROCEDURE — P9037 PLATE PHERES LEUKOREDU IRRAD: HCPCS | Performed by: NURSE PRACTITIONER

## 2019-04-15 PROCEDURE — 85025 COMPLETE CBC W/AUTO DIFF WBC: CPT | Performed by: INTERNAL MEDICINE

## 2019-04-15 PROCEDURE — 25000132 ZZH RX MED GY IP 250 OP 250 PS 637: Performed by: PHYSICIAN ASSISTANT

## 2019-04-15 PROCEDURE — 83605 ASSAY OF LACTIC ACID: CPT | Performed by: INTERNAL MEDICINE

## 2019-04-15 PROCEDURE — 83605 ASSAY OF LACTIC ACID: CPT | Performed by: NURSE PRACTITIONER

## 2019-04-15 PROCEDURE — 25000132 ZZH RX MED GY IP 250 OP 250 PS 637: Performed by: NURSE PRACTITIONER

## 2019-04-15 PROCEDURE — 97110 THERAPEUTIC EXERCISES: CPT | Mod: GO

## 2019-04-15 PROCEDURE — 80076 HEPATIC FUNCTION PANEL: CPT | Performed by: INTERNAL MEDICINE

## 2019-04-15 PROCEDURE — 99239 HOSP IP/OBS DSCHRG MGMT >30: CPT | Performed by: INTERNAL MEDICINE

## 2019-04-15 PROCEDURE — 97530 THERAPEUTIC ACTIVITIES: CPT | Mod: GO

## 2019-04-15 PROCEDURE — 25000132 ZZH RX MED GY IP 250 OP 250 PS 637: Performed by: INTERNAL MEDICINE

## 2019-04-15 PROCEDURE — 25000128 H RX IP 250 OP 636: Performed by: NURSE PRACTITIONER

## 2019-04-15 PROCEDURE — 36592 COLLECT BLOOD FROM PICC: CPT | Performed by: INTERNAL MEDICINE

## 2019-04-15 PROCEDURE — 80048 BASIC METABOLIC PNL TOTAL CA: CPT | Performed by: INTERNAL MEDICINE

## 2019-04-15 PROCEDURE — 84100 ASSAY OF PHOSPHORUS: CPT | Performed by: INTERNAL MEDICINE

## 2019-04-15 PROCEDURE — 83735 ASSAY OF MAGNESIUM: CPT | Performed by: INTERNAL MEDICINE

## 2019-04-15 RX ORDER — ROPINIROLE 0.25 MG/1
0.75 TABLET, FILM COATED ORAL
Qty: 60 TABLET | Refills: 1 | Status: ON HOLD | OUTPATIENT
Start: 2019-04-15 | End: 2019-06-19

## 2019-04-15 RX ORDER — ATORVASTATIN CALCIUM 20 MG/1
20 TABLET, FILM COATED ORAL EVERY EVENING
Qty: 30 TABLET | Refills: 1 | Status: ON HOLD | OUTPATIENT
Start: 2019-04-15 | End: 2019-06-19

## 2019-04-15 RX ORDER — ONDANSETRON 8 MG/1
8 TABLET, FILM COATED ORAL EVERY 8 HOURS PRN
Qty: 30 TABLET | Refills: 1 | Status: ON HOLD | OUTPATIENT
Start: 2019-04-15 | End: 2019-06-19

## 2019-04-15 RX ORDER — POLYETHYLENE GLYCOL 3350 17 G/17G
17 POWDER, FOR SOLUTION ORAL DAILY PRN
COMMUNITY
Start: 2019-04-15 | End: 2019-04-26

## 2019-04-15 RX ORDER — ACETAMINOPHEN 325 MG/1
650 TABLET ORAL EVERY 4 HOURS PRN
COMMUNITY
Start: 2019-04-15

## 2019-04-15 RX ORDER — VORICONAZOLE 50 MG/1
150 TABLET, FILM COATED ORAL EVERY 12 HOURS
Qty: 180 TABLET | Refills: 0 | Status: ON HOLD | OUTPATIENT
Start: 2019-04-15 | End: 2019-06-19

## 2019-04-15 RX ORDER — HEPARIN SODIUM,PORCINE 10 UNIT/ML
5-10 VIAL (ML) INTRAVENOUS EVERY 24 HOURS
Status: DISCONTINUED | OUTPATIENT
Start: 2019-04-15 | End: 2019-04-15 | Stop reason: CLARIF

## 2019-04-15 RX ORDER — ACYCLOVIR 400 MG/1
400 TABLET ORAL 2 TIMES DAILY
Qty: 60 TABLET | Refills: 1 | Status: ON HOLD | OUTPATIENT
Start: 2019-04-15 | End: 2019-06-19

## 2019-04-15 RX ORDER — TAMSULOSIN HYDROCHLORIDE 0.4 MG/1
0.4 CAPSULE ORAL DAILY
Qty: 30 CAPSULE | Refills: 1 | Status: ON HOLD | OUTPATIENT
Start: 2019-04-16 | End: 2019-06-19

## 2019-04-15 RX ORDER — SENNOSIDES 8.6 MG
2 TABLET ORAL 2 TIMES DAILY PRN
Status: ON HOLD | COMMUNITY
Start: 2019-04-15 | End: 2019-06-19

## 2019-04-15 RX ORDER — PANTOPRAZOLE SODIUM 40 MG/1
40 TABLET, DELAYED RELEASE ORAL
Qty: 60 TABLET | Refills: 1 | Status: ON HOLD | OUTPATIENT
Start: 2019-04-15 | End: 2019-06-19

## 2019-04-15 RX ORDER — ATORVASTATIN CALCIUM 10 MG/1
20 TABLET, FILM COATED ORAL EVERY EVENING
Status: DISCONTINUED | OUTPATIENT
Start: 2019-04-15 | End: 2019-04-15 | Stop reason: HOSPADM

## 2019-04-15 RX ORDER — VANCOMYCIN HYDROCHLORIDE 50 MG/ML
125 KIT ORAL 4 TIMES DAILY
Qty: 50 ML | Refills: 0 | Status: SHIPPED | OUTPATIENT
Start: 2019-04-15 | End: 2019-04-26

## 2019-04-15 RX ORDER — PROCHLORPERAZINE MALEATE 5 MG
5-10 TABLET ORAL EVERY 6 HOURS PRN
Qty: 30 TABLET | Refills: 1 | Status: SHIPPED | OUTPATIENT
Start: 2019-04-15 | End: 2021-04-19

## 2019-04-15 RX ORDER — HEPARIN SODIUM,PORCINE 10 UNIT/ML
5-10 VIAL (ML) INTRAVENOUS
Status: DISCONTINUED | OUTPATIENT
Start: 2019-04-15 | End: 2019-04-15 | Stop reason: CLARIF

## 2019-04-15 RX ORDER — LEVOFLOXACIN 250 MG/1
250 TABLET, FILM COATED ORAL DAILY
Qty: 30 TABLET | Refills: 1 | Status: ON HOLD | OUTPATIENT
Start: 2019-04-15 | End: 2019-05-07

## 2019-04-15 RX ADMIN — LEVOFLOXACIN 250 MG: 250 TABLET, FILM COATED ORAL at 10:13

## 2019-04-15 RX ADMIN — PANTOPRAZOLE SODIUM 40 MG: 40 TABLET, DELAYED RELEASE ORAL at 08:03

## 2019-04-15 RX ADMIN — VANCOMYCIN HYDROCHLORIDE 125 MG: KIT at 08:03

## 2019-04-15 RX ADMIN — VANCOMYCIN HYDROCHLORIDE 125 MG: KIT at 12:33

## 2019-04-15 RX ADMIN — Medication 2 SPRAY: at 08:04

## 2019-04-15 RX ADMIN — ACYCLOVIR 400 MG: 400 TABLET ORAL at 08:03

## 2019-04-15 RX ADMIN — VORICONAZOLE 150 MG: 50 TABLET ORAL at 08:03

## 2019-04-15 RX ADMIN — TAMSULOSIN HYDROCHLORIDE 0.4 MG: 0.4 CAPSULE ORAL at 08:03

## 2019-04-15 RX ADMIN — VANCOMYCIN HYDROCHLORIDE 125 MG: KIT at 15:38

## 2019-04-15 RX ADMIN — SODIUM CHLORIDE, PRESERVATIVE FREE 1000 ML: 5 INJECTION INTRAVENOUS at 10:15

## 2019-04-15 RX ADMIN — PANTOPRAZOLE SODIUM 40 MG: 40 TABLET, DELAYED RELEASE ORAL at 15:38

## 2019-04-15 ASSESSMENT — ACTIVITIES OF DAILY LIVING (ADL)
ADLS_ACUITY_SCORE: 14
ADLS_ACUITY_SCORE: 15

## 2019-04-15 ASSESSMENT — MIFFLIN-ST. JEOR: SCORE: 1654.46

## 2019-04-15 NOTE — DISCHARGE SUMMARY
[facility], [place] -- Discharge Summary -- Hematology / Oncology  Date of Admission:  3/12/2019  Date of Discharge:  4/15/2019  Discharging Provider: Miranda Morgan  Date of Service (when I saw the patient): 04/15/19    DISCHARGE DIAGNOSES  Active Problems:    Acute leukemia (H)    Acute myeloid leukemia not having achieved remission (H)    Septic shock (H)    HISTORY OF PRESENT ILLNESS Mr. Ace is a 63 year old male with new diagnosis of acute myeloid leukemia incidentally found during work up of nonspecific chest symptoms after partial root canal intervention.    HOSPITAL COURSE El Ace was started on induction chemotherapy with 7+3 (cytarabine and daunorubicin) with D1=3/15/19. BMBx on 3/25 (done early due to rising WBC and blast counts) demonstrated persistent disease with 95% blasts by flow, 98% by morphology. Started re-induction with decitabine on 3/26 PM and added venetoclax on 3/29. His course has been complicated by neutropenic sepsis secondary to odontic/soft tissue infection - he completed a course of broad spectrum antibiotics - with linezolid & zosyn (subsequently diagnosed with C diff infection to complete PO vanc course 4/20/19). He had extraction of teeth #18 and 19 on 4/3 but subsequently developed septic shock requiring transfer to MICU team 4/3-4/4. Hypotension improved after fluid resuscitated and he did not require intubation or pressor support. He was transferred back to Heme Malignancy service on 4/4/19. He's continued to improve & was able to complete Decitabine (4/8 & 4/9 had been held r/t ALT & T bili elevation).     The following problems were addressed:   #AML, refractory. NGS positive for IDH2 and RUNX1 alterations.  Patient presented to [facility] ED in [place], [place] for complaints of nonspecific chest discomfort after partial root canal treatment (done 3/11/19). CT C/A/P was negative for PE or other acute findings. On OSH labs, his  WBC was incidentally noted to be elevated at 71.1 with Hb 9.0 and plts 97K (prior labs had been unremarkable per patient). Smear was suspicious for immature blasts and acute leukemia. No symptoms of hyperviscosity, TLS or DIC on presentation. 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 Flow consistent with AML with 95% blasts with the following immunophenotype: Positive for CD13, CD33, CD34, CD38, dim to negative CD45, , partial HLA-DR. Baseline ECHO normal, PICC place.   - HLA typing sent, BMT consult done 3/29 by Dr. Brown  - s/p initial Hydrea (dc'd 3/16)   - received induction chemotherapy with 7+3 (cytarabine and daunorubicin). Day 1=3/15/19.   - repeat BMBx done early (3/25) due to rising WBC/blast count. Still with persistent heavy burden of disease (98% blasts by morphology).   - s/p Hydrea for WBC count management; started 1g BID (3/27), incresaed to 2g BID (x3/28). Hydrea discontinued 3/31.   - Started reinduction with decitabine/venetoclax. Decitabine D1=3/26; added Venetoclax on 3/29. Ramp up dosing accounting for concomitant voriconazole. Final dose will be 100mg daily. Today is Day 20from start of reinduction.  Of note, decitabine D9 and D10 held 4/3 and 4/4, respectively, due to acute illness and elevated Tbili/ALT. With improvement in ALT and Tbili, we resumed decitabine for last 2 doses (4/8 and 4/9).   - Continue Venetoclax at this time, dose now at 100mg daily (due to interaction to Vfend)    #Pancytopenia  2/2 AML & associated chemotherapy.   - transfuse to maintain Hb >7, Plt >10K. Needed Plts 4/9 and 4/12, so not needing daily transfusions. Could likely have labs/transfusions twice weekly in clinic. Arranged this & faxed orders to [facility] in [place] (5 minutes from patients house)     ID:  #Neutropenic fever/sepsis with septic shock (4/3-4/4), improved  #C.diff diarrhea, improved  #Neutropenic colitis, improved  #Left  mandible cellulitis/odontic infection. S/p extraction of teeth #18 and 19 on 4/3.   First fever on 3/22. Sources include c.diff colitis and odontic/soft tissue infection.   - ID following  - BCx 3/22 to present are NGTD  - 3/23 fungal BCx NGTD  - 3/22 UA negative, repeat UA/UCx negative on 3/30  - 3/22 CXR with small R pleural effusion, no focal airspace opacity  - 3/25 galactomannan and fungitell both negative  - 3/26 C.diff positive  - 3/29 CT C/A/P demonstrated Right thickened hemicolon with adjacent fat stranding  - 3/31 CT Dental demonstrated left hemimandible cellulitis  - Dental consulted for CT findings, unable to intervene as urgently as needed at this time due to acute worsening of clinic status. Thus, ultimately consulted Oral Surgery on 4/3; they performed extraction of teeth #18 and 19 on 4/3.   - pt has now been afebrile since 4/4   - in MICU, started on stress dose steroids, Vit C, and thiamine. D/c'd steroids 4/4, discontinued Vit C and thiamine on 4/5.      **Anti-infectives:  - s/p Cefepime (x 3/22-4/1). Changed to Zosyn (x 4/1-4/3). With septic shock changed to clindamycin (4/3-4/4) and imipenem (x4/3-4/8). Changed back to IV Zosyn (x4/8). Continue through 4/13  - s/p PO Flagyl (x3/29-4/1)  - s/p IV vanc (x 3/31-4/3). Changed to IV Linezolid (x 4/3- present); changed to PO dosing on 4/7. Continue through 4/13  - continue PO Vancomycin 125mg four times daily. Plan to continue for an additional week after Zosyn/Linezolid antibiotic therapy is completed (to complete 4/20/19)     #ID PPx   - continue ACV  - resumed ppx Vfend 150mg BID (will hold dose in AM, & check level 4/22/19)  - follow LFTs on 4/22/19        GI:  #Hyperbilirubinemia, improving.  #Transaminitis, improving.  Acute elevation in Tbili (primarily direct) on 4/3 PM, improving by 4/5. Likely related to septic shock. As Tbili improving, noted acute elevation in transaminases on 4/5. Again likely related to sequelae from shock, but Vfend  could be contributing. No acute finding on 4/4 Abd US. Denies abdominal/RUQ pain  - LFTs improving (repeat 4/22/19 as added back atorvastatin)     #C.diff diarrhea, improving  #Neutropenic colitis, improving  - c.diff treatment as above (through 4/20/19)  - s/p TPN (x4/4-4/7). With improvement in clinical status and stable GI symptoms, ADAT  - stools improving     #Hemorrhoids, improved  - PRN management: TUCKS pads, sitz bath, topical Prep H and/or lidocaine     #Chemotherapy induced nausea, controlled/improved.  - scheduled Zofran q8hr, consider adjusting to PRN in next 1-2 days as nausea remains absent after discontinuation of scheduled compazine  - PRN antiemetics     CV:  #Type 2 NSTEMI 2/2 demand ischemia/septic shock.   Troponin elevated, stable at 0.436-->0.405. Will not further trend. Repeat ECHO (4/5) demonstrates stable EF 55-60%. LV/RV size and function normal. New mild-mod mitral insufficiency. Trivial pericardial effusion. IVC dilated c/w overload. s.      #Subclinical coronary atherosclerosis  #Hyperlipidemia  Total Agatston calcium score was elevated at 591 (91st percentile) on CT coronaries performed 6/8/2016. Nuclear stress test was negative for ischemia on 8/10/2016. Life style modification measures were recommended. Patient follows with cardiology as outpatient (last office visit on 11/12/18).  - holding atorvastatin at this time. Could potentially resume after completion of reinduction if LFTs remain WNL (added back 20 mg atorvastatin at discharge, repeat LFT next Monday)     RENAL:   #FOREST, resolved.  Baseline Cr 0.7 (GFR>90). Creatinine uptrended with Cr max 1.45 (GFR 50), now improved/resolved. Multifactorial in setting of decreased PO intake, fevers, diarrhea, and medications (IV Vancomycin, Zosyn, contrast), and development of septic shock. Now resolved.      :  #BPH   [place] [place] On PTA Flomax 0.8 mg daily.  - of note, possible drug  interaction between voriconazole and flomax (can raise flomax level in blood & cause hypotension).   - decreased flomax from 0.8mg to 0.4mg (soft BPs in setting of neutropenic sepsis and risk of further hypotension due to interaction with voriconazole). Continue 0.4mg dose at this time.       NEURO/MSK:  #Restless legs syndrome  - Continue ropinirole 0.75 mg at bedtime, offer PRN ativan for persistent symptoms.     #History of lumbar spine degenerative disc disease   Patient is s/p laminectomy/facetectomy procedures. He does not routinely take pain meds.      DENTAL:  #S/P root canal manipulation  #Odontic infection as above  Patient had the start of a root canal treatment initiated on 3/11/19 with plan to complete it a few weeks later (per pt's wife and son). He was started on a 7-day course of oral  mg q6h beginning one day prior to the procedure. At time of admission, he was having pain in his left jaw but site appeared unremarkable with no abscess or drainage.  In retrospect, may have also had some leukemic infiltration of gums complicating his course. Pain at site initially improved during chemotherapy, but did return. Pain worsened and with higher temps, checked CT Dental which demonstrated cellulitis along left hemimandible. Developed worsening oral infection as above.  - Tylenol PRN, tramadol vs oxycodone PRN   - abx as above  - Dental consult as above  - OMFS consult, intervention on 4/3 as above     PSYCHOSOCIAL:  #Coping.  Appreciate SW and  involvement. Pt had requested help with Advance Directive, SW following     Code Status: FULL     Disposition:   - [facility] for labs & transfusions twice weekly (to start this Thursday as giving him platelets prior to discharge)  - AUGIE visit & bone marrow biopsy at [facility] (4/23/19) will incelsaet Ryann r/t labs need to be placed for the marrow  - AUGIE visit to discuss bone marrow biopsy results & tentatively set up Decitabine if needed on  4/26/19 (decitabine on 28 day cycle due 4/23/19)  - First available with Dr Cuadra was 5/6/19  - Follow up voriconazole level & LFT's (r/t history) on 4/22/19 also started back on atorvastatin 20 mg at discharge  -  at discharge (will discharge on levaquin - despite c diff history - on PO vanc through 4/20? And voriconazole) so Venclexta dose is still at 100 mg daily (this will need to be followed) I ordered 60 tablets from specialty mail order pharmacy to be delivered 4/16/19 in case dose is increased by the team  - outpatient transfusion orders 1 unit to keep hgb > 7, 1 unit to keep platelet > 10  - for now he's going  Home with his picc line r/t getting blood / platelets at [place] (consider port placement? Once counts improve)    Miranda Morgan  [facility]  [phone]  Hematology/Oncology  April 15, 2019    CODE STATUS Full  PCP Bre Vizcaino    PHYSICAL EXAM  Vital Signs with Ranges  Temp:  [96.5  F (35.8  C)-97.5  F (36.4  C)] 97.1  F (36.2  C)  Pulse:  [] 99  Resp:  [16-18] 18  BP: ()/(69-89) 121/75  SpO2:  [95 %-100 %] 99 %  Constitutional: Seen resting in bed. Pleasant, alert, interactive.  HEENT: NC/AT. MMM.   Respiratory: Breathing even and non-labored on RA.   GI: Abd ND.  Skin: No concerning lesions or rash on exposed surfaces.   Musculoskeletal: No LE edema.  Neurologic: Alert and oriented. Grossly nonfocal.  Neuropsychiatric: Calm, mentation appears normal, answering questions appropriately.      DISCHARGE DISPOSITION  Home & in stable condition    DISCHARGE ORDERS     CBC with platelets differential    Last Lab Result: Hemoglobin (g/dL)       Date                     Value                 04/15/2019               8.7 (L)          ----------     Basic metabolic panel     Voriconazole Level     Hepatic panel     BMT GENERIC REFERRAL      Activity    Your activity upon discharge: activity as tolerated - see educational sheet on low platelets &  neutropenia     When to contact  your care team    Please call the [facility] Triage RN Line [phone] ([facility] RN available M-F 8-5, after 5 pm the RN Advisor will page the On-Call Oncology Fellow who will return your call) for temperature greater than 100.4 F, uncontrolled nausea/vomiting/diarrhea or unrelieved constipation, pain not relieved by medications, bleeding not stopped by pressure, dizziness, chest pain, shortness of breath, changes in level of consciousness, or any other new concerning symptoms.     Discharge Instructions    - Please continue Venclexta at 100 mg PO daily (until ANC > 1000, at this time you will be directed by your health care provider to stop voriconazole & levofloxacin & they will likely increase your dose)  - We will be following your labs & transfusion needs at [facility]  - AUGIE visit for 4/26/18 is to check in post bone marrow biopsy & see if meds need to be adjusted     Reason for your hospital stay    Admitted for treatment of AML & associated toxicities     Follow Up and recommended labs and tests    The team would like you to get labs with possible platelet transfusions on 4/18 and 4/22 at:    [place] [address] [place] [address]  Phn: [phone]     Adult [facility] Follow-up and recommended labs and tests    - Please follow up twice weekly at [facility] for labs (CBC + BMP) & possible red blood cell (if hgb < 7) & platelets (if platelet < 10)  - Please follow up for a bone marrow biopsy on 4/22/19 with AUGIE check in, additional AUGIE visit 4/26/19 r/t BMBx results & if more decitabine needed  - Please follow up with Dr Cuadra 5/6/19 (Christina Braxton & Dr Cuadra aware, otherwise AUGIE follow up scheduled for 4/22 & 4/26)  - Please HOLD AM voriconazole dose on 4/22/19 (will be drawing a drug level)  - We will check LFT 4/22/19 (have been normal / stabilized) but still on voriconazole & atorvastatin (lower dose than prior 20 mg every evening)    Appointments on [place]  and/or [facility] (with [facility] or [facility] provider or service). Call [phone] if you haven't heard regarding these appointments within 7 days of discharge.     Full Code     Diet    Follow this diet upon discharge: Orders Placed This Encounter      Calorie Counts      Advance Diet as Tolerated: Regular Diet Adult     Discharge Medications   Current Discharge Medication List      START taking these medications    Details   acetaminophen (TYLENOL) 325 MG tablet Take 2 tablets (650 mg) by mouth every 4 hours as needed for mild pain or fever (pre-med before blood products)    Associated Diagnoses: Acute leukemia not having achieved remission (H)      acyclovir (ZOVIRAX) 400 MG tablet Take 1 tablet (400 mg) by mouth 2 times daily  Qty: 60 tablet, Refills: 1    Associated Diagnoses: Acute leukemia not having achieved remission (H)      levofloxacin (LEVAQUIN) 250 MG tablet Take 1 tablet (250 mg) by mouth daily  Qty: 30 tablet, Refills: 1    Associated Diagnoses: Acute leukemia not having achieved remission (H)      ondansetron (ZOFRAN) 8 MG tablet Take 1 tablet (8 mg) by mouth every 8 hours as needed for nausea  Qty: 30 tablet, Refills: 1    Associated Diagnoses: Acute myeloid leukemia not having achieved remission (H)      pantoprazole (PROTONIX) 40 MG EC tablet Take 1 tablet (40 mg) by mouth 2 times daily (before meals)  Qty: 60 tablet, Refills: 1    Associated Diagnoses: Acute leukemia not having achieved remission (H)      polyethylene glycol (MIRALAX/GLYCOLAX) packet Take 17 g by mouth daily as needed for constipation    Associated Diagnoses: Acute leukemia not having achieved remission (H)      prochlorperazine (COMPAZINE) 5 MG tablet Take 1-2 tablets (5-10 mg) by mouth every 6 hours as needed for nausea or vomiting  Qty: 30 tablet, Refills: 1    Associated Diagnoses: Acute leukemia not having achieved remission (H)      sennosides (SENOKOT) 8.6 MG tablet Take 2 tablets by mouth 2 times daily as needed for  constipation    Associated Diagnoses: Acute leukemia not having achieved remission (H)      tamsulosin (FLOMAX) 0.4 MG capsule Take 1 capsule (0.4 mg) by mouth daily  Qty: 30 capsule, Refills: 1    Associated Diagnoses: Acute leukemia not having achieved remission (H)      vancomycin (FIRVANQ) 50 MG/ML oral solution Take 2.5 mLs (125 mg) by mouth 4 times daily for 5 days  Qty: 50 mL, Refills: 0    Associated Diagnoses: Acute leukemia not having achieved remission (H)      venetoclax (VENCLEXTA) 100 MG tablet CHEMOTHERAPY Take 1 tablet (100 mg) by mouth daily (with dinner)    Comments: Verbally called in script to [facility] pharmacy - they are filling & mailing out on 4/16/19  Associated Diagnoses: Acute myeloid leukemia not having achieved remission (H)      voriconazole (VFEND) 50 MG tablet Take 3 tablets (150 mg) by mouth every 12 hours  Qty: 180 tablet, Refills: 0    Associated Diagnoses: Acute leukemia not having achieved remission (H)         CONTINUE these medications which have CHANGED    Details   atorvastatin (LIPITOR) 20 MG tablet Take 1 tablet (20 mg) by mouth every evening  Qty: 30 tablet, Refills: 1    Associated Diagnoses: Acute leukemia not having achieved remission (H)      rOPINIRole (REQUIP) 0.25 MG tablet Take 3 tablets (0.75 mg) by mouth nightly as needed (restless leg syndrome)  Qty: 60 tablet, Refills: 1    Associated Diagnoses: Acute leukemia not having achieved remission (H)         STOP taking these medications       alfuzosin ER (UROXATRAL) 10 MG 24 hr tablet Comments:   Reason for Stopping:         oxybutynin ER (DITROPAN-XL) 5 MG 24 hr tablet Comments:   Reason for Stopping:         penicillin V (VEETID) 500 MG tablet Comments:   Reason for Stopping:             DATA  CBC RECENT  Recent Labs   Lab Test 04/15/19  0413 04/14/19  0315 04/13/19  0401   WBC 2.2* 2.0* 2.4*   HGB 8.7* 7.7* 7.7*   MCV 94 95 95   PLT 11* 19* 23*      BMP RECENT   Recent Labs   Lab Test 04/15/19  0413 04/14/19  0315  04/13/19  0401    135 138   POTASSIUM 3.8 3.9 3.9   CHLORIDE 106 104 106   CO2 23 23 23   BUN 17 13 16   CR 0.72 0.74 0.77   ANIONGAP 9 8 8   DEBBIE 8.8 8.6 8.4*   GLC 94 99 96     LFT RECENT  Recent Labs   Lab Test 04/15/19  0413 04/14/19  0315   AST 22 22   ALT 56 61   ALKPHOS 138 130   BILITOTAL 1.1 0.9     Most Recent 6 Bacteria Isolates From Any Culture   Recent Labs   Lab Test 04/03/19  2323 04/03/19  2207 04/03/19  1823 04/03/19  1821 04/03/19  1810 04/03/19  0833   CULT No growth after 12 days No growth after 12 days No growth No growth No growth No growth after 12 days  No growth     IMAGING No results found.    ALLERGIES  No Known Allergies

## 2019-04-15 NOTE — PROGRESS NOTES
Care Coordinator Progress Note    Admission Date/Time:  3/12/2019  Attending MD:  Bharat Leo MD    Data  Chart reviewed, discussed with interdisciplinary team.   Patient was admitted for:    Acute leukemia not having achieved remission (H)  Acute myeloid leukemia not having achieved remission (H).    Writer was asked by AUGIE to assist with scheduling patient a lab and possible platelet transfusion appointment on 4/18 and 4/22 at [facility], as this is close to patient's home.     Writer called [facility] ([phone]) and was told that they can accomodate this, but would need patient's [facility] PCP to co-sign the orders on an order form, along with the patient's PICC placement report and fax back to ([phone]).     Writer received [facility]'s order form (consent, physician order sheet, and PLT- blood transfusion form) and AUGIE filled this out. Writer then faxed the form to patient's PCP, Dr. Vizcaino ([phone]) and also left a message with his team regarding the incoming fax.     Writer will send the requested documentation to [facility] once the co-signed documents are returned back from patient's PCP.     Plan  Anticipated Discharge Date:  4/15/19  Anticipated Discharge Plan:  Home with assist of spouse and follow-up in clinic as recommended.     Gayla De La Garza, RN, BSN, PHN  Care Coordinator

## 2019-04-15 NOTE — PLAN OF CARE
Cancel, pt declines any therapy prior to disch  Physical Therapy Discharge Summary  Reason for discharge:   Discharge from hospital to home  Progress towards goals: See goals on Care Plan in [facility] electronic health record for goal details.  Goals partially met. Barriers to achieving goals: limited tolerance,  fatigue, balance, weakness  Recommendation(s):   Continued therapy is recommended. Rationale/Recommendations: (However, pt declines any formal PT upon disch).  Outpatient PT to increase safety and independence with basic functional mobility, and to address unmet goals.

## 2019-04-15 NOTE — PLAN OF CARE
Discharge Planner OT   Patient plan for discharge: home with assist from spouse   Current status: Pt performed sit<>stand transfer with SBA, ambulated with SBA and no AE from room to kitchenette on unit. Pt practiced functional high and low reaching to simulate home environment as much as possible. Pt ambulated a total of ~800 feet around 5C unit with SBA and no AE. Pt also ascended/descended 12 stairs with SBA while holding 6lb weight to simulate carrying objects up and down stairs. Pt was very steady feet with no LOB noted. Th went over 9 different dowel exercises for BUE strengthening, x10 reps of each with rest breaks taken in between. Pt tolerated activity well, does not endorse any major concerns with returning home and has all AE needed.   Barriers to return to prior living situation: none  Recommendations for discharge: home with assist from spouse   Rationale for recommendations: Assist as needed for heavier IADLs pending lab values.        Entered by: Sera Askew 04/15/2019 3:53 PM     Occupational Therapy Discharge Summary    Reason for therapy discharge:    Discharged to home.    Progress towards therapy goal(s). See goals on Care Plan in [facility] electronic health record for goal details.  Goals partially met.  Barriers to achieving goals:   discharge from facility.    Therapy recommendation(s):    Continued therapy is recommended.  Rationale/Recommendations:  Pt would benefit from further therapy to progress functional strength and endurance, however pt declining this therefore REC home with assist for heavier IADLs and continued home exercise program. .

## 2019-04-15 NOTE — DISCHARGE SUMMARY
"Discharge attending note.  I examined the patient and reviewed his vital signs, laboratory results, assessment and plan and discharge instructions. With the team of midlevel provider, a fellow, a nurse and discharge coordinator, we spent over 30 minutes coordinating discharge care and discussing the plan with the patient and his family.       Attending note:    I have reviewed today's vital signs, medications, labs and imaging results. I have seen, evaluated and examined the patient independently and discussed the plan with the patient and team. The associated note has been read and corrected by me.  My independent findings and assessment are below.     Subjective: He feels well today. He denies any complaints     /85 (BP Location: Left arm)   Pulse 104   Temp 97.1  F (36.2  C) (Axillary)   Resp 18   Ht 1.778 m (5' 10\")   Wt 85.8 kg (189 lb 3.2 oz)   SpO2 100%   BMI 27.15 kg/m     General: Pleasant man, not in distress  Lungs: Clear to auscultation  Abdomen: Soft, non tender     Assessment and plan: 64 yo man with AML s/p 7+3 with refractory disease s/p re-induction with  decitabine and venetoclax, today 21. Also hospital course complicated by C.dif diarrhea and neutropenic fever. He is currently afebrile on po vancomycin, levaquin, acyclovir and voriconazole.  Bone marrow biopsy will be repeated on day 28.  Plan for discharge today.  We will contact Dr. Rogers to facilitate a follow-up appointment for next week. Remainder of supportive care as per PA/NP  note.     Mike Lambert MD  Pager:[phone]      "

## 2019-04-15 NOTE — PLAN OF CARE
Pt discharged to: Home  Via:Car  Accompanied by: Brothers and taken by RN in   Belongings: All belongings were packed and carried out on a cart by patient and family  Teaching: Discharge teaching completed, including: review of medications and their administration times and precautions, thrombocytopenia precautions, neutropenic precautions for home.   Clinic appointment: Pt will have F/U in clinic on 4/23 w/ PA (labs and BM bx to be completed. Will be seen at infusion clinic at [facility] in [place] 2x weekly staring 4/18.  Report called/faxed: Pt to F/U, schedule times for infusion at [place], verbalizes understanding  Local housing:Lives @ home with wife in [place].    Mildly tachy in low 100's, triggering lactic acid (2.0 mml/L): given 1L bolus over 2.5 hours and rechecked w/ improvement (1.0 mmol/L). OVSS on RA. Denies pain, N/V. Continues to have loose stools, but smaller and less frequent w/ vanco tx for c.diff. Given 1 unit plts w/out complication for count of 11. Appetite good. Good UOP. Given Venclexa x2 to take home for use. Restarting lipitor @ lower dose on discharge. Provided research kits by research assistant earlier in day.     Problem: Adult Inpatient Plan of Care  Goal: Plan of Care Review  4/15/2019 1553 by Sima Thomason RN  Outcome: Adequate for Discharge     Problem: Adult Inpatient Plan of Care  Goal: Patient-Specific Goal (Individualization)  4/15/2019 1553 by Sima Thomason, RN  Outcome: Adequate for Discharge     Problem: Adult Inpatient Plan of Care  Goal: Absence of Hospital-Acquired Illness or Injury  4/15/2019 1553 by Sima Thomason, RN  Outcome: Adequate for Discharge     Problem: Adult Inpatient Plan of Care  Goal: Optimal Comfort and Wellbeing  4/15/2019 1553 by Sima Thomason, RN  Outcome: Adequate for Discharge     Problem: Adult Inpatient Plan of Care  Goal: Readiness for Transition of Care  4/15/2019 1553 by Sima Thomason, RN  Outcome: Adequate for Discharge      Problem: Bleeding Risk or Actual  Goal: Absence of Bleeding  4/15/2019 1553 by Sima Thomason RN  Outcome: Adequate for Discharge     Problem: Anemia (Chemotherapy Effects)  Goal: Anemia Symptom Improvement  4/15/2019 1553 by Sima Thomason RN  Outcome: Adequate for Discharge     Problem: Nausea and Vomiting (Chemotherapy Effects)  Goal: Fluid and Electrolyte Balance  4/15/2019 1553 by Sima Thomason RN  Outcome: Adequate for Discharge     Problem: Neutropenia (Chemotherapy Effects)  Goal: Absence of Infection  4/15/2019 1553 by Sima Thomason RN  Outcome: Adequate for Discharge     Problem: Oral Mucositis (Chemotherapy Effects)  Goal: Improved Oral Mucous Membrane Integrity  4/15/2019 1553 by Sima Thomason RN  Outcome: Adequate for Discharge     Problem: Thrombocytopenia Bleeding Risk (Chemotherapy Effects)  Goal: Absence of Bleeding  4/15/2019 1553 by Sima Thomason RN  Outcome: Adequate for Discharge     Problem: Hematological Disorder  Goal: Hematological Disorder  Description  Patient comorbidity will be monitored for signs and symptoms of Hematogical condition.  Problems will be absent, minimized or managed by discharge/transition of care.  4/15/2019 1553 by Sima Thomason RN  Outcome: Adequate for Discharge

## 2019-04-16 ENCOUNTER — TELEPHONE (OUTPATIENT)
Dept: ONCOLOGY | Facility: CLINIC | Age: 64
End: 2019-04-16

## 2019-04-16 DIAGNOSIS — C92.00 ACUTE MYELOID LEUKEMIA NOT HAVING ACHIEVED REMISSION (H): Primary | ICD-10-CM

## 2019-04-16 DIAGNOSIS — Z79.899 ENCOUNTER FOR LONG-TERM (CURRENT) USE OF MEDICATIONS: ICD-10-CM

## 2019-04-16 NOTE — ORAL ONC MGMT
"Oral Chemotherapy Monitoring Program    Primary Oncologist: Dr. Cuadra  Primary Oncology Clinic: [facility]  Cancer Diagnosis: AML    Drug: Venclexta  Start Date: started inpatient at the end of 2019  Dose is appropriate for patient. Dose was reduced for interaction with Voriconazole.  Expected duration of therapy: Until disease progression or unacceptable toxicity    Drug Interaction Assessment: Dose of Venclexta was reduced to 100mg daily due to CY inhibition with Voriconazole. Randy is aware. Dose will need to be increased if Voriconazole is ever discontinued.    Drugs checked include: Acetaminophen -Acyclovir -AtorvaSTATin -LevoFLOXacin -Ondansetron -Pantoprazole -Polyethylene Glycol 3350 -Prochlorperazine -ROPINIRole -Senna -Tamsulosin -Vancomycin -Venetoclax -Voriconazole -Decitabine -LORazepam    Lab Monitoring Plan  CMP and CBC weekly x 4 then monthly  Subjective/Objective:  El Ace is a 63 year old male contacted by phone for an initial visit for oral chemotherapy education.     ORAL CHEMOTHERAPY 2019   Drug Name Venclexta (Venetoclax)   Current Dosage 100mg   Current Schedule Daily   Cycle Details Continuous   Start Date of Last Cycle 2019   Planned next cycle start date 2019   Doses missed in last 2 weeks 0   Adherence Assessment Adherent   Adverse Effects No AE identified during assessment   Any new drug interactions? Yes   Pharmacist Intervention? Yes   Intervention(s) Patient Education;Dose decreased (chemo)   Is the dose as ordered appropriate for the patient? Yes       Vitals:  BP:   BP Readings from Last 1 Encounters:   04/15/19 112/80     Wt Readings from Last 1 Encounters:   04/15/19 85.8 kg (189 lb 3.2 oz)     Estimated body surface area is 2.06 meters squared as calculated from the following:    Height as of 3/12/19: 1.778 m (5' 10\").    Weight as of 4/15/19: 85.8 kg (189 lb 3.2 oz).      Labs:  _  Result Component Current Result Ref Range   Sodium " 138 (4/15/2019) 133 - 144 mmol/L     _  Result Component Current Result Ref Range   Potassium 3.8 (4/15/2019) 3.4 - 5.3 mmol/L     _  Result Component Current Result Ref Range   Calcium 8.8 (4/15/2019) 8.5 - 10.1 mg/dL     _  Result Component Current Result Ref Range   Magnesium 2.0 (4/15/2019) 1.6 - 2.3 mg/dL     _  Result Component Current Result Ref Range   Phosphorus 4.3 (4/15/2019) 2.5 - 4.5 mg/dL     _  Result Component Current Result Ref Range   Albumin 2.9 (L) (4/15/2019) 3.4 - 5.0 g/dL     _  Result Component Current Result Ref Range   Urea Nitrogen 17 (4/15/2019) 7 - 30 mg/dL     _  Result Component Current Result Ref Range   Creatinine 0.72 (4/15/2019) 0.66 - 1.25 mg/dL       _  Result Component Current Result Ref Range   AST 22 (4/15/2019) 0 - 45 U/L     _  Result Component Current Result Ref Range   ALT 56 (4/15/2019) 0 - 70 U/L     _  Result Component Current Result Ref Range   Bilirubin Total 1.1 (4/15/2019) 0.2 - 1.3 mg/dL       _  Result Component Current Result Ref Range   WBC 2.2 (L) (4/15/2019) 4.0 - 11.0 10e9/L     _  Result Component Current Result Ref Range   Hemoglobin 8.7 (L) (4/15/2019) 13.3 - 17.7 g/dL     _  Result Component Current Result Ref Range   Platelet Count 11 (LL) (4/15/2019) 150 - 450 10e9/L     _  Result Component Current Result Ref Range   Absolute Neutrophil 0.9 (L) (4/15/2019) 1.6 - 8.3 10e9/L       Assessment:  Patient is appropriate to start therapy.    Plan:  Basic chemotherapy teaching was reviewed with the patient including indication, start date of therapy, dose, administration, adverse effects, missed doses, food and drug interactions, monitoring, side effect management, office contact information, and safe handling. Written materials were mailed and all questions answered.    Follow-Up:  In about one week pending office visits next week.     Brian Bernard PharmD  [facility]  [phone]  April 16, 2019

## 2019-04-17 LAB
BACTERIA SPEC CULT: NORMAL
BACTERIA SPEC CULT: NORMAL
Lab: NORMAL
SPECIMEN SOURCE: NORMAL
YEAST SPEC QL CULT: NO GROWTH

## 2019-04-22 LAB
BACTERIA SPEC CULT: NORMAL
SPECIMEN SOURCE: NORMAL

## 2019-04-23 ENCOUNTER — RESEARCH ENCOUNTER (OUTPATIENT)
Dept: TRANSPLANT | Facility: CLINIC | Age: 64
End: 2019-04-23

## 2019-04-23 ENCOUNTER — ONCOLOGY VISIT (OUTPATIENT)
Dept: ONCOLOGY | Facility: CLINIC | Age: 64
End: 2019-04-23
Attending: PHYSICIAN ASSISTANT
Payer: COMMERCIAL

## 2019-04-23 ENCOUNTER — OFFICE VISIT (OUTPATIENT)
Dept: ONCOLOGY | Facility: CLINIC | Age: 64
End: 2019-04-23
Attending: INTERNAL MEDICINE
Payer: COMMERCIAL

## 2019-04-23 VITALS
OXYGEN SATURATION: 99 % | SYSTOLIC BLOOD PRESSURE: 115 MMHG | TEMPERATURE: 97.9 F | HEIGHT: 70 IN | RESPIRATION RATE: 16 BRPM | BODY MASS INDEX: 28.15 KG/M2 | WEIGHT: 196.6 LBS | DIASTOLIC BLOOD PRESSURE: 72 MMHG | HEART RATE: 92 BPM

## 2019-04-23 VITALS
WEIGHT: 196.6 LBS | SYSTOLIC BLOOD PRESSURE: 97 MMHG | HEIGHT: 70 IN | RESPIRATION RATE: 14 BRPM | OXYGEN SATURATION: 98 % | HEART RATE: 75 BPM | TEMPERATURE: 97.9 F | DIASTOLIC BLOOD PRESSURE: 64 MMHG | BODY MASS INDEX: 28.15 KG/M2

## 2019-04-23 DIAGNOSIS — R07.9 CHEST PAIN, UNSPECIFIED TYPE: ICD-10-CM

## 2019-04-23 DIAGNOSIS — C92.00 ACUTE MYELOID LEUKEMIA NOT HAVING ACHIEVED REMISSION (H): Primary | ICD-10-CM

## 2019-04-23 DIAGNOSIS — C92.00 ACUTE MYELOID LEUKEMIA NOT HAVING ACHIEVED REMISSION (H): ICD-10-CM

## 2019-04-23 LAB
ABO + RH BLD: NORMAL
ABO + RH BLD: NORMAL
ALBUMIN SERPL-MCNC: 3.3 G/DL (ref 3.4–5)
ALP SERPL-CCNC: 220 U/L (ref 40–150)
ALT SERPL W P-5'-P-CCNC: 38 U/L (ref 0–70)
ANION GAP SERPL CALCULATED.3IONS-SCNC: 6 MMOL/L (ref 3–14)
AST SERPL W P-5'-P-CCNC: 19 U/L (ref 0–45)
BASOPHILS # BLD AUTO: 0 10E9/L (ref 0–0.2)
BASOPHILS NFR BLD AUTO: 0 %
BILIRUB SERPL-MCNC: 0.5 MG/DL (ref 0.2–1.3)
BLD GP AB SCN SERPL QL: NORMAL
BLOOD BANK CMNT PATIENT-IMP: NORMAL
BUN SERPL-MCNC: 11 MG/DL (ref 7–30)
CALCIUM SERPL-MCNC: 8.7 MG/DL (ref 8.5–10.1)
CHLORIDE SERPL-SCNC: 108 MMOL/L (ref 94–109)
CO2 SERPL-SCNC: 24 MMOL/L (ref 20–32)
CREAT SERPL-MCNC: 0.74 MG/DL (ref 0.66–1.25)
DIFFERENTIAL METHOD BLD: ABNORMAL
EOSINOPHIL # BLD AUTO: 0 10E9/L (ref 0–0.7)
EOSINOPHIL NFR BLD AUTO: 1.4 %
ERYTHROCYTE [DISTWIDTH] IN BLOOD BY AUTOMATED COUNT: 14.1 % (ref 10–15)
GFR SERPL CREATININE-BSD FRML MDRD: >90 ML/MIN/{1.73_M2}
GLUCOSE SERPL-MCNC: 133 MG/DL (ref 70–99)
HCT VFR BLD AUTO: 25 % (ref 40–53)
HGB BLD-MCNC: 8.4 G/DL (ref 13.3–17.7)
LYMPHOCYTES # BLD AUTO: 0.6 10E9/L (ref 0.8–5.3)
LYMPHOCYTES NFR BLD AUTO: 67.4 %
MCH RBC QN AUTO: 31.1 PG (ref 26.5–33)
MCHC RBC AUTO-ENTMCNC: 33.6 G/DL (ref 31.5–36.5)
MCV RBC AUTO: 93 FL (ref 78–100)
MONOCYTES # BLD AUTO: 0 10E9/L (ref 0–1.3)
MONOCYTES NFR BLD AUTO: 0.4 %
NEUTROPHILS # BLD AUTO: 0.3 10E9/L (ref 1.6–8.3)
NEUTROPHILS NFR BLD AUTO: 30.8 %
NRBC # BLD AUTO: 0 10*3/UL
NRBC BLD AUTO-RTO: 1 /100
PLATELET # BLD AUTO: 201 10E9/L (ref 150–450)
PLATELET # BLD EST: ABNORMAL 10*3/UL
POTASSIUM SERPL-SCNC: 3.4 MMOL/L (ref 3.4–5.3)
PROT SERPL-MCNC: 6.9 G/DL (ref 6.8–8.8)
RBC # BLD AUTO: 2.7 10E12/L (ref 4.4–5.9)
RBC MORPH BLD: NORMAL
SODIUM SERPL-SCNC: 138 MMOL/L (ref 133–144)
SPECIMEN EXP DATE BLD: NORMAL
TROPONIN I SERPL-MCNC: <0.015 UG/L (ref 0–0.04)
VORICONAZOLE SERPL-MCNC: 0.3 UG/ML (ref 1–5.5)
WBC # BLD AUTO: 0.9 10E9/L (ref 4–11)

## 2019-04-23 PROCEDURE — 80299 QUANTITATIVE ASSAY DRUG: CPT | Performed by: PHYSICIAN ASSISTANT

## 2019-04-23 PROCEDURE — 40000951 ZZHCL STATISTIC BONE MARROW INTERP TC 85097: Performed by: PHYSICIAN ASSISTANT

## 2019-04-23 PROCEDURE — 38222 DX BONE MARROW BX & ASPIR: CPT | Mod: ZF | Performed by: PHYSICIAN ASSISTANT

## 2019-04-23 PROCEDURE — 86850 RBC ANTIBODY SCREEN: CPT | Performed by: PHYSICIAN ASSISTANT

## 2019-04-23 PROCEDURE — 00000161 ZZHCL STATISTIC H-SPHEME PROCESS B/S: Performed by: PHYSICIAN ASSISTANT

## 2019-04-23 PROCEDURE — 88311 DECALCIFY TISSUE: CPT | Performed by: PHYSICIAN ASSISTANT

## 2019-04-23 PROCEDURE — 99214 OFFICE O/P EST MOD 30 MIN: CPT | Mod: ZP | Performed by: PHYSICIAN ASSISTANT

## 2019-04-23 PROCEDURE — 88275 CYTOGENETICS 100-300: CPT | Performed by: PHYSICIAN ASSISTANT

## 2019-04-23 PROCEDURE — 00000058 ZZHCL STATISTIC BONE MARROW ASP PERF TC 38220: Performed by: PHYSICIAN ASSISTANT

## 2019-04-23 PROCEDURE — 88237 TISSUE CULTURE BONE MARROW: CPT | Performed by: PHYSICIAN ASSISTANT

## 2019-04-23 PROCEDURE — 88184 FLOWCYTOMETRY/ TC 1 MARKER: CPT | Performed by: PHYSICIAN ASSISTANT

## 2019-04-23 PROCEDURE — 93010 ELECTROCARDIOGRAM REPORT: CPT | Mod: ZP | Performed by: INTERNAL MEDICINE

## 2019-04-23 PROCEDURE — 86900 BLOOD TYPING SEROLOGIC ABO: CPT | Performed by: PHYSICIAN ASSISTANT

## 2019-04-23 PROCEDURE — G0463 HOSPITAL OUTPT CLINIC VISIT: HCPCS | Mod: ZF

## 2019-04-23 PROCEDURE — 88185 FLOWCYTOMETRY/TC ADD-ON: CPT | Performed by: PHYSICIAN ASSISTANT

## 2019-04-23 PROCEDURE — 81450 HL NEO GSAP 5-50DNA/DNA&RNA: CPT | Performed by: PHYSICIAN ASSISTANT

## 2019-04-23 PROCEDURE — 88280 CHROMOSOME KARYOTYPE STUDY: CPT | Performed by: PHYSICIAN ASSISTANT

## 2019-04-23 PROCEDURE — 40001005 ZZHCL STATISTIC FLOW >15 ABY TC 88189: Performed by: PHYSICIAN ASSISTANT

## 2019-04-23 PROCEDURE — 88271 CYTOGENETICS DNA PROBE: CPT | Performed by: PHYSICIAN ASSISTANT

## 2019-04-23 PROCEDURE — 88305 TISSUE EXAM BY PATHOLOGIST: CPT | Performed by: PHYSICIAN ASSISTANT

## 2019-04-23 PROCEDURE — 80053 COMPREHEN METABOLIC PANEL: CPT | Performed by: PHYSICIAN ASSISTANT

## 2019-04-23 PROCEDURE — 88161 CYTOPATH SMEAR OTHER SOURCE: CPT | Performed by: PHYSICIAN ASSISTANT

## 2019-04-23 PROCEDURE — 40000424 ZZHCL STATISTIC BONE MARROW CORE PERF TC 38221: Performed by: PHYSICIAN ASSISTANT

## 2019-04-23 PROCEDURE — 84484 ASSAY OF TROPONIN QUANT: CPT | Performed by: PHYSICIAN ASSISTANT

## 2019-04-23 PROCEDURE — 85025 COMPLETE CBC W/AUTO DIFF WBC: CPT | Performed by: PHYSICIAN ASSISTANT

## 2019-04-23 PROCEDURE — 88264 CHROMOSOME ANALYSIS 20-25: CPT | Performed by: PHYSICIAN ASSISTANT

## 2019-04-23 PROCEDURE — 40000611 ZZHCL STATISTIC MORPHOLOGY W/INTERP HEMEPATH TC 85060: Performed by: PHYSICIAN ASSISTANT

## 2019-04-23 PROCEDURE — 86901 BLOOD TYPING SEROLOGIC RH(D): CPT | Performed by: PHYSICIAN ASSISTANT

## 2019-04-23 ASSESSMENT — MIFFLIN-ST. JEOR
SCORE: 1688.02
SCORE: 1688.02

## 2019-04-23 ASSESSMENT — PAIN SCALES - GENERAL
PAINLEVEL: NO PAIN (0)
PAINLEVEL: NO PAIN (0)

## 2019-04-23 NOTE — PROGRESS NOTES
"BMT ONC Adult Bone Marrow Biopsy Procedure Note  April 23, 2019  /72 (BP Location: Left arm, Patient Position: Left side, Cuff Size: Adult Regular)   Pulse 92   Temp 97.9  F (36.6  C) (Oral)   Resp 16   Ht 1.778 m (5' 10\")   Wt 89.2 kg (196 lb 9.6 oz)   SpO2 99%   BMI 28.21 kg/m       Learning needs assessment complete within 12 months? YES    DIAGNOSIS: AML     PROCEDURE: Unilateral Bone Marrow Biopsy and Unilateral Aspirate    [facility]    Patient s identification was positively verified by verbal identification and invasive procedure safety checklist was completed. Informed consent was obtained. Patient declined pre-medication. Patient   was placed in the prone position and prepped and draped in a sterile manner. Approximately 10 cc of 1% Lidocaine was used over the left posterior iliac spine. Following this a 3 mm incision was made. Trephine bone marrow core(s) was (were) obtained from the [facility]. Bone marrow aspirates were obtained from the [facility]. Aspirates were sent for morphology, immunophenotyping, cytogenetics, NGS and research. A total of approximately 50 ml of marrow was aspirated. Following this procedure a sterile dressing was applied to the bone marrow biopsy site(s). The patient was placed in the supine position to maintain pressure on the biopsy site. Post-procedure wound care instructions were given.     Complications: NO    Post-procedural pain assessment: 0 out of 10 on the numeric pain rating scale.     Interventions: NO    Length of procedure:21 minutes to 45 minutes    Procedure performed by: Ryann Pavon PA-C    "

## 2019-04-23 NOTE — NURSING NOTE
"Oncology Rooming Note    April 23, 2019 8:41 AM   El Ace is a 63 year old male who presents for:    Chief Complaint   Patient presents with     Oncology Clinic Visit     BMBX     Initial Vitals: /72 (BP Location: Left arm, Patient Position: Left side, Cuff Size: Adult Regular)   Pulse 92   Temp 97.9  F (36.6  C) (Oral)   Resp 16   Ht 1.778 m (5' 10\")   Wt 89.2 kg (196 lb 9.6 oz)   SpO2 99%   BMI 28.21 kg/m   Estimated body mass index is 28.21 kg/m  as calculated from the following:    Height as of this encounter: 1.778 m (5' 10\").    Weight as of this encounter: 89.2 kg (196 lb 9.6 oz). Body surface area is 2.1 meters squared.  No Pain (0) Comment: Data Unavailable   No LMP for male patient.  Allergies reviewed: Yes  Medications reviewed: Yes    Medications: Medication refills not needed today.  Pharmacy name entered into [employer]: [facility] - [facility]    Clinical concerns: None, Ryann was NOT notified.      KENNY CABELLO LPN            "

## 2019-04-23 NOTE — NURSING NOTE
"Oncology Rooming Note    April 23, 2019 8:38 AM   El Ace is a 63 year old male who presents for:    Chief Complaint   Patient presents with     Oncology Clinic Visit     F/U AML     Initial Vitals: /72 (BP Location: Left arm, Patient Position: Sitting, Cuff Size: Adult Regular)   Pulse 92   Temp 97.9  F (36.6  C) (Oral)   Resp 16   Ht 1.778 m (5' 10\")   Wt 89.2 kg (196 lb 9.6 oz)   SpO2 99%   BMI 28.21 kg/m   Estimated body mass index is 28.21 kg/m  as calculated from the following:    Height as of this encounter: 1.778 m (5' 10\").    Weight as of this encounter: 89.2 kg (196 lb 9.6 oz). Body surface area is 2.1 meters squared.  No Pain (0) Comment: Data Unavailable   No LMP for male patient.  Allergies reviewed: Yes  Medications reviewed: Yes    Medications: Medication refills not needed today.  Pharmacy name entered into [employer]: [facility] - [facility]    Clinical concerns: None, Ryann was NOT notified.      KENNY CABELLO LPN            "

## 2019-04-23 NOTE — NURSING NOTE
Chief Complaint   Patient presents with     Oncology Clinic Visit     BMBX     Labs drawn from picc line by rn in clinic.  Both lines flushed with saline.  Karina Sheehan RN

## 2019-04-23 NOTE — LETTER
"4/23/2019      RE: El Ace  [address] [facility] [address]       BMT ONC Adult Bone Marrow Biopsy Procedure Note  April 23, 2019  /72 (BP Location: Left arm, Patient Position: Left side, Cuff Size: Adult Regular)   Pulse 92   Temp 97.9  F (36.6  C) (Oral)   Resp 16   Ht 1.778 m (5' 10\")   Wt 89.2 kg (196 lb 9.6 oz)   SpO2 99%   BMI 28.21 kg/m        Learning needs assessment complete within 12 months? YES    DIAGNOSIS: AML     PROCEDURE: Unilateral Bone Marrow Biopsy and Unilateral Aspirate    LOCATION: [facility] 2nd Floor    Patient s identification was positively verified by verbal identification and invasive procedure safety checklist was completed. Informed consent was obtained. Patient declined pre-medication. Patient   was placed in the prone position and prepped and draped in a sterile manner. Approximately 10 cc of 1% Lidocaine was used over the left posterior iliac spine. Following this a 3 mm incision was made. Trephine bone marrow core(s) was (were) obtained from the [facility]. Bone marrow aspirates were obtained from the [facility]. Aspirates were sent for morphology, immunophenotyping, cytogenetics, NGS and research. A total of approximately 50 ml of marrow was aspirated. Following this procedure a sterile dressing was applied to the bone marrow biopsy site(s). The patient was placed in the supine position to maintain pressure on the biopsy site. Post-procedure wound care instructions were given.     Complications: NO    Post-procedural pain assessment: 0 out of 10 on the numeric pain rating scale.     Interventions: NO    Length of procedure:21 minutes to 45 minutes    Procedure performed by: ERASTO Brooks PA      "

## 2019-04-23 NOTE — LETTER
"4/23/2019       RE: El Ace  [address] [facility] [address]     Dear Colleague,    Thank you for referring your patient, El Ace, to the [facility]. Please see a copy of my visit note below.    BMT ONC Adult Bone Marrow Biopsy Procedure Note  April 23, 2019  /72 (BP Location: Left arm, Patient Position: Left side, Cuff Size: Adult Regular)   Pulse 92   Temp 97.9  F (36.6  C) (Oral)   Resp 16   Ht 1.778 m (5' 10\")   Wt 89.2 kg (196 lb 9.6 oz)   SpO2 99%   BMI 28.21 kg/m        Learning needs assessment complete within 12 months? YES    DIAGNOSIS: AML     PROCEDURE: Unilateral Bone Marrow Biopsy and Unilateral Aspirate    LOCATION: [facility] 2nd Floor    Patient s identification was positively verified by verbal identification and invasive procedure safety checklist was completed. Informed consent was obtained. Patient declined pre-medication. Patient   was placed in the prone position and prepped and draped in a sterile manner. Approximately 10 cc of 1% Lidocaine was used over the left posterior iliac spine. Following this a 3 mm incision was made. Trephine bone marrow core(s) was (were) obtained from the [facility]. Bone marrow aspirates were obtained from the [facility]. Aspirates were sent for morphology, immunophenotyping, cytogenetics, NGS and research. A total of approximately 50 ml of marrow was aspirated. Following this procedure a sterile dressing was applied to the bone marrow biopsy site(s). The patient was placed in the supine position to maintain pressure on the biopsy site. Post-procedure wound care instructions were given.     Complications: NO    Post-procedural pain assessment: 0 out of 10 on the numeric pain rating scale.     Interventions: NO    Length of procedure:21 minutes to 45 minutes    Procedure performed by: Ryann Pavon PA-C      Again, thank you for allowing me to participate in the care of your patient.      Sincerely,    Ryann Paovn, " PA

## 2019-04-23 NOTE — LETTER
4/23/2019      RE: El Ace  [address]  [facility] [address]       Oncology/Hematology Visit Note  Apr 23, 2019    Reason for Visit: follow up of AML after hospital discharge    History of Present Illness: El Ace is a 63 year old male with recent diagnosis of AML. Patient presented to [facility] ED in [place], [place] for complaints of nonspecific chest discomfort after partial root canal treatment (done 3/11/19). CT C/A/P was negative for PE or other acute findings. On OSH labs, his WBC was incidentally noted to be elevated at 71.1 with Hb 9.0 and plts 97K (prior labs had been unremarkable per patient). Smear was suspicious for immature blasts and acute leukemia. No symptoms of hyperviscosity, TLS or DIC on presentation. 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 Flow consistent with AML with 95% blasts with the following immunophenotype: Positive for CD13, CD33, CD34, CD38, dim to negative CD45, , partial HLA-DR. started on induction chemotherapy with 7+3 (cytarabine and daunorubicin) with D1=3/15/19. BMBx on 3/25 (done early due to rising WBC and blast counts) demonstrated persistent disease with 95% blasts by flow, 98% by morphology. Started re-induction with decitabine on 3/26 PM and added venetoclax on 3/29. His course has been complicated by neutropenic sepsis secondary to odontic/soft tissue infection - he completed a course of broad spectrum antibiotics - with linezolid & zosyn (subsequently diagnosed with C diff infection to complete PO vanc course 4/20/19). He had extraction of teeth #18 and 19 on 4/3 but subsequently developed septic shock requiring transfer to MICU team 4/3-4/4. Hypotension improved after fluid resuscitated and he did not require intubation or pressor support. He was transferred back to Heme Malignancy service on 4/4/19. Hewas able to complete Decitabine (4/8 & 4/9 had been held r/t ALT & T bili  "elevation). He was discharged on 4/15/19 and presents today for hospital discharge follow up.     Interval History:  Randy is here with his wife Bessy and daughter. He has been feeling a little dizziness at times when standing up. After discharge he noticed if he got up at night to urinate he needed to sit down. No syncope or falls. He feels this is happening less now. He is on reduced dose of Flomax and is able to void without difficulty. He has been having some difficulty with hand dexterity playing [employer]. Also sometimes feels hands are \"shaky\" when picking up objects. Denies numbness/tingling. No problems with feet. He goes for walks outside, but sometimes needs to take breaks due to fatigue. He doesn't feel short of breath, just feels a little weak. He does have some episodes of chest pain that come and go, but these occur at rest and not with walking. He states these pains feel similar to the ones he had at diagnosis and are not more frequent, longer lasting or severe. He denies any associated palpitations, dizziness or dyspnea.     He had tooth extraction while hospitalized. Denies any pain or bleeding since discharge. He had C diff while hospitalized but finished oral vancomycin. Stools are soft, about 1-2BMs/day. No abdominal pain or cramping. Denies any fevers and is checking temperature regularly. No nausea or need for anti-emetics. Appetite is good.    He is taking voriconazole 150 mg twice daily. Last dose at 10PM last night. He held this AM for blood draw. Also taking ACV BID and levaquin daily. Denies any infectious concerns, cough, sinus symptoms, dysuria, rash. Denies bleeding/bruising, headaches, blurry vision. Remaining ROS negative      Current Outpatient Medications   Medication Sig Dispense Refill     acyclovir (ZOVIRAX) 400 MG tablet Take 1 tablet (400 mg) by mouth 2 times daily 60 tablet 1     atorvastatin (LIPITOR) 20 MG tablet Take 1 tablet (20 mg) by mouth every evening 30 tablet 1     " "levofloxacin (LEVAQUIN) 250 MG tablet Take 1 tablet (250 mg) by mouth daily 30 tablet 1     pantoprazole (PROTONIX) 40 MG EC tablet Take 1 tablet (40 mg) by mouth 2 times daily (before meals) 60 tablet 1     rOPINIRole (REQUIP) 0.25 MG tablet Take 3 tablets (0.75 mg) by mouth nightly as needed (restless leg syndrome) 60 tablet 1     tamsulosin (FLOMAX) 0.4 MG capsule Take 1 capsule (0.4 mg) by mouth daily 30 capsule 1     venetoclax (VENCLEXTA) 100 MG tablet CHEMOTHERAPY Take 1 tablet (100 mg) by mouth daily (with dinner)       voriconazole (VFEND) 50 MG tablet Take 3 tablets (150 mg) by mouth every 12 hours 180 tablet 0     acetaminophen (TYLENOL) 325 MG tablet Take 2 tablets (650 mg) by mouth every 4 hours as needed for mild pain or fever (pre-med before blood products) (Patient not taking: Reported on 4/23/2019)       ondansetron (ZOFRAN) 8 MG tablet Take 1 tablet (8 mg) by mouth every 8 hours as needed for nausea (Patient not taking: Reported on 4/23/2019) 30 tablet 1     polyethylene glycol (MIRALAX/GLYCOLAX) packet Take 17 g by mouth daily as needed for constipation (Patient not taking: Reported on 4/23/2019)       prochlorperazine (COMPAZINE) 5 MG tablet Take 1-2 tablets (5-10 mg) by mouth every 6 hours as needed for nausea or vomiting (Patient not taking: Reported on 4/23/2019) 30 tablet 1     sennosides (SENOKOT) 8.6 MG tablet Take 2 tablets by mouth 2 times daily as needed for constipation (Patient not taking: Reported on 4/23/2019)         Physical Examination:  General: The patient is a pleasant male in no acute distress.  /72 (BP Location: Left arm, Patient Position: Sitting, Cuff Size: Adult Regular)   Pulse 92   Temp 97.9  F (36.6  C) (Oral)   Resp 16   Ht 1.778 m (5' 10\")   Wt 89.2 kg (196 lb 9.6 oz)   SpO2 99%   BMI 28.21 kg/m     Wt Readings from Last 10 Encounters:   04/23/19 89.2 kg (196 lb 9.6 oz)   04/15/19 85.8 kg (189 lb 3.2 oz)     HEENT: EOMI, PERRL. Sclerae are anicteric. Oral " mucosa is pink and moist with no lesions or thrush.   Lymph: Neck is supple with no lymphadenopathy in the cervical or supraclavicular areas.   Heart: Regular rate and rhythm.   Lungs: Clear to auscultation bilaterally.   Abdomen: Bowel sounds present, soft, nontender with no palpable hepatosplenomegaly or masses.   Extremities: No lower extremity edema noted bilaterally.   Neuro: Cranial nerves II through XII are grossly intact.  Skin: PICC site c/d/i. No rashes, petechiae, or bruising noted on exposed skin.    Laboratory Data:  Results for RENZO ESPINO (MRN [id number]) as of 4/23/2019 10:33   4/23/2019 08:54   Sodium 138   Potassium 3.4   Chloride 108   Carbon Dioxide 24   Urea Nitrogen 11   Creatinine 0.74   GFR Estimate >90   GFR Estimate If Black >90   Calcium 8.7   Anion Gap 6   Albumin 3.3 (L)   Protein Total 6.9   Bilirubin Total 0.5   Alkaline Phosphatase 220 (H)   ALT 38   AST 19   Glucose 133 (H)   WBC 0.9 (LL)   Hemoglobin 8.4 (L)   Hematocrit 25.0 (L)   Platelet Count 201   RBC Count 2.70 (L)   MCV 93   MCH 31.1   MCHC 33.6   RDW 14.1   Diff Method Manual Differential   % Neutrophils 30.8   % Lymphocytes 67.4   % Monocytes 0.4   % Eosinophils 1.4   % Basophils 0.0   Nucleated RBCs 1 (H)   Absolute Neutrophil 0.3 (LL)   Absolute Lymphocytes 0.6 (L)   Absolute Monocytes 0.0   Absolute Eosinophils 0.0   Absolute Basophils 0.0   Absolute Nucleated RBC 0.0   RBC Morphology Normal   Platelet Estimate Confirming automa...       Assessment and Plan:  AML, refractory. NGS positive for IDH2 and RUNX1 alterations.  Patient presented to [facility] ED in [place], [place] for complaints of nonspecific chest discomfort after partial root canal treatment (done 3/11/19). CT C/A/P was negative for PE or other acute findings. On OSH labs, his WBC was incidentally noted to be elevated at 71.1 with Hb 9.0 and plts 97K (prior labs had been unremarkable per patient). Smear was suspicious for immature blasts and acute  leukemia. No symptoms of hyperviscosity, TLS or DIC on presentation. 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 Flow consistent with AML with 95% blasts with the following immunophenotype: Positive for CD13, CD33, CD34, CD38, dim to negative CD45, , partial HLA-DR. Baseline ECHO normal, PICC place.   - HLA typing sent, BMT consult done 3/29 by Dr. Brown. Recommending allo SCT once in CR1  - s/p initial Hydrea (dc'd 3/16)   - received induction chemotherapy with 7+3 (cytarabine and daunorubicin). Day 1=3/15/19.   - repeat BMBx done early (3/25) due to rising WBC/blast count. Still with persistent heavy burden of disease (98% blasts by morphology).   - s/p Hydrea for WBC count management; started 1g BID (3/27), incresaed to 2g BID (x3/28). Hydrea discontinued 3/31.   - Started reinduction with decitabine/venetoclax. Decitabine D1=3/26; added Venetoclax on 3/29. Of note, decitabine D9 and D10 held 4/3 and 4/4, respectively, due to acute illness and elevated Tbili/ALT. With improvement in ALT and Tbili, decitabine resumed for last 2 doses (4/8 and 4/9).   - Continue Venetoclax 100mg daily (due to interaction to Vfend)  - D28 BMBx today  - Consented for FATE -  trial  - Follow up with Dr. Cuadra scheduled 4/26. Will add labs and infusion (poss pRBCs). He does not need further labs at [place] this week.     Anemia, leukopenia  2/2 AML & associated chemotherapy.   Platelets have recovered.   --Hgb 8.4. Will send type & cross for possible pRBCs on 4/26. Blood consent signed  --WBC 0.9, ANC 0.3     ID:  Neutropenic. No signs/sx of active infection    Recent C diff: completed oral vanc. No diarrhea. 1-2 BMs/day, soft    Left mandible cellulitis/odontic infection. S/p extraction of teeth #18 and 19 on 4/3.   Dental consulted for CT findings, unable to intervene as urgently as needed at this time due to acute worsening of clinic status. Thus, ultimately  consulted Oral Surgery on 4/3; they performed extraction of teeth #18 and 19 on 4/3.      ID PPx   - continue ACV  - Continue levaquin 250 mg daily as ANC 0.3.   - resumed ppx Vfend 150mg BID. Level pending  - follow LFTs on 4/22/19--improved although Alk phos elevated  - Will remove PICC line today due to infection risk. He has good venous access if needs transfusion, but has not needed transfusion since hospital discharge.        GI:  Hyperbilirubinemia--resolved  Transaminitis  - LFTs improving (repeat 4/22/19 as added back atorvastatin). Alk phos mildly elevated. Trend     Hemorrhoids, improved  - PRN management: TUCKS pads, sitz bath, topical Prep H and/or lidocaine     Chemotherapy induced nausea  No recent nausea     CV:  Type 2 NSTEMI 2/2 demand ischemia/septic shock.   Troponin elevated, stable at 0.436-->0.405. Will not further trend. Repeat ECHO (4/5) demonstrates stable EF 55-60%. LV/RV size and function normal. New mild-mod mitral insufficiency. Trivial pericardial effusion. IVC dilated c/w overload.   --Repeat EKG today due to reported chest pain, although atypical. No change from prior EKG. Trop neg     Subclinical coronary atherosclerosis  Hyperlipidemia  Total Agatston calcium score was elevated at 591 (91st percentile) on CT coronaries performed 6/8/2016. Nuclear stress test was negative for ischemia on 8/10/2016. Life style modification measures were recommended. Patient follows with cardiology as outpatient (last office visit on 11/12/18).  - Now back on 20 mg atorvastatin     RENAL:   FOREST, resolved.  Baseline Cr 0.7 (GFR>90). Creatinine uptrended with Cr max 1.45 (GFR 50), now improved/resolved. Multifactorial in setting of decreased PO intake, fevers, diarrhea, and medications (IV Vancomycin, Zosyn, contrast), and development of septic shock. Now resolved.      :  BPH   Patient follows with urology ([facility], [place], [place]). On PTA Flomax 0.8 mg daily.  - of note, possible drug  interaction between voriconazole and flomax (can raise flomax level in blood & cause hypotension).   - decreased flomax from 0.8mg to 0.4mg (soft BPs in setting of neutropenic sepsis and risk of further hypotension due to interaction with voriconazole). Continue 0.4mg dose at this time.       NEURO/MSK:  Restless legs syndrome  - Continue ropinirole 0.75 mg at bedtime, offer PRN ativan for persistent symptoms.     History of lumbar spine degenerative disc disease   Patient is s/p laminectomy/facetectomy procedures. He does not routinely take pain meds.      DENTAL:  S/P root canal manipulation  Odontic infection as above  Patient had the start of a root canal treatment initiated on 3/11/19 with plan to complete it a few weeks later (per pt's wife and son). He was started on a 7-day course of oral  mg q6h beginning one day prior to the procedure. At time of admission, he was having pain in his left jaw but site appeared unremarkable with no abscess or drainage.  In retrospect, may have also had some leukemic infiltration of gums complicating his course. Pain at site initially improved during chemotherapy, but did return. Pain worsened and with higher temps, checked CT Dental which demonstrated cellulitis along left hemimandible. Developed worsening oral infection as above.  - OMFS consult, intervention on 4/3 as above    Ryann Pavon PA-C  [facility]  [address] [place] [id number] [phone]

## 2019-04-23 NOTE — PROGRESS NOTES
Oncology/Hematology Visit Note  Apr 23, 2019    Reason for Visit: follow up of AML after hospital discharge    History of Present Illness: El Ace is a 63 year old male with recent diagnosis of AML. Patient presented to [facility] ED in [place], [place] for complaints of nonspecific chest discomfort after partial root canal treatment (done 3/11/19). CT C/A/P was negative for PE or other acute findings. On OSH labs, his WBC was incidentally noted to be elevated at 71.1 with Hb 9.0 and plts 97K (prior labs had been unremarkable per patient). Smear was suspicious for immature blasts and acute leukemia. No symptoms of hyperviscosity, TLS or DIC on presentation. 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 Flow consistent with AML with 95% blasts with the following immunophenotype: Positive for CD13, CD33, CD34, CD38, dim to negative CD45, , partial HLA-DR. started on induction chemotherapy with 7+3 (cytarabine and daunorubicin) with D1=3/15/19. BMBx on 3/25 (done early due to rising WBC and blast counts) demonstrated persistent disease with 95% blasts by flow, 98% by morphology. Started re-induction with decitabine on 3/26 PM and added venetoclax on 3/29. His course has been complicated by neutropenic sepsis secondary to odontic/soft tissue infection - he completed a course of broad spectrum antibiotics - with linezolid & zosyn (subsequently diagnosed with C diff infection to complete PO vanc course 4/20/19). He had extraction of teeth #18 and 19 on 4/3 but subsequently developed septic shock requiring transfer to MICU team 4/3-4/4. Hypotension improved after fluid resuscitated and he did not require intubation or pressor support. He was transferred back to Heme Malignancy service on 4/4/19. Hewas able to complete Decitabine (4/8 & 4/9 had been held r/t ALT & T bili elevation). He was discharged on 4/15/19 and presents today for hospital discharge  "follow up.     Interval History:  Randy is here with his wife Bessy and daughter. He has been feeling a little dizziness at times when standing up. After discharge he noticed if he got up at night to urinate he needed to sit down. No syncope or falls. He feels this is happening less now. He is on reduced dose of Flomax and is able to void without difficulty. He has been having some difficulty with hand dexterity playing [employer]adium[employer]. Also sometimes feels hands are \"shaky\" when picking up objects. Denies numbness/tingling. No problems with feet. He goes for walks outside, but sometimes needs to take breaks due to fatigue. He doesn't feel short of breath, just feels a little weak. He does have some episodes of chest pain that come and go, but these occur at rest and not with walking. He states these pains feel similar to the ones he had at diagnosis and are not more frequent, longer lasting or severe. He denies any associated palpitations, dizziness or dyspnea.     He had tooth extraction while hospitalized. Denies any pain or bleeding since discharge. He had C diff while hospitalized but finished oral vancomycin. Stools are soft, about 1-2BMs/day. No abdominal pain or cramping. Denies any fevers and is checking temperature regularly. No nausea or need for anti-emetics. Appetite is good.    He is taking voriconazole 150 mg twice daily. Last dose at 10PM last night. He held this AM for blood draw. Also taking ACV BID and levaquin daily. Denies any infectious concerns, cough, sinus symptoms, dysuria, rash. Denies bleeding/bruising, headaches, blurry vision. Remaining ROS negative      Current Outpatient Medications   Medication Sig Dispense Refill     acyclovir (ZOVIRAX) 400 MG tablet Take 1 tablet (400 mg) by mouth 2 times daily 60 tablet 1     atorvastatin (LIPITOR) 20 MG tablet Take 1 tablet (20 mg) by mouth every evening 30 tablet 1     levofloxacin (LEVAQUIN) 250 MG tablet Take 1 tablet (250 mg) by mouth daily 30 tablet 1 " "    pantoprazole (PROTONIX) 40 MG EC tablet Take 1 tablet (40 mg) by mouth 2 times daily (before meals) 60 tablet 1     rOPINIRole (REQUIP) 0.25 MG tablet Take 3 tablets (0.75 mg) by mouth nightly as needed (restless leg syndrome) 60 tablet 1     tamsulosin (FLOMAX) 0.4 MG capsule Take 1 capsule (0.4 mg) by mouth daily 30 capsule 1     venetoclax (VENCLEXTA) 100 MG tablet CHEMOTHERAPY Take 1 tablet (100 mg) by mouth daily (with dinner)       voriconazole (VFEND) 50 MG tablet Take 3 tablets (150 mg) by mouth every 12 hours 180 tablet 0     acetaminophen (TYLENOL) 325 MG tablet Take 2 tablets (650 mg) by mouth every 4 hours as needed for mild pain or fever (pre-med before blood products) (Patient not taking: Reported on 4/23/2019)       ondansetron (ZOFRAN) 8 MG tablet Take 1 tablet (8 mg) by mouth every 8 hours as needed for nausea (Patient not taking: Reported on 4/23/2019) 30 tablet 1     polyethylene glycol (MIRALAX/GLYCOLAX) packet Take 17 g by mouth daily as needed for constipation (Patient not taking: Reported on 4/23/2019)       prochlorperazine (COMPAZINE) 5 MG tablet Take 1-2 tablets (5-10 mg) by mouth every 6 hours as needed for nausea or vomiting (Patient not taking: Reported on 4/23/2019) 30 tablet 1     sennosides (SENOKOT) 8.6 MG tablet Take 2 tablets by mouth 2 times daily as needed for constipation (Patient not taking: Reported on 4/23/2019)         Physical Examination:  General: The patient is a pleasant male in no acute distress.  /72 (BP Location: Left arm, Patient Position: Sitting, Cuff Size: Adult Regular)   Pulse 92   Temp 97.9  F (36.6  C) (Oral)   Resp 16   Ht 1.778 m (5' 10\")   Wt 89.2 kg (196 lb 9.6 oz)   SpO2 99%   BMI 28.21 kg/m    Wt Readings from Last 10 Encounters:   04/23/19 89.2 kg (196 lb 9.6 oz)   04/15/19 85.8 kg (189 lb 3.2 oz)     HEENT: EOMI, PERRL. Sclerae are anicteric. Oral mucosa is pink and moist with no lesions or thrush.   Lymph: Neck is supple with no " lymphadenopathy in the cervical or supraclavicular areas.   Heart: Regular rate and rhythm.   Lungs: Clear to auscultation bilaterally.   Abdomen: Bowel sounds present, soft, nontender with no palpable hepatosplenomegaly or masses.   Extremities: No lower extremity edema noted bilaterally.   Neuro: Cranial nerves II through XII are grossly intact.  Skin: PICC site c/d/i. No rashes, petechiae, or bruising noted on exposed skin.    Laboratory Data:  Results for RENZO ESPINO (MRN [id number]) as of 4/23/2019 10:33   4/23/2019 08:54   Sodium 138   Potassium 3.4   Chloride 108   Carbon Dioxide 24   Urea Nitrogen 11   Creatinine 0.74   GFR Estimate >90   GFR Estimate If Black >90   Calcium 8.7   Anion Gap 6   Albumin 3.3 (L)   Protein Total 6.9   Bilirubin Total 0.5   Alkaline Phosphatase 220 (H)   ALT 38   AST 19   Glucose 133 (H)   WBC 0.9 (LL)   Hemoglobin 8.4 (L)   Hematocrit 25.0 (L)   Platelet Count 201   RBC Count 2.70 (L)   MCV 93   MCH 31.1   MCHC 33.6   RDW 14.1   Diff Method Manual Differential   % Neutrophils 30.8   % Lymphocytes 67.4   % Monocytes 0.4   % Eosinophils 1.4   % Basophils 0.0   Nucleated RBCs 1 (H)   Absolute Neutrophil 0.3 (LL)   Absolute Lymphocytes 0.6 (L)   Absolute Monocytes 0.0   Absolute Eosinophils 0.0   Absolute Basophils 0.0   Absolute Nucleated RBC 0.0   RBC Morphology Normal   Platelet Estimate Confirming automa...       Assessment and Plan:  AML, refractory. NGS positive for IDH2 and RUNX1 alterations.  Patient presented to [facility] ED in [place], [place] for complaints of nonspecific chest discomfort after partial root canal treatment (done 3/11/19). CT C/A/P was negative for PE or other acute findings. On OSH labs, his WBC was incidentally noted to be elevated at 71.1 with Hb 9.0 and plts 97K (prior labs had been unremarkable per patient). Smear was suspicious for immature blasts and acute leukemia. No symptoms of hyperviscosity, TLS or DIC on presentation. Underwent BM  biopsy (3/13) which confirmed acute myeloid leukemia 95% cellularity with 95% blasts. Flow consistent with AML with 95% blasts with the following immunophenotype: Positive for CD13, CD33, CD34, CD38, dim to negative CD45, , partial HLA-DR. Baseline ECHO normal, PICC place.   - HLA typing sent, BMT consult done 3/29 by Dr. Brown. Recommending allo SCT once in CR1  - s/p initial Hydrea (dc'd 3/16)   - received induction chemotherapy with 7+3 (cytarabine and daunorubicin). Day 1=3/15/19.   - repeat BMBx done early (3/25) due to rising WBC/blast count. Still with persistent heavy burden of disease (98% blasts by morphology).   - s/p Hydrea for WBC count management; started 1g BID (3/27), incresaed to 2g BID (x3/28). Hydrea discontinued 3/31.   - Started reinduction with decitabine/venetoclax. Decitabine D1=3/26; added Venetoclax on 3/29. Of note, decitabine D9 and D10 held 4/3 and 4/4, respectively, due to acute illness and elevated Tbili/ALT. With improvement in ALT and Tbili, decitabine resumed for last 2 doses (4/8 and 4/9).   - Continue Venetoclax 100mg daily (due to interaction to Vfend)  - D28 BMBx today  - Consented for FATE -  trial  - Follow up with Dr. Cuadra scheduled 4/26. Will add labs and infusion (poss pRBCs). He does not need further labs at [place] this week.     Anemia, leukopenia  2/2 AML & associated chemotherapy.   Platelets have recovered.   --Hgb 8.4. Will send type & cross for possible pRBCs on 4/26. Blood consent signed  --WBC 0.9, ANC 0.3     ID:  Neutropenic. No signs/sx of active infection    Recent C diff: completed oral vanc. No diarrhea. 1-2 BMs/day, soft    Left mandible cellulitis/odontic infection. S/p extraction of teeth #18 and 19 on 4/3.   Dental consulted for CT findings, unable to intervene as urgently as needed at this time due to acute worsening of clinic status. Thus, ultimately consulted Oral Surgery on 4/3; they performed extraction of teeth #18 and 19 on 4/3.       ID PPx   - continue ACV  - Continue levaquin 250 mg daily as ANC 0.3.   - resumed ppx Vfend 150mg BID. Level pending  - follow LFTs on 4/22/19--improved although Alk phos elevated  - Will remove PICC line today due to infection risk. He has good venous access if needs transfusion, but has not needed transfusion since hospital discharge.        GI:  Hyperbilirubinemia--resolved  Transaminitis  - LFTs improving (repeat 4/22/19 as added back atorvastatin). Alk phos mildly elevated. Trend     Hemorrhoids, improved  - PRN management: TUCKS pads, sitz bath, topical Prep H and/or lidocaine     Chemotherapy induced nausea  No recent nausea     CV:  Type 2 NSTEMI 2/2 demand ischemia/septic shock.   Troponin elevated, stable at 0.436-->0.405. Will not further trend. Repeat ECHO (4/5) demonstrates stable EF 55-60%. LV/RV size and function normal. New mild-mod mitral insufficiency. Trivial pericardial effusion. IVC dilated c/w overload.   --Repeat EKG today due to reported chest pain, although atypical. No change from prior EKG. Trop neg     Subclinical coronary atherosclerosis  Hyperlipidemia  Total Agatston calcium score was elevated at 591 (91st percentile) on CT coronaries performed 6/8/2016. Nuclear stress test was negative for ischemia on 8/10/2016. Life style modification measures were recommended. Patient follows with cardiology as outpatient (last office visit on 11/12/18).  - Now back on 20 mg atorvastatin     RENAL:   FOREST, resolved.  Baseline Cr 0.7 (GFR>90). Creatinine uptrended with Cr max 1.45 (GFR 50), now improved/resolved. Multifactorial in setting of decreased PO intake, fevers, diarrhea, and medications (IV Vancomycin, Zosyn, contrast), and development of septic shock. Now resolved.      :  BPH   Patient follows with urology ([facility], [place], [place]). On PTA Flomax 0.8 mg daily.  - of note, possible drug interaction between voriconazole and flomax (can raise flomax level in blood & cause  hypotension).   - decreased flomax from 0.8mg to 0.4mg (soft BPs in setting of neutropenic sepsis and risk of further hypotension due to interaction with voriconazole). Continue 0.4mg dose at this time.       NEURO/MSK:  Restless legs syndrome  - Continue ropinirole 0.75 mg at bedtime, offer PRN ativan for persistent symptoms.     History of lumbar spine degenerative disc disease   Patient is s/p laminectomy/facetectomy procedures. He does not routinely take pain meds.      DENTAL:  S/P root canal manipulation  Odontic infection as above  Patient had the start of a root canal treatment initiated on 3/11/19 with plan to complete it a few weeks later (per pt's wife and son). He was started on a 7-day course of oral  mg q6h beginning one day prior to the procedure. At time of admission, he was having pain in his left jaw but site appeared unremarkable with no abscess or drainage.  In retrospect, may have also had some leukemic infiltration of gums complicating his course. Pain at site initially improved during chemotherapy, but did return. Pain worsened and with higher temps, checked CT Dental which demonstrated cellulitis along left hemimandible. Developed worsening oral infection as above.  - OMFS consult, intervention on 4/3 as above    Ryann Pavon PA-C  [facility]  [address] [place] [address] [phone]

## 2019-04-23 NOTE — NURSING NOTE
Chief Complaint   Patient presents with     Oncology Clinic Visit     BMBX     Order received from [employer] [facility] to remove PICC line.  PICC line removed per protocol without difficulty.  Pt tolerated well.      Patient supine for 30 minutes following biopsy. After 30 minutes, dressing clean, dry and intact. Vital signs stable. See DOC flow sheets for details. Left ambulatory with family member.  Karina Sheehan RN

## 2019-04-24 LAB
COPATH REPORT: NORMAL
COPATH REPORT: NORMAL
INTERPRETATION ECG - MUSE: NORMAL

## 2019-04-24 NOTE — PROGRESS NOTES
REASON FOR VISIT:  Management of acute myeloid leukemia (AML)    HISTORY OF PRESENT ILLNESS:  Mr. Ace is a 63 year old man with AML.  He is here with his wife, Bessy.  To summarize his course, he presented with chest tightness after recent root canal.  Workup revealed marked luekocytosis with circulating blasts.  He was transferred to [facility] for management.  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  He was treated with hydroxyurea and then daunorubicin and cytarabine (7+3) induction chemotherapy starting 3/15/2019.  After initial improvement of leukocytosis, WBC and circulating blasts increased prior to day 14.  Bone marrow biopsy revealed essentially no treatment response.  He was started on salvage decitabine (10 days x 20 mg/m2) on 3/26/2019 and venetoclax (dose adjusted for concurrent azole antifungal) was added on 3/29/2019.  Course was complicated by neutropenic sepsis and brief time in MICU with dental problems and C diff colitis, acute kidney injury, elevated liver labs that have all improved.  He had a bone marrow earlier this week to evaluate treatment response and is here to discuss results and ongoing management.    Energy level and appetite are improving.  Weight stable.  No fevers, chills, or night sweats.  No headache, vision changes, focal weakness or sensory changes.  No dyspnea, cough, or chest pain.  Normal bowel function.  No urinary complaints.  No bleeding, bruising, or skin rashes.  No pain.  No lumps or bumps.  Managing well.    REVIEW OF SYSTEMS:  A complete review of systems was negative other than noted.    MEDICATIONS:  Current Outpatient Medications   Medication     acetaminophen (TYLENOL) 325 MG tablet     acyclovir (ZOVIRAX) 400 MG tablet     atorvastatin (LIPITOR) 20 MG tablet     levofloxacin (LEVAQUIN) 250 MG tablet     ondansetron (ZOFRAN) 8 MG tablet     pantoprazole (PROTONIX) 40 MG EC tablet     polyethylene glycol (MIRALAX/GLYCOLAX) packet      prochlorperazine (COMPAZINE) 5 MG tablet     rOPINIRole (REQUIP) 0.25 MG tablet     sennosides (SENOKOT) 8.6 MG tablet     tamsulosin (FLOMAX) 0.4 MG capsule     venetoclax (VENCLEXTA) 100 MG tablet CHEMOTHERAPY     voriconazole (VFEND) 50 MG tablet     No current facility-administered medications for this visit.      PHYSICAL EXAMINATION:  BP 99/65 (BP Location: Right arm, Patient Position: Sitting, Cuff Size: Adult Regular)   Pulse 83   Temp 98.4  F (36.9  C) (Oral)   Resp 16   Wt 90.8 kg (200 lb 1.6 oz)   SpO2 99%   BMI 28.71 kg/m    Wt Readings from Last 5 Encounters:   04/26/19 90.8 kg (200 lb 1.6 oz)   04/23/19 89.2 kg (196 lb 9.6 oz)   04/23/19 89.2 kg (196 lb 9.6 oz)   04/15/19 85.8 kg (189 lb 3.2 oz)     General: Well appearing. No acute distress.  HEENT: Sclerae anicteric. No OP lesions, masses, or tonsilar enlargement.  Lungs: Clear bilaterally without wheezing or crackles.  Heart: Regular rate and rhythm without murmurs.  Gastrointestinal: Bowel sounds present, no tenderness to palpation, spleen tip not palpable.  Extremities: No lower extremity edema.  Neuro: Grossly non-focal.  Skin/access: No visible rash.  Performance status: ECOG 1    LABORATORY DATA:  Recent Labs   Lab Test 04/26/19  1324 04/23/19  0854 04/15/19  0413 04/14/19  0315 04/13/19  0401  04/11/19  0454  04/04/19  0400  03/12/19  2241   WBC 0.4* 0.9* 2.2* 2.0* 2.4*   < > 1.8*   < > 4.1   < > 101.3*   HGB 8.2* 8.4* 8.7* 7.7* 7.7*   < > 7.8*   < > 6.4*   < > 9.4*    201 11* 19* 23*   < > 12*   < > 36*   < > 99*   ANEU  --  0.3* 0.9* 0.7* 1.0*   < > 0.5*   < > 0.1*   < > 1.0*   ABLA  --   --   --   --  0.1*  --  0.2*  --  0.4*   < >  --    ALYM  --  0.6* 1.3 1.3 1.2   < > 1.0   < > 0.4*   < > 2.0   RETICABSCT  --   --   --   --   --   --   --   --   --   --  58.4    < > = values in this interval not displayed.     Recent Labs   Lab Test 04/26/19  1324 04/23/19  0854 04/15/19  0413  04/12/19  0444  04/10/19  0438   04/08/19  0352 04/05/19 0312 04/04/19 0400  04/02/19  0552    138 138   < > 136   < > 140   < > 139   < > 138 136   < > 131*   POTASSIUM 3.4 3.4 3.8   < > 4.1   < > 4.1   < > 4.0   < > 3.3* 3.7   < > 3.6   CHLORIDE 107 108 106   < > 105   < > 108   < > 107   < > 109 107   < > 102   CO2 25 24 23   < > 23   < > 24   < > 26   < > 16* 17*   < > 20   BUN 9 11 17   < > 14   < > 11   < > 19   < > 34* 15   < > 11   CR 0.69 0.74 0.72   < > 0.68   < > 0.69   < > 0.67   < > 1.19 1.34*   < > 0.95   DEBBIE 8.6 8.7 8.8   < > 8.6   < > 8.2*   < > 7.6*   < > 7.5* 7.4*   < > 7.3*   MAG  --   --  2.0  --  2.1  --  2.2  --  2.1   < > 2.8* 2.4*   < > 1.8   PHOS  --   --  4.3  --  4.3  --  3.8  --  3.2   < > 2.4* 3.4   < > 1.6*   URIC  --   --   --   --  1.6*  --   --   --   --   --   --  1.6*  --  1.7*   LDH  --   --   --   --   --   --   --   --  215  --  344* 341*  --  226    < > = values in this interval not displayed.     Recent Labs   Lab Test 04/26/19  1324 04/23/19  0854 04/15/19  0413  04/12/19  0444  04/10/19  0438  04/08/19 0352   AST 18 19 22   < > 25   < > 25   < > 42   ALT 33 38 56   < > 70   < > 84*   < > 117*   ALKPHOS 232* 220* 138   < > 116   < > 104   < > 85   BILITOTAL 0.5 0.5 1.1   < > 1.3   < > 1.2   < > 1.8*   INR  --   --   --   --  1.18*  --  1.15*  --  1.22*    < > = values in this interval not displayed.     4/23/2019 bone marrow biopsy 40-50% cellular with dysplasia and no increase in blasts by morphology or flow cytometry    IMPRESSION AND PLAN:   Mr. Ace is a 63 year old man with AML here for ongoing management.    His AML has responded very well to decitabine and venetoclax with no evidence of disease on his marrow.  We reviewed the results and that alloBMT consolidation would provide the greatest chance for cure.  We also discussed that he may have had pre-existing MDS but that it does not change the plan.  He remains leukopenic likely related to venetoclax and dysplasia.  I have discussed with  Dr. Brown from BMT and he will remain on venetoclax through BMT workup to reduce the risk of relapse.  The next step will be BMT workup.  I contacted the BMT team who are working on the details. They are pleased and agree with the plan.    He has no active infectious issues.  He will remain on acyclovir and voriconazole as well as levofloxacin as long as his ANC is <1,000/mcL.  He is still having some loose stool and rare cramping, so we will re-start oral vancomycin and continue as long as he is taking levofloxacin to reduce the risk of recurrent C diff.      We will continue to keep his PCP Dr. Vizcaino in the loop.    We discussed that BMT will be coordinating the transplant process but he can call if there are questions I can help with.  I look forward to seeing him back when he is ready to return from BMT.    45 minute face-to-face visit, >50% in counseling and coordination of care.    Rivera Cuadra MD, PhD  Attending Physician, [facility] [place]   of Medicine, [facility]  Division of Hematology, Oncology, and Transplantation  [email or web address]  [phone] [facility]  [phone] pager

## 2019-04-25 LAB — COPATH REPORT: NORMAL

## 2019-04-26 ENCOUNTER — APPOINTMENT (OUTPATIENT)
Dept: LAB | Facility: CLINIC | Age: 64
End: 2019-04-26
Attending: INTERNAL MEDICINE
Payer: COMMERCIAL

## 2019-04-26 ENCOUNTER — ONCOLOGY VISIT (OUTPATIENT)
Dept: ONCOLOGY | Facility: CLINIC | Age: 64
End: 2019-04-26
Attending: INTERNAL MEDICINE
Payer: COMMERCIAL

## 2019-04-26 VITALS
HEART RATE: 83 BPM | BODY MASS INDEX: 28.65 KG/M2 | TEMPERATURE: 98.4 F | HEIGHT: 70 IN | OXYGEN SATURATION: 99 % | WEIGHT: 200.1 LBS | RESPIRATION RATE: 16 BRPM | SYSTOLIC BLOOD PRESSURE: 99 MMHG | DIASTOLIC BLOOD PRESSURE: 65 MMHG

## 2019-04-26 DIAGNOSIS — C95.00 ACUTE LEUKEMIA NOT HAVING ACHIEVED REMISSION (H): ICD-10-CM

## 2019-04-26 DIAGNOSIS — C92.00 ACUTE MYELOID LEUKEMIA NOT HAVING ACHIEVED REMISSION (H): ICD-10-CM

## 2019-04-26 DIAGNOSIS — C92.00 LEUKEMIA, ACUTE MYELOID (H): ICD-10-CM

## 2019-04-26 DIAGNOSIS — Z86.2 PERSONAL HISTORY OF DISEASES OF BLOOD AND BLOOD-FORMING ORGANS: ICD-10-CM

## 2019-04-26 DIAGNOSIS — Z79.899 ENCOUNTER FOR LONG-TERM (CURRENT) USE OF MEDICATIONS: ICD-10-CM

## 2019-04-26 PROBLEM — M16.12 PRIMARY OSTEOARTHRITIS OF LEFT HIP: Status: ACTIVE | Noted: 2018-10-23

## 2019-04-26 LAB
ABO + RH BLD: NORMAL
ABO + RH BLD: NORMAL
ALBUMIN SERPL-MCNC: 3.1 G/DL (ref 3.4–5)
ALP SERPL-CCNC: 232 U/L (ref 40–150)
ALT SERPL W P-5'-P-CCNC: 33 U/L (ref 0–70)
ANION GAP SERPL CALCULATED.3IONS-SCNC: 7 MMOL/L (ref 3–14)
AST SERPL W P-5'-P-CCNC: 18 U/L (ref 0–45)
BILIRUB SERPL-MCNC: 0.5 MG/DL (ref 0.2–1.3)
BLD GP AB SCN SERPL QL: NORMAL
BLOOD BANK CMNT PATIENT-IMP: NORMAL
BUN SERPL-MCNC: 9 MG/DL (ref 7–30)
CALCIUM SERPL-MCNC: 8.6 MG/DL (ref 8.5–10.1)
CHLORIDE SERPL-SCNC: 107 MMOL/L (ref 94–109)
CO2 SERPL-SCNC: 25 MMOL/L (ref 20–32)
CREAT SERPL-MCNC: 0.69 MG/DL (ref 0.66–1.25)
DIFFERENTIAL METHOD BLD: ABNORMAL
ERYTHROCYTE [DISTWIDTH] IN BLOOD BY AUTOMATED COUNT: 16 % (ref 10–15)
GFR SERPL CREATININE-BSD FRML MDRD: >90 ML/MIN/{1.73_M2}
GLUCOSE SERPL-MCNC: 105 MG/DL (ref 70–99)
HCT VFR BLD AUTO: 24.7 % (ref 40–53)
HGB BLD-MCNC: 8.2 G/DL (ref 13.3–17.7)
MCH RBC QN AUTO: 31.3 PG (ref 26.5–33)
MCHC RBC AUTO-ENTMCNC: 33.2 G/DL (ref 31.5–36.5)
MCV RBC AUTO: 94 FL (ref 78–100)
PLATELET # BLD AUTO: 381 10E9/L (ref 150–450)
POTASSIUM SERPL-SCNC: 3.4 MMOL/L (ref 3.4–5.3)
PROT SERPL-MCNC: 6.6 G/DL (ref 6.8–8.8)
RBC # BLD AUTO: 2.62 10E12/L (ref 4.4–5.9)
SODIUM SERPL-SCNC: 139 MMOL/L (ref 133–144)
SPECIMEN EXP DATE BLD: NORMAL
WBC # BLD AUTO: 0.4 10E9/L (ref 4–11)

## 2019-04-26 PROCEDURE — 99215 OFFICE O/P EST HI 40 MIN: CPT | Mod: ZP | Performed by: INTERNAL MEDICINE

## 2019-04-26 PROCEDURE — 86900 BLOOD TYPING SEROLOGIC ABO: CPT | Performed by: INTERNAL MEDICINE

## 2019-04-26 PROCEDURE — 80053 COMPREHEN METABOLIC PANEL: CPT | Performed by: INTERNAL MEDICINE

## 2019-04-26 PROCEDURE — 36415 COLL VENOUS BLD VENIPUNCTURE: CPT

## 2019-04-26 PROCEDURE — 86850 RBC ANTIBODY SCREEN: CPT | Performed by: INTERNAL MEDICINE

## 2019-04-26 PROCEDURE — G0463 HOSPITAL OUTPT CLINIC VISIT: HCPCS | Mod: ZF

## 2019-04-26 PROCEDURE — 85027 COMPLETE CBC AUTOMATED: CPT | Performed by: INTERNAL MEDICINE

## 2019-04-26 PROCEDURE — 86901 BLOOD TYPING SEROLOGIC RH(D): CPT | Performed by: INTERNAL MEDICINE

## 2019-04-26 RX ORDER — VANCOMYCIN HYDROCHLORIDE 50 MG/ML
125 KIT ORAL 4 TIMES DAILY
Qty: 50 ML | Refills: 0 | Status: ON HOLD | OUTPATIENT
Start: 2019-04-26 | End: 2019-05-07

## 2019-04-26 ASSESSMENT — MIFFLIN-ST. JEOR: SCORE: 1703.9

## 2019-04-26 ASSESSMENT — PAIN SCALES - GENERAL: PAINLEVEL: NO PAIN (0)

## 2019-04-26 NOTE — LETTER
4/26/2019       RE: El Ace  [address] [facility] [address]     Dear Colleague,    Thank you for referring your patient, El Ace, to the [facility]. Please see a copy of my visit note below.    REASON FOR VISIT:  Management of acute myeloid leukemia (AML)    HISTORY OF PRESENT ILLNESS:  Mr. Ace is a 63 year old man with AML.  He is here with his wife, Bessy.  To summarize his course, he presented with chest tightness after recent root canal.  Workup revealed marked luekocytosis with circulating blasts.  He was transferred to [facility] for management.  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  He was treated with hydroxyurea and then daunorubicin and cytarabine (7+3) induction chemotherapy starting 3/15/2019.  After initial improvement of leukocytosis, WBC and circulating blasts increased prior to day 14.  Bone marrow biopsy revealed essentially no treatment response.  He was started on salvage decitabine (10 days x 20 mg/m2) on 3/26/2019 and venetoclax (dose adjusted for concurrent azole antifungal) was added on 3/29/2019.  Course was complicated by neutropenic sepsis and brief time in MICU with dental problems and C diff colitis, acute kidney injury, elevated liver labs that have all improved.  He had a bone marrow earlier this week to evaluate treatment response and is here to discuss results and ongoing management.    Energy level and appetite are improving.  Weight stable.  No fevers, chills, or night sweats.  No headache, vision changes, focal weakness or sensory changes.  No dyspnea, cough, or chest pain.  Normal bowel function.  No urinary complaints.  No bleeding, bruising, or skin rashes.  No pain.  No lumps or bumps.  Managing well.    REVIEW OF SYSTEMS:  A complete review of systems was negative other than noted.    MEDICATIONS:  Current Outpatient Medications   Medication     acetaminophen (TYLENOL) 325 MG tablet     acyclovir  (ZOVIRAX) 400 MG tablet     atorvastatin (LIPITOR) 20 MG tablet     levofloxacin (LEVAQUIN) 250 MG tablet     ondansetron (ZOFRAN) 8 MG tablet     pantoprazole (PROTONIX) 40 MG EC tablet     polyethylene glycol (MIRALAX/GLYCOLAX) packet     prochlorperazine (COMPAZINE) 5 MG tablet     rOPINIRole (REQUIP) 0.25 MG tablet     sennosides (SENOKOT) 8.6 MG tablet     tamsulosin (FLOMAX) 0.4 MG capsule     venetoclax (VENCLEXTA) 100 MG tablet CHEMOTHERAPY     voriconazole (VFEND) 50 MG tablet     No current facility-administered medications for this visit.      PHYSICAL EXAMINATION:  BP 99/65 (BP Location: Right arm, Patient Position: Sitting, Cuff Size: Adult Regular)   Pulse 83   Temp 98.4  F (36.9  C) (Oral)   Resp 16   Wt 90.8 kg (200 lb 1.6 oz)   SpO2 99%   BMI 28.71 kg/m     Wt Readings from Last 5 Encounters:   04/26/19 90.8 kg (200 lb 1.6 oz)   04/23/19 89.2 kg (196 lb 9.6 oz)   04/23/19 89.2 kg (196 lb 9.6 oz)   04/15/19 85.8 kg (189 lb 3.2 oz)     General: Well appearing. No acute distress.  HEENT: Sclerae anicteric. No OP lesions, masses, or tonsilar enlargement.  Lungs: Clear bilaterally without wheezing or crackles.  Heart: Regular rate and rhythm without murmurs.  Gastrointestinal: Bowel sounds present, no tenderness to palpation, spleen tip not palpable.  Extremities: No lower extremity edema.  Neuro: Grossly non-focal.  Skin/access: No visible rash.  Performance status: ECOG 1    LABORATORY DATA:  Recent Labs   Lab Test 04/26/19  1324 04/23/19  0854 04/15/19  0413 04/14/19  0315 04/13/19  0401  04/11/19  0454  04/04/19  0400  03/12/19  2241   WBC 0.4* 0.9* 2.2* 2.0* 2.4*   < > 1.8*   < > 4.1   < > 101.3*   HGB 8.2* 8.4* 8.7* 7.7* 7.7*   < > 7.8*   < > 6.4*   < > 9.4*    201 11* 19* 23*   < > 12*   < > 36*   < > 99*   ANEU  --  0.3* 0.9* 0.7* 1.0*   < > 0.5*   < > 0.1*   < > 1.0*   ABLA  --   --   --   --  0.1*  --  0.2*  --  0.4*   < >  --    ALYM  --  0.6* 1.3 1.3 1.2   < > 1.0   < > 0.4*   <  > 2.0   RETICABSCT  --   --   --   --   --   --   --   --   --   --  58.4    < > = values in this interval not displayed.     Recent Labs   Lab Test 04/26/19  1324 04/23/19 0854 04/15/19  0413  04/12/19  0444  04/10/19  0438  04/08/19  0352  04/05/19  0312 04/04/19  0400  04/02/19  0552    138 138   < > 136   < > 140   < > 139   < > 138 136   < > 131*   POTASSIUM 3.4 3.4 3.8   < > 4.1   < > 4.1   < > 4.0   < > 3.3* 3.7   < > 3.6   CHLORIDE 107 108 106   < > 105   < > 108   < > 107   < > 109 107   < > 102   CO2 25 24 23   < > 23   < > 24   < > 26   < > 16* 17*   < > 20   BUN 9 11 17   < > 14   < > 11   < > 19   < > 34* 15   < > 11   CR 0.69 0.74 0.72   < > 0.68   < > 0.69   < > 0.67   < > 1.19 1.34*   < > 0.95   DEBBIE 8.6 8.7 8.8   < > 8.6   < > 8.2*   < > 7.6*   < > 7.5* 7.4*   < > 7.3*   MAG  --   --  2.0  --  2.1  --  2.2  --  2.1   < > 2.8* 2.4*   < > 1.8   PHOS  --   --  4.3  --  4.3  --  3.8  --  3.2   < > 2.4* 3.4   < > 1.6*   URIC  --   --   --   --  1.6*  --   --   --   --   --   --  1.6*  --  1.7*   LDH  --   --   --   --   --   --   --   --  215  --  344* 341*  --  226    < > = values in this interval not displayed.     Recent Labs   Lab Test 04/26/19  1324 04/23/19 0854 04/15/19  0413  04/12/19  0444  04/10/19  0438  04/08/19  0352   AST 18 19 22   < > 25   < > 25   < > 42   ALT 33 38 56   < > 70   < > 84*   < > 117*   ALKPHOS 232* 220* 138   < > 116   < > 104   < > 85   BILITOTAL 0.5 0.5 1.1   < > 1.3   < > 1.2   < > 1.8*   INR  --   --   --   --  1.18*  --  1.15*  --  1.22*    < > = values in this interval not displayed.     4/23/2019 bone marrow biopsy 40-50% cellular with dysplasia and no increase in blasts by morphology or flow cytometry    IMPRESSION AND PLAN:   Mr. Ace is a 63 year old man with AML here for ongoing management.    His AML has responded very well to decitabine and venetoclax with no evidence of disease on his marrow.  We reviewed the results and that alloBMT consolidation  would provide the greatest chance for cure.  We also discussed that he may have had pre-existing MDS but that it does not change the plan.  He remains leukopenic likely related to venetoclax and dysplasia.  I have discussed with Dr. Brown from BMT and he will remain on venetoclax through BMT workup to reduce the risk of relapse.  The next step will be BMT workup.  I contacted the BMT team who are working on the details. They are pleased and agree with the plan.    He has no active infectious issues.  He will remain on acyclovir and voriconazole as well as levofloxacin as long as his ANC is <1,000/mcL.  He is still having some loose stool and rare cramping, so we will re-start oral vancomycin and continue as long as he is taking levofloxacin to reduce the risk of recurrent C diff.      We will continue to keep his PCP Dr. Vizcaino in the loop.    We discussed that BMT will be coordinating the transplant process but he can call if there are questions I can help with.  I look forward to seeing him back when he is ready to return from BMT.    45 minute face-to-face visit, >50% in counseling and coordination of care.    Rivera Cuadra MD, PhD  Attending Physician, [facility]   of Medicine, [facility]  Division of Hematology, Oncology, and Transplantation  [email or web address] email  [phone] clinic  [phone] pager

## 2019-04-26 NOTE — NURSING NOTE
Chief Complaint   Patient presents with     Blood Draw     Labs drawn via VPT by RN in lab. VS taken. Pt checked in for next appt     Labs collected from venipuncture by RN. Vitals taken. Checked in for appointment(s).    Patricia LIRIANO RN PHN BSN  BMT/Oncology Lab

## 2019-04-26 NOTE — NURSING NOTE
"Oncology Rooming Note    April 26, 2019 1:36 PM   El Ace is a 63 year old male who presents for:    Chief Complaint   Patient presents with     Blood Draw     Labs drawn via VPT by RN in lab. VS taken. Pt checked in for next appt     Oncology Clinic Visit     Return Acute Myeloid Leukemia     Initial Vitals: BP 99/65 (BP Location: Right arm, Patient Position: Sitting, Cuff Size: Adult Regular)   Pulse 83   Temp 98.4  F (36.9  C) (Oral)   Resp 16   Ht 1.778 m (5' 10\")   Wt 90.8 kg (200 lb 1.6 oz)   SpO2 99%   BMI 28.71 kg/m   Estimated body mass index is 28.71 kg/m  as calculated from the following:    Height as of this encounter: 1.778 m (5' 10\").    Weight as of this encounter: 90.8 kg (200 lb 1.6 oz). Body surface area is 2.12 meters squared.  No Pain (0) Comment: Data Unavailable   No LMP for male patient.  Allergies reviewed: Yes  Medications reviewed: Yes    Medications: Medication refills not needed today.  Pharmacy name entered into [employer]: [id number] - [facility] [id number].    Clinical concerns: hospital follow up; bone marrow results       Giselle Meier CMA              "

## 2019-04-29 ENCOUNTER — NURSE TRIAGE (OUTPATIENT)
Dept: NURSING | Facility: CLINIC | Age: 64
End: 2019-04-29

## 2019-04-29 ENCOUNTER — APPOINTMENT (OUTPATIENT)
Dept: GENERAL RADIOLOGY | Facility: CLINIC | Age: 64
DRG: 394 | End: 2019-04-29
Attending: EMERGENCY MEDICINE
Payer: COMMERCIAL

## 2019-04-29 ENCOUNTER — HOSPITAL ENCOUNTER (INPATIENT)
Facility: CLINIC | Age: 64
LOS: 8 days | Discharge: HOME HEALTH | DRG: 394 | End: 2019-05-07
Attending: EMERGENCY MEDICINE | Admitting: INTERNAL MEDICINE
Payer: COMMERCIAL

## 2019-04-29 DIAGNOSIS — B96.5 BACTEREMIA DUE TO PSEUDOMONAS: Primary | ICD-10-CM

## 2019-04-29 DIAGNOSIS — R78.81 BACTEREMIA DUE TO PSEUDOMONAS: Primary | ICD-10-CM

## 2019-04-29 DIAGNOSIS — C95.00 ACUTE LEUKEMIA NOT HAVING ACHIEVED REMISSION (H): ICD-10-CM

## 2019-04-29 DIAGNOSIS — D70.9 NEUTROPENIA WITH FEVER (H): ICD-10-CM

## 2019-04-29 DIAGNOSIS — R50.81 NEUTROPENIA WITH FEVER (H): ICD-10-CM

## 2019-04-29 DIAGNOSIS — C92.00 ACUTE MYELOID LEUKEMIA NOT HAVING ACHIEVED REMISSION (H): ICD-10-CM

## 2019-04-29 LAB
CO2 BLDCOV-SCNC: 22 MMOL/L (ref 21–28)
DIFFERENTIAL METHOD BLD: ABNORMAL
ERYTHROCYTE [DISTWIDTH] IN BLOOD BY AUTOMATED COUNT: 16.8 % (ref 10–15)
HCT VFR BLD AUTO: 24.6 % (ref 40–53)
HGB BLD-MCNC: 8.1 G/DL (ref 13.3–17.7)
LACTATE BLD-SCNC: 1.2 MMOL/L (ref 0.7–2.1)
LACTATE BLD-SCNC: 1.5 MMOL/L (ref 0.7–2)
MCH RBC QN AUTO: 31 PG (ref 26.5–33)
MCHC RBC AUTO-ENTMCNC: 32.9 G/DL (ref 31.5–36.5)
MCV RBC AUTO: 94 FL (ref 78–100)
PCO2 BLDV: 27 MM HG (ref 40–50)
PH BLDV: 7.52 PH (ref 7.32–7.43)
PLATELET # BLD AUTO: 481 10E9/L (ref 150–450)
PO2 BLDV: 47 MM HG (ref 25–47)
RBC # BLD AUTO: 2.61 10E12/L (ref 4.4–5.9)
SAO2 % BLDV FROM PO2: 88 %
WBC # BLD AUTO: 0.3 10E9/L (ref 4–11)

## 2019-04-29 PROCEDURE — 83605 ASSAY OF LACTIC ACID: CPT

## 2019-04-29 PROCEDURE — 99285 EMERGENCY DEPT VISIT HI MDM: CPT | Mod: 25

## 2019-04-29 PROCEDURE — 83605 ASSAY OF LACTIC ACID: CPT | Performed by: EMERGENCY MEDICINE

## 2019-04-29 PROCEDURE — 85027 COMPLETE CBC AUTOMATED: CPT

## 2019-04-29 PROCEDURE — 87086 URINE CULTURE/COLONY COUNT: CPT | Performed by: EMERGENCY MEDICINE

## 2019-04-29 PROCEDURE — 87040 BLOOD CULTURE FOR BACTERIA: CPT | Performed by: EMERGENCY MEDICINE

## 2019-04-29 PROCEDURE — 12000001 ZZH R&B MED SURG/OB UMMC

## 2019-04-29 PROCEDURE — 99223 1ST HOSP IP/OBS HIGH 75: CPT | Mod: AI | Performed by: INTERNAL MEDICINE

## 2019-04-29 PROCEDURE — 96365 THER/PROPH/DIAG IV INF INIT: CPT

## 2019-04-29 PROCEDURE — 71046 X-RAY EXAM CHEST 2 VIEWS: CPT

## 2019-04-29 PROCEDURE — 82803 BLOOD GASES ANY COMBINATION: CPT

## 2019-04-29 PROCEDURE — 25000128 H RX IP 250 OP 636: Performed by: EMERGENCY MEDICINE

## 2019-04-29 PROCEDURE — 99285 EMERGENCY DEPT VISIT HI MDM: CPT | Mod: Z6 | Performed by: EMERGENCY MEDICINE

## 2019-04-29 PROCEDURE — 81001 URINALYSIS AUTO W/SCOPE: CPT | Performed by: EMERGENCY MEDICINE

## 2019-04-29 PROCEDURE — 25800030 ZZH RX IP 258 OP 636: Performed by: EMERGENCY MEDICINE

## 2019-04-29 RX ORDER — SODIUM CHLORIDE 9 MG/ML
INJECTION, SOLUTION INTRAVENOUS CONTINUOUS
Status: DISCONTINUED | OUTPATIENT
Start: 2019-04-29 | End: 2019-04-30

## 2019-04-29 RX ORDER — CEFEPIME HYDROCHLORIDE 1 G/1
1 INJECTION, POWDER, FOR SOLUTION INTRAMUSCULAR; INTRAVENOUS ONCE
Status: COMPLETED | OUTPATIENT
Start: 2019-04-29 | End: 2019-04-29

## 2019-04-29 RX ORDER — SODIUM CHLORIDE 9 MG/ML
1000 INJECTION, SOLUTION INTRAVENOUS CONTINUOUS
Status: DISCONTINUED | OUTPATIENT
Start: 2019-04-29 | End: 2019-04-30

## 2019-04-29 RX ADMIN — SODIUM CHLORIDE: 9 INJECTION, SOLUTION INTRAVENOUS at 20:27

## 2019-04-29 RX ADMIN — CEFEPIME HYDROCHLORIDE 1 G: 1 INJECTION, POWDER, FOR SOLUTION INTRAMUSCULAR; INTRAVENOUS at 22:26

## 2019-04-29 RX ADMIN — SODIUM CHLORIDE 1000 ML: 900 INJECTION, SOLUTION INTRAVENOUS at 23:24

## 2019-04-29 ASSESSMENT — ENCOUNTER SYMPTOMS
ABDOMINAL PAIN: 0
FATIGUE: 1
SHORTNESS OF BREATH: 0
APPETITE CHANGE: 1
COUGH: 0

## 2019-04-29 NOTE — LETTER
Transition Communication Hand-off for Care Transitions to Next Level of Care Provider    Name: El Ace  : 1955  MRN #: [id number]  Primary Care Provider: Bre Vizcaino     Primary Clinic: [facility] [address]  [facility] [address]     Reason for Hospitalization:  Neutropenia with fever (H) [D70.9, R50.81]  Acute myeloid leukemia not having achieved remission (H) [C92.00]  Admit Date/Time: 2019  8:18 PM  Discharge Date: 19  Payor Source: Payor: PREFERREDONE / Plan: PREFERREDONE NON [place] [employer] / Product Type: HMO /     El Ace is a 63 year old male with AML now in morphologic and immunophenotypic CR s/p decitabine/venetoclax reinduction. He has continued on venetoclax while awaiting transplant (workup due week of ) but was admitted on  with neutropenic fever. Found to have proctitis and pseudomonal bacteremia. Interestingly, last fever was on 5/3/19 but Pseudomonas in bloodstream is resistant to antibiotics patient has been treated with during admission (cefepime & meropenem) and has intermediate sensitivity to Zosyn. Lab to add sensitivities to aztrenoam and ceftolozane-tazobactam (Zerbaxa) and these should be back in next 1-2 days. Patient preferred TID dosing of Zerbaxa to continuous infusion of aztreonam and thus he will be discharged on this w/ [facility]. PICC placed on  as cultures cleared after 5/3 and he will discharge with this, completed [facility] appt on .  Was also treated with Flagyl for a brief period of time and then transitioned back to PO vanco prophy for C.diff.  Should follow up in clinic on  as requested from hospital stay and antibiotic sensitivities should be reviewed at that time to determine final antibiotic plan.  In the interim he will also be working with BMT team to complete workup in anticipation of transplant in the coming weeks.      Discharge Plan: Home with home infusion services  and follow-up in clinic as recommended.       Discharge Needs Assessment:  Needs      Most Recent Value   Home Infusion Provider  [facility] [phone], Fax: [phone]          Follow-up plan:    Future Appointments   Date Time Provider Department Center   5/9/2019  8:45 AM  [facility] DRAW [facility]   5/9/2019  9:20 AM Neisha Jackson PA-C [facility]       Any outstanding tests or procedures:        Referrals     Future Labs/Procedures    Home infusion referral     Comments:    [facility]  Phone  [phone]  Fax  [phone]    Patient is discharging home on IV Meropenum q8 hours.     Anticipated Length of Therapy: Per MD    Home Infusion Pharmacist to adjust therapy based on labs and clinical assessments: Yes    Labs:  May draw labs from Venous Catheter: Yes  Home Infusion Pharmacist to order labs based on therapy type and clinical assessments: Yes  Call/Fax Lab Results to: Dr. Cuadra (Phn: [phone]).    Agency Staff to assess nursing needs for Infusion Therapy.    Access Device Management:  IV Access Type: PICC  Flush with Heparin and Normal Saline IVP PRN and routine site care (per agency protocol) to maintain access device? Yes            Gayla De La Garza, RN, BSN, PHN  Care Coordinator       AVS/Discharge Summary is the source of truth; this is a helpful guide for improved communication of patient story

## 2019-04-30 ENCOUNTER — MEDICAL CORRESPONDENCE (OUTPATIENT)
Dept: TRANSPLANT | Facility: CLINIC | Age: 64
End: 2019-04-30

## 2019-04-30 PROBLEM — D70.9 NEUTROPENIA WITH FEVER (H): Status: ACTIVE | Noted: 2019-04-30

## 2019-04-30 PROBLEM — R50.81 NEUTROPENIA WITH FEVER (H): Status: ACTIVE | Noted: 2019-04-30

## 2019-04-30 LAB
ALBUMIN SERPL-MCNC: 2.6 G/DL (ref 3.4–5)
ALBUMIN UR-MCNC: 10 MG/DL
ALP SERPL-CCNC: 189 U/L (ref 40–150)
ALT SERPL W P-5'-P-CCNC: 28 U/L (ref 0–70)
ANION GAP SERPL CALCULATED.3IONS-SCNC: 10 MMOL/L (ref 3–14)
APPEARANCE UR: CLEAR
AST SERPL W P-5'-P-CCNC: 17 U/L (ref 0–45)
BACTERIA SPEC CULT: NO GROWTH
BILIRUB DIRECT SERPL-MCNC: 0.3 MG/DL (ref 0–0.2)
BILIRUB SERPL-MCNC: 0.8 MG/DL (ref 0.2–1.3)
BILIRUB UR QL STRIP: NEGATIVE
BUN SERPL-MCNC: 8 MG/DL (ref 7–30)
CALCIUM SERPL-MCNC: 7.8 MG/DL (ref 8.5–10.1)
CHLORIDE SERPL-SCNC: 106 MMOL/L (ref 94–109)
CO2 SERPL-SCNC: 22 MMOL/L (ref 20–32)
COLOR UR AUTO: YELLOW
CREAT SERPL-MCNC: 0.67 MG/DL (ref 0.66–1.25)
CREAT SERPL-MCNC: 0.79 MG/DL (ref 0.66–1.25)
DIFFERENTIAL METHOD BLD: ABNORMAL
ERYTHROCYTE [DISTWIDTH] IN BLOOD BY AUTOMATED COUNT: 16.6 % (ref 10–15)
GFR SERPL CREATININE-BSD FRML MDRD: >90 ML/MIN/{1.73_M2}
GFR SERPL CREATININE-BSD FRML MDRD: >90 ML/MIN/{1.73_M2}
GLUCOSE SERPL-MCNC: 102 MG/DL (ref 70–99)
GLUCOSE UR STRIP-MCNC: NEGATIVE MG/DL
HCT VFR BLD AUTO: 22.6 % (ref 40–53)
HGB BLD-MCNC: 7.3 G/DL (ref 13.3–17.7)
HGB UR QL STRIP: NEGATIVE
KETONES UR STRIP-MCNC: NEGATIVE MG/DL
LACTATE BLD-SCNC: 0.6 MMOL/L (ref 0.7–2)
LEUKOCYTE ESTERASE UR QL STRIP: NEGATIVE
Lab: NORMAL
MAGNESIUM SERPL-MCNC: 2.1 MG/DL (ref 1.6–2.3)
MCH RBC QN AUTO: 31.1 PG (ref 26.5–33)
MCHC RBC AUTO-ENTMCNC: 32.3 G/DL (ref 31.5–36.5)
MCV RBC AUTO: 96 FL (ref 78–100)
MUCOUS THREADS #/AREA URNS LPF: PRESENT /LPF
NITRATE UR QL: NEGATIVE
PH UR STRIP: 7 PH (ref 5–7)
PHOSPHATE SERPL-MCNC: 3.3 MG/DL (ref 2.5–4.5)
PLATELET # BLD AUTO: 348 10E9/L (ref 150–450)
PLATELET # BLD AUTO: 360 10E9/L (ref 150–450)
POTASSIUM SERPL-SCNC: 3.9 MMOL/L (ref 3.4–5.3)
PROT SERPL-MCNC: 6.2 G/DL (ref 6.8–8.8)
RBC # BLD AUTO: 2.35 10E12/L (ref 4.4–5.9)
RBC #/AREA URNS AUTO: 0 /HPF (ref 0–2)
SODIUM SERPL-SCNC: 138 MMOL/L (ref 133–144)
SOURCE: ABNORMAL
SP GR UR STRIP: 1.01 (ref 1–1.03)
SPECIMEN SOURCE: NORMAL
UROBILINOGEN UR STRIP-MCNC: NORMAL MG/DL (ref 0–2)
WBC # BLD AUTO: 0.4 10E9/L (ref 4–11)
WBC #/AREA URNS AUTO: 1 /HPF (ref 0–5)

## 2019-04-30 PROCEDURE — 83735 ASSAY OF MAGNESIUM: CPT | Performed by: INTERNAL MEDICINE

## 2019-04-30 PROCEDURE — 82565 ASSAY OF CREATININE: CPT | Performed by: INTERNAL MEDICINE

## 2019-04-30 PROCEDURE — 25000128 H RX IP 250 OP 636: Performed by: INTERNAL MEDICINE

## 2019-04-30 PROCEDURE — 36415 COLL VENOUS BLD VENIPUNCTURE: CPT | Performed by: INTERNAL MEDICINE

## 2019-04-30 PROCEDURE — 25800030 ZZH RX IP 258 OP 636: Performed by: PHYSICIAN ASSISTANT

## 2019-04-30 PROCEDURE — 99232 SBSQ HOSP IP/OBS MODERATE 35: CPT | Performed by: INTERNAL MEDICINE

## 2019-04-30 PROCEDURE — 80048 BASIC METABOLIC PNL TOTAL CA: CPT | Performed by: INTERNAL MEDICINE

## 2019-04-30 PROCEDURE — 85049 AUTOMATED PLATELET COUNT: CPT | Performed by: INTERNAL MEDICINE

## 2019-04-30 PROCEDURE — 25000132 ZZH RX MED GY IP 250 OP 250 PS 637: Performed by: INTERNAL MEDICINE

## 2019-04-30 PROCEDURE — 84100 ASSAY OF PHOSPHORUS: CPT | Performed by: INTERNAL MEDICINE

## 2019-04-30 PROCEDURE — 25800030 ZZH RX IP 258 OP 636: Performed by: INTERNAL MEDICINE

## 2019-04-30 PROCEDURE — 87040 BLOOD CULTURE FOR BACTERIA: CPT | Performed by: INTERNAL MEDICINE

## 2019-04-30 PROCEDURE — 25000132 ZZH RX MED GY IP 250 OP 250 PS 637: Performed by: PHYSICIAN ASSISTANT

## 2019-04-30 PROCEDURE — 83605 ASSAY OF LACTIC ACID: CPT | Performed by: INTERNAL MEDICINE

## 2019-04-30 PROCEDURE — 80076 HEPATIC FUNCTION PANEL: CPT | Performed by: INTERNAL MEDICINE

## 2019-04-30 PROCEDURE — 12000001 ZZH R&B MED SURG/OB UMMC

## 2019-04-30 PROCEDURE — 85027 COMPLETE CBC AUTOMATED: CPT

## 2019-04-30 RX ORDER — SENNOSIDES 8.6 MG
2 TABLET ORAL 2 TIMES DAILY PRN
Status: DISCONTINUED | OUTPATIENT
Start: 2019-04-30 | End: 2019-04-30

## 2019-04-30 RX ORDER — POTASSIUM CHLORIDE 1.5 G/1.58G
20-40 POWDER, FOR SOLUTION ORAL
Status: DISCONTINUED | OUTPATIENT
Start: 2019-04-30 | End: 2019-05-07 | Stop reason: HOSPADM

## 2019-04-30 RX ORDER — ATORVASTATIN CALCIUM 10 MG/1
20 TABLET, FILM COATED ORAL EVERY EVENING
Status: DISCONTINUED | OUTPATIENT
Start: 2019-04-30 | End: 2019-05-07 | Stop reason: HOSPADM

## 2019-04-30 RX ORDER — ROPINIROLE 0.25 MG/1
0.75 TABLET, FILM COATED ORAL
Status: DISCONTINUED | OUTPATIENT
Start: 2019-04-30 | End: 2019-05-07 | Stop reason: HOSPADM

## 2019-04-30 RX ORDER — POTASSIUM CHLORIDE 29.8 MG/ML
20 INJECTION INTRAVENOUS
Status: DISCONTINUED | OUTPATIENT
Start: 2019-04-30 | End: 2019-05-07 | Stop reason: HOSPADM

## 2019-04-30 RX ORDER — SENNOSIDES 8.6 MG
1-2 TABLET ORAL 2 TIMES DAILY PRN
Status: DISCONTINUED | OUTPATIENT
Start: 2019-04-30 | End: 2019-05-07 | Stop reason: HOSPADM

## 2019-04-30 RX ORDER — MAGNESIUM SULFATE HEPTAHYDRATE 40 MG/ML
4 INJECTION, SOLUTION INTRAVENOUS EVERY 4 HOURS PRN
Status: DISCONTINUED | OUTPATIENT
Start: 2019-04-30 | End: 2019-05-07 | Stop reason: HOSPADM

## 2019-04-30 RX ORDER — POTASSIUM CHLORIDE 7.45 MG/ML
10 INJECTION INTRAVENOUS
Status: DISCONTINUED | OUTPATIENT
Start: 2019-04-30 | End: 2019-05-07 | Stop reason: HOSPADM

## 2019-04-30 RX ORDER — POTASSIUM CHLORIDE 750 MG/1
20-40 TABLET, EXTENDED RELEASE ORAL
Status: DISCONTINUED | OUTPATIENT
Start: 2019-04-30 | End: 2019-05-07 | Stop reason: HOSPADM

## 2019-04-30 RX ORDER — POTASSIUM CL/LIDO/0.9 % NACL 10MEQ/0.1L
10 INTRAVENOUS SOLUTION, PIGGYBACK (ML) INTRAVENOUS
Status: DISCONTINUED | OUTPATIENT
Start: 2019-04-30 | End: 2019-05-07 | Stop reason: HOSPADM

## 2019-04-30 RX ORDER — ONDANSETRON 8 MG/1
8 TABLET, FILM COATED ORAL EVERY 8 HOURS PRN
Status: DISCONTINUED | OUTPATIENT
Start: 2019-04-30 | End: 2019-05-07 | Stop reason: HOSPADM

## 2019-04-30 RX ORDER — TAMSULOSIN HYDROCHLORIDE 0.4 MG/1
0.4 CAPSULE ORAL DAILY
Status: DISCONTINUED | OUTPATIENT
Start: 2019-04-30 | End: 2019-05-07 | Stop reason: HOSPADM

## 2019-04-30 RX ORDER — VANCOMYCIN HYDROCHLORIDE 50 MG/ML
125 KIT ORAL 4 TIMES DAILY
Status: DISCONTINUED | OUTPATIENT
Start: 2019-04-30 | End: 2019-05-02

## 2019-04-30 RX ORDER — PROCHLORPERAZINE MALEATE 5 MG
5-10 TABLET ORAL EVERY 6 HOURS PRN
Status: DISCONTINUED | OUTPATIENT
Start: 2019-04-30 | End: 2019-05-07 | Stop reason: HOSPADM

## 2019-04-30 RX ORDER — ACYCLOVIR 400 MG/1
400 TABLET ORAL 2 TIMES DAILY
Status: DISCONTINUED | OUTPATIENT
Start: 2019-04-30 | End: 2019-05-07 | Stop reason: HOSPADM

## 2019-04-30 RX ORDER — ACETAMINOPHEN 325 MG/1
650 TABLET ORAL EVERY 4 HOURS PRN
Status: DISCONTINUED | OUTPATIENT
Start: 2019-04-30 | End: 2019-05-07 | Stop reason: HOSPADM

## 2019-04-30 RX ORDER — NYSTATIN 100000/ML
500000 SUSPENSION, ORAL (FINAL DOSE FORM) ORAL 4 TIMES DAILY
Status: DISCONTINUED | OUTPATIENT
Start: 2019-04-30 | End: 2019-05-03

## 2019-04-30 RX ORDER — SODIUM CHLORIDE 9 MG/ML
1000 INJECTION, SOLUTION INTRAVENOUS CONTINUOUS
Status: DISCONTINUED | OUTPATIENT
Start: 2019-04-30 | End: 2019-05-01

## 2019-04-30 RX ORDER — LEVOFLOXACIN 250 MG/1
250 TABLET, FILM COATED ORAL DAILY
Status: DISCONTINUED | OUTPATIENT
Start: 2019-04-30 | End: 2019-04-30

## 2019-04-30 RX ORDER — PANTOPRAZOLE SODIUM 40 MG/1
40 TABLET, DELAYED RELEASE ORAL
Status: DISCONTINUED | OUTPATIENT
Start: 2019-04-30 | End: 2019-05-07 | Stop reason: HOSPADM

## 2019-04-30 RX ORDER — VORICONAZOLE 50 MG/1
150 TABLET, FILM COATED ORAL EVERY 12 HOURS SCHEDULED
Status: DISCONTINUED | OUTPATIENT
Start: 2019-04-30 | End: 2019-05-07 | Stop reason: HOSPADM

## 2019-04-30 RX ADMIN — ENOXAPARIN SODIUM 40 MG: 40 INJECTION SUBCUTANEOUS at 00:40

## 2019-04-30 RX ADMIN — VANCOMYCIN HYDROCHLORIDE 125 MG: KIT at 17:13

## 2019-04-30 RX ADMIN — ACETAMINOPHEN 650 MG: 325 TABLET, FILM COATED ORAL at 17:13

## 2019-04-30 RX ADMIN — ATORVASTATIN CALCIUM 20 MG: 10 TABLET, FILM COATED ORAL at 19:42

## 2019-04-30 RX ADMIN — CEFEPIME 2 G: 2 INJECTION, POWDER, FOR SOLUTION INTRAVENOUS at 13:57

## 2019-04-30 RX ADMIN — NYSTATIN 500000 UNITS: 100000 SUSPENSION ORAL at 15:57

## 2019-04-30 RX ADMIN — CEFEPIME 2 G: 2 INJECTION, POWDER, FOR SOLUTION INTRAVENOUS at 22:03

## 2019-04-30 RX ADMIN — PANTOPRAZOLE SODIUM 40 MG: 40 TABLET, DELAYED RELEASE ORAL at 08:16

## 2019-04-30 RX ADMIN — SODIUM CHLORIDE 1000 ML: 9 INJECTION, SOLUTION INTRAVENOUS at 15:57

## 2019-04-30 RX ADMIN — TAMSULOSIN HYDROCHLORIDE 0.4 MG: 0.4 CAPSULE ORAL at 08:16

## 2019-04-30 RX ADMIN — NYSTATIN 500000 UNITS: 100000 SUSPENSION ORAL at 13:56

## 2019-04-30 RX ADMIN — ACETAMINOPHEN 650 MG: 325 TABLET, FILM COATED ORAL at 05:38

## 2019-04-30 RX ADMIN — VANCOMYCIN HYDROCHLORIDE 125 MG: KIT at 22:46

## 2019-04-30 RX ADMIN — VANCOMYCIN HYDROCHLORIDE 125 MG: KIT at 08:15

## 2019-04-30 RX ADMIN — LEVOFLOXACIN 250 MG: 250 TABLET, FILM COATED ORAL at 08:16

## 2019-04-30 RX ADMIN — SODIUM CHLORIDE 1000 ML: 9 INJECTION, SOLUTION INTRAVENOUS at 00:38

## 2019-04-30 RX ADMIN — VORICONAZOLE 150 MG: 50 TABLET ORAL at 19:42

## 2019-04-30 RX ADMIN — SODIUM CHLORIDE 1000 ML: 9 INJECTION, SOLUTION INTRAVENOUS at 05:43

## 2019-04-30 RX ADMIN — VORICONAZOLE 150 MG: 50 TABLET ORAL at 08:16

## 2019-04-30 RX ADMIN — VANCOMYCIN HYDROCHLORIDE 125 MG: KIT at 12:08

## 2019-04-30 RX ADMIN — SODIUM CHLORIDE 1000 ML: 9 INJECTION, SOLUTION INTRAVENOUS at 05:59

## 2019-04-30 RX ADMIN — ACYCLOVIR 400 MG: 400 TABLET ORAL at 08:16

## 2019-04-30 RX ADMIN — NYSTATIN 500000 UNITS: 100000 SUSPENSION ORAL at 19:42

## 2019-04-30 RX ADMIN — PANTOPRAZOLE SODIUM 40 MG: 40 TABLET, DELAYED RELEASE ORAL at 15:57

## 2019-04-30 RX ADMIN — ACYCLOVIR 400 MG: 400 TABLET ORAL at 19:43

## 2019-04-30 RX ADMIN — CEFEPIME 2 G: 2 INJECTION, POWDER, FOR SOLUTION INTRAVENOUS at 05:47

## 2019-04-30 ASSESSMENT — ACTIVITIES OF DAILY LIVING (ADL)
DRESS: 0-->INDEPENDENT
ADLS_ACUITY_SCORE: 13
RETIRED_EATING: 0-->INDEPENDENT
NUMBER_OF_TIMES_PATIENT_HAS_FALLEN_WITHIN_LAST_SIX_MONTHS: 1
COGNITION: 0 - NO COGNITION ISSUES REPORTED
BATHING: 0-->INDEPENDENT
ADLS_ACUITY_SCORE: 13
FALL_HISTORY_WITHIN_LAST_SIX_MONTHS: YES
AMBULATION: 0-->INDEPENDENT
TOILETING: 0-->INDEPENDENT
SWALLOWING: 0-->SWALLOWS FOODS/LIQUIDS WITHOUT DIFFICULTY
ADLS_ACUITY_SCORE: 13
ADLS_ACUITY_SCORE: 13
TRANSFERRING: 0-->INDEPENDENT
RETIRED_COMMUNICATION: 0-->UNDERSTANDS/COMMUNICATES WITHOUT DIFFICULTY
ADLS_ACUITY_SCORE: 13

## 2019-04-30 NOTE — ED NOTES
[facility], [place]   ED Nurse to Floor Handoff     El Ace is a 63 year old male who speaks English and lives with a spouse,  in a home  They arrived in the ED by car from home    ED Chief Complaint: Fever    ED Dx;   Final diagnoses:   Neutropenia with fever (H)   Acute myeloid leukemia not having achieved remission (H)         Needed?: No    Allergies:   Allergies   Allergen Reactions     Lactose GI Disturbance   .  Past Medical Hx:   Past Medical History:   Diagnosis Date     Acute leukemia (H) 3/12/2019      Baseline Mental status: WDL  Current Mental Status changes: at basesline    Infection present or suspected this encounter: cultures pending  Sepsis suspected: Yes  Isolation type: Neutropenic precautions     Activity level - Baseline/Home:  Independent  Activity Level - Current:   Independent    Bariatric equipment needed?: No    In the ED these meds were given:   Medications   sodium chloride 0.9% infusion ( Intravenous New Bag 4/29/19 2027)   0.9% sodium chloride BOLUS (1,000 mLs Intravenous New Bag 4/29/19 2324)     Followed by   sodium chloride 0.9% infusion (has no administration in time range)   ceFEPIme (MAXIPIME) 1g vial to attach to  ml bag for ADULTS or NS 50 ml bag for PEDS (0 g Intravenous Stopped 4/29/19 2330)       Drips running?  No    Home pump  No    Current LDAs  PICC Double Lumen 03/14/19 Right Basilic (Active)   Number of days: 46       Labs results:   Labs Ordered and Resulted from Time of ED Arrival Up to the Time of Departure from the ED   CBC WITH PLATELETS DIFFERENTIAL - Abnormal; Notable for the following components:       Result Value    WBC 0.3 (*)     RBC Count 2.61 (*)     Hemoglobin 8.1 (*)     Hematocrit 24.6 (*)     RDW 16.8 (*)     Platelet Count 481 (*)     All other components within normal limits   ISTAT  GASES LACTATE ALEJANDRO POCT - Abnormal; Notable for the following components:    Ph Venous 7.52 (*)     PCO2 Venous 27  (*)     All other components within normal limits   ROUTINE UA WITH MICROSCOPIC   LACTIC ACID WHOLE BLOOD   COMPREHENSIVE METABOLIC PANEL   INR   NURSING DRAW AND HOLD   ISTAT CG4 GASES LACTATE ALEJANDRO NURSING POCT   URINE CULTURE AEROBIC BACTERIAL   BLOOD CULTURE   BLOOD CULTURE       Imaging Studies:   Recent Results (from the past 24 hour(s))   XR Chest 2 Views    Narrative    EXAM: XR CHEST 2 VW  4/29/2019 10:04 PM     HISTORY:  fever       COMPARISON: Chest radiograph 4/4/2019    FINDINGS: PA and lateral views of chest. Heart size is normal. Midline  trachea. No pneumothorax or pleural effusion. On the lateral  projection, there is mild bulging in the posterior aspect of the  diaphragm which is consistent with patient's known small  fat-containing hernia through the posterior diaphragma , seen on CT  dated 3/29/2019.      Impression    IMPRESSION: No acute finding in the chest.       Recent vital signs:   /81   Pulse 119   Temp 99.2  F (37.3  C) (Oral)   Resp 23   Wt 91.2 kg (201 lb)   SpO2 99%   BMI 28.84 kg/m      Malinda Coma Scale Score: 15 (04/29/19 2037)       Cardiac Rhythm: Normal Sinus  Pt needs tele? No  Skin/wound Issues: Full skin assessment not completed    Code Status: Full Code    Pain control: good    Nausea control: pt had none    Abnormal labs/tests/findings requiring intervention: see Results    Family present during ED course? Yes   Family Comments/Social Situation comments: Wife     Tasks needing completion: None    Shyla Mendoza RN  ascom -- [phone] [place] ED  [phone] [facility] ED

## 2019-04-30 NOTE — H&P
[facility], [place]    History and Physical -   Malignant Heme Admission       Date of Admission:  4/29/2019    Assessment & Plan   El Ace is a 63 year old male with history of AML on decitabine and venetolcax admitted on 4/29/2019 admitted for neutropenic fevers.    ID  # Neutropenic Fevers  Fever up to 101 without additional symptoms.  Does have history of dental infection but not complaining of any dental symptoms.  Also has history of recurrent c diff and remains on oral vanco without any diarrhea.  No longer has any central line.  CXR and UA unremarkable.  -Follow Blood cultures  -Continue IVF   -Start cefepimine  -Continue his home regimen of abx: vori, oral vnaco, levoflox, acyclovir.    Heme  #AML  On decitabine and venetoclax (not ordered). Pending BMT workup.     Diet: Regular  DVT Prophylaxis: Enoxaparin (Lovenox) SQ  De Anda Catheter: not present  Code Status: Full    Disposition Plan   Expected discharge: 2 - 3 days, recommended to prior living arrangement once fevers resolve  Entered: Barak Olmedo MD 04/30/2019, 12:10 AM     The patient's care was discussed with the hematology fellow    Barak Olmedo MD   Medicine Night MD  [facility]  Pager: [phone]  Please see sticky note for cross cover information  ______________________________________________________________________    Chief Complaint   Neutropenic fevers    History is obtained from the patient    History of Present Illness   El Ace is a 63 year old male who history of AML presenting with neutropenic fevers. Recently admitted (3/12-4/15) for neutropenic sepsis with dental infection.  Has recent history of c. Diff and is on oral vanco.    Noted fever earlier this afternoon of 101.  Otherwise no symptoms.  Has just been feeling fatigued.  No changes in medications. No sick contacts.  No cough, No dysuria, No pain, No constipation, No rash, No travel, no  diarrhea.    No longer has PICC line.    Review of Systems    The 10 point Review of Systems is negative other than noted in the HPI or here.     Past Medical History    I have reviewed this patient's medical history and updated it with pertinent information if needed.   Past Medical History:   Diagnosis Date     Acute leukemia (H) 3/12/2019       Past Surgical History   I have reviewed this patient's surgical history and updated it with pertinent information if needed.  None    Social History   I have reviewed this patient's social history and updated it with pertinent information if needed.  Social History     Tobacco Use     Smoking status: Never Smoker     Smokeless tobacco: Former User   Substance Use Topics     Alcohol use: None     Drug use: None       Family History   I have reviewed this patient's family history and updated it with pertinent information if needed.   History reviewed. No pertinent family history.    Prior to Admission Medications   Prior to Admission Medications   Prescriptions Last Dose Informant Patient Reported? Taking?   acetaminophen (TYLENOL) 325 MG tablet   No No   Sig: Take 2 tablets (650 mg) by mouth every 4 hours as needed for mild pain or fever (pre-med before blood products)   Patient not taking: Reported on 4/23/2019   acyclovir (ZOVIRAX) 400 MG tablet 4/29/2019 at Unknown time  No Yes   Sig: Take 1 tablet (400 mg) by mouth 2 times daily   atorvastatin (LIPITOR) 20 MG tablet 4/29/2019 at Unknown time  No Yes   Sig: Take 1 tablet (20 mg) by mouth every evening   levofloxacin (LEVAQUIN) 250 MG tablet 4/29/2019 at Unknown time  No Yes   Sig: Take 1 tablet (250 mg) by mouth daily   ondansetron (ZOFRAN) 8 MG tablet Unknown at Unknown time  No No   Sig: Take 1 tablet (8 mg) by mouth every 8 hours as needed for nausea   pantoprazole (PROTONIX) 40 MG EC tablet 4/29/2019 at Unknown time  No Yes   Sig: Take 1 tablet (40 mg) by mouth 2 times daily (before meals)   prochlorperazine  (COMPAZINE) 5 MG tablet Unknown at Unknown time  No No   Sig: Take 1-2 tablets (5-10 mg) by mouth every 6 hours as needed for nausea or vomiting   Patient not taking: Reported on 4/23/2019   rOPINIRole (REQUIP) 0.25 MG tablet 4/28/2019 at Unknown time  No Yes   Sig: Take 3 tablets (0.75 mg) by mouth nightly as needed (restless leg syndrome)   sennosides (SENOKOT) 8.6 MG tablet Unknown at Unknown time  No No   Sig: Take 2 tablets by mouth 2 times daily as needed for constipation   Patient not taking: Reported on 4/23/2019   tamsulosin (FLOMAX) 0.4 MG capsule 4/29/2019 at Unknown time  No Yes   Sig: Take 1 capsule (0.4 mg) by mouth daily   vancomycin (FIRVANQ) 50 MG/ML oral solution 4/29/2019 at Unknown time  No Yes   Sig: Take 2.5 mLs (125 mg) by mouth 4 times daily   venetoclax (VENCLEXTA) 100 MG tablet CHEMOTHERAPY 4/29/2019 at Unknown time  No Yes   Sig: Take 1 tablet (100 mg) by mouth daily (with dinner)   voriconazole (VFEND) 50 MG tablet 4/29/2019 at Unknown time  No Yes   Sig: Take 3 tablets (150 mg) by mouth every 12 hours      Facility-Administered Medications: None     Allergies   Allergies   Allergen Reactions     Lactose GI Disturbance       Physical Exam   Vital Signs: Temp: 99.2  F (37.3  C) Temp src: Oral BP: 120/68 Pulse: 96 Heart Rate: 108 Resp: 16 SpO2: 99 % O2 Device: None (Room air)    Weight: 201 lbs 0 oz    General: Well appearing. No acute distress.  HEENT: Sclerae anicteric. No OP lesions, masses, or tonsilar enlargement.  Lungs: Clear bilaterally without wheezing or crackles.  Heart: Regular rate and rhythm without murmurs.  Gastrointestinal: Bowel sounds present, no tenderness to palpation, spleen tip not palpable.  Extremities: No lower extremity edema.  Neuro: Grossly non-focal.  Skin/access: No visible rash.      Data   Data reviewed today: I reviewed all medications, new labs and imaging results over the last 24 hours. I personally reviewed the chest x-ray image(s) showing no signs of  pna.    Recent Labs   Lab 04/29/19  2030 04/26/19  1324 04/23/19  0854   WBC 0.3* 0.4* 0.9*   HGB 8.1* 8.2* 8.4*   MCV 94 94 93   * 381 201   NA  --  139 138   POTASSIUM  --  3.4 3.4   CHLORIDE  --  107 108   CO2  --  25 24   BUN  --  9 11   CR  --  0.69 0.74   ANIONGAP  --  7 6   DEBBIE  --  8.6 8.7   GLC  --  105* 133*   ALBUMIN  --  3.1* 3.3*   PROTTOTAL  --  6.6* 6.9   BILITOTAL  --  0.5 0.5   ALKPHOS  --  232* 220*   ALT  --  33 38   AST  --  18 19   TROPI  --   --  <0.015     Recent Results (from the past 24 hour(s))   XR Chest 2 Views    Narrative    EXAM: XR CHEST 2 VW  4/29/2019 10:04 PM     HISTORY:  fever       COMPARISON: Chest radiograph 4/4/2019    FINDINGS: PA and lateral views of chest. Heart size is normal. Midline  trachea. No pneumothorax or pleural effusion. On the lateral  projection, there is mild bulging in the posterior aspect of the  diaphragm which is consistent with patient's known small  fat-containing hernia through the posterior diaphragma , seen on CT  dated 3/29/2019.      Impression    IMPRESSION: No acute finding in the chest.

## 2019-04-30 NOTE — PROGRESS NOTES
Antimicrobial Stewardship Team Note    Antimicrobial Stewardship Program - A joint venture between [facility] and  Physicians to optimize antibiotic management.  NOT a formal consult - Restricted Antimicrobial Review     Patient: El Ace  MRN: [id number]  Allergies: Lactose    Brief Summary: El Ace is a 63 year old male with history of AML currently undergoing induction therapy with decitabine and venetolcax. He was hospitalized recently for neutropenic sepsis 3/22-4/15 and completed a course of broad spectrum antibiotics including linezolid, zosyn, and vancomycin for treatment of left mandible cellulitis/odontic infection s/p tooth extraction on 4/3. Patient was also treated for C.Diff infection with a PO vancomycin 125 mg four times daily, discharge note state patient was to complete 4/20, but is still taking without any diarrhea symptoms. Now presents to the ED on 4/29 for neutropenic fevers (T max 101.5, ANC 0.3 from 4/23), tachycardic without any other symptoms (including dental symptoms). Started on empiric cefepime. *Of note per chart review patient has also had sustained periods of low ALC (<0.5) for past month.     Microbiology:   - 4/29 UC NGTD   - 4/29 BCx2 NGTD  -4/30 BC NGTD    Antibiotics:   Cefepime 4/29-  PO vancomycin 4/4 -   Ppx: ACV, levo, and voriconazole    Microbiology from last hospitalization:   - BCx 3/22 to present are NGTD  - 3/23 fungal BCx NGTD  - 3/22 UA negative, repeat UA/UCx negative on 3/30  - 3/22 CXR with small R pleural effusion, no focal airspace opacity  - 3/25 galactomannan and fungitell both negative  - 3/26 C.diff positive    Interval hx: Afebrile, lactate of 0.6, leukopenia, and otherwise normal labs/vitals.        Active Anti-infective Medications   (From admission, onward)                Start     Stop    04/30/19 0800  levofloxacin  250 mg,   Oral,   DAILY     prophylaxis        --    04/30/19 0800  vancomycin  125 mg,   Oral,   4 TIMES  DAILY     Clostridioides difficile        --    04/30/19 0800  voriconazole  150 mg,   Oral,   EVERY 12 HOURS SCHEDULED     Fungal Infection Prophylaxis        --    04/30/19 0630  ceFEPIme  2 g,   Intravenous,   EVERY 8 HOURS     Febrile Neutropenia        --    04/30/19 0012  acyclovir  400 mg,   Oral,   2 TIMES DAILY     viral prophylaxis        --          Assessment:   Febrile neutropenia of unclear source: Fever has resolved with antibiotics with no clear source of infection, pending further workup. Agree with continuing cefepime as empiric treatment of febrile neutropenia. Prophylaxis levofloxacin is not need while patient in being treated with cefepime. Patient has completed treatment course of PO vancomycin for past C. Diff infection, and is not currently experiencing symptoms of diarrhea. However, in the setting of broad spectrum antibiotics and hx of C. Diff would be appropriate to continue PO vancomycin at a prophylactic dose. Please discontinue PO vancomycin once cefepime course if completed. If chemotherapy is planned to continue, patient may benefit from PJP prophylaxis with prolonged durations of lymphopenia.    Recommendations:  1) Change PO vancomycin to ppx dosing of 125 mg twice daily (discontinue once cefepime is completed)   2) Agree with continuing cefepime   3) Agree with holding levofloxacin while patient is on cefepime    Discussed with ID Staff  Debbie Ann, JesusD and Noemi Garcia MD     Vital Signs/Clinical Features:  Vitals         04/28 0700  -  04/29 0659 04/29 0700  -  04/30 0659 04/30 0700  -  04/30 1415   Most Recent    Temp ( F)   99.2 -  101.5    96.6 -  97.5     97 (36.1)    Pulse   96 -  122    81 -  91     84    Heart Rate   108 -  138       108    Resp   16 -  23      16     16    BP   104/68 -  132/79    115/57 -  122/74     122/74    SpO2 (%)   96 -  99    97 -  99     99            Labs  Estimated Creatinine Clearance: 126.5 mL/min (based on SCr of 0.67 mg/dL).  Recent  Labs   Lab Test 04/14/19  0315 04/15/19  0413 04/23/19  0854 04/26/19  1324 04/30/19  0118 04/30/19  0529   CR 0.74 0.72 0.74 0.69 0.79 0.67       Recent Labs   Lab Test 04/11/19  0454 04/12/19  0444 04/13/19  0401 04/14/19  0315 04/15/19  0413 04/23/19  0854 04/26/19  1324 04/29/19  2030 04/30/19  0118 04/30/19  0529   WBC 1.8* 2.2* 2.4* 2.0* 2.2* 0.9* 0.4* 0.3*  --  0.4*   ANEU 0.5* 1.2* 1.0* 0.7* 0.9* 0.3*  --   --   --   --    ALYM 1.0 1.0 1.2 1.3 1.3 0.6*  --   --   --   --    TRACIE 0.1 0.0 0.0 0.0 0.0 0.0  --   --   --   --    AEOS 0.0 0.0 0.0 0.0 0.0 0.0  --   --   --   --    HGB 7.8* 7.9* 7.7* 7.7* 8.7* 8.4* 8.2* 8.1*  --  7.3*   HCT 24.2* 24.3* 23.2* 23.7* 26.6* 25.0* 24.7* 24.6*  --  22.6*   MCV 97 96 95 95 94 93 94 94  --  96   PLT 12* 8* 23* 19* 11* 201 381 481* 360 348       Recent Labs   Lab Test 04/13/19  0401 04/14/19  0315 04/15/19  0413 04/23/19  0854 04/26/19  1324 04/30/19  0529   BILITOTAL 0.9 0.9 1.1 0.5 0.5 0.8   ALKPHOS 122 130 138 220* 232* 189*   ALBUMIN 2.7* 2.8* 2.9* 3.3* 3.1* 2.6*   AST 20 22 22 19 18 17   ALT 64 61 56 38 33 28       Recent Labs   Lab Test 04/03/19 2037  04/04/19  0848 04/04/19  1130 04/04/19  1526 04/15/19  1352 04/29/19  2030 04/29/19 2032   PCAL 18.31*  --   --   --   --   --   --   --    LACT  --    < > 3.2* 3.2* 3.2* 1.0 1.5 1.2    < > = values in this interval not displayed.       Recent Labs   Lab Test 04/02/19 2142 04/23/19  0854   VANCOMYCIN 8.4  --   --    VCON  --    < > 0.3*    < > = values in this interval not displayed.       Culture Results:  7-Day Micro Results       Procedure Component Value Units Date/Time    Blood culture [[id number]] Collected:  04/30/19 0118    Order Status:  Completed Lab Status:  Preliminary result Updated:  04/30/19 0813    Specimen:  Blood      Specimen Description Blood Right Arm     Special Requests Received in aerobic bottle only     Culture Micro No growth after 6 hours    Urine Culture Aerobic Bacterial [[id number]] Collected:   04/29/19 2223    Order Status:  Completed Lab Status:  Preliminary result Updated:  04/30/19 1140    Specimen:  Midstream Urine from Urine Midstream      Specimen Description Midstream Urine     Special Requests Specimen received in preservative     Culture Micro Culture negative < 24 hours, reincubate    Blood culture [[id number]] Collected:  04/29/19 2031    Order Status:  Completed Lab Status:  Preliminary result Updated:  04/30/19 0813    Specimen:  Blood from Arm, Right      Specimen Description Blood Right Arm     Special Requests Received in aerobic bottle only     Culture Micro No growth after 8 hours    Blood culture [[id number]] Collected:  04/29/19 2030    Order Status:  Completed Lab Status:  Preliminary result Updated:  04/30/19 0813    Specimen:  Blood from Arm, Left      Specimen Description Blood Left Arm     Special Requests Received in aerobic bottle only     Culture Micro No growth after 10 hours    Protein total CSF: Tube 1     Order Status:  No result Lab Status:  No result     Specimen:  CSF     Cell count with differential CSF: Tube 3     Order Status:  No result Lab Status:  No result     Specimen:  CSF     Glucose CSF: Tube 1     Order Status:  No result Lab Status:  No result     Specimen:  CSF             Recent Labs   Lab Test 03/22/19  0945 03/30/19 2206 04/04/19  0523 04/29/19 2149   URINEPH 7.5* 6.5 6.0 7.0   NITRITE Negative Negative Negative Negative   LEUKEST Negative Negative Negative Negative   WBCU 2 <1 4 1                   Recent Labs   Lab Test 03/26/19 2000   CDBPCT Positive*       Imaging: Xr Chest 2 Views    Result Date: 4/30/2019  EXAM: XR CHEST 2 VW  4/29/2019 10:04 PM HISTORY:  fever   COMPARISON: Chest radiograph 4/4/2019 FINDINGS: PA and lateral views of chest. Heart size is normal. Midline trachea. No pneumothorax or pleural effusion. On the lateral projection, there is mild bulging in the posterior aspect of the diaphragm which is consistent with patient's known small  fat-containing hernia through the posterior diaphragma , seen on CT dated 3/29/2019.     IMPRESSION: No acute finding in the chest. I have personally reviewed the examination and initial interpretation and I agree with the findings. ROSA SPAULDING MD

## 2019-04-30 NOTE — ED TRIAGE NOTES
Pt arrived via car with fever and fatigue starting last night. Pt has a history of AML leukemia and tomorrow starts his workup for BMT transplant. Pt is on oral chemo and took a dose today at 6PM.

## 2019-04-30 NOTE — ED NOTES
Bed: IN02  Expected date:   Expected time: 7:48 PM  Means of arrival: Car  Comments:  El Ace  MRN [id number]  Leukemia with fever of 101.9. Pt has had chemo for the last three weeks

## 2019-04-30 NOTE — PLAN OF CARE
Pt alert and oriented x4. Pt TMAX 101. Pt given tylenol. Doctor notified. Blood cultures last done at 0113. Pt triggered sepsis. Lactic:. Pt denied pain and nausea. Pt has NS running at 75m/hr. Pt reported fair appetite and good U/O. Enteric isolation maintained.

## 2019-04-30 NOTE — PLAN OF CARE
/72   Pulse 102   Temp 99.9  F (37.7  C) (Oral)   Resp 18   Wt 91.2 kg (201 lb)   SpO2 97%   BMI 28.84 kg/m      Activity: Pt up ad shelley in room  Neuros: A&O x4, calls appropriately. CMS intact   Cardiac: Tachy  Respiratory: Pt denies shortness of breath or chest pain. LS clear. 97% on RA.   GI/: Pt voiding spontaneously. +Bs, +Flatus. Pt reports 1 loose BM during shift   Diet: Tolerating regular diet   Skin: WNL   Lines: L PIV infusing NS @ 125 mL/hr. 1L Bolus given per orders. Maximpine given x1.   Labs: Lactic triggered. Temp 101.5 w/ HR in 100s.   Pain/nausea: Pt denies pain or nausea   New changes this shift: Pt triggered sepsis protocol. Lactic 0.6. Max temp 101.5 during shift. PRN tylenol given x1 w/ 1 L Bolus given per orders.   Plan: Continue plan of care

## 2019-04-30 NOTE — ED PROVIDER NOTES
History     Chief Complaint   Patient presents with     Fever     The history is provided by the patient, medical records and the spouse.     El Ace is a 63 year old male with a medical history significant for AML incidentally found during workup of nonspecific chest symptoms after partial root canal s/p chemo complicated by neutropenic sepsis 2/2 odontic/soft tissue infection (completed course of broad spectrum antibiotics with linezolid and zosyn, on vancomycin). He had teeth extraction and subsequently developed septic shock, transferred to MICU team. Patient presents to the Emergency Department for evaluation of a fever since last night. Home temp this morning was measured at 101. Patient complains that he is feeling fatigued and worn out. He notes of minor loss of appetite. He denies cough or sneeze. He denies abdominal pain or shortness of breath. He reports of constipation, taking Miralax yesterday with normal stools today. He denies dental pain. He states that besides a fever, he has no new symptoms.     Past Medical History:   Diagnosis Date     Acute leukemia (H) 3/12/2019       Past Surgical History:   Procedure Laterality Date     PICC INSERTION Right 03/14/2019    5Fr - 42cm (2cm external), basilic vein, high SVC       History reviewed. No pertinent family history.    Social History     Tobacco Use     Smoking status: Never Smoker     Smokeless tobacco: Former User   Substance Use Topics     Alcohol use: Not on file       Current Facility-Administered Medications   Medication     0.9% sodium chloride BOLUS    Followed by     sodium chloride 0.9% infusion     ceFEPIme (MAXIPIME) 1g vial to attach to  ml bag for ADULTS or NS 50 ml bag for PEDS     sodium chloride 0.9% infusion     Current Outpatient Medications   Medication     acyclovir (ZOVIRAX) 400 MG tablet     atorvastatin (LIPITOR) 20 MG tablet     levofloxacin (LEVAQUIN) 250 MG tablet     pantoprazole (PROTONIX) 40 MG EC tablet      rOPINIRole (REQUIP) 0.25 MG tablet     tamsulosin (FLOMAX) 0.4 MG capsule     vancomycin (FIRVANQ) 50 MG/ML oral solution     venetoclax (VENCLEXTA) 100 MG tablet CHEMOTHERAPY     voriconazole (VFEND) 50 MG tablet     acetaminophen (TYLENOL) 325 MG tablet     ondansetron (ZOFRAN) 8 MG tablet     prochlorperazine (COMPAZINE) 5 MG tablet     sennosides (SENOKOT) 8.6 MG tablet        Allergies   Allergen Reactions     Lactose GI Disturbance       I have reviewed the Medications, Allergies, Past Medical and Surgical History, and Social History in the Epic system.    Review of Systems   Constitutional: Positive for appetite change (Minor loss of appetite) and fatigue.   HENT: Negative for dental problem and sneezing.    Respiratory: Negative for cough and shortness of breath.    Gastrointestinal: Negative for abdominal pain.   All other systems reviewed and are negative.      Physical Exam   BP: 104/68  Pulse: 122  Heart Rate: 138  Temp: 99.9  F (37.7  C)  Resp: 16  Weight: 91.2 kg (201 lb)  SpO2: 98 %      Physical Exam   Constitutional: He is oriented to person, place, and time. He appears well-developed and well-nourished.   HENT:   Head: Normocephalic and atraumatic.   Eyes: Pupils are equal, round, and reactive to light. Conjunctivae are normal.   Neck: Normal range of motion. Neck supple.   Cardiovascular: Normal rate, regular rhythm and normal heart sounds.   Pulmonary/Chest: Effort normal. No stridor. No respiratory distress. He has no wheezes.   Abdominal: Soft. He exhibits no distension. There is no tenderness. There is no rebound.   Musculoskeletal: He exhibits no edema or deformity.   Neurological: He is alert and oriented to person, place, and time. Coordination normal.   Skin: Skin is warm.   Psychiatric: He has a normal mood and affect. His behavior is normal. Thought content normal.       ED Course   8:50 PM  The patient was seen and examined by Alejandra Bartlett MD in Room ED01.        Procedures              Critical Care time:  none         Results for orders placed or performed during the hospital encounter of 04/29/19   XR Chest 2 Views    Narrative    EXAM: XR CHEST 2 VW  4/29/2019 10:04 PM     HISTORY:  fever       COMPARISON: Chest radiograph 4/4/2019    FINDINGS: PA and lateral views of chest. Heart size is normal. Midline  trachea. No pneumothorax or pleural effusion. On the lateral  projection, there is mild bulging in the posterior aspect of the  diaphragm which is consistent with patient's known small  fat-containing hernia through the posterior diaphragma , seen on CT  dated 3/29/2019.      Impression    IMPRESSION: No acute finding in the chest.   Routine UA with microscopic   Result Value Ref Range    Color Urine Yellow     Appearance Urine Clear     Glucose Urine Negative NEG^Negative mg/dL    Bilirubin Urine Negative NEG^Negative    Ketones Urine Negative NEG^Negative mg/dL    Specific Gravity Urine 1.015 1.003 - 1.035    Blood Urine Negative NEG^Negative    pH Urine 7.0 5.0 - 7.0 pH    Protein Albumin Urine 10 (A) NEG^Negative mg/dL    Urobilinogen mg/dL Normal 0.0 - 2.0 mg/dL    Nitrite Urine Negative NEG^Negative    Leukocyte Esterase Urine Negative NEG^Negative    Source Midstream Urine     WBC Urine 1 0 - 5 /HPF    RBC Urine 0 0 - 2 /HPF    Mucous Urine Present (A) NEG^Negative /LPF   CBC with platelets differential   Result Value Ref Range    WBC 0.3 (LL) 4.0 - 11.0 10e9/L    RBC Count 2.61 (L) 4.4 - 5.9 10e12/L    Hemoglobin 8.1 (L) 13.3 - 17.7 g/dL    Hematocrit 24.6 (L) 40.0 - 53.0 %    MCV 94 78 - 100 fl    MCH 31.0 26.5 - 33.0 pg    MCHC 32.9 31.5 - 36.5 g/dL    RDW 16.8 (H) 10.0 - 15.0 %    Platelet Count 481 (H) 150 - 450 10e9/L    Diff Method WBC <0.5, Diff not done    Lactic acid whole blood   Result Value Ref Range    Lactic Acid 1.5 0.7 - 2.0 mmol/L   ISTAT gases lactate deana POCT   Result Value Ref Range    Ph Venous 7.52 (H) 7.32 - 7.43 pH    PCO2 Venous 27 (L) 40 - 50 mm Hg    PO2  Venous 47 25 - 47 mm Hg    Bicarbonate Venous 22 21 - 28 mmol/L    O2 Sat Venous 88 %    Lactic Acid 1.2 0.7 - 2.1 mmol/L   Urine Culture Aerobic Bacterial   Result Value Ref Range    Specimen Description Midstream Urine     Special Requests Specimen received in preservative     Culture Micro PENDING    Blood culture   Result Value Ref Range    Specimen Description Blood Left Arm     Special Requests Received in aerobic bottle only     Culture Micro PENDING    Blood culture   Result Value Ref Range    Specimen Description Blood Right Arm     Special Requests Received in aerobic bottle only     Culture Micro PENDING      Medications   0.9% sodium chloride BOLUS (1,000 mLs Intravenous New Bag 4/29/19 3600)     Followed by   sodium chloride 0.9% infusion (1,000 mLs Intravenous New Bag 4/30/19 0038)   acetaminophen (TYLENOL) tablet 650 mg (has no administration in time range)   acyclovir (ZOVIRAX) tablet 400 mg (has no administration in time range)   atorvastatin (LIPITOR) tablet 20 mg (has no administration in time range)   levofloxacin (LEVAQUIN) tablet 250 mg (has no administration in time range)   ondansetron (ZOFRAN) tablet 8 mg (has no administration in time range)   pantoprazole (PROTONIX) EC tablet 40 mg (has no administration in time range)   prochlorperazine (COMPAZINE) tablet 5-10 mg (has no administration in time range)   rOPINIRole (REQUIP) tablet 0.75 mg (has no administration in time range)   sennosides (SENOKOT) tablet 2 tablet (has no administration in time range)   tamsulosin (FLOMAX) capsule 0.4 mg (has no administration in time range)   vancomycin (FIRVANQ) oral solution 125 mg (has no administration in time range)   voriconazole (VFEND) tablet 150 mg (has no administration in time range)   Medication Instruction (has no administration in time range)   ceFEPIme (MAXIPIME) 2 g vial to attach to  ml bag for ADULTS or 50 ml bag for PEDS (has no administration in time range)   enoxaparin (LOVENOX)  injection 40 mg (40 mg Subcutaneous Given 4/30/19 0040)   ceFEPIme (MAXIPIME) 1g vial to attach to  ml bag for ADULTS or NS 50 ml bag for PEDS (0 g Intravenous Stopped 4/29/19 2330)            Labs Ordered and Resulted from Time of ED Arrival Up to the Time of Departure from the ED   ISTAT  GASES LACTATE ALEJANDRO POCT - Abnormal; Notable for the following components:       Result Value    Ph Venous 7.52 (*)     PCO2 Venous 27 (*)     All other components within normal limits   CBC WITH PLATELETS DIFFERENTIAL   LACTIC ACID WHOLE BLOOD   ROUTINE UA WITH MICROSCOPIC   NURSING DRAW AND HOLD   ISTAT CG4 GASES LACTATE ALEJANDRO NURSING POCT   URINE CULTURE AEROBIC BACTERIAL            Assessments & Plan (with Medical Decision Making)   With a history of AML on multiple prophylactic antibiotics who presented to the ER due to fever.  Patient remains neutropenic.  No clear clear source visible.  Case was discussed with the hematology fellow.  The patient will be admitted to their service for further care.  Patient was given 1 dose of IV cefepime in the ER.    I have reviewed the nursing notes.    I have reviewed the findings, diagnosis, plan and need for follow up with the patient.       Medication List      There are no discharge medications for this visit.         Final diagnoses:   Neutropenia with fever (H)   Acute myeloid leukemia not having achieved remission (H)     I, Rosa Hung, am serving as a trained medical scribe to document services personally performed by Alejandra Bartlett MD, based on the provider's statements to me.      Alejandra KWONG MD, was physically present and have reviewed and verified the accuracy of this note documented by Rosa Hung.     4/29/2019   [facility], [place], EMERGENCY DEPARTMENT     Alejandra Bartlett MD  04/30/19 0105

## 2019-04-30 NOTE — PROGRESS NOTES
[facility], [place]  Hematology / Oncology Progress Note    Date of Admission: 4/29/2019  Date of Service (when I saw the patient): 04/30/2019     Assessment & Plan   El Ace is a 63 year old male with AML now in morphologic and immunophenotypic CR s/p decitabine/venetoclax reinduction. He has continued on venetoclax while awaiting transplant (workup due week of 4/29) but was admitted on 4/29 with neutropenic fever.     #Neutropenic fever/SIRS.  Presented to ED on 4/29 after fever 101 at home. In ED  but HD stable. No new focal symptoms. Since admission, spiked Tmax 101.5 (4/30 @ 00:26) and now defervesced. Lactic acid 1.5. WBC 0.3. He does not have a central line in place. No known sick contacts. Per his wife, pt did go to [employer] on Sunday (4/28) and wore his mask but has otherwise been pretty sequestered in the house.   - BCx NGTD  - UA negative, culture pending  - CXR negative  - no current respiratory symptoms, low threshold to obtain RVP but can obtain if develops recurrent fever  - continue Cefepime (x 4/29 PM)    #ID PPx  - continue ppx ACV and voriconazole  - hold ppx Levaquin as now on Cefepime  - continue oral vancomycin (per outpt team he was continued on this to prevent recurrence of C.diff while on ppx (and now broad-spectrum) antibiotics)    #AML (IDH2, RUNX1 positive). Currently in mophologic/immunophenotypic remission.   Refractory to initial induction chemotherapy with 7+3 (D1= 3/15/19). Underwent second induction with Decitabine x 10 days (D1=3/26) and had venetoclax added (3/29). Course was complicated by neutropenic sepsis/septic shock related to odontic infection, C.diff diarrhea and neutropenic colitis, FOREST, and LFT derangement all of which improved. He underwent BMBx on 4/23. This demonstrated morphologic and immunophenotypic remission. He is now planned for BMT workup with goal of allogeneic transplant. Per pt, his son will be the donor.   - was due  "to begin transplant workup this week; have alerted his outpatient/BMT team of pt's admission  - pt had been continuing venetoclax 100mg daily (per Dr. Cuadra's d/w Dr. Brown, pt had planned to remain on this through BMT workup to reduce risk of relapse). However, given current complication/admission for neutropenic fever, Dr. Brown is ok to stop venetoclax at this time. Have discontinued this medication currently.     #Leukopenia/neutropenia  #Anemia  2/2 chemotherapy. Appears Plts have recovered. Likely had drop in Hgb overnight due to dilutional effect from fluids.  - transfuse to maintain Hgb >7 (irradiated), Plt >10K    #Subclinical coronary atherosclerosis  #HLD  - back on PTA Lipitor, continue     #BPH  - continue PTA Flomax 0.4mg daily    #H/o RLS  - PTA Requip at bedtime PRN    FEN  - S/p 1L NS bolus x 2, continues IVFs with NS @125 ml/hr. If afebrile and HD stable throughout today, plan to discontinue IVFs later today/tomorrow.  - lyte repletion per unit protocol  - calcium corrects for albumin (4/30)  - regular diet    PPx  - VTE: Lovenox 40mg subcutaneous daily (hold if Plt count <50K)  - GI: PPI    Dispo: Anticipate discharge back to home pending afebrile ~48 hrs.    - message sent to outpatient/BMT team regarding his workup    Discussed with Dr. Thomas, staff attending.    Vee Cabrera PA-C  Heme/Onc  [phone]      Interval History   Admitted overnight. Tmax 101.5 since admission, now defervesced. He reports fever of 101 at home and feeling generally unwell and \"listless\". Did feel cold but denies shaking chills. Denies headache, cold symptoms, mouth pain/sores, SOB, abdominal pain, cramping, or nausea. Did have difficulty with constipation most recently, resolved with laxatives at home. Dental xtraction site is still a little tender but overall improved, swelling has also improved. Denies diarrhea. Denies dysuria. Denies extremity edema. Denies any new skin rashes or lesions. He does " "not have a central line.       Physical Exam   Temp: 96.6  F (35.9  C) Temp src: Oral BP: 115/57 Pulse: 81 Heart Rate: 108 Resp: 16 SpO2: 98 % O2 Device: None (Room air)    Vitals:    04/29/19 2012   Weight: 91.2 kg (201 lb)     Vital Signs with Ranges  Temp:  [96.6  F (35.9  C)-101.5  F (38.6  C)] 96.6  F (35.9  C)  Pulse:  [] 81  Heart Rate:  [108-138] 108  Resp:  [16-23] 16  BP: (104-132)/(57-81) 115/57  SpO2:  [96 %-99 %] 98 %  I/O last 3 completed shifts:  In: 350 [P.O.:350]  Out: -     Constitutional: Pleasant male seen resting in bed. Alert, interactive. NAD. Non-toxic appearing.   HEENT: NC/AT. Sclera clear, anicteric. OP pink and moist, no lesions or thrush.  Respiratory: Breathing even and non-labored on RA. Able to sit up for exam. Lungs CTA b/l, no wheezing or crackles.  CV: RRR  GI: Normal BS. Abd soft, non-distended. Pt reports a \"little discomfort\" on palpation of mid abdomen (just below epigastrium) but not reactively tender, no rebound or guarding.   Skin: Dry, warm, intact. Well healed site of prior PICC line in inner upper right arm. No concerning lesions or rash on exposed surfaces.   Musculoskeletal: Extremities grossly normal in appearance, no edema.   Neurologic: Alert and oriented. Speech normal. Grossly nonfocal.  Neuropsychiatric: Calm, mentation appears normal, answering questions appropriately.           Medications     - MEDICATION INSTRUCTIONS -       sodium chloride 125 mL/hr at 04/30/19 0836       acyclovir  400 mg Oral BID     atorvastatin  20 mg Oral QPM     ceFEPIme (MAXIPIME) IV  2 g Intravenous Q8H     enoxaparin  40 mg Subcutaneous Q24H     pantoprazole  40 mg Oral BID AC     tamsulosin  0.4 mg Oral Daily     vancomycin  125 mg Oral 4x Daily     voriconazole  150 mg Oral Q12H ZUNILDA       Data   Results for orders placed or performed during the hospital encounter of 04/29/19 (from the past 24 hour(s))   CBC with platelets differential   Result Value Ref Range    WBC 0.3 (LL) " 4.0 - 11.0 10e9/L    RBC Count 2.61 (L) 4.4 - 5.9 10e12/L    Hemoglobin 8.1 (L) 13.3 - 17.7 g/dL    Hematocrit 24.6 (L) 40.0 - 53.0 %    MCV 94 78 - 100 fl    MCH 31.0 26.5 - 33.0 pg    MCHC 32.9 31.5 - 36.5 g/dL    RDW 16.8 (H) 10.0 - 15.0 %    Platelet Count 481 (H) 150 - 450 10e9/L    Diff Method WBC <0.5, Diff not done    Lactic acid whole blood   Result Value Ref Range    Lactic Acid 1.5 0.7 - 2.0 mmol/L   Blood culture   Result Value Ref Range    Specimen Description Blood Left Arm     Special Requests Received in aerobic bottle only     Culture Micro No growth after 10 hours    Blood culture   Result Value Ref Range    Specimen Description Blood Right Arm     Special Requests Received in aerobic bottle only     Culture Micro No growth after 8 hours    ISTAT gases lactate deana POCT   Result Value Ref Range    Ph Venous 7.52 (H) 7.32 - 7.43 pH    PCO2 Venous 27 (L) 40 - 50 mm Hg    PO2 Venous 47 25 - 47 mm Hg    Bicarbonate Venous 22 21 - 28 mmol/L    O2 Sat Venous 88 %    Lactic Acid 1.2 0.7 - 2.1 mmol/L   Routine UA with microscopic   Result Value Ref Range    Color Urine Yellow     Appearance Urine Clear     Glucose Urine Negative NEG^Negative mg/dL    Bilirubin Urine Negative NEG^Negative    Ketones Urine Negative NEG^Negative mg/dL    Specific Gravity Urine 1.015 1.003 - 1.035    Blood Urine Negative NEG^Negative    pH Urine 7.0 5.0 - 7.0 pH    Protein Albumin Urine 10 (A) NEG^Negative mg/dL    Urobilinogen mg/dL Normal 0.0 - 2.0 mg/dL    Nitrite Urine Negative NEG^Negative    Leukocyte Esterase Urine Negative NEG^Negative    Source Midstream Urine     WBC Urine 1 0 - 5 /HPF    RBC Urine 0 0 - 2 /HPF    Mucous Urine Present (A) NEG^Negative /LPF   XR Chest 2 Views    Narrative    EXAM: XR CHEST 2 VW  4/29/2019 10:04 PM     HISTORY:  fever       COMPARISON: Chest radiograph 4/4/2019    FINDINGS: PA and lateral views of chest. Heart size is normal. Midline  trachea. No pneumothorax or pleural effusion. On the  lateral  projection, there is mild bulging in the posterior aspect of the  diaphragm which is consistent with patient's known small  fat-containing hernia through the posterior diaphragma , seen on CT  dated 3/29/2019.      Impression    IMPRESSION: No acute finding in the chest.    I have personally reviewed the examination and initial interpretation  and I agree with the findings.    ROSA SPAULDING MD   Urine Culture Aerobic Bacterial   Result Value Ref Range    Specimen Description Midstream Urine     Special Requests Specimen received in preservative     Culture Micro Culture negative < 24 hours, reincubate    Platelet count   Result Value Ref Range    Platelet Count 360 150 - 450 10e9/L   Creatinine   Result Value Ref Range    Creatinine 0.79 0.66 - 1.25 mg/dL    GFR Estimate >90 >60 mL/min/[1.73_m2]    GFR Estimate If Black >90 >60 mL/min/[1.73_m2]   Blood culture   Result Value Ref Range    Specimen Description Blood Right Arm     Special Requests Received in aerobic bottle only     Culture Micro No growth after 6 hours    CBC with platelets differential   Result Value Ref Range    WBC 0.4 (LL) 4.0 - 11.0 10e9/L    RBC Count 2.35 (L) 4.4 - 5.9 10e12/L    Hemoglobin 7.3 (L) 13.3 - 17.7 g/dL    Hematocrit 22.6 (L) 40.0 - 53.0 %    MCV 96 78 - 100 fl    MCH 31.1 26.5 - 33.0 pg    MCHC 32.3 31.5 - 36.5 g/dL    RDW 16.6 (H) 10.0 - 15.0 %    Platelet Count 348 150 - 450 10e9/L    Diff Method WBC <0.5, Diff not done    Basic metabolic panel   Result Value Ref Range    Sodium 138 133 - 144 mmol/L    Potassium 3.9 3.4 - 5.3 mmol/L    Chloride 106 94 - 109 mmol/L    Carbon Dioxide 22 20 - 32 mmol/L    Anion Gap 10 3 - 14 mmol/L    Glucose 102 (H) 70 - 99 mg/dL    Urea Nitrogen 8 7 - 30 mg/dL    Creatinine 0.67 0.66 - 1.25 mg/dL    GFR Estimate >90 >60 mL/min/[1.73_m2]    GFR Estimate If Black >90 >60 mL/min/[1.73_m2]    Calcium 7.8 (L) 8.5 - 10.1 mg/dL   Magnesium   Result Value Ref Range    Magnesium 2.1 1.6 - 2.3  mg/dL   Phosphorus   Result Value Ref Range    Phosphorus 3.3 2.5 - 4.5 mg/dL   Lactic acid level STAT for sepsis protocol   Result Value Ref Range    Lactate for Sepsis Protocol 0.6 (L) 0.7 - 2.0 mmol/L   Hepatic panel   Result Value Ref Range    Bilirubin Direct 0.3 (H) 0.0 - 0.2 mg/dL    Bilirubin Total 0.8 0.2 - 1.3 mg/dL    Albumin 2.6 (L) 3.4 - 5.0 g/dL    Protein Total 6.2 (L) 6.8 - 8.8 g/dL    Alkaline Phosphatase 189 (H) 40 - 150 U/L    ALT 28 0 - 70 U/L    AST 17 0 - 45 U/L     I have seen, interviewed, and examined the patient independently.  I have reviewed the vital signs and labs.  This note reflects my assessment and plan.      Neutropenic fever, no source identifies.     On cefepime, vanco    Have discussed with BMT and AML primaries: will hold venetoclax to let ANC recovery in prep for transplant. Patient is in CR    Carolyn Thomas MD/PhD

## 2019-04-30 NOTE — PLAN OF CARE
Pt transferred from 7B to 7D at 1400. Wife at bedside. Pt denies pain. No needs at this time. Enteric isolation. Continue POC.

## 2019-04-30 NOTE — PLAN OF CARE
A&O x4, Tmax 97.0, VSS on RA. Denied pain. L PIV inf NS @125 with abx per orders. Up independently in room, voiding adequately, no BM this shift, +bs and flatus. Report given and pt transferred to 7D at 1400.

## 2019-05-01 ENCOUNTER — APPOINTMENT (OUTPATIENT)
Dept: CT IMAGING | Facility: CLINIC | Age: 64
DRG: 394 | End: 2019-05-01
Attending: PHYSICIAN ASSISTANT
Payer: COMMERCIAL

## 2019-05-01 LAB
ANION GAP SERPL CALCULATED.3IONS-SCNC: 8 MMOL/L (ref 3–14)
BACTERIA SPEC CULT: NORMAL
BUN SERPL-MCNC: 7 MG/DL (ref 7–30)
CALCIUM SERPL-MCNC: 8 MG/DL (ref 8.5–10.1)
CHLORIDE SERPL-SCNC: 106 MMOL/L (ref 94–109)
CO2 SERPL-SCNC: 24 MMOL/L (ref 20–32)
CREAT SERPL-MCNC: 0.67 MG/DL (ref 0.66–1.25)
DIFFERENTIAL METHOD BLD: ABNORMAL
ERYTHROCYTE [DISTWIDTH] IN BLOOD BY AUTOMATED COUNT: 16.6 % (ref 10–15)
FLUAV+FLUBV RNA SPEC QL NAA+PROBE: NEGATIVE
FLUAV+FLUBV RNA SPEC QL NAA+PROBE: NEGATIVE
GFR SERPL CREATININE-BSD FRML MDRD: >90 ML/MIN/{1.73_M2}
GLUCOSE SERPL-MCNC: 107 MG/DL (ref 70–99)
HCT VFR BLD AUTO: 22.5 % (ref 40–53)
HGB BLD-MCNC: 7.2 G/DL (ref 13.3–17.7)
LACTATE BLD-SCNC: 0.6 MMOL/L (ref 0.7–2)
MCH RBC QN AUTO: 30.6 PG (ref 26.5–33)
MCHC RBC AUTO-ENTMCNC: 32 G/DL (ref 31.5–36.5)
MCV RBC AUTO: 96 FL (ref 78–100)
PLATELET # BLD AUTO: 393 10E9/L (ref 150–450)
POTASSIUM SERPL-SCNC: 3.7 MMOL/L (ref 3.4–5.3)
RBC # BLD AUTO: 2.35 10E12/L (ref 4.4–5.9)
RSV RNA SPEC NAA+PROBE: NEGATIVE
SODIUM SERPL-SCNC: 138 MMOL/L (ref 133–144)
SPECIMEN SOURCE: NORMAL
SPECIMEN SOURCE: NORMAL
WBC # BLD AUTO: 0.3 10E9/L (ref 4–11)

## 2019-05-01 PROCEDURE — 25000128 H RX IP 250 OP 636: Performed by: INTERNAL MEDICINE

## 2019-05-01 PROCEDURE — 80048 BASIC METABOLIC PNL TOTAL CA: CPT | Performed by: INTERNAL MEDICINE

## 2019-05-01 PROCEDURE — 86665 EPSTEIN-BARR CAPSID VCA: CPT | Performed by: PHYSICIAN ASSISTANT

## 2019-05-01 PROCEDURE — 87631 RESP VIRUS 3-5 TARGETS: CPT | Performed by: PHYSICIAN ASSISTANT

## 2019-05-01 PROCEDURE — 87449 NOS EACH ORGANISM AG IA: CPT | Performed by: PHYSICIAN ASSISTANT

## 2019-05-01 PROCEDURE — 36415 COLL VENOUS BLD VENIPUNCTURE: CPT | Performed by: INTERNAL MEDICINE

## 2019-05-01 PROCEDURE — 85027 COMPLETE CBC AUTOMATED: CPT

## 2019-05-01 PROCEDURE — 87040 BLOOD CULTURE FOR BACTERIA: CPT | Performed by: PHYSICIAN ASSISTANT

## 2019-05-01 PROCEDURE — 87305 ASPERGILLUS AG IA: CPT | Performed by: PHYSICIAN ASSISTANT

## 2019-05-01 PROCEDURE — 12000001 ZZH R&B MED SURG/OB UMMC

## 2019-05-01 PROCEDURE — 71250 CT THORAX DX C-: CPT

## 2019-05-01 PROCEDURE — 36415 COLL VENOUS BLD VENIPUNCTURE: CPT | Performed by: PHYSICIAN ASSISTANT

## 2019-05-01 PROCEDURE — 83605 ASSAY OF LACTIC ACID: CPT | Performed by: INTERNAL MEDICINE

## 2019-05-01 PROCEDURE — 99232 SBSQ HOSP IP/OBS MODERATE 35: CPT | Performed by: INTERNAL MEDICINE

## 2019-05-01 PROCEDURE — 25000132 ZZH RX MED GY IP 250 OP 250 PS 637: Performed by: INTERNAL MEDICINE

## 2019-05-01 PROCEDURE — 25000132 ZZH RX MED GY IP 250 OP 250 PS 637: Performed by: PHYSICIAN ASSISTANT

## 2019-05-01 PROCEDURE — 25800030 ZZH RX IP 258 OP 636: Performed by: PHYSICIAN ASSISTANT

## 2019-05-01 RX ORDER — LIDOCAINE HYDROCHLORIDE 20 MG/ML
JELLY TOPICAL 4 TIMES DAILY PRN
Status: DISCONTINUED | OUTPATIENT
Start: 2019-05-01 | End: 2019-05-07 | Stop reason: HOSPADM

## 2019-05-01 RX ORDER — LANOLIN ALCOHOL/MO/W.PET/CERES
3 CREAM (GRAM) TOPICAL
Status: DISCONTINUED | OUTPATIENT
Start: 2019-05-01 | End: 2019-05-07 | Stop reason: HOSPADM

## 2019-05-01 RX ADMIN — NYSTATIN 500000 UNITS: 100000 SUSPENSION ORAL at 19:10

## 2019-05-01 RX ADMIN — CEFEPIME 2 G: 2 INJECTION, POWDER, FOR SOLUTION INTRAVENOUS at 13:58

## 2019-05-01 RX ADMIN — ENOXAPARIN SODIUM 40 MG: 40 INJECTION SUBCUTANEOUS at 00:32

## 2019-05-01 RX ADMIN — CEFEPIME 2 G: 2 INJECTION, POWDER, FOR SOLUTION INTRAVENOUS at 06:15

## 2019-05-01 RX ADMIN — VANCOMYCIN HYDROCHLORIDE 125 MG: KIT at 22:19

## 2019-05-01 RX ADMIN — PANTOPRAZOLE SODIUM 40 MG: 40 TABLET, DELAYED RELEASE ORAL at 15:56

## 2019-05-01 RX ADMIN — NYSTATIN 500000 UNITS: 100000 SUSPENSION ORAL at 08:09

## 2019-05-01 RX ADMIN — SODIUM CHLORIDE 1000 ML: 9 INJECTION, SOLUTION INTRAVENOUS at 02:57

## 2019-05-01 RX ADMIN — PANTOPRAZOLE SODIUM 40 MG: 40 TABLET, DELAYED RELEASE ORAL at 08:09

## 2019-05-01 RX ADMIN — VANCOMYCIN HYDROCHLORIDE 125 MG: KIT at 08:09

## 2019-05-01 RX ADMIN — ACYCLOVIR 400 MG: 400 TABLET ORAL at 08:09

## 2019-05-01 RX ADMIN — NYSTATIN 500000 UNITS: 100000 SUSPENSION ORAL at 11:35

## 2019-05-01 RX ADMIN — ACETAMINOPHEN 650 MG: 325 TABLET, FILM COATED ORAL at 17:22

## 2019-05-01 RX ADMIN — WITCH HAZEL: 500 SOLUTION RECTAL; TOPICAL at 09:50

## 2019-05-01 RX ADMIN — VANCOMYCIN HYDROCHLORIDE 125 MG: KIT at 17:22

## 2019-05-01 RX ADMIN — NYSTATIN 500000 UNITS: 100000 SUSPENSION ORAL at 15:56

## 2019-05-01 RX ADMIN — CEFEPIME 2 G: 2 INJECTION, POWDER, FOR SOLUTION INTRAVENOUS at 22:19

## 2019-05-01 RX ADMIN — ACYCLOVIR 400 MG: 400 TABLET ORAL at 19:10

## 2019-05-01 RX ADMIN — ATORVASTATIN CALCIUM 20 MG: 10 TABLET, FILM COATED ORAL at 19:10

## 2019-05-01 RX ADMIN — VORICONAZOLE 150 MG: 50 TABLET ORAL at 08:09

## 2019-05-01 RX ADMIN — VORICONAZOLE 150 MG: 50 TABLET ORAL at 19:10

## 2019-05-01 RX ADMIN — GLYCERIN, PETROLATUM, PHENYLEPHRINE HCL, PRAMOXINE HCL: 144; 2.5; 10; 15 CREAM TOPICAL at 11:35

## 2019-05-01 RX ADMIN — GLYCERIN, PETROLATUM, PHENYLEPHRINE HCL, PRAMOXINE HCL: 144; 2.5; 10; 15 CREAM TOPICAL at 19:11

## 2019-05-01 RX ADMIN — TAMSULOSIN HYDROCHLORIDE 0.4 MG: 0.4 CAPSULE ORAL at 08:09

## 2019-05-01 ASSESSMENT — ACTIVITIES OF DAILY LIVING (ADL)
ADLS_ACUITY_SCORE: 13

## 2019-05-01 NOTE — PLAN OF CARE
VSS. Afebrile. Up ambulating the halls. IVF d/c'd. Denies pain. RSV swab sent. Tucks pads and preparation H ordered for patient - he is applying himself. SIRS triggered. Lactic acid pending. Possible discharge in next day or so if afebrile. Continue POC.    Addendum: Lactic acid 0.6

## 2019-05-01 NOTE — PROGRESS NOTES
[facility], [place]  Hematology / Oncology Progress Note    Date of Admission: 4/29/2019  Date of Service (when I saw the patient): 05/01/2019     Assessment & Plan   El Ace is a 63 year old male with AML now in morphologic and immunophenotypic CR s/p decitabine/venetoclax reinduction. He has continued on venetoclax while awaiting transplant (workup due week of 4/29) but was admitted on 4/29 with neutropenic fever.     #Neutropenic fever/SIRS.  Presented to ED on 4/29 after fever 101 at home. In ED  but HD stable. No new focal symptoms. After admission, spiked Tmax 101.5 (4/30 @ 00:26) and subsequently defervesced. Lactic acid 1.5. WBC 0.3. He does not have a central line in place. No known sick contacts. Per his wife, pt did go to [employer] on Sunday (4/28) and wore his mask but has otherwise been pretty sequestered in the house. Did have recurrent temperature on 4/30 evening; Tmax 101 (4/30 @ 17:37).   - Blood cultures (4/29, 4/30) remain NGTD  - UA negative, culture negative  - CXR (4/29) negative  - no current respiratory symptoms though with recurrent fever last night will get screen PCR for Inf A/B/RSV  - check serum galactomannan and fungitell  - continue Cefepime (x 4/29 PM)  - if spikes recurrent temp today, would obtain CT Chest/sinus    #ID PPx  - continue ppx ACV and voriconazole  - hold ppx Levaquin as now on Cefepime  - continue oral vancomycin (QID regimen, per outpt team he was continued on this to prevent recurrence of C.diff while on ppx (and now broad-spectrum) antibiotics). Could potentially decrease to BID dosing on discharge.    #AML (IDH2, RUNX1 positive). Currently in mophologic/immunophenotypic remission.   Refractory to initial induction chemotherapy with 7+3 (D1= 3/15/19). Underwent second induction with Decitabine x 10 days (D1=3/26) and had venetoclax added (3/29). Course was complicated by neutropenic sepsis/septic shock related to odontic  infection, C.diff diarrhea and neutropenic colitis, FOREST, and LFT derangement all of which improved. He underwent BMBx on 4/23. This demonstrated morphologic and immunophenotypic remission. He is now planned for BMT workup with goal of allogeneic transplant. His son will be the donor.   - was due to begin transplant workup this week. Outpatient/BMT team aware of pt's admission. Will order CT Chest and MUGA scan inpatient for part of workup.  - pt had been continuing venetoclax 100mg daily (per Dr. Cuadra's d/w Dr. Brown, pt had planned to remain on this through BMT workup to reduce risk of relapse). However, given current complication/admission for neutropenic fever, Dr. Brown is ok to stop venetoclax at this time. Have discontinued this medication (as of 4/30).     #Leukopenia/neutropenia  #Anemia  2/2 chemotherapy. Appears Plts have recovered. Likely had drop in Hgb overnight (4/29-4/30) due to dilutional effect from fluids. Hgb stable at 7.2 this AM.   - transfuse to maintain Hgb >7 (irradiated), Plt >10K    #Hemorrhoids.  Has h/o hemorrhoids. Pt reports having been constipated PTA and taking a laxative at home. He has since had several soft stools, which have exacerbated his hemorrhoids. No bleeding, diarrhea, or abdominal pain/cramping.  - Prep H cream, PRN lidocaine gel, TUCKS pads    #Subclinical coronary atherosclerosis  #HLD  - on PTA Lipitor, continue     #BPH  - continue PTA Flomax 0.4mg daily    #H/o RLS  - PTA Requip at bedtime PRN    FEN  - S/p 1L NS bolus x 2, continued IVFs with NS @125 ml/hr --> reduced to 75 ml/hr (4/30 afternoon). He remains HD stable, feeling better, and good PO intake this AM. Will discontinue MIVFs today, bolus PRN.   - lyte repletion per unit protocol  - calcium corrected for albumin (4/30)  - regular diet    PPx  - VTE: Lovenox 40mg subcutaneous daily (hold if Plt count <50K)  - GI: PPI    Dispo: Anticipate discharge back to home pending afebrile 24-48 hrs.    - will  work with outpatient team to reschedule BMT workup     Discussed with Dr. Thomas, staff attending.    Vee Cabrera PA-C  Heme/Onc  [phone]      Interval History   Did have recurrent temp to 101 last evening, afebrile thereafter. Randy reports feeling better overall today. Denies any new symptoms including sinus/nasal congestion, mouth pain/sores, SOB, or cough. His hemorrhoid discomfort is acting up after several soft stools yesterday/overnight. He attributes this to having taken a laxative at home PTA for constipation. Denies diarrhea. Denies abdominal pain or cramping. Ate breakfast this AM. Understand will be here another night while we monitor his temperature curve. Asking good questions about BMT workup plan.       Physical Exam   Temp: 98.3  F (36.8  C) Temp src: Oral BP: 124/80 Pulse: 101 Heart Rate: 98 Resp: 18 SpO2: 98 % O2 Device: None (Room air)    Vitals:    04/29/19 2012   Weight: 91.2 kg (201 lb)     Vital Signs with Ranges  Temp:  [97  F (36.1  C)-101  F (38.3  C)] 98.3  F (36.8  C)  Pulse:  [] 101  Heart Rate:  [] 98  Resp:  [16-20] 18  BP: (113-129)/(67-80) 124/80  SpO2:  [97 %-99 %] 98 %  I/O last 3 completed shifts:  In: 3075 [P.O.:400; I.V.:2675]  Out: 1975 [Urine:1975]    Constitutional: Pleasant male seen reclined in bed, having finished breakfast. Alert, interactive. NAD. Non-toxic appearing. Appears to be feeling better overall.   HEENT: NC/AT. Sclera clear, anicteric. OP pink and moist, no lesions or thrush.  Respiratory: Breathing even and non-labored on RA. Able to sit up for exam. Lungs CTA b/l, no wheezing or crackles.  CV: Mildly tachycardic but regular rhythm, no murmur appreciated.   GI: Normal BS. Abd soft, ND/NT. No rebound or guarding.   Skin: Dry, warm, intact. Well healed site of prior PICC line in inner upper right arm. No concerning lesions or rash on exposed surfaces.   Musculoskeletal: Extremities grossly normal in appearance, no edema.    Neurologic: Alert and oriented. Speech normal. Grossly nonfocal.  Neuropsychiatric: Calm, mentation appears normal, answering questions appropriately.         Medications     - MEDICATION INSTRUCTIONS -       sodium chloride 1,000 mL (05/01/19 0257)       acyclovir  400 mg Oral BID     atorvastatin  20 mg Oral QPM     ceFEPIme (MAXIPIME) IV  2 g Intravenous Q8H     enoxaparin  40 mg Subcutaneous Q24H     nystatin  500,000 Units Oral 4x Daily     pantoprazole  40 mg Oral BID AC     pramox-pe-glycerin-petrolatum   Rectal TID     tamsulosin  0.4 mg Oral Daily     vancomycin  125 mg Oral 4x Daily     voriconazole  150 mg Oral Q12H ZUNILDA       Data   Results for orders placed or performed during the hospital encounter of 04/29/19 (from the past 24 hour(s))   CBC with platelets differential   Result Value Ref Range    WBC 0.3 (LL) 4.0 - 11.0 10e9/L    RBC Count 2.35 (L) 4.4 - 5.9 10e12/L    Hemoglobin 7.2 (L) 13.3 - 17.7 g/dL    Hematocrit 22.5 (L) 40.0 - 53.0 %    MCV 96 78 - 100 fl    MCH 30.6 26.5 - 33.0 pg    MCHC 32.0 31.5 - 36.5 g/dL    RDW 16.6 (H) 10.0 - 15.0 %    Platelet Count 393 150 - 450 10e9/L    Diff Method WBC <0.5, Diff not done    Basic metabolic panel   Result Value Ref Range    Sodium 138 133 - 144 mmol/L    Potassium 3.7 3.4 - 5.3 mmol/L    Chloride 106 94 - 109 mmol/L    Carbon Dioxide 24 20 - 32 mmol/L    Anion Gap 8 3 - 14 mmol/L    Glucose 107 (H) 70 - 99 mg/dL    Urea Nitrogen 7 7 - 30 mg/dL    Creatinine 0.67 0.66 - 1.25 mg/dL    GFR Estimate >90 >60 mL/min/[1.73_m2]    GFR Estimate If Black >90 >60 mL/min/[1.73_m2]    Calcium 8.0 (L) 8.5 - 10.1 mg/dL         I have seen, interviewed, and examined the patient independently.  I have reviewed the vital signs and labs.  This note reflects my assessment and plan.      Neutropenic fever, no source identified. Last fever 5 pm last night     On cefepime, vanco    Have discussed with BMT and AML primaries: will hold venetoclax to let ANC recovery in  prep for transplant. Patient is in CR.    Will go ahead and get MUGA and CT ch for BMT w/u    Carolyn Thomas MD/PhD

## 2019-05-01 NOTE — PLAN OF CARE
/68 (BP Location: Right arm)   Pulse 101   Temp 100.3  F (37.9  C) (Oral)   Resp 20   Wt 91.2 kg (201 lb)   SpO2 97%   BMI 28.84 kg/m      Tmax: 100.3, no blood cultures needed this shift. He denies pain, nausea, and SOB. Enteric precautions continued. VSS. Fluids infusing per MAR.    Will continue to monitor/assess. Sleeping in room between cares.

## 2019-05-01 NOTE — PLAN OF CARE
Pt alert and oriented x4. Pt TMAX 100.4. Tylenol given.  Blood cultures completed. AOVSS. Pt to have MUGA scan done around 0900 tomorrow. No prep needed. Pt denies pain and nausea. Pt reported fair appetite and good U/O. Pt has cream in bathroom for Hemorids. Pt on airborne isolation while RVP pending. Continue to monitor.

## 2019-05-02 ENCOUNTER — OFFICE VISIT (OUTPATIENT)
Dept: RADIATION ONCOLOGY | Facility: CLINIC | Age: 64
End: 2019-05-02
Attending: RADIOLOGY
Payer: COMMERCIAL

## 2019-05-02 ENCOUNTER — APPOINTMENT (OUTPATIENT)
Dept: NUCLEAR MEDICINE | Facility: CLINIC | Age: 64
DRG: 394 | End: 2019-05-02
Attending: PHYSICIAN ASSISTANT
Payer: COMMERCIAL

## 2019-05-02 DIAGNOSIS — C92.00 ACUTE MYELOID LEUKEMIA NOT HAVING ACHIEVED REMISSION (H): Primary | ICD-10-CM

## 2019-05-02 LAB
ALBUMIN UR-MCNC: 10 MG/DL
ANION GAP SERPL CALCULATED.3IONS-SCNC: 8 MMOL/L (ref 3–14)
APPEARANCE UR: CLEAR
BILIRUB UR QL STRIP: NEGATIVE
BUN SERPL-MCNC: 9 MG/DL (ref 7–30)
CALCIUM SERPL-MCNC: 8.2 MG/DL (ref 8.5–10.1)
CHLORIDE SERPL-SCNC: 105 MMOL/L (ref 94–109)
CO2 SERPL-SCNC: 22 MMOL/L (ref 20–32)
COLOR UR AUTO: YELLOW
COPATH REPORT: NORMAL
CREAT SERPL-MCNC: 0.68 MG/DL (ref 0.66–1.25)
DIFFERENTIAL METHOD BLD: ABNORMAL
EBV VCA IGG SER QL IA: >8 AI (ref 0–0.8)
ERYTHROCYTE [DISTWIDTH] IN BLOOD BY AUTOMATED COUNT: 16.7 % (ref 10–15)
GALACTOMANNAN AG SERPL QL IA: NEGATIVE
GALACTOMANNAN AG SERPL-ACNC: 0.03
GFR SERPL CREATININE-BSD FRML MDRD: >90 ML/MIN/{1.73_M2}
GLUCOSE SERPL-MCNC: 121 MG/DL (ref 70–99)
GLUCOSE UR STRIP-MCNC: NEGATIVE MG/DL
HCT VFR BLD AUTO: 22.5 % (ref 40–53)
HGB BLD-MCNC: 7.4 G/DL (ref 13.3–17.7)
HGB UR QL STRIP: NEGATIVE
KETONES UR STRIP-MCNC: NEGATIVE MG/DL
LACTATE BLD-SCNC: 0.5 MMOL/L (ref 0.7–2)
LEUKOCYTE ESTERASE UR QL STRIP: NEGATIVE
MAGNESIUM SERPL-MCNC: 2.2 MG/DL (ref 1.6–2.3)
MCH RBC QN AUTO: 30.8 PG (ref 26.5–33)
MCHC RBC AUTO-ENTMCNC: 32.9 G/DL (ref 31.5–36.5)
MCV RBC AUTO: 94 FL (ref 78–100)
MUCOUS THREADS #/AREA URNS LPF: PRESENT /LPF
NITRATE UR QL: NEGATIVE
PH UR STRIP: 6.5 PH (ref 5–7)
PHOSPHATE SERPL-MCNC: 3.3 MG/DL (ref 2.5–4.5)
PLATELET # BLD AUTO: 458 10E9/L (ref 150–450)
POTASSIUM SERPL-SCNC: 3.5 MMOL/L (ref 3.4–5.3)
RBC # BLD AUTO: 2.4 10E12/L (ref 4.4–5.9)
RBC #/AREA URNS AUTO: 1 /HPF (ref 0–2)
SODIUM SERPL-SCNC: 135 MMOL/L (ref 133–144)
SOURCE: ABNORMAL
SP GR UR STRIP: 1.01 (ref 1–1.03)
UROBILINOGEN UR STRIP-MCNC: NORMAL MG/DL (ref 0–2)
WBC # BLD AUTO: 0.4 10E9/L (ref 4–11)
WBC #/AREA URNS AUTO: 1 /HPF (ref 0–5)

## 2019-05-02 PROCEDURE — 87040 BLOOD CULTURE FOR BACTERIA: CPT | Performed by: PHYSICIAN ASSISTANT

## 2019-05-02 PROCEDURE — 25000132 ZZH RX MED GY IP 250 OP 250 PS 637: Performed by: INTERNAL MEDICINE

## 2019-05-02 PROCEDURE — 81382 HLA II TYPING 1 LOC HR: CPT | Performed by: INTERNAL MEDICINE

## 2019-05-02 PROCEDURE — 84100 ASSAY OF PHOSPHORUS: CPT | Performed by: INTERNAL MEDICINE

## 2019-05-02 PROCEDURE — 36415 COLL VENOUS BLD VENIPUNCTURE: CPT | Performed by: PHYSICIAN ASSISTANT

## 2019-05-02 PROCEDURE — 77290 THER RAD SIMULAJ FIELD CPLX: CPT | Performed by: NURSE PRACTITIONER

## 2019-05-02 PROCEDURE — 78472 GATED HEART PLANAR SINGLE: CPT

## 2019-05-02 PROCEDURE — 87086 URINE CULTURE/COLONY COUNT: CPT | Performed by: PHYSICIAN ASSISTANT

## 2019-05-02 PROCEDURE — 12000001 ZZH R&B MED SURG/OB UMMC

## 2019-05-02 PROCEDURE — 25000128 H RX IP 250 OP 636: Performed by: PHYSICIAN ASSISTANT

## 2019-05-02 PROCEDURE — 85027 COMPLETE CBC AUTOMATED: CPT

## 2019-05-02 PROCEDURE — 77470 SPECIAL RADIATION TREATMENT: CPT | Performed by: RADIOLOGY

## 2019-05-02 PROCEDURE — 36415 COLL VENOUS BLD VENIPUNCTURE: CPT | Performed by: INTERNAL MEDICINE

## 2019-05-02 PROCEDURE — 25000128 H RX IP 250 OP 636: Performed by: INTERNAL MEDICINE

## 2019-05-02 PROCEDURE — 83735 ASSAY OF MAGNESIUM: CPT | Performed by: INTERNAL MEDICINE

## 2019-05-02 PROCEDURE — 83605 ASSAY OF LACTIC ACID: CPT | Performed by: INTERNAL MEDICINE

## 2019-05-02 PROCEDURE — 99232 SBSQ HOSP IP/OBS MODERATE 35: CPT | Performed by: INTERNAL MEDICINE

## 2019-05-02 PROCEDURE — A9560 TC99M LABELED RBC: HCPCS | Performed by: INTERNAL MEDICINE

## 2019-05-02 PROCEDURE — 87799 DETECT AGENT NOS DNA QUANT: CPT | Performed by: PHYSICIAN ASSISTANT

## 2019-05-02 PROCEDURE — 34300033 ZZH RX 343: Performed by: INTERNAL MEDICINE

## 2019-05-02 PROCEDURE — 81001 URINALYSIS AUTO W/SCOPE: CPT | Performed by: PHYSICIAN ASSISTANT

## 2019-05-02 PROCEDURE — 80048 BASIC METABOLIC PNL TOTAL CA: CPT | Performed by: INTERNAL MEDICINE

## 2019-05-02 PROCEDURE — 81378 HLA I & II TYPING HR: CPT | Performed by: INTERNAL MEDICINE

## 2019-05-02 PROCEDURE — 25000132 ZZH RX MED GY IP 250 OP 250 PS 637: Performed by: PHYSICIAN ASSISTANT

## 2019-05-02 RX ORDER — VANCOMYCIN HYDROCHLORIDE 50 MG/ML
125 KIT ORAL 2 TIMES DAILY
Status: DISCONTINUED | OUTPATIENT
Start: 2019-05-02 | End: 2019-05-03

## 2019-05-02 RX ORDER — METRONIDAZOLE 500 MG/1
500 TABLET ORAL EVERY 8 HOURS SCHEDULED
Status: DISCONTINUED | OUTPATIENT
Start: 2019-05-02 | End: 2019-05-06

## 2019-05-02 RX ADMIN — TAMSULOSIN HYDROCHLORIDE 0.4 MG: 0.4 CAPSULE ORAL at 09:50

## 2019-05-02 RX ADMIN — VANCOMYCIN HYDROCHLORIDE 125 MG: KIT at 05:45

## 2019-05-02 RX ADMIN — ATORVASTATIN CALCIUM 20 MG: 10 TABLET, FILM COATED ORAL at 19:03

## 2019-05-02 RX ADMIN — ENOXAPARIN SODIUM 40 MG: 40 INJECTION SUBCUTANEOUS at 00:16

## 2019-05-02 RX ADMIN — CEFEPIME 2 G: 2 INJECTION, POWDER, FOR SOLUTION INTRAVENOUS at 21:47

## 2019-05-02 RX ADMIN — SODIUM CHLORIDE 1000 ML: 9 INJECTION, SOLUTION INTRAVENOUS at 09:51

## 2019-05-02 RX ADMIN — VORICONAZOLE 150 MG: 50 TABLET ORAL at 09:50

## 2019-05-02 RX ADMIN — ACETAMINOPHEN 650 MG: 325 TABLET, FILM COATED ORAL at 15:59

## 2019-05-02 RX ADMIN — CEFEPIME 2 G: 2 INJECTION, POWDER, FOR SOLUTION INTRAVENOUS at 15:44

## 2019-05-02 RX ADMIN — VORICONAZOLE 150 MG: 50 TABLET ORAL at 19:03

## 2019-05-02 RX ADMIN — GLYCERIN, PETROLATUM, PHENYLEPHRINE HCL, PRAMOXINE HCL: 144; 2.5; 10; 15 CREAM TOPICAL at 15:45

## 2019-05-02 RX ADMIN — NYSTATIN 500000 UNITS: 100000 SUSPENSION ORAL at 09:49

## 2019-05-02 RX ADMIN — PANTOPRAZOLE SODIUM 40 MG: 40 TABLET, DELAYED RELEASE ORAL at 15:45

## 2019-05-02 RX ADMIN — Medication 27 MCI.: at 09:11

## 2019-05-02 RX ADMIN — GLYCERIN, PETROLATUM, PHENYLEPHRINE HCL, PRAMOXINE HCL: 144; 2.5; 10; 15 CREAM TOPICAL at 19:04

## 2019-05-02 RX ADMIN — CEFEPIME 2 G: 2 INJECTION, POWDER, FOR SOLUTION INTRAVENOUS at 05:45

## 2019-05-02 RX ADMIN — VANCOMYCIN HYDROCHLORIDE 125 MG: KIT at 10:00

## 2019-05-02 RX ADMIN — NYSTATIN 500000 UNITS: 100000 SUSPENSION ORAL at 12:37

## 2019-05-02 RX ADMIN — PANTOPRAZOLE SODIUM 40 MG: 40 TABLET, DELAYED RELEASE ORAL at 09:49

## 2019-05-02 RX ADMIN — MELATONIN TAB 3 MG 3 MG: 3 TAB at 00:16

## 2019-05-02 RX ADMIN — GLYCERIN, PETROLATUM, PHENYLEPHRINE HCL, PRAMOXINE HCL: 144; 2.5; 10; 15 CREAM TOPICAL at 09:54

## 2019-05-02 RX ADMIN — ACYCLOVIR 400 MG: 400 TABLET ORAL at 09:50

## 2019-05-02 RX ADMIN — METRONIDAZOLE 500 MG: 500 TABLET ORAL at 15:44

## 2019-05-02 RX ADMIN — ACYCLOVIR 400 MG: 400 TABLET ORAL at 19:03

## 2019-05-02 RX ADMIN — ACETAMINOPHEN 650 MG: 325 TABLET, FILM COATED ORAL at 22:34

## 2019-05-02 RX ADMIN — MELATONIN TAB 3 MG 3 MG: 3 TAB at 21:47

## 2019-05-02 RX ADMIN — NYSTATIN 500000 UNITS: 100000 SUSPENSION ORAL at 19:03

## 2019-05-02 RX ADMIN — VANCOMYCIN HYDROCHLORIDE 125 MG: KIT at 19:03

## 2019-05-02 RX ADMIN — METRONIDAZOLE 500 MG: 500 TABLET ORAL at 21:47

## 2019-05-02 RX ADMIN — NYSTATIN 500000 UNITS: 100000 SUSPENSION ORAL at 15:44

## 2019-05-02 ASSESSMENT — ACTIVITIES OF DAILY LIVING (ADL)
ADLS_ACUITY_SCORE: 13

## 2019-05-02 NOTE — PLAN OF CARE
Tmax: 100.4, no blood cultures needed as they were drawn last shift. He denies pain, nausea, and SOB. Enteric precautions continued. VSS. Fluids infusing per MAR. Resp. panel results negative.    Heart MUGA test @ 0900 today.   Will continue to monitor/assess. Sleeping in room between cares.

## 2019-05-02 NOTE — PROGRESS NOTES
[facility], [place]  Hematology / Oncology Progress Note    Date of Admission: 4/29/2019  Date of Service (when I saw the patient): 05/02/2019     Assessment & Plan   El Ace is a 63 year old male with AML now in morphologic and immunophenotypic CR s/p decitabine/venetoclax reinduction. He has continued on venetoclax while awaiting transplant (workup due week of 4/29) but was admitted on 4/29 with neutropenic fever.     #Neutropenic fever/SIRS.  Presented to ED on 4/29 after fever 101 at home. In ED  but HD stable. No new focal symptoms. After admission, spiked Tmax 101.5 (4/30 @ 00:26) and subsequently defervesced. Lactic acid 1.5. WBC 0.3. He does not have a central line in place. No known sick contacts. Per his wife, pt did go to [employer] on Sunday (4/28) and wore his mask but has otherwise been pretty sequestered in the house. Did have recurrent temperature on 4/30 evening (Tmax 101) and again 5/1 (Tmax 100.4). Hemorrhoid irritation/pain began overnight 4/30-5/1, slightly improved on 5/2 AM. No induration (though does not have white count to form an abscess) or fissure appreciated on exam. He does report malodorous urine without dysuria today (5/2). Otherwise no new focal sxs.  Tmax 101.7 thus far throughout today.  - Blood cultures (4/29, 4/30, 5/1) remain NGTD  - 4/29 UA/UCx negative. Given new malodorous urine will resend UA/UCx today.   - CXR (4/29) negative  - 5/1 PCR screen for Inf A/B/RSV negative  - 5/1 serum galactomannan and fungitell pending  - CT Chest w/o contrast done 5/1 as part of BMT workup planning. This was negative.   - will consult ID for any additional recs as this is pt is transplant bound. Obtain anaerobic blood culture and add Flagyl 500mg q8hr (x 5/2) for anaerobic coverage.   - check blood CMV and EBV DNA quant  - continue Cefepime (x 4/29 PM)    #ID PPx  - continue ppx ACV and voriconazole  - hold ppx Levaquin as now on Cefepime  - continue oral  vancomycin (per outpt team he was continued on this to prevent recurrence of C.diff while on ppx (and now broad-spectrum) antibiotics). D/w further with ID today, will decrease to BID dosing for maintenance dosing.    #AML (IDH2, RUNX1 positive). Currently in mophologic/immunophenotypic remission.   Refractory to initial induction chemotherapy with 7+3 (D1= 3/15/19). Underwent second induction with Decitabine x 10 days (D1=3/26) and had venetoclax added (3/29). Course was complicated by neutropenic sepsis/septic shock related to odontic infection, C.diff diarrhea and neutropenic colitis, FOREST, and LFT derangement all of which improved. He underwent BMBx on 4/23. This demonstrated morphologic and immunophenotypic remission. He is now planned for BMT workup with goal of allogeneic transplant. His son will be the donor.   - was due to begin transplant workup this week. Outpatient/BMT team aware of pt's admission. Did obtain CT Chest (5/1) and MUGA scan (5/2) inpatient for part of workup. Talked with Rad Onc team as pt was due to meet with their team on 5/2. They will see pt while inpatient (on 5/2).   - pt had been continuing venetoclax 100mg daily (per Dr. Cuadra's d/w Dr. Brown, pt had planned to remain on this through BMT workup to reduce risk of relapse). However, given current complication/admission for neutropenic fever, Dr. Brown is ok to stop venetoclax at this time. Discontinued Venetoclax (as of 4/30).    #Leukopenia/neutropenia  #Anemia  2/2 chemotherapy. Appears Plts have recovered. Likely had drop in Hgb overnight (4/29-4/30) due to dilutional effect from fluids. Hgb stable since.  - transfuse to maintain Hgb >7 (irradiated), Plt >10K    #Hemorrhoids.  Has h/o hemorrhoids. Pt reported having been constipated PTA and taking a laxative at home. He subsequently had several soft stools, which exacerbated his hemorrhoids. No bleeding, diarrhea, or abdominal pain/cramping. Started overnight 4/30-5/1,  "ongoing but slightly better on 5/2 AM.  - Prep H cream, PRN lidocaine gel, TUCKS pads  - 5/2: superficial rectal exam (neutropenia) with large external hemorrhoid; no bleeding, break in skin, or surrounding erythema/induration/fluctuance. Though notably he does not have white cells to form an abscess.     #Subclinical coronary atherosclerosis  #HLD  - on PTA Lipitor, continue     #BPH  - continue PTA Flomax 0.4mg daily    #H/o RLS  - PTA Requip at bedtime PRN    FEN  - S/p 1L NS bolus x 2, continued IVFs with NS @125 ml/hr --> reduced to 75 ml/hr (4/30 afternoon). IVFs stopped on 5/1 as pt was HD stable, feeling better, and taking in PO. With fever overnight and pt feeling more \"listless\" again today, will give 1L NS bolus. Bolus PRN.   - lyte repletion per unit protocol  - regular diet    PPx  - VTE: Lovenox 40mg subcutaneous daily (hold if Plt count <50K)  - GI: PPI    Dispo: Remains inpatient for ongoing management of neutropenic fever. Starting Flagyl for anaerobic coverage. Anticipate discharge back to home pending afebrile 24-48 hrs.    - will work with outpatient team to reschedule BMT workup. Messaged Esequiel Salter (on 5/2) regarding which tests we have completed inpatient.     Discussed with Dr. Thomas, staff attending.    Vee Cabrera PA-C  Heme/Onc  [phone]      Interval History   Did have recurrent temp to 100.4 last evening and overnight. He is feeling more \"listless\" again this morning, resting in bed. Hemorrhoid pain/irritation kept him up again overnight but he thinks the Prep H is helping and he states the pain is a little better today. No increase in stools, no diarrhea. Denies bleeding due to hemorrhoids. Denies abdominal pain or cramping. Denies mouth pain/sores, nausea, or reflux. He does endorse malodorous urine but denies dysuria. He otherwise denies sinus pain or nasal congestion, SOB, or cough.       Physical Exam   Temp: 100.1  F (37.8  C) Temp src: Oral BP: 124/75   Heart Rate: " 102 Resp: 16 SpO2: 99 % O2 Device: None (Room air)    Vitals:    04/29/19 2012 05/01/19 1100 05/02/19 0930   Weight: 91.2 kg (201 lb) 89.4 kg (197 lb 3.2 oz) 89.2 kg (196 lb 11.2 oz)     Vital Signs with Ranges  Temp:  [98  F (36.7  C)-100.4  F (38  C)] 100.1  F (37.8  C)  Heart Rate:  [102-123] 102  Resp:  [16-20] 16  BP: (109-124)/(65-75) 124/75  SpO2:  [97 %-99 %] 99 %  I/O last 3 completed shifts:  In: 840 [P.O.:640; I.V.:200]  Out: -   Constitutional: Pleasant male seen resting in bed. More fatigued appearing compared to yesterday but still non-toxic appearing. NAD.   HEENT: NC/AT. Sclera clear, anicteric. OP pink and moist, no lesions or thrush.  Respiratory: Breathing even and non-labored on RA. Able to sit up for exam. Lungs CTA b/l, no wheezing or crackles.  CV: Mildly tachycardic but regular rhythm, no murmur appreciated.   GI: Normal BS. Abd soft, ND/NT. No rebound or guarding.   : 5/2 superficial rectal exam (neutropenia) with large external hemorrhoid; no bleeding, skin tear, or surrounding erythema, induration or fluctuance.   Skin: Dry, warm, intact. Well healed site of prior PICC line in inner upper right arm. No concerning lesions or rash on exposed surfaces.   Musculoskeletal: Extremities grossly normal in appearance, no edema.   Neurologic: Alert and oriented. Speech normal. Grossly nonfocal.  Neuropsychiatric: Calm, mentation appears normal, answering questions appropriately.         Medications     - MEDICATION INSTRUCTIONS -         sodium chloride 0.9%  1,000 mL Intravenous Once     acyclovir  400 mg Oral BID     atorvastatin  20 mg Oral QPM     ceFEPIme (MAXIPIME) IV  2 g Intravenous Q8H     enoxaparin  40 mg Subcutaneous Q24H     nystatin  500,000 Units Oral 4x Daily     pantoprazole  40 mg Oral BID AC     pramox-pe-glycerin-petrolatum   Rectal TID     tamsulosin  0.4 mg Oral Daily     vancomycin  125 mg Oral 4x Daily     voriconazole  150 mg Oral Q12H ZUNILDA       Data   Results for orders  placed or performed during the hospital encounter of 04/29/19 (from the past 24 hour(s))   Lactic acid level STAT for sepsis protocol   Result Value Ref Range    Lactate for Sepsis Protocol 0.6 (L) 0.7 - 2.0 mmol/L   CT Chest w/o Contrast    Narrative    EXAMINATION: CT CHEST W/O CONTRAST, 5/1/2019 3:33 PM    TECHNIQUE:  Helical CT images from the thoracic inlet through the lung  bases were obtained without IV contrast. Contrast dose: None    COMPARISON: 3/29/2019    HISTORY: BMT (allogeneic transplant) workup    FINDINGS:    Coarse calcification in the anterior left thyroid gland. The central  tracheobronchial tree is patent. Linear atelectasis in the lower  lobes. Focal scarring adjacent to the right minor fissure (series 6  image 135). No pneumothorax or pleural effusion. No suspicious nodules  or masses. Small calcified right lower lobe pulmonary granuloma  (series 6 image 265).    Hypodense blood pool is compatible with the patient's clinical history  of anemia. The heart size is normal. Moderate three-vessel coronary  calcium. Trace anterior pericardial fluid. Normal caliber and  configuration of the thoracic great vessels. No thoracic adenopathy.  Fat-containing right Bochdalek hernia.    Unchanged cysts in the liver. The upper abdomen is otherwise normal.  No worrisome bony or soft tissue lesions. Benign lipoma along the  lateral left chest wall.      Impression    IMPRESSION: No suspicious airspace disease. Moderate three-vessel  coronary calcium.     I have personally reviewed the examination and initial interpretation  and I agree with the findings.    GEORGE DEY MD   Blood culture   Result Value Ref Range    Specimen Description Blood Right Arm     Special Requests Received in aerobic bottle only     Culture Micro No growth after 8 hours    Blood culture   Result Value Ref Range    Specimen Description Blood Right Arm     Special Requests Received in aerobic bottle only     Culture Micro No growth  after 7 hours    CBC with platelets differential   Result Value Ref Range    WBC 0.4 (LL) 4.0 - 11.0 10e9/L    RBC Count 2.40 (L) 4.4 - 5.9 10e12/L    Hemoglobin 7.4 (L) 13.3 - 17.7 g/dL    Hematocrit 22.5 (L) 40.0 - 53.0 %    MCV 94 78 - 100 fl    MCH 30.8 26.5 - 33.0 pg    MCHC 32.9 31.5 - 36.5 g/dL    RDW 16.7 (H) 10.0 - 15.0 %    Platelet Count 458 (H) 150 - 450 10e9/L    Diff Method WBC <0.5, Diff not done    Basic metabolic panel   Result Value Ref Range    Sodium 135 133 - 144 mmol/L    Potassium 3.5 3.4 - 5.3 mmol/L    Chloride 105 94 - 109 mmol/L    Carbon Dioxide 22 20 - 32 mmol/L    Anion Gap 8 3 - 14 mmol/L    Glucose 121 (H) 70 - 99 mg/dL    Urea Nitrogen 9 7 - 30 mg/dL    Creatinine 0.68 0.66 - 1.25 mg/dL    GFR Estimate >90 >60 mL/min/[1.73_m2]    GFR Estimate If Black >90 >60 mL/min/[1.73_m2]    Calcium 8.2 (L) 8.5 - 10.1 mg/dL   Magnesium   Result Value Ref Range    Magnesium 2.2 1.6 - 2.3 mg/dL   Phosphorus   Result Value Ref Range    Phosphorus 3.3 2.5 - 4.5 mg/dL   NM MUGA rest (nuc card)    Narrative    EXAMINATION: NM HEART MUGA REST  Rest MUGA examination of the heart,  5/2/2019 9:45 AM.    INDICATION:  BMT (allogeneic transplant) workup     Additional Information: none    TECHNIQUE:    The patient's red blood cells were labeled with 27 mCi of Tc-99m  pertechnetate. Anterior, FRAN, and left lateral gated views are  obtained of the heart. The left ventricular ejection fraction was  calculated.    FINDINGS:    COMPARISON: None.    The left ventricular ejection fraction is decreased at 43%.    There is normal left ventricular size.  There is normal wall motion of  the left ventricle. The right ventricle is moving normally. The  pulmonary arteries appear normal.    The wall motion on all views appears to be within normal limits.      Impression    IMPRESSION:    1. Decreased left ventricular ejection fraction at 43%.    2. Normal chamber size and wall motion    -----------------------  Normal  Ejection Fraction Value Ranges:  Male LV EF% - 50-78%,   Female LVEF% - 50-87%    SCOTTY SHORE MD         I have seen, interviewed, and examined the patient independently.  I have reviewed the vital signs and labs.  This note reflects my assessment and plan.      Neutropenic fever, no source identified. Feels well, but repeat fever 7 pm and midnight last night. Given plans to proceed with tranplant, will get ID consult for w/u and management. Appreciate their help, will send tests andadjust meds per their recs.    On cefepime, vanco    Have discussed with BMT and AML primaries: will hold venetoclax to let ANC recovery in prep for transplant. Patient is in CR.    Will go ahead and get MUGA (pending)and CT ch (done) for BMT w/u  Rad onc to see in prep for transplant as well.    Ractal pain: monitor closely: this is better, but will consider further w/u for rectal abscess is persists    Carolyn Thomas MD/PhD

## 2019-05-02 NOTE — CONSULTS
[facility]  Transplant Infectious Disease Consult Note:  New Patient     Patient:  El Ace, Date of birth 1955, Medical record number [id number]  Date of Visit:  05/02/2019  Consult requested by Vee Cabrera for evaluation of neutropenic fevers.         Assessment and Recommendations:   Recommendations:  - Add flagyl 500 mg PO or IV TID  - Obtain anaerobic blood cultures  - Check CMV PCR quant and EBV PCR quant blood.  - Change PO vancomycin to prophylactic dose of 125 mg PO BID  - Continue with voriconazole prophylaxis  - Continue with cefepime for neutropenic fevers     Thank you for the consultation. Transplant ID will follow along.     Assessment:  This is a 64 yo male with new diagnosis of AML found incidentally after partial root canal intervention, s/p 7+3 induction on 3/15 and re induction with decitabine/venetoclax who presents with neutropenic fevers. Patient is actively getting w/u for BMT. ID consulted to help with management.     Infectious Disease issues include:  - Neutropenic fevers: Patient with moderate rectal pain. He has previous h/o hemorrhoids. A few days ago, patient was dealing with constipation and was given a laxative which in turn gave him diarrhea, and subsequently hemorrhoidal pain. On examination, anal area is tender to palpation and slightly erythematous, no fissure visualized. Given that this is his only localizing symptom, and fevers persist in spite of cefepime, will add flagyl to cover for possible anorectal infection. Will also obtain anaerobic blood cultures.     Previous ID issues:  - Previous neutropenic fevers from odontogenic source.   - C diff colitis treated with vancomycin PO since 3/26    Other ID issues:   - PCP prophylaxis: None  - Fungal prophylaxis: voriconazole  - Serostatus: CMV neg, EBV pos, HSV pos - ACV prophylaxis  - Immunization status: Needs all pre BMT vaccinations   - Gamma globulin status: Not in records   -  Isolation status:  Good hand hygiene.    Attestation:  I have reviewed today's vital signs, medications, labs and imaging. I personally reviewed the imaging. Reviewed medical history, surgical history, and family history in Epic History tab. No updates needed to be made.     Noemi Garcia MD  Transplant Infectious Diseases   [phone]         History of the Infectious Disease lllness:   This is a 64 yo male with new diagnosis of AML found incidentally after partial root canal intervention, s/p 7+3 induction on 3/15 and re induction with decitabine/venetoclax who presents with neutropenic fevers. Patient is actively getting w/u for BMT. ID consulted to help with management.     Randy is feeling ok today, he has mild HAs (global, achy, 3-5/10) when he has a fevers, but no visual changes, sinus pressure, ear pain, or sore throat. No runny nose, dyspnea, cough, CP, n/v, abdominal pain or urinary symptoms. A few days ago, patient was dealing with constipation and was given a laxative which in turn gave him diarrhea, and subsequently hemorrhoidal pain.     He has been on and off febrile since admission. He was started on cefepime. BCx negative to date. Ct chest done for BMT w/u was negative. UA/UCx negative. BDG and fungitell pending.       Transplants:  N/A     Review of Systems:   ROS: 10 point ROS neg other than the symptoms noted above in the HPI.    Past Medical History:   Diagnosis Date     Acute leukemia (H) 3/12/2019       Past Surgical History:   Procedure Laterality Date     PICC INSERTION Right 03/14/2019    5Fr - 42cm (2cm external), basilic vein, high SVC     Family History:  History reviewed. No pertinent family history.    Social History     Social History Narrative     Not on file     Social History     Tobacco Use     Smoking status: Never Smoker     Smokeless tobacco: Former User   Substance Use Topics     Alcohol use: None     Drug use: None       Immunization History   Administered Date(s)  Administered     DTaP, Unspecified 05/12/2010, 06/16/2012     FLU 6-35 months 12/18/2006     Influenza (H1N1) 12/11/2009     Influenza (IIV3) PF 12/18/2006, 11/21/2008, 11/05/2010, 11/15/2013     Influenza Vaccine IM 3yrs+ 4 Valent IIV4 10/15/2014, 12/05/2016     Influenza Vaccine, 3 YRS +, IM (QUADRIVALENT W/PRESERVATIVES) 10/02/2018       Patient Active Problem List   Diagnosis     Acute leukemia (H)     Acute myeloid leukemia not having achieved remission (H)     Septic shock (H)     Primary osteoarthritis of left hip     Generalized anxiety disorder     Family history of malignant neoplasm of gastrointestinal tract     Esophageal reflux     Dysthymic disorder     Attention deficit hyperactivity disorder (ADHD)     Neutropenia with fever (H)       Current Facility-Administered Medications   Medication     acetaminophen (TYLENOL) tablet 650 mg     acyclovir (ZOVIRAX) tablet 400 mg     atorvastatin (LIPITOR) tablet 20 mg     ceFEPIme (MAXIPIME) 2 g vial to attach to  ml bag for ADULTS or 50 ml bag for PEDS     enoxaparin (LOVENOX) injection 40 mg     lidocaine (XYLOCAINE) 2 % external gel     magnesium sulfate 4 g in 100 mL sterile water (premade)     Medication Instruction     melatonin tablet 3 mg     metroNIDAZOLE (FLAGYL) tablet 500 mg     nystatin (MYCOSTATIN) suspension 500,000 Units     ondansetron (ZOFRAN) tablet 8 mg     pantoprazole (PROTONIX) EC tablet 40 mg     potassium chloride (KLOR-CON) Packet 20-40 mEq     potassium chloride 10 mEq in 100 mL intermittent infusion with 10 mg lidocaine     potassium chloride 10 mEq in 100 mL sterile water intermittent infusion (premix)     potassium chloride 20 mEq in 50 mL intermittent infusion     potassium chloride ER (K-DUR/KLOR-CON M) CR tablet 20-40 mEq     potassium phosphate 15 mmol in D5W 250 mL intermittent infusion     potassium phosphate 20 mmol in D5W 250 mL intermittent infusion     potassium phosphate 20 mmol in D5W 500 mL intermittent infusion      potassium phosphate 25 mmol in D5W 500 mL intermittent infusion     pramox-pe-glycerin-petrolatum (PREPARATION H) cream     prochlorperazine (COMPAZINE) tablet 5-10 mg     rOPINIRole (REQUIP) tablet 0.75 mg     sennosides (SENOKOT) tablet 1-2 tablet     tamsulosin (FLOMAX) capsule 0.4 mg     vancomycin (FIRVANQ) oral solution 125 mg     voriconazole (VFEND) tablet 150 mg     witch hazel-glycerin (TUCKS) pad       Allergies   Allergen Reactions     Lactose GI Disturbance              Physical Exam:   Vitals were reviewed.  All vitals stable  /66 (BP Location: Right arm)   Pulse 101   Temp 100.8  F (38.2  C) (Oral)   Resp 18   Wt 89.2 kg (196 lb 11.2 oz)   SpO2 98%   BMI 28.22 kg/m      Exam:  GENERAL:  well-developed, well-nourished, alert, oriented, in no acute distress.  HEENT:  Head is normocephalic, atraumatic   EYES:  Eyes have anicteric sclerae. Conjunctiva normal.   ENT:  Oropharynx is moist without exudates or ulcers.  NECK:  Supple. No LAD  LUNGS:  Clear to auscultation. No wheezes.   CARDIOVASCULAR:  Regular rate and rhythm with no murmurs, gallops or rubs.  ABDOMEN:  Normal bowel sounds, soft, nontender.   SKIN:  No acute rashes. Perianal area slightly red, big hemorrhoid visualized, tender to palpation.   NEUROLOGIC:  Grossly nonfocal.         Laboratory Data:     Metabolic Studies    Recent Labs   Lab Test 05/02/19  0545 05/01/19  0522 04/30/19  0529  04/29/19 2032 04/12/19  0444    138 138  --   --    < > 136   POTASSIUM 3.5 3.7 3.9  --   --    < > 4.1   CHLORIDE 105 106 106  --   --    < > 105   CO2 22 24 22  --   --    < > 23   ANIONGAP 8 8 10  --   --    < > 8   BUN 9 7 8  --   --    < > 14   CR 0.68 0.67 0.67   < >  --    < > 0.68   GFRESTIMATED >90 >90 >90   < >  --    < > >90   * 107* 102*  --   --    < > 103*   DEBBIE 8.2* 8.0* 7.8*  --   --    < > 8.6   PHOS 3.3  --  3.3  --   --    < > 4.3   MAG 2.2  --  2.1  --   --    < > 2.1   URIC  --   --   --   --   --   --   1.6*   LACT  --   --   --   --  1.2   < >  --     < > = values in this interval not displayed.       Hepatic Studies    Recent Labs   Lab Test 04/30/19  0529 04/26/19  1324 04/23/19  0854  04/08/19  0352  04/05/19  0312   BILITOTAL 0.8 0.5 0.5   < > 1.8*   < > 1.8*   DBIL 0.3*  --   --    < > 0.8*   < > 1.5*   ALKPHOS 189* 232* 220*   < > 85   < > 75   PROTTOTAL 6.2* 6.6* 6.9   < > 5.3*   < > 5.6*   ALBUMIN 2.6* 3.1* 3.3*   < > 2.1*   < > 2.0*   AST 17 18 19   < > 42   < > 422*   ALT 28 33 38   < > 117*   < > 302*   LDH  --   --   --   --  215  --  344*    < > = values in this interval not displayed.       Hematology Studies     Recent Labs   Lab Test 05/02/19  0545 05/01/19  0522 04/30/19  0529  04/29/19  2030  04/23/19  0854 04/15/19  0413  04/13/19  0401   WBC 0.4* 0.3* 0.4*  --  0.3*   < > 0.9* 2.2*   < > 2.4*   ABLA  --   --   --   --   --   --   --   --   --  0.1*   BLST  --   --   --   --   --   --   --   --   --  5.4   ANEU  --   --   --   --   --   --  0.3* 0.9*   < > 1.0*   ALYM  --   --   --   --   --   --  0.6* 1.3   < > 1.2   TRACIE  --   --   --   --   --   --  0.0 0.0   < > 0.0   AEOS  --   --   --   --   --   --  0.0 0.0   < > 0.0   HGB 7.4* 7.2* 7.3*  --  8.1*   < > 8.4* 8.7*   < > 7.7*   HCT 22.5* 22.5* 22.6*  --  24.6*   < > 25.0* 26.6*   < > 23.2*   * 393 348   < > 481*   < > 201 11*   < > 23*    < > = values in this interval not displayed.       Urine Studies     Recent Labs   Lab Test 04/29/19  2149 04/04/19  0523 03/30/19  2206 03/22/19  0945   URINEPH 7.0 6.0 6.5 7.5*   NITRITE Negative Negative Negative Negative   LEUKEST Negative Negative Negative Negative   WBCU 1 4 <1 2       Microbiology:  Last 6 Culture results with specimen source  Culture Micro   Date Value Ref Range Status   05/01/2019 No growth after 7 hours  Preliminary   05/01/2019 No growth after 8 hours  Preliminary   04/30/2019 No growth after 2 days  Preliminary   04/29/2019 No growth  Final   04/29/2019 No growth after 3  days  Preliminary   04/29/2019 No growth after 3 days  Preliminary    Specimen Description   Date Value Ref Range Status   05/01/2019 Blood Right Arm  Final   05/01/2019 Blood Right Arm  Final   05/01/2019 Nasopharyngeal  Final   04/30/2019 Blood Right Arm  Final   04/29/2019 Midstream Urine  Final   04/29/2019 Blood Right Arm  Final          Last check of C difficile  C Diff Toxin B PCR   Date Value Ref Range Status   03/26/2019 Positive (A) NEG^Negative Final     Comment:     Positive: Toxin producing Clostridium difficile target DNA sequences detected,   presumed positive for Clostridium difficile toxin B.  Clostridium difficile (Requires Enteric Isolation)  FDA approved assay performed using [employer] GeneXpert real-time PCR.  Critical Value/Significant Value called to and read back by  JATINDER KAUR RN [id number] 3.26.19 ND         Virology:  CMV viral loads    Recent Labs   Lab Test 03/31/19  0626   CSPEC Plasma   CMVLOG Not Calculated       CMV viral loads    Log IU/mL of CMVQNT   Date Value Ref Range Status   03/31/2019 Not Calculated <2.1 [Log_IU]/mL Final       EBV viral loads  EBV DNA Copies/mL   Date Value Ref Range Status   03/31/2019 1,186 (A) EBVNEG^EBV DNA Not Detected [Copies]/mL Final       Imaging:  Results for orders placed or performed during the hospital encounter of 04/29/19   XR Chest 2 Views    Narrative    EXAM: XR CHEST 2 VW  4/29/2019 10:04 PM     HISTORY:  fever       COMPARISON: Chest radiograph 4/4/2019    FINDINGS: PA and lateral views of chest. Heart size is normal. Midline  trachea. No pneumothorax or pleural effusion. On the lateral  projection, there is mild bulging in the posterior aspect of the  diaphragm which is consistent with patient's known small  fat-containing hernia through the posterior diaphragma , seen on CT  dated 3/29/2019.      Impression    IMPRESSION: No acute finding in the chest.    I have personally reviewed the examination and initial interpretation  and I agree  with the findings.    ROSA SPAULDING MD   NM MUGA rest (nuc card)    Narrative    EXAMINATION: NM HEART MUGA REST  Rest MUGA examination of the heart,  5/2/2019 9:45 AM.    INDICATION:  BMT (allogeneic transplant) workup     Additional Information: none    TECHNIQUE:    The patient's red blood cells were labeled with 27 mCi of Tc-99m  pertechnetate. Anterior, FRAN, and left lateral gated views are  obtained of the heart. The left ventricular ejection fraction was  calculated.    FINDINGS:    COMPARISON: None.    The left ventricular ejection fraction is decreased at 43%.    There is normal left ventricular size.  There is normal wall motion of  the left ventricle. The right ventricle is moving normally. The  pulmonary arteries appear normal.    The wall motion on all views appears to be within normal limits.      Impression    IMPRESSION:    1. Decreased left ventricular ejection fraction at 43%.    2. Normal chamber size and wall motion    -----------------------  Normal Ejection Fraction Value Ranges:  Male LV EF% - 50-78%,   Female LVEF% - 50-87%    SCOTTY SHORE MD   CT Chest w/o Contrast    Narrative    EXAMINATION: CT CHEST W/O CONTRAST, 5/1/2019 3:33 PM    TECHNIQUE:  Helical CT images from the thoracic inlet through the lung  bases were obtained without IV contrast. Contrast dose: None    COMPARISON: 3/29/2019    HISTORY: BMT (allogeneic transplant) workup    FINDINGS:    Coarse calcification in the anterior left thyroid gland. The central  tracheobronchial tree is patent. Linear atelectasis in the lower  lobes. Focal scarring adjacent to the right minor fissure (series 6  image 135). No pneumothorax or pleural effusion. No suspicious nodules  or masses. Small calcified right lower lobe pulmonary granuloma  (series 6 image 265).    Hypodense blood pool is compatible with the patient's clinical history  of anemia. The heart size is normal. Moderate three-vessel coronary  calcium. Trace anterior  pericardial fluid. Normal caliber and  configuration of the thoracic great vessels. No thoracic adenopathy.  Fat-containing right Bochdalek hernia.    Unchanged cysts in the liver. The upper abdomen is otherwise normal.  No worrisome bony or soft tissue lesions. Benign lipoma along the  lateral left chest wall.      Impression    IMPRESSION: No suspicious airspace disease. Moderate three-vessel  coronary calcium.     I have personally reviewed the examination and initial interpretation  and I agree with the findings.    GEORGE DEY MD

## 2019-05-02 NOTE — LETTER
5/2/2019       RE: El Ace  [address] [place] [id number]     Dear Colleague,    Thank you for referring your patient, El Ace, to the RADIATION ONCOLOGY CLINIC. Please see a copy of my visit note below.    Radiation Oncology Consult  Patient comes in for initial consultation in the Radiation Oncology Department at the request of Dr. Cuadra to determine eligibility for TBI prior to transplant.     History of Present Illness:  Randy is a pleasant 63 year old male in no acute distress and is accompanied by his wife, Bessy.  He initially presented with chest pain after having a root canal on 3.11.2019.  He had leukocytosis with peripheral blasts at 96%.  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  He started induction chemotherapy with 7 + 3 on 3/15/19. Bone marrow showed no response to treatment at 98% blasts.  On 3/26/19 he started salvage with decitabine and venetoclax.  On 4/23/19 bone marrow biopsy showed 40-50% cellular without blasts and flow was also negative. He feels he tolerated treatment fairly well.  He is currently admitted with fevers and neutropenia.  He has been maintained on venetoclax until Monday.    His son will be his donor.        Previous Radiation:  None    Previous Chemotherapy:  As above per HPI.      Pregnant: Not Applicable  No results found for: HCGQUANT  Implanted Cardiac Devices: No    Medications:  No current facility-administered medications for this visit.      No current outpatient medications on file.     Facility-Administered Medications Ordered in Other Visits   Medication     acetaminophen (TYLENOL) tablet 650 mg     acyclovir (ZOVIRAX) tablet 400 mg     atorvastatin (LIPITOR) tablet 20 mg     ceFEPIme (MAXIPIME) 2 g vial to attach to  ml bag for ADULTS or 50 ml bag for PEDS     enoxaparin (LOVENOX) injection 40 mg     lidocaine (XYLOCAINE) 2 % external gel     magnesium sulfate 4  g in 100 mL sterile water (premade)     Medication Instruction     melatonin tablet 3 mg     metroNIDAZOLE (FLAGYL) tablet 500 mg     nystatin (MYCOSTATIN) suspension 500,000 Units     ondansetron (ZOFRAN) tablet 8 mg     pantoprazole (PROTONIX) EC tablet 40 mg     potassium chloride (KLOR-CON) Packet 20-40 mEq     potassium chloride 10 mEq in 100 mL intermittent infusion with 10 mg lidocaine     potassium chloride 10 mEq in 100 mL sterile water intermittent infusion (premix)     potassium chloride 20 mEq in 50 mL intermittent infusion     potassium chloride ER (K-DUR/KLOR-CON M) CR tablet 20-40 mEq     potassium phosphate 15 mmol in D5W 250 mL intermittent infusion     potassium phosphate 20 mmol in D5W 250 mL intermittent infusion     potassium phosphate 20 mmol in D5W 500 mL intermittent infusion     potassium phosphate 25 mmol in D5W 500 mL intermittent infusion     pramox-pe-glycerin-petrolatum (PREPARATION H) cream     prochlorperazine (COMPAZINE) tablet 5-10 mg     rOPINIRole (REQUIP) tablet 0.75 mg     sennosides (SENOKOT) tablet 1-2 tablet     tamsulosin (FLOMAX) capsule 0.4 mg     vancomycin (FIRVANQ) oral solution 125 mg     voriconazole (VFEND) tablet 150 mg     witch hazel-glycerin (TUCKS) pad       Allergies:     Allergies   Allergen Reactions     Lactose GI Disturbance       Past Medical History:  Past Medical History:   Diagnosis Date     Acute leukemia (H) 3/12/2019       Past Surgical History:  Past Surgical History:   Procedure Laterality Date     ORTHOPEDIC SURGERY  1975    back and knee     PICC INSERTION Right 03/14/2019    5Fr - 42cm (2cm external), basilic vein, high SVC       Family History:  family history includes Cerebrovascular Disease in his mother; Colon Cancer in his mother; Septicemia in his father.        Social History:  Patient is  and lives in [place], [place].  Employed as a .  Has 3 children.    Pain Assessment 0-10:  Patient rates pain at a 0.    Review of  Symptoms:  Constitutional: Positive for fever, malaise/fatigue.     HENT: Negative for nosebleeds, congestion, sore throat and neck pain.   Respiratory: Negative for cough, hemoptysis, sputum production, shortness of breath and wheezing.   Cardiovascular: Negative for chest pain, palpitations, claudication, leg swelling and PND.   Gastrointestinal: Negative for heartburn, nausea, vomiting, abdominal pain, diarrhea, constipation and blood in stool.   Genitourinary: Negative for dysuria.   Musculoskeletal: Negative for myalgias and joint pain.   Skin: Negative for itching and rash.   Neurological: Negative for dizziness, tingling, weakness and headaches.   Endo/Heme/Allergies: Does not bruise/bleed easily.   Psychiatric/Behavioral: Negative for depression and suicidal ideas. The patient is not nervous/anxious.     Physical Exam:  GENERAL: Well-developed, well-nourished, globally oriented.   HEENT: Normocephalic, atraumatic. Oral cavity and oropharynx without mucosal lesions.   NECK: Supple, full range of motion, no palpable cervical or supraclavicular lymphadenopathy.   LUNGS: Clear to auscultation bilaterally.   CARDIOVASCULAR: Regular rate and rhythm without murmur.   ABDOMEN: Soft, nondistended, nontender, no hepatosplenomegaly.  EXTREMITIES: Without cyanosis, clubbing or edema. .   LYMPHATICS: No palpable supraclavicular, axillary, inguinal, femoral adenopathy.   NEUROLOGIC: Alert and oriented.  Gait normal.     Labs:  CBC RESULTS:   Recent Labs   Lab Test 05/02/19  0545   WBC 0.4*   RBC 2.40*   HGB 7.4*   HCT 22.5*   MCV 94   MCH 30.8   MCHC 32.9   RDW 16.7*   *       All pertinent labs, scans, and test results have been reviewed.    EDUCATION:  Patient given verbal and written education on TBI side effects, purpose of treatment and what the radiation experience will be like.  Patient expressed understanding and asked appropriate questions.        Assessment/Plan: AML  Patient currently undergoing work-up  for haplo son NMA transplant per protocol 2016-15 which calls for  cGy (200cGy daily x 2).        STAFF RADIATION ONCOLOGIST    I saw the patient who appears to be a suitable candidate for TBI prior to hematopoietic transplant per protocol.  Conditioning for this protocol is nonmyeloablative and includes chemotherapy and total body irradiation given in 2 treatments for a total dose of  400 cGy.      The total body will be treated with patient in incumbent position with compensators.    Patient has no radiation history.    Risks and benefits of TBI were discussed including the potential short term side effects but not limited to nausea, vomiting, mucositis, alopecia, and potential organ damage.  Also discussed potential long term side effects including but not limited to cataracts, sterility, chronic organ dysfunction, neurological effects, hormone insufficiencies, and secondary malignancies.    Patient and family were given a chance to ask questions.  They seem to understand risks and benefits of radiation conditioning prior to transplant.  Informed consent was obtained.  Simulation and measurements were performed under my direction.    If you have any questions or concerns please do not hesitate to contact me. Patient will be followed by BMT staff after transplant.    Angélica Wilson  [phone] pager   Department of Radiation Oncology  [facility]    CC  Patient Care Team:  Bre Vizcaino MD as PCP - General (Speciality Unknown)  Rivera Cuadra MD as Referring Physician (Internal Medicine - Hematology)  Mike Coronado MD as BMT Physician (BMT - Adult)  Soto Brown MD as BMT Physician (BMT - Adult)

## 2019-05-02 NOTE — PROGRESS NOTES
Radiation Oncology Consult  Patient comes in for initial consultation in the Radiation Oncology Department at the request of Dr. Cuadra to determine eligibility for TBI prior to transplant.     History of Present Illness:  Randy is a pleasant 63 year old male in no acute distress and is accompanied by his wife, Bessy.  He initially presented with chest pain after having a root canal on 3.11.2019.  He had leukocytosis with peripheral blasts at 96%.  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  He started induction chemotherapy with 7 + 3 on 3/15/19. Bone marrow showed no response to treatment at 98% blasts.  On 3/26/19 he started salvage with decitabine and venetoclax.  On 4/23/19 bone marrow biopsy showed 40-50% cellular without blasts and flow was also negative. He feels he tolerated treatment fairly well.  He is currently admitted with fevers and neutropenia.  He has been maintained on venetoclax until Monday.    His son will be his donor.        Previous Radiation:  None    Previous Chemotherapy:  As above per HPI.      Pregnant: Not Applicable  No results found for: HCGQUANT  Implanted Cardiac Devices: No    Medications:  No current facility-administered medications for this visit.      No current outpatient medications on file.     Facility-Administered Medications Ordered in Other Visits   Medication     acetaminophen (TYLENOL) tablet 650 mg     acyclovir (ZOVIRAX) tablet 400 mg     atorvastatin (LIPITOR) tablet 20 mg     ceFEPIme (MAXIPIME) 2 g vial to attach to  ml bag for ADULTS or 50 ml bag for PEDS     enoxaparin (LOVENOX) injection 40 mg     lidocaine (XYLOCAINE) 2 % external gel     magnesium sulfate 4 g in 100 mL sterile water (premade)     Medication Instruction     melatonin tablet 3 mg     metroNIDAZOLE (FLAGYL) tablet 500 mg     nystatin (MYCOSTATIN) suspension 500,000 Units     ondansetron (ZOFRAN) tablet 8 mg      pantoprazole (PROTONIX) EC tablet 40 mg     potassium chloride (KLOR-CON) Packet 20-40 mEq     potassium chloride 10 mEq in 100 mL intermittent infusion with 10 mg lidocaine     potassium chloride 10 mEq in 100 mL sterile water intermittent infusion (premix)     potassium chloride 20 mEq in 50 mL intermittent infusion     potassium chloride ER (K-DUR/KLOR-CON M) CR tablet 20-40 mEq     potassium phosphate 15 mmol in D5W 250 mL intermittent infusion     potassium phosphate 20 mmol in D5W 250 mL intermittent infusion     potassium phosphate 20 mmol in D5W 500 mL intermittent infusion     potassium phosphate 25 mmol in D5W 500 mL intermittent infusion     pramox-pe-glycerin-petrolatum (PREPARATION H) cream     prochlorperazine (COMPAZINE) tablet 5-10 mg     rOPINIRole (REQUIP) tablet 0.75 mg     sennosides (SENOKOT) tablet 1-2 tablet     tamsulosin (FLOMAX) capsule 0.4 mg     vancomycin (FIRVANQ) oral solution 125 mg     voriconazole (VFEND) tablet 150 mg     witch hazel-glycerin (TUCKS) pad       Allergies:     Allergies   Allergen Reactions     Lactose GI Disturbance       Past Medical History:  Past Medical History:   Diagnosis Date     Acute leukemia (H) 3/12/2019       Past Surgical History:  Past Surgical History:   Procedure Laterality Date     ORTHOPEDIC SURGERY  1975    back and knee     PICC INSERTION Right 03/14/2019    5Fr - 42cm (2cm external), basilic vein, high SVC       Family History:  family history includes Cerebrovascular Disease in his mother; Colon Cancer in his mother; Septicemia in his father.        Social History:  Patient is  and lives in [place], [place].  Employed as a .  Has 3 children.    Pain Assessment 0-10:  Patient rates pain at a 0.    Review of Symptoms:  Constitutional: Positive for fever, malaise/fatigue.     HENT: Negative for nosebleeds, congestion, sore throat and neck pain.   Respiratory: Negative for cough, hemoptysis, sputum production, shortness of  breath and wheezing.   Cardiovascular: Negative for chest pain, palpitations, claudication, leg swelling and PND.   Gastrointestinal: Negative for heartburn, nausea, vomiting, abdominal pain, diarrhea, constipation and blood in stool.   Genitourinary: Negative for dysuria.   Musculoskeletal: Negative for myalgias and joint pain.   Skin: Negative for itching and rash.   Neurological: Negative for dizziness, tingling, weakness and headaches.   Endo/Heme/Allergies: Does not bruise/bleed easily.   Psychiatric/Behavioral: Negative for depression and suicidal ideas. The patient is not nervous/anxious.     Physical Exam:  GENERAL: Well-developed, well-nourished, globally oriented.   HEENT: Normocephalic, atraumatic. Oral cavity and oropharynx without mucosal lesions.   NECK: Supple, full range of motion, no palpable cervical or supraclavicular lymphadenopathy.   LUNGS: Clear to auscultation bilaterally.   CARDIOVASCULAR: Regular rate and rhythm without murmur.   ABDOMEN: Soft, nondistended, nontender, no hepatosplenomegaly.  EXTREMITIES: Without cyanosis, clubbing or edema. .   LYMPHATICS: No palpable supraclavicular, axillary, inguinal, femoral adenopathy.   NEUROLOGIC: Alert and oriented.  Gait normal.     Labs:  CBC RESULTS:   Recent Labs   Lab Test 05/02/19  0545   WBC 0.4*   RBC 2.40*   HGB 7.4*   HCT 22.5*   MCV 94   MCH 30.8   MCHC 32.9   RDW 16.7*   *       All pertinent labs, scans, and test results have been reviewed.    EDUCATION:  Patient given verbal and written education on TBI side effects, purpose of treatment and what the radiation experience will be like.  Patient expressed understanding and asked appropriate questions.        Assessment/Plan: AML  Patient currently undergoing work-up for haplo son NMA transplant per protocol 2016-15 which calls for  cGy (200cGy daily x 2).        STAFF RADIATION ONCOLOGIST    I saw the patient who appears to be a suitable candidate for TBI prior to  hematopoietic transplant per protocol.  Conditioning for this protocol is nonmyeloablative and includes chemotherapy and total body irradiation given in 2 treatments for a total dose of  400 cGy.      The total body will be treated with patient in incumbent position with compensators.    Patient has no radiation history.    Risks and benefits of TBI were discussed including the potential short term side effects but not limited to nausea, vomiting, mucositis, alopecia, and potential organ damage.  Also discussed potential long term side effects including but not limited to cataracts, sterility, chronic organ dysfunction, neurological effects, hormone insufficiencies, and secondary malignancies.    Patient and family were given a chance to ask questions.  They seem to understand risks and benefits of radiation conditioning prior to transplant.  Informed consent was obtained.  Simulation and measurements were performed under my direction.    If you have any questions or concerns please do not hesitate to contact me. Patient will be followed by BMT staff after transplant.    Angélica Wilson  [phone] pager   Department of Radiation Oncology  [facility]    CC  Patient Care Team:  Bre Vizcaino MD as PCP - General (Speciality Unknown)  Rivera Cuadra MD as Referring Physician (Internal Medicine - Hematology)  Mike Coronado MD as BMT Physician (BMT - Adult)  Soto Mccollum MD as BMT Physician (BMT - Adult)  SOTO MCCOLLUM

## 2019-05-02 NOTE — PLAN OF CARE
Pt alert and oriented x4. Pt TMAX 101.6. Tylenol given x2. BC completed. Pt denied pain and nausea. Pt continuing to use cream for hemorids in bathroom. Pt reporting fair appetite. Pt had MUGA scan completed during the day. Continue to monitor.

## 2019-05-02 NOTE — PLAN OF CARE
Tmax 101.7. Sepsis triggered and LA 0.5. UA/UC sent new BC ordered, per ID. Bolus given for fever. C/o Hemorid pain, but improving. Also stating that he is not sleeping very well. MUGA scan complete.  BMT consult at 1400 downstairs. Famly at bedside. Continue to monitor.

## 2019-05-03 LAB
1,3 BETA GLUCAN SER-MCNC: 52 PG/ML
ABO + RH BLD: NORMAL
ABO + RH BLD: NORMAL
ANION GAP SERPL CALCULATED.3IONS-SCNC: 8 MMOL/L (ref 3–14)
ANISOCYTOSIS BLD QL SMEAR: ABNORMAL
B-D GLUCAN INTERPRETATION (1,3): NEGATIVE
BACTERIA SPEC CULT: NO GROWTH
BASOPHILS # BLD AUTO: 0 10E9/L (ref 0–0.2)
BASOPHILS NFR BLD AUTO: 0 %
BLD GP AB SCN SERPL QL: NORMAL
BLD PROD TYP BPU: NORMAL
BLD PROD TYP BPU: NORMAL
BLD UNIT ID BPU: 0
BLOOD BANK CMNT PATIENT-IMP: NORMAL
BLOOD PRODUCT CODE: NORMAL
BPU ID: NORMAL
BUN SERPL-MCNC: 9 MG/DL (ref 7–30)
CALCIUM SERPL-MCNC: 8.4 MG/DL (ref 8.5–10.1)
CHLORIDE SERPL-SCNC: 106 MMOL/L (ref 94–109)
CMV DNA SPEC NAA+PROBE-ACNC: NORMAL [IU]/ML
CMV DNA SPEC NAA+PROBE-LOG#: NORMAL {LOG_IU}/ML
CO2 SERPL-SCNC: 23 MMOL/L (ref 20–32)
CREAT SERPL-MCNC: 0.66 MG/DL (ref 0.66–1.25)
DIFFERENTIAL METHOD BLD: ABNORMAL
EOSINOPHIL # BLD AUTO: 0 10E9/L (ref 0–0.7)
EOSINOPHIL NFR BLD AUTO: 0 %
ERYTHROCYTE [DISTWIDTH] IN BLOOD BY AUTOMATED COUNT: 16.9 % (ref 10–15)
GFR SERPL CREATININE-BSD FRML MDRD: >90 ML/MIN/{1.73_M2}
GLUCOSE SERPL-MCNC: 110 MG/DL (ref 70–99)
HCT VFR BLD AUTO: 21 % (ref 40–53)
HGB BLD-MCNC: 6.6 G/DL (ref 13.3–17.7)
LACTATE BLD-SCNC: 0.8 MMOL/L (ref 0.7–2)
LYMPHOCYTES # BLD AUTO: 0.3 10E9/L (ref 0.8–5.3)
LYMPHOCYTES NFR BLD AUTO: 54.1 %
Lab: NORMAL
MACROCYTES BLD QL SMEAR: PRESENT
MCH RBC QN AUTO: 30.6 PG (ref 26.5–33)
MCHC RBC AUTO-ENTMCNC: 31.4 G/DL (ref 31.5–36.5)
MCV RBC AUTO: 97 FL (ref 78–100)
MICROCYTES BLD QL SMEAR: PRESENT
MONOCYTES # BLD AUTO: 0 10E9/L (ref 0–1.3)
MONOCYTES NFR BLD AUTO: 1.7 %
NEUTROPHILS # BLD AUTO: 0.2 10E9/L (ref 1.6–8.3)
NEUTROPHILS NFR BLD AUTO: 44.2 %
NUM BPU REQUESTED: 1
OVALOCYTES BLD QL SMEAR: SLIGHT
PLATELET # BLD AUTO: 454 10E9/L (ref 150–450)
POIKILOCYTOSIS BLD QL SMEAR: SLIGHT
POTASSIUM SERPL-SCNC: 3.4 MMOL/L (ref 3.4–5.3)
RBC # BLD AUTO: 2.16 10E12/L (ref 4.4–5.9)
SODIUM SERPL-SCNC: 136 MMOL/L (ref 133–144)
SPECIMEN EXP DATE BLD: NORMAL
SPECIMEN SOURCE: NORMAL
SPECIMEN SOURCE: NORMAL
TRANSFUSION STATUS PATIENT QL: NORMAL
TRANSFUSION STATUS PATIENT QL: NORMAL
WBC # BLD AUTO: 0.5 10E9/L (ref 4–11)

## 2019-05-03 PROCEDURE — 25000128 H RX IP 250 OP 636: Performed by: INTERNAL MEDICINE

## 2019-05-03 PROCEDURE — 99232 SBSQ HOSP IP/OBS MODERATE 35: CPT | Performed by: INTERNAL MEDICINE

## 2019-05-03 PROCEDURE — 12000001 ZZH R&B MED SURG/OB UMMC

## 2019-05-03 PROCEDURE — 80048 BASIC METABOLIC PNL TOTAL CA: CPT | Performed by: INTERNAL MEDICINE

## 2019-05-03 PROCEDURE — P9040 RBC LEUKOREDUCED IRRADIATED: HCPCS | Performed by: INTERNAL MEDICINE

## 2019-05-03 PROCEDURE — 36415 COLL VENOUS BLD VENIPUNCTURE: CPT | Performed by: INTERNAL MEDICINE

## 2019-05-03 PROCEDURE — 86923 COMPATIBILITY TEST ELECTRIC: CPT | Performed by: INTERNAL MEDICINE

## 2019-05-03 PROCEDURE — 87040 BLOOD CULTURE FOR BACTERIA: CPT | Performed by: INTERNAL MEDICINE

## 2019-05-03 PROCEDURE — 85025 COMPLETE CBC W/AUTO DIFF WBC: CPT | Performed by: INTERNAL MEDICINE

## 2019-05-03 PROCEDURE — 86850 RBC ANTIBODY SCREEN: CPT | Performed by: INTERNAL MEDICINE

## 2019-05-03 PROCEDURE — 25000132 ZZH RX MED GY IP 250 OP 250 PS 637: Performed by: INTERNAL MEDICINE

## 2019-05-03 PROCEDURE — 87040 BLOOD CULTURE FOR BACTERIA: CPT | Performed by: PHYSICIAN ASSISTANT

## 2019-05-03 PROCEDURE — 87186 SC STD MICRODIL/AGAR DIL: CPT | Performed by: INTERNAL MEDICINE

## 2019-05-03 PROCEDURE — 40000141 ZZH STATISTIC PERIPHERAL IV START W/O US GUIDANCE

## 2019-05-03 PROCEDURE — 25000132 ZZH RX MED GY IP 250 OP 250 PS 637: Performed by: NURSE PRACTITIONER

## 2019-05-03 PROCEDURE — 25000132 ZZH RX MED GY IP 250 OP 250 PS 637: Performed by: PHYSICIAN ASSISTANT

## 2019-05-03 PROCEDURE — 83605 ASSAY OF LACTIC ACID: CPT | Performed by: NURSE PRACTITIONER

## 2019-05-03 PROCEDURE — 87800 DETECT AGNT MULT DNA DIREC: CPT | Performed by: INTERNAL MEDICINE

## 2019-05-03 PROCEDURE — 86900 BLOOD TYPING SEROLOGIC ABO: CPT | Performed by: INTERNAL MEDICINE

## 2019-05-03 PROCEDURE — 86901 BLOOD TYPING SEROLOGIC RH(D): CPT | Performed by: INTERNAL MEDICINE

## 2019-05-03 PROCEDURE — 87077 CULTURE AEROBIC IDENTIFY: CPT | Performed by: INTERNAL MEDICINE

## 2019-05-03 RX ORDER — NYSTATIN 100000/ML
500000 SUSPENSION, ORAL (FINAL DOSE FORM) ORAL
Status: DISCONTINUED | OUTPATIENT
Start: 2019-05-03 | End: 2019-05-07 | Stop reason: HOSPADM

## 2019-05-03 RX ADMIN — GLYCERIN, PETROLATUM, PHENYLEPHRINE HCL, PRAMOXINE HCL: 144; 2.5; 10; 15 CREAM TOPICAL at 13:39

## 2019-05-03 RX ADMIN — ACETAMINOPHEN 650 MG: 325 TABLET, FILM COATED ORAL at 11:21

## 2019-05-03 RX ADMIN — WITCH HAZEL: 500 SOLUTION RECTAL; TOPICAL at 08:26

## 2019-05-03 RX ADMIN — CEFEPIME 2 G: 2 INJECTION, POWDER, FOR SOLUTION INTRAVENOUS at 06:30

## 2019-05-03 RX ADMIN — CEFEPIME 2 G: 2 INJECTION, POWDER, FOR SOLUTION INTRAVENOUS at 13:39

## 2019-05-03 RX ADMIN — GLYCERIN, PETROLATUM, PHENYLEPHRINE HCL, PRAMOXINE HCL: 144; 2.5; 10; 15 CREAM TOPICAL at 19:47

## 2019-05-03 RX ADMIN — GLYCERIN, PETROLATUM, PHENYLEPHRINE HCL, PRAMOXINE HCL: 144; 2.5; 10; 15 CREAM TOPICAL at 08:26

## 2019-05-03 RX ADMIN — ENOXAPARIN SODIUM 40 MG: 40 INJECTION SUBCUTANEOUS at 00:13

## 2019-05-03 RX ADMIN — VANCOMYCIN HYDROCHLORIDE 125 MG: KIT at 08:22

## 2019-05-03 RX ADMIN — METRONIDAZOLE 500 MG: 500 TABLET ORAL at 06:30

## 2019-05-03 RX ADMIN — ATORVASTATIN CALCIUM 20 MG: 10 TABLET, FILM COATED ORAL at 19:45

## 2019-05-03 RX ADMIN — VORICONAZOLE 150 MG: 50 TABLET ORAL at 19:45

## 2019-05-03 RX ADMIN — ACETAMINOPHEN 650 MG: 325 TABLET, FILM COATED ORAL at 04:50

## 2019-05-03 RX ADMIN — METRONIDAZOLE 500 MG: 500 TABLET ORAL at 13:39

## 2019-05-03 RX ADMIN — TAMSULOSIN HYDROCHLORIDE 0.4 MG: 0.4 CAPSULE ORAL at 08:22

## 2019-05-03 RX ADMIN — ACYCLOVIR 400 MG: 400 TABLET ORAL at 08:20

## 2019-05-03 RX ADMIN — PANTOPRAZOLE SODIUM 40 MG: 40 TABLET, DELAYED RELEASE ORAL at 08:21

## 2019-05-03 RX ADMIN — ACYCLOVIR 400 MG: 400 TABLET ORAL at 19:45

## 2019-05-03 RX ADMIN — MELATONIN TAB 3 MG 3 MG: 3 TAB at 21:55

## 2019-05-03 RX ADMIN — NYSTATIN 500000 UNITS: 100000 SUSPENSION ORAL at 17:58

## 2019-05-03 RX ADMIN — VORICONAZOLE 150 MG: 50 TABLET ORAL at 08:21

## 2019-05-03 RX ADMIN — METRONIDAZOLE 500 MG: 500 TABLET ORAL at 21:55

## 2019-05-03 RX ADMIN — CEFEPIME 2 G: 2 INJECTION, POWDER, FOR SOLUTION INTRAVENOUS at 21:55

## 2019-05-03 RX ADMIN — ACETAMINOPHEN 650 MG: 325 TABLET, FILM COATED ORAL at 20:54

## 2019-05-03 RX ADMIN — PANTOPRAZOLE SODIUM 40 MG: 40 TABLET, DELAYED RELEASE ORAL at 16:23

## 2019-05-03 RX ADMIN — NYSTATIN 500000 UNITS: 100000 SUSPENSION ORAL at 08:22

## 2019-05-03 ASSESSMENT — ACTIVITIES OF DAILY LIVING (ADL)
ADLS_ACUITY_SCORE: 13

## 2019-05-03 ASSESSMENT — PAIN DESCRIPTION - DESCRIPTORS: DESCRIPTORS: CONSTANT;ACHING

## 2019-05-03 NOTE — PROGRESS NOTES
[facility]  Transplant Infectious Disease Progress Note     Patient:  El Ace, Date of birth 1955, Medical record number [id number]  Date of Visit:  05/03/2019  Consult requested by Vee Cabrera for evaluation of neutropenic fevers.         Assessment and Recommendations:   Recommendations:  - Continue with cefepime and flagyl for neutropenic fevers   - Obtain anaerobic blood cultures  - Pending CMV PCR quant and EBV PCR quant blood.  - Discontinue vancomycin prophylaxis while on flagyl  - Continue with voriconazole prophylaxis    Thank you for the consultation. Transplant ID will follow along.     Assessment:  This is a 62 yo male with new diagnosis of AML found incidentally after partial root canal intervention, s/p 7+3 induction on 3/15 and re induction with decitabine/venetoclax who presents with neutropenic fevers. Patient is actively getting w/u for BMT. ID consulted to help with management.     Infectious Disease issues include:  - Neutropenic fevers: Patient with moderate rectal pain. He has previous h/o hemorrhoids. A few days ago, patient was dealing with constipation and was given a laxative which in turn gave him diarrhea, and subsequently hemorrhoidal pain. On examination, anal area is tender to palpation and slightly erythematous, no fissure visualized. Given that this is his only localizing symptom, and fevers persist in spite of cefepime, will add flagyl to cover for possible anorectal infection. Will also obtain anaerobic blood cultures.     Previous ID issues:  - Previous neutropenic fevers from odontogenic source.   - C diff colitis treated with vancomycin PO since 3/26    Other ID issues:   - PCP prophylaxis: None  - Fungal prophylaxis: voriconazole  - Serostatus: CMV neg, EBV pos, HSV pos - ACV prophylaxis  - Immunization status: Needs all pre BMT vaccinations   - Gamma globulin status: Not in records   - Isolation status:  Good hand  hygiene.    Attestation:  I have reviewed today's vital signs, medications, labs and imaging. I personally reviewed the imaging.     Noemi Garcia MD  Transplant Infectious Diseases   [phone]        Interval History:   Fever curve trending down. No new symptoms today. He feels he has a little more energy. Hemorrhoids still painful but slightly better. No HAs, sore throat, cough, dyspnea, CP, n/v, abdominal pain, diarrhea or urinary symptoms. No new rashes.          History of the Infectious Disease lllness:   This is a 62 yo male with new diagnosis of AML found incidentally after partial root canal intervention, s/p 7+3 induction on 3/15 and re induction with decitabine/venetoclax who presents with neutropenic fevers. Patient is actively getting w/u for BMT. ID consulted to help with management.     Randy is feeling ok today, he has mild HAs (global, achy, 3-5/10) when he has a fevers, but no visual changes, sinus pressure, ear pain, or sore throat. No runny nose, dyspnea, cough, CP, n/v, abdominal pain or urinary symptoms. A few days ago, patient was dealing with constipation and was given a laxative which in turn gave him diarrhea, and subsequently hemorrhoidal pain.     He has been on and off febrile since admission. He was started on cefepime. BCx negative to date. Ct chest done for BMT w/u was negative. UA/UCx negative. BDG and fungitell pending.       Transplants:  N/A     Review of Systems:   ROS: 10 point ROS neg other than the symptoms noted above in the interval history.      Current Facility-Administered Medications   Medication     acetaminophen (TYLENOL) tablet 650 mg     acyclovir (ZOVIRAX) tablet 400 mg     atorvastatin (LIPITOR) tablet 20 mg     ceFEPIme (MAXIPIME) 2 g vial to attach to  ml bag for ADULTS or 50 ml bag for PEDS     enoxaparin (LOVENOX) injection 40 mg     lidocaine (XYLOCAINE) 2 % external gel     magnesium sulfate 4 g in 100 mL sterile water (premade)     Medication  Instruction     melatonin tablet 3 mg     metroNIDAZOLE (FLAGYL) tablet 500 mg     nystatin (MYCOSTATIN) suspension 500,000 Units     ondansetron (ZOFRAN) tablet 8 mg     pantoprazole (PROTONIX) EC tablet 40 mg     potassium chloride (KLOR-CON) Packet 20-40 mEq     potassium chloride 10 mEq in 100 mL intermittent infusion with 10 mg lidocaine     potassium chloride 10 mEq in 100 mL sterile water intermittent infusion (premix)     potassium chloride 20 mEq in 50 mL intermittent infusion     potassium chloride ER (K-DUR/KLOR-CON M) CR tablet 20-40 mEq     potassium phosphate 15 mmol in D5W 250 mL intermittent infusion     potassium phosphate 20 mmol in D5W 250 mL intermittent infusion     potassium phosphate 20 mmol in D5W 500 mL intermittent infusion     potassium phosphate 25 mmol in D5W 500 mL intermittent infusion     pramox-pe-glycerin-petrolatum (PREPARATION H) cream     prochlorperazine (COMPAZINE) tablet 5-10 mg     rOPINIRole (REQUIP) tablet 0.75 mg     sennosides (SENOKOT) tablet 1-2 tablet     tamsulosin (FLOMAX) capsule 0.4 mg     vancomycin (FIRVANQ) oral solution 125 mg     voriconazole (VFEND) tablet 150 mg     witch hazel-glycerin (TUCKS) pad       Allergies   Allergen Reactions     Lactose GI Disturbance              Physical Exam:   Vitals were reviewed.  All vitals stable  /68   Pulse 101   Temp 98.5  F (36.9  C) (Oral)   Resp 16   Wt 88.7 kg (195 lb 8.8 oz)   SpO2 98%   BMI 28.06 kg/m      Exam:  GENERAL:  well-developed, well-nourished, alert, oriented, in no acute distress.  HEENT:  Head is normocephalic, atraumatic   EYES:  Eyes have anicteric sclerae. Conjunctiva normal.   ENT:  Oropharynx is moist without exudates or ulcers.  NECK:  Supple. No LAD  LUNGS:  Clear to auscultation. No wheezes.   CARDIOVASCULAR:  Regular rate and rhythm with no murmurs, gallops or rubs.  ABDOMEN:  Normal bowel sounds, soft, nontender.   SKIN:  No acute rashes. Perianal area slightly red, big hemorrhoid  visualized, tender to palpation - not examined today  NEUROLOGIC:  Grossly nonfocal.         Laboratory Data:     Metabolic Studies    Recent Labs   Lab Test 05/03/19 0612 05/02/19 0545 05/01/19 0522 04/29/19 2032 04/12/19  0444    135 138   < >  --    < > 136   POTASSIUM 3.4 3.5 3.7   < >  --    < > 4.1   CHLORIDE 106 105 106   < >  --    < > 105   CO2 23 22 24   < >  --    < > 23   ANIONGAP 8 8 8   < >  --    < > 8   BUN 9 9 7   < >  --    < > 14   CR 0.66 0.68 0.67   < >  --    < > 0.68   GFRESTIMATED >90 >90 >90   < >  --    < > >90   * 121* 107*   < >  --    < > 103*   DEBBIE 8.4* 8.2* 8.0*   < >  --    < > 8.6   PHOS  --  3.3  --    < >  --    < > 4.3   MAG  --  2.2  --    < >  --    < > 2.1   URIC  --   --   --   --   --   --  1.6*   LACT  --   --   --   --  1.2   < >  --     < > = values in this interval not displayed.       Hepatic Studies    Recent Labs   Lab Test 04/30/19  0529 04/26/19  1324 04/23/19  0854  04/08/19  0352  04/05/19  0312   BILITOTAL 0.8 0.5 0.5   < > 1.8*   < > 1.8*   DBIL 0.3*  --   --    < > 0.8*   < > 1.5*   ALKPHOS 189* 232* 220*   < > 85   < > 75   PROTTOTAL 6.2* 6.6* 6.9   < > 5.3*   < > 5.6*   ALBUMIN 2.6* 3.1* 3.3*   < > 2.1*   < > 2.0*   AST 17 18 19   < > 42   < > 422*   ALT 28 33 38   < > 117*   < > 302*   LDH  --   --   --   --  215  --  344*    < > = values in this interval not displayed.       Hematology Studies     Recent Labs   Lab Test 05/03/19 0612 05/02/19 0545 05/01/19  0522 04/30/19  0529  04/23/19  0854  04/13/19  0401   WBC 0.5* 0.4* 0.3* 0.4*   < > 0.9*   < > 2.4*   ABLA  --   --   --   --   --   --   --  0.1*   BLST  --   --   --   --   --   --   --  5.4   ANEU 0.2*  --   --   --   --  0.3*   < > 1.0*   ALYM 0.3*  --   --   --   --  0.6*   < > 1.2   TRACIE 0.0  --   --   --   --  0.0   < > 0.0   AEOS 0.0  --   --   --   --  0.0   < > 0.0   HGB 6.6* 7.4* 7.2* 7.3*   < > 8.4*   < > 7.7*   HCT 21.0* 22.5* 22.5* 22.6*   < > 25.0*   < > 23.2*   PLT  454* 458* 393 348   < > 201   < > 23*    < > = values in this interval not displayed.       Urine Studies     Recent Labs   Lab Test 05/02/19  1330 04/29/19  2149 04/04/19  0523 03/30/19  2206 03/22/19  0945   URINEPH 6.5 7.0 6.0 6.5 7.5*   NITRITE Negative Negative Negative Negative Negative   LEUKEST Negative Negative Negative Negative Negative   WBCU 1 1 4 <1 2       Microbiology:  Last 6 Culture results with specimen source  Culture Micro   Date Value Ref Range Status   05/02/2019 No growth after 16 hours  Preliminary   05/02/2019 No growth after 20 hours  Preliminary   05/02/2019 Culture negative < 24 hours, reincubate  Preliminary   05/01/2019 No growth after 2 days  Preliminary   05/01/2019 No growth after 2 days  Preliminary   04/30/2019 No growth after 3 days  Preliminary    Specimen Description   Date Value Ref Range Status   05/02/2019 Blood Right Hand  Final   05/02/2019 Blood Right Arm  Final   05/02/2019 Catheterized Urine  Final   05/01/2019 Blood Right Arm  Final   05/01/2019 Blood Right Arm  Final   05/01/2019 Nasopharyngeal  Final          Last check of C difficile  C Diff Toxin B PCR   Date Value Ref Range Status   03/26/2019 Positive (A) NEG^Negative Final     Comment:     Positive: Toxin producing Clostridium difficile target DNA sequences detected,   presumed positive for Clostridium difficile toxin B.  Clostridium difficile (Requires Enteric Isolation)  FDA approved assay performed using [employer] GeneXpert real-time PCR.  Critical Value/Significant Value called to and read back by  JATINDER KAUR RN [id number] 3.26.19 NDP         Virology:  CMV viral loads    Recent Labs   Lab Test 05/02/19  1605 03/31/19  0626   CSPEC Plasma, EDTA anticoagulant Plasma   CMVLOG Not Calculated Not Calculated       CMV viral loads    Log IU/mL of CMVQNT   Date Value Ref Range Status   05/02/2019 Not Calculated <2.1 [Log_IU]/mL Final   03/31/2019 Not Calculated <2.1 [Log_IU]/mL Final       EBV viral loads  EBV DNA  Copies/mL   Date Value Ref Range Status   03/31/2019 1,186 (A) EBVNEG^EBV DNA Not Detected [Copies]/mL Final       Imaging:  No results found for this or any previous visit (from the past 24 hour(s)).

## 2019-05-03 NOTE — PROGRESS NOTES
[facility], [place]  Hematology / Oncology Progress Note    Date of Admission: 4/29/2019  Date of Service (when I saw the patient): 05/03/2019     Assessment & Plan   El Ace is a 63 year old male with AML now in morphologic and immunophenotypic CR s/p decitabine/venetoclax reinduction. He has continued on venetoclax while awaiting transplant (workup due week of 4/29) but was admitted on 4/29 with neutropenic fever.     Today:   - cont cefepime, flagyl TID   - awaiting EBV, CMV, anaerobic cultures     #Neutropenic fever/SIRS.  Presented to ED on 4/29 after fever 101 at home. In ED  but HD stable. No new focal symptoms. After admission, spiked Tmax 101.5 (4/30 @ 00:26) and subsequently defervesced. Lactic acid 1.5. WBC 0.3. He does not have a central line in place. No known sick contacts. Per his wife, pt did go to [employer] on Sunday (4/28) and wore his mask but has otherwise been pretty sequestered in the house. Did have recurrent temperature on 4/30 evening (Tmax 101) and again 5/1 (Tmax 100.4). Hemorrhoid irritation/pain began overnight 4/30-5/1, slightly improved on 5/2 AM. No induration (though does not have white count to form an abscess) or fissure appreciated on exam. tmax 100.5 this AM, so fevers have down trended since starting flagyl.   - Blood cultures (4/29, 4/30, 5/1) remain NGTD  - 4/29 UA/UCx negative. Repeat urine sent on 5/2, culture NGTD   - CXR (4/29) negative  - 5/1 PCR screen for Inf A/B/RSV negative  - 5/1 serum galactomannan (pending) and fungitell negative  - CT Chest w/o contrast done 5/1 as part of BMT workup planning. This was negative.   - will consult ID for any additional recs as this is pt is transplant bound. Obtain anaerobic blood culture and add Flagyl 500mg q8hr (x 5/2) for anaerobic coverage.   - check blood CMV and EBV DNA quant PENDING   - continue Cefepime (x 4/29 PM)    #ID PPx  - continue ppx ACV and voriconazole  - hold ppx Levaquin as  now on Cefepime  - continue oral vancomycin (per outpt team he was continued on this to prevent recurrence of C.diff while on ppx (and now broad-spectrum) antibiotics). D/w further with ID , will decrease to BID dosing for maintenance dosing.    #AML (IDH2, RUNX1 positive). Currently in mophologic/immunophenotypic remission.   Refractory to initial induction chemotherapy with 7+3 (D1= 3/15/19). Underwent second induction with Decitabine x 10 days (D1=3/26) and had venetoclax added (3/29). Course was complicated by neutropenic sepsis/septic shock related to odontic infection, C.diff diarrhea and neutropenic colitis, FOREST, and LFT derangement all of which improved. He underwent BMBx on 4/23. This demonstrated morphologic and immunophenotypic remission. He is now planned for BMT workup with goal of allogeneic transplant. His son will be the donor.   - was due to begin transplant workup this week. Outpatient/BMT team aware of pt's admission. Did obtain CT Chest (5/1) and MUGA scan (5/2) inpatient for part of workup. Talked with Rad Onc team as pt was due to meet with their team on 5/2. They will see pt while inpatient (on 5/2).   - pt had been continuing venetoclax 100mg daily (per Dr. Cuadra's d/w Dr. Brown, pt had planned to remain on this through BMT workup to reduce risk of relapse). However, given current complication/admission for neutropenic fever, Dr. Brown is ok to stop venetoclax at this time. Discontinued Venetoclax (as of 4/30).    #Leukopenia/neutropenia  #Anemia  2/2 chemotherapy. Appears Plts have recovered. Likely had drop in Hgb overnight (4/29-4/30) due to dilutional effect from fluids. Hgb stable since.  - transfuse to maintain Hgb >7 (irradiated), Plt >10K    #Hemorrhoids.  Has h/o hemorrhoids. Pt reported having been constipated PTA and taking a laxative at home. He subsequently had several soft stools, which exacerbated his hemorrhoids. No bleeding, diarrhea, or abdominal pain/cramping.  "Started overnight 4/30-5/1, ongoing but slightly better on 5/2 AM.  - Prep H cream, PRN lidocaine gel, TUCKS pads  - 5/2: superficial rectal exam (neutropenia) with large external hemorrhoid; no bleeding, break in skin, or surrounding erythema/induration/fluctuance. Though notably he does not have white cells to form an abscess.     #Subclinical coronary atherosclerosis  #HLD  - on PTA Lipitor, continue     #BPH  - continue PTA Flomax 0.4mg daily    #H/o RLS  - PTA Requip at bedtime PRN    FEN  - S/p 1L NS bolus x 2, continued IVFs with NS @125 ml/hr --> reduced to 75 ml/hr (4/30 afternoon). IVFs stopped on 5/1 as pt was HD stable, feeling better, and taking in PO.  - lyte repletion per unit protocol  - regular diet    PPx  - VTE: Lovenox 40mg subcutaneous daily (hold if Plt count <50K)  - GI: PPI    Dispo: Remains inpatient for ongoing management of neutropenic fever. Starting Flagyl for anaerobic coverage. Anticipate discharge back to home pending afebrile 24-48 hrs.    - will work with outpatient team to reschedule BMT workup. Messaged Esequiel Salter (on 5/2) regarding which tests we have completed inpatient.     Discussed with Dr. Thomas, staff attending.    Cynthia Triana, DNP, APRN, CNP  Hematology/oncology  [id number]       Interval History   Did have recurrent temp to 100.4 last evening and overnight. He is feeling more \"listless\" again this morning, resting in bed. Hemorrhoid pain/irritation kept him up again overnight but he thinks the Prep H is helping and he states the pain is a little better today. No increase in stools, no diarrhea. Denies bleeding due to hemorrhoids. Denies abdominal pain or cramping. Denies mouth pain/sores, nausea, or reflux. He does endorse malodorous urine but denies dysuria. He otherwise denies sinus pain or nasal congestion, SOB, or cough.       Physical Exam   Temp: 99.9  F (37.7  C) Temp src: Oral BP: 114/67   Heart Rate: 102 Resp: 18 SpO2: 100 % O2 Device: None (Room air)  "   Vitals:    05/01/19 1100 05/02/19 0930 05/03/19 0747   Weight: 89.4 kg (197 lb 3.2 oz) 89.2 kg (196 lb 11.2 oz) 88.7 kg (195 lb 8.8 oz)     Vital Signs with Ranges  Temp:  [97.5  F (36.4  C)-101.6  F (38.7  C)] 99.9  F (37.7  C)  Heart Rate:  [100-122] 102  Resp:  [18-20] 18  BP: (104-132)/(66-81) 114/67  SpO2:  [97 %-100 %] 100 %  I/O last 3 completed shifts:  In: 650 [P.O.:450; I.V.:200]  Out: -   Constitutional: Pleasant male seen resting in bed. More fatigued appearing compared to yesterday but still non-toxic appearing. NAD.   HEENT: NC/AT. Sclera clear, anicteric. OP pink and moist, no lesions or thrush.  Respiratory: Breathing even and non-labored on RA. Able to sit up for exam. Lungs CTA b/l, no wheezing or crackles.  CV: Mildly tachycardic but regular rhythm, no murmur appreciated.   GI: Normal BS. Abd soft, ND/NT. No rebound or guarding.   : 5/2 superficial rectal exam (neutropenia) with large external hemorrhoid; no bleeding, skin tear, or surrounding erythema, induration or fluctuance.   Skin: Dry, warm, intact. Well healed site of prior PICC line in inner upper right arm. No concerning lesions or rash on exposed surfaces.   Musculoskeletal: Extremities grossly normal in appearance, no edema.   Neurologic: Alert and oriented. Speech normal. Grossly nonfocal.  Neuropsychiatric: Calm, mentation appears normal, answering questions appropriately.         Medications     - MEDICATION INSTRUCTIONS -         acyclovir  400 mg Oral BID     atorvastatin  20 mg Oral QPM     ceFEPIme (MAXIPIME) IV  2 g Intravenous Q8H     enoxaparin  40 mg Subcutaneous Q24H     metroNIDAZOLE  500 mg Oral Q8H ZUNILDA     nystatin  500,000 Units Oral TID     pantoprazole  40 mg Oral BID AC     pramox-pe-glycerin-petrolatum   Rectal TID     tamsulosin  0.4 mg Oral Daily     vancomycin  125 mg Oral BID     voriconazole  150 mg Oral Q12H ZUNILDA       Data   Results for orders placed or performed during the hospital encounter of 04/29/19  (from the past 24 hour(s))   Lactic acid level STAT for sepsis protocol   Result Value Ref Range    Lactate for Sepsis Protocol 0.5 (L) 0.7 - 2.0 mmol/L   Urine Culture Aerobic Bacterial   Result Value Ref Range    Specimen Description Catheterized Urine     Special Requests Specimen received in preservative     Culture Micro Culture negative < 24 hours, reincubate    UA with Microscopic   Result Value Ref Range    Color Urine Yellow     Appearance Urine Clear     Glucose Urine Negative NEG^Negative mg/dL    Bilirubin Urine Negative NEG^Negative    Ketones Urine Negative NEG^Negative mg/dL    Specific Gravity Urine 1.014 1.003 - 1.035    Blood Urine Negative NEG^Negative    pH Urine 6.5 5.0 - 7.0 pH    Protein Albumin Urine 10 (A) NEG^Negative mg/dL    Urobilinogen mg/dL Normal 0.0 - 2.0 mg/dL    Nitrite Urine Negative NEG^Negative    Leukocyte Esterase Urine Negative NEG^Negative    Source Catheterized Urine     WBC Urine 1 0 - 5 /HPF    RBC Urine 1 0 - 2 /HPF    Mucous Urine Present (A) NEG^Negative /LPF   Blood culture   Result Value Ref Range    Specimen Description Blood Right Arm     Special Requests Received in aerobic bottle only     Culture Micro No growth after 12 hours    CMV DNA quantification   Result Value Ref Range    CMV DNA Quantitation Specimen Plasma, EDTA anticoagulant     CMV Quant IU/mL CMV DNA Not Detected CMVND^CMV DNA Not Detected [IU]/mL    Log IU/mL of CMVQNT Not Calculated <2.1 [Log_IU]/mL   Blood culture   Result Value Ref Range    Specimen Description Blood Right Hand     Special Requests Received in aerobic bottle only     Culture Micro No growth after 8 hours    CBC with platelets differential   Result Value Ref Range    WBC 0.5 (LL) 4.0 - 11.0 10e9/L    RBC Count 2.16 (L) 4.4 - 5.9 10e12/L    Hemoglobin 6.6 (LL) 13.3 - 17.7 g/dL    Hematocrit 21.0 (L) 40.0 - 53.0 %    MCV 97 78 - 100 fl    MCH 30.6 26.5 - 33.0 pg    MCHC 31.4 (L) 31.5 - 36.5 g/dL    RDW 16.9 (H) 10.0 - 15.0 %     Platelet Count 454 (H) 150 - 450 10e9/L    Diff Method Manual Differential     % Neutrophils 44.2 %    % Lymphocytes 54.1 %    % Monocytes 1.7 %    % Eosinophils 0.0 %    % Basophils 0.0 %    Absolute Neutrophil 0.2 (LL) 1.6 - 8.3 10e9/L    Absolute Lymphocytes 0.3 (L) 0.8 - 5.3 10e9/L    Absolute Monocytes 0.0 0.0 - 1.3 10e9/L    Absolute Eosinophils 0.0 0.0 - 0.7 10e9/L    Absolute Basophils 0.0 0.0 - 0.2 10e9/L    Anisocytosis Moderate     Poikilocytosis Slight     Ovalocytes Slight     Microcytes Present     Macrocytes Present    Basic metabolic panel   Result Value Ref Range    Sodium 136 133 - 144 mmol/L    Potassium 3.4 3.4 - 5.3 mmol/L    Chloride 106 94 - 109 mmol/L    Carbon Dioxide 23 20 - 32 mmol/L    Anion Gap 8 3 - 14 mmol/L    Glucose 110 (H) 70 - 99 mg/dL    Urea Nitrogen 9 7 - 30 mg/dL    Creatinine 0.66 0.66 - 1.25 mg/dL    GFR Estimate >90 >60 mL/min/[1.73_m2]    GFR Estimate If Black >90 >60 mL/min/[1.73_m2]    Calcium 8.4 (L) 8.5 - 10.1 mg/dL   ABO/Rh type and screen   Result Value Ref Range    Units Ordered 1     ABO A     RH(D) Pos     Antibody Screen Neg     Test Valid Only At          [facility]    Specimen Expires 05/06/2019     Crossmatch Red Blood Cells    Blood component   Result Value Ref Range    Unit Number [id number]     Blood Component Type Red Blood Cells LeukoReduced Irradiated     Division Number 00     Status of Unit Released to care unit 05/03/2019 1100     Blood Product Code [id number]     Unit Status ISS          I have seen, interviewed, and examined the patient independently.  I have reviewed the vital signs and labs.  This note reflects my assessment and plan.      Neutropenic fever, no source identified. Feels well, but repeat fever again last night. appreciate ID assistance, have added flagyl and sent viral studies.       Have discussed with BMT and AML primaries: will hold venetoclax to let ANC recovery in prep for  transplant. Patient is in CR.    MUGA with EF 43%: will notify primary and BMT and CT ch (done, ok) for BMT w/u  Rad onc to see in prep for transplant as well.    Ractal pain: monitor closely: this is better, but will consider further w/u for rectal abscess is persists    Carolyn Thomas MD/PhD

## 2019-05-03 NOTE — PLAN OF CARE
Patient slept between cares .Continues to run fevers.Moonlighter notified.Tylenol given to bring fever down.Patient voiding without reported difficulty .Continue to monitor.Lab just notified station/nurse of drop in HGB so will pass on to next nurse

## 2019-05-03 NOTE — PLAN OF CARE
Temp max 99.9, vss. Pt continues on IV cefepime. Received 1 unit PRBCs for hgb 6.6. Sitz bath available in room for hemorrhoid pain.

## 2019-05-04 ENCOUNTER — APPOINTMENT (OUTPATIENT)
Dept: CT IMAGING | Facility: CLINIC | Age: 64
DRG: 394 | End: 2019-05-04
Attending: NURSE PRACTITIONER
Payer: COMMERCIAL

## 2019-05-04 LAB
ANION GAP SERPL CALCULATED.3IONS-SCNC: 9 MMOL/L (ref 3–14)
ANISOCYTOSIS BLD QL SMEAR: SLIGHT
BASOPHILS # BLD AUTO: 0 10E9/L (ref 0–0.2)
BASOPHILS NFR BLD AUTO: 0 %
BUN SERPL-MCNC: 10 MG/DL (ref 7–30)
BURR CELLS BLD QL SMEAR: SLIGHT
CALCIUM SERPL-MCNC: 8.1 MG/DL (ref 8.5–10.1)
CHLORIDE SERPL-SCNC: 106 MMOL/L (ref 94–109)
CO2 SERPL-SCNC: 20 MMOL/L (ref 20–32)
CREAT SERPL-MCNC: 0.68 MG/DL (ref 0.66–1.25)
DIFFERENTIAL METHOD BLD: ABNORMAL
EOSINOPHIL # BLD AUTO: 0 10E9/L (ref 0–0.7)
EOSINOPHIL NFR BLD AUTO: 0.9 %
ERYTHROCYTE [DISTWIDTH] IN BLOOD BY AUTOMATED COUNT: 17.8 % (ref 10–15)
GFR SERPL CREATININE-BSD FRML MDRD: >90 ML/MIN/{1.73_M2}
GLUCOSE SERPL-MCNC: 112 MG/DL (ref 70–99)
HCT VFR BLD AUTO: 23.9 % (ref 40–53)
HGB BLD-MCNC: 7.5 G/DL (ref 13.3–17.7)
LYMPHOCYTES # BLD AUTO: 0.3 10E9/L (ref 0.8–5.3)
LYMPHOCYTES NFR BLD AUTO: 34.2 %
MCH RBC QN AUTO: 30 PG (ref 26.5–33)
MCHC RBC AUTO-ENTMCNC: 31.4 G/DL (ref 31.5–36.5)
MCV RBC AUTO: 96 FL (ref 78–100)
MONOCYTES # BLD AUTO: 0 10E9/L (ref 0–1.3)
MONOCYTES NFR BLD AUTO: 1.8 %
NEUTROPHILS # BLD AUTO: 0.6 10E9/L (ref 1.6–8.3)
NEUTROPHILS NFR BLD AUTO: 63.1 %
OVALOCYTES BLD QL SMEAR: SLIGHT
PLATELET # BLD AUTO: 411 10E9/L (ref 150–450)
PLATELET # BLD EST: ABNORMAL 10*3/UL
POIKILOCYTOSIS BLD QL SMEAR: SLIGHT
POTASSIUM SERPL-SCNC: 3.5 MMOL/L (ref 3.4–5.3)
RBC # BLD AUTO: 2.5 10E12/L (ref 4.4–5.9)
SODIUM SERPL-SCNC: 136 MMOL/L (ref 133–144)
WBC # BLD AUTO: 1 10E9/L (ref 4–11)

## 2019-05-04 PROCEDURE — 25000128 H RX IP 250 OP 636: Performed by: INTERNAL MEDICINE

## 2019-05-04 PROCEDURE — 25000128 H RX IP 250 OP 636: Performed by: STUDENT IN AN ORGANIZED HEALTH CARE EDUCATION/TRAINING PROGRAM

## 2019-05-04 PROCEDURE — 25000132 ZZH RX MED GY IP 250 OP 250 PS 637: Performed by: PHYSICIAN ASSISTANT

## 2019-05-04 PROCEDURE — 36415 COLL VENOUS BLD VENIPUNCTURE: CPT | Performed by: INTERNAL MEDICINE

## 2019-05-04 PROCEDURE — 25000125 ZZHC RX 250: Performed by: STUDENT IN AN ORGANIZED HEALTH CARE EDUCATION/TRAINING PROGRAM

## 2019-05-04 PROCEDURE — 99233 SBSQ HOSP IP/OBS HIGH 50: CPT | Performed by: INTERNAL MEDICINE

## 2019-05-04 PROCEDURE — 85025 COMPLETE CBC W/AUTO DIFF WBC: CPT | Performed by: INTERNAL MEDICINE

## 2019-05-04 PROCEDURE — 25000132 ZZH RX MED GY IP 250 OP 250 PS 637: Performed by: NURSE PRACTITIONER

## 2019-05-04 PROCEDURE — 74177 CT ABD & PELVIS W/CONTRAST: CPT

## 2019-05-04 PROCEDURE — 12000001 ZZH R&B MED SURG/OB UMMC

## 2019-05-04 PROCEDURE — 80048 BASIC METABOLIC PNL TOTAL CA: CPT | Performed by: INTERNAL MEDICINE

## 2019-05-04 PROCEDURE — 25000132 ZZH RX MED GY IP 250 OP 250 PS 637: Performed by: INTERNAL MEDICINE

## 2019-05-04 RX ORDER — IOPAMIDOL 755 MG/ML
119 INJECTION, SOLUTION INTRAVASCULAR ONCE
Status: COMPLETED | OUTPATIENT
Start: 2019-05-04 | End: 2019-05-04

## 2019-05-04 RX ADMIN — PANTOPRAZOLE SODIUM 40 MG: 40 TABLET, DELAYED RELEASE ORAL at 09:24

## 2019-05-04 RX ADMIN — ATORVASTATIN CALCIUM 20 MG: 10 TABLET, FILM COATED ORAL at 19:17

## 2019-05-04 RX ADMIN — NYSTATIN 500000 UNITS: 100000 SUSPENSION ORAL at 19:16

## 2019-05-04 RX ADMIN — CEFEPIME 2 G: 2 INJECTION, POWDER, FOR SOLUTION INTRAVENOUS at 15:49

## 2019-05-04 RX ADMIN — ACYCLOVIR 400 MG: 400 TABLET ORAL at 09:23

## 2019-05-04 RX ADMIN — VORICONAZOLE 150 MG: 50 TABLET ORAL at 19:17

## 2019-05-04 RX ADMIN — METRONIDAZOLE 500 MG: 500 TABLET ORAL at 06:05

## 2019-05-04 RX ADMIN — CEFEPIME 2 G: 2 INJECTION, POWDER, FOR SOLUTION INTRAVENOUS at 06:05

## 2019-05-04 RX ADMIN — CEFEPIME 2 G: 2 INJECTION, POWDER, FOR SOLUTION INTRAVENOUS at 21:40

## 2019-05-04 RX ADMIN — PANTOPRAZOLE SODIUM 40 MG: 40 TABLET, DELAYED RELEASE ORAL at 16:24

## 2019-05-04 RX ADMIN — ENOXAPARIN SODIUM 40 MG: 40 INJECTION SUBCUTANEOUS at 00:16

## 2019-05-04 RX ADMIN — TAMSULOSIN HYDROCHLORIDE 0.4 MG: 0.4 CAPSULE ORAL at 09:23

## 2019-05-04 RX ADMIN — GLYCERIN, PETROLATUM, PHENYLEPHRINE HCL, PRAMOXINE HCL: 144; 2.5; 10; 15 CREAM TOPICAL at 19:19

## 2019-05-04 RX ADMIN — METRONIDAZOLE 500 MG: 500 TABLET ORAL at 21:40

## 2019-05-04 RX ADMIN — ACYCLOVIR 400 MG: 400 TABLET ORAL at 19:17

## 2019-05-04 RX ADMIN — VORICONAZOLE 150 MG: 50 TABLET ORAL at 09:23

## 2019-05-04 RX ADMIN — NYSTATIN 500000 UNITS: 100000 SUSPENSION ORAL at 14:22

## 2019-05-04 RX ADMIN — NYSTATIN 500000 UNITS: 100000 SUSPENSION ORAL at 09:24

## 2019-05-04 RX ADMIN — SODIUM CHLORIDE, PRESERVATIVE FREE 80 ML: 5 INJECTION INTRAVENOUS at 11:43

## 2019-05-04 RX ADMIN — METRONIDAZOLE 500 MG: 500 TABLET ORAL at 14:22

## 2019-05-04 RX ADMIN — IOPAMIDOL 119 ML: 755 INJECTION, SOLUTION INTRAVENOUS at 11:42

## 2019-05-04 ASSESSMENT — ACTIVITIES OF DAILY LIVING (ADL)
ADLS_ACUITY_SCORE: 13

## 2019-05-04 NOTE — PROGRESS NOTES
[facility], [place]  Hematology / Oncology Progress Note    Date of Admission: 4/29/2019  Date of Service (when I saw the patient): 05/04/2019     Assessment & Plan   El Ace is a 63 year old male with AML now in morphologic and immunophenotypic CR s/p decitabine/venetoclax reinduction. He has continued on venetoclax while awaiting transplant (workup due week of 4/29) but was admitted on 4/29 with neutropenic fever.     Today:   - cont cefepime, flagyl TID   - awaiting EBV, anaerobic cultures   - will obtain CT A/P with contrast to evaluate for abscess     #Neutropenic fever/SIRS.  Presented to ED on 4/29 after fever 101 at home. In ED  but HD stable. No new focal symptoms. After admission, spiked Tmax 101.5 (4/30 @ 00:26) and subsequently defervesced. Lactic acid 1.5. WBC 0.3. He does not have a central line in place. No known sick contacts. Per his wife, pt did go to [employer] on Sunday (4/28) and wore his mask but has otherwise been pretty sequestered in the house. Continues to have fevers, tmax 101.7 in past 24 hrs.  Has rectal pain 2/2 suspected hemorrhoids vs ? Abscess    - Blood cultures (4/29, 4/30, 5/1) remain NGTD  - 4/29 UA/UCx negative. Repeat urine sent on 5/2, culture NGTD   - CXR (4/29) negative  - 5/1 PCR screen for Inf A/B/RSV negative  - 5/1 serum galactomannan (pending) and fungitell negative  - CT Chest w/o contrast done 5/1 as part of BMT workup planning. This was negative.   - will consult ID for any additional recs as this is pt is transplant bound. Obtain anaerobic blood culture and add Flagyl 500mg q8hr (x 5/2) for anaerobic coverage.   - check blood CMV (negative) and EBV DNA quant PENDING   - continue Cefepime (x 4/29 PM)  - will obtain CT abd/pelvis to evaluate for abscess x5/4 , consider colorectal surgery consult if suspected abscess is present     #ID PPx  - continue ppx ACV and voriconazole  - hold ppx Levaquin as now on Cefepime  - continue oral  vancomycin (per outpt team he was continued on this to prevent recurrence of C.diff while on ppx (and now broad-spectrum) antibiotics). D/w further with ID , will decrease to BID dosing for maintenance dosing.    #AML (IDH2, RUNX1 positive). Currently in mophologic/immunophenotypic remission.   Refractory to initial induction chemotherapy with 7+3 (D1= 3/15/19). Underwent second induction with Decitabine x 10 days (D1=3/26) and had venetoclax added (3/29). Course was complicated by neutropenic sepsis/septic shock related to odontic infection, C.diff diarrhea and neutropenic colitis, FOREST, and LFT derangement all of which improved. He underwent BMBx on 4/23. This demonstrated morphologic and immunophenotypic remission. He is now planned for BMT workup with goal of allogeneic transplant. His son will be the donor.   - was due to begin transplant workup this week. Outpatient/BMT team aware of pt's admission. Did obtain CT Chest (5/1) and MUGA scan (5/2) inpatient for part of workup. Talked with Rad Onc team as pt was due to meet with their team on 5/2. They will see pt while inpatient (on 5/2).   - pt had been continuing venetoclax 100mg daily (per Dr. Cuadra's d/w Dr. Brown, pt had planned to remain on this through BMT workup to reduce risk of relapse). However, given current complication/admission for neutropenic fever, Dr. Brown is ok to stop venetoclax at this time. Discontinued Venetoclax (as of 4/30).    #Leukopenia/neutropenia  #Anemia  2/2 chemotherapy. Appears Plts have recovered. Likely had drop in Hgb overnight (4/29-4/30) due to dilutional effect from fluids. Hgb stable since. WBC recovering, WBC 1.0, ANC 0.6 today   - transfuse to maintain Hgb >7 (irradiated), Plt >10K    #Hemorrhoids.  Has h/o hemorrhoids. Pt reported having been constipated PTA and taking a laxative at home. He subsequently had several soft stools, which exacerbated his hemorrhoids. No bleeding, diarrhea, or abdominal pain/cramping.  Started overnight 4/30-5/1, ongoing but slightly better on 5/2 AM.  - Prep H cream, PRN lidocaine gel, TUCKS pads  - 5/2: superficial rectal exam (neutropenia) with large external hemorrhoid; no bleeding, break in skin, or surrounding erythema/induration/fluctuance. Though notably he does not have white cells to form an abscess.     #Subclinical coronary atherosclerosis  #HLD  - on PTA Lipitor, continue     #BPH  - continue PTA Flomax 0.4mg daily    #H/o RLS  - PTA Requip at bedtime PRN    FEN  - S/p 1L NS bolus x 2, continued IVFs with NS @125 ml/hr --> reduced to 75 ml/hr (4/30 afternoon). IVFs stopped on 5/1 as pt was HD stable, feeling better, and taking in PO.  - lyte repletion per unit protocol  - regular diet    PPx  - VTE: Lovenox 40mg subcutaneous daily (hold if Plt count <50K)  - GI: PPI    Dispo: Remains inpatient for ongoing management of neutropenic fever. Starting Flagyl for anaerobic coverage. Anticipate discharge back to home pending afebrile 24-48 hrs.    - will work with outpatient team to reschedule BMT workup. Messaged Esequiel Adhikarifrancoisnayeli (on 5/2) regarding which tests we have completed inpatient.     Discussed with Dr. Thomas, staff attending.    Cynthia Triana, DNP, APRN, CNP  Hematology/oncology  [id number]       Interval History   tmax 101.7 in past 24 hours.  Denies headaches, chest pain, SOB, n/v/d.  Had a regular BM yesterday. Continues to have rectal pain, preventing him from walking yesterday. No bleeding.  No loose stools.  He is eating/drinking.  Reports his wife will be here today.  Thinks the rectal pain has improved some since yesterday.     Physical Exam   Temp: 99.7  F (37.6  C) Temp src: Oral BP: 108/67   Heart Rate: 97 Resp: 20 SpO2: 98 % O2 Device: None (Room air)    Vitals:    05/02/19 0930 05/03/19 0747 05/04/19 0812   Weight: 89.2 kg (196 lb 11.2 oz) 88.7 kg (195 lb 8.8 oz) 88.7 kg (195 lb 9.6 oz)     Vital Signs with Ranges  Temp:  [98  F (36.7  C)-101.2  F (38.4  C)] 99.7  F (37.6   C)  Heart Rate:  [] 97  Resp:  [16-20] 20  BP: ()/(56-76) 108/67  SpO2:  [96 %-100 %] 98 %  I/O last 3 completed shifts:  In: 1560 [P.O.:960; I.V.:300]  Out: -   Constitutional: Pleasant male seen resting in bed. More fatigued appearing compared to yesterday but still non-toxic appearing. NAD.   HEENT: NC/AT. Sclera clear, anicteric. OP pink and moist, no lesions or thrush.  Respiratory: Breathing even and non-labored on RA. Able to sit up for exam. Lungs CTA b/l, no wheezing or crackles.  CV: Mildly tachycardic but regular rhythm, no murmur appreciated.   GI: Normal BS. Abd soft, ND/NT. No rebound or guarding.   : 5/2 superficial rectal exam (neutropenia) with large external hemorrhoid; no bleeding, skin tear, or surrounding erythema, induration or fluctuance.   Skin: Dry, warm, intact. Well healed site of prior PICC line in inner upper right arm. No concerning lesions or rash on exposed surfaces.   Musculoskeletal: Extremities grossly normal in appearance, no edema.   Neurologic: Alert and oriented. Speech normal. Grossly nonfocal.  Neuropsychiatric: Calm, mentation appears normal, answering questions appropriately.         Medications     - MEDICATION INSTRUCTIONS -         acyclovir  400 mg Oral BID     atorvastatin  20 mg Oral QPM     ceFEPIme (MAXIPIME) IV  2 g Intravenous Q8H     enoxaparin  40 mg Subcutaneous Q24H     metroNIDAZOLE  500 mg Oral Q8H ZUNILDA     nystatin  500,000 Units Oral TID     pantoprazole  40 mg Oral BID AC     pramox-pe-glycerin-petrolatum   Rectal TID     tamsulosin  0.4 mg Oral Daily     voriconazole  150 mg Oral Q12H ZUNILDA       Data   Results for orders placed or performed during the hospital encounter of 04/29/19 (from the past 24 hour(s))   Lactic acid level STAT for sepsis protocol   Result Value Ref Range    Lactate for Sepsis Protocol 0.8 0.7 - 2.0 mmol/L   Blood culture   Result Value Ref Range    Specimen Description Blood Right Arm     Special Requests Received in  aerobic bottle only     Culture Micro No growth after 9 hours    Blood culture   Result Value Ref Range    Specimen Description Blood Left Arm     Special Requests Received in aerobic bottle only     Culture Micro No growth after 9 hours    CBC with platelets differential   Result Value Ref Range    WBC 1.0 (L) 4.0 - 11.0 10e9/L    RBC Count 2.50 (L) 4.4 - 5.9 10e12/L    Hemoglobin 7.5 (L) 13.3 - 17.7 g/dL    Hematocrit 23.9 (L) 40.0 - 53.0 %    MCV 96 78 - 100 fl    MCH 30.0 26.5 - 33.0 pg    MCHC 31.4 (L) 31.5 - 36.5 g/dL    RDW 17.8 (H) 10.0 - 15.0 %    Platelet Count 411 150 - 450 10e9/L    Diff Method Manual Differential     % Neutrophils 63.1 %    % Lymphocytes 34.2 %    % Monocytes 1.8 %    % Eosinophils 0.9 %    % Basophils 0.0 %    Absolute Neutrophil 0.6 (L) 1.6 - 8.3 10e9/L    Absolute Lymphocytes 0.3 (L) 0.8 - 5.3 10e9/L    Absolute Monocytes 0.0 0.0 - 1.3 10e9/L    Absolute Eosinophils 0.0 0.0 - 0.7 10e9/L    Absolute Basophils 0.0 0.0 - 0.2 10e9/L    Anisocytosis Slight     Poikilocytosis Slight     Ovalocytes Slight     Haley Cells Slight     Platelet Estimate Confirming automated cell count    Basic metabolic panel   Result Value Ref Range    Sodium 136 133 - 144 mmol/L    Potassium 3.5 3.4 - 5.3 mmol/L    Chloride 106 94 - 109 mmol/L    Carbon Dioxide 20 20 - 32 mmol/L    Anion Gap 9 3 - 14 mmol/L    Glucose 112 (H) 70 - 99 mg/dL    Urea Nitrogen 10 7 - 30 mg/dL    Creatinine 0.68 0.66 - 1.25 mg/dL    GFR Estimate >90 >60 mL/min/[1.73_m2]    GFR Estimate If Black >90 >60 mL/min/[1.73_m2]    Calcium 8.1 (L) 8.5 - 10.1 mg/dL         I have seen, interviewed, and examined the patient independently.  I have reviewed the vital signs and labs.  This note reflects my assessment and plan.      Neutropenic fever, no source identified. Feels well, but repeat fever again last night. appreciate ID assistance, have added flagyl and sent viral studies. CT+contrast to r/o rectal abscess      Have discussed with BMT  and AML primaries: will hold venetoclax to let ANC recovery in prep for transplant. Patient is in CR.    MUGA with EF 43%: have notified Dr. Brown: will plan for outpatient RENE and CT ch (done, ok) for BMT w/u  Rad onc to see in prep for transplant as well.      Carolyn Thomas MD/PhD

## 2019-05-04 NOTE — PROGRESS NOTES
[id number]: Afebrile overnight. OVSS. Denies pain, states hemorrhoid is a dull ache. Denies nausea. Slept comfortably between cares. Continue to monitor.

## 2019-05-04 NOTE — PLAN OF CARE
Tmax 101.7, blood cultures drawn. Tylenol given x1. Continues to c/o pain from hemorrhoids, pt used sitz bath this evening with minimal relief but is open to continuing to try them. Denies nausea. Good appetite this evening and fluid intake. BM x1 this shift per pt report, voiding adequately. Melatonin given at bedtime. Up in room independently. Continue to monitor.

## 2019-05-04 NOTE — PLAN OF CARE
Tmax 99.7, OVSS.  Denies pain and nausea. Abdomen CT completed. Feels much better today. Showered independently, wife at bedside. Will continue to monitor and with POC.

## 2019-05-05 LAB
ANION GAP SERPL CALCULATED.3IONS-SCNC: 7 MMOL/L (ref 3–14)
ANISOCYTOSIS BLD QL SMEAR: SLIGHT
BACTERIA SPEC CULT: NO GROWTH
BACTERIA SPEC CULT: NO GROWTH
BACTERIA SPEC CULT: NORMAL
BASOPHILS # BLD AUTO: 0 10E9/L (ref 0–0.2)
BASOPHILS NFR BLD AUTO: 0 %
BUN SERPL-MCNC: 12 MG/DL (ref 7–30)
CALCIUM SERPL-MCNC: 8.4 MG/DL (ref 8.5–10.1)
CHLORIDE SERPL-SCNC: 108 MMOL/L (ref 94–109)
CO2 SERPL-SCNC: 23 MMOL/L (ref 20–32)
CREAT SERPL-MCNC: 0.69 MG/DL (ref 0.66–1.25)
DIFFERENTIAL METHOD BLD: ABNORMAL
EOSINOPHIL # BLD AUTO: 0 10E9/L (ref 0–0.7)
EOSINOPHIL NFR BLD AUTO: 0 %
ERYTHROCYTE [DISTWIDTH] IN BLOOD BY AUTOMATED COUNT: 17.5 % (ref 10–15)
GFR SERPL CREATININE-BSD FRML MDRD: >90 ML/MIN/{1.73_M2}
GLUCOSE SERPL-MCNC: 106 MG/DL (ref 70–99)
HCT VFR BLD AUTO: 24 % (ref 40–53)
HGB BLD-MCNC: 7.8 G/DL (ref 13.3–17.7)
LYMPHOCYTES # BLD AUTO: 0.7 10E9/L (ref 0.8–5.3)
LYMPHOCYTES NFR BLD AUTO: 49.1 %
Lab: NORMAL
Lab: NORMAL
MACROCYTES BLD QL SMEAR: PRESENT
MCH RBC QN AUTO: 30.7 PG (ref 26.5–33)
MCHC RBC AUTO-ENTMCNC: 32.5 G/DL (ref 31.5–36.5)
MCV RBC AUTO: 95 FL (ref 78–100)
MONOCYTES # BLD AUTO: 0 10E9/L (ref 0–1.3)
MONOCYTES NFR BLD AUTO: 0 %
NEUTROPHILS # BLD AUTO: 0.7 10E9/L (ref 1.6–8.3)
NEUTROPHILS NFR BLD AUTO: 50.9 %
OVALOCYTES BLD QL SMEAR: SLIGHT
PLATELET # BLD AUTO: 404 10E9/L (ref 150–450)
PLATELET # BLD EST: ABNORMAL 10*3/UL
POIKILOCYTOSIS BLD QL SMEAR: SLIGHT
POTASSIUM SERPL-SCNC: 3.7 MMOL/L (ref 3.4–5.3)
RBC # BLD AUTO: 2.54 10E12/L (ref 4.4–5.9)
SODIUM SERPL-SCNC: 138 MMOL/L (ref 133–144)
SPECIMEN SOURCE: NORMAL
WBC # BLD AUTO: 1.4 10E9/L (ref 4–11)

## 2019-05-05 PROCEDURE — 12000001 ZZH R&B MED SURG/OB UMMC

## 2019-05-05 PROCEDURE — 25000128 H RX IP 250 OP 636: Performed by: NURSE PRACTITIONER

## 2019-05-05 PROCEDURE — 85025 COMPLETE CBC W/AUTO DIFF WBC: CPT | Performed by: INTERNAL MEDICINE

## 2019-05-05 PROCEDURE — 36415 COLL VENOUS BLD VENIPUNCTURE: CPT | Performed by: INTERNAL MEDICINE

## 2019-05-05 PROCEDURE — 99233 SBSQ HOSP IP/OBS HIGH 50: CPT | Performed by: INTERNAL MEDICINE

## 2019-05-05 PROCEDURE — 25000132 ZZH RX MED GY IP 250 OP 250 PS 637: Performed by: NURSE PRACTITIONER

## 2019-05-05 PROCEDURE — 25000132 ZZH RX MED GY IP 250 OP 250 PS 637: Performed by: INTERNAL MEDICINE

## 2019-05-05 PROCEDURE — 80048 BASIC METABOLIC PNL TOTAL CA: CPT | Performed by: INTERNAL MEDICINE

## 2019-05-05 PROCEDURE — 87040 BLOOD CULTURE FOR BACTERIA: CPT | Performed by: NURSE PRACTITIONER

## 2019-05-05 PROCEDURE — 87075 CULTR BACTERIA EXCEPT BLOOD: CPT | Performed by: INTERNAL MEDICINE

## 2019-05-05 PROCEDURE — 36415 COLL VENOUS BLD VENIPUNCTURE: CPT | Performed by: NURSE PRACTITIONER

## 2019-05-05 PROCEDURE — 25000132 ZZH RX MED GY IP 250 OP 250 PS 637: Performed by: PHYSICIAN ASSISTANT

## 2019-05-05 PROCEDURE — 25000128 H RX IP 250 OP 636: Performed by: INTERNAL MEDICINE

## 2019-05-05 RX ORDER — HEPARIN SODIUM,PORCINE 10 UNIT/ML
2-5 VIAL (ML) INTRAVENOUS
Status: DISCONTINUED | OUTPATIENT
Start: 2019-05-05 | End: 2019-05-05

## 2019-05-05 RX ORDER — MEROPENEM 1 G/1
1 INJECTION, POWDER, FOR SOLUTION INTRAVENOUS EVERY 8 HOURS
Status: DISCONTINUED | OUTPATIENT
Start: 2019-05-05 | End: 2019-05-07

## 2019-05-05 RX ORDER — HEPARIN SODIUM,PORCINE 10 UNIT/ML
2-5 VIAL (ML) INTRAVENOUS
Status: DISCONTINUED | OUTPATIENT
Start: 2019-05-05 | End: 2019-05-07 | Stop reason: HOSPADM

## 2019-05-05 RX ORDER — LIDOCAINE 40 MG/G
CREAM TOPICAL
Status: DISCONTINUED | OUTPATIENT
Start: 2019-05-05 | End: 2019-05-07 | Stop reason: HOSPADM

## 2019-05-05 RX ORDER — LIDOCAINE 40 MG/G
CREAM TOPICAL
Status: DISCONTINUED | OUTPATIENT
Start: 2019-05-05 | End: 2019-05-05

## 2019-05-05 RX ADMIN — ATORVASTATIN CALCIUM 20 MG: 10 TABLET, FILM COATED ORAL at 19:39

## 2019-05-05 RX ADMIN — GLYCERIN, PETROLATUM, PHENYLEPHRINE HCL, PRAMOXINE HCL: 144; 2.5; 10; 15 CREAM TOPICAL at 19:39

## 2019-05-05 RX ADMIN — VORICONAZOLE 150 MG: 50 TABLET ORAL at 19:39

## 2019-05-05 RX ADMIN — GLYCERIN, PETROLATUM, PHENYLEPHRINE HCL, PRAMOXINE HCL: 144; 2.5; 10; 15 CREAM TOPICAL at 11:25

## 2019-05-05 RX ADMIN — PANTOPRAZOLE SODIUM 40 MG: 40 TABLET, DELAYED RELEASE ORAL at 08:24

## 2019-05-05 RX ADMIN — MEROPENEM 1 G: 1 INJECTION, POWDER, FOR SOLUTION INTRAVENOUS at 19:39

## 2019-05-05 RX ADMIN — METRONIDAZOLE 500 MG: 500 TABLET ORAL at 13:37

## 2019-05-05 RX ADMIN — PANTOPRAZOLE SODIUM 40 MG: 40 TABLET, DELAYED RELEASE ORAL at 16:19

## 2019-05-05 RX ADMIN — METRONIDAZOLE 500 MG: 500 TABLET ORAL at 22:13

## 2019-05-05 RX ADMIN — NYSTATIN 500000 UNITS: 100000 SUSPENSION ORAL at 13:37

## 2019-05-05 RX ADMIN — NYSTATIN 500000 UNITS: 100000 SUSPENSION ORAL at 08:23

## 2019-05-05 RX ADMIN — MELATONIN TAB 3 MG 3 MG: 3 TAB at 22:13

## 2019-05-05 RX ADMIN — MEROPENEM 1 G: 1 INJECTION, POWDER, FOR SOLUTION INTRAVENOUS at 13:38

## 2019-05-05 RX ADMIN — TAMSULOSIN HYDROCHLORIDE 0.4 MG: 0.4 CAPSULE ORAL at 08:23

## 2019-05-05 RX ADMIN — ENOXAPARIN SODIUM 40 MG: 40 INJECTION SUBCUTANEOUS at 00:28

## 2019-05-05 RX ADMIN — METRONIDAZOLE 500 MG: 500 TABLET ORAL at 06:07

## 2019-05-05 RX ADMIN — VORICONAZOLE 150 MG: 50 TABLET ORAL at 08:23

## 2019-05-05 RX ADMIN — CEFEPIME 2 G: 2 INJECTION, POWDER, FOR SOLUTION INTRAVENOUS at 06:07

## 2019-05-05 RX ADMIN — ACYCLOVIR 400 MG: 400 TABLET ORAL at 19:39

## 2019-05-05 RX ADMIN — WITCH HAZEL: 500 SOLUTION RECTAL; TOPICAL at 11:25

## 2019-05-05 RX ADMIN — ACYCLOVIR 400 MG: 400 TABLET ORAL at 08:24

## 2019-05-05 RX ADMIN — NYSTATIN 500000 UNITS: 100000 SUSPENSION ORAL at 18:14

## 2019-05-05 ASSESSMENT — ACTIVITIES OF DAILY LIVING (ADL)
ADLS_ACUITY_SCORE: 13

## 2019-05-05 NOTE — PROGRESS NOTES
Pt afebrile continues on iv antibx. Blood cultures repeated prior to change from cefepime to merrem. Planned to have picc placed and PLC tomorrow at 1430 for home iv antibx. Pt and wife aware of plan.   Will continue to monitor and continue POC.

## 2019-05-05 NOTE — PROGRESS NOTES
[facility], [place]  Hematology / Oncology Progress Note    Date of Admission: 4/29/2019  Date of Service (when I saw the patient): 05/05/2019     Assessment & Plan   El Ace is a 63 year old male with AML now in morphologic and immunophenotypic CR s/p decitabine/venetoclax reinduction. He has continued on venetoclax while awaiting transplant (workup due week of 4/29) but was admitted on 4/29 with neutropenic fever.     Today:   - Possible discharge 5/7/19 pending blood culture sensitivities as blood cultures 5/3/19 demonstrating pseudomonas aerugoinosa and CT abd/pelvis 5/4/19 with concern of proctitis.  - Stopped Cefepime (x 4/29 PM - 5/5/19).  Added meropenum 1 gm IV every 8 hours on 5/5/19 and repeat blood cultures x 2 sets (both aerobic and anerobic).    - PICC placement for 5/5/19.  - Flagyl 500mg q8hr (x 5/2) for anaerobic coverage.       Neutropenic fever/SIRS- likely source proctiti  Presented to ED on 4/29 after fever 101 at home. In ED  but HD stable. No new focal symptoms. After admission, spiked Tmax 101.5 (4/30 @ 00:26) and subsequently defervesced. Lactic acid 1.5. WBC 0.3. He does not have a central line in place. No known sick contacts. Per his wife, pt did go to [place] on Sunday (4/28) and wore his mask but has otherwise been pretty sequestered in the house. Continues to have fevers, tmax 101.7 in past 24 hrs.  Has rectal pain 2/2 suspected hemorrhoids vs ? Abscess    - Blood cultures 4/29, 4/30, 5/1 remain NGTD. 5/3/19: blood culture positive for gram negative rods, pseudomonas aerugoinosa   - 4/29 UA/UCx negative. Repeat urine sent on 5/2, culture NGTD   - CXR (4/29) negative  - 5/1 PCR screen for Inf A/B/RSV negative  - 5/1 serum galactomannan negative and fungitell negative  - 5/4/19  CT abd/pelvis with concern of proctitis. No abscess identified.    Antibiotics:    - ID consulted  for any additional recs as this is pt is transplant bound and potential  discharge for 5/6/19 pending remains afebrile and ANC > 1000.  - Cefepime (x 4/29 PM - 5/5/19).  Per Dr. Garcia, concern is that  Blood cx from  5/3/19 which are now positive for gram negative rods, pseudomonas aerugoinosa, may be resistant to Cefipime, thus the delay in results, awaiting susceptibilities.  Therefore, will change to meropenum 1 gm IV every 8 hours on 5/5/19 and repeat blood cultures x 2 sets (both aerobic and anerobic).  Will also schedule PICC placement for 5/5/19.  - Flagyl 500mg q8hr (x 5/2) for anaerobic coverage.       ID PPx  - Continue ppx acyclovir and voriconazole.  Will not need refills at time of discharge.  - Hold ppx Levaquin as now on Cefepime  - On 5/3/19, per ID discontinue vancomycin prophylaxis while on flagyl [was on oral vancomycin as outpatient to prevent recurrence of C.diff]    AML (IDH2, RUNX1 positive), currently in mophologic/immunophenotypic remission.   Refractory to initial induction chemotherapy with 7+3 (D1= 3/15/19). Underwent second induction with Decitabine x 10 days (D1=3/26) and had venetoclax added (3/29). Course was complicated by neutropenic sepsis/septic shock related to odontic infection, C.diff diarrhea and neutropenic colitis, FOREST, and LFT derangement all of which improved. He underwent BMBx on 4/23. This demonstrated morphologic and immunophenotypic remission. He is now planned for BMT with his son serving as the donor. Patient has been on Venetoclax 100mg daily, with plan for patient to remain on this throughout BMT evaluation due to high risk of relapse.  Due to current complication/admission for neutropenic fever, Dr. Brown approved stopping Venetoclax as of 4/30/19.    BMT work up commenced inpatient due to begin transplant workup this week. Outpatient/BMT team aware of pt's admission.   - 5/1/19 CT Chest negative w/o contrast  - 5/2/19 Rad Onc to see patient  - Blood CMV negative and EBV DNA quant negative       Leukopenia/neutropenia  Anemia  2/2  chemotherapy. Appears Plts have recovered. Likely had drop in Hgb overnight (4/29-4/30) due to dilutional effect from fluids.   - Evidence of WBC and neutrophil recovery as of 4/30/19.  - 5/5/19:  WBC 1.4   - transfuse to maintain Hgb >7 (irradiated).    Hemorrhoids  Has h/o hemorrhoids. Pt reported having been constipated PTA and taking a laxative at home. He subsequently had several soft stools, which exacerbated his hemorrhoids. No bleeding, diarrhea, or abdominal pain/cramping. Started overnight 4/30-5/1, ongoing but slightly better on 5/2 AM.  - Prep H cream, PRN lidocaine gel, TUCKS pads  - 5/2: superficial rectal exam (neutropenia) with large external hemorrhoid; no bleeding, break in skin, or surrounding erythema/induration/fluctuance.   - 5/5/19 improving hemorrhoidal pain in light of WBC recovery.    Subclinical coronary atherosclerosis  HLD  - Continue PTA Lipitor    BPH  - Continue PTA Flomax 0.4mg daily    H/o RLS  - Continue PTA Requip at bedtime PRN    FEN  - S/p 1L NS bolus x 2, continued IVFs with NS @125 ml/hr --> reduced to 75 ml/hr (4/30 afternoon). IVFs stopped on 5/1 as pt was HD stable, feeling better, and taking in PO.  - lyte repletion per unit protocol  - regular diet    PPx  - VTE: Lovenox 40mg subcutaneous daily (hold if Plt count <50K)  - GI: PPI    Dispo: Remains inpatient for ongoing management of neutropenic fever. Starting Flagyl for anaerobic coverage. If remains afebrile and ANC >1000, plan to discharge 5/7/19.  - Commencing meropen until susceptibility testing known.  To go home with IV abx and PICC  - To notify Esequiel Salter (on 5/2), BMT coordinator of impending discharge so follow up testing can continue as an outpatient.     Discussed with Dr. Thomas, staff attending.    Winsome Boss ANP-BC, AOP  Hematology/Oncology  [phone]      Interval History   Mr. Ace has remained afebrile the past 24 hours.  Denies headaches, chest pain, SOB, N/V/D.  Had loose stool this  am.  States he is feeling stronger and notes improvement in rectal pain.  He is eating/drinking. Sleeping fairly with melatonin.  Asked for me to contact his wife regarding today's update (wife appreciative).    Physical Exam   Temp: 96.8  F (36  C) Temp src: Oral BP: 115/72 Pulse: 83 Heart Rate: 98 Resp: 18 SpO2: 99 % O2 Device: None (Room air)    Vitals:    05/02/19 0930 05/03/19 0747 05/04/19 0812   Weight: 89.2 kg (196 lb 11.2 oz) 88.7 kg (195 lb 8.8 oz) 88.7 kg (195 lb 9.6 oz)     Vital Signs with Ranges  Temp:  [96.8  F (36  C)-99.7  F (37.6  C)] 96.8  F (36  C)  Pulse:  [83-90] 83  Heart Rate:  [84-98] 98  Resp:  [18-20] 18  BP: (101-128)/(58-76) 115/72  SpO2:  [96 %-99 %] 99 %  I/O last 3 completed shifts:  In: 850 [P.O.:500; I.V.:350]  Out: -   Constitutional: Pleasant male seen resting in bed. Looks well, in NAD.  HEENT: NC/AT. Sclera clear, anicteric. OP pink and moist, no lesions or thrush.  Respiratory: Breathing even and non-labored on RA. Able to sit up for exam. Lungs CTA b/l, no wheezing or crackles.  CV: Heart rate and regular rhythm, no murmur appreciated.   GI: Normal BS. Abd soft, ND/NT. No rebound or guarding.   : 5/2 superficial rectal exam (neutropenia) with large external hemorrhoid; no bleeding, skin tear, or surrounding erythema, induration or fluctuance.   Skin: Dry, warm, intact. Well healed site of prior PICC line in inner upper right arm. No concerning lesions or rash on exposed surfaces. PIV intact.  Musculoskeletal: Extremities grossly normal in appearance, no edema.   Neurologic: Alert and oriented. Speech normal. Grossly nonfocal.  Neuropsychiatric: Calm, mentation appears normal, asking and answering questions appropriately.       Medications     - MEDICATION INSTRUCTIONS -         acyclovir  400 mg Oral BID     atorvastatin  20 mg Oral QPM     ceFEPIme (MAXIPIME) IV  2 g Intravenous Q8H     enoxaparin  40 mg Subcutaneous Q24H     metroNIDAZOLE  500 mg Oral Q8H ZUNILDA     nystatin   500,000 Units Oral TID     pantoprazole  40 mg Oral BID AC     pramox-pe-glycerin-petrolatum   Rectal TID     tamsulosin  0.4 mg Oral Daily     voriconazole  150 mg Oral Q12H [facility]       Data   Results for orders placed or performed during the hospital encounter of 04/29/19 (from the past 24 hour(s))   CT Abdomen Pelvis w Contrast    Narrative    EXAMINATION: CT ABDOMEN PELVIS W CONTRAST, 5/4/2019 11:52 AM    TECHNIQUE:  Helical CT images from the lung bases through the  symphysis pubis were obtained with IV contrast. Contrast dose: 119cc  of Isovue 370    COMPARISON: CT CAP 3/29/2019    HISTORY: neutropenic patient with fevers and rectal discomfort,  evaluate for GI source of infection, possible rectal abscess ?    FINDINGS:    Lung bases: Unremarkable    Abdomen and pelvis: Scattered hepatic cysts unchanged from 3/29/2019.  The largest right lobe mass measuring 3.1 x 4.8 cm, unchanged (series  3 image 89). Normal spleen splenule is present. Normal gallbladder.  Normal pancreas. Normal adrenal glands and kidneys. No dilation of the  urinary collecting system. Distended bladder without evidence of  bladder wall abnormality. Mildly enlarged prostate. Increased fat  stranding in the perianal soft tissues concerning for proctitis. No  evidence of fluid collection or abscess. Nonobstructive bowel gas  pattern. Decreased bowel wall thickening of the right hemicolon  compared to 3/20/1919. Several prominent inguinal lymph nodes, not  significantly changed from previous. Small fat-containing  periumbilical hernia. No significant abdominal pelvic  lymphadenopathy..    Bones and soft tissues: Degenerative changes of the thoracolumbar  spine. No concerning osseous or soft tissue findings.      Impression    IMPRESSION:   1. Increased soft tissue edema in the perianal soft tissues, raising  the concern for proctitis. No evidence of abscess or fluid collection.  2. Decreased bowel wall thickening of the right colon compared  to  3/29/2019.     I have personally reviewed the examination and initial interpretation  and I agree with the findings.    MARIE WOLF MD   CBC with platelets differential   Result Value Ref Range    WBC 1.4 (L) 4.0 - 11.0 10e9/L    RBC Count 2.54 (L) 4.4 - 5.9 10e12/L    Hemoglobin 7.8 (L) 13.3 - 17.7 g/dL    Hematocrit 24.0 (L) 40.0 - 53.0 %    MCV 95 78 - 100 fl    MCH 30.7 26.5 - 33.0 pg    MCHC 32.5 31.5 - 36.5 g/dL    RDW 17.5 (H) 10.0 - 15.0 %    Platelet Count 404 150 - 450 10e9/L    Diff Method Manual Differential     % Neutrophils 50.9 %    % Lymphocytes 49.1 %    % Monocytes 0.0 %    % Eosinophils 0.0 %    % Basophils 0.0 %    Absolute Neutrophil 0.7 (L) 1.6 - 8.3 10e9/L    Absolute Lymphocytes 0.7 (L) 0.8 - 5.3 10e9/L    Absolute Monocytes 0.0 0.0 - 1.3 10e9/L    Absolute Eosinophils 0.0 0.0 - 0.7 10e9/L    Absolute Basophils 0.0 0.0 - 0.2 10e9/L    Anisocytosis Slight     Poikilocytosis Slight     Ovalocytes Slight     Macrocytes Present     Platelet Estimate Confirming automated cell count    Basic metabolic panel   Result Value Ref Range    Sodium 138 133 - 144 mmol/L    Potassium 3.7 3.4 - 5.3 mmol/L    Chloride 108 94 - 109 mmol/L    Carbon Dioxide 23 20 - 32 mmol/L    Anion Gap 7 3 - 14 mmol/L    Glucose 106 (H) 70 - 99 mg/dL    Urea Nitrogen 12 7 - 30 mg/dL    Creatinine 0.69 0.66 - 1.25 mg/dL    GFR Estimate >90 >60 mL/min/[1.73_m2]    GFR Estimate If Black >90 >60 mL/min/[1.73_m2]    Calcium 8.4 (L) 8.5 - 10.1 mg/dL         I have seen, interviewed, and examined the patient independently.  I have reviewed the vital signs and labs.  This note reflects my assessment and plan.      Neutropenic fever, no source identified. Feels well, and now afebrile (some ANC increase now seen) appreciate ID assistance, have added flagyl and sent viral studies. CT+contrast to r/o rectal abscess: proctotis. This is symptomatically improving. Pseudomonas in blood, will follow sensitivities      Have discussed  with BMT and AML primaries: will hold venetoclax to let ANC recovery in prep for transplant. Patient is in CR.    MUGA with EF 43%: have notified Dr. Brown: will plan for outpatient RENE and CT ch (done, ok) for BMT w/u  Rad onc to see in prep for transplant as well.      Carolyn Thomas MD/PhD

## 2019-05-05 NOTE — PLAN OF CARE
VSS. Afebrile. Denied pain, nausea and vomiting. Gram, negative rods from left arm also showing pseudomonas aeruginosa. Hospitalist notified. Continues on IV Cefepime and PO Flagyl. Voiding spontaneously but not saving urine. Slept between cares. Possible discharge today back to home pending afebrile 24-48 hrs. Continue with POC.

## 2019-05-05 NOTE — PLAN OF CARE
He is afebrile, vital signs stable. Count recovery appears to be progressing. He denies signs or symptoms of infection. Main complaint is hemorrhoid pain for which he is using Preparation H. Antibiotic schedule remains intact.    Addendum: Microbiology lab later called to report positive blood culture from left arm revealing gram negative rods. Hospitalist text paged to review results.

## 2019-05-06 ENCOUNTER — HOME INFUSION (PRE-WILLOW HOME INFUSION) (OUTPATIENT)
Dept: PHARMACY | Facility: CLINIC | Age: 64
End: 2019-05-06

## 2019-05-06 ENCOUNTER — APPOINTMENT (OUTPATIENT)
Dept: GENERAL RADIOLOGY | Facility: CLINIC | Age: 64
DRG: 394 | End: 2019-05-06
Attending: NURSE PRACTITIONER
Payer: COMMERCIAL

## 2019-05-06 LAB
ALBUMIN SERPL-MCNC: 2.6 G/DL (ref 3.4–5)
ALP SERPL-CCNC: 173 U/L (ref 40–150)
ALT SERPL W P-5'-P-CCNC: 57 U/L (ref 0–70)
ANION GAP SERPL CALCULATED.3IONS-SCNC: 8 MMOL/L (ref 3–14)
ANISOCYTOSIS BLD QL SMEAR: SLIGHT
AST SERPL W P-5'-P-CCNC: 37 U/L (ref 0–45)
BACTERIA SPEC CULT: NO GROWTH
BACTERIA SPEC CULT: NORMAL
BASOPHILS # BLD AUTO: 0 10E9/L (ref 0–0.2)
BASOPHILS NFR BLD AUTO: 0 %
BILIRUB DIRECT SERPL-MCNC: 0.2 MG/DL (ref 0–0.2)
BILIRUB SERPL-MCNC: 0.4 MG/DL (ref 0.2–1.3)
BUN SERPL-MCNC: 11 MG/DL (ref 7–30)
CALCIUM SERPL-MCNC: 8.4 MG/DL (ref 8.5–10.1)
CHLORIDE SERPL-SCNC: 107 MMOL/L (ref 94–109)
CO2 SERPL-SCNC: 22 MMOL/L (ref 20–32)
CREAT SERPL-MCNC: 0.67 MG/DL (ref 0.66–1.25)
DIFFERENTIAL METHOD BLD: ABNORMAL
EBV DNA # SPEC NAA+PROBE: NORMAL {COPIES}/ML
EBV DNA SPEC NAA+PROBE-LOG#: NORMAL {LOG_COPIES}/ML
EOSINOPHIL # BLD AUTO: 0 10E9/L (ref 0–0.7)
EOSINOPHIL NFR BLD AUTO: 0 %
ERYTHROCYTE [DISTWIDTH] IN BLOOD BY AUTOMATED COUNT: 17.2 % (ref 10–15)
GFR SERPL CREATININE-BSD FRML MDRD: >90 ML/MIN/{1.73_M2}
GLUCOSE SERPL-MCNC: 115 MG/DL (ref 70–99)
HCT VFR BLD AUTO: 25.6 % (ref 40–53)
HGB BLD-MCNC: 8.1 G/DL (ref 13.3–17.7)
LYMPHOCYTES # BLD AUTO: 0.8 10E9/L (ref 0.8–5.3)
LYMPHOCYTES NFR BLD AUTO: 52 %
Lab: NORMAL
Lab: NORMAL
MACROCYTES BLD QL SMEAR: PRESENT
MAGNESIUM SERPL-MCNC: 2.3 MG/DL (ref 1.6–2.3)
MCH RBC QN AUTO: 30 PG (ref 26.5–33)
MCHC RBC AUTO-ENTMCNC: 31.6 G/DL (ref 31.5–36.5)
MCV RBC AUTO: 95 FL (ref 78–100)
MONOCYTES # BLD AUTO: 0 10E9/L (ref 0–1.3)
MONOCYTES NFR BLD AUTO: 3 %
NEUTROPHILS # BLD AUTO: 0.7 10E9/L (ref 1.6–8.3)
NEUTROPHILS NFR BLD AUTO: 45 %
PHOSPHATE SERPL-MCNC: 3.1 MG/DL (ref 2.5–4.5)
PLATELET # BLD AUTO: 433 10E9/L (ref 150–450)
PLATELET # BLD EST: ABNORMAL 10*3/UL
POTASSIUM SERPL-SCNC: 3.5 MMOL/L (ref 3.4–5.3)
PROT SERPL-MCNC: 6.3 G/DL (ref 6.8–8.8)
RBC # BLD AUTO: 2.7 10E12/L (ref 4.4–5.9)
SODIUM SERPL-SCNC: 138 MMOL/L (ref 133–144)
SPECIMEN SOURCE: NORMAL
SPECIMEN SOURCE: NORMAL
WBC # BLD AUTO: 1.5 10E9/L (ref 4–11)

## 2019-05-06 PROCEDURE — 25000125 ZZHC RX 250: Performed by: NURSE PRACTITIONER

## 2019-05-06 PROCEDURE — 36569 INSJ PICC 5 YR+ W/O IMAGING: CPT

## 2019-05-06 PROCEDURE — 25000128 H RX IP 250 OP 636: Performed by: INTERNAL MEDICINE

## 2019-05-06 PROCEDURE — 99233 SBSQ HOSP IP/OBS HIGH 50: CPT | Performed by: INTERNAL MEDICINE

## 2019-05-06 PROCEDURE — 85025 COMPLETE CBC W/AUTO DIFF WBC: CPT | Performed by: INTERNAL MEDICINE

## 2019-05-06 PROCEDURE — 80076 HEPATIC FUNCTION PANEL: CPT | Performed by: INTERNAL MEDICINE

## 2019-05-06 PROCEDURE — 84100 ASSAY OF PHOSPHORUS: CPT | Performed by: INTERNAL MEDICINE

## 2019-05-06 PROCEDURE — 40000986 XR CHEST PORT 1 VW

## 2019-05-06 PROCEDURE — 36415 COLL VENOUS BLD VENIPUNCTURE: CPT | Performed by: INTERNAL MEDICINE

## 2019-05-06 PROCEDURE — C1751 CATH, INF, PER/CENT/MIDLINE: HCPCS

## 2019-05-06 PROCEDURE — 25000132 ZZH RX MED GY IP 250 OP 250 PS 637: Performed by: INTERNAL MEDICINE

## 2019-05-06 PROCEDURE — 25000132 ZZH RX MED GY IP 250 OP 250 PS 637: Performed by: PHYSICIAN ASSISTANT

## 2019-05-06 PROCEDURE — 12000001 ZZH R&B MED SURG/OB UMMC

## 2019-05-06 PROCEDURE — 87040 BLOOD CULTURE FOR BACTERIA: CPT | Performed by: INTERNAL MEDICINE

## 2019-05-06 PROCEDURE — 83735 ASSAY OF MAGNESIUM: CPT | Performed by: INTERNAL MEDICINE

## 2019-05-06 PROCEDURE — 80048 BASIC METABOLIC PNL TOTAL CA: CPT | Performed by: INTERNAL MEDICINE

## 2019-05-06 PROCEDURE — 25000132 ZZH RX MED GY IP 250 OP 250 PS 637: Performed by: NURSE PRACTITIONER

## 2019-05-06 PROCEDURE — 27211417 ZZ H KIT, VPS RHYTHM STYLET

## 2019-05-06 PROCEDURE — 25000128 H RX IP 250 OP 636: Performed by: NURSE PRACTITIONER

## 2019-05-06 RX ORDER — VANCOMYCIN HYDROCHLORIDE 50 MG/ML
125 KIT ORAL 4 TIMES DAILY
Status: DISCONTINUED | OUTPATIENT
Start: 2019-05-06 | End: 2019-05-07 | Stop reason: HOSPADM

## 2019-05-06 RX ORDER — NYSTATIN 100000/ML
500000 SUSPENSION, ORAL (FINAL DOSE FORM) ORAL 4 TIMES DAILY
Qty: 400 ML | Refills: 0 | Status: ON HOLD | OUTPATIENT
Start: 2019-05-06 | End: 2019-06-19

## 2019-05-06 RX ADMIN — TAMSULOSIN HYDROCHLORIDE 0.4 MG: 0.4 CAPSULE ORAL at 08:07

## 2019-05-06 RX ADMIN — NYSTATIN 500000 UNITS: 100000 SUSPENSION ORAL at 11:13

## 2019-05-06 RX ADMIN — VORICONAZOLE 150 MG: 50 TABLET ORAL at 20:27

## 2019-05-06 RX ADMIN — MEROPENEM 1 G: 1 INJECTION, POWDER, FOR SOLUTION INTRAVENOUS at 13:13

## 2019-05-06 RX ADMIN — GLYCERIN, PETROLATUM, PHENYLEPHRINE HCL, PRAMOXINE HCL: 144; 2.5; 10; 15 CREAM TOPICAL at 08:09

## 2019-05-06 RX ADMIN — MEROPENEM 1 G: 1 INJECTION, POWDER, FOR SOLUTION INTRAVENOUS at 22:36

## 2019-05-06 RX ADMIN — PANTOPRAZOLE SODIUM 40 MG: 40 TABLET, DELAYED RELEASE ORAL at 15:27

## 2019-05-06 RX ADMIN — LIDOCAINE HYDROCHLORIDE 5 ML: 10 INJECTION, SOLUTION EPIDURAL; INFILTRATION; INTRACAUDAL; PERINEURAL at 10:36

## 2019-05-06 RX ADMIN — ATORVASTATIN CALCIUM 20 MG: 10 TABLET, FILM COATED ORAL at 20:27

## 2019-05-06 RX ADMIN — PANTOPRAZOLE SODIUM 40 MG: 40 TABLET, DELAYED RELEASE ORAL at 08:07

## 2019-05-06 RX ADMIN — ACYCLOVIR 400 MG: 400 TABLET ORAL at 20:27

## 2019-05-06 RX ADMIN — VANCOMYCIN HYDROCHLORIDE 125 MG: KIT at 20:27

## 2019-05-06 RX ADMIN — METRONIDAZOLE 500 MG: 500 TABLET ORAL at 06:23

## 2019-05-06 RX ADMIN — ENOXAPARIN SODIUM 40 MG: 40 INJECTION SUBCUTANEOUS at 00:49

## 2019-05-06 RX ADMIN — GLYCERIN, PETROLATUM, PHENYLEPHRINE HCL, PRAMOXINE HCL: 144; 2.5; 10; 15 CREAM TOPICAL at 13:14

## 2019-05-06 RX ADMIN — NYSTATIN 500000 UNITS: 100000 SUSPENSION ORAL at 17:48

## 2019-05-06 RX ADMIN — NYSTATIN 500000 UNITS: 100000 SUSPENSION ORAL at 08:08

## 2019-05-06 RX ADMIN — GLYCERIN, PETROLATUM, PHENYLEPHRINE HCL, PRAMOXINE HCL: 144; 2.5; 10; 15 CREAM TOPICAL at 20:34

## 2019-05-06 RX ADMIN — MELATONIN TAB 3 MG 3 MG: 3 TAB at 22:21

## 2019-05-06 RX ADMIN — ACYCLOVIR 400 MG: 400 TABLET ORAL at 08:07

## 2019-05-06 RX ADMIN — MEROPENEM 1 G: 1 INJECTION, POWDER, FOR SOLUTION INTRAVENOUS at 04:06

## 2019-05-06 RX ADMIN — VORICONAZOLE 150 MG: 50 TABLET ORAL at 08:08

## 2019-05-06 RX ADMIN — VANCOMYCIN HYDROCHLORIDE 125 MG: KIT at 11:54

## 2019-05-06 RX ADMIN — VANCOMYCIN HYDROCHLORIDE 125 MG: KIT at 15:27

## 2019-05-06 ASSESSMENT — ACTIVITIES OF DAILY LIVING (ADL)
ADLS_ACUITY_SCORE: 13

## 2019-05-06 NOTE — PROGRESS NOTES
[facility]  Transplant Infectious Disease Progress Note     Patient:  El Ace, Date of birth 1955, Medical record number [id number]  Date of Visit:  05/06/2019  Consult requested by Vee Cabrera for evaluation of neutropenic fevers.         Assessment and Recommendations:   Recommendations:  - continuing meropenem  - Continue with voriconazole prophylaxis  - called micro lab: the meropenem and zosyn susceptibilities will return tomorrow.  Would wait until tomorrow to discharge home and in the meantime, work on if he needs meropenem it is TID dosing.   -continues on vori, PO vanc secondary prophylaxis and acv     thoughts discussed w/ team.  Please call with questions.     Assessment: 64 yo male with new diagnosis of AML found incidentally after partial root canal intervention, s/p 7+3 induction on 3/15 and re induction with decitabine/venetoclax who presents with neutropenic fevers. Patient is actively getting w/u for BMT.  ID consulted to help with management.     ID issues include:  - proctitis and hemorrhoid w/ Neutropenic fevers and pseudomonas bacteremia: Fevers resolved w/ improvement in counts on cefepime which the pseudomonas isolate is resistant. Rectal /  Hemorrhoid pain improving.  Currently on meropenem and clinically doing well.  The pseudomonas meropenem and zosyn susceptibilities are pending and will return tomorrow.    Previous ID issues:  - Previous neutropenic fevers from odontogenic source.   - C diff colitis treated with vancomycin PO since 3/26    Other ID issues:   - prophylaxis: voriconazole, ACV  - Serostatus: CMV neg, EBV pos, HSV pos  - Immunization status: Needs all pre BMT vaccinations   - Gamma globulin status: Not in records   - Isolation status:  Good hand hygiene.    Attestation:  I have reviewed today's vital signs, medications, labs and imaging.      Floor time: 25 minutes, Face-to-face time: 10 minutes, Total time: 35 minutes  Rios Desai  Yo,   Pager [phone]        Interval History:      Last documented fever on 5/3 to 101.2.  El denies any abdominal pain, n/v/, some loose stool.  No cough / sob, vision changes or headaches.  Reports hemorrhoid pain improving.          History of the Infectious Disease lllness:   This is a 64 yo male with new diagnosis of AML found incidentally after partial root canal intervention, s/p 7+3 induction on 3/15 and re induction with decitabine/venetoclax who presents with neutropenic fevers. Patient is actively getting w/u for BMT. ID consulted to help with management.     Randy is feeling ok today, he has mild HAs (global, achy, 3-5/10) when he has a fevers, but no visual changes, sinus pressure, ear pain, or sore throat. No runny nose, dyspnea, cough, CP, n/v, abdominal pain or urinary symptoms. A few days ago, patient was dealing with constipation and was given a laxative which in turn gave him diarrhea, and subsequently hemorrhoidal pain.     He has been on and off febrile since admission. He was started on cefepime. BCx negative to date. Ct chest done for BMT w/u was negative. UA/UCx negative. BDG and fungitell pending.       Transplants:  N/A     Review of Systems:   ROS: 10 point ROS neg other than the symptoms noted above in the interval history.      Current Facility-Administered Medications   Medication     acetaminophen (TYLENOL) tablet 650 mg     acyclovir (ZOVIRAX) tablet 400 mg     atorvastatin (LIPITOR) tablet 20 mg     enoxaparin (LOVENOX) injection 40 mg     heparin lock flush 10 UNIT/ML injection 2-5 mL     lidocaine (LMX4) cream     lidocaine (XYLOCAINE) 2 % external gel     magnesium sulfate 4 g in 100 mL sterile water (premade)     Medication Instruction     melatonin tablet 3 mg     meropenem (MERREM) 1 g vial to attach to  mL bag     metroNIDAZOLE (FLAGYL) tablet 500 mg     nystatin (MYCOSTATIN) suspension 500,000 Units     ondansetron (ZOFRAN) tablet 8 mg     pantoprazole  (PROTONIX) EC tablet 40 mg     potassium chloride (KLOR-CON) Packet 20-40 mEq     potassium chloride 10 mEq in 100 mL intermittent infusion with 10 mg lidocaine     potassium chloride 10 mEq in 100 mL sterile water intermittent infusion (premix)     potassium chloride 20 mEq in 50 mL intermittent infusion     potassium chloride ER (K-DUR/KLOR-CON M) CR tablet 20-40 mEq     potassium phosphate 15 mmol in D5W 250 mL intermittent infusion     potassium phosphate 20 mmol in D5W 250 mL intermittent infusion     potassium phosphate 20 mmol in D5W 500 mL intermittent infusion     potassium phosphate 25 mmol in D5W 500 mL intermittent infusion     pramox-pe-glycerin-petrolatum (PREPARATION H) cream     prochlorperazine (COMPAZINE) tablet 5-10 mg     rOPINIRole (REQUIP) tablet 0.75 mg     sennosides (SENOKOT) tablet 1-2 tablet     sodium chloride (PF) 0.9% PF flush 10 mL     sodium chloride (PF) 0.9% PF flush 10-20 mL     tamsulosin (FLOMAX) capsule 0.4 mg     voriconazole (VFEND) tablet 150 mg     witch hazel-glycerin (TUCKS) pad       Allergies   Allergen Reactions     Lactose GI Disturbance              Physical Exam:   Vitals were reviewed.  All vitals stable  BP 97/66 (BP Location: Left arm)   Pulse 87   Temp 96.1  F (35.6  C) (Oral)   Resp 16   Wt 86.5 kg (190 lb 12.8 oz)   SpO2 98%   BMI 27.38 kg/m      Exam:  GENERAL:  well-developed, well-nourished, alert, oriented, sitting in bed, comfortable, speaking full sentences.   HEENT:  Head is normocephalic, atraumatic   EYES:  Eyes have anicteric sclerae. Conjunctiva normal.   ENT:  Oropharynx is moist without exudates or ulcers.  NECK:  Supple. No LAD  LUNGS:  Clear to auscultation. No wheezes.   CARDIOVASCULAR:  Regular rate and rhythm with no murmurs, gallops or rubs.  ABDOMEN:  Normal bowel sounds, soft, nontender.   SKIN:  No acute rashes. Perianal area slightly red, big hemorrhoid visualized, tender to palpation - not examined today  NEUROLOGIC:  Grossly  nonfocal.         Laboratory Data:     Metabolic Studies    Recent Labs   Lab Test 05/06/19 0412 05/05/19  0535 05/04/19  0658  04/29/19 2032 04/12/19  0444    138 136   < >  --    < > 136   POTASSIUM 3.5 3.7 3.5   < >  --    < > 4.1   CHLORIDE 107 108 106   < >  --    < > 105   CO2 22 23 20   < >  --    < > 23   ANIONGAP 8 7 9   < >  --    < > 8   BUN 11 12 10   < >  --    < > 14   CR 0.67 0.69 0.68   < >  --    < > 0.68   GFRESTIMATED >90 >90 >90   < >  --    < > >90   * 106* 112*   < >  --    < > 103*   DEBBIE 8.4* 8.4* 8.1*   < >  --    < > 8.6   PHOS 3.1  --   --    < >  --    < > 4.3   MAG 2.3  --   --    < >  --    < > 2.1   URIC  --   --   --   --   --   --  1.6*   LACT  --   --   --   --  1.2   < >  --     < > = values in this interval not displayed.       Hepatic Studies    Recent Labs   Lab Test 05/06/19 0412 04/30/19 0529 04/26/19  1324  04/08/19  0352 04/05/19  0312   BILITOTAL 0.4 0.8 0.5   < > 1.8*   < > 1.8*   DBIL 0.2 0.3*  --    < > 0.8*   < > 1.5*   ALKPHOS 173* 189* 232*   < > 85   < > 75   PROTTOTAL 6.3* 6.2* 6.6*   < > 5.3*   < > 5.6*   ALBUMIN 2.6* 2.6* 3.1*   < > 2.1*   < > 2.0*   AST 37 17 18   < > 42   < > 422*   ALT 57 28 33   < > 117*   < > 302*   LDH  --   --   --   --  215  --  344*    < > = values in this interval not displayed.       Hematology Studies     Recent Labs   Lab Test 05/06/19 0412 05/05/19 0535 05/04/19 0658 05/03/19 0612 04/13/19  0401   WBC 1.5* 1.4* 1.0* 0.5*   < > 2.4*   ABLA  --   --   --   --   --  0.1*   BLST  --   --   --   --   --  5.4   ANEU 0.7* 0.7* 0.6* 0.2*   < > 1.0*   ALYM 0.8 0.7* 0.3* 0.3*   < > 1.2   TRACIE 0.0 0.0 0.0 0.0   < > 0.0   AEOS 0.0 0.0 0.0 0.0   < > 0.0   HGB 8.1* 7.8* 7.5* 6.6*   < > 7.7*   HCT 25.6* 24.0* 23.9* 21.0*   < > 23.2*    404 411 454*   < > 23*    < > = values in this interval not displayed.       Urine Studies     Recent Labs   Lab Test 05/02/19  1330 04/29/19  2149 04/04/19  0523 03/30/19  2206  03/22/19  0945   URINEPH 6.5 7.0 6.0 6.5 7.5*   NITRITE Negative Negative Negative Negative Negative   LEUKEST Negative Negative Negative Negative Negative   WBCU 1 1 4 <1 2       Microbiology:  BCx 5/6 pending   5/5/19 negative to date   5/3/19 pseudomonas only sensitive to amikiacin, gent and tobra, pending are zosyn and meropenem.     Virology:  CMV viral loads    5/2/19 negative    EBV viral loads  EBV DNA Copies/mL   Date Value Ref Range Status   03/31/2019 1,186 (A) EBVNEG^EBV DNA Not Detected [Copies]/mL Final       Imaging:  CXR: 5/6/19:   1.  New right upper extremity approach PICC line tip projects over the mid/low SVC.  2.  No acute cardiopulmonary disease.    CT a/p: w/ contrast: 5/4/19:   1. Increased soft tissue edema in the perianal soft tissues, raising the concern for proctitis. No evidence of abscess or fluid collection.  2. Decreased bowel wall thickening of the right colon compared to 3/29/2019.     CT chest: 5/1/19: No suspicious airspace disease. Moderate three-vessel coronary calcium.

## 2019-05-06 NOTE — PLAN OF CARE
[id number]  AVSS. Denied pain, nausea/vomiting. Up ad shelley in room and halls. Good po intake. Voiding spontaneously without difficulty. LBM 5/5. PICC line placed this morning. Patient and wife went to the patient learning center and completed teaching on PICC line cares and IV abx administration. Plan to discharge to home tomorrow. No acute events. Continue with plan of care.

## 2019-05-06 NOTE — PROGRESS NOTES
Therapy: IV abx  Insurance: [employer]  One  Ded: $3000  Met: $3000   Co-Insurance:85%  Max Out of Pocket: $6000  Met: $6000    In reference to admission on 4/29/19 to check IV abx coverage    Please contact Intake with any questions, [phone] or In Basket pool, [facility] ([phone]).

## 2019-05-06 NOTE — PROGRESS NOTES
Care Coordinator Progress Note    Admission Date/Time:  4/29/2019  Attending MD:  Carolyn Thomas MD    Data  Chart reviewed, discussed with interdisciplinary team.   Patient was admitted for:    Neutropenia with fever (H)  Acute myeloid leukemia not having achieved remission (H).    Concerns with insurance coverage for discharge needs: None.  Current Living Situation: Patient lives with spouse.  Support System: Supportive and Involved  Services Involved: None  Transportation at Discharge: Car and Family or friend will provide  Transportation to Medical Appointments:   - Name of caregiver: Bessy, spouse  Barriers to Discharge: Medical Stability    Coordination of Care and Referrals: Provided patient/family with options for Home Infusion.        Per team, patient will likely be stable to discharge home tomorrow, on IV Meropenum every 8 hours. Patient has PLC today at 230pm.     Writer met with the patient to introduce the role of the care coordinator and assess discharge needs. Writer offered patient choice, and patient would like a referral made to [facility] of IV abx. Patient reported that he is otherwise independent at home and does not anticipate any further needs at this time. Patient did not have any other questions or concerns at this time.     Writer made a referral to [facility] and patient has 100% coverage for IV abx. AVS updated. [facility] liaison notified. RNCC will follow-up as needed.      Plan  Anticipated Discharge Date:  5/7/19  Anticipated Discharge Plan:  Home with assist of spouse as needed, home infusion services and follow-up in clinic as recommended.     Gayla De La Garza, RN, BSN, PHN  Care Coordinator

## 2019-05-06 NOTE — PROVIDER NOTIFICATION
05/06/19 1000   PICC Double Lumen 05/06/19 Right Brachial vein lateral   Placement Date/Time: 05/06/19 1030   Catheter Brand: Sunruno DL valved  Size (Fr): 5 Fr  Lot #: [id number]  Full barrier precautions done: Yes, hand hygiene, sterile gown, sterile gloves, mask, cap, full body drape, chlorhexidine scrub  Consent Signed: Ye...   Site Assessment WDL   External Cath Length (cm) 1 cm   Extremity Circumference (cm) 29.5 cm   Dressing Intervention Chlorhexidine patch;Transparent;Securing device   Dressing Change Due 05/13/19   Lumen A - Color GRAY   Lumen A - Status infusing;saline locked   Lumen A - Cap Change Due 05/09/19   Lumen B - Color PURPLE   Lumen B - Status blood return noted;saline locked   Lumen B - Cap Change Due 05/09/19

## 2019-05-06 NOTE — PLAN OF CARE
VSS. Afebrile. Denied pain, nausea and vomiting. Anaerobic bacterial culture collected and in process. Continues on IV Merrem and PO Flagyl. Voiding spontaneously but not saving urine. Slept between cares. Plan for patient to get a PICC placed today. PLC at 1430 to learn about managing PICC at home. Patient will go home on IV antibiotics. Possible discharge today. Continue with POC.

## 2019-05-06 NOTE — PROGRESS NOTES
General acute hospital, [place]  Hematology / Oncology Progress Note     Assessment & Plan   El Ace is a 63 year old male with AML now in morphologic and immunophenotypic CR s/p decitabine/venetoclax reinduction. He has continued on venetoclax while awaiting transplant (workup due week of 4/29) but was admitted on 4/29 with neutropenic fever. Found to have proctitis and pseudomonal bacteremia.      #Neutropenic fever/SIRS  #Proctitis  #Pseudomonas aeruginosa bacteremia  Presented to ED on 4/29 after fever 101 at home. In ED  but HD stable. No new focal symptoms. After admission, spiked Tmax 101.5 (4/30 @ 00:26) and subsequently defervesced. Lactic acid 1.5. WBC 0.3. He does not have a central line in place. No known sick contacts. Per his wife, pt did go to [facility] on Sunday (4/28) and wore his mask but had otherwise been pretty sequestered in the house.   - CT abd/pelvis on 5/4 positive for proctitis, no abscess seen.  - Blood culture 5/3/19 positive for pseudomonas; current sensitivities indicate sensitivity to amikacin, gentamicin, and tobramycin. Zosyn & meropenem sensitivities are pending and should result 5/7. Cultures 5/4-5/6 negative thus far, will monitor.  - PICC placed today 5/5, teaching to be done at [facility] this afternoon.  - Urine cultures 4/29 and 5/2 NGTD. CXR 4/29 negative. 5/1 influenza, galactomannan, fungitell negative.  - Appreciate ID recommendations.     #ID PPx  - Continue ppx acyclovir and voriconazole.  Will not need refills at time of discharge.  - Hold ppx Levaquin as now on Cefepime  - Now that Flagyl stopped, OK to resume home C.diff prophy with oral vanco     #AML (IDH2, RUNX1 positive), currently in mophologic/immunophenotypic remission.   Refractory to initial induction chemotherapy with 7+3 (D1= 3/15/19). Underwent second induction with Decitabine x 10 days (D1=3/26) and had venetoclax added (3/29). Course was complicated by neutropenic sepsis/septic shock  related to odontic infection, C.diff diarrhea and neutropenic colitis, FOREST, and LFT derangement all of which improved. He underwent BMBx on 4/23. This demonstrated morphologic and immunophenotypic remission. He is now planned for BMT with his son serving as the donor. Patient has been on Venetoclax 100mg daily, with plan for patient to remain on this throughout BMT evaluation due to high risk of relapse.  Due to current complication/admission for neutropenic fever, Dr. Brown approved stopping Venetoclax as of 4/30/19.     BMT work up commenced inpatient due to begin transplant workup this week. Outpatient/BMT team aware of pt's admission.   - 5/1/19 CT Chest negative w/o contrast  - 5/2/19 Rad Onc to see patient  - Blood CMV negative and EBV DNA quant pending     #Leukopenia/neutropenia --> improving  #Anemia  2/2 chemotherapy. Appears Plts have recovered. Likely had drop in Hgb overnight (4/29-4/30) due to dilutional effect from fluids.   - Evidence of WBC and neutrophil recovery as of 4/30/19, WNC 5/6 1.5 and WNC 0.7.  - transfuse to maintain Hgb >7 (irradiated).     #Hemorrhoids  Has h/o hemorrhoids. Pt reported having been constipated PTA and taking a laxative at home. He subsequently had several soft stools, which exacerbated his hemorrhoids. No bleeding, diarrhea, or abdominal pain/cramping. Started overnight 4/30-5/1, ongoing but slightly better on 5/2 AM. CT abd/pelvis as above.  - Prep H cream, PRN lidocaine gel, TUCKS pads     #Subclinical coronary atherosclerosis  #HLD  - Continue PTA Lipitor     #BPH  - Continue PTA Flomax 0.4mg daily     #H/o RLS  - Continue PTA Requip at bedtime PRN     #FEN  - No MIVF  - lyte repletion per unit protocol  - regular diet     #PPx  - VTE: Lovenox 40mg subcutaneous daily (hold if Plt count <50K)  - GI: PPI    Dispo: Anticipate discharge home tomorrow 5/7 with PICC line and IV antibiotics. [facility] is able to accommodate meropenem q8h if this is our antibiotic of choice at  time of discharge.    Patient and plan of care discussed with staff attending, Dr. Thomas.     Liberty Hearn PA-C  Hematology/Oncology  [phone]    Interval History   Randy is doing well today. No fevers overnight, last fever was May 3. He is still having some rectal pain & discomfort. Bowels on the looser side. Urinating fine. No chest pain or SOB. No nausea or vomiting. No leg swelling. He is hoping to go home soon.    Physical Exam   Temp: 96.1  F (35.6  C) Temp src: Oral BP: 97/66 Pulse: 87 Heart Rate: 91 Resp: 16 SpO2: 98 % O2 Device: None (Room air)    Vitals:    05/04/19 0812 05/05/19 0803 05/06/19 0732   Weight: 88.7 kg (195 lb 9.6 oz) 86.4 kg (190 lb 6.4 oz) 86.5 kg (190 lb 12.8 oz)     Vital Signs with Ranges  Temp:  [95.8  F (35.4  C)-97.1  F (36.2  C)] 96.1  F (35.6  C)  Pulse:  [] 87  Heart Rate:  [] 91  Resp:  [16-18] 16  BP: ()/(64-76) 97/66  SpO2:  [98 %-99 %] 98 %  I/O last 3 completed shifts:  In: 900 [P.O.:600; I.V.:300]  Out: -     Constitutional: Awake, alert, cooperative, no apparent distress, and appears stated age.  Eyes: Lids and lashes normal, pupils equal, round and reactive to light, extra ocular muscles intact, sclera clear, conjunctiva normal.  ENT: Normocephalic, without obvious abnormality, atraumatic, oral pharynx with moist mucus membranes, tonsils without erythema or exudates, gums normal and good dentition.  Respiratory: No increased work of breathing, good air exchange, clear to auscultation bilaterally, no crackles or wheezing.  Cardiovascular: Regular rate and rhythm, normal S1 and S2, and no murmur noted.  GI: Normal bowel sounds, soft, non-distended, non-tender.  Skin: No lesions or rashes noted to exposed skin surfaces.  Musculoskeletal: No edema to bilateral lower extremities.  Neurologic: No focal deficits.  Neuropsychiatric: Calm, normal eye contact, alert, normal affect, oriented to self, place, time and situation, memory for past and recent events  intact and thought process normal.    Medications     - MEDICATION INSTRUCTIONS -         acyclovir  400 mg Oral BID     atorvastatin  20 mg Oral QPM     enoxaparin  40 mg Subcutaneous Q24H     meropenem  1 g Intravenous Q8H     metroNIDAZOLE  500 mg Oral Q8H ZUNILDA     nystatin  500,000 Units Oral TID     pantoprazole  40 mg Oral BID AC     pramox-pe-glycerin-petrolatum   Rectal TID     sodium chloride (PF)  10 mL Intracatheter Q7 Days     tamsulosin  0.4 mg Oral Daily     voriconazole  150 mg Oral Q12H ZUNILDA       Data   Results for orders placed or performed during the hospital encounter of 04/29/19 (from the past 24 hour(s))   Blood culture   Result Value Ref Range    Specimen Description Right Arm     Special Requests Received in aerobic bottle only     Culture Micro No growth after 16 hours    Blood culture   Result Value Ref Range    Specimen Description Blood Left Arm     Special Requests Received in aerobic bottle only     Culture Micro No growth after 16 hours    Blood culture   Result Value Ref Range    Specimen Description Blood Right Arm     Special Requests Received in anaerobic bottle only     Culture Micro       Canceled, Test credited  Duplicate request  SEE ACCESSION [id number]     Blood culture   Result Value Ref Range    Specimen Description Blood Right Arm     Special Requests Received in anaerobic bottle only     Culture Micro No growth after 2 hours    CBC with platelets differential   Result Value Ref Range    WBC 1.5 (L) 4.0 - 11.0 10e9/L    RBC Count 2.70 (L) 4.4 - 5.9 10e12/L    Hemoglobin 8.1 (L) 13.3 - 17.7 g/dL    Hematocrit 25.6 (L) 40.0 - 53.0 %    MCV 95 78 - 100 fl    MCH 30.0 26.5 - 33.0 pg    MCHC 31.6 31.5 - 36.5 g/dL    RDW 17.2 (H) 10.0 - 15.0 %    Platelet Count 433 150 - 450 10e9/L    Diff Method Manual Differential     % Neutrophils 45.0 %    % Lymphocytes 52.0 %    % Monocytes 3.0 %    % Eosinophils 0.0 %    % Basophils 0.0 %    Absolute Neutrophil 0.7 (L) 1.6 - 8.3 10e9/L     Absolute Lymphocytes 0.8 0.8 - 5.3 10e9/L    Absolute Monocytes 0.0 0.0 - 1.3 10e9/L    Absolute Eosinophils 0.0 0.0 - 0.7 10e9/L    Absolute Basophils 0.0 0.0 - 0.2 10e9/L    Anisocytosis Slight     Macrocytes Present     Platelet Estimate Confirming automated cell count    Basic metabolic panel   Result Value Ref Range    Sodium 138 133 - 144 mmol/L    Potassium 3.5 3.4 - 5.3 mmol/L    Chloride 107 94 - 109 mmol/L    Carbon Dioxide 22 20 - 32 mmol/L    Anion Gap 8 3 - 14 mmol/L    Glucose 115 (H) 70 - 99 mg/dL    Urea Nitrogen 11 7 - 30 mg/dL    Creatinine 0.67 0.66 - 1.25 mg/dL    GFR Estimate >90 >60 mL/min/[1.73_m2]    GFR Estimate If Black >90 >60 mL/min/[1.73_m2]    Calcium 8.4 (L) 8.5 - 10.1 mg/dL   Magnesium   Result Value Ref Range    Magnesium 2.3 1.6 - 2.3 mg/dL   Phosphorus   Result Value Ref Range    Phosphorus 3.1 2.5 - 4.5 mg/dL   Hepatic panel   Result Value Ref Range    Bilirubin Direct 0.2 0.0 - 0.2 mg/dL    Bilirubin Total 0.4 0.2 - 1.3 mg/dL    Albumin 2.6 (L) 3.4 - 5.0 g/dL    Protein Total 6.3 (L) 6.8 - 8.8 g/dL    Alkaline Phosphatase 173 (H) 40 - 150 U/L    ALT 57 0 - 70 U/L    AST 37 0 - 45 U/L   XR Chest Port 1 View    Narrative    XR CHEST PORT 1 VW  5/6/2019 11:26 AM      HISTORY: PICC placement    COMPARISON: Chest radiograph 4/29/2019, chest CT 5/1/2019.    FINDINGS: Single AP view of the chest. Right upper extremity approach  PICC line tip projects over the mid/low SVC. Trachea is midline.  Normal cardiac silhouette. No pneumothorax or pleural effusion. No  focal airspace consolidation. No acute osseous abnormality. Visualized  abdomen is unremarkable..      Impression    IMPRESSION:   1.  New right upper extremity approach PICC line tip projects over the  mid/low SVC.  2.  No acute cardiopulmonary disease.       I have seen, interviewed, and examined the patient independently.  I have reviewed the vital signs and labs.  This note reflects my assessment and plan.      Pseudomonas with  a lot of resistances. Needs jeff (q8 hrs) will get further sensitivities tomorrow. I reviewed this with Dr. Castillo (ID) she feels we need to keep him on jeff IV until full sensitivities available tomorrow even though he is afebrile now. Will do that. He will need 10 day treatment course. Can discontinue gilberto Thomas MD/PhD

## 2019-05-06 NOTE — PROGRESS NOTES
[facility]  Transplant Infectious Disease Brief Progress Note     Patient:  El Ace, Date of birth 1955, Medical record number [id number]  Date of Visit:  05/05/2019  Consult requested by Vee Cabrera for evaluation of neutropenic fevers.         Assessment and Recommendations:   Recommendations:  - Discontinue cefepime and flagyl  - Start meropenem 1gr IV q8 hrs    - Obtain anaerobic and aerobic blood cultures  - Pending EBV PCR quant blood.  - Continue vancomycin BID for CDI prophylaxis  - Continue with voriconazole prophylaxis    Thank you for the consultation. Transplant ID will follow along. Dr. Ram will take over tomorrow.     Assessment:  This is a 64 yo male with new diagnosis of AML found incidentally after partial root canal intervention, s/p 7+3 induction on 3/15 and re induction with decitabine/venetoclax who presents with neutropenic fevers. Patient is actively getting w/u for BMT. ID consulted to help with management.     Infectious Disease issues include:  - Neutropenic fevers: Patient with moderate rectal pain. He has previous h/o hemorrhoids. On examination, anal area was tender to palpation and slightly erythematous, no fissure visualized and no fluctuation. CT a/p showed possible proctitis.  Now with PSA bacteremia.     - PSA bacteremia on 5/3: Likely from proctitis? Concerning given that it grew on day 4 of cefepime. While we wait for susceptibilities will change cefepime/flagyl to meropenem in case this organisms is harboring resistance to beta lactams. Will tailor antibiotics based on results. Plan for PICC line placement. Repeat blood cultures.     Previous ID issues:  - Previous neutropenic fevers from odontogenic source.   - C diff colitis treated with vancomycin PO since 3/26 until 5/2, now on BID prophylaxis.     Other ID issues:   - PCP prophylaxis: None  - Fungal prophylaxis: voriconazole  - Serostatus: CMV neg, EBV pos, HSV pos - ACV  prophylaxis  - Immunization status: Needs all pre BMT vaccinations   - Gamma globulin status: Not in records   - Isolation status:  Good hand hygiene.    Attestation:  I did not examined the patient today but have reviewed today's vital signs, medications, labs and imaging.    Noemi Garcia MD  Transplant Infectious Diseases   [phone]

## 2019-05-06 NOTE — PLAN OF CARE
Pt remains afebrile, OVSS. States hemorrhoid pain is improving and not as bothersome. Denies nausea. Adequate UOP, pt not saving. No BM. Good appetite this evening. Plan for PICC placement tomorrow and PLC scheduled for 1430. Melatonin given at bedtime. Continue with plan of care.

## 2019-05-06 NOTE — CONSULTS
05/06/19 1513 Rebecca Perkins, MILO       Patient and wife completed Valved PICC/IV medication class.        No education note entered.

## 2019-05-07 ENCOUNTER — HOME INFUSION (PRE-WILLOW HOME INFUSION) (OUTPATIENT)
Dept: PHARMACY | Facility: CLINIC | Age: 64
End: 2019-05-07

## 2019-05-07 ENCOUNTER — APPOINTMENT (OUTPATIENT)
Dept: GENERAL RADIOLOGY | Facility: CLINIC | Age: 64
DRG: 394 | End: 2019-05-07
Attending: PHYSICIAN ASSISTANT
Payer: COMMERCIAL

## 2019-05-07 VITALS
WEIGHT: 193.7 LBS | HEART RATE: 87 BPM | RESPIRATION RATE: 18 BRPM | OXYGEN SATURATION: 100 % | TEMPERATURE: 97 F | BODY MASS INDEX: 27.79 KG/M2 | DIASTOLIC BLOOD PRESSURE: 71 MMHG | SYSTOLIC BLOOD PRESSURE: 108 MMHG

## 2019-05-07 LAB
ANION GAP SERPL CALCULATED.3IONS-SCNC: 10 MMOL/L (ref 3–14)
ANISOCYTOSIS BLD QL SMEAR: SLIGHT
BACTERIA SPEC CULT: NO GROWTH
BACTERIA SPEC CULT: NO GROWTH
BASOPHILS # BLD AUTO: 0 10E9/L (ref 0–0.2)
BASOPHILS NFR BLD AUTO: 0 %
BUN SERPL-MCNC: 9 MG/DL (ref 7–30)
CALCIUM SERPL-MCNC: 8.4 MG/DL (ref 8.5–10.1)
CHLORIDE SERPL-SCNC: 108 MMOL/L (ref 94–109)
CO2 SERPL-SCNC: 23 MMOL/L (ref 20–32)
CREAT SERPL-MCNC: 0.58 MG/DL (ref 0.66–1.25)
DIFFERENTIAL METHOD BLD: ABNORMAL
EOSINOPHIL # BLD AUTO: 0 10E9/L (ref 0–0.7)
EOSINOPHIL NFR BLD AUTO: 0 %
ERYTHROCYTE [DISTWIDTH] IN BLOOD BY AUTOMATED COUNT: 17.4 % (ref 10–15)
GFR SERPL CREATININE-BSD FRML MDRD: >90 ML/MIN/{1.73_M2}
GLUCOSE SERPL-MCNC: 104 MG/DL (ref 70–99)
HCT VFR BLD AUTO: 25.9 % (ref 40–53)
HGB BLD-MCNC: 8.3 G/DL (ref 13.3–17.7)
LYMPHOCYTES # BLD AUTO: 0.9 10E9/L (ref 0.8–5.3)
LYMPHOCYTES NFR BLD AUTO: 54.4 %
Lab: NORMAL
Lab: NORMAL
MCH RBC QN AUTO: 30.7 PG (ref 26.5–33)
MCHC RBC AUTO-ENTMCNC: 32 G/DL (ref 31.5–36.5)
MCV RBC AUTO: 96 FL (ref 78–100)
MONOCYTES # BLD AUTO: 0.1 10E9/L (ref 0–1.3)
MONOCYTES NFR BLD AUTO: 3.5 %
NEUTROPHILS # BLD AUTO: 0.7 10E9/L (ref 1.6–8.3)
NEUTROPHILS NFR BLD AUTO: 42.1 %
NRBC # BLD AUTO: 0 10*3/UL
NRBC BLD AUTO-RTO: 1 /100
OVALOCYTES BLD QL SMEAR: SLIGHT
PLATELET # BLD AUTO: 387 10E9/L (ref 150–450)
PLATELET # BLD EST: ABNORMAL 10*3/UL
POIKILOCYTOSIS BLD QL SMEAR: SLIGHT
POTASSIUM SERPL-SCNC: 3.7 MMOL/L (ref 3.4–5.3)
RBC # BLD AUTO: 2.7 10E12/L (ref 4.4–5.9)
SODIUM SERPL-SCNC: 141 MMOL/L (ref 133–144)
SPECIMEN SOURCE: NORMAL
SPECIMEN SOURCE: NORMAL
WBC # BLD AUTO: 1.6 10E9/L (ref 4–11)

## 2019-05-07 PROCEDURE — 25000132 ZZH RX MED GY IP 250 OP 250 PS 637: Performed by: PHYSICIAN ASSISTANT

## 2019-05-07 PROCEDURE — 80048 BASIC METABOLIC PNL TOTAL CA: CPT | Performed by: INTERNAL MEDICINE

## 2019-05-07 PROCEDURE — 85025 COMPLETE CBC W/AUTO DIFF WBC: CPT | Performed by: INTERNAL MEDICINE

## 2019-05-07 PROCEDURE — 25000128 H RX IP 250 OP 636: Performed by: NURSE PRACTITIONER

## 2019-05-07 PROCEDURE — 25000128 H RX IP 250 OP 636: Performed by: INTERNAL MEDICINE

## 2019-05-07 PROCEDURE — 99239 HOSP IP/OBS DSCHRG MGMT >30: CPT | Performed by: INTERNAL MEDICINE

## 2019-05-07 PROCEDURE — 25000132 ZZH RX MED GY IP 250 OP 250 PS 637: Performed by: NURSE PRACTITIONER

## 2019-05-07 PROCEDURE — 25000132 ZZH RX MED GY IP 250 OP 250 PS 637: Performed by: INTERNAL MEDICINE

## 2019-05-07 PROCEDURE — 71045 X-RAY EXAM CHEST 1 VIEW: CPT

## 2019-05-07 PROCEDURE — 36415 COLL VENOUS BLD VENIPUNCTURE: CPT | Performed by: INTERNAL MEDICINE

## 2019-05-07 PROCEDURE — 25000128 H RX IP 250 OP 636: Performed by: PHYSICIAN ASSISTANT

## 2019-05-07 RX ORDER — VANCOMYCIN HYDROCHLORIDE 50 MG/ML
125 KIT ORAL 4 TIMES DAILY
Qty: 150 ML | Refills: 0 | Status: ON HOLD | OUTPATIENT
Start: 2019-05-07 | End: 2019-06-19

## 2019-05-07 RX ADMIN — GLYCERIN, PETROLATUM, PHENYLEPHRINE HCL, PRAMOXINE HCL: 144; 2.5; 10; 15 CREAM TOPICAL at 08:21

## 2019-05-07 RX ADMIN — VANCOMYCIN HYDROCHLORIDE 125 MG: KIT at 08:18

## 2019-05-07 RX ADMIN — GLYCERIN, PETROLATUM, PHENYLEPHRINE HCL, PRAMOXINE HCL: 144; 2.5; 10; 15 CREAM TOPICAL at 14:11

## 2019-05-07 RX ADMIN — MEROPENEM 1 G: 1 INJECTION, POWDER, FOR SOLUTION INTRAVENOUS at 05:53

## 2019-05-07 RX ADMIN — ALTEPLASE 2 MG: 2.2 INJECTION, POWDER, LYOPHILIZED, FOR SOLUTION INTRAVENOUS at 10:05

## 2019-05-07 RX ADMIN — VANCOMYCIN HYDROCHLORIDE 125 MG: KIT at 12:38

## 2019-05-07 RX ADMIN — VORICONAZOLE 150 MG: 50 TABLET ORAL at 08:18

## 2019-05-07 RX ADMIN — ACYCLOVIR 400 MG: 400 TABLET ORAL at 08:18

## 2019-05-07 RX ADMIN — NYSTATIN 500000 UNITS: 100000 SUSPENSION ORAL at 12:38

## 2019-05-07 RX ADMIN — ENOXAPARIN SODIUM 40 MG: 40 INJECTION SUBCUTANEOUS at 01:12

## 2019-05-07 RX ADMIN — NYSTATIN 500000 UNITS: 100000 SUSPENSION ORAL at 08:21

## 2019-05-07 RX ADMIN — ALTEPLASE 2 MG: 2.2 INJECTION, POWDER, LYOPHILIZED, FOR SOLUTION INTRAVENOUS at 07:18

## 2019-05-07 RX ADMIN — CEFTOLOZANE AND TAZOBACTAM 1.5 G: 1; .5 INJECTION, POWDER, LYOPHILIZED, FOR SOLUTION INTRAVENOUS at 12:38

## 2019-05-07 RX ADMIN — TAMSULOSIN HYDROCHLORIDE 0.4 MG: 0.4 CAPSULE ORAL at 08:18

## 2019-05-07 RX ADMIN — PANTOPRAZOLE SODIUM 40 MG: 40 TABLET, DELAYED RELEASE ORAL at 08:18

## 2019-05-07 ASSESSMENT — ACTIVITIES OF DAILY LIVING (ADL)
ADLS_ACUITY_SCORE: 13

## 2019-05-07 NOTE — PROGRESS NOTES
[facility]  Transplant Infectious Disease Progress Note     Patient:  El Ace, Date of birth 1955, Medical record number [id number]  Date of Visit:  05/07/2019  Consult requested by Vee Cabrera for evaluation of neutropenic fevers.         Assessment and Recommendations:   Recommendations:  - discontinue meropenem, start ceftolazane/ tazobactam TID  - Continue with voriconazole prophylaxis  - called micro lab: added on susceptibilities to aztreonam and ceftolazane/tazobactam and polymyxin B/ colistin  -continues on vori, PO vanc secondary prophylaxis and acv  - ok to discharge home.  Plan for 10 day treatment with an active drug, susceptibilities will be back hopefully in 1-2 days.     thoughts discussed w/ team.  Please call with questions.     Assessment: 62 yo male with new diagnosis of AML found incidentally after partial root canal intervention, s/p 7+3 induction on 3/15 and re induction with decitabine/venetoclax who presents with neutropenic fevers. Patient is actively getting w/u for BMT.  El is doing very well and clinically improved with count recovery.      ID issues include:  - proctitis and hemorrhoid w/ Neutropenic fevers and pseudomonas bacteremia: Fevers resolved w/ improvement in counts on cefepime which the pseudomonas isolate is resistant. Rectal /  Hemorrhoid pain improving.  Currently on meropenem and clinically doing well.  Basically the pseudomonas in the blood is resistant to the abx's that we have currently had him on, yet he continues to do well, afebrile, improving rectal / hemorrhoid pain.  Repeat BCx's negative.  Given that he is immunocompromised and heading into SCT, we would like to treat the pseudomonas with a proper drug based on susceptibilities.  I've discussed w/ El that we won't know if either aztreonam or ceftolazane/tazobactam are susceptible until we have the results.  He prefers a TID dosing, which can be done by [facility] home  infusion rather than continuous infusion with aztreonam.  Given the risk of nephrotoxicity of the aminoglycosides, and his clinical recovery, will hold off on that class of drug currently.     Previous ID issues:  - Previous neutropenic fevers from odontogenic source.   - C diff colitis treated with vancomycin PO since 3/26    Other ID issues:   - prophylaxis: voriconazole, ACV  - Serostatus: CMV neg, EBV pos, HSV pos  - Immunization status: Needs all pre BMT vaccinations   - Gamma globulin status: Not in records   - Isolation status:  Good hand hygiene.    Attestation:  I have reviewed today's vital signs, medications, labs and imaging.      Floor time: 25 minutes, Face-to-face time: 10 minutes, Total time: 35 minutes  Rios Ram,   Pager [phone]        Interval History:      Last documented fever on 5/3 to 101.2.  El denies any abdominal pain, n/v/, some loose stool.  No cough / sob, vision changes or headaches.  Reports hemorrhoid pain improving.          History of the Infectious Disease lllness:   This is a 62 yo male with new diagnosis of AML found incidentally after partial root canal intervention, s/p 7+3 induction on 3/15 and re induction with decitabine/venetoclax who presents with neutropenic fevers. Patient is actively getting w/u for BMT. ID consulted to help with management.     Randy is feeling ok today, he has mild HAs (global, achy, 3-5/10) when he has a fevers, but no visual changes, sinus pressure, ear pain, or sore throat. No runny nose, dyspnea, cough, CP, n/v, abdominal pain or urinary symptoms. A few days ago, patient was dealing with constipation and was given a laxative which in turn gave him diarrhea, and subsequently hemorrhoidal pain.     He has been on and off febrile since admission. He was started on cefepime. BCx negative to date. Ct chest done for BMT w/u was negative. UA/UCx negative. BDG and fungitell pending.       Transplants:  N/A     Review of Systems:   ROS: 10  point ROS neg other than the symptoms noted above in the interval history.      Current Facility-Administered Medications   Medication     acetaminophen (TYLENOL) tablet 650 mg     acyclovir (ZOVIRAX) tablet 400 mg     atorvastatin (LIPITOR) tablet 20 mg     ceftolozane-tazobactam (ZERBAXA) 1.5 g vial to attach to  ml bag     enoxaparin (LOVENOX) injection 40 mg     heparin lock flush 10 UNIT/ML injection 2-5 mL     lidocaine (LMX4) cream     lidocaine (XYLOCAINE) 2 % external gel     magnesium sulfate 4 g in 100 mL sterile water (premade)     Medication Instruction     melatonin tablet 3 mg     nystatin (MYCOSTATIN) suspension 500,000 Units     ondansetron (ZOFRAN) tablet 8 mg     pantoprazole (PROTONIX) EC tablet 40 mg     potassium chloride (KLOR-CON) Packet 20-40 mEq     potassium chloride 10 mEq in 100 mL intermittent infusion with 10 mg lidocaine     potassium chloride 10 mEq in 100 mL sterile water intermittent infusion (premix)     potassium chloride 20 mEq in 50 mL intermittent infusion     potassium chloride ER (K-DUR/KLOR-CON M) CR tablet 20-40 mEq     potassium phosphate 15 mmol in D5W 250 mL intermittent infusion     potassium phosphate 20 mmol in D5W 250 mL intermittent infusion     potassium phosphate 20 mmol in D5W 500 mL intermittent infusion     potassium phosphate 25 mmol in D5W 500 mL intermittent infusion     pramox-pe-glycerin-petrolatum (PREPARATION H) cream     prochlorperazine (COMPAZINE) tablet 5-10 mg     rOPINIRole (REQUIP) tablet 0.75 mg     sennosides (SENOKOT) tablet 1-2 tablet     sodium chloride (PF) 0.9% PF flush 10 mL     sodium chloride (PF) 0.9% PF flush 10-20 mL     tamsulosin (FLOMAX) capsule 0.4 mg     vancomycin (FIRVANQ) oral solution 125 mg     voriconazole (VFEND) tablet 150 mg     witch hazel-glycerin (TUCKS) pad       Allergies   Allergen Reactions     Lactose GI Disturbance              Physical Exam:   Vitals were reviewed.  All vitals stable  /71 (BP  Location: Left arm)   Pulse 87   Temp 97  F (36.1  C) (Oral)   Resp 18   Wt 87.9 kg (193 lb 11.2 oz)   SpO2 100%   BMI 27.79 kg/m      Exam:  GENERAL:  well-developed, well-nourished, alert, oriented, sitting in bed, comfortable, speaking full sentences.   HEENT:  Head is normocephalic, atraumatic   EYES:  Eyes have anicteric sclerae. Conjunctiva normal.   ENT:  Oropharynx is moist without exudates or ulcers.  NECK:  Supple. No LAD  LUNGS:  Clear to auscultation. No wheezes.   CARDIOVASCULAR:  Regular rate and rhythm with no murmurs, gallops or rubs.  ABDOMEN:  Normal bowel sounds, soft, nontender.   SKIN:  No acute rashes. Perianal area slightly red, big hemorrhoid visualized, tender to palpation - not examined today  NEUROLOGIC:  Grossly nonfocal.         Laboratory Data:     Metabolic Studies    Recent Labs   Lab Test 05/07/19  0610 05/06/19  0412 05/05/19  0535  04/29/19 2032 04/12/19  0444    138 138   < >  --    < > 136   POTASSIUM 3.7 3.5 3.7   < >  --    < > 4.1   CHLORIDE 108 107 108   < >  --    < > 105   CO2 23 22 23   < >  --    < > 23   ANIONGAP 10 8 7   < >  --    < > 8   BUN 9 11 12   < >  --    < > 14   CR 0.58* 0.67 0.69   < >  --    < > 0.68   GFRESTIMATED >90 >90 >90   < >  --    < > >90   * 115* 106*   < >  --    < > 103*   DEBBIE 8.4* 8.4* 8.4*   < >  --    < > 8.6   PHOS  --  3.1  --    < >  --    < > 4.3   MAG  --  2.3  --    < >  --    < > 2.1   URIC  --   --   --   --   --   --  1.6*   LACT  --   --   --   --  1.2   < >  --     < > = values in this interval not displayed.       Hepatic Studies    Recent Labs   Lab Test 05/06/19  0412 04/30/19  0529 04/26/19  1324  04/08/19  0352  04/05/19  0312   BILITOTAL 0.4 0.8 0.5   < > 1.8*   < > 1.8*   DBIL 0.2 0.3*  --    < > 0.8*   < > 1.5*   ALKPHOS 173* 189* 232*   < > 85   < > 75   PROTTOTAL 6.3* 6.2* 6.6*   < > 5.3*   < > 5.6*   ALBUMIN 2.6* 2.6* 3.1*   < > 2.1*   < > 2.0*   AST 37 17 18   < > 42   < > 422*   ALT 57 28 33   < >  117*   < > 302*   LDH  --   --   --   --  215  --  344*    < > = values in this interval not displayed.       Hematology Studies     Recent Labs   Lab Test 05/07/19  0610 05/06/19  0412 05/05/19  0535 05/04/19  0658  04/13/19  0401   WBC 1.6* 1.5* 1.4* 1.0*   < > 2.4*   ABLA  --   --   --   --   --  0.1*   BLST  --   --   --   --   --  5.4   ANEU 0.7* 0.7* 0.7* 0.6*   < > 1.0*   ALYM 0.9 0.8 0.7* 0.3*   < > 1.2   TRACIE 0.1 0.0 0.0 0.0   < > 0.0   AEOS 0.0 0.0 0.0 0.0   < > 0.0   HGB 8.3* 8.1* 7.8* 7.5*   < > 7.7*   HCT 25.9* 25.6* 24.0* 23.9*   < > 23.2*    433 404 411   < > 23*    < > = values in this interval not displayed.       Urine Studies     Recent Labs   Lab Test 05/02/19  1330 04/29/19  2149 04/04/19  0523 03/30/19  2206 03/22/19  0945   URINEPH 6.5 7.0 6.0 6.5 7.5*   NITRITE Negative Negative Negative Negative Negative   LEUKEST Negative Negative Negative Negative Negative   WBCU 1 1 4 <1 2       Microbiology:  BCx 5/6 pending   5/5/19 negative to date   5/3/19 pseudomonas only sensitive to amikiacin, gent and tobra, pending are zosyn and meropenem.     Virology:  CMV viral loads    5/2/19 negative    EBV viral loads  EBV DNA Copies/mL   Date Value Ref Range Status   05/02/2019 EBV DNA Not Detected EBVNEG^EBV DNA Not Detected [Copies]/mL Final   03/31/2019 1,186 (A) EBVNEG^EBV DNA Not Detected [Copies]/mL Final       Imaging:  CXR: 5/6/19:   1.  New right upper extremity approach PICC line tip projects over the mid/low SVC.  2.  No acute cardiopulmonary disease.    CT a/p: w/ contrast: 5/4/19:   1. Increased soft tissue edema in the perianal soft tissues, raising the concern for proctitis. No evidence of abscess or fluid collection.  2. Decreased bowel wall thickening of the right colon compared to 3/29/2019.     CT chest: 5/1/19: No suspicious airspace disease. Moderate three-vessel coronary calcium.

## 2019-05-07 NOTE — PLAN OF CARE
New picc caused pm nurse problems and TPA ordered but no problems noted from 2400 to 0645 ,Pt reported lab could not draw blood from new picc and he had a venipuncture for labs so TPA was instilled at 0720.Next nurse aware.Afebrile OVSS,Probable DC

## 2019-05-07 NOTE — PROGRESS NOTES
RN called Vascular Access, picc sluggish and not drawing back blood.  Dressing change done using sterile technique, both ports flushing without difficulty, both with good blood return.

## 2019-05-07 NOTE — PLAN OF CARE
Discharge  D: Orders for discharge and outpatient medications written.  I: Home medications and return to clinic schedule reviewed with patient. Discharge instructions and parameters for calling Health Care Provider reviewed. Patient left at 1500 accompanied by Wife, Bessy.   A: Patient/family verbalized understanding and was ready for discharge. VSS. Afebrile. Denied pain, nausea and vomiting. Right double lumen PICC dressing changed and good blood return was noted in both lumens. Home infusion to see patient tonight at his home.  P: Patient instructed to  medications in Pharmacy. Follow up as scheduled.

## 2019-05-07 NOTE — PHARMACY
[facility]  Parenteral ANtimicrobial therapy Transitions to Home Infusion Review (PANTHIR) Note    Antimicrobial Stewardship Program - A joint venture between [facility] and  Physicians to optimize antibiotic management.  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 Patient is actively getting w/u for BMT. Patient was found to have proctitis and MDR-Pseudomonas bacteremia.     IV Antibiotic(s) Indication: GNB bacteremia (Pseudomonas aeruginosa bacteremia)    IV Antibiotic(s):  Ceftolozane-tazobactam 1.5 g IV every 8 hours         Start Date: 5/7/2019    Stop/Reassess IV Therapy Date: 5/16/2019 (plan for 10 day course per ID assuming ceftolozane/tazobactam is active against PsA isolate)    Pull Line Date: TBD (per heme/onc, line may stay in place for possible transplant)    Laboratory Tests: CBC with Diff, AST, ALT, BMP    Lab Monitoring Frequency: 1x week    Line Type: PICC    Timing/Next Dose Due: 5/7/2019 at 20:30    First Dose Received in Controlled Setting: Yes    Designated Provider: Neisha Jackson PA-C    Recommendations:   1. Agree with current management, tailor therapy based on susceptibilities  2. Assess duration of therapy/susceptibilties and PICC pull line date at follow-up appointment on Thursday, May 9th at 9:20am      Eleanor Broadbent, PharmD, [facility]  PGY-2 Infectious Diseases Pharmacy Resident  Pager: [phone]  Ext: [phone]    Vital Signs/Clinical Features:  Vitals         05/05 0700  -  05/06 0659 05/06 0700  -  05/07 0659 05/07 0700  -  05/07 1402   Most Recent    Temp ( F) 96.3 -  97.1    95.8 -  97.6    97 -   97.2     97 (36.1)    Pulse 87 -  100             Heart Rate 88 -  100    89 -  102    81 -  89     89    Resp   18    16 -  18      18     18    BP 99/64 -  109/73    97/66 -  121/77    97/60 -  108/71     108/71    SpO2 (%) 98 -  99    98 -  100    98 -  100     100            Labs  Estimated Creatinine Clearance: 143.8 mL/min (A) (based on SCr of 0.58 mg/dL (L)).  Recent Labs   Lab Test 05/02/19  0545 05/03/19  0612 05/04/19  0658 05/05/19  0535 05/06/19  0412 05/07/19  0610   CR 0.68 0.66 0.68 0.69 0.67 0.58*       Recent Labs   Lab Test 04/23/19  0854  05/02/19  0545 05/03/19  0612 05/04/19  0658 05/05/19  0535 05/06/19  0412 05/07/19  0610   WBC 0.9*   < > 0.4* 0.5* 1.0* 1.4* 1.5* 1.6*   ANEU 0.3*  --   --  0.2* 0.6* 0.7* 0.7* 0.7*   ALYM 0.6*  --   --  0.3* 0.3* 0.7* 0.8 0.9   TRACIE 0.0  --   --  0.0 0.0 0.0 0.0 0.1   AEOS 0.0  --   --  0.0 0.0 0.0 0.0 0.0   HGB 8.4*   < > 7.4* 6.6* 7.5* 7.8* 8.1* 8.3*   HCT 25.0*   < > 22.5* 21.0* 23.9* 24.0* 25.6* 25.9*   MCV 93   < > 94 97 96 95 95 96      < > 458* 454* 411 404 433 387    < > = values in this interval not displayed.       Recent Labs   Lab Test 04/14/19  0315 04/15/19  0413 04/23/19  0854 04/26/19  1324 04/30/19  0529 05/06/19  0412   BILITOTAL 0.9 1.1 0.5 0.5 0.8 0.4   ALKPHOS 130 138 220* 232* 189* 173*   ALBUMIN 2.8* 2.9* 3.3* 3.1* 2.6* 2.6*   AST 22 22 19 18 17 37   ALT 61 56 38 33 28 57       Recent Labs   Lab Test 04/03/19 2037 04/04/19  0848 04/04/19  1130 04/04/19  1526 04/15/19  1352 04/29/19 2030 04/29/19 2032   PCAL 18.31*  --   --   --   --   --   --   --    LACT  --    < > 3.2* 3.2* 3.2* 1.0 1.5 1.2    < > = values in this interval not displayed.       Recent Labs   Lab Test 04/02/19 2142 04/23/19  0854   VANCOMYCIN 8.4  --   --    VCON  --    < > 0.3*    < > = values in this interval not displayed.       Culture Results:  7-Day Micro Results       Procedure Component Value Units Date/Time    Anaerobic bacterial culture [[id number]]  Collected:  05/06/19 0412    Order Status:  Canceled Lab Status:  No result Updated:  05/06/19 0414    Specimen:  Blood     Blood culture [[id number]] Collected:  05/06/19 0330    Order Status:  Completed Lab Status:  Final result Updated:  05/06/19 0505    Specimen:  Blood      Specimen Description Blood Right Arm     Special Requests Received in anaerobic bottle only     Culture Micro Canceled, Test credited  Duplicate request  SEE ACCESSION [id number]      Blood culture [[id number]] Collected:  05/06/19 0330    Order Status:  Completed Lab Status:  Preliminary result Updated:  05/07/19 0653    Specimen:  Blood      Specimen Description Blood Right Arm     Special Requests Received in anaerobic bottle only     Culture Micro No growth after 1 day    Anaerobic bacterial culture     Order Status:  Canceled Lab Status:  No result     Specimen:  Body fluid, unsp from Blood     Anaerobic bacterial culture     Order Status:  Canceled Lab Status:  No result     Specimen:  Body fluid, unsp from Blood     Anaerobic bacterial culture [[id number]] Collected:  05/05/19 2322    Order Status:  Canceled Lab Status:  No result Updated:  05/05/19 2327    Specimen:  Peripheral Blood     Anaerobic bacterial culture     Order Status:  Canceled Lab Status:  No result     Specimen:  Peripheral Blood     Blood culture [[id number]] Collected:  05/05/19 1252    Order Status:  Completed Lab Status:  Preliminary result Updated:  05/07/19 0653    Specimen:  Blood      Specimen Description Blood Left Arm     Special Requests Received in aerobic bottle only     Culture Micro No growth after 2 days    Blood culture [[id number]] Collected:  05/05/19 1249    Order Status:  Completed Lab Status:  Preliminary result Updated:  05/07/19 0653    Specimen:  Right Arm      Specimen Description Right Arm     Special Requests Received in aerobic bottle only     Culture Micro No growth after 2 days    Anaerobic bacterial culture [[id number]] Collected:  05/04/19 0815    Order Status:   Completed Lab Status:  Final result Updated:  05/05/19 1458    Specimen:  Unknown      Specimen Description Unknown     Culture Micro Canceled, Test credited      Incorrectly ordered by PCU/Clinic      Notification of test cancellation was given to  Jeannette Hartmann RN on 5.5.2019 at 1457. KVO        Anaerobic blood culture needs to be collected.    Anaerobic bacterial culture     Order Status:  Canceled Lab Status:  No result     Specimen:  Blood     Blood culture [[id number]]  (Abnormal)  (Susceptibility) Collected:  05/03/19 2030    Order Status:  Completed Lab Status:  Edited Result - FINAL Updated:  05/07/19 1314    Specimen:  Blood      Specimen Description Blood Left Arm     Special Requests Received in aerobic bottle only     Culture Micro Cultured on the 1st day of incubation:  Pseudomonas aeruginosa        Critical Value/Significant Value, preliminary result only, called to and read back by  SHEMAR NAVARRO RN 2223 5.4.19 NDP        Notified Debbie Ann Pharmacist on 05.05.19 at 9:00am JT.      Susceptibility testing requested by  Dr. Gonzales at 1110 5.7.19 [id number]  for Azreonam and Ceftolo/tazo        Susceptibility testing requested by   DR GONZALES COLISTIN PAGER [phone] 5.7.19 CT        (Note)  POSITIVE for PSEUDOMONAS AERUGINOSA by Verigene multiplex nucleic  acid test. Final identification and antimicrobial susceptibility  testing will be verified by standard methods.    Specimen tested with Verigene multiplex, gram-negative blood culture  nucleic acid test for the following targets: Acinetobacter sp.,  Citrobacter sp., Enterobacter sp., Proteus sp., E. coli, K.  pneumoniae/oxytoca, P. aeruginosa, and the following resistance  markers: CTXM, KPC, NDM, VIM, IMP and OXA.    Critical Value/Significant Value called to and read back by Beckie Best, RN 7D 0104 5.5.19 TURNER.          Susceptibility       Pseudomonas aeruginosa (1)       Antibiotic Interpretation Sensitivity Method Status    AMIKACIN Sensitive  16 ug/mL NATHALY Preliminary    CEFAZOLIN [*]  Resistant >=64 ug/mL NATHALY Preliminary    CEFEPIME Resistant >=64 ug/mL NATHALY Preliminary    CEFTAZIDIME Resistant >=64 ug/mL NATHALY Preliminary    CIPROFLOXACIN Resistant >=4 ug/mL NATHALY Preliminary    GENTAMICIN Sensitive 4 ug/mL NATHALY Preliminary    LEVOFLOXACIN Resistant >=8 ug/mL NATHALY Preliminary    TOBRAMYCIN Sensitive <=1 ug/mL NATHALY Preliminary    Piperacillin/Tazo Intermediate 32.0 ug/mL NATHALY Preliminary    MEROPENEM Resistant >32.0 ug/mL NATHALY Preliminary               [*]   Suppressed Antibiotic                   Blood culture [[id number]] Collected:  05/03/19 2026    Order Status:  Completed Lab Status:  Preliminary result Updated:  05/07/19 0652    Specimen:  Blood      Specimen Description Blood Right Arm     Special Requests Received in aerobic bottle only     Culture Micro No growth after 4 days    Anaerobic bacterial culture [[id number]] Collected:  05/03/19 1312    Order Status:  Canceled Lab Status:  No result Updated:  05/03/19 1323    Specimen:  Blood     Blood culture [[id number]] Collected:  05/03/19 0801    Order Status:  Completed Lab Status:  Preliminary result Updated:  05/07/19 0652    Specimen:  Blood      Specimen Description Blood Right Arm     Special Requests Received in anaerobic bottle only     Culture Micro No growth after 4 days    Blood culture [[id number]] Collected:  05/02/19 1911    Order Status:  Completed Lab Status:  Preliminary result Updated:  05/07/19 0651    Specimen:  Blood      Specimen Description Blood Right Hand     Special Requests Received in aerobic bottle only     Culture Micro No growth after 5 days    Blood culture [[id number]] Collected:  05/02/19 1605    Order Status:  Completed Lab Status:  Preliminary result Updated:  05/07/19 0651    Specimen:  Blood      Specimen Description Blood Right Arm     Special Requests Received in aerobic bottle only     Culture Micro No growth after 5 days    Anaerobic bacterial culture     Order Status:  Canceled Lab  Status:  No result     Specimen:  Blood     Urine Culture Aerobic Bacterial [[id number]] Collected:  05/02/19 1330    Order Status:  Completed Lab Status:  Final result Updated:  05/03/19 1805    Specimen:  Catheterized Urine from Urine clean catch      Specimen Description Catheterized Urine     Special Requests Specimen received in preservative     Culture Micro No growth    Blood culture [[id number]] Collected:  05/01/19 2312    Order Status:  Completed Lab Status:  Final result Updated:  05/07/19 0651    Specimen:  Blood      Specimen Description Blood Right Arm     Special Requests Received in aerobic bottle only     Culture Micro No growth    Blood culture [[id number]] Collected:  05/01/19 2201    Order Status:  Completed Lab Status:  Final result Updated:  05/07/19 0651    Specimen:  Blood      Specimen Description Blood Right Arm     Special Requests Received in aerobic bottle only     Culture Micro No growth    1,3 Beta D glucan fungitell [[id number]] Collected:  05/01/19 1050    Order Status:  Completed Lab Status:  Final result Updated:  05/03/19 1238    Specimen:  Blood      (1,3)-Beta-D-Glucan 52 pg/mL      B-D GLUCAN INTERPRETATION (1,3) Negative         Comment: (Note)  INTERPRETIVE INFORMATION: (1,3)-beta-D-glucan (Fungitell)   Less than 31 pg/mL ................... Negative   31-59 pg/mL .......................... Negative   60-79 pg/mL .......................... Indeterminate   Greater than or equal to 80 pg/mL .... Positive  The Fungitell test is indicated for presumptive diagnosis   of fungal infection and should be used in conjunction with   other diagnostic procedures. This test does not detect   certain fungal species such as Cryptococcus, which produce   very low levels of (1,3)-beta-D-glucan. This test will not   detect the zygomycetes, such as Absidia, Mucor, and   Rhizopus, which are not known to produce   (1,3)-beta-D-glucan. In addition, the yeast phase of   Blastomyces dermatitidis produces little    (1,3)-beta-D-glucan and may not be detected by the assay.  Performed by [facility],  [address] [place] [address] [phone]  [email or web address], Agustín Duval MD, Lab. Director         Aspergillus Galactomannan Antigen [[id number]] Collected:  05/01/19 1050    Order Status:  Completed Lab Status:  Final result Updated:  05/02/19 2319    Specimen:  Blood      Aspergillus Galactomannan Index 0.03         Aspergillus Galact AG Negative         Comment: (Note)  INTERPRETIVE INFORMATION: Aspergillus Galactomannan Antigen   by EIA  Negative results do not exclude the diagnosis of invasive  aspergillosis. A single positive test result (index equal  to or greater than 0.5) should be clinically correlated  by testing a separate serum specimen because many agents  (e.g. foods, antibiotics) may cross-react with the test.  If invasive aspergillosis is suspected in high-risk  patients, serial sampling is recommended.  Performed by [facility],  [address] [place] [address] [phone]  [email or web address], Agustín Duval MD, Lab. Director         Influenza A and B and RSV PCR [[id number]] Collected:  05/01/19 0919    Order Status:  Completed Lab Status:  Final result Updated:  05/01/19 1328    Specimen:  Nasopharyngeal      Specimen Description Nasopharyngeal     Influenza A PCR Negative     Comment: Flu A target RNA not detected, presumed negative for Influenza A or the viral   load is below the limit of detection.          Influenza B PCR Negative     Comment: Flu B target RNA not detected, presumed negative for Influenza B or the viral   load is below the limit of detection.          Resp Syncytial Virus Negative     Comment: RSV target RNA not detected, presumed negative for Respiratory Syncitial Virus   or the viral load is below the limit of detection.  FDA approved assay performed using [employer] GeneXpert(R) real-time PCR.                 Recent Labs   Lab Test 03/22/19  0945 03/30/19  2206 04/04/19  0523 04/29/19   2149 05/02/19  1330   URINEPH 7.5* 6.5 6.0 7.0 6.5   NITRITE Negative Negative Negative Negative Negative   LEUKEST Negative Negative Negative Negative Negative   WBCU 2 <1 4 1 1                   Recent Labs   Lab Test 03/26/19 2000   CDBPCT Positive*       Imaging: Xr Chest Port 1 View    Result Date: 5/7/2019  XR CHEST PORT 1 VW  5/7/2019 10:16 AM  HISTORY: Check PICC placement COMPARISON: 5/6/2019. FINDINGS: Single AP view of the chest. Right upper extremity PICC line tip projects over the mid/low SVC. Trachea is midline. Heart size is stable. No pleural effusion. No focal airspace consolidation. No acute osseous abnormality. Visualized abdomen is unremarkable.     IMPRESSION: 1.  Right upper extremity PICC line tip projects over the mid/lower SVC. 2.  No acute cardiopulmonary disease. I have personally reviewed the examination and initial interpretation and I agree with the findings. IRIS JALLOH MD    Xr Chest Port 1 View    Result Date: 5/6/2019  XR CHEST PORT 1 VW  5/6/2019 11:26 AM  HISTORY: PICC placement COMPARISON: Chest radiograph 4/29/2019, chest CT 5/1/2019. FINDINGS: Single AP view of the chest. Right upper extremity approach PICC line tip projects over the mid/low SVC. Trachea is midline. Normal cardiac silhouette. No pneumothorax or pleural effusion. No focal airspace consolidation. No acute osseous abnormality. Visualized abdomen is unremarkable..     IMPRESSION: 1.  New right upper extremity approach PICC line tip projects over the mid/low SVC. 2.  No acute cardiopulmonary disease. I have personally reviewed the examination and initial interpretation and I agree with the findings. IRIS JALLOH MD    Nm Muga Rest (nuc Card)    Result Date: 5/2/2019  EXAMINATION: NM HEART MUGA REST Rest MUGA examination of the heart,  5/2/2019 9:45 AM. INDICATION:  BMT (allogeneic transplant) workup Additional Information: none TECHNIQUE: The patient's red blood cells were labeled with 27 mCi of Tc-99m  pertechnetate. Anterior, Kinyarwanda, and left lateral gated views are obtained of the heart. The left ventricular ejection fraction was calculated. FINDINGS: COMPARISON: None. The left ventricular ejection fraction is decreased at 43%. There is normal left ventricular size.  There is normal wall motion of the left ventricle. The right ventricle is moving normally. The pulmonary arteries appear normal. The wall motion on all views appears to be within normal limits.     IMPRESSION: 1. Decreased left ventricular ejection fraction at 43%. 2. Normal chamber size and wall motion ----------------------- Normal Ejection Fraction Value Ranges: Male LV EF% - 50-78%, Female LVEF% - 50-87% SCOTTY SHORE MD    Ct Abdomen Pelvis W Contrast    Result Date: 5/4/2019  EXAMINATION: CT ABDOMEN PELVIS W CONTRAST, 5/4/2019 11:52 AM TECHNIQUE:  Helical CT images from the lung bases through the symphysis pubis were obtained with IV contrast. Contrast dose: 119cc of Isovue 370 COMPARISON: CT CAP 3/29/2019 HISTORY: neutropenic patient with fevers and rectal discomfort, evaluate for GI source of infection, possible rectal abscess ? FINDINGS: Lung bases: Unremarkable Abdomen and pelvis: Scattered hepatic cysts unchanged from 3/29/2019. The largest right lobe mass measuring 3.1 x 4.8 cm, unchanged (series 3 image 89). Normal spleen splenule is present. Normal gallbladder. Normal pancreas. Normal adrenal glands and kidneys. No dilation of the urinary collecting system. Distended bladder without evidence of bladder wall abnormality. Mildly enlarged prostate. Increased fat stranding in the perianal soft tissues concerning for proctitis. No evidence of fluid collection or abscess. Nonobstructive bowel gas pattern. Decreased bowel wall thickening of the right hemicolon compared to 3/20/1919. Several prominent inguinal lymph nodes, not significantly changed from previous. Small fat-containing periumbilical hernia. No significant abdominal pelvic  lymphadenopathy.. Bones and soft tissues: Degenerative changes of the thoracolumbar spine. No concerning osseous or soft tissue findings.     IMPRESSION: 1. Increased soft tissue edema in the perianal soft tissues, raising the concern for proctitis. No evidence of abscess or fluid collection. 2. Decreased bowel wall thickening of the right colon compared to 3/29/2019. I have personally reviewed the examination and initial interpretation and I agree with the findings. MARIE WOLF MD    Ct Chest W/o Contrast    Result Date: 5/1/2019  EXAMINATION: CT CHEST W/O CONTRAST, 5/1/2019 3:33 PM TECHNIQUE:  Helical CT images from the thoracic inlet through the lung bases were obtained without IV contrast. Contrast dose: None COMPARISON: 3/29/2019 HISTORY: BMT (allogeneic transplant) workup FINDINGS: Coarse calcification in the anterior left thyroid gland. The central tracheobronchial tree is patent. Linear atelectasis in the lower lobes. Focal scarring adjacent to the right minor fissure (series 6 image 135). No pneumothorax or pleural effusion. No suspicious nodules or masses. Small calcified right lower lobe pulmonary granuloma (series 6 image 265). Hypodense blood pool is compatible with the patient's clinical history of anemia. The heart size is normal. Moderate three-vessel coronary calcium. Trace anterior pericardial fluid. Normal caliber and configuration of the thoracic great vessels. No thoracic adenopathy. Fat-containing right Bochdalek hernia. Unchanged cysts in the liver. The upper abdomen is otherwise normal. No worrisome bony or soft tissue lesions. Benign lipoma along the lateral left chest wall.     IMPRESSION: No suspicious airspace disease. Moderate three-vessel coronary calcium. I have personally reviewed the examination and initial interpretation and I agree with the findings. GEORGE DEY MD

## 2019-05-07 NOTE — DISCHARGE SUMMARY
[facility], [place]  Discharge Summary  Hematology / Oncology    Date of Admission:  4/29/2019  Date of Discharge:  5/7/2019  Discharging Provider: Liberty Hearn PA-C/Carolyn Thomas MD    Discharge Diagnoses      Neutropenia with fever (H)  Acute myeloid leukemia not having achieved remission (H)  Bacteremia due to Pseudomonas    History of Present Illness   El Ace is a 63 year old male with AML now in morphologic and immunophenotypic CR s/p decitabine/venetoclax reinduction. He has continued on venetoclax while awaiting transplant (workup due week of 4/29) but was admitted on 4/29 with neutropenic fever. Found to have proctitis and pseudomonal bacteremia. Interestingly, last fever was on 5/3/19 but Pseudomonas in bloodstream is resistant to antibiotics patient has been treated with during admission (cefepime & meropenem) and has intermediate sensitivity to Zosyn. Lab to add sensitivities to aztrenoam and ceftolozane-tazobactam (Zerbaxa) and these should be back in next 1-2 days. Patient preferred TID dosing of Zerbaxa to continuous infusion of aztreonam and thus he will be discharged on this w/ [facility]. PICC placed on 5/5 as cultures cleared after 5/3 and he will discharge with this, completed [facility] appt on 5/6.  Was also treated with Flagyl for a brief period of time and then transitioned back to PO vanco prophy for C.diff.  Should follow up in clinic on 5/9 as requested from hospital stay and antibiotic sensitivities should be reviewed at that time to determine final antibiotic plan.  In the interim he will also be working with BMT team to complete workup in anticipation of transplant in the coming weeks.    Hospital Course   El Ace was admitted on 4/29/2019.  The following problems were addressed during his hospitalization:     #Neutropenic fever/SIRS  #Proctitis  #Pseudomonas aeruginosa bacteremia  Presented to ED on 4/29 after fever 101 at home. In ED   but HD stable. No new focal symptoms. After admission, spiked Tmax 101.5 (4/30 @ 00:26) and subsequently defervesced. Lactic acid 1.5. WBC 0.3. He does not have a central line in place. No known sick contacts. Per his wife, pt did go to [employer] on Sunday (4/28) and wore his mask but had otherwise been pretty sequestered in the house.   - CT abd/pelvis on 5/4 positive for proctitis, no abscess seen.  - Blood culture 5/3/19 positive for pseudomonas; current sensitivities indicate sensitivity to amikacin, gentamicin, and tobramycin, intermediate resistance to Zosyn. Lab to add sensitivities to aztrenoam and ceftolozane-tazobactam (Zerbaxa) and these should be back in next 1-2 days. Patient preferred TID dosing of Zerbaxa to continuous infusion of aztreonam and thus he will be discharged on this w/ [facility].   - PICC placed 5/5, teaching done at [facility] on 5/6  - Urine cultures 4/29 and 5/2 NGTD. CXR 4/29 negative. 5/1 influenza, galactomannan, fungitell negative.  - Appreciate ID recommendations & assistance.     #ID PPx  - Continue ppx acyclovir and voriconazole.  Will not need refills at time of discharge.  - Hold ppx Levaquin as now on Cefepime  - Now that Flagyl stopped, OK to resume home C.diff prophy with oral vanco     #AML (IDH2, RUNX1 positive), currently in mophologic/immunophenotypic remission.   Refractory to initial induction chemotherapy with 7+3 (D1= 3/15/19). Underwent second induction with Decitabine x 10 days (D1=3/26) and had venetoclax added (3/29). Course was complicated by neutropenic sepsis/septic shock related to odontic infection, C.diff diarrhea and neutropenic colitis, FOREST, and LFT derangement all of which improved. He underwent BMBx on 4/23. This demonstrated morphologic and immunophenotypic remission. He is now planned for BMT with his son serving as the donor. Patient has been on Venetoclax 100mg daily, with plan for patient to remain on this throughout BMT evaluation due to high risk of  relapse.  Due to current complication/admission for neutropenic fever, Dr. Brown approved stopping Venetoclax as of 4/30/19.     BMT work up commenced inpatient due to begin transplant workup this week. Outpatient/BMT team aware of pt's admission. Chest CT and Rad Onc consult done while admitted. Blood CMV & EBV quant negative     #Leukopenia/neutropenia --> improving  #Anemia  2/2 chemotherapy. Appears Plts have recovered. Likely had drop in Hgb overnight (4/29-4/30) due to dilutional effect from fluids.   - Evidence of count recovery as of 4/30/19, WNC 5/7 1.6 and ANC 0.7.  - transfuse to maintain Hgb >7 (irradiated).      #Hemorrhoids  Has h/o hemorrhoids. Pt reported having been constipated PTA and taking a laxative at home. He subsequently had several soft stools, which exacerbated his hemorrhoids. No bleeding, diarrhea, or abdominal pain/cramping. Started overnight 4/30-5/1, ongoing but slightly better on 5/2 AM. CT abd/pelvis with proctitis as above.  - Prep H cream, PRN lidocaine gel, TUCKS pads     #Subclinical coronary atherosclerosis  #HLD  - Continue PTA Lipitor     #BPH  - Continue PTA Flomax 0.4mg daily     #H/o RLS  - Continue PTA Requip at bedtime PRN     Patient and plan of care discussed with staff attending, Dr. Thomas.     Liberty Hearn PA-C  Hematology/Oncology  [phone]    Pending Results   These results will be followed up by outpatient team.  Unresulted Labs Ordered in the Past 30 Days of this Admission     Date and Time Order Name Status Description    5/6/2019 0330 Blood culture Preliminary     5/5/2019 1136 Blood culture Preliminary     5/5/2019 1136 Blood culture Preliminary     5/3/2019 1941 Blood culture Preliminary     5/2/2019 1556 Blood culture Preliminary     5/2/2019 1556 Blood culture Preliminary     5/2/2019 1355 Blood culture Preliminary     4/23/2019 0903 CHROMOSOME BONE MARROW In process     4/23/2019 0903 FISH In process     4/23/2019 0903 PROCESS AND HOLD DNA In process      4/3/2019 1743 Transfusion reaction evaluation In process     4/3/2019 1738 Transfusion reaction evaluation In process     4/2/2019 1440 Transfusion reaction evaluation In process     4/2/2019 1440 Transfusion reaction evaluation In process     4/1/2019 1355 Transfusion reaction evaluation In process     3/29/2019 0745 HLA ABDR/DQ High Res BMT Recipient In process           Code Status   Full Code    Primary Care Physician   Bre Vizcaino    Discharge Disposition   Discharged to home  Condition at discharge: Stable    Consultations This Hospital Stay   INFECTIOUS DISEASE TRANSPLANT HSCT/ HEME MALIG ADULT IP CONSULT  VASCULAR ACCESS CARE ADULT IP CONSULT  VASCULAR ACCESS ADULT IP CONSULT  PATIENT [facility] IP CONSULT  VASCULAR ACCESS ADULT IP CONSULT  VASCULAR ACCESS ADULT IP CONSULT  VASCULAR ACCESS CARE ADULT IP CONSULT  VASCULAR ACCESS CARE ADULT IP CONSULT    Discharge Orders     Discharge Medications   Current Discharge Medication List      START taking these medications    Details   ceftolozane-tazobactam (ZERBAXA) 1.5g injection Inject 11.4 mLs (1.5 g) into the vein every 8 hours for 10 days  Qty: 342 mL, Refills: 0    Associated Diagnoses: Bacteremia due to Pseudomonas      nystatin (MYCOSTATIN) 984810 UNIT/ML suspension Take 5 mLs (500,000 Units) by mouth 4 times daily  Qty: 400 mL, Refills: 0    Associated Diagnoses: Neutropenia with fever (H)      pramox-pe-glycerin-petrolatum (PREPARATION H) 1-0.25-14.4-15 % CREA cream Place rectally 3 times daily    Associated Diagnoses: Neutropenia with fever (H)         CONTINUE these medications which have NOT CHANGED    Details   acyclovir (ZOVIRAX) 400 MG tablet Take 1 tablet (400 mg) by mouth 2 times daily  Qty: 60 tablet, Refills: 1    Associated Diagnoses: Acute leukemia not having achieved remission (H)      atorvastatin (LIPITOR) 20 MG tablet Take 1 tablet (20 mg) by mouth every evening  Qty: 30 tablet, Refills: 1    Associated Diagnoses: Acute  leukemia not having achieved remission (H)      pantoprazole (PROTONIX) 40 MG EC tablet Take 1 tablet (40 mg) by mouth 2 times daily (before meals)  Qty: 60 tablet, Refills: 1    Associated Diagnoses: Acute leukemia not having achieved remission (H)      rOPINIRole (REQUIP) 0.25 MG tablet Take 3 tablets (0.75 mg) by mouth nightly as needed (restless leg syndrome)  Qty: 60 tablet, Refills: 1    Associated Diagnoses: Acute leukemia not having achieved remission (H)      tamsulosin (FLOMAX) 0.4 MG capsule Take 1 capsule (0.4 mg) by mouth daily  Qty: 30 capsule, Refills: 1    Associated Diagnoses: Acute leukemia not having achieved remission (H)      vancomycin (FIRVANQ) 50 MG/ML oral solution Take 2.5 mLs (125 mg) by mouth 4 times daily  Qty: 50 mL, Refills: 0    Associated Diagnoses: Acute leukemia not having achieved remission (H)      voriconazole (VFEND) 50 MG tablet Take 3 tablets (150 mg) by mouth every 12 hours  Qty: 180 tablet, Refills: 0    Associated Diagnoses: Acute leukemia not having achieved remission (H)      acetaminophen (TYLENOL) 325 MG tablet Take 2 tablets (650 mg) by mouth every 4 hours as needed for mild pain or fever (pre-med before blood products)    Associated Diagnoses: Acute leukemia not having achieved remission (H)      ondansetron (ZOFRAN) 8 MG tablet Take 1 tablet (8 mg) by mouth every 8 hours as needed for nausea  Qty: 30 tablet, Refills: 1    Associated Diagnoses: Acute myeloid leukemia not having achieved remission (H)      prochlorperazine (COMPAZINE) 5 MG tablet Take 1-2 tablets (5-10 mg) by mouth every 6 hours as needed for nausea or vomiting  Qty: 30 tablet, Refills: 1    Associated Diagnoses: Acute leukemia not having achieved remission (H)      sennosides (SENOKOT) 8.6 MG tablet Take 2 tablets by mouth 2 times daily as needed for constipation    Associated Diagnoses: Acute leukemia not having achieved remission (H)         STOP taking these medications       levofloxacin  (LEVAQUIN) 250 MG tablet Comments:   Reason for Stopping:         venetoclax (VENCLEXTA) 100 MG tablet CHEMOTHERAPY Comments:   Reason for Stopping:             Allergies   Allergies   Allergen Reactions     Lactose GI Disturbance     Data   Most Recent 3 CBC's:  Recent Labs   Lab Test 05/07/19  0610 05/06/19  0412 05/05/19  0535   WBC 1.6* 1.5* 1.4*   HGB 8.3* 8.1* 7.8*   MCV 96 95 95    433 404      Most Recent 3 BMP's:  Recent Labs   Lab Test 05/07/19  0610 05/06/19  0412 05/05/19  0535    138 138   POTASSIUM 3.7 3.5 3.7   CHLORIDE 108 107 108   CO2 23 22 23   BUN 9 11 12   CR 0.58* 0.67 0.69   ANIONGAP 10 8 7   DEBBIE 8.4* 8.4* 8.4*   * 115* 106*     Most Recent 2 LFT's:  Recent Labs   Lab Test 05/06/19  0412 04/30/19  0529   AST 37 17   ALT 57 28   ALKPHOS 173* 189*   BILITOTAL 0.4 0.8     Most Recent INR's and Anticoagulation Dosing History:  Anticoagulation Dose History     Recent Dosing and Labs Latest Ref Rng & Units 4/2/2019 4/3/2019 4/3/2019 4/5/2019 4/8/2019 4/10/2019 4/12/2019    INR 0.86 - 1.14 1.55(H) 1.84(H) 1.85(H) 1.60(H) 1.22(H) 1.15(H) 1.18(H)        Most Recent 3 Troponin's:  Recent Labs   Lab Test 04/23/19  0854 04/04/19  0400 04/03/19 2037   TROPI <0.015 0.405* 0.436*     Most Recent Cholesterol Panel:  Recent Labs   Lab Test 04/08/19  0352   TRIG 203*     Most Recent 6 Bacteria Isolates From Any Culture (See EPIC Reports for Culture Details):  Recent Labs   Lab Test 05/06/19  0330 05/05/19  1252 05/05/19  1249 05/04/19  0815 05/03/19  2030 05/03/19 2026   CULT No growth after 1 day  Canceled, Test credited  Duplicate request  SEE ACCESSION [id number]   No growth after 2 days No growth after 2 days Canceled, Test credited  Incorrectly ordered by PCU/Clinic  Notification of test cancellation was given to  Jeannette Hartmann RN on 5.5.2019 at 1457. KVO    Anaerobic blood culture needs to be collected. Cultured on the 1st day of incubation:  Pseudomonas aeruginosa  *  Critical  Value/Significant Value, preliminary result only, called to and read back by  SHEMAR NAVARRO, RN 2223 5.4.19 NDP    Notified Debbie Ann Pharmacist on 05.05.19 at 9:00am JT.  Susceptibility testing requested by  Dr. Ram at 1110 5.7.19 [id number]  for Azreonam and Ceftolo/tazo    (Note)  POSITIVE for PSEUDOMONAS AERUGINOSA by Verigene multiplex nucleic  acid test. Final identification and antimicrobial susceptibility  testing will be verified by standard methods.    Specimen tested with Verigene multiplex, gram-negative blood culture  nucleic acid test for the following targets: Acinetobacter sp.,  Citrobacter sp., Enterobacter sp., Proteus sp., E. coli, K.  pneumoniae/oxytoca, P. aeruginosa, and the following resistance  markers: CTXM, KPC, NDM, VIM, IMP and OXA.    Critical Value/Significant Value called to and read back by Beckie Best RN 7D 0104 5.5.19 TURNER.       No growth after 4 days     Most Recent TSH, T4 and A1c Labs:  Recent Labs   Lab Test 03/31/19  0626   A1C 6.0*     Results for orders placed or performed during the hospital encounter of 04/29/19   XR Chest 2 Views    Narrative    EXAM: XR CHEST 2 VW  4/29/2019 10:04 PM     HISTORY:  fever       COMPARISON: Chest radiograph 4/4/2019    FINDINGS: PA and lateral views of chest. Heart size is normal. Midline  trachea. No pneumothorax or pleural effusion. On the lateral  projection, there is mild bulging in the posterior aspect of the  diaphragm which is consistent with patient's known small  fat-containing hernia through the posterior diaphragma , seen on CT  dated 3/29/2019.      Impression    IMPRESSION: No acute finding in the chest.    I have personally reviewed the examination and initial interpretation  and I agree with the findings.    ROSA SPAULDING MD   NM MUGA rest (nuc card)    Narrative    EXAMINATION: NM HEART MUGA REST  Rest MUGA examination of the heart,  5/2/2019 9:45 AM.    INDICATION:  BMT (allogeneic transplant) workup      Additional Information: none    TECHNIQUE:    The patient's red blood cells were labeled with 27 mCi of Tc-99m  pertechnetate. Anterior, FRAN, and left lateral gated views are  obtained of the heart. The left ventricular ejection fraction was  calculated.    FINDINGS:    COMPARISON: None.    The left ventricular ejection fraction is decreased at 43%.    There is normal left ventricular size.  There is normal wall motion of  the left ventricle. The right ventricle is moving normally. The  pulmonary arteries appear normal.    The wall motion on all views appears to be within normal limits.      Impression    IMPRESSION:    1. Decreased left ventricular ejection fraction at 43%.    2. Normal chamber size and wall motion    -----------------------  Normal Ejection Fraction Value Ranges:  Male LV EF% - 50-78%,   Female LVEF% - 50-87%    SCOTTY SHORE MD   CT Chest w/o Contrast    Narrative    EXAMINATION: CT CHEST W/O CONTRAST, 5/1/2019 3:33 PM    TECHNIQUE:  Helical CT images from the thoracic inlet through the lung  bases were obtained without IV contrast. Contrast dose: None    COMPARISON: 3/29/2019    HISTORY: BMT (allogeneic transplant) workup    FINDINGS:    Coarse calcification in the anterior left thyroid gland. The central  tracheobronchial tree is patent. Linear atelectasis in the lower  lobes. Focal scarring adjacent to the right minor fissure (series 6  image 135). No pneumothorax or pleural effusion. No suspicious nodules  or masses. Small calcified right lower lobe pulmonary granuloma  (series 6 image 265).    Hypodense blood pool is compatible with the patient's clinical history  of anemia. The heart size is normal. Moderate three-vessel coronary  calcium. Trace anterior pericardial fluid. Normal caliber and  configuration of the thoracic great vessels. No thoracic adenopathy.  Fat-containing right Bochdalek hernia.    Unchanged cysts in the liver. The upper abdomen is otherwise normal.  No  worrisome bony or soft tissue lesions. Benign lipoma along the  lateral left chest wall.      Impression    IMPRESSION: No suspicious airspace disease. Moderate three-vessel  coronary calcium.     I have personally reviewed the examination and initial interpretation  and I agree with the findings.    GEORGE DEY MD   CT Abdomen Pelvis w Contrast    Narrative    EXAMINATION: CT ABDOMEN PELVIS W CONTRAST, 5/4/2019 11:52 AM    TECHNIQUE:  Helical CT images from the lung bases through the  symphysis pubis were obtained with IV contrast. Contrast dose: 119cc  of Isovue 370    COMPARISON: CT CAP 3/29/2019    HISTORY: neutropenic patient with fevers and rectal discomfort,  evaluate for GI source of infection, possible rectal abscess ?    FINDINGS:    Lung bases: Unremarkable    Abdomen and pelvis: Scattered hepatic cysts unchanged from 3/29/2019.  The largest right lobe mass measuring 3.1 x 4.8 cm, unchanged (series  3 image 89). Normal spleen splenule is present. Normal gallbladder.  Normal pancreas. Normal adrenal glands and kidneys. No dilation of the  urinary collecting system. Distended bladder without evidence of  bladder wall abnormality. Mildly enlarged prostate. Increased fat  stranding in the perianal soft tissues concerning for proctitis. No  evidence of fluid collection or abscess. Nonobstructive bowel gas  pattern. Decreased bowel wall thickening of the right hemicolon  compared to 3/20/1919. Several prominent inguinal lymph nodes, not  significantly changed from previous. Small fat-containing  periumbilical hernia. No significant abdominal pelvic  lymphadenopathy..    Bones and soft tissues: Degenerative changes of the thoracolumbar  spine. No concerning osseous or soft tissue findings.      Impression    IMPRESSION:   1. Increased soft tissue edema in the perianal soft tissues, raising  the concern for proctitis. No evidence of abscess or fluid collection.  2. Decreased bowel wall thickening of the right  colon compared to  3/29/2019.     I have personally reviewed the examination and initial interpretation  and I agree with the findings.    MARIE OWLF MD   XR Chest Port 1 View    Narrative    XR CHEST PORT 1 VW  5/6/2019 11:26 AM      HISTORY: PICC placement    COMPARISON: Chest radiograph 4/29/2019, chest CT 5/1/2019.    FINDINGS: Single AP view of the chest. Right upper extremity approach  PICC line tip projects over the mid/low SVC. Trachea is midline.  Normal cardiac silhouette. No pneumothorax or pleural effusion. No  focal airspace consolidation. No acute osseous abnormality. Visualized  abdomen is unremarkable..      Impression    IMPRESSION:   1.  New right upper extremity approach PICC line tip projects over the  mid/low SVC.  2.  No acute cardiopulmonary disease.    I have personally reviewed the examination and initial interpretation  and I agree with the findings.    IRIS JALLOH MD   XR Chest Port 1 View    Narrative    XR CHEST PORT 1 VW  5/7/2019 10:16 AM      HISTORY: Check PICC placement    COMPARISON: 5/6/2019.    FINDINGS: Single AP view of the chest. Right upper extremity PICC line  tip projects over the mid/low SVC. Trachea is midline. Heart size is  stable. No pleural effusion. No focal airspace consolidation. No acute  osseous abnormality. Visualized abdomen is unremarkable.      Impression    IMPRESSION:  1.  Right upper extremity PICC line tip projects over the mid/lower  SVC.  2.  No acute cardiopulmonary disease.    I have personally reviewed the examination and initial interpretation  and I agree with the findings.    IRIS JALLOH MD     I have seen, interviewed, and examined the patient independently.  I have reviewed the vital signs and labs.  This note reflects my assessment and plan.    We have spent greater than 30 minutes organizing outpatient care, follow up appointments, and medications.    Afebrile now for several days but panresistent pseudomonas on one blood culture on  5/3, subsequent ones negative. Very much appreciate Dr. Castillo helping us with empiric regimen, will add further sensitivities testing and have him follow up in clinic on Thursday to follow up and finalize treatment plan.    ANC improving off venetoclax (held in preparation for transplant)    Proctitis seen on CT, pain is improving    MUGA with reduced EF, Dr. Brown aware, will repeat echo as outpatient. I told Randy this plan    Carolyn Thomas MD/PhD

## 2019-05-07 NOTE — PLAN OF CARE
1900 - 2300  VS stable, afebrile. Pt denied pain/SOB/n/v. Merrem given as ordered, however PICC line was difficult to plunge fluids through and receive blood return - TPA ordered and will be administered prior to discharge. Plan for discharge tomorrow, continue plan of care.

## 2019-05-07 NOTE — PROGRESS NOTES
Home Infusion  El is expected to dc tomorrow and will be going home on IV abx (currently meropenem q8).  He has never done home IV therapy before however he and his wife Bessy have been to the [facility] for initial teaching.  Met with El and Bessy at bedside and provided them with information about I services.  Explained about administration method, showed them the teaching materials and explained that a home nurse will provide additional teaching needed for CG or self-administration.  Informed them about supplies and delivery of supplies, storage of medication, dosing times, plan for SNV and 24/7 availability of I staff while on IV therapy.     El and Bessy verbalized understanding of all information given.   Luisa are willing and able to learn and manage home IV therapy once teaching is completed.  Questions answered.  Will continue to follow until dc and update pt/spouse with details once final orders are determined.    Ailyn Braun RN RODOLFO  [facility] Liaison  [phone]

## 2019-05-08 ENCOUNTER — HOME INFUSION (PRE-WILLOW HOME INFUSION) (OUTPATIENT)
Dept: PHARMACY | Facility: CLINIC | Age: 64
End: 2019-05-08

## 2019-05-08 LAB
BACTERIA SPEC CULT: ABNORMAL
BACTERIA SPEC CULT: NO GROWTH
BACTERIA SPEC CULT: NO GROWTH
Lab: ABNORMAL
Lab: NORMAL
Lab: NORMAL
SPECIMEN SOURCE: ABNORMAL
SPECIMEN SOURCE: NORMAL
SPECIMEN SOURCE: NORMAL

## 2019-05-09 ENCOUNTER — ONCOLOGY VISIT (OUTPATIENT)
Dept: ONCOLOGY | Facility: CLINIC | Age: 64
End: 2019-05-09
Attending: PHYSICIAN ASSISTANT
Payer: COMMERCIAL

## 2019-05-09 ENCOUNTER — HOME INFUSION (PRE-WILLOW HOME INFUSION) (OUTPATIENT)
Dept: PHARMACY | Facility: CLINIC | Age: 64
End: 2019-05-09

## 2019-05-09 ENCOUNTER — APPOINTMENT (OUTPATIENT)
Dept: LAB | Facility: CLINIC | Age: 64
End: 2019-05-09
Attending: INTERNAL MEDICINE
Payer: COMMERCIAL

## 2019-05-09 VITALS
OXYGEN SATURATION: 99 % | WEIGHT: 197.7 LBS | TEMPERATURE: 98.2 F | BODY MASS INDEX: 28.3 KG/M2 | HEIGHT: 70 IN | SYSTOLIC BLOOD PRESSURE: 112 MMHG | HEART RATE: 82 BPM | DIASTOLIC BLOOD PRESSURE: 69 MMHG

## 2019-05-09 DIAGNOSIS — C92.00 ACUTE MYELOID LEUKEMIA NOT HAVING ACHIEVED REMISSION (H): Primary | ICD-10-CM

## 2019-05-09 DIAGNOSIS — D70.9 NEUTROPENIA WITH FEVER (H): ICD-10-CM

## 2019-05-09 DIAGNOSIS — R50.81 NEUTROPENIA WITH FEVER (H): ICD-10-CM

## 2019-05-09 DIAGNOSIS — B96.5 BACTEREMIA DUE TO PSEUDOMONAS: ICD-10-CM

## 2019-05-09 DIAGNOSIS — R78.81 BACTEREMIA DUE TO PSEUDOMONAS: ICD-10-CM

## 2019-05-09 DIAGNOSIS — Z86.2 PERSONAL HISTORY OF DISEASES OF BLOOD AND BLOOD-FORMING ORGANS: ICD-10-CM

## 2019-05-09 LAB
ALBUMIN SERPL-MCNC: 2.9 G/DL (ref 3.4–5)
ALP SERPL-CCNC: 201 U/L (ref 40–150)
ALT SERPL W P-5'-P-CCNC: 67 U/L (ref 0–70)
ANION GAP SERPL CALCULATED.3IONS-SCNC: 8 MMOL/L (ref 3–14)
AST SERPL W P-5'-P-CCNC: 41 U/L (ref 0–45)
BACTERIA SPEC CULT: NO GROWTH
BACTERIA SPEC CULT: NO GROWTH
BASOPHILS # BLD AUTO: 0 10E9/L (ref 0–0.2)
BASOPHILS NFR BLD AUTO: 0 %
BILIRUB SERPL-MCNC: 0.3 MG/DL (ref 0.2–1.3)
BUN SERPL-MCNC: 8 MG/DL (ref 7–30)
CALCIUM SERPL-MCNC: 8.6 MG/DL (ref 8.5–10.1)
CHLORIDE SERPL-SCNC: 107 MMOL/L (ref 94–109)
CO2 SERPL-SCNC: 24 MMOL/L (ref 20–32)
CREAT SERPL-MCNC: 0.57 MG/DL (ref 0.66–1.25)
DIFFERENTIAL METHOD BLD: ABNORMAL
EOSINOPHIL # BLD AUTO: 0 10E9/L (ref 0–0.7)
EOSINOPHIL NFR BLD AUTO: 0 %
ERYTHROCYTE [DISTWIDTH] IN BLOOD BY AUTOMATED COUNT: 17.3 % (ref 10–15)
GFR SERPL CREATININE-BSD FRML MDRD: >90 ML/MIN/{1.73_M2}
GLUCOSE SERPL-MCNC: 121 MG/DL (ref 70–99)
HCT VFR BLD AUTO: 24.6 % (ref 40–53)
HGB BLD-MCNC: 8.2 G/DL (ref 13.3–17.7)
IMM GRANULOCYTES # BLD: 0 10E9/L (ref 0–0.4)
IMM GRANULOCYTES NFR BLD: 1 %
LYMPHOCYTES # BLD AUTO: 0.8 10E9/L (ref 0.8–5.3)
LYMPHOCYTES NFR BLD AUTO: 40.4 %
Lab: NORMAL
Lab: NORMAL
MCH RBC QN AUTO: 31.7 PG (ref 26.5–33)
MCHC RBC AUTO-ENTMCNC: 33.3 G/DL (ref 31.5–36.5)
MCV RBC AUTO: 95 FL (ref 78–100)
MONOCYTES # BLD AUTO: 0.2 10E9/L (ref 0–1.3)
MONOCYTES NFR BLD AUTO: 11.9 %
NEUTROPHILS # BLD AUTO: 0.9 10E9/L (ref 1.6–8.3)
NEUTROPHILS NFR BLD AUTO: 46.7 %
NRBC # BLD AUTO: 0 10*3/UL
NRBC BLD AUTO-RTO: 0 /100
PLATELET # BLD AUTO: 313 10E9/L (ref 150–450)
POTASSIUM SERPL-SCNC: 4 MMOL/L (ref 3.4–5.3)
PROT SERPL-MCNC: 6.7 G/DL (ref 6.8–8.8)
RBC # BLD AUTO: 2.59 10E12/L (ref 4.4–5.9)
SODIUM SERPL-SCNC: 139 MMOL/L (ref 133–144)
SPECIMEN SOURCE: NORMAL
SPECIMEN SOURCE: NORMAL
WBC # BLD AUTO: 1.9 10E9/L (ref 4–11)

## 2019-05-09 PROCEDURE — 85025 COMPLETE CBC W/AUTO DIFF WBC: CPT | Performed by: PHYSICIAN ASSISTANT

## 2019-05-09 PROCEDURE — 99214 OFFICE O/P EST MOD 30 MIN: CPT | Mod: ZP | Performed by: PHYSICIAN ASSISTANT

## 2019-05-09 PROCEDURE — G0463 HOSPITAL OUTPT CLINIC VISIT: HCPCS | Mod: ZF

## 2019-05-09 PROCEDURE — 36592 COLLECT BLOOD FROM PICC: CPT

## 2019-05-09 PROCEDURE — 80053 COMPREHEN METABOLIC PANEL: CPT | Performed by: PHYSICIAN ASSISTANT

## 2019-05-09 ASSESSMENT — MIFFLIN-ST. JEOR: SCORE: 1693.01

## 2019-05-09 ASSESSMENT — PAIN SCALES - GENERAL: PAINLEVEL: NO PAIN (0)

## 2019-05-09 NOTE — PROGRESS NOTES
REASON FOR VISIT:  Management of acute myeloid leukemia (AML)    HISTORY OF PRESENT ILLNESS:  Mr. Ace is a 63 year old man with AML.  He is here with his wife, Bessy.  To summarize his course, he presented with chest tightness after recent root canal.  Workup revealed marked luekocytosis with circulating blasts.  He was transferred to [facility] for management.  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  He was treated with hydroxyurea and then daunorubicin and cytarabine (7+3) induction chemotherapy starting 3/15/2019.  After initial improvement of leukocytosis, WBC and circulating blasts increased prior to day 14.  Bone marrow biopsy revealed essentially no treatment response.  He was started on salvage decitabine (10 days x 20 mg/m2) on 3/26/2019 and venetoclax (dose adjusted for concurrent azole antifungal) was added on 3/29/2019.  Course was complicated by neutropenic sepsis and brief time in MICU with dental problems and C diff colitis, acute kidney injury, elevated liver labs that have all improved.     Recent bone marrow on 4/23/2019 showed CR.     Was admitted to the hospital for neutropenic fever from bacteremia due to pseudomonas aeruginosa.     INTERIM HISTORY:  El presents today for hospital follow-up with his wife.     He denies any further fevers or chills. Energy is doing well. He has been walking, walked about 20 minutes yesterday. He feels deconditioned, though does feel like he is getting stronger. His cognition is improving as well. No SOB, CP or cough. No N/V and having normal stools. No edema. No new lumps or bumps or bleeding.     REVIEW OF SYSTEMS:  A complete review of systems was negative other than noted.    MEDICATIONS:  Current Outpatient Medications   Medication     acyclovir (ZOVIRAX) 400 MG tablet     atorvastatin (LIPITOR) 20 MG tablet     ceftolozane-tazobactam (ZERBAXA) 1.5g injection     nystatin (MYCOSTATIN) 450630 UNIT/ML suspension      "ondansetron (ZOFRAN) 8 MG tablet     pantoprazole (PROTONIX) 40 MG EC tablet     pramox-pe-glycerin-petrolatum (PREPARATION H) 1-0.25-14.4-15 % CREA cream     rOPINIRole (REQUIP) 0.25 MG tablet     tamsulosin (FLOMAX) 0.4 MG capsule     vancomycin (FIRVANQ) 50 MG/ML oral solution     voriconazole (VFEND) 50 MG tablet     acetaminophen (TYLENOL) 325 MG tablet     prochlorperazine (COMPAZINE) 5 MG tablet     sennosides (SENOKOT) 8.6 MG tablet     No current facility-administered medications for this visit.      PHYSICAL EXAMINATION:  /69 (BP Location: Left arm, Patient Position: Chair, Cuff Size: Adult Regular)   Pulse 82   Temp 98.2  F (36.8  C) (Oral)   Ht 1.778 m (5' 10\")   Wt 89.7 kg (197 lb 11.2 oz)   SpO2 99%   BMI 28.37 kg/m    Wt Readings from Last 5 Encounters:   05/09/19 89.7 kg (197 lb 11.2 oz)   05/07/19 87.9 kg (193 lb 11.2 oz)   04/26/19 90.8 kg (200 lb 1.6 oz)   04/23/19 89.2 kg (196 lb 9.6 oz)   04/23/19 89.2 kg (196 lb 9.6 oz)     General: Well appearing. No acute distress.  HEENT: Sclerae anicteric. No OP lesions, masses, or tonsilar enlargement.  Lungs: Clear bilaterally without wheezing or crackles.  Heart: Regular rate and rhythm without murmurs.  Gastrointestinal: Bowel sounds present, no tenderness to palpation, spleen tip not palpable.  Extremities: No lower extremity edema.  Neuro: Grossly non-focal.  Skin/access: No visible rash or lesions.  Performance status: ECOG 1    LABORATORY DATA:   5/9/2019 09:08   Sodium 139   Potassium 4.0   Chloride 107   Carbon Dioxide 24   Urea Nitrogen 8   Creatinine 0.57 (L)   GFR Estimate >90   GFR Estimate If Black >90   Calcium 8.6   Anion Gap 8   Albumin 2.9 (L)   Protein Total 6.7 (L)   Bilirubin Total 0.3   Alkaline Phosphatase 201 (H)   ALT 67   AST 41   Glucose 121 (H)   WBC 1.9 (L)   Hemoglobin 8.2 (L)   Hematocrit 24.6 (L)   Platelet Count 313   RBC Count 2.59 (L)   MCV 95   MCH 31.7   MCHC 33.3   RDW 17.3 (H)   Diff Method Automated " Method   % Neutrophils 46.7   % Lymphocytes 40.4   % Monocytes 11.9   % Eosinophils 0.0   % Basophils 0.0   % Immature Granulocytes 1.0   Nucleated RBCs 0   Absolute Neutrophil 0.9 (L)   Absolute Lymphocytes 0.8   Absolute Monocytes 0.2   Absolute Eosinophils 0.0   Absolute Basophils 0.0   Abs Immature Granulocytes 0.0   Absolute Nucleated RBC 0.0         IMPRESSION AND PLAN:   Mr. Ace is a 63 year old man with AML here for ongoing management.    AML   -was treated with decitabine and venetoclax with no evidence of disease on his marrow.    -plan is for alloBMT consolidation with intent to cure.    -leukopenic likely related to venetoclax and dysplasia.    -plan was to remain on venetoclax through BMT workup though with fever this was held on 4/30. Will continue to hold  -BMT workup started inpatient, will continue  Tomorrow outpatient     Proctitis   Neutropenic fever  Pseudomonas aeruginosa bacteremia  -PICC placed on 5/5  -was discharged from the hospital with ceftolozane-tazobactam (Zerbaxa) TID for 10 days, though ID was consulted and decided to extend his therapy for a total of 14 days (to complete on 5/21).   -call and dicussed with [facility] and extended abx course. Told patient about updated plan  -currently feeling good with no s/s of infection. Afebrile. Should call if he starts to have f/c or other infectious symptoms     PPx:   -continue PO vanc for C. Diff ppx  -continue acyclovir and voriconazole  -do no need levaquin with other antibiotics right now    Heme  -needs irradiated blood with impending transplant  -no transfusions needed today    Neisha Jackson PA-C  [facility]  [address] [place] [address]  [phone]

## 2019-05-09 NOTE — NURSING NOTE
"Oncology Rooming Note    May 9, 2019 9:23 AM   El Ace is a 63 year old male who presents for:    Chief Complaint   Patient presents with     Blood Draw     labs drawn via PICC line by RN     RECHECK     ONc AML Acute leukemia      Initial Vitals: /69 (BP Location: Left arm, Patient Position: Chair, Cuff Size: Adult Regular)   Pulse 82   Temp 98.2  F (36.8  C) (Oral)   Ht 1.778 m (5' 10\")   Wt 89.7 kg (197 lb 11.2 oz)   SpO2 99%   BMI 28.37 kg/m   Estimated body mass index is 28.37 kg/m  as calculated from the following:    Height as of this encounter: 1.778 m (5' 10\").    Weight as of this encounter: 89.7 kg (197 lb 11.2 oz). Body surface area is 2.1 meters squared.  No Pain (0) Comment: Data Unavailable   No LMP for male patient.  Allergies reviewed: Yes  Medications reviewed: Yes    Medications: Medication refills not needed today.  Pharmacy name entered into [employer]:    [facility] - [place], [place] - [phone]  [facility] [place], [place] - [phone]    Clinical concerns: none        Oraila Ady, JESSICA              "

## 2019-05-09 NOTE — NURSING NOTE
Chief Complaint   Patient presents with     Blood Draw     labs drawn via PICC line by RN     /69 (BP Location: Left arm, Patient Position: Chair, Cuff Size: Adult Regular)   Pulse 82   Temp 98.2  F (36.8  C) (Oral)   Wt 89.7 kg (197 lb 11.2 oz)   SpO2 99%   BMI 28.37 kg/m      Lines accessed by RN in lab. Labs collected through gray lumen and sent. Both lines flushed with NS. Pt tolerated well.   Pt checked in for next appointment.    Meena Randhawa RN

## 2019-05-09 NOTE — PROGRESS NOTES
This is a recent snapshot of the patient's [facility] medical record.  For current drug dose and complete information and questions, call [phone] or In Basket pool, [facility] ([id number])  CSN Number:  [id number]

## 2019-05-09 NOTE — LETTER
5/9/2019      RE: El Ace  [address] [facility] [address]       REASON FOR VISIT:  Management of acute myeloid leukemia (AML)    HISTORY OF PRESENT ILLNESS:  Mr. Ace is a 63 year old man with AML.  He is here with his wife, Bessy.  To summarize his course, he presented with chest tightness after recent root canal.  Workup revealed marked luekocytosis with circulating blasts.  He was transferred to [facility] for management.  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  He was treated with hydroxyurea and then daunorubicin and cytarabine (7+3) induction chemotherapy starting 3/15/2019.  After initial improvement of leukocytosis, WBC and circulating blasts increased prior to day 14.  Bone marrow biopsy revealed essentially no treatment response.  He was started on salvage decitabine (10 days x 20 mg/m2) on 3/26/2019 and venetoclax (dose adjusted for concurrent azole antifungal) was added on 3/29/2019.  Course was complicated by neutropenic sepsis and brief time in MICU with dental problems and C diff colitis, acute kidney injury, elevated liver labs that have all improved.     Recent bone marrow on 4/23/2019 showed CR.     Was admitted to the hospital for neutropenic fever from bacteremia due to pseudomonas aeruginosa.     INTERIM HISTORY:  El presents today for hospital follow-up with his wife.     He denies any further fevers or chills. Energy is doing well. He has been walking, walked about 20 minutes yesterday. He feels deconditioned, though does feel like he is getting stronger. His cognition is improving as well. No SOB, CP or cough. No N/V and having normal stools. No edema. No new lumps or bumps or bleeding.     REVIEW OF SYSTEMS:  A complete review of systems was negative other than noted.    MEDICATIONS:  Current Outpatient Medications   Medication     acyclovir (ZOVIRAX) 400 MG tablet     atorvastatin (LIPITOR) 20 MG tablet     ceftolozane-tazobactam  "(ZERBAXA) 1.5g injection     nystatin (MYCOSTATIN) 517190 UNIT/ML suspension     ondansetron (ZOFRAN) 8 MG tablet     pantoprazole (PROTONIX) 40 MG EC tablet     pramox-pe-glycerin-petrolatum (PREPARATION H) 1-0.25-14.4-15 % CREA cream     rOPINIRole (REQUIP) 0.25 MG tablet     tamsulosin (FLOMAX) 0.4 MG capsule     vancomycin (FIRVANQ) 50 MG/ML oral solution     voriconazole (VFEND) 50 MG tablet     acetaminophen (TYLENOL) 325 MG tablet     prochlorperazine (COMPAZINE) 5 MG tablet     sennosides (SENOKOT) 8.6 MG tablet     No current facility-administered medications for this visit.      PHYSICAL EXAMINATION:  /69 (BP Location: Left arm, Patient Position: Chair, Cuff Size: Adult Regular)   Pulse 82   Temp 98.2  F (36.8  C) (Oral)   Ht 1.778 m (5' 10\")   Wt 89.7 kg (197 lb 11.2 oz)   SpO2 99%   BMI 28.37 kg/m     Wt Readings from Last 5 Encounters:   05/09/19 89.7 kg (197 lb 11.2 oz)   05/07/19 87.9 kg (193 lb 11.2 oz)   04/26/19 90.8 kg (200 lb 1.6 oz)   04/23/19 89.2 kg (196 lb 9.6 oz)   04/23/19 89.2 kg (196 lb 9.6 oz)     General: Well appearing. No acute distress.  HEENT: Sclerae anicteric. No OP lesions, masses, or tonsilar enlargement.  Lungs: Clear bilaterally without wheezing or crackles.  Heart: Regular rate and rhythm without murmurs.  Gastrointestinal: Bowel sounds present, no tenderness to palpation, spleen tip not palpable.  Extremities: No lower extremity edema.  Neuro: Grossly non-focal.  Skin/access: No visible rash or lesions.  Performance status: ECOG 1    LABORATORY DATA:   5/9/2019 09:08   Sodium 139   Potassium 4.0   Chloride 107   Carbon Dioxide 24   Urea Nitrogen 8   Creatinine 0.57 (L)   GFR Estimate >90   GFR Estimate If Black >90   Calcium 8.6   Anion Gap 8   Albumin 2.9 (L)   Protein Total 6.7 (L)   Bilirubin Total 0.3   Alkaline Phosphatase 201 (H)   ALT 67   AST 41   Glucose 121 (H)   WBC 1.9 (L)   Hemoglobin 8.2 (L)   Hematocrit 24.6 (L)   Platelet Count 313   RBC Count " 2.59 (L)   MCV 95   MCH 31.7   MCHC 33.3   RDW 17.3 (H)   Diff Method Automated Method   % Neutrophils 46.7   % Lymphocytes 40.4   % Monocytes 11.9   % Eosinophils 0.0   % Basophils 0.0   % Immature Granulocytes 1.0   Nucleated RBCs 0   Absolute Neutrophil 0.9 (L)   Absolute Lymphocytes 0.8   Absolute Monocytes 0.2   Absolute Eosinophils 0.0   Absolute Basophils 0.0   Abs Immature Granulocytes 0.0   Absolute Nucleated RBC 0.0         IMPRESSION AND PLAN:   Mr. Ace is a 63 year old man with AML here for ongoing management.    AML   -was treated with decitabine and venetoclax with no evidence of disease on his marrow.    -plan is for alloBMT consolidation with intent to cure.    -leukopenic likely related to venetoclax and dysplasia.    -plan was to remain on venetoclax through BMT workup though with fever this was held on 4/30. Will continue to hold  -BMT workup started inpatient, will continue  Tomorrow outpatient     Proctitis   Neutropenic fever  Pseudomonas aeruginosa bacteremia  -PICC placed on 5/5  -was discharged from the hospital with ceftolozane-tazobactam (Zerbaxa) TID for 10 days, though ID was consulted and decided to extend his therapy for a total of 14 days (to complete on 5/21).   -call and dicussed with [facility] and extended abx course. Told patient about updated plan  -currently feeling good with no s/s of infection. Afebrile. Should call if he starts to have f/c or other infectious symptoms     PPx:   -continue PO vanc for C. Diff ppx  -continue acyclovir and voriconazole  -do no need levaquin with other antibiotics right now    Heme  -needs irradiated blood with impending transplant  -no transfusions needed today    Neisha Jackson PA-C  [facility]  [address], [place] [address] [phone]                          Neisha Jackson PA-C

## 2019-05-10 ENCOUNTER — OFFICE VISIT (OUTPATIENT)
Dept: INTERVENTIONAL RADIOLOGY/VASCULAR | Facility: CLINIC | Age: 64
End: 2019-05-10
Payer: COMMERCIAL

## 2019-05-10 ENCOUNTER — OFFICE VISIT (OUTPATIENT)
Dept: TRANSPLANT | Facility: CLINIC | Age: 64
End: 2019-05-10
Attending: INTERNAL MEDICINE
Payer: COMMERCIAL

## 2019-05-10 ENCOUNTER — OFFICE VISIT (OUTPATIENT)
Dept: TRANSPLANT | Facility: CLINIC | Age: 64
End: 2019-05-10
Attending: PHYSICIAN ASSISTANT
Payer: COMMERCIAL

## 2019-05-10 VITALS
SYSTOLIC BLOOD PRESSURE: 119 MMHG | RESPIRATION RATE: 14 BRPM | OXYGEN SATURATION: 100 % | DIASTOLIC BLOOD PRESSURE: 76 MMHG | BODY MASS INDEX: 28.23 KG/M2 | WEIGHT: 197.2 LBS | HEART RATE: 86 BPM | HEIGHT: 70 IN | TEMPERATURE: 97 F

## 2019-05-10 DIAGNOSIS — C92.01 ACUTE MYELOID LEUKEMIA IN REMISSION (H): Primary | ICD-10-CM

## 2019-05-10 DIAGNOSIS — C92.00 LEUKEMIA, ACUTE MYELOID (H): ICD-10-CM

## 2019-05-10 DIAGNOSIS — C92.00 LEUKEMIA, ACUTE MYELOID (H): Primary | ICD-10-CM

## 2019-05-10 DIAGNOSIS — C92.00 ACUTE MYELOID LEUKEMIA NOT HAVING ACHIEVED REMISSION (H): Primary | ICD-10-CM

## 2019-05-10 DIAGNOSIS — Z86.2 PERSONAL HISTORY OF DISEASES OF BLOOD AND BLOOD-FORMING ORGANS: ICD-10-CM

## 2019-05-10 LAB
ALBUMIN SERPL-MCNC: 3.2 G/DL (ref 3.4–5)
ALP SERPL-CCNC: 221 U/L (ref 40–150)
ALT SERPL W P-5'-P-CCNC: 64 U/L (ref 0–70)
ANION GAP SERPL CALCULATED.3IONS-SCNC: 8 MMOL/L (ref 3–14)
AST SERPL W P-5'-P-CCNC: 32 U/L (ref 0–45)
BASOPHILS # BLD AUTO: 0 10E9/L (ref 0–0.2)
BASOPHILS NFR BLD AUTO: 0.4 %
BILIRUB SERPL-MCNC: 0.3 MG/DL (ref 0.2–1.3)
BUN SERPL-MCNC: 9 MG/DL (ref 7–30)
CALCIUM SERPL-MCNC: 8.7 MG/DL (ref 8.5–10.1)
CHLORIDE SERPL-SCNC: 107 MMOL/L (ref 94–109)
CO2 SERPL-SCNC: 24 MMOL/L (ref 20–32)
CREAT SERPL-MCNC: 0.56 MG/DL (ref 0.66–1.25)
DIFFERENTIAL METHOD BLD: ABNORMAL
EOSINOPHIL # BLD AUTO: 0 10E9/L (ref 0–0.7)
EOSINOPHIL NFR BLD AUTO: 0 %
ERYTHROCYTE [DISTWIDTH] IN BLOOD BY AUTOMATED COUNT: 18.2 % (ref 10–15)
GFR SERPL CREATININE-BSD FRML MDRD: >90 ML/MIN/{1.73_M2}
GLUCOSE CSF-MCNC: 57 MG/DL (ref 40–70)
GLUCOSE SERPL-MCNC: 87 MG/DL (ref 70–99)
HCT VFR BLD AUTO: 27.1 % (ref 40–53)
HGB BLD-MCNC: 8.7 G/DL (ref 13.3–17.7)
IMM GRANULOCYTES # BLD: 0 10E9/L (ref 0–0.4)
IMM GRANULOCYTES NFR BLD: 0.8 %
LYMPHOCYTES # BLD AUTO: 1 10E9/L (ref 0.8–5.3)
LYMPHOCYTES NFR BLD AUTO: 41.1 %
MCH RBC QN AUTO: 31.6 PG (ref 26.5–33)
MCHC RBC AUTO-ENTMCNC: 32.1 G/DL (ref 31.5–36.5)
MCV RBC AUTO: 99 FL (ref 78–100)
MONOCYTES # BLD AUTO: 0.4 10E9/L (ref 0–1.3)
MONOCYTES NFR BLD AUTO: 13.8 %
NEUTROPHILS # BLD AUTO: 1.1 10E9/L (ref 1.6–8.3)
NEUTROPHILS NFR BLD AUTO: 43.9 %
NRBC # BLD AUTO: 0 10*3/UL
NRBC BLD AUTO-RTO: 0 /100
PLATELET # BLD AUTO: 335 10E9/L (ref 150–450)
POTASSIUM SERPL-SCNC: 4.2 MMOL/L (ref 3.4–5.3)
PROT CSF-MCNC: 46 MG/DL (ref 15–60)
PROT SERPL-MCNC: 7 G/DL (ref 6.8–8.8)
RBC # BLD AUTO: 2.75 10E12/L (ref 4.4–5.9)
SODIUM SERPL-SCNC: 138 MMOL/L (ref 133–144)
WBC # BLD AUTO: 2.5 10E9/L (ref 4–11)

## 2019-05-10 PROCEDURE — G0463 HOSPITAL OUTPT CLINIC VISIT: HCPCS | Mod: 25,ZF

## 2019-05-10 PROCEDURE — 89050 BODY FLUID CELL COUNT: CPT | Performed by: PHYSICIAN ASSISTANT

## 2019-05-10 PROCEDURE — 80053 COMPREHEN METABOLIC PANEL: CPT | Performed by: INTERNAL MEDICINE

## 2019-05-10 PROCEDURE — 82945 GLUCOSE OTHER FLUID: CPT | Performed by: PHYSICIAN ASSISTANT

## 2019-05-10 PROCEDURE — 62270 DX LMBR SPI PNXR: CPT

## 2019-05-10 PROCEDURE — 85025 COMPLETE CBC W/AUTO DIFF WBC: CPT | Performed by: INTERNAL MEDICINE

## 2019-05-10 PROCEDURE — 88108 CYTOPATH CONCENTRATE TECH: CPT | Performed by: PHYSICIAN ASSISTANT

## 2019-05-10 PROCEDURE — 84157 ASSAY OF PROTEIN OTHER: CPT | Performed by: PHYSICIAN ASSISTANT

## 2019-05-10 PROCEDURE — 00000102 ZZHCL STATISTIC CYTO WRIGHT STAIN TC: Performed by: PHYSICIAN ASSISTANT

## 2019-05-10 ASSESSMENT — ENCOUNTER SYMPTOMS
POLYDIPSIA: 0
FATIGUE: 0
POLYPHAGIA: 0
NAUSEA: 0
INCREASED ENERGY: 0
NIGHT SWEATS: 0
BLOOD IN STOOL: 0
RECTAL PAIN: 0
WEIGHT GAIN: 0
DECREASED APPETITE: 0
HALLUCINATIONS: 0
HEARTBURN: 0
JAUNDICE: 0
ABDOMINAL PAIN: 0
WEIGHT LOSS: 0
BLOATING: 0
BOWEL INCONTINENCE: 0
VOMITING: 0
DIARRHEA: 0
FEVER: 1
CHILLS: 0
CONSTIPATION: 0
ALTERED TEMPERATURE REGULATION: 0

## 2019-05-10 ASSESSMENT — PAIN SCALES - GENERAL: PAINLEVEL: NO PAIN (0)

## 2019-05-10 ASSESSMENT — MIFFLIN-ST. JEOR: SCORE: 1683.87

## 2019-05-10 NOTE — LETTER
5/10/2019     RE: El Ace  [address] [facility] [address]     Dear Colleague,    Thank you for referring your patient, El Ace, to the [facility] INTERVENTIONAL RADIOLOGY at [facility]. Please see a copy of my visit note below.    First Name: El  Age: 63 year old   Referring Physician: Dr. Brown   REASON FOR REFERRAL: Education and evaluation for tunneled catheter placement  Patient is undergoing w/u for allogeneic BMT using  related donor  (son)    HPI:  This is a patient who presented with chest tightness after a root canal.  Workup revealed marked luekocytosis with circulating blasts.  He was transferred to [facility] for management.  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  He was treated with hydroxyurea and then daunorubicin and cytarabine (7+3) induction chemotherapy starting 3/15/2019.  After initial improvement of leukocytosis, WBC and circulating blasts increased prior to day 14.  Bone marrow biopsy revealed essentially no treatment response.  He was started on salvage decitabine (10 days x 20 mg/m2) on 3/26/2019 and venetoclax (dose adjusted for concurrent azole antifungal) was added on 3/29/2019.  Course was complicated by neutropenic sepsis and brief time in MICU with dental problems and C diff colitis, acute kidney injury, elevated liver labs that have all improved.  He had a bone marrow in mid April and there was no evidence of disease in his marrow.  He was admitted 4/29 with neutropenic fevers, he had developed a bacteremia due to pseudomonas.  He was found to have proctitis, and C diff colitis.  It turns out the pseudomonas was quite stubborn to be treated, but they eventually found an antibiotic that would work (he will be on a 14 day course starting from May7).  He is feeling much better now he told me.  He is undergoing work-up for allogeneic BMT, using his son as the donor.    LINE HX:    1. Right  arm PICC x 2.  The second one remains in placed.  Blood return from both lumens, flushes easily.  No redness at the exit site.    PAST MEDICAL HISTORY:   Past Medical History:   Diagnosis Date     Acute leukemia (H) 3/12/2019     PAST SURGICAL HISTORY:   Past Surgical History:   Procedure Laterality Date     ARTHROSCOPY KNEE  1995     CARPAL TUNNEL RELEASE RT/LT       COLONOSCOPY  2000,2014     ORTHOPEDIC SURGERY  1975    laminectomy     PICC INSERTION Right 03/14/2019    5Fr - 42cm (2cm external), basilic vein, high SVC     vasectomy  2002     FAMILY HISTORY:   Family History   Problem Relation Age of Onset     Colon Cancer Mother      Cerebrovascular Disease Mother      Diabetes Mother      Septicemia Father      SOCIAL HISTORY:   Social History     Tobacco Use     Smoking status: Former Smoker     Smokeless tobacco: Former User     Tobacco comment: Smoked on and off for about 10 years about 20years ago.    Substance Use Topics     Alcohol use: Not Currently     PROBLEM LIST:   Patient Active Problem List    Diagnosis Date Noted     Neutropenia with fever (H) 04/30/2019     Priority: Medium     Septic shock (H) 04/03/2019     Priority: Medium     Acute myeloid leukemia not having achieved remission (H) 03/14/2019     Priority: Medium     Acute leukemia (H) 03/12/2019     Priority: Medium     Primary osteoarthritis of left hip 10/23/2018     Priority: Medium     Generalized anxiety disorder 02/24/2015     Priority: Medium     Dysthymic disorder 02/24/2015     Priority: Medium     Family history of malignant neoplasm of gastrointestinal tract 10/08/2014     Priority: Medium     Esophageal reflux 05/03/2006     Priority: Medium     Attention deficit hyperactivity disorder (ADHD) 05/03/2006     Priority: Medium     MEDICATIONS:   Prescription Medications as of 5/10/2019       Rx Number Disp Refills Start End Last Dispensed Date Next Fill Date Owning Pharmacy    acyclovir (ZOVIRAX) 400 MG tablet  60 tablet 1 4/15/2019     [address]    Sig: Take 1 tablet (400 mg) by mouth 2 times daily    Class: E-Prescribe    Route: Oral    atorvastatin (LIPITOR) 20 MG tablet  30 tablet 1 4/15/2019    [address]    Sig: Take 1 tablet (20 mg) by mouth every evening    Class: E-Prescribe    Route: Oral    ceftolozane-tazobactam (ZERBAXA) 1.5g injection  342 mL 0 5/7/2019 5/17/2019   [address]    Sig: Inject 11.4 mLs (1.5 g) into the vein every 8 hours for 10 days    Class: Injection    Route: Intravenous    nystatin (MYCOSTATIN) 151282 UNIT/ML suspension  400 mL 0 5/6/2019    [address]    Sig: Take 5 mLs (500,000 Units) by mouth 4 times daily    Class: E-Prescribe    Route: Oral    pantoprazole (PROTONIX) 40 MG EC tablet  60 tablet 1 4/15/2019    [address]    Sig: Take 1 tablet (40 mg) by mouth 2 times daily (before meals)    Class: E-Prescribe    Route: Oral    pramox-pe-glycerin-petrolatum (PREPARATION H) 1-0.25-14.4-15 % CREA cream    5/6/2019    [address]    Sig: Place rectally 3 times daily    Class: OTC    Route: Rectal    rOPINIRole (REQUIP) 0.25 MG tablet  60 tablet 1 4/15/2019 6/14/2019   [address]    Sig: Take 3 tablets (0.75 mg) by mouth nightly as needed (restless leg syndrome)    Class: E-Prescribe    Route: Oral    tamsulosin (FLOMAX) 0.4 MG capsule  30 capsule 1 4/16/2019    [address]    Sig: Take 1 capsule (0.4 mg) by mouth daily    Class: E-Prescribe    Route: Oral    vancomycin (FIRVANQ) 50 MG/ML oral solution  150 mL 0 5/7/2019    [facility]  "[address]    Sig: Take 2.5 mLs (125 mg) by mouth 4 times daily    Class: E-Prescribe    Route: Oral    voriconazole (VFEND) 50 MG tablet  180 tablet 0 4/15/2019 5/15/2019   [address]    Sig: Take 3 tablets (150 mg) by mouth every 12 hours    Class: E-Prescribe    Route: Oral    acetaminophen (TYLENOL) 325 MG tablet    4/15/2019    [address]    Sig: Take 2 tablets (650 mg) by mouth every 4 hours as needed for mild pain or fever (pre-med before blood products)    Class: OTC    Route: Oral    ondansetron (ZOFRAN) 8 MG tablet  30 tablet 1 4/15/2019    [address]    Sig: Take 1 tablet (8 mg) by mouth every 8 hours as needed for nausea    Class: E-Prescribe    Route: Oral    prochlorperazine (COMPAZINE) 5 MG tablet  30 tablet 1 4/15/2019    [address]    Sig: Take 1-2 tablets (5-10 mg) by mouth every 6 hours as needed for nausea or vomiting    Class: E-Prescribe    Route: Oral    sennosides (SENOKOT) 8.6 MG tablet    4/15/2019    [place] [address]    Sig: Take 2 tablets by mouth 2 times daily as needed for constipation    Class: OTC    Route: Oral        ALLERGIES:    Allergies   Allergen Reactions     Lactose GI Disturbance     Vital Signs 5/10/2019   Systolic 105   Diastolic 69   Pulse 83   Temperature 97.5   Respirations 14   Weight (LB) 197 lb 3.2 oz   Height 5' 9.567\"   BMI (Calculated) 28.65   Pain    O2 99       ROS:  Answers for HPI/ROS submitted by the patient on 5/10/2019   General Symptoms: Yes  Skin Symptoms: No  HENT Symptoms: No  EYE SYMPTOMS: No  HEART SYMPTOMS: No  LUNG SYMPTOMS: No  INTESTINAL SYMPTOMS: Yes  URINARY SYMPTOMS: No  REPRODUCTIVE SYMPTOMS: No  SKELETAL SYMPTOMS: No  BLOOD SYMPTOMS: No  NERVOUS SYSTEM " SYMPTOMS: No  MENTAL HEALTH SYMPTOMS: No  Fever: Yes  Loss of appetite: No  Weight loss: No  Weight gain: No  Fatigue: No  Night sweats: No  Chills: No  Increased stress: No  Excessive hunger: No  Excessive thirst: No  Feeling hot or cold when others believe the temperature is normal: No  Loss of height: No  Post-operative complications: No  Surgical site pain: No  Hallucinations: No  Change in or Loss of Energy: No  Hyperactivity: No  Confusion: No  Heart burn or indigestion: No  Nausea: No  Vomiting: No  Abdominal pain: No  Bloating: No  Constipation: No  Diarrhea: No  Blood in stool: No  Black stools: No  Rectal or Anal pain: No  Fecal incontinence: No  Yellowing of skin or eyes: No  Vomit with blood: No  Change in stools: No    Physical Examination: Vital signs are reviewed and they are stable  Constitutional: Pleasant older gentleman, in no acute physical distress, came with his wife, Bessy to the appt  Neck: Neck supple. No adenopathy  Musculoskeletal: extremities normal- no gross deformities noted, gait normal and normal muscle tone  Skin: no suspicious lesions or rashes  Neurologic: negative  Psychiatric: affect normal/bright and mentation appears normal.    RESULTS:  BMP RESULTS:  Lab Results   Component Value Date     05/10/2019    POTASSIUM 4.2 05/10/2019    CHLORIDE 107 05/10/2019    CO2 24 05/10/2019    ANIONGAP 8 05/10/2019    GLC 87 05/10/2019    BUN 9 05/10/2019    CR 0.56 (L) 05/10/2019    GFRESTIMATED >90 05/10/2019    GFRESTBLACK >90 05/10/2019    DEBBIE 8.7 05/10/2019        CBC RESULTS:  Lab Results   Component Value Date    WBC 2.5 (L) 05/10/2019    RBC 2.75 (L) 05/10/2019    HGB 8.7 (L) 05/10/2019    HCT 27.1 (L) 05/10/2019    MCV 99 05/10/2019    MCH 31.6 05/10/2019    MCHC 32.1 05/10/2019    RDW 18.2 (H) 05/10/2019     05/10/2019       INR/PTT:  Lab Results   Component Value Date    INR 1.14 05/10/2019    PTT 29 05/10/2019     NOTE:  Remove the PICC at the end of the  porcedure    ASSESSMENT/PLAN:  Type of catheter: 9.5 Fr.  Double lumen tunneled power [facility]     Preferred Location: Right  Internal Jugular Vein     Platelet count is   Lab Results   Component Value Date     05/10/2019    , and coagulation factors are   Lab Results   Component Value Date    INR 1.14 05/10/2019    ,  Lab Results   Component Value Date    PTT 29 05/10/2019   . The patient is at low risk for bleeding during the procedure.    PROVIDER NOTE:  The tunneled catheter placement procedure and its risks including but not limited to bleeding, infection, fibrin sheath formation and blood clots was explained to Randy and his wife, Bessy.    CONSENT: Affirmation of informed written consent was not obtained.     INSTRUCTIONS/GUIDELINES:   Eating and drinking restriction guidelines were reviewed., Anti-bacterial scrub was given and instructions for its use were reviewed to decrease the risk of infection on the day of the procedure., I explained the expected length of time the tunneled catheter could remain in place., I explained that when they went to the [facility] they would be taught about exit site dressing care, flushing of the lumens and how to care for the catheter when bathing., The role of Interventional Radiology was reviewed. and The principles of infection control were reviewed.     40 minutes was spent with Randy and his wife, Bessy.  35 minutes was spent in counseling  Sarika Nitish MS, APRN, CNS, CRN  Clinical Nurse Specialist  Interventional Radiology  [phone] (voice mail)  [phone] (pager).    CC  Patient Care Team:  Bre Vizcaino MD as PCP - General (Speciality Unknown)  Rivera Cuadra MD as Referring Physician (Internal Medicine - Hematology)  Mike Coronado MD as BMT Physician (BMT - Adult)  Soto Mccollum MD as BMT Physician (BMT - Adult)  SOTO MCCOLLUM

## 2019-05-10 NOTE — PROGRESS NOTES
"BMT ONC Adult Lumbar Puncture Procedure Note    May 10, 2019    Procedure: Lumbar Puncture to obtain CSF     Diagnosis: Pre transplant work up for AML    Learning needs assessment complete within 12 months: YES    Vitals reviewed:  YES    Vital Signs 5/10/2019   Systolic 105   Diastolic 69   Pulse 83   Temperature 97.5   Respirations 14   Weight (LB) 197 lb 3.2 oz   Height 5' 9.567\"   BMI (Calculated) 28.65   Pain    O2 99         Informed Consent: Procedure, benefits, risks, and alternatives explained to the patient who verbalized understanding of the information and agreed to proceed with lumbar puncture. Risks include bleeding, headache, and or infection.  Patient safety checklist completed.    Labs: Reviewed:      Lab Results   Component Value Date    INR 1.14 05/10/2019     05/10/2019           Description:. The patient was seated at the bedside with knees dangling. The L3-4 disc space was prepped and draped in a sterile fashion. Local anesthesia with 3mL 1% preservative-free lidocaine was used. A 22 gauge, 4 inch needle was placed on the first  attempt.  6 mL spinal fluid collected. Specimen appearsClear and colorless. Specimen sent for cell count and differential, cytology, protein and glucose.  The needle was removed and a bandage was applied to the site.  Patient tolerated well.    Post-procedure pain assessment: 0 out of 10 on the numeric pain rating scale  Interventions: No    Complications: No     Disposition: Patient will remain on back for 1 hour post procedure. Avoid soaking in a tub tonight.  Call if develops headaches, fevers and or chills.     Performed by:   Mary De Leon  "

## 2019-05-10 NOTE — NURSING NOTE
Pt tolerated LP procedure well.  No current complaints of headache or other discomfort.  Pt remains alert and oriented, wife at bedside. Pt lying supine for 60 min, as routine.  Pt given verbal teaching re: to increase oral fluid intake today/tomorrow and to drink caffeine today.  Pt instructed to call if severe headache occurs as outpatient.  LP procedure site assessed, no evidence of drainage or bleeding observed at this time.  Pt provided with soda, cookies and sandwiches.  Call light w/in reach.  Pt to have VS reassessed at 1730 prior to discharge to home.

## 2019-05-10 NOTE — NURSING NOTE
Chief Complaint   Patient presents with     RECHECK     here for nurse workup visit, labs, consents, teaching HX: AML

## 2019-05-10 NOTE — PATIENT INSTRUCTIONS
Return per work up schedule   Closes on 5/16/2019 with provider, already scheduled  On 5/16/2019 can you schedule him for a lab appointment for a CBC and type and screen

## 2019-05-10 NOTE — NURSING NOTE
PT ambulatory to room, accompanied by his wife.  PT continues to deny pain.  Provider at bedside to obtain written procedural consent for LP.  Pt currently alert and oriented to plan of care.  Time out completed at bedside.  Provider at bedside.

## 2019-05-10 NOTE — PROGRESS NOTES
First Name: El  Age: 63 year old   Referring Physician: Dr. Brown   REASON FOR REFERRAL: Education and evaluation for tunneled catheter placement  Patient is undergoing w/u for allogeneic BMT using  related donor  (son)    HPI:  This is a patient who presented with chest tightness after a root canal.  Workup revealed marked luekocytosis with circulating blasts.  He was transferred to [facility] for management.  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  He was treated with hydroxyurea and then daunorubicin and cytarabine (7+3) induction chemotherapy starting 3/15/2019.  After initial improvement of leukocytosis, WBC and circulating blasts increased prior to day 14.  Bone marrow biopsy revealed essentially no treatment response.  He was started on salvage decitabine (10 days x 20 mg/m2) on 3/26/2019 and venetoclax (dose adjusted for concurrent azole antifungal) was added on 3/29/2019.  Course was complicated by neutropenic sepsis and brief time in MICU with dental problems and C diff colitis, acute kidney injury, elevated liver labs that have all improved.  He had a bone marrow in mid April and there was no evidence of disease in his marrow.  He was admitted 4/29 with neutropenic fevers, he had developed a bacteremia due to pseudomonas.  He was found to have proctitis, and C diff colitis.  It turns out the pseudomonas was quite stubborn to be treated, but they eventually found an antibiotic that would work (he will be on a 14 day course starting from May7).  He is feeling much better now he told me.  He is undergoing work-up for allogeneic BMT, using his son as the donor.    LINE HX:    1. Right arm PICC x 2.  The second one remains in placed.  Blood return from both lumens, flushes easily.  No redness at the exit site.    PAST MEDICAL HISTORY:   Past Medical History:   Diagnosis Date     Acute leukemia (H) 3/12/2019     PAST SURGICAL HISTORY:   Past Surgical History:   Procedure  Laterality Date     ARTHROSCOPY KNEE  1995     CARPAL TUNNEL RELEASE RT/LT       COLONOSCOPY  2000,2014     ORTHOPEDIC SURGERY  1975    laminectomy     PICC INSERTION Right 03/14/2019    5Fr - 42cm (2cm external), basilic vein, high SVC     vasectomy  2002     FAMILY HISTORY:   Family History   Problem Relation Age of Onset     Colon Cancer Mother      Cerebrovascular Disease Mother      Diabetes Mother      Septicemia Father      SOCIAL HISTORY:   Social History     Tobacco Use     Smoking status: Former Smoker     Smokeless tobacco: Former User     Tobacco comment: Smoked on and off for about 10 years about 20years ago.    Substance Use Topics     Alcohol use: Not Currently     PROBLEM LIST:   Patient Active Problem List    Diagnosis Date Noted     Neutropenia with fever (H) 04/30/2019     Priority: Medium     Septic shock (H) 04/03/2019     Priority: Medium     Acute myeloid leukemia not having achieved remission (H) 03/14/2019     Priority: Medium     Acute leukemia (H) 03/12/2019     Priority: Medium     Primary osteoarthritis of left hip 10/23/2018     Priority: Medium     Generalized anxiety disorder 02/24/2015     Priority: Medium     Dysthymic disorder 02/24/2015     Priority: Medium     Family history of malignant neoplasm of gastrointestinal tract 10/08/2014     Priority: Medium     Esophageal reflux 05/03/2006     Priority: Medium     Attention deficit hyperactivity disorder (ADHD) 05/03/2006     Priority: Medium     MEDICATIONS:   Prescription Medications as of 5/10/2019       Rx Number Disp Refills Start End Last Dispensed Date Next Fill Date Owning Pharmacy    acyclovir (ZOVIRAX) 400 MG tablet  60 tablet 1 4/15/2019    [address]    Sig: Take 1 tablet (400 mg) by mouth 2 times daily    Class: E-Prescribe    Route: Oral    atorvastatin (LIPITOR) 20 MG tablet  30 tablet 1 4/15/2019    [place] [address]  [facility]    Sig: Take 1 tablet (20 mg) by mouth every evening    Class: E-Prescribe    Route: Oral    ceftolozane-tazobactam (ZERBAXA) 1.5g injection  342 mL 0 5/7/2019 5/17/2019   [address]    Sig: Inject 11.4 mLs (1.5 g) into the vein every 8 hours for 10 days    Class: Injection    Route: Intravenous    nystatin (MYCOSTATIN) 821314 UNIT/ML suspension  400 mL 0 5/6/2019    [address]    Sig: Take 5 mLs (500,000 Units) by mouth 4 times daily    Class: E-Prescribe    Route: Oral    pantoprazole (PROTONIX) 40 MG EC tablet  60 tablet 1 4/15/2019    [address]    Sig: Take 1 tablet (40 mg) by mouth 2 times daily (before meals)    Class: E-Prescribe    Route: Oral    pramox-pe-glycerin-petrolatum (PREPARATION H) 1-0.25-14.4-15 % CREA cream    5/6/2019    [address]    Sig: Place rectally 3 times daily    Class: OTC    Route: Rectal    rOPINIRole (REQUIP) 0.25 MG tablet  60 tablet 1 4/15/2019 6/14/2019   [address]    Sig: Take 3 tablets (0.75 mg) by mouth nightly as needed (restless leg syndrome)    Class: E-Prescribe    Route: Oral    tamsulosin (FLOMAX) 0.4 MG capsule  30 capsule 1 4/16/2019    [address]    Sig: Take 1 capsule (0.4 mg) by mouth daily    Class: E-Prescribe    Route: Oral    vancomycin (FIRVANQ) 50 MG/ML oral solution  150 mL 0 5/7/2019    [address]    Sig: Take 2.5 mLs (125 mg) by mouth 4 times daily    Class: E-Prescribe    Route: Oral    voriconazole (VFEND) 50 MG tablet  180 tablet 0 4/15/2019 5/15/2019   [address]     "Sig: Take 3 tablets (150 mg) by mouth every 12 hours    Class: E-Prescribe    Route: Oral    acetaminophen (TYLENOL) 325 MG tablet    4/15/2019    [facility]    Sig: Take 2 tablets (650 mg) by mouth every 4 hours as needed for mild pain or fever (pre-med before blood products)    Class: OTC    Route: Oral    ondansetron (ZOFRAN) 8 MG tablet  30 tablet 1 4/15/2019    [address]    Sig: Take 1 tablet (8 mg) by mouth every 8 hours as needed for nausea    Class: E-Prescribe    Route: Oral    prochlorperazine (COMPAZINE) 5 MG tablet  30 tablet 1 4/15/2019    [address]    Sig: Take 1-2 tablets (5-10 mg) by mouth every 6 hours as needed for nausea or vomiting    Class: E-Prescribe    Route: Oral    sennosides (SENOKOT) 8.6 MG tablet    4/15/2019    [facility]    Sig: Take 2 tablets by mouth 2 times daily as needed for constipation    Class: OTC    Route: Oral        ALLERGIES:    Allergies   Allergen Reactions     Lactose GI Disturbance     Vital Signs 5/10/2019   Systolic 105   Diastolic 69   Pulse 83   Temperature 97.5   Respirations 14   Weight (LB) 197 lb 3.2 oz   Height 5' 9.567\"   BMI (Calculated) 28.65   Pain    O2 99       ROS:  Answers for HPI/ROS submitted by the patient on 5/10/2019   General Symptoms: Yes  Skin Symptoms: No  HENT Symptoms: No  EYE SYMPTOMS: No  HEART SYMPTOMS: No  LUNG SYMPTOMS: No  INTESTINAL SYMPTOMS: Yes  URINARY SYMPTOMS: No  REPRODUCTIVE SYMPTOMS: No  SKELETAL SYMPTOMS: No  BLOOD SYMPTOMS: No  NERVOUS SYSTEM SYMPTOMS: No  MENTAL HEALTH SYMPTOMS: No  Fever: Yes  Loss of appetite: No  Weight loss: No  Weight gain: No  Fatigue: No  Night sweats: No  Chills: No  Increased stress: No  Excessive hunger: No  Excessive thirst: No  Feeling hot or cold when others believe the temperature is normal: " No  Loss of height: No  Post-operative complications: No  Surgical site pain: No  Hallucinations: No  Change in or Loss of Energy: No  Hyperactivity: No  Confusion: No  Heart burn or indigestion: No  Nausea: No  Vomiting: No  Abdominal pain: No  Bloating: No  Constipation: No  Diarrhea: No  Blood in stool: No  Black stools: No  Rectal or Anal pain: No  Fecal incontinence: No  Yellowing of skin or eyes: No  Vomit with blood: No  Change in stools: No    Physical Examination: Vital signs are reviewed and they are stable  Constitutional: Pleasant older gentleman, in no acute physical distress, came with his wife, Bessy to the appt  Neck: Neck supple. No adenopathy  Musculoskeletal: extremities normal- no gross deformities noted, gait normal and normal muscle tone  Skin: no suspicious lesions or rashes  Neurologic: negative  Psychiatric: affect normal/bright and mentation appears normal.    RESULTS:  BMP RESULTS:  Lab Results   Component Value Date     05/10/2019    POTASSIUM 4.2 05/10/2019    CHLORIDE 107 05/10/2019    CO2 24 05/10/2019    ANIONGAP 8 05/10/2019    GLC 87 05/10/2019    BUN 9 05/10/2019    CR 0.56 (L) 05/10/2019    GFRESTIMATED >90 05/10/2019    GFRESTBLACK >90 05/10/2019    DEBBIE 8.7 05/10/2019        CBC RESULTS:  Lab Results   Component Value Date    WBC 2.5 (L) 05/10/2019    RBC 2.75 (L) 05/10/2019    HGB 8.7 (L) 05/10/2019    HCT 27.1 (L) 05/10/2019    MCV 99 05/10/2019    MCH 31.6 05/10/2019    MCHC 32.1 05/10/2019    RDW 18.2 (H) 05/10/2019     05/10/2019       INR/PTT:  Lab Results   Component Value Date    INR 1.14 05/10/2019    PTT 29 05/10/2019     NOTE:  Remove the PICC at the end of the porcedure    ASSESSMENT/PLAN:  Type of catheter: 9.5 Fr.  Double lumen tunneled power [facility]     Preferred Location: Right  Internal Jugular Vein     Platelet count is   Lab Results   Component Value Date     05/10/2019    , and coagulation factors are   Lab Results   Component Value Date     INR 1.14 05/10/2019    ,  Lab Results   Component Value Date    PTT 29 05/10/2019   . The patient is at low risk for bleeding during the procedure.    PROVIDER NOTE:  The tunneled catheter placement procedure and its risks including but not limited to bleeding, infection, fibrin sheath formation and blood clots was explained to Randy and his wife, Bessy.    CONSENT: Affirmation of informed written consent was not obtained.     INSTRUCTIONS/GUIDELINES:   Eating and drinking restriction guidelines were reviewed., Anti-bacterial scrub was given and instructions for its use were reviewed to decrease the risk of infection on the day of the procedure., I explained the expected length of time the tunneled catheter could remain in place., I explained that when they went to [facility] they would be taught about exit site dressing care, flushing of the lumens and how to care for the catheter when bathing., The role of Interventional Radiology was reviewed. and The principles of infection control were reviewed.     40 minutes was spent with Randy and his wife, Bessy.  35 minutes was spent in Ama Talbert MS, APRN, CNS, CRN  Clinical Nurse Specialist  Interventional Radiology  [phone] (voice mail)  [phone] (pager).      CC  Patient Care Team:  Bre Vizcaino MD as PCP - General (Speciality Unknown)  Rivera Cuadra MD as Referring Physician (Internal Medicine - Hematology)  Mike Coronado MD as BMT Physician (BMT - Adult)  Soto Mccollum MD as BMT Physician (BMT - Adult)  SOTO MCCOLLUM

## 2019-05-10 NOTE — PROGRESS NOTES
Pharmacy Assessment - Pre-Stem Cell Transplant    Assessments & Recommendations:  1) recent history of CDI, recommend oral vancomycin prophylaxis 250mg BID or as instructed by ID during period of neutropenia  2.) actively being treated with zerbaxa by ID for MDR PsAg bacteremia. In the event of neutropenic fever post admission, recommend immediate ID consult  3) Borderline low EF, recommend conservative fluid management as able  4) hold voriconazole at least 5 days prior to cytoxan administration and at least 48 hours after post transplant Cy  5) hold statin during admission but consider restarting post discharge      History of Present Illness:  El Ace is a 63 year old year old male diagnosed with AML.  He has been treated with 7+3 and decitabine/venetoclax.  He is now being worked up for haplo Stem Cell Transplant on protocol 2016-15. Comorbidity index and subsequent chemo TBD    Pertinent labs/tests:  Viral Testing:  CMV(+) / HSV(+) / EBV(+) / VZV (+)  Ejection Fraction: 43% (5/2)  QTc: 446msec    Weights:   Wt Readings from Last 3 Encounters:   05/10/19 89.4 kg (197 lb 3.2 oz)   05/09/19 89.7 kg (197 lb 11.2 oz)   05/07/19 87.9 kg (193 lb 11.2 oz)   Ideal body weight: 72 kg (158 lb 11.8 oz)  Adjusted ideal body weight: 79 kg (174 lb 2 oz)    Primary BMT Physician: Dr Brown  BMT RN Coordinator:  Jerri Payton    Past Medical History:  Past Medical History:   Diagnosis Date     Acute leukemia (H) 3/12/2019       Medication Allergies:  Allergies   Allergen Reactions     Lactose GI Disturbance       Current Medications (pre-admit):  Current Outpatient Medications   Medication Sig Dispense Refill     acetaminophen (TYLENOL) 325 MG tablet Take 2 tablets (650 mg) by mouth every 4 hours as needed for mild pain or fever (pre-med before blood products) (Patient not taking: Reported on 4/23/2019)       acyclovir (ZOVIRAX) 400 MG tablet Take 1 tablet (400 mg) by mouth 2 times daily 60 tablet 1     atorvastatin  (LIPITOR) 20 MG tablet Take 1 tablet (20 mg) by mouth every evening 30 tablet 1     ceftolozane-tazobactam (ZERBAXA) 1.5g injection Inject 11.4 mLs (1.5 g) into the vein every 8 hours for 10 days 342 mL 0     nystatin (MYCOSTATIN) 557898 UNIT/ML suspension Take 5 mLs (500,000 Units) by mouth 4 times daily 400 mL 0     ondansetron (ZOFRAN) 8 MG tablet Take 1 tablet (8 mg) by mouth every 8 hours as needed for nausea (Patient not taking: Reported on 5/10/2019) 30 tablet 1     pantoprazole (PROTONIX) 40 MG EC tablet Take 1 tablet (40 mg) by mouth 2 times daily (before meals) 60 tablet 1     pramox-pe-glycerin-petrolatum (PREPARATION H) 1-0.25-14.4-15 % CREA cream Place rectally 3 times daily       prochlorperazine (COMPAZINE) 5 MG tablet Take 1-2 tablets (5-10 mg) by mouth every 6 hours as needed for nausea or vomiting (Patient not taking: Reported on 4/23/2019) 30 tablet 1     rOPINIRole (REQUIP) 0.25 MG tablet Take 3 tablets (0.75 mg) by mouth nightly as needed (restless leg syndrome) 60 tablet 1     sennosides (SENOKOT) 8.6 MG tablet Take 2 tablets by mouth 2 times daily as needed for constipation (Patient not taking: Reported on 4/23/2019)       tamsulosin (FLOMAX) 0.4 MG capsule Take 1 capsule (0.4 mg) by mouth daily 30 capsule 1     vancomycin (FIRVANQ) 50 MG/ML oral solution Take 2.5 mLs (125 mg) by mouth 4 times daily 150 mL 0     voriconazole (VFEND) 50 MG tablet Take 3 tablets (150 mg) by mouth every 12 hours 180 tablet 0       Herbal Medication/Nutritional Supplements:  denies    Smoking/Past Drug Use:  Denies marijuana use    Nausea/Vomiting, Pain, or other issues:  Patient reports nausea well controlled in the past but does not remember which medication was most effective.  Patient denies pain and is not currently taking pain medications for breakthrough relief.      Summary:  I met with El Ace for approximately 45 minutes.  We discussed his current and upcoming transplant medication list including  the conditioning chemotherapy and immunosuppression, antiemetics, prophylactic antibiotics and vaccinations. El Ace and his caregiver participated in our conversation and voiced their understanding of the topics discussed. Thank you for allowing me to participate in the care of this patient.    Beto Andujar, JesusD

## 2019-05-10 NOTE — PROGRESS NOTES
"PT arrived, ambulatory, in care of his wife.  PT here for nurse workup visit.  Pt son to be his donor, harvesting BM.  HT/WT measured, VSS.  Medication list reviewed and allergies assessed.  Pt denies pain or other discomforts at this time.  Clinic consents discussed and pt's signature obtained this visit. Calendar of upcoming clinic visits/consults discussed with patient and his wife.  Pt states \"I have already had the Radiation consult scheduled for next week\".  \"They had me do that when I was in the hospital all last week with my fevers\".  Will page NC to clarify this scheduling.  Labs drawn from PICC line.  Pt continues to require q8 hr IV ABX, home infusion.  UA collected.  Both patient and his wife report understanding of [facility] check in routines,  parking, use of complimentary shuttle to hospital.  Pt to have line consult today as well as PFT's and lumbar puncture.  Pt discharged to next appointment and to have lunch prior to returning to BMT clinic for procedure.  Total time spent with patient was 45 min.  "

## 2019-05-11 LAB
BACTERIA SPEC CULT: NO GROWTH
BACTERIA SPEC CULT: NO GROWTH
DLCOCOR-%PRED-PRE: 98 %
DLCOCOR-PRE: 26.22 ML/MIN/MMHG
DLCOUNC-%PRED-PRE: 78 %
DLCOUNC-PRE: 20.85 ML/MIN/MMHG
DLCOUNC-PRED: 26.52 ML/MIN/MMHG
ERV-%PRED-PRE: 121 %
ERV-PRE: 1.26 L
ERV-PRED: 1.04 L
EXPTIME-PRE: 8.09 SEC
FEF2575-%PRED-PRE: 121 %
FEF2575-PRE: 3.31 L/SEC
FEF2575-PRED: 2.73 L/SEC
FEFMAX-%PRED-PRE: 120 %
FEFMAX-PRE: 10.6 L/SEC
FEFMAX-PRED: 8.8 L/SEC
FEV1-%PRED-PRE: 118 %
FEV1-PRE: 4.01 L
FEV1FEV6-PRE: 77 %
FEV1FEV6-PRED: 78 %
FEV1FVC-PRE: 77 %
FEV1FVC-PRED: 77 %
FEV1SVC-PRE: 79 %
FEV1SVC-PRED: 70 %
FIFMAX-PRE: 7.26 L/SEC
FIO2-PRE: 21 %
FRCPLETH-%PRED-PRE: 110 %
FRCPLETH-PRE: 4.01 L
FRCPLETH-PRED: 3.62 L
FVC-%PRED-PRE: 118 %
FVC-PRE: 5.23 L
FVC-PRED: 4.41 L
IC-%PRED-PRE: 101 %
IC-PRE: 3.82 L
IC-PRED: 3.77 L
Lab: NORMAL
Lab: NORMAL
RVPLETH-%PRED-PRE: 110 %
RVPLETH-PRE: 2.75 L
RVPLETH-PRED: 2.49 L
SPECIMEN SOURCE: NORMAL
SPECIMEN SOURCE: NORMAL
TLCPLETH-%PRED-PRE: 111 %
TLCPLETH-PRE: 7.83 L
TLCPLETH-PRED: 7.02 L
VA-%PRED-PRE: 116 %
VA-PRE: 7.42 L
VC-%PRED-PRE: 105 %
VC-PRE: 5.08 L
VC-PRED: 4.81 L

## 2019-05-12 LAB
BACTERIA SPEC CULT: NO GROWTH
Lab: NORMAL
SPECIMEN SOURCE: NORMAL

## 2019-05-13 ENCOUNTER — ALLIED HEALTH/NURSE VISIT (OUTPATIENT)
Dept: TRANSPLANT | Facility: CLINIC | Age: 64
End: 2019-05-13
Attending: INTERNAL MEDICINE
Payer: COMMERCIAL

## 2019-05-13 ENCOUNTER — RESULTS ONLY (OUTPATIENT)
Dept: OTHER | Facility: CLINIC | Age: 64
End: 2019-05-13

## 2019-05-13 DIAGNOSIS — C92.01 ACUTE MYELOID LEUKEMIA IN REMISSION (H): Primary | ICD-10-CM

## 2019-05-13 DIAGNOSIS — Z71.9 VISIT FOR COUNSELING: Primary | ICD-10-CM

## 2019-05-13 DIAGNOSIS — C92.01 ACUTE MYELOID LEUKEMIA IN REMISSION (H): ICD-10-CM

## 2019-05-13 LAB
BASE DEFICIT BLDA-SCNC: 5 MMOL/L
COHGB MFR BLD: 0.7 % (ref 0–2)
HCO3 BLD-SCNC: 19 MMOL/L (ref 21–28)
HGB BLD-MCNC: 9.1 G/DL (ref 13.3–17.7)
METHGB MFR BLD: 0.2 % (ref 0–3)
O2/TOTAL GAS SETTING VFR VENT: 21 %
PCO2 BLD: 34 MM HG (ref 35–45)
PH BLD: 7.37 PH (ref 7.35–7.45)
PO2 BLD: 100 MM HG (ref 80–105)

## 2019-05-13 PROCEDURE — 36592 COLLECT BLOOD FROM PICC: CPT

## 2019-05-13 PROCEDURE — 86833 HLA CLASS II HIGH DEFIN QUAL: CPT | Performed by: INTERNAL MEDICINE

## 2019-05-13 PROCEDURE — 40000694 ZZHCL STATISTIC STAT SERVICE TX TESTING: Performed by: INTERNAL MEDICINE

## 2019-05-13 PROCEDURE — 97802 MEDICAL NUTRITION INDIV IN: CPT | Mod: ZF

## 2019-05-13 PROCEDURE — 86832 HLA CLASS I HIGH DEFIN QUAL: CPT | Performed by: INTERNAL MEDICINE

## 2019-05-13 NOTE — PROGRESS NOTES
"CLINICAL NUTRITION SERVICES    REASON FOR ASSESSMENT  El Ace is a/an 63 year old male assessed by the dietitian for a nutrition consult for education prior to a bone marrow transplant.   Referring Provider: Rivera Cuadra MD     NUTRITION HISTORY  Information obtained from pt and significant other. Pt states that he tends to lose weight when he is in the hospital but has been eating better since discharge. He does struggle with taste changes (hypoguesia). Denies issues with nausea/vomitting or chewing/swallowing. Previous constipation issues but this has resolved. Noted, pt reports that lactose can cause an upset stomach/gas but that he does not have an allergy and that this was listed in chart last admission limiting foods that he could eat and can tolerate at baseline (pizza, pancakes, etc). He would like for this to be removed. Otherwise, no restrictions noted.     LABS & MEDICATIONS  Reviewed  Lytes WNL aside from Cr 0.56 L, albumin 3.2 L which could be r/t illness/inflammation; alk phos 221 elevated - remaining LFTs and bili WNL; Hgb A1C 6.0% (3/31/19)    Zofran, compazine, senna    ANTHROPOMETRICS  Height:   Ht Readings from Last 2 Encounters:   05/10/19 1.767 m (5' 9.57\")   05/09/19 1.778 m (5' 10\")     IBW: 75.5 kg   BMI: 28.9 --  Overweight BMI 25-29.9  Weight History:  Weight overall stable x 2 weeks  Wt Readings from Last 10 Encounters:   05/10/19 89.4 kg (197 lb 3.2 oz)   05/09/19 89.7 kg (197 lb 11.2 oz)   05/07/19 87.9 kg (193 lb 11.2 oz)   04/26/19 90.8 kg (200 lb 1.6 oz)   04/23/19 89.2 kg (196 lb 9.6 oz)   04/23/19 89.2 kg (196 lb 9.6 oz)   04/15/19 85.8 kg (189 lb 3.2 oz)     Dosing Weight: 89 kg (actual)    ASSESSED NUTRITION NEEDS  Estimated Energy Needs: 8196-7176 kcals/day (25 - 30 kcals/kg)  Justification: Maintenance  Estimated Protein Needs: 70-90 grams protein/day (0.8 - 1 grams of pro/kg)  Justification: Maintenance  Estimated Fluid Needs:(1 mL/kcal)   Justification: " Maintenance    PHYSICAL FINDINGS  See malnutrition section below.    MALNUTRITION  Malnutrition Diagnosis: Patient does not meet two of the above criteria necessary for diagnosing malnutrition but is at risk for malnutrition    NUTRITION DIAGNOSIS  Food- and nutrition-related knowledge deficit related to no previous education on nutrition changes associated with a bone marrow transplant as evidenced by MD consult and pt verbalization      INTERVENTIONS  Implementation  Nutrition Education:   Provided education on how to cope with the side effects of potential upcoming bone marrow transplant. Encouraged optimize current healthy state to maintain or regain lost weight prior to transplant. Discussed how to fortify meals and snacks with more calories and protein. Reviewed oral nutrition supplements and snack options available for consumption during inpatient stay. Reviewed guidelines for food safety during neutropenia phase and while taking immunosuppression medications. Answered all pt's current questions about nutrition.     Provided handouts:  Nutrition Care and Bone Marrow Transplant, Food Safety for Transplant Recipients (booklet), and nutrition resources for cancer patients handout.     Goals  1. Maintain weight >195 Ibs throughout SCT course.   2. Verbalized 2-3 ways to maintain nutrition status throughout SCT course.     Monitoring/Evaluation  Progress toward goals will be monitored and evaluated per protocol.    Patient Understanding: good  Expected Compliance: good  Follow Up: PRN-pt provided with RD contact information if needs arise.     Time Spent with Patient: 30 minutes    Debbie Thornton RD, , LD.  5C/BMT Pager:[phone]    **Reviewed and agree with above assessment and interventions**  Treva Leigh RDN, LDN  [facility]  [phone]

## 2019-05-13 NOTE — NURSING NOTE
Chief Complaint   Patient presents with     Blood Draw     Labs drawn via PICC by RN in lab. Line flushed with saline. Lab appt only.      Estephania Mcguire RN

## 2019-05-14 ENCOUNTER — MEDICAL CORRESPONDENCE (OUTPATIENT)
Dept: HEALTH INFORMATION MANAGEMENT | Facility: CLINIC | Age: 64
End: 2019-05-14

## 2019-05-14 ENCOUNTER — HOME INFUSION (PRE-WILLOW HOME INFUSION) (OUTPATIENT)
Dept: PHARMACY | Facility: CLINIC | Age: 64
End: 2019-05-14

## 2019-05-14 LAB
ALBUMIN SERPL-MCNC: 3 G/DL (ref 3.4–5)
ALP SERPL-CCNC: 201 U/L (ref 40–150)
ALT SERPL W P-5'-P-CCNC: 45 U/L (ref 0–70)
ANION GAP SERPL CALCULATED.3IONS-SCNC: 7 MMOL/L (ref 3–14)
AST SERPL W P-5'-P-CCNC: 24 U/L (ref 0–45)
BASOPHILS # BLD AUTO: 0 10E9/L (ref 0–0.2)
BASOPHILS NFR BLD AUTO: 0.4 %
BILIRUB SERPL-MCNC: 0.3 MG/DL (ref 0.2–1.3)
BUN SERPL-MCNC: 7 MG/DL (ref 7–30)
CALCIUM SERPL-MCNC: 7.8 MG/DL (ref 8.5–10.1)
CHLORIDE SERPL-SCNC: 109 MMOL/L (ref 94–109)
CO2 SERPL-SCNC: 25 MMOL/L (ref 20–32)
COPATH REPORT: NORMAL
CREAT SERPL-MCNC: 0.72 MG/DL (ref 0.66–1.25)
DIFFERENTIAL METHOD BLD: ABNORMAL
EOSINOPHIL # BLD AUTO: 0 10E9/L (ref 0–0.7)
EOSINOPHIL NFR BLD AUTO: 0 %
ERYTHROCYTE [DISTWIDTH] IN BLOOD BY AUTOMATED COUNT: 19 % (ref 10–15)
GFR SERPL CREATININE-BSD FRML MDRD: >90 ML/MIN/{1.73_M2}
GLUCOSE SERPL-MCNC: 115 MG/DL (ref 70–99)
HCT VFR BLD AUTO: 28.6 % (ref 40–53)
HGB BLD-MCNC: 8.8 G/DL (ref 13.3–17.7)
IMM GRANULOCYTES # BLD: 0 10E9/L (ref 0–0.4)
IMM GRANULOCYTES NFR BLD: 0.2 %
LYMPHOCYTES # BLD AUTO: 1.3 10E9/L (ref 0.8–5.3)
LYMPHOCYTES NFR BLD AUTO: 25 %
MCH RBC QN AUTO: 30 PG (ref 26.5–33)
MCHC RBC AUTO-ENTMCNC: 30.8 G/DL (ref 31.5–36.5)
MCV RBC AUTO: 98 FL (ref 78–100)
MONOCYTES # BLD AUTO: 1 10E9/L (ref 0–1.3)
MONOCYTES NFR BLD AUTO: 19.3 %
NEUTROPHILS # BLD AUTO: 2.8 10E9/L (ref 1.6–8.3)
NEUTROPHILS NFR BLD AUTO: 55.1 %
NRBC # BLD AUTO: 0 10*3/UL
NRBC BLD AUTO-RTO: 0 /100
PLATELET # BLD AUTO: 387 10E9/L (ref 150–450)
POTASSIUM SERPL-SCNC: 4.1 MMOL/L (ref 3.4–5.3)
PROT SERPL-MCNC: 6.9 G/DL (ref 6.8–8.8)
RBC # BLD AUTO: 2.93 10E12/L (ref 4.4–5.9)
SA1 CELL: NORMAL
SA1 COMMENTS: NORMAL
SA1 HI RISK ABY: NORMAL
SA1 MOD RISK ABY: NORMAL
SA1 TEST METHOD: NORMAL
SA2 CELL: NORMAL
SA2 COMMENTS: NORMAL
SA2 HI RISK ABY UA: NORMAL
SA2 MOD RISK ABY: NORMAL
SA2 TEST METHOD: NORMAL
SODIUM SERPL-SCNC: 141 MMOL/L (ref 133–144)
WBC # BLD AUTO: 5.1 10E9/L (ref 4–11)

## 2019-05-14 PROCEDURE — 80053 COMPREHEN METABOLIC PANEL: CPT | Performed by: INTERNAL MEDICINE

## 2019-05-14 PROCEDURE — 85025 COMPLETE CBC W/AUTO DIFF WBC: CPT | Performed by: INTERNAL MEDICINE

## 2019-05-14 ASSESSMENT — ANXIETY QUESTIONNAIRES
3. WORRYING TOO MUCH ABOUT DIFFERENT THINGS: NOT AT ALL
1. FEELING NERVOUS, ANXIOUS, OR ON EDGE: NOT AT ALL
5. BEING SO RESTLESS THAT IT IS HARD TO SIT STILL: NOT AT ALL
7. FEELING AFRAID AS IF SOMETHING AWFUL MIGHT HAPPEN: SEVERAL DAYS
IF YOU CHECKED OFF ANY PROBLEMS ON THIS QUESTIONNAIRE, HOW DIFFICULT HAVE THESE PROBLEMS MADE IT FOR YOU TO DO YOUR WORK, TAKE CARE OF THINGS AT HOME, OR GET ALONG WITH OTHER PEOPLE: SOMEWHAT DIFFICULT
GAD7 TOTAL SCORE: 2
2. NOT BEING ABLE TO STOP OR CONTROL WORRYING: NOT AT ALL
6. BECOMING EASILY ANNOYED OR IRRITABLE: NOT AT ALL

## 2019-05-14 ASSESSMENT — PATIENT HEALTH QUESTIONNAIRE - PHQ9
SUM OF ALL RESPONSES TO PHQ QUESTIONS 1-9: 5
5. POOR APPETITE OR OVEREATING: SEVERAL DAYS

## 2019-05-14 NOTE — PROGRESS NOTES
This is a recent snapshot of the patient's [facility] medical record.  For current drug dose and complete information and questions, call [phone] or In Basket pool, fv home infusion ([id number])  CSN Number:  [id number]

## 2019-05-14 NOTE — PROGRESS NOTES
"CLINICAL SOCIAL WORK   PSYCHOSOCIAL ASSESSMENT  BLOOD AND MARROW TRANSPLANT SERVICE      Assessment completed on May 13, 2019 of living situation, support system, financial status, functional status, coping, stressors, need for resources and social work intervention provided as needed.  Information for this assessment was provided by Pt and spouse report in addition to medical chart review and consultation with medical team.     Present at assessment: Patient, El \"Randy\" Db and Bessy Hernandez were present for this assessment conducted by Gloria Joyner, [facility] .     Diagnosis: Acute Myeloid Leukemia (AML)    Date of Diagnosis: 3/12/19     Transplant type: Allogeneic     Donor: Related allogeneic donor stem cell transplant  Donor: Manny Ace    Physician: Soto Brown MD    Nurse Coordinator: Jerri Payton RN    Social Workers: EDI John, LICSW and EDI Davidson, LGSW    Permanent Address:   [address] [place] [id number]    Local Address:   [id number] [place] [address] main desk: [phone]    Contact Information:  Pt Home Phone: [phone]  Pt Cell Phone: [phone]  Pt Email: [email or web address]  Pt's wife Bessy Hernandez Phone: [phone]    Presenting Information:  Randy is a 63 year old male diagnosed with AML who presents for evaluation for allogeneic transplant at the [facility] ([facility]).  Pt was accompanied to today's visit by his wife Bessy.     Decision Making:   Self     Health Care Directive:   Will bring in copy The pt has a completed form, but does not have this notarized yet. He plans to bring this in once he is admitted to have it completed.     Relationship Status:        Special Needs: The pt lives around 35 minutes from the hospital, although the pt did indicate it takes around 40-45 min on average. [facility] after [facility] hospital " admission to be closer during the daily appt period. Once the pt's appt become less they may consider returning home.     Family/Support System: Pt endorsed a good support system including family and close friends who will be available to support Pt throughout transplant process.     Spouse: Bessy Hernandez    Children: Debbie Jeffery (37), Manny Ace (34), and Nelli Ace (20)    Grandchildren: Jasper Jeffery (12) and Marlen Jeffery (10)    Parents:      Siblings: Alexis Ace (67) and Karson Ace (54)    Friends: Some close friends and neighbors who are supportive.     Caregiver: SW discussed with pt the caregiver role and expectation at length. Pt is agreeable to having a full time caregiver for a minimum of 100 days until cleared by the BMT physician. Pt's identified caregivers are his wife Bessy and possibly his brother. Pt signed the caregiver contract which will be scanned into the EMR. Caregiver education and resources provided. No caregiver concerns identified. Pt and Pt's wife Bessy confirmed understanding caregiving requirement, including driving restrictions, as discussed during psychosocial assessment.     Name & Numbers  Bessy Hernandez [phone]  Alexis Ace- possible back-ups, but live in [place] and have agreed to come if needed  Tonya Thompson    Transportation Mode:  Private Car and Shuttle . Pt is aware of driving restrictions post-BMT and the need for the caregiver is to drive until cleared to drive by the  BMT physician. SW provided information on parking info and monthly parking pass options. Pt will utilize the hospital security shuttle for transportation to and from the [facility] and BMT Clinic/Hospital.    Insurance:  No Insurance issues identified. The pt's wife did not that if the pt goes on Long Term Disability (starts in Oct) then pt have to cover the cost of his insurance. Pt denied specific insurance concerns at this time. SW reiterated information about the BMT Financial   should specific insurance questions arise as Pt moves through transplant process.     Insurance  Iva SEAY, [phone]    Sources of Income:  Employer disability and Savings  The pt is currently collectin Short Term Disability and this will go until Oct, Bessy is also still work part-time, but will stop working when the pt is DC'ed from the hospital. Pt denied anticipation of financial hardship related to BMT at this time.  SW encouraged Pt to contact this SW for additional potential resources should financial situation change.     SW did discuss grants available to the pt, they do not feel they have the need to apply for anything now, but do worry about how the cost will add up over time. SW explained that the family could apply at any point and encouraged them to reach out if they were interested in applying.    Employment:   Employer: [employer]    Position:   Last Day of Work: 3/12/19    Spouse's Employment:  Employer: [employer]   Position: Physical Therapist    Mental Health: No mental health issues identified       PHQ-9 assessment, score was 5, which indicates mild signs of depression .   GAD7 assessment, score was 2, which indicates no signs of anxiety.    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 The pt shared that he did go to counseling at that time and found it helpful, but is not interested in trying medication for anything. He does not associated having any depression symptoms at this time, but overall does appear a bit flat when talking about how things are going for him. He shared that he tends to take 1 day at a time and does not like to look at the big picture, which is hard as his wife likes to look at the bigger picture at times. The pt shared that overall he feels sad right now for all the work his wife Bessy has to do, but some times was spend  "talking through these emotions he has and expressing them to Bessy. The pt is willing to consider seeing a counselor again, but at this time feels he is doing well.     We talked about how some patients may see an increase in feelings of anxiety or depression while hospitalized for extended periods along with isolation. Encouraged Randy and Bessy to let us know if they are noticing an increase in symptoms. We talked about the variety of modalities available to use as coping mechanisms (including but not limited to guided imagery, relaxation techniques, progressive muscle relaxation, counseling/talk therapy and medication).    Chemical Use: No issues identified  The pt denies the use of tobacco, alcohol, marijuana or other drugs. Based on the information provided, there appear to be no specific risks or concerns identified at this time.     Trauma/Loss/Abuse History: Multiple losses associated with cancer diagnosis and treatment, including health, employment, changes to physical appearance, etc.     Spirituality:  Patient identifies with [place] community The pt attends [facility] in [place], [place].     Coping: Pt noted that he is currently feeling \"positive, hopeful and frustrated\".  Pt shared that his main coping mechanisms are talking with family and playing his guitar. The pt does have a hard time identifying coping skills and some time was spent talking over ways to help try new things to help support him during the BMT process.  SW and Pt discussed additional positive coping mechanisms that Pt can utilize while in the hospital.     Caregiver Coping: Pt's wife Bessy noted that she is feeling \"positive, hopeful, and ready to begin\" at this time.  Bessy noted that she farhana by talking with friends and family, exercise, and gathering educational information.      Education Provided: Transplant process expectations, Caregiver requirements, Caregiver self-care, Financial issues related to " transplant, Financial resources/grants available, Common psychosocial stressors pre/post transplant, Support group(s) available, Tour/layout of the inpatient unit/non-use of cell phones, Hospital resources available, Web site information, Resources for transplant patients and their families as well as the Clinical Social Work role.     Interventions Provided: Supportive counseling and education     Recreation/Leisure Activities:  The pt shared that he enjoy's playing his guitar, staying up-to-date on current events and walking/light exercise.    Plans for Hospital Stay Leisure:  Reading, playing guitar    Assessment and Recommendations for Team:  Pt is a 63 year old male diagnosed with AML who is here undergoing preparation for a planned allogeneic transplant.     Pt is a pleasant and well articulate male who feels comfortable communicating with the medical team. Pt has a good support team who are involved.     Pt may benefit from ongoing psychosocial support in regards to coping with the adjustment to the BMT process. Pt's family may benefit from ongoing psychosocial support in regards to coping with the adjustment to the BMT process and may also benefit from attending the BMT Caregiver Support Group that meets weekly on the inpatient unit.     Pt has a good support system and a good caregiver plan. Pt verbalizes understanding of the transplant process and wanting to proceed. SW provided contact information and encouraged Pt to contact SW with questions, concerns, resources and for support. Per this assessment, I did not identify any barriers to this patient moving forward with transplant      Important Information:   - Pt would like to stay at the [facility] post hospital discharge.  - Pt would be interested in a treadmill  - The pt is interested in a blessing ceremony with his [place] .   - The pt and his wife will benefit from continued SW support during the BMT process.     Follow up Planned:   Initiate  financial resources  Psychosocial support  Lodging referrals    EDI John, [facility]  Pager: [phone]  Phone: [phone]

## 2019-05-14 NOTE — PROGRESS NOTES
This is a recent snapshot of the patient's [facility] medical record.  For current drug dose and complete information and questions, call [phone] or In Basket pool, fv home infusion ([id number])  CSN Number:  [id number]

## 2019-05-14 NOTE — PROGRESS NOTES
This is a recent snapshot of the patient's [facility] medical record.  For current drug dose and complete information and questions, call [phone] or In Basket pool, fv home infusion ([id number])  CSN Number:  [id number]

## 2019-05-15 ENCOUNTER — ANCILLARY PROCEDURE (OUTPATIENT)
Dept: CARDIOLOGY | Facility: CLINIC | Age: 64
End: 2019-05-15
Attending: INTERNAL MEDICINE
Payer: COMMERCIAL

## 2019-05-15 DIAGNOSIS — C92.01 ACUTE MYELOID LEUKEMIA IN REMISSION (H): ICD-10-CM

## 2019-05-15 LAB
APPEARANCE CSF: CLEAR
COLOR CSF: COLORLESS
RBC # CSF MANUAL: 1 /UL (ref 0–2)
TUBE # CSF: 3 #
WBC # CSF MANUAL: 0 /UL (ref 0–5)

## 2019-05-15 ASSESSMENT — ANXIETY QUESTIONNAIRES: GAD7 TOTAL SCORE: 2

## 2019-05-15 NOTE — PROGRESS NOTES
This is a recent snapshot of the patient's [facility] medical record.  For current drug dose and complete information and questions, call [phone] or In [facility] pool, fv home infusion ([id number])  CSN Number:  [id number]

## 2019-05-15 NOTE — PROGRESS NOTES
[facility] BMT [facility]    Diagnosis:   1. 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     Treatments:   1. 7+3 ([place]), 3/15/19, no response, Day 12 persistent disease with near packed marrow  2. Dec 10d+Ric 3/26/19, CR1 on 4/23/19 but with pos mutations (IDH1 17%, RUNX1 8%)     PMH: HLP, BPH, DJD, RLS    Interval history: Doing well, almost back to normal. 10-point ROS otherwise negative.     Lab Results   Component Value Date    WBC 5.8 05/16/2019    HGB 9.3 (L) 05/16/2019    HCT 29.4 (L) 05/16/2019     05/16/2019     05/14/2019    POTASSIUM 4.1 05/14/2019    CHLORIDE 109 05/14/2019    CO2 25 05/14/2019    BUN 7 05/14/2019    CR 0.72 05/14/2019     (H) 05/14/2019    NTBNPI 2,031 (H) 04/03/2019    TROPI <0.015 04/23/2019    AST 24 05/14/2019    ALT 45 05/14/2019    ALKPHOS 201 (H) 05/14/2019    BILITOTAL 0.3 05/14/2019    INR 1.14 05/10/2019       Current Outpatient Medications   Medication     acyclovir (ZOVIRAX) 400 MG tablet     atorvastatin (LIPITOR) 20 MG tablet     ceftolozane-tazobactam (ZERBAXA) 1.5g injection     nystatin (MYCOSTATIN) 586342 UNIT/ML suspension     pantoprazole (PROTONIX) 40 MG EC tablet     pramox-pe-glycerin-petrolatum (PREPARATION H) 1-0.25-14.4-15 % CREA cream     rOPINIRole (REQUIP) 0.25 MG tablet     tamsulosin (FLOMAX) 0.4 MG capsule     vancomycin (FIRVANQ) 50 MG/ML oral solution     acetaminophen (TYLENOL) 325 MG tablet     ondansetron (ZOFRAN) 8 MG tablet     prochlorperazine (COMPAZINE) 5 MG tablet     sennosides (SENOKOT) 8.6 MG tablet     No current facility-administered medications for this visit.      Ph/E:   Vitals: /67   Pulse 88   Temp 98.7  F (37.1  C) (Oral)   Resp 16   Wt 91.3 kg (201 lb 4.5 oz)   SpO2 99%   BMI 29.24 kg/m     KPS: 90%  General: NAD; H&N: no mucosal lesions; Lungs clear; Heart RRR; Abdomen; Soft, No organomegaly; Extremities: No edema; Skin: No  rash; Neuro: Nonfocal; Mood/Affect: appropriate; Lymph: no LAD    A&P:   1. AML, CR1: HCT-CI 3 (DLCO; note: infection doesn't require abx after day 0), haplo Tac/MMF/PT-Cy, VELMA, Flu/Yue/Cy/TBI, BM from son, stop PPI. CMV neg to pos, A to A.    2. ID: Recent BSI with pseudomonas and CDI. Acyclovir. Hold vori in prep for Cy. Ceftolozane-tazobactam (Zerbaxa) TID until 5/21. Complete PO vanc.      In today's visit, we discussed in detail the research for which El Ace is eligible. We discussed the potential risks and potential benefits of each protocol individually. We explained potential alternatives to the protocols discussed. We explained to the patient that participation is voluntary and that consent may be withdrawn at any time.     The patient completed the last round of treatment on 3/26/19.    HCT-CI score: 1. We counseled the patient about the impact of this on the risk of treatment related and overall mortality. The score fit within treatment protocol eligibility criteria.    Karnofsky performance score: 90%      Active infections:  Yes.  Prior infections that require additional special prophylaxis considerations: Yes.  I reviewed and discussed infectious disease evaluation with the patient and the management plan during treatment.    Reproductive status: What methods of birth control does the patient plan to use during the treatment period beginning with conditioning and ending with the discontinuation of immune suppression (indicate with an X all that apply):  __ The patient is confirmed to be sterile or post-menopausal  __ Sexual abstinence  _x_ Condoms  __ Implants  __ Injectables  __ Oral contraceptives  __ Intrauterine devices (IUD)  __ Other (describe)    The patient received appropriate reproductive counseling and agreed with the need for effective contraception during the treatment procedures.      Dental health suitable to proceed: Yes    After our detailed discussion above, the patient signed  the following consents for treatment and protocols:    [facility] protocol    [facility] database    Local database    MIPLATE    Immune reconstitution study     The patient received a signed copy of the consents. The patient had opportunity to ask questions that were answered to the best of my ability and to the patient's apparent satisfaction.    Soto Brown       Parts of this note were dictated using [employer].     Soto Brown

## 2019-05-16 ENCOUNTER — APPOINTMENT (OUTPATIENT)
Dept: LAB | Facility: CLINIC | Age: 64
End: 2019-05-16
Attending: INTERNAL MEDICINE
Payer: COMMERCIAL

## 2019-05-16 ENCOUNTER — HOME INFUSION (PRE-WILLOW HOME INFUSION) (OUTPATIENT)
Dept: PHARMACY | Facility: CLINIC | Age: 64
End: 2019-05-16

## 2019-05-16 ENCOUNTER — OFFICE VISIT (OUTPATIENT)
Dept: TRANSPLANT | Facility: CLINIC | Age: 64
End: 2019-05-16
Attending: INTERNAL MEDICINE
Payer: COMMERCIAL

## 2019-05-16 VITALS
BODY MASS INDEX: 29.24 KG/M2 | TEMPERATURE: 98.7 F | WEIGHT: 201.28 LBS | DIASTOLIC BLOOD PRESSURE: 67 MMHG | OXYGEN SATURATION: 99 % | HEART RATE: 88 BPM | SYSTOLIC BLOOD PRESSURE: 103 MMHG | RESPIRATION RATE: 16 BRPM

## 2019-05-16 DIAGNOSIS — C92.01 ACUTE MYELOID LEUKEMIA IN REMISSION (H): ICD-10-CM

## 2019-05-16 LAB
ABO + RH BLD: NORMAL
ABO + RH BLD: NORMAL
BASOPHILS # BLD AUTO: 0 10E9/L (ref 0–0.2)
BASOPHILS NFR BLD AUTO: 0.7 %
BLD GP AB SCN SERPL QL: NORMAL
BLOOD BANK CMNT PATIENT-IMP: NORMAL
COMMENT VXMB1: NORMAL
COMMENT VXMT1: NORMAL
CROSSMATCHDATEVXM: NORMAL
DIFFERENTIAL METHOD BLD: ABNORMAL
DONOR VXM: NORMAL
DSA VXM B1: NORMAL
DSA VXMT1: NORMAL
EOSINOPHIL # BLD AUTO: 0 10E9/L (ref 0–0.7)
EOSINOPHIL NFR BLD AUTO: 0 %
ERYTHROCYTE [DISTWIDTH] IN BLOOD BY AUTOMATED COUNT: 19.5 % (ref 10–15)
HCT VFR BLD AUTO: 29.4 % (ref 40–53)
HGB BLD-MCNC: 9.3 G/DL (ref 13.3–17.7)
IMM GRANULOCYTES # BLD: 0 10E9/L (ref 0–0.4)
IMM GRANULOCYTES NFR BLD: 0.5 %
LYMPHOCYTES # BLD AUTO: 1.1 10E9/L (ref 0.8–5.3)
LYMPHOCYTES NFR BLD AUTO: 19.7 %
MCH RBC QN AUTO: 31.7 PG (ref 26.5–33)
MCHC RBC AUTO-ENTMCNC: 31.6 G/DL (ref 31.5–36.5)
MCV RBC AUTO: 100 FL (ref 78–100)
MONOCYTES # BLD AUTO: 0.9 10E9/L (ref 0–1.3)
MONOCYTES NFR BLD AUTO: 15.3 %
NEUTROPHILS # BLD AUTO: 3.7 10E9/L (ref 1.6–8.3)
NEUTROPHILS NFR BLD AUTO: 63.8 %
NRBC # BLD AUTO: 0 10*3/UL
NRBC BLD AUTO-RTO: 0 /100
PLATELET # BLD AUTO: 356 10E9/L (ref 150–450)
RBC # BLD AUTO: 2.93 10E12/L (ref 4.4–5.9)
RESULT VXM B1: NORMAL
RESULT VXM T1: NORMAL
SERUM DATE VXM B1: NORMAL
SERUM DATE VXM T1: NORMAL
SPECIMEN EXP DATE BLD: NORMAL
WBC # BLD AUTO: 5.8 10E9/L (ref 4–11)

## 2019-05-16 PROCEDURE — 86900 BLOOD TYPING SEROLOGIC ABO: CPT | Performed by: PHYSICIAN ASSISTANT

## 2019-05-16 PROCEDURE — 85025 COMPLETE CBC W/AUTO DIFF WBC: CPT | Performed by: PHYSICIAN ASSISTANT

## 2019-05-16 PROCEDURE — 86901 BLOOD TYPING SEROLOGIC RH(D): CPT | Performed by: PHYSICIAN ASSISTANT

## 2019-05-16 PROCEDURE — G0463 HOSPITAL OUTPT CLINIC VISIT: HCPCS | Mod: ZF

## 2019-05-16 PROCEDURE — 36592 COLLECT BLOOD FROM PICC: CPT

## 2019-05-16 PROCEDURE — 86850 RBC ANTIBODY SCREEN: CPT | Performed by: PHYSICIAN ASSISTANT

## 2019-05-16 ASSESSMENT — PAIN SCALES - GENERAL: PAINLEVEL: NO PAIN (0)

## 2019-05-16 NOTE — NURSING NOTE
Chief Complaint   Patient presents with     Blood Draw     Labs drawn by RN in Lab from Right Arm PICC. Line flushed with Saline.      Debbie Waite RN

## 2019-05-16 NOTE — NURSING NOTE
"Oncology Rooming Note    May 16, 2019 10:12 AM   El Ace is a 63 year old male who presents for:    Chief Complaint   Patient presents with     Blood Draw     Labs drawn by RN in Lab from Right Arm PICC. Line flushed with Saline.      RECHECK     RTN-acute myeloid leukemia     Initial Vitals: /67   Pulse 88   Temp 98.7  F (37.1  C) (Oral)   Resp 16   Wt 91.3 kg (201 lb 4.5 oz)   SpO2 99%   BMI 29.24 kg/m   Estimated body mass index is 29.24 kg/m  as calculated from the following:    Height as of 5/10/19: 1.767 m (5' 9.57\").    Weight as of this encounter: 91.3 kg (201 lb 4.5 oz). Body surface area is 2.12 meters squared.  No Pain (0) Comment: Data Unavailable   No LMP for male patient.  Allergies reviewed: Yes  Medications reviewed: Yes    Medications: MEDICATION REFILLS NEEDED TODAY. Provider was notified.  Pharmacy name entered into [facility]:    [facility] - [place], [place] - [phone]  [facility] [place], [place] - [phone]    Clinical concerns: Refill- Protonix     Dian Veloz, JESSICA              "

## 2019-05-17 LAB — COPATH REPORT: NORMAL

## 2019-05-17 NOTE — PROGRESS NOTES
This is a recent snapshot of the patient's [facility] medical record.  For current drug dose and complete information and questions, call [phone] or In Basket pool, fv home infusion ([id number])  CSN Number:  [id number]

## 2019-05-18 LAB — COPATH REPORT: NORMAL

## 2019-05-20 ENCOUNTER — HOME INFUSION (PRE-WILLOW HOME INFUSION) (OUTPATIENT)
Dept: PHARMACY | Facility: CLINIC | Age: 64
End: 2019-05-20

## 2019-05-22 ENCOUNTER — HOME INFUSION (PRE-WILLOW HOME INFUSION) (OUTPATIENT)
Dept: PHARMACY | Facility: CLINIC | Age: 64
End: 2019-05-22

## 2019-05-22 DIAGNOSIS — C92.01 ACUTE MYELOID LEUKEMIA IN REMISSION (H): Primary | ICD-10-CM

## 2019-05-22 NOTE — PROGRESS NOTES
This is a recent snapshot of the patient's [facility] medical record.  For current drug dose and complete information and questions, call [phone] or In Basket pool, fv home infusion ([id number])  CSN Number:  [id number]

## 2019-05-23 ENCOUNTER — TELEPHONE (OUTPATIENT)
Dept: TRANSPLANT | Facility: CLINIC | Age: 64
End: 2019-05-23

## 2019-05-23 ENCOUNTER — HOSPITAL ENCOUNTER (OUTPATIENT)
Facility: CLINIC | Age: 64
DRG: 014 | End: 2019-05-23
Attending: INTERNAL MEDICINE | Admitting: INTERNAL MEDICINE
Payer: COMMERCIAL

## 2019-05-23 ENCOUNTER — TELEPHONE (OUTPATIENT)
Dept: INTERVENTIONAL RADIOLOGY/VASCULAR | Facility: CLINIC | Age: 64
End: 2019-05-23

## 2019-05-23 DIAGNOSIS — C92.01 ACUTE MYELOID LEUKEMIA IN REMISSION (H): Primary | ICD-10-CM

## 2019-05-23 NOTE — PROGRESS NOTES
This is a recent snapshot of the patient's [facility] medical record.  For current drug dose and complete information and questions, call [phone] or In [facility] pool, fv home infusion ([id number])  CSN Number:  [id number]

## 2019-05-23 NOTE — TELEPHONE ENCOUNTER
Called pt to confirm admission and line placement for Friday 5/24. Pt instructed to remain NPO after 2 am on Fri morning and to check in at the [facility] waiting room on 5/24 at 6:30am. Pt also instructed to use [employer] to wash neck and chest twice on Thurs 5/23 and once Fri morning.  Pt verbalized understanding of all instructions and had no further questions.

## 2019-05-24 ENCOUNTER — APPOINTMENT (OUTPATIENT)
Dept: INTERVENTIONAL RADIOLOGY/VASCULAR | Facility: CLINIC | Age: 64
DRG: 014 | End: 2019-05-24
Attending: INTERNAL MEDICINE
Payer: COMMERCIAL

## 2019-05-24 ENCOUNTER — MEDICAL CORRESPONDENCE (OUTPATIENT)
Dept: TRANSPLANT | Facility: CLINIC | Age: 64
End: 2019-05-24

## 2019-05-24 ENCOUNTER — APPOINTMENT (OUTPATIENT)
Dept: MEDSURG UNIT | Facility: CLINIC | Age: 64
DRG: 014 | End: 2019-05-24
Attending: INTERNAL MEDICINE
Payer: COMMERCIAL

## 2019-05-24 ENCOUNTER — HOSPITAL ENCOUNTER (INPATIENT)
Facility: CLINIC | Age: 64
LOS: 34 days | Discharge: HOME | DRG: 014 | End: 2019-06-27
Attending: INTERNAL MEDICINE | Admitting: INTERNAL MEDICINE
Payer: COMMERCIAL

## 2019-05-24 DIAGNOSIS — M25.532 PAIN AND SWELLING OF LEFT WRIST: ICD-10-CM

## 2019-05-24 DIAGNOSIS — Z94.84 HISTORY OF ALLOGENEIC STEM CELL TRANSPLANT (H): ICD-10-CM

## 2019-05-24 DIAGNOSIS — R50.81 NEUTROPENIA WITH FEVER (H): ICD-10-CM

## 2019-05-24 DIAGNOSIS — M25.432 PAIN AND SWELLING OF LEFT WRIST: ICD-10-CM

## 2019-05-24 DIAGNOSIS — C92.01 ACUTE MYELOID LEUKEMIA IN REMISSION (H): ICD-10-CM

## 2019-05-24 DIAGNOSIS — C95.00 ACUTE LEUKEMIA NOT HAVING ACHIEVED REMISSION (H): ICD-10-CM

## 2019-05-24 DIAGNOSIS — C92.01 AML (ACUTE MYELOID LEUKEMIA) IN REMISSION (H): Primary | ICD-10-CM

## 2019-05-24 DIAGNOSIS — D70.9 NEUTROPENIA WITH FEVER (H): ICD-10-CM

## 2019-05-24 LAB
ABO + RH BLD: NORMAL
ABO + RH BLD: NORMAL
ALBUMIN SERPL-MCNC: 3.4 G/DL (ref 3.4–5)
ALP SERPL-CCNC: 165 U/L (ref 40–150)
ALT SERPL W P-5'-P-CCNC: 40 U/L (ref 0–70)
ANION GAP SERPL CALCULATED.3IONS-SCNC: 5 MMOL/L (ref 3–14)
APTT PPP: 30 SEC (ref 22–37)
AST SERPL W P-5'-P-CCNC: 24 U/L (ref 0–45)
BASOPHILS # BLD AUTO: 0.1 10E9/L (ref 0–0.2)
BASOPHILS NFR BLD AUTO: 1.2 %
BILIRUB SERPL-MCNC: 0.4 MG/DL (ref 0.2–1.3)
BLD GP AB SCN SERPL QL: NORMAL
BLOOD BANK CMNT PATIENT-IMP: NORMAL
BUN SERPL-MCNC: 13 MG/DL (ref 7–30)
CALCIUM SERPL-MCNC: 8.5 MG/DL (ref 8.5–10.1)
CHLORIDE SERPL-SCNC: 108 MMOL/L (ref 94–109)
CO2 SERPL-SCNC: 27 MMOL/L (ref 20–32)
CREAT SERPL-MCNC: 0.65 MG/DL (ref 0.66–1.25)
DIFFERENTIAL METHOD BLD: ABNORMAL
EOSINOPHIL # BLD AUTO: 0 10E9/L (ref 0–0.7)
EOSINOPHIL NFR BLD AUTO: 1 %
ERYTHROCYTE [DISTWIDTH] IN BLOOD BY AUTOMATED COUNT: 18.6 % (ref 10–15)
GFR SERPL CREATININE-BSD FRML MDRD: >90 ML/MIN/{1.73_M2}
GLUCOSE SERPL-MCNC: 133 MG/DL (ref 70–99)
HCT VFR BLD AUTO: 33.6 % (ref 40–53)
HGB BLD-MCNC: 10.5 G/DL (ref 13.3–17.7)
IMM GRANULOCYTES # BLD: 0 10E9/L (ref 0–0.4)
IMM GRANULOCYTES NFR BLD: 0.5 %
INR PPP: 1.13 (ref 0.86–1.14)
LYMPHOCYTES # BLD AUTO: 1.2 10E9/L (ref 0.8–5.3)
LYMPHOCYTES NFR BLD AUTO: 29.6 %
MAGNESIUM SERPL-MCNC: 2.5 MG/DL (ref 1.6–2.3)
MCH RBC QN AUTO: 31.5 PG (ref 26.5–33)
MCHC RBC AUTO-ENTMCNC: 31.3 G/DL (ref 31.5–36.5)
MCV RBC AUTO: 101 FL (ref 78–100)
MONOCYTES # BLD AUTO: 0.5 10E9/L (ref 0–1.3)
MONOCYTES NFR BLD AUTO: 11.7 %
NEUTROPHILS # BLD AUTO: 2.3 10E9/L (ref 1.6–8.3)
NEUTROPHILS NFR BLD AUTO: 56 %
NRBC # BLD AUTO: 0 10*3/UL
NRBC BLD AUTO-RTO: 0 /100
PHOSPHATE SERPL-MCNC: 4.3 MG/DL (ref 2.5–4.5)
PLATELET # BLD AUTO: 369 10E9/L (ref 150–450)
POTASSIUM SERPL-SCNC: 4 MMOL/L (ref 3.4–5.3)
PROT SERPL-MCNC: 6.9 G/DL (ref 6.8–8.8)
RBC # BLD AUTO: 3.33 10E12/L (ref 4.4–5.9)
SODIUM SERPL-SCNC: 140 MMOL/L (ref 133–144)
SPECIMEN EXP DATE BLD: NORMAL
URATE SERPL-MCNC: 4.9 MG/DL (ref 3.5–7.2)
WBC # BLD AUTO: 4.1 10E9/L (ref 4–11)

## 2019-05-24 PROCEDURE — 84100 ASSAY OF PHOSPHORUS: CPT | Performed by: PHYSICIAN ASSISTANT

## 2019-05-24 PROCEDURE — 27210904 ZZH KIT CR6

## 2019-05-24 PROCEDURE — C1751 CATH, INF, PER/CENT/MIDLINE: HCPCS

## 2019-05-24 PROCEDURE — 25000128 H RX IP 250 OP 636: Performed by: INTERNAL MEDICINE

## 2019-05-24 PROCEDURE — 85025 COMPLETE CBC W/AUTO DIFF WBC: CPT | Performed by: PHYSICIAN ASSISTANT

## 2019-05-24 PROCEDURE — 27210732 ZZH ACCESSORY CR1

## 2019-05-24 PROCEDURE — 99152 MOD SED SAME PHYS/QHP 5/>YRS: CPT

## 2019-05-24 PROCEDURE — 25000132 ZZH RX MED GY IP 250 OP 250 PS 637: Performed by: PHYSICIAN ASSISTANT

## 2019-05-24 PROCEDURE — 25000128 H RX IP 250 OP 636: Performed by: PHYSICIAN ASSISTANT

## 2019-05-24 PROCEDURE — 25000128 H RX IP 250 OP 636: Performed by: RADIOLOGY

## 2019-05-24 PROCEDURE — 25800025 ZZH RX 258: Performed by: PHYSICIAN ASSISTANT

## 2019-05-24 PROCEDURE — 20600000 ZZH R&B BMT

## 2019-05-24 PROCEDURE — 25000125 ZZHC RX 250: Performed by: RADIOLOGY

## 2019-05-24 PROCEDURE — C1769 GUIDE WIRE: HCPCS

## 2019-05-24 PROCEDURE — 27210738 ZZH ACCESSORY CR2

## 2019-05-24 PROCEDURE — 80053 COMPREHEN METABOLIC PANEL: CPT | Performed by: PHYSICIAN ASSISTANT

## 2019-05-24 PROCEDURE — 86901 BLOOD TYPING SEROLOGIC RH(D): CPT | Performed by: PHYSICIAN ASSISTANT

## 2019-05-24 PROCEDURE — 86900 BLOOD TYPING SEROLOGIC ABO: CPT | Performed by: PHYSICIAN ASSISTANT

## 2019-05-24 PROCEDURE — 25800030 ZZH RX IP 258 OP 636: Performed by: INTERNAL MEDICINE

## 2019-05-24 PROCEDURE — 40000172 ZZH STATISTIC PROCEDURE PREP ONLY

## 2019-05-24 PROCEDURE — 85610 PROTHROMBIN TIME: CPT | Performed by: PHYSICIAN ASSISTANT

## 2019-05-24 PROCEDURE — 84550 ASSAY OF BLOOD/URIC ACID: CPT | Performed by: PHYSICIAN ASSISTANT

## 2019-05-24 PROCEDURE — 99153 MOD SED SAME PHYS/QHP EA: CPT

## 2019-05-24 PROCEDURE — 05HM33Z INSERTION OF INFUSION DEVICE INTO RIGHT INTERNAL JUGULAR VEIN, PERCUTANEOUS APPROACH: ICD-10-PCS | Performed by: RADIOLOGY

## 2019-05-24 PROCEDURE — 27210908 ZZH NEEDLE CR4

## 2019-05-24 PROCEDURE — 3E04305 INTRODUCTION OF OTHER ANTINEOPLASTIC INTO CENTRAL VEIN, PERCUTANEOUS APPROACH: ICD-10-PCS | Performed by: INTERNAL MEDICINE

## 2019-05-24 PROCEDURE — 86850 RBC ANTIBODY SCREEN: CPT | Performed by: PHYSICIAN ASSISTANT

## 2019-05-24 PROCEDURE — 85730 THROMBOPLASTIN TIME PARTIAL: CPT | Performed by: PHYSICIAN ASSISTANT

## 2019-05-24 PROCEDURE — 83735 ASSAY OF MAGNESIUM: CPT | Performed by: PHYSICIAN ASSISTANT

## 2019-05-24 PROCEDURE — 25800030 ZZH RX IP 258 OP 636: Performed by: RADIOLOGY

## 2019-05-24 PROCEDURE — 36558 INSERT TUNNELED CV CATH: CPT | Mod: LT

## 2019-05-24 RX ORDER — POTASSIUM CHLORIDE 29.8 MG/ML
20 INJECTION INTRAVENOUS
Status: DISCONTINUED | OUTPATIENT
Start: 2019-05-24 | End: 2019-06-27 | Stop reason: HOSPADM

## 2019-05-24 RX ORDER — TAMSULOSIN HYDROCHLORIDE 0.4 MG/1
0.4 CAPSULE ORAL DAILY
Status: DISCONTINUED | OUTPATIENT
Start: 2019-05-24 | End: 2019-06-27 | Stop reason: HOSPADM

## 2019-05-24 RX ORDER — PROCHLORPERAZINE MALEATE 5 MG
5 TABLET ORAL EVERY 6 HOURS PRN
Status: DISCONTINUED | OUTPATIENT
Start: 2019-05-24 | End: 2019-06-27 | Stop reason: HOSPADM

## 2019-05-24 RX ORDER — ALLOPURINOL 300 MG/1
300 TABLET ORAL DAILY
Status: COMPLETED | OUTPATIENT
Start: 2019-05-24 | End: 2019-05-27

## 2019-05-24 RX ORDER — FENTANYL CITRATE 50 UG/ML
25-50 INJECTION, SOLUTION INTRAMUSCULAR; INTRAVENOUS EVERY 5 MIN PRN
Status: DISCONTINUED | OUTPATIENT
Start: 2019-05-24 | End: 2019-05-24 | Stop reason: HOSPADM

## 2019-05-24 RX ORDER — URSODIOL 300 MG/1
300 CAPSULE ORAL 3 TIMES DAILY
Status: DISCONTINUED | OUTPATIENT
Start: 2019-05-24 | End: 2019-06-27 | Stop reason: HOSPADM

## 2019-05-24 RX ORDER — FUROSEMIDE 10 MG/ML
20 INJECTION INTRAMUSCULAR; INTRAVENOUS EVERY 8 HOURS PRN
Status: DISPENSED | OUTPATIENT
Start: 2019-06-03 | End: 2019-06-05

## 2019-05-24 RX ORDER — ACETAMINOPHEN 325 MG/1
325-650 TABLET ORAL EVERY 4 HOURS PRN
Status: DISCONTINUED | OUTPATIENT
Start: 2019-05-24 | End: 2019-06-27 | Stop reason: HOSPADM

## 2019-05-24 RX ORDER — POTASSIUM CL/LIDO/0.9 % NACL 10MEQ/0.1L
10 INTRAVENOUS SOLUTION, PIGGYBACK (ML) INTRAVENOUS
Status: DISCONTINUED | OUTPATIENT
Start: 2019-05-24 | End: 2019-06-27 | Stop reason: HOSPADM

## 2019-05-24 RX ORDER — POTASSIUM CHLORIDE 7.45 MG/ML
10 INJECTION INTRAVENOUS
Status: DISCONTINUED | OUTPATIENT
Start: 2019-05-24 | End: 2019-06-27 | Stop reason: HOSPADM

## 2019-05-24 RX ORDER — ONDANSETRON 8 MG/1
8 TABLET, FILM COATED ORAL EVERY 8 HOURS
Status: COMPLETED | OUTPATIENT
Start: 2019-06-03 | End: 2019-06-07

## 2019-05-24 RX ORDER — FLUMAZENIL 0.1 MG/ML
0.2 INJECTION, SOLUTION INTRAVENOUS
Status: DISCONTINUED | OUTPATIENT
Start: 2019-05-24 | End: 2019-05-24 | Stop reason: HOSPADM

## 2019-05-24 RX ORDER — NALOXONE HYDROCHLORIDE 0.4 MG/ML
.1-.4 INJECTION, SOLUTION INTRAMUSCULAR; INTRAVENOUS; SUBCUTANEOUS
Status: DISCONTINUED | OUTPATIENT
Start: 2019-05-24 | End: 2019-05-24 | Stop reason: HOSPADM

## 2019-05-24 RX ORDER — VORICONAZOLE 200 MG/1
200 TABLET, FILM COATED ORAL EVERY 12 HOURS SCHEDULED
Status: DISCONTINUED | OUTPATIENT
Start: 2019-06-07 | End: 2019-06-25

## 2019-05-24 RX ORDER — VANCOMYCIN HYDROCHLORIDE 50 MG/ML
250 KIT ORAL 2 TIMES DAILY
Status: DISCONTINUED | OUTPATIENT
Start: 2019-05-24 | End: 2019-05-24

## 2019-05-24 RX ORDER — AZITHROMYCIN 250 MG/1
250 TABLET, FILM COATED ORAL DAILY
Status: DISCONTINUED | OUTPATIENT
Start: 2019-05-24 | End: 2019-06-04

## 2019-05-24 RX ORDER — VANCOMYCIN HYDROCHLORIDE 50 MG/ML
125 KIT ORAL 2 TIMES DAILY
Status: DISCONTINUED | OUTPATIENT
Start: 2019-05-24 | End: 2019-06-18

## 2019-05-24 RX ORDER — MEPERIDINE HYDROCHLORIDE 25 MG/ML
25-50 INJECTION INTRAMUSCULAR; INTRAVENOUS; SUBCUTANEOUS
Status: ACTIVE | OUTPATIENT
Start: 2019-05-31 | End: 2019-06-01

## 2019-05-24 RX ORDER — ACYCLOVIR 800 MG/1
800 TABLET ORAL
Status: DISCONTINUED | OUTPATIENT
Start: 2019-05-27 | End: 2019-06-27 | Stop reason: HOSPADM

## 2019-05-24 RX ORDER — POTASSIUM CHLORIDE 750 MG/1
20-40 TABLET, EXTENDED RELEASE ORAL
Status: DISCONTINUED | OUTPATIENT
Start: 2019-05-24 | End: 2019-06-27 | Stop reason: HOSPADM

## 2019-05-24 RX ORDER — DEXTROSE MONOHYDRATE 25 G/50ML
25-50 INJECTION, SOLUTION INTRAVENOUS
Status: CANCELLED | OUTPATIENT
Start: 2019-05-24

## 2019-05-24 RX ORDER — SULFAMETHOXAZOLE/TRIMETHOPRIM 800-160 MG
1 TABLET ORAL
Status: DISCONTINUED | OUTPATIENT
Start: 2019-07-01 | End: 2019-06-27 | Stop reason: HOSPADM

## 2019-05-24 RX ORDER — HEPARIN SODIUM,PORCINE 10 UNIT/ML
5-10 VIAL (ML) INTRAVENOUS
Status: DISCONTINUED | OUTPATIENT
Start: 2019-05-24 | End: 2019-06-27 | Stop reason: HOSPADM

## 2019-05-24 RX ORDER — POTASSIUM CHLORIDE 1.5 G/1.58G
20-40 POWDER, FOR SOLUTION ORAL
Status: DISCONTINUED | OUTPATIENT
Start: 2019-05-24 | End: 2019-06-27 | Stop reason: HOSPADM

## 2019-05-24 RX ORDER — SODIUM CHLORIDE 9 MG/ML
INJECTION, SOLUTION INTRAVENOUS CONTINUOUS
Status: DISCONTINUED | OUTPATIENT
Start: 2019-05-24 | End: 2019-05-24 | Stop reason: HOSPADM

## 2019-05-24 RX ORDER — HEPARIN SODIUM,PORCINE 10 UNIT/ML
5 VIAL (ML) INTRAVENOUS
Status: COMPLETED | OUTPATIENT
Start: 2019-05-24 | End: 2019-05-24

## 2019-05-24 RX ORDER — FUROSEMIDE 10 MG/ML
10 INJECTION INTRAMUSCULAR; INTRAVENOUS
Status: DISPENSED | OUTPATIENT
Start: 2019-06-03 | End: 2019-06-05

## 2019-05-24 RX ORDER — LORAZEPAM 2 MG/ML
.5-1 INJECTION INTRAMUSCULAR EVERY 4 HOURS PRN
Status: DISCONTINUED | OUTPATIENT
Start: 2019-05-24 | End: 2019-06-27 | Stop reason: HOSPADM

## 2019-05-24 RX ORDER — DIPHENHYDRAMINE HCL 25 MG
25 CAPSULE ORAL ONCE
Status: COMPLETED | OUTPATIENT
Start: 2019-05-31 | End: 2019-05-31

## 2019-05-24 RX ORDER — ONDANSETRON 8 MG/1
8 TABLET, FILM COATED ORAL EVERY 8 HOURS
Status: COMPLETED | OUTPATIENT
Start: 2019-05-25 | End: 2019-05-31

## 2019-05-24 RX ORDER — DEXTROSE MONOHYDRATE 25 G/50ML
25-50 INJECTION, SOLUTION INTRAVENOUS
Status: DISCONTINUED | OUTPATIENT
Start: 2019-05-24 | End: 2019-05-24 | Stop reason: HOSPADM

## 2019-05-24 RX ORDER — ACETAMINOPHEN 325 MG/1
650 TABLET ORAL ONCE
Status: COMPLETED | OUTPATIENT
Start: 2019-05-31 | End: 2019-05-31

## 2019-05-24 RX ORDER — DEXAMETHASONE 4 MG/1
8 TABLET ORAL ONCE
Status: COMPLETED | OUTPATIENT
Start: 2019-05-27 | End: 2019-05-27

## 2019-05-24 RX ORDER — FUROSEMIDE 10 MG/ML
10 INJECTION INTRAMUSCULAR; INTRAVENOUS
Status: DISPENSED | OUTPATIENT
Start: 2019-05-25 | End: 2019-05-26

## 2019-05-24 RX ORDER — HEPARIN SODIUM,PORCINE 10 UNIT/ML
5-10 VIAL (ML) INTRAVENOUS EVERY 24 HOURS
Status: DISCONTINUED | OUTPATIENT
Start: 2019-05-24 | End: 2019-06-27 | Stop reason: HOSPADM

## 2019-05-24 RX ORDER — NICOTINE POLACRILEX 4 MG
15-30 LOZENGE BUCCAL
Status: CANCELLED | OUTPATIENT
Start: 2019-05-24

## 2019-05-24 RX ORDER — LIDOCAINE 40 MG/G
CREAM TOPICAL
Status: DISCONTINUED | OUTPATIENT
Start: 2019-05-24 | End: 2019-05-24 | Stop reason: HOSPADM

## 2019-05-24 RX ORDER — CEFAZOLIN SODIUM 2 G/100ML
2 INJECTION, SOLUTION INTRAVENOUS
Status: COMPLETED | OUTPATIENT
Start: 2019-05-24 | End: 2019-05-24

## 2019-05-24 RX ORDER — LORAZEPAM 0.5 MG/1
.5-1 TABLET ORAL EVERY 4 HOURS PRN
Status: DISCONTINUED | OUTPATIENT
Start: 2019-05-24 | End: 2019-06-27 | Stop reason: HOSPADM

## 2019-05-24 RX ORDER — MAGNESIUM SULFATE HEPTAHYDRATE 40 MG/ML
4 INJECTION, SOLUTION INTRAVENOUS EVERY 4 HOURS PRN
Status: DISCONTINUED | OUTPATIENT
Start: 2019-05-24 | End: 2019-06-27 | Stop reason: HOSPADM

## 2019-05-24 RX ORDER — HEPARIN SODIUM,PORCINE 10 UNIT/ML
5 VIAL (ML) INTRAVENOUS EVERY 24 HOURS
Status: CANCELLED | OUTPATIENT
Start: 2019-05-24

## 2019-05-24 RX ORDER — FUROSEMIDE 10 MG/ML
20 INJECTION INTRAMUSCULAR; INTRAVENOUS EVERY 8 HOURS PRN
Status: ACTIVE | OUTPATIENT
Start: 2019-05-25 | End: 2019-05-26

## 2019-05-24 RX ORDER — PANTOPRAZOLE SODIUM 40 MG/1
40 TABLET, DELAYED RELEASE ORAL DAILY
Status: DISCONTINUED | OUTPATIENT
Start: 2019-05-24 | End: 2019-05-30

## 2019-05-24 RX ORDER — NICOTINE POLACRILEX 4 MG
15-30 LOZENGE BUCCAL
Status: DISCONTINUED | OUTPATIENT
Start: 2019-05-24 | End: 2019-05-24 | Stop reason: HOSPADM

## 2019-05-24 RX ADMIN — URSODIOL 300 MG: 300 CAPSULE ORAL at 13:02

## 2019-05-24 RX ADMIN — Medication 5 ML: at 08:34

## 2019-05-24 RX ADMIN — VANCOMYCIN HYDROCHLORIDE 125 MG: KIT at 20:38

## 2019-05-24 RX ADMIN — DEXTROSE AND SODIUM CHLORIDE 1000 ML: 5; 450 INJECTION, SOLUTION INTRAVENOUS at 22:34

## 2019-05-24 RX ADMIN — AZITHROMYCIN 250 MG: 250 TABLET, FILM COATED ORAL at 13:45

## 2019-05-24 RX ADMIN — TAMSULOSIN HYDROCHLORIDE 0.4 MG: 0.4 CAPSULE ORAL at 13:02

## 2019-05-24 RX ADMIN — SODIUM CHLORIDE: 9 INJECTION, SOLUTION INTRAVENOUS at 07:11

## 2019-05-24 RX ADMIN — SODIUM CHLORIDE, PRESERVATIVE FREE 5 ML: 5 INJECTION INTRAVENOUS at 13:02

## 2019-05-24 RX ADMIN — CEFAZOLIN SODIUM 2 G: 2 INJECTION, SOLUTION INTRAVENOUS at 07:11

## 2019-05-24 RX ADMIN — PANTOPRAZOLE SODIUM 40 MG: 40 TABLET, DELAYED RELEASE ORAL at 13:02

## 2019-05-24 RX ADMIN — FENTANYL CITRATE 50 MCG: 50 INJECTION, SOLUTION INTRAMUSCULAR; INTRAVENOUS at 08:33

## 2019-05-24 RX ADMIN — URSODIOL 300 MG: 300 CAPSULE ORAL at 20:38

## 2019-05-24 RX ADMIN — MICAFUNGIN SODIUM 50 MG: 10 INJECTION, POWDER, LYOPHILIZED, FOR SOLUTION INTRAVENOUS at 16:38

## 2019-05-24 RX ADMIN — MIDAZOLAM 1 MG: 1 INJECTION INTRAMUSCULAR; INTRAVENOUS at 08:33

## 2019-05-24 RX ADMIN — LIDOCAINE HYDROCHLORIDE 10 ML: 10 INJECTION, SOLUTION EPIDURAL; INFILTRATION; INTRACAUDAL; PERINEURAL at 08:34

## 2019-05-24 RX ADMIN — ALLOPURINOL 300 MG: 300 TABLET ORAL at 16:38

## 2019-05-24 ASSESSMENT — MIFFLIN-ST. JEOR
SCORE: 1703.44
SCORE: 1664.37

## 2019-05-24 ASSESSMENT — ACTIVITIES OF DAILY LIVING (ADL)
ADLS_ACUITY_SCORE: 14
ADLS_ACUITY_SCORE: 13
ADLS_ACUITY_SCORE: 14

## 2019-05-24 ASSESSMENT — PAIN DESCRIPTION - DESCRIPTORS: DESCRIPTORS: DISCOMFORT

## 2019-05-24 NOTE — H&P
BMT History & Physical     Admission  Name: El Ace  Date:  5/24/2019  Service: BMT   Chief Complaint:   AML  Informant:  Patient and Chart  Resuscitation Status: Full Code    Patient ID:  El Ace is a 63 year old male with hx of AML (IDH2, RUNX1 pos), currently day -7 of flu/cy/TBI and related haplo BMT.    Transplant Essential Data:  Diagnosis AMLU Acute myelogeneous leukemia, Unknown  HCT Type Allogeneic    Prep Regimen Cytoxan  Fludarabine  TBI  Donor Source Haplo BM    GVHD Prophylaxis Mycophenolate    Clinical Trials MiPlate     Oncology Treatment History: El Ace is a 63 year old diagnosed with AML in March, 2019.   He underwent 7+3 (cytarabine and daunorubicin) D1=3/15/19 and reinduction with decitabaine and venetoclax 3/29.  Induction course notable for febrile neutropenia.  Source of fevers is felt to be 2/2 odontogenic source (mandibular cellulitis) s/p OMFS procedure.  Patient was transferred to the MICU for further evaluation. Of note he also tested positive for C.diff on 3/27 and has completed oral vancomycin.   He had a confirmed pseudomonas bacteremia and has since completed treatment for this.      Today he feels well.  He is afebrile and feels good.  No recent illnesses or sick contacts.    Treatment/Chemotherapy Number of Cycles Date Range Outcomes & Complications   7+3  3/15/19    Decitabine/venetoclax  3/26, venetoclax added 3/29 and d/yanna 4/30 pseuodomonas bacteremia, septic shock (odontic source)  C.diff colitis    Morphologic/immunophenotypic remission                   Bone Marrow Workup Results:  Blood Counts Recent Labs   Lab Test 05/16/19  0959   WBC 5.8   ANEU 3.7   ALYM 1.1   TRACIE 0.9   AEOS 0.0   HGB 9.3*   HCT 29.4*         Bone Marrow (4/23) Marrow cellularity of 40-50% with trilineage hematopoiesis showing   megakaryocytic and erythroid predominance, megakaryocytic dysplasia, erythroid dysplasia, and no increase in blasts;  FLOW SHAYNE   Blood Type  Recent Labs   Lab Test 05/16/19  0959   ABO A      Chemistries Recent Labs   Lab Test 05/14/19  1430      POTASSIUM 4.1   CHLORIDE 109   CO2 25   BUN 7   CR 0.72      Liver Tests Recent Labs   Lab Test 05/14/19  1430   BILITOTAL 0.3   ALKPHOS 201*   AST 24   ALT 45          Chest X-Ray: WNL     Chest CT SHAYNE   PFTs FVC%  Recent Labs   Lab Test 05/10/19  1328   20003 118       FEV1%  Recent Labs   Lab Test 05/10/19  1328   27413 118       DLCO%  Recent Labs   Lab Test 05/10/19  1328   35227 98      MUGA: Muga EF 43%     EKG NSR   Serologies: CMVpos, EBVpos, HSVpos           Family History:   Family History   Problem Relation Age of Onset     Colon Cancer Mother      Cerebrovascular Disease Mother      Diabetes Mother      Septicemia Father        Social History:   Social History     Socioeconomic History     Marital status:      Spouse name: Not on file     Number of children: Not on file     Years of education: Not on file     Highest education level: Not on file   Occupational History     Not on file   Social Needs     Financial resource strain: Not on file     Food insecurity:     Worry: Not on file     Inability: Not on file     Transportation needs:     Medical: Not on file     Non-medical: Not on file   Tobacco Use     Smoking status: Former Smoker     Smokeless tobacco: Former User     Tobacco comment: Smoked on and off for about 10 years about 20years ago.    Substance and Sexual Activity     Alcohol use: Not Currently     Drug use: Never     Sexual activity: Not on file   Lifestyle     Physical activity:     Days per week: Not on file     Minutes per session: Not on file     Stress: Not on file   Relationships     Social connections:     Talks on phone: Not on file     Gets together: Not on file     Attends Nondenominational service: Not on file     Active member of club or organization: Not on file     Attends meetings of clubs or organizations: Not on file     Relationship status: Not on file      Intimate partner violence:     Fear of current or ex partner: Not on file     Emotionally abused: Not on file     Physically abused: Not on file     Forced sexual activity: Not on file   Other Topics Concern     Parent/sibling w/ CABG, MI or angioplasty before 65F 55M? Not Asked   Social History Narrative     Not on file       Past Medical History:   Past Medical History:   Diagnosis Date     Acute leukemia (H) 3/12/2019        Past Surgical History:   Past Surgical History:   Procedure Laterality Date     ARTHROSCOPY KNEE  1995     CARPAL TUNNEL RELEASE RT/LT       COLONOSCOPY  2000,2014     ORTHOPEDIC SURGERY  1975    laminectomy     PICC INSERTION Right 03/14/2019    5Fr - 42cm (2cm external), basilic vein, high SVC     vasectomy  2002       Allergies:   Allergies   Allergen Reactions     Lactose GI Disturbance     Intolerance due to gas       Home Medications      Prior to Admission medications    Medication Sig Start Date End Date Taking? Authorizing Provider   acyclovir (ZOVIRAX) 400 MG tablet Take 1 tablet (400 mg) by mouth 2 times daily 4/15/19  Yes Miranda Morgan APRN CNP   atorvastatin (LIPITOR) 20 MG tablet Take 1 tablet (20 mg) by mouth every evening 4/15/19  Yes Miranda Morgan APRN CNP   nystatin (MYCOSTATIN) 425041 UNIT/ML suspension Take 5 mLs (500,000 Units) by mouth 4 times daily 5/6/19  Yes Liberty Hearn PA-C   pantoprazole (PROTONIX) 40 MG EC tablet Take 1 tablet (40 mg) by mouth 2 times daily (before meals) 4/15/19  Yes Miranda Morgan APRN CNP   pramox-pe-glycerin-petrolatum (PREPARATION H) 1-0.25-14.4-15 % CREA cream Place rectally 3 times daily 5/6/19  Yes Liberty Hearn PA-C   rOPINIRole (REQUIP) 0.25 MG tablet Take 3 tablets (0.75 mg) by mouth nightly as needed (restless leg syndrome) 4/15/19 6/14/19 Yes Miranda Morgan APRN CNP   sennosides (SENOKOT) 8.6 MG tablet Take 2 tablets by mouth 2 times daily as needed for constipation 4/15/19   "Yes Miranda Morgan APRN CNP   tamsulosin (FLOMAX) 0.4 MG capsule Take 1 capsule (0.4 mg) by mouth daily 4/16/19  Yes Miranda Morgan APRN CNP   vancomycin (FIRVANQ) 50 MG/ML oral solution Take 2.5 mLs (125 mg) by mouth 4 times daily 5/7/19  Yes Liberty Hearn PA-C   acetaminophen (TYLENOL) 325 MG tablet Take 2 tablets (650 mg) by mouth every 4 hours as needed for mild pain or fever (pre-med before blood products)  Patient not taking: Reported on 4/23/2019 4/15/19   Miranda Morgan APRN CNP   ondansetron (ZOFRAN) 8 MG tablet Take 1 tablet (8 mg) by mouth every 8 hours as needed for nausea  Patient not taking: Reported on 5/10/2019 4/15/19   Miranda Morgan APRN CNP   prochlorperazine (COMPAZINE) 5 MG tablet Take 1-2 tablets (5-10 mg) by mouth every 6 hours as needed for nausea or vomiting  Patient not taking: Reported on 4/23/2019 4/15/19   Miranda Morgan APRN CNP   voriconazole (VFEND) 50 MG tablet Take 3 tablets (150 mg) by mouth every 12 hours 4/15/19 5/15/19  Miranda Morgan APRN CNP       Review of Systems    A 10 point review of systems was negative other than noted above.     PHYSICAL EXAM      Weight     Wt Readings from Last 3 Encounters:   05/24/19 90.7 kg (200 lb)   05/16/19 91.3 kg (201 lb 4.5 oz)   05/10/19 89.4 kg (197 lb 3.2 oz)        /64   Pulse 62   Temp 97.8  F (36.6  C) (Oral)   Resp 13   Ht 1.778 m (5' 10\")   Wt 90.7 kg (200 lb)   SpO2 96%   BMI 28.70 kg/m       General: NAD   Eyes: JEROMY, sclera anicteric   Nose/Mouth/Throat: OP clear, buccal mucosa moist, no ulcerations   Lungs: CTA bilaterally  Cardiovascular: RRR, no M/R/G   Abdominal/Rectal: +BS, soft, NT, ND, No HSM   Lymphatics: No edema  Skin: No rashes or petechaie  Neuro: A&O   Additional Findings: [facility] site NT, no drainage.    ASSESSMENT BY SYSTEMS   El Ace is a 63 year old male with hx of AML (IDH2, RUNX1 pos), currently day -7 of " flu/cy/TBI and related haplo BMT.    5/25 (day -6): flu/cy  5/26 (day -5): flu  5/27 (day -4): flu   5/28 (day -3): flu  5/29 (day -2): TBI/flu  5/30 (day -1): TBI  5/31 (day 0): Transplant    1.  BMT: Prep as above. Allopurinol through day 0. Flush and pre-meds prior to transplant. GCSF starts d+5 and cont to until ANC>2500 x 2 consecutive days. Re-stage per protocol.    2.  HEME: Keep Hgb>8g/dL and plts>10K. No pre-meds.  - on MiPlate study                            3.  ID: Afebrile. Prophylaxis with HD ACV (CMV pos, HSV pos, EBV pos), azithromycin (c. Diff), Jesica to Vfend.  Bactrim or appropriate PCP therapy to start d+28.    - zerbaxa by ID for MDR PsAg bacteremia. In the event of neutropenic fever , recommend immediate ID consult  - recent history of CDI, recommend oral vancomycin prophylaxis 125mg BID                     4.  GI: Zofran and dexamethasone prior to chemo to prevent N/V. Ativan and compazine available PRN break-through symptoms.   - Protonix for GI prophy.   - Ursodiol for VOD prophy.    5.  GVH: post transplant cytoxan (day +3, +4), tac/MMF start day +5.    6.  FEN/Renal: Monitor creat and lytes. Replete lytes PRN per SS. Monitor weight, I+O, lytes per protocol with IVF flush.    Tish Vaca     ATTENDING ADDENDUM:    I have independently seen and evaluated the patient on May 24, 2019 and reviewed clinical, laboratory, and radiographic findings. I have discussed the plan with the team and agree with the attached note with the following edits:    El Ace is a 63 year old year old male, with AML in CR1 after 2 inductions, recent CDI and pseudomonas BSI, off ABx, here for haplo from son VELMA.    Ph/E: Vitals reviewed. No distress. Oropharynx clear. Neck supple. Heart RRR. Lungs clear. Abdomen soft. No peripheral edema. No rash. Neuro nonfocal.     A&P: start conditioning Corona platform but 400 cGy TBI per local protocol. Jesica/Vori. SEs were discussed including organ toxicity,  infection, and death.        [START ON 5/31/2019] acetaminophen  650 mg Oral Once     [START ON 5/27/2019] acyclovir  800 mg Oral 5x Daily     allopurinol  300 mg Oral Daily     azithromycin  250 mg Oral Daily     [START ON 5/25/2019] Chemotherapy Infusing-Continuous Infusion   Does not apply Q8H     [START ON 6/3/2019] Chemotherapy Infusing-Continuous Infusion   Does not apply Q8H     [START ON 5/25/2019] cyclophosphamide (CYTOXAN) [facility] chemo infusion  30 mg/kg (Treatment Plan Recorded) Intravenous Once     [START ON 6/3/2019] cyclophosphamide (CYTOXAN) [facility] chemo infusion  50 mg/kg (Treatment Plan Recorded) Intravenous Q24H     [START ON 5/25/2019] dexamethasone  10 mg Oral Daily     [START ON 5/28/2019] dexamethasone  6 mg Oral Daily     [START ON 5/27/2019] dexamethasone  8 mg Oral Once     [START ON 5/31/2019] diphenhydrAMINE  25 mg Oral Once     [START ON 6/5/2019] filgrastim (NEUPOGEN/GRANIX) intravenous  5 mcg/kg (Treatment Plan Recorded) Intravenous Daily at 8 pm     [START ON 5/25/2019] FLUdarabine (FLUDARA) chemo infusion  30 mg/m2 (Treatment Plan Recorded) Intravenous Q24H     heparin lock flush  5-10 mL Intracatheter Q24H     [START ON 5/25/2019] mesna (MESNEX) infusion  30 mg/kg (Treatment Plan Recorded) Intravenous Once     [START ON 6/3/2019] mesna (MESNEX) infusion  50 mg/kg (Treatment Plan Recorded) Intravenous Q24H     micafungin (MYCAMINE) intermittent infusion > 45 kg  50 mg Intravenous Daily     [START ON 6/5/2019] mycophenolate mofetil  1,000 mg Intravenous Q8H BMT     [START ON 5/25/2019] ondansetron  8 mg Oral Q8H     [START ON 6/3/2019] ondansetron  8 mg Oral Q8H     pantoprazole  40 mg Oral Daily     [START ON 7/1/2019] sulfamethoxazole-trimethoprim  1 tablet Oral Q Mon Tues BID     tamsulosin  0.4 mg Oral Daily     ursodiol  300 mg Oral TID     vancomycin  125 mg Oral BID     [START ON 6/7/2019] voriconazole  200 mg Oral Q12H ZUNILDA       Soto Stephanie

## 2019-05-24 NOTE — PROGRESS NOTES
Patient admitted to: 5CBMT  Admitted from: Home  Arrived by: Car  Reason for admission: Transplant  Patient accompanied by: Self  Belongings: Remain with patient  Teaching: Done per unit routine

## 2019-05-24 NOTE — PROGRESS NOTES
Prepped for placement of tunneled line.  Denies pain.  No family here now; they are expected later today.  Planned admission to 5C post procedure.  H & P current.  Consent being discussed now.  Labs current.

## 2019-05-24 NOTE — PROGRESS NOTES
Interventional Radiology Pre-Procedure Sedation Assessment   Time of Assessment: 7:34 AM    Expected Level: Moderate Sedation    Indication: Sedation is required for the following type of Procedure: Venous Access    Sedation and procedural consent: Risks, benefits and alternatives were discussed with Patient    PO Intake: Appropriately NPO for procedure    ASA Class: Class 2 - MILD SYSTEMIC DISEASE, NO ACUTE PROBLEMS, NO FUNCTIONAL LIMITATIONS.    Mallampati: Grade 3:  Soft palate visible, posterior pharyngeal wall not visible    Lungs: Lungs Clear with good breath sounds bilaterally    Heart: Normal heart sounds and rate    History and physical reviewed and no updates needed. I have reviewed the lab findings, diagnostic data, medications, and the plan for sedation. I have determined this patient to be an appropriate candidate for the planned sedation and procedure and have reassessed the patient IMMEDIATELY PRIOR to sedation and procedure.    Buzz Galdamez MD

## 2019-05-24 NOTE — PLAN OF CARE
Pt arrived this morning. Day -7. Had very uneventful day. Research stool sample still needs to be collected. Cytoxan flush due to start this evening.     Problem: Adult Inpatient Plan of Care  Goal: Plan of Care Review  Outcome: No Change     Problem: Adult Inpatient Plan of Care  Goal: Patient-Specific Goal (Individualization)  Outcome: No Change     Problem: Adult Inpatient Plan of Care  Goal: Optimal Comfort and Wellbeing  Outcome: No Change     Problem: Adjustment to Transplant (Stem Cell/Bone Marrow Transplant)  Goal: Optimal Coping with Transplant  Outcome: No Change     Problem: Infection Risk (Stem Cell/Bone Marrow Transplant)  Goal: Absence of Infection Signs/Symptoms  Outcome: No Change     Problem: Nutrition Intake Altered (Stem Cell/Bone Marrow Transplant)  Goal: Optimal Nutrition Intake  Outcome: No Change   No c/o n/v/d or pain. Continue POC.

## 2019-05-24 NOTE — IR NOTE
Patient Name: El Ace  Medical Record Number: [id number]  Today's Date: 5/24/2019    Procedure: Dr. Javad Miranda, Dr. Elijah Vargas  Proceduralist: double lumen central venous catheter placement    Sedation start time: 0805  Sedation end time: 0830  Sedation medications administered: 1mg versed and 50mcg fentanyl  Total sedation time: 25 minutes  Sedation Notes: no complications     Procedure start time: 0805  Puncture time: 0806  Procedure end time: 0837      Report given to: aleta Lewis RN  : n/a    Other Notes: Pt arrived to IR room 2 from 2A. Consent reviewed. Pt denies any questions or concerns regarding procedure. Pt positioned supine and monitored per protocol. Pt tolerated procedure without any noted complications. Pt transferred back to  for andmission.

## 2019-05-24 NOTE — PROCEDURES
Interventional Radiology Brief Post Procedure Note    Procedure: IR CVC TUNNEL PLACEMENT > 5 YRS OF AGE    Proceduralist: Javad Miranda MD    Assistant: Elijah Avery MD and None    Time Out: Prior to the start of the procedure and with procedural staff participation, I verbally confirmed the patient s identity using two indicators, relevant allergies, that the procedure was appropriate and matched the consent or emergent situation, and that the correct equipment/implants were available. Immediately prior to starting the procedure I conducted the Time Out with the procedural staff and re-confirmed the patient s name, procedure, and site/side. (The Joint Commission universal protocol was followed.)  Yes    Medications   Medication Event Details Admin User Admin Time       Sedation: IR Nurse Monitored Care   Post Procedure Summary:  Prior to the start of the procedure and with procedural staff participation, I verbally confirmed the patient s identity using two indicators, relevant allergies, that the procedure was appropriate and matched the consent or emergent situation, and that the correct equipment/implants were available. Immediately prior to starting the procedure I conducted the Time Out with the procedural staff and re-confirmed the patient s name, procedure, and site/side. (The Joint Commission universal protocol was followed.)  Yes       Sedatives: Fentanyl and Midazolam (Versed)    Vital signs, airway and pulse oximetry were monitored and remained stable throughout the procedure and sedation was maintained until the procedure was complete.  The patient was monitored by staff until sedation discharge criteria were met.    Patient tolerance: Patient tolerated the procedure well with no immediate complications.    Time of sedation in minutes: 30 Minutes minutes from beginning to end of physician one to one monitoring.          Findings: A 9.5 Frisian Double lumen Hickmann line placed.     Estimated  Blood Loss: Minimal    Fluoroscopy Time:  minute(s)    SPECIMENS: None    Complications: 1. None     Condition: Stable    Plan:   -Line is ready to use.     Comments: See dictated procedure note for full details.    Elijah Avery MD

## 2019-05-25 ENCOUNTER — APPOINTMENT (OUTPATIENT)
Dept: PHYSICAL THERAPY | Facility: CLINIC | Age: 64
DRG: 014 | End: 2019-05-25
Attending: PHYSICIAN ASSISTANT
Payer: COMMERCIAL

## 2019-05-25 LAB
ANION GAP SERPL CALCULATED.3IONS-SCNC: 6 MMOL/L (ref 3–14)
BASOPHILS # BLD AUTO: 0.1 10E9/L (ref 0–0.2)
BASOPHILS NFR BLD AUTO: 1 %
BUN SERPL-MCNC: 14 MG/DL (ref 7–30)
CALCIUM SERPL-MCNC: 7.9 MG/DL (ref 8.5–10.1)
CHLORIDE SERPL-SCNC: 109 MMOL/L (ref 94–109)
CMV DNA SPEC NAA+PROBE-ACNC: NORMAL [IU]/ML
CMV DNA SPEC NAA+PROBE-LOG#: NORMAL {LOG_IU}/ML
CO2 SERPL-SCNC: 26 MMOL/L (ref 20–32)
CREAT SERPL-MCNC: 0.66 MG/DL (ref 0.66–1.25)
DIFFERENTIAL METHOD BLD: ABNORMAL
EOSINOPHIL # BLD AUTO: 0 10E9/L (ref 0–0.7)
EOSINOPHIL NFR BLD AUTO: 0.6 %
ERYTHROCYTE [DISTWIDTH] IN BLOOD BY AUTOMATED COUNT: 18.5 % (ref 10–15)
GFR SERPL CREATININE-BSD FRML MDRD: >90 ML/MIN/{1.73_M2}
GLUCOSE SERPL-MCNC: 140 MG/DL (ref 70–99)
HCT VFR BLD AUTO: 32.4 % (ref 40–53)
HGB BLD-MCNC: 10 G/DL (ref 13.3–17.7)
IMM GRANULOCYTES # BLD: 0 10E9/L (ref 0–0.4)
IMM GRANULOCYTES NFR BLD: 0.4 %
LYMPHOCYTES # BLD AUTO: 1.5 10E9/L (ref 0.8–5.3)
LYMPHOCYTES NFR BLD AUTO: 29 %
MCH RBC QN AUTO: 30.5 PG (ref 26.5–33)
MCHC RBC AUTO-ENTMCNC: 30.9 G/DL (ref 31.5–36.5)
MCV RBC AUTO: 99 FL (ref 78–100)
MONOCYTES # BLD AUTO: 0.8 10E9/L (ref 0–1.3)
MONOCYTES NFR BLD AUTO: 15.3 %
NEUTROPHILS # BLD AUTO: 2.8 10E9/L (ref 1.6–8.3)
NEUTROPHILS NFR BLD AUTO: 53.7 %
NRBC # BLD AUTO: 0 10*3/UL
NRBC BLD AUTO-RTO: 0 /100
PLATELET # BLD AUTO: 370 10E9/L (ref 150–450)
POTASSIUM SERPL-SCNC: 3.6 MMOL/L (ref 3.4–5.3)
POTASSIUM SERPL-SCNC: 4.1 MMOL/L (ref 3.4–5.3)
RBC # BLD AUTO: 3.28 10E12/L (ref 4.4–5.9)
SODIUM SERPL-SCNC: 138 MMOL/L (ref 133–144)
SODIUM SERPL-SCNC: 140 MMOL/L (ref 133–144)
SPECIMEN SOURCE: NORMAL
WBC # BLD AUTO: 5.2 10E9/L (ref 4–11)

## 2019-05-25 PROCEDURE — 25800030 ZZH RX IP 258 OP 636: Performed by: INTERNAL MEDICINE

## 2019-05-25 PROCEDURE — 25000131 ZZH RX MED GY IP 250 OP 636 PS 637: Performed by: PHYSICIAN ASSISTANT

## 2019-05-25 PROCEDURE — 25000128 H RX IP 250 OP 636: Performed by: INTERNAL MEDICINE

## 2019-05-25 PROCEDURE — 20600000 ZZH R&B BMT

## 2019-05-25 PROCEDURE — 97116 GAIT TRAINING THERAPY: CPT | Mod: GP

## 2019-05-25 PROCEDURE — 25000128 H RX IP 250 OP 636: Performed by: PHYSICIAN ASSISTANT

## 2019-05-25 PROCEDURE — 84295 ASSAY OF SERUM SODIUM: CPT | Performed by: PHYSICIAN ASSISTANT

## 2019-05-25 PROCEDURE — 25800025 ZZH RX 258: Performed by: PHYSICIAN ASSISTANT

## 2019-05-25 PROCEDURE — 25000132 ZZH RX MED GY IP 250 OP 250 PS 637: Performed by: PHYSICIAN ASSISTANT

## 2019-05-25 PROCEDURE — 97161 PT EVAL LOW COMPLEX 20 MIN: CPT | Mod: GP

## 2019-05-25 PROCEDURE — 80048 BASIC METABOLIC PNL TOTAL CA: CPT | Performed by: PHYSICIAN ASSISTANT

## 2019-05-25 PROCEDURE — 97530 THERAPEUTIC ACTIVITIES: CPT | Mod: GP

## 2019-05-25 PROCEDURE — 97110 THERAPEUTIC EXERCISES: CPT | Mod: GP

## 2019-05-25 PROCEDURE — 84132 ASSAY OF SERUM POTASSIUM: CPT | Performed by: PHYSICIAN ASSISTANT

## 2019-05-25 PROCEDURE — 85025 COMPLETE CBC W/AUTO DIFF WBC: CPT | Performed by: PHYSICIAN ASSISTANT

## 2019-05-25 RX ORDER — FUROSEMIDE 10 MG/ML
20 INJECTION INTRAMUSCULAR; INTRAVENOUS ONCE
Status: COMPLETED | OUTPATIENT
Start: 2019-05-25 | End: 2019-05-25

## 2019-05-25 RX ADMIN — MESNA 2720 MG: 100 INJECTION, SOLUTION INTRAVENOUS at 09:09

## 2019-05-25 RX ADMIN — CYCLOPHOSPHAMIDE 2720 MG: 2 INJECTION, POWDER, FOR SOLUTION INTRAVENOUS; ORAL at 11:21

## 2019-05-25 RX ADMIN — ALLOPURINOL 300 MG: 300 TABLET ORAL at 08:28

## 2019-05-25 RX ADMIN — DEXTROSE AND SODIUM CHLORIDE 1000 ML: 5; 450 INJECTION, SOLUTION INTRAVENOUS at 23:54

## 2019-05-25 RX ADMIN — FUROSEMIDE 10 MG: 10 INJECTION, SOLUTION INTRAVENOUS at 17:41

## 2019-05-25 RX ADMIN — VANCOMYCIN HYDROCHLORIDE 125 MG: KIT at 08:28

## 2019-05-25 RX ADMIN — AZITHROMYCIN 250 MG: 250 TABLET, FILM COATED ORAL at 08:28

## 2019-05-25 RX ADMIN — URSODIOL 300 MG: 300 CAPSULE ORAL at 08:28

## 2019-05-25 RX ADMIN — VANCOMYCIN HYDROCHLORIDE 125 MG: KIT at 20:29

## 2019-05-25 RX ADMIN — DEXAMETHASONE 10 MG: 2 TABLET ORAL at 08:28

## 2019-05-25 RX ADMIN — MICAFUNGIN SODIUM 50 MG: 10 INJECTION, POWDER, LYOPHILIZED, FOR SOLUTION INTRAVENOUS at 09:10

## 2019-05-25 RX ADMIN — URSODIOL 300 MG: 300 CAPSULE ORAL at 20:29

## 2019-05-25 RX ADMIN — DEXTROSE AND SODIUM CHLORIDE 1000 ML: 5; 450 INJECTION, SOLUTION INTRAVENOUS at 02:28

## 2019-05-25 RX ADMIN — ONDANSETRON HYDROCHLORIDE 8 MG: 8 TABLET, FILM COATED ORAL at 16:40

## 2019-05-25 RX ADMIN — ONDANSETRON HYDROCHLORIDE 8 MG: 8 TABLET, FILM COATED ORAL at 08:28

## 2019-05-25 RX ADMIN — FLUDARABINE PHOSPHATE 64 MG: 25 INJECTION, SOLUTION INTRAVENOUS at 10:08

## 2019-05-25 RX ADMIN — DEXTROSE AND SODIUM CHLORIDE 1000 ML: 5; 450 INJECTION, SOLUTION INTRAVENOUS at 14:18

## 2019-05-25 RX ADMIN — URSODIOL 300 MG: 300 CAPSULE ORAL at 14:17

## 2019-05-25 RX ADMIN — PANTOPRAZOLE SODIUM 40 MG: 40 TABLET, DELAYED RELEASE ORAL at 08:28

## 2019-05-25 RX ADMIN — DEXTROSE AND SODIUM CHLORIDE 1000 ML: 5; 450 INJECTION, SOLUTION INTRAVENOUS at 18:48

## 2019-05-25 RX ADMIN — FUROSEMIDE 20 MG: 10 INJECTION, SOLUTION INTRAVENOUS at 22:11

## 2019-05-25 RX ADMIN — TAMSULOSIN HYDROCHLORIDE 0.4 MG: 0.4 CAPSULE ORAL at 08:28

## 2019-05-25 ASSESSMENT — ACTIVITIES OF DAILY LIVING (ADL)
ADLS_ACUITY_SCORE: 14

## 2019-05-25 ASSESSMENT — MIFFLIN-ST. JEOR
SCORE: 1727.37
SCORE: 1681.55

## 2019-05-25 NOTE — PLAN OF CARE
OT5C: OT orders received and acknowledged. Per discussion with PT pt with no acute OT needs at this time. OT to hold with PT to continue to screen for OT needs.

## 2019-05-25 NOTE — PLAN OF CARE
VSS. No c/o n/v/d or pain. Pt received fludarabine and cytoxan this morning. Cytoxan flush infusing and mesna infusing to equal 250 mL/hr. Lasix received x1 for urine parameter < 300mL/hr. No other PRN's received. Will receive fludarabine tomorrow. Continue POC.    Problem: Adult Inpatient Plan of Care  Goal: Plan of Care Review  5/25/2019 1659 by Libra Crandall RN  Outcome: No Change     Problem: Adult Inpatient Plan of Care  Goal: Optimal Comfort and Wellbeing  Outcome: No Change     Problem: Adjustment to Transplant (Stem Cell/Bone Marrow Transplant)  Goal: Optimal Coping with Transplant  5/25/2019 1659 by Libra Crandall RN  Outcome: No Change     Problem: Fatigue (Stem Cell/Bone Marrow Transplant)  Goal: Energy Level Supports Daily Activity  5/25/2019 1659 by Libra Crandall RN  Outcome: No Change     Problem: Hematologic Alteration (Stem Cell/Bone Marrow Transplant)  Goal: Blood Counts Within Acceptable Range  5/25/2019 1659 by Libra Crandall RN  Outcome: No Change     Problem: Infection Risk (Stem Cell/Bone Marrow Transplant)  Goal: Absence of Infection Signs/Symptoms  5/25/2019 1659 by Libra Crandall RN  Outcome: No Change     Problem: Nutrition Intake Altered (Stem Cell/Bone Marrow Transplant)  Goal: Optimal Nutrition Intake  5/25/2019 1659 by Libra Crandall, RN  Outcome: No Change

## 2019-05-25 NOTE — PROGRESS NOTES
"BMT Daily Progress Note     Mr. Ace 63 y.o hx of AML. Admitted for VELMA Haplo. Currently day -6  Overnight events: Line placed yetserday- it is sore but not bleeding. Toelrated fluid flush okay- frequent voiding as anticipated, denies feeling bloated today. Discussed cytoxan/fludrabine- he is ready to start chemo and no complaints aside from some new line tenderness.   Review of Systems: 10 point ROS negative except as noted above.    Physical Exam:   Blood pressure 121/80, pulse 77, temperature 96.7  F (35.9  C), temperature source Axillary, resp. rate 18, height 1.735 m (5' 8.31\"), weight 91.2 kg (201 lb 1.6 oz), SpO2 99 %.  General: NAD   Eyes: JEROMY, sclera anicteric   Nose/Mouth/Throat: OP clear, buccal mucosa moist, no ulcerations   Lungs: CTA bilaterally  Cardiovascular: RRR, no M/R/G   Abdominal/Rectal: +BS, soft, NT, ND, No HSM   Lymphatics: no edema  Skin: no rashes or petechaie  Neuro: A&O   Additional Findings: [facility] site- mildly tender. NO erythema but slight bruising.   Labs:  Lab Results   Component Value Date    WBC 5.2 05/25/2019    ANEU 2.8 05/25/2019    HGB 10.0 (L) 05/25/2019    HCT 32.4 (L) 05/25/2019     05/25/2019     05/25/2019    POTASSIUM 4.1 05/25/2019    CHLORIDE 109 05/25/2019    CO2 26 05/25/2019     (H) 05/25/2019    BUN 14 05/25/2019    CR 0.66 05/25/2019    MAG 2.5 (H) 05/24/2019    INR 1.13 05/24/2019         Imaging: Reviewed  Microbiology:  Reviewed    Assessment and Plan:   El Ace is a 63 year old male with hx of AML (IDH2, RUNX1 pos), currently day -6 of flu/cy/TBI and related haplo BMT.     5/25 (day -6): flu/cy  5/26 (day -5): flu  5/27 (day -4): flu   5/28 (day -3): flu  5/29 (day -2): TBI/flu  5/30 (day -1): TBI  5/31 (day 0): Transplant     1.  BMT: Prep as above. Allopurinol through day 0. Flush and pre-meds prior to transplant. GCSF starts d+5 and cont to until ANC>2500 x 2 consecutive days. Re-stage per protocol.     2.  HEME: Keep " Hgb>8g/dL and plts>10K. No pre-meds.  - on MiPlate study                            3.  ID: Afebrile. Prophylaxis with HD ACV (CMV pos, HSV pos, EBV pos), azithromycin (c. Diff), Jesica to Vfend.  Bactrim or appropriate PCP therapy to start d+28.    - zerbaxa by ID for MDR PsAg bacteremia. In the event of neutropenic fever , recommend immediate ID consult  - recent history of CDI, recommend oral vancomycin prophylaxis 125mg BID                     4.  GI: Zofran and dexamethasone prior to chemo to prevent N/V. Ativan and compazine available PRN break-through symptoms.   - Protonix for GI prophy.   - Ursodiol for VOD prophy.     5.  GVH: post transplant cytoxan (day +3, +4), tac/MMF start day +5.     6.  FEN/Renal: Monitor creat and lytes. Replete lytes PRN per SS. Monitor weight, I+O, lytes per protocol with IVF flush.  - Wt up only slightly- no edema on exam. Will no diurese today. Continue lasix prn per protocol.      Val Lindsey PA-C  [phone]     ATTENDING ADDENDUM:     I have independently seen and evaluated the patient on May 25, 2019 and reviewed clinical, laboratory, and radiographic findings. I have discussed the plan with the team and agree with the attached note with the following edits:     El Ace is a 63 year old year old male, with AML in CR1 after 2 inductions, recent CDI and pseudomonas BSI, off ABx, here for haplo from son VELMA. No complaints.      Ph/E: Vitals reviewed. No distress. Oropharynx clear. Neck supple. Heart RRR. Lungs clear. Abdomen soft. No peripheral edema. No rash. Neuro nonfocal.      A&P: Conditioning        Soto Stephanie

## 2019-05-25 NOTE — PLAN OF CARE
"/85 (BP Location: Left arm)   Pulse 77   Temp 97.9  F (36.6  C) (Axillary)   Resp 18   Ht 1.735 m (5' 8.31\")   Wt 89.5 kg (197 lb 5 oz)   SpO2 99%   BMI 29.73 kg/m      A&Ox4. Ind in room. Good appetite. R [facility] running cytoxan flush started at 2230. Cytoxan and flu due this am. Educational materials given to pt and plan discussed. Questions sought and answered. No replacements needed this am. Will continue with POC.   "

## 2019-05-26 LAB
ANION GAP SERPL CALCULATED.3IONS-SCNC: 8 MMOL/L (ref 3–14)
ANISOCYTOSIS BLD QL SMEAR: SLIGHT
BASOPHILS # BLD AUTO: 0 10E9/L (ref 0–0.2)
BASOPHILS NFR BLD AUTO: 0 %
BUN SERPL-MCNC: 12 MG/DL (ref 7–30)
CALCIUM SERPL-MCNC: 8.2 MG/DL (ref 8.5–10.1)
CHLORIDE SERPL-SCNC: 105 MMOL/L (ref 94–109)
CO2 SERPL-SCNC: 23 MMOL/L (ref 20–32)
CREAT SERPL-MCNC: 0.56 MG/DL (ref 0.66–1.25)
DIFFERENTIAL METHOD BLD: ABNORMAL
EOSINOPHIL # BLD AUTO: 0 10E9/L (ref 0–0.7)
EOSINOPHIL NFR BLD AUTO: 0 %
ERYTHROCYTE [DISTWIDTH] IN BLOOD BY AUTOMATED COUNT: 17.8 % (ref 10–15)
GFR SERPL CREATININE-BSD FRML MDRD: >90 ML/MIN/{1.73_M2}
GLUCOSE SERPL-MCNC: 196 MG/DL (ref 70–99)
HCT VFR BLD AUTO: 31 % (ref 40–53)
HGB BLD-MCNC: 9.9 G/DL (ref 13.3–17.7)
LYMPHOCYTES # BLD AUTO: 0.6 10E9/L (ref 0.8–5.3)
LYMPHOCYTES NFR BLD AUTO: 5.2 %
MCH RBC QN AUTO: 31.1 PG (ref 26.5–33)
MCHC RBC AUTO-ENTMCNC: 31.9 G/DL (ref 31.5–36.5)
MCV RBC AUTO: 98 FL (ref 78–100)
MONOCYTES # BLD AUTO: 0.1 10E9/L (ref 0–1.3)
MONOCYTES NFR BLD AUTO: 0.9 %
NEUTROPHILS # BLD AUTO: 10.5 10E9/L (ref 1.6–8.3)
NEUTROPHILS NFR BLD AUTO: 93.9 %
OVALOCYTES BLD QL SMEAR: SLIGHT
PLATELET # BLD AUTO: 357 10E9/L (ref 150–450)
PLATELET # BLD EST: ABNORMAL 10*3/UL
POIKILOCYTOSIS BLD QL SMEAR: SLIGHT
POLYCHROMASIA BLD QL SMEAR: SLIGHT
POTASSIUM SERPL-SCNC: 3.3 MMOL/L (ref 3.4–5.3)
POTASSIUM SERPL-SCNC: 3.6 MMOL/L (ref 3.4–5.3)
RBC # BLD AUTO: 3.18 10E12/L (ref 4.4–5.9)
SODIUM SERPL-SCNC: 136 MMOL/L (ref 133–144)
SODIUM SERPL-SCNC: 139 MMOL/L (ref 133–144)
WBC # BLD AUTO: 11.2 10E9/L (ref 4–11)

## 2019-05-26 PROCEDURE — 25000131 ZZH RX MED GY IP 250 OP 636 PS 637: Performed by: PHYSICIAN ASSISTANT

## 2019-05-26 PROCEDURE — 25000128 H RX IP 250 OP 636: Performed by: INTERNAL MEDICINE

## 2019-05-26 PROCEDURE — 85025 COMPLETE CBC W/AUTO DIFF WBC: CPT | Performed by: PHYSICIAN ASSISTANT

## 2019-05-26 PROCEDURE — 25000128 H RX IP 250 OP 636: Performed by: PHYSICIAN ASSISTANT

## 2019-05-26 PROCEDURE — 84295 ASSAY OF SERUM SODIUM: CPT | Performed by: PHYSICIAN ASSISTANT

## 2019-05-26 PROCEDURE — 20600000 ZZH R&B BMT

## 2019-05-26 PROCEDURE — 80048 BASIC METABOLIC PNL TOTAL CA: CPT | Performed by: PHYSICIAN ASSISTANT

## 2019-05-26 PROCEDURE — 84132 ASSAY OF SERUM POTASSIUM: CPT | Performed by: PHYSICIAN ASSISTANT

## 2019-05-26 PROCEDURE — 25800030 ZZH RX IP 258 OP 636: Performed by: INTERNAL MEDICINE

## 2019-05-26 PROCEDURE — 25000132 ZZH RX MED GY IP 250 OP 250 PS 637: Performed by: PHYSICIAN ASSISTANT

## 2019-05-26 RX ORDER — FUROSEMIDE 10 MG/ML
20 INJECTION INTRAMUSCULAR; INTRAVENOUS ONCE
Status: COMPLETED | OUTPATIENT
Start: 2019-05-26 | End: 2019-05-26

## 2019-05-26 RX ADMIN — FUROSEMIDE 10 MG: 10 INJECTION, SOLUTION INTRAVENOUS at 03:48

## 2019-05-26 RX ADMIN — MICAFUNGIN SODIUM 50 MG: 10 INJECTION, POWDER, LYOPHILIZED, FOR SOLUTION INTRAVENOUS at 08:10

## 2019-05-26 RX ADMIN — Medication 5 ML: at 15:48

## 2019-05-26 RX ADMIN — VANCOMYCIN HYDROCHLORIDE 125 MG: KIT at 19:36

## 2019-05-26 RX ADMIN — DEXAMETHASONE 10 MG: 2 TABLET ORAL at 08:09

## 2019-05-26 RX ADMIN — ONDANSETRON HYDROCHLORIDE 8 MG: 8 TABLET, FILM COATED ORAL at 08:04

## 2019-05-26 RX ADMIN — ONDANSETRON HYDROCHLORIDE 8 MG: 8 TABLET, FILM COATED ORAL at 03:39

## 2019-05-26 RX ADMIN — URSODIOL 300 MG: 300 CAPSULE ORAL at 19:36

## 2019-05-26 RX ADMIN — POTASSIUM CHLORIDE 20 MEQ: 750 TABLET, EXTENDED RELEASE ORAL at 11:42

## 2019-05-26 RX ADMIN — ONDANSETRON HYDROCHLORIDE 8 MG: 8 TABLET, FILM COATED ORAL at 15:48

## 2019-05-26 RX ADMIN — FLUDARABINE PHOSPHATE 64 MG: 25 INJECTION, SOLUTION INTRAVENOUS at 08:19

## 2019-05-26 RX ADMIN — FUROSEMIDE 20 MG: 10 INJECTION, SOLUTION INTRAVENOUS at 08:10

## 2019-05-26 RX ADMIN — URSODIOL 300 MG: 300 CAPSULE ORAL at 13:56

## 2019-05-26 RX ADMIN — POTASSIUM CHLORIDE 40 MEQ: 750 TABLET, EXTENDED RELEASE ORAL at 08:31

## 2019-05-26 RX ADMIN — Medication 10 ML: at 14:01

## 2019-05-26 RX ADMIN — ALLOPURINOL 300 MG: 300 TABLET ORAL at 08:09

## 2019-05-26 RX ADMIN — PANTOPRAZOLE SODIUM 40 MG: 40 TABLET, DELAYED RELEASE ORAL at 08:09

## 2019-05-26 RX ADMIN — TAMSULOSIN HYDROCHLORIDE 0.4 MG: 0.4 CAPSULE ORAL at 08:09

## 2019-05-26 RX ADMIN — VANCOMYCIN HYDROCHLORIDE 125 MG: KIT at 08:10

## 2019-05-26 RX ADMIN — URSODIOL 300 MG: 300 CAPSULE ORAL at 08:09

## 2019-05-26 RX ADMIN — AZITHROMYCIN 250 MG: 250 TABLET, FILM COATED ORAL at 08:09

## 2019-05-26 ASSESSMENT — ACTIVITIES OF DAILY LIVING (ADL)
ADLS_ACUITY_SCORE: 14

## 2019-05-26 NOTE — PROGRESS NOTES
"BMT Daily Progress Note     Mr. Ace 63 y.o hx of AML. Admitted for VELMA Haplo. Currently day -5  Overnight events: Randy slept okay. He does feel a bit nauseated or like his dinner just kind of sat in his stomach. He has not vomited. He denies any other complaints. Looking forward to completing cytoxan flush.   Review of Systems: 10 point ROS negative except as noted above.    Physical Exam:   Blood pressure 103/69, pulse 77, temperature 97.3  F (36.3  C), temperature source Axillary, resp. rate 16, height 1.735 m (5' 8.31\"), weight 95.8 kg (211 lb 3.2 oz), SpO2 94 %.  General: NAD   Eyes: JEROMY, sclera anicteric   Nose/Mouth/Throat: OP clear, buccal mucosa moist, no ulcerations   Lungs: CTA bilaterally  Cardiovascular: RRR, no M/R/G   Abdominal/Rectal: +BS, soft, NT, ND, No HSM   Lymphatics: no edema  Skin: no rashes or petechaie  Neuro: A&O   Additional Findings: Duke site- mildly tender. NO erythema but slight bruising.   Labs:  Lab Results   Component Value Date    WBC 11.2 (H) 05/26/2019    ANEU 10.5 (H) 05/26/2019    HGB 9.9 (L) 05/26/2019    HCT 31.0 (L) 05/26/2019     05/26/2019     05/26/2019    POTASSIUM 3.3 (L) 05/26/2019    CHLORIDE 105 05/26/2019    CO2 23 05/26/2019     (H) 05/26/2019    BUN 12 05/26/2019    CR 0.56 (L) 05/26/2019    MAG 2.5 (H) 05/24/2019    INR 1.13 05/24/2019         Imaging: Reviewed  Microbiology:  Reviewed    Assessment and Plan:   El Ace is a 63 year old male with hx of AML (IDH2, RUNX1 pos), currently day -5 of flu/cy/TBI and related haplo BMT.     5/25 (day -6): flu/cy  5/26 (day -5): flu  5/27 (day -4): flu   5/28 (day -3): flu  5/29 (day -2): TBI/flu  5/30 (day -1): TBI  5/31 (day 0): Transplant     1.  BMT: Prep as above. Allopurinol through day 0. Flush and pre-meds prior to transplant. GCSF starts d+5 and cont to until ANC>2500 x 2 consecutive days. Re-stage per protocol.     2.  HEME: Keep Hgb>8g/dL and plts>10K. No pre-meds.  - on MiPlate " study                            3.  ID: Afebrile. Prophylaxis with HD ACV (CMV pos, HSV pos, EBV pos), azithromycin (c. Diff), Jesica to Vfend.  Bactrim or appropriate PCP therapy to start d+28.    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 In the event of neutropenic fever , recommend immediate ID consult  - recent history of CDI, recommend oral vancomycin prophylaxis 125mg BID                     4.  GI: mild nausea this morning- still eating drinking okay. Encouraged use of prns if needed. Anticipate improval as we get away from cytoxan.   Zofran and dexamethasone prior to chemo to prevent N/V. Ativan and compazine available PRN break-through symptoms.   - Protonix for GI prophy.   - Ursodiol for VOD prophy.     5.  GVH: post transplant cytoxan (day +3, +4), tac/MMF start day +5.     6.  FEN/Renal: Monitor creat and lytes. Replete lytes PRN per SS. Monitor weight, I+O, lytes per protocol with IVF flush.  - Wt up and net positive. Give lasix 20mg IV today, will complete flush around noon today.      Val Lindsey PA-C  [phone]     ATTENDING ADDENDUM:     I have independently seen and evaluated the patient on May 26, 2019 and reviewed clinical, laboratory, and radiographic findings. I have discussed the plan with the team and agree with the attached note with the following edits:     El Ace is a 63 year old year old male, with AML in CR1 after 2 inductions, recent CDI and pseudomonas BSI, off ABx, here for haplo from son VELMA. No complaints.      Ph/E: Vitals reviewed. No distress. Oropharynx clear. Neck supple. Heart RRR. Lungs clear. Abdomen soft. No peripheral edema. No rash. Neuro nonfocal.      A&P: Conditioning        Soto Stephanie

## 2019-05-26 NOTE — PROGRESS NOTES
05/25/19 1100   Quick Adds   Type of Visit Initial PT Evaluation   Living Environment   Lives With spouse   Living Arrangements house   Home Accessibility stairs to enter home   Stair Railings, Within Home, Primary railing on left side (ascending)   Transportation Anticipated car, drives self   Living Environment Comment Pt lives in a split entry home with his spouse. He as a walk in shower with a built in shower chair and grab bars by the toilet   Self-Care   Usual Activity Tolerance good   Current Activity Tolerance moderate   Regular Exercise Yes   Activity/Exercise Type walking   Exercise Amount/Frequency 3-5 times/wk   Equipment Currently Used at Home grab bar, toilet   Activity/Exercise/Self-Care Comment Pt was walking 1.5 miles regularly prior to admission.    Functional Level Prior   Ambulation 0-->independent   Transferring 0-->independent   Toileting 0-->independent   Bathing 0-->independent   Communication 0-->understands/communicates without difficulty   Swallowing 0-->swallows foods/liquids without difficulty   Cognition 0 - no cognition issues reported   Fall history within last six months no   Number of times patient has fallen within last six months 1   Which of the above functional risks had a recent onset or change? none   General Information   Onset of Illness/Injury or Date of Surgery - Date 05/24/19   Referring Physician Rivera Cuadra MD   Patient/Family Goals Statement To increase strength and edurance before upcoming BMT   Pertinent History of Current Problem (include personal factors and/or comorbidities that impact the POC) Patient ID:  El Ace is a 63 year old male with hx of AML (IDH2, RUNX1 pos), currently day -6 of flu/cy/TBI and related haplo BMT.   Precautions/Limitations no known precautions/limitations   General Observations History of left hip labrum injury, knee arthritis, lumbar DJD.   General Info Comments Pt's wife is a PT. Performs HEP regularly from previous  hospitilization. Activity: up ad shelley   Cognitive Status Examination   Orientation orientation to person, place and time   Level of Consciousness alert   Follows Commands and Answers Questions 100% of the time   Personal Safety and Judgment intact   Memory intact   Pain Assessment   Patient Currently in Pain No   Posture    Posture Protracted shoulders   Range of Motion (ROM)   ROM Comment WFL   Strength   Strength Comments WFL   Bed Mobility   Bed Mobility Comments IND   Transfer Skills   Transfer Comments IND   Gait   Gait Comments PT: performed to facilitate pt's safe and efficient ambulation. Pt ambulates ~300' with no AD, SBA. Tends to lean forward to hold on to IV pole. Gait normalizes, stride length increases when therapist manages IV pole for pt.    Balance   Balance Comments No LOB or signs of dizziness, unstability with functional mobility.    Sensory Examination   Sensory Perception Comments WNL   General Therapy Interventions   Planned Therapy Interventions home program guidelines;progressive activity/exercise;risk factor education;strengthening;motor coordination training;joint mobilization;manual therapy;ADL retraining;balance training;bed mobility training   Clinical Impression   Criteria for Skilled Therapeutic Intervention yes, treatment indicated   PT Diagnosis Impaired functional endurance in setting of future BMT   Influenced by the following impairments AML, future BMT    Functional limitations due to impairments decreased functional endurance,    Clinical Presentation Evolving/Changing   Clinical Presentation Rationale clinicla reasoning   Clinical Decision Making (Complexity) Low complexity   Therapy Frequency` 3 times/week   Predicted Duration of Therapy Intervention (days/wks) 8 weeks   Anticipated Discharge Disposition Other (see comments)  (deferred discharge planning 2/2 future BMT )   Risk & Benefits of therapy have been explained Yes   Patient, Family & other staff in agreement with plan  "of care Yes   Clinical Impression Comments Pt is agreeable to therapy and goals of improving functional capacity before BMT   [facility] AM-PAC TM \"6 Clicks\"   2016, Trustees of [facility], under license to [employer].  All rights reserved.   6 Clicks Short Forms Basic Mobility Inpatient Short Form   [facility] AM-PAC  \"6 Clicks\" V.2 Basic Mobility Inpatient Short Form   1. Turning from your back to your side while in a flat bed without using bedrails? 4 - None   2. Moving from lying on your back to sitting on the side of a flat bed without using bedrails? 4 - None   3. Moving to and from a bed to a chair (including a wheelchair)? 4 - None   4. Standing up from a chair using your arms (e.g., wheelchair, or bedside chair)? 4 - None   5. To walk in hospital room? 4 - None   6. Climbing 3-5 steps with a railing? 4 - None   Basic Mobility Raw Score (Score out of 24.Lower scores equate to lower levels of function) 24   Total Evaluation Time   Total Evaluation Time (Minutes) 10     "

## 2019-05-26 NOTE — PLAN OF CARE
[id number]  VSS. A&Ox4. Up independently. Voiding. Tolerating diet. K+ check 3.6, no replacement needed. Na 139.

## 2019-05-26 NOTE — PLAN OF CARE
"/66 (BP Location: Right arm)   Pulse 77   Temp 97.9  F (36.6  C) (Axillary)   Resp 18   Ht 1.735 m (5' 8.31\")   Wt 95.8 kg (211 lb 3.2 oz)   SpO2 96%   BMI 31.82 kg/m      A&Ox4. Denies pain/nausea. Ind in room. 20mg lasix given on eves for wt up 4kg. 10mg lasix given x1 for not meeting Q2H parameters. R Srikanth running flush and mesna. Flush to run until 1330. Good appetite. 60meq KCl PO needed this am with bfast, 40meq followed by 20meq. Fludarabine planned for today. Will continue with POC.   "

## 2019-05-26 NOTE — PLAN OF CARE
Pt tolerated chemo well this morning without immediate side effects.    He reported nausea this am which resolved with scheduled Zofran.      VSS        Problem: Adult Inpatient Plan of Care  Goal: Plan of Care Review  5/26/2019 1530 by Holli Pabon, RN  Outcome: No Change     Problem: Nausea and Vomiting (Stem Cell/Bone Marrow Transplant)  Goal: Nausea and Vomiting Symptom Relief  Intervention: Prevent and Manage Nausea and Vomiting  Flowsheets (Taken 5/26/2019 1530)  Nausea/Vomiting Interventions: antiemetic

## 2019-05-27 LAB
ABO + RH BLD: NORMAL
ABO + RH BLD: NORMAL
ALBUMIN SERPL-MCNC: 3.3 G/DL (ref 3.4–5)
ALP SERPL-CCNC: 132 U/L (ref 40–150)
ALT SERPL W P-5'-P-CCNC: 35 U/L (ref 0–70)
ANION GAP SERPL CALCULATED.3IONS-SCNC: 5 MMOL/L (ref 3–14)
AST SERPL W P-5'-P-CCNC: 14 U/L (ref 0–45)
BASOPHILS # BLD AUTO: 0 10E9/L (ref 0–0.2)
BASOPHILS NFR BLD AUTO: 0.2 %
BILIRUB DIRECT SERPL-MCNC: 0.1 MG/DL (ref 0–0.2)
BILIRUB SERPL-MCNC: 0.3 MG/DL (ref 0.2–1.3)
BLD GP AB SCN SERPL QL: NORMAL
BLOOD BANK CMNT PATIENT-IMP: NORMAL
BUN SERPL-MCNC: 12 MG/DL (ref 7–30)
CALCIUM SERPL-MCNC: 8.5 MG/DL (ref 8.5–10.1)
CHLORIDE SERPL-SCNC: 112 MMOL/L (ref 94–109)
CO2 SERPL-SCNC: 27 MMOL/L (ref 20–32)
CREAT SERPL-MCNC: 0.62 MG/DL (ref 0.66–1.25)
DIFFERENTIAL METHOD BLD: ABNORMAL
EOSINOPHIL # BLD AUTO: 0 10E9/L (ref 0–0.7)
EOSINOPHIL NFR BLD AUTO: 0 %
ERYTHROCYTE [DISTWIDTH] IN BLOOD BY AUTOMATED COUNT: 18.5 % (ref 10–15)
GFR SERPL CREATININE-BSD FRML MDRD: >90 ML/MIN/{1.73_M2}
GLUCOSE SERPL-MCNC: 115 MG/DL (ref 70–99)
HCT VFR BLD AUTO: 32 % (ref 40–53)
HGB BLD-MCNC: 10.1 G/DL (ref 13.3–17.7)
IMM GRANULOCYTES # BLD: 0.1 10E9/L (ref 0–0.4)
IMM GRANULOCYTES NFR BLD: 0.5 %
INR PPP: 1.1 (ref 0.86–1.14)
LYMPHOCYTES # BLD AUTO: 0.6 10E9/L (ref 0.8–5.3)
LYMPHOCYTES NFR BLD AUTO: 4.4 %
MAGNESIUM SERPL-MCNC: 2.3 MG/DL (ref 1.6–2.3)
MCH RBC QN AUTO: 31 PG (ref 26.5–33)
MCHC RBC AUTO-ENTMCNC: 31.6 G/DL (ref 31.5–36.5)
MCV RBC AUTO: 98 FL (ref 78–100)
MONOCYTES # BLD AUTO: 0.5 10E9/L (ref 0–1.3)
MONOCYTES NFR BLD AUTO: 3.9 %
NEUTROPHILS # BLD AUTO: 12.3 10E9/L (ref 1.6–8.3)
NEUTROPHILS NFR BLD AUTO: 91 %
NRBC # BLD AUTO: 0 10*3/UL
NRBC BLD AUTO-RTO: 0 /100
PHOSPHATE SERPL-MCNC: 4 MG/DL (ref 2.5–4.5)
PLATELET # BLD AUTO: 339 10E9/L (ref 150–450)
POTASSIUM SERPL-SCNC: 3.6 MMOL/L (ref 3.4–5.3)
PROT SERPL-MCNC: 6.5 G/DL (ref 6.8–8.8)
RBC # BLD AUTO: 3.26 10E12/L (ref 4.4–5.9)
SODIUM SERPL-SCNC: 144 MMOL/L (ref 133–144)
SPECIMEN EXP DATE BLD: NORMAL
WBC # BLD AUTO: 13.5 10E9/L (ref 4–11)

## 2019-05-27 PROCEDURE — 86901 BLOOD TYPING SEROLOGIC RH(D): CPT | Performed by: PHYSICIAN ASSISTANT

## 2019-05-27 PROCEDURE — 25800030 ZZH RX IP 258 OP 636: Performed by: INTERNAL MEDICINE

## 2019-05-27 PROCEDURE — 84100 ASSAY OF PHOSPHORUS: CPT | Performed by: PHYSICIAN ASSISTANT

## 2019-05-27 PROCEDURE — 25000131 ZZH RX MED GY IP 250 OP 636 PS 637: Performed by: PHYSICIAN ASSISTANT

## 2019-05-27 PROCEDURE — 85025 COMPLETE CBC W/AUTO DIFF WBC: CPT | Performed by: PHYSICIAN ASSISTANT

## 2019-05-27 PROCEDURE — 25000128 H RX IP 250 OP 636: Performed by: INTERNAL MEDICINE

## 2019-05-27 PROCEDURE — 80076 HEPATIC FUNCTION PANEL: CPT | Performed by: PHYSICIAN ASSISTANT

## 2019-05-27 PROCEDURE — 83735 ASSAY OF MAGNESIUM: CPT | Performed by: PHYSICIAN ASSISTANT

## 2019-05-27 PROCEDURE — 86850 RBC ANTIBODY SCREEN: CPT | Performed by: PHYSICIAN ASSISTANT

## 2019-05-27 PROCEDURE — 20600000 ZZH R&B BMT

## 2019-05-27 PROCEDURE — 25000128 H RX IP 250 OP 636: Performed by: PHYSICIAN ASSISTANT

## 2019-05-27 PROCEDURE — 86900 BLOOD TYPING SEROLOGIC ABO: CPT | Performed by: PHYSICIAN ASSISTANT

## 2019-05-27 PROCEDURE — 80048 BASIC METABOLIC PNL TOTAL CA: CPT | Performed by: PHYSICIAN ASSISTANT

## 2019-05-27 PROCEDURE — 25000132 ZZH RX MED GY IP 250 OP 250 PS 637: Performed by: PHYSICIAN ASSISTANT

## 2019-05-27 PROCEDURE — 85610 PROTHROMBIN TIME: CPT | Performed by: PHYSICIAN ASSISTANT

## 2019-05-27 RX ORDER — BACLOFEN 10 MG/1
10 TABLET ORAL 3 TIMES DAILY PRN
Status: DISCONTINUED | OUTPATIENT
Start: 2019-05-27 | End: 2019-06-27 | Stop reason: HOSPADM

## 2019-05-27 RX ADMIN — URSODIOL 300 MG: 300 CAPSULE ORAL at 19:57

## 2019-05-27 RX ADMIN — ALLOPURINOL 300 MG: 300 TABLET ORAL at 08:21

## 2019-05-27 RX ADMIN — MICAFUNGIN SODIUM 50 MG: 10 INJECTION, POWDER, LYOPHILIZED, FOR SOLUTION INTRAVENOUS at 08:21

## 2019-05-27 RX ADMIN — FLUDARABINE PHOSPHATE 64 MG: 25 INJECTION, SOLUTION INTRAVENOUS at 08:38

## 2019-05-27 RX ADMIN — ONDANSETRON HYDROCHLORIDE 8 MG: 8 TABLET, FILM COATED ORAL at 01:03

## 2019-05-27 RX ADMIN — AZITHROMYCIN 250 MG: 250 TABLET, FILM COATED ORAL at 08:21

## 2019-05-27 RX ADMIN — ACYCLOVIR 800 MG: 800 TABLET ORAL at 22:05

## 2019-05-27 RX ADMIN — Medication 5 ML: at 03:17

## 2019-05-27 RX ADMIN — ACYCLOVIR 800 MG: 800 TABLET ORAL at 10:47

## 2019-05-27 RX ADMIN — ACYCLOVIR 800 MG: 800 TABLET ORAL at 14:14

## 2019-05-27 RX ADMIN — ACYCLOVIR 800 MG: 800 TABLET ORAL at 08:21

## 2019-05-27 RX ADMIN — URSODIOL 300 MG: 300 CAPSULE ORAL at 14:14

## 2019-05-27 RX ADMIN — TAMSULOSIN HYDROCHLORIDE 0.4 MG: 0.4 CAPSULE ORAL at 08:21

## 2019-05-27 RX ADMIN — VANCOMYCIN HYDROCHLORIDE 125 MG: KIT at 08:21

## 2019-05-27 RX ADMIN — URSODIOL 300 MG: 300 CAPSULE ORAL at 08:21

## 2019-05-27 RX ADMIN — PANTOPRAZOLE SODIUM 40 MG: 40 TABLET, DELAYED RELEASE ORAL at 08:21

## 2019-05-27 RX ADMIN — ACYCLOVIR 800 MG: 800 TABLET ORAL at 18:11

## 2019-05-27 RX ADMIN — VANCOMYCIN HYDROCHLORIDE 125 MG: KIT at 19:57

## 2019-05-27 RX ADMIN — ONDANSETRON HYDROCHLORIDE 8 MG: 8 TABLET, FILM COATED ORAL at 08:21

## 2019-05-27 RX ADMIN — ONDANSETRON HYDROCHLORIDE 8 MG: 8 TABLET, FILM COATED ORAL at 16:18

## 2019-05-27 RX ADMIN — SODIUM CHLORIDE, PRESERVATIVE FREE 10 ML: 5 INJECTION INTRAVENOUS at 10:47

## 2019-05-27 RX ADMIN — ONDANSETRON HYDROCHLORIDE 8 MG: 8 TABLET, FILM COATED ORAL at 23:55

## 2019-05-27 RX ADMIN — DEXAMETHASONE 8 MG: 4 TABLET ORAL at 08:20

## 2019-05-27 ASSESSMENT — ACTIVITIES OF DAILY LIVING (ADL)
ADLS_ACUITY_SCORE: 14

## 2019-05-27 ASSESSMENT — MIFFLIN-ST. JEOR: SCORE: 1690.63

## 2019-05-27 NOTE — PLAN OF CARE
Pt had a quiet day with stable vital signs.    Pt tolerated chemo well without immediate side effects.    Problem: Adult Inpatient Plan of Care  Goal: Plan of Care Review  5/27/2019 1808 by Holli Pabon RN  Outcome: No Change

## 2019-05-27 NOTE — PROGRESS NOTES
"BMT Daily Progress Note     Mr. Ace 63 y.o hx of AML. Admitted for VELMA Haplo. Currently day -4  Overnight events: Randy is feeling more rested. He is eating and drinking well. Still slight nausea which seems worse in the morning but able to eat and drink later in the day. No fevers or infectious concerns.    Review of Systems: 10 point ROS negative except as noted above.    Physical Exam:   Blood pressure 99/67, pulse 69, temperature 96.9  F (36.1  C), temperature source Axillary, resp. rate 16, height 1.735 m (5' 8.31\"), weight 95.8 kg (211 lb 3.2 oz), SpO2 98 %.  General: NAD   Eyes: JEROMY, sclera anicteric   Nose/Mouth/Throat: OP clear, buccal mucosa moist, no ulcerations   Lungs: CTA bilaterally  Cardiovascular: RRR, no M/R/G   Abdominal/Rectal: +BS, soft, NT, ND, No HSM   Lymphatics: no edema  Skin: no rashes or petechaie  Neuro: A&O   Additional Findings: Duke site- mildly tender. NO erythema but slight bruising.   Labs:  Lab Results   Component Value Date    WBC 13.5 (H) 05/27/2019    ANEU 12.3 (H) 05/27/2019    HGB 10.1 (L) 05/27/2019    HCT 32.0 (L) 05/27/2019     05/27/2019     05/27/2019    POTASSIUM 3.6 05/27/2019    CHLORIDE 112 (H) 05/27/2019    CO2 27 05/27/2019     (H) 05/27/2019    BUN 12 05/27/2019    CR 0.62 (L) 05/27/2019    MAG 2.3 05/27/2019    INR 1.10 05/27/2019         Imaging: Reviewed  Microbiology:  Reviewed    Assessment and Plan:   El Ace is a 63 year old male with hx of AML (IDH2, RUNX1 pos), currently day -4 of flu/cy/TBI and related haplo BMT.     5/25 (day -6): flu/cy  5/26 (day -5): flu  5/27 (day -4): flu   5/28 (day -3): flu  5/29 (day -2): TBI/flu  5/30 (day -1): TBI  5/31 (day 0): Transplant     1.  BMT: Prep as above. Allopurinol through day 0. Flush and pre-meds prior to transplant. GCSF starts d+5 and cont to until ANC>2500 x 2 consecutive days. Re-stage per protocol.     2.  HEME: Keep Hgb>8g/dL and plts>10K. No pre-meds.  - on MiPlate " study                            3.  ID: Afebrile. Prophylaxis with HD ACV (CMV pos, HSV pos, EBV pos), azithromycin (c. Diff), Jesica to Vfend.  Bactrim or appropriate PCP therapy to start d+28.    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 In the event of neutropenic fever , recommend immediate ID consult  - recent history of CDI, recommend oral vancomycin prophylaxis 125mg BID                     4.  GI: mild nausea this morning and yesterday- hasn't vomited. Seems to improve after steroids/ am zofran.   Zofran and dexamethasone prior to chemo to prevent N/V. Ativan and compazine available PRN break-through symptoms.   - Protonix for GI prophy.   - Ursodiol for VOD prophy.     5.  GVH: post transplant cytoxan (day +3, +4), tac/MMF start day +5.     6.  FEN/Renal: Monitor creat and lytes. Replete lytes PRN per SS. Monitor weight, I+O, lytes per protocol with IVF flush.  - No edema on exam. Awaiting wt today. No additional diuresis      Val Lindsey PA-C  [phone]     ATTENDING ADDENDUM:     I have independently seen and evaluated the patient on May 27, 2019 and reviewed clinical, laboratory, and radiographic findings. I have discussed the plan with the team and agree with the attached note with the following edits:     El Ace is a 63 year old year old male, with AML in CR1 after 2 inductions, recent CDI and pseudomonas BSI, off ABx, here for haplo from son VELMA. No complaints.      Ph/E: Vitals reviewed. No distress. Oropharynx clear. Neck supple. Heart RRR. Lungs clear. Abdomen soft. No peripheral edema. No rash. Neuro nonfocal.      A&P: Conditioning        Soto Brown

## 2019-05-27 NOTE — PLAN OF CARE
AVSS. Denies pain, nausea or vomiting. Has good oral intake. Up independently and voiding freely. No replacement needed. For chemo today.  Problem: Adult Inpatient Plan of Care  Goal: Plan of Care Review  5/27/2019 0544 by Princess Eliana Park RN  Outcome: No Change     Problem: Adult Inpatient Plan of Care  Goal: Patient-Specific Goal (Individualization)  Outcome: No Change     Problem: Adult Inpatient Plan of Care  Goal: Absence of Hospital-Acquired Illness or Injury  Outcome: No Change     Problem: Adult Inpatient Plan of Care  Goal: Optimal Comfort and Wellbeing  5/27/2019 0544 by Princess Eliana Park RN  Outcome: No Change     Problem: Adjustment to Transplant (Stem Cell/Bone Marrow Transplant)  Goal: Optimal Coping with Transplant  5/27/2019 0544 by Princess Eliana Park RN  Outcome: No Change     Problem: Bladder Irritation (Stem Cell/Bone Marrow Transplant)  Goal: Symptom-Free Urinary Elimination  Outcome: No Change     Problem: Diarrhea (Stem Cell/Bone Marrow Transplant)  Goal: Diarrhea Symptom Control  Outcome: No Change     Problem: Fatigue (Stem Cell/Bone Marrow Transplant)  Goal: Energy Level Supports Daily Activity  Outcome: No Change     Problem: Hematologic Alteration (Stem Cell/Bone Marrow Transplant)  Goal: Blood Counts Within Acceptable Range  Outcome: No Change     Problem: Hypersensitivity Reaction (Stem Cell/Bone Marrow Transplant)  Goal: Absence of Hypersensitivity Reaction  Outcome: No Change     Problem: Infection Risk (Stem Cell/Bone Marrow Transplant)  Goal: Absence of Infection Signs/Symptoms  Outcome: No Change     Problem: Mucositis (Stem Cell/Bone Marrow Transplant)  Goal: Mucous Membrane Health and Integrity  Outcome: No Change     Problem: Nutrition Intake Altered (Stem Cell/Bone Marrow Transplant)  Goal: Optimal Nutrition Intake  Outcome: No Change     Problem: Nausea and Vomiting (Stem Cell/Bone Marrow Transplant)  Goal: Nausea and Vomiting Symptom Relief  5/27/2019 0544 by  Princess Eliana Park, MILO  Outcome: Improving

## 2019-05-28 LAB
ANION GAP SERPL CALCULATED.3IONS-SCNC: 8 MMOL/L (ref 3–14)
BASOPHILS # BLD AUTO: 0 10E9/L (ref 0–0.2)
BASOPHILS NFR BLD AUTO: 0.2 %
BUN SERPL-MCNC: 14 MG/DL (ref 7–30)
CALCIUM SERPL-MCNC: 8.3 MG/DL (ref 8.5–10.1)
CHLORIDE SERPL-SCNC: 108 MMOL/L (ref 94–109)
CO2 SERPL-SCNC: 26 MMOL/L (ref 20–32)
CREAT SERPL-MCNC: 0.57 MG/DL (ref 0.66–1.25)
DIFFERENTIAL METHOD BLD: ABNORMAL
EOSINOPHIL # BLD AUTO: 0 10E9/L (ref 0–0.7)
EOSINOPHIL NFR BLD AUTO: 0 %
ERYTHROCYTE [DISTWIDTH] IN BLOOD BY AUTOMATED COUNT: 18.1 % (ref 10–15)
GFR SERPL CREATININE-BSD FRML MDRD: >90 ML/MIN/{1.73_M2}
GLUCOSE SERPL-MCNC: 107 MG/DL (ref 70–99)
HCT VFR BLD AUTO: 33.6 % (ref 40–53)
HGB BLD-MCNC: 10.5 G/DL (ref 13.3–17.7)
IMM GRANULOCYTES # BLD: 0 10E9/L (ref 0–0.4)
IMM GRANULOCYTES NFR BLD: 0.4 %
LYMPHOCYTES # BLD AUTO: 0.3 10E9/L (ref 0.8–5.3)
LYMPHOCYTES NFR BLD AUTO: 3 %
MCH RBC QN AUTO: 31 PG (ref 26.5–33)
MCHC RBC AUTO-ENTMCNC: 31.3 G/DL (ref 31.5–36.5)
MCV RBC AUTO: 99 FL (ref 78–100)
MONOCYTES # BLD AUTO: 0.4 10E9/L (ref 0–1.3)
MONOCYTES NFR BLD AUTO: 4.4 %
NEUTROPHILS # BLD AUTO: 8.8 10E9/L (ref 1.6–8.3)
NEUTROPHILS NFR BLD AUTO: 92 %
NRBC # BLD AUTO: 0 10*3/UL
NRBC BLD AUTO-RTO: 0 /100
PLATELET # BLD AUTO: 319 10E9/L (ref 150–450)
POTASSIUM SERPL-SCNC: 3.7 MMOL/L (ref 3.4–5.3)
RBC # BLD AUTO: 3.39 10E12/L (ref 4.4–5.9)
SODIUM SERPL-SCNC: 142 MMOL/L (ref 133–144)
WBC # BLD AUTO: 9.6 10E9/L (ref 4–11)

## 2019-05-28 PROCEDURE — 25000132 ZZH RX MED GY IP 250 OP 250 PS 637: Performed by: PHYSICIAN ASSISTANT

## 2019-05-28 PROCEDURE — 20600000 ZZH R&B BMT

## 2019-05-28 PROCEDURE — 87081 CULTURE SCREEN ONLY: CPT | Performed by: PHYSICIAN ASSISTANT

## 2019-05-28 PROCEDURE — 25800030 ZZH RX IP 258 OP 636: Performed by: INTERNAL MEDICINE

## 2019-05-28 PROCEDURE — 25000128 H RX IP 250 OP 636: Performed by: PHYSICIAN ASSISTANT

## 2019-05-28 PROCEDURE — 80048 BASIC METABOLIC PNL TOTAL CA: CPT | Performed by: PHYSICIAN ASSISTANT

## 2019-05-28 PROCEDURE — 25000131 ZZH RX MED GY IP 250 OP 636 PS 637: Performed by: PHYSICIAN ASSISTANT

## 2019-05-28 PROCEDURE — 87077 CULTURE AEROBIC IDENTIFY: CPT | Performed by: PHYSICIAN ASSISTANT

## 2019-05-28 PROCEDURE — 87181 SC STD AGAR DILUTION PER AGT: CPT | Performed by: PHYSICIAN ASSISTANT

## 2019-05-28 PROCEDURE — 85025 COMPLETE CBC W/AUTO DIFF WBC: CPT | Performed by: PHYSICIAN ASSISTANT

## 2019-05-28 PROCEDURE — 25000128 H RX IP 250 OP 636: Performed by: INTERNAL MEDICINE

## 2019-05-28 RX ADMIN — TAMSULOSIN HYDROCHLORIDE 0.4 MG: 0.4 CAPSULE ORAL at 08:07

## 2019-05-28 RX ADMIN — ACYCLOVIR 800 MG: 800 TABLET ORAL at 08:07

## 2019-05-28 RX ADMIN — PANTOPRAZOLE SODIUM 40 MG: 40 TABLET, DELAYED RELEASE ORAL at 08:07

## 2019-05-28 RX ADMIN — MICAFUNGIN SODIUM 50 MG: 10 INJECTION, POWDER, LYOPHILIZED, FOR SOLUTION INTRAVENOUS at 08:08

## 2019-05-28 RX ADMIN — ACYCLOVIR 800 MG: 800 TABLET ORAL at 22:00

## 2019-05-28 RX ADMIN — ACYCLOVIR 800 MG: 800 TABLET ORAL at 10:58

## 2019-05-28 RX ADMIN — DEXAMETHASONE 6 MG: 2 TABLET ORAL at 08:07

## 2019-05-28 RX ADMIN — VANCOMYCIN HYDROCHLORIDE 125 MG: KIT at 20:19

## 2019-05-28 RX ADMIN — URSODIOL 300 MG: 300 CAPSULE ORAL at 14:19

## 2019-05-28 RX ADMIN — ACYCLOVIR 800 MG: 800 TABLET ORAL at 17:25

## 2019-05-28 RX ADMIN — ONDANSETRON HYDROCHLORIDE 8 MG: 8 TABLET, FILM COATED ORAL at 17:25

## 2019-05-28 RX ADMIN — ACYCLOVIR 800 MG: 800 TABLET ORAL at 14:19

## 2019-05-28 RX ADMIN — FLUDARABINE PHOSPHATE 64 MG: 25 INJECTION, SOLUTION INTRAVENOUS at 09:18

## 2019-05-28 RX ADMIN — ONDANSETRON HYDROCHLORIDE 8 MG: 8 TABLET, FILM COATED ORAL at 08:07

## 2019-05-28 RX ADMIN — AZITHROMYCIN 250 MG: 250 TABLET, FILM COATED ORAL at 08:07

## 2019-05-28 RX ADMIN — SODIUM CHLORIDE, PRESERVATIVE FREE 5 ML: 5 INJECTION INTRAVENOUS at 10:57

## 2019-05-28 RX ADMIN — VANCOMYCIN HYDROCHLORIDE 125 MG: KIT at 08:08

## 2019-05-28 RX ADMIN — URSODIOL 300 MG: 300 CAPSULE ORAL at 20:19

## 2019-05-28 RX ADMIN — URSODIOL 300 MG: 300 CAPSULE ORAL at 08:07

## 2019-05-28 ASSESSMENT — ACTIVITIES OF DAILY LIVING (ADL)
ADLS_ACUITY_SCORE: 14

## 2019-05-28 ASSESSMENT — MIFFLIN-ST. JEOR: SCORE: 1684.73

## 2019-05-28 NOTE — PROGRESS NOTES
"BMT Daily Progress Note     Mr. Ace 63 y.o hx of AML. Admitted for VELMA Haplo. Currently day -3.  Overnight events: Randy states he is feeling well. Eating well, denies oral sores or n/v/d. No rashes, bruises or bleeding. No cough, congestion or fevers or chills.  Review of Systems: 10 point ROS negative except as noted above.    Physical Exam:   Blood pressure 112/71, pulse 70, temperature 98.4  F (36.9  C), temperature source Oral, resp. rate 16, height 1.735 m (5' 8.31\"), weight 91.5 kg (201 lb 12.8 oz), SpO2 97 %.  General: NAD   Eyes: JEROMY, sclera anicteric   Nose/Mouth/Throat: OP clear, buccal mucosa moist, no ulcerations   Lungs: CTA bilaterally  Cardiovascular: RRR, no M/R/G   Abdominal/Rectal: +BS, soft, NT, ND, No HSM   Lymphatics: no edema  Skin: no rashes or petechaie  Neuro: A&O   Additional Findings: Duke site- mildly tender. NO erythema but slight bruising.     Labs:  Lab Results   Component Value Date    WBC 9.6 05/28/2019    ANEU 8.8 (H) 05/28/2019    HGB 10.5 (L) 05/28/2019    HCT 33.6 (L) 05/28/2019     05/28/2019     05/28/2019    POTASSIUM 3.7 05/28/2019    CHLORIDE 108 05/28/2019    CO2 26 05/28/2019     (H) 05/28/2019    BUN 14 05/28/2019    CR 0.57 (L) 05/28/2019    MAG 2.3 05/27/2019    INR 1.10 05/27/2019         Imaging: Reviewed  Microbiology:  Reviewed    Assessment and Plan:   El Ace is a 63 year old male with hx of AML (IDH2, RUNX1 pos), currently day -3 of flu/cy/TBI and related haplo BMT.     5/25 (day -6): flu/cy  5/26 (day -5): flu  5/27 (day -4): flu   5/28 (day -3): flu  5/29 (day -2): TBI/flu  5/30 (day -1): TBI  5/31 (day 0): Transplant     1.  BMT: Prep as above. Allopurinol through day 0. Flush and pre-meds prior to transplant. GCSF starts d+5 and cont to until ANC>2500 x 2 consecutive days. Re-stage per protocol.     2.  HEME: Keep Hgb>8g/dL and plts>10K. No pre-meds.  - on MiPlate study                            3.  ID: Afebrile. " Prophylaxis with HD ACV (CMV pos, HSV pos, EBV pos), azithromycin (c. Diff), Jesica to Vfend.  Bactrim or appropriate PCP therapy to start d+28.    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 In the event of neutropenic fever , recommend immediate ID consult  - recent history of CDI, recommend oral vancomycin prophylaxis 125mg BID                     4.  GI: mild nausea this morning and yesterday- hasn't vomited. Seems to improve after steroids/ am zofran.   Zofran and dexamethasone prior to chemo to prevent N/V. Ativan and compazine available PRN break-through symptoms.   - Protonix for GI prophy.   - Ursodiol for VOD prophy.     5.  GVH: post transplant cytoxan (day +3, +4), tac/MMF start day +5.     6.  FEN/Renal: Monitor creat and lytes. Replete lytes PRN per SS. Monitor weight, I+O, lytes per protocol with IVF flush.       Renee Seaman PAC  [phone]     ATTENDING ADDENDUM:     I have independently seen and evaluated the patient on May 28, 2019 and reviewed clinical, laboratory, and radiographic findings. I have discussed the plan with the team and agree with the attached note with the following edits:     El Ace is a 63 year old year old male, with AML in CR1 after 2 inductions, recent CDI and pseudomonas BSI, off ABx, here for haplo from son VELMA. No complaints.      Ph/E: Vitals reviewed. No distress. Oropharynx clear. Neck supple. Heart RRR. Lungs clear. Abdomen soft. No peripheral edema. No rash. Neuro nonfocal.      A&P: Conditioning        Soto Stephanie

## 2019-05-28 NOTE — PROGRESS NOTES
"Pt's psychosocial assessment (completed in BMT clinic on 05/13/19) copied below for staff review, as needed:    CLINICAL SOCIAL WORK   PSYCHOSOCIAL ASSESSMENT  BLOOD AND MARROW TRANSPLANT SERVICE        Assessment completed on May 13, 2019 of living situation, support system, financial status, functional status, coping, stressors, need for resources and social work intervention provided as needed.  Information for this assessment was provided by Pt and spouse report in addition to medical chart review and consultation with medical team.      Present at assessment: Patient, El \"Randy\" Db and Bessy Hernandez were present for this assessment conducted by Gloria Joyner, BMT .      Diagnosis: Acute Myeloid Leukemia (AML)     Date of Diagnosis: 3/12/19      Transplant type: Allogeneic      Donor: Related allogeneic donor stem cell transplant          Donor: Manny Ace     Physician: Soto Brown MD     Nurse Coordinator: Jerri Payton RN     Social Workers: EDI John, LICSW and EDI Davidson, LGSW     Permanent Address:   [address] [place] [id number]     Local Address:   [place] [address]  [employer] main desk: [phone]     Contact Information:  Pt Home Phone: [phone]  Pt Cell Phone: [phone]  Pt Email: [email or web address]  Pt's wife Bessy Hernandez Phone: [phone]     Presenting Information:  Randy is a 63 year old male diagnosed with AML who presents for evaluation for allogeneic transplant at the [facility] ([facility]).  Pt was accompanied to today's visit by his wife Bessy.      Decision Making:   Self      Health Care Directive:   Will bring in copy The pt has a completed form, but does not have this notarized yet. He plans to bring this in once he is admitted to have it completed.      Relationship Status:         Special Needs: The pt lives around 35 minutes from the hospital, although " the pt did indicate it takes around 40-45 min on average. They are planing to stay at the [facility] for sometime after his hospital admission to be closer during the daily appt period. Once the pt's appt become less they may consider returning home.      Family/Support System: Pt endorsed a good support system including family and close friends who will be available to support Pt throughout transplant process.      Spouse: Bessy Hernandez     Children: Debbie Jeffery (37), Manny Ace (34), and Nelli Ace (20)     Grandchildren: Jasper Jeffery (12) and Marlen Jeffery (10)     Parents:       Siblings: Alexis Ace (67) and Karson Ace (54)     Friends: Some close friends and neighbors who are supportive.      Caregiver: SW discussed with pt the caregiver role and expectation at length. Pt is agreeable to having a full time caregiver for a minimum of 100 days until cleared by the BMT physician. Pt's identified caregivers are his wife Bessy and possibly his brother. Pt signed the caregiver contract which will be scanned into the EMR. Caregiver education and resources provided. No caregiver concerns identified. Pt and Pt's wife Bessy confirmed understanding caregiving requirement, including driving restrictions, as discussed during psychosocial assessment.      Name & Numbers  Bessy Hernandez [phone]  Alexis Ace- possible back-ups, but live in [place] and have agreed to come if needed  Tonya Thompson     Transportation Mode:  Private Car and Shuttle . Pt is aware of driving restrictions post-BMT and the need for the caregiver is to drive until cleared to drive by the  BMT physician. SW provided information on parking info and monthly parking pass options. Pt will utilize the [employer] shuttle for transportation to and from the [facility] and BMT Clinic/Hospital.     Insurance:  No Insurance issues identified. The pt's wife did not that if the pt goes on Long Term Disability (starts in Oct) then pt have to cover  the cost of his insurance. Pt denied specific insurance concerns at this time. SW reiterated information about the BMT Financial  should specific insurance questions arise as Pt moves through transplant process.      Insurance  Iva SEAY, [phone]     Sources of Income:  Employer disability and Savings  The pt is currently collectin Short Term Disability and this will go until Oct, Bessy is also still work part-time, but will stop working when the pt is DC'ed from the hospital. Pt denied anticipation of financial hardship related to BMT at this time.  SW encouraged Pt to contact this SW for additional potential resources should financial situation change.      SW did discuss grants available to the pt, they do not feel they have the need to apply for anything now, but do worry about how the cost will add up over time. SW explained that the family could apply at any point and encouraged them to reach out if they were interested in applying.     Employment:   Employer: [employer]    Position:   Last Day of Work: 3/12/19     Spouse's Employment:  Employer: [employer]   Position: Physical Therapist     Mental Health: No mental health issues identified        PHQ-9 assessment, score was 5, which indicates mild signs of depression .   GAD7 assessment, score was 2, which indicates no signs of anxiety.     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 The pt shared that he did go to counseling at that time and found it helpful, but is not interested in trying medication for anything. He does not associated having any depression symptoms at this time, but overall does appear a bit flat when talking about how things are going for him. He shared that he tends to take 1 day at a time and does not like to look at the big picture, which is hard as his wife likes to  "look at the bigger picture at times. The pt shared that overall he feels sad right now for all the work his wife Bessy has to do, but some times was spend talking through these emotions he has and expressing them to Bessy. The pt is willing to consider seeing a counselor again, but at this time feels he is doing well.      We talked about how some patients may see an increase in feelings of anxiety or depression while hospitalized for extended periods along with isolation. Encouraged Randy and Bessy to let us know if they are noticing an increase in symptoms. We talked about the variety of modalities available to use as coping mechanisms (including but not limited to guided imagery, relaxation techniques, progressive muscle relaxation, counseling/talk therapy and medication).     Chemical Use: No issues identified  The pt denies the use of tobacco, alcohol, marijuana or other drugs. Based on the information provided, there appear to be no specific risks or concerns identified at this time.      Trauma/Loss/Abuse History: Multiple losses associated with cancer diagnosis and treatment, including health, employment, changes to physical appearance, etc.      Spirituality:  Patient identifies with [place] community The pt attends [employer] in [place], [place].      Coping: Pt noted that he is currently feeling \"positive, hopeful and frustrated\".  Pt shared that his main coping mechanisms are talking with family and playing his guitar. The pt does have a hard time identifying coping skills and some time was spent talking over ways to help try new things to help support him during the BMT process.  SW and Pt discussed additional positive coping mechanisms that Pt can utilize while in the hospital.      Caregiver Coping: Pt's wife Bessy noted that she is feeling \"positive, hopeful, and ready to begin\" at this time.  Bessy noted that she farhana by talking with friends and family, exercise, and gathering " educational information.       Education Provided: Transplant process expectations, Caregiver requirements, Caregiver self-care, Financial issues related to transplant, Financial resources/grants available, Common psychosocial stressors pre/post transplant, Support group(s) available, Tour/layout of the inpatient unit/non-use of cell phones, Hospital resources available, Web site information, Resources for transplant patients and their families as well as the Clinical Social Work role.      Interventions Provided: Supportive counseling and education      Recreation/Leisure Activities:  The pt shared that he enjoy's playing his guitar, staying up-to-date on current events and walking/light exercise.     Plans for Hospital Stay Leisure:  Reading, playing guitar     Assessment and Recommendations for Team:  Pt is a 63 year old male diagnosed with AML who is here undergoing preparation for a planned allogeneic transplant.      Pt is a pleasant and well articulate male who feels comfortable communicating with the medical team. Pt has a good support team who are involved.      Pt may benefit from ongoing psychosocial support in regards to coping with the adjustment to the BMT process. Pt's family may benefit from ongoing psychosocial support in regards to coping with the adjustment to the BMT process and may also benefit from attending the BMT Caregiver Support Group that meets weekly on the inpatient unit.      Pt has a good support system and a good caregiver plan. Pt verbalizes understanding of the transplant process and wanting to proceed. SW provided contact information and encouraged Pt to contact SW with questions, concerns, resources and for support. Per this assessment, I did not identify any barriers to this patient moving forward with transplant        Important Information:   - Pt would like to stay at the [facility] post hospital discharge.  - Pt would be interested in a treadmill  - The pt is interested in a  blessing ceremony with his parish .   - The pt and his wife will benefit from continued  support during the BMT process.      Follow up Planned:   Initiate financial resources  Psychosocial support  Lodging referrals

## 2019-05-28 NOTE — PLAN OF CARE
[id number]. Pt AVSS, denies any pain. No N/V/D. Reports having soft stool x1. Hiccups for 2 hours in the evening; prn ordered but hiccups went away before giving. No replacements needed this AM. Continue to monitor.    Problem: Adult Inpatient Plan of Care  Goal: Plan of Care Review  5/28/2019 0549 by Toan Gallego RN  Outcome: No Change     Problem: Adjustment to Transplant (Stem Cell/Bone Marrow Transplant)  Goal: Optimal Coping with Transplant  Outcome: No Change     Problem: Diarrhea (Stem Cell/Bone Marrow Transplant)  Goal: Diarrhea Symptom Control  Outcome: No Change     Problem: Nausea and Vomiting (Stem Cell/Bone Marrow Transplant)  Goal: Nausea and Vomiting Symptom Relief  Outcome: No Change

## 2019-05-28 NOTE — PROGRESS NOTES
BMT CLINICAL SOCIAL WORK NOTE:     Focus: Psychosocial support     Data: Randy is a 62 y/o male who is day -3 for an allo BMT for a diagnosis of AML.    Interventions: CSW met with Randy to assess coping and provide psychosocial support. Randy reported feeling well. He said he feels stronger compared to his last hospitalization. Randy shared that his dtr and wife have been visiting and his son will be in town from [place] at the end of this week, as he is the donor. Randy declined a blessing ceremony for transplant day. He said that his  has been visiting him at the hospital. Randy showed CSW his completed HCD and requested a notary. CSW will arrange this. Randy denied feelings of anxiety or depression; stating that he is feeling good and focused on each step of this process. Randy said that he and his wife/caregiver would like to stay at [facility] post-transplant. CSW let Randy know that this referral can be made closer to discharge.      Assessment: Pt presented as calm and pleasant. His affect was restricted. He reports to be feeling good and seems to be coping well. Pt is supported by his wife and children.      Plan: CSW will continue to provide supportive counseling and assistance with resources as needed. CSW will continue to collaborate with multidisciplinary team regarding Pt's plan of care.      EDI Davidson, LGSW  Pager: [phone]  Phone: [phone]

## 2019-05-29 ENCOUNTER — APPOINTMENT (OUTPATIENT)
Dept: PHYSICAL THERAPY | Facility: CLINIC | Age: 64
DRG: 014 | End: 2019-05-29
Attending: INTERNAL MEDICINE
Payer: COMMERCIAL

## 2019-05-29 ENCOUNTER — ONCOLOGY VISIT (OUTPATIENT)
Dept: RADIATION ONCOLOGY | Facility: CLINIC | Age: 64
End: 2019-05-29

## 2019-05-29 ENCOUNTER — APPOINTMENT (OUTPATIENT)
Dept: RADIATION ONCOLOGY | Facility: CLINIC | Age: 64
End: 2019-05-29
Attending: RADIOLOGY
Payer: COMMERCIAL

## 2019-05-29 LAB
ANION GAP SERPL CALCULATED.3IONS-SCNC: 6 MMOL/L (ref 3–14)
BASOPHILS # BLD AUTO: 0 10E9/L (ref 0–0.2)
BASOPHILS NFR BLD AUTO: 0.2 %
BUN SERPL-MCNC: 13 MG/DL (ref 7–30)
CALCIUM SERPL-MCNC: 8.2 MG/DL (ref 8.5–10.1)
CHLORIDE SERPL-SCNC: 108 MMOL/L (ref 94–109)
CO2 SERPL-SCNC: 27 MMOL/L (ref 20–32)
CREAT SERPL-MCNC: 0.6 MG/DL (ref 0.66–1.25)
DIFFERENTIAL METHOD BLD: ABNORMAL
EOSINOPHIL # BLD AUTO: 0 10E9/L (ref 0–0.7)
EOSINOPHIL NFR BLD AUTO: 0.1 %
ERYTHROCYTE [DISTWIDTH] IN BLOOD BY AUTOMATED COUNT: 17.8 % (ref 10–15)
GFR SERPL CREATININE-BSD FRML MDRD: >90 ML/MIN/{1.73_M2}
GLUCOSE SERPL-MCNC: 110 MG/DL (ref 70–99)
HCT VFR BLD AUTO: 33.6 % (ref 40–53)
HGB BLD-MCNC: 10.6 G/DL (ref 13.3–17.7)
IMM GRANULOCYTES # BLD: 0 10E9/L (ref 0–0.4)
IMM GRANULOCYTES NFR BLD: 0.4 %
LYMPHOCYTES # BLD AUTO: 0.2 10E9/L (ref 0.8–5.3)
LYMPHOCYTES NFR BLD AUTO: 1.9 %
MAGNESIUM SERPL-MCNC: 2.5 MG/DL (ref 1.6–2.3)
MCH RBC QN AUTO: 31.3 PG (ref 26.5–33)
MCHC RBC AUTO-ENTMCNC: 31.5 G/DL (ref 31.5–36.5)
MCV RBC AUTO: 99 FL (ref 78–100)
MONOCYTES # BLD AUTO: 0.2 10E9/L (ref 0–1.3)
MONOCYTES NFR BLD AUTO: 2.8 %
NEUTROPHILS # BLD AUTO: 7.8 10E9/L (ref 1.6–8.3)
NEUTROPHILS NFR BLD AUTO: 94.6 %
NRBC # BLD AUTO: 0 10*3/UL
NRBC BLD AUTO-RTO: 0 /100
PHOSPHATE SERPL-MCNC: 4.5 MG/DL (ref 2.5–4.5)
PLATELET # BLD AUTO: 299 10E9/L (ref 150–450)
POTASSIUM SERPL-SCNC: 3.8 MMOL/L (ref 3.4–5.3)
RBC # BLD AUTO: 3.39 10E12/L (ref 4.4–5.9)
SODIUM SERPL-SCNC: 140 MMOL/L (ref 133–144)
WBC # BLD AUTO: 8.2 10E9/L (ref 4–11)

## 2019-05-29 PROCEDURE — 20600000 ZZH R&B BMT

## 2019-05-29 PROCEDURE — 25800030 ZZH RX IP 258 OP 636: Performed by: INTERNAL MEDICINE

## 2019-05-29 PROCEDURE — 85025 COMPLETE CBC W/AUTO DIFF WBC: CPT | Performed by: PHYSICIAN ASSISTANT

## 2019-05-29 PROCEDURE — 25000131 ZZH RX MED GY IP 250 OP 636 PS 637: Performed by: PHYSICIAN ASSISTANT

## 2019-05-29 PROCEDURE — 84100 ASSAY OF PHOSPHORUS: CPT | Performed by: PHYSICIAN ASSISTANT

## 2019-05-29 PROCEDURE — 25000128 H RX IP 250 OP 636: Performed by: INTERNAL MEDICINE

## 2019-05-29 PROCEDURE — 77336 RADIATION PHYSICS CONSULT: CPT | Performed by: RADIOLOGY

## 2019-05-29 PROCEDURE — 80048 BASIC METABOLIC PNL TOTAL CA: CPT | Performed by: PHYSICIAN ASSISTANT

## 2019-05-29 PROCEDURE — 77331 SPECIAL RADIATION DOSIMETRY: CPT | Performed by: RADIOLOGY

## 2019-05-29 PROCEDURE — 83735 ASSAY OF MAGNESIUM: CPT | Performed by: PHYSICIAN ASSISTANT

## 2019-05-29 PROCEDURE — 77332 RADIATION TREATMENT AID(S): CPT | Performed by: RADIOLOGY

## 2019-05-29 PROCEDURE — 25000128 H RX IP 250 OP 636: Performed by: PHYSICIAN ASSISTANT

## 2019-05-29 PROCEDURE — 25000132 ZZH RX MED GY IP 250 OP 250 PS 637: Performed by: STUDENT IN AN ORGANIZED HEALTH CARE EDUCATION/TRAINING PROGRAM

## 2019-05-29 PROCEDURE — 77412 RADIATION TX DELIVERY LVL 3: CPT | Performed by: RADIOLOGY

## 2019-05-29 PROCEDURE — 25000132 ZZH RX MED GY IP 250 OP 250 PS 637: Performed by: PHYSICIAN ASSISTANT

## 2019-05-29 PROCEDURE — 97530 THERAPEUTIC ACTIVITIES: CPT | Mod: GP | Performed by: PHYSICAL THERAPIST

## 2019-05-29 PROCEDURE — 25000132 ZZH RX MED GY IP 250 OP 250 PS 637: Performed by: INTERNAL MEDICINE

## 2019-05-29 RX ORDER — SUCRALFATE ORAL 1 G/10ML
1 SUSPENSION ORAL
Status: DISCONTINUED | OUTPATIENT
Start: 2019-05-29 | End: 2019-06-17

## 2019-05-29 RX ORDER — CALCIUM CARBONATE 500 MG/1
500 TABLET, CHEWABLE ORAL 2 TIMES DAILY PRN
Status: DISCONTINUED | OUTPATIENT
Start: 2019-05-29 | End: 2019-06-14

## 2019-05-29 RX ADMIN — SUCRALFATE 1 G: 1 SUSPENSION ORAL at 23:45

## 2019-05-29 RX ADMIN — ONDANSETRON HYDROCHLORIDE 8 MG: 8 TABLET, FILM COATED ORAL at 16:25

## 2019-05-29 RX ADMIN — AZITHROMYCIN 250 MG: 250 TABLET, FILM COATED ORAL at 08:14

## 2019-05-29 RX ADMIN — ACYCLOVIR 800 MG: 800 TABLET ORAL at 08:14

## 2019-05-29 RX ADMIN — TAMSULOSIN HYDROCHLORIDE 0.4 MG: 0.4 CAPSULE ORAL at 08:14

## 2019-05-29 RX ADMIN — ONDANSETRON HYDROCHLORIDE 8 MG: 8 TABLET, FILM COATED ORAL at 08:14

## 2019-05-29 RX ADMIN — SODIUM CHLORIDE, PRESERVATIVE FREE 5 ML: 5 INJECTION INTRAVENOUS at 14:29

## 2019-05-29 RX ADMIN — FLUDARABINE PHOSPHATE 64 MG: 25 INJECTION, SOLUTION INTRAVENOUS at 09:48

## 2019-05-29 RX ADMIN — ACYCLOVIR 800 MG: 800 TABLET ORAL at 18:43

## 2019-05-29 RX ADMIN — URSODIOL 300 MG: 300 CAPSULE ORAL at 14:28

## 2019-05-29 RX ADMIN — VANCOMYCIN HYDROCHLORIDE 125 MG: KIT at 20:01

## 2019-05-29 RX ADMIN — ACYCLOVIR 800 MG: 800 TABLET ORAL at 14:28

## 2019-05-29 RX ADMIN — URSODIOL 300 MG: 300 CAPSULE ORAL at 08:14

## 2019-05-29 RX ADMIN — PANTOPRAZOLE SODIUM 40 MG: 40 TABLET, DELAYED RELEASE ORAL at 08:14

## 2019-05-29 RX ADMIN — VANCOMYCIN HYDROCHLORIDE 125 MG: KIT at 08:13

## 2019-05-29 RX ADMIN — DEXAMETHASONE 6 MG: 2 TABLET ORAL at 08:14

## 2019-05-29 RX ADMIN — ONDANSETRON HYDROCHLORIDE 8 MG: 8 TABLET, FILM COATED ORAL at 23:45

## 2019-05-29 RX ADMIN — ONDANSETRON HYDROCHLORIDE 8 MG: 8 TABLET, FILM COATED ORAL at 00:38

## 2019-05-29 RX ADMIN — MICAFUNGIN SODIUM 50 MG: 10 INJECTION, POWDER, LYOPHILIZED, FOR SOLUTION INTRAVENOUS at 08:12

## 2019-05-29 RX ADMIN — ACYCLOVIR 800 MG: 800 TABLET ORAL at 22:37

## 2019-05-29 RX ADMIN — ACYCLOVIR 800 MG: 800 TABLET ORAL at 16:25

## 2019-05-29 RX ADMIN — URSODIOL 300 MG: 300 CAPSULE ORAL at 20:01

## 2019-05-29 RX ADMIN — CALCIUM CARBONATE (ANTACID) CHEW TAB 500 MG 500 MG: 500 CHEW TAB at 18:43

## 2019-05-29 ASSESSMENT — ACTIVITIES OF DAILY LIVING (ADL)
ADLS_ACUITY_SCORE: 14

## 2019-05-29 ASSESSMENT — MIFFLIN-ST. JEOR: SCORE: 1675.2

## 2019-05-29 NOTE — PLAN OF CARE
[id number]. Pt AVSS, denies any pain. Mild indigestion from dinner. No SOB or chest pain. Voiding well. Central line caps changed. In good spirits. No replacements this AM. Continue to monitor.     Problem: Adult Inpatient Plan of Care  Goal: Plan of Care Review  5/29/2019 0536 by Toan Gallego, RN  Outcome: No Change     Problem: Diarrhea (Stem Cell/Bone Marrow Transplant)  Goal: Diarrhea Symptom Control  Outcome: No Change     Problem: Fatigue (Stem Cell/Bone Marrow Transplant)  Goal: Energy Level Supports Daily Activity  Outcome: No Change     Problem: Infection Risk (Stem Cell/Bone Marrow Transplant)  Goal: Absence of Infection Signs/Symptoms  Outcome: No Change     Problem: Nutrition Intake Altered (Stem Cell/Bone Marrow Transplant)  Goal: Optimal Nutrition Intake  5/29/2019 0536 by Toan Gallego, RN  Outcome: No Change

## 2019-05-29 NOTE — PLAN OF CARE
"/78 (BP Location: Right arm)   Pulse 77   Temp 98.5  F (36.9  C) (Oral)   Resp 16   Ht 1.735 m (5' 8.31\")   Wt 91.5 kg (201 lb 12.8 oz)   SpO2 98%   BMI 30.41 kg/m    Pt afeb, vss, denies nausea/pain. Pt given chemotherapy (Fludarabine) this am, tolerated well. Pt offers no complaints. Plan for fludarabine and TBI tomorrow.   Problem: Adult Inpatient Plan of Care  Goal: Plan of Care Review  5/28/2019 1910 by Debbie Johnson, RN  Outcome: No Change     Problem: Adult Inpatient Plan of Care  Goal: Patient-Specific Goal (Individualization)  Outcome: No Change     Problem: Nutrition Intake Altered (Stem Cell/Bone Marrow Transplant)  Goal: Optimal Nutrition Intake  Outcome: No Change     "

## 2019-05-29 NOTE — PLAN OF CARE
"Discharge Planner PT   Patient plan for discharge: not discussed   Current status: Pt issued \"allogeneic transplant folder\". Educated pt on plts, hgb, WBCs and activity/exercise modifications pending levels. Pt has TM in room, states independent use ~1 mile/day. Verbal education and demonstration for UE, LE and postural exercises to complete independently throughout the day.   Barriers to return to prior living situation: medical status  Recommendations for discharge: local lodging with assist  Rationale for recommendations: pending LOS and activity level. Pt will benefit from skilled inpatient PT services to instruct in safe exercise program to maximize functional strength and endurance to prepare for return to community mobility.          Entered by: Lluvia Heredia 05/29/2019 4:56 PM       "

## 2019-05-29 NOTE — PROGRESS NOTES
"  30-Second Sit to Stand Test:  The test is conducted with a straight back chair, without armrests, with a 17-inch seat height.    Patient Score (score =0 if must use arms)= 7 reps    The 30 Second Sit to Stand Test is considered a test of fall risk.  Data from [place] [employer], cosponsored by [place] Dept of Health:  If must use arms = High Fall Risk regardless of reps  8 or less times = High Fall Risk   9 to 12 times = Moderate Risk  13 or more times = Low Risk    The 30 Second Sit to Stand Test is also considered a test of leg strength and endurance.   Normative Data from Stef et al,. 2001  Age                 Reps: Men        Reps: Women  60-64                14-19                       12-17                               65-69                12-18                       11-16                    70-74                12-17                       10-15              75-79                11-17                       10-15                    80-84                10-15                         9-14  85-89                 8-14                          8-13  90-94                 7-12                          4-11    Assessment (rationale for performing, application to patient s function & care plan): Assess LE strength and endurance. Pt reports limitation 2/2 B knee arthritis. Per observation of standing dynamic balance, no concern for fall risk at this time.            05/29/19 1600   FACIT-Fatigue Scale (Version 4) Copyright 1978, 1997 by Alexis Kaplan, PhD   Signing Clinician's Name/Credentials Lluvia Heredia, PT, DPT   Below is a list of statements that other people with your illness have said are important. Please [place] or rodolfo one number per line to indicate your response as it applies to the past 7 days.   I feel fatigued 1 - A little bit   I feel weak all over 0 - Not at all   I feel listless (\"washed out\") 0 - Not at all   I feel tired 0 - Not at all   I have trouble starting things because I am tired 0 - Not at all   I have " trouble finishing things because I am tired 0 - Not at all   I have energy 3 - Quite a bit   I am able to do my usual activities 2 - Somewhat   I need to sleep during the day 0 - Not at all   I am too tired to eat 2 - Somewhat   I need help doing my usual activities 0 - Not at all   I am frustrated by being too tired to do the things I want to do 1 - A little bit   I have to limit my social activity because I am tired  0 - Not at all   FACIT-Fatigue Subscale Scoring   HI7 Item calculation 3   HI12 Item Calculation 4   An1 Item Calculation 4   An2 Item Calculation 4   An3 Item Calculation 4   An4 Item Calculation 4   An5 Item Calculation 3   An7 Item Calculation 2   An8 Item Calculation 4   An12 Item Calculation 2   An14 Item Calculation 4   An15 Item Calculation 3   An16 Item Calculation 4   Total Item Calculations Sum 45   Item Calculated Sum multiplied by 13 585   FACIT Fatigue Subscale (score out of 52). The higher the score, the better the QOL. 45     A score of < 30 indicates below normal range   The FACIT-Fatigue scale assesses self-reported fatigue and its impact on daily activities and function during the past week.  Scores range 52-0, with lower scores indicating worse fatigue.    40 is the average score in general [place] population with a standard deviation of ~10.    Minimally Important Difference   3-4 points.    Source: Scoring & Interpretation Materials at http://www.facit.org/FACITOrg/Questionnaires

## 2019-05-29 NOTE — PROGRESS NOTES
"BMT Daily Progress Note     Mr. Ace 63 y.o hx of AML. Admitted for VELMA Haplo. Currently day -2.  Overnight events: Randy has a little \"indigestion\". Worse after pizza last night. Otherwise trying to have light meals. No reflux or burning. No belly pain, n/v/d. Up walking on treadmill and feeling well this morning. No cough, congestion, SOB or chest pain..  Review of Systems: 10 point ROS negative except as noted above.    Physical Exam:   Blood pressure 110/77, pulse 77, temperature 97  F (36.1  C), temperature source Oral, resp. rate 18, height 1.735 m (5' 8.31\"), weight 90.6 kg (199 lb 11.2 oz), SpO2 97 %.  General: NAD   Eyes: JEROMY, sclera anicteric   Nose/Mouth/Throat: OP clear, buccal mucosa moist, no ulcerations   Lungs: CTA bilaterally  Cardiovascular: RRR, no M/R/G   Abdominal/Rectal: +BS, soft, NT, ND, No HSM   Lymphatics: no edema  Skin: no rashes or petechaie  Neuro: A&O   Additional Findings: [place] site c/d/i    Labs:  Lab Results   Component Value Date    WBC 8.2 05/29/2019    ANEU 7.8 05/29/2019    HGB 10.6 (L) 05/29/2019    HCT 33.6 (L) 05/29/2019     05/29/2019     05/29/2019    POTASSIUM 3.8 05/29/2019    CHLORIDE 108 05/29/2019    CO2 27 05/29/2019     (H) 05/29/2019    BUN 13 05/29/2019    CR 0.60 (L) 05/29/2019    MAG 2.5 (H) 05/29/2019    INR 1.10 05/27/2019         Imaging: Reviewed  Microbiology:  Reviewed    Assessment and Plan:   El Ace is a 63 year old male with hx of AML (IDH2, RUNX1 pos), currently day -2 of flu/cy/TBI and related haplo BMT.     5/25 (day -6): flu/cy  5/26 (day -5): flu  5/27 (day -4): flu   5/28 (day -3): flu  5/29 (day -2): TBI/flu  5/30 (day -1): TBI  5/31 (day 0): Transplant     1.  BMT: Prep as above. Allopurinol through day 0. Flush and pre-meds prior to transplant. GCSF starts d+5 and cont to until ANC>2500 x 2 consecutive days. Re-stage per protocol.     2.  HEME: Keep Hgb>8g/dL and plts>10K. No pre-meds.  - on MiPlate study        "                     3.  ID: Afebrile. Prophylaxis with HD ACV (CMV pos, HSV pos, EBV pos), azithromycin (c. Diff), Jesica to Vfend.  Bactrim or appropriate PCP therapy to start d+28.    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 In the event of neutropenic fever, recommend immediate ID consult  - recent history of CDI, recommend oral vancomycin prophylaxis 125mg BID                     4.  GI: Nausea resolved, reports indigestion without reflux. Tums prn  Zofran and dexamethasone prior to chemo to prevent N/V. Ativan and compazine available PRN break-through symptoms.   - Protonix for GI prophy.   - Ursodiol for VOD prophy.     5.  GVH: post transplant cytoxan (day +3, +4), tac/MMF start day +5.     6.  FEN/Renal: Monitor creat and lytes. Replete lytes PRN per SS. Monitor weight, I+O, lytes per protocol with IVF flush.       Renee Seaman PAC  [phone]     Attending Note:    The patient was seen and examined by me separate from mid-level provider.   I personally reviewed the today's laboratory results, vital signs and radiology results.    Doing well.  Playing his guitar.  No side effects with the conditioning.  I found that vitals are stable and there was no fever. No acute distress. Patient was alert and oriented and grossly neurologically intact. Mouth is clean. Line insertion non-tender and clear. Lungs clear bilateral. Heart regular. Abdomen soft non-tender, BS present. Lower extremity with no edema. No rash.     Main laboratory finding were   Lab Results   Component Value Date    WBC 8.2 05/29/2019    ANEU 7.8 05/29/2019    HGB 10.6 (L) 05/29/2019    HCT 33.6 (L) 05/29/2019     05/29/2019     05/29/2019    POTASSIUM 3.8 05/29/2019    CHLORIDE 108 05/29/2019    CO2 27 05/29/2019     (H) 05/29/2019    BUN 13 05/29/2019    CR 0.60 (L) 05/29/2019    MAG 2.5 (H) 05/29/2019    INR 1.10 05/27/2019    BILITOTAL 0.3 05/27/2019    AST 14 05/27/2019    ALT 35 05/27/2019    ALKPHOS 132 05/27/2019     PROTTOTAL 6.5 (L) 05/27/2019    ALBUMIN 3.3 (L) 05/27/2019         My plan is to continue with the fludarabine and TBI today.  He will have the last dose of TBI tomorrow and his transplant should come on Friday.  He will not start with immunosuppression until after the cyclophosfamide, post transplant.      Alexander Zeng M.D.

## 2019-05-29 NOTE — PLAN OF CARE
"/72 (BP Location: Right arm)   Pulse 82   Temp 98.1  F (36.7  C) (Oral)   Resp 18   Ht 1.735 m (5' 8.31\")   Wt 90.6 kg (199 lb 11.2 oz)   SpO2 100%   BMI 30.09 kg/m    Pt afeb, vss,denies nausea/pain. Pt given fludarabine, with no issues. Pt had tbi this afternoon. Pt offers no other complaints continue to monitor per poc.  "

## 2019-05-30 ENCOUNTER — APPOINTMENT (OUTPATIENT)
Dept: RADIATION ONCOLOGY | Facility: CLINIC | Age: 64
End: 2019-05-30
Attending: RADIOLOGY
Payer: COMMERCIAL

## 2019-05-30 ENCOUNTER — DOCUMENTATION ONLY (OUTPATIENT)
Dept: OTHER | Facility: CLINIC | Age: 64
End: 2019-05-30

## 2019-05-30 ENCOUNTER — ONCOLOGY VISIT (OUTPATIENT)
Dept: RADIATION ONCOLOGY | Facility: CLINIC | Age: 64
End: 2019-05-30

## 2019-05-30 LAB
ABO + RH BLD: NORMAL
ABO + RH BLD: NORMAL
ANION GAP SERPL CALCULATED.3IONS-SCNC: 8 MMOL/L (ref 3–14)
BASOPHILS # BLD AUTO: 0 10E9/L (ref 0–0.2)
BASOPHILS NFR BLD AUTO: 0.1 %
BLD GP AB SCN SERPL QL: NORMAL
BLOOD BANK CMNT PATIENT-IMP: NORMAL
BUN SERPL-MCNC: 16 MG/DL (ref 7–30)
CALCIUM SERPL-MCNC: 8.5 MG/DL (ref 8.5–10.1)
CHLORIDE SERPL-SCNC: 106 MMOL/L (ref 94–109)
CO2 SERPL-SCNC: 26 MMOL/L (ref 20–32)
CREAT SERPL-MCNC: 0.6 MG/DL (ref 0.66–1.25)
DIFFERENTIAL METHOD BLD: ABNORMAL
EOSINOPHIL # BLD AUTO: 0 10E9/L (ref 0–0.7)
EOSINOPHIL NFR BLD AUTO: 0 %
ERYTHROCYTE [DISTWIDTH] IN BLOOD BY AUTOMATED COUNT: 17.4 % (ref 10–15)
GFR SERPL CREATININE-BSD FRML MDRD: >90 ML/MIN/{1.73_M2}
GLUCOSE SERPL-MCNC: 98 MG/DL (ref 70–99)
HCT VFR BLD AUTO: 34.4 % (ref 40–53)
HGB BLD-MCNC: 10.8 G/DL (ref 13.3–17.7)
IMM GRANULOCYTES # BLD: 0.1 10E9/L (ref 0–0.4)
IMM GRANULOCYTES NFR BLD: 0.6 %
LYMPHOCYTES # BLD AUTO: 0.1 10E9/L (ref 0.8–5.3)
LYMPHOCYTES NFR BLD AUTO: 1.2 %
MCH RBC QN AUTO: 30.7 PG (ref 26.5–33)
MCHC RBC AUTO-ENTMCNC: 31.4 G/DL (ref 31.5–36.5)
MCV RBC AUTO: 98 FL (ref 78–100)
MONOCYTES # BLD AUTO: 0.1 10E9/L (ref 0–1.3)
MONOCYTES NFR BLD AUTO: 1.2 %
NEUTROPHILS # BLD AUTO: 8 10E9/L (ref 1.6–8.3)
NEUTROPHILS NFR BLD AUTO: 96.9 %
NRBC # BLD AUTO: 0 10*3/UL
NRBC BLD AUTO-RTO: 0 /100
PLATELET # BLD AUTO: 303 10E9/L (ref 150–450)
POTASSIUM SERPL-SCNC: 3.8 MMOL/L (ref 3.4–5.3)
RBC # BLD AUTO: 3.52 10E12/L (ref 4.4–5.9)
SODIUM SERPL-SCNC: 139 MMOL/L (ref 133–144)
SPECIMEN EXP DATE BLD: NORMAL
WBC # BLD AUTO: 8.2 10E9/L (ref 4–11)

## 2019-05-30 PROCEDURE — 25000131 ZZH RX MED GY IP 250 OP 636 PS 637: Performed by: PHYSICIAN ASSISTANT

## 2019-05-30 PROCEDURE — 25000132 ZZH RX MED GY IP 250 OP 250 PS 637: Performed by: PHYSICIAN ASSISTANT

## 2019-05-30 PROCEDURE — 80048 BASIC METABOLIC PNL TOTAL CA: CPT | Performed by: PHYSICIAN ASSISTANT

## 2019-05-30 PROCEDURE — 20600000 ZZH R&B BMT

## 2019-05-30 PROCEDURE — 25000132 ZZH RX MED GY IP 250 OP 250 PS 637: Performed by: INTERNAL MEDICINE

## 2019-05-30 PROCEDURE — 25000128 H RX IP 250 OP 636: Performed by: INTERNAL MEDICINE

## 2019-05-30 PROCEDURE — 25000128 H RX IP 250 OP 636: Performed by: PHYSICIAN ASSISTANT

## 2019-05-30 PROCEDURE — 86901 BLOOD TYPING SEROLOGIC RH(D): CPT | Performed by: PHYSICIAN ASSISTANT

## 2019-05-30 PROCEDURE — 77412 RADIATION TX DELIVERY LVL 3: CPT | Performed by: RADIOLOGY

## 2019-05-30 PROCEDURE — 86850 RBC ANTIBODY SCREEN: CPT | Performed by: PHYSICIAN ASSISTANT

## 2019-05-30 PROCEDURE — 25800030 ZZH RX IP 258 OP 636: Performed by: INTERNAL MEDICINE

## 2019-05-30 PROCEDURE — 85025 COMPLETE CBC W/AUTO DIFF WBC: CPT | Performed by: PHYSICIAN ASSISTANT

## 2019-05-30 PROCEDURE — 86900 BLOOD TYPING SEROLOGIC ABO: CPT | Performed by: PHYSICIAN ASSISTANT

## 2019-05-30 RX ORDER — SODIUM CHLORIDE 9 MG/ML
INJECTION, SOLUTION INTRAVENOUS CONTINUOUS
Status: DISCONTINUED | OUTPATIENT
Start: 2019-05-31 | End: 2019-05-31

## 2019-05-30 RX ORDER — PANTOPRAZOLE SODIUM 40 MG/1
40 TABLET, DELAYED RELEASE ORAL 2 TIMES DAILY
Status: DISCONTINUED | OUTPATIENT
Start: 2019-05-30 | End: 2019-06-27 | Stop reason: HOSPADM

## 2019-05-30 RX ADMIN — SUCRALFATE 1 G: 1 SUSPENSION ORAL at 16:01

## 2019-05-30 RX ADMIN — VANCOMYCIN HYDROCHLORIDE 125 MG: KIT at 21:18

## 2019-05-30 RX ADMIN — ONDANSETRON HYDROCHLORIDE 8 MG: 8 TABLET, FILM COATED ORAL at 15:54

## 2019-05-30 RX ADMIN — ACYCLOVIR 800 MG: 800 TABLET ORAL at 21:18

## 2019-05-30 RX ADMIN — URSODIOL 300 MG: 300 CAPSULE ORAL at 08:31

## 2019-05-30 RX ADMIN — PANTOPRAZOLE SODIUM 40 MG: 40 TABLET, DELAYED RELEASE ORAL at 21:18

## 2019-05-30 RX ADMIN — ACYCLOVIR 800 MG: 800 TABLET ORAL at 08:31

## 2019-05-30 RX ADMIN — SODIUM CHLORIDE, PRESERVATIVE FREE 5 ML: 5 INJECTION INTRAVENOUS at 09:39

## 2019-05-30 RX ADMIN — TAMSULOSIN HYDROCHLORIDE 0.4 MG: 0.4 CAPSULE ORAL at 08:31

## 2019-05-30 RX ADMIN — SUCRALFATE 1 G: 1 SUSPENSION ORAL at 21:18

## 2019-05-30 RX ADMIN — URSODIOL 300 MG: 300 CAPSULE ORAL at 21:18

## 2019-05-30 RX ADMIN — AZITHROMYCIN 250 MG: 250 TABLET, FILM COATED ORAL at 08:31

## 2019-05-30 RX ADMIN — URSODIOL 300 MG: 300 CAPSULE ORAL at 14:01

## 2019-05-30 RX ADMIN — ACYCLOVIR 800 MG: 800 TABLET ORAL at 17:59

## 2019-05-30 RX ADMIN — ACYCLOVIR 800 MG: 800 TABLET ORAL at 11:24

## 2019-05-30 RX ADMIN — SUCRALFATE 1 G: 1 SUSPENSION ORAL at 11:46

## 2019-05-30 RX ADMIN — MICAFUNGIN SODIUM 50 MG: 10 INJECTION, POWDER, LYOPHILIZED, FOR SOLUTION INTRAVENOUS at 08:33

## 2019-05-30 RX ADMIN — DEXAMETHASONE 6 MG: 2 TABLET ORAL at 08:31

## 2019-05-30 RX ADMIN — ONDANSETRON HYDROCHLORIDE 8 MG: 8 TABLET, FILM COATED ORAL at 08:31

## 2019-05-30 RX ADMIN — PANTOPRAZOLE SODIUM 40 MG: 40 TABLET, DELAYED RELEASE ORAL at 08:32

## 2019-05-30 RX ADMIN — VANCOMYCIN HYDROCHLORIDE 125 MG: KIT at 08:32

## 2019-05-30 RX ADMIN — ACYCLOVIR 800 MG: 800 TABLET ORAL at 14:01

## 2019-05-30 RX ADMIN — SUCRALFATE 1 G: 1 SUSPENSION ORAL at 08:31

## 2019-05-30 ASSESSMENT — ACTIVITIES OF DAILY LIVING (ADL)
ADLS_ACUITY_SCORE: 14

## 2019-05-30 ASSESSMENT — MIFFLIN-ST. JEOR: SCORE: 1676.11

## 2019-05-30 NOTE — PLAN OF CARE
Patient vital signs stable. No new complaints. Transplant tomorrow. 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  Problem: Adult Inpatient Plan of Care  Goal: Plan of Care Review  5/30/2019 1834 by Rivera Chaney RN  Outcome: No Change  5/30/2019 0559 by Toan Gallego RN  Outcome: No Change  Goal: Patient-Specific Goal (Individualization)  Outcome: No Change  Goal: Absence of Hospital-Acquired Illness or Injury  Outcome: No Change  Goal: Optimal Comfort and Wellbeing  Outcome: No Change  Goal: Readiness for Transition of Care  Outcome: No Change  Goal: Rounds/Family Conference  Outcome: No Change     Problem: Adjustment to Transplant (Stem Cell/Bone Marrow Transplant)  Goal: Optimal Coping with Transplant  Outcome: No Change     Problem: Bladder Irritation (Stem Cell/Bone Marrow Transplant)  Goal: Symptom-Free Urinary Elimination  Outcome: No Change     Problem: Diarrhea (Stem Cell/Bone Marrow Transplant)  Goal: Diarrhea Symptom Control  Outcome: No Change     Problem: Fatigue (Stem Cell/Bone Marrow Transplant)  Goal: Energy Level Supports Daily Activity  5/30/2019 1834 by Rivera Chaney RN  Outcome: No Change  5/30/2019 0559 by Toan Gallego RN  Outcome: No Change     Problem: Hematologic Alteration (Stem Cell/Bone Marrow Transplant)  Goal: Blood Counts Within Acceptable Range  Outcome: No Change     Problem: Hypersensitivity Reaction (Stem Cell/Bone Marrow Transplant)  Goal: Absence of Hypersensitivity Reaction  Outcome: No Change     Problem: Infection Risk (Stem Cell/Bone Marrow Transplant)  Goal: Absence of Infection Signs/Symptoms  Outcome: No Change     Problem: Mucositis (Stem Cell/Bone Marrow Transplant)  Goal: Mucous Membrane Health and Integrity  5/30/2019 1834 by Rivera Chaney RN  Outcome: No Change  5/30/2019 0559 by Toan Gallego RN  Outcome: No Change     Problem: Nausea and Vomiting (Stem Cell/Bone Marrow Transplant)  Goal: Nausea and Vomiting Symptom  Relief  5/30/2019 1834 by Rivera Chaney, RN  Outcome: No Change  5/30/2019 0559 by Toan Gallego, RN  Outcome: No Change     Problem: Nutrition Intake Altered (Stem Cell/Bone Marrow Transplant)  Goal: Optimal Nutrition Intake  Outcome: No Change

## 2019-05-30 NOTE — PLAN OF CARE
[id number]. Pt AVSS, denies any pain. Mild indigestion from dinner. TUMS and Carafate partially effective. Thinks that the indigestion may be nausea instead of indigestion. Sleeping well over night so did not what any prn meds at this time. No SOB or chest pain. Voiding well. TBI scheduled at 2pm today. No replacements this AM. Continue to monitor.     Problem: Adult Inpatient Plan of Care  Goal: Plan of Care Review  Outcome: No Change     Problem: Fatigue (Stem Cell/Bone Marrow Transplant)  Goal: Energy Level Supports Daily Activity  Outcome: No Change     Problem: Mucositis (Stem Cell/Bone Marrow Transplant)  Goal: Mucous Membrane Health and Integrity  Outcome: No Change     Problem: Nausea and Vomiting (Stem Cell/Bone Marrow Transplant)  Goal: Nausea and Vomiting Symptom Relief  Outcome: No Change

## 2019-05-30 NOTE — PROGRESS NOTES
"BMT Daily Progress Note     Mr. Ace 63 y.o hx of AML. Admitted for VELMA Haplo. Currently day -1.  Overnight events: Randy has a little \"indigestion\" - still there this morning. It is better- he is wondering if related to hid diet (Onions). He is eating and drinking well but he is struggling if nausea or if acid reflux. No bleeding, diarrhea or other complaints. Tolerating TBI well.   Review of Systems: 10 point ROS negative except as noted above.    Physical Exam:   Blood pressure 120/75, pulse 87, temperature 97.8  F (36.6  C), temperature source Oral, resp. rate 16, height 1.735 m (5' 8.31\"), weight 90.6 kg (199 lb 11.2 oz), SpO2 98 %.  General: NAD   Eyes: JEROMY, sclera anicteric   Nose/Mouth/Throat: OP clear, buccal mucosa moist, no ulcerations   Lungs: CTA bilaterally  Cardiovascular: RRR, no M/R/G   Abdominal/Rectal: +BS, soft, NT, ND, No HSM   Lymphatics: no edema  Skin: no rashes or petechaie  Neuro: A&O   Additional Findings: [place] site c/d/i    Labs:  Lab Results   Component Value Date    WBC 8.2 05/30/2019    ANEU 8.0 05/30/2019    HGB 10.8 (L) 05/30/2019    HCT 34.4 (L) 05/30/2019     05/30/2019     05/30/2019    POTASSIUM 3.8 05/30/2019    CHLORIDE 106 05/30/2019    CO2 26 05/30/2019    GLC 98 05/30/2019    BUN 16 05/30/2019    CR 0.60 (L) 05/30/2019    MAG 2.5 (H) 05/29/2019    INR 1.10 05/27/2019         Imaging: Reviewed  Microbiology:  Reviewed    Assessment and Plan:   El Ace is a 63 year old male with hx of AML (IDH2, RUNX1 pos), currently day -1 of flu/cy/TBI and related haplo BMT.     5/25 (day -6): flu/cy  5/26 (day -5): flu  5/27 (day -4): flu   5/28 (day -3): flu  5/29 (day -2): TBI/flu  5/30 (day -1): TBI  5/31 (day 0): Transplant. No ABO mismatch - both A+. Will give slight flush given marrow. 2 hours pre and 8 hours post.      1.  BMT: Prep as above. Allopurinol through day 0. Flush and pre-meds prior to transplant. GCSF starts d+5 and cont to until ANC>2500 x 2 " consecutive days. Re-stage per protocol.     2.  HEME: Keep Hgb>8g/dL and plts>10K. No pre-meds.  - on MiPlate study                            3.  ID: Afebrile. Prophylaxis with HD ACV (CMV pos, HSV pos, EBV pos), azithromycin (c. Diff), Jesica to Vfend.  Bactrim or appropriate PCP therapy to start d+28.    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 In the event of neutropenic fever, recommend immediate ID consult  - recent history of CDI, recommend oral vancomycin prophylaxis 125mg BID                     4.  GI:   Nausea resolved, reports indigestion without reflux. Tums prn  - Increase GI prophy - protonox BID   Zofran and dexamethasone prior to chemo to prevent N/V. Ativan and compazine available PRN break-through symptoms.   - Ursodiol for VOD prophy.     5.  GVH: post transplant cytoxan (day +3, +4), tac/MMF start day +5.     6.  FEN/Renal: Monitor creat and lytes.   Wt stable from admission  Replete lytes PRN per SS.      Val Lindsey PA-C  [phone]     Attending Note:    The patient was seen and examined by me separate from mid-level provider.   I personally reviewed the today's laboratory results, vital signs and radiology results.    Doing well.  Playing his guitar.  He had mild stomach upset this morning but was not sure he was related to the chemotherapy.  He did not ask for additional medication.    I found that vitals are stable and there was no fever. No acute distress. Patient was alert and oriented and grossly neurologically intact. Mouth is clean. Line insertion non-tender and clear. Lungs clear bilateral. Heart regular. Abdomen soft non-tender, BS present. Lower extremity with no edema. No rash.     Main laboratory finding were   Lab Results   Component Value Date    WBC 8.2 05/30/2019    ANEU 8.0 05/30/2019    HGB 10.8 (L) 05/30/2019    HCT 34.4 (L) 05/30/2019     05/30/2019     05/30/2019    POTASSIUM 3.8 05/30/2019    CHLORIDE 106 05/30/2019    CO2 26 05/30/2019    GLC 98  05/30/2019    BUN 16 05/30/2019    CR 0.60 (L) 05/30/2019    MAG 2.5 (H) 05/29/2019    INR 1.10 05/27/2019    BILITOTAL 0.3 05/27/2019    AST 14 05/27/2019    ALT 35 05/27/2019    ALKPHOS 132 05/27/2019    PROTTOTAL 6.5 (L) 05/27/2019    ALBUMIN 3.3 (L) 05/27/2019         My plan is to continue with the TBI today.  Tomorrow will be transplant day.  I encouraged him to ask for antinausea medication if he does not feel well.  I explained that this is likely a side effect of the conditioning regimen.  He was encouraged to remain physically active and to keep oral intake, as tolerated.  We will reassess him tomorrow.        Alexander Zeng M.D.

## 2019-05-31 LAB
ANION GAP SERPL CALCULATED.3IONS-SCNC: 4 MMOL/L (ref 3–14)
BACTERIA SPEC CULT: ABNORMAL
BACTERIA SPEC CULT: ABNORMAL
BASOPHILS # BLD AUTO: 0 10E9/L (ref 0–0.2)
BASOPHILS NFR BLD AUTO: 0.1 %
BUN SERPL-MCNC: 16 MG/DL (ref 7–30)
CALCIUM SERPL-MCNC: 8.7 MG/DL (ref 8.5–10.1)
CHLORIDE SERPL-SCNC: 106 MMOL/L (ref 94–109)
CO2 SERPL-SCNC: 28 MMOL/L (ref 20–32)
CREAT SERPL-MCNC: 0.52 MG/DL (ref 0.66–1.25)
DIFFERENTIAL METHOD BLD: ABNORMAL
EOSINOPHIL # BLD AUTO: 0 10E9/L (ref 0–0.7)
EOSINOPHIL NFR BLD AUTO: 0 %
ERYTHROCYTE [DISTWIDTH] IN BLOOD BY AUTOMATED COUNT: 17.6 % (ref 10–15)
GFR SERPL CREATININE-BSD FRML MDRD: >90 ML/MIN/{1.73_M2}
GLUCOSE SERPL-MCNC: 119 MG/DL (ref 70–99)
HCT VFR BLD AUTO: 33.2 % (ref 40–53)
HGB BLD-MCNC: 10.7 G/DL (ref 13.3–17.7)
IMM GRANULOCYTES # BLD: 0.1 10E9/L (ref 0–0.4)
IMM GRANULOCYTES NFR BLD: 1 %
LYMPHOCYTES # BLD AUTO: 0.1 10E9/L (ref 0.8–5.3)
LYMPHOCYTES NFR BLD AUTO: 0.7 %
Lab: ABNORMAL
MAGNESIUM SERPL-MCNC: 2.6 MG/DL (ref 1.6–2.3)
MCH RBC QN AUTO: 30.9 PG (ref 26.5–33)
MCHC RBC AUTO-ENTMCNC: 32.2 G/DL (ref 31.5–36.5)
MCV RBC AUTO: 96 FL (ref 78–100)
MONOCYTES # BLD AUTO: 0.1 10E9/L (ref 0–1.3)
MONOCYTES NFR BLD AUTO: 1.2 %
NEUTROPHILS # BLD AUTO: 7.4 10E9/L (ref 1.6–8.3)
NEUTROPHILS NFR BLD AUTO: 97 %
NRBC # BLD AUTO: 0 10*3/UL
NRBC BLD AUTO-RTO: 0 /100
PLATELET # BLD AUTO: 268 10E9/L (ref 150–450)
POTASSIUM SERPL-SCNC: 4.1 MMOL/L (ref 3.4–5.3)
RBC # BLD AUTO: 3.46 10E12/L (ref 4.4–5.9)
SODIUM SERPL-SCNC: 138 MMOL/L (ref 133–144)
SPECIMEN SOURCE: ABNORMAL
WBC # BLD AUTO: 7.6 10E9/L (ref 4–11)

## 2019-05-31 PROCEDURE — 30243G2 TRANSFUSION OF ALLOGENEIC RELATED BONE MARROW INTO CENTRAL VEIN, PERCUTANEOUS APPROACH: ICD-10-PCS | Performed by: INTERNAL MEDICINE

## 2019-05-31 PROCEDURE — 25000128 H RX IP 250 OP 636: Performed by: INTERNAL MEDICINE

## 2019-05-31 PROCEDURE — 25000131 ZZH RX MED GY IP 250 OP 636 PS 637: Performed by: PHYSICIAN ASSISTANT

## 2019-05-31 PROCEDURE — 83735 ASSAY OF MAGNESIUM: CPT | Performed by: PHYSICIAN ASSISTANT

## 2019-05-31 PROCEDURE — 25800030 ZZH RX IP 258 OP 636: Performed by: PHYSICIAN ASSISTANT

## 2019-05-31 PROCEDURE — 25000132 ZZH RX MED GY IP 250 OP 250 PS 637: Performed by: INTERNAL MEDICINE

## 2019-05-31 PROCEDURE — 81500001 ZZH ACQUISITION BONE MARROW RELATED DONOR

## 2019-05-31 PROCEDURE — 25000132 ZZH RX MED GY IP 250 OP 250 PS 637: Performed by: PHYSICIAN ASSISTANT

## 2019-05-31 PROCEDURE — 20600000 ZZH R&B BMT

## 2019-05-31 PROCEDURE — 80048 BASIC METABOLIC PNL TOTAL CA: CPT | Performed by: PHYSICIAN ASSISTANT

## 2019-05-31 PROCEDURE — 25800030 ZZH RX IP 258 OP 636: Performed by: INTERNAL MEDICINE

## 2019-05-31 PROCEDURE — 38214 VOLUME DEPLETE OF HARVEST: CPT | Performed by: INTERNAL MEDICINE

## 2019-05-31 PROCEDURE — 25000128 H RX IP 250 OP 636: Performed by: PHYSICIAN ASSISTANT

## 2019-05-31 PROCEDURE — 85025 COMPLETE CBC W/AUTO DIFF WBC: CPT | Performed by: PHYSICIAN ASSISTANT

## 2019-05-31 RX ORDER — SODIUM CHLORIDE 9 MG/ML
INJECTION, SOLUTION INTRAVENOUS CONTINUOUS
Status: ACTIVE | OUTPATIENT
Start: 2019-05-31 | End: 2019-05-31

## 2019-05-31 RX ORDER — DIPHENHYDRAMINE HCL 25 MG
25 CAPSULE ORAL ONCE
Status: COMPLETED | OUTPATIENT
Start: 2019-05-31 | End: 2019-05-31

## 2019-05-31 RX ORDER — ACETAMINOPHEN 325 MG/1
650 TABLET ORAL ONCE
Status: COMPLETED | OUTPATIENT
Start: 2019-05-31 | End: 2019-05-31

## 2019-05-31 RX ADMIN — ONDANSETRON HYDROCHLORIDE 8 MG: 8 TABLET, FILM COATED ORAL at 08:04

## 2019-05-31 RX ADMIN — SUCRALFATE 1 G: 1 SUSPENSION ORAL at 21:32

## 2019-05-31 RX ADMIN — ACYCLOVIR 800 MG: 800 TABLET ORAL at 11:17

## 2019-05-31 RX ADMIN — SODIUM CHLORIDE: 9 INJECTION, SOLUTION INTRAVENOUS at 19:27

## 2019-05-31 RX ADMIN — URSODIOL 300 MG: 300 CAPSULE ORAL at 08:00

## 2019-05-31 RX ADMIN — ONDANSETRON HYDROCHLORIDE 8 MG: 8 TABLET, FILM COATED ORAL at 23:54

## 2019-05-31 RX ADMIN — Medication 5 ML: at 21:32

## 2019-05-31 RX ADMIN — MICAFUNGIN SODIUM 50 MG: 10 INJECTION, POWDER, LYOPHILIZED, FOR SOLUTION INTRAVENOUS at 08:05

## 2019-05-31 RX ADMIN — SUCRALFATE 1 G: 1 SUSPENSION ORAL at 16:02

## 2019-05-31 RX ADMIN — PANTOPRAZOLE SODIUM 40 MG: 40 TABLET, DELAYED RELEASE ORAL at 21:34

## 2019-05-31 RX ADMIN — ACETAMINOPHEN 650 MG: 325 TABLET, FILM COATED ORAL at 19:58

## 2019-05-31 RX ADMIN — VANCOMYCIN HYDROCHLORIDE 125 MG: KIT at 21:34

## 2019-05-31 RX ADMIN — DIPHENHYDRAMINE HYDROCHLORIDE 25 MG: 25 CAPSULE ORAL at 16:02

## 2019-05-31 RX ADMIN — ACYCLOVIR 800 MG: 800 TABLET ORAL at 08:04

## 2019-05-31 RX ADMIN — ONDANSETRON HYDROCHLORIDE 8 MG: 8 TABLET, FILM COATED ORAL at 16:02

## 2019-05-31 RX ADMIN — ONDANSETRON HYDROCHLORIDE 8 MG: 8 TABLET, FILM COATED ORAL at 00:37

## 2019-05-31 RX ADMIN — SUCRALFATE 1 G: 1 SUSPENSION ORAL at 08:04

## 2019-05-31 RX ADMIN — SODIUM CHLORIDE: 9 INJECTION, SOLUTION INTRAVENOUS at 13:15

## 2019-05-31 RX ADMIN — TAMSULOSIN HYDROCHLORIDE 0.4 MG: 0.4 CAPSULE ORAL at 08:04

## 2019-05-31 RX ADMIN — DIPHENHYDRAMINE HYDROCHLORIDE 25 MG: 25 CAPSULE ORAL at 19:58

## 2019-05-31 RX ADMIN — URSODIOL 300 MG: 300 CAPSULE ORAL at 14:26

## 2019-05-31 RX ADMIN — SUCRALFATE 1 G: 1 SUSPENSION ORAL at 12:10

## 2019-05-31 RX ADMIN — URSODIOL 300 MG: 300 CAPSULE ORAL at 19:26

## 2019-05-31 RX ADMIN — AZITHROMYCIN 250 MG: 250 TABLET, FILM COATED ORAL at 08:04

## 2019-05-31 RX ADMIN — DIPHENHYDRAMINE HYDROCHLORIDE 25 MG: 25 CAPSULE ORAL at 18:04

## 2019-05-31 RX ADMIN — ACETAMINOPHEN 650 MG: 325 TABLET, FILM COATED ORAL at 16:02

## 2019-05-31 RX ADMIN — ACYCLOVIR 800 MG: 800 TABLET ORAL at 17:58

## 2019-05-31 RX ADMIN — Medication 5 ML: at 10:41

## 2019-05-31 RX ADMIN — Medication 5 ML: at 03:39

## 2019-05-31 RX ADMIN — PANTOPRAZOLE SODIUM 40 MG: 40 TABLET, DELAYED RELEASE ORAL at 08:04

## 2019-05-31 RX ADMIN — ACYCLOVIR 800 MG: 800 TABLET ORAL at 14:26

## 2019-05-31 RX ADMIN — ACYCLOVIR 800 MG: 800 TABLET ORAL at 21:32

## 2019-05-31 RX ADMIN — SODIUM CHLORIDE, PRESERVATIVE FREE 5 ML: 5 INJECTION INTRAVENOUS at 10:30

## 2019-05-31 RX ADMIN — VANCOMYCIN HYDROCHLORIDE 125 MG: KIT at 08:04

## 2019-05-31 ASSESSMENT — ACTIVITIES OF DAILY LIVING (ADL)
ADLS_ACUITY_SCORE: 14

## 2019-05-31 ASSESSMENT — MIFFLIN-ST. JEOR: SCORE: 1677.37

## 2019-05-31 NOTE — PROCEDURES
Radiation Oncology:  [facility] [address] [place] [address]     MATHEW ESPINOMichelle  Med Rec #: [id number]  Diagnosis: C92.01 Acute myeloblastic leukemia, in remission        Total Body Irradiation Physician PASCUAL Note  May 29, 2019             Under my direction a Single fraction of 200cGy TBI was delivered after the set up   parameters were checked in the treatment position in the treatment room.  The chart and   set up information were reviewed.  The patient's questions were answered.     Objective:  Patient appeared relaxed and ready for TBI.  Nausea well controlled  Assessment:    Tolerated the  TBI.    Plan:       Proceed as per BMT program      Treatment Summary:  Radiation Oncology - Course: 1 Protocol: 2016-15  Treatment Site  Current   Dose  Modality  From  To  Elapsed   Days  Fx.  1 TBI     200 cGy  18 X   5/29/2019   5/29/2019        1                             I have checked the following parameters prior to the first treatment:  Patient Identification  Protocol  SSD, Correct placement of the field, correct body position  Prescription thickness  Compensator and beam spoiler placement  Dose prescription, dose rate and energy  OSLs were ordered for verification during the treatment     Heather Vieira MD

## 2019-05-31 NOTE — PROGRESS NOTES
"BMT Daily Progress Note     Mr. Ace 63 y.o hx of AML. Admitted for VELMA Haplo. Currently day 0.  Overnight events: Randy is feeling well. No complaints today. Discussed will start transplant flush, then bone marrow infusion later today. Afebrile and indigestion/heartburn is improved.   Review of Systems: 10 point ROS negative except as noted above.    Physical Exam:   Blood pressure 103/78, pulse 87, temperature 97.5  F (36.4  C), temperature source Oral, resp. rate 16, height 1.735 m (5' 8.31\"), weight 90.7 kg (199 lb 14.4 oz), SpO2 95 %.  General: NAD   Eyes: JEROMY, sclera anicteric   Nose/Mouth/Throat: OP clear, buccal mucosa moist, no ulcerations   Lungs: CTA bilaterally  Cardiovascular: RRR, no M/R/G   Abdominal/Rectal: +BS, soft, NT, ND, No HSM   Lymphatics: no edema  Skin: no rashes or petechaie  Neuro: A&O   Additional Findings: [facility] c/d/i    Labs:  Lab Results   Component Value Date    WBC 7.6 05/31/2019    ANEU 7.4 05/31/2019    HGB 10.7 (L) 05/31/2019    HCT 33.2 (L) 05/31/2019     05/31/2019     05/31/2019    POTASSIUM 4.1 05/31/2019    CHLORIDE 106 05/31/2019    CO2 28 05/31/2019     (H) 05/31/2019    BUN 16 05/31/2019    CR 0.52 (L) 05/31/2019    MAG 2.6 (H) 05/31/2019    INR 1.10 05/27/2019     Imaging: Reviewed  Microbiology:  Reviewed    Assessment and Plan:   El Ace is a 63 year old male with hx of AML (IDH2, RUNX1 pos), currently day 0 of flu/cy/TBI and related haplo BMT.     5/25 (day -6): flu/cy  5/26 (day -5): flu  5/27 (day -4): flu   5/28 (day -3): flu  5/29 (day -2): TBI/flu  5/30 (day -1): TBI  5/31 (day 0): Transplant. No ABO mismatch - both A+. Will give slight flush given marrow. 2 hours pre and 8 hours post.      1.  BMT: Prep as above.     GCSF starts d+5 and cont to until ANC>2500 x 2 consecutive days. Re-stage per protocol.     2.  HEME: Keep Hgb>8g/dL and plts>10K. No pre-meds.  - on MiPlate study                            3.  ID: Afebrile. " Prophylaxis with HD ACV (CMV pos, HSV pos, EBV pos), azithromycin (c. Diff), Jesica to Vfend.  Bactrim or appropriate PCP therapy to start d+28.    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 In the event of neutropenic fever, recommend immediate ID consult  - recent history of CDI, recommend oral vancomycin prophylaxis 125mg BID                     4.  GI:   - Increase GI prophy - protonox BID. Improved symptoms. He does have prn carafate.   Zofran and dexamethasone prior to chemo to prevent N/V.   Ativan and compazine available PRN break-through symptoms.   - Ursodiol for VOD prophy.     5.  GVH: post transplant cytoxan (day +3, +4), tac/MMF start day +5.     6.  FEN/Renal: Monitor creat and lytes.   Wt stable from admission  Replete lytes PRN per SS.      Val Lindsey PA-C  [phone]     Attending Note:    The patient was seen and examined by me separate from mid-level provider.   I personally reviewed the today's laboratory results, vital signs and radiology results.    Doing well.  He has no complaints and is looking forward to the transplant today.   I found that vitals are stable and there was no fever. No acute distress. Patient was alert and oriented and grossly neurologically intact. Mouth is clean. Line insertion non-tender and clear. Lungs clear bilateral. Heart regular. Abdomen soft non-tender, BS present. Lower extremity with no edema. No rash.     Main laboratory finding were   Lab Results   Component Value Date    WBC 7.6 05/31/2019    ANEU 7.4 05/31/2019    HGB 10.7 (L) 05/31/2019    HCT 33.2 (L) 05/31/2019     05/31/2019     05/31/2019    POTASSIUM 4.1 05/31/2019    CHLORIDE 106 05/31/2019    CO2 28 05/31/2019     (H) 05/31/2019    BUN 16 05/31/2019    CR 0.52 (L) 05/31/2019    MAG 2.6 (H) 05/31/2019    INR 1.10 05/27/2019    BILITOTAL 0.3 05/27/2019    AST 14 05/27/2019    ALT 35 05/27/2019    ALKPHOS 132 05/27/2019    PROTTOTAL 6.5 (L) 05/27/2019    ALBUMIN 3.3 (L) 05/27/2019          My plan is to proceed with the transplant as planned, later this afternoon.  He is doing well and he was encouraged to remain physically active and to eat, as tolerated.  He does not need any transfusions or intravenous nutrition at this time.  We will monitor him for fevers as his counts go down.  He has a very large volume with a bone marrow harvested that we expect to give him 2 x 4 hours apart.  We will reassess the patient in the morning.        Alexander Zeng M.D.

## 2019-05-31 NOTE — PROGRESS NOTES
BMT/Cellular Allogeneic Product Infusion       Patient Vitals for the past 24 hrs:   Temp Temp src Resp Heart Rate BP   05/30/19 0900 99.1  F (37.3  C) Oral 16 64 94/67   05/30/19 1147 98.2  F (36.8  C) -- 16 84 103/70   05/30/19 1555 98.1  F (36.7  C) Oral 16 76 103/76   05/30/19 1920 97.1  F (36.2  C) Axillary 16 82 107/75   05/31/19 0037 96.7  F (35.9  C) Axillary 16 77 114/69   05/31/19 0329 97  F (36.1  C) Axillary 16 78 120/76         BMT INFUSION DOCUMENTATION (last 48 hours)      BMT/Cellular Product Infusion    No documentation.                      Baseline Pre-Infusion Evaluation (to be completed by Provider):   Dyspnea: Grade 0 - none  Hypoxia: Grade 0 - not present  Fever: Grade 0 - afebrile  Chills: Grade 0 - none  Febrile Neutropenia: Grade 0 - not present  Sinus Bradycardia: Grade 0 - none  Hypertension: Grade 0 - none  Hypotension: Grade 0 - none  Chest Pain: Grade 0 - none  Bronchospasm: Grade 0 - none  Pain: Grade 0 - none  Rash: Grade 0 - None  Neurologic Specify: none    If adverse reactions, events or complications occur (fever greater than 2 degrees fahrenheit increase, and severe reactions of the following types: chills, dyspnea, bronchospasm, hyper/hypotension, hypoxia, bradycardia, chest pain, back/flank pain, hypoxia, and any other reaction deemed severe or life threatening; any instance of product bag breakage or unusual product appearance)    Any other events that are >= grade 3, then immediately contact the BMT Attending physician, the Cell Therapy Laboratory Medical Director (pager [phone]) and the Cell Therapy Laboratory ([phone]).  After midnight, holidays & weekends contact the [facility] on the appropriate campus ([facility] [place]: [phone]; [facility] [place]: [phone]).    Miranda Lindsey PA-C

## 2019-05-31 NOTE — PLAN OF CARE
AVSS. Pt has no complaints. Transplant today. Orders to flush 2 hours pre and 8 hours post infusion. Currently hep locked. All research labs collected this morning; call TTL for . Good urine output, bm x1. No replacements needed this morning. Continue plan of care.     Problem: Adult Inpatient Plan of Care  Goal: Plan of Care Review  5/31/2019 0621 by Renee Berg, RN  Outcome: No Change     Problem: Adult Inpatient Plan of Care  Goal: Patient-Specific Goal (Individualization)  5/31/2019 0621 by Renee Berg, RN  Outcome: No Change     Problem: Adult Inpatient Plan of Care  Goal: Optimal Comfort and Wellbeing  5/31/2019 0621 by Renee Berg, RN  Outcome: No Change     Problem: Adult Inpatient Plan of Care  Goal: Readiness for Transition of Care  5/31/2019 0621 by Renee Berg, RN  Outcome: No Change     Problem: Adjustment to Transplant (Stem Cell/Bone Marrow Transplant)  Goal: Optimal Coping with Transplant  5/31/2019 0621 by Renee Berg, RN  Outcome: No Change     Problem: Nutrition Intake Altered (Stem Cell/Bone Marrow Transplant)  Goal: Optimal Nutrition Intake  5/31/2019 0621 by Renee Berg, RN  Outcome: No Change

## 2019-05-31 NOTE — PLAN OF CARE
Patient with no complaints this shift, eating and drinking well. Transplant flush started around 1315 after calling cell processing, first infusion bag here around 1500.   Continue statin

## 2019-05-31 NOTE — PROGRESS NOTES
SPIRITUAL HEALTH SERVICES  SPIRITUAL ASSESSMENT Progress Note  [facility] ([place]) 5C     REFERRAL SOURCE: Initial assessment    Per chart review, patient is being supported by his own  at [facility] in [place], [place].     PLAN: [facility] will remain available to offer support as needed.    Debbie Grider  Oncology   Pager [phone]    [facility] remains available 24/7 for emergent requests/referrals, either by having the switchboard page the on-call  or by entering an ASAP/STAT consult in Epic (this will also page the on-call ).

## 2019-05-31 NOTE — PROCEDURES
After verifying patient identity and given pre-medication and IV fluid flush the patient received an allogeneic hematopoietic bone marrow transplant through her central line. The patient tolerated the procedure well with no immediate severe complications. I was present for the critical steps of this procedure.    Alexander Zeng M.D.

## 2019-05-31 NOTE — PROGRESS NOTES
BMT CLINICAL SOCIAL WORK NOTE:     Focus: Financial Assistance     Data: Randy is a 62 y/o male who is day 0 for an allo BMT for a diagnosis of AML.      Interventions: CSW met with Randy to assess coping and provide info on financial grants, per his wife's request. Randy's wife (Bessy) and his dtr were at his bedside. Randy said he is feeling good. CSW provided info on [employer], [facility], [employer] and [employer] Assistance grants. Let Randy and his wife know that I will change to outpatient rotation on Monday. Provided them with contact info for BMT SW (Gloria).      Assessment: Pt presented as calm and pleasant.  Pt appears to be coping well. Pt is supported by his wife and children.      Plan: CSW will continue to provide supportive counseling and assistance with resources as needed. CSW will continue to collaborate with multidisciplinary team regarding Pt's plan of care.      EDI Davidson, [facility]  Pager: [phone]  Phone: [phone]

## 2019-06-01 LAB
ANION GAP SERPL CALCULATED.3IONS-SCNC: 4 MMOL/L (ref 3–14)
ANISOCYTOSIS BLD QL SMEAR: SLIGHT
BASOPHILS # BLD AUTO: 0 10E9/L (ref 0–0.2)
BASOPHILS NFR BLD AUTO: 0.9 %
BUN SERPL-MCNC: 14 MG/DL (ref 7–30)
BURR CELLS BLD QL SMEAR: SLIGHT
CALCIUM SERPL-MCNC: 8.1 MG/DL (ref 8.5–10.1)
CHLORIDE SERPL-SCNC: 110 MMOL/L (ref 94–109)
CO2 SERPL-SCNC: 27 MMOL/L (ref 20–32)
CREAT SERPL-MCNC: 0.6 MG/DL (ref 0.66–1.25)
DIFFERENTIAL METHOD BLD: ABNORMAL
EOSINOPHIL # BLD AUTO: 0 10E9/L (ref 0–0.7)
EOSINOPHIL NFR BLD AUTO: 0 %
ERYTHROCYTE [DISTWIDTH] IN BLOOD BY AUTOMATED COUNT: 15.9 % (ref 10–15)
GFR SERPL CREATININE-BSD FRML MDRD: >90 ML/MIN/{1.73_M2}
GLUCOSE SERPL-MCNC: 88 MG/DL (ref 70–99)
HCT VFR BLD AUTO: 41.9 % (ref 40–53)
HGB BLD-MCNC: 13.3 G/DL (ref 13.3–17.7)
LYMPHOCYTES # BLD AUTO: 0 10E9/L (ref 0.8–5.3)
LYMPHOCYTES NFR BLD AUTO: 0.9 %
MCH RBC QN AUTO: 31.2 PG (ref 26.5–33)
MCHC RBC AUTO-ENTMCNC: 31.7 G/DL (ref 31.5–36.5)
MCV RBC AUTO: 98 FL (ref 78–100)
MONOCYTES # BLD AUTO: 0 10E9/L (ref 0–1.3)
MONOCYTES NFR BLD AUTO: 0 %
NEUTROPHILS # BLD AUTO: 2.7 10E9/L (ref 1.6–8.3)
NEUTROPHILS NFR BLD AUTO: 98.2 %
NRBC # BLD AUTO: 0 10*3/UL
NRBC BLD AUTO-RTO: 1 /100
PLATELET # BLD AUTO: 204 10E9/L (ref 150–450)
PLATELET # BLD EST: ABNORMAL 10*3/UL
POIKILOCYTOSIS BLD QL SMEAR: SLIGHT
POTASSIUM SERPL-SCNC: 4 MMOL/L (ref 3.4–5.3)
RBC # BLD AUTO: 4.26 10E12/L (ref 4.4–5.9)
SODIUM SERPL-SCNC: 141 MMOL/L (ref 133–144)
WBC # BLD AUTO: 2.7 10E9/L (ref 4–11)

## 2019-06-01 PROCEDURE — 80048 BASIC METABOLIC PNL TOTAL CA: CPT | Performed by: PHYSICIAN ASSISTANT

## 2019-06-01 PROCEDURE — 25000132 ZZH RX MED GY IP 250 OP 250 PS 637: Performed by: PHYSICIAN ASSISTANT

## 2019-06-01 PROCEDURE — 25000128 H RX IP 250 OP 636: Performed by: PHYSICIAN ASSISTANT

## 2019-06-01 PROCEDURE — 25000128 H RX IP 250 OP 636: Performed by: INTERNAL MEDICINE

## 2019-06-01 PROCEDURE — 25800030 ZZH RX IP 258 OP 636: Performed by: INTERNAL MEDICINE

## 2019-06-01 PROCEDURE — 25000132 ZZH RX MED GY IP 250 OP 250 PS 637: Performed by: INTERNAL MEDICINE

## 2019-06-01 PROCEDURE — 85025 COMPLETE CBC W/AUTO DIFF WBC: CPT | Performed by: PHYSICIAN ASSISTANT

## 2019-06-01 PROCEDURE — 20600000 ZZH R&B BMT

## 2019-06-01 RX ADMIN — MICAFUNGIN SODIUM 50 MG: 10 INJECTION, POWDER, LYOPHILIZED, FOR SOLUTION INTRAVENOUS at 08:28

## 2019-06-01 RX ADMIN — PANTOPRAZOLE SODIUM 40 MG: 40 TABLET, DELAYED RELEASE ORAL at 08:24

## 2019-06-01 RX ADMIN — SUCRALFATE 1 G: 1 SUSPENSION ORAL at 08:24

## 2019-06-01 RX ADMIN — URSODIOL 300 MG: 300 CAPSULE ORAL at 08:24

## 2019-06-01 RX ADMIN — ACYCLOVIR 800 MG: 800 TABLET ORAL at 18:00

## 2019-06-01 RX ADMIN — PROCHLORPERAZINE MALEATE 5 MG: 5 TABLET, FILM COATED ORAL at 16:17

## 2019-06-01 RX ADMIN — ACYCLOVIR 800 MG: 800 TABLET ORAL at 11:34

## 2019-06-01 RX ADMIN — SODIUM CHLORIDE, PRESERVATIVE FREE 5 ML: 5 INJECTION INTRAVENOUS at 10:30

## 2019-06-01 RX ADMIN — TAMSULOSIN HYDROCHLORIDE 0.4 MG: 0.4 CAPSULE ORAL at 08:24

## 2019-06-01 RX ADMIN — SODIUM CHLORIDE, PRESERVATIVE FREE 10 ML: 5 INJECTION INTRAVENOUS at 05:41

## 2019-06-01 RX ADMIN — SUCRALFATE 1 G: 1 SUSPENSION ORAL at 21:54

## 2019-06-01 RX ADMIN — ACYCLOVIR 800 MG: 800 TABLET ORAL at 08:25

## 2019-06-01 RX ADMIN — VANCOMYCIN HYDROCHLORIDE 125 MG: KIT at 19:49

## 2019-06-01 RX ADMIN — VANCOMYCIN HYDROCHLORIDE 125 MG: KIT at 08:24

## 2019-06-01 RX ADMIN — AZITHROMYCIN 250 MG: 250 TABLET, FILM COATED ORAL at 08:24

## 2019-06-01 RX ADMIN — URSODIOL 300 MG: 300 CAPSULE ORAL at 19:49

## 2019-06-01 RX ADMIN — SUCRALFATE 1 G: 1 SUSPENSION ORAL at 11:34

## 2019-06-01 RX ADMIN — ACYCLOVIR 800 MG: 800 TABLET ORAL at 14:26

## 2019-06-01 RX ADMIN — SUCRALFATE 1 G: 1 SUSPENSION ORAL at 16:46

## 2019-06-01 RX ADMIN — URSODIOL 300 MG: 300 CAPSULE ORAL at 14:26

## 2019-06-01 RX ADMIN — ACYCLOVIR 800 MG: 800 TABLET ORAL at 21:54

## 2019-06-01 RX ADMIN — PANTOPRAZOLE SODIUM 40 MG: 40 TABLET, DELAYED RELEASE ORAL at 19:49

## 2019-06-01 ASSESSMENT — ACTIVITIES OF DAILY LIVING (ADL)
ADLS_ACUITY_SCORE: 14

## 2019-06-01 NOTE — PROGRESS NOTES
Type of transplant: Donor: Allogeneic - Related  Product:      BMT INFUSION DOCUMENTATION (last 48 hours)      BMT/Cellular Product Infusion     Row Name 05/31/19 1500 05/31/19 1400             [REMOVED] Product 05/31/19 1552 HPC, Marrow    Product Details Product Release Date: 05/31/19 -AD Product Release Time: 1552 -SY Product Type: HPC, Marrow  -AD DIN: [id number]  -AD Product Description Code: [id number]  -AD Volume Dispensed (mL): 896 mL  -AD Completion Date (RN to complete): 05/31/19  -JW Completion Time (RN to complete): 1904  -JW    Checked by (Patient RN)  --  rose king rn  -LETA      Checked by (Witness)  --  -SR Lavonne Zamora RN  -STEVIE      Product Volume Infused (mL)  --  896 mL  -JW      Flush Volume (mL)  --  60 mL  -JW      Volume Dispensed (mL)  --  --         [REMOVED] Product 05/31/19 1825 HPC, Marrow    Product Details Product Release Date: 05/31/19 -AD Product Release Time: 1825 -SY Product Type: HPC, Marrow  -AD DIN: [id number]  -AD Product Description Code: [id number]  -AD Volume Dispensed (mL): 896 mL  -AD Completion Date (RN to complete): 05/31/19  -JW Completion Time (RN to complete): 2130  -JW    Checked by (Patient RN)  rose king rn  -LETA  --  -JW      Checked by (Witness)  Debbie Chance RN  -  -- Simultaneous filing. User may not have seen previous data.  -JW      Product Volume Infused (mL)  896 mL  -JW  --      Flush Volume (mL)  60 mL  -JW  --      Volume Dispensed (mL)  --  --        User Key  (r) = Recorded By, (t) = Taken By, (c) = Cosigned By    Initials Name Effective Dates    MONO Ulloa, Yessi SHELBY 04/29/14 -     Lavonne Valverde RN 04/30/15 -     Rose Antunez RN 04/29/14 -     Debbie Torres RN 08/21/17 -     Ilsa Sparks 04/29/14 -         Preparation: RN Documentation  Patient was premedicated as ordered: yes  Line Type: central line, right  Patient Stable Prior to Infusion: yes  Time Infusion Started: 1920  Teaching: side  effects/monitoring  Tolerated/Reaction: Patient tolerance of product infusion  Immediate suspected transfusion reaction to the product: none  Did patient have prior history of similar signs/symptoms during this hospitalization?: no  Symptoms during/after infusion: (NONE)  Did the patient tolerate the infusion well: yes  Medications and treatment for symptoms: none  Did the symptoms resolve?: (NA)  Flush until: 0530  Plan:continue

## 2019-06-01 NOTE — PLAN OF CARE
Pt denied pain or nausea.  Ate 75% and tolerating well.  Got two very large bags of haplo BMT  from his son.  Only complaint was itchy hands and feet - relieved with extra benadryl.  Pre-meds given and repeated after 4 hours.  Will flush until 0530 (8 hours post).  Fatigued tonight after benadryl doses.   Problem: Adult Inpatient Plan of Care  Goal: Plan of Care Review  5/31/2019 2236 by Rose Cortés, RN  Outcome: No Change

## 2019-06-01 NOTE — PLAN OF CARE
"/75   Pulse 59   Temp 97.3  F (36.3  C)   Resp 16   Ht 1.735 m (5' 8.31\")   Wt 90.8 kg (200 lb 2.8 oz)   SpO2 96%   BMI 30.16 kg/m    Patient slept well through the night  No complaints of nausea or pain  Post transplant flush done at 0530, patient is now heplocked.   Continue with current POC  Problem: Adult Inpatient Plan of Care  Goal: Plan of Care Review  6/1/2019 0547 by Cristino Rosado RN  Outcome: No Change     Problem: Adult Inpatient Plan of Care  Goal: Patient-Specific Goal (Individualization)  6/1/2019 0547 by Cristino Rosado RN  Outcome: No Change     Problem: Adjustment to Transplant (Stem Cell/Bone Marrow Transplant)  Goal: Optimal Coping with Transplant  6/1/2019 0547 by Cristino Rosado RN  Outcome: No Change     Problem: Bladder Irritation (Stem Cell/Bone Marrow Transplant)  Goal: Symptom-Free Urinary Elimination  6/1/2019 0547 by Cristino Rosado RN  Outcome: No Change     Problem: Fatigue (Stem Cell/Bone Marrow Transplant)  Goal: Energy Level Supports Daily Activity  6/1/2019 0547 by Cristino Rosado RN  Outcome: No Change     Problem: Hematologic Alteration (Stem Cell/Bone Marrow Transplant)  Goal: Blood Counts Within Acceptable Range  6/1/2019 0547 by Cristino Rosado RN  Outcome: No Change     "

## 2019-06-01 NOTE — PROGRESS NOTES
"BMT Daily Progress Note     Mr. Ace 63 y.o hx of AML. Admitted for VELMA Haplo. Currently day +1.  Overnight events: Randy is feeling well.  In fact, this morning he walked in the room he was vigorously walking on the treadmill.  He had no heartburn, no cough no shortness of breath.  He has no diarrhea.  There are no rashes.  He does not complain of edema.  Continues to have decent oral intake.    Review of Systems: 10 point ROS negative except as noted above.    Physical Exam:   Blood pressure 111/80, pulse 87, temperature 97.1  F (36.2  C), temperature source Axillary, resp. rate 16, height 1.735 m (5' 8.31\"), weight 90.8 kg (200 lb 2.8 oz), SpO2 99 %.  General: NAD   Eyes: JEROMY, sclera anicteric   Nose/Mouth/Throat: OP clear, buccal mucosa moist, no ulcerations   Lungs: CTA bilaterally  Cardiovascular: RRR, no M/R/G   Abdominal/Rectal: +BS, soft, NT, ND, No HSM   Lymphatics: no edema  Skin: no rashes or petechaie  Neuro: A&O   Additional Findings: Bath site c/d/i    Labs:  Lab Results   Component Value Date    WBC 2.7 (L) 06/01/2019    ANEU 2.7 06/01/2019    HGB 13.3 06/01/2019    HCT 41.9 06/01/2019     06/01/2019     06/01/2019    POTASSIUM 4.0 06/01/2019    CHLORIDE 110 (H) 06/01/2019    CO2 27 06/01/2019    GLC 88 06/01/2019    BUN 14 06/01/2019    CR 0.60 (L) 06/01/2019    MAG 2.6 (H) 05/31/2019    INR 1.10 05/27/2019     Imaging: Reviewed  Microbiology:  Reviewed    Assessment and Plan:   El Ace is a 63 year old male with hx of AML (IDH2, RUNX1 pos), currently day 0 of flu/cy/TBI and related haplo BMT.     5/25 (day -6): flu/cy  5/26 (day -5): flu  5/27 (day -4): flu   5/28 (day -3): flu  5/29 (day -2): TBI/flu  5/30 (day -1): TBI  5/31 (day 0): Transplant. No ABO mismatch - both A+. Will give slight flush given marrow. 2 hours pre and 8 hours post.      1.  BMT: Is now 1 day after the transplant doing well waiting to start the posttransplant cyclophosphamide on Monday.     GCSF " starts d+5 and cont to until ANC>2500 x 2 consecutive days. Re-stage per protocol.     2.  HEME: Keep Hgb>8g/dL and plts>10K. No pre-meds.  Counts are stable at this time there is no need for transfusions.  - on MiPlate study                            3.  ID: Afebrile. Prophylaxis with HD ACV (CMV pos, HSV pos, EBV pos), azithromycin (c. Diff), Jesica to Vfend.  Bactrim or appropriate PCP therapy to start d+28.    No active infections at this time.  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 In the event of neutropenic fever, recommend immediate ID consult  - recent history of CDI, recommend oral vancomycin prophylaxis 125mg BID                     4.  GI: No active gastrointestinal issues today.  - Increase GI prophy - protonox BID. Improved symptoms. He does have prn carafate.   Zofran and dexamethasone prior to chemo to prevent N/V.   Ativan and compazine available PRN break-through symptoms.   - Ursodiol for VOD prophy.     5.  GVH: post transplant cytoxan (day +3, +4), tac/MMF start day +5.     6.  FEN/Renal: Monitor creat and lytes.   Wt stable from admission  Replete lytes PRN per SS.      Alexander Zeng

## 2019-06-01 NOTE — PLAN OF CARE
Patient with no complaints of pain or nausea, eating well and working out on treadmill this morning.

## 2019-06-02 LAB
ABO + RH BLD: NORMAL
ABO + RH BLD: NORMAL
ANION GAP SERPL CALCULATED.3IONS-SCNC: 6 MMOL/L (ref 3–14)
ANISOCYTOSIS BLD QL SMEAR: SLIGHT
BASOPHILS # BLD AUTO: 0 10E9/L (ref 0–0.2)
BASOPHILS NFR BLD AUTO: 0 %
BLD GP AB SCN SERPL QL: NORMAL
BLOOD BANK CMNT PATIENT-IMP: NORMAL
BUN SERPL-MCNC: 13 MG/DL (ref 7–30)
CALCIUM SERPL-MCNC: 8.4 MG/DL (ref 8.5–10.1)
CHLORIDE SERPL-SCNC: 104 MMOL/L (ref 94–109)
CMV DNA SPEC NAA+PROBE-ACNC: NORMAL [IU]/ML
CMV DNA SPEC NAA+PROBE-LOG#: NORMAL {LOG_IU}/ML
CO2 SERPL-SCNC: 26 MMOL/L (ref 20–32)
CREAT SERPL-MCNC: 0.59 MG/DL (ref 0.66–1.25)
DIFFERENTIAL METHOD BLD: ABNORMAL
EOSINOPHIL # BLD AUTO: 0 10E9/L (ref 0–0.7)
EOSINOPHIL NFR BLD AUTO: 0 %
ERYTHROCYTE [DISTWIDTH] IN BLOOD BY AUTOMATED COUNT: 15.8 % (ref 10–15)
GFR SERPL CREATININE-BSD FRML MDRD: >90 ML/MIN/{1.73_M2}
GLUCOSE SERPL-MCNC: 104 MG/DL (ref 70–99)
HCT VFR BLD AUTO: 42.7 % (ref 40–53)
HGB BLD-MCNC: 14 G/DL (ref 13.3–17.7)
LYMPHOCYTES # BLD AUTO: 0 10E9/L (ref 0.8–5.3)
LYMPHOCYTES NFR BLD AUTO: 0 %
MCH RBC QN AUTO: 31.7 PG (ref 26.5–33)
MCHC RBC AUTO-ENTMCNC: 32.8 G/DL (ref 31.5–36.5)
MCV RBC AUTO: 97 FL (ref 78–100)
MONOCYTES # BLD AUTO: 0 10E9/L (ref 0–1.3)
MONOCYTES NFR BLD AUTO: 0 %
NEUTROPHILS # BLD AUTO: 4.2 10E9/L (ref 1.6–8.3)
NEUTROPHILS NFR BLD AUTO: 100 %
NRBC # BLD AUTO: 0.1 10*3/UL
NRBC BLD AUTO-RTO: 4 /100
PLATELET # BLD AUTO: 179 10E9/L (ref 150–450)
PLATELET # BLD EST: ABNORMAL 10*3/UL
POTASSIUM SERPL-SCNC: 4 MMOL/L (ref 3.4–5.3)
RBC # BLD AUTO: 4.41 10E12/L (ref 4.4–5.9)
SODIUM SERPL-SCNC: 136 MMOL/L (ref 133–144)
SPECIMEN EXP DATE BLD: NORMAL
SPECIMEN SOURCE: NORMAL
WBC # BLD AUTO: 4.2 10E9/L (ref 4–11)

## 2019-06-02 PROCEDURE — 80048 BASIC METABOLIC PNL TOTAL CA: CPT | Performed by: PHYSICIAN ASSISTANT

## 2019-06-02 PROCEDURE — 86901 BLOOD TYPING SEROLOGIC RH(D): CPT | Performed by: PHYSICIAN ASSISTANT

## 2019-06-02 PROCEDURE — 86900 BLOOD TYPING SEROLOGIC ABO: CPT | Performed by: PHYSICIAN ASSISTANT

## 2019-06-02 PROCEDURE — 25800030 ZZH RX IP 258 OP 636: Performed by: INTERNAL MEDICINE

## 2019-06-02 PROCEDURE — 86850 RBC ANTIBODY SCREEN: CPT | Performed by: PHYSICIAN ASSISTANT

## 2019-06-02 PROCEDURE — 20600000 ZZH R&B BMT

## 2019-06-02 PROCEDURE — 25000128 H RX IP 250 OP 636: Performed by: INTERNAL MEDICINE

## 2019-06-02 PROCEDURE — 25000132 ZZH RX MED GY IP 250 OP 250 PS 637: Performed by: PHYSICIAN ASSISTANT

## 2019-06-02 PROCEDURE — 25800025 ZZH RX 258: Performed by: PHYSICIAN ASSISTANT

## 2019-06-02 PROCEDURE — 25000128 H RX IP 250 OP 636: Performed by: PHYSICIAN ASSISTANT

## 2019-06-02 PROCEDURE — 85025 COMPLETE CBC W/AUTO DIFF WBC: CPT | Performed by: PHYSICIAN ASSISTANT

## 2019-06-02 PROCEDURE — 25000132 ZZH RX MED GY IP 250 OP 250 PS 637: Performed by: INTERNAL MEDICINE

## 2019-06-02 RX ORDER — ONDANSETRON 8 MG/1
8 TABLET, ORALLY DISINTEGRATING ORAL EVERY 6 HOURS PRN
Status: DISCONTINUED | OUTPATIENT
Start: 2019-06-02 | End: 2019-06-14

## 2019-06-02 RX ORDER — ONDANSETRON 2 MG/ML
8 INJECTION INTRAMUSCULAR; INTRAVENOUS EVERY 6 HOURS PRN
Status: DISCONTINUED | OUTPATIENT
Start: 2019-06-02 | End: 2019-06-14

## 2019-06-02 RX ADMIN — ACYCLOVIR 800 MG: 800 TABLET ORAL at 21:36

## 2019-06-02 RX ADMIN — URSODIOL 300 MG: 300 CAPSULE ORAL at 09:02

## 2019-06-02 RX ADMIN — Medication 5 ML: at 04:21

## 2019-06-02 RX ADMIN — AZITHROMYCIN 250 MG: 250 TABLET, FILM COATED ORAL at 09:01

## 2019-06-02 RX ADMIN — DEXTROSE AND SODIUM CHLORIDE 1000 ML: 5; 450 INJECTION, SOLUTION INTRAVENOUS at 21:47

## 2019-06-02 RX ADMIN — VANCOMYCIN HYDROCHLORIDE 125 MG: KIT at 19:53

## 2019-06-02 RX ADMIN — SUCRALFATE 1 G: 1 SUSPENSION ORAL at 13:00

## 2019-06-02 RX ADMIN — PANTOPRAZOLE SODIUM 40 MG: 40 TABLET, DELAYED RELEASE ORAL at 19:53

## 2019-06-02 RX ADMIN — URSODIOL 300 MG: 300 CAPSULE ORAL at 14:39

## 2019-06-02 RX ADMIN — URSODIOL 300 MG: 300 CAPSULE ORAL at 19:53

## 2019-06-02 RX ADMIN — SUCRALFATE 1 G: 1 SUSPENSION ORAL at 21:36

## 2019-06-02 RX ADMIN — MICAFUNGIN SODIUM 50 MG: 10 INJECTION, POWDER, LYOPHILIZED, FOR SOLUTION INTRAVENOUS at 09:01

## 2019-06-02 RX ADMIN — ACYCLOVIR 800 MG: 800 TABLET ORAL at 14:39

## 2019-06-02 RX ADMIN — ACYCLOVIR 800 MG: 800 TABLET ORAL at 09:02

## 2019-06-02 RX ADMIN — SUCRALFATE 1 G: 1 SUSPENSION ORAL at 09:01

## 2019-06-02 RX ADMIN — ACYCLOVIR 800 MG: 800 TABLET ORAL at 18:11

## 2019-06-02 RX ADMIN — PANTOPRAZOLE SODIUM 40 MG: 40 TABLET, DELAYED RELEASE ORAL at 09:02

## 2019-06-02 RX ADMIN — PROCHLORPERAZINE MALEATE 5 MG: 5 TABLET, FILM COATED ORAL at 17:12

## 2019-06-02 RX ADMIN — VANCOMYCIN HYDROCHLORIDE 125 MG: KIT at 09:01

## 2019-06-02 RX ADMIN — SUCRALFATE 1 G: 1 SUSPENSION ORAL at 17:12

## 2019-06-02 RX ADMIN — TAMSULOSIN HYDROCHLORIDE 0.4 MG: 0.4 CAPSULE ORAL at 09:02

## 2019-06-02 RX ADMIN — DEXTROSE AND SODIUM CHLORIDE 1000 ML: 5; 450 INJECTION, SOLUTION INTRAVENOUS at 21:37

## 2019-06-02 RX ADMIN — SODIUM CHLORIDE, PRESERVATIVE FREE 5 ML: 5 INJECTION INTRAVENOUS at 11:34

## 2019-06-02 RX ADMIN — ACYCLOVIR 800 MG: 800 TABLET ORAL at 11:29

## 2019-06-02 ASSESSMENT — ACTIVITIES OF DAILY LIVING (ADL)
ADLS_ACUITY_SCORE: 14

## 2019-06-02 ASSESSMENT — MIFFLIN-ST. JEOR: SCORE: 1685.37

## 2019-06-02 NOTE — PLAN OF CARE
Pt denied pain, but complained of nausea once - had relief with po compazine followed up with carafate.  Pt is eating well and taking pills without difficulty.   Problem: Nausea and Vomiting (Stem Cell/Bone Marrow Transplant)  Goal: Nausea and Vomiting Symptom Relief  6/1/2019 2232 by Rose Cortés, RN  Outcome: Declining

## 2019-06-02 NOTE — PROGRESS NOTES
"BMT Daily Progress Note     Mr. Ace 63 y.o hx of AML. Admitted for VELMA Haplo. Currently day +2.  Overnight events: Randy is feeling well.  He was again walking on the treadmill this morning when I came to see him.  He denies gastrointestinal or genitourinary problems.  He has good energy as evidenced by the working on the treadmill.  There are no other complaints today.    Review of Systems: 10 point ROS negative except as noted above.    Physical Exam:   Blood pressure 114/81, pulse 81, temperature 96.1  F (35.6  C), temperature source Axillary, resp. rate 16, height 1.735 m (5' 8.31\"), weight 91.6 kg (201 lb 15.1 oz), SpO2 98 %.  General: NAD   Eyes: JEROMY, sclera anicteric   Nose/Mouth/Throat: OP clear, buccal mucosa moist, no ulcerations   Lungs: CTA bilaterally  Cardiovascular: RRR, no M/R/G   Abdominal/Rectal: +BS, soft, NT, ND, No HSM   Lymphatics: no edema  Skin: no rashes or petechaie  Neuro: A&O   Additional Findings: [facility] c/d/i    Labs:  Lab Results   Component Value Date    WBC 4.2 06/02/2019    ANEU 4.2 06/02/2019    HGB 14.0 06/02/2019    HCT 42.7 06/02/2019     06/02/2019     06/02/2019    POTASSIUM 4.0 06/02/2019    CHLORIDE 104 06/02/2019    CO2 26 06/02/2019     (H) 06/02/2019    BUN 13 06/02/2019    CR 0.59 (L) 06/02/2019    MAG 2.6 (H) 05/31/2019    INR 1.10 05/27/2019     Imaging: Reviewed  Microbiology:  Reviewed    Assessment and Plan:   El Ace is a 63 year old male with hx of AML (IDH2, RUNX1 pos), currently day 0 of flu/cy/TBI and related haplo BMT.     5/25 (day -6): flu/cy  5/26 (day -5): flu  5/27 (day -4): flu   5/28 (day -3): flu  5/29 (day -2): TBI/flu  5/30 (day -1): TBI  5/31 (day 0): Transplant. No ABO mismatch - both A+. Will give slight flush given marrow. 2 hours pre and 8 hours post.      1.  BMT: I reviewed with him that we will start the IV fluids today for the posttransplant cyclophosphamide tomorrow.  He knows that we will have to " urinate every 2 hours to keep his bladder empty and reduce the risk of a choline causing hemorrhagic cystitis.   GCSF starts d+5 and cont to until ANC>2500 x 2 consecutive days. Re-stage per protocol.     2.  HEME: Keep Hgb>8g/dL and plts>10K.  His counts are starting to fall but he still requires no transfusions.   - on MiPlate study                            3.  ID: Afebrile. Prophylaxis with HD ACV (CMV pos, HSV pos, EBV pos), azithromycin (c. Diff), Jesica to Vfend.  Bactrim or appropriate PCP therapy to start d+28.    No active infections at this time.  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 In the event of neutropenic fever, recommend immediate ID consult  - recent history of CDI, recommend oral vancomycin prophylaxis 125mg BID                     4.  GI: No active gastrointestinal issues today.  - Increase GI prophy - protonox BID. Improved symptoms. He does have prn carafate.   Zofran and dexamethasone prior to chemo to prevent N/V.   Ativan and compazine available PRN break-through symptoms.   - Ursodiol for VOD prophy.     5.  GVH: post transplant cytoxan (day +3, +4), tac/MMF start day +5.     6.  FEN/Renal: Monitor creat and lytes.   Wt stable from admission  Replete lytes PRN per SS.      Alexander Zeng

## 2019-06-02 NOTE — PLAN OF CARE
Patient has no complaints of pain or nausea. Working out on treadmill this morning, ate both breakfast and lunch. Cytoxan pre flush to start at 2200 tonight.

## 2019-06-03 ENCOUNTER — APPOINTMENT (OUTPATIENT)
Dept: PHYSICAL THERAPY | Facility: CLINIC | Age: 64
DRG: 014 | End: 2019-06-03
Attending: INTERNAL MEDICINE
Payer: COMMERCIAL

## 2019-06-03 LAB
ALBUMIN SERPL-MCNC: 3 G/DL (ref 3.4–5)
ALP SERPL-CCNC: 125 U/L (ref 40–150)
ALT SERPL W P-5'-P-CCNC: 27 U/L (ref 0–70)
ANION GAP SERPL CALCULATED.3IONS-SCNC: 5 MMOL/L (ref 3–14)
ANISOCYTOSIS BLD QL SMEAR: SLIGHT
AST SERPL W P-5'-P-CCNC: 16 U/L (ref 0–45)
BASOPHILS # BLD AUTO: 0 10E9/L (ref 0–0.2)
BASOPHILS NFR BLD AUTO: 0 %
BILIRUB DIRECT SERPL-MCNC: 0.2 MG/DL (ref 0–0.2)
BILIRUB SERPL-MCNC: 0.9 MG/DL (ref 0.2–1.3)
BUN SERPL-MCNC: 11 MG/DL (ref 7–30)
CALCIUM SERPL-MCNC: 8.2 MG/DL (ref 8.5–10.1)
CHLORIDE SERPL-SCNC: 106 MMOL/L (ref 94–109)
CO2 SERPL-SCNC: 25 MMOL/L (ref 20–32)
CREAT SERPL-MCNC: 0.52 MG/DL (ref 0.66–1.25)
DIFFERENTIAL METHOD BLD: ABNORMAL
EOSINOPHIL # BLD AUTO: 0.1 10E9/L (ref 0–0.7)
EOSINOPHIL NFR BLD AUTO: 2.6 %
ERYTHROCYTE [DISTWIDTH] IN BLOOD BY AUTOMATED COUNT: 15.5 % (ref 10–15)
GFR SERPL CREATININE-BSD FRML MDRD: >90 ML/MIN/{1.73_M2}
GLUCOSE SERPL-MCNC: 135 MG/DL (ref 70–99)
HCT VFR BLD AUTO: 40.3 % (ref 40–53)
HGB BLD-MCNC: 13.2 G/DL (ref 13.3–17.7)
INR PPP: 1.01 (ref 0.86–1.14)
LYMPHOCYTES # BLD AUTO: 0 10E9/L (ref 0.8–5.3)
LYMPHOCYTES NFR BLD AUTO: 0 %
MAGNESIUM SERPL-MCNC: 2.2 MG/DL (ref 1.6–2.3)
MCH RBC QN AUTO: 31.5 PG (ref 26.5–33)
MCHC RBC AUTO-ENTMCNC: 32.8 G/DL (ref 31.5–36.5)
MCV RBC AUTO: 96 FL (ref 78–100)
MONOCYTES # BLD AUTO: 0 10E9/L (ref 0–1.3)
MONOCYTES NFR BLD AUTO: 0 %
NEUTROPHILS # BLD AUTO: 2.6 10E9/L (ref 1.6–8.3)
NEUTROPHILS NFR BLD AUTO: 97.4 %
PHOSPHATE SERPL-MCNC: 4 MG/DL (ref 2.5–4.5)
PLATELET # BLD AUTO: 143 10E9/L (ref 150–450)
PLATELET # BLD EST: ABNORMAL 10*3/UL
POTASSIUM SERPL-SCNC: 3.5 MMOL/L (ref 3.4–5.3)
POTASSIUM SERPL-SCNC: 3.8 MMOL/L (ref 3.4–5.3)
PROT SERPL-MCNC: 6.4 G/DL (ref 6.8–8.8)
RBC # BLD AUTO: 4.19 10E12/L (ref 4.4–5.9)
SODIUM SERPL-SCNC: 134 MMOL/L (ref 133–144)
SODIUM SERPL-SCNC: 136 MMOL/L (ref 133–144)
WBC # BLD AUTO: 2.7 10E9/L (ref 4–11)

## 2019-06-03 PROCEDURE — 25000132 ZZH RX MED GY IP 250 OP 250 PS 637: Performed by: INTERNAL MEDICINE

## 2019-06-03 PROCEDURE — 84295 ASSAY OF SERUM SODIUM: CPT | Performed by: PHYSICIAN ASSISTANT

## 2019-06-03 PROCEDURE — 20600000 ZZH R&B BMT

## 2019-06-03 PROCEDURE — 25800030 ZZH RX IP 258 OP 636: Performed by: INTERNAL MEDICINE

## 2019-06-03 PROCEDURE — 85610 PROTHROMBIN TIME: CPT | Performed by: PHYSICIAN ASSISTANT

## 2019-06-03 PROCEDURE — 25800025 ZZH RX 258: Performed by: PHYSICIAN ASSISTANT

## 2019-06-03 PROCEDURE — 82248 BILIRUBIN DIRECT: CPT | Performed by: PHYSICIAN ASSISTANT

## 2019-06-03 PROCEDURE — 85025 COMPLETE CBC W/AUTO DIFF WBC: CPT | Performed by: PHYSICIAN ASSISTANT

## 2019-06-03 PROCEDURE — 25000131 ZZH RX MED GY IP 250 OP 636 PS 637: Performed by: PHYSICIAN ASSISTANT

## 2019-06-03 PROCEDURE — 25000132 ZZH RX MED GY IP 250 OP 250 PS 637: Performed by: PHYSICIAN ASSISTANT

## 2019-06-03 PROCEDURE — 25000128 H RX IP 250 OP 636: Performed by: PHYSICIAN ASSISTANT

## 2019-06-03 PROCEDURE — 80053 COMPREHEN METABOLIC PANEL: CPT | Performed by: PHYSICIAN ASSISTANT

## 2019-06-03 PROCEDURE — 25000128 H RX IP 250 OP 636: Performed by: INTERNAL MEDICINE

## 2019-06-03 PROCEDURE — 83735 ASSAY OF MAGNESIUM: CPT | Performed by: PHYSICIAN ASSISTANT

## 2019-06-03 PROCEDURE — 97110 THERAPEUTIC EXERCISES: CPT | Mod: GP

## 2019-06-03 PROCEDURE — 84100 ASSAY OF PHOSPHORUS: CPT | Performed by: PHYSICIAN ASSISTANT

## 2019-06-03 PROCEDURE — 84132 ASSAY OF SERUM POTASSIUM: CPT | Performed by: PHYSICIAN ASSISTANT

## 2019-06-03 RX ADMIN — ACYCLOVIR 800 MG: 800 TABLET ORAL at 22:09

## 2019-06-03 RX ADMIN — ACYCLOVIR 800 MG: 800 TABLET ORAL at 17:01

## 2019-06-03 RX ADMIN — URSODIOL 300 MG: 300 CAPSULE ORAL at 14:17

## 2019-06-03 RX ADMIN — TAMSULOSIN HYDROCHLORIDE 0.4 MG: 0.4 CAPSULE ORAL at 07:52

## 2019-06-03 RX ADMIN — ACYCLOVIR 800 MG: 800 TABLET ORAL at 14:17

## 2019-06-03 RX ADMIN — DEXTROSE AND SODIUM CHLORIDE 1000 ML: 5; 450 INJECTION, SOLUTION INTRAVENOUS at 04:40

## 2019-06-03 RX ADMIN — PANTOPRAZOLE SODIUM 40 MG: 40 TABLET, DELAYED RELEASE ORAL at 07:52

## 2019-06-03 RX ADMIN — SUCRALFATE 1 G: 1 SUSPENSION ORAL at 22:09

## 2019-06-03 RX ADMIN — SUCRALFATE 1 G: 1 SUSPENSION ORAL at 11:02

## 2019-06-03 RX ADMIN — CYCLOPHOSPHAMIDE 4535 MG: 2 INJECTION, POWDER, FOR SOLUTION INTRAVENOUS; ORAL at 10:06

## 2019-06-03 RX ADMIN — MESNA 4540 MG: 100 INJECTION, SOLUTION INTRAVENOUS at 08:32

## 2019-06-03 RX ADMIN — ONDANSETRON HYDROCHLORIDE 8 MG: 8 TABLET, FILM COATED ORAL at 09:54

## 2019-06-03 RX ADMIN — FUROSEMIDE 10 MG: 10 INJECTION, SOLUTION INTRAVENOUS at 15:45

## 2019-06-03 RX ADMIN — VANCOMYCIN HYDROCHLORIDE 125 MG: KIT at 07:52

## 2019-06-03 RX ADMIN — FUROSEMIDE 10 MG: 10 INJECTION, SOLUTION INTRAVENOUS at 19:12

## 2019-06-03 RX ADMIN — ACYCLOVIR 800 MG: 800 TABLET ORAL at 11:02

## 2019-06-03 RX ADMIN — DEXTROSE AND SODIUM CHLORIDE 1000 ML: 5; 450 INJECTION, SOLUTION INTRAVENOUS at 22:11

## 2019-06-03 RX ADMIN — SUCRALFATE 1 G: 1 SUSPENSION ORAL at 15:45

## 2019-06-03 RX ADMIN — SUCRALFATE 1 G: 1 SUSPENSION ORAL at 07:52

## 2019-06-03 RX ADMIN — MICAFUNGIN SODIUM 50 MG: 10 INJECTION, POWDER, LYOPHILIZED, FOR SOLUTION INTRAVENOUS at 07:49

## 2019-06-03 RX ADMIN — VANCOMYCIN HYDROCHLORIDE 125 MG: KIT at 19:49

## 2019-06-03 RX ADMIN — FUROSEMIDE 10 MG: 10 INJECTION, SOLUTION INTRAVENOUS at 12:06

## 2019-06-03 RX ADMIN — DEXTROSE AND SODIUM CHLORIDE 1000 ML: 5; 450 INJECTION, SOLUTION INTRAVENOUS at 04:39

## 2019-06-03 RX ADMIN — AZITHROMYCIN 250 MG: 250 TABLET, FILM COATED ORAL at 07:52

## 2019-06-03 RX ADMIN — ONDANSETRON HYDROCHLORIDE 8 MG: 8 TABLET, FILM COATED ORAL at 17:01

## 2019-06-03 RX ADMIN — PANTOPRAZOLE SODIUM 40 MG: 40 TABLET, DELAYED RELEASE ORAL at 19:49

## 2019-06-03 RX ADMIN — URSODIOL 300 MG: 300 CAPSULE ORAL at 19:49

## 2019-06-03 RX ADMIN — DEXTROSE AND SODIUM CHLORIDE 1000 ML: 5; 450 INJECTION, SOLUTION INTRAVENOUS at 15:37

## 2019-06-03 RX ADMIN — ACYCLOVIR 800 MG: 800 TABLET ORAL at 07:52

## 2019-06-03 RX ADMIN — URSODIOL 300 MG: 300 CAPSULE ORAL at 07:52

## 2019-06-03 ASSESSMENT — ACTIVITIES OF DAILY LIVING (ADL)
ADLS_ACUITY_SCORE: 14

## 2019-06-03 ASSESSMENT — MIFFLIN-ST. JEOR
SCORE: 1700.15
SCORE: 1689.37

## 2019-06-03 NOTE — PLAN OF CARE
Pt complained of nausea once - received compazine with relief. Cytoxan flush started at 2200.  Eating and taking pills well.  No complaints of pain.     Problem: Adult Inpatient Plan of Care  Goal: Plan of Care Review  Outcome: No Change  Goal: Patient-Specific Goal (Individualization)  Outcome: No Change  Goal: Absence of Hospital-Acquired Illness or Injury  Outcome: No Change  Goal: Optimal Comfort and Wellbeing  Outcome: No Change  Goal: Readiness for Transition of Care  Outcome: No Change  Goal: Rounds/Family Conference  Outcome: No Change     Problem: Adjustment to Transplant (Stem Cell/Bone Marrow Transplant)  Goal: Optimal Coping with Transplant  Outcome: No Change     Problem: Bladder Irritation (Stem Cell/Bone Marrow Transplant)  Goal: Symptom-Free Urinary Elimination  Outcome: No Change     Problem: Diarrhea (Stem Cell/Bone Marrow Transplant)  Goal: Diarrhea Symptom Control  Outcome: No Change     Problem: Fatigue (Stem Cell/Bone Marrow Transplant)  Goal: Energy Level Supports Daily Activity  Outcome: No Change     Problem: Hematologic Alteration (Stem Cell/Bone Marrow Transplant)  Goal: Blood Counts Within Acceptable Range  Outcome: No Change     Problem: Hypersensitivity Reaction (Stem Cell/Bone Marrow Transplant)  Goal: Absence of Hypersensitivity Reaction  Outcome: No Change     Problem: Infection Risk (Stem Cell/Bone Marrow Transplant)  Goal: Absence of Infection Signs/Symptoms  Outcome: No Change     Problem: Mucositis (Stem Cell/Bone Marrow Transplant)  Goal: Mucous Membrane Health and Integrity  Outcome: No Change     Problem: Nausea and Vomiting (Stem Cell/Bone Marrow Transplant)  Goal: Nausea and Vomiting Symptom Relief  Outcome: No Change     Problem: Nutrition Intake Altered (Stem Cell/Bone Marrow Transplant)  Goal: Optimal Nutrition Intake  Outcome: No Change

## 2019-06-03 NOTE — PROGRESS NOTES
"BMT Daily Progress Note     Mr. Ace 63 y.o hx of AML. Admitted for VELMA Haplo. Currently day +3.  Overnight events: Urinating frequently with cytoxan flush.  No fevers.  NO cough or sob.  No bleeding.  No rash.  Review of Systems: 10 point ROS negative except as noted above.    Physical Exam:   Blood pressure 112/78, pulse 81, temperature 98.8  F (37.1  C), temperature source Oral, resp. rate 16, height 1.735 m (5' 8.31\"), weight 92 kg (202 lb 13.2 oz), SpO2 99 %.  General: NAD   Eyes: JEROMY, sclera anicteric   Nose/Mouth/Throat: OP clear, buccal mucosa moist, no ulcerations   Lungs: CTA bilaterally  Cardiovascular: RRR, no M/R/G   Abdominal/Rectal: +BS, soft, NT, ND, No HSM   Lymphatics: no edema  Skin: no rashes or petechaie  Neuro: A&O   Additional Findings: [facility] site c/d/i    Labs:  Lab Results   Component Value Date    WBC 2.7 (L) 06/03/2019    ANEU 2.6 06/03/2019    HGB 13.2 (L) 06/03/2019    HCT 40.3 06/03/2019     (L) 06/03/2019     06/03/2019    POTASSIUM 3.8 06/03/2019    CHLORIDE 106 06/03/2019    CO2 25 06/03/2019     (H) 06/03/2019    BUN 11 06/03/2019    CR 0.52 (L) 06/03/2019    MAG 2.2 06/03/2019    INR 1.01 06/03/2019     Imaging: Reviewed  Microbiology:  Reviewed    Assessment and Plan:   El Ace is a 63 year old male with hx of AML (IDH2, RUNX1 pos), currently day +3 of flu/cy/TBI and related haplo BMT.     5/25 (day -6): flu/cy  5/26 (day -5): flu  5/27 (day -4): flu   5/28 (day -3): flu  5/29 (day -2): TBI/flu  5/30 (day -1): TBI  5/31 (day 0): Transplant. No ABO mismatch - both A+. Will give slight flush given marrow. 2 hours pre and 8 hours post.      1.  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 GCSF starts d+5 and cont to until ANC>2500 x 2 consecutive days. Re-stage per protocol.     2.  HEME: Keep Hgb>8g/dL and plts>10K.   WBC trending down, no transfusions today.  Not yet neutropenic.  - on MiPlate study                            3.  ID: Afebrile. Prophylaxis with HD " ACV (CMV pos, HSV pos, EBV pos), azithromycin (c. Diff), Jesica to Vfend.  Bactrim or appropriate PCP therapy to start d+28.    No active infections at this time.  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 In the event of neutropenic fever, recommend immediate ID consult  - recent history of CDI, recommend oral vancomycin prophylaxis 125mg BID                     4.  GI:  No nausea.  - Increase GI prophy - protonox BID. Improved symptoms. He does have prn carafate.   Zofran and dexamethasone prior to chemo to prevent N/V.   Ativan and compazine available PRN break-through symptoms.   - Ursodiol for VOD prophy.     5.  GVH: post transplant cytoxan, flush started last night and cytoxan today (day +3, +4), tac/MMF start day +5.     6.  FEN/Renal: Monitor creat and lytes. Will sign out weight and Na/K to hospitalist/moonlighter.  Wt stable from admission  Replete lytes PRN per SS.      Mary De Leon PA-C  [id number]    Attending Note:    The patient was seen and examined by me separate from mid-level provider.   I personally reviewed the today's laboratory results, vital signs and radiology results.  Doing very well this morning.  Planning to exercise on the treadmill.  Started IV fluids.  Making frequent urine.  I found that vitals are stable and there was no fever. No acute distress. Patient was alert and oriented and grossly neurologically intact. Mouth is clean. Line insertion non-tender and clear. Lungs clear bilateral. Heart regular. Abdomen soft non-tender, BS present. Lower extremity with no edema. No rash.     Main laboratory finding were   Lab Results   Component Value Date    WBC 2.7 (L) 06/03/2019    ANEU 2.6 06/03/2019    HGB 13.2 (L) 06/03/2019    HCT 40.3 06/03/2019     (L) 06/03/2019     06/03/2019    POTASSIUM 3.5 06/03/2019    CHLORIDE 106 06/03/2019    CO2 25 06/03/2019     (H) 06/03/2019    BUN 11 06/03/2019    CR 0.52 (L) 06/03/2019    MAG 2.2 06/03/2019    INR 1.01 06/03/2019     BILITOTAL 0.9 06/03/2019    AST 16 06/03/2019    ALT 27 06/03/2019    ALKPHOS 125 06/03/2019    PROTTOTAL 6.4 (L) 06/03/2019    ALBUMIN 3.0 (L) 06/03/2019     My plan is to start the post transplant cyclophosphamide today, per protocol.  The patient was praised and encouraged to remain physically active.  He also encouraged to keep good oral intake.  His counts are falling but he does not require transfusions, yet.  He does not need intravenous nutrition.  We will reassess the patient in the morning.    Alexander Zeng M.D.

## 2019-06-03 NOTE — PLAN OF CARE
Discharge Planner PT 5C  Patient plan for discharge: local [facility]  Current status: pt declining activity with therapy today 2/2 fatigue and having IV running; encouraged use of TM as well as performance of UE/LE exercises. VSS on RA.   Barriers to return to prior living situation: medical status  Recommendations for discharge: local [facility] with assist  Rationale for recommendations: pt continues to be IND in room, per self pt is limited by IV at this time otherwise has no concerns.        Entered by: Brian Thomason 06/03/2019 3:22 PM

## 2019-06-03 NOTE — PROGRESS NOTES
"CLINICAL NUTRITION SERVICES - ASSESSMENT NOTE     Nutrition Prescription  Malnutrition Status:    Patient does not meet two of the above criteria necessary for diagnosing malnutrition but is at risk for malnutrition    Recommendations already ordered by Registered Dietitian (RD):  Medical Food Supplements - can order PRN, list of options provided  Snack - rec to start if intakes decline, list of options provided and can order PRN  Cafe Passes - provided 7 cafe passes to help prevent menu fatigue    Future/Additional Recommendations:  Monitor po intakes and need for further intervention if po decreases  -can consider scheduled snacks/supplements, use of appetite stimulant (such as mirtazapine, megace, marinol) if primary barrier is lack of appetite, calorie counts to assess need for TPN/nutrition support, additional cafe passes PRN     REASON FOR ASSESSMENT  El Ace is a/an 63 year old male assessed by the dietitian for [facility] significant for: AML  5/30 - VELMA Haplo    NUTRITION HISTORY  Pt reports that po intake is going well in house and that he has been eating without any major barriers. Encouraged pt to continue eating adequately and discussed various options available should po intakes decline. All questions/concerns addressed.    CURRENT NUTRITION ORDERS  Diet: High Kcal/High Protein, supplements PRN  Intake/Tolerance: % intakes at meals    LABS  Labs reviewed  Cr 0.52L, albumin 3.0L - likely w/ illness/inflammation  Bili/LFTs WNL  Euglycemia; Hgb A1C 6.0% (3/31/19)  TG 203H (4/8/19)    MEDICATIONS  Medications reviewed  zofran  carafate  IVF: D5-1/2NS @ 250 mL/hr -- 1020 kcal/day    ANTHROPOMETRICS  Height: 173.5 cm (5' 8.307\")  Most Recent Weight: 91.6 kg (201 lb 15.1 oz)    IBW: 70 kg  BMI:30.4-- Obesity Grade I BMI 30-34.9  Weight History:   Wt Readings from Last 15 Encounters:   06/03/19 92 kg (202 lb 13.2 oz)   05/16/19 91.3 kg (201 lb 4.5 oz)   05/10/19 89.4 kg (197 lb 3.2 oz)   05/09/19 " 89.7 kg (197 lb 11.2 oz)   05/07/19 87.9 kg (193 lb 11.2 oz)   04/26/19 90.8 kg (200 lb 1.6 oz)   04/23/19 89.2 kg (196 lb 9.6 oz)   04/23/19 89.2 kg (196 lb 9.6 oz)   04/15/19 85.8 kg (189 lb 3.2 oz)     Dosing Weight: 75 kg (adjusted - admit wt 90.7 kg and IBW of 70 kg)    ASSESSED NUTRITION NEEDS  Estimated Energy Needs: 3752-8449 kcals/day (25 - 30+ kcals/kg)  Justification: Maintenance s/p SCT  Estimated Protein Needs:  grams protein/day (1.2 - 1.5+ grams of pro/kg)  Justification: Maintenance of LBM and s/p SCT  Estimated Fluid Needs:  Per provider pending fluid status    PHYSICAL FINDINGS  See malnutrition section below.    MALNUTRITION  % Intake: No decreased intake noted  % Weight Loss: None noted  Subcutaneous Fat Loss: None observed  Muscle Loss: None observed  Fluid Accumulation/Edema: None noted  Malnutrition Diagnosis: Patient does not meet two of the above criteria necessary for diagnosing malnutrition but is at risk for malnutrition    NUTRITION DIAGNOSIS  Predicted inadequate nutrient intake (calories, protein) related to potential to develop barriers to po s/p SCT and/or menu fatigue with prolonged hospital stay     INTERVENTIONS  Implementation  Nutrition Education: Provided education on RD role and nutrition POC, discussion of snack/supplement options available - lists provided, reiterated role of adequate nutrition s/p SCT   Collaboration with other providers -5C rounds   Medical food supplement therapy -see above  Cafe passes x7 provided    Goals  Patient to consume % of nutritionally adequate meal trays TID, or the equivalent with supplements/snacks.     Monitoring/Evaluation  Progress toward goals will be monitored and evaluated per protocol.    Debbie Thornton MS, RD, , LD.  5C/BMT Pager:[phone]

## 2019-06-03 NOTE — PLAN OF CARE
Patient has no complaints of pain or nausea, ate both breakfast and lunch, tolerated cytoxan fine with no side effects noted. Had decreased urine output with his first void post cytoxan. Given 10 mg IV lasix with good results. Continue to monitor urine output, I/O's and weight. Lasix prn if needed.

## 2019-06-03 NOTE — PLAN OF CARE
"/79 (BP Location: Right arm)   Pulse 81   Temp 97.8  F (36.6  C) (Axillary)   Resp 16   Ht 1.735 m (5' 8.31\")   Wt 91.6 kg (201 lb 15.1 oz)   SpO2 97%   BMI 30.43 kg/m      VSS, afebrile. No complaint of pain or n/v/d. No PRN's given. Good urine output, around 2,000 ml. Labs drawn, no replacements needed. New bags hung of D5 and 0.45% at 250 mL/hr for cytoxan flush. Independent with cares. Continue with POC.     Problem: Adult Inpatient Plan of Care  Goal: Plan of Care Review  6/3/2019 0520 by Mihir Underwood, RN  Outcome: No Change     Problem: Adult Inpatient Plan of Care  Goal: Patient-Specific Goal (Individualization)  6/3/2019 0520 by Mihir Underwood, RN  Outcome: No Change     Problem: Adult Inpatient Plan of Care  Goal: Absence of Hospital-Acquired Illness or Injury  6/3/2019 0520 by Mihir Underwood, RN  Outcome: No Change     Problem: Adult Inpatient Plan of Care  Goal: Optimal Comfort and Wellbeing  6/3/2019 0520 by Mihir Underwood, RN  Outcome: No Change     Problem: Adjustment to Transplant (Stem Cell/Bone Marrow Transplant)  Goal: Optimal Coping with Transplant  6/3/2019 0520 by Mihir Underwood, RN  Outcome: No Change     Problem: Fatigue (Stem Cell/Bone Marrow Transplant)  Goal: Energy Level Supports Daily Activity  6/3/2019 0520 by Mihir Underwood, RN  Outcome: No Change     Problem: Hematologic Alteration (Stem Cell/Bone Marrow Transplant)  Goal: Blood Counts Within Acceptable Range  6/3/2019 0520 by Mihir Underwood, RN  Outcome: No Change     Problem: Infection Risk (Stem Cell/Bone Marrow Transplant)  Goal: Absence of Infection Signs/Symptoms  6/3/2019 0520 by Mihir Underwood, RN  Outcome: No Change     "

## 2019-06-04 ENCOUNTER — APPOINTMENT (OUTPATIENT)
Dept: GENERAL RADIOLOGY | Facility: CLINIC | Age: 64
DRG: 014 | End: 2019-06-04
Attending: INTERNAL MEDICINE
Payer: COMMERCIAL

## 2019-06-04 LAB
ALBUMIN UR-MCNC: NEGATIVE MG/DL
ANION GAP SERPL CALCULATED.3IONS-SCNC: 10 MMOL/L (ref 3–14)
ANISOCYTOSIS BLD QL SMEAR: SLIGHT
APPEARANCE UR: CLEAR
BASOPHILS # BLD AUTO: 0 10E9/L (ref 0–0.2)
BASOPHILS NFR BLD AUTO: 0 %
BILIRUB UR QL STRIP: NEGATIVE
BUN SERPL-MCNC: 10 MG/DL (ref 7–30)
CALCIUM SERPL-MCNC: 7.8 MG/DL (ref 8.5–10.1)
CHLORIDE SERPL-SCNC: 98 MMOL/L (ref 94–109)
CO2 SERPL-SCNC: 22 MMOL/L (ref 20–32)
COLOR UR AUTO: YELLOW
CREAT SERPL-MCNC: 0.6 MG/DL (ref 0.66–1.25)
DIFFERENTIAL METHOD BLD: ABNORMAL
EOSINOPHIL # BLD AUTO: 0 10E9/L (ref 0–0.7)
EOSINOPHIL NFR BLD AUTO: 1 %
ERYTHROCYTE [DISTWIDTH] IN BLOOD BY AUTOMATED COUNT: 15.6 % (ref 10–15)
GFR SERPL CREATININE-BSD FRML MDRD: >90 ML/MIN/{1.73_M2}
GLUCOSE SERPL-MCNC: 114 MG/DL (ref 70–99)
GLUCOSE UR STRIP-MCNC: NEGATIVE MG/DL
HCT VFR BLD AUTO: 39.1 % (ref 40–53)
HGB BLD-MCNC: 12.9 G/DL (ref 13.3–17.7)
HGB UR QL STRIP: NEGATIVE
KETONES UR STRIP-MCNC: 80 MG/DL
LEUKOCYTE ESTERASE UR QL STRIP: NEGATIVE
LYMPHOCYTES # BLD AUTO: 0 10E9/L (ref 0.8–5.3)
LYMPHOCYTES NFR BLD AUTO: 0 %
MCH RBC QN AUTO: 31.5 PG (ref 26.5–33)
MCHC RBC AUTO-ENTMCNC: 33 G/DL (ref 31.5–36.5)
MCV RBC AUTO: 95 FL (ref 78–100)
MONOCYTES # BLD AUTO: 0 10E9/L (ref 0–1.3)
MONOCYTES NFR BLD AUTO: 1 %
MUCOUS THREADS #/AREA URNS LPF: PRESENT /LPF
NEUTROPHILS # BLD AUTO: 2.2 10E9/L (ref 1.6–8.3)
NEUTROPHILS NFR BLD AUTO: 98 %
NITRATE UR QL: NEGATIVE
PH UR STRIP: 6.5 PH (ref 5–7)
PLATELET # BLD AUTO: 124 10E9/L (ref 150–450)
PLATELET # BLD EST: ABNORMAL 10*3/UL
POTASSIUM SERPL-SCNC: 3.5 MMOL/L (ref 3.4–5.3)
POTASSIUM SERPL-SCNC: 3.6 MMOL/L (ref 3.4–5.3)
RBC # BLD AUTO: 4.1 10E12/L (ref 4.4–5.9)
RBC #/AREA URNS AUTO: <1 /HPF (ref 0–2)
SODIUM SERPL-SCNC: 131 MMOL/L (ref 133–144)
SODIUM SERPL-SCNC: 133 MMOL/L (ref 133–144)
SOURCE: ABNORMAL
SP GR UR STRIP: 1.02 (ref 1–1.03)
UROBILINOGEN UR STRIP-MCNC: NORMAL MG/DL (ref 0–2)
WBC # BLD AUTO: 2.2 10E9/L (ref 4–11)
WBC #/AREA URNS AUTO: <1 /HPF (ref 0–5)

## 2019-06-04 PROCEDURE — 25000128 H RX IP 250 OP 636: Performed by: INTERNAL MEDICINE

## 2019-06-04 PROCEDURE — 84295 ASSAY OF SERUM SODIUM: CPT | Performed by: PHYSICIAN ASSISTANT

## 2019-06-04 PROCEDURE — 84132 ASSAY OF SERUM POTASSIUM: CPT | Performed by: PHYSICIAN ASSISTANT

## 2019-06-04 PROCEDURE — 25800030 ZZH RX IP 258 OP 636: Performed by: INTERNAL MEDICINE

## 2019-06-04 PROCEDURE — 71046 X-RAY EXAM CHEST 2 VIEWS: CPT

## 2019-06-04 PROCEDURE — 25000132 ZZH RX MED GY IP 250 OP 250 PS 637: Performed by: INTERNAL MEDICINE

## 2019-06-04 PROCEDURE — 81001 URINALYSIS AUTO W/SCOPE: CPT | Performed by: PHYSICIAN ASSISTANT

## 2019-06-04 PROCEDURE — 80048 BASIC METABOLIC PNL TOTAL CA: CPT | Performed by: PHYSICIAN ASSISTANT

## 2019-06-04 PROCEDURE — 87040 BLOOD CULTURE FOR BACTERIA: CPT | Performed by: PHYSICIAN ASSISTANT

## 2019-06-04 PROCEDURE — 87103 BLOOD FUNGUS CULTURE: CPT | Performed by: PHYSICIAN ASSISTANT

## 2019-06-04 PROCEDURE — 25000132 ZZH RX MED GY IP 250 OP 250 PS 637: Performed by: PHYSICIAN ASSISTANT

## 2019-06-04 PROCEDURE — 25000131 ZZH RX MED GY IP 250 OP 636 PS 637: Performed by: PHYSICIAN ASSISTANT

## 2019-06-04 PROCEDURE — 25000125 ZZHC RX 250: Performed by: INTERNAL MEDICINE

## 2019-06-04 PROCEDURE — 85025 COMPLETE CBC W/AUTO DIFF WBC: CPT | Performed by: PHYSICIAN ASSISTANT

## 2019-06-04 PROCEDURE — 25800030 ZZH RX IP 258 OP 636: Performed by: PHYSICIAN ASSISTANT

## 2019-06-04 PROCEDURE — 25000128 H RX IP 250 OP 636: Performed by: PHYSICIAN ASSISTANT

## 2019-06-04 PROCEDURE — 25800025 ZZH RX 258: Performed by: PHYSICIAN ASSISTANT

## 2019-06-04 PROCEDURE — 20600000 ZZH R&B BMT

## 2019-06-04 RX ORDER — DIPHENHYDRAMINE HYDROCHLORIDE 50 MG/ML
25 INJECTION INTRAMUSCULAR; INTRAVENOUS EVERY 6 HOURS PRN
Status: DISCONTINUED | OUTPATIENT
Start: 2019-06-04 | End: 2019-06-27 | Stop reason: HOSPADM

## 2019-06-04 RX ORDER — SODIUM CHLORIDE 9 MG/ML
INJECTION, SOLUTION INTRAVENOUS CONTINUOUS
Status: DISCONTINUED | OUTPATIENT
Start: 2019-06-04 | End: 2019-06-05

## 2019-06-04 RX ORDER — DIPHENHYDRAMINE HCL 25 MG
25 CAPSULE ORAL EVERY 6 HOURS PRN
Status: DISCONTINUED | OUTPATIENT
Start: 2019-06-04 | End: 2019-06-27 | Stop reason: HOSPADM

## 2019-06-04 RX ORDER — ACETAMINOPHEN 325 MG/1
650 TABLET ORAL EVERY 12 HOURS
Status: DISCONTINUED | OUTPATIENT
Start: 2019-06-04 | End: 2019-06-06

## 2019-06-04 RX ADMIN — TAMSULOSIN HYDROCHLORIDE 0.4 MG: 0.4 CAPSULE ORAL at 08:38

## 2019-06-04 RX ADMIN — VANCOMYCIN HYDROCHLORIDE 1500 MG: 10 INJECTION, POWDER, LYOPHILIZED, FOR SOLUTION INTRAVENOUS at 20:10

## 2019-06-04 RX ADMIN — PROCHLORPERAZINE EDISYLATE 5 MG: 5 INJECTION INTRAMUSCULAR; INTRAVENOUS at 03:06

## 2019-06-04 RX ADMIN — POLYMYXIN B SULFATE 1860000 UNITS: 500000 INJECTION, POWDER, LYOPHILIZED, FOR SOLUTION INTRAMUSCULAR; INTRATHECAL; INTRAVENOUS; OPHTHALMIC at 18:14

## 2019-06-04 RX ADMIN — ACETAMINOPHEN 650 MG: 325 TABLET, FILM COATED ORAL at 03:00

## 2019-06-04 RX ADMIN — ONDANSETRON HYDROCHLORIDE 8 MG: 8 TABLET, FILM COATED ORAL at 17:34

## 2019-06-04 RX ADMIN — FUROSEMIDE 20 MG: 10 INJECTION, SOLUTION INTRAVENOUS at 18:24

## 2019-06-04 RX ADMIN — SODIUM CHLORIDE 250 ML: 9 INJECTION, SOLUTION INTRAVENOUS at 17:28

## 2019-06-04 RX ADMIN — URSODIOL 300 MG: 300 CAPSULE ORAL at 20:10

## 2019-06-04 RX ADMIN — PANTOPRAZOLE SODIUM 40 MG: 40 TABLET, DELAYED RELEASE ORAL at 08:38

## 2019-06-04 RX ADMIN — PROCHLORPERAZINE MALEATE 5 MG: 5 TABLET, FILM COATED ORAL at 08:42

## 2019-06-04 RX ADMIN — ONDANSETRON HYDROCHLORIDE 8 MG: 8 TABLET, FILM COATED ORAL at 02:51

## 2019-06-04 RX ADMIN — SODIUM CHLORIDE: 9 INJECTION, SOLUTION INTRAVENOUS at 11:08

## 2019-06-04 RX ADMIN — ACYCLOVIR 800 MG: 800 TABLET ORAL at 14:02

## 2019-06-04 RX ADMIN — SODIUM CHLORIDE: 9 INJECTION, SOLUTION INTRAVENOUS at 11:09

## 2019-06-04 RX ADMIN — CYCLOPHOSPHAMIDE 4535 MG: 2 INJECTION, POWDER, FOR SOLUTION INTRAVENOUS; ORAL at 10:22

## 2019-06-04 RX ADMIN — MESNA 4540 MG: 100 INJECTION, SOLUTION INTRAVENOUS at 08:34

## 2019-06-04 RX ADMIN — SODIUM CHLORIDE 250 ML: 9 INJECTION, SOLUTION INTRAVENOUS at 19:13

## 2019-06-04 RX ADMIN — SUCRALFATE 1 G: 1 SUSPENSION ORAL at 16:27

## 2019-06-04 RX ADMIN — AZITHROMYCIN 250 MG: 250 TABLET, FILM COATED ORAL at 08:38

## 2019-06-04 RX ADMIN — PANTOPRAZOLE SODIUM 40 MG: 40 TABLET, DELAYED RELEASE ORAL at 20:10

## 2019-06-04 RX ADMIN — FUROSEMIDE 10 MG: 10 INJECTION, SOLUTION INTRAVENOUS at 13:10

## 2019-06-04 RX ADMIN — ONDANSETRON HYDROCHLORIDE 8 MG: 8 TABLET, FILM COATED ORAL at 08:38

## 2019-06-04 RX ADMIN — ACYCLOVIR 800 MG: 800 TABLET ORAL at 22:35

## 2019-06-04 RX ADMIN — MICAFUNGIN SODIUM 50 MG: 10 INJECTION, POWDER, LYOPHILIZED, FOR SOLUTION INTRAVENOUS at 08:36

## 2019-06-04 RX ADMIN — VANCOMYCIN HYDROCHLORIDE 125 MG: KIT at 20:10

## 2019-06-04 RX ADMIN — DEXTROSE AND SODIUM CHLORIDE 1000 ML: 5; 450 INJECTION, SOLUTION INTRAVENOUS at 03:26

## 2019-06-04 RX ADMIN — VANCOMYCIN HYDROCHLORIDE 125 MG: KIT at 08:38

## 2019-06-04 RX ADMIN — ACYCLOVIR 800 MG: 800 TABLET ORAL at 11:05

## 2019-06-04 RX ADMIN — CEFEPIME 2 G: 2 INJECTION, POWDER, FOR SOLUTION INTRAVENOUS at 18:28

## 2019-06-04 RX ADMIN — SUCRALFATE 1 G: 1 SUSPENSION ORAL at 22:35

## 2019-06-04 RX ADMIN — PROCHLORPERAZINE MALEATE 5 MG: 5 TABLET, FILM COATED ORAL at 17:34

## 2019-06-04 RX ADMIN — SUCRALFATE 1 G: 1 SUSPENSION ORAL at 12:06

## 2019-06-04 RX ADMIN — ACYCLOVIR 800 MG: 800 TABLET ORAL at 17:34

## 2019-06-04 RX ADMIN — VANCOMYCIN HYDROCHLORIDE 1500 MG: 10 INJECTION, POWDER, LYOPHILIZED, FOR SOLUTION INTRAVENOUS at 09:26

## 2019-06-04 RX ADMIN — URSODIOL 300 MG: 300 CAPSULE ORAL at 08:38

## 2019-06-04 RX ADMIN — ACYCLOVIR 800 MG: 800 TABLET ORAL at 08:38

## 2019-06-04 RX ADMIN — ACETAMINOPHEN 650 MG: 325 TABLET, FILM COATED ORAL at 17:34

## 2019-06-04 RX ADMIN — CEFEPIME 2 G: 2 INJECTION, POWDER, FOR SOLUTION INTRAVENOUS at 12:07

## 2019-06-04 RX ADMIN — URSODIOL 300 MG: 300 CAPSULE ORAL at 14:02

## 2019-06-04 RX ADMIN — FUROSEMIDE 10 MG: 10 INJECTION, SOLUTION INTRAVENOUS at 05:13

## 2019-06-04 RX ADMIN — SUCRALFATE 1 G: 1 SUSPENSION ORAL at 08:37

## 2019-06-04 RX ADMIN — CEFEPIME 2 G: 2 INJECTION, POWDER, FOR SOLUTION INTRAVENOUS at 03:23

## 2019-06-04 ASSESSMENT — ACTIVITIES OF DAILY LIVING (ADL)
ADLS_ACUITY_SCORE: 14

## 2019-06-04 ASSESSMENT — MIFFLIN-ST. JEOR
SCORE: 1699.24
SCORE: 1702.42

## 2019-06-04 NOTE — PLAN OF CARE
Patient feeling tired and lethargic today, did eat two meals though. Given cytoxan over two hours, tolerated it fine. Lasix given once for decreased urine output, good initial response. Continue to monitor I/O's and weight, use the higher dose of lasix if weight is up this evening.

## 2019-06-04 NOTE — PROCEDURES
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     Treatment Summary:  Radiation Oncology - Course: 1 Protocol: 2016-15  Treatment Site    Dose          Modality From To Elapsed Days Fx.  1 TBI         400 cGy 18 X  5/29/2019  2019   1  2             Dose per Fraction: 200 cGy   Total Dose:  400 cGy                  COMMENTS:   Patient tolerated total body radiation without any acute toxicity.     PAIN MANAGEMENT:   Patient did not require any pain management related to total body radiation.  Pain otherwise managed   by inpatient team.     FOLLOW UP PLAN: Total Body Irradiation was well tolerated.  After discharge from the hospital   the patient will be followed in the Bone Marrow Transplant Clinic.        Nurse Practitioner    Staff Physician  AURE Chandler M.D.     Physicist   Manny Wyman, PhD     CC:   Bre Vizcaino MD                                    Radiation Oncology:  [facility] [address] [place] [address]

## 2019-06-04 NOTE — CONSULTS
[facility]    Transplant Infectious Diseases Inpatient Consult       El Ace MRN# [id number]   YOB: 1955 Age: 63 year old   Date of Admission: 5/24/2019  6:36 AM           Recommendations:   1. Continue cefepime.   2. Add polymyxin B IV till more information regarding blood cx are available (ordered after discussion with layo).   3. If the fever recurred again, or if the patient suffered from clinical decompensation with low blood pressure then I would substitute daptomycin 12 mg/kg/day for vancomcyin IV.   4. Please check anaerobic blood cx with the next fever.         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   Now admitted for elective VELMA, Haplo-HSCT (son), HSCT done on 5/31/2019.   Now with fever but not neutropenic.         Active Problems and Infectious Diseases Issues:   1. Fever in HSCT recipient on 5/31/2019.   It is interesting that the patient is not actually neutropenic.   The source of the fever is not known but with recent proctitis and the presence of line intraabdominal process or line sepsis are possible.   The patient had recent history of bacteremia with MDR P aeruginosa only susceptible to aminoglycosides and colistin. Recurring infection with the same MDR Pseudomonas or other MDR GNB is possible, I would add polymyxin B to the cefepime awaiting for more information.   The patient is also colonized with VRE; if clinical decompensation noted, please substitute high dose daptomycin for vancomycin IV.          Old Problems and Infectious Diseases Issues:   1. CDI in 3/2019.   2. Proctitis.   3. MDR P aeruginosa BSI treated with ceftolozane/tazobactam (intermediate to ceftolozane/tazobactam). Attributed to proctitis.     Other Infectious  Disease issues include:  - on ACV and low dose micafungin, will be started on voriconazole.   - PCP prophylaxis: will be started on bactrim.   - Serostatus: CMV-, EBV+, HSV1+/2+, VZV + on high dose ACV.       Attestation:  I interviewed the patient and obtained history from the patient, and by reviewing the patient's chart including outside records, microbiological data, and radiological data. All data are summarized in this notes.  Rosemary THOMAS Aftab   Pager: [phone]  06/04/2019        HPI:  64 yo male with AML that did not respond to 7+3 which was complicated by CDI for which he received PO vancomycin then secondary ppx with 125 mg bid. He then received reinduction with venetoclax and decitabine. This was complicated by febrile neutropenia due to teeth abscesses s/p extraction of 2 teeth.   The course was then further complicated by proctitis and MDR P aeruginosa BSI on 5/4/2019. Treated with ceftazidime/avibactam. However, after discharge the susceptibility came back with resistance to aztreonam and ceftolozane/tazobactam. Since repeat blood cx were negative and since the patient was asymptomatic he was continued on the same till 5/24/2019.   The patient was then admitted to [facility] for elective VELMA haplo-HSCT day 0: 5/31/2019. On 6/4/2019 he spiked fever and ID were consulted.     He does not have any complaints except fever and chills with HA. Now all resolved.   Denying diarrhea. No pain at the anal/perianal area. No N/V. No cough, chest pain, shortness of breath. No other complaints.       ROS:  As mentioned in the interim history otherwise negative by reviewing constitutional symptoms, central and peripheral neurological systems, respiratory system, cardiac system, GI system,  system, musculoskeletal, skin, allergy, and lymphatics.                     PMHX:     Past Medical History:   Diagnosis Date     Acute leukemia (H) 3/12/2019           PSHX:     Past Surgical History:   Procedure Laterality Date      ARTHROSCOPY KNEE  1995     CARPAL TUNNEL RELEASE RT/LT       COLONOSCOPY  2000,2014     IR CVC TUNNEL PLACEMENT > 5 YRS OF AGE  5/24/2019     ORTHOPEDIC SURGERY  1975     PICC INSERTION Right 03/14/2019     vasectomy  2002           Social HX:     Social History     Social History Narrative     Not on file   lives in [place] with wife. Has 3 kids in [place], no grand kids, works as parol officer. No pets.       Pysical Examination:  Temp: 98.6  F (37  C) Temp src: Axillary BP: 117/74 Pulse: 96 Heart Rate: 101 Resp: 16 SpO2: 97 % O2 Device: None (Room air)    Vitals:    05/31/19 1000 06/02/19 0754 06/03/19 0753 06/03/19 1528   Weight: 90.8 kg (200 lb 2.8 oz) 91.6 kg (201 lb 15.1 oz) 92 kg (202 lb 13.2 oz) 93.1 kg (205 lb 3.2 oz)    06/04/19 0735   Weight: 93 kg (205 lb)     Constitutional: awake, alert, cooperative, no apparent distress and appears at stated age, well nourished.   Head, ENT, Eyes, and Neck: Normocephalic, sinuses non-tender to palpation, external ears without lesions, moist buccal mucosa without oral thrush, tonsils without swelling, erythema, or exudate, no tenderness palpating teeth, good dentition, gums without necrosis or abscesses.   PERRL, EOMI, pink conjunctivae, non-icteric sclera.   Neck supple without rigidity, no cervical/axillary/inguinal LA bilaterally.    Neurologic: Patient is moving all extremities without focal deficit, no focal sensory loss.   Lungs: CTA bilaterally, no accessory muscle use, no dullness to percussion and no abnormal tactile fremitus.   CVS: RRR, normal S1/S2, no murmur, PMI was not displaced.   Abdomen: non-tender, non-distended, no masses, no bruit, no shifting dullness, normal BS.   Genitalia: no kruse catheter in place, no lesions were visualized and no tenderness to plapation. Perianal area with external hemorrhoids but no induration or tenderness.    Extremities: no pitting edema of bilateral lower extremities, no ulcers, normal ROM of all joints, no swelling or  erythema of any of joints and no tenderness to palpation.   Skin: no induration, fluctuation or discharge at the Duke catheter in the right chest wall, and no rash              Allergy:    No Known Allergies      Medications:  Medications that Require Transfusion:     [START ON 6/5/2019] - MEDICATION INSTRUCTIONS -       sodium chloride 225 mL/hr at 06/04/19 1109     [START ON 6/5/2019] tacrolimus (Prograf) infusion ADULT     Scheduled Medications:     sodium chloride 0.9%  250 mL Intravenous Q12H     sodium chloride 0.9%  250 mL Intravenous Q12H     acetaminophen  650 mg Oral Q24H     acyclovir  800 mg Oral 5x Daily     ceFEPIme (MAXIPIME) IV  2 g Intravenous Q8H     Chemotherapy Infusing-Continuous Infusion   Does not apply Q8H     [START ON 6/5/2019] filgrastim (NEUPOGEN/GRANIX) intravenous  5 mcg/kg (Treatment Plan Recorded) Intravenous Daily at 8 pm     heparin lock flush  5-10 mL Intracatheter Q24H     mesna (MESNEX) infusion  50 mg/kg (Treatment Plan Recorded) Intravenous Q24H     micafungin (MYCAMINE) intermittent infusion > 45 kg  50 mg Intravenous Daily     [START ON 6/5/2019] mycophenolate mofetil  1,000 mg Intravenous Q8H BMT     ondansetron  8 mg Oral Q8H     pantoprazole  40 mg Oral BID     polymyxin B intermittent infusion ADULT  20,000 Units/kg Intravenous Once    Followed by     [START ON 6/5/2019] polymyxin B intermittent infusion ADULT  15,000 Units/kg Intravenous Q12H     sucralfate  1 g Oral 4x Daily AC & HS     [START ON 7/1/2019] sulfamethoxazole-trimethoprim  1 tablet Oral Q Mon Tues BID     tamsulosin  0.4 mg Oral Daily     ursodiol  300 mg Oral TID     vancomycin  125 mg Oral BID     vancomycin (VANCOCIN) IV  1,500 mg Intravenous Q12H     [START ON 6/7/2019] voriconazole  200 mg Oral Q12H ZUNILDA       Laboratory Data:   No results found for: ACD4    Inflammatory Markers    No lab results found.    Immune Globulin Studies   No lab results found.    Metabolic Studies       Recent Labs   Lab  Test 06/04/19 0309 06/03/19  1538 06/03/19 0437 06/02/19 0420 06/01/19 0424 05/31/19 0339 05/30/19 0345 05/29/19 0349 04/29/19 2032 04/29/19 2030 03/31/19  0626   * 134 136 136 141 138 139 140   < >  --   --    < > 134   POTASSIUM 3.5 3.5 3.8 4.0 4.0 4.1 3.8 3.8   < >  --   --    < > 3.9   CHLORIDE 98  --  106 104 110* 106 106 108   < >  --   --    < > 102   CO2 22  --  25 26 27 28 26 27   < >  --   --    < > 22   ANIONGAP 10  --  5 6 4 4 8 6   < >  --   --    < > 9   BUN 10  --  11 13 14 16 16 13   < >  --   --    < > 12   CR 0.60*  --  0.52* 0.59* 0.60* 0.52* 0.60* 0.60*   < >  --   --    < > 0.75   GFRESTIMATED >90  --  >90 >90 >90 >90 >90 >90   < >  --   --    < > >90   *  --  135* 104* 88 119* 98 110*   < >  --   --    < > 129*   A1C  --   --   --   --   --   --   --   --   --   --   --   --  6.0*   DEBBIE 7.8*  --  8.2* 8.4* 8.1* 8.7 8.5 8.2*   < >  --   --    < > 8.0*   PHOS  --   --  4.0  --   --   --   --  4.5   < >  --   --    < > 2.9   MAG  --   --  2.2  --   --  2.6*  --  2.5*   < >  --   --    < > 2.2   LACT  --   --   --   --   --   --   --   --   --  1.2 1.5   < >  --     < > = values in this interval not displayed.       Hepatic Studies    Recent Labs   Lab Test 06/03/19 0437 05/27/19 0318 05/24/19 1102 05/14/19  1430 05/10/19  1235 05/09/19  0908  04/08/19  0352  04/05/19  0312   BILITOTAL 0.9 0.3 0.4 0.3 0.3 0.3   < > 1.8*   < > 1.8*   ALKPHOS 125 132 165* 201* 221* 201*   < > 85   < > 75   ALBUMIN 3.0* 3.3* 3.4 3.0* 3.2* 2.9*   < > 2.1*   < > 2.0*   AST 16 14 24 24 32 41   < > 42   < > 422*   ALT 27 35 40 45 64 67   < > 117*   < > 302*   LDH  --   --   --   --   --   --   --  215  --  344*    < > = values in this interval not displayed.       Pancreatitis testing    Recent Labs   Lab Test 04/08/19  0352 04/03/19  0330 03/25/19  0541   LIPASE  --   --  50*   TRIG 203* 106  --        Hematology Studies      Recent Labs   Lab Test 06/04/19  0309 06/03/19  0437 06/02/19  0420  06/01/19  0424 05/31/19  0339 05/30/19  0345   WBC 2.2* 2.7* 4.2 2.7* 7.6 8.2   ANEU 2.2 2.6 4.2 2.7 7.4 8.0   ALYM 0.0* 0.0* 0.0* 0.0* 0.1* 0.1*   TRACIE 0.0 0.0 0.0 0.0 0.1 0.1   AEOS 0.0 0.1 0.0 0.0 0.0 0.0   HGB 12.9* 13.2* 14.0 13.3 10.7* 10.8*   HCT 39.1* 40.3 42.7 41.9 33.2* 34.4*   * 143* 179 204 268 303       Arterial Blood Gas Testing    Recent Labs   Lab Test 05/10/19  1343 04/04/19  0201 04/03/19  2204 04/03/19  2037   PH 7.37  --   --   --    PCO2 34*  --   --   --    PO2 100  --   --   --    HCO3 19*  --   --   --    O2PER 21 4 4L 4L        Urine Studies     Recent Labs   Lab Test 06/04/19  0313 05/10/19  1234 05/02/19  1330 04/29/19  2149 04/04/19  0523   URINEPH 6.5 5.0 6.5 7.0 6.0   NITRITE Negative Negative Negative Negative Negative   LEUKEST Negative Negative Negative Negative Negative   WBCU <1 2 1 1 4       Vancomycin Levels     Recent Labs   Lab Test 04/02/19  2142   VANCOMYCIN 8.4       Tacrolimus levels    Invalid input(s): TACROLIMUS, TAC, TACR  Transplant Immunosuppression Labs Latest Ref Rng & Units 6/4/2019 6/3/2019 6/2/2019 6/1/2019 5/31/2019   Creat 0.66 - 1.25 mg/dL 0.60(L) 0.52(L) 0.59(L) 0.60(L) 0.52(L)   BUN 7 - 30 mg/dL 10 11 13 14 16   WBC 4.0 - 11.0 10e9/L 2.2(L) 2.7(L) 4.2 2.7(L) 7.6   Neutrophil % 98.0 97.4 100.0 98.2 97.0   ANEU 1.6 - 8.3 10e9/L 2.2 2.6 4.2 2.7 7.4       CSF testing     Recent Labs   Lab Test 05/10/19  1645   CWBC 0   CRBC 1   CGLU 57   CTP 46         Microbiology:  Blood cx 6/4/2019 pending     Last check of C difficile  C Diff Toxin B PCR   Date Value Ref Range Status   03/26/2019 Positive (A) NEG^Negative Final     Comment:     Positive: Toxin producing Clostridium difficile target DNA sequences detected,   presumed positive for Clostridium difficile toxin B.  Clostridium difficile (Requires Enteric Isolation)  FDA approved assay performed using [employer] GeneXpert real-time PCR.  Critical Value/Significant Value called to and read back by  JATINDER  NATASHA, RN 2215 3.26.19 [facility]         Virology:  CMV viral loads  No results found for: 91753, 16946, 83989, 27858  CMV viral loads    Recent Labs   Lab Test 06/01/19  0424 05/25/19  0228   CSPEC Plasma Plasma, EDTA anticoagulant   CMVLOG Not Calculated Not Calculated       CMV viral loads    Log IU/mL of CMVQNT   Date Value Ref Range Status   06/01/2019 Not Calculated <2.1 [Log_IU]/mL Final   05/25/2019 Not Calculated <2.1 [Log_IU]/mL Final   05/02/2019 Not Calculated <2.1 [Log_IU]/mL Final   03/31/2019 Not Calculated <2.1 [Log_IU]/mL Final       CMV resistance testing  No lab results found.  No results found for: CMVCID, CMVFOS, CMVGAN     No results found for: H6RES    EBV DNA Copies/mL   Date Value Ref Range Status   05/02/2019 EBV DNA Not Detected EBVNEG^EBV DNA Not Detected [Copies]/mL Final   03/31/2019 1,186 (A) EBVNEG^EBV DNA Not Detected [Copies]/mL Final         Imaging:  CXR 6/4/2019   IMPRESSION: Clear lungs.    CT abdomen and pelvis 5/4/2019   IMPRESSION:   1. Increased soft tissue edema in the perianal soft tissues, raising  the concern for proctitis. No evidence of abscess or fluid collection.  2. Decreased bowel wall thickening of the right colon compared to  3/29/2019.         Rosemary Ugalde  Pager: [phone]

## 2019-06-04 NOTE — PROGRESS NOTES
"BMT Daily Progress Note     Mr. Ace 63 y.o hx of AML. Admitted for VELMA Haplo. Currently day +4.  Overnight events: Randy had his first fever overnight. He reports he was very sweaty and felt feverish but no focal symptoms. He is eating lightly as he continues to have slight nausea- he has not vomited. No diarrhea. He denies any pain. Not feeling bloated.     Review of Systems: 10 point ROS negative except as noted above.    Physical Exam:   Blood pressure 111/69, pulse 78, temperature 97.9  F (36.6  C), temperature source Oral, resp. rate 16, height 1.735 m (5' 8.31\"), weight 93 kg (205 lb), SpO2 97 %.  General: NAD, more fatigued looking this week than last. Can easily move about the bed, sit up without assistance.   Eyes: JEROMY, sclera anicteric   Nose/Mouth/Throat: OP clear, buccal mucosa moist, no ulcerations   Lungs: CTA bilaterally  Cardiovascular: RRR, no M/R/G   Abdominal/Rectal: +BS, soft, NT, ND  Lymphatics: no edema  Skin: no rashes or petechaie  Neuro: A&O   Additional Findings: [place] site c/d/i    Labs:  Lab Results   Component Value Date    WBC 2.2 (L) 06/04/2019    ANEU 2.2 06/04/2019    HGB 12.9 (L) 06/04/2019    HCT 39.1 (L) 06/04/2019     (L) 06/04/2019     (L) 06/04/2019    POTASSIUM 3.5 06/04/2019    CHLORIDE 98 06/04/2019    CO2 22 06/04/2019     (H) 06/04/2019    BUN 10 06/04/2019    CR 0.60 (L) 06/04/2019    MAG 2.2 06/03/2019    INR 1.01 06/03/2019     Imaging: Reviewed  Microbiology:  Reviewed    Assessment and Plan:   El Ace is a 63 year old male with hx of AML (IDH2, RUNX1 pos), currently day +4 of flu/cy/TBI and related haplo BMT.     5/25 (day -6): flu/cy  5/26 (day -5): flu  5/27 (day -4): flu   5/28 (day -3): flu  5/29 (day -2): TBI/flu  5/30 (day -1): TBI  5/31 (day 0): Transplant. No ABO mismatch - both A+.      1.  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 GCSF starts d+5 and cont to until ANC>2500 x 2 consecutive days. Re-stage per protocol.     2.  HEME: Keep " "Hgb>8g/dL and plts>10K.   WBC trending down, no transfusions today.  Not yet neutropenic.  - on MiPlate study                            3.  ID: Tmax 100.9   -6/4: BC NGTD. Started empiric cefepime. Notified by lab 6/4 Am that bm growing Gram + rods-> start vancomycin until ID and sensitivities back.  -Prophylaxis with HD ACV (CMV pos, HSV pos, EBV pos), azithromycin (c. Diff), Jesica to Vfend.  Bactrim or appropriate PCP therapy to start d+28.      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 In the event of neutropenic fever, recommend immediate ID consult. Consult ID today.   - recent history of CDI, recommend oral vancomycin prophylaxis 125mg BID                     4.  GI:  Mild nausea yesterday and today- likely cytoxan induced. Encouraged use of PRNs.   - Increase GI prophy - protonox BID. Improved symptoms. He does have prn carafate.   Zofran and dexamethasone prior to chemo to prevent N/V.   Ativan and compazine available PRN break-through symptoms.   - Ursodiol for VOD prophy.     5.  GVH: post transplant cytoxan, flush started last night and cytoxan today (day +3, +4), tac/MMF start day +5.     6.  FEN/Renal: Monitor creat and lytes.   Wt up 5kg from admission. Will monitor I/O and no give additional diuretics currently.   Replete lytes PRN per SS.      Val Lindsey PA-C  [phone]    Attending Note:    The patient was seen and examined by me separate from mid-level provider.   I personally reviewed the today's laboratory results, vital signs and radiology results.  The patient was feeling tired this morning from the fever overnight as well as the frequent urination.  He has no nausea or vomiting.  He is clearly more fatigued.  He did have a little sweating after the fever broke.  He did not have rigors.    I found that vitals are stable and were /69 (BP Location: Right arm)   Pulse 78   Temp 97.9  F (36.6  C) (Oral)   Resp 16   Ht 1.735 m (5' 8.31\")   Wt 93 kg (205 lb)   SpO2 97%   BMI 30.89 " kg/m  . No acute distress. Patient was alert and oriented and grossly neurologically intact. Mouth is clean. Line insertion non-tender and clear. Lungs clear bilateral. Heart regular. Abdomen soft non-tender, BS present. Lower extremity with no edema. No rash.     Main laboratory finding were   Lab Results   Component Value Date    WBC 2.2 (L) 06/04/2019    ANEU 2.2 06/04/2019    HGB 12.9 (L) 06/04/2019    HCT 39.1 (L) 06/04/2019     (L) 06/04/2019     (L) 06/04/2019    POTASSIUM 3.5 06/04/2019    CHLORIDE 98 06/04/2019    CO2 22 06/04/2019     (H) 06/04/2019    BUN 10 06/04/2019    CR 0.60 (L) 06/04/2019    MAG 2.2 06/03/2019    INR 1.01 06/03/2019    BILITOTAL 0.9 06/03/2019    AST 16 06/03/2019    ALT 27 06/03/2019    ALKPHOS 125 06/03/2019    PROTTOTAL 6.4 (L) 06/03/2019    ALBUMIN 3.0 (L) 06/03/2019     My plan is to continue the cefepime that was started overnight and and vancomycin based on a culture from his bone marrow that showed gram-positive bacilli.  We will send additional blood cultures tomorrow.  This is even if he has no fever.  The fevers could also be secondary to the post transplant cyclophosphamide and the lysis of lymphocytes.  Nevertheless, we will treated as neutropenic fever.  The patient will have a chest x-ray.  His sodium is down to 131 and will change his IV fluids to normal saline.  We have discussed the findings and the plan with the patient and he will be reassessed tomorrow.     Alexander Zeng M.D.

## 2019-06-04 NOTE — PROVIDER NOTIFICATION
MD notified of first temp 100.9. BC drawn. UA sent. Tyl given. PRN one time dose cefepime given. Asked for CXR and cefepime q 8hr.

## 2019-06-04 NOTE — PLAN OF CARE
"  Problem: Adult Inpatient Plan of Care  Goal: Plan of Care Review  Outcome: No Change     Problem: Adjustment to Transplant (Stem Cell/Bone Marrow Transplant)  Goal: Optimal Coping with Transplant  Outcome: No Change     1900-0730: /69 (BP Location: Right arm)   Pulse 81   Temp 100.9  F (38.3  C) (Axillary)   Resp 18   Ht 1.735 m (5' 8.31\")   Wt 93.1 kg (205 lb 3.2 oz)   SpO2 96%   BMI 30.92 kg/m    Pt day +4. Spiked first fever overnight, temp 100.9 F. Tylenol given. Blood cultures from both ports drawn. UA/UC sent. Cefepime started. MD ordered chest x-ray. Pt reports feeling \"tired, low energy.\" Denied pain. Pt c/o nausea once, prn compazine IV given. On Cytoxan flush at 250 ml/hr, lasix x 1 given for not meeting voiding parameter. Continuous mesna infusing. Cytoxan and mesna again today. Continue to monitor and plan of care.  "

## 2019-06-05 LAB
ABO + RH BLD: NORMAL
ABO + RH BLD: NORMAL
ANION GAP SERPL CALCULATED.3IONS-SCNC: 7 MMOL/L (ref 3–14)
ANISOCYTOSIS BLD QL SMEAR: SLIGHT
BASOPHILS # BLD AUTO: 0 10E9/L (ref 0–0.2)
BASOPHILS NFR BLD AUTO: 0 %
BLD GP AB SCN SERPL QL: NORMAL
BLOOD BANK CMNT PATIENT-IMP: NORMAL
BUN SERPL-MCNC: 10 MG/DL (ref 7–30)
BURR CELLS BLD QL SMEAR: SLIGHT
CALCIUM SERPL-MCNC: 8.1 MG/DL (ref 8.5–10.1)
CHLORIDE SERPL-SCNC: 106 MMOL/L (ref 94–109)
CO2 SERPL-SCNC: 24 MMOL/L (ref 20–32)
CREAT SERPL-MCNC: 0.59 MG/DL (ref 0.66–1.25)
DIFFERENTIAL METHOD BLD: ABNORMAL
EOSINOPHIL # BLD AUTO: 0 10E9/L (ref 0–0.7)
EOSINOPHIL NFR BLD AUTO: 1.6 %
ERYTHROCYTE [DISTWIDTH] IN BLOOD BY AUTOMATED COUNT: 15.7 % (ref 10–15)
GFR SERPL CREATININE-BSD FRML MDRD: >90 ML/MIN/{1.73_M2}
GLUCOSE SERPL-MCNC: 88 MG/DL (ref 70–99)
HCT VFR BLD AUTO: 37.2 % (ref 40–53)
HGB BLD-MCNC: 12.2 G/DL (ref 13.3–17.7)
LYMPHOCYTES # BLD AUTO: 0 10E9/L (ref 0.8–5.3)
LYMPHOCYTES NFR BLD AUTO: 0.8 %
MAGNESIUM SERPL-MCNC: 1.8 MG/DL (ref 1.6–2.3)
MCH RBC QN AUTO: 31 PG (ref 26.5–33)
MCHC RBC AUTO-ENTMCNC: 32.8 G/DL (ref 31.5–36.5)
MCV RBC AUTO: 95 FL (ref 78–100)
MONOCYTES # BLD AUTO: 0 10E9/L (ref 0–1.3)
MONOCYTES NFR BLD AUTO: 0.8 %
NEUTROPHILS # BLD AUTO: 0.6 10E9/L (ref 1.6–8.3)
NEUTROPHILS NFR BLD AUTO: 96.8 %
PLATELET # BLD AUTO: 101 10E9/L (ref 150–450)
POIKILOCYTOSIS BLD QL SMEAR: SLIGHT
POTASSIUM SERPL-SCNC: 3.4 MMOL/L (ref 3.4–5.3)
POTASSIUM SERPL-SCNC: 3.5 MMOL/L (ref 3.4–5.3)
RBC # BLD AUTO: 3.93 10E12/L (ref 4.4–5.9)
SODIUM SERPL-SCNC: 136 MMOL/L (ref 133–144)
SODIUM SERPL-SCNC: 138 MMOL/L (ref 133–144)
SPECIMEN EXP DATE BLD: NORMAL
WBC # BLD AUTO: 0.6 10E9/L (ref 4–11)

## 2019-06-05 PROCEDURE — 83735 ASSAY OF MAGNESIUM: CPT | Performed by: PHYSICIAN ASSISTANT

## 2019-06-05 PROCEDURE — 25000128 H RX IP 250 OP 636: Performed by: INTERNAL MEDICINE

## 2019-06-05 PROCEDURE — 84132 ASSAY OF SERUM POTASSIUM: CPT | Performed by: PHYSICIAN ASSISTANT

## 2019-06-05 PROCEDURE — 87040 BLOOD CULTURE FOR BACTERIA: CPT | Performed by: PHYSICIAN ASSISTANT

## 2019-06-05 PROCEDURE — 25000132 ZZH RX MED GY IP 250 OP 250 PS 637: Performed by: PHYSICIAN ASSISTANT

## 2019-06-05 PROCEDURE — 25000125 ZZHC RX 250: Performed by: INTERNAL MEDICINE

## 2019-06-05 PROCEDURE — 86850 RBC ANTIBODY SCREEN: CPT | Performed by: PHYSICIAN ASSISTANT

## 2019-06-05 PROCEDURE — 25000132 ZZH RX MED GY IP 250 OP 250 PS 637: Performed by: INTERNAL MEDICINE

## 2019-06-05 PROCEDURE — 80048 BASIC METABOLIC PNL TOTAL CA: CPT | Performed by: PHYSICIAN ASSISTANT

## 2019-06-05 PROCEDURE — 84295 ASSAY OF SERUM SODIUM: CPT | Performed by: PHYSICIAN ASSISTANT

## 2019-06-05 PROCEDURE — 25800030 ZZH RX IP 258 OP 636: Performed by: PHYSICIAN ASSISTANT

## 2019-06-05 PROCEDURE — 85025 COMPLETE CBC W/AUTO DIFF WBC: CPT | Performed by: PHYSICIAN ASSISTANT

## 2019-06-05 PROCEDURE — 25800030 ZZH RX IP 258 OP 636: Performed by: INTERNAL MEDICINE

## 2019-06-05 PROCEDURE — 25000125 ZZHC RX 250: Performed by: PHYSICIAN ASSISTANT

## 2019-06-05 PROCEDURE — 86901 BLOOD TYPING SEROLOGIC RH(D): CPT | Performed by: PHYSICIAN ASSISTANT

## 2019-06-05 PROCEDURE — 20600000 ZZH R&B BMT

## 2019-06-05 PROCEDURE — 25000131 ZZH RX MED GY IP 250 OP 636 PS 637: Performed by: PHYSICIAN ASSISTANT

## 2019-06-05 PROCEDURE — 25000128 H RX IP 250 OP 636: Performed by: PHYSICIAN ASSISTANT

## 2019-06-05 PROCEDURE — 86900 BLOOD TYPING SEROLOGIC ABO: CPT | Performed by: PHYSICIAN ASSISTANT

## 2019-06-05 RX ORDER — VANCOMYCIN HYDROCHLORIDE 1 G/200ML
1000 INJECTION, SOLUTION INTRAVENOUS EVERY 12 HOURS
Status: DISCONTINUED | OUTPATIENT
Start: 2019-06-05 | End: 2019-06-05

## 2019-06-05 RX ADMIN — TACROLIMUS 110 MCG/HR: 5 INJECTION, SOLUTION INTRAVENOUS at 10:02

## 2019-06-05 RX ADMIN — CEFEPIME 2 G: 2 INJECTION, POWDER, FOR SOLUTION INTRAVENOUS at 19:51

## 2019-06-05 RX ADMIN — ACETAMINOPHEN 650 MG: 325 TABLET, FILM COATED ORAL at 04:00

## 2019-06-05 RX ADMIN — FUROSEMIDE 10 MG: 10 INJECTION, SOLUTION INTRAVENOUS at 05:54

## 2019-06-05 RX ADMIN — MICAFUNGIN SODIUM 50 MG: 10 INJECTION, POWDER, LYOPHILIZED, FOR SOLUTION INTRAVENOUS at 08:11

## 2019-06-05 RX ADMIN — SUCRALFATE 1 G: 1 SUSPENSION ORAL at 21:53

## 2019-06-05 RX ADMIN — SODIUM CHLORIDE 250 ML: 9 INJECTION, SOLUTION INTRAVENOUS at 05:45

## 2019-06-05 RX ADMIN — URSODIOL 300 MG: 300 CAPSULE ORAL at 08:05

## 2019-06-05 RX ADMIN — ONDANSETRON HYDROCHLORIDE 8 MG: 8 TABLET, FILM COATED ORAL at 16:50

## 2019-06-05 RX ADMIN — VANCOMYCIN HYDROCHLORIDE 125 MG: KIT at 08:05

## 2019-06-05 RX ADMIN — ACYCLOVIR 800 MG: 800 TABLET ORAL at 21:53

## 2019-06-05 RX ADMIN — SODIUM CHLORIDE 250 ML: 9 INJECTION, SOLUTION INTRAVENOUS at 04:01

## 2019-06-05 RX ADMIN — TAMSULOSIN HYDROCHLORIDE 0.4 MG: 0.4 CAPSULE ORAL at 08:05

## 2019-06-05 RX ADMIN — PANTOPRAZOLE SODIUM 40 MG: 40 TABLET, DELAYED RELEASE ORAL at 08:06

## 2019-06-05 RX ADMIN — ACYCLOVIR 800 MG: 800 TABLET ORAL at 08:05

## 2019-06-05 RX ADMIN — SUCRALFATE 1 G: 1 SUSPENSION ORAL at 11:10

## 2019-06-05 RX ADMIN — VANCOMYCIN HYDROCHLORIDE 1500 MG: 10 INJECTION, POWDER, LYOPHILIZED, FOR SOLUTION INTRAVENOUS at 08:07

## 2019-06-05 RX ADMIN — POLYMYXIN B SULFATE 1395000 UNITS: 500000 INJECTION, POWDER, LYOPHILIZED, FOR SOLUTION INTRAMUSCULAR; INTRATHECAL; INTRAVENOUS; OPHTHALMIC at 04:23

## 2019-06-05 RX ADMIN — ACYCLOVIR 800 MG: 800 TABLET ORAL at 14:13

## 2019-06-05 RX ADMIN — ACYCLOVIR 800 MG: 800 TABLET ORAL at 11:10

## 2019-06-05 RX ADMIN — SUCRALFATE 1 G: 1 SUSPENSION ORAL at 08:06

## 2019-06-05 RX ADMIN — ONDANSETRON HYDROCHLORIDE 8 MG: 8 TABLET, FILM COATED ORAL at 00:27

## 2019-06-05 RX ADMIN — MYCOPHENOLATE MOFETIL 1000 MG: 500 INJECTION, POWDER, LYOPHILIZED, FOR SOLUTION INTRAVENOUS at 14:14

## 2019-06-05 RX ADMIN — CEFEPIME 2 G: 2 INJECTION, POWDER, FOR SOLUTION INTRAVENOUS at 11:10

## 2019-06-05 RX ADMIN — TACROLIMUS 110 MCG/HR: 5 INJECTION, SOLUTION INTRAVENOUS at 19:51

## 2019-06-05 RX ADMIN — MYCOPHENOLATE MOFETIL 1000 MG: 500 INJECTION, POWDER, LYOPHILIZED, FOR SOLUTION INTRAVENOUS at 05:45

## 2019-06-05 RX ADMIN — URSODIOL 300 MG: 300 CAPSULE ORAL at 14:13

## 2019-06-05 RX ADMIN — VANCOMYCIN HYDROCHLORIDE 1500 MG: 10 INJECTION, POWDER, LYOPHILIZED, FOR SOLUTION INTRAVENOUS at 19:51

## 2019-06-05 RX ADMIN — ONDANSETRON HYDROCHLORIDE 8 MG: 8 TABLET, FILM COATED ORAL at 10:02

## 2019-06-05 RX ADMIN — ACYCLOVIR 800 MG: 800 TABLET ORAL at 18:17

## 2019-06-05 RX ADMIN — FILGRASTIM 480 MCG: 480 INJECTION, SOLUTION INTRAVENOUS; SUBCUTANEOUS at 20:52

## 2019-06-05 RX ADMIN — URSODIOL 300 MG: 300 CAPSULE ORAL at 19:51

## 2019-06-05 RX ADMIN — CEFEPIME 2 G: 2 INJECTION, POWDER, FOR SOLUTION INTRAVENOUS at 02:34

## 2019-06-05 RX ADMIN — SUCRALFATE 1 G: 1 SUSPENSION ORAL at 16:50

## 2019-06-05 RX ADMIN — PANTOPRAZOLE SODIUM 40 MG: 40 TABLET, DELAYED RELEASE ORAL at 19:51

## 2019-06-05 RX ADMIN — MYCOPHENOLATE MOFETIL 1000 MG: 500 INJECTION, POWDER, LYOPHILIZED, FOR SOLUTION INTRAVENOUS at 21:54

## 2019-06-05 RX ADMIN — VANCOMYCIN HYDROCHLORIDE 125 MG: KIT at 20:52

## 2019-06-05 ASSESSMENT — ACTIVITIES OF DAILY LIVING (ADL)
ADLS_ACUITY_SCORE: 14

## 2019-06-05 ASSESSMENT — MIFFLIN-ST. JEOR: SCORE: 1682.37

## 2019-06-05 NOTE — PHARMACY-VANCOMYCIN DOSING SERVICE
Pharmacy Vancomycin Initial Note (19)  Date of Service 2019  Patient's  1955  63 year old, male    Indication: FN/Cell product bacterial growth    Current estimated CrCl = Estimated Creatinine Clearance: 141 mL/min (A) (based on SCr of 0.59 mg/dL (L)).    Creatinine for last 3 days  6/3/2019:  4:37 AM Creatinine 0.52 mg/dL  2019:  3:09 AM Creatinine 0.60 mg/dL  2019:  2:35 AM Creatinine 0.59 mg/dL    Recent Vancomycin Level(s) for last 3 days  No results found for requested labs within last 72 hours.      Vancomycin IV Administrations (past 72 hours)                   vancomycin 1500 mg in 0.9% NaCl 250 ml intermittent infusion 1,500 mg (mg) 1,500 mg Given 19     1,500 mg Given 19     1,500 mg Given  926                Nephrotoxins and other renal medications (From now, onward)    Start     Dose/Rate Route Frequency Ordered Stop    19  vancomycin (VANCOCIN) 1000 mg in dextrose 5% 200 mL PREMIX      1,000 mg  200 mL/hr over 1 Hours Intravenous EVERY 12 HOURS 19 0913      19 0900  tacrolimus (PROGRAF) 5,000 mcg in D5W 250 mL      110 mcg/hr  5.5 mL/hr  Intravenous CONTINUOUS 19 1555      19 1200  furosemide (LASIX) injection 10 mg      10 mg Intravenous EVERY 2 HOURS PRN 19 1458 19 1159    19 1200  furosemide (LASIX) injection 20 mg      20 mg Intravenous EVERY 8 HOURS PRN 19 1458 19 1159    19 0800  acyclovir (ZOVIRAX) tablet 800 mg      800 mg Oral 5 TIMES DAILY 19 1337      19  vancomycin (FIRVANQ) oral solution 125 mg      125 mg Oral 2 TIMES DAILY 19 1303            Contrast Orders - past 72 hours (72h ago, onward)    None                Plan:  1.  Start vancomycin  1500 mg IV q12h.   2.  Goal Trough Level: 10-15 mg/L   3.  Pharmacy will check trough levels as appropriate in 1-3 Days.    4. Serum creatinine levels will be ordered a minimum of twice weekly.      Beto  Pratima

## 2019-06-05 NOTE — PLAN OF CARE
AVSS. Denies pain, nausea or vomiting. Daily blood culture done. Cytoxan flush until 1230. NS infusing at 225ml/hr. Voiding every 2 hours. Lasix 10mg given x1. Mesna infusing at 45.6ml/hr. Up independently.  Problem: Adult Inpatient Plan of Care  Goal: Plan of Care Review  6/5/2019 0525 by Princess Eliana Park, RN  Outcome: No Change     Problem: Adult Inpatient Plan of Care  Goal: Patient-Specific Goal (Individualization)  6/5/2019 0525 by Princess Eliana Park, RN  Outcome: No Change     Problem: Adult Inpatient Plan of Care  Goal: Absence of Hospital-Acquired Illness or Injury  6/5/2019 0525 by Princess Eliana Park, RN  Outcome: No Change     Problem: Adult Inpatient Plan of Care  Goal: Optimal Comfort and Wellbeing  Outcome: No Change     Problem: Adjustment to Transplant (Stem Cell/Bone Marrow Transplant)  Goal: Optimal Coping with Transplant  Outcome: No Change     Problem: Bladder Irritation (Stem Cell/Bone Marrow Transplant)  Goal: Symptom-Free Urinary Elimination  Outcome: No Change     Problem: Diarrhea (Stem Cell/Bone Marrow Transplant)  Goal: Diarrhea Symptom Control  Outcome: No Change     Problem: Fatigue (Stem Cell/Bone Marrow Transplant)  Goal: Energy Level Supports Daily Activity  6/5/2019 0525 by Princess Eliana Park, RN  Outcome: No Change     Problem: Hematologic Alteration (Stem Cell/Bone Marrow Transplant)  Goal: Blood Counts Within Acceptable Range  Outcome: No Change     Problem: Hypersensitivity Reaction (Stem Cell/Bone Marrow Transplant)  Goal: Absence of Hypersensitivity Reaction  Outcome: No Change     Problem: Infection Risk (Stem Cell/Bone Marrow Transplant)  Goal: Absence of Infection Signs/Symptoms  Outcome: No Change     Problem: Mucositis (Stem Cell/Bone Marrow Transplant)  Goal: Mucous Membrane Health and Integrity  Outcome: No Change     Problem: Nausea and Vomiting (Stem Cell/Bone Marrow Transplant)  Goal: Nausea and Vomiting Symptom Relief  6/5/2019 0525 by Princess Vivian  Eliana BAUTISTA, RN  Outcome: No Change     Problem: Nutrition Intake Altered (Stem Cell/Bone Marrow Transplant)  Goal: Optimal Nutrition Intake  6/5/2019 0525 by Princess Eliana Park, RN  Outcome: No Change

## 2019-06-05 NOTE — PROGRESS NOTES
"BMT Daily Progress Note     Mr. cAe 63 y.o hx of AML. Admitted for VELMA Haplo. Currently day +5.  Overnight events: Randy had a miserable night. Up voiding frequently. Newly feeling really unsteady on his feet, fumbling for light switch- its not dizziness just really feels foggy and not himself this morning - started overnight. No further fevers. No adominal pain- stools are loose, but just couple per day, ate breakfast. No headaches, or weakness.     Review of Systems: 10 point ROS negative except as noted above.    Physical Exam:   Blood pressure 123/84, pulse 96, temperature 97.7  F (36.5  C), temperature source Axillary, resp. rate 18, height 1.735 m (5' 8.31\"), weight 91.3 kg (201 lb 4.5 oz), SpO2 98 %.  General: NAD, more fatigued looking.   Eyes: JEROMY, sclera anicteric   Nose/Mouth/Throat: OP clear, buccal mucosa moist, no ulcerations   Lungs: CTA bilaterally  Cardiovascular: RRR, no M/R/G   Abdominal/Rectal: +BS, soft, NT, ND  Lymphatics: no edema  Skin: no rashes or petechaie  Neuro: A&O. EOMs intake, Feels unsteady though follows commands appropriately, celebrellar testing intact.   Additional Findings: [facility] c/d/i    Labs:  Lab Results   Component Value Date    WBC 0.6 (LL) 06/05/2019    ANEU 0.6 (L) 06/05/2019    HGB 12.2 (L) 06/05/2019    HCT 37.2 (L) 06/05/2019     (L) 06/05/2019     06/05/2019    POTASSIUM 3.4 06/05/2019    CHLORIDE 106 06/05/2019    CO2 24 06/05/2019    GLC 88 06/05/2019    BUN 10 06/05/2019    CR 0.59 (L) 06/05/2019    MAG 1.8 06/05/2019    INR 1.01 06/03/2019     Imaging: Reviewed  Microbiology:  Reviewed    Assessment and Plan:   El Ace is a 63 year old male with hx of AML (IDH2, RUNX1 pos), currently day +5 of flu/cy/TBI and related haplo BMT.     5/25 (day -6): flu/cy  5/26 (day -5): flu  5/27 (day -4): flu   5/28 (day -3): flu  5/29 (day -2): TBI/flu  5/30 (day -1): TBI  5/31 (day 0): Transplant. No ABO mismatch - both A+.      1.  BMT: " Transplanted on 5/31/2019. GCSF starts d+5 and cont to until ANC>2500 x 2 consecutive days. Re-stage per protocol.     2.  HEME: Keep Hgb>8g/dL and plts>10K.   WBC trending down, no transfusions today.  Not yet neutropenic.  - on MiPlate study                            3.  ID: Tmax 99.7  -6/4: BC NGTD. Daily blood cultures ordered.   Notified by lab 6/4 Am that bm growing Gram + rods-> start vancomycin until ID and sensitivities back.  -Prophylaxis with HD ACV (CMV pos, HSV pos, EBV pos), azithromycin (c. Diff), Jesica to Vfend.  Bactrim or appropriate PCP therapy to start d+28.      - 6/4 consult ID as hx of MDR PsAg bacteremia (5/3). Per ID started polymyxin B IV last night, 2nd dose at 430 am. Given new unsteadiness will stop as concern for neurotoxicity. Per ID will not resume. If hypotensive or BC gnr- likely will need tobramycin.   - COntinue empiric cefepime only   - recent history of CDI, recommend oral vancomycin prophylaxis 125mg BID                     4.  GI:  Loose stools    - Increase GI prophy - protonox BID. Improved symptoms. He does have prn carafate.   Zofran and dexamethasone for another day given post transplant cytoxan.   Ativan and compazine available PRN break-through symptoms.   - Ursodiol for VOD prophy.     5.  GVH: post transplant cytoxan, flush started last night and cytoxan today (day +3, +4), tac/MMF start day +5.     6.  FEN/Renal: Monitor creat and lytes.   - Wt imrpoving with diuresis. Goal to above lasix as many nephrotoxins (IV vanco/tac/polymixin B)   Replete lytes PRN per SS.      NITHIN ArangoC  [phone]    Patient has been seen and evaluated by me. I have reviewed today's vital signs, medications, labs and imaging results. I have discussed the plan with the team and agree with the findings and plan in this note.      Somewhat unsteady yesterday; better this AM but concerns led to stopping polymixin .  No new infection identified. Wants to eat but not much appetite.   Leiukopenic still and expected for some time.   renal function ok and tac starting.    No new resp or card sx   No new abd sx.  no rash or bleeding.  Exam withclear lungs. No focal abd tenderness  no bone tenderness.    Alert and oriented. No tremor. CN intact and f to n normal today    Cont abx; gvhd prophy and anti emetics with caution to avoid any other agents that can cause unsteadiness .      Brian Powers MD

## 2019-06-05 NOTE — PLAN OF CARE
AVSS.   Problem: Adult Inpatient Plan of Care  Goal: Plan of Care Review  6/5/2019 1755 by Linh Ordonez RN  Outcome: No Change     Problem: Adult Inpatient Plan of Care  Goal: Patient-Specific Goal (Individualization)  6/5/2019 1755 by Lihn Ordonez RN  Outcome: No Change     Problem: Adult Inpatient Plan of Care  Goal: Absence of Hospital-Acquired Illness or Injury  6/5/2019 1755 by Linh Ordonez RN  Outcome: No Change     Problem: Adult Inpatient Plan of Care  Goal: Optimal Comfort and Wellbeing  6/5/2019 1755 by Linh Ordonez RN  Outcome: No Change     Problem: Adjustment to Transplant (Stem Cell/Bone Marrow Transplant)  Goal: Optimal Coping with Transplant  6/5/2019 1755 by Linh Ordonez RN  Outcome: No Change     Problem: Fatigue (Stem Cell/Bone Marrow Transplant)  Goal: Energy Level Supports Daily Activity  6/5/2019 1755 by Linh Ordonez RN  Outcome: No Change     Problem: Hematologic Alteration (Stem Cell/Bone Marrow Transplant)  Goal: Blood Counts Within Acceptable Range  6/5/2019 1755 by Linh Ordonez RN  Outcome: No Change     Problem: Infection Risk (Stem Cell/Bone Marrow Transplant)  Goal: Absence of Infection Signs/Symptoms  6/5/2019 1755 by Linh Ordonez RN  Outcome: No Change     Problem: Nausea and Vomiting (Stem Cell/Bone Marrow Transplant)  Goal: Nausea and Vomiting Symptom Relief  6/5/2019 1755 by Linh Ordonez RN  Outcome: No Change     Problem: Nutrition Intake Altered (Stem Cell/Bone Marrow Transplant)  Goal: Optimal Nutrition Intake  6/5/2019 1755 by Linh Ordonez RN  Outcome: No Change   Post cytoxan flush ended. Patient was feeling unsteady on his feet but starting to get better. Denies nausea and is eating well. Showered. MMF/TAC started today. Continue POC.

## 2019-06-05 NOTE — PLAN OF CARE
Pt  continues on chemo flush until 1230 tomorrow; took lasix 20 mg for low urine out put and increased wt; pt is afebrile, vital signs are stable; now meeting urine parameter; got compazine and Zofran for nausea; no emesis; got a dose of Polymyxin B (antibiotic) with flush 250 ml NS before and 250 ml after the dose; monitor pt closely and continues q 2 h void; continues plan  of care.      Problem: Adult Inpatient Plan of Care  Goal: Plan of Care Review  Outcome: No Change     Problem: Adult Inpatient Plan of Care  Goal: Patient-Specific Goal (Individualization)  Outcome: No Change     Problem: Adult Inpatient Plan of Care  Goal: Absence of Hospital-Acquired Illness or Injury  Outcome: No Change     Problem: Fatigue (Stem Cell/Bone Marrow Transplant)  Goal: Energy Level Supports Daily Activity  Outcome: No Change     Problem: Nausea and Vomiting (Stem Cell/Bone Marrow Transplant)  Goal: Nausea and Vomiting Symptom Relief  Outcome: No Change     Problem: Nutrition Intake Altered (Stem Cell/Bone Marrow Transplant)  Goal: Optimal Nutrition Intake  Outcome: No Change

## 2019-06-06 LAB
ALBUMIN SERPL-MCNC: 2.8 G/DL (ref 3.4–5)
ALP SERPL-CCNC: 128 U/L (ref 40–150)
ALT SERPL W P-5'-P-CCNC: 30 U/L (ref 0–70)
ANION GAP SERPL CALCULATED.3IONS-SCNC: 5 MMOL/L (ref 3–14)
AST SERPL W P-5'-P-CCNC: 19 U/L (ref 0–45)
BILIRUB SERPL-MCNC: 0.7 MG/DL (ref 0.2–1.3)
BUN SERPL-MCNC: 11 MG/DL (ref 7–30)
CALCIUM SERPL-MCNC: 8.6 MG/DL (ref 8.5–10.1)
CHLORIDE SERPL-SCNC: 109 MMOL/L (ref 94–109)
CO2 SERPL-SCNC: 24 MMOL/L (ref 20–32)
CREAT SERPL-MCNC: 0.75 MG/DL (ref 0.66–1.25)
DIFFERENTIAL METHOD BLD: ABNORMAL
ERYTHROCYTE [DISTWIDTH] IN BLOOD BY AUTOMATED COUNT: 15.8 % (ref 10–15)
GFR SERPL CREATININE-BSD FRML MDRD: >90 ML/MIN/{1.73_M2}
GLUCOSE SERPL-MCNC: 87 MG/DL (ref 70–99)
HCT VFR BLD AUTO: 36.9 % (ref 40–53)
HGB BLD-MCNC: 12.1 G/DL (ref 13.3–17.7)
MCH RBC QN AUTO: 31.2 PG (ref 26.5–33)
MCHC RBC AUTO-ENTMCNC: 32.8 G/DL (ref 31.5–36.5)
MCV RBC AUTO: 95 FL (ref 78–100)
PLATELET # BLD AUTO: 74 10E9/L (ref 150–450)
POTASSIUM SERPL-SCNC: 3.7 MMOL/L (ref 3.4–5.3)
PROT SERPL-MCNC: 6.4 G/DL (ref 6.8–8.8)
RBC # BLD AUTO: 3.88 10E12/L (ref 4.4–5.9)
SODIUM SERPL-SCNC: 138 MMOL/L (ref 133–144)
WBC # BLD AUTO: 0.4 10E9/L (ref 4–11)

## 2019-06-06 PROCEDURE — 85027 COMPLETE CBC AUTOMATED: CPT

## 2019-06-06 PROCEDURE — 25000131 ZZH RX MED GY IP 250 OP 636 PS 637: Performed by: PHYSICIAN ASSISTANT

## 2019-06-06 PROCEDURE — 25800030 ZZH RX IP 258 OP 636: Performed by: INTERNAL MEDICINE

## 2019-06-06 PROCEDURE — 87040 BLOOD CULTURE FOR BACTERIA: CPT | Performed by: PHYSICIAN ASSISTANT

## 2019-06-06 PROCEDURE — 20600000 ZZH R&B BMT

## 2019-06-06 PROCEDURE — 25000125 ZZHC RX 250: Performed by: PHYSICIAN ASSISTANT

## 2019-06-06 PROCEDURE — 25000132 ZZH RX MED GY IP 250 OP 250 PS 637: Performed by: INTERNAL MEDICINE

## 2019-06-06 PROCEDURE — 80053 COMPREHEN METABOLIC PANEL: CPT | Performed by: PHYSICIAN ASSISTANT

## 2019-06-06 PROCEDURE — 25000128 H RX IP 250 OP 636: Performed by: INTERNAL MEDICINE

## 2019-06-06 PROCEDURE — 25000132 ZZH RX MED GY IP 250 OP 250 PS 637: Performed by: PHYSICIAN ASSISTANT

## 2019-06-06 PROCEDURE — 25800030 ZZH RX IP 258 OP 636: Performed by: PHYSICIAN ASSISTANT

## 2019-06-06 PROCEDURE — 25000128 H RX IP 250 OP 636: Performed by: PHYSICIAN ASSISTANT

## 2019-06-06 RX ADMIN — SUCRALFATE 1 G: 1 SUSPENSION ORAL at 12:22

## 2019-06-06 RX ADMIN — URSODIOL 300 MG: 300 CAPSULE ORAL at 20:17

## 2019-06-06 RX ADMIN — ONDANSETRON HYDROCHLORIDE 8 MG: 8 TABLET, FILM COATED ORAL at 10:47

## 2019-06-06 RX ADMIN — TAMSULOSIN HYDROCHLORIDE 0.4 MG: 0.4 CAPSULE ORAL at 08:11

## 2019-06-06 RX ADMIN — CEFEPIME 2 G: 2 INJECTION, POWDER, FOR SOLUTION INTRAVENOUS at 03:10

## 2019-06-06 RX ADMIN — MYCOPHENOLATE MOFETIL 1000 MG: 500 INJECTION, POWDER, LYOPHILIZED, FOR SOLUTION INTRAVENOUS at 22:52

## 2019-06-06 RX ADMIN — TACROLIMUS 110 MCG/HR: 5 INJECTION, SOLUTION INTRAVENOUS at 20:11

## 2019-06-06 RX ADMIN — SUCRALFATE 1 G: 1 SUSPENSION ORAL at 15:56

## 2019-06-06 RX ADMIN — ACYCLOVIR 800 MG: 800 TABLET ORAL at 14:18

## 2019-06-06 RX ADMIN — PANTOPRAZOLE SODIUM 40 MG: 40 TABLET, DELAYED RELEASE ORAL at 20:17

## 2019-06-06 RX ADMIN — URSODIOL 300 MG: 300 CAPSULE ORAL at 08:11

## 2019-06-06 RX ADMIN — MYCOPHENOLATE MOFETIL 1000 MG: 500 INJECTION, POWDER, LYOPHILIZED, FOR SOLUTION INTRAVENOUS at 05:51

## 2019-06-06 RX ADMIN — PANTOPRAZOLE SODIUM 40 MG: 40 TABLET, DELAYED RELEASE ORAL at 08:11

## 2019-06-06 RX ADMIN — SUCRALFATE 1 G: 1 SUSPENSION ORAL at 22:57

## 2019-06-06 RX ADMIN — ONDANSETRON HYDROCHLORIDE 8 MG: 8 TABLET, FILM COATED ORAL at 18:15

## 2019-06-06 RX ADMIN — CEFEPIME 2 G: 2 INJECTION, POWDER, FOR SOLUTION INTRAVENOUS at 20:08

## 2019-06-06 RX ADMIN — ACYCLOVIR 800 MG: 800 TABLET ORAL at 08:11

## 2019-06-06 RX ADMIN — VANCOMYCIN HYDROCHLORIDE 1500 MG: 10 INJECTION, POWDER, LYOPHILIZED, FOR SOLUTION INTRAVENOUS at 08:13

## 2019-06-06 RX ADMIN — ACYCLOVIR 800 MG: 800 TABLET ORAL at 18:15

## 2019-06-06 RX ADMIN — VANCOMYCIN HYDROCHLORIDE 125 MG: KIT at 20:17

## 2019-06-06 RX ADMIN — ACYCLOVIR 800 MG: 800 TABLET ORAL at 22:57

## 2019-06-06 RX ADMIN — VANCOMYCIN HYDROCHLORIDE 125 MG: KIT at 08:12

## 2019-06-06 RX ADMIN — MICAFUNGIN SODIUM 50 MG: 10 INJECTION, POWDER, LYOPHILIZED, FOR SOLUTION INTRAVENOUS at 08:13

## 2019-06-06 RX ADMIN — CEFEPIME 2 G: 2 INJECTION, POWDER, FOR SOLUTION INTRAVENOUS at 12:22

## 2019-06-06 RX ADMIN — MYCOPHENOLATE MOFETIL 1000 MG: 500 INJECTION, POWDER, LYOPHILIZED, FOR SOLUTION INTRAVENOUS at 14:19

## 2019-06-06 RX ADMIN — URSODIOL 300 MG: 300 CAPSULE ORAL at 14:18

## 2019-06-06 RX ADMIN — ONDANSETRON HYDROCHLORIDE 8 MG: 8 TABLET, FILM COATED ORAL at 02:54

## 2019-06-06 RX ADMIN — FILGRASTIM 480 MCG: 480 INJECTION, SOLUTION INTRAVENOUS; SUBCUTANEOUS at 20:58

## 2019-06-06 RX ADMIN — SUCRALFATE 1 G: 1 SUSPENSION ORAL at 08:11

## 2019-06-06 RX ADMIN — ACYCLOVIR 800 MG: 800 TABLET ORAL at 10:47

## 2019-06-06 ASSESSMENT — ACTIVITIES OF DAILY LIVING (ADL)
ADLS_ACUITY_SCORE: 14

## 2019-06-06 ASSESSMENT — MIFFLIN-ST. JEOR: SCORE: 1682.37

## 2019-06-06 NOTE — PLAN OF CARE
AVSS. Denies pain. Eating well even tho food isn't tasting good. Feeling better and steady on his feet again. Continue POC.   Problem: Adult Inpatient Plan of Care  Goal: Plan of Care Review  6/6/2019 1651 by Linh Ordonez RN  Outcome: No Change     Problem: Adult Inpatient Plan of Care  Goal: Patient-Specific Goal (Individualization)  6/6/2019 1651 by Linh Ordonez RN  Outcome: No Change     Problem: Adult Inpatient Plan of Care  Goal: Absence of Hospital-Acquired Illness or Injury  6/6/2019 1651 by Linh Ordonez RN  Outcome: No Change     Problem: Adult Inpatient Plan of Care  Goal: Optimal Comfort and Wellbeing  6/6/2019 1651 by Linh Ordonez RN  Outcome: No Change     Problem: Infection Risk (Stem Cell/Bone Marrow Transplant)  Goal: Absence of Infection Signs/Symptoms  6/6/2019 1651 by Linh Ordonez RN  Outcome: No Change     Problem: Nausea and Vomiting (Stem Cell/Bone Marrow Transplant)  Goal: Nausea and Vomiting Symptom Relief  6/6/2019 1651 by Linh Ordonez RN  Outcome: No Change     Problem: Nutrition Intake Altered (Stem Cell/Bone Marrow Transplant)  Goal: Optimal Nutrition Intake  6/6/2019 1651 by Linh Ordonez, RN  Outcome: No Change

## 2019-06-06 NOTE — PROGRESS NOTES
BMT CLINICAL SOCIAL WORK NOTE:    Focus: Supportive Counseling    Data: Pt is a 63 year old male s/p allo BMT, currently +6.    Interventions: Clinical  (CSW) met with Pt to assess coping, provide supportive counseling and assist with resources as needed. SW attempted visit with the pt today, but he was visiting with the BMT pt peer volunteer. SW will continue to follow to support the pt as needed.      Assessment: Pt presented as alert.  Pt appears to be coping well at this time. Pt continues to be supported by his family.     Plan: CSW will continue to provide supportive counseling and assistance with resources as needed. CSW will continue to collaborate with multidisciplinary team regarding Pt's plan of care.     EDI John, [facility] HospitalSW  Pager: [phone]  Phone: [phone]

## 2019-06-06 NOTE — PROGRESS NOTES
[facility]    Transplant Infectious Diseases Inpatient Progress Note       El Ace MRN# [id number]   YOB: 1955 Age: 63 year old   Date of Admission: 5/24/2019  6:36 AM           Recommendations:   1. Continue cefepime.   2. Continue to monitor clinically.   3. Please check anaerobic blood cx with the next fever.   4. If clinically decompensating then I would use either one of the following regimens:  Either   - daptomycin 12 mg/kg/day plus ceftolozane/tazobactom plus one of the aminoglycosides (therapy dose not synergy dose), pending workup (ie: blood cx, etc....).   Or   - daptomycin 12 mg/kg/day plus polymyxin B IV (without loading dose) and monitor for recurring neurotoxicity, pending workup.     ID will follow with you but only peripherally. Please call for questions.         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 On MMF/TAC  With fever 6/4/2019.          Active Problems and Infectious Diseases Issues:   1. Fever in HSCT recipient on 5/31/2019.   The source of the fever is not known but now resolved.   The patient had recent history of bacteremia with MDR P aeruginosa only susceptible to aminoglycosides and colistin. Recurring infection with the same MDR Pseudomonas or other MDR GNB is possible. Unfortunately he developed dizziness and almost ataxia with polymyxing B. Polymyxin B was discontinued 6/5/2019 (only received 2 doses). Continue to monitor clinically and to monitor blood cx.     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 Or daptomycin 12 mg/kg/day plus polymyxin B without loading dose.         Old Problems and Infectious Diseases Issues:   1. CDI in 3/2019.   2. Proctitis.   3. MDR P aeruginosa BSI treated with ceftolozane/tazobactam (intermediate to ceftolozane/tazobactam). Attributed to proctitis.     Other Infectious Disease issues include:  - on ACV and low dose micafungin, will be started on voriconazole.   - PCP prophylaxis: will be started on bactrim.   - Serostatus: CMV-, EBV+, HSV1+/2+, VZV + on high dose ACV.       Discussed with primary team.   Attestation:  I interviewed the patient and obtained history from the patient, and by reviewing the patient's chart including outside records, microbiological data, and radiological data. All data are summarized in this notes.  Rosemary Ugalde   Pager: [phone]  06/06/2019        Discussed with primary team.   Interim History:  Dizziness and fogginess resolved.   Afebrile.     HPI:  64 yo male with AML that did not respond to 7+3 which was complicated by CDI for which he received PO vancomycin then secondary ppx with 125 mg bid. He then received reinduction with venetoclax and decitabine. This was complicated by febrile neutropenia due to teeth abscesses s/p extraction of 2 teeth.   The course was then further complicated by proctitis and MDR P aeruginosa BSI on 5/4/2019. Treated with ceftazidime/avibactam. However, after discharge the susceptibility came back with resistance to aztreonam and ceftolozane/tazobactam. Since repeat blood cx were negative and since the patient was asymptomatic he was continued on the same till 5/24/2019.   The patient was then admitted to [facility] for elective VELMA haplo-HSCT day 0: 5/31/2019. On 6/4/2019 he spiked fever and ID were consulted.     He does not have any complaints except fever and chills with HA. Now all resolved.   Denying diarrhea. No pain at the anal/perianal area. No  N/V. No cough, chest pain, shortness of breath. No other complaints.       ROS:  As mentioned in the interim history otherwise negative by reviewing constitutional symptoms, central and peripheral neurological systems, respiratory system, cardiac system, GI system,  system, musculoskeletal, skin, allergy, and lymphatics.                 Pysical Examination:  Temp: 98  F (36.7  C) Temp src: Oral BP: 117/79   Heart Rate: 65 Resp: 16 SpO2: 98 % O2 Device: None (Room air)    Vitals:    06/03/19 1528 06/04/19 0735 06/04/19 1634 06/05/19 0900   Weight: 93.1 kg (205 lb 3.2 oz) 93 kg (205 lb) 93.3 kg (205 lb 11.2 oz) 91.3 kg (201 lb 4.5 oz)    06/06/19 0900   Weight: 91.3 kg (201 lb 4.5 oz)     Constitutional: awake, alert, cooperative, no apparent distress and appears at stated age, well nourished.   Neurologic: Patient is moving all extremities without focal deficit, no focal sensory loss. Negative romberg sign.             Allergy:      Allergies   Allergen Reactions     Polymyxin B Other (See Comments)     Neurotoxicity, 6/5/19         Medications:  Medications that Require Transfusion:     tacrolimus (Prograf) infusion ADULT 110 mcg/hr (06/05/19 1951)   Scheduled Medications:     acyclovir  800 mg Oral 5x Daily     ceFEPIme (MAXIPIME) IV  2 g Intravenous Q8H     filgrastim (NEUPOGEN/GRANIX) intravenous  5 mcg/kg (Treatment Plan Recorded) Intravenous Daily at 8 pm     heparin lock flush  5-10 mL Intracatheter Q24H     mycophenolate mofetil  1,000 mg Intravenous Q8H BMT     ondansetron  8 mg Oral Q8H     pantoprazole  40 mg Oral BID     sucralfate  1 g Oral 4x Daily AC & HS     [START ON 7/1/2019] sulfamethoxazole-trimethoprim  1 tablet Oral Q Mon Tues BID     tamsulosin  0.4 mg Oral Daily     ursodiol  300 mg Oral TID     vancomycin  125 mg Oral BID     [START ON 6/7/2019] voriconazole  200 mg Oral Q12H ZUNILDA       Metabolic Studies       Recent Labs   Lab Test 06/06/19  0308 06/05/19  1651 06/05/19  0235 06/04/19  1625  06/04/19  0309 06/03/19  1538 06/03/19  0437 06/02/19  0420 06/01/19  0424 05/29/19 0349 04/29/19 2032 04/29/19  2030  03/31/19  0626    138 136 133 131* 134 136 136 141   < > 140   < >  --   --    < > 134   POTASSIUM 3.7 3.5 3.4 3.6 3.5 3.5 3.8 4.0 4.0   < > 3.8   < >  --   --    < > 3.9   CHLORIDE 109  --  106  --  98  --  106 104 110*   < > 108   < >  --   --    < > 102   CO2 24  --  24  --  22  --  25 26 27   < > 27   < >  --   --    < > 22   ANIONGAP 5  --  7  --  10  --  5 6 4   < > 6   < >  --   --    < > 9   BUN 11  --  10  --  10  --  11 13 14   < > 13   < >  --   --    < > 12   CR 0.75  --  0.59*  --  0.60*  --  0.52* 0.59* 0.60*   < > 0.60*   < >  --   --    < > 0.75   GFRESTIMATED >90  --  >90  --  >90  --  >90 >90 >90   < > >90   < >  --   --    < > >90   GLC 87  --  88  --  114*  --  135* 104* 88   < > 110*   < >  --   --    < > 129*   A1C  --   --   --   --   --   --   --   --   --   --   --   --   --   --   --  6.0*   DEBBIE 8.6  --  8.1*  --  7.8*  --  8.2* 8.4* 8.1*   < > 8.2*   < >  --   --    < > 8.0*   PHOS  --   --   --   --   --   --  4.0  --   --   --  4.5   < >  --   --    < > 2.9   MAG  --   --  1.8  --   --   --  2.2  --   --    < > 2.5*   < >  --   --    < > 2.2   LACT  --   --   --   --   --   --   --   --   --   --   --   --  1.2 1.5   < >  --     < > = values in this interval not displayed.       Hepatic Studies    Recent Labs   Lab Test 06/06/19  0308 06/03/19  0437 05/27/19  0318 05/24/19  1102 05/14/19  1430 05/10/19  1235  04/08/19  0352  04/05/19  0312   BILITOTAL 0.7 0.9 0.3 0.4 0.3 0.3   < > 1.8*   < > 1.8*   ALKPHOS 128 125 132 165* 201* 221*   < > 85   < > 75   ALBUMIN 2.8* 3.0* 3.3* 3.4 3.0* 3.2*   < > 2.1*   < > 2.0*   AST 19 16 14 24 24 32   < > 42   < > 422*   ALT 30 27 35 40 45 64   < > 117*   < > 302*   LDH  --   --   --   --   --   --   --  215  --  344*    < > = values in this interval not displayed.       Pancreatitis testing    Recent Labs   Lab Test  04/08/19  0352 04/03/19  0330 03/25/19  0541   LIPASE  --   --  50*   TRIG 203* 106  --        Hematology Studies      Recent Labs   Lab Test 06/06/19  0308 06/05/19  0235 06/04/19  0309 06/03/19  0437 06/02/19  0420 06/01/19  0424 05/31/19  0339   WBC 0.4* 0.6* 2.2* 2.7* 4.2 2.7* 7.6   ANEU  --  0.6* 2.2 2.6 4.2 2.7 7.4   ALYM  --  0.0* 0.0* 0.0* 0.0* 0.0* 0.1*   TRACIE  --  0.0 0.0 0.0 0.0 0.0 0.1   AEOS  --  0.0 0.0 0.1 0.0 0.0 0.0   HGB 12.1* 12.2* 12.9* 13.2* 14.0 13.3 10.7*   HCT 36.9* 37.2* 39.1* 40.3 42.7 41.9 33.2*   PLT 74* 101* 124* 143* 179 204 268       Arterial Blood Gas Testing    Recent Labs   Lab Test 05/10/19  1343 04/04/19  0201 04/03/19 2204 04/03/19 2037   PH 7.37  --   --   --    PCO2 34*  --   --   --    PO2 100  --   --   --    HCO3 19*  --   --   --    O2PER 21 4 4L 4L        Urine Studies     Recent Labs   Lab Test 06/04/19  0313 05/10/19  1234 05/02/19  1330 04/29/19  2149 04/04/19  0523   URINEPH 6.5 5.0 6.5 7.0 6.0   NITRITE Negative Negative Negative Negative Negative   LEUKEST Negative Negative Negative Negative Negative   WBCU <1 2 1 1 4       Vancomycin Levels     Recent Labs   Lab Test 04/02/19 2142   VANCOMYCIN 8.4       Tacrolimus levels    Invalid input(s): TACROLIMUS, TAC, TACR  Transplant Immunosuppression Labs Latest Ref Rng & Units 6/6/2019 6/5/2019 6/4/2019 6/3/2019 6/2/2019   Creat 0.66 - 1.25 mg/dL 0.75 0.59(L) 0.60(L) 0.52(L) 0.59(L)   BUN 7 - 30 mg/dL 11 10 10 11 13   WBC 4.0 - 11.0 10e9/L 0.4(LL) 0.6(LL) 2.2(L) 2.7(L) 4.2   Neutrophil % - 96.8 98.0 97.4 100.0   ANEU 1.6 - 8.3 10e9/L - 0.6(L) 2.2 2.6 4.2       CSF testing     Recent Labs   Lab Test 05/10/19  1645   CWBC 0   CRBC 1   CGLU 57   CTP 46         Microbiology:  Blood cx 6/4/2019 pending     Last check of C difficile  C Diff Toxin B PCR   Date Value Ref Range Status   03/26/2019 Positive (A) NEG^Negative Final     Comment:     Positive: Toxin producing Clostridium difficile target DNA sequences detected,    presumed positive for Clostridium difficile toxin B.  Clostridium difficile (Requires Enteric Isolation)  FDA approved assay performed using [employer] GeneXpert real-time PCR.  Critical Value/Significant Value called to and read back by  JATINDER KAUR, RN 2215 3.26.19 ND         Virology:  CMV viral loads  No results found for: 01776, 03242, 13123, 91783  CMV viral loads    Recent Labs   Lab Test 06/01/19  0424 05/25/19  0228   CSPEC Plasma Plasma, EDTA anticoagulant   CMVLOG Not Calculated Not Calculated       CMV viral loads    Log IU/mL of CMVQNT   Date Value Ref Range Status   06/01/2019 Not Calculated <2.1 [Log_IU]/mL Final   05/25/2019 Not Calculated <2.1 [Log_IU]/mL Final   05/02/2019 Not Calculated <2.1 [Log_IU]/mL Final   03/31/2019 Not Calculated <2.1 [Log_IU]/mL Final       CMV resistance testing  No lab results found.  No results found for: CMVCID, CMVFOS, CMVGAN     No results found for: H6RES    EBV DNA Copies/mL   Date Value Ref Range Status   05/02/2019 EBV DNA Not Detected EBVNEG^EBV DNA Not Detected [Copies]/mL Final   03/31/2019 1,186 (A) EBVNEG^EBV DNA Not Detected [Copies]/mL Final         Imaging:  CXR 6/4/2019   IMPRESSION: Clear lungs.    CT abdomen and pelvis 5/4/2019   IMPRESSION:   1. Increased soft tissue edema in the perianal soft tissues, raising  the concern for proctitis. No evidence of abscess or fluid collection.  2. Decreased bowel wall thickening of the right colon compared to  3/29/2019.         Rosemary Ugalde  Pager: [phone]

## 2019-06-06 NOTE — PLAN OF CARE
AVSS. Denies pain, NVD. Pt reports that he is feeling much more steady on his feet since his reaction to polymyxin yesterday. Surveillance blood culture drawn. Tac gtt at 5.5 ml/hr. No complaints offered, continue to monitor.    Problem: Adult Inpatient Plan of Care  Goal: Plan of Care Review  6/6/2019 8415 by Renee Ca, RN  Outcome: No Change  6/5/2019 2155 by Linh Ordonez, RN  Outcome: No Change

## 2019-06-06 NOTE — PROGRESS NOTES
"BMT Daily Progress Note     Mr. Ace 63 y.o hx of AML. Admitted for VELMA Haplo. Currently day +6.  Overnight events: Randy is feeling better today. He is feeling much more steady on his feet. He doesn't really have much of an appetite and food doesn't taste right. He is doing a good job of eating but now its more of a job. He denies any nausea, diarrhea or abdominal pain. Afebrile, no sore throat or infectious concerns.     Review of Systems: 10 point ROS negative except as noted above.    Physical Exam:   Blood pressure 119/73, pulse 96, temperature 96  F (35.6  C), temperature source Axillary, resp. rate 16, height 1.735 m (5' 8.31\"), weight 91.3 kg (201 lb 4.5 oz), SpO2 97 %.  General: NAD, appears to feel better today.   Eyes: JEROMY, sclera anicteric   Nose/Mouth/Throat: OP clear, buccal mucosa moist, no ulcerations   Lungs: CTA bilaterally  Cardiovascular: RRR, no M/R/G   Abdominal/Rectal: +BS, soft, NT, ND  Lymphatics: no edema  Skin: no rashes or petechaie  Neuro: A&O. EOMs intake, Feels unsteady though follows commands appropriately, celebrellar testing intact.   Additional Findings: [facility] c/d/i    Labs:  Lab Results   Component Value Date    WBC 0.4 (LL) 06/06/2019    ANEU 0.6 (L) 06/05/2019    HGB 12.1 (L) 06/06/2019    HCT 36.9 (L) 06/06/2019    PLT 74 (L) 06/06/2019     06/06/2019    POTASSIUM 3.7 06/06/2019    CHLORIDE 109 06/06/2019    CO2 24 06/06/2019    GLC 87 06/06/2019    BUN 11 06/06/2019    CR 0.75 06/06/2019    MAG 1.8 06/05/2019    INR 1.01 06/03/2019     Imaging: Reviewed  Microbiology:  Reviewed    Assessment and Plan:   El Ace is a 63 year old male with hx of AML (IDH2, RUNX1 pos), currently day +6 of flu/cy/TBI and related haplo BMT.     5/25 (day -6): flu/cy  5/26 (day -5): flu  5/27 (day -4): flu   5/28 (day -3): flu  5/29 (day -2): TBI/flu  5/30 (day -1): TBI  5/31 (day 0): Transplant. No ABO mismatch - both A+.      1.  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 GCSF starts " d+6 and cont to until ANC>2500 x 2 consecutive days. Re-stage per protocol.     2.  HEME: Keep Hgb>8g/dL and plts>10K.   WBC trending down, no transfusions today.  Not yet neutropenic.  - on MiPlate study                            3.  ID: Tmax 99.5  -6/4: BC NGTD. Daily blood cultures ordered.   Notified by lab 6/4. On vanco 6/4 to 6/6. Stop IV vancomycin today as daily BC NGTD.   -Prophylaxis with HD ACV (CMV pos, HSV pos, EBV pos), azithromycin (c. Diff), Jesica to Vfend.  Bactrim or appropriate PCP therapy to start d+28.      - 6/4 consult ID as hx of MDR PsAg bacteremia (5/3). Per ID started polymyxin B IV 6/4 however after 2nd dose  Pt developed new unsteadiness will stop as concern for neurotoxicity. Per ID will not resume. If hypotensive start 12mg/kg dapto + tobramycin   - COntinue empiric cefepime only   - recent history of CDI, recommend oral vancomycin prophylaxis 125mg BID                     4.  GI:  Loose stools    - Increase GI prophy - protonox BID. Improved symptoms. He does have prn carafate.   Zofran and dexamethasone for another day given post transplant cytoxan.   Ativan and compazine available PRN break-through symptoms.   - Ursodiol for VOD prophy.     5.  GVH: post transplant cytoxan, flush started last night and cytoxan today (day +3, +4), tac/MMF start day +5.  6/7 First Tac level.      6.  FEN/Renal: Monitor creat and lytes.   - Wt imrpoving with diuresis. Goal to above lasix as many nephrotoxins (IV vanco/tac/polymixin B)   Replete lytes PRN per SS.      Val Lindsey PA-C  [phone]    Miranda Lindsey PA-C      Patient has been seen and evaluated by me. I have reviewed today's vital signs, medications, labs and imaging results. I have discussed the plan with the team and agree with the findings and plan in this note.    No new fevers or bleedign.   Not unsteady today. Eating well and no resp sx.  Exam s rash.  No tremor and CN intact.   States balance is nearly back to nl  Abd soft.   No ext signs of bleeding.   No bone tenderness.    WBC low but no defined infection   plts falling. as expected.  Starting tac with very close review of renal fxn. and drug levels.  Brian Powers MD

## 2019-06-07 LAB
ANION GAP SERPL CALCULATED.3IONS-SCNC: 5 MMOL/L (ref 3–14)
APTT PPP: 31 SEC (ref 22–37)
APTT PPP: 32 SEC (ref 22–37)
BUN SERPL-MCNC: 13 MG/DL (ref 7–30)
CALCIUM SERPL-MCNC: 8.3 MG/DL (ref 8.5–10.1)
CHLORIDE SERPL-SCNC: 110 MMOL/L (ref 94–109)
CMV IGG SERPL QL IA: 0.6 AI (ref 0–0.8)
CO2 SERPL-SCNC: 25 MMOL/L (ref 20–32)
CREAT SERPL-MCNC: 0.56 MG/DL (ref 0.66–1.25)
DIFFERENTIAL METHOD BLD: ABNORMAL
ERYTHROCYTE [DISTWIDTH] IN BLOOD BY AUTOMATED COUNT: 15.1 % (ref 10–15)
FIBRINOGEN PPP-MCNC: 484 MG/DL (ref 200–420)
FIBRINOGEN PPP-MCNC: 488 MG/DL (ref 200–420)
GFR SERPL CREATININE-BSD FRML MDRD: >90 ML/MIN/{1.73_M2}
GLUCOSE SERPL-MCNC: 107 MG/DL (ref 70–99)
HCT VFR BLD AUTO: 37.1 % (ref 40–53)
HGB BLD-MCNC: 12.4 G/DL (ref 13.3–17.7)
INR PPP: 0.99 (ref 0.86–1.14)
INR PPP: 0.99 (ref 0.86–1.14)
MAGNESIUM SERPL-MCNC: 1.6 MG/DL (ref 1.6–2.3)
MCH RBC QN AUTO: 31.5 PG (ref 26.5–33)
MCHC RBC AUTO-ENTMCNC: 33.4 G/DL (ref 31.5–36.5)
MCV RBC AUTO: 94 FL (ref 78–100)
PLATELET # BLD AUTO: 58 10E9/L (ref 150–450)
POTASSIUM SERPL-SCNC: 3.6 MMOL/L (ref 3.4–5.3)
RBC # BLD AUTO: 3.94 10E12/L (ref 4.4–5.9)
SODIUM SERPL-SCNC: 140 MMOL/L (ref 133–144)
TACROLIMUS BLD-MCNC: 10.4 UG/L (ref 5–15)
TME LAST DOSE: NORMAL H
WBC # BLD AUTO: 0.3 10E9/L (ref 4–11)

## 2019-06-07 PROCEDURE — 85730 THROMBOPLASTIN TIME PARTIAL: CPT | Performed by: PHYSICIAN ASSISTANT

## 2019-06-07 PROCEDURE — 85025 COMPLETE CBC W/AUTO DIFF WBC: CPT | Performed by: PHYSICIAN ASSISTANT

## 2019-06-07 PROCEDURE — 25000128 H RX IP 250 OP 636: Performed by: INTERNAL MEDICINE

## 2019-06-07 PROCEDURE — 25000128 H RX IP 250 OP 636: Performed by: PHYSICIAN ASSISTANT

## 2019-06-07 PROCEDURE — 20600000 ZZH R&B BMT

## 2019-06-07 PROCEDURE — 25000132 ZZH RX MED GY IP 250 OP 250 PS 637: Performed by: INTERNAL MEDICINE

## 2019-06-07 PROCEDURE — 25000132 ZZH RX MED GY IP 250 OP 250 PS 637: Performed by: PHYSICIAN ASSISTANT

## 2019-06-07 PROCEDURE — 25000125 ZZHC RX 250: Performed by: PHYSICIAN ASSISTANT

## 2019-06-07 PROCEDURE — 80048 BASIC METABOLIC PNL TOTAL CA: CPT | Performed by: PHYSICIAN ASSISTANT

## 2019-06-07 PROCEDURE — 83735 ASSAY OF MAGNESIUM: CPT | Performed by: PHYSICIAN ASSISTANT

## 2019-06-07 PROCEDURE — 25800030 ZZH RX IP 258 OP 636: Performed by: PHYSICIAN ASSISTANT

## 2019-06-07 PROCEDURE — 85027 COMPLETE CBC AUTOMATED: CPT

## 2019-06-07 PROCEDURE — 25000131 ZZH RX MED GY IP 250 OP 636 PS 637: Performed by: PHYSICIAN ASSISTANT

## 2019-06-07 PROCEDURE — 80197 ASSAY OF TACROLIMUS: CPT | Performed by: PHYSICIAN ASSISTANT

## 2019-06-07 PROCEDURE — 85610 PROTHROMBIN TIME: CPT | Performed by: PHYSICIAN ASSISTANT

## 2019-06-07 PROCEDURE — 85384 FIBRINOGEN ACTIVITY: CPT | Performed by: PHYSICIAN ASSISTANT

## 2019-06-07 PROCEDURE — 25800030 ZZH RX IP 258 OP 636: Performed by: INTERNAL MEDICINE

## 2019-06-07 PROCEDURE — 86644 CMV ANTIBODY: CPT | Performed by: PHYSICIAN ASSISTANT

## 2019-06-07 RX ORDER — LOPERAMIDE HCL 2 MG
2 CAPSULE ORAL 4 TIMES DAILY PRN
Status: DISCONTINUED | OUTPATIENT
Start: 2019-06-07 | End: 2019-06-27 | Stop reason: HOSPADM

## 2019-06-07 RX ADMIN — VORICONAZOLE 200 MG: 200 TABLET, FILM COATED ORAL at 20:26

## 2019-06-07 RX ADMIN — URSODIOL 300 MG: 300 CAPSULE ORAL at 07:57

## 2019-06-07 RX ADMIN — ACYCLOVIR 800 MG: 800 TABLET ORAL at 14:31

## 2019-06-07 RX ADMIN — PANTOPRAZOLE SODIUM 40 MG: 40 TABLET, DELAYED RELEASE ORAL at 07:57

## 2019-06-07 RX ADMIN — TACROLIMUS 85 MCG/HR: 5 INJECTION, SOLUTION INTRAVENOUS at 16:53

## 2019-06-07 RX ADMIN — VANCOMYCIN HYDROCHLORIDE 125 MG: KIT at 20:28

## 2019-06-07 RX ADMIN — MYCOPHENOLATE MOFETIL 1000 MG: 500 INJECTION, POWDER, LYOPHILIZED, FOR SOLUTION INTRAVENOUS at 14:27

## 2019-06-07 RX ADMIN — MYCOPHENOLATE MOFETIL 1000 MG: 500 INJECTION, POWDER, LYOPHILIZED, FOR SOLUTION INTRAVENOUS at 06:17

## 2019-06-07 RX ADMIN — TAMSULOSIN HYDROCHLORIDE 0.4 MG: 0.4 CAPSULE ORAL at 07:57

## 2019-06-07 RX ADMIN — PANTOPRAZOLE SODIUM 40 MG: 40 TABLET, DELAYED RELEASE ORAL at 20:26

## 2019-06-07 RX ADMIN — ACYCLOVIR 800 MG: 800 TABLET ORAL at 18:12

## 2019-06-07 RX ADMIN — CEFEPIME 2 G: 2 INJECTION, POWDER, FOR SOLUTION INTRAVENOUS at 03:13

## 2019-06-07 RX ADMIN — ACYCLOVIR 800 MG: 800 TABLET ORAL at 21:26

## 2019-06-07 RX ADMIN — SUCRALFATE 1 G: 1 SUSPENSION ORAL at 11:05

## 2019-06-07 RX ADMIN — FILGRASTIM 480 MCG: 480 INJECTION, SOLUTION INTRAVENOUS; SUBCUTANEOUS at 21:21

## 2019-06-07 RX ADMIN — ONDANSETRON HYDROCHLORIDE 8 MG: 8 TABLET, FILM COATED ORAL at 03:04

## 2019-06-07 RX ADMIN — URSODIOL 300 MG: 300 CAPSULE ORAL at 14:31

## 2019-06-07 RX ADMIN — VANCOMYCIN HYDROCHLORIDE 125 MG: KIT at 07:57

## 2019-06-07 RX ADMIN — SODIUM CHLORIDE, PRESERVATIVE FREE 10 ML: 5 INJECTION INTRAVENOUS at 11:04

## 2019-06-07 RX ADMIN — SUCRALFATE 1 G: 1 SUSPENSION ORAL at 16:08

## 2019-06-07 RX ADMIN — URSODIOL 300 MG: 300 CAPSULE ORAL at 20:30

## 2019-06-07 RX ADMIN — ACYCLOVIR 800 MG: 800 TABLET ORAL at 11:05

## 2019-06-07 RX ADMIN — MYCOPHENOLATE MOFETIL 1000 MG: 500 INJECTION, POWDER, LYOPHILIZED, FOR SOLUTION INTRAVENOUS at 21:28

## 2019-06-07 RX ADMIN — SUCRALFATE 1 G: 1 SUSPENSION ORAL at 21:26

## 2019-06-07 RX ADMIN — CEFEPIME 2 G: 2 INJECTION, POWDER, FOR SOLUTION INTRAVENOUS at 11:59

## 2019-06-07 RX ADMIN — SUCRALFATE 1 G: 1 SUSPENSION ORAL at 07:57

## 2019-06-07 RX ADMIN — ACYCLOVIR 800 MG: 800 TABLET ORAL at 07:57

## 2019-06-07 RX ADMIN — CEFEPIME 2 G: 2 INJECTION, POWDER, FOR SOLUTION INTRAVENOUS at 20:26

## 2019-06-07 ASSESSMENT — MIFFLIN-ST. JEOR: SCORE: 1677.02

## 2019-06-07 ASSESSMENT — ACTIVITIES OF DAILY LIVING (ADL)
ADLS_ACUITY_SCORE: 14

## 2019-06-07 NOTE — PLAN OF CARE
"/81 (BP Location: Left arm)   Pulse 96   Temp 97.7  F (36.5  C) (Oral)   Resp 18   Ht 1.735 m (5' 8.31\")   Wt 90.8 kg (200 lb 1.6 oz)   SpO2 99%   BMI 30.15 kg/m      Neuro: A&Ox4.   Cardiac: SR. VSS.   Respiratory: Sating 99% on RA.  GI/: Adequate urine output. BM X2 (per pt's report, one was watery and provider was informed. New order for stool sample, but no stool since order.  Diet/appetite: Tolerating regular diet. Eating well.  Activity:  Independent up to chair and bathroom.  Pain: At acceptable level on current regimen.   Skin: No new deficits noted.  LDA's:Central lines.  New this shift : Tac was 10.4.  Plan: Continue with POC. Notify primary team with changes.  "

## 2019-06-07 NOTE — PROGRESS NOTES
"BMT Daily Progress Note     Mr. Ace 63 y.o hx of AML. Admitted for VELMA Haplo. Currently day +7.  Overnight events: Randy is doing well. He is having loose stools 3x day- progressively more watery. He is eating well and was already up walking on the treadmill this morning. He has no sore throat and no infectious concerns (cold symptoms). Working on getting into a better routine to help him sleep at night.   Review of Systems: 10 point ROS negative except as noted above.    Physical Exam:   Blood pressure 121/81, pulse 96, temperature 97.7  F (36.5  C), temperature source Oral, resp. rate 18, height 1.735 m (5' 8.31\"), weight 90.8 kg (200 lb 1.6 oz), SpO2 99 %.  General: NAD, appears to feel better today.   Eyes: JEROMY, sclera anicteric   Nose/Mouth/Throat: OP clear, buccal mucosa moist, no ulcerations   Lungs: CTA bilaterally  Cardiovascular: RRR, no M/R/G   Abdominal/Rectal: +BS, soft, NT, ND  Lymphatics: no edema  Skin: no rashes or petechaie  Neuro: A&O. EOMs intake, Feels unsteady though follows commands appropriately, celebrellar testing intact.   Additional Findings: [facility] c/d/i    Labs:  Lab Results   Component Value Date    WBC 0.3 (LL) 06/07/2019    ANEU 0.6 (L) 06/05/2019    HGB 12.4 (L) 06/07/2019    HCT 37.1 (L) 06/07/2019    PLT 58 (L) 06/07/2019     06/07/2019    POTASSIUM 3.6 06/07/2019    CHLORIDE 110 (H) 06/07/2019    CO2 25 06/07/2019     (H) 06/07/2019    BUN 13 06/07/2019    CR 0.56 (L) 06/07/2019    MAG 1.6 06/07/2019    INR 0.99 06/07/2019     Imaging: Reviewed  Microbiology:  Reviewed    Assessment and Plan:   El Ace is a 63 year old male with hx of AML (IDH2, RUNX1 pos), currently day +7 of flu/cy/TBI and related haplo BMT.     5/25 (day -6): flu/cy  5/26 (day -5): flu  5/27 (day -4): flu   5/28 (day -3): flu  5/29 (day -2): TBI/flu  5/30 (day -1): TBI  5/31 (day 0): Transplant. No ABO mismatch - both A+.      1.  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 GCSF starts d+5 " and cont to until ANC>2500 x 2 consecutive days. Re-stage per protocol.     2.  HEME: Keep Hgb>8g/dL and plts>10K.   WBC trending down, no transfusions today.  Not yet neutropenic.  - on MiPlate study                            3.  ID: Tmax 99.5  -6/4: BC NGTD. Daily blood cultures ordered.   Notified by lab 6/4. On vanco 6/4 to 6/6. Stop IV vancomycin today as daily BC NGTD.   -Prophylaxis with HD ACV (CMV pos, HSV pos, EBV pos), azithromycin (c. Diff), Jesica to Vfend.  Bactrim or appropriate PCP therapy to start d+28.      - 6/4 consult ID as hx of MDR PsAg bacteremia (5/3). Per ID started polymyxin B IV 6/4 however after 2nd dose  Pt developed new unsteadiness will stop as concern for neurotoxicity. Per ID will not resume. If hypotensive start 12mg/kg dapto + tobramycin   - COntinue empiric cefepime only   - recent history of CDI, recommend oral vancomycin prophylaxis 125mg BID                     4.  GI:  Loose stools- more watery and urgent  - Highly unlikely Cdiff but will rule out (on BID prophy PO vanco). Start PRN imodium.   - Increase GI prophy - protonox BID. Improved symptoms. He does have prn carafate.   Zofran and dexamethasone for another day given post transplant cytoxan.   Ativan and compazine available PRN break-through symptoms.   - Ursodiol for VOD prophy.     5.  GVH: post transplant cytoxan, flush started last night and cytoxan today (day +3, +4), tac/MMF start day +5.  6/7 First Tac level= 10.4- no change to dose.       6.  FEN/Renal: Monitor creat and lytes.   - Wt imrpoving with diuresis. Goal to above lasix as many nephrotoxins (IV vanco/tac/polymixin B)   Replete lytes PRN per SS.      Val Lindsey PA-C  [phone]    Miranda Lindsey PA-C    Patient has been seen and evaluated by me. I have reviewed today's vital signs, medications, labs and imaging results. I have discussed the plan with the team and agree with the findings and plan in this note.      No new fcs or bleeding;  Eating  ok and mood good. Some loose stools but no resp sx  No new rash  Lungs clear; cor reg  Abd soft s tender or big HS.  No abd mass  No LE edema or bone tenderness    WBC low but no define additional infection.  No other new complications but still pancytopenic.    Brian Powers MD

## 2019-06-07 NOTE — PROGRESS NOTES
BMT Daily Bleeding Assessment   [facility] Study Daily Hemostatic Assessment Form     In the last 24 hours:      Oral and Nasal:  B5.  New petechiae of oral mucosa? No  B6.  Oropharyngeal bleeding? No  B7.  Epistaxis during assessment period? No               Skin, Soft Tissue, Musculoskeletal:  B8.  New petechiae present on skin? No  B9.  New purpura present? No  B10.  One or more spontaneous hematomas >1 inch in soft tissue or muscle? No  B11.  Spontaneous hematoma in deeper tissues? No  B12.  Joint bleeding? No    Gastrointestinal/Genitourinary/Gynecologic:  B13.  Was there a stool specimen? Yes     B13a.  Melanotic stool? No   B13b.  Visible red blood in stool? No    B14.  Was there emesis? No  B15.  Does patient have a nasogastric drainage tube? No  B16.  Visible blood in urine? No  B17.  Is the patient female? No            Pulmonary:   B18.  Hemoptysis? No  B20.  Blood tinged sputum? No    Opthalmic:  B. Scleral bleed? No    Invasive Sites:  B27.  Active oozing at invasive site for cumulative total             of >1 hour during assessment period? No    C2.  Comments on Assessment:  (Record approximate date/time onset of bleeding,   if applicable; Description (location/size). Include reason if any component of the assessment was not completed): reports diarrhea, no blood,  Denies any bleeding or bruising since yesterday morning.

## 2019-06-07 NOTE — PLAN OF CARE
AVSS. Denies pain, NV. Reported a small amount of diarrhea earlier in the day. Tac gtt at 5.5 ml/hr. Voiding adequately, independent in room. Continue to monitor.      Problem: Adult Inpatient Plan of Care  Goal: Plan of Care Review  6/7/2019 0542 by Renee Ca, RN  Outcome: No Change  6/6/2019 1651 by Linh Ordonez, RN  Outcome: No Change

## 2019-06-08 LAB
ABO + RH BLD: NORMAL
ABO + RH BLD: NORMAL
ANION GAP SERPL CALCULATED.3IONS-SCNC: 5 MMOL/L (ref 3–14)
BLD GP AB SCN SERPL QL: NORMAL
BLOOD BANK CMNT PATIENT-IMP: NORMAL
BUN SERPL-MCNC: 14 MG/DL (ref 7–30)
CALCIUM SERPL-MCNC: 8.4 MG/DL (ref 8.5–10.1)
CHLORIDE SERPL-SCNC: 110 MMOL/L (ref 94–109)
CO2 SERPL-SCNC: 24 MMOL/L (ref 20–32)
CREAT SERPL-MCNC: 0.54 MG/DL (ref 0.66–1.25)
DIFFERENTIAL METHOD BLD: ABNORMAL
ERYTHROCYTE [DISTWIDTH] IN BLOOD BY AUTOMATED COUNT: 15.1 % (ref 10–15)
GFR SERPL CREATININE-BSD FRML MDRD: >90 ML/MIN/{1.73_M2}
GLUCOSE SERPL-MCNC: 96 MG/DL (ref 70–99)
HCT VFR BLD AUTO: 36.3 % (ref 40–53)
HGB BLD-MCNC: 12.3 G/DL (ref 13.3–17.7)
MCH RBC QN AUTO: 31.8 PG (ref 26.5–33)
MCHC RBC AUTO-ENTMCNC: 33.9 G/DL (ref 31.5–36.5)
MCV RBC AUTO: 94 FL (ref 78–100)
PLATELET # BLD AUTO: 29 10E9/L (ref 150–450)
POTASSIUM SERPL-SCNC: 3.5 MMOL/L (ref 3.4–5.3)
RBC # BLD AUTO: 3.87 10E12/L (ref 4.4–5.9)
SODIUM SERPL-SCNC: 140 MMOL/L (ref 133–144)
SPECIMEN EXP DATE BLD: NORMAL
WBC # BLD AUTO: 0.1 10E9/L (ref 4–11)

## 2019-06-08 PROCEDURE — 25800030 ZZH RX IP 258 OP 636: Performed by: PHYSICIAN ASSISTANT

## 2019-06-08 PROCEDURE — 20600000 ZZH R&B BMT

## 2019-06-08 PROCEDURE — 86900 BLOOD TYPING SEROLOGIC ABO: CPT | Performed by: PHYSICIAN ASSISTANT

## 2019-06-08 PROCEDURE — 25000132 ZZH RX MED GY IP 250 OP 250 PS 637: Performed by: PHYSICIAN ASSISTANT

## 2019-06-08 PROCEDURE — 25000128 H RX IP 250 OP 636: Performed by: PHYSICIAN ASSISTANT

## 2019-06-08 PROCEDURE — 80048 BASIC METABOLIC PNL TOTAL CA: CPT | Performed by: PHYSICIAN ASSISTANT

## 2019-06-08 PROCEDURE — 86850 RBC ANTIBODY SCREEN: CPT | Performed by: PHYSICIAN ASSISTANT

## 2019-06-08 PROCEDURE — 25000128 H RX IP 250 OP 636: Performed by: INTERNAL MEDICINE

## 2019-06-08 PROCEDURE — 86901 BLOOD TYPING SEROLOGIC RH(D): CPT | Performed by: PHYSICIAN ASSISTANT

## 2019-06-08 PROCEDURE — 85027 COMPLETE CBC AUTOMATED: CPT

## 2019-06-08 PROCEDURE — 25000125 ZZHC RX 250: Performed by: PHYSICIAN ASSISTANT

## 2019-06-08 PROCEDURE — 25800030 ZZH RX IP 258 OP 636: Performed by: INTERNAL MEDICINE

## 2019-06-08 PROCEDURE — 25000132 ZZH RX MED GY IP 250 OP 250 PS 637: Performed by: INTERNAL MEDICINE

## 2019-06-08 RX ORDER — HYDRALAZINE HYDROCHLORIDE 20 MG/ML
10 INJECTION INTRAMUSCULAR; INTRAVENOUS EVERY 6 HOURS PRN
Status: DISCONTINUED | OUTPATIENT
Start: 2019-06-08 | End: 2019-06-27 | Stop reason: HOSPADM

## 2019-06-08 RX ADMIN — ACYCLOVIR 800 MG: 800 TABLET ORAL at 15:08

## 2019-06-08 RX ADMIN — SUCRALFATE 1 G: 1 SUSPENSION ORAL at 07:47

## 2019-06-08 RX ADMIN — CEFEPIME 2 G: 2 INJECTION, POWDER, FOR SOLUTION INTRAVENOUS at 21:26

## 2019-06-08 RX ADMIN — TACROLIMUS 85 MCG/HR: 5 INJECTION, SOLUTION INTRAVENOUS at 21:13

## 2019-06-08 RX ADMIN — ACYCLOVIR 800 MG: 800 TABLET ORAL at 18:20

## 2019-06-08 RX ADMIN — CEFEPIME 2 G: 2 INJECTION, POWDER, FOR SOLUTION INTRAVENOUS at 12:51

## 2019-06-08 RX ADMIN — MYCOPHENOLATE MOFETIL 1000 MG: 500 INJECTION, POWDER, LYOPHILIZED, FOR SOLUTION INTRAVENOUS at 22:04

## 2019-06-08 RX ADMIN — VANCOMYCIN HYDROCHLORIDE 125 MG: KIT at 22:03

## 2019-06-08 RX ADMIN — CEFEPIME 2 G: 2 INJECTION, POWDER, FOR SOLUTION INTRAVENOUS at 03:15

## 2019-06-08 RX ADMIN — SUCRALFATE 1 G: 1 SUSPENSION ORAL at 22:03

## 2019-06-08 RX ADMIN — ACYCLOVIR 800 MG: 800 TABLET ORAL at 12:50

## 2019-06-08 RX ADMIN — ACYCLOVIR 800 MG: 800 TABLET ORAL at 07:47

## 2019-06-08 RX ADMIN — MYCOPHENOLATE MOFETIL 1000 MG: 500 INJECTION, POWDER, LYOPHILIZED, FOR SOLUTION INTRAVENOUS at 15:08

## 2019-06-08 RX ADMIN — MYCOPHENOLATE MOFETIL 1000 MG: 500 INJECTION, POWDER, LYOPHILIZED, FOR SOLUTION INTRAVENOUS at 05:27

## 2019-06-08 RX ADMIN — PROCHLORPERAZINE EDISYLATE 5 MG: 5 INJECTION INTRAMUSCULAR; INTRAVENOUS at 23:28

## 2019-06-08 RX ADMIN — PANTOPRAZOLE SODIUM 40 MG: 40 TABLET, DELAYED RELEASE ORAL at 07:47

## 2019-06-08 RX ADMIN — PANTOPRAZOLE SODIUM 40 MG: 40 TABLET, DELAYED RELEASE ORAL at 21:12

## 2019-06-08 RX ADMIN — URSODIOL 300 MG: 300 CAPSULE ORAL at 15:08

## 2019-06-08 RX ADMIN — TAMSULOSIN HYDROCHLORIDE 0.4 MG: 0.4 CAPSULE ORAL at 07:47

## 2019-06-08 RX ADMIN — URSODIOL 300 MG: 300 CAPSULE ORAL at 21:12

## 2019-06-08 RX ADMIN — SUCRALFATE 1 G: 1 SUSPENSION ORAL at 12:50

## 2019-06-08 RX ADMIN — VORICONAZOLE 200 MG: 200 TABLET, FILM COATED ORAL at 21:12

## 2019-06-08 RX ADMIN — URSODIOL 300 MG: 300 CAPSULE ORAL at 07:48

## 2019-06-08 RX ADMIN — VORICONAZOLE 200 MG: 200 TABLET, FILM COATED ORAL at 07:48

## 2019-06-08 RX ADMIN — SUCRALFATE 1 G: 1 SUSPENSION ORAL at 16:39

## 2019-06-08 RX ADMIN — VANCOMYCIN HYDROCHLORIDE 125 MG: KIT at 07:48

## 2019-06-08 RX ADMIN — FILGRASTIM 480 MCG: 480 INJECTION, SOLUTION INTRAVENOUS; SUBCUTANEOUS at 21:14

## 2019-06-08 RX ADMIN — ACYCLOVIR 800 MG: 800 TABLET ORAL at 22:03

## 2019-06-08 ASSESSMENT — ACTIVITIES OF DAILY LIVING (ADL)
ADLS_ACUITY_SCORE: 14

## 2019-06-08 ASSESSMENT — MIFFLIN-ST. JEOR: SCORE: 1674.37

## 2019-06-08 NOTE — PLAN OF CARE
"BP (!) 127/101   Pulse 74   Temp 97.9  F (36.6  C) (Axillary)   Resp 16   Ht 1.735 m (5' 8.31\")   Wt 90.5 kg (199 lb 8.3 oz)   SpO2 99%   BMI 30.06 kg/m    Pt afeb, dbp elevated MD notified, prn hydralazine available for dbp>110, pt asymptomatic. Pt with no appetite, however eating at each meal. Denies nausea/pain. Pt had 2 formed stools, no diarrhea, c diff order d/c'd. Pt up in room, offers no complaints, continue to monitor per poc.  Problem: Adult Inpatient Plan of Care  Goal: Plan of Care Review  6/8/2019 1845 by Debbie Johnson RN  Outcome: No Change     Problem: Adult Inpatient Plan of Care  Goal: Patient-Specific Goal (Individualization)  6/8/2019 1845 by Debbie Johnson RN  Outcome: No Change     Problem: Adult Inpatient Plan of Care  Goal: Optimal Comfort and Wellbeing  6/8/2019 1845 by Debbie Johnson RN  Outcome: No Change     Problem: Nutrition Intake Altered (Stem Cell/Bone Marrow Transplant)  Goal: Optimal Nutrition Intake  6/8/2019 1845 by Debbie Johnson, RN  Outcome: No Change     Problem: Diarrhea (Stem Cell/Bone Marrow Transplant)  Goal: Diarrhea Symptom Control  6/8/2019 1845 by Debbie Johnson, RN  Outcome: Improving     "

## 2019-06-08 NOTE — PROGRESS NOTES
"BMT Daily Progress Note     Mr. Ace 63 y.o hx of AML. Admitted for VELMA Haplo. Currently day +8.  Overnight events: Randy is doing well. Was previously having loose stools but last evening he had a small, formed stool. No stools since. Ate breakfast this morning. No n/v. No other new concerns.     Review of Systems: 10 point ROS negative except as noted above.    Physical Exam:   Blood pressure 120/83, pulse 96, temperature 96.3  F (35.7  C), temperature source Axillary, resp. rate 18, height 1.735 m (5' 8.31\"), weight 90.5 kg (199 lb 8.3 oz), SpO2 94 %.  General: NAD   Eyes: JEROMY, sclera anicteric   Nose/Mouth/Throat: OP clear, buccal mucosa moist, no ulcerations   Lungs: CTA bilaterally  Cardiovascular: RRR, no M/R/G   Abdominal/Rectal: +BS, soft, NT, ND  Lymphatics: no edema  Skin: no rashes or petechaie  Neuro: A&O.    Additional Findings: [facility] c/d/i    Labs:  Lab Results   Component Value Date    WBC 0.1 (LL) 06/08/2019    ANEU 0.6 (L) 06/05/2019    HGB 12.3 (L) 06/08/2019    HCT 36.3 (L) 06/08/2019    PLT 29 (LL) 06/08/2019     06/08/2019    POTASSIUM 3.5 06/08/2019    CHLORIDE 110 (H) 06/08/2019    CO2 24 06/08/2019    GLC 96 06/08/2019    BUN 14 06/08/2019    CR 0.54 (L) 06/08/2019    MAG 1.6 06/07/2019    INR 0.99 06/07/2019     Imaging: Reviewed  Microbiology:  Reviewed    Assessment and Plan:   El Ace is a 63 year old male with hx of AML (IDH2, RUNX1 pos), currently day +8 of flu/cy/TBI and related haplo BMT.     5/25 (day -6): flu/cy  5/26 (day -5): flu  5/27 (day -4): flu   5/28 (day -3): flu  5/29 (day -2): TBI/flu  5/30 (day -1): TBI  5/31 (day 0): Transplant. No ABO mismatch - both A+.      1.  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 GCSF started d+5 and cont to until ANC>2500 x 2 consecutive days. Re-stage per protocol.     2.  HEME: Keep Hgb>8g/dL and plts>10K.   WBC trending down, no transfusions today.    - on MiPlate study                            3.  ID: Tmax 99.5  -6/4: BC " NGTD. Daily blood cultures ordered.   Notified by lab 6/4. On vanco 6/4 to 6/6. Stop IV vancomycin as daily BC NGTD.   -Prophylaxis with HD ACV (CMV pos, HSV pos, EBV pos), azithromycin (c. Diff), Jesica to Vfend.  Bactrim or appropriate PCP therapy to start d+28.      - 6/4 consult ID as hx of MDR PsAg bacteremia (5/3). Per ID started polymyxin B IV 6/4 however after 2nd dose  Pt developed new unsteadiness will stop as concern for neurotoxicity. Per ID will not resume. If hypotensive start 12mg/kg dapto + tobramycin   - COntinue empiric cefepime only   - recent history of CDI, recommend oral vancomycin prophylaxis 125mg BID                     4.  GI:  Loose stools-now formed evening of 6/7  - Highly unlikely Cdiff but will rule out (on BID prophy PO vanco). on PRN imodium.   - Increase GI prophy - protonox BID. Improved symptoms. He does have prn carafate.   Zofran and dexamethasone for another day given post transplant cytoxan.   Ativan and compazine available PRN break-through symptoms.   - Ursodiol for VOD prophy.     5.  GVH: post transplant cytoxan, flush started last night and cytoxan today (day +3, +4), tac/MMF start day +5.  6/7 First Tac level= 10.4- no change to dose.       6.  FEN/Renal: Monitor creat and lytes.   - Wt imrpoving with diuresis. Goal to above lasix as many nephrotoxins (IV vanco/tac/polymixin B)   Replete lytes PRN per SS.          Miranda Watson, APRN CNP  Patient has been seen and evaluated by me. I have reviewed today's vital signs, medications, labs and imaging results. I have discussed the plan with the team and agree with the findings and plan in this note.      Feeling better; eating and no new abd distress; No fcs or bleding  No resp sx  Exam s rash or edema  Lungs clear;  Cor reg    ENT mucosa neg  Abd s focal tenderness    Leukopenic but no new active infection identified.  Cont plans as above.  Brian Powers MD

## 2019-06-08 NOTE — PLAN OF CARE
AVSS. With 1 episode of slightly elevated BP. Denies pain, nausea or vomiting. Had 1 small soft, formed stool. Up independently and voiding freely. TAC infusing at 4.3ml/hr. For TAC level today.  Problem: Adult Inpatient Plan of Care  Goal: Plan of Care Review  6/8/2019 0535 by Princess Eliana Park RN  Outcome: No Change     Problem: Adult Inpatient Plan of Care  Goal: Patient-Specific Goal (Individualization)  Outcome: No Change     Problem: Adult Inpatient Plan of Care  Goal: Absence of Hospital-Acquired Illness or Injury  Outcome: No Change     Problem: Adult Inpatient Plan of Care  Goal: Optimal Comfort and Wellbeing  6/8/2019 0535 by Princess Eliana Park, MILO  Outcome: No Change     Problem: Adjustment to Transplant (Stem Cell/Bone Marrow Transplant)  Goal: Optimal Coping with Transplant  6/8/2019 0535 by Princess Eliana Park RN  Outcome: No Change     Problem: Bladder Irritation (Stem Cell/Bone Marrow Transplant)  Goal: Symptom-Free Urinary Elimination  Outcome: No Change     Problem: Diarrhea (Stem Cell/Bone Marrow Transplant)  Goal: Diarrhea Symptom Control  6/8/2019 0535 by Princess Eliana Park RN  Outcome: No Change     Problem: Fatigue (Stem Cell/Bone Marrow Transplant)  Goal: Energy Level Supports Daily Activity  6/8/2019 0535 by Princess Eliana Park RN  Outcome: No Change     Problem: Hematologic Alteration (Stem Cell/Bone Marrow Transplant)  Goal: Blood Counts Within Acceptable Range  6/8/2019 0535 by Princess Eliana Park RN  Outcome: No Change     Problem: Hypersensitivity Reaction (Stem Cell/Bone Marrow Transplant)  Goal: Absence of Hypersensitivity Reaction  Outcome: No Change     Problem: Infection Risk (Stem Cell/Bone Marrow Transplant)  Goal: Absence of Infection Signs/Symptoms  6/8/2019 0535 by Princess Eliana Park RN  Outcome: No Change     Problem: Mucositis (Stem Cell/Bone Marrow Transplant)  Goal: Mucous Membrane Health and Integrity  Outcome: No Change     Problem: Nausea and  Vomiting (Stem Cell/Bone Marrow Transplant)  Goal: Nausea and Vomiting Symptom Relief  Outcome: No Change     Problem: Nutrition Intake Altered (Stem Cell/Bone Marrow Transplant)  Goal: Optimal Nutrition Intake  6/8/2019 0535 by Princess Eliana Park RN  Outcome: No Change

## 2019-06-08 NOTE — PLAN OF CARE
VSS. 1 slightly high BP around 1600, usually does not have any HTN. No c/o n/v. Did not have any episodes of diarrhea this evening. Has only had a total of one episode of diarrhea this morning @ 10:00. If he does not have 2 more episodes of diarrhea between now and 10:00 tomorrow morning, c diff order can be discontinued. Tacrolimus rate decreased to 4.3mL/hr. Appetite still good. Continue POC.    Problem: Adult Inpatient Plan of Care  Goal: Plan of Care Review  6/7/2019 2259 by Libra Crandall RN  Outcome: No Change     Problem: Adult Inpatient Plan of Care  Goal: Optimal Comfort and Wellbeing  6/7/2019 2259 by Libra Crandall RN  Outcome: No Change     Problem: Adult Inpatient Plan of Care  Goal: Readiness for Transition of Care  6/7/2019 2259 by Libra Crandall RN  Outcome: No Change     Problem: Fatigue (Stem Cell/Bone Marrow Transplant)  Goal: Energy Level Supports Daily Activity  6/7/2019 2259 by Libra Crandall RN  Outcome: No Change     Problem: Hematologic Alteration (Stem Cell/Bone Marrow Transplant)  Goal: Blood Counts Within Acceptable Range  6/7/2019 2259 by Libra Crandall RN  Outcome: No Change     Problem: Infection Risk (Stem Cell/Bone Marrow Transplant)  Goal: Absence of Infection Signs/Symptoms  6/7/2019 2259 by Libra Crandall RN  Outcome: No Change     Problem: Nutrition Intake Altered (Stem Cell/Bone Marrow Transplant)  Goal: Optimal Nutrition Intake  6/7/2019 2259 by Libra Crandall RN  Outcome: No Change     Problem: Diarrhea (Stem Cell/Bone Marrow Transplant)  Goal: Diarrhea Symptom Control  6/7/2019 2259 by Libra Crandall RN  Outcome: Improving

## 2019-06-09 LAB
ANION GAP SERPL CALCULATED.3IONS-SCNC: 6 MMOL/L (ref 3–14)
BUN SERPL-MCNC: 14 MG/DL (ref 7–30)
CALCIUM SERPL-MCNC: 8.2 MG/DL (ref 8.5–10.1)
CHLORIDE SERPL-SCNC: 110 MMOL/L (ref 94–109)
CMV DNA SPEC NAA+PROBE-ACNC: NORMAL [IU]/ML
CMV DNA SPEC NAA+PROBE-LOG#: NORMAL {LOG_IU}/ML
CMV IGG SERPL QL IA: 0.7 AI (ref 0–0.8)
CO2 SERPL-SCNC: 24 MMOL/L (ref 20–32)
CREAT SERPL-MCNC: 0.5 MG/DL (ref 0.66–1.25)
DIFFERENTIAL METHOD BLD: ABNORMAL
ERYTHROCYTE [DISTWIDTH] IN BLOOD BY AUTOMATED COUNT: 14.6 % (ref 10–15)
GFR SERPL CREATININE-BSD FRML MDRD: >90 ML/MIN/{1.73_M2}
GLUCOSE SERPL-MCNC: 116 MG/DL (ref 70–99)
HCT VFR BLD AUTO: 34.8 % (ref 40–53)
HGB BLD-MCNC: 11.7 G/DL (ref 13.3–17.7)
MAGNESIUM SERPL-MCNC: 1.5 MG/DL (ref 1.6–2.3)
MAGNESIUM SERPL-MCNC: 2.3 MG/DL (ref 1.6–2.3)
MCH RBC QN AUTO: 31.2 PG (ref 26.5–33)
MCHC RBC AUTO-ENTMCNC: 33.6 G/DL (ref 31.5–36.5)
MCV RBC AUTO: 93 FL (ref 78–100)
PLATELET # BLD AUTO: 16 10E9/L (ref 150–450)
POTASSIUM SERPL-SCNC: 3.6 MMOL/L (ref 3.4–5.3)
RBC # BLD AUTO: 3.75 10E12/L (ref 4.4–5.9)
SODIUM SERPL-SCNC: 140 MMOL/L (ref 133–144)
SPECIMEN SOURCE: NORMAL
WBC # BLD AUTO: 0 10E9/L (ref 4–11)

## 2019-06-09 PROCEDURE — 25000125 ZZHC RX 250: Performed by: PHYSICIAN ASSISTANT

## 2019-06-09 PROCEDURE — 25000128 H RX IP 250 OP 636: Performed by: PHYSICIAN ASSISTANT

## 2019-06-09 PROCEDURE — 25000132 ZZH RX MED GY IP 250 OP 250 PS 637: Performed by: PHYSICIAN ASSISTANT

## 2019-06-09 PROCEDURE — 25000128 H RX IP 250 OP 636: Performed by: INTERNAL MEDICINE

## 2019-06-09 PROCEDURE — 83735 ASSAY OF MAGNESIUM: CPT | Performed by: PHYSICIAN ASSISTANT

## 2019-06-09 PROCEDURE — 25800030 ZZH RX IP 258 OP 636: Performed by: INTERNAL MEDICINE

## 2019-06-09 PROCEDURE — 20600000 ZZH R&B BMT

## 2019-06-09 PROCEDURE — 80048 BASIC METABOLIC PNL TOTAL CA: CPT | Performed by: PHYSICIAN ASSISTANT

## 2019-06-09 PROCEDURE — 85027 COMPLETE CBC AUTOMATED: CPT

## 2019-06-09 PROCEDURE — 25000131 ZZH RX MED GY IP 250 OP 636 PS 637: Performed by: INTERNAL MEDICINE

## 2019-06-09 PROCEDURE — 25000132 ZZH RX MED GY IP 250 OP 250 PS 637: Performed by: INTERNAL MEDICINE

## 2019-06-09 PROCEDURE — 25800030 ZZH RX IP 258 OP 636: Performed by: PHYSICIAN ASSISTANT

## 2019-06-09 RX ORDER — AMLODIPINE BESYLATE 5 MG/1
5 TABLET ORAL DAILY
Status: DISCONTINUED | OUTPATIENT
Start: 2019-06-09 | End: 2019-06-12

## 2019-06-09 RX ADMIN — URSODIOL 300 MG: 300 CAPSULE ORAL at 08:19

## 2019-06-09 RX ADMIN — VANCOMYCIN HYDROCHLORIDE 125 MG: KIT at 08:19

## 2019-06-09 RX ADMIN — PANTOPRAZOLE SODIUM 40 MG: 40 TABLET, DELAYED RELEASE ORAL at 08:19

## 2019-06-09 RX ADMIN — SUCRALFATE 1 G: 1 SUSPENSION ORAL at 11:15

## 2019-06-09 RX ADMIN — VORICONAZOLE 200 MG: 200 TABLET, FILM COATED ORAL at 08:19

## 2019-06-09 RX ADMIN — ONDANSETRON 8 MG: 8 TABLET, ORALLY DISINTEGRATING ORAL at 08:18

## 2019-06-09 RX ADMIN — PANTOPRAZOLE SODIUM 40 MG: 40 TABLET, DELAYED RELEASE ORAL at 20:07

## 2019-06-09 RX ADMIN — FILGRASTIM 480 MCG: 480 INJECTION, SOLUTION INTRAVENOUS; SUBCUTANEOUS at 20:06

## 2019-06-09 RX ADMIN — CEFEPIME 2 G: 2 INJECTION, POWDER, FOR SOLUTION INTRAVENOUS at 04:13

## 2019-06-09 RX ADMIN — MYCOPHENOLATE MOFETIL 1000 MG: 500 INJECTION, POWDER, LYOPHILIZED, FOR SOLUTION INTRAVENOUS at 15:13

## 2019-06-09 RX ADMIN — CEFEPIME 2 G: 2 INJECTION, POWDER, FOR SOLUTION INTRAVENOUS at 20:06

## 2019-06-09 RX ADMIN — VORICONAZOLE 200 MG: 200 TABLET, FILM COATED ORAL at 20:07

## 2019-06-09 RX ADMIN — ONDANSETRON 8 MG: 8 TABLET, ORALLY DISINTEGRATING ORAL at 21:39

## 2019-06-09 RX ADMIN — TAMSULOSIN HYDROCHLORIDE 0.4 MG: 0.4 CAPSULE ORAL at 08:19

## 2019-06-09 RX ADMIN — SUCRALFATE 1 G: 1 SUSPENSION ORAL at 17:40

## 2019-06-09 RX ADMIN — TACROLIMUS 85 MCG/HR: 5 INJECTION, SOLUTION INTRAVENOUS at 20:09

## 2019-06-09 RX ADMIN — ACYCLOVIR 800 MG: 800 TABLET ORAL at 17:40

## 2019-06-09 RX ADMIN — ACYCLOVIR 800 MG: 800 TABLET ORAL at 11:15

## 2019-06-09 RX ADMIN — ACYCLOVIR 800 MG: 800 TABLET ORAL at 15:13

## 2019-06-09 RX ADMIN — SUCRALFATE 1 G: 1 SUSPENSION ORAL at 21:39

## 2019-06-09 RX ADMIN — SUCRALFATE 1 G: 1 SUSPENSION ORAL at 08:18

## 2019-06-09 RX ADMIN — MAGNESIUM SULFATE HEPTAHYDRATE 4 G: 40 INJECTION, SOLUTION INTRAVENOUS at 06:45

## 2019-06-09 RX ADMIN — CEFEPIME 2 G: 2 INJECTION, POWDER, FOR SOLUTION INTRAVENOUS at 11:15

## 2019-06-09 RX ADMIN — MYCOPHENOLATE MOFETIL 1000 MG: 500 INJECTION, POWDER, LYOPHILIZED, FOR SOLUTION INTRAVENOUS at 07:22

## 2019-06-09 RX ADMIN — VANCOMYCIN HYDROCHLORIDE 125 MG: KIT at 20:08

## 2019-06-09 RX ADMIN — URSODIOL 300 MG: 300 CAPSULE ORAL at 20:07

## 2019-06-09 RX ADMIN — ACYCLOVIR 800 MG: 800 TABLET ORAL at 21:39

## 2019-06-09 RX ADMIN — MYCOPHENOLATE MOFETIL 1000 MG: 500 INJECTION, POWDER, LYOPHILIZED, FOR SOLUTION INTRAVENOUS at 21:39

## 2019-06-09 RX ADMIN — URSODIOL 300 MG: 300 CAPSULE ORAL at 15:13

## 2019-06-09 RX ADMIN — ACYCLOVIR 800 MG: 800 TABLET ORAL at 08:19

## 2019-06-09 ASSESSMENT — ACTIVITIES OF DAILY LIVING (ADL)
ADLS_ACUITY_SCORE: 14

## 2019-06-09 NOTE — PLAN OF CARE
[id number]. Pt AVSS, denies any pain. Nausea at bedtime, Compazine found effective. Mg replacement this AM; recheck 2 hours after. Continues to be positive and in good spirits. Continue with POC.    Problem: Adult Inpatient Plan of Care  Goal: Plan of Care Review  6/9/2019 0654 by Toan Gallego RN  Outcome: No Change     Problem: Fatigue (Stem Cell/Bone Marrow Transplant)  Goal: Energy Level Supports Daily Activity  Outcome: No Change     Problem: Nausea and Vomiting (Stem Cell/Bone Marrow Transplant)  Goal: Nausea and Vomiting Symptom Relief  Outcome: No Change

## 2019-06-09 NOTE — PLAN OF CARE
"/85 (BP Location: Right arm)   Pulse 65   Temp 97.4  F (36.3  C) (Oral)   Resp 18   Ht 1.735 m (5' 8.31\")   Wt 90.5 kg (199 lb 8.3 oz)   SpO2 99%   BMI 30.06 kg/m    Pt afeb, vss BP wnl pt declined norvasc that was added today, discussed with Dr. Powers., plan to hold norvasc review need for it tomorrow. Pt nauseated this am, zofran x 1 with relief. Pt eating and drinking po fluids fair. Good urine output, 1 small soft stool. Tacrolimus @ 4.3ml/hr (85mcg/hr). Mag recheck wnl. Pt offers no complaints, continue to monitor per poc.  Problem: Adult Inpatient Plan of Care  Goal: Plan of Care Review  6/9/2019 1748 by Debbie Johnson, RN  Outcome: No Change     Problem: Hematologic Alteration (Stem Cell/Bone Marrow Transplant)  Goal: Blood Counts Within Acceptable Range  6/9/2019 1748 by Debbie Johnson RN  Outcome: No Change     Problem: Diarrhea (Stem Cell/Bone Marrow Transplant)  Goal: Diarrhea Symptom Control  6/9/2019 1748 by Debbie Johnson, RN  Outcome: Improving     Problem: Nausea and Vomiting (Stem Cell/Bone Marrow Transplant)  Goal: Nausea and Vomiting Symptom Relief  6/9/2019 1748 by Debbie Johnson, RN  Outcome: Improving     "

## 2019-06-09 NOTE — PROGRESS NOTES
"BMT Daily Progress Note     Mr. Ace 63 y.o hx of AML. Admitted for VELMA Haplo. Currently day +9.  Overnight events: Randy had mild nausea last evening and this morning. Received compazine early morning and is now trying zofran. No emesis and still eating meals and taking pills without issue. Now with formed stools. No other complaints.     Review of Systems: 10 point ROS negative except as noted above.    Physical Exam:   Blood pressure 133/90, pulse 77, temperature 97.4  F (36.3  C), temperature source Axillary, resp. rate 18, height 1.735 m (5' 8.31\"), weight 90.5 kg (199 lb 8.3 oz), SpO2 98 %.  General: NAD   Eyes: JEROMY, sclera anicteric   Nose/Mouth/Throat: OP clear, buccal mucosa moist, no ulcerations   Lungs: CTA bilaterally  Cardiovascular: RRR, no M/R/G   Abdominal/Rectal: +BS, soft, NT, ND  Lymphatics: no edema  Skin: no rashes or petechaie  Neuro: A&O.    Additional Findings: [facility] c/d/i    Labs:  Lab Results   Component Value Date    WBC 0.0 (LL) 06/09/2019    ANEU 0.6 (L) 06/05/2019    HGB 11.7 (L) 06/09/2019    HCT 34.8 (L) 06/09/2019    PLT 16 (LL) 06/09/2019     06/09/2019    POTASSIUM 3.6 06/09/2019    CHLORIDE 110 (H) 06/09/2019    CO2 24 06/09/2019     (H) 06/09/2019    BUN 14 06/09/2019    CR 0.50 (L) 06/09/2019    MAG 1.5 (L) 06/09/2019    INR 0.99 06/07/2019     Imaging: Reviewed  Microbiology:  Reviewed    Assessment and Plan:   El Ace is a 63 year old male with hx of AML (IDH2, RUNX1 pos), currently day +9 of flu/cy/TBI and related haplo BMT.     5/25 (day -6): flu/cy  5/26 (day -5): flu  5/27 (day -4): flu   5/28 (day -3): flu  5/29 (day -2): TBI/flu  5/30 (day -1): TBI  5/31 (day 0): Transplant. No ABO mismatch - both A+.      1.  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 GCSF started d+5 and cont to until ANC>2500 x 2 consecutive days. Re-stage per protocol.     2.  HEME: Keep Hgb>8g/dL and plts>10K.   WBC trending down, no transfusions today.    - on MiPlate study       "                      3.  ID: afebrile  -6/4: BC NGTD. Daily blood cultures ordered.   Notified by lab 6/4. On vanco 6/4 to 6/6. Stop IV vancomycin as daily BC NGTD.   -Prophylaxis with HD ACV (CMV pos, HSV pos, EBV pos), azithromycin (c. Diff), Jesica to Vfend.  Bactrim or appropriate PCP therapy to start d+28.      - 6/4 consult ID as hx of MDR PsAg bacteremia (5/3). Per ID started polymyxin B IV 6/4 however after 2nd dose  Pt developed new unsteadiness will stop as concern for neurotoxicity. Per ID will not resume. If hypotensive start 12mg/kg dapto + tobramycin   - COntinue empiric cefepime only   - recent history of CDI, recommend oral vancomycin prophylaxis 125mg BID                     4.  GI:  Loose stools-now formed evening of 6/7  - Highly unlikely Cdiff but will rule out (on BID prophy PO vanco). on PRN imodium.   - Increase GI prophy - protonox BID. Improved symptoms. He does have prn carafate.   Ativan and compazine, zofran available PRN break-through symptoms.   - Ursodiol for VOD prophy.     5.  GVH: post transplant cytoxan, flush started last night and cytoxan today (day +3, +4), tac/MMF start day +5.  6/7 First Tac level= 10.4- no change to dose.       6.  FEN/Renal: Monitor creat and lytes.   - Wt imrpoving with diuresis. Goal to above lasix as many nephrotoxins (IV vanco/tac/polymixin B)   Replete lytes PRN per SS.     7.  CV: CI induced HTN: start low dose norvasc     LAWRENCE Cody CNP    Patient has been seen and evaluated by me. I have reviewed today's vital signs, medications, labs and imaging results. I have discussed the plan with the team and agree with the findings and plan in this note.    Energy good; exercising today.  Still eating but some nausea.  no loose stools  No resp or ENT sx.  No bleeding or rash    Exam with clear lungs. Cor reg  ENT neg  Abd soft; no big HS or rebound  No edema  No petech.    Brian Powers MD

## 2019-06-10 ENCOUNTER — APPOINTMENT (OUTPATIENT)
Dept: PHYSICAL THERAPY | Facility: CLINIC | Age: 64
DRG: 014 | End: 2019-06-10
Attending: INTERNAL MEDICINE
Payer: COMMERCIAL

## 2019-06-10 LAB
ALBUMIN SERPL-MCNC: 3.1 G/DL (ref 3.4–5)
ALP SERPL-CCNC: 143 U/L (ref 40–150)
ALT SERPL W P-5'-P-CCNC: 22 U/L (ref 0–70)
ANION GAP SERPL CALCULATED.3IONS-SCNC: 7 MMOL/L (ref 3–14)
AST SERPL W P-5'-P-CCNC: 12 U/L (ref 0–45)
BACTERIA SPEC CULT: NO GROWTH
BACTERIA SPEC CULT: NO GROWTH
BILIRUB DIRECT SERPL-MCNC: 0.2 MG/DL (ref 0–0.2)
BILIRUB SERPL-MCNC: 0.6 MG/DL (ref 0.2–1.3)
BLD PROD TYP BPU: NORMAL
BLD PROD TYP BPU: NORMAL
BLD UNIT ID BPU: 0
BLOOD PRODUCT CODE: NORMAL
BPU ID: NORMAL
BUN SERPL-MCNC: 12 MG/DL (ref 7–30)
CALCIUM SERPL-MCNC: 8.6 MG/DL (ref 8.5–10.1)
CHLORIDE SERPL-SCNC: 108 MMOL/L (ref 94–109)
CO2 SERPL-SCNC: 25 MMOL/L (ref 20–32)
CREAT SERPL-MCNC: 0.51 MG/DL (ref 0.66–1.25)
DIFFERENTIAL METHOD BLD: ABNORMAL
ERYTHROCYTE [DISTWIDTH] IN BLOOD BY AUTOMATED COUNT: 14.6 % (ref 10–15)
GFR SERPL CREATININE-BSD FRML MDRD: >90 ML/MIN/{1.73_M2}
GLUCOSE SERPL-MCNC: 99 MG/DL (ref 70–99)
HCT VFR BLD AUTO: 34.2 % (ref 40–53)
HGB BLD-MCNC: 11.6 G/DL (ref 13.3–17.7)
INR PPP: 1.02 (ref 0.86–1.14)
Lab: NORMAL
Lab: NORMAL
MAGNESIUM SERPL-MCNC: 1.8 MG/DL (ref 1.6–2.3)
MCH RBC QN AUTO: 31.2 PG (ref 26.5–33)
MCHC RBC AUTO-ENTMCNC: 33.9 G/DL (ref 31.5–36.5)
MCV RBC AUTO: 92 FL (ref 78–100)
NUM BPU REQUESTED: 1
PHOSPHATE SERPL-MCNC: 3.4 MG/DL (ref 2.5–4.5)
PLATELET # BLD AUTO: 20 10E9/L (ref 150–450)
PLATELET # BLD AUTO: 8 10E9/L (ref 150–450)
POTASSIUM SERPL-SCNC: 3.9 MMOL/L (ref 3.4–5.3)
PROT SERPL-MCNC: 6.4 G/DL (ref 6.8–8.8)
RBC # BLD AUTO: 3.72 10E12/L (ref 4.4–5.9)
SODIUM SERPL-SCNC: 140 MMOL/L (ref 133–144)
SPECIMEN SOURCE: NORMAL
SPECIMEN SOURCE: NORMAL
TACROLIMUS BLD-MCNC: 20.4 UG/L (ref 5–15)
TME LAST DOSE: ABNORMAL H
TRANSFUSION STATUS PATIENT QL: NORMAL
TRANSFUSION STATUS PATIENT QL: NORMAL
WBC # BLD AUTO: 0 10E9/L (ref 4–11)

## 2019-06-10 PROCEDURE — 80197 ASSAY OF TACROLIMUS: CPT | Performed by: INTERNAL MEDICINE

## 2019-06-10 PROCEDURE — 25800030 ZZH RX IP 258 OP 636: Performed by: PHYSICIAN ASSISTANT

## 2019-06-10 PROCEDURE — 80053 COMPREHEN METABOLIC PANEL: CPT | Performed by: PHYSICIAN ASSISTANT

## 2019-06-10 PROCEDURE — 84100 ASSAY OF PHOSPHORUS: CPT | Performed by: PHYSICIAN ASSISTANT

## 2019-06-10 PROCEDURE — 25000128 H RX IP 250 OP 636: Performed by: INTERNAL MEDICINE

## 2019-06-10 PROCEDURE — 25000132 ZZH RX MED GY IP 250 OP 250 PS 637: Performed by: INTERNAL MEDICINE

## 2019-06-10 PROCEDURE — 97110 THERAPEUTIC EXERCISES: CPT | Mod: GP

## 2019-06-10 PROCEDURE — 25000132 ZZH RX MED GY IP 250 OP 250 PS 637: Performed by: PHYSICIAN ASSISTANT

## 2019-06-10 PROCEDURE — 85027 COMPLETE CBC AUTOMATED: CPT

## 2019-06-10 PROCEDURE — 97530 THERAPEUTIC ACTIVITIES: CPT | Mod: GP

## 2019-06-10 PROCEDURE — P9037 PLATE PHERES LEUKOREDU IRRAD: HCPCS | Performed by: PHYSICIAN ASSISTANT

## 2019-06-10 PROCEDURE — 25000125 ZZHC RX 250: Performed by: PHYSICIAN ASSISTANT

## 2019-06-10 PROCEDURE — 25000128 H RX IP 250 OP 636: Performed by: PHYSICIAN ASSISTANT

## 2019-06-10 PROCEDURE — 85610 PROTHROMBIN TIME: CPT | Performed by: PHYSICIAN ASSISTANT

## 2019-06-10 PROCEDURE — 82248 BILIRUBIN DIRECT: CPT | Performed by: PHYSICIAN ASSISTANT

## 2019-06-10 PROCEDURE — 83735 ASSAY OF MAGNESIUM: CPT | Performed by: PHYSICIAN ASSISTANT

## 2019-06-10 PROCEDURE — 20600000 ZZH R&B BMT

## 2019-06-10 PROCEDURE — 25000132 ZZH RX MED GY IP 250 OP 250 PS 637: Performed by: NURSE PRACTITIONER

## 2019-06-10 RX ORDER — CALCIUM CARBONATE 500 MG/1
500-1000 TABLET, CHEWABLE ORAL 2 TIMES DAILY PRN
Status: DISCONTINUED | OUTPATIENT
Start: 2019-06-10 | End: 2019-06-27 | Stop reason: HOSPADM

## 2019-06-10 RX ADMIN — URSODIOL 300 MG: 300 CAPSULE ORAL at 20:13

## 2019-06-10 RX ADMIN — SUCRALFATE 1 G: 1 SUSPENSION ORAL at 07:44

## 2019-06-10 RX ADMIN — FILGRASTIM 480 MCG: 480 INJECTION, SOLUTION INTRAVENOUS; SUBCUTANEOUS at 20:07

## 2019-06-10 RX ADMIN — MYCOPHENOLATE MOFETIL 1000 MG: 500 INJECTION, POWDER, LYOPHILIZED, FOR SOLUTION INTRAVENOUS at 05:27

## 2019-06-10 RX ADMIN — AMLODIPINE BESYLATE 5 MG: 5 TABLET ORAL at 07:58

## 2019-06-10 RX ADMIN — PANTOPRAZOLE SODIUM 40 MG: 40 TABLET, DELAYED RELEASE ORAL at 20:14

## 2019-06-10 RX ADMIN — VANCOMYCIN HYDROCHLORIDE 125 MG: KIT at 07:44

## 2019-06-10 RX ADMIN — MYCOPHENOLATE MOFETIL 1000 MG: 500 INJECTION, POWDER, LYOPHILIZED, FOR SOLUTION INTRAVENOUS at 21:54

## 2019-06-10 RX ADMIN — Medication 5 ML: at 12:07

## 2019-06-10 RX ADMIN — ACYCLOVIR 800 MG: 800 TABLET ORAL at 17:43

## 2019-06-10 RX ADMIN — ACYCLOVIR 800 MG: 800 TABLET ORAL at 12:03

## 2019-06-10 RX ADMIN — VANCOMYCIN HYDROCHLORIDE 125 MG: KIT at 20:13

## 2019-06-10 RX ADMIN — ACYCLOVIR 800 MG: 800 TABLET ORAL at 07:44

## 2019-06-10 RX ADMIN — VORICONAZOLE 200 MG: 200 TABLET, FILM COATED ORAL at 20:13

## 2019-06-10 RX ADMIN — TACROLIMUS 60 MCG/HR: 5 INJECTION, SOLUTION INTRAVENOUS at 20:05

## 2019-06-10 RX ADMIN — ACYCLOVIR 800 MG: 800 TABLET ORAL at 14:03

## 2019-06-10 RX ADMIN — SUCRALFATE 1 G: 1 SUSPENSION ORAL at 12:03

## 2019-06-10 RX ADMIN — CEFEPIME 2 G: 2 INJECTION, POWDER, FOR SOLUTION INTRAVENOUS at 20:06

## 2019-06-10 RX ADMIN — PANTOPRAZOLE SODIUM 40 MG: 40 TABLET, DELAYED RELEASE ORAL at 07:45

## 2019-06-10 RX ADMIN — CEFEPIME 2 G: 2 INJECTION, POWDER, FOR SOLUTION INTRAVENOUS at 12:03

## 2019-06-10 RX ADMIN — VORICONAZOLE 200 MG: 200 TABLET, FILM COATED ORAL at 07:44

## 2019-06-10 RX ADMIN — SUCRALFATE 1 G: 1 SUSPENSION ORAL at 21:54

## 2019-06-10 RX ADMIN — CALCIUM CARBONATE (ANTACID) CHEW TAB 500 MG 1000 MG: 500 CHEW TAB at 17:43

## 2019-06-10 RX ADMIN — TAMSULOSIN HYDROCHLORIDE 0.4 MG: 0.4 CAPSULE ORAL at 07:44

## 2019-06-10 RX ADMIN — CEFEPIME 2 G: 2 INJECTION, POWDER, FOR SOLUTION INTRAVENOUS at 05:27

## 2019-06-10 RX ADMIN — MYCOPHENOLATE MOFETIL 1000 MG: 500 INJECTION, POWDER, LYOPHILIZED, FOR SOLUTION INTRAVENOUS at 14:04

## 2019-06-10 RX ADMIN — URSODIOL 300 MG: 300 CAPSULE ORAL at 14:03

## 2019-06-10 RX ADMIN — URSODIOL 300 MG: 300 CAPSULE ORAL at 07:44

## 2019-06-10 RX ADMIN — SUCRALFATE 1 G: 1 SUSPENSION ORAL at 16:19

## 2019-06-10 RX ADMIN — ACYCLOVIR 800 MG: 800 TABLET ORAL at 21:54

## 2019-06-10 ASSESSMENT — ACTIVITIES OF DAILY LIVING (ADL)
ADLS_ACUITY_SCORE: 14

## 2019-06-10 ASSESSMENT — MIFFLIN-ST. JEOR: SCORE: 1650.37

## 2019-06-10 NOTE — PROGRESS NOTES
"   06/10/19 1415   FACIT-Fatigue Scale (Version 4) Copyright 1978, 1997 by Alexis Kaplan, PhD   Signing Clinician's Name/Credentials Rocael Pimentel PT   Below is a list of statements that other people with your illness have said are important. Please [place] or rodolfo one number per line to indicate your response as it applies to the past 7 days.   I feel fatigued 1 - A little bit   I feel weak all over 1 - A little bit   I feel listless (\"washed out\") 2 - Somewhat   I feel tired 1 - A little bit   I have trouble starting things because I am tired 1 - A little bit   I have trouble finishing things because I am tired 0 - Not at all   I have energy 3 - Quite a bit   I am able to do my usual activities 3 - Quite a bit   I need to sleep during the day 0 - Not at all   I am too tired to eat 1 - A little bit   I need help doing my usual activities 0 - Not at all   I am frustrated by being too tired to do the things I want to do 0 - Not at all   I have to limit my social activity because I am tired  0 - Not at all   FACIT-Fatigue Subscale Scoring   HI7 Item calculation 3   HI12 Item Calculation 3   An1 Item Calculation 2   An2 Item Calculation 3   An3 Item Calculation 3   An4 Item Calculation 4   An5 Item Calculation 3   An7 Item Calculation 3   An8 Item Calculation 4   An12 Item Calculation 3   An14 Item Calculation 4   An15 Item Calculation 4   An16 Item Calculation 4   Total Item Calculations Sum 43   Item Calculated Sum multiplied by 13 559   FACIT Fatigue Subscale (score out of 52). The higher the score, the better the QOL. 43       A score of < 30 indicates below normal range   The FACIT-Fatigue scale assesses self-reported fatigue and its impact on daily activities and function during the past week.  Scores range 52-0, with lower scores indicating worse fatigue.    40 is the average score in general [place] population with a standard deviation of ~10.    Minimally Important Difference   3-4 points.    Source: " Scoring & Interpretation Materials at http://www.facit.org/FACITOrg/Questionnaires

## 2019-06-10 NOTE — PLAN OF CARE
"[id number]. Pt AVSS, besides elevated B/P. Parameters not met for intervention. Denies any pain. Complained of some GI discomfort (as pt said \"feels like a pit in my stomach\") / nausea. Zofran found effective for this. Pt will need Plts this AM. No other complaints over night. Continue with POC.    Problem: Adult Inpatient Plan of Care  Goal: Plan of Care Review  Outcome: No Change     Problem: Adjustment to Transplant (Stem Cell/Bone Marrow Transplant)  Goal: Optimal Coping with Transplant  Outcome: No Change     Problem: Bladder Irritation (Stem Cell/Bone Marrow Transplant)  Goal: Symptom-Free Urinary Elimination  Outcome: No Change     Problem: Nausea and Vomiting (Stem Cell/Bone Marrow Transplant)  Goal: Nausea and Vomiting Symptom Relief  Outcome: No Change  "

## 2019-06-10 NOTE — PROGRESS NOTES
BMT CLINICAL SOCIAL WORK NOTE:    Focus: Supportive Counseling    Data: Pt is a 63 year old male, s/p allo BMT currently day +10.    Interventions: Clinical  (CSW) met with Pt to assess coping, provide supportive counseling and assist with resources as needed. Pt shared that overall he is feeling well.  Pt processed that Val told him that his counts should start coming in over the next week and he is hopeful for this. The pt did discuss that his wife is getting a bit worried about how she will manage everything at home once he returns and he wondered about hiring caregivers for this. CSW and the pt discussed this process and the cost associated with this. The pt expressed that his wife is worried about not working and the impact this will have on them financially, but that option of maybe using the mahendra money to help pay for private duty support. CSW encouraged the pt to talk things over with his wife and see what would be helpful for them. The pt has the mahendra applications and will complete these. The pt did express some worry that he may discharge sooner then his wife will be ready and CSW did express that they should start planning for his possible discharge in the coming days.  CSW provided empathic listening, validation of concerns, and encouragement. CSW encouraged Pt to contact CSW for support, questions and/or resources.     Assessment: Pt presented as friendly and open.  Pt appears to be coping well at this time. Pt continues to be supported by his wife and family.     Plan: CSW will continue to provide supportive counseling and assistance with resources as needed. CSW will continue to collaborate with multidisciplinary team regarding Pt's plan of care.     EDI John, [facility]  Pager: [phone]  Phone: [phone]

## 2019-06-10 NOTE — PLAN OF CARE
Discharge Planner PT  PT 5C  Patient plan for discharge: [facility] with assist  Current status: Pt completed Day +10 FACIT and 30-sec sit to stand tests. See results below and next note. Pt completed supine, seated, and standing LE exercises. Pt ambulates independently without an assistive device; N95 mask out of room. Pt ambulated room to rehab gym for NuStep exercise. Pt completed NuStep exercise x 19 min at low workload and moderate speed. HR 88-94 and SpO2 100% during exercise.   Barriers to return to prior living situation: Medical condition  Recommendations for discharge: [facility] [place] with assist and home exercise program.  Rationale for recommendations: Pt is functionally independent. HEP needed for pt to progress and maintain strength and functional endurance.      30-Second Sit to Stand Test:  The test is conducted with a straight back chair, without armrests, with a 17-inch seat height.    Patient Score (score =0 if must use arms) 13 reps    The 30 Second Sit to Stand Test is considered a test of fall risk.  Data from [place] [employer], cosponsored by [place] Dept of Health:  If must use arms = High Fall Risk regardless of reps  8 or less times = High Fall Risk   9 to 12 times = Moderate Risk  13 or more times = Low Risk    The 30 Second Sit to Stand Test is also considered a test of leg strength and endurance.   Normative Data from Stef et al,. 2001  Age                 Reps: Men        Reps: Women  60-64                14-19                       12-17                               65-69                12-18                       11-16                    70-74                12-17                       10-15              75-79                11-17                       10-15                    80-84                10-15                         9-14  85-89                 8-14                          8-13  90-94                 7-12                          4-11    Assessment (rationale for performing, application to  patient?s function & care plan): Pt's test improved from 7 reps to 13 reps indicating improved strength and decreased fall risk.  (Physical Therapist: Minutes billed as physical performance)          Entered by: Remy Pimentel 06/10/2019 3:19 PM

## 2019-06-10 NOTE — PLAN OF CARE
Randy received his platelets and tolerated them fine.  Denies nausea or pain.  He has been out of bed most of the day, showered, worked with therapy, played guitar and walked on his treadmill.  Tacrolimus level was supratherapeutic and dose was decreased per order.  He agreed to take norvasc after a discussion with NP, BP WNL this shift.  Continue plan of care as we await count recovery.        Problem: Adult Inpatient Plan of Care  Goal: Optimal Comfort and Wellbeing  Outcome: No Change     Problem: Nutrition Intake Altered (Stem Cell/Bone Marrow Transplant)  Goal: Optimal Nutrition Intake  6/10/2019 1428 by Vee Sandoval, RN  Outcome: No Change

## 2019-06-10 NOTE — PROGRESS NOTES
[facility]    Transplant Infectious Diseases Inpatient Progress Note       El Ace MRN# [id number]   YOB: 1955 Age: 63 year old   Date of Admission: 5/24/2019  6:36 AM           Recommendations:   1. Should be able to switch from cefepime to oral ABx of your choice while the patient is neutropenic.   2. Continue to monitor clinically.   3. Please check anaerobic blood cx with the next fever.     ID will follow with you on as needed bases, please call for questions.         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 On MMF/TAC  With fever 6/4/2019.          Active Problems and Infectious Diseases Issues:   1. Fever x1.   The source of the fever is not known but now resolved.   The patient had recent history of bacteremia with MDR P aeruginosa only susceptible to aminoglycosides and colistin. Recurring infection with the same MDR Pseudomonas or other MDR GNB is possible. Unfortunately he developed dizziness and almost ataxia with polymyxing B. Polymyxin B was discontinued 6/5/2019 (only received 2 doses). Continue to monitor clinically and to monitor blood cx.     The patient hasn't spiked fever for the past one week, and though neutropenic, we should be able to move to oral ABx for ppx.     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 Or daptomycin 12 mg/kg/day plus polymyxin B without loading dose.         Old Problems and Infectious Diseases Issues:   1. CDI in 3/2019, on  suppressive vancomycin PO.   2. Proctitis.   3. MDR P aeruginosa BSI treated with ceftolozane/tazobactam (intermediate to ceftolozane/tazobactam). Attributed to proctitis.     Other Infectious Disease issues include:  - on ACV and voriconazole.   - PCP prophylaxis: will be started on bactrim.   - Serostatus: CMV-, EBV+, HSV1+/2+, VZV + on high dose ACV.       Attestation:  I interviewed the patient and obtained history from the patient, and by reviewing the patient's chart including outside records, microbiological data, and radiological data. All data are summarized in this notes.  Rosemary HANNAHMichelle Ugalde   Pager: [phone]  06/10/2019        Interim History:  No complaints.      HPI:  62 yo male with AML that did not respond to 7+3 which was complicated by CDI for which he received PO vancomycin then secondary ppx with 125 mg bid. He then received reinduction with venetoclax and decitabine. This was complicated by febrile neutropenia due to teeth abscesses s/p extraction of 2 teeth.   The course was then further complicated by proctitis and MDR P aeruginosa BSI on 5/4/2019. Treated with ceftazidime/avibactam. However, after discharge the susceptibility came back with resistance to aztreonam and ceftolozane/tazobactam. Since repeat blood cx were negative and since the patient was asymptomatic he was continued on the same till 5/24/2019.   The patient was then admitted to [facility] for elective VELMA haplo-HSCT day 0: 5/31/2019. On 6/4/2019 he spiked fever and ID were consulted.     He does not have any complaints except fever and chills with HA. Now all resolved.   Denying diarrhea. No pain at the anal/perianal area. No N/V. No cough, chest pain, shortness of breath. No other complaints.       ROS:  As mentioned in the interim history otherwise negative by reviewing constitutional symptoms, central and peripheral neurological systems, respiratory system, cardiac system, GI system,  system, musculoskeletal, skin, allergy, and  lymphatics.                 Pysical Examination:  Temp: 96.2  F (35.7  C) Temp src: Axillary BP: 121/85 Pulse: 87 Heart Rate: 98 Resp: 18 SpO2: 100 % O2 Device: None (Room air)    Vitals:    06/05/19 0900 06/06/19 0900 06/07/19 0800 06/08/19 0830   Weight: 91.3 kg (201 lb 4.5 oz) 91.3 kg (201 lb 4.5 oz) 90.8 kg (200 lb 1.6 oz) 90.5 kg (199 lb 8.3 oz)    06/10/19 0731   Weight: 88.1 kg (194 lb 3.6 oz)     Constitutional: awake, alert, cooperative, no apparent distress and appears at stated age, well nourished.   Oral mucosa: moist, no ulcers, no thrush.   Neurologic: Patient is moving all extremities without focal deficit, no focal sensory loss. Negative romberg sign.             Allergy:      Allergies   Allergen Reactions     Polymyxin B Other (See Comments)     Neurotoxicity, 6/5/19         Medications:  Medications that Require Transfusion:     tacrolimus (Prograf) infusion ADULT 85 mcg/hr (06/10/19 0800)   Scheduled Medications:     acyclovir  800 mg Oral 5x Daily     amLODIPine  5 mg Oral Daily     ceFEPIme (MAXIPIME) IV  2 g Intravenous Q8H     filgrastim (NEUPOGEN/GRANIX) intravenous  5 mcg/kg (Treatment Plan Recorded) Intravenous Daily at 8 pm     heparin lock flush  5-10 mL Intracatheter Q24H     mycophenolate mofetil  1,000 mg Intravenous Q8H BMT     pantoprazole  40 mg Oral BID     sucralfate  1 g Oral 4x Daily AC & HS     [START ON 7/1/2019] sulfamethoxazole-trimethoprim  1 tablet Oral Q Mon Tues BID     tamsulosin  0.4 mg Oral Daily     ursodiol  300 mg Oral TID     vancomycin  125 mg Oral BID     voriconazole  200 mg Oral Q12H ZUNILDA       Metabolic Studies       Recent Labs   Lab Test 06/10/19  0418 06/09/19  1117 06/09/19  0422 06/08/19  0322 06/07/19  0312 06/06/19  0308 06/05/19  1651 06/05/19  0235  06/03/19  0437  04/29/19  2032 04/29/19 2030 03/31/19  0626     --  140 140 140 138 138 136   < > 136   < >  --   --    < > 134   POTASSIUM 3.9  --  3.6 3.5 3.6 3.7 3.5 3.4   < > 3.8   < >  --    --    < > 3.9   CHLORIDE 108  --  110* 110* 110* 109  --  106   < > 106   < >  --   --    < > 102   CO2 25  --  24 24 25 24  --  24   < > 25   < >  --   --    < > 22   ANIONGAP 7  --  6 5 5 5  --  7   < > 5   < >  --   --    < > 9   BUN 12  --  14 14 13 11  --  10   < > 11   < >  --   --    < > 12   CR 0.51*  --  0.50* 0.54* 0.56* 0.75  --  0.59*   < > 0.52*   < >  --   --    < > 0.75   GFRESTIMATED >90  --  >90 >90 >90 >90  --  >90   < > >90   < >  --   --    < > >90   GLC 99  --  116* 96 107* 87  --  88   < > 135*   < >  --   --    < > 129*   A1C  --   --   --   --   --   --   --   --   --   --   --   --   --   --  6.0*   DEBBIE 8.6  --  8.2* 8.4* 8.3* 8.6  --  8.1*   < > 8.2*   < >  --   --    < > 8.0*   PHOS 3.4  --   --   --   --   --   --   --   --  4.0   < >  --   --    < > 2.9   MAG 1.8 2.3 1.5*  --  1.6  --   --  1.8  --  2.2   < >  --   --    < > 2.2   LACT  --   --   --   --   --   --   --   --   --   --   --  1.2 1.5   < >  --     < > = values in this interval not displayed.       Hepatic Studies    Recent Labs   Lab Test 06/10/19  0418 06/06/19  0308 06/03/19  0437 05/27/19  0318 05/24/19  1102 05/14/19  1430  04/08/19  0352  04/05/19 0312   BILITOTAL 0.6 0.7 0.9 0.3 0.4 0.3   < > 1.8*   < > 1.8*   ALKPHOS 143 128 125 132 165* 201*   < > 85   < > 75   ALBUMIN 3.1* 2.8* 3.0* 3.3* 3.4 3.0*   < > 2.1*   < > 2.0*   AST 12 19 16 14 24 24   < > 42   < > 422*   ALT 22 30 27 35 40 45   < > 117*   < > 302*   LDH  --   --   --   --   --   --   --  215  --  344*    < > = values in this interval not displayed.       Pancreatitis testing    Recent Labs   Lab Test 04/08/19  0352 04/03/19  0330 03/25/19  0541   LIPASE  --   --  50*   TRIG 203* 106  --        Hematology Studies      Recent Labs   Lab Test 06/10/19  0950 06/10/19  0418 06/09/19  0422 06/08/19  0322 06/07/19  0312 06/06/19  0308 06/05/19  0235 06/04/19  0309 06/03/19  0437 06/02/19  0420 06/01/19  0424 05/31/19  0339   WBC  --  0.0* 0.0* 0.1* 0.3* 0.4*  0.6* 2.2* 2.7* 4.2 2.7* 7.6   ANEU  --   --   --   --   --   --  0.6* 2.2 2.6 4.2 2.7 7.4   ALYM  --   --   --   --   --   --  0.0* 0.0* 0.0* 0.0* 0.0* 0.1*   TRACIE  --   --   --   --   --   --  0.0 0.0 0.0 0.0 0.0 0.1   AEOS  --   --   --   --   --   --  0.0 0.0 0.1 0.0 0.0 0.0   HGB  --  11.6* 11.7* 12.3* 12.4* 12.1* 12.2* 12.9* 13.2* 14.0 13.3 10.7*   HCT  --  34.2* 34.8* 36.3* 37.1* 36.9* 37.2* 39.1* 40.3 42.7 41.9 33.2*   PLT 20* 8* 16* 29* 58* 74* 101* 124* 143* 179 204 268       Arterial Blood Gas Testing    Recent Labs   Lab Test 05/10/19  1343 04/04/19  0201 04/03/19  2204 04/03/19  2037   PH 7.37  --   --   --    PCO2 34*  --   --   --    PO2 100  --   --   --    HCO3 19*  --   --   --    O2PER 21 4 4L 4L        Urine Studies     Recent Labs   Lab Test 06/04/19  0313 05/10/19  1234 05/02/19  1330 04/29/19  2149 04/04/19  0523   URINEPH 6.5 5.0 6.5 7.0 6.0   NITRITE Negative Negative Negative Negative Negative   LEUKEST Negative Negative Negative Negative Negative   WBCU <1 2 1 1 4       Vancomycin Levels     Recent Labs   Lab Test 04/02/19  2142   VANCOMYCIN 8.4       Tacrolimus levels    Invalid input(s): TACROLIMUS, TAC, TACR  Transplant Immunosuppression Labs Latest Ref Rng & Units 6/10/2019 6/9/2019 6/8/2019 6/7/2019 6/6/2019   Tacro Level 5.0 - 15.0 ug/L 20.4(HH) - - 10.4 -   Tacro Level - Not Provided - - Whole blood, EDTA anticoagulant -   Creat 0.66 - 1.25 mg/dL 0.51(L) 0.50(L) 0.54(L) 0.56(L) 0.75   BUN 7 - 30 mg/dL 12 14 14 13 11   WBC 4.0 - 11.0 10e9/L 0.0(LL) 0.0(LL) 0.1(LL) 0.3(LL) 0.4(LL)   Neutrophil % - - - - -   ANEU 1.6 - 8.3 10e9/L - - - - -       CSF testing     Recent Labs   Lab Test 05/10/19  1645   CWBC 0   CRBC 1   CGLU 57   CTP 46         Microbiology:  Blood cx 6/4/2019 pending     Last check of C difficile  C Diff Toxin B PCR   Date Value Ref Range Status   03/26/2019 Positive (A) NEG^Negative Final     Comment:     Positive: Toxin producing Clostridium difficile target DNA  sequences detected,   presumed positive for Clostridium difficile toxin B.  Clostridium difficile (Requires Enteric Isolation)  FDA approved assay performed using [employer] GeneXpert real-time PCR.  Critical Value/Significant Value called to and read back by  JATINDER KAUR, RN [id number] 3.26.19 NDP         Virology:  CMV viral loads  No results found for: 79942, 60066, 13786, 54909  CMV viral loads    Recent Labs   Lab Test 06/08/19  0322 06/01/19  0424   CSPEC EDTA PLASMA Plasma   CMVLOG Not Calculated Not Calculated       CMV viral loads    Log IU/mL of CMVQNT   Date Value Ref Range Status   06/08/2019 Not Calculated <2.1 [Log_IU]/mL Final   06/01/2019 Not Calculated <2.1 [Log_IU]/mL Final   05/25/2019 Not Calculated <2.1 [Log_IU]/mL Final   05/02/2019 Not Calculated <2.1 [Log_IU]/mL Final   03/31/2019 Not Calculated <2.1 [Log_IU]/mL Final       CMV resistance testing  No lab results found.  No results found for: CMVCID, CMVFOS, CMVGAN     No results found for: H6RES    EBV DNA Copies/mL   Date Value Ref Range Status   05/02/2019 EBV DNA Not Detected EBVNEG^EBV DNA Not Detected [Copies]/mL Final   03/31/2019 1,186 (A) EBVNEG^EBV DNA Not Detected [Copies]/mL Final         Imaging:  CXR 6/4/2019   IMPRESSION: Clear lungs.    CT abdomen and pelvis 5/4/2019   IMPRESSION:   1. Increased soft tissue edema in the perianal soft tissues, raising  the concern for proctitis. No evidence of abscess or fluid collection.  2. Decreased bowel wall thickening of the right colon compared to  3/29/2019.         Rosemary Ugalde  Pager: [phone]

## 2019-06-10 NOTE — PROGRESS NOTES
BMT Daily Bleeding Assessment   [facility]     In the last 24 hours:      Oral and Nasal:  B5.  New petechiae of oral mucosa? No  B6.  Oropharyngeal bleeding? No  B7.  Epistaxis during assessment period? No               Skin, Soft Tissue, Musculoskeletal:  B8.  New petechiae present on skin? No  B9.  New purpura present? No  B10.  One or more spontaneous hematomas >1 inch in soft tissue or muscle? No  B11.  Spontaneous hematoma in deeper tissues? No  B12.  Joint bleeding? No    Gastrointestinal/Genitourinary/Gynecologic:  B13.  Was there a stool specimen? No  B14.  Was there emesis? No  B15.  Does patient have a nasogastric drainage tube? No  B16.  Visible blood in urine? No  B17.  Is the patient female? No            Pulmonary:   B18.  Hemoptysis? No  B20.  Blood tinged sputum? No    Opthalmic:  B. Scleral bleed? No    Invasive Sites:  B27.  Active oozing at invasive site for cumulative total             of >1 hour during assessment period? No    C2.  Comments on Assessment:  (Record approximate date/time onset of bleeding,   if applicable; Description (location/size). Include reason if any component of the assessment was not completed): No bleeding or bruising noted on exam    Val Lindsey PA-C  [phone]

## 2019-06-10 NOTE — PLAN OF CARE
OT/5C: HOLD- Per chart review and discussion with PT, pt with no current OT needs in addition to PT goals. Will continue holding and check back transplant day +21 or when notified by PT if needs arise.

## 2019-06-10 NOTE — PROGRESS NOTES
"BMT Daily Progress Note     Mr. Ace 63 y.o hx of AML. Admitted for VELMA Haplo. Currently day +10.  Overnight events: Randy is doing well over all. No fevers, he doesn't have a great appetite but eating well. He did have some epigastric discomfort and nausea last night but it improved with zofran. He takes tums at home and would like to have those available as he wasn't sure its really nausea but more heartburn? No new issues today. BP is higher from tac- starting norvasc which he is agreeable to.     Review of Systems: 10 point ROS negative except as noted above.    Physical Exam:   Blood pressure 129/90, pulse 69, temperature 98  F (36.7  C), temperature source Oral, resp. rate 18, height 1.735 m (5' 8.31\"), weight 88.1 kg (194 lb 3.6 oz), SpO2 100 %.  General: NAD   Eyes: JEROMY, sclera anicteric   Nose/Mouth/Throat: OP clear, buccal mucosa moist, no ulcerations   Lungs: CTA bilaterally  Cardiovascular: RRR, no M/R/G   Abdominal/Rectal: +BS, soft, NT, ND  Lymphatics: no edema  Skin: no rashes or petechaie  Neuro: A&O.    Additional Findings: [facility] c/d/i    Labs:  Lab Results   Component Value Date    WBC 0.0 (LL) 06/10/2019    ANEU 0.6 (L) 06/05/2019    HGB 11.6 (L) 06/10/2019    HCT 34.2 (L) 06/10/2019    PLT 8 (LL) 06/10/2019     06/10/2019    POTASSIUM 3.9 06/10/2019    CHLORIDE 108 06/10/2019    CO2 25 06/10/2019    GLC 99 06/10/2019    BUN 12 06/10/2019    CR 0.51 (L) 06/10/2019    MAG 1.8 06/10/2019    INR 1.02 06/10/2019     Imaging: Reviewed  Microbiology:  Reviewed    Assessment and Plan:   El Ace is a 63 year old male with hx of AML (IDH2, RUNX1 pos), currently day +9 of flu/cy/TBI and related haplo BMT.     5/25 (day -6): flu/cy  5/26 (day -5): flu  5/27 (day -4): flu   5/28 (day -3): flu  5/29 (day -2): TBI/flu  5/30 (day -1): TBI  5/31 (day 0): Transplant. No ABO mismatch - both A+.      1.  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 GCSF started d+5 and cont to until ANC>2500 x 2 " consecutive days. Re-stage per protocol.     2.  HEME: Keep Hgb>8g/dL and plts>10K. .    - on MiPlate study   - Needs daily bleeding assessments.  - 6/10 first plt transfusion.                             3.  ID: afebrile  -6/4: BC NGTD. Daily blood cultures ordered.   Notified by lab 6/4. On vanco 6/4 to 6/6. Stop IV vancomycin as daily BC NGTD.   -Prophylaxis with HD ACV (CMV pos, HSV pos, EBV pos), azithromycin (c. Diff), Jesica to Vfend.  Bactrim or appropriate PCP therapy to start d+28.      - 6/4 consult ID as hx of MDR PsAg bacteremia (5/3). Per ID started polymyxin B IV 6/4 however after 2nd dose  Pt developed new unsteadiness will stop as concern for neurotoxicity. Per ID will not resume. If hypotensive start 12mg/kg dapto + tobramycin   - COntinue empiric cefepime only   - recent history of CDI, recommend oral vancomycin prophylaxis 125mg BID                     4.  GI:  Loose stools-now formed evening of 6/7  - Highly unlikely Cdiff but will rule out (on BID prophy PO vanco). on PRN imodium.   - Increase GI prophy - protonox BID. Improved symptoms. He does have prn carafate.   Ativan and compazine, zofran available PRN break-through symptoms.   - Ursodiol for VOD prophy.     5.  GVH: post transplant cytoxan, flush started last night and cytoxan today (day +3, +4), tac/MMF start day +5.  6/7 First Tac level= 10.4- dose adjusted for vori-  6/10 tac level pending.     6.  FEN/Renal: Monitor creat and lytes.   -cr stable.   Replete lytes PRN per SS.     7.  CV: CI induced HTN: start low dose norvasc     Val Lindsey PA-C  [phone]    Miranda Lindsey PA-C    Patient has been seen and evaluated by me. I have reviewed today's vital signs, medications, labs and imaging results. I have discussed the plan with the team and agree with the findings and plan in this note.      Panycotpyenic but no new fcs or bleeding.  Sl loose stools x 1 but no NV persisting. Energy ok  No resp or ENT sx. No abd upset  Exam  with clear lungs and no rash  ENT neg.  Abd soft s focal tenderness or big HS  No edema  No petech    WBC low so on prophy abx  BP up a bit, likely from tac.    Brian Powers

## 2019-06-11 ENCOUNTER — APPOINTMENT (OUTPATIENT)
Dept: PHYSICAL THERAPY | Facility: CLINIC | Age: 64
DRG: 014 | End: 2019-06-11
Attending: INTERNAL MEDICINE
Payer: COMMERCIAL

## 2019-06-11 LAB
ABO + RH BLD: NORMAL
ABO + RH BLD: NORMAL
ANION GAP SERPL CALCULATED.3IONS-SCNC: 7 MMOL/L (ref 3–14)
BACTERIA SPEC CULT: NO GROWTH
BLD GP AB SCN SERPL QL: NORMAL
BLOOD BANK CMNT PATIENT-IMP: NORMAL
BUN SERPL-MCNC: 10 MG/DL (ref 7–30)
CALCIUM SERPL-MCNC: 8.9 MG/DL (ref 8.5–10.1)
CHLORIDE SERPL-SCNC: 108 MMOL/L (ref 94–109)
CO2 SERPL-SCNC: 25 MMOL/L (ref 20–32)
CREAT SERPL-MCNC: 0.52 MG/DL (ref 0.66–1.25)
DIFFERENTIAL METHOD BLD: ABNORMAL
ERYTHROCYTE [DISTWIDTH] IN BLOOD BY AUTOMATED COUNT: 13.9 % (ref 10–15)
GFR SERPL CREATININE-BSD FRML MDRD: >90 ML/MIN/{1.73_M2}
GLUCOSE SERPL-MCNC: 100 MG/DL (ref 70–99)
HCT VFR BLD AUTO: 33.1 % (ref 40–53)
HGB BLD-MCNC: 11.3 G/DL (ref 13.3–17.7)
LACTATE BLD-SCNC: 0.8 MMOL/L (ref 0.7–2)
Lab: NORMAL
MCH RBC QN AUTO: 31.5 PG (ref 26.5–33)
MCHC RBC AUTO-ENTMCNC: 34.1 G/DL (ref 31.5–36.5)
MCV RBC AUTO: 92 FL (ref 78–100)
PLATELET # BLD AUTO: 14 10E9/L (ref 150–450)
POTASSIUM SERPL-SCNC: 3.6 MMOL/L (ref 3.4–5.3)
RBC # BLD AUTO: 3.59 10E12/L (ref 4.4–5.9)
SODIUM SERPL-SCNC: 140 MMOL/L (ref 133–144)
SPECIMEN EXP DATE BLD: NORMAL
SPECIMEN SOURCE: NORMAL
WBC # BLD AUTO: 0 10E9/L (ref 4–11)

## 2019-06-11 PROCEDURE — 25800030 ZZH RX IP 258 OP 636: Performed by: PHYSICIAN ASSISTANT

## 2019-06-11 PROCEDURE — 86850 RBC ANTIBODY SCREEN: CPT | Performed by: PHYSICIAN ASSISTANT

## 2019-06-11 PROCEDURE — 20600000 ZZH R&B BMT

## 2019-06-11 PROCEDURE — 25000128 H RX IP 250 OP 636: Performed by: INTERNAL MEDICINE

## 2019-06-11 PROCEDURE — 85027 COMPLETE CBC AUTOMATED: CPT

## 2019-06-11 PROCEDURE — 25000132 ZZH RX MED GY IP 250 OP 250 PS 637: Performed by: INTERNAL MEDICINE

## 2019-06-11 PROCEDURE — 25000128 H RX IP 250 OP 636: Performed by: PHYSICIAN ASSISTANT

## 2019-06-11 PROCEDURE — 97530 THERAPEUTIC ACTIVITIES: CPT | Mod: GP

## 2019-06-11 PROCEDURE — 86901 BLOOD TYPING SEROLOGIC RH(D): CPT | Performed by: PHYSICIAN ASSISTANT

## 2019-06-11 PROCEDURE — 25000132 ZZH RX MED GY IP 250 OP 250 PS 637: Performed by: NURSE PRACTITIONER

## 2019-06-11 PROCEDURE — 25000132 ZZH RX MED GY IP 250 OP 250 PS 637: Performed by: PHYSICIAN ASSISTANT

## 2019-06-11 PROCEDURE — 83605 ASSAY OF LACTIC ACID: CPT | Performed by: PHYSICIAN ASSISTANT

## 2019-06-11 PROCEDURE — 86900 BLOOD TYPING SEROLOGIC ABO: CPT | Performed by: PHYSICIAN ASSISTANT

## 2019-06-11 PROCEDURE — 25000125 ZZHC RX 250: Performed by: PHYSICIAN ASSISTANT

## 2019-06-11 PROCEDURE — 80048 BASIC METABOLIC PNL TOTAL CA: CPT | Performed by: PHYSICIAN ASSISTANT

## 2019-06-11 PROCEDURE — 97110 THERAPEUTIC EXERCISES: CPT | Mod: GP

## 2019-06-11 RX ADMIN — ACYCLOVIR 800 MG: 800 TABLET ORAL at 18:48

## 2019-06-11 RX ADMIN — MYCOPHENOLATE MOFETIL 1000 MG: 500 INJECTION, POWDER, LYOPHILIZED, FOR SOLUTION INTRAVENOUS at 13:55

## 2019-06-11 RX ADMIN — CEFEPIME 2 G: 2 INJECTION, POWDER, FOR SOLUTION INTRAVENOUS at 19:58

## 2019-06-11 RX ADMIN — VANCOMYCIN HYDROCHLORIDE 125 MG: KIT at 08:54

## 2019-06-11 RX ADMIN — VORICONAZOLE 200 MG: 200 TABLET, FILM COATED ORAL at 19:57

## 2019-06-11 RX ADMIN — AMLODIPINE BESYLATE 5 MG: 5 TABLET ORAL at 08:54

## 2019-06-11 RX ADMIN — SUCRALFATE 1 G: 1 SUSPENSION ORAL at 11:47

## 2019-06-11 RX ADMIN — PANTOPRAZOLE SODIUM 40 MG: 40 TABLET, DELAYED RELEASE ORAL at 08:54

## 2019-06-11 RX ADMIN — ACYCLOVIR 800 MG: 800 TABLET ORAL at 13:55

## 2019-06-11 RX ADMIN — MYCOPHENOLATE MOFETIL 1000 MG: 500 INJECTION, POWDER, LYOPHILIZED, FOR SOLUTION INTRAVENOUS at 22:06

## 2019-06-11 RX ADMIN — URSODIOL 300 MG: 300 CAPSULE ORAL at 08:54

## 2019-06-11 RX ADMIN — CEFEPIME 2 G: 2 INJECTION, POWDER, FOR SOLUTION INTRAVENOUS at 11:47

## 2019-06-11 RX ADMIN — URSODIOL 300 MG: 300 CAPSULE ORAL at 13:55

## 2019-06-11 RX ADMIN — VANCOMYCIN HYDROCHLORIDE 125 MG: KIT at 20:03

## 2019-06-11 RX ADMIN — MYCOPHENOLATE MOFETIL 1000 MG: 500 INJECTION, POWDER, LYOPHILIZED, FOR SOLUTION INTRAVENOUS at 06:11

## 2019-06-11 RX ADMIN — FILGRASTIM 480 MCG: 480 INJECTION, SOLUTION INTRAVENOUS; SUBCUTANEOUS at 19:58

## 2019-06-11 RX ADMIN — SUCRALFATE 1 G: 1 SUSPENSION ORAL at 06:11

## 2019-06-11 RX ADMIN — URSODIOL 300 MG: 300 CAPSULE ORAL at 19:57

## 2019-06-11 RX ADMIN — TAMSULOSIN HYDROCHLORIDE 0.4 MG: 0.4 CAPSULE ORAL at 08:54

## 2019-06-11 RX ADMIN — CEFEPIME 2 G: 2 INJECTION, POWDER, FOR SOLUTION INTRAVENOUS at 04:17

## 2019-06-11 RX ADMIN — ACYCLOVIR 800 MG: 800 TABLET ORAL at 22:06

## 2019-06-11 RX ADMIN — SUCRALFATE 1 G: 1 SUSPENSION ORAL at 16:05

## 2019-06-11 RX ADMIN — VORICONAZOLE 200 MG: 200 TABLET, FILM COATED ORAL at 08:54

## 2019-06-11 RX ADMIN — SUCRALFATE 1 G: 1 SUSPENSION ORAL at 22:06

## 2019-06-11 RX ADMIN — TACROLIMUS 60 MCG/HR: 5 INJECTION, SOLUTION INTRAVENOUS at 19:54

## 2019-06-11 RX ADMIN — ACYCLOVIR 800 MG: 800 TABLET ORAL at 11:47

## 2019-06-11 RX ADMIN — ACYCLOVIR 800 MG: 800 TABLET ORAL at 08:54

## 2019-06-11 RX ADMIN — PANTOPRAZOLE SODIUM 40 MG: 40 TABLET, DELAYED RELEASE ORAL at 19:56

## 2019-06-11 ASSESSMENT — ACTIVITIES OF DAILY LIVING (ADL)
ADLS_ACUITY_SCORE: 14

## 2019-06-11 ASSESSMENT — MIFFLIN-ST. JEOR: SCORE: 1669.37

## 2019-06-11 NOTE — PROGRESS NOTES
BMT CLINICAL SOCIAL WORK NOTE:    Focus: Supportive Counseling    Data: Pt is a 63 year old male s/p allo BMT, currently day +11.    Interventions: Clinical  (CSW) met with Pt to provide a list of the private duty caregivers that the pt requested. CSW spoke with the pt regarding his concerns about possibly being discharged next week and how this would impact te pt's wife Bessy's ability to work. CSW agreed to call Bessy to talk over these concerns. Call was placed to Bessy and discussed her questions including concerns aroudn her LLS co-pay application and the [facility] and [facility] grants that were provided to them as well. Bessy shared that she was not thinking about hiring caregivers as she feels that will be more complicated then her taking off work. Bessy clarified the roles of the caregiver and that this role could be anyone, both family and friends. Bessy feels that they will be able to mange this piece. CSW provided empathic listening, validation of concerns, and encouragement. CSW encouraged Pt to contact CSW for support, questions and/or resources.     Pt does plan to discharge to the [facility] when medically stable.    Assessment: Pt presented as friendly and open.  Pt appears to be coping well at this time. Pt continues to be supported by his wife Bessy.     Plan: CSW will continue to provide supportive counseling and assistance with resources as needed. CSW will continue to collaborate with multidisciplinary team regarding Pt's plan of care.     EDI John, [facility]  Pager: [phone]  Phone: [phone]

## 2019-06-11 NOTE — PLAN OF CARE
Bp slightly elevated but below PRN parameter. OVSS. Denies pain and nausea. No replacements needed this am.     Problem: Adult Inpatient Plan of Care  Goal: Plan of Care Review  6/11/2019 0539 by Miranda Vigil RN  Outcome: No Change     Problem: Adult Inpatient Plan of Care  Goal: Optimal Comfort and Wellbeing  6/11/2019 0539 by Miranda Vigil, RN  Outcome: No Change

## 2019-06-11 NOTE — PLAN OF CARE
Pt was afebrile, vital signs are stable; denied any pain ; complained of indigestion after eating  and took 2 Tums; was up in the chair half of the shift; monitor pt closely and continue plan of care.  Problem: Adult Inpatient Plan of Care  Goal: Plan of Care Review  6/10/2019 2235 by Liberty Baker, RN  Outcome: No Change     Problem: Adult Inpatient Plan of Care  Goal: Patient-Specific Goal (Individualization)  Outcome: No Change     Problem: Adult Inpatient Plan of Care  Goal: Optimal Comfort and Wellbeing  6/10/2019 2235 by Liberty Baker, RN  Outcome: No Change     Problem: Nausea and Vomiting (Stem Cell/Bone Marrow Transplant)  Goal: Nausea and Vomiting Symptom Relief  6/10/2019 2235 by Liberty Baker, RN  Outcome: No Change     Problem: Fatigue (Stem Cell/Bone Marrow Transplant)  Goal: Energy Level Supports Daily Activity  Outcome: No Change     Problem: Nutrition Intake Altered (Stem Cell/Bone Marrow Transplant)  Goal: Optimal Nutrition Intake  6/10/2019 2235 by Liberty Baker, RN  Outcome: No Change

## 2019-06-11 NOTE — PROGRESS NOTES
"BMT Daily Progress Note     Mr. Ace 63 y.o hx of AML. Admitted for VELMA Haplo. Currently day +11.  Overnight events: Randy is doing well. Already walked on the treadmill this morning, playing guMENABANQERr now. He is eating but it is a job; he doesn't feel great - was nauseated/upset stomach after dinner. No further epigastric discomfort. No infectious concerns nor bleeding. He would like me to call and update his wife later about anticipated course in coming week or so.      Review of Systems: 10 point ROS negative except as noted above.    Physical Exam:   Blood pressure 99/75, pulse 87, temperature 97.5  F (36.4  C), temperature source Oral, resp. rate 18, height 1.735 m (5' 8.31\"), weight 90 kg (198 lb 6.6 oz), SpO2 98 %.  General: NAD   Eyes: JEROMY, sclera anicteric   Nose/Mouth/Throat: OP clear, buccal mucosa moist, no ulcerations   Lungs: CTA bilaterally  Cardiovascular: RRR, no M/R/G   Abdominal/Rectal: +BS, soft, NT, ND  Lymphatics: no edema  Skin: no rashes or petechaie  Neuro: A&O.    Additional Findings: [place] site c/d/i    Labs:  Lab Results   Component Value Date    WBC 0.0 (LL) 06/11/2019    ANEU 0.6 (L) 06/05/2019    HGB 11.3 (L) 06/11/2019    HCT 33.1 (L) 06/11/2019    PLT 14 (LL) 06/11/2019     06/11/2019    POTASSIUM 3.6 06/11/2019    CHLORIDE 108 06/11/2019    CO2 25 06/11/2019     (H) 06/11/2019    BUN 10 06/11/2019    CR 0.52 (L) 06/11/2019    MAG 1.8 06/10/2019    INR 1.02 06/10/2019     Imaging: Reviewed  Microbiology:  Reviewed    Assessment and Plan:   El Ace is a 63 year old male with hx of AML (IDH2, RUNX1 pos), currently day +11 of flu/cy/TBI and related haplo BMT.     5/25 (day -6): flu/cy  5/26 (day -5): flu  5/27 (day -4): flu   5/28 (day -3): flu  5/29 (day -2): TBI/flu  5/30 (day -1): TBI  5/31 (day 0): Transplant. No ABO mismatch - both A+.      1.  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 GCSF started d+5 and cont to until ANC>2500 x 2 consecutive days. Re-stage per " protocol.     2.  HEME: Keep Hgb>8g/dL and plts>10K. .    - on MiPlate study   - Needs daily bleeding assessments.  - 6/10 first plt transfusion.                             3.  ID: afebrile  -6/4-6/6  BC negative  Notified by lab 6/4. On vanco 6/4 to 6/6. Stop IV vancomycin as daily BC NGTD.   -Prophylaxis with HD ACV (CMV pos, HSV pos, EBV pos), azithromycin (c. Diff), Jesica to Vfend.  Bactrim or appropriate PCP therapy to start d+28.       - 6/4 consult ID as hx of MDR PsAg bacteremia (5/3). Per ID started polymyxin B IV 6/4 however after 2nd dose  Pt developed new unsteadiness will stop as concern for neurotoxicity. Per ID will not resume. If hypotensive start 12mg/kg dapto + tobramycin   - COntinue empiric cefepime only   - recent history of CDI, recommend oral vancomycin prophylaxis 125mg BID                     4.  GI: stable loose stools  - Highly unlikely Cdiff but will rule out (on BID prophy PO vanco). on PRN imodium.   - Increase GI prophy - protonox BID. Improved symptoms. He does have prn carafate.   Ativan and compazine, zofran available PRN break-through symptoms.   - Ursodiol for VOD prophy.     5.  GVH: post transplant cytoxan, flush started last night and cytoxan today (day +3, +4), tac/MMF start day +5.  6/7 First Tac level= 10.4- dose adjusted for vori-  6/10 tac level 20.4- tac gtt decreased 60mcg/hr - recheck level tomorrow AM. NO evidence of toxicity (Cr stale, no tremor or headache).     6.  FEN/Renal: Monitor creat and lytes.   -cr stable.   Replete lytes PRN per SS.     7.  CV: CI induced HTN:   Bp low in AM then higher as day goes on. Continue norvasc 2.5mg daily      Val Lindsey PA-C  [phone]    Patient has been seen and evaluated by me. I have reviewed today's vital signs, medications, labs and imaging results. I have discussed the plan with the team and agree with the findings and plan in this note.      Counts low but no new sx.   Eatng some; no SOB or cough  No abd pain or  NV  Energy not so great late in day but mostly ok    Exam s rash or edema.  Lungs clear  Cor reg s gallop or new M  Abd soft  No big HS.   NO rebound    Cont same close observation for high BP but no other changes. Plans above    Brian Powers

## 2019-06-11 NOTE — PROGRESS NOTES
BMT Daily Bleeding Assessment   [facility]     In the last 24 hours:      Oral and Nasal:  B5.  New petechiae of oral mucosa? No  B6.  Oropharyngeal bleeding? No  B7.  Epistaxis during assessment period? No               Skin, Soft Tissue, Musculoskeletal:  B8.  New petechiae present on skin? No  B9.  New purpura present? No  B10.  One or more spontaneous hematomas >1 inch in soft tissue or muscle? No  B11.  Spontaneous hematoma in deeper tissues? No  B12.  Joint bleeding? No    Gastrointestinal/Genitourinary/Gynecologic:  B13.  Was there a stool specimen? No  B14.  Was there emesis? No  B15.  Does patient have a nasogastric drainage tube? No  B16.  Visible blood in urine? No  B17.  Is the patient female? No            Pulmonary:   B18.  Hemoptysis? No  B20.  Blood tinged sputum? No    Opthalmic:  B. Scleral bleed? No    Invasive Sites:  B27.  Active oozing at invasive site for cumulative total             of >1 hour during assessment period? No    C2.  Comments on Assessment:  (Record approximate date/time onset of bleeding,   if applicable; Description (location/size). Include reason if any component of the assessment was not completed): No bleeding or bruising noted on exam or in review of systems.     Val Lindsey PA-C  [phone]

## 2019-06-11 NOTE — PLAN OF CARE
Discharge Planner PT  PT 5C  Patient plan for discharge: [facility] with assist  Current status: Pt completed supine, seated, and standing LE exercises. Pt ambulates independently to/from rehab gym with N95 mask in place. Pt completed NuStep exercise x 24 min with high intensity interval training format at low workload. HR 88-91 and SpO2 % during exercise.  Barriers to return to prior living situation: Medical condition  Recommendations for discharge: [facility] with assist and home exercise program.  Rationale for recommendations: Pt remains functionally independent. HEP needed for pt to progress and maintain strength and functional endurance.       Entered by: Remy Pimentel 06/11/2019 3:24 PM

## 2019-06-11 NOTE — PLAN OF CARE
"Patient remains A&O, pleasant and up ad shelley- treadmill in room and playing his guitar. Patient denies pain or needs. Good appetite for breakfast, although, patient reported \"eating is a chore.\" Plan to discharge to [facility] when medically stable.   "

## 2019-06-12 LAB
ANION GAP SERPL CALCULATED.3IONS-SCNC: 8 MMOL/L (ref 3–14)
BACTERIA SPEC CULT: NO GROWTH
BLD PROD TYP BPU: NORMAL
BLD PROD TYP BPU: NORMAL
BLD UNIT ID BPU: 0
BLOOD PRODUCT CODE: NORMAL
BPU ID: NORMAL
BUN SERPL-MCNC: 11 MG/DL (ref 7–30)
CALCIUM SERPL-MCNC: 9 MG/DL (ref 8.5–10.1)
CHLORIDE SERPL-SCNC: 109 MMOL/L (ref 94–109)
CO2 SERPL-SCNC: 23 MMOL/L (ref 20–32)
CREAT SERPL-MCNC: 0.56 MG/DL (ref 0.66–1.25)
DIFFERENTIAL METHOD BLD: ABNORMAL
ERYTHROCYTE [DISTWIDTH] IN BLOOD BY AUTOMATED COUNT: 14 % (ref 10–15)
GFR SERPL CREATININE-BSD FRML MDRD: >90 ML/MIN/{1.73_M2}
GLUCOSE SERPL-MCNC: 99 MG/DL (ref 70–99)
HCT VFR BLD AUTO: 33.8 % (ref 40–53)
HGB BLD-MCNC: 11.5 G/DL (ref 13.3–17.7)
Lab: NORMAL
MAGNESIUM SERPL-MCNC: 1.3 MG/DL (ref 1.6–2.3)
MAGNESIUM SERPL-MCNC: 2.5 MG/DL (ref 1.6–2.3)
MCH RBC QN AUTO: 30.9 PG (ref 26.5–33)
MCHC RBC AUTO-ENTMCNC: 34 G/DL (ref 31.5–36.5)
MCV RBC AUTO: 91 FL (ref 78–100)
NUM BPU REQUESTED: 1
PLATELET # BLD AUTO: 18 10E9/L (ref 150–450)
PLATELET # BLD AUTO: 9 10E9/L (ref 150–450)
POTASSIUM SERPL-SCNC: 3.8 MMOL/L (ref 3.4–5.3)
RBC # BLD AUTO: 3.72 10E12/L (ref 4.4–5.9)
SODIUM SERPL-SCNC: 140 MMOL/L (ref 133–144)
SPECIMEN SOURCE: NORMAL
TACROLIMUS BLD-MCNC: 13.3 UG/L (ref 5–15)
TME LAST DOSE: NORMAL H
TRANSFUSION STATUS PATIENT QL: NORMAL
TRANSFUSION STATUS PATIENT QL: NORMAL
WBC # BLD AUTO: 0 10E9/L (ref 4–11)

## 2019-06-12 PROCEDURE — 83735 ASSAY OF MAGNESIUM: CPT | Performed by: INTERNAL MEDICINE

## 2019-06-12 PROCEDURE — 25000128 H RX IP 250 OP 636: Performed by: PHYSICIAN ASSISTANT

## 2019-06-12 PROCEDURE — 80197 ASSAY OF TACROLIMUS: CPT | Performed by: INTERNAL MEDICINE

## 2019-06-12 PROCEDURE — 25000128 H RX IP 250 OP 636: Performed by: INTERNAL MEDICINE

## 2019-06-12 PROCEDURE — 85025 COMPLETE CBC W/AUTO DIFF WBC: CPT | Performed by: PHYSICIAN ASSISTANT

## 2019-06-12 PROCEDURE — 25000132 ZZH RX MED GY IP 250 OP 250 PS 637: Performed by: INTERNAL MEDICINE

## 2019-06-12 PROCEDURE — 20600000 ZZH R&B BMT

## 2019-06-12 PROCEDURE — P9037 PLATE PHERES LEUKOREDU IRRAD: HCPCS | Performed by: PHYSICIAN ASSISTANT

## 2019-06-12 PROCEDURE — 80048 BASIC METABOLIC PNL TOTAL CA: CPT | Performed by: PHYSICIAN ASSISTANT

## 2019-06-12 PROCEDURE — 83735 ASSAY OF MAGNESIUM: CPT | Performed by: PHYSICIAN ASSISTANT

## 2019-06-12 PROCEDURE — 85027 COMPLETE CBC AUTOMATED: CPT

## 2019-06-12 PROCEDURE — 25000132 ZZH RX MED GY IP 250 OP 250 PS 637: Performed by: PHYSICIAN ASSISTANT

## 2019-06-12 PROCEDURE — 25000125 ZZHC RX 250: Performed by: PHYSICIAN ASSISTANT

## 2019-06-12 PROCEDURE — 25800030 ZZH RX IP 258 OP 636: Performed by: PHYSICIAN ASSISTANT

## 2019-06-12 RX ORDER — AMLODIPINE BESYLATE 10 MG/1
10 TABLET ORAL DAILY
Status: DISCONTINUED | OUTPATIENT
Start: 2019-06-12 | End: 2019-06-16

## 2019-06-12 RX ADMIN — SUCRALFATE 1 G: 1 SUSPENSION ORAL at 06:42

## 2019-06-12 RX ADMIN — MYCOPHENOLATE MOFETIL 1000 MG: 500 INJECTION, POWDER, LYOPHILIZED, FOR SOLUTION INTRAVENOUS at 21:10

## 2019-06-12 RX ADMIN — URSODIOL 300 MG: 300 CAPSULE ORAL at 19:47

## 2019-06-12 RX ADMIN — MYCOPHENOLATE MOFETIL 1000 MG: 500 INJECTION, POWDER, LYOPHILIZED, FOR SOLUTION INTRAVENOUS at 14:23

## 2019-06-12 RX ADMIN — FILGRASTIM 480 MCG: 480 INJECTION, SOLUTION INTRAVENOUS; SUBCUTANEOUS at 19:52

## 2019-06-12 RX ADMIN — AMLODIPINE BESYLATE 10 MG: 10 TABLET ORAL at 07:50

## 2019-06-12 RX ADMIN — ACYCLOVIR 800 MG: 800 TABLET ORAL at 18:08

## 2019-06-12 RX ADMIN — TACROLIMUS 60 MCG/HR: 5 INJECTION, SOLUTION INTRAVENOUS at 19:49

## 2019-06-12 RX ADMIN — VANCOMYCIN HYDROCHLORIDE 125 MG: KIT at 19:47

## 2019-06-12 RX ADMIN — URSODIOL 300 MG: 300 CAPSULE ORAL at 14:22

## 2019-06-12 RX ADMIN — SUCRALFATE 1 G: 1 SUSPENSION ORAL at 18:08

## 2019-06-12 RX ADMIN — SUCRALFATE 1 G: 1 SUSPENSION ORAL at 21:10

## 2019-06-12 RX ADMIN — MYCOPHENOLATE MOFETIL 1000 MG: 500 INJECTION, POWDER, LYOPHILIZED, FOR SOLUTION INTRAVENOUS at 06:36

## 2019-06-12 RX ADMIN — VORICONAZOLE 200 MG: 200 TABLET, FILM COATED ORAL at 19:47

## 2019-06-12 RX ADMIN — ACYCLOVIR 800 MG: 800 TABLET ORAL at 11:40

## 2019-06-12 RX ADMIN — TAMSULOSIN HYDROCHLORIDE 0.4 MG: 0.4 CAPSULE ORAL at 07:35

## 2019-06-12 RX ADMIN — URSODIOL 300 MG: 300 CAPSULE ORAL at 07:35

## 2019-06-12 RX ADMIN — ACYCLOVIR 800 MG: 800 TABLET ORAL at 07:35

## 2019-06-12 RX ADMIN — MAGNESIUM SULFATE HEPTAHYDRATE 4 G: 40 INJECTION, SOLUTION INTRAVENOUS at 06:39

## 2019-06-12 RX ADMIN — CEFEPIME 2 G: 2 INJECTION, POWDER, FOR SOLUTION INTRAVENOUS at 19:52

## 2019-06-12 RX ADMIN — ACYCLOVIR 800 MG: 800 TABLET ORAL at 21:10

## 2019-06-12 RX ADMIN — VORICONAZOLE 200 MG: 200 TABLET, FILM COATED ORAL at 07:35

## 2019-06-12 RX ADMIN — CEFEPIME 2 G: 2 INJECTION, POWDER, FOR SOLUTION INTRAVENOUS at 04:41

## 2019-06-12 RX ADMIN — SUCRALFATE 1 G: 1 SUSPENSION ORAL at 11:40

## 2019-06-12 RX ADMIN — ACETAMINOPHEN 650 MG: 325 TABLET, FILM COATED ORAL at 04:40

## 2019-06-12 RX ADMIN — VANCOMYCIN HYDROCHLORIDE 125 MG: KIT at 07:35

## 2019-06-12 RX ADMIN — PROCHLORPERAZINE MALEATE 5 MG: 5 TABLET, FILM COATED ORAL at 18:08

## 2019-06-12 RX ADMIN — CEFEPIME 2 G: 2 INJECTION, POWDER, FOR SOLUTION INTRAVENOUS at 11:40

## 2019-06-12 RX ADMIN — PANTOPRAZOLE SODIUM 40 MG: 40 TABLET, DELAYED RELEASE ORAL at 07:35

## 2019-06-12 RX ADMIN — PANTOPRAZOLE SODIUM 40 MG: 40 TABLET, DELAYED RELEASE ORAL at 19:47

## 2019-06-12 RX ADMIN — ACYCLOVIR 800 MG: 800 TABLET ORAL at 14:22

## 2019-06-12 ASSESSMENT — ACTIVITIES OF DAILY LIVING (ADL)
ADLS_ACUITY_SCORE: 14

## 2019-06-12 ASSESSMENT — PAIN DESCRIPTION - DESCRIPTORS: DESCRIPTORS: ACHING

## 2019-06-12 ASSESSMENT — MIFFLIN-ST. JEOR: SCORE: 1665.68

## 2019-06-12 NOTE — PLAN OF CARE
Pt is afebrile; vital are stable; denied any pain; no nausea; pt ate well; Tac gtt at 60 mcg/h; Bp was up but on Amlodipine; monitor pt and continue plan of care.    Problem: Adult Inpatient Plan of Care  Goal: Plan of Care Review  Outcome: No Change     Problem: Adult Inpatient Plan of Care  Goal: Patient-Specific Goal (Individualization)  Outcome: No Change     Problem: Adult Inpatient Plan of Care  Goal: Optimal Comfort and Wellbeing  Outcome: No Change     Problem: Fatigue (Stem Cell/Bone Marrow Transplant)  Goal: Energy Level Supports Daily Activity  Outcome: No Change     Problem: Hematologic Alteration (Stem Cell/Bone Marrow Transplant)  Goal: Blood Counts Within Acceptable Range  Outcome: No Change

## 2019-06-12 NOTE — PLAN OF CARE
VSS. Bp improved 129/87 tonight. Denies nausea. C/o back pain. He believes it is from sleeping wrong and reports this is the second night it has been hurting. Tonight his pian is significantly worse then yesterday. Heat pads and tylenol given with some relief. Will request providers make oxycodone available to avoid masking any future fever. 4g mag given and 1U platelets replaced. He will need post platelet check 10-60 minutes after completion. Recheck mag level at 1100. No other replacements needed this am.     Problem: Adult Inpatient Plan of Care  Goal: Plan of Care Review  6/12/2019 0549 by Miranda Vigil, RN  Outcome: No Change     Problem: Adult Inpatient Plan of Care  Goal: Optimal Comfort and Wellbeing  6/12/2019 0549 by Miranda Vigil, RN  Outcome: Declining

## 2019-06-12 NOTE — PROGRESS NOTES
"BMT Daily Progress Note     Mr. Ace 63 y.o hx of AML. Admitted for VELMA Haplo. Currently day +12.  Overnight events: Randy is doing well. He did have more lower back pain- seems like his usual lower back pain that he would get intermittently prior to diagnosis so he doesn't think its bone pain or gcsf related. Improved with tylenol and heating pack- feels much better this morning since getting some sleep. He continues to eat though he really has no appetite. No issues n/v/d- stools are soft and loose. He has no new issues today aside from back pain.      Review of Systems: 10 point ROS negative except as noted above.    Physical Exam:   Blood pressure 120/85, pulse 71, temperature 97.4  F (36.3  C), temperature source Oral, resp. rate 16, height 1.735 m (5' 8.31\"), weight 89.6 kg (197 lb 9.6 oz), SpO2 98 %.  General: NAD   Eyes: JEROMY, sclera anicteric   Nose/Mouth/Throat: OP clear, buccal mucosa moist, no ulcerations   Lungs: CTA bilaterally  Cardiovascular: RRR, no M/R/G   Abdominal/Rectal: +BS, soft, NT, ND  Lymphatics: no edema  Skin: no rashes or petechaie  Neuro: A&O.    Additional Findings: [place] site c/d/i    Labs:  Lab Results   Component Value Date    WBC 0.0 (LL) 06/12/2019    ANEU 0.6 (L) 06/05/2019    HGB 11.5 (L) 06/12/2019    HCT 33.8 (L) 06/12/2019    PLT 18 (LL) 06/12/2019     06/12/2019    POTASSIUM 3.8 06/12/2019    CHLORIDE 109 06/12/2019    CO2 23 06/12/2019    GLC 99 06/12/2019    BUN 11 06/12/2019    CR 0.56 (L) 06/12/2019    MAG 1.3 (L) 06/12/2019    INR 1.02 06/10/2019     Imaging: Reviewed  Microbiology:  Reviewed    Assessment and Plan:   El Ace is a 63 year old male with hx of AML (IDH2, RUNX1 pos), currently day +12 of flu/cy/TBI and related haplo BMT.     5/25 (day -6): flu/cy  5/26 (day -5): flu  5/27 (day -4): flu   5/28 (day -3): flu  5/29 (day -2): TBI/flu  5/30 (day -1): TBI  5/31 (day 0): Transplant. No ABO mismatch - both A+.      1.  BMT: Transplanted on " 5/31/2019. GCSF started d+5 and cont to until ANC>2500 x 2 consecutive days. Re-stage per protocol.     2.  HEME: Keep Hgb>8g/dL and plts>10K.   - on MiPlate study   - Needs daily bleeding assessments.  - 6/10 first plt transfusion. Plts again today.                             3.  ID: afebrile  -6/4-6/6  BC negative  -Prophylaxis with HD ACV (CMV pos, HSV pos, EBV pos), azithromycin (c. Diff), Jesica to Vfend.  Bactrim or appropriate PCP therapy to start d+28.       - 6/4 consult ID as hx of MDR PsAg bacteremia (5/3). Per ID started polymyxin B IV 6/4 however after 2nd dose  Pt developed new unsteadiness will stop as concern for neurotoxicity. Per ID will not resume. If hypotensive start 12mg/kg dapto + tobramycin   - COntinue empiric cefepime only   - recent history of CDI, recommend oral vancomycin prophylaxis 125mg BID                     4.  GI: stable loose stools  -  Prn imodium  - Increase GI prophy - protonox BID. Improved symptoms. He does have prn carafate/tums   Ativan and compazine, zofran available PRN break-through symptoms.   - Ursodiol for VOD prophy.     5.  GVH: post transplant cytoxan, flush started last night and cytoxan today (day +3, +4), tac/MMF start day +5.  6/7 First Tac level= 10.4- dose adjusted for vori-  6/10 tac level 20.4- tac gtt decreased 60mcg/hr - recheck level tomorrow AM. NO evidence of toxicity (Cr stale, no tremor or headache).     6.  FEN/Renal: Monitor creat and lytes.   -cr stable.   Replete lytes PRN per SS.     7.  CV: CI induced HTN:   Bp low in AM then higher as day goes on. Norvasc 10mg daily.      Val Lindsey PA-C  [phone]    Patient has been seen and evaluated by me. I have reviewed today's vital signs, medications, labs and imaging results. I have discussed the plan with the team and agree with the findings and plan in this note.      WBC still zero but no signs of new infection.   No new GI upset.Fatigued but no other acure new findings.  Exam with clear lungs  and no oral ulcers.  No rash or edema;  Abd s tenderness. No hepatosplenomegaly  No abd rebound  Cor reg; no gallop    COnt same gvhd prophy;  Adjust BP meds and prophy abx.    Brian Powers

## 2019-06-12 NOTE — PROGRESS NOTES
BMT Daily Bleeding Assessment   [facility]     In the last 24 hours:      Oral and Nasal:  B5.  New petechiae of oral mucosa? No  B6.  Oropharyngeal bleeding? No  B7.  Epistaxis during assessment period? No               Skin, Soft Tissue, Musculoskeletal:  B8.  New petechiae present on skin? No  B9.  New purpura present? No  B10.  One or more spontaneous hematomas >1 inch in soft tissue or muscle? No  B11.  Spontaneous hematoma in deeper tissues? No  B12.  Joint bleeding? No    Gastrointestinal/Genitourinary/Gynecologic:  B13.  Was there a stool specimen? No  B14.  Was there emesis? No  B15.  Does patient have a nasogastric drainage tube? No  B16.  Visible blood in urine? No  B17.  Is the patient female? No            Pulmonary:   B18.  Hemoptysis? No  B20.  Blood tinged sputum? No    Opthalmic:  B. Scleral bleed? No    Invasive Sites:  B27.  Active oozing at invasive site for cumulative total             of >1 hour during assessment period? No    C2.  Comments on Assessment:  (Record approximate date/time onset of bleeding,   if applicable; Description (location/size). Include reason if any component of the assessment was not completed): No bleeding in last 24hrs.     Val Lindsey PA-C  [phone]

## 2019-06-12 NOTE — PLAN OF CARE
"/78 (BP Location: Right arm)   Pulse 71   Temp 97.1  F (36.2  C) (Oral)   Resp 18   Ht 1.735 m (5' 8.31\")   Wt 89.6 kg (197 lb 9.6 oz)   SpO2 100%   BMI 29.78 kg/m    Pt afeb, vss, denies pain, pt had some nausea this evening, compazine x 1. Pt eating and drinking po fluids well. Mag recheck, 2.5. Pt up in room ambulating, using treadmill. Pt offers no complaints, continue to monitor per poc.  Problem: Adult Inpatient Plan of Care  Goal: Plan of Care Review  6/12/2019 1820 by Debbie Johnson RN  Outcome: No Change     Problem: Adult Inpatient Plan of Care  Goal: Patient-Specific Goal (Individualization)  Outcome: No Change     Problem: Adult Inpatient Plan of Care  Goal: Optimal Comfort and Wellbeing  6/12/2019 1820 by Debbie Johnson RN  Outcome: No Change     Problem: Hematologic Alteration (Stem Cell/Bone Marrow Transplant)  Goal: Blood Counts Within Acceptable Range  Outcome: No Change     Problem: Diarrhea (Stem Cell/Bone Marrow Transplant)  Goal: Diarrhea Symptom Control  Outcome: Improving     Problem: Nausea and Vomiting (Stem Cell/Bone Marrow Transplant)  Goal: Nausea and Vomiting Symptom Relief  Outcome: Declining     "

## 2019-06-13 LAB
ALBUMIN SERPL-MCNC: 3.2 G/DL (ref 3.4–5)
ALP SERPL-CCNC: 178 U/L (ref 40–150)
ALT SERPL W P-5'-P-CCNC: 59 U/L (ref 0–70)
ANION GAP SERPL CALCULATED.3IONS-SCNC: 6 MMOL/L (ref 3–14)
AST SERPL W P-5'-P-CCNC: 49 U/L (ref 0–45)
BILIRUB SERPL-MCNC: 0.5 MG/DL (ref 0.2–1.3)
BUN SERPL-MCNC: 12 MG/DL (ref 7–30)
CALCIUM SERPL-MCNC: 8.7 MG/DL (ref 8.5–10.1)
CHLORIDE SERPL-SCNC: 110 MMOL/L (ref 94–109)
CO2 SERPL-SCNC: 25 MMOL/L (ref 20–32)
CREAT SERPL-MCNC: 0.59 MG/DL (ref 0.66–1.25)
DIFFERENTIAL METHOD BLD: ABNORMAL
ERYTHROCYTE [DISTWIDTH] IN BLOOD BY AUTOMATED COUNT: 14 % (ref 10–15)
GFR SERPL CREATININE-BSD FRML MDRD: >90 ML/MIN/{1.73_M2}
GLUCOSE SERPL-MCNC: 114 MG/DL (ref 70–99)
HCT VFR BLD AUTO: 31.8 % (ref 40–53)
HGB BLD-MCNC: 10.9 G/DL (ref 13.3–17.7)
MAGNESIUM SERPL-MCNC: 1.5 MG/DL (ref 1.6–2.3)
MAGNESIUM SERPL-MCNC: 2.1 MG/DL (ref 1.6–2.3)
MCH RBC QN AUTO: 31.1 PG (ref 26.5–33)
MCHC RBC AUTO-ENTMCNC: 34.3 G/DL (ref 31.5–36.5)
MCV RBC AUTO: 91 FL (ref 78–100)
PLATELET # BLD AUTO: 16 10E9/L (ref 150–450)
POTASSIUM SERPL-SCNC: 3.6 MMOL/L (ref 3.4–5.3)
PROT SERPL-MCNC: 6.6 G/DL (ref 6.8–8.8)
RBC # BLD AUTO: 3.51 10E12/L (ref 4.4–5.9)
SODIUM SERPL-SCNC: 141 MMOL/L (ref 133–144)
WBC # BLD AUTO: 0.1 10E9/L (ref 4–11)

## 2019-06-13 PROCEDURE — 25000128 H RX IP 250 OP 636: Performed by: INTERNAL MEDICINE

## 2019-06-13 PROCEDURE — 25800030 ZZH RX IP 258 OP 636: Performed by: PHYSICIAN ASSISTANT

## 2019-06-13 PROCEDURE — 25000125 ZZHC RX 250: Performed by: PHYSICIAN ASSISTANT

## 2019-06-13 PROCEDURE — 25000128 H RX IP 250 OP 636: Performed by: PHYSICIAN ASSISTANT

## 2019-06-13 PROCEDURE — 20600000 ZZH R&B BMT

## 2019-06-13 PROCEDURE — 83735 ASSAY OF MAGNESIUM: CPT | Performed by: PHYSICIAN ASSISTANT

## 2019-06-13 PROCEDURE — 25000132 ZZH RX MED GY IP 250 OP 250 PS 637: Performed by: PHYSICIAN ASSISTANT

## 2019-06-13 PROCEDURE — 80053 COMPREHEN METABOLIC PANEL: CPT | Performed by: PHYSICIAN ASSISTANT

## 2019-06-13 PROCEDURE — 25000132 ZZH RX MED GY IP 250 OP 250 PS 637: Performed by: INTERNAL MEDICINE

## 2019-06-13 PROCEDURE — 85027 COMPLETE CBC AUTOMATED: CPT

## 2019-06-13 RX ADMIN — MYCOPHENOLATE MOFETIL 1000 MG: 500 INJECTION, POWDER, LYOPHILIZED, FOR SOLUTION INTRAVENOUS at 22:18

## 2019-06-13 RX ADMIN — ACYCLOVIR 800 MG: 800 TABLET ORAL at 22:17

## 2019-06-13 RX ADMIN — ACYCLOVIR 800 MG: 800 TABLET ORAL at 12:02

## 2019-06-13 RX ADMIN — PANTOPRAZOLE SODIUM 40 MG: 40 TABLET, DELAYED RELEASE ORAL at 20:18

## 2019-06-13 RX ADMIN — ACETAMINOPHEN 650 MG: 325 TABLET, FILM COATED ORAL at 03:57

## 2019-06-13 RX ADMIN — SUCRALFATE 1 G: 1 SUSPENSION ORAL at 22:17

## 2019-06-13 RX ADMIN — CEFEPIME 2 G: 2 INJECTION, POWDER, FOR SOLUTION INTRAVENOUS at 03:54

## 2019-06-13 RX ADMIN — URSODIOL 300 MG: 300 CAPSULE ORAL at 20:18

## 2019-06-13 RX ADMIN — URSODIOL 300 MG: 300 CAPSULE ORAL at 09:09

## 2019-06-13 RX ADMIN — ACYCLOVIR 800 MG: 800 TABLET ORAL at 09:09

## 2019-06-13 RX ADMIN — SUCRALFATE 1 G: 1 SUSPENSION ORAL at 16:24

## 2019-06-13 RX ADMIN — ACYCLOVIR 800 MG: 800 TABLET ORAL at 17:34

## 2019-06-13 RX ADMIN — VANCOMYCIN HYDROCHLORIDE 125 MG: KIT at 09:10

## 2019-06-13 RX ADMIN — PANTOPRAZOLE SODIUM 40 MG: 40 TABLET, DELAYED RELEASE ORAL at 09:09

## 2019-06-13 RX ADMIN — FILGRASTIM 480 MCG: 480 INJECTION, SOLUTION INTRAVENOUS; SUBCUTANEOUS at 20:13

## 2019-06-13 RX ADMIN — MYCOPHENOLATE MOFETIL 1000 MG: 500 INJECTION, POWDER, LYOPHILIZED, FOR SOLUTION INTRAVENOUS at 14:12

## 2019-06-13 RX ADMIN — CALCIUM CARBONATE (ANTACID) CHEW TAB 500 MG 1000 MG: 500 CHEW TAB at 02:43

## 2019-06-13 RX ADMIN — MYCOPHENOLATE MOFETIL 1000 MG: 500 INJECTION, POWDER, LYOPHILIZED, FOR SOLUTION INTRAVENOUS at 05:12

## 2019-06-13 RX ADMIN — CEFEPIME 2 G: 2 INJECTION, POWDER, FOR SOLUTION INTRAVENOUS at 20:53

## 2019-06-13 RX ADMIN — SUCRALFATE 1 G: 1 SUSPENSION ORAL at 12:04

## 2019-06-13 RX ADMIN — CEFEPIME 2 G: 2 INJECTION, POWDER, FOR SOLUTION INTRAVENOUS at 12:00

## 2019-06-13 RX ADMIN — VORICONAZOLE 200 MG: 200 TABLET, FILM COATED ORAL at 09:09

## 2019-06-13 RX ADMIN — ACYCLOVIR 800 MG: 800 TABLET ORAL at 14:11

## 2019-06-13 RX ADMIN — TACROLIMUS 60 MCG/HR: 5 INJECTION, SOLUTION INTRAVENOUS at 20:15

## 2019-06-13 RX ADMIN — SUCRALFATE 1 G: 1 SUSPENSION ORAL at 09:09

## 2019-06-13 RX ADMIN — URSODIOL 300 MG: 300 CAPSULE ORAL at 14:11

## 2019-06-13 RX ADMIN — TAMSULOSIN HYDROCHLORIDE 0.4 MG: 0.4 CAPSULE ORAL at 09:09

## 2019-06-13 RX ADMIN — VANCOMYCIN HYDROCHLORIDE 125 MG: KIT at 20:23

## 2019-06-13 RX ADMIN — VORICONAZOLE 200 MG: 200 TABLET, FILM COATED ORAL at 20:18

## 2019-06-13 RX ADMIN — MAGNESIUM SULFATE HEPTAHYDRATE 4 G: 40 INJECTION, SOLUTION INTRAVENOUS at 05:12

## 2019-06-13 RX ADMIN — AMLODIPINE BESYLATE 10 MG: 10 TABLET ORAL at 09:09

## 2019-06-13 ASSESSMENT — ACTIVITIES OF DAILY LIVING (ADL)
ADLS_ACUITY_SCORE: 14

## 2019-06-13 ASSESSMENT — PAIN DESCRIPTION - DESCRIPTORS
DESCRIPTORS: ACHING;DISCOMFORT
DESCRIPTORS: ACHING;DISCOMFORT

## 2019-06-13 ASSESSMENT — MIFFLIN-ST. JEOR: SCORE: 1662.37

## 2019-06-13 NOTE — PROGRESS NOTES
BMT CLINICAL SOCIAL WORK NOTE:    Focus: Supportive Counseling    Data: Pt is a 63 year old male s/p allo BMT, currently day +13.    Interventions: Clinical  (CSW) met with the pt to  the mahendra information his wife left for CSW. The pt shared that his counts started to come in today slightly so he is hopeful he will be able to leave next week. His wife Bessy is set to start FMLA next week if the pt does get D/C'ed. The pt shared that he has been experiencing more nausea today, but overall is in good spirits.  CSW provided empathic listening, validation of concerns, and encouragement. CSW encouraged Pt to contact CSW for support, questions and/or resources.    Application for the [employer] were faxed in per the pt's request. CSW also sent in the pt's LLS Co-pay mahendra along with proof of income for review.      Assessment: Pt presented as friendly and open.  Pt appears to be coping well at this time. Pt continues to be supported by his wife Bessy.     Plan: CSW will continue to provide supportive counseling and assistance with resources as needed. CSW will continue to collaborate with multidisciplinary team regarding Pt's plan of care.     EDI John, [facility] HospitalSW  Pager: [phone]  Phone: [phone]

## 2019-06-13 NOTE — PROGRESS NOTES
"BMT Daily Progress Note     Mr. Ace 63 y.o hx of AML. Admitted for VELMA Haplo. Currently day +13.  Overnight events: Randy again didn't sleep great due to lower back pain- eventually feel asleep and feels okay this morning. His appetite is overall low but he is eating anyway- some foods settle better than others. He has no fevers, chills, mouth pain or infectious concerns. He is pleased to hear WBC up slightly.  Needs some medical leave paperwork completed.      Review of Systems: 10 point ROS negative except as noted above.    Physical Exam:   Blood pressure 129/85, pulse 71, temperature 97.7  F (36.5  C), temperature source Oral, resp. rate 16, height 1.735 m (5' 8.31\"), weight 89.3 kg (196 lb 13.9 oz), SpO2 100 %.  General: NAD   Eyes: JEROMY, sclera anicteric   Nose/Mouth/Throat: OP clear, buccal mucosa moist, no ulcerations   Lungs: CTA bilaterally  Cardiovascular: RRR, no M/R/G   Abdominal/Rectal: +BS, soft, NT, ND  Lymphatics: no edema  Skin: no rashes or petechaie  Neuro: A&O.    Additional Findings: [facility] c/d/i    Labs:  Lab Results   Component Value Date    WBC 0.1 (LL) 06/13/2019    ANEU 0.6 (L) 06/05/2019    HGB 10.9 (L) 06/13/2019    HCT 31.8 (L) 06/13/2019    PLT 16 (LL) 06/13/2019     06/13/2019    POTASSIUM 3.6 06/13/2019    CHLORIDE 110 (H) 06/13/2019    CO2 25 06/13/2019     (H) 06/13/2019    BUN 12 06/13/2019    CR 0.59 (L) 06/13/2019    MAG 1.5 (L) 06/13/2019    INR 1.02 06/10/2019     Imaging: Reviewed  Microbiology:  Reviewed    Assessment and Plan:   El Ace is a 63 year old male with hx of AML (IDH2, RUNX1 pos), currently day +13 of flu/cy/TBI and related haplo BMT.     5/25 (day -6): flu/cy  5/26 (day -5): flu  5/27 (day -4): flu   5/28 (day -3): flu  5/29 (day -2): TBI/flu  5/30 (day -1): TBI  5/31 (day 0): Transplant. No ABO mismatch - both A+.      1.  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 GCSF started d+5 and cont to until ANC>2500 x 2 consecutive days. Re-stage per " protocol.     2.  HEME: Keep Hgb>8g/dL and plts>10K.   - on MiPlate study   - Needs daily bleeding assessments. No bleeding to date.                             3.  ID: afebrile  -6/4-6/6  BC negative  -Prophylaxis with HD ACV (CMV pos, HSV pos, EBV pos), azithromycin (c. Diff), Jesica to Vfend.  Bactrim or appropriate PCP therapy to start d+28.       - 6/4 consult ID as hx of MDR PsAg bacteremia (5/3). Per ID started polymyxin B IV 6/4 however after 2nd dose  Pt developed new unsteadiness will stop as concern for neurotoxicity. Per ID will not resume. If hypotensive start 12mg/kg dapto + tobramycin   - COntinue empiric cefepime only   - recent history of CDI, recommend oral vancomycin prophylaxis 125mg BID                     4.  GI:   -  Prn imodium- controlling loose stools well.   - Increase GI prophy - protonox BID. Improved symptoms. He does have prn carafate/tums   Ativan and compazine, zofran available PRN break-through symptoms.   - Ursodiol for VOD prophy.     5.  GVH: post transplant cytoxan, flush started last night and cytoxan today (day +3, +4), tac/MMF start day +5.  6/7 First Tac level= 10.4- dose adjusted for vori-  6/10 tac level 20.4- tac gtt decreased 60mcg/hr - 6/12 recheck 13.3. Will check level again tomorrow.     6.  FEN/Renal: Monitor creat and lytes.   -cr stable.   Replete lytes PRN per SS.     7.  CV: CI induced HTN:  Norvasc 10mg daily.      Val Lindsey PA-C  [phone]    Patient has been seen and evaluated by me. I have reviewed today's vital signs, medications, labs and imaging results. I have discussed the plan with the team and agree with the findings and plan in this note.      . No new fcs No bleeding  Energy ok today. INterested in eating  No other new c/o on ROS    Exam s rash. No petech.  Lungs clear.  Oral mucosa neg  COr reg; not tachy  Abd soft s tenderness or big HEP.  No Edema    Cont gvhd and abx prophy.  No signs of new infection.    Brian Ash  [facility]

## 2019-06-13 NOTE — PROGRESS NOTES
CLINICAL NUTRITION SERVICES - REASSESSMENT NOTE     Nutrition Prescription    RECOMMENDATIONS FOR MDs/PROVIDERS TO ORDER:  Adjustment in bowel regimen PRN with last BM documented 6/9 per I/O data    Malnutrition Status:    Patient does not meet two of the above criteria necessary for diagnosing malnutrition but is at risk for malnutrition    Recommendations already ordered by Registered Dietitian (RD):  Medical Food Supplements - can order PRN, list of options provided, trial of vanilla boost with lunch today  Snack - rec to start if intakes decline, list of options provided and can order PRN  Cafe Passes - pt has ample at this time  Nutrition Education - discussion on appropriate foods to eat if nausea/GI upset    Future/Additional Recommendations:  Monitor po intakes and need for further intervention if po decreases  -can consider scheduled snacks/supplements, use of appetite stimulant (such as mirtazapine, megace, marinol) if primary barrier is lack of appetite, calorie counts to assess need for TPN/nutrition support, additional cafe passes PRN     EVALUATION OF THE PROGRESS TOWARD GOALS   Diet: High Kcal/High Protein, supplements PRN    Intake: majority of intakes % at meals       NEW FINDINGS   Pt reports that he doesn't have a great appetite and has occaisonal GI upset (quesy w/o emesis, general upset stomach not always with active nausea). Despite this, pt reports he continues to eat. Typically  Cereal, milk and fruit for breakfast. Tries to eat a larger lunch and smaller dinner to help with sleeping. Feels appetite is down d/t being room bound but does walk on treadmill from time to time. Pt stating that he doesn't want to gain weight with decrease in PA from baseline. Discussed management of low appetite and GI upset and foods recommended with these symptoms. Reiterated role of adequate nutrition in post-SCT course.  All questions/concerns addressed.   Weight: stable over admission. Admit 89.5 kg  (5/24) --> 90.8 kg (5/31) --> 90.8 kg (6/7) --> 89.3 kg (6/13)  Labs: Cl 110 H, Cr 0.59 L, Mg 1.5 L (high 2.5 yesterday), albumin 3.2 L likely r/t illness/inflammation, alk phos 178 H, AST 49 H, euglycemia  Meds: carafate, PRN-tums, mg sulfate replacement, compazine,   GI: last BM on 6/9    MALNUTRITION  % Intake: Decreased intake does not meet criteria  % Weight Loss: None noted  Subcutaneous Fat Loss: None observed  Muscle Loss: None observed  Fluid Accumulation/Edema: None noted  Malnutrition Diagnosis: Patient does not meet two of the above criteria necessary for diagnosing malnutrition but is at risk for malnutrition    Previous Goals   Patient to consume % of nutritionally adequate meal trays TID, or the equivalent with supplements/snacks.  Evaluation: Met    Previous Nutrition Diagnosis  Predicted inadequate nutrient intake (calories, protein) related to potential to develop barriers to po s/p SCT and/or menu fatigue with prolonged hospital stay   Evaluation: No change    CURRENT NUTRITION DIAGNOSIS  Predicted inadequate nutrient intake (calories, protein) related to potential to develop barriers to po s/p SCT and/or menu fatigue with prolonged hospital stay     INTERVENTIONS  Implementation  Collaboration with other providers -5C rounds  Medical food supplement therapy -see above  Nutrition Education - discussion on appropriate foods to eat if nausea/GI upset. Encouraged small/frequent po of soft/bland foods if better tolerated. Discussed avoiding high acidic foods If causing GI upset (OJ, tomato juice, etc). Discussed utilization of protein supplements if sit better with pt during active GI upset as sometimes liquids are better tolerated during these periods.     Goals  Patient to consume % of nutritionally adequate meal trays TID, or the equivalent with supplements/snacks.    Monitoring/Evaluation  Progress toward goals will be monitored and evaluated per protocol.    Debbie Thornton MS, RD,  EDILIA LI.  5C/BMT Pager:[phone]

## 2019-06-13 NOTE — PROGRESS NOTES
BMT Daily Bleeding Assessment   [facility]     In the last 24 hours:      Oral and Nasal:  B5.  New petechiae of oral mucosa? No  B6.  Oropharyngeal bleeding? No  B7.  Epistaxis during assessment period? No               Skin, Soft Tissue, Musculoskeletal:  B8.  New petechiae present on skin? No  B9.  New purpura present? No  B10.  One or more spontaneous hematomas >1 inch in soft tissue or muscle? No  B11.  Spontaneous hematoma in deeper tissues? No  B12.  Joint bleeding? No    Gastrointestinal/Genitourinary/Gynecologic:  B13.  Was there a stool specimen? No  B14.  Was there emesis? No  B15.  Does patient have a nasogastric drainage tube? No  B16.  Visible blood in urine? No  B17.  Is the patient female? No            Pulmonary:   B18.  Hemoptysis? No  B20.  Blood tinged sputum? No    Opthalmic:  B. Scleral bleed? No    Invasive Sites:  B27.  Active oozing at invasive site for cumulative total             of >1 hour during assessment period? No    C2.  Comments on Assessment:  (Record approximate date/time onset of bleeding,   if applicable; Description (location/size). Include reason if any component of the assessment was not completed): N/A    Val Lindsey PA-C  [phone]

## 2019-06-13 NOTE — PLAN OF CARE
VSS. Magnesium re-check this AM is WNL. No c/o pain. No c/o nausea. Fair appetite for meals. Up ad shelley in room. Continue with POC.

## 2019-06-14 LAB
ABO + RH BLD: NORMAL
ABO + RH BLD: NORMAL
ANION GAP SERPL CALCULATED.3IONS-SCNC: 6 MMOL/L (ref 3–14)
BLD GP AB SCN SERPL QL: NORMAL
BLOOD BANK CMNT PATIENT-IMP: NORMAL
BUN SERPL-MCNC: 7 MG/DL (ref 7–30)
CALCIUM SERPL-MCNC: 8.9 MG/DL (ref 8.5–10.1)
CHLORIDE SERPL-SCNC: 109 MMOL/L (ref 94–109)
CO2 SERPL-SCNC: 25 MMOL/L (ref 20–32)
CREAT SERPL-MCNC: 0.57 MG/DL (ref 0.66–1.25)
DIFFERENTIAL METHOD BLD: ABNORMAL
ERYTHROCYTE [DISTWIDTH] IN BLOOD BY AUTOMATED COUNT: 13.8 % (ref 10–15)
GFR SERPL CREATININE-BSD FRML MDRD: >90 ML/MIN/{1.73_M2}
GLUCOSE SERPL-MCNC: 90 MG/DL (ref 70–99)
HCT VFR BLD AUTO: 31 % (ref 40–53)
HGB BLD-MCNC: 10.7 G/DL (ref 13.3–17.7)
MAGNESIUM SERPL-MCNC: 1.5 MG/DL (ref 1.6–2.3)
MAGNESIUM SERPL-MCNC: 2.6 MG/DL (ref 1.6–2.3)
MCH RBC QN AUTO: 31.2 PG (ref 26.5–33)
MCHC RBC AUTO-ENTMCNC: 34.5 G/DL (ref 31.5–36.5)
MCV RBC AUTO: 90 FL (ref 78–100)
PLATELET # BLD AUTO: 15 10E9/L (ref 150–450)
POTASSIUM SERPL-SCNC: 3.9 MMOL/L (ref 3.4–5.3)
RBC # BLD AUTO: 3.43 10E12/L (ref 4.4–5.9)
SODIUM SERPL-SCNC: 141 MMOL/L (ref 133–144)
SPECIMEN EXP DATE BLD: NORMAL
TACROLIMUS BLD-MCNC: 11.7 UG/L (ref 5–15)
TME LAST DOSE: NORMAL H
WBC # BLD AUTO: 0.3 10E9/L (ref 4–11)

## 2019-06-14 PROCEDURE — 25000132 ZZH RX MED GY IP 250 OP 250 PS 637: Performed by: PHYSICIAN ASSISTANT

## 2019-06-14 PROCEDURE — 86850 RBC ANTIBODY SCREEN: CPT | Performed by: PHYSICIAN ASSISTANT

## 2019-06-14 PROCEDURE — 85025 COMPLETE CBC W/AUTO DIFF WBC: CPT | Performed by: PHYSICIAN ASSISTANT

## 2019-06-14 PROCEDURE — 25000125 ZZHC RX 250: Performed by: PHYSICIAN ASSISTANT

## 2019-06-14 PROCEDURE — 20600000 ZZH R&B BMT

## 2019-06-14 PROCEDURE — 85027 COMPLETE CBC AUTOMATED: CPT

## 2019-06-14 PROCEDURE — 25000128 H RX IP 250 OP 636: Performed by: PHYSICIAN ASSISTANT

## 2019-06-14 PROCEDURE — 86901 BLOOD TYPING SEROLOGIC RH(D): CPT | Performed by: PHYSICIAN ASSISTANT

## 2019-06-14 PROCEDURE — 83735 ASSAY OF MAGNESIUM: CPT | Performed by: PHYSICIAN ASSISTANT

## 2019-06-14 PROCEDURE — 25000128 H RX IP 250 OP 636: Performed by: INTERNAL MEDICINE

## 2019-06-14 PROCEDURE — 25000131 ZZH RX MED GY IP 250 OP 636 PS 637: Performed by: PHYSICIAN ASSISTANT

## 2019-06-14 PROCEDURE — 80048 BASIC METABOLIC PNL TOTAL CA: CPT | Performed by: PHYSICIAN ASSISTANT

## 2019-06-14 PROCEDURE — 25800030 ZZH RX IP 258 OP 636: Performed by: PHYSICIAN ASSISTANT

## 2019-06-14 PROCEDURE — 86900 BLOOD TYPING SEROLOGIC ABO: CPT | Performed by: PHYSICIAN ASSISTANT

## 2019-06-14 PROCEDURE — 80197 ASSAY OF TACROLIMUS: CPT | Performed by: INTERNAL MEDICINE

## 2019-06-14 PROCEDURE — 25000132 ZZH RX MED GY IP 250 OP 250 PS 637: Performed by: INTERNAL MEDICINE

## 2019-06-14 RX ORDER — ONDANSETRON 8 MG/1
8 TABLET, ORALLY DISINTEGRATING ORAL EVERY MORNING
Status: DISCONTINUED | OUTPATIENT
Start: 2019-06-14 | End: 2019-06-17

## 2019-06-14 RX ADMIN — URSODIOL 300 MG: 300 CAPSULE ORAL at 13:54

## 2019-06-14 RX ADMIN — CEFEPIME 2 G: 2 INJECTION, POWDER, FOR SOLUTION INTRAVENOUS at 20:11

## 2019-06-14 RX ADMIN — URSODIOL 300 MG: 300 CAPSULE ORAL at 07:39

## 2019-06-14 RX ADMIN — CEFEPIME 2 G: 2 INJECTION, POWDER, FOR SOLUTION INTRAVENOUS at 12:36

## 2019-06-14 RX ADMIN — AMLODIPINE BESYLATE 10 MG: 10 TABLET ORAL at 07:39

## 2019-06-14 RX ADMIN — ACYCLOVIR 800 MG: 800 TABLET ORAL at 18:12

## 2019-06-14 RX ADMIN — SUCRALFATE 1 G: 1 SUSPENSION ORAL at 11:19

## 2019-06-14 RX ADMIN — ACYCLOVIR 800 MG: 800 TABLET ORAL at 07:39

## 2019-06-14 RX ADMIN — ONDANSETRON 8 MG: 8 TABLET, ORALLY DISINTEGRATING ORAL at 11:19

## 2019-06-14 RX ADMIN — ACYCLOVIR 800 MG: 800 TABLET ORAL at 11:19

## 2019-06-14 RX ADMIN — VANCOMYCIN HYDROCHLORIDE 125 MG: KIT at 07:39

## 2019-06-14 RX ADMIN — TACROLIMUS 60 MCG/HR: 5 INJECTION, SOLUTION INTRAVENOUS at 19:44

## 2019-06-14 RX ADMIN — MAGNESIUM SULFATE HEPTAHYDRATE 4 G: 40 INJECTION, SOLUTION INTRAVENOUS at 07:38

## 2019-06-14 RX ADMIN — VORICONAZOLE 200 MG: 200 TABLET, FILM COATED ORAL at 07:39

## 2019-06-14 RX ADMIN — SUCRALFATE 1 G: 1 SUSPENSION ORAL at 16:08

## 2019-06-14 RX ADMIN — ACYCLOVIR 800 MG: 800 TABLET ORAL at 13:54

## 2019-06-14 RX ADMIN — SUCRALFATE 1 G: 1 SUSPENSION ORAL at 21:03

## 2019-06-14 RX ADMIN — PANTOPRAZOLE SODIUM 40 MG: 40 TABLET, DELAYED RELEASE ORAL at 19:44

## 2019-06-14 RX ADMIN — ACYCLOVIR 800 MG: 800 TABLET ORAL at 21:03

## 2019-06-14 RX ADMIN — MYCOPHENOLATE MOFETIL 1000 MG: 500 INJECTION, POWDER, LYOPHILIZED, FOR SOLUTION INTRAVENOUS at 13:54

## 2019-06-14 RX ADMIN — CEFEPIME 2 G: 2 INJECTION, POWDER, FOR SOLUTION INTRAVENOUS at 04:55

## 2019-06-14 RX ADMIN — URSODIOL 300 MG: 300 CAPSULE ORAL at 19:44

## 2019-06-14 RX ADMIN — SUCRALFATE 1 G: 1 SUSPENSION ORAL at 07:39

## 2019-06-14 RX ADMIN — MYCOPHENOLATE MOFETIL 1000 MG: 500 INJECTION, POWDER, LYOPHILIZED, FOR SOLUTION INTRAVENOUS at 21:03

## 2019-06-14 RX ADMIN — FILGRASTIM 480 MCG: 480 INJECTION, SOLUTION INTRAVENOUS; SUBCUTANEOUS at 19:44

## 2019-06-14 RX ADMIN — TAMSULOSIN HYDROCHLORIDE 0.4 MG: 0.4 CAPSULE ORAL at 07:39

## 2019-06-14 RX ADMIN — PANTOPRAZOLE SODIUM 40 MG: 40 TABLET, DELAYED RELEASE ORAL at 07:39

## 2019-06-14 RX ADMIN — MYCOPHENOLATE MOFETIL 1000 MG: 500 INJECTION, POWDER, LYOPHILIZED, FOR SOLUTION INTRAVENOUS at 05:55

## 2019-06-14 RX ADMIN — VANCOMYCIN HYDROCHLORIDE 125 MG: KIT at 19:44

## 2019-06-14 RX ADMIN — VORICONAZOLE 200 MG: 200 TABLET, FILM COATED ORAL at 19:44

## 2019-06-14 ASSESSMENT — ACTIVITIES OF DAILY LIVING (ADL)
ADLS_ACUITY_SCORE: 14

## 2019-06-14 ASSESSMENT — MIFFLIN-ST. JEOR: SCORE: 1658.37

## 2019-06-14 NOTE — PLAN OF CARE
Pt is feb, vital signs are stable; denied any pain; lungs are clear; eating small meals at the time; no other complaints; monitor closely and continue plan of care.      Problem: Adult Inpatient Plan of Care  Goal: Plan of Care Review  6/13/2019 2152 by Liberty Baker, RN  Outcome: No Change     Problem: Adult Inpatient Plan of Care  Goal: Patient-Specific Goal (Individualization)  6/13/2019 2152 by Liberty Baker, RN  Outcome: No Change     Problem: Adult Inpatient Plan of Care  Goal: Absence of Hospital-Acquired Illness or Injury  6/13/2019 2152 by Liberty Baker, RN  Outcome: No Change     Problem: Adult Inpatient Plan of Care  Goal: Optimal Comfort and Wellbeing  6/13/2019 2152 by Liberty Baker, RN  Outcome: No Change     Problem: Nausea and Vomiting (Stem Cell/Bone Marrow Transplant)  Goal: Nausea and Vomiting Symptom Relief  6/13/2019 2152 by Liberty Baker, RN  Outcome: No Change

## 2019-06-14 NOTE — PROGRESS NOTES
"BMT Daily Progress Note     Mr. Ace 63 y.o hx of AML. Admitted for VELMA Haplo. Currently day +14.  Overnight events: Randy feels 'great' today. He slept better, nausea and backpain much better than the last few days. He is tired but talking himself into walking on the treadmill. He denies any fever, chills or infectious concerns. Appetite is low but he is eating pretty well despite this. No new complaints.      Review of Systems: 10 point ROS negative except as noted above.    Physical Exam:   Blood pressure 110/81, pulse 89, temperature 96.9  F (36.1  C), temperature source Axillary, resp. rate 16, height 1.735 m (5' 8.31\"), weight 89.3 kg (196 lb 13.9 oz), SpO2 98 %.  General: NAD   Eyes: JEROMY, sclera anicteric   Nose/Mouth/Throat: OP clear, buccal mucosa moist, no ulcerations   Lungs: CTA bilaterally  Cardiovascular: RRR, no M/R/G   Abdominal/Rectal: +BS, soft, NT, ND  Lymphatics: no edema  Skin: no rashes or petechaie  Neuro: A&O.    Additional Findings: [facility] c/d/i    Labs:  Lab Results   Component Value Date    WBC 0.3 (LL) 06/14/2019    ANEU 0.6 (L) 06/05/2019    HGB 10.7 (L) 06/14/2019    HCT 31.0 (L) 06/14/2019    PLT 15 (LL) 06/14/2019     06/14/2019    POTASSIUM 3.9 06/14/2019    CHLORIDE 109 06/14/2019    CO2 25 06/14/2019    GLC 90 06/14/2019    BUN 7 06/14/2019    CR 0.57 (L) 06/14/2019    MAG 1.5 (L) 06/14/2019    INR 1.02 06/10/2019     Imaging: Reviewed  Microbiology:  Reviewed    Assessment and Plan:   El Ace is a 63 year old male with hx of AML (IDH2, RUNX1 pos), currently day +14 of flu/cy/TBI and related haplo BMT.     5/25 (day -6): flu/cy  5/26 (day -5): flu  5/27 (day -4): flu   5/28 (day -3): flu  5/29 (day -2): TBI/flu  5/30 (day -1): TBI  5/31 (day 0): Transplant. No ABO mismatch - both A+.      1.  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 GCSF started d+5 and cont to until ANC>2500 x 2 consecutive days. Re-stage per protocol.  WBC up to 0.3!      2.  HEME: Keep Hgb>8g/dL and " plts>10K.   - on MiPlate study   - Needs daily bleeding assessments. No bleeding to date.                             3.  ID: afebrile  -6/4-6/6  BC negative  -Prophylaxis with HD ACV (CMV pos, HSV pos, EBV pos), azithromycin (c. Diff), Jesica to Vfend.  Bactrim or appropriate PCP therapy to start d+28.       - 6/4 consult ID as hx of MDR PsAg bacteremia (5/3). Per ID started polymyxin B IV 6/4 however after 2nd dose  Pt developed new unsteadiness will stop as concern for neurotoxicity. Per ID will not resume. If hypotensive start 12mg/kg dapto + tobramycin   - COntinue empiric cefepime only until ANC >500.   - recent history of CDI, recommend oral vancomycin prophylaxis 125mg BID                     4.  GI:   -  Prn imodium- controlling loose stools well. Had a more formed stool overnight- monitor.   - Increase GI prophy - protonox BID. Improved symptoms. He does have prn carafate/tums   Ativan and compazine, zofran available PRN break-through symptoms.   - Ursodiol for VOD prophy.     5.  GVH: post transplant cytoxan, flush started last night and cytoxan today (day +3, +4), tac/MMF start day +5.  6/7 First Tac level= 10.4- dose adjusted for vori-  6/10 tac level 20.4- tac gtt decreased 60mcg/hr - 6/12 recheck 13.3. Recheck pending. Likely switch to oral Sunday/Monday.     6.  FEN/Renal: Monitor creat and lytes.   -cr stable.   Replete lytes PRN per SS.     7.  CV: CI induced HTN:  Norvasc 10mg daily.      Val Lindsey PA-C  [phone]    Patient has been seen and evaluated by me. I have reviewed today's vital signs, medications, labs and imaging results. I have discussed the plan with the team and agree with the findings and plan in this note.      No new rash or bleeding. No fcs.  Eating ok with soft stools but no abd pain  WBC up to 300    Exam s rash or signs of bleeding. Lungs clear. Cor reg  Abd s big Hep S.  Non tender.   BS nl  Sl LE edema but just trace..  Oral mucosa neg    Counts up a bit; no active new  infections.  Cont gvhd prophy meds with adjustment of tac dosing    Brian Powers

## 2019-06-14 NOTE — PROGRESS NOTES
BMT Daily Bleeding Assessment   [facility]     In the last 24 hours:      Oral and Nasal:  B5.  New petechiae of oral mucosa? No  B6.  Oropharyngeal bleeding? No  B7.  Epistaxis during assessment period? No               Skin, Soft Tissue, Musculoskeletal:  B8.  New petechiae present on skin? No  B9.  New purpura present? No  B10.  One or more spontaneous hematomas >1 inch in soft tissue or muscle? No  B11.  Spontaneous hematoma in deeper tissues? No  B12.  Joint bleeding? No    Gastrointestinal/Genitourinary/Gynecologic:  B13.  Was there a stool specimen? No  B14.  Was there emesis? No  B15.  Does patient have a nasogastric drainage tube? No  B16.  Visible blood in urine? No  B17.  Is the patient female? No            Pulmonary:   B18.  Hemoptysis? No  B20.  Blood tinged sputum? No    Opthalmic:  B. Scleral bleed? No    Invasive Sites:  B27.  Active oozing at invasive site for cumulative total             of >1 hour during assessment period? No    C2.  Comments on Assessment:  (Record approximate date/time onset of bleeding,   if applicable; Description (location/size). Include reason if any component of the assessment was not completed): N/A    Val Lindsey PA-C  [phone]

## 2019-06-14 NOTE — PLAN OF CARE
PT 5C: Cancel - Pt politely declining therapy at check in due to IV pole cord/power issues. Pt expressed mild frustration with no PT yesterday. Educated on intermittent flexibility required with PT schedule and reinforced that we will maintain his Mon, Tues, Thurs schedule as able.

## 2019-06-14 NOTE — PLAN OF CARE
A/VSS, afebrile. Pt denies pain. Randy is doing well, good appetite. Tac gtt continue, Mg replaced to 2.6, Pt up ad shelley in room, voiding well, Lg BM today.  Will continue to monitor per plan of care.     Problem: Adult Inpatient Plan of Care  Goal: Plan of Care Review  6/14/2019 1802 by Odette Grey, RN  Outcome: Improving     Problem: Adult Inpatient Plan of Care  Goal: Optimal Comfort and Wellbeing  6/14/2019 1802 by Odette Grey, RN  Outcome: Improving     Problem: Adjustment to Transplant (Stem Cell/Bone Marrow Transplant)  Goal: Optimal Coping with Transplant  Outcome: Improving     Problem: Diarrhea (Stem Cell/Bone Marrow Transplant)  Goal: Diarrhea Symptom Control  Outcome: Improving     Problem: Hematologic Alteration (Stem Cell/Bone Marrow Transplant)  Goal: Blood Counts Within Acceptable Range  Outcome: Improving     Problem: Fatigue (Stem Cell/Bone Marrow Transplant)  Goal: Energy Level Supports Daily Activity  Outcome: Improving

## 2019-06-14 NOTE — PLAN OF CARE
Afebrile. VSS. Independent.  Oriented x4. Denies N/V/D. Pt denies pain.  Pt needs Magnesium replacement. Tac running at 60 ml/hr.  Had hard BM over night. Slept well over night and pt is up  in bed watching the news.  Continue with POC.          Problem: Adult Inpatient Plan of Care  Goal: Plan of Care Review  6/14/2019 0619 by Debbie Nassar, RN  Outcome: No Change     Problem: Adult Inpatient Plan of Care  Goal: Patient-Specific Goal (Individualization)  6/14/2019 0619 by Debbie Nassar, RN  Outcome: No Change     Problem: Adult Inpatient Plan of Care  Goal: Absence of Hospital-Acquired Illness or Injury  6/14/2019 0619 by Debbie Nassar, RN  Outcome: No Change     Problem: Adult Inpatient Plan of Care  Goal: Optimal Comfort and Wellbeing  6/14/2019 0619 by Debbie Nassar, RN  Outcome: No Change

## 2019-06-15 LAB
ANION GAP SERPL CALCULATED.3IONS-SCNC: 8 MMOL/L (ref 3–14)
BASOPHILS # BLD AUTO: 0 10E9/L (ref 0–0.2)
BASOPHILS NFR BLD AUTO: 0 %
BUN SERPL-MCNC: 10 MG/DL (ref 7–30)
CALCIUM SERPL-MCNC: 9 MG/DL (ref 8.5–10.1)
CHLORIDE SERPL-SCNC: 108 MMOL/L (ref 94–109)
CMV DNA SPEC NAA+PROBE-ACNC: NORMAL [IU]/ML
CMV DNA SPEC NAA+PROBE-LOG#: NORMAL {LOG_IU}/ML
CO2 SERPL-SCNC: 25 MMOL/L (ref 20–32)
CREAT SERPL-MCNC: 0.58 MG/DL (ref 0.66–1.25)
DIFFERENTIAL METHOD BLD: ABNORMAL
EOSINOPHIL # BLD AUTO: 0 10E9/L (ref 0–0.7)
EOSINOPHIL NFR BLD AUTO: 0 %
ERYTHROCYTE [DISTWIDTH] IN BLOOD BY AUTOMATED COUNT: 13.7 % (ref 10–15)
GFR SERPL CREATININE-BSD FRML MDRD: >90 ML/MIN/{1.73_M2}
GLUCOSE SERPL-MCNC: 97 MG/DL (ref 70–99)
HCT VFR BLD AUTO: 30 % (ref 40–53)
HGB BLD-MCNC: 10.1 G/DL (ref 13.3–17.7)
LYMPHOCYTES # BLD AUTO: 0.1 10E9/L (ref 0.8–5.3)
LYMPHOCYTES NFR BLD AUTO: 6.2 %
MAGNESIUM SERPL-MCNC: 1.8 MG/DL (ref 1.6–2.3)
MCH RBC QN AUTO: 31.1 PG (ref 26.5–33)
MCHC RBC AUTO-ENTMCNC: 33.7 G/DL (ref 31.5–36.5)
MCV RBC AUTO: 92 FL (ref 78–100)
MONOCYTES # BLD AUTO: 0.3 10E9/L (ref 0–1.3)
MONOCYTES NFR BLD AUTO: 20.4 %
NEUTROPHILS # BLD AUTO: 1.1 10E9/L (ref 1.6–8.3)
NEUTROPHILS NFR BLD AUTO: 73.4 %
PLATELET # BLD AUTO: 16 10E9/L (ref 150–450)
PLATELET # BLD EST: ABNORMAL 10*3/UL
POTASSIUM SERPL-SCNC: 4 MMOL/L (ref 3.4–5.3)
RBC # BLD AUTO: 3.25 10E12/L (ref 4.4–5.9)
RBC MORPH BLD: NORMAL
SODIUM SERPL-SCNC: 141 MMOL/L (ref 133–144)
SPECIMEN SOURCE: NORMAL
WBC # BLD AUTO: 1.5 10E9/L (ref 4–11)

## 2019-06-15 PROCEDURE — 25000125 ZZHC RX 250: Performed by: PHYSICIAN ASSISTANT

## 2019-06-15 PROCEDURE — 80048 BASIC METABOLIC PNL TOTAL CA: CPT | Performed by: PHYSICIAN ASSISTANT

## 2019-06-15 PROCEDURE — 85025 COMPLETE CBC W/AUTO DIFF WBC: CPT | Performed by: PHYSICIAN ASSISTANT

## 2019-06-15 PROCEDURE — 25000131 ZZH RX MED GY IP 250 OP 636 PS 637: Performed by: PHYSICIAN ASSISTANT

## 2019-06-15 PROCEDURE — 25000128 H RX IP 250 OP 636: Performed by: INTERNAL MEDICINE

## 2019-06-15 PROCEDURE — 25000132 ZZH RX MED GY IP 250 OP 250 PS 637: Performed by: PHYSICIAN ASSISTANT

## 2019-06-15 PROCEDURE — 25000132 ZZH RX MED GY IP 250 OP 250 PS 637: Performed by: INTERNAL MEDICINE

## 2019-06-15 PROCEDURE — 20600000 ZZH R&B BMT

## 2019-06-15 PROCEDURE — 25000128 H RX IP 250 OP 636: Performed by: PHYSICIAN ASSISTANT

## 2019-06-15 PROCEDURE — 25800030 ZZH RX IP 258 OP 636: Performed by: PHYSICIAN ASSISTANT

## 2019-06-15 PROCEDURE — 25000131 ZZH RX MED GY IP 250 OP 636 PS 637: Performed by: NURSE PRACTITIONER

## 2019-06-15 PROCEDURE — 83735 ASSAY OF MAGNESIUM: CPT | Performed by: PHYSICIAN ASSISTANT

## 2019-06-15 RX ADMIN — SUCRALFATE 1 G: 1 SUSPENSION ORAL at 21:29

## 2019-06-15 RX ADMIN — VANCOMYCIN HYDROCHLORIDE 125 MG: KIT at 19:44

## 2019-06-15 RX ADMIN — TACROLIMUS 2.5 MG: 1 CAPSULE ORAL at 10:04

## 2019-06-15 RX ADMIN — PANTOPRAZOLE SODIUM 40 MG: 40 TABLET, DELAYED RELEASE ORAL at 08:20

## 2019-06-15 RX ADMIN — MYCOPHENOLATE MOFETIL 1000 MG: 500 INJECTION, POWDER, LYOPHILIZED, FOR SOLUTION INTRAVENOUS at 13:30

## 2019-06-15 RX ADMIN — SUCRALFATE 1 G: 1 SUSPENSION ORAL at 11:58

## 2019-06-15 RX ADMIN — MYCOPHENOLATE MOFETIL 1000 MG: 500 INJECTION, POWDER, LYOPHILIZED, FOR SOLUTION INTRAVENOUS at 21:29

## 2019-06-15 RX ADMIN — AMLODIPINE BESYLATE 10 MG: 10 TABLET ORAL at 08:20

## 2019-06-15 RX ADMIN — ACYCLOVIR 800 MG: 800 TABLET ORAL at 17:45

## 2019-06-15 RX ADMIN — CEFEPIME 2 G: 2 INJECTION, POWDER, FOR SOLUTION INTRAVENOUS at 21:29

## 2019-06-15 RX ADMIN — SUCRALFATE 1 G: 1 SUSPENSION ORAL at 15:48

## 2019-06-15 RX ADMIN — ACYCLOVIR 800 MG: 800 TABLET ORAL at 08:20

## 2019-06-15 RX ADMIN — TACROLIMUS 2.5 MG: 1 CAPSULE ORAL at 19:44

## 2019-06-15 RX ADMIN — VORICONAZOLE 200 MG: 200 TABLET, FILM COATED ORAL at 19:44

## 2019-06-15 RX ADMIN — ACYCLOVIR 800 MG: 800 TABLET ORAL at 10:04

## 2019-06-15 RX ADMIN — PANTOPRAZOLE SODIUM 40 MG: 40 TABLET, DELAYED RELEASE ORAL at 19:44

## 2019-06-15 RX ADMIN — CEFEPIME 2 G: 2 INJECTION, POWDER, FOR SOLUTION INTRAVENOUS at 05:23

## 2019-06-15 RX ADMIN — URSODIOL 300 MG: 300 CAPSULE ORAL at 19:44

## 2019-06-15 RX ADMIN — SUCRALFATE 1 G: 1 SUSPENSION ORAL at 08:20

## 2019-06-15 RX ADMIN — VORICONAZOLE 200 MG: 200 TABLET, FILM COATED ORAL at 08:20

## 2019-06-15 RX ADMIN — TAMSULOSIN HYDROCHLORIDE 0.4 MG: 0.4 CAPSULE ORAL at 08:20

## 2019-06-15 RX ADMIN — VANCOMYCIN HYDROCHLORIDE 125 MG: KIT at 08:19

## 2019-06-15 RX ADMIN — MYCOPHENOLATE MOFETIL 1000 MG: 500 INJECTION, POWDER, LYOPHILIZED, FOR SOLUTION INTRAVENOUS at 05:23

## 2019-06-15 RX ADMIN — ONDANSETRON 8 MG: 8 TABLET, ORALLY DISINTEGRATING ORAL at 08:20

## 2019-06-15 RX ADMIN — FILGRASTIM 480 MCG: 480 INJECTION, SOLUTION INTRAVENOUS; SUBCUTANEOUS at 19:44

## 2019-06-15 RX ADMIN — ACYCLOVIR 800 MG: 800 TABLET ORAL at 21:29

## 2019-06-15 RX ADMIN — CEFEPIME 2 G: 2 INJECTION, POWDER, FOR SOLUTION INTRAVENOUS at 13:30

## 2019-06-15 RX ADMIN — URSODIOL 300 MG: 300 CAPSULE ORAL at 08:20

## 2019-06-15 RX ADMIN — URSODIOL 300 MG: 300 CAPSULE ORAL at 13:30

## 2019-06-15 RX ADMIN — ACYCLOVIR 800 MG: 800 TABLET ORAL at 13:30

## 2019-06-15 ASSESSMENT — ACTIVITIES OF DAILY LIVING (ADL)
ADLS_ACUITY_SCORE: 14

## 2019-06-15 ASSESSMENT — MIFFLIN-ST. JEOR: SCORE: 1663.41

## 2019-06-15 NOTE — PLAN OF CARE
AVSS. Denies pain, nausea, vomiting, diarrhea or bleeding. Up independently and voiding freely. TAC infusing. No replacement needed.  Problem: Adult Inpatient Plan of Care  Goal: Plan of Care Review  6/15/2019 0532 by Princess Eliana Park RN  Outcome: No Change     Problem: Adult Inpatient Plan of Care  Goal: Patient-Specific Goal (Individualization)  Outcome: No Change     Problem: Adult Inpatient Plan of Care  Goal: Absence of Hospital-Acquired Illness or Injury  Outcome: No Change     Problem: Adult Inpatient Plan of Care  Goal: Optimal Comfort and Wellbeing  6/15/2019 0532 by Princess Eliana Park RN  Outcome: No Change     Problem: Adjustment to Transplant (Stem Cell/Bone Marrow Transplant)  Goal: Optimal Coping with Transplant  6/15/2019 0532 by Princess Eliana Park RN  Outcome: No Change     Problem: Diarrhea (Stem Cell/Bone Marrow Transplant)  Goal: Diarrhea Symptom Control  6/15/2019 0532 by Princess Eliana Park RN  Outcome: No Change     Problem: Fatigue (Stem Cell/Bone Marrow Transplant)  Goal: Energy Level Supports Daily Activity  6/15/2019 0532 by Princess Eliana Park RN  Outcome: No Change     Problem: Hematologic Alteration (Stem Cell/Bone Marrow Transplant)  Goal: Blood Counts Within Acceptable Range  6/15/2019 0532 by Princess Eliana Park RN  Outcome: No Change     Problem: Infection Risk (Stem Cell/Bone Marrow Transplant)  Goal: Absence of Infection Signs/Symptoms  Outcome: No Change     Problem: Mucositis (Stem Cell/Bone Marrow Transplant)  Goal: Mucous Membrane Health and Integrity  Outcome: No Change     Problem: Nausea and Vomiting (Stem Cell/Bone Marrow Transplant)  Goal: Nausea and Vomiting Symptom Relief  Outcome: No Change     Problem: Nutrition Intake Altered (Stem Cell/Bone Marrow Transplant)  Goal: Optimal Nutrition Intake  Outcome: No Change

## 2019-06-15 NOTE — PROGRESS NOTES
BMT Daily Bleeding Assessment   [facility] Study Daily Hemostatic Assessment Form   Gilma 15, 2019      In the last 24 hours:      Oral and Nasal:  B5.  New petechiae of oral mucosa? No  B6.  Oropharyngeal bleeding? No  B7.  Epistaxis during assessment period? No               Skin, Soft Tissue, Musculoskeletal:  B8.  New petechiae present on skin? No  B9.  New purpura present? No  B10.  One or more spontaneous hematomas >1 inch in soft tissue or muscle? No  B11.  Spontaneous hematoma in deeper tissues? No  B12.  Joint bleeding? No    Gastrointestinal/Genitourinary/Gynecologic:  B13.  Was there a stool specimen? No  B14.  Was there emesis? No  B15.  Does patient have a nasogastric drainage tube? No  B16.  Visible blood in urine? No  B17.  Is the patient female? No            Pulmonary:   B18.  Hemoptysis? No  B20.  Blood tinged sputum? No    Opthalmic:  B. Scleral bleed? No    Invasive Sites:  B27.  Active oozing at invasive site for cumulative total             of >1 hour during assessment period? No    C2.  Comments on Assessment:  (Record approximate date/time onset of bleeding,   if applicable; Description (location/size). Include reason if any component of the assessment was not completed): N/A    Dolly Valverde, MSN, APRN, ACNP-BC  Pager: [phone]

## 2019-06-15 NOTE — PROGRESS NOTES
"BMT Daily Progress Note   Gilma 15, 2019    Mr. Ace 63 y.o hx of AML. Admitted for VELMA Haplo. Currently day +15.    Overnight events: Randy is doing well, denied any acute events overnight. He is tolerating a regular diet and denied any NVD. No new constitutional symptoms. No acute bleeding events other than notes some dried blood in his nostrils on occasion, avoiding blowing his nose. Discussed engraftment and possibility of discharge early this week, however he communicated Thursday would be idea for his caregiver.      Review of Systems: 10 point ROS negative except as noted above.    Physical Exam:   Blood pressure 93/61, pulse 109, temperature 96  F (35.6  C), temperature source Axillary, resp. rate 16, height 1.735 m (5' 8.31\"), weight 89.4 kg (197 lb 1.6 oz), SpO2 100 %.  General: NAD, interactive   Eyes: JEROMY, sclera anicteric   Nose/Mouth/Throat: OP clear, buccal mucosa moist, no ulcerations. No blood noted in the nares.    Lungs: CTA bilaterally  Cardiovascular: RRR, no M/R/G   Abdominal/Rectal: +BS, soft, NT, ND  Lymphatics: no edema  Skin: no rashes or petechaie  Neuro: A&O.    Additional Findings: [place] site c/d/i    Labs:  Lab Results   Component Value Date    WBC 1.5 (L) 06/15/2019    ANEU 1.1 (L) 06/15/2019    HGB 10.1 (L) 06/15/2019    HCT 30.0 (L) 06/15/2019    PLT 16 (LL) 06/15/2019     06/15/2019    POTASSIUM 4.0 06/15/2019    CHLORIDE 108 06/15/2019    CO2 25 06/15/2019    GLC 97 06/15/2019    BUN 10 06/15/2019    CR 0.58 (L) 06/15/2019    MAG 1.8 06/15/2019    INR 1.02 06/10/2019     Imaging: Reviewed  Microbiology:  Reviewed    Assessment and Plan:   El Ace is a 63 year old male with hx of AML (IDH2, RUNX1 pos), currently day +15 of flu/cy/TBI and related haplo BMT.     5/25 (day -6): flu/cy  5/26 (day -5): flu  5/27 (day -4): flu   5/28 (day -3): flu  5/29 (day -2): TBI/flu  5/30 (day -1): TBI  5/31 (day 0): Transplant. No ABO mismatch - both A+.      1.  BMT: Transplanted " on 5/31/2019.   - GCSF started d+5 and cont to until ANC>2500 x 2 consecutive days. WBC up to 1.5 with an ANC of 1.1 today!   - Re-stage per protocol.     2.  HEME: Keep Hgb>8g/dL and plts>10K.   - on MiPlate study   - Needs daily bleeding assessments. No bleeding to date.                             3.  ID: Afebrile  -6/4-6/6  BC negative  -Prophylaxis with HD ACV (CMV pos, HSV pos, EBV pos), azithromycin (c. Diff), Jesica to Vfend.  Bactrim or appropriate PCP therapy to start d+28. Will plan to discontinue empiric Cefepime 6/16, transition back to Azithro until day + 21.     - 6/4 consult ID as hx of MDR PsAg bacteremia (5/3). Per ID started polymyxin B IV 6/4 however after 2nd dose  Pt developed new unsteadiness will stop as concern for neurotoxicity. Per ID will not resume. If hypotensive start 12 mg/kg dapto + tobramycin   - COntinue empiric cefepime only until ANC >500.   - recent history of CDI, recommend oral vancomycin prophylaxis 125mg BID                     4.  GI: No NVD 6/15  -  Prn imodium- controlling loose stools well.   - Increase GI prophy - protonox BID. Improved symptoms. He does have prn carafate/tums   - Ativan and compazine, zofran available PRN break-through symptoms.   - Ursodiol for VOD prophy.     5.  GVH: S/P post-txp cytoxan, tac/MMF were started on day +5. No s/sx GVH to date.  - 6/14 Tacro level 11.7, changed to PO 6/15 2.5 mg BID. Check level Monday morning.   - Will change MMF to PO on 6/16.    6.  FEN/Renal: Monitor creat and lytes.   -Cr stable.   - Replete lytes PRN per SS.     7.  CV: CI induced HTN:    - Norvasc 10mg daily.      Dolly Valverde, MSN, APRN, ACNP-BC  Pager: [phone]    Patient has been seen and evaluated by me. I have reviewed today's vital signs, medications, labs and imaging results independently. I have discussed the plan with the team and agree with the findings and plan in this note.       Feeling OK, engrafting. Eating better    No mouth sores,  Chest  CTAB  CVS: S1 S2 RRR  Abd: soft, non tender    Engrafting, will change tac to oral today.    Dilma Quezada

## 2019-06-15 NOTE — PLAN OF CARE
A/VSS, afebrile. Pt denies pain. NO reports of n/v/d. Pt doing well. Pt has a good appetite. Pt has been doing activities in room, voiding well, and having normal bowel movements. Will continue to monitor per plan of care.     Problem: Adult Inpatient Plan of Care  Goal: Plan of Care Review  6/15/2019 1806 by Odette Grey, RN  Outcome: Improving     Problem: Adult Inpatient Plan of Care  Goal: Patient-Specific Goal (Individualization)  6/15/2019 1806 by Odette Grey RN  Outcome: Improving     Problem: Adult Inpatient Plan of Care  Goal: Optimal Comfort and Wellbeing  6/15/2019 1806 by Odette Grey, RN  Outcome: Improving     Problem: Adult Inpatient Plan of Care  Goal: Readiness for Transition of Care  Outcome: Improving     Problem: Adjustment to Transplant (Stem Cell/Bone Marrow Transplant)  Goal: Optimal Coping with Transplant  6/15/2019 1806 by Odette Grey, RN  Outcome: Improving

## 2019-06-16 LAB
ANION GAP SERPL CALCULATED.3IONS-SCNC: 7 MMOL/L (ref 3–14)
BASOPHILS # BLD AUTO: 0 10E9/L (ref 0–0.2)
BASOPHILS NFR BLD AUTO: 0 %
BUN SERPL-MCNC: 11 MG/DL (ref 7–30)
CALCIUM SERPL-MCNC: 8.8 MG/DL (ref 8.5–10.1)
CHLORIDE SERPL-SCNC: 110 MMOL/L (ref 94–109)
CO2 SERPL-SCNC: 24 MMOL/L (ref 20–32)
CREAT SERPL-MCNC: 0.61 MG/DL (ref 0.66–1.25)
DIFFERENTIAL METHOD BLD: ABNORMAL
EOSINOPHIL # BLD AUTO: 0 10E9/L (ref 0–0.7)
EOSINOPHIL NFR BLD AUTO: 0 %
ERYTHROCYTE [DISTWIDTH] IN BLOOD BY AUTOMATED COUNT: 13.7 % (ref 10–15)
GFR SERPL CREATININE-BSD FRML MDRD: >90 ML/MIN/{1.73_M2}
GLUCOSE SERPL-MCNC: 92 MG/DL (ref 70–99)
HCT VFR BLD AUTO: 29.8 % (ref 40–53)
HGB BLD-MCNC: 10 G/DL (ref 13.3–17.7)
LYMPHOCYTES # BLD AUTO: 0 10E9/L (ref 0.8–5.3)
LYMPHOCYTES NFR BLD AUTO: 1 %
MCH RBC QN AUTO: 31.1 PG (ref 26.5–33)
MCHC RBC AUTO-ENTMCNC: 33.6 G/DL (ref 31.5–36.5)
MCV RBC AUTO: 93 FL (ref 78–100)
MONOCYTES # BLD AUTO: 0.7 10E9/L (ref 0–1.3)
MONOCYTES NFR BLD AUTO: 17 %
NEUTROPHILS # BLD AUTO: 3.4 10E9/L (ref 1.6–8.3)
NEUTROPHILS NFR BLD AUTO: 82 %
PLATELET # BLD AUTO: 22 10E9/L (ref 150–450)
PLATELET # BLD EST: ABNORMAL 10*3/UL
POTASSIUM SERPL-SCNC: 4.1 MMOL/L (ref 3.4–5.3)
RBC # BLD AUTO: 3.22 10E12/L (ref 4.4–5.9)
RBC MORPH BLD: NORMAL
SODIUM SERPL-SCNC: 140 MMOL/L (ref 133–144)
WBC # BLD AUTO: 4.1 10E9/L (ref 4–11)

## 2019-06-16 PROCEDURE — 85025 COMPLETE CBC W/AUTO DIFF WBC: CPT | Performed by: PHYSICIAN ASSISTANT

## 2019-06-16 PROCEDURE — 25000132 ZZH RX MED GY IP 250 OP 250 PS 637: Performed by: INTERNAL MEDICINE

## 2019-06-16 PROCEDURE — 80048 BASIC METABOLIC PNL TOTAL CA: CPT | Performed by: PHYSICIAN ASSISTANT

## 2019-06-16 PROCEDURE — 25000128 H RX IP 250 OP 636: Performed by: INTERNAL MEDICINE

## 2019-06-16 PROCEDURE — 25000128 H RX IP 250 OP 636: Performed by: NURSE PRACTITIONER

## 2019-06-16 PROCEDURE — 25000132 ZZH RX MED GY IP 250 OP 250 PS 637: Performed by: PHYSICIAN ASSISTANT

## 2019-06-16 PROCEDURE — 25000131 ZZH RX MED GY IP 250 OP 636 PS 637: Performed by: PHYSICIAN ASSISTANT

## 2019-06-16 PROCEDURE — 20600000 ZZH R&B BMT

## 2019-06-16 PROCEDURE — 25000128 H RX IP 250 OP 636: Performed by: PHYSICIAN ASSISTANT

## 2019-06-16 PROCEDURE — 25000131 ZZH RX MED GY IP 250 OP 636 PS 637: Performed by: NURSE PRACTITIONER

## 2019-06-16 PROCEDURE — 25800030 ZZH RX IP 258 OP 636: Performed by: PHYSICIAN ASSISTANT

## 2019-06-16 PROCEDURE — 25000128 H RX IP 250 OP 636: Performed by: RADIOLOGY

## 2019-06-16 PROCEDURE — 25000125 ZZHC RX 250: Performed by: PHYSICIAN ASSISTANT

## 2019-06-16 PROCEDURE — 25000132 ZZH RX MED GY IP 250 OP 250 PS 637: Performed by: NURSE PRACTITIONER

## 2019-06-16 PROCEDURE — 25800030 ZZH RX IP 258 OP 636: Performed by: NURSE PRACTITIONER

## 2019-06-16 RX ORDER — AZITHROMYCIN 250 MG/1
250 TABLET, FILM COATED ORAL DAILY
Status: DISCONTINUED | OUTPATIENT
Start: 2019-06-16 | End: 2019-06-25

## 2019-06-16 RX ORDER — MYCOPHENOLATE MOFETIL 500 MG/1
1000 TABLET, FILM COATED ORAL
Status: DISCONTINUED | OUTPATIENT
Start: 2019-06-16 | End: 2019-06-27 | Stop reason: HOSPADM

## 2019-06-16 RX ADMIN — ACYCLOVIR 800 MG: 800 TABLET ORAL at 18:14

## 2019-06-16 RX ADMIN — VANCOMYCIN HYDROCHLORIDE 125 MG: KIT at 19:35

## 2019-06-16 RX ADMIN — URSODIOL 300 MG: 300 CAPSULE ORAL at 13:24

## 2019-06-16 RX ADMIN — FILGRASTIM 480 MCG: 480 INJECTION, SOLUTION INTRAVENOUS; SUBCUTANEOUS at 19:35

## 2019-06-16 RX ADMIN — URSODIOL 300 MG: 300 CAPSULE ORAL at 07:40

## 2019-06-16 RX ADMIN — AMLODIPINE BESYLATE 10 MG: 10 TABLET ORAL at 07:40

## 2019-06-16 RX ADMIN — ACYCLOVIR 800 MG: 800 TABLET ORAL at 11:04

## 2019-06-16 RX ADMIN — URSODIOL 300 MG: 300 CAPSULE ORAL at 19:35

## 2019-06-16 RX ADMIN — SUCRALFATE 1 G: 1 SUSPENSION ORAL at 16:06

## 2019-06-16 RX ADMIN — SUCRALFATE 1 G: 1 SUSPENSION ORAL at 11:04

## 2019-06-16 RX ADMIN — TACROLIMUS 2.5 MG: 1 CAPSULE ORAL at 19:35

## 2019-06-16 RX ADMIN — VANCOMYCIN HYDROCHLORIDE 125 MG: KIT at 07:39

## 2019-06-16 RX ADMIN — Medication 5 ML: at 16:06

## 2019-06-16 RX ADMIN — SUCRALFATE 1 G: 1 SUSPENSION ORAL at 22:26

## 2019-06-16 RX ADMIN — SODIUM CHLORIDE 1000 ML: 9 INJECTION, SOLUTION INTRAVENOUS at 14:49

## 2019-06-16 RX ADMIN — TAMSULOSIN HYDROCHLORIDE 0.4 MG: 0.4 CAPSULE ORAL at 07:40

## 2019-06-16 RX ADMIN — PANTOPRAZOLE SODIUM 40 MG: 40 TABLET, DELAYED RELEASE ORAL at 19:35

## 2019-06-16 RX ADMIN — ACYCLOVIR 800 MG: 800 TABLET ORAL at 13:24

## 2019-06-16 RX ADMIN — MYCOPHENOLATE MOFETIL 1000 MG: 500 INJECTION, POWDER, LYOPHILIZED, FOR SOLUTION INTRAVENOUS at 05:28

## 2019-06-16 RX ADMIN — SODIUM CHLORIDE, PRESERVATIVE FREE 5 ML: 5 INJECTION INTRAVENOUS at 09:36

## 2019-06-16 RX ADMIN — SUCRALFATE 1 G: 1 SUSPENSION ORAL at 07:40

## 2019-06-16 RX ADMIN — MYCOPHENOLATE MOFETIL 1000 MG: 500 TABLET, FILM COATED ORAL at 13:27

## 2019-06-16 RX ADMIN — MYCOPHENOLATE MOFETIL 1000 MG: 500 TABLET, FILM COATED ORAL at 22:27

## 2019-06-16 RX ADMIN — ACYCLOVIR 800 MG: 800 TABLET ORAL at 07:40

## 2019-06-16 RX ADMIN — ACYCLOVIR 800 MG: 800 TABLET ORAL at 22:26

## 2019-06-16 RX ADMIN — Medication 5 ML: at 22:28

## 2019-06-16 RX ADMIN — PANTOPRAZOLE SODIUM 40 MG: 40 TABLET, DELAYED RELEASE ORAL at 07:40

## 2019-06-16 RX ADMIN — ONDANSETRON 8 MG: 8 TABLET, ORALLY DISINTEGRATING ORAL at 07:40

## 2019-06-16 RX ADMIN — CEFEPIME 2 G: 2 INJECTION, POWDER, FOR SOLUTION INTRAVENOUS at 05:28

## 2019-06-16 RX ADMIN — TACROLIMUS 2.5 MG: 1 CAPSULE ORAL at 07:40

## 2019-06-16 RX ADMIN — SODIUM CHLORIDE 500 ML: 9 INJECTION, SOLUTION INTRAVENOUS at 07:47

## 2019-06-16 RX ADMIN — AZITHROMYCIN 250 MG: 250 TABLET, FILM COATED ORAL at 09:35

## 2019-06-16 RX ADMIN — VORICONAZOLE 200 MG: 200 TABLET, FILM COATED ORAL at 07:40

## 2019-06-16 RX ADMIN — VORICONAZOLE 200 MG: 200 TABLET, FILM COATED ORAL at 19:35

## 2019-06-16 ASSESSMENT — ACTIVITIES OF DAILY LIVING (ADL)
ADLS_ACUITY_SCORE: 14

## 2019-06-16 ASSESSMENT — MIFFLIN-ST. JEOR: SCORE: 1659.78

## 2019-06-16 NOTE — PLAN OF CARE
AVSS. Denies pain, bleeding, nausea or vomiting. Has good appetite and eating well. Up independently and voiding freely. No replacement needed.  Problem: Adult Inpatient Plan of Care  Goal: Plan of Care Review  6/16/2019 0516 by Princess Eliana Park RN  Outcome: No Change     Problem: Adult Inpatient Plan of Care  Goal: Patient-Specific Goal (Individualization)  6/16/2019 0516 by Princess Eliana Park RN  Outcome: No Change     Problem: Adult Inpatient Plan of Care  Goal: Absence of Hospital-Acquired Illness or Injury  6/16/2019 0516 by Princess Eliana Park RN  Outcome: No Change     Problem: Adult Inpatient Plan of Care  Goal: Optimal Comfort and Wellbeing  6/16/2019 0516 by Princess Eliana Park RN  Outcome: No Change     Problem: Adjustment to Transplant (Stem Cell/Bone Marrow Transplant)  Goal: Optimal Coping with Transplant  6/16/2019 0516 by Princess Eliana Park RN  Outcome: Improving     Problem: Diarrhea (Stem Cell/Bone Marrow Transplant)  Goal: Diarrhea Symptom Control  Outcome: Improving     Problem: Fatigue (Stem Cell/Bone Marrow Transplant)  Goal: Energy Level Supports Daily Activity  Outcome: Improving     Problem: Hematologic Alteration (Stem Cell/Bone Marrow Transplant)  Goal: Blood Counts Within Acceptable Range  6/16/2019 0516 by Princess Eliana Park RN  Outcome: Improving     Problem: Infection Risk (Stem Cell/Bone Marrow Transplant)  Goal: Absence of Infection Signs/Symptoms  Outcome: Improving     Problem: Mucositis (Stem Cell/Bone Marrow Transplant)  Goal: Mucous Membrane Health and Integrity  Outcome: Improving     Problem: Nausea and Vomiting (Stem Cell/Bone Marrow Transplant)  Goal: Nausea and Vomiting Symptom Relief  6/16/2019 0516 by Princess Eliana Park RN  Outcome: Improving     Problem: Nutrition Intake Altered (Stem Cell/Bone Marrow Transplant)  Goal: Optimal Nutrition Intake  6/16/2019 0516 by Princess Eliana Park RN  Outcome: Improving

## 2019-06-16 NOTE — PLAN OF CARE
A/VSS, afebrile. Pt doing well, no complaints of pain, n/v/d. Pt had dizziness today, 1.5 mL of NS given, Norvasc discontinued. Pt counts continue to improve, MD granted hallway rights to pt, pt up ambulating hallways this evening. Pt has a good appetite and voiding well. Will continue to monitor per plan of care.     Problem: Adult Inpatient Plan of Care  Goal: Plan of Care Review  6/16/2019 1810 by Odette Grey RN  Outcome: Improving     Problem: Adult Inpatient Plan of Care  Goal: Patient-Specific Goal (Individualization)  6/16/2019 1810 by Odette Grey RN  Outcome: Improving     Problem: Adult Inpatient Plan of Care  Goal: Absence of Hospital-Acquired Illness or Injury  6/16/2019 1810 by Odette Grey RN  Outcome: Improving     Problem: Adult Inpatient Plan of Care  Goal: Optimal Comfort and Wellbeing  6/16/2019 1810 by Odette Grey RN  Outcome: Improving     Problem: Adult Inpatient Plan of Care  Goal: Readiness for Transition of Care  Outcome: Improving     Problem: Adjustment to Transplant (Stem Cell/Bone Marrow Transplant)  Goal: Optimal Coping with Transplant  6/16/2019 1810 by Odette Grey RN  Outcome: Improving

## 2019-06-16 NOTE — PROGRESS NOTES
"BMT Daily Progress Note   June 16, 2019      Mr. Ace 63 y.o hx of AML. Admitted for VELMA Haplo. Currently day +16.    Overnight events: Randy is had a good night. Uneventful. Notes that he was a little lightheaded when he got OOB this morning with + orthostatic BP recorded by RN. Discussed discontinuing Norvasc and giving 1/2 L NS bolus which he was agreeable to. No NVD, bleeding, or new skin changes/rashes. Expressed that he was grateful his sons stem cells are engrafting, felt this was the perfect Fathers Day gift.      Review of Systems: 10 point ROS negative except as noted above.    Physical Exam:   Blood pressure 110/73, pulse 74, temperature 97.6  F (36.4  C), temperature source Oral, resp. rate 16, height 1.735 m (5' 8.31\"), weight 89 kg (196 lb 4.8 oz), SpO2 100 %.  General: NAD, interactive   Eyes: JEROMY, sclera anicteric   Nose/Mouth/Throat: OP clear, buccal mucosa moist, no ulcerations. No blood noted in the nares.    Lungs: CTA bilaterally  Cardiovascular: RRR, no M/R/G   Abdominal/Rectal: +BS, soft, NT, ND  Lymphatics: no edema  Skin: no rashes or petechaie  Neuro: A&O.    Additional Findings: [facility] c/d/i    Labs:  Lab Results   Component Value Date    WBC 4.1 06/16/2019    ANEU 3.4 06/16/2019    HGB 10.0 (L) 06/16/2019    HCT 29.8 (L) 06/16/2019    PLT 22 (LL) 06/16/2019     06/16/2019    POTASSIUM 4.1 06/16/2019    CHLORIDE 110 (H) 06/16/2019    CO2 24 06/16/2019    GLC 92 06/16/2019    BUN 11 06/16/2019    CR 0.61 (L) 06/16/2019    MAG 1.8 06/15/2019    INR 1.02 06/10/2019     Imaging: Reviewed  Microbiology:  Reviewed    Assessment and Plan:   El Ace is a 63 year old male with hx of AML (IDH2, RUNX1 pos), currently day +15 of flu/cy/TBI and related haplo BMT.     5/25 (day -6): flu/cy  5/26 (day -5): flu  5/27 (day -4): flu   5/28 (day -3): flu  5/29 (day -2): TBI/flu  5/30 (day -1): TBI  5/31 (day 0): Transplant. No ABO mismatch - both A+.      1.  BMT: Transplanted on " 5/31/2019.   - GCSF started d+5 and cont to until ANC>2500 x 2 consecutive days. WBC up to 4.1 with ANC 3.4, scheduled last dose GCSF 6/16 PM.  - Re-stage per protocol.     2.  HEME: Keep Hgb>8g/dL and plts>10K.   - on MiPlate study   - Needs daily bleeding assessments. No bleeding to date.                             3.  ID: Afebrile  -6/4-6/6  BC negative  -Prophylaxis with HD ACV (CMV pos, HSV pos, EBV pos), azithromycin (c. Diff), Jesica to Vfend.  Bactrim or appropriate PCP therapy to start d+28. Discontinued Cefepime 6/16, transition back to Azithro until day + 21.     - 6/4 consult ID as hx of MDR PsAg bacteremia (5/3). Per ID started polymyxin B IV 6/4 however after 2nd dose  Pt developed new unsteadiness will stop as concern for neurotoxicity. Per ID will not resume. If hypotensive start 12 mg/kg dapto + tobramycin   - COntinue empiric cefepime only until ANC >500.   - recent history of CDI, recommend oral vancomycin prophylaxis 125mg BID                     4.  GI: No NVD 6/15-16  -  Prn imodium- controlling loose stools well.   - Increase GI prophy - protonox BID. Improved symptoms. He does have prn carafate/tums   - Ativan and compazine, zofran available PRN break-through symptoms.   - Ursodiol for VOD prophy.     5.  GVH: S/P post-txp cytoxan, tac/MMF were started on day +5. No s/sx GVH to date.  - 6/14 Tacro level 11.7, changed to PO 6/15 2.5 mg BID. Check level Monday morning.   - Changed MMF to PO on 6/16.    6.  FEN/Renal: Monitor creat and lytes.   -Cr stable.   - Replete lytes PRN per SS.     7.  CV: CI induced HTN: Resolved with transition to PO tacro. Mild, symptomatic, orthostatic hypotension this morning.  - Discontinued Norvasc 6/16  - 1/2 L NS bolus for orthostasis 6/16 am     Dolly Valverde, DNP, MSN, APRN, ACNP-BC  Pager: [phone]    Patient has been seen and evaluated by me. I have reviewed today's vital signs, medications, labs and imaging results independently. I have discussed the  plan with the team and agree with the findings and plan in this note.       Feeling OK, engrafting. Eating better    No mouth sores,  Chest CTAB  CVS: S1 S2 RRR  Abd: soft, non tender    Engrafting, will change MMF to oral today    Dilma Quezada

## 2019-06-17 ENCOUNTER — APPOINTMENT (OUTPATIENT)
Dept: PHYSICAL THERAPY | Facility: CLINIC | Age: 64
DRG: 014 | End: 2019-06-17
Attending: INTERNAL MEDICINE
Payer: COMMERCIAL

## 2019-06-17 LAB
ABO + RH BLD: NORMAL
ABO + RH BLD: NORMAL
ALBUMIN SERPL-MCNC: 3.2 G/DL (ref 3.4–5)
ALP SERPL-CCNC: 216 U/L (ref 40–150)
ALT SERPL W P-5'-P-CCNC: 34 U/L (ref 0–70)
ANION GAP SERPL CALCULATED.3IONS-SCNC: 7 MMOL/L (ref 3–14)
AST SERPL W P-5'-P-CCNC: 17 U/L (ref 0–45)
BASOPHILS # BLD AUTO: 0 10E9/L (ref 0–0.2)
BASOPHILS NFR BLD AUTO: 0 %
BILIRUB DIRECT SERPL-MCNC: <0.1 MG/DL (ref 0–0.2)
BILIRUB SERPL-MCNC: 0.2 MG/DL (ref 0.2–1.3)
BLD GP AB SCN SERPL QL: NORMAL
BLOOD BANK CMNT PATIENT-IMP: NORMAL
BUN SERPL-MCNC: 6 MG/DL (ref 7–30)
CALCIUM SERPL-MCNC: 8.8 MG/DL (ref 8.5–10.1)
CHLORIDE SERPL-SCNC: 109 MMOL/L (ref 94–109)
CO2 SERPL-SCNC: 24 MMOL/L (ref 20–32)
CREAT SERPL-MCNC: 0.64 MG/DL (ref 0.66–1.25)
DIFFERENTIAL METHOD BLD: ABNORMAL
EOSINOPHIL # BLD AUTO: 0 10E9/L (ref 0–0.7)
EOSINOPHIL NFR BLD AUTO: 0 %
ERYTHROCYTE [DISTWIDTH] IN BLOOD BY AUTOMATED COUNT: 13.9 % (ref 10–15)
GFR SERPL CREATININE-BSD FRML MDRD: >90 ML/MIN/{1.73_M2}
GLUCOSE SERPL-MCNC: 94 MG/DL (ref 70–99)
HCT VFR BLD AUTO: 29 % (ref 40–53)
HGB BLD-MCNC: 10 G/DL (ref 13.3–17.7)
INR PPP: 1.09 (ref 0.86–1.14)
LYMPHOCYTES # BLD AUTO: 0.1 10E9/L (ref 0.8–5.3)
LYMPHOCYTES NFR BLD AUTO: 0.9 %
MAGNESIUM SERPL-MCNC: 1.2 MG/DL (ref 1.6–2.3)
MAGNESIUM SERPL-MCNC: 2 MG/DL (ref 1.6–2.3)
MCH RBC QN AUTO: 31.3 PG (ref 26.5–33)
MCHC RBC AUTO-ENTMCNC: 34.5 G/DL (ref 31.5–36.5)
MCV RBC AUTO: 91 FL (ref 78–100)
MONOCYTES # BLD AUTO: 2 10E9/L (ref 0–1.3)
MONOCYTES NFR BLD AUTO: 21.2 %
NEUTROPHILS # BLD AUTO: 7.4 10E9/L (ref 1.6–8.3)
NEUTROPHILS NFR BLD AUTO: 77 %
NRBC # BLD AUTO: 0.1 10*3/UL
NRBC BLD AUTO-RTO: 1 /100
PHOSPHATE SERPL-MCNC: 3.5 MG/DL (ref 2.5–4.5)
PLATELET # BLD AUTO: 30 10E9/L (ref 150–450)
PLATELET # BLD EST: ABNORMAL 10*3/UL
POTASSIUM SERPL-SCNC: 3.9 MMOL/L (ref 3.4–5.3)
PROMYELOCYTES # BLD MANUAL: 0.1 10E9/L
PROMYELOCYTES NFR BLD MANUAL: 0.9 %
PROT SERPL-MCNC: 6.2 G/DL (ref 6.8–8.8)
RBC # BLD AUTO: 3.2 10E12/L (ref 4.4–5.9)
RBC MORPH BLD: NORMAL
SODIUM SERPL-SCNC: 140 MMOL/L (ref 133–144)
SPECIMEN EXP DATE BLD: NORMAL
TACROLIMUS BLD-MCNC: 20.1 UG/L (ref 5–15)
TME LAST DOSE: ABNORMAL H
WBC # BLD AUTO: 9.6 10E9/L (ref 4–11)

## 2019-06-17 PROCEDURE — 25000132 ZZH RX MED GY IP 250 OP 250 PS 637: Performed by: NURSE PRACTITIONER

## 2019-06-17 PROCEDURE — 25000128 H RX IP 250 OP 636: Performed by: PHYSICIAN ASSISTANT

## 2019-06-17 PROCEDURE — 25000132 ZZH RX MED GY IP 250 OP 250 PS 637: Performed by: PHYSICIAN ASSISTANT

## 2019-06-17 PROCEDURE — 85025 COMPLETE CBC W/AUTO DIFF WBC: CPT | Performed by: PHYSICIAN ASSISTANT

## 2019-06-17 PROCEDURE — 25000131 ZZH RX MED GY IP 250 OP 636 PS 637: Performed by: PHYSICIAN ASSISTANT

## 2019-06-17 PROCEDURE — 80197 ASSAY OF TACROLIMUS: CPT | Performed by: PHYSICIAN ASSISTANT

## 2019-06-17 PROCEDURE — 82248 BILIRUBIN DIRECT: CPT | Performed by: PHYSICIAN ASSISTANT

## 2019-06-17 PROCEDURE — 25000132 ZZH RX MED GY IP 250 OP 250 PS 637: Performed by: INTERNAL MEDICINE

## 2019-06-17 PROCEDURE — 20600000 ZZH R&B BMT

## 2019-06-17 PROCEDURE — 97530 THERAPEUTIC ACTIVITIES: CPT | Mod: GP

## 2019-06-17 PROCEDURE — 86850 RBC ANTIBODY SCREEN: CPT | Performed by: PHYSICIAN ASSISTANT

## 2019-06-17 PROCEDURE — 86900 BLOOD TYPING SEROLOGIC ABO: CPT | Performed by: PHYSICIAN ASSISTANT

## 2019-06-17 PROCEDURE — 80053 COMPREHEN METABOLIC PANEL: CPT | Performed by: PHYSICIAN ASSISTANT

## 2019-06-17 PROCEDURE — 86901 BLOOD TYPING SEROLOGIC RH(D): CPT | Performed by: PHYSICIAN ASSISTANT

## 2019-06-17 PROCEDURE — 83735 ASSAY OF MAGNESIUM: CPT | Performed by: PHYSICIAN ASSISTANT

## 2019-06-17 PROCEDURE — 83735 ASSAY OF MAGNESIUM: CPT | Performed by: INTERNAL MEDICINE

## 2019-06-17 PROCEDURE — 84100 ASSAY OF PHOSPHORUS: CPT | Performed by: PHYSICIAN ASSISTANT

## 2019-06-17 PROCEDURE — 85610 PROTHROMBIN TIME: CPT | Performed by: PHYSICIAN ASSISTANT

## 2019-06-17 PROCEDURE — 25000131 ZZH RX MED GY IP 250 OP 636 PS 637: Performed by: NURSE PRACTITIONER

## 2019-06-17 PROCEDURE — 97110 THERAPEUTIC EXERCISES: CPT | Mod: GP

## 2019-06-17 RX ORDER — SUCRALFATE ORAL 1 G/10ML
1 SUSPENSION ORAL 4 TIMES DAILY PRN
Status: DISCONTINUED | OUTPATIENT
Start: 2019-06-17 | End: 2019-06-27 | Stop reason: HOSPADM

## 2019-06-17 RX ADMIN — MYCOPHENOLATE MOFETIL 1000 MG: 500 TABLET, FILM COATED ORAL at 23:02

## 2019-06-17 RX ADMIN — PANTOPRAZOLE SODIUM 40 MG: 40 TABLET, DELAYED RELEASE ORAL at 20:10

## 2019-06-17 RX ADMIN — URSODIOL 300 MG: 300 CAPSULE ORAL at 14:31

## 2019-06-17 RX ADMIN — URSODIOL 300 MG: 300 CAPSULE ORAL at 08:19

## 2019-06-17 RX ADMIN — ONDANSETRON 8 MG: 8 TABLET, ORALLY DISINTEGRATING ORAL at 08:19

## 2019-06-17 RX ADMIN — VANCOMYCIN HYDROCHLORIDE 125 MG: KIT at 20:10

## 2019-06-17 RX ADMIN — ACYCLOVIR 800 MG: 800 TABLET ORAL at 18:30

## 2019-06-17 RX ADMIN — SODIUM CHLORIDE, PRESERVATIVE FREE 5 ML: 5 INJECTION INTRAVENOUS at 06:30

## 2019-06-17 RX ADMIN — MYCOPHENOLATE MOFETIL 1000 MG: 500 TABLET, FILM COATED ORAL at 06:30

## 2019-06-17 RX ADMIN — SODIUM CHLORIDE 500 ML: 9 INJECTION, SOLUTION INTRAVENOUS at 09:27

## 2019-06-17 RX ADMIN — URSODIOL 300 MG: 300 CAPSULE ORAL at 20:10

## 2019-06-17 RX ADMIN — VORICONAZOLE 200 MG: 200 TABLET, FILM COATED ORAL at 20:10

## 2019-06-17 RX ADMIN — ACYCLOVIR 800 MG: 800 TABLET ORAL at 23:02

## 2019-06-17 RX ADMIN — ACYCLOVIR 800 MG: 800 TABLET ORAL at 08:19

## 2019-06-17 RX ADMIN — ACYCLOVIR 800 MG: 800 TABLET ORAL at 14:31

## 2019-06-17 RX ADMIN — SUCRALFATE 1 G: 1 SUSPENSION ORAL at 08:19

## 2019-06-17 RX ADMIN — MYCOPHENOLATE MOFETIL 1000 MG: 500 TABLET, FILM COATED ORAL at 14:31

## 2019-06-17 RX ADMIN — VANCOMYCIN HYDROCHLORIDE 125 MG: KIT at 08:20

## 2019-06-17 RX ADMIN — TAMSULOSIN HYDROCHLORIDE 0.4 MG: 0.4 CAPSULE ORAL at 08:19

## 2019-06-17 RX ADMIN — TACROLIMUS 2.5 MG: 1 CAPSULE ORAL at 08:20

## 2019-06-17 RX ADMIN — AZITHROMYCIN 250 MG: 250 TABLET, FILM COATED ORAL at 08:19

## 2019-06-17 RX ADMIN — TACROLIMUS 1.5 MG: 1 CAPSULE ORAL at 20:10

## 2019-06-17 RX ADMIN — PANTOPRAZOLE SODIUM 40 MG: 40 TABLET, DELAYED RELEASE ORAL at 08:19

## 2019-06-17 RX ADMIN — VORICONAZOLE 200 MG: 200 TABLET, FILM COATED ORAL at 08:19

## 2019-06-17 RX ADMIN — ACYCLOVIR 800 MG: 800 TABLET ORAL at 11:04

## 2019-06-17 RX ADMIN — MAGNESIUM SULFATE HEPTAHYDRATE 4 G: 40 INJECTION, SOLUTION INTRAVENOUS at 04:27

## 2019-06-17 ASSESSMENT — ACTIVITIES OF DAILY LIVING (ADL)
ADLS_ACUITY_SCORE: 14

## 2019-06-17 ASSESSMENT — MIFFLIN-ST. JEOR: SCORE: 1656.15

## 2019-06-17 NOTE — PLAN OF CARE
AVSS. Denies dizziness, lightheadedness, pain or nausea. Up ad shelley and voiding well. Magnesium 4g given and needs recheck. For TAC level today.  Problem: Adult Inpatient Plan of Care  Goal: Plan of Care Review  6/17/2019 0528 by Princess Eliana Park RN  Outcome: Improving     Problem: Adult Inpatient Plan of Care  Goal: Patient-Specific Goal (Individualization)  6/17/2019 0528 by Princess Eliana Park, MILO  Outcome: Improving     Problem: Adult Inpatient Plan of Care  Goal: Absence of Hospital-Acquired Illness or Injury  6/17/2019 0528 by Princess Eliana Park RN  Outcome: Improving     Problem: Adult Inpatient Plan of Care  Goal: Optimal Comfort and Wellbeing  6/17/2019 0528 by Princess Eliana Park RN  Outcome: Improving     Problem: Adult Inpatient Plan of Care  Goal: Readiness for Transition of Care  6/17/2019 0528 by Princess Eliana Park RN  Outcome: Improving     Problem: Adjustment to Transplant (Stem Cell/Bone Marrow Transplant)  Goal: Optimal Coping with Transplant  6/17/2019 0528 by Princess Eliana Park RN  Outcome: Improving     Problem: Fatigue (Stem Cell/Bone Marrow Transplant)  Goal: Energy Level Supports Daily Activity  6/17/2019 0528 by Princess Eliana Park RN  Outcome: Improving     Problem: Hematologic Alteration (Stem Cell/Bone Marrow Transplant)  Goal: Blood Counts Within Acceptable Range  Outcome: Improving     Problem: Infection Risk (Stem Cell/Bone Marrow Transplant)  Goal: Absence of Infection Signs/Symptoms  Outcome: Improving     Problem: Nausea and Vomiting (Stem Cell/Bone Marrow Transplant)  Goal: Nausea and Vomiting Symptom Relief  Outcome: Improving     Problem: Nutrition Intake Altered (Stem Cell/Bone Marrow Transplant)  Goal: Optimal Nutrition Intake  Outcome: Improving

## 2019-06-17 NOTE — PROGRESS NOTES
BMT CLINICAL SOCIAL WORK NOTE:    Focus: Supportive Counseling    Data: Pt is a 63 year old male s/p allo BMT, currently day +17. Goal for discharge 6/20/19    Interventions: Clinical  (CSW) met with Pt to assess coping, provide supportive counseling and assist with resources as needed. Pt shared that overall he is feeling good, and happy to see his counts coming up.  Pt processed that he is still dealing with a few minor things, but feels this is ok. 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 Discussed that the RN KODAK Hernandez would stop in to help get her teaching done as well as the RN scheduling the other training's for him, but this would certainly be accomplished before Thur. The pt expressed some excitement about leaving, but also some anticipation on how things will go once he leaves. Discussed [facility] referral and this will be sent for a start date of 6/19. CSW provided empathic listening, validation of concerns, and encouragement. CSW encouraged Pt to contact CSW for support, questions and/or resources.     Assessment: Pt presented as friendly and open.  Pt appears to be coping well at this time. Pt continues to be supported by his wife Bessy.     Plan: CSW will continue to provide supportive counseling and assistance with resources as needed. CSW will continue to collaborate with multidisciplinary team regarding Pt's plan of care.     EDI John, [facility]  Pager: [phone]  Phone: [phone]

## 2019-06-17 NOTE — PROGRESS NOTES
BMT Daily Bleeding Assessment   [facility] Study Daily Hemostatic Assessment Form   June 17, 2019      In the last 24 hours:      Oral and Nasal:  B5.  New petechiae of oral mucosa? No  B6.  Oropharyngeal bleeding? No  B7.  Epistaxis during assessment period? No               Skin, Soft Tissue, Musculoskeletal:  B8.  New petechiae present on skin? No  B9.  New purpura present? No  B10.  One or more spontaneous hematomas >1 inch in soft tissue or muscle? No  B11.  Spontaneous hematoma in deeper tissues? No  B12.  Joint bleeding? No    Gastrointestinal/Genitourinary/Gynecologic:  B13.  Was there a stool specimen? No  B14.  Was there emesis? No  B15.  Does patient have a nasogastric drainage tube? No  B16.  Visible blood in urine? No  B17.  Is the patient female? No            Pulmonary:   B18.  Hemoptysis? No  B20.  Blood tinged sputum? No    Opthalmic:  B. Scleral bleed? No    Invasive Sites:  B27.  Active oozing at invasive site for cumulative total             of >1 hour during assessment period? No    C2.  Comments on Assessment:  (Record approximate date/time onset of bleeding,   if applicable; Description (location/size). Include reason if any component of the assessment was not completed): N/A    Val Lindsey PA-C  [phone]

## 2019-06-17 NOTE — PLAN OF CARE
Discharge Planner PT  PT 5C  Patient plan for discharge: Connie Garzon with spouse  Current status: Pt reported orthostatic problems over the weekend and improved today. Pt completed seated LE exercises. Pt completed NuStep exercise x 22 min at low workload with high intensity interval training format at higher speed. -108 and SpO2 99% during exercise. At end of exercise, /69, , and SpO2 99% with minimal lightheadedness. Symptoms did not increase standing or walking back to room.  Barriers to return to prior living situation: Medical condition  Recommendations for discharge: Connie Cliffordge with spouse and home exercise program.  Rationale for recommendations: Pt is functionally independent. HEP needed for pt to progress and maintain strength and functional endurance.       Entered by: Remy Pimentel 06/17/2019 5:20 PM

## 2019-06-17 NOTE — PROGRESS NOTES
"BMT Daily Progress Note   June 17, 2019    Mr. Ace 63 y.o hx of AML. Admitted for VELMA Haplo. Currently day +17.    Overnight events: Randy is doing well. He is less dizzy today but still slighty so - thinks a little bit more fluid would be helpful. He is eating and drinking okay- not much of an appetite. He has no new pain or complaints or expresses.      Review of Systems: 10 point ROS negative except as noted above.    Physical Exam:   Blood pressure 99/66, pulse 81, temperature 97.7  F (36.5  C), temperature source Oral, resp. rate 16, height 1.735 m (5' 8.31\"), weight 88.7 kg (195 lb 8 oz), SpO2 99 %.  General: NAD, interactive   Eyes: JEROMY, sclera anicteric   Nose/Mouth/Throat: OP clear, buccal mucosa moist, no ulcerations. No blood noted in the nares.    Lungs: CTA bilaterally  Cardiovascular: RRR, no M/R/G   Abdominal/Rectal: +BS, soft, NT, ND  Lymphatics: no edema  Skin: no rashes or petechaie  Neuro: A&O.    Additional Findings: [facility] c/d/i    Labs:  Lab Results   Component Value Date    WBC 9.6 06/17/2019    ANEU 7.4 06/17/2019    HGB 10.0 (L) 06/17/2019    HCT 29.0 (L) 06/17/2019    PLT 30 (LL) 06/17/2019     06/17/2019    POTASSIUM 3.9 06/17/2019    CHLORIDE 109 06/17/2019    CO2 24 06/17/2019    GLC 94 06/17/2019    BUN 6 (L) 06/17/2019    CR 0.64 (L) 06/17/2019    MAG 1.2 (L) 06/17/2019    INR 1.09 06/17/2019     Imaging: Reviewed  Microbiology:  Reviewed    Assessment and Plan:   El Ace is a 63 year old male with hx of AML (IDH2, RUNX1 pos), currently day +17 of flu/cy/TBI and related haplo BMT.     5/25 (day -6): flu/cy  5/26 (day -5): flu  5/27 (day -4): flu   5/28 (day -3): flu  5/29 (day -2): TBI/flu  5/30 (day -1): TBI  5/31 (day 0): Transplant. No ABO mismatch - both A+.      1.  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   - GCSF started d+5 and cont to until ANC>2500 x 2 consecutive days. WBC up to 1.5 with an ANC of 1.1 today!   - Re-stage per protocol.     2.  HEME: Keep " Hgb>8g/dL and plts>10K.   - on MiPlate study   - Needs daily bleeding assessments. No bleeding to date.                             3.  ID: Afebrile  -6/4-6/6  BC negative, Culture negative neutropenic fever- s/p empiric cefepime 6/4-6/16.   -Prophylaxis with HD ACV (CMV pos, HSV pos, EBV pos),  Vfend.  Bactrim or appropriate PCP therapy to start d+28.   - Resume bacterial prophy: Azithro until day + 21.     - recent history of CDI, recommend oral vancomycin prophylaxis 125mg BID                     4.  GI: No NVD   -  Prn imodium- controlling loose stools well.   - Stop qAM zofran- change to prn. Also change carafate to prn.   -  GI prophy - protonox BID (GERD symptoms)   - Ativan and compazine, zofran available PRN break-through symptoms.   - Ursodiol for VOD prophy.     5.  GVH: S/P post-txp cytoxan, tac/MMF were started on day +5. No s/sx GVH to date.  - 6/14 Tacro level 11.7, changed to PO 6/15 2.5 mg BID.   - repeat level pending.   -MMF 1000mg TID     6.  FEN/Renal: Monitor creat and lytes.   -Cr stable.   - Replete lytes PRN per SS.     7.  CV: CI induced HTN:    - Newly orthostatic over the weekend. Give additional 500cc bolus today  - stop norvasc.     Dispo: Plan for hospital discharge Thursday. [place] nurse coordinator will set up teaching times with Bessy (wife and caregiver) today.      Val Lindsey PA-C  Pager: [phone]    Patient has been seen and evaluated by me. I have reviewed today's vital signs, medications, labs and imaging results independently. I have discussed the plan with the team and agree with the findings and plan in this note.      Counts rising; No fcs  NO bleeding  No resp or notable GI sx    Exam with no rash. clear lungs.    Abd soft s tendereness.  No big Hep; no rebound  No edema    Plan cont same meds and full diet Getting safer for all meds PO.

## 2019-06-17 NOTE — PLAN OF CARE
VS baseline, no pain or nausea and vomiting were reported,  ml IV flush was administrated, patient stated no dizziness, today continue to monitor

## 2019-06-18 ENCOUNTER — APPOINTMENT (OUTPATIENT)
Dept: PHYSICAL THERAPY | Facility: CLINIC | Age: 64
DRG: 014 | End: 2019-06-18
Attending: INTERNAL MEDICINE
Payer: COMMERCIAL

## 2019-06-18 LAB
ANION GAP SERPL CALCULATED.3IONS-SCNC: 5 MMOL/L (ref 3–14)
ANISOCYTOSIS BLD QL SMEAR: SLIGHT
BASOPHILS # BLD AUTO: 0 10E9/L (ref 0–0.2)
BASOPHILS NFR BLD AUTO: 0 %
BUN SERPL-MCNC: 7 MG/DL (ref 7–30)
CALCIUM SERPL-MCNC: 8.7 MG/DL (ref 8.5–10.1)
CHLORIDE SERPL-SCNC: 110 MMOL/L (ref 94–109)
CO2 SERPL-SCNC: 27 MMOL/L (ref 20–32)
CREAT SERPL-MCNC: 0.65 MG/DL (ref 0.66–1.25)
DIFFERENTIAL METHOD BLD: ABNORMAL
EOSINOPHIL # BLD AUTO: 0 10E9/L (ref 0–0.7)
EOSINOPHIL NFR BLD AUTO: 0 %
ERYTHROCYTE [DISTWIDTH] IN BLOOD BY AUTOMATED COUNT: 14.1 % (ref 10–15)
GFR SERPL CREATININE-BSD FRML MDRD: >90 ML/MIN/{1.73_M2}
GLUCOSE SERPL-MCNC: 103 MG/DL (ref 70–99)
HCT VFR BLD AUTO: 29.5 % (ref 40–53)
HGB BLD-MCNC: 10 G/DL (ref 13.3–17.7)
LYMPHOCYTES # BLD AUTO: 0.1 10E9/L (ref 0.8–5.3)
LYMPHOCYTES NFR BLD AUTO: 1.1 %
Lab: NORMAL
Lab: NORMAL
MAGNESIUM SERPL-MCNC: 1.5 MG/DL (ref 1.6–2.3)
MAGNESIUM SERPL-MCNC: 2.8 MG/DL (ref 1.6–2.3)
MCH RBC QN AUTO: 31.3 PG (ref 26.5–33)
MCHC RBC AUTO-ENTMCNC: 33.9 G/DL (ref 31.5–36.5)
MCV RBC AUTO: 93 FL (ref 78–100)
MONOCYTES # BLD AUTO: 1.6 10E9/L (ref 0–1.3)
MONOCYTES NFR BLD AUTO: 19.1 %
NEUTROPHILS # BLD AUTO: 6.7 10E9/L (ref 1.6–8.3)
NEUTROPHILS NFR BLD AUTO: 78.7 %
OVALOCYTES BLD QL SMEAR: SLIGHT
PLATELET # BLD AUTO: 44 10E9/L (ref 150–450)
PLATELET # BLD EST: ABNORMAL 10*3/UL
POIKILOCYTOSIS BLD QL SMEAR: SLIGHT
POTASSIUM SERPL-SCNC: 4.1 MMOL/L (ref 3.4–5.3)
PROMYELOCYTES # BLD MANUAL: 0.1 10E9/L
PROMYELOCYTES NFR BLD MANUAL: 1.1 %
RBC # BLD AUTO: 3.19 10E12/L (ref 4.4–5.9)
SODIUM SERPL-SCNC: 142 MMOL/L (ref 133–144)
SPECIMEN SOURCE: NORMAL
SPECIMEN SOURCE: NORMAL
WBC # BLD AUTO: 8.5 10E9/L (ref 4–11)
YEAST SPEC QL CULT: NO GROWTH
YEAST SPEC QL CULT: NO GROWTH

## 2019-06-18 PROCEDURE — 83735 ASSAY OF MAGNESIUM: CPT | Performed by: PHYSICIAN ASSISTANT

## 2019-06-18 PROCEDURE — 25000132 ZZH RX MED GY IP 250 OP 250 PS 637: Performed by: INTERNAL MEDICINE

## 2019-06-18 PROCEDURE — 97530 THERAPEUTIC ACTIVITIES: CPT | Mod: GP

## 2019-06-18 PROCEDURE — 25000131 ZZH RX MED GY IP 250 OP 636 PS 637: Performed by: NURSE PRACTITIONER

## 2019-06-18 PROCEDURE — 25000132 ZZH RX MED GY IP 250 OP 250 PS 637: Performed by: HOSPITALIST

## 2019-06-18 PROCEDURE — 80048 BASIC METABOLIC PNL TOTAL CA: CPT | Performed by: PHYSICIAN ASSISTANT

## 2019-06-18 PROCEDURE — 25000132 ZZH RX MED GY IP 250 OP 250 PS 637: Performed by: NURSE PRACTITIONER

## 2019-06-18 PROCEDURE — 25000131 ZZH RX MED GY IP 250 OP 636 PS 637: Performed by: PHYSICIAN ASSISTANT

## 2019-06-18 PROCEDURE — 85025 COMPLETE CBC W/AUTO DIFF WBC: CPT | Performed by: PHYSICIAN ASSISTANT

## 2019-06-18 PROCEDURE — 97110 THERAPEUTIC EXERCISES: CPT | Mod: GP

## 2019-06-18 PROCEDURE — 25000128 H RX IP 250 OP 636: Performed by: PHYSICIAN ASSISTANT

## 2019-06-18 PROCEDURE — 25000132 ZZH RX MED GY IP 250 OP 250 PS 637: Performed by: PHYSICIAN ASSISTANT

## 2019-06-18 PROCEDURE — 20600000 ZZH R&B BMT

## 2019-06-18 RX ORDER — MAGNESIUM OXIDE 400 MG/1
400 TABLET ORAL 2 TIMES DAILY
Status: DISCONTINUED | OUTPATIENT
Start: 2019-06-18 | End: 2019-06-27 | Stop reason: HOSPADM

## 2019-06-18 RX ORDER — ANALGESIC BALM 1.74; 4.06 G/29G; G/29G
OINTMENT TOPICAL EVERY 6 HOURS PRN
Status: DISCONTINUED | OUTPATIENT
Start: 2019-06-18 | End: 2019-06-18

## 2019-06-18 RX ORDER — ANALGESIC BALM 1.74; 4.06 G/29G; G/29G
OINTMENT TOPICAL EVERY 4 HOURS PRN
Status: DISCONTINUED | OUTPATIENT
Start: 2019-06-18 | End: 2019-06-27 | Stop reason: HOSPADM

## 2019-06-18 RX ORDER — NALOXONE HYDROCHLORIDE 0.4 MG/ML
.1-.4 INJECTION, SOLUTION INTRAMUSCULAR; INTRAVENOUS; SUBCUTANEOUS
Status: DISCONTINUED | OUTPATIENT
Start: 2019-06-18 | End: 2019-06-27 | Stop reason: HOSPADM

## 2019-06-18 RX ORDER — CYCLOBENZAPRINE HCL 5 MG
10 TABLET ORAL 3 TIMES DAILY PRN
Status: DISCONTINUED | OUTPATIENT
Start: 2019-06-18 | End: 2019-06-27 | Stop reason: HOSPADM

## 2019-06-18 RX ORDER — OXYCODONE HYDROCHLORIDE 5 MG/1
5 TABLET ORAL ONCE
Status: COMPLETED | OUTPATIENT
Start: 2019-06-18 | End: 2019-06-18

## 2019-06-18 RX ADMIN — MENTHOL AND METHYL SALICYLATE: 7.6; 29 OINTMENT TOPICAL at 21:22

## 2019-06-18 RX ADMIN — VORICONAZOLE 200 MG: 200 TABLET, FILM COATED ORAL at 19:45

## 2019-06-18 RX ADMIN — ACYCLOVIR 800 MG: 800 TABLET ORAL at 18:12

## 2019-06-18 RX ADMIN — PANTOPRAZOLE SODIUM 40 MG: 40 TABLET, DELAYED RELEASE ORAL at 08:04

## 2019-06-18 RX ADMIN — MAGNESIUM SULFATE HEPTAHYDRATE 4 G: 40 INJECTION, SOLUTION INTRAVENOUS at 06:43

## 2019-06-18 RX ADMIN — ACETAMINOPHEN 650 MG: 325 TABLET, FILM COATED ORAL at 12:42

## 2019-06-18 RX ADMIN — ACYCLOVIR 800 MG: 800 TABLET ORAL at 08:04

## 2019-06-18 RX ADMIN — MYCOPHENOLATE MOFETIL 1000 MG: 500 TABLET, FILM COATED ORAL at 06:43

## 2019-06-18 RX ADMIN — PANTOPRAZOLE SODIUM 40 MG: 40 TABLET, DELAYED RELEASE ORAL at 19:45

## 2019-06-18 RX ADMIN — MAGNESIUM OXIDE TAB 400 MG (241.3 MG ELEMENTAL MG) 400 MG: 400 (241.3 MG) TAB at 12:42

## 2019-06-18 RX ADMIN — BACLOFEN 10 MG: 10 TABLET ORAL at 14:31

## 2019-06-18 RX ADMIN — ACYCLOVIR 800 MG: 800 TABLET ORAL at 12:42

## 2019-06-18 RX ADMIN — URSODIOL 300 MG: 300 CAPSULE ORAL at 19:45

## 2019-06-18 RX ADMIN — CYCLOBENZAPRINE HYDROCHLORIDE 10 MG: 5 TABLET, FILM COATED ORAL at 19:45

## 2019-06-18 RX ADMIN — BACLOFEN 10 MG: 10 TABLET ORAL at 21:22

## 2019-06-18 RX ADMIN — URSODIOL 300 MG: 300 CAPSULE ORAL at 15:24

## 2019-06-18 RX ADMIN — AZITHROMYCIN 250 MG: 250 TABLET, FILM COATED ORAL at 08:04

## 2019-06-18 RX ADMIN — SODIUM CHLORIDE, PRESERVATIVE FREE 5 ML: 5 INJECTION INTRAVENOUS at 04:15

## 2019-06-18 RX ADMIN — VORICONAZOLE 200 MG: 200 TABLET, FILM COATED ORAL at 08:04

## 2019-06-18 RX ADMIN — OXYCODONE HYDROCHLORIDE 5 MG: 5 TABLET ORAL at 23:19

## 2019-06-18 RX ADMIN — TACROLIMUS 1.5 MG: 1 CAPSULE ORAL at 08:04

## 2019-06-18 RX ADMIN — LORAZEPAM 0.5 MG: 0.5 TABLET ORAL at 23:19

## 2019-06-18 RX ADMIN — ACYCLOVIR 800 MG: 800 TABLET ORAL at 15:24

## 2019-06-18 RX ADMIN — VANCOMYCIN HYDROCHLORIDE 125 MG: KIT at 08:04

## 2019-06-18 RX ADMIN — TACROLIMUS 1.5 MG: 1 CAPSULE ORAL at 19:45

## 2019-06-18 RX ADMIN — ACETAMINOPHEN 650 MG: 325 TABLET, FILM COATED ORAL at 18:12

## 2019-06-18 RX ADMIN — URSODIOL 300 MG: 300 CAPSULE ORAL at 08:04

## 2019-06-18 RX ADMIN — MYCOPHENOLATE MOFETIL 1000 MG: 500 TABLET, FILM COATED ORAL at 21:22

## 2019-06-18 RX ADMIN — ACYCLOVIR 800 MG: 800 TABLET ORAL at 21:22

## 2019-06-18 RX ADMIN — MAGNESIUM OXIDE TAB 400 MG (241.3 MG ELEMENTAL MG) 400 MG: 400 (241.3 MG) TAB at 19:45

## 2019-06-18 RX ADMIN — MYCOPHENOLATE MOFETIL 1000 MG: 500 TABLET, FILM COATED ORAL at 15:24

## 2019-06-18 RX ADMIN — ACETAMINOPHEN 650 MG: 325 TABLET, FILM COATED ORAL at 08:03

## 2019-06-18 RX ADMIN — ACETAMINOPHEN 650 MG: 325 TABLET, FILM COATED ORAL at 22:52

## 2019-06-18 RX ADMIN — TAMSULOSIN HYDROCHLORIDE 0.4 MG: 0.4 CAPSULE ORAL at 08:04

## 2019-06-18 ASSESSMENT — ACTIVITIES OF DAILY LIVING (ADL)
ADLS_ACUITY_SCORE: 14

## 2019-06-18 ASSESSMENT — MIFFLIN-ST. JEOR: SCORE: 1670.67

## 2019-06-18 NOTE — PROGRESS NOTES
BMT Daily Bleeding Assessment   [facility] Study Daily Hemostatic Assessment Form   June 18, 2019      In the last 24 hours:      Oral and Nasal:  B5.  New petechiae of oral mucosa? No  B6.  Oropharyngeal bleeding? No  B7.  Epistaxis during assessment period? No               Skin, Soft Tissue, Musculoskeletal:  B8.  New petechiae present on skin? No  B9.  New purpura present? No  B10.  One or more spontaneous hematomas >1 inch in soft tissue or muscle? No  B11.  Spontaneous hematoma in deeper tissues? No  B12.  Joint bleeding? No    Gastrointestinal/Genitourinary/Gynecologic:  B13.  Was there a stool specimen? No  B14.  Was there emesis? No  B15.  Does patient have a nasogastric drainage tube? No  B16.  Visible blood in urine? No  B17.  Is the patient female? No            Pulmonary:   B18.  Hemoptysis? No  B20.  Blood tinged sputum? No    Opthalmic:  B. Scleral bleed? No    Invasive Sites:  B27.  Active oozing at invasive site for cumulative total             of >1 hour during assessment period? No    C2.  Comments on Assessment:  (Record approximate date/time onset of bleeding,   if applicable; Description (location/size). Include reason if any component of the assessment was not completed): N/A    Val Lindsey PA-C  [phone]

## 2019-06-18 NOTE — PLAN OF CARE
VS normal range, fair oral intake, voided fair, today complained back neck pain, administrated PRN medication per ordered however not decrease pain, used hot pack to try decrease pain continue to monitor

## 2019-06-18 NOTE — PLAN OF CARE
Afebrile, all other vital signs stable thus far. No complaints of SOB, N/V or pain. Continues to have intermittent dizziness- but steady on feet. Up self- voiding adequately. Needing 4g of Magnesium replaced this AM. Plan for discharge Thursday. Appears to have slept throughout the night. No further complaints thus far. Will continue to monitor.     Problem: Adult Inpatient Plan of Care  Goal: Plan of Care Review  6/18/2019 0628 by Bruna Villarreal RN  Outcome: No Change     Problem: Adult Inpatient Plan of Care  Goal: Optimal Comfort and Wellbeing  6/18/2019 0628 by Bruna Villarreal RN  Outcome: No Change     Problem: Adult Inpatient Plan of Care  Goal: Readiness for Transition of Care  6/18/2019 0628 by Bruna Villarreal RN  Outcome: No Change     Problem: Fatigue (Stem Cell/Bone Marrow Transplant)  Goal: Energy Level Supports Daily Activity  6/18/2019 0628 by Bruna Villarreal RN  Outcome: No Change     Problem: Hematologic Alteration (Stem Cell/Bone Marrow Transplant)  Goal: Blood Counts Within Acceptable Range  6/18/2019 0628 by Bruna Villarreal, RN  Outcome: No Change     Problem: Infection Risk (Stem Cell/Bone Marrow Transplant)  Goal: Absence of Infection Signs/Symptoms  6/18/2019 0628 by Bruna Villarreal, RN  Outcome: No Change     Problem: Nutrition Intake Altered (Stem Cell/Bone Marrow Transplant)  Goal: Optimal Nutrition Intake  6/18/2019 0628 by Bruna Villarreal, RN  Outcome: No Change

## 2019-06-18 NOTE — PLAN OF CARE
Discharge Planner PT  PT 5C  Patient plan for discharge: Connie Garzon with spouse and home exercise program  Current status: Pt reported increased neck pain today. Pt demonstrates tightness and tenderness of left upper trapezius with significantly reduced cervical rotation, lateral flexion, and extension. Myofascial release and cervical ROM exercises provided with some improvement of cervical rotation and lateral flexion. Pt re-instructed in UE and LE home exercises, activity guidelines, and recumbent bike exercise format. Pt has written instructions and verbalized understanding of these instructions.   Barriers to return to prior living situation: Medical condition.  Recommendations for discharge: Connie Cliffordge with spouse and home exercise program.  Rationale for recommendations: Pt is functionally independent. HEP needed for pt to progress and maintain strength and functional endurance.       Entered by: Remy Pimentel 06/18/2019 3:41 PM

## 2019-06-18 NOTE — PROGRESS NOTES
"BMT Daily Progress Note   June 18, 2019    Mr. Ace 63 y.o hx of AML. Admitted for VELMA Haplo. Currently day +18.    Overnight events: Randy is doing well. He woke up at 6am- no pain, went back to sleep for an hour and no has a stiff neck. Notes this happens intermittently and he has exercises for it but ROM of decreased. Slight headache associated with it. No vision changes (though does note he has noticed like bright blue flashses of light- he thinks its aside effect of medicaiton- happens rarely as he is falling asleep and wanted to mention it but it isn't too bothersome). He continues to eat though appetite is poor. Hopeful for bmbx tomorrow to simply schedule for Thursday (discharge) and Friday first day in clinic.       Review of Systems: 10 point ROS negative except as noted above.    Physical Exam:   Blood pressure 122/73, pulse 94, temperature 97.8  F (36.6  C), temperature source Oral, resp. rate 16, height 1.735 m (5' 8.31\"), weight 90.1 kg (198 lb 11.2 oz), SpO2 98 %.  General: NAD, interactive   Eyes: JEROMY, sclera anicteric   Nose/Mouth/Throat: OP clear, buccal mucosa moist, no ulcerations. No blood noted in the nares.    Lungs: CTA bilaterally  Cardiovascular: RRR, no M/R/G   Abdominal/Rectal: +BS, soft, NT, ND  Lymphatics: no edema  Skin: no rashes or petechaie  Neuro: A&O.    Additional Findings: [facility] c/d/i    Labs:  Lab Results   Component Value Date    WBC 8.5 06/18/2019    ANEU 6.7 06/18/2019    HGB 10.0 (L) 06/18/2019    HCT 29.5 (L) 06/18/2019    PLT 44 (LL) 06/18/2019     06/18/2019    POTASSIUM 4.1 06/18/2019    CHLORIDE 110 (H) 06/18/2019    CO2 27 06/18/2019     (H) 06/18/2019    BUN 7 06/18/2019    CR 0.65 (L) 06/18/2019    MAG 1.5 (L) 06/18/2019    INR 1.09 06/17/2019     Imaging: Reviewed  Microbiology:  Reviewed    Assessment and Plan:   El Ace is a 63 year old male with hx of AML (IDH2, RUNX1 pos), currently day +18 of flu/cy/TBI and related haplo " BMT.     5/25 (day -6): flu/cy  5/26 (day -5): flu  5/27 (day -4): flu   5/28 (day -3): flu  5/29 (day -2): TBI/flu  5/30 (day -1): TBI  5/31 (day 0): Transplant. No ABO mismatch - both A+.      1.  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   - GCSF started d+5 and cont to until ANC>2500 x 2 consecutive days. Final day gcsf 6/16/19.   - Re-stage per protocol.  -Day +21 bmbx will be completed tomorrow (6/19- day+19) 10am on 5C.      2.  HEME: Keep Hgb>8g/dL and plts>10K.   - on MiPlate study   - Needs daily bleeding assessments. No bleeding to date.                              3.  ID: Afebrile  -6/4-6/6  BC negative, Culture negative neutropenic fever- s/p empiric cefepime 6/4-6/16.   -Prophylaxis with HD ACV (CMV pos, HSV pos, EBV pos),  Vfend.  Bactrim or appropriate PCP therapy to start d+28.   - Resume bacterial prophy: Azithro until day + 21.     - Hx of Cdiff had been on PO vanco prophy BID however stop today as off cefepime.                      4.  GI: No NVD   -  Prn imodium- controlling loose stools well.   - Stop qAM zofran- change to prn. Also change carafate to prn.   -  GI prophy - protonox BID (GERD symptoms)   - Ativan and compazine, zofran available PRN break-through symptoms.   - Ursodiol for VOD prophy.     5.  GVH: S/P post-txp cytoxan, tac/MMF were started on day +5. No s/sx GVH to date.  - 6/14 Tacro level 11.7, changed to PO 6/15 2.5 mg BID.   - 6/17 tac high 20.1- dose decreasedto 1.5mg qPM. Recheck level tomorrow.   -MMF 1000mg TID through day +35    6.  FEN/Renal: Monitor creat and lytes.   -Cr stable.   - Replete lytes PRN per SS.   - Start mag ox 400mg BID today for tac induced hypomag.     7.  CV: CI induced HTN:    - stop norvasc.   - did have recent orthostatis- improved with fluids     Dispo: Plan for hospital discharge Thursday. F/u clinic Friday      Val Lindsey PA-C  Pager: [phone]    Patient has been seen and evaluated by me. I have reviewed today's vital signs, medications, labs and  imaging results independently. I have discussed the plan with the team and agree with the findings and plan in this note.      Counts up but no new signs of infection No rash or bleeding; Eating ok  Neck sore today but seems muscular  No resp sx    Exam with stiff sore neck;  ENT neg  Lungs clear  Cor reg tachy  Abd not tender nor distended. Liver not big  No edema    Plan cont gvhd prophy meds and slowly reduce abx coverage. Needing minimal gi sx control in meds  No active infection.    Brian Powers

## 2019-06-19 ENCOUNTER — APPOINTMENT (OUTPATIENT)
Dept: CT IMAGING | Facility: CLINIC | Age: 64
DRG: 014 | End: 2019-06-19
Attending: PHYSICIAN ASSISTANT
Payer: COMMERCIAL

## 2019-06-19 ENCOUNTER — APPOINTMENT (OUTPATIENT)
Dept: MRI IMAGING | Facility: CLINIC | Age: 64
DRG: 014 | End: 2019-06-19
Attending: PHYSICIAN ASSISTANT
Payer: COMMERCIAL

## 2019-06-19 PROBLEM — Z94.84 HISTORY OF ALLOGENEIC STEM CELL TRANSPLANT (H): Status: ACTIVE | Noted: 2019-06-19

## 2019-06-19 LAB
ANION GAP SERPL CALCULATED.3IONS-SCNC: 6 MMOL/L (ref 3–14)
BASOPHILS # BLD AUTO: 0 10E9/L (ref 0–0.2)
BASOPHILS NFR BLD AUTO: 0 %
BUN SERPL-MCNC: 8 MG/DL (ref 7–30)
CALCIUM SERPL-MCNC: 8.6 MG/DL (ref 8.5–10.1)
CHLORIDE SERPL-SCNC: 104 MMOL/L (ref 94–109)
CO2 SERPL-SCNC: 27 MMOL/L (ref 20–32)
COPATH REPORT: NORMAL
CREAT SERPL-MCNC: 0.58 MG/DL (ref 0.66–1.25)
DIFFERENTIAL METHOD BLD: ABNORMAL
EOSINOPHIL # BLD AUTO: 0 10E9/L (ref 0–0.7)
EOSINOPHIL NFR BLD AUTO: 0 %
ERYTHROCYTE [DISTWIDTH] IN BLOOD BY AUTOMATED COUNT: 14 % (ref 10–15)
GFR SERPL CREATININE-BSD FRML MDRD: >90 ML/MIN/{1.73_M2}
GLUCOSE SERPL-MCNC: 132 MG/DL (ref 70–99)
HCT VFR BLD AUTO: 29.7 % (ref 40–53)
HGB BLD-MCNC: 10.2 G/DL (ref 13.3–17.7)
LYMPHOCYTES # BLD AUTO: 0 10E9/L (ref 0.8–5.3)
LYMPHOCYTES NFR BLD AUTO: 0 %
MAGNESIUM SERPL-MCNC: 1.6 MG/DL (ref 1.6–2.3)
MCH RBC QN AUTO: 31.4 PG (ref 26.5–33)
MCHC RBC AUTO-ENTMCNC: 34.3 G/DL (ref 31.5–36.5)
MCV RBC AUTO: 91 FL (ref 78–100)
MONOCYTES # BLD AUTO: 2.6 10E9/L (ref 0–1.3)
MONOCYTES NFR BLD AUTO: 27.4 %
MYELOCYTES # BLD: 0.1 10E9/L
MYELOCYTES NFR BLD MANUAL: 0.9 %
NEUTROPHILS # BLD AUTO: 6.9 10E9/L (ref 1.6–8.3)
NEUTROPHILS NFR BLD AUTO: 71.7 %
PLATELET # BLD AUTO: 40 10E9/L (ref 150–450)
PLATELET # BLD EST: ABNORMAL 10*3/UL
POTASSIUM SERPL-SCNC: 3.7 MMOL/L (ref 3.4–5.3)
RBC # BLD AUTO: 3.25 10E12/L (ref 4.4–5.9)
RBC MORPH BLD: NORMAL
SODIUM SERPL-SCNC: 138 MMOL/L (ref 133–144)
TACROLIMUS BLD-MCNC: 20 UG/L (ref 5–15)
TME LAST DOSE: ABNORMAL H
WBC # BLD AUTO: 9.6 10E9/L (ref 4–11)

## 2019-06-19 PROCEDURE — 25000128 H RX IP 250 OP 636: Performed by: PHYSICIAN ASSISTANT

## 2019-06-19 PROCEDURE — 88237 TISSUE CULTURE BONE MARROW: CPT | Performed by: PHYSICIAN ASSISTANT

## 2019-06-19 PROCEDURE — 81267 CHIMERISM ANAL NO CELL SELEC: CPT | Performed by: PHYSICIAN ASSISTANT

## 2019-06-19 PROCEDURE — 25000132 ZZH RX MED GY IP 250 OP 250 PS 637: Performed by: INTERNAL MEDICINE

## 2019-06-19 PROCEDURE — 00000161 ZZHCL STATISTIC H-SPHEME PROCESS B/S: Performed by: PHYSICIAN ASSISTANT

## 2019-06-19 PROCEDURE — A9585 GADOBUTROL INJECTION: HCPCS | Performed by: INTERNAL MEDICINE

## 2019-06-19 PROCEDURE — 83735 ASSAY OF MAGNESIUM: CPT | Performed by: PHYSICIAN ASSISTANT

## 2019-06-19 PROCEDURE — 88311 DECALCIFY TISSUE: CPT | Performed by: PHYSICIAN ASSISTANT

## 2019-06-19 PROCEDURE — 88342 IMHCHEM/IMCYTCHM 1ST ANTB: CPT | Performed by: PHYSICIAN ASSISTANT

## 2019-06-19 PROCEDURE — 20600000 ZZH R&B BMT

## 2019-06-19 PROCEDURE — 88185 FLOWCYTOMETRY/TC ADD-ON: CPT | Performed by: PHYSICIAN ASSISTANT

## 2019-06-19 PROCEDURE — 88280 CHROMOSOME KARYOTYPE STUDY: CPT | Performed by: PHYSICIAN ASSISTANT

## 2019-06-19 PROCEDURE — 88275 CYTOGENETICS 100-300: CPT | Performed by: PHYSICIAN ASSISTANT

## 2019-06-19 PROCEDURE — 88264 CHROMOSOME ANALYSIS 20-25: CPT | Performed by: PHYSICIAN ASSISTANT

## 2019-06-19 PROCEDURE — 07DR3ZX EXTRACTION OF ILIAC BONE MARROW, PERCUTANEOUS APPROACH, DIAGNOSTIC: ICD-10-PCS | Performed by: PHYSICIAN ASSISTANT

## 2019-06-19 PROCEDURE — 80197 ASSAY OF TACROLIMUS: CPT | Performed by: PHYSICIAN ASSISTANT

## 2019-06-19 PROCEDURE — 40000611 ZZHCL STATISTIC MORPHOLOGY W/INTERP HEMEPATH TC 85060: Performed by: PHYSICIAN ASSISTANT

## 2019-06-19 PROCEDURE — 25000131 ZZH RX MED GY IP 250 OP 636 PS 637: Performed by: PHYSICIAN ASSISTANT

## 2019-06-19 PROCEDURE — 25000132 ZZH RX MED GY IP 250 OP 250 PS 637: Performed by: NURSE PRACTITIONER

## 2019-06-19 PROCEDURE — 25500064 ZZH RX 255 OP 636: Performed by: INTERNAL MEDICINE

## 2019-06-19 PROCEDURE — 88161 CYTOPATH SMEAR OTHER SOURCE: CPT | Performed by: PHYSICIAN ASSISTANT

## 2019-06-19 PROCEDURE — 00000058 ZZHCL STATISTIC BONE MARROW ASP PERF TC 38220: Performed by: PHYSICIAN ASSISTANT

## 2019-06-19 PROCEDURE — 85025 COMPLETE CBC W/AUTO DIFF WBC: CPT | Performed by: PHYSICIAN ASSISTANT

## 2019-06-19 PROCEDURE — 40001005 ZZHCL STATISTIC FLOW >15 ABY TC 88189: Performed by: PHYSICIAN ASSISTANT

## 2019-06-19 PROCEDURE — 80048 BASIC METABOLIC PNL TOTAL CA: CPT | Performed by: PHYSICIAN ASSISTANT

## 2019-06-19 PROCEDURE — 25000132 ZZH RX MED GY IP 250 OP 250 PS 637: Performed by: PHYSICIAN ASSISTANT

## 2019-06-19 PROCEDURE — 40000424 ZZHCL STATISTIC BONE MARROW CORE PERF TC 38221: Performed by: PHYSICIAN ASSISTANT

## 2019-06-19 PROCEDURE — 81120 IDH1 COMMON VARIANTS: CPT | Performed by: PHYSICIAN ASSISTANT

## 2019-06-19 PROCEDURE — 88341 IMHCHEM/IMCYTCHM EA ADD ANTB: CPT | Performed by: PHYSICIAN ASSISTANT

## 2019-06-19 PROCEDURE — 25000131 ZZH RX MED GY IP 250 OP 636 PS 637: Performed by: NURSE PRACTITIONER

## 2019-06-19 PROCEDURE — 88184 FLOWCYTOMETRY/ TC 1 MARKER: CPT | Performed by: PHYSICIAN ASSISTANT

## 2019-06-19 PROCEDURE — 70450 CT HEAD/BRAIN W/O DYE: CPT

## 2019-06-19 PROCEDURE — 40000951 ZZHCL STATISTIC BONE MARROW INTERP TC 85097: Performed by: PHYSICIAN ASSISTANT

## 2019-06-19 PROCEDURE — 88271 CYTOGENETICS DNA PROBE: CPT | Performed by: PHYSICIAN ASSISTANT

## 2019-06-19 PROCEDURE — 70553 MRI BRAIN STEM W/O & W/DYE: CPT

## 2019-06-19 PROCEDURE — 88305 TISSUE EXAM BY PATHOLOGIST: CPT | Performed by: PHYSICIAN ASSISTANT

## 2019-06-19 RX ORDER — MYCOPHENOLATE MOFETIL 500 MG/1
1000 TABLET, FILM COATED ORAL EVERY 8 HOURS
Qty: 96 TABLET | Refills: 0 | Status: SHIPPED | OUTPATIENT
Start: 2019-06-19 | End: 2019-06-27

## 2019-06-19 RX ORDER — TACROLIMUS 0.5 MG/1
CAPSULE ORAL
Qty: 60 CAPSULE | Refills: 0 | Status: SHIPPED | OUTPATIENT
Start: 2019-06-19 | End: 2019-06-20

## 2019-06-19 RX ORDER — TACROLIMUS 1 MG/1
1 CAPSULE ORAL EVERY 12 HOURS SCHEDULED
Status: DISCONTINUED | OUTPATIENT
Start: 2019-06-19 | End: 2019-06-24

## 2019-06-19 RX ORDER — SULFAMETHOXAZOLE/TRIMETHOPRIM 800-160 MG
1 TABLET ORAL
Qty: 16 TABLET | Refills: 0 | Status: SHIPPED | OUTPATIENT
Start: 2019-07-01 | End: 2019-07-18

## 2019-06-19 RX ORDER — HEPARIN SODIUM,PORCINE 10 UNIT/ML
5 VIAL (ML) INTRAVENOUS
Status: CANCELLED | OUTPATIENT
Start: 2019-06-21

## 2019-06-19 RX ORDER — ACYCLOVIR 800 MG/1
800 TABLET ORAL
Qty: 150 TABLET | Refills: 0 | Status: SHIPPED | OUTPATIENT
Start: 2019-06-19 | End: 2019-07-09

## 2019-06-19 RX ORDER — GADOBUTROL 604.72 MG/ML
10 INJECTION INTRAVENOUS ONCE
Status: COMPLETED | OUTPATIENT
Start: 2019-06-19 | End: 2019-06-19

## 2019-06-19 RX ORDER — HEPARIN SODIUM,PORCINE 10 UNIT/ML
5 VIAL (ML) INTRAVENOUS EVERY 24 HOURS
Qty: 60 ML | Refills: 0 | Status: SHIPPED | OUTPATIENT
Start: 2019-06-20 | End: 2019-07-09

## 2019-06-19 RX ORDER — TACROLIMUS 1 MG/1
CAPSULE ORAL
Qty: 60 CAPSULE | Refills: 0 | Status: SHIPPED | OUTPATIENT
Start: 2019-06-19 | End: 2019-06-20

## 2019-06-19 RX ORDER — HEPARIN SODIUM (PORCINE) LOCK FLUSH IV SOLN 100 UNIT/ML 100 UNIT/ML
5 SOLUTION INTRAVENOUS
Status: CANCELLED | OUTPATIENT
Start: 2019-06-21

## 2019-06-19 RX ORDER — OXYCODONE HYDROCHLORIDE 5 MG/1
5 TABLET ORAL EVERY 4 HOURS PRN
Status: DISCONTINUED | OUTPATIENT
Start: 2019-06-19 | End: 2019-06-27 | Stop reason: HOSPADM

## 2019-06-19 RX ORDER — TAMSULOSIN HYDROCHLORIDE 0.4 MG/1
0.4 CAPSULE ORAL DAILY
Qty: 30 CAPSULE | Refills: 0 | Status: SHIPPED | OUTPATIENT
Start: 2019-06-19 | End: 2019-07-18

## 2019-06-19 RX ORDER — MAGNESIUM OXIDE 400 MG/1
400 TABLET ORAL 2 TIMES DAILY
Qty: 120 TABLET | Refills: 0 | Status: SHIPPED | OUTPATIENT
Start: 2019-06-19 | End: 2019-06-21

## 2019-06-19 RX ORDER — URSODIOL 300 MG/1
300 CAPSULE ORAL 3 TIMES DAILY
Qty: 90 CAPSULE | Refills: 0 | Status: SHIPPED | OUTPATIENT
Start: 2019-06-19 | End: 2019-07-18

## 2019-06-19 RX ORDER — ONDANSETRON 8 MG/1
8 TABLET, ORALLY DISINTEGRATING ORAL EVERY 8 HOURS PRN
Qty: 30 TABLET | Refills: 0 | Status: SHIPPED | OUTPATIENT
Start: 2019-06-19 | End: 2019-08-05

## 2019-06-19 RX ORDER — VORICONAZOLE 200 MG/1
200 TABLET, FILM COATED ORAL EVERY 12 HOURS
Qty: 60 TABLET | Refills: 0 | Status: SHIPPED | OUTPATIENT
Start: 2019-06-19 | End: 2019-06-27

## 2019-06-19 RX ORDER — PANTOPRAZOLE SODIUM 40 MG/1
40 TABLET, DELAYED RELEASE ORAL
Qty: 60 TABLET | Refills: 0 | Status: SHIPPED | OUTPATIENT
Start: 2019-06-19 | End: 2019-07-08

## 2019-06-19 RX ADMIN — MYCOPHENOLATE MOFETIL 1000 MG: 500 TABLET, FILM COATED ORAL at 06:49

## 2019-06-19 RX ADMIN — TACROLIMUS 1 MG: 1 CAPSULE ORAL at 19:37

## 2019-06-19 RX ADMIN — PANTOPRAZOLE SODIUM 40 MG: 40 TABLET, DELAYED RELEASE ORAL at 19:37

## 2019-06-19 RX ADMIN — VORICONAZOLE 200 MG: 200 TABLET, FILM COATED ORAL at 19:37

## 2019-06-19 RX ADMIN — PANTOPRAZOLE SODIUM 40 MG: 40 TABLET, DELAYED RELEASE ORAL at 07:59

## 2019-06-19 RX ADMIN — Medication 5 ML: at 14:17

## 2019-06-19 RX ADMIN — ACYCLOVIR 800 MG: 800 TABLET ORAL at 07:59

## 2019-06-19 RX ADMIN — MAGNESIUM OXIDE TAB 400 MG (241.3 MG ELEMENTAL MG) 400 MG: 400 (241.3 MG) TAB at 07:59

## 2019-06-19 RX ADMIN — ACYCLOVIR 800 MG: 800 TABLET ORAL at 14:17

## 2019-06-19 RX ADMIN — MYCOPHENOLATE MOFETIL 1000 MG: 500 TABLET, FILM COATED ORAL at 22:19

## 2019-06-19 RX ADMIN — ACYCLOVIR 800 MG: 800 TABLET ORAL at 18:40

## 2019-06-19 RX ADMIN — URSODIOL 300 MG: 300 CAPSULE ORAL at 19:37

## 2019-06-19 RX ADMIN — GADOBUTROL 9 ML: 604.72 INJECTION INTRAVENOUS at 13:04

## 2019-06-19 RX ADMIN — MYCOPHENOLATE MOFETIL 1000 MG: 500 TABLET, FILM COATED ORAL at 14:17

## 2019-06-19 RX ADMIN — ACETAMINOPHEN 650 MG: 325 TABLET, FILM COATED ORAL at 19:44

## 2019-06-19 RX ADMIN — Medication 5 ML: at 08:03

## 2019-06-19 RX ADMIN — ACYCLOVIR 800 MG: 800 TABLET ORAL at 22:19

## 2019-06-19 RX ADMIN — ACYCLOVIR 800 MG: 800 TABLET ORAL at 10:50

## 2019-06-19 RX ADMIN — URSODIOL 300 MG: 300 CAPSULE ORAL at 07:59

## 2019-06-19 RX ADMIN — TAMSULOSIN HYDROCHLORIDE 0.4 MG: 0.4 CAPSULE ORAL at 07:59

## 2019-06-19 RX ADMIN — AZITHROMYCIN 250 MG: 250 TABLET, FILM COATED ORAL at 07:59

## 2019-06-19 RX ADMIN — MIDAZOLAM 1 MG: 1 INJECTION INTRAMUSCULAR; INTRAVENOUS at 09:51

## 2019-06-19 RX ADMIN — OXYCODONE HYDROCHLORIDE 5 MG: 5 TABLET ORAL at 14:17

## 2019-06-19 RX ADMIN — SODIUM CHLORIDE, PRESERVATIVE FREE 5 ML: 5 INJECTION INTRAVENOUS at 04:29

## 2019-06-19 RX ADMIN — MAGNESIUM OXIDE TAB 400 MG (241.3 MG ELEMENTAL MG) 400 MG: 400 (241.3 MG) TAB at 19:37

## 2019-06-19 RX ADMIN — CYCLOBENZAPRINE HYDROCHLORIDE 10 MG: 5 TABLET, FILM COATED ORAL at 09:51

## 2019-06-19 RX ADMIN — URSODIOL 300 MG: 300 CAPSULE ORAL at 14:17

## 2019-06-19 RX ADMIN — CYCLOBENZAPRINE HYDROCHLORIDE 10 MG: 5 TABLET, FILM COATED ORAL at 18:40

## 2019-06-19 RX ADMIN — VORICONAZOLE 200 MG: 200 TABLET, FILM COATED ORAL at 07:59

## 2019-06-19 ASSESSMENT — ACTIVITIES OF DAILY LIVING (ADL)
ADLS_ACUITY_SCORE: 14

## 2019-06-19 ASSESSMENT — PAIN DESCRIPTION - DESCRIPTORS
DESCRIPTORS: ACHING;SORE
DESCRIPTORS: ACHING;SORE
DESCRIPTORS: SORE;ACHING

## 2019-06-19 ASSESSMENT — MIFFLIN-ST. JEOR: SCORE: 1663.41

## 2019-06-19 NOTE — SUMMARY OF CARE
[facility] Summary of Care   & Survivorship Care Plan  Treatment Team:  Patient Care Team:  Bre Vizcaino MD as PCP - General (Speciality Unknown)  Rivera Cuadra MD as Referring Physician (Internal Medicine - Hematology)  Mike Coronado MD as BMT Physician (BMT - Adult)  Soto Brown MD as BMT Physician (BMT - Adult)  Christina Braxton, RN as Specialty Care Coordinator (Hematology & Oncology)  Vivienne Cevallos MSW as  (BMT - Adult)  Debbie Joyner LICSW as  (BMT - Adult)  Jerri Payton RN as Specialty Care Coordinator (BMT - Adult)  Discharge Diagnosis: S/P BMT -Haplo for AML    Transplant Essential Data:  Diagnosis AMLU Acute myelogeneous leukemia, Unknown  HCT Type Allogeneic    Prep Regimen Cytoxan  Fludarabine  TBI  Donor Source Related BM    GVHD Prophylaxis Mycophenolate    Clinical Trials MiPlate plt study      Oncology Treatment History: El Ace is a 63 year old diagnosed with AML in March, 2019.   He underwent 7+3 (cytarabine and daunorubicin) D1=3/15/19 and reinduction with decitabaine and venetoclax 3/29.  Induction course notable for febrile neutropenia.  Source of fevers is felt to be 2/2 odontogenic source (mandibular cellulitis) s/p OMFS procedure.  Patient was transferred to the MICU for further evaluation. Of note he also tested positive for C.diff on 3/27 and has completed oral vancomycin.   He had a confirmed pseudomonas bacteremia and has since completed treatment for this.       Today he feels well.  He is afebrile and feels good.  No recent illnesses or sick contacts.     Treatment/Chemotherapy Number of Cycles Date Range Outcomes & Complications   7+3   3/15/19     Decitabine/venetoclax   3/26, venetoclax added 3/29 and d/yanna 4/30 pseuodomonas bacteremia, septic shock (odontic source)  C.diff colitis     Morphologic/immunophenotypic remission       Hospital Discharge on 06/20/19  Discharge Location [place]   Activity at  Discharge As tolerated   Nutrition Regular diet as tolerated     Blood Transfusion Parameters Transfuse if Hemoglobin < or equal 8.0 g/dL  Red Blood Cell Order: 2 units, irradiated and leukoreduced   Transfuse if Platelet count < or equal 10k uL  Platelet order: 1 adult dose, irradiated and leukoreduced  Transfusion Pre-meds:  None     Blood Counts Recent Labs   Lab Test 06/19/19  0404   WBC 9.6   ANEU 6.9   ALYM 0.0*   TRACIE 2.6*   AEOS 0.0   HGB 10.2*   HCT 29.7*   PLT 40*      IV Medications Outpatient Pharmacy None     Electrolyte Replacement  Parameters in Clinic Intravenous Electrolyte Replacement:  Potassium Chloride  Give only if serum creatinine <2. Reference range 3.4-5.3 mmol/L        3.0 - 3.3 mmol/L Oral: 40 mEq by mouth x 1  PIV/CVC on TPN: 30 mEq over 1 hour x 1 doses & 20 mEq by mouth  CVC NOT on TPN: 40 mEq IV x 1   2.5-2.9 mmol/L  Oral: 40 mEq by mouth every 2 hrs x 2  PIV/CVC on TPN: 30 mEq IV & 40 mEq by mouth   CVC NOT on TPN: 60 mEq IV        <2.5 PIV/CVC on TPN: 30 mEq IV & 40 mEq by mouth   CVC NOT on TPN: 60 mEq IV      Magnesium Sulfate  Reference range 1.6-2.3 mg/dl       1.2-1.5 mg/dl  Oral:400 mg by mouth twice daily x 2 days   IV: 2 gm over 1 hour        0.9-1.1 IV Only: 4 gm IV over 2 hours   < 0.9  Discretion of provider and pharmacist        Home Infusion  IV Medications through home infusion: None   Line Care Care: [facility] - Flush each line with 5mL of 10 unit/mL heparin every 24 hours  Supplies Through: Discharge pharmacy as no line care coverage.    Physical Therapy & Support Services OP Cancer therapy rehab for PT - strength and endurance improvement from prolonged hospitalization     Laboratory Tests at Next Clinic Visit Hemogram (CBC) differential, platelet count  Magnesium  Comprehensive Metabolic Panel     Restrictions Immediately Post-Transplant   Always wear your mask in public.    No driving until cleared by your physician    Continue dietary precautions as discussed at  your pre-BMT teaching session   When to Call  (Refer to Discharge Teaching Materials)   Fever > 100.5 F    Bleeding that does not stop after a few minutes    Severe nausea, vomiting, or diarrhea     New fall or injury    Change in designated caregiver    Medication question    Any other urgent concern   Follow Up Visits Clinic Appointment Date/Time:   BMT Clinic (date, time, provider): 6/28/19 11:30am for labs, 12:20 with Shreya Rollins NP. Then BMT pharmacist review 1:30pm    Survivorship Visits:     You will have a 60-minute provider visit dedicated to cancer survivorship one week before your scheduled anniversary visit on day + 100, 1 year, and 2 years.    Your labs will be drawn after the appointment to allow your provider to order additional tests as needed.

## 2019-06-19 NOTE — PROCEDURES
"BMT ONC Adult Bone Marrow Biopsy Procedure Note  June 19, 2019  /80 (BP Location: Right arm)   Pulse 87   Temp 97.6  F (36.4  C) (Oral)   Resp 16   Ht 1.735 m (5' 8.31\")   Wt 89.4 kg (197 lb 1.6 oz)   SpO2 96%   BMI 29.70 kg/m       Learning needs assessment complete within 12 months? NO    DIAGNOSIS: AML- day +19 s/p Haplo BMT     PROCEDURE: Unilateral Bone Marrow Biopsy and Unilateral Aspirate    LOCATION: Inpatient [facility]    Patient s identification was positively verified by verbal identification and invasive procedure safety checklist was completed. Informed consent was obtained. Following the administration of 1mg IV  Midazolam as pre-medication, patient was placed in the prone position and prepped and draped in a sterile manner. Approximately 15 cc of 1% Lidocaine was used over the right posterior iliac spine. Following this a 3 mm incision was made. Trephine bone marrow core(s) was (were) obtained from the IC. Bone marrow aspirates were obtained from the IC. Aspirates were sent for morphology, immunophenotyping, cytogenetics and molecular diagnostics RFLP and IDH2. A total of approximately 20 ml of marrow was aspirated. Following this procedure a sterile dressing was applied to the bone marrow biopsy site(s). The patient was placed in the supine position to maintain pressure on the biopsy site. Post-procedure wound care instructions were given.     Complications: NO    Pre-procedural pain: 0 out of 10 on the numeric pain rating scale.     Procedural pain: 4 out of 10 on the numeric pain rating scale.     Post-procedural pain assessment: 0 out of 10 on the numeric pain rating scale.     Interventions: NO, deep breathing through aspirates.     Length of procedure:20 minutes or less      Procedure performed by: Val Lindsey PA-C  [phone]    "

## 2019-06-19 NOTE — PLAN OF CARE
"/86 (BP Location: Right arm)   Pulse 87   Temp 96.9  F (36.1  C) (Oral)   Resp 16   Ht 1.735 m (5' 8.31\")   Wt 89.4 kg (197 lb 1.6 oz)   SpO2 99%   BMI 29.70 kg/m    Pt afeb, vss, neck stiff/pain oxycodone and flexeril x 1 slight decrease in pain. MRI and CT done. BMBX done, dressing c/d/I. Pt offers no other complaints, continue to monitor per poc.  Problem: Adult Inpatient Plan of Care  Goal: Plan of Care Review  6/19/2019 1450 by Debbie Johnson RN  Outcome: No Change     Problem: Adult Inpatient Plan of Care  Goal: Optimal Comfort and Wellbeing  6/19/2019 1450 by Debbie Johnson RN  Outcome: Declining     Problem: Fatigue (Stem Cell/Bone Marrow Transplant)  Goal: Energy Level Supports Daily Activity  6/19/2019 1450 by Debbie Johnson RN  Outcome: Declining     "

## 2019-06-19 NOTE — DISCHARGE SUMMARY
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   6.) Reflux  7.) Chemotherapy induced diarrhea  8.) Fever due to #5 above    Discharge Medications:       Randy Ace   Home Medication Instructions THOM:[id number]    Printed on:06/27/19 1134   Medication Information                      acetaminophen (TYLENOL) 325 MG tablet  Take 2 tablets (650 mg) by mouth every 4 hours as needed for mild pain or fever (pre-med before blood products)             acyclovir (ZOVIRAX) 800 MG tablet  Take 1 tablet (800 mg) by mouth 5 times daily             CELLCEPT (BRAND) 500 MG tablet  Take 2 tablets (1,000 mg) by mouth every 8 hours for 8 days (through day+35)             heparin lock flush 10 UNIT/ML SOLN injection  5 mLs by Intracatheter route every 24 hours             magnesium oxide (MAG-OX) 400 MG tablet  Take 2 tablets (800 mg) by mouth 2 times daily             ondansetron (ZOFRAN-ODT) 8 MG ODT tab  Take 1 tablet (8 mg) by mouth every 8 hours as needed for nausea             pantoprazole (PROTONIX) 40 MG EC tablet  Take 1 tablet (40 mg) by mouth 2 times daily (before meals)             pramox-pe-glycerin-petrolatum (PREPARATION H) 1-0.25-14.4-15 % CREA cream  Place rectally 3 times daily as needed for hemorrhoids             predniSONE (DELTASONE) 20 MG tablet  Take 2 tablets (40 mg) by mouth daily Take 40mg by mouth daily x 5 days (6/26-6/30)  Take 20mg daily x 3 days (7/1-7/3)  Take 10mg daily x 2 days (7/4-7/5)             prochlorperazine  "(COMPAZINE) 5 MG tablet  Take 1-2 tablets (5-10 mg) by mouth every 6 hours as needed for nausea or vomiting             sulfamethoxazole-trimethoprim (BACTRIM DS/SEPTRA DS) 800-160 MG tablet  Take 1 tablet by mouth Every Mon, Tues two times daily Start taking after day+28 (7/1) and instructed to so by BMT clinic.             tacrolimus (GENERIC EQUIVALENT) 0.5 MG capsule  Take 1 capsule (0.5 mg) by mouth 2 times daily             tamsulosin (FLOMAX) 0.4 MG capsule  Take 1 capsule (0.4 mg) by mouth daily             ursodiol (ACTIGALL) 300 MG capsule  Take 1 capsule (300 mg) by mouth 3 times daily             voriconazole (VFEND) 50 MG tablet  Take 3 tablets (150 mg) by mouth every 12 hours               Brief History of Illness:    **Adopted from H&P  Oncology Treatment History: El Ace is a 63 year old diagnosed with AML in March, 2019.   He underwent 7+3 (cytarabine and daunorubicin) D1=3/15/19 and reinduction with decitabaine and venetoclax 3/29.  Induction course notable for febrile neutropenia.  Source of fevers is felt to be 2/2 odontogenic source (mandibular cellulitis) s/p OMFS procedure.  Patient was transferred to the MICU for further evaluation. Of note he also tested positive for C.diff on 3/27 and has completed oral vancomycin.   He had a confirmed pseudomonas bacteremia and has since completed treatment for this.       Today he feels well.  He is afebrile and feels good.  No recent illnesses or sick contacts.     Treatment/Chemotherapy Number of Cycles Date Range Outcomes & Complications   7+3   3/15/19     Decitabine/venetoclax   3/26, venetoclax added 3/29 and d/yanna 4/30 pseuodomonas bacteremia, septic shock (odontic source)  C.diff colitis     Morphologic/immunophenotypic remission     Physical Exam:    /80 (BP Location: Right arm)   Pulse 87   Temp 97.6  F (36.4  C) (Oral)   Resp 16   Ht 1.735 m (5' 8.31\")   Wt 89.4 kg (197 lb 1.6 oz)   SpO2 96%   BMI 29.70 kg/m    General " Appearance: well appearing, NAD.   HEENT: sclera anicteric, EOMI. Moist mucus membranes, no ulcerations or thrush.   CV: RRR, no murmur or rub.   RESP: CTA bilaterally; no rales or wheezes.  GI: +BS, soft, nontender, no masses or hepatosplenomegaly.  EXT: no edema bridgett LE's  SKIN:  No rash or lesions on exposed areas.  NEURO: A&O x3; CN II-XII grossly intact.  PSYCH: Appropriate affect  VASCULAR ACCESS:   Hospital Course:    El really did quite well through out his transplant admission. He did have some GERD symptoms with induction chemotherapy, this resovled with BID protonix and has not been an ongoing issue. No other issues with chemotherapy. He then developed neutropenic fevers for which ID was consulted as he had a multidrug resistant pseudomonas aeruginosa blood stream infection 5/2019 just prior to transplant admission. ID recommended Polymyxin B IV until cultures negative x 48 hrs however after 2 doses he had difficulty with balance - question of neurotoxicity so it was stopped per ID as he was otherwise hemodynamically stable so likelihood of recurrent PSA BSI was unlikely.  His blood cultures remained negative. Due to this he was tx with empiric cefepime 6/4-6/16. Transitioned to azithro until hospital discharge given counts have been stable off gcsf since 6/16. Only other issue that developed this hospital stay was new complaint of neck pain 6/18- treated as muscle spasm without relief, progressed to throbbing in occipital region. 6/19 Head CT negative for bleeding. 6/19 Brain MRI to assess for PRES was negative. With imaging and physical exam re-assuring, favor musculoskeletal etiology. Plan had been to discharge but given severity of edmonds immobility he was kept in the hospital. He then began having fevers 6/21- cultures were repeated negative. ID consulted 6/24 for persistent fevers, joint pain- 6/26 neck MRI with increased C1 uptake which was concerning for possible osteomyelitis (on cefepime and  vancomycin). Also developed increased left writ pain and swelling starting 6/24.19. ID felt like osteomyelitis was unlikely with no persistent bacteremia. Rhuematology was consulted for tap of wrist as concern that he had an inflammatory condition as absence of bacteremia made dessimenated bacterial infection seem very unlikely. Bone scan 6/26 confirmed no evidence of osteomylitis in C1, 6/25 left wrist aspiration postive for intracellular calcium pyrophosphate crystals and culture NGTD. Interestingly GMCSF and low magnesium all predispose to this condition. He received MP 40mg IV 6/25 with marked improvement in joint pain. He continues to have limited ROM of neck but it is improved - per Rheumatology Prednisone taper (slightly prolonged as want El to regain full ROM before tapering pred).   Please see routine post transplant care plan as detailed below:   El Ace is a 63 year old male with hx of AML (IDH2, RUNX1 pos), currently day +27 of flu/cy/TBI and related haplo BMT.     5/25 (day -6): flu/cy  5/26 (day -5): flu  5/27 (day -4): flu   5/28 (day -3): flu  5/29 (day -2): TBI/flu  5/30 (day -1): TBI  5/31 (day 0): Transplant. No ABO mismatch - both A+.      1.  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   - GCSF started d+5 and completed 6/16/19.   - Re-stage per protocol.  -Day +21 bmbx - completed 6/19 ( 2 days early to simplify first clinic visit). In remission and 100% donor.       2.  HEME: Keep Hgb>8g/dL and plts>10K.   - on MiPlate study- no current evidence of bleeding. >10days since his last platelet tranfusion.   - Unlikely to need transfusion in clinic.                              3.  ID: fever resolved(6/22- 6/25) Likely due to psuedogout.   Prophy:   -Prophylaxis with HD ACV - 6/22 CMV neg.   Fungal prophy: continue Vfend  level high 2.2 - Vfend decreased to 150mg PO BID.    Bactrim or appropriate PCP therapy to start d+28, 7/1     S/p neutropenic fever  -6/4-6/6  BC negative, Culture negative  neutropenic fever- s/p empiric cefepime 6/4-6/16.       Given recurrent fever, neck pain - work up for possible meningitis/C1 osteomyelitis (s/p Cefepime/vanco) and ID consult.   - 6/24 LP - negative to date.   - 6/24 serum AspGM neg  - 6/24 HHV6, CMV, EBV all negative in serum. New HSV1 pcr postive in serum but negative in CSF (will not repeat as no evidence of disseminated HSV infection).      4. MSK/RHeum: CPPD/psuedogout  6/26 MRI Cpsike with C1 uptake, new left wrist pain -> Rheum consult appreciate recs.   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   -6/26 left wrist: Intracellular calcium pyrophosphate crystals present, MANY PMNs and no orgamisms. CRP elevated at 120, SEd 88-  DDx: Crowned Dens Syndrome (CPPD) - psuedogout that involves c1, wrist, bilateral ankles  - Not candidate for Nsaids. Improved with MP 40mg IV x1 (6/25)   - per Rheumo rapid pred taper. Will slow down slightly as pain improved but still limited ROM in neck (Pred 40mg daily x5 days (6/26-6/30), Pred 20mg daily (7/1-7/3) then pred 10mg daily x2 days (7/4-7/5).      5.  GI: No NVD   -  Prn imodium- controlling loose stools well.   - zofran ODT prn.   -  GI prophy - protonox  Daily (gerd symptoms early post transplant)  -  compazine, zofran available PRN break-through symptoms.   - Ursodiol for VOD prophy.     5.  GVH: S/P post-txp cytoxan,   - tac/MMF were started on day +5. No s/sx GVH to date.  - Tac has repeatedly been high despite dose reductions (decrease vfend this week to try to help too- given he is an Haplo work hard to continue tac if at all possible).   - 6/24 Tac: 18.3. SKip eveing dose. Resume 0.5mg PO BID. Recheck level pending    - MRI 6/20 (throbbing head/neck pain) negative for PRES  -MMF 1000mg TID through day +35     6.  FEN/Renal: Monitor creat and lytes.   - Cr stable.   - Replete lytes PRN per SS.   - tac induced hypomag:  mag ox 400mg BID -titrate up as tolerated.      7.  CV:   - CI induced HTN:  stopped norvasc d/t orthostasis.   - Orthostatics improved.     8. Neuro:  6/19:New occipital throbbing and ongoing neck pain. Physical exam re-assuring; Head CT 6/19 neg, brain MRI with and without contrast neg for PRES 6/19. Likely psuedogout as above  - Continue daily steroids.      Discharge Instructions and Follow-Up:    Discharge diet: Regular diet as tolerated  Discharge activity: Activity as tolerated   Discharge follow-up: Follow up with BMT Clinic as follows: 6/28/19 11:30 for labs and 12:20pm for Shreya Rollins and 1:30 Pharm D med review  Discharge Disposition:    Discharged to [facility].     Val Lindsey PA-C  [phone]

## 2019-06-19 NOTE — SUMMARY OF CARE
Randy Ace   Home Medication Instructions THOM:[id number]    Printed on:06/27/19 1100   Medication Information                      acetaminophen (TYLENOL) 325 MG tablet  Take 2 tablets (650 mg) by mouth every 4 hours as needed for mild pain or fever (pre-med before blood products)             acyclovir (ZOVIRAX) 800 MG tablet  Take 1 tablet (800 mg) by mouth 5 times daily             CELLCEPT (BRAND) 500 MG tablet  Take 2 tablets (1,000 mg) by mouth every 8 hours for 8 days (through day+35)  Immunosuppressant- helps prevent GVHD           heparin lock flush 10 UNIT/ML SOLN injection  5 mLs by Intracatheter route every 24 hours             magnesium oxide (MAG-OX) 400 MG tablet  Take 2 tablets (800 mg) by mouth 2 times daily  Tacrolimus makes you waste magnesium - dose may increase.            ondansetron (ZOFRAN-ODT) 8 MG ODT tab  Take 1 tablet (8 mg) by mouth every 8 hours as needed for nausea             pantoprazole (PROTONIX) 40 MG EC tablet  Take 1 tablet (40 mg) by mouth 2 times daily (before meals)  Ulcer prevention - if no acid reflux you can stop- likely wait a few weeks after stopping prednisone before trying to stop.            pramox-pe-glycerin-petrolatum (PREPARATION H) 1-0.25-14.4-15 % CREA cream  Place rectally 3 times daily as needed for hemorrhoids             predniSONE (DELTASONE) 20 MG tablet  Take 2 tablets (40 mg) by mouth daily Take 40mg by mouth daily x 5 days (6/26-6/30)  Take 20mg daily x 3 days (7/1-7/3)  Take 10mg daily x 2 days (7/4-7/5)  Treatment of Gout - This is a tentative plan. If your neck pain or other joint pain please let clinic staff now.            prochlorperazine (COMPAZINE) 5 MG tablet  Take 1-2 tablets (5-10 mg) by mouth every 6 hours as needed for nausea or vomiting             sulfamethoxazole-trimethoprim (BACTRIM DS/SEPTRA DS) 800-160 MG tablet  Take 1 tablet by mouth Every Mon, Tues two times daily   Start taking after day+28 (7/1) and instructed to so by BMT  clinic.  Will take for 1 year post transplant to prevent PJP pneumonia.            tacrolimus (GENERIC EQUIVALENT) 0.5 MG capsule  Take 1 capsule (0.5 mg) by mouth 2 times daily  Immunosuppression- helps prevent GVHD. Will take through day +100, then taper per BMT           tamsulosin (FLOMAX) 0.4 MG capsule  Take 1 capsule (0.4 mg) by mouth daily             ursodiol (ACTIGALL) 300 MG capsule  Take 1 capsule (300 mg) by mouth 3 times daily  Will that through day +60- helps protect liver.            voriconazole (VFEND) 50 MG tablet  Take 3 tablets (150 mg) by mouth every 12 hours  Antifungal medication- will take through day +100 and off Tacrolimus

## 2019-06-19 NOTE — PROGRESS NOTES
BMT Daily Bleeding Assessment   [facility]   June 19, 2019      In the last 24 hours:      Oral and Nasal:  B5.  New petechiae of oral mucosa? No  B6.  Oropharyngeal bleeding? No  B7.  Epistaxis during assessment period? No               Skin, Soft Tissue, Musculoskeletal:  B8.  New petechiae present on skin? No  B9.  New purpura present? No  B10.  One or more spontaneous hematomas >1 inch in soft tissue or muscle? No  B11.  Spontaneous hematoma in deeper tissues? No  B12.  Joint bleeding? No    Gastrointestinal/Genitourinary/Gynecologic:  B13.  Was there a stool specimen? No  B14.  Was there emesis? No  B15.  Does patient have a nasogastric drainage tube? No  B16.  Visible blood in urine? No  B17.  Is the patient female? No            Pulmonary:   B18.  Hemoptysis? No  B20.  Blood tinged sputum? No    Opthalmic:  B. Scleral bleed? No    Invasive Sites:  B27.  Active oozing at invasive site for cumulative total             of >1 hour during assessment period? No - minimal bleeding at bmbx site.     C2.  Comments on Assessment:  (Record approximate date/time onset of bleeding,   if applicable; Description (location/size). Include reason if any component of the assessment was not completed): N/A    Val Lindsey PA-C  [phone]

## 2019-06-19 NOTE — PLAN OF CARE
Afebrile, slightly tachy x1, all other vital signs stable thus far. No complaints of dizziness, SOB, or N/V. Patient complaining of sharp neck/head pain and headache throughout shift. PRN Flexeril and Baclofen given x1, w/no relief. PRN Tylenol given x1. Moonlighter paged and one time dose of Oxycodone given x1 w/PRN Ativan x1, see MAR. Patient finally able to relax and sleep a little bit throughout the night. Still complaining of pain, but slightly more tolerable this AM. Up self in room, voiding adequately. No replacements needed this AM. Plan for bmbx today. Possible discharge tomorrow? No further complaints thus far. Will continue to monitor closely.     Problem: Adult Inpatient Plan of Care  Goal: Plan of Care Review  6/19/2019 0607 by Bruna Villarreal RN  Outcome: No Change     Problem: Adult Inpatient Plan of Care  Goal: Optimal Comfort and Wellbeing  6/19/2019 0607 by Bruna Villarreal RN  Outcome: No Change     Problem: Adult Inpatient Plan of Care  Goal: Readiness for Transition of Care  6/19/2019 0607 by Bruna Villarreal, RN  Outcome: No Change     Problem: Fatigue (Stem Cell/Bone Marrow Transplant)  Goal: Energy Level Supports Daily Activity  6/19/2019 0607 by Bruna Villarreal, RN  Outcome: No Change     Problem: Hematologic Alteration (Stem Cell/Bone Marrow Transplant)  Goal: Blood Counts Within Acceptable Range  6/19/2019 0607 by Bruna Villarreal, RN  Outcome: No Change     Problem: Nutrition Intake Altered (Stem Cell/Bone Marrow Transplant)  Goal: Optimal Nutrition Intake  6/19/2019 0607 by Bruna Villarreal, RN  Outcome: No Change

## 2019-06-19 NOTE — PROGRESS NOTES
"BMT Daily Progress Note   June 19, 2019    Mr. Ace 63 y.o hx of AML. Admitted for VELMA Haplo. Currently day +18.    Overnight events: Randy is feeling poorly- yesterday he woke up with stiff neck, this seemed musculoskeletal in nature and something that happens to him intermittently, however it has gotten worse. Has thrombing pain in the back of his head which he has never had with this before, nor has his neck pain ever lasted this long. He denies any visual changes, no nausea, no fevers, Pain to turn head much at all. He newly feels unsteady on his feet. This may be related to muscle relaxants/oxycodone x1 overnight. Able to answer all orientation questions      Review of Systems: 10 point ROS negative except as noted above.    Physical Exam:   Blood pressure 126/80, pulse 87, temperature 97.6  F (36.4  C), temperature source Oral, resp. rate 16, height 1.735 m (5' 8.31\"), weight 89.4 kg (197 lb 1.6 oz), SpO2 96 %.  General: NAD, interactive   Eyes: JEROMY, sclera anicteric   Nose/Mouth/Throat: OP clear, buccal mucosa moist, no ulcerations. No blood noted in the nares.    Lungs: CTA bilaterally  Cardiovascular: RRR, no M/R/G   Abdominal/Rectal: +BS, soft, NT, ND  Lymphatics: no edema  Skin: no rashes or petechaie  Neuro: A&O.  Equal  strength bilaterally, able to raise arms above head symtrically, smile, repeat sentence back to me. He did complete cellebellar finger/nose but oddly didn't rotate to touch my finger with pad of his finger despite showig him several times, also tremulous.   Additional Findings: [place] site c/d/i    Labs:  Lab Results   Component Value Date    WBC 9.6 06/19/2019    ANEU 6.9 06/19/2019    HGB 10.2 (L) 06/19/2019    HCT 29.7 (L) 06/19/2019    PLT 40 (LL) 06/19/2019     06/19/2019    POTASSIUM 3.7 06/19/2019    CHLORIDE 104 06/19/2019    CO2 27 06/19/2019     (H) 06/19/2019    BUN 8 06/19/2019    CR 0.58 (L) 06/19/2019    MAG 1.6 06/19/2019    INR 1.09 06/17/2019 "     Imaging: Reviewed  Microbiology:  Reviewed    Assessment and Plan:   El Ace is a 63 year old male with hx of AML (IDH2, RUNX1 pos), currently day +19 of flu/cy/TBI and related haplo BMT.     5/25 (day -6): flu/cy  5/26 (day -5): flu  5/27 (day -4): flu   5/28 (day -3): flu  5/29 (day -2): TBI/flu  5/30 (day -1): TBI  5/31 (day 0): Transplant. No ABO mismatch - both A+.      1.  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   - GCSF started d+5 and cont to until ANC>2500 x 2 consecutive days. Final day gcsf 6/16/19.   - Re-stage per protocol.  -Day +21 bmbx - completed today ( 2 days early to simplify first clinic visit). Results pending.       2.  HEME: Keep Hgb>8g/dL and plts>10K.   - on MiPlate study   - Needs daily bleeding assessments. No bleeding to date.                              3.  ID: Afebrile  -6/4-6/6  BC negative, Culture negative neutropenic fever- s/p empiric cefepime 6/4-6/16.   -Prophylaxis with HD ACV - 6/15 CMV neg.   Fungal prophy: continue Vfend 200mg BID (does have some flashes of light when falling asleep- currently not bothersome to pt).    Bactrim or appropriate PCP therapy to start d+28.   - Resume bacterial prophy: Azithro until day + 21.     - Hx of Cdiff had been on PO vanco prophy BID however stopped 6/18 as off broad spectrum abx.                     4.  GI: No NVD   -  Prn imodium- controlling loose stools well.   - zofran ODT prn.   -  GI prophy - protonox BID (GERD symptoms)   - Ativan and compazine, zofran available PRN break-through symptoms.   - Ursodiol for VOD prophy.     5.  GVH: S/P post-txp cytoxan,   tac/MMF were started on day +5. No s/sx GVH to date.  - 6/14 Tacro level 11.7, changed to PO 6/15 2.5 mg BID.   - 6/17 tac high 20.1- dose decreasedt o 1.5mg qPM.   - 6/19:New occipital throbbing - though related to neck pain yesterday but will obtain Brain MRI  _ MRI negative for PREs- resume TAC this evening.   -6/19 level: 20.1 - still high even though drawn early - likely  level ~18. Given taht skipped AM dose today will decrease to 1mg PO BID (discharge pharmacy updated). Will check level Friday in BMT clinic.   c.   -MMF 1000mg TID through day +35    6.  FEN/Renal: Monitor creat and lytes.   -Cr stable.   - Replete lytes PRN per SS.   - tac induced hypomag: mag ox 400mg BID     7.  CV: CI induced HTN:    - stop norvasc.   - did have recent orthostatis- improved with fluids     Dispo: Plan for hospital discharge Thursday. F/u clinic Friday      Val iLndsey PA-C  Pager: [phone]      _______________________________________________    BMT ATTENDING NOTE    I have seen and personally evaluated the patient.  I reviewed vitals, medications, laboratory results, and I viewed imaging studies.  After doing so, I formulated a plan as documented in this note with the care team and patient today.     El Ace is a 63 year old male with AML, admitted on 5/24/2019 for VELMA haploidentical BMT, and remains hospitalized pending satisfactory recovery.      Today, El notes worsening stiff neck.  He has had some of this in the past, seems muscular in nature, but this is the worst it has been.  He is also had some throbbing pain in the back of his head.  He has not had any changes in his vision, no vomiting.  No fevers.  Mental status is intact.    On exam, he is pleasant, interactive, sclerae anicteric, cranial nerves grossly intact, difficulty moving neck due to pain, no oral mucosal lesions, normal respiratory effort, no JVD, normal perfusion, abdomen non-distended, skin without rash, no edema, normal affect.      Overall, our plan is to evaluate worsening neck and posterior head pain with an MRI, which is reassuring today.  He was due for his bone marrow restaging, performed today.  His blood counts are recovering, and he is likely to be a candidate for hospital dismissal soon once his pain is better under control.  We will assure that he continues to eat well.  Adjust tacrolimus dose.   Remainder of supportive care as above.  Discussed this assessment and plan in detail with patient and team today.      Roseline Jama MD    This note was created with voice recognition software.  Please notify me of any transcriptional errors.

## 2019-06-20 ENCOUNTER — APPOINTMENT (OUTPATIENT)
Dept: PHYSICAL THERAPY | Facility: CLINIC | Age: 64
DRG: 014 | End: 2019-06-20
Attending: INTERNAL MEDICINE
Payer: COMMERCIAL

## 2019-06-20 LAB
ABO + RH BLD: NORMAL
ABO + RH BLD: NORMAL
ALBUMIN SERPL-MCNC: 3 G/DL (ref 3.4–5)
ALP SERPL-CCNC: 229 U/L (ref 40–150)
ALT SERPL W P-5'-P-CCNC: 27 U/L (ref 0–70)
ANION GAP SERPL CALCULATED.3IONS-SCNC: 7 MMOL/L (ref 3–14)
AST SERPL W P-5'-P-CCNC: 17 U/L (ref 0–45)
BASOPHILS # BLD AUTO: 0 10E9/L (ref 0–0.2)
BASOPHILS NFR BLD AUTO: 0 %
BILIRUB SERPL-MCNC: 0.3 MG/DL (ref 0.2–1.3)
BLD GP AB SCN SERPL QL: NORMAL
BLOOD BANK CMNT PATIENT-IMP: NORMAL
BUN SERPL-MCNC: 8 MG/DL (ref 7–30)
CALCIUM SERPL-MCNC: 8.4 MG/DL (ref 8.5–10.1)
CHLORIDE SERPL-SCNC: 103 MMOL/L (ref 94–109)
CO2 SERPL-SCNC: 27 MMOL/L (ref 20–32)
COPATH REPORT: NORMAL
COPATH REPORT: NORMAL
CREAT SERPL-MCNC: 0.58 MG/DL (ref 0.66–1.25)
DIFFERENTIAL METHOD BLD: ABNORMAL
EOSINOPHIL # BLD AUTO: 0 10E9/L (ref 0–0.7)
EOSINOPHIL NFR BLD AUTO: 0 %
ERYTHROCYTE [DISTWIDTH] IN BLOOD BY AUTOMATED COUNT: 14.2 % (ref 10–15)
GFR SERPL CREATININE-BSD FRML MDRD: >90 ML/MIN/{1.73_M2}
GLUCOSE SERPL-MCNC: 140 MG/DL (ref 70–99)
HCT VFR BLD AUTO: 30.1 % (ref 40–53)
HGB BLD-MCNC: 10.1 G/DL (ref 13.3–17.7)
LACTATE BLD-SCNC: 0.8 MMOL/L (ref 0.7–2)
LYMPHOCYTES # BLD AUTO: 0.2 10E9/L (ref 0.8–5.3)
LYMPHOCYTES NFR BLD AUTO: 2.7 %
MCH RBC QN AUTO: 31 PG (ref 26.5–33)
MCHC RBC AUTO-ENTMCNC: 33.6 G/DL (ref 31.5–36.5)
MCV RBC AUTO: 92 FL (ref 78–100)
MONOCYTES # BLD AUTO: 3.9 10E9/L (ref 0–1.3)
MONOCYTES NFR BLD AUTO: 49.5 %
MYELOCYTES # BLD: 0.1 10E9/L
MYELOCYTES NFR BLD MANUAL: 1.8 %
NEUTROPHILS # BLD AUTO: 3.4 10E9/L (ref 1.6–8.3)
NEUTROPHILS NFR BLD AUTO: 43.3 %
PLATELET # BLD AUTO: 45 10E9/L (ref 150–450)
PLATELET # BLD EST: ABNORMAL 10*3/UL
POTASSIUM SERPL-SCNC: 3.8 MMOL/L (ref 3.4–5.3)
PROMYELOCYTES # BLD MANUAL: 0.2 10E9/L
PROMYELOCYTES NFR BLD MANUAL: 2.7 %
PROT SERPL-MCNC: 6.6 G/DL (ref 6.8–8.8)
RBC # BLD AUTO: 3.26 10E12/L (ref 4.4–5.9)
RBC MORPH BLD: NORMAL
SODIUM SERPL-SCNC: 136 MMOL/L (ref 133–144)
SPECIMEN EXP DATE BLD: NORMAL
WBC # BLD AUTO: 7.9 10E9/L (ref 4–11)

## 2019-06-20 PROCEDURE — 25000131 ZZH RX MED GY IP 250 OP 636 PS 637: Performed by: PHYSICIAN ASSISTANT

## 2019-06-20 PROCEDURE — 86850 RBC ANTIBODY SCREEN: CPT | Performed by: PHYSICIAN ASSISTANT

## 2019-06-20 PROCEDURE — 20600000 ZZH R&B BMT

## 2019-06-20 PROCEDURE — 25000131 ZZH RX MED GY IP 250 OP 636 PS 637: Performed by: NURSE PRACTITIONER

## 2019-06-20 PROCEDURE — 25000132 ZZH RX MED GY IP 250 OP 250 PS 637: Performed by: INTERNAL MEDICINE

## 2019-06-20 PROCEDURE — 97110 THERAPEUTIC EXERCISES: CPT | Mod: GP

## 2019-06-20 PROCEDURE — 25000128 H RX IP 250 OP 636: Performed by: PHYSICIAN ASSISTANT

## 2019-06-20 PROCEDURE — 86901 BLOOD TYPING SEROLOGIC RH(D): CPT | Performed by: PHYSICIAN ASSISTANT

## 2019-06-20 PROCEDURE — 83605 ASSAY OF LACTIC ACID: CPT | Performed by: PHYSICIAN ASSISTANT

## 2019-06-20 PROCEDURE — 80053 COMPREHEN METABOLIC PANEL: CPT | Performed by: PHYSICIAN ASSISTANT

## 2019-06-20 PROCEDURE — 25000132 ZZH RX MED GY IP 250 OP 250 PS 637: Performed by: NURSE PRACTITIONER

## 2019-06-20 PROCEDURE — 25000132 ZZH RX MED GY IP 250 OP 250 PS 637: Performed by: PHYSICIAN ASSISTANT

## 2019-06-20 PROCEDURE — 86900 BLOOD TYPING SEROLOGIC ABO: CPT | Performed by: PHYSICIAN ASSISTANT

## 2019-06-20 PROCEDURE — 85025 COMPLETE CBC W/AUTO DIFF WBC: CPT | Performed by: PHYSICIAN ASSISTANT

## 2019-06-20 PROCEDURE — 25800025 ZZH RX 258: Performed by: PHYSICIAN ASSISTANT

## 2019-06-20 RX ORDER — TACROLIMUS 1 MG/1
1 CAPSULE ORAL EVERY 12 HOURS
Qty: 60 CAPSULE | Refills: 0 | COMMUNITY
Start: 2019-06-20 | End: 2019-06-27

## 2019-06-20 RX ORDER — CYCLOBENZAPRINE HCL 5 MG
5 TABLET ORAL 3 TIMES DAILY PRN
Qty: 10 TABLET | Refills: 0 | Status: SHIPPED | OUTPATIENT
Start: 2019-06-20 | End: 2019-06-27

## 2019-06-20 RX ORDER — LIDOCAINE 4 G/G
1 PATCH TOPICAL
Status: DISCONTINUED | OUTPATIENT
Start: 2019-06-20 | End: 2019-06-22

## 2019-06-20 RX ADMIN — TAMSULOSIN HYDROCHLORIDE 0.4 MG: 0.4 CAPSULE ORAL at 07:23

## 2019-06-20 RX ADMIN — ACYCLOVIR 800 MG: 800 TABLET ORAL at 21:45

## 2019-06-20 RX ADMIN — Medication 5 ML: at 11:03

## 2019-06-20 RX ADMIN — ACYCLOVIR 800 MG: 800 TABLET ORAL at 07:23

## 2019-06-20 RX ADMIN — URSODIOL 300 MG: 300 CAPSULE ORAL at 07:23

## 2019-06-20 RX ADMIN — MYCOPHENOLATE MOFETIL 1000 MG: 500 TABLET, FILM COATED ORAL at 14:07

## 2019-06-20 RX ADMIN — MYCOPHENOLATE MOFETIL 1000 MG: 500 TABLET, FILM COATED ORAL at 21:45

## 2019-06-20 RX ADMIN — DEXTROSE AND SODIUM CHLORIDE: 5; 450 INJECTION, SOLUTION INTRAVENOUS at 23:31

## 2019-06-20 RX ADMIN — CYCLOBENZAPRINE HYDROCHLORIDE 10 MG: 5 TABLET, FILM COATED ORAL at 07:53

## 2019-06-20 RX ADMIN — AZITHROMYCIN 250 MG: 250 TABLET, FILM COATED ORAL at 07:23

## 2019-06-20 RX ADMIN — MAGNESIUM OXIDE TAB 400 MG (241.3 MG ELEMENTAL MG) 400 MG: 400 (241.3 MG) TAB at 07:23

## 2019-06-20 RX ADMIN — ACETAMINOPHEN 650 MG: 325 TABLET, FILM COATED ORAL at 04:08

## 2019-06-20 RX ADMIN — LIDOCAINE 1 PATCH: 560 PATCH PERCUTANEOUS; TOPICAL; TRANSDERMAL at 14:08

## 2019-06-20 RX ADMIN — SODIUM CHLORIDE 1000 ML: 9 INJECTION, SOLUTION INTRAVENOUS at 09:55

## 2019-06-20 RX ADMIN — VORICONAZOLE 200 MG: 200 TABLET, FILM COATED ORAL at 19:55

## 2019-06-20 RX ADMIN — PANTOPRAZOLE SODIUM 40 MG: 40 TABLET, DELAYED RELEASE ORAL at 19:55

## 2019-06-20 RX ADMIN — PANTOPRAZOLE SODIUM 40 MG: 40 TABLET, DELAYED RELEASE ORAL at 07:23

## 2019-06-20 RX ADMIN — MYCOPHENOLATE MOFETIL 1000 MG: 500 TABLET, FILM COATED ORAL at 06:05

## 2019-06-20 RX ADMIN — ACYCLOVIR 800 MG: 800 TABLET ORAL at 18:16

## 2019-06-20 RX ADMIN — TACROLIMUS 1 MG: 1 CAPSULE ORAL at 19:55

## 2019-06-20 RX ADMIN — URSODIOL 300 MG: 300 CAPSULE ORAL at 19:56

## 2019-06-20 RX ADMIN — VORICONAZOLE 200 MG: 200 TABLET, FILM COATED ORAL at 07:23

## 2019-06-20 RX ADMIN — TACROLIMUS 1 MG: 1 CAPSULE ORAL at 07:23

## 2019-06-20 RX ADMIN — URSODIOL 300 MG: 300 CAPSULE ORAL at 14:07

## 2019-06-20 RX ADMIN — ACYCLOVIR 800 MG: 800 TABLET ORAL at 10:58

## 2019-06-20 RX ADMIN — DEXTROSE AND SODIUM CHLORIDE: 5; 450 INJECTION, SOLUTION INTRAVENOUS at 14:27

## 2019-06-20 RX ADMIN — OXYCODONE HYDROCHLORIDE 5 MG: 5 TABLET ORAL at 19:55

## 2019-06-20 RX ADMIN — ACETAMINOPHEN 650 MG: 325 TABLET, FILM COATED ORAL at 19:56

## 2019-06-20 RX ADMIN — SODIUM CHLORIDE, PRESERVATIVE FREE 5 ML: 5 INJECTION INTRAVENOUS at 04:01

## 2019-06-20 RX ADMIN — SODIUM CHLORIDE 1000 ML: 9 INJECTION, SOLUTION INTRAVENOUS at 07:53

## 2019-06-20 RX ADMIN — ACYCLOVIR 800 MG: 800 TABLET ORAL at 14:07

## 2019-06-20 RX ADMIN — MAGNESIUM OXIDE TAB 400 MG (241.3 MG ELEMENTAL MG) 400 MG: 400 (241.3 MG) TAB at 19:55

## 2019-06-20 ASSESSMENT — ACTIVITIES OF DAILY LIVING (ADL)
ADLS_ACUITY_SCORE: 14

## 2019-06-20 ASSESSMENT — PAIN DESCRIPTION - DESCRIPTORS
DESCRIPTORS: ACHING;SORE
DESCRIPTORS: SORE;ACHING

## 2019-06-20 ASSESSMENT — MIFFLIN-ST. JEOR: SCORE: 1677.47

## 2019-06-20 NOTE — PROGRESS NOTES
BMT Daily Bleeding Assessment   [facility]     In the last 24 hours:      Oral and Nasal:  B5.  New petechiae of oral mucosa? No  B6.  Oropharyngeal bleeding? No  B7.  Epistaxis during assessment period? No               Skin, Soft Tissue, Musculoskeletal:  B8.  New petechiae present on skin? No  B9.  New purpura present? No  B10.  One or more spontaneous hematomas >1 inch in soft tissue or muscle? No  B11.  Spontaneous hematoma in deeper tissues? No  B12.  Joint bleeding? No    Gastrointestinal/Genitourinary/Gynecologic:  B13.  Was there a stool specimen? No  B14.  Was there emesis? No  B15.  Does patient have a nasogastric drainage tube? No  B16.  Visible blood in urine? No  B17.  Is the patient female? No            Pulmonary:   B18.  Hemoptysis? No  B20.  Blood tinged sputum? No    Opthalmic:  B. Scleral bleed? No    Invasive Sites:  B27.  Active oozing at invasive site for cumulative total             of >1 hour during assessment period? No    C2.  Comments on Assessment:  (Record approximate date/time onset of bleeding,   if applicable; Description (location/size). Include reason if any component of the assessment was not completed): N/A    Johnnie Horan PA-C  [phone]

## 2019-06-20 NOTE — PROGRESS NOTES
"BMT Daily Progress Note       Mr. Ace 63 y.o hx of AML. Admitted for VELMA Haplo. Currently day +20.    Overnight events: Randy was lightheaded and \"felt like he was going to pass out\" this morning as he got up to sit in the chair. BP was 85/59 but improved to 112/85 when he laid down in bed. He symptoms resolved at that time. Hasn't been eating and drinking quite as well the last few days due to his neck pain. Went almost 12 hours without urine overnight but had a small output this morning. Mouth is dry. No fevers, chills, nausea, or diarrhea. Neck pain is maybe a bit improved today, the pain has moved from the back of his head down to his neck. Pain is reproducible with palpation. No vision changes, numbness/tingling in arms, chest pain, SOB, or fogginess.   Review of Systems: 10 point ROS negative except as noted above.  Physical Exam:   Blood pressure 105/80, pulse 120, temperature 96.7  F (35.9  C), temperature source Axillary, resp. rate 16, height 1.735 m (5' 8.31\"), weight 89.4 kg (197 lb 1.6 oz), SpO2 96 %.  General: NAD, interactive   Eyes: JEROMY, sclera anicteric   Nose/Mouth/Throat: OP clear, buccal mucosa dry but without ulcerations. No blood noted in the nares.    Lungs: CTA anteriorly (limited due to neck discomfort)  Cardiovascular: tachycardic but regular, no M/R/G   Abdominal/Rectal: +BS, soft, NT, ND  Lymphatics: no edema  Skin: no rashes or petechaie  Neuro: A&O.  CN II-XII grossly intact, equal  strength bilaterally, smile is symmetric.   Additional Findings: [facility] c/d/i    Labs:  Lab Results   Component Value Date    WBC 7.9 06/20/2019    ANEU 3.4 06/20/2019    HGB 10.1 (L) 06/20/2019    HCT 30.1 (L) 06/20/2019    PLT 45 (LL) 06/20/2019     06/20/2019    POTASSIUM 3.8 06/20/2019    CHLORIDE 103 06/20/2019    CO2 27 06/20/2019     (H) 06/20/2019    BUN 8 06/20/2019    CR 0.58 (L) 06/20/2019    MAG 1.6 06/19/2019    INR 1.09 06/17/2019     Imaging: Reviewed  Microbiology:  " Reviewed    Assessment and Plan:   El Ace is a 63 year old male with hx of AML (IDH2, RUNX1 pos), currently day +20 of flu/cy/TBI and related haplo BMT.     5/25 (day -6): flu/cy  5/26 (day -5): flu  5/27 (day -4): flu   5/28 (day -3): flu  5/29 (day -2): TBI/flu  5/30 (day -1): TBI  5/31 (day 0): Transplant. No ABO mismatch - both A+.      1.  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   - GCSF started d+5 and completed 6/16/19.   - Re-stage per protocol.  -Day +21 bmbx - completed 6/19 ( 2 days early to simplify first clinic visit). FLOW is neg but rest of results pending.       2.  HEME: Keep Hgb>8g/dL and plts>10K.   - on MiPlate study- no current evidence of bleeding.   - Needs daily bleeding assessments. No bleeding to date.                              3.  ID: Afebrile  -6/4-6/6  BC negative, Culture negative neutropenic fever- s/p empiric cefepime 6/4-6/16.   -Prophylaxis with HD ACV - 6/15 CMV neg.   Fungal prophy: continue Vfend 200mg BID (does have some flashes of light when falling asleep- currently not bothersome to pt).    Bactrim or appropriate PCP therapy to start d+28.   - Resume bacterial prophy: Azithro until day + 21.     - Hx of Cdiff had been on PO vanco prophy BID however stopped 6/18 as off broad spectrum abx.                     4.  GI: No NVD   -  Prn imodium- controlling loose stools well.   - zofran ODT prn.   -  GI prophy - protonox BID (GERD symptoms)   - Ativan and compazine, zofran available PRN break-through symptoms.   - Ursodiol for VOD prophy.     5.  GVH: S/P post-txp cytoxan,   - tac/MMF were started on day +5. No s/sx GVH to date.  - 6/14 Tacro level 11.7, changed to PO 6/15 2.5 mg BID.   - 6/17 tac high 20.1- dose decreasedt o 1.5mg bid  - 6/19 tac level still high at 20.0 despite skipping AM dose- decreased to 1mg BID. Plan to check a level Friday in clinic.   - MRI 6/20 (throbbing head/neck pain) negative for PREs   -MMF 1000mg TID through day +35    6.  FEN/Renal: Monitor  creat and lytes.   - Cr stable.   - Replete lytes PRN per SS.   - tac induced hypomag: mag ox 400mg BID     7.  CV:   - CI induced HTN:  stopped norvasc d/t orthostasis. Orthostatic again 6/20. Given 2L boluses as pt has been net negative for the last few days and physical exam consistent with intravascular depletion. Afebrile and lactic acid 0.8. Symptoms improved but BP still orthostatic. Start maintenance fluids at 125cc per hour.     8. Neuro:  6/19:New occipital throbbing and ongoing neck pain. Physical exam re-assuring; Head CT 6/19 neg, brain MRI with and without contrast neg for PRES 6/19. Likely musculoskeletal. Flexeril prn. Trial of lidocaine patches. PT to evaluate.     Dispo: Given patient's ongoing orthostatic hypotension, neck pain, and unsteadiness will keep him overnight and re-evaluate for discharge tomorrow.      Johnnie Horan PA-C  [phone]      _______________________________________________    BMT ATTENDING NOTE    I have seen and personally evaluated the patient.  I reviewed vitals, medications, laboratory results, and I viewed imaging studies.  After doing so, I formulated a plan as documented in this note with the care team and patient today.     El Ace is a 63 year old male with AML, admitted on 5/24/2019 for VELMA haploidentical BMT, and remains hospitalized pending satisfactory recovery.      El is having a bit of a tough day today.  Lightheadedness upon standing.  He was found to be orthostatic and received fluid boluses.  Neck continues to feel stiff,   Pain moving more down towards the back of his head, reproducible with palpation    On exam, he is pleasant, interactive, sclerae anicteric, cranial nerves grossly intact, difficulty moving neck due to pain, no oral mucosal lesions, normal respiratory effort, no JVD, normal perfusion, abdomen non-distended, skin without rash, no edema, normal affect.      Overall, our plan is to continue support him as he recovers from his  transplant.  Suspect posterior head and neck pain is musculoskeletal, with MRI being negative, continue to monitor.  He is dehydrated, and will continue intravenous fluids for orthostatic hypotension.  Await restaging bone marrow biopsy.  His blood counts are recovering, and he is likely to be a candidate for hospital dismissal soon once his pain is better under control.  We will assure that he continues to eat well.  Adjust tacrolimus dose.  Remainder of supportive care as above.  Discussed this assessment and plan in detail with patient and team today.      Roseline Jama MD    This note was created with voice recognition software.  Please notify me of any transcriptional errors.

## 2019-06-20 NOTE — CONSULTS
"6/20/19  I saw the patient and his wife in the room for PLC CVC ed per unit request.Patient and wife have done PICC/IV med cares before.Patient slept through most of the appointment.Wife correctly returned all CVC skills using [facility] supplies on PLC model,asked a few questions,able to answer and demonstrate \"teach back \" questions and verbalized understanding of content presented. Continue to reinforce information.Unit nurse and Care Coordinator to follow up on CVC supplies for patient for discharge.  Also see education flow sheet.   Written material given and reviewed at today's appointment:Caring For Your CVC,Heparin Flush,End Cap Change,Bandage Change, Hand washing,PLC card,[facility] web site sticker..       "

## 2019-06-20 NOTE — PLAN OF CARE
Afebrile. Slightly tachycardic less than 110. Still with headache (occipital) and stiff neck and pain. Pt reports that pain is better as compared to yesterday. Pain is also decreased by proper positioning and heating pad. Pt prefers to take tylenol instead of oxycodone. Tylenol given x2. Denies dizziness, nausea or vomiting. Has mild tremors on both hands. Able to tolerate [place] toast and key last night. Up independently and voiding freely. With dry and intact dressing over the BM biopsy site. Appointment for central line education at 1130. Plan for discharge today.  Problem: Fatigue (Stem Cell/Bone Marrow Transplant)  Goal: Energy Level Supports Daily Activity  Outcome: No Change     Problem: Nutrition Intake Altered (Stem Cell/Bone Marrow Transplant)  Goal: Optimal Nutrition Intake  Outcome: No Change     Problem: Adult Inpatient Plan of Care  Goal: Plan of Care Review  Outcome: Improving     Problem: Adult Inpatient Plan of Care  Goal: Patient-Specific Goal (Individualization)  Outcome: Improving     Problem: Adult Inpatient Plan of Care  Goal: Absence of Hospital-Acquired Illness or Injury  Outcome: Improving     Problem: Adult Inpatient Plan of Care  Goal: Optimal Comfort and Wellbeing  Outcome: Improving     Problem: Adult Inpatient Plan of Care  Goal: Readiness for Transition of Care  Outcome: Improving     Problem: Adjustment to Transplant (Stem Cell/Bone Marrow Transplant)  Goal: Optimal Coping with Transplant  Outcome: Improving     Problem: Hematologic Alteration (Stem Cell/Bone Marrow Transplant)  Goal: Blood Counts Within Acceptable Range  Outcome: Improving     Problem: Infection Risk (Stem Cell/Bone Marrow Transplant)  Goal: Absence of Infection Signs/Symptoms  Outcome: Improving

## 2019-06-20 NOTE — PLAN OF CARE
"/80   Pulse 120   Temp 96.7  F (35.9  C) (Axillary)   Resp 16   Ht 1.735 m (5' 8.31\")   Wt 90.8 kg (200 lb 3.2 oz)   SpO2 96%   BMI 30.17 kg/m    Pt afeb, vss, denies nausea/vomiting, Pt continues to have stiff neck with pain slightly better than yesterday but continues to report as painful and stiff. Pt hypotensive, symptomatic this am, dizzy weakness in legs, 1L bolus x 2 with slight improvement in bp(see vs flowsheet). Plan for pt to stay inpatient and reevaluate tomorrow for possible discharge. Pt eating and drinking po fluids at each meal, tolerating well. 1 soft loose stool. Plan to try lidocaine patch for neck discomfort and start mivf fluids. Continue to monitor per poc.  Problem: Adult Inpatient Plan of Care  Goal: Plan of Care Review  6/20/2019 1332 by Debbie Johnson RN  Outcome: No Change     Problem: Adult Inpatient Plan of Care  Goal: Patient-Specific Goal (Individualization)  6/20/2019 1332 by Debbie Johnson RN  Outcome: No Change     Problem: Adult Inpatient Plan of Care  Goal: Absence of Hospital-Acquired Illness or Injury  6/20/2019 1332 by Debbie Johnson RN  Outcome: No Change     Problem: Adult Inpatient Plan of Care  Goal: Optimal Comfort and Wellbeing  6/20/2019 1332 by Debbie Johnson RN  Outcome: No Change     Problem: Fatigue (Stem Cell/Bone Marrow Transplant)  Goal: Energy Level Supports Daily Activity  6/20/2019 1332 by Debbie Johnson RN  Outcome: No Change     Problem: Nutrition Intake Altered (Stem Cell/Bone Marrow Transplant)  Goal: Optimal Nutrition Intake  6/20/2019 1332 by Debbie Johnson RN  Outcome: Improving     "

## 2019-06-20 NOTE — PLAN OF CARE
AVSS.  Neck pain and stiffness.  Pain seems to slowly be improving with the lidocaine patches but the stiffness is still there.  No lightheadedness/dizziness with getting up.  Fluids running at 100ml/hr. Maybe discharge tomorrow.    Problem: Adult Inpatient Plan of Care  Goal: Plan of Care Review  6/20/2019 1837 by Marychuy Pink, RN  Outcome: No Change     Problem: Adjustment to Transplant (Stem Cell/Bone Marrow Transplant)  Goal: Optimal Coping with Transplant  6/20/2019 1837 by Marychuy Pink, RN  Outcome: No Change     Problem: Diarrhea (Stem Cell/Bone Marrow Transplant)  Goal: Diarrhea Symptom Control  Outcome: No Change     Problem: Fatigue (Stem Cell/Bone Marrow Transplant)  Goal: Energy Level Supports Daily Activity  6/20/2019 1837 by Marychuy Pink, RN  Outcome: No Change     Problem: Nutrition Intake Altered (Stem Cell/Bone Marrow Transplant)  Goal: Optimal Nutrition Intake  6/20/2019 1837 by Marychuy Pink, RN  Outcome: No Change

## 2019-06-21 ENCOUNTER — APPOINTMENT (OUTPATIENT)
Dept: GENERAL RADIOLOGY | Facility: CLINIC | Age: 64
DRG: 014 | End: 2019-06-21
Attending: INTERNAL MEDICINE
Payer: COMMERCIAL

## 2019-06-21 ENCOUNTER — CARE COORDINATION (OUTPATIENT)
Dept: TRANSPLANT | Facility: CLINIC | Age: 64
End: 2019-06-21

## 2019-06-21 LAB
ALBUMIN UR-MCNC: NEGATIVE MG/DL
ANION GAP SERPL CALCULATED.3IONS-SCNC: 8 MMOL/L (ref 3–14)
APPEARANCE UR: CLEAR
BASOPHILS # BLD AUTO: 0 10E9/L (ref 0–0.2)
BASOPHILS NFR BLD AUTO: 0 %
BILIRUB UR QL STRIP: NEGATIVE
BUN SERPL-MCNC: 6 MG/DL (ref 7–30)
CALCIUM SERPL-MCNC: 8.1 MG/DL (ref 8.5–10.1)
CHLORIDE SERPL-SCNC: 104 MMOL/L (ref 94–109)
CO2 SERPL-SCNC: 24 MMOL/L (ref 20–32)
COLOR UR AUTO: ABNORMAL
COPATH REPORT: NORMAL
COPATH REPORT: NORMAL
CREAT SERPL-MCNC: 0.52 MG/DL (ref 0.66–1.25)
DIFFERENTIAL METHOD BLD: ABNORMAL
EOSINOPHIL # BLD AUTO: 0 10E9/L (ref 0–0.7)
EOSINOPHIL NFR BLD AUTO: 0 %
ERYTHROCYTE [DISTWIDTH] IN BLOOD BY AUTOMATED COUNT: 14.5 % (ref 10–15)
GFR SERPL CREATININE-BSD FRML MDRD: >90 ML/MIN/{1.73_M2}
GLUCOSE SERPL-MCNC: 116 MG/DL (ref 70–99)
GLUCOSE UR STRIP-MCNC: NEGATIVE MG/DL
HCT VFR BLD AUTO: 28.1 % (ref 40–53)
HGB BLD-MCNC: 9.6 G/DL (ref 13.3–17.7)
HGB UR QL STRIP: NEGATIVE
HYALINE CASTS #/AREA URNS LPF: 28 /LPF (ref 0–2)
KETONES UR STRIP-MCNC: NEGATIVE MG/DL
LEUKOCYTE ESTERASE UR QL STRIP: NEGATIVE
LYMPHOCYTES # BLD AUTO: 0.3 10E9/L (ref 0.8–5.3)
LYMPHOCYTES NFR BLD AUTO: 3.5 %
MAGNESIUM SERPL-MCNC: 1.1 MG/DL (ref 1.6–2.3)
MAGNESIUM SERPL-MCNC: 2.3 MG/DL (ref 1.6–2.3)
MCH RBC QN AUTO: 31 PG (ref 26.5–33)
MCHC RBC AUTO-ENTMCNC: 34.2 G/DL (ref 31.5–36.5)
MCV RBC AUTO: 91 FL (ref 78–100)
METAMYELOCYTES # BLD: 0.1 10E9/L
METAMYELOCYTES NFR BLD MANUAL: 0.9 %
MONOCYTES # BLD AUTO: 3.8 10E9/L (ref 0–1.3)
MONOCYTES NFR BLD AUTO: 42.1 %
MUCOUS THREADS #/AREA URNS LPF: PRESENT /LPF
MYELOCYTES # BLD: 0.4 10E9/L
MYELOCYTES NFR BLD MANUAL: 4.4 %
NEUTROPHILS # BLD AUTO: 4.4 10E9/L (ref 1.6–8.3)
NEUTROPHILS NFR BLD AUTO: 48.2 %
NITRATE UR QL: NEGATIVE
NRBC # BLD AUTO: 0.1 10*3/UL
NRBC BLD AUTO-RTO: 1 /100
PH UR STRIP: 6.5 PH (ref 5–7)
PLATELET # BLD AUTO: 44 10E9/L (ref 150–450)
PLATELET # BLD EST: ABNORMAL 10*3/UL
POTASSIUM SERPL-SCNC: 4 MMOL/L (ref 3.4–5.3)
PROMYELOCYTES # BLD MANUAL: 0.1 10E9/L
PROMYELOCYTES NFR BLD MANUAL: 0.9 %
RBC # BLD AUTO: 3.1 10E12/L (ref 4.4–5.9)
RBC #/AREA URNS AUTO: <1 /HPF (ref 0–2)
RBC MORPH BLD: NORMAL
SODIUM SERPL-SCNC: 136 MMOL/L (ref 133–144)
SOURCE: ABNORMAL
SP GR UR STRIP: 1.01 (ref 1–1.03)
SQUAMOUS #/AREA URNS AUTO: <1 /HPF (ref 0–1)
TACROLIMUS BLD-MCNC: 12.5 UG/L (ref 5–15)
TME LAST DOSE: NORMAL H
UROBILINOGEN UR STRIP-MCNC: NORMAL MG/DL (ref 0–2)
WBC # BLD AUTO: 9.1 10E9/L (ref 4–11)
WBC #/AREA URNS AUTO: 6 /HPF (ref 0–5)

## 2019-06-21 PROCEDURE — 25000132 ZZH RX MED GY IP 250 OP 250 PS 637: Performed by: NURSE PRACTITIONER

## 2019-06-21 PROCEDURE — 87040 BLOOD CULTURE FOR BACTERIA: CPT | Performed by: PHYSICIAN ASSISTANT

## 2019-06-21 PROCEDURE — 83735 ASSAY OF MAGNESIUM: CPT | Performed by: INTERNAL MEDICINE

## 2019-06-21 PROCEDURE — 81001 URINALYSIS AUTO W/SCOPE: CPT | Performed by: PHYSICIAN ASSISTANT

## 2019-06-21 PROCEDURE — 25000132 ZZH RX MED GY IP 250 OP 250 PS 637: Performed by: PHYSICIAN ASSISTANT

## 2019-06-21 PROCEDURE — 80197 ASSAY OF TACROLIMUS: CPT | Performed by: INTERNAL MEDICINE

## 2019-06-21 PROCEDURE — 25000131 ZZH RX MED GY IP 250 OP 636 PS 637: Performed by: NURSE PRACTITIONER

## 2019-06-21 PROCEDURE — 85025 COMPLETE CBC W/AUTO DIFF WBC: CPT | Performed by: PHYSICIAN ASSISTANT

## 2019-06-21 PROCEDURE — 83735 ASSAY OF MAGNESIUM: CPT | Performed by: PHYSICIAN ASSISTANT

## 2019-06-21 PROCEDURE — 20600000 ZZH R&B BMT

## 2019-06-21 PROCEDURE — 80048 BASIC METABOLIC PNL TOTAL CA: CPT | Performed by: PHYSICIAN ASSISTANT

## 2019-06-21 PROCEDURE — 25000131 ZZH RX MED GY IP 250 OP 636 PS 637: Performed by: PHYSICIAN ASSISTANT

## 2019-06-21 PROCEDURE — 25000128 H RX IP 250 OP 636: Performed by: PHYSICIAN ASSISTANT

## 2019-06-21 PROCEDURE — 87086 URINE CULTURE/COLONY COUNT: CPT | Performed by: PHYSICIAN ASSISTANT

## 2019-06-21 PROCEDURE — 25800030 ZZH RX IP 258 OP 636: Performed by: INTERNAL MEDICINE

## 2019-06-21 PROCEDURE — 71045 X-RAY EXAM CHEST 1 VIEW: CPT

## 2019-06-21 RX ORDER — PREDNISONE 20 MG/1
20 TABLET ORAL ONCE
Status: COMPLETED | OUTPATIENT
Start: 2019-06-21 | End: 2019-06-21

## 2019-06-21 RX ORDER — SODIUM CHLORIDE 9 MG/ML
INJECTION, SOLUTION INTRAVENOUS CONTINUOUS
Status: DISCONTINUED | OUTPATIENT
Start: 2019-06-21 | End: 2019-06-27 | Stop reason: HOSPADM

## 2019-06-21 RX ORDER — MAGNESIUM OXIDE 400 MG/1
800 TABLET ORAL 2 TIMES DAILY
Qty: 120 TABLET | Refills: 0 | Status: SHIPPED | OUTPATIENT
Start: 2019-06-21 | End: 2019-06-28

## 2019-06-21 RX ORDER — LIDOCAINE 4 G/G
PATCH TOPICAL
Qty: 6 PATCH | Refills: 0 | Status: SHIPPED | OUTPATIENT
Start: 2019-06-21 | End: 2019-06-27

## 2019-06-21 RX ADMIN — SODIUM CHLORIDE: 9 INJECTION, SOLUTION INTRAVENOUS at 16:00

## 2019-06-21 RX ADMIN — TAMSULOSIN HYDROCHLORIDE 0.4 MG: 0.4 CAPSULE ORAL at 08:23

## 2019-06-21 RX ADMIN — TACROLIMUS 1 MG: 1 CAPSULE ORAL at 19:57

## 2019-06-21 RX ADMIN — ACYCLOVIR 800 MG: 800 TABLET ORAL at 14:39

## 2019-06-21 RX ADMIN — URSODIOL 300 MG: 300 CAPSULE ORAL at 14:39

## 2019-06-21 RX ADMIN — URSODIOL 300 MG: 300 CAPSULE ORAL at 08:23

## 2019-06-21 RX ADMIN — PANTOPRAZOLE SODIUM 40 MG: 40 TABLET, DELAYED RELEASE ORAL at 19:57

## 2019-06-21 RX ADMIN — PANTOPRAZOLE SODIUM 40 MG: 40 TABLET, DELAYED RELEASE ORAL at 08:23

## 2019-06-21 RX ADMIN — ACYCLOVIR 800 MG: 800 TABLET ORAL at 18:53

## 2019-06-21 RX ADMIN — SODIUM CHLORIDE 1000 ML: 9 INJECTION, SOLUTION INTRAVENOUS at 08:27

## 2019-06-21 RX ADMIN — ACYCLOVIR 800 MG: 800 TABLET ORAL at 12:04

## 2019-06-21 RX ADMIN — ACETAMINOPHEN 650 MG: 325 TABLET, FILM COATED ORAL at 05:59

## 2019-06-21 RX ADMIN — TACROLIMUS 1 MG: 1 CAPSULE ORAL at 08:23

## 2019-06-21 RX ADMIN — MYCOPHENOLATE MOFETIL 1000 MG: 500 TABLET, FILM COATED ORAL at 05:54

## 2019-06-21 RX ADMIN — MAGNESIUM OXIDE TAB 400 MG (241.3 MG ELEMENTAL MG) 400 MG: 400 (241.3 MG) TAB at 19:57

## 2019-06-21 RX ADMIN — MYCOPHENOLATE MOFETIL 1000 MG: 500 TABLET, FILM COATED ORAL at 22:33

## 2019-06-21 RX ADMIN — ACYCLOVIR 800 MG: 800 TABLET ORAL at 08:23

## 2019-06-21 RX ADMIN — ACETAMINOPHEN 650 MG: 325 TABLET, FILM COATED ORAL at 16:00

## 2019-06-21 RX ADMIN — AZITHROMYCIN 250 MG: 250 TABLET, FILM COATED ORAL at 08:23

## 2019-06-21 RX ADMIN — MAGNESIUM OXIDE TAB 400 MG (241.3 MG ELEMENTAL MG) 400 MG: 400 (241.3 MG) TAB at 08:23

## 2019-06-21 RX ADMIN — VORICONAZOLE 200 MG: 200 TABLET, FILM COATED ORAL at 08:23

## 2019-06-21 RX ADMIN — VORICONAZOLE 200 MG: 200 TABLET, FILM COATED ORAL at 19:57

## 2019-06-21 RX ADMIN — MYCOPHENOLATE MOFETIL 1000 MG: 500 TABLET, FILM COATED ORAL at 14:39

## 2019-06-21 RX ADMIN — MAGNESIUM SULFATE HEPTAHYDRATE 4 G: 40 INJECTION, SOLUTION INTRAVENOUS at 04:27

## 2019-06-21 RX ADMIN — PREDNISONE 20 MG: 20 TABLET ORAL at 16:00

## 2019-06-21 RX ADMIN — LIDOCAINE 1 PATCH: 560 PATCH PERCUTANEOUS; TOPICAL; TRANSDERMAL at 12:04

## 2019-06-21 RX ADMIN — URSODIOL 300 MG: 300 CAPSULE ORAL at 19:57

## 2019-06-21 RX ADMIN — ACYCLOVIR 800 MG: 800 TABLET ORAL at 22:34

## 2019-06-21 ASSESSMENT — PAIN DESCRIPTION - DESCRIPTORS
DESCRIPTORS: ACHING;SORE
DESCRIPTORS: ACHING;SORE

## 2019-06-21 ASSESSMENT — ACTIVITIES OF DAILY LIVING (ADL)
ADLS_ACUITY_SCORE: 14

## 2019-06-21 ASSESSMENT — MIFFLIN-ST. JEOR: SCORE: 1668.85

## 2019-06-21 NOTE — PROGRESS NOTES
CLINICAL NUTRITION SERVICES - REASSESSMENT NOTE     Nutrition Prescription    RECOMMENDATIONS FOR MDs/PROVIDERS TO ORDER:  Encourage po intakes    Malnutrition Status:    Patient does not meet two of the above criteria necessary for diagnosing malnutrition but is at risk for malnutrition    Recommendations already ordered by Registered Dietitian (RD):  Medical Food Supplements - can order PRN, list of options provided, discussed other options after discharge pt can trial (whey protein, carnation instant breakfast ,etc)  Snack - list of options provided and can order PRN  Cafe Passes - can utilize to help prevent menu fatigue and ask for additional PRN  Nutrition Education - reiterated nutrition goals at discharge (maintain adequate po of kcal/protein and food safety)    Future/Additional Recommendations:  Monitor po intakes and need for further intervention if po decreases  -can consider scheduled snacks/supplements, use of appetite stimulant (such as mirtazapine, megace, marinol) if primary barrier is lack of appetite, additional cafe passes PRN     EVALUATION OF THE PROGRESS TOWARD GOALS   Diet: High Kcal/High Protein, supplements PRN    Intake: majority of intakes % at meals  Up through last few days where appetite has been lower and has not been eating as much, appetite low in previous parts of stay however pt had continued to make sure he was eating TID     NEW FINDINGS   Pt did not care for trial of vanilla boost. Pt appetite down and suspect in part related to musculoskeletal neck pain. Had a granola bar for dinner last night.  Discussed for after discharge various options pt can trial for protein supplement if he would like to trial that may be better tolerated than boost (I.e. Smoothie/shake with whey protein base, carnation instant breakfast, can trial other brands/flavors like ensure, etc.). Reiterated role of food safety. Reiterated role of adequate nutrition in post-SCT course.  All  questions/concerns addressed.   Weight: stable over admission. Admit 89.5 kg (5/24) --> 90.8 kg (5/31) --> 90.8 kg (6/7) --> 89.3 kg (6/13) --> 89.9 kg (6/21)  Labs: BUN 6L, Cr 0.52L consistent with decrease in po last few days, Mg 1.1 L, euglycemia  Meds: NS bolus with dehydration, mag-ox BID, IVF: D5W-1/2NS @ 100 mL/hr -- 408 kcal/day,   GI: BMx1 on 6/19 and 6/20 - appropriate stooling    MALNUTRITION  % Intake: <75% intake for >7 days (non severe)  % Weight Loss: None noted, stable over admission  Subcutaneous Fat Loss: None observed  Muscle Loss: None observed  Fluid Accumulation/Edema: None noted  Malnutrition Diagnosis: Patient does not meet two of the above criteria necessary for diagnosing malnutrition but is at risk for malnutrition    Previous Goals   Patient to consume % of nutritionally adequate meal trays TID, or the equivalent with supplements/snacks.  Evaluation: Not met - decreased over past few days    Previous Nutrition Diagnosis  Predicted inadequate nutrient intake (calories, protein) related to potential to develop barriers to po s/p SCT and/or menu fatigue with prolonged hospital stay   Evaluation: No longer applicable see below    CURRENT NUTRITION DIAGNOSIS  Inadequate oral intakes related to decreased appetite with neck pain as evidenced by pt report, decreased po last few days, RN/progress notes, decreased BUN.    INTERVENTIONS  Implementation  Collaboration with other providers -5C rounds  Medical food supplement therapy -see above  Snacks - can order PRN  Nutrition Education - reiterated role of nutrition in post-SCT course and for discharge (adequate po of kcal/protein, food safety)    Goals  Patient to consume % of nutritionally adequate meal trays TID, or the equivalent with supplements/snacks.    Monitoring/Evaluation  Progress toward goals will be monitored and evaluated per protocol.    Debbie Thornton MS, RD, , LD.  5C/BMT Pager:[phone]

## 2019-06-21 NOTE — PLAN OF CARE
BMT Teaching Flowsheet    El Ace is a 63 year old male  The primary encounter diagnosis was AML (acute myeloid leukemia) in remission (H). Diagnoses of Acute myeloid leukemia in remission (H), Acute leukemia not having achieved remission (H), Neutropenia with fever (H), and History of allogeneic stem cell transplant (H) were also pertinent to this visit.    Teaching Topic: Allogenic Stem Cell Transplant Discharge Teaching    Person(s) involved in teaching: Patient and Spouse  Motivation Level  Asks Questions: Yes  Eager to Learn: Yes  Cooperative: Yes  Receptive (willing/able to accept information): Yes  Any cultural factors/Latter-day beliefs that may influence understanding or compliance? No    Patient and Family demonstrates understanding of the following:  - Reason for the appointment, diagnosis and treatment plan: Yes  - Knowledge of proper use of medications and conditions for which they are ordered (with special attention to potential side effects or drug interactions): Yes  - Which situations necessitate calling provider and whom to contact: Yes    Teaching concerns addressed: None were identified.     Proper use and care of (medical equipment, care aids, etc.) NA  Pain management techniques: Yes  Patient instructed on hand hygiene: Yes  How and/when to access community resources: Yes    Infection Control:  Patient and Family demonstrates understanding of the following:  Surgical procedure site care taught NA  Signs and symptoms of infection taught Yes  Wound care taught NA  Central venous catheter care taught: Line care class scheduled for 6/20    Instructional Materials Used/Given: Discharge teaching completed with patient and family.  Written guidelines provided including clinic follow up, signs and symptoms to report, infection prevention, and contact list. Reviewed potential side effects s/p transplant and what to expect during recovery period. Reviewed symptoms and treatment of GVHD. Discussed  clinic routines. Discussed activities to avoid to prevent infections and mask guidelines.  Pt and family verbalize understanding of material via teach back and all questions have been answered.    Time spent with patient: 60 minutes.    Specific Concerns: NA

## 2019-06-21 NOTE — PROGRESS NOTES
Line Company:  No coverage for line care company. Heparin script ordered by AUGIE.  Referral made for line care:  No  IV medications needed at discharge:  None   Pharmacy concerns:  None  PT/OT recommended: No   Referral made for PT/OT:  No  [facility]/c location:  [facility]  Has placement need been communicated to Social Work?  Yes  Teaching time arranged with family:  Teaching completed with patient and wife on 6/20  Notify nurse to schedule line care class/ DM teaching, prior to d/c:  PLC line care class completed 6/20  Medicare form required:  No    Outpatient coordinator notified of pt's discharge.

## 2019-06-21 NOTE — PLAN OF CARE
AVSS. Pt reports that pain is getting better but neck stiffness is still the same. Oxycodone given x1 and Tylenol given x2 for neck pain. With lidocaine patch over neck last night. Denies nausea, vomiting, dizziness or bleeding. Still has tremors on both hands. Pt has decreased appetite and only had 1 granola bar for dinner. Up independently and voiding freely. D5 1/2 NS infusing x 100ml/hr. Magnesium 4g given and needs recheck. Plan for discharge today.  Problem: Adult Inpatient Plan of Care  Goal: Optimal Comfort and Wellbeing  Outcome: No Change     Problem: Fatigue (Stem Cell/Bone Marrow Transplant)  Goal: Energy Level Supports Daily Activity  6/21/2019 0517 by Princess [place]iana Park, MILO  Outcome: No Change     Problem: Nutrition Intake Altered (Stem Cell/Bone Marrow Transplant)  Goal: Optimal Nutrition Intake  6/21/2019 0517 by Princess [place]iana Park, MILO  Outcome: No Change     Problem: Adult Inpatient Plan of Care  Goal: Plan of Care Review  6/21/2019 0517 by Princess Eliana Park RN  Outcome: Improving     Problem: Adult Inpatient Plan of Care  Goal: Patient-Specific Goal (Individualization)  Outcome: Improving     Problem: Adult Inpatient Plan of Care  Goal: Absence of Hospital-Acquired Illness or Injury  Outcome: Improving     Problem: Adjustment to Transplant (Stem Cell/Bone Marrow Transplant)  Goal: Optimal Coping with Transplant  6/21/2019 0517 by Princess Eliana Park, MLIO  Outcome: Improving     Problem: Hematologic Alteration (Stem Cell/Bone Marrow Transplant)  Goal: Blood Counts Within Acceptable Range  6/21/2019 0517 by Princess Eliana Park, MILO  Outcome: Improving     Problem: Infection Risk (Stem Cell/Bone Marrow Transplant)  Goal: Absence of Infection Signs/Symptoms  Outcome: Improving

## 2019-06-21 NOTE — PLAN OF CARE
OT 5C: OT to complete orders. PT has been following pt for strengthening and endurance. Has been IND in room with no concerns for safe discharge. No acute OT needs identified and likely to discharge soon.

## 2019-06-21 NOTE — PROGRESS NOTES
"BMT Daily Progress Note       Mr. Ace 63 y.o hx of AML. Admitted for VELMA Haplo. Currently day +21.    Overnight events: Randy was lightheaded - felt better after another liter for IVF and breakfast. Still with neck pain but headache resolved. He had planned on hospital discharge but still very unsteady on his feet- found out that his room at [facility] was 3rd floor and elevator is scheduled for repair this weekend. He newly complaints of bilateral medial malleolus pain - equal but denies it being electric/pins and needle like. He can best described it as pain from being flat footed when he walks in unsupportive shoes but he winces with palpation. He is concerned he will not safely be able to make it up the stairs to subjective unsteadiness (hasn't taken any oxycodone since 9am, no flexeril since overnight). No muscle weakness.   Review of Systems: 10 point ROS negative except as noted above.  Physical Exam:   Blood pressure 117/81, pulse 120, temperature 97.9  F (36.6  C), resp. rate 18, height 1.735 m (5' 8.31\"), weight 89.9 kg (198 lb 4.8 oz), SpO2 100 %.  General: NAD, interactive   Eyes: JEROMY, sclera anicteric   Nose/Mouth/Throat: OP clear, buccal mucosa dry but without ulcerations. No blood noted in the nares.    Lungs: CTA anteriorly (limited due to neck discomfort)  Cardiovascular: tachycardic but regular, no M/R/G   Abdominal/Rectal: +BS, soft, NT, ND  Lymphatics: no edema  Skin: no rashes or petechaie  Neuro: A&O.  CN II-XII grossly intact, equal  strength bilaterally, smile is symmetric.   Additional Findings: [place] site c/d/i    Labs:  Lab Results   Component Value Date    WBC 9.1 06/21/2019    ANEU 4.4 06/21/2019    HGB 9.6 (L) 06/21/2019    HCT 28.1 (L) 06/21/2019    PLT 44 (LL) 06/21/2019     06/21/2019    POTASSIUM 4.0 06/21/2019    CHLORIDE 104 06/21/2019    CO2 24 06/21/2019     (H) 06/21/2019    BUN 6 (L) 06/21/2019    CR 0.52 (L) 06/21/2019    MAG 2.3 06/21/2019    INR 1.09 " 06/17/2019     Imaging: Reviewed  Microbiology:  Reviewed    Assessment and Plan:   El Ace is a 63 year old male with hx of AML (IDH2, RUNX1 pos), currently day +21 of flu/cy/TBI and related haplo BMT.     5/25 (day -6): flu/cy  5/26 (day -5): flu  5/27 (day -4): flu   5/28 (day -3): flu  5/29 (day -2): TBI/flu  5/30 (day -1): TBI  5/31 (day 0): Transplant. No ABO mismatch - both A+.      1.  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   - GCSF started d+5 and completed 6/16/19.   - Re-stage per protocol.  -Day +21 bmbx - completed 6/19 ( 2 days early to simplify first clinic visit). FLOW is neg but rest of results pending.       2.  HEME: Keep Hgb>8g/dL and plts>10K.   - on MiPlate study- no current evidence of bleeding.   - Needs daily bleeding assessments. No bleeding to date.                              3.  ID: Afebrile  -6/4-6/6  BC negative, Culture negative neutropenic fever- s/p empiric cefepime 6/4-6/16.   -Prophylaxis with HD ACV - 6/15 CMV neg.   Fungal prophy: continue Vfend 200mg BID (does have some flashes of light when falling asleep- currently not bothersome to pt).    Bactrim or appropriate PCP therapy to start d+28.   - Stop azithro prophy  - Obtain UA/uC today with unsteadiness- occult UTI? UA appears negative.   - Hx of Cdiff had been on PO vanco prophy BID however stopped 6/18 as off broad spectrum abx.                     4.  GI: No NVD   -  Prn imodium- controlling loose stools well.   - zofran ODT prn.   -  GI prophy - protonox BID (GERD symptoms)   - Ativan and compazine, zofran available PRN break-through symptoms.   - Ursodiol for VOD prophy.     5.  GVH: S/P post-txp cytoxan,   - tac/MMF were started on day +5. No s/sx GVH to date.  - 6/14 Tacro level 11.7, changed to PO 6/15 2.5 mg BID.   - 6/17 tac high 20.1- dose decreasedt o 1.5mg bid  - 6/19 tac level still high at 20.0 despite skipping AM dose- decreased to 1mg BID.   - 6/21 level: 12.5- keep tac 1mg daily.   - MRI 6/20 (throbbing  head/neck pain) negative for PREs   -MMF 1000mg TID through day +35    6.  FEN/Renal: Monitor creat and lytes.   - Cr stable.   - Replete lytes PRN per SS.   - tac induced hypomag: mag ox 400mg BID     7.  CV:   - CI induced HTN:  stopped norvasc d/t orthostasis.   - Orthostatics improved. But given 1L bolus for softer pressures (autonomic dysfunction)  - Afebrile and lactic acid 0.8. Symptoms improved but BP still orthostatic. Start maintenance fluids at 125cc per hour.     8. Neuro:  6/19:New occipital throbbing and ongoing neck pain. Physical exam re-assuring; Head CT 6/19 neg, brain MRI with and without contrast neg for PRES 6/19. Likely musculoskeletal. Flexeril prn.   - continue lidocaine 12 on 12 off.     Had planned for hospital discharge but will keep overnight due to persistent unsteadiness  - Consider vori trough?   - negative MRI head, afebrile seems unlikely encepholitis/meningitis  - No true muscle weakness, Is the MSK/arithric in nature with stiffness and deconditioning? Trial of pred 20mg daily. If symptoms dramatically improve will feel assure larger neuro progress is not ongoing. If unimproved consider rheumatology consult?     Val Lindsey PA-C  [phone]    _______________________________________________    BMT ATTENDING NOTE    I have seen and personally evaluated the patient.  I reviewed vitals, medications, laboratory results, and I viewed imaging studies.  After doing so, I formulated a plan as documented in this note with the care team and patient today.     El Ace is a 63 year old male with AML, admitted on 5/24/2019 for VELMA haploidentical BMT, and remains hospitalized pending satisfactory recovery.      El was hoping to feel well enough to leave the hospital today, but his neck is still stiff and he was still unsteady on his feet. Spiked a low-grade fever in the afternoon, thus needs to stay in hospital for further monitoring    On exam, he is pleasant, interactive, sclerae  anicteric, cranial nerves grossly intact, neck range of motion improving, no oral mucosal lesions, normal respiratory effort, no JVD, normal perfusion, abdomen non-distended, skin without rash, no edema, normal affect.      Overall, our plan is to continue support him as he recovers from his transplant.  CXR and panculture due to fever, but hold empiric antibiotics as ANC adequate. Suspect posterior head and neck pain is musculoskeletal, with MRI being negative, continue to monitor. Give low dose prednisone today for arthralgias to see if this helps. He is dehydrated, and will continue intravenous fluids for orthostatic hypotension. His blood counts are recovering. We will assure that he continues to eat well.  Adjust tacrolimus dose.  Remainder of supportive care as above.  Discussed this assessment and plan in detail with patient and team today.      Roseline Jama MD    This note was created with voice recognition software.  Please notify me of any transcriptional errors.

## 2019-06-21 NOTE — PLAN OF CARE
PT 5C: Up IND    Discharge Planner PT   Patient plan for discharge: [facility] - hopefully tomorrow  Current status: Performed gentle stretching and soft tissue mobilization to cervical spine with increased tenderness at R upper trap. Pt with very guarded posturing due to pain.   Barriers to return to prior living situation: medical status  Recommendations for discharge: [facility] with SO and HEP + OP PT  Rationale for recommendations: Pt is mobilizing IND. He will be safe to discharge to [facility] when medically appropriate. He would benefit from OP PT to address chronic back pain and new neck pain this week for continued strengthening and soft tissue mobilization therapy.        Entered by: Hilda Willams 06/21/2019 8:38 AM

## 2019-06-21 NOTE — PROGRESS NOTES
BMT SOCIAL WORK DISCHARGE NOTE:    Focus: Discharge Plan    Data: Pt is a 63 year old male with a hx of AML s/p Allo BMT per medical record.    Interventions: Per Med Team, pt is medically stable for discharge in 1-2 days. SW met with pt to assess coping, provide psychosocial support and provide discharge packet with post-transplant resources for patient and caregiver. Pt shared that he is feeling better, but still dealing with some BP and neck issues. The pt is hopeful that he will be able to leave tomorrow as he feels ready to leave the hospital. SW provided empathetic listening, validation of concerns, and encouragement. SW encouraged pt to contact SW for support, questions and/or resources.     Education Provided: Discharge Resource Packet, Caregiver Self-Care Education, and Expected Emotional Responses to Transition Home.    Assessment: Pt presented as friendly, but tired.  Pt appears to be coping well. Pt continues to be supported by his wife Bessy.    Plan: Pt to discharge [facility] with Bessy as primary caregiver. SW will continue to provide psychosocial support and assistance with resources as needed. BRIANDA will continue to collaborate with multidisciplinary team regarding pt's plan of care.     EDI John, [facility]  Phone [phone]  Pager [phone]

## 2019-06-21 NOTE — PLAN OF CARE
Tmax 100.6ax.  OVSS.  Bld cx x2 and UA/UC sent.  CXR.   Viral labs sent.  Some lightheadedness this am.  1L NS bolus given.  Now MIVF started again at 100ml/hr.  Very fatigued.  Unstable and uncoordinated.  C/O some pain in feet/legs.  Neck pain/stiffness continues.  Lidocaine patch on and helping.  Tac lvl 12.5 and has tremors.  Mg recheck 2.3 and no replacement.  Eating and drinking some.  Was supposed to discontinue but ended up staying.  Continue to monitor.      Problem: Diarrhea (Stem Cell/Bone Marrow Transplant)  Goal: Diarrhea Symptom Control  Outcome: No Change     Problem: Hematologic Alteration (Stem Cell/Bone Marrow Transplant)  Goal: Blood Counts Within Acceptable Range  6/21/2019 1649 by Marychuy Pink, RN  Outcome: Improving     Problem: Adult Inpatient Plan of Care  Goal: Plan of Care Review  6/21/2019 1649 by Marychuy Pink, RN  Outcome: Declining  Flowsheets (Taken 6/21/2019 1649)  Plan of Care Reviewed With: patient; spouse     Problem: Adjustment to Transplant (Stem Cell/Bone Marrow Transplant)  Goal: Optimal Coping with Transplant  6/21/2019 1649 by Marychuy Pink, RN  Outcome: Declining     Problem: Adjustment to Transplant (Stem Cell/Bone Marrow Transplant)  Goal: Optimal Coping with Transplant  Intervention: Optimize Patient/Family Adjustment to Transplant  Flowsheets (Taken 6/21/2019 1649)  Supportive Measures: active listening utilized; verbalization of feelings encouraged  Family/Support System Care: presence promoted; self-care encouraged; support provided     Problem: Fatigue (Stem Cell/Bone Marrow Transplant)  Goal: Energy Level Supports Daily Activity  6/21/2019 1649 by Marychuy Pink, RN  Outcome: Declining     Problem: Fatigue (Stem Cell/Bone Marrow Transplant)  Goal: Energy Level Supports Daily Activity  Intervention: Manage Fatigue  Flowsheets (Taken 6/21/2019 1649)  Fatigue Management: activity schedule adjusted  Sleep/Rest Enhancement: awakenings minimized; regular sleep/rest pattern  promoted     Problem: Infection Risk (Stem Cell/Bone Marrow Transplant)  Goal: Absence of Infection Signs/Symptoms  6/21/2019 1649 by Marychuy Pink RN  Outcome: Declining     Problem: Infection Risk (Stem Cell/Bone Marrow Transplant)  Goal: Absence of Infection Signs/Symptoms  Intervention: Prevent and Manage Infection  Flowsheets (Taken 6/21/2019 1649)  Fever Reduction/Comfort Measures: medication administered; lightweight bedding  Infection Prevention: equipment surfaces disinfected; personal protective equipment utilized; rest/sleep promoted; single patient room provided  Infection Management: aseptic technique maintained; cultures obtained and sent to lab  Isolation Precautions: contact precautions maintained; enteric precautions maintained

## 2019-06-22 ENCOUNTER — APPOINTMENT (OUTPATIENT)
Dept: PHYSICAL THERAPY | Facility: CLINIC | Age: 64
DRG: 014 | End: 2019-06-22
Attending: INTERNAL MEDICINE
Payer: COMMERCIAL

## 2019-06-22 LAB
ANION GAP SERPL CALCULATED.3IONS-SCNC: 8 MMOL/L (ref 3–14)
BACTERIA SPEC CULT: NO GROWTH
BASOPHILS # BLD AUTO: 0 10E9/L (ref 0–0.2)
BASOPHILS NFR BLD AUTO: 0 %
BUN SERPL-MCNC: 7 MG/DL (ref 7–30)
CALCIUM SERPL-MCNC: 8.5 MG/DL (ref 8.5–10.1)
CHLORIDE SERPL-SCNC: 106 MMOL/L (ref 94–109)
CMV DNA SPEC NAA+PROBE-ACNC: NORMAL [IU]/ML
CMV DNA SPEC NAA+PROBE-LOG#: NORMAL {LOG_IU}/ML
CMV IGG SERPL QL IA: 1.1 AI (ref 0–0.8)
CMV IGG SERPL QL IA: NORMAL AI (ref 0–0.8)
CO2 SERPL-SCNC: 23 MMOL/L (ref 20–32)
CREAT SERPL-MCNC: 0.6 MG/DL (ref 0.66–1.25)
DIFFERENTIAL METHOD BLD: ABNORMAL
EOSINOPHIL # BLD AUTO: 0 10E9/L (ref 0–0.7)
EOSINOPHIL NFR BLD AUTO: 0 %
ERYTHROCYTE [DISTWIDTH] IN BLOOD BY AUTOMATED COUNT: 14.7 % (ref 10–15)
GFR SERPL CREATININE-BSD FRML MDRD: >90 ML/MIN/{1.73_M2}
GLUCOSE SERPL-MCNC: 126 MG/DL (ref 70–99)
HCT VFR BLD AUTO: 28 % (ref 40–53)
HGB BLD-MCNC: 9.6 G/DL (ref 13.3–17.7)
LACTATE BLD-SCNC: 0.5 MMOL/L (ref 0.7–2)
LYMPHOCYTES # BLD AUTO: 0.5 10E9/L (ref 0.8–5.3)
LYMPHOCYTES NFR BLD AUTO: 4.4 %
Lab: NORMAL
MAGNESIUM SERPL-MCNC: 1.6 MG/DL (ref 1.6–2.3)
MCH RBC QN AUTO: 31 PG (ref 26.5–33)
MCHC RBC AUTO-ENTMCNC: 34.3 G/DL (ref 31.5–36.5)
MCV RBC AUTO: 90 FL (ref 78–100)
METAMYELOCYTES # BLD: 0.4 10E9/L
METAMYELOCYTES NFR BLD MANUAL: 3.5 %
MONOCYTES # BLD AUTO: 2.9 10E9/L (ref 0–1.3)
MONOCYTES NFR BLD AUTO: 28.1 %
MYELOCYTES # BLD: 0.2 10E9/L
MYELOCYTES NFR BLD MANUAL: 1.8 %
NEUTROPHILS # BLD AUTO: 6.4 10E9/L (ref 1.6–8.3)
NEUTROPHILS NFR BLD AUTO: 62.2 %
PLATELET # BLD AUTO: 68 10E9/L (ref 150–450)
POTASSIUM SERPL-SCNC: 4.3 MMOL/L (ref 3.4–5.3)
RBC # BLD AUTO: 3.1 10E12/L (ref 4.4–5.9)
RBC MORPH BLD: NORMAL
SODIUM SERPL-SCNC: 137 MMOL/L (ref 133–144)
SPECIMEN SOURCE: NORMAL
SPECIMEN SOURCE: NORMAL
WBC # BLD AUTO: 10.3 10E9/L (ref 4–11)

## 2019-06-22 PROCEDURE — 25000132 ZZH RX MED GY IP 250 OP 250 PS 637: Performed by: INTERNAL MEDICINE

## 2019-06-22 PROCEDURE — 87798 DETECT AGENT NOS DNA AMP: CPT | Performed by: PHYSICIAN ASSISTANT

## 2019-06-22 PROCEDURE — 25000131 ZZH RX MED GY IP 250 OP 636 PS 637: Performed by: NURSE PRACTITIONER

## 2019-06-22 PROCEDURE — 25000128 H RX IP 250 OP 636: Performed by: PHYSICIAN ASSISTANT

## 2019-06-22 PROCEDURE — 25000132 ZZH RX MED GY IP 250 OP 250 PS 637: Performed by: NURSE PRACTITIONER

## 2019-06-22 PROCEDURE — 25000132 ZZH RX MED GY IP 250 OP 250 PS 637: Performed by: PHYSICIAN ASSISTANT

## 2019-06-22 PROCEDURE — 20600000 ZZH R&B BMT

## 2019-06-22 PROCEDURE — 97116 GAIT TRAINING THERAPY: CPT | Mod: GP

## 2019-06-22 PROCEDURE — 87799 DETECT AGENT NOS DNA QUANT: CPT | Performed by: PHYSICIAN ASSISTANT

## 2019-06-22 PROCEDURE — 25800030 ZZH RX IP 258 OP 636: Performed by: INTERNAL MEDICINE

## 2019-06-22 PROCEDURE — 97530 THERAPEUTIC ACTIVITIES: CPT | Mod: GP

## 2019-06-22 PROCEDURE — 87533 HHV-6 DNA QUANT: CPT | Performed by: PHYSICIAN ASSISTANT

## 2019-06-22 PROCEDURE — 83735 ASSAY OF MAGNESIUM: CPT | Performed by: PHYSICIAN ASSISTANT

## 2019-06-22 PROCEDURE — 25000131 ZZH RX MED GY IP 250 OP 636 PS 637: Performed by: PHYSICIAN ASSISTANT

## 2019-06-22 PROCEDURE — 87040 BLOOD CULTURE FOR BACTERIA: CPT | Performed by: PHYSICIAN ASSISTANT

## 2019-06-22 PROCEDURE — 85025 COMPLETE CBC W/AUTO DIFF WBC: CPT | Performed by: PHYSICIAN ASSISTANT

## 2019-06-22 PROCEDURE — 87103 BLOOD FUNGUS CULTURE: CPT | Performed by: PHYSICIAN ASSISTANT

## 2019-06-22 PROCEDURE — 86644 CMV ANTIBODY: CPT | Performed by: PHYSICIAN ASSISTANT

## 2019-06-22 PROCEDURE — 83605 ASSAY OF LACTIC ACID: CPT | Performed by: INTERNAL MEDICINE

## 2019-06-22 PROCEDURE — 80048 BASIC METABOLIC PNL TOTAL CA: CPT | Performed by: PHYSICIAN ASSISTANT

## 2019-06-22 RX ORDER — LIDOCAINE 4 G/G
2 PATCH TOPICAL
Status: DISCONTINUED | OUTPATIENT
Start: 2019-06-22 | End: 2019-06-25

## 2019-06-22 RX ADMIN — ACYCLOVIR 800 MG: 800 TABLET ORAL at 08:13

## 2019-06-22 RX ADMIN — MYCOPHENOLATE MOFETIL 1000 MG: 500 TABLET, FILM COATED ORAL at 06:29

## 2019-06-22 RX ADMIN — MYCOPHENOLATE MOFETIL 1000 MG: 500 TABLET, FILM COATED ORAL at 22:06

## 2019-06-22 RX ADMIN — ACETAMINOPHEN 650 MG: 325 TABLET, FILM COATED ORAL at 20:07

## 2019-06-22 RX ADMIN — TACROLIMUS 1 MG: 1 CAPSULE ORAL at 08:12

## 2019-06-22 RX ADMIN — SODIUM CHLORIDE, PRESERVATIVE FREE 5 ML: 5 INJECTION INTRAVENOUS at 03:03

## 2019-06-22 RX ADMIN — URSODIOL 300 MG: 300 CAPSULE ORAL at 08:13

## 2019-06-22 RX ADMIN — VORICONAZOLE 200 MG: 200 TABLET, FILM COATED ORAL at 20:07

## 2019-06-22 RX ADMIN — URSODIOL 300 MG: 300 CAPSULE ORAL at 20:07

## 2019-06-22 RX ADMIN — AZITHROMYCIN 250 MG: 250 TABLET, FILM COATED ORAL at 08:13

## 2019-06-22 RX ADMIN — SODIUM CHLORIDE: 9 INJECTION, SOLUTION INTRAVENOUS at 22:07

## 2019-06-22 RX ADMIN — SODIUM CHLORIDE, PRESERVATIVE FREE 5 ML: 5 INJECTION INTRAVENOUS at 20:15

## 2019-06-22 RX ADMIN — URSODIOL 300 MG: 300 CAPSULE ORAL at 15:00

## 2019-06-22 RX ADMIN — ACYCLOVIR 800 MG: 800 TABLET ORAL at 22:06

## 2019-06-22 RX ADMIN — VORICONAZOLE 200 MG: 200 TABLET, FILM COATED ORAL at 08:12

## 2019-06-22 RX ADMIN — OXYCODONE HYDROCHLORIDE 5 MG: 5 TABLET ORAL at 22:06

## 2019-06-22 RX ADMIN — ACYCLOVIR 800 MG: 800 TABLET ORAL at 17:51

## 2019-06-22 RX ADMIN — TAMSULOSIN HYDROCHLORIDE 0.4 MG: 0.4 CAPSULE ORAL at 08:13

## 2019-06-22 RX ADMIN — MAGNESIUM OXIDE TAB 400 MG (241.3 MG ELEMENTAL MG) 400 MG: 400 (241.3 MG) TAB at 20:07

## 2019-06-22 RX ADMIN — ACYCLOVIR 800 MG: 800 TABLET ORAL at 11:24

## 2019-06-22 RX ADMIN — LIDOCAINE 1 PATCH: 560 PATCH PERCUTANEOUS; TOPICAL; TRANSDERMAL at 11:24

## 2019-06-22 RX ADMIN — PANTOPRAZOLE SODIUM 40 MG: 40 TABLET, DELAYED RELEASE ORAL at 20:07

## 2019-06-22 RX ADMIN — SODIUM CHLORIDE: 9 INJECTION, SOLUTION INTRAVENOUS at 02:52

## 2019-06-22 RX ADMIN — MAGNESIUM OXIDE TAB 400 MG (241.3 MG ELEMENTAL MG) 400 MG: 400 (241.3 MG) TAB at 08:13

## 2019-06-22 RX ADMIN — PANTOPRAZOLE SODIUM 40 MG: 40 TABLET, DELAYED RELEASE ORAL at 08:12

## 2019-06-22 RX ADMIN — MYCOPHENOLATE MOFETIL 1000 MG: 500 TABLET, FILM COATED ORAL at 15:00

## 2019-06-22 RX ADMIN — ACYCLOVIR 800 MG: 800 TABLET ORAL at 15:00

## 2019-06-22 RX ADMIN — TACROLIMUS 1 MG: 1 CAPSULE ORAL at 20:07

## 2019-06-22 ASSESSMENT — ACTIVITIES OF DAILY LIVING (ADL)
ADLS_ACUITY_SCORE: 14
ADLS_ACUITY_SCORE: 14
ADLS_ACUITY_SCORE: 17
ADLS_ACUITY_SCORE: 14
ADLS_ACUITY_SCORE: 17
ADLS_ACUITY_SCORE: 17

## 2019-06-22 ASSESSMENT — PAIN DESCRIPTION - DESCRIPTORS: DESCRIPTORS: SORE

## 2019-06-22 ASSESSMENT — MIFFLIN-ST. JEOR: SCORE: 1669.31

## 2019-06-22 NOTE — PLAN OF CARE
Tmax 99.9ax.  OVSS.  Very little lightheadedness.  Pain/stiffness improved some today, but has moved down into his back.  Icy hot put on his back this am and lidocaine patch on this afternoon.  Able to work with therapy and then go for a walk with his wife for 1 lap.  Will go again before dinner.  Hoping to leave tomorrow if improving.  Eating and drinking a little better today.      Problem: Fatigue (Stem Cell/Bone Marrow Transplant)  Goal: Energy Level Supports Daily Activity  6/22/2019 1804 by Marychuy Pink, RN  Outcome: No Change     Problem: Fatigue (Stem Cell/Bone Marrow Transplant)  Goal: Energy Level Supports Daily Activity  Intervention: Manage Fatigue  Flowsheets (Taken 6/22/2019 1804)  Fatigue Management: frequent rest breaks encouraged; paced activity encouraged  Sleep/Rest Enhancement: awakenings minimized; regular sleep/rest pattern promoted     Problem: Infection Risk (Stem Cell/Bone Marrow Transplant)  Goal: Absence of Infection Signs/Symptoms  6/22/2019 1804 by Marychuy Pink, RN  Outcome: No Change     Problem: Infection Risk (Stem Cell/Bone Marrow Transplant)  Goal: Absence of Infection Signs/Symptoms  Intervention: Prevent and Manage Infection  Flowsheets (Taken 6/22/2019 1804)  Infection Prevention: equipment surfaces disinfected; personal protective equipment utilized; rest/sleep promoted; single patient room provided  Infection Management: aseptic technique maintained  Isolation Precautions: contact precautions maintained; enteric precautions maintained     Problem: Adult Inpatient Plan of Care  Goal: Plan of Care Review  6/22/2019 1804 by Marychuy Pink, RN  Outcome: Improving  Flowsheets (Taken 6/22/2019 1804)  Plan of Care Reviewed With: patient; spouse     Problem: Adjustment to Transplant (Stem Cell/Bone Marrow Transplant)  Goal: Optimal Coping with Transplant  Outcome: Improving     Problem: Adjustment to Transplant (Stem Cell/Bone Marrow Transplant)  Goal: Optimal Coping with  Transplant  Intervention: Optimize Patient/Family Adjustment to Transplant  Flowsheets (Taken 6/22/2019 1804)  Supportive Measures: active listening utilized; verbalization of feelings encouraged; self-care encouraged  Family/Support System Care: involvement promoted; presence promoted; support provided

## 2019-06-22 NOTE — PLAN OF CARE
PT -   Discharge Planner PT   Patient plan for discharge: [facility] with wife  Current status: Pt completed supine>sit with SBA, cues for log roll technique, to avoid further back pain. Pt completes sit>stands with SBA. Pt required increased time prior to each movement 2/2 pain. Pt reported increased pain in back and feet this session, but says that his neck back is much better. Discussed plans to discharge to [facility], and how pt must progress gait and activity tolerance. Educated pt on how back pain could be related to increased time spent in bed. Pt amb 200' x 1 with CGS-SBA, pushing IV pole. Pt reported improved back pain with amb, but increased pain in feet. Encouraged pt to complete 2 more walks this evening, and 4 throughout the day tomorrow. Pt was receptive, and wife was supportive. Pt was positioned supine in bed with nurse and wife present, all needs in reach following PT session.   Barriers to return to prior living situation: medical status, decreased functional mobility, pain, activity intolerance  Recommendations for discharge: Per discussion with PT, the recommendation for discharge location is [facility] with wife and OP PT, pending progress with amb, and improvement of back pain over the next couple of days.   Rationale for recommendations: Pt has been mobilizing IND overall, but recent neck and back pain has significantly limited OOB activity. If pt pain improves, pt will be able to safely discharge to [facility], but would benefit from further OP PT to progress endurance and independent mobility.   Entered by: Lluvia Tong 06/22/2019 3:25 PM

## 2019-06-22 NOTE — PROGRESS NOTES
BMT Daily Bleeding Assessment   [facility] Study Daily Hemostatic Assessment Form     In the last 24 hours:      Oral and Nasal:  B5.  New petechiae of oral mucosa? No  B6.  Oropharyngeal bleeding? No  B7.  Epistaxis during assessment period? No               Skin, Soft Tissue, Musculoskeletal:  B8.  New petechiae present on skin? No  B9.  New purpura present? No  B10.  One or more spontaneous hematomas >1 inch in soft tissue or muscle? No  B11.  Spontaneous hematoma in deeper tissues? No  B12.  Joint bleeding? No    Gastrointestinal/Genitourinary/Gynecologic:  B13.  Was there a stool specimen? No  B14.  Was there emesis? No  B15.  Does patient have a nasogastric drainage tube? No  B16.  Visible blood in urine? No  B17.  Is the patient female? No            Pulmonary:   B18.  Hemoptysis? No  B20.  Blood tinged sputum? No    Opthalmic:  B. Scleral bleed? No    Invasive Sites:  B27.  Active oozing at invasive site for cumulative total             of >1 hour during assessment period? No    C2.  Comments on Assessment:  (Record approximate date/time onset of bleeding,   if applicable; Description (location/size). Include reason if any component of the assessment was not completed): Assessment at 0910, Denies any bleeding or new bruising.

## 2019-06-22 NOTE — PLAN OF CARE
"./84 (BP Location: Right arm)   Pulse 100   Temp 98.1  F (36.7  C) (Axillary)   Resp 16   Ht 1.735 m (5' 8.31\")   Wt 89.9 kg (198 lb 4.8 oz)   SpO2 98%   BMI 29.88 kg/m      Pt alert and oriented. Afebrile. Tachy 100s+, Ovss. Denies pain or nausea. Mivf-NS at 100ml/hr. Good urine output. No replacements this morning. Research lab sent. Cont to monitor and follow poc.     Problem: Adult Inpatient Plan of Care  Goal: Plan of Care Review  Outcome: No Change     Problem: Fatigue (Stem Cell/Bone Marrow Transplant)  Goal: Energy Level Supports Daily Activity  Outcome: No Change     Problem: Hematologic Alteration (Stem Cell/Bone Marrow Transplant)  Goal: Blood Counts Within Acceptable Range  Outcome: Improving     Problem: Infection Risk (Stem Cell/Bone Marrow Transplant)  Goal: Absence of Infection Signs/Symptoms  Outcome: Improving     Problem: Nausea and Vomiting (Stem Cell/Bone Marrow Transplant)  Goal: Nausea and Vomiting Symptom Relief  Outcome: No Change     "

## 2019-06-22 NOTE — PROGRESS NOTES
"BMT Daily Progress Note       Mr. Ace 63 y.o hx of AML. Admitted for VELMA Haplo. Currently day +22    Overnight events: Neck pain back again, spiked another fever.  Notes fatigue.  Working on eating.  Does not feel very well when he walks.     Review of Systems: 10 point ROS negative except as noted above.  Physical Exam:   Blood pressure 125/81, pulse 100, temperature 98.2  F (36.8  C), temperature source Axillary, resp. rate 16, height 1.735 m (5' 8.31\"), weight 90 kg (198 lb 6.4 oz), SpO2 96 %.  General: NAD, interactive   Eyes: JEROMY, sclera anicteric   Nose/Mouth/Throat: OP clear, buccal mucosa dry but without ulcerations. No blood noted in the nares.    Lungs: normal effort at rest  Cardiovascular: tachycardic  Abdominal/Rectal: +BS, soft, NT, ND  Lymphatics: no edema  Skin: no rashes or petechaie  Neuro: A&O.  CN II-XII grossly intact, equal  strength bilaterally, smile is symmetric.   Additional Findings: [facility] c/d/i    Labs:  Lab Results   Component Value Date    WBC 10.3 06/22/2019    ANEU 6.4 06/22/2019    HGB 9.6 (L) 06/22/2019    HCT 28.0 (L) 06/22/2019    PLT 68 (L) 06/22/2019     06/22/2019    POTASSIUM 4.3 06/22/2019    CHLORIDE 106 06/22/2019    CO2 23 06/22/2019     (H) 06/22/2019    BUN 7 06/22/2019    CR 0.60 (L) 06/22/2019    MAG 1.6 06/22/2019    INR 1.09 06/17/2019     Imaging: Reviewed  Microbiology:  Reviewed    Assessment and Plan:   El Ace is a 63 year old male with hx of AML (IDH2, RUNX1 pos), currently day +22 of flu/cy/TBI and related haplo BMT.     5/25 (day -6): flu/cy  5/26 (day -5): flu  5/27 (day -4): flu   5/28 (day -3): flu  5/29 (day -2): TBI/flu  5/30 (day -1): TBI  5/31 (day 0): Transplant. No ABO mismatch - both A+.      1.  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   - GCSF started d+5 and completed 6/16/19.   - Re-stage per protocol.  -Day +21 bmbx - completed 6/19 ( 2 days early to simplify first clinic visit). In remission and 100% donor.       2.  " HEME: Keep Hgb>8g/dL and plts>10K.   - on MiPlate study- no current evidence of bleeding.   - Needs daily bleeding assessments. No bleeding to date.                              3.  ID: Afebrile  -6/4-6/6  BC negative, Culture negative neutropenic fever- s/p empiric cefepime 6/4-6/16.   -Prophylaxis with HD ACV - 6/15 CMV neg.   Fungal prophy: continue Vfend 200mg BID (does have some flashes of light when falling asleep- currently not bothersome to pt).    Bactrim or appropriate PCP therapy to start d+28.   - Hx of Cdiff had been on PO vanco prophy BID however stopped 6/18 as off broad spectrum abx.   - Recurrent fever along with neck pain, appreciate ID evaluation.  Unclear if these are related but will proceed to MRI neck and CT chest. Cultures have been negative and he is off empiric antibiotics as he has a normal ANC.                     4.  GI: No NVD   -  Prn imodium- controlling loose stools well.   - zofran ODT prn.   -  GI prophy - protonox BID (GERD symptoms)   - Ativan and compazine, zofran available PRN break-through symptoms.   - Ursodiol for VOD prophy.     5.  GVH: S/P post-txp cytoxan,   - tac/MMF were started on day +5. No s/sx GVH to date.  - 6/14 Tacro level 11.7, changed to PO 6/15 2.5 mg BID.   - 6/17 tac high 20.1- dose decreasedt o 1.5mg bid  - 6/19 tac level still high at 20.0 despite skipping AM dose- decreased to 1mg BID.   - 6/21 level: 12.5- keep tac 1mg daily.   - MRI 6/20 (throbbing head/neck pain) negative for PREs   -MMF 1000mg TID through day +35    6.  FEN/Renal: Monitor creat and lytes.   - Cr stable.   - Replete lytes PRN per SS.   - tac induced hypomag: mag ox 400mg BID     7.  CV:   - CI induced HTN:  stopped norvasc d/t orthostasis.   - Orthostatics improved. But given 1L bolus for softer pressures (autonomic dysfunction)  - Afebrile and lactic acid 0.8. Symptoms improved but BP still orthostatic. Maintenance fluids at 125cc per hour.     8. Neuro:  6/19:New occipital throbbing  and ongoing neck pain. Physical exam re-assuring; Head CT 6/19 neg, brain MRI with and without contrast neg for PRES 6/19. Likely musculoskeletal. Flexeril prn.   - continue lidocaine 12 on 12 off.     Had planned for hospital discharge but will keep overnight due to persistent unsteadiness and low-grade fever.  - No true muscle weakness, Is the MSK/arithric in nature with stiffness and deconditioning? Trial of pred 20mg daily yesterday, seems a little better, but will keep monitoring and work more with PT today.  _______________________________________________    BMT ATTENDING NOTE    I have seen and personally evaluated the patient.  I reviewed vitals, medications, laboratory results, and I viewed imaging studies.  After doing so, I formulated a plan as documented in this note with the care team and patient today.     El Ace is a 63 year old male with AML, admitted on 5/24/2019 for VELMA haploidentical BMT, and remains hospitalized pending satisfactory recovery.      El ongoing neck pain and recurrent fevers.  Trying to eat and walk but does not feel well yet.    On exam, he is pleasant, interactive, sclerae anicteric, cranial nerves grossly intact, neck range of motion improving, no oral mucosal lesions, normal respiratory effort, no JVD, normal perfusion, abdomen non-distended, skin without rash, no edema, normal affect.      Overall, our plan is to continue support him as he recovers from his transplant.  CXR and panculture, but hold empiric antibiotics as ANC adequate. ID consult if recurrence of fever. He is dehydrated, and will continue intravenous fluids for orthostatic hypotension. His blood counts are recovering. We will assure that he continues to eat well.  Adjust tacrolimus dose.  Remainder of supportive care as above.  Discussed this assessment and plan in detail with patient and team today.      Roseline Jama MD    This note was created with voice recognition software.  Please notify me  of any transcriptional errors.

## 2019-06-23 ENCOUNTER — APPOINTMENT (OUTPATIENT)
Dept: CT IMAGING | Facility: CLINIC | Age: 64
DRG: 014 | End: 2019-06-23
Attending: INTERNAL MEDICINE
Payer: COMMERCIAL

## 2019-06-23 LAB
ANION GAP SERPL CALCULATED.3IONS-SCNC: 8 MMOL/L (ref 3–14)
BASOPHILS # BLD AUTO: 0 10E9/L (ref 0–0.2)
BASOPHILS NFR BLD AUTO: 0 %
BUN SERPL-MCNC: 8 MG/DL (ref 7–30)
CALCIUM SERPL-MCNC: 8.4 MG/DL (ref 8.5–10.1)
CHLORIDE SERPL-SCNC: 103 MMOL/L (ref 94–109)
CO2 SERPL-SCNC: 23 MMOL/L (ref 20–32)
CREAT SERPL-MCNC: 0.58 MG/DL (ref 0.66–1.25)
DIFFERENTIAL METHOD BLD: ABNORMAL
EOSINOPHIL # BLD AUTO: 0 10E9/L (ref 0–0.7)
EOSINOPHIL NFR BLD AUTO: 0 %
ERYTHROCYTE [DISTWIDTH] IN BLOOD BY AUTOMATED COUNT: 15.1 % (ref 10–15)
GFR SERPL CREATININE-BSD FRML MDRD: >90 ML/MIN/{1.73_M2}
GLUCOSE SERPL-MCNC: 98 MG/DL (ref 70–99)
HCT VFR BLD AUTO: 27.2 % (ref 40–53)
HGB BLD-MCNC: 9.1 G/DL (ref 13.3–17.7)
LYMPHOCYTES # BLD AUTO: 0.2 10E9/L (ref 0.8–5.3)
LYMPHOCYTES NFR BLD AUTO: 2.6 %
MCH RBC QN AUTO: 30.3 PG (ref 26.5–33)
MCHC RBC AUTO-ENTMCNC: 33.5 G/DL (ref 31.5–36.5)
MCV RBC AUTO: 91 FL (ref 78–100)
METAMYELOCYTES # BLD: 0.3 10E9/L
METAMYELOCYTES NFR BLD MANUAL: 3.4 %
MONOCYTES # BLD AUTO: 2.9 10E9/L (ref 0–1.3)
MONOCYTES NFR BLD AUTO: 29.9 %
NEUTROPHILS # BLD AUTO: 6.2 10E9/L (ref 1.6–8.3)
NEUTROPHILS NFR BLD AUTO: 64.1 %
NRBC # BLD AUTO: 0.1 10*3/UL
NRBC BLD AUTO-RTO: 1 /100
PLATELET # BLD AUTO: 75 10E9/L (ref 150–450)
PLATELET # BLD EST: ABNORMAL 10*3/UL
POTASSIUM SERPL-SCNC: 4 MMOL/L (ref 3.4–5.3)
RBC # BLD AUTO: 3 10E12/L (ref 4.4–5.9)
RBC MORPH BLD: NORMAL
SODIUM SERPL-SCNC: 133 MMOL/L (ref 133–144)
WBC # BLD AUTO: 9.6 10E9/L (ref 4–11)

## 2019-06-23 PROCEDURE — 25000128 H RX IP 250 OP 636: Performed by: PHYSICIAN ASSISTANT

## 2019-06-23 PROCEDURE — 25000132 ZZH RX MED GY IP 250 OP 250 PS 637: Performed by: PHYSICIAN ASSISTANT

## 2019-06-23 PROCEDURE — 87040 BLOOD CULTURE FOR BACTERIA: CPT | Performed by: PHYSICIAN ASSISTANT

## 2019-06-23 PROCEDURE — 80048 BASIC METABOLIC PNL TOTAL CA: CPT | Performed by: PHYSICIAN ASSISTANT

## 2019-06-23 PROCEDURE — 25000132 ZZH RX MED GY IP 250 OP 250 PS 637: Performed by: INTERNAL MEDICINE

## 2019-06-23 PROCEDURE — 25000131 ZZH RX MED GY IP 250 OP 636 PS 637: Performed by: NURSE PRACTITIONER

## 2019-06-23 PROCEDURE — 86901 BLOOD TYPING SEROLOGIC RH(D): CPT | Performed by: PHYSICIAN ASSISTANT

## 2019-06-23 PROCEDURE — 85025 COMPLETE CBC W/AUTO DIFF WBC: CPT | Performed by: PHYSICIAN ASSISTANT

## 2019-06-23 PROCEDURE — 20600000 ZZH R&B BMT

## 2019-06-23 PROCEDURE — 25000131 ZZH RX MED GY IP 250 OP 636 PS 637: Performed by: PHYSICIAN ASSISTANT

## 2019-06-23 PROCEDURE — 86850 RBC ANTIBODY SCREEN: CPT | Performed by: PHYSICIAN ASSISTANT

## 2019-06-23 PROCEDURE — 25000132 ZZH RX MED GY IP 250 OP 250 PS 637: Performed by: NURSE PRACTITIONER

## 2019-06-23 PROCEDURE — 25800030 ZZH RX IP 258 OP 636: Performed by: INTERNAL MEDICINE

## 2019-06-23 PROCEDURE — 87103 BLOOD FUNGUS CULTURE: CPT | Performed by: PHYSICIAN ASSISTANT

## 2019-06-23 PROCEDURE — 71250 CT THORAX DX C-: CPT

## 2019-06-23 PROCEDURE — 86900 BLOOD TYPING SEROLOGIC ABO: CPT | Performed by: PHYSICIAN ASSISTANT

## 2019-06-23 PROCEDURE — 86923 COMPATIBILITY TEST ELECTRIC: CPT | Performed by: PHYSICIAN ASSISTANT

## 2019-06-23 RX ORDER — GADOBUTROL 604.72 MG/ML
0.1 INJECTION INTRAVENOUS ONCE
Status: COMPLETED | OUTPATIENT
Start: 2019-06-23 | End: 2019-06-25

## 2019-06-23 RX ADMIN — Medication 5 ML: at 18:13

## 2019-06-23 RX ADMIN — VORICONAZOLE 200 MG: 200 TABLET, FILM COATED ORAL at 19:57

## 2019-06-23 RX ADMIN — SODIUM CHLORIDE: 9 INJECTION, SOLUTION INTRAVENOUS at 19:57

## 2019-06-23 RX ADMIN — ACETAMINOPHEN 650 MG: 325 TABLET, FILM COATED ORAL at 04:11

## 2019-06-23 RX ADMIN — ACETAMINOPHEN 650 MG: 325 TABLET, FILM COATED ORAL at 20:03

## 2019-06-23 RX ADMIN — SODIUM CHLORIDE, PRESERVATIVE FREE 5 ML: 5 INJECTION INTRAVENOUS at 04:16

## 2019-06-23 RX ADMIN — Medication 1 G: at 18:12

## 2019-06-23 RX ADMIN — MYCOPHENOLATE MOFETIL 1000 MG: 500 TABLET, FILM COATED ORAL at 06:55

## 2019-06-23 RX ADMIN — URSODIOL 300 MG: 300 CAPSULE ORAL at 07:30

## 2019-06-23 RX ADMIN — SODIUM CHLORIDE: 9 INJECTION, SOLUTION INTRAVENOUS at 06:55

## 2019-06-23 RX ADMIN — URSODIOL 300 MG: 300 CAPSULE ORAL at 19:57

## 2019-06-23 RX ADMIN — MAGNESIUM OXIDE TAB 400 MG (241.3 MG ELEMENTAL MG) 400 MG: 400 (241.3 MG) TAB at 19:57

## 2019-06-23 RX ADMIN — ACYCLOVIR 800 MG: 800 TABLET ORAL at 10:49

## 2019-06-23 RX ADMIN — VORICONAZOLE 200 MG: 200 TABLET, FILM COATED ORAL at 07:30

## 2019-06-23 RX ADMIN — PANTOPRAZOLE SODIUM 40 MG: 40 TABLET, DELAYED RELEASE ORAL at 19:57

## 2019-06-23 RX ADMIN — ACYCLOVIR 800 MG: 800 TABLET ORAL at 18:12

## 2019-06-23 RX ADMIN — ACYCLOVIR 800 MG: 800 TABLET ORAL at 22:02

## 2019-06-23 RX ADMIN — ACETAMINOPHEN 650 MG: 325 TABLET, FILM COATED ORAL at 11:53

## 2019-06-23 RX ADMIN — MYCOPHENOLATE MOFETIL 1000 MG: 500 TABLET, FILM COATED ORAL at 14:19

## 2019-06-23 RX ADMIN — TAMSULOSIN HYDROCHLORIDE 0.4 MG: 0.4 CAPSULE ORAL at 07:30

## 2019-06-23 RX ADMIN — PANTOPRAZOLE SODIUM 40 MG: 40 TABLET, DELAYED RELEASE ORAL at 07:30

## 2019-06-23 RX ADMIN — MYCOPHENOLATE MOFETIL 1000 MG: 500 TABLET, FILM COATED ORAL at 22:02

## 2019-06-23 RX ADMIN — URSODIOL 300 MG: 300 CAPSULE ORAL at 14:19

## 2019-06-23 RX ADMIN — TACROLIMUS 1 MG: 1 CAPSULE ORAL at 07:30

## 2019-06-23 RX ADMIN — AZITHROMYCIN 250 MG: 250 TABLET, FILM COATED ORAL at 07:30

## 2019-06-23 RX ADMIN — OXYCODONE HYDROCHLORIDE 5 MG: 5 TABLET ORAL at 22:02

## 2019-06-23 RX ADMIN — MAGNESIUM OXIDE TAB 400 MG (241.3 MG ELEMENTAL MG) 400 MG: 400 (241.3 MG) TAB at 07:30

## 2019-06-23 RX ADMIN — Medication 1 G: at 12:14

## 2019-06-23 RX ADMIN — ACYCLOVIR 800 MG: 800 TABLET ORAL at 14:19

## 2019-06-23 RX ADMIN — ACYCLOVIR 800 MG: 800 TABLET ORAL at 07:30

## 2019-06-23 RX ADMIN — Medication 1 G: at 14:18

## 2019-06-23 RX ADMIN — TACROLIMUS 1 MG: 1 CAPSULE ORAL at 19:57

## 2019-06-23 ASSESSMENT — ACTIVITIES OF DAILY LIVING (ADL)
ADLS_ACUITY_SCORE: 16
ADLS_ACUITY_SCORE: 14

## 2019-06-23 ASSESSMENT — PAIN DESCRIPTION - DESCRIPTORS: DESCRIPTORS: CONSTANT;ACHING

## 2019-06-23 ASSESSMENT — MIFFLIN-ST. JEOR: SCORE: 1665.68

## 2019-06-23 NOTE — PLAN OF CARE
AVSS.  No nausea.  Stool x1.  Back and neck pain/stiffness trying morphine gel.  Down for MRI.  Declined shower or walking.  Says he just had no energy today, but will try tomorrow.  Continue plan of care.    UPDATE:  Unable to complete MRI.  Can't lay flat for even a couple of minutes and scans would take 30-40 minutes.  Will try tomorrow, but will need pain meds to complete.    Problem: Adult Inpatient Plan of Care  Goal: Plan of Care Review  6/23/2019 1829 by Marychuy Pink, RN  Outcome: No Change     Problem: Adjustment to Transplant (Stem Cell/Bone Marrow Transplant)  Goal: Optimal Coping with Transplant  6/23/2019 1829 by Marychuy Pink, RN  Outcome: No Change     Problem: Adjustment to Transplant (Stem Cell/Bone Marrow Transplant)  Goal: Optimal Coping with Transplant  Intervention: Optimize Patient/Family Adjustment to Transplant  Flowsheets (Taken 6/23/2019 1829)  Supportive Measures: active listening utilized; verbalization of feelings encouraged     Problem: Fatigue (Stem Cell/Bone Marrow Transplant)  Goal: Energy Level Supports Daily Activity  6/23/2019 1829 by Marychuy Pink, RN  Outcome: No Change     Problem: Fatigue (Stem Cell/Bone Marrow Transplant)  Goal: Energy Level Supports Daily Activity  Intervention: Manage Fatigue  Flowsheets (Taken 6/23/2019 1829)  Fatigue Management: activity schedule adjusted  Sleep/Rest Enhancement: awakenings minimized; regular sleep/rest pattern promoted     Problem: Infection Risk (Stem Cell/Bone Marrow Transplant)  Goal: Absence of Infection Signs/Symptoms  6/23/2019 1829 by Marychuy Pink, RN  Outcome: Declining     Problem: Infection Risk (Stem Cell/Bone Marrow Transplant)  Goal: Absence of Infection Signs/Symptoms  Intervention: Prevent and Manage Infection  6/23/2019 1829 by Marychuy Pink, RN  Flowsheets (Taken 6/23/2019 1829)  Infection Management: aseptic technique maintained

## 2019-06-23 NOTE — PLAN OF CARE
"./90 (BP Location: Right arm)   Pulse 117   Temp 100.7  F (38.2  C) (Axillary)   Resp 16   Ht 1.735 m (5' 8.31\")   Wt 90 kg (198 lb 6.4 oz)   SpO2 97%   BMI 29.90 kg/m      Pt febrile, tmax 101.4ax. Prn Tylenol x2, blood Cx sent, MD aware. Ovss. Denies nausea. C/o neck/back pain/stiffness, received prn Oxycodone x1. Mivf-NS infusing at 100ml/hr. No replacements per AM labs. Cont to monitor and follow poc.     Problem: Adult Inpatient Plan of Care  Goal: Plan of Care Review  6/23/2019 0609 by German Henderson, RN  Outcome: No Change     Problem: Adult Inpatient Plan of Care  Goal: Optimal Comfort and Wellbeing  Outcome: No Change     Problem: Diarrhea (Stem Cell/Bone Marrow Transplant)  Goal: Diarrhea Symptom Control  Outcome: No Change     Problem: Hematologic Alteration (Stem Cell/Bone Marrow Transplant)  Goal: Blood Counts Within Acceptable Range  Outcome: No Change     Problem: Infection Risk (Stem Cell/Bone Marrow Transplant)  Goal: Absence of Infection Signs/Symptoms  6/23/2019 0609 by German Henderson, RN  Outcome: No Change     "

## 2019-06-23 NOTE — PROGRESS NOTES
[facility]    Transplant Infectious Diseases Inpatient Progress Note       El Ace MRN# [id number]   YOB: 1955 Age: 63 year old   Date of Admission: 5/24/2019  6:36 AM           Recommendations:   1. If fever recurs please check MRI of the C spine with contrast to rule out OM/discitis.   2. If fever recurs with unremarkable MRI of the neck, and CT of the chest, may check HHV-6.   3. Continue to monitor LFT as the Alk Phos is slowly rising.   4. Monitor rash on bilateral feet for possible GVHD vs viral illness.     Dr. Santiago will assume the patient's care on Monday.        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 On MMF/TAC for GVHD ppx.   With fever 6/4/2019 x1 that resolved.   Fever again 6/21/2019 and still ongoing.           Active Problems and Infectious Diseases Issues:   1. FUO.   The fever occurred 5-6 days after engraftment. I can not localize an infectious process, so I think an inflammatory process related to engraftment is possible.   The patient has chronic/recurring neck pain, but now it's more intense; with history of P aeruginosa BSI, MRI to rule out OM/discitis is reasonable.   Although the CXR showed new LLL atelectasis vs consolidation, the patient is afebrile, on voriconazole, and his ANC has already recovered, it would unusual to develop an opportunistic infection such as fungal infection when his ANC has actually recovered.   Another possibility is a viral illness such as HHV-6 after engraftment.   Recurring AML less likely with <1% blasts in recent BMBx.   I would continue to monitor LFT given possible viral illness or GVHD or voriconazole toxicity.         Old Problems and  Infectious Diseases Issues:   1. CDI in 3/2019, on suppressive vancomycin PO.   2. Proctitis.   3. MDR P aeruginosa BSI treated with ceftolozane/tazobactam (intermediate to ceftolozane/tazobactam). Attributed to proctitis.     Other Infectious Disease issues include:  - on ACV and voriconazole.   - PCP prophylaxis: will be started on bactrim.   - Serostatus: CMV+, EBV+, HSV1+/2+, VZV + on high dose ACV.       Attestation:  I interviewed the patient and obtained history from the patient, and by reviewing the patient's chart including outside records, microbiological data, and radiological data. All data are summarized in this notes.  Rosemary Ugalde   Pager: [phone]  06/23/2019        Interim History:  Since I last saw the patient, it seems that the neutrophilic engraftment occurred around 6/15/2019, the platelet engraftment around 6/22/2019.   Azithromycin PO for ppx was substituted for IV cefepime ON 6/16/2019.   The patient was ready to be discharged when started to spike fevers on 6/21/2019.     The patient has chronic neck pain, for which he sought the help of physical therapy in the past, however, the intensity of the pain has been worse lately and the pain is radiating through the vertebral column. No radiation to arms or legs. No exacerbating factors, it responds somewhat to OxyContin.   No diarrhea, no cough, no dyspnea, no N/V, no dysuria. He endorsed poor appetite and faint non-pruritic rash on bilateral feet.     HPI:  64 yo male with AML that did not respond to 7+3 which was complicated by CDI for which he received PO vancomycin then secondary ppx with 125 mg bid. He then received reinduction with venetoclax and decitabine. This was complicated by febrile neutropenia due to teeth abscesses s/p extraction of 2 teeth.   The course was then further complicated by proctitis and MDR P aeruginosa BSI on 5/4/2019. Treated with ceftolazone/tazobactam. However, after discharge the susceptibility came back  with resistance to aztreonam and intermediate NATHALY to ceftolozane/tazobactam. Since repeat blood cx were negative and since the patient was asymptomatic he was continued on the same till 5/24/2019.   The patient was then admitted to [facility] for elective VELMA haplo-HSCT day 0: 5/31/2019. On 6/4/2019 he spiked fever and ID were consulted. The patient was started on cefepime then switched to polymycin B given hx of MDR P aeruginosa pending more information. Within 24 hr he developed dizziness and Romberg sign was positive on physical exam. polymyxin B was discontinued, the patient's symptoms improved. There was a question whether that was truly polymyxin B neurotoxicity given that the patient lacked good night sleep the night before. I felt that the timing and Romberg sign were compelling enough to discontinue polymyxin B. The fever episode only occurred once, the patient was placed back on cefepime while neutropenic without recurring fever and infectious workup remained negative.       ROS:  As mentioned in the interim history otherwise negative by reviewing constitutional symptoms, central and peripheral neurological systems, respiratory system, cardiac system, GI system,  system, musculoskeletal, skin, allergy, and lymphatics.                 Pysical Examination:  Temp: 98.6  F (37  C) Temp src: Axillary BP: 118/83 Pulse: 117 Heart Rate: 102 Resp: 16 SpO2: 98 % O2 Device: None (Room air)    Vitals:    06/18/19 0847 06/19/19 1100 06/20/19 0900 06/21/19 0740   Weight: 90.1 kg (198 lb 11.2 oz) 89.4 kg (197 lb 1.6 oz) 90.8 kg (200 lb 3.2 oz) 89.9 kg (198 lb 4.8 oz)    06/22/19 0740   Weight: 90 kg (198 lb 6.4 oz)     Constitutional: awake, alert, cooperative, no respiratory distress but appears ills and tired, at stated age, well nourished.   Head, neck, Oral mucosa: moist, no ulcers, no thrush, no ulcers. The neck is stiff but not rigid, no tenderness palpating the C/T/L spine, no cervical/axillary/inguinal CRSECENCIO.   Chest:  CTA with decrease BS on the right but without egophony, dullness to percussion, or abnormal tactile fremitus.   CVS: tachycardic, regular, no murmur.   Abdomen: soft, not tender, not distended.   Extremities: no edema of BiLE, normal ROM of all joints, no swollen joints, no erythematous/tender/warm joints.   Skin: no induration, fluctuation, or discharge at the site of the [place] in the right chest wall. Faint telangectasia-like rash on the dorsum of both feet and discolored dark hands bilaterally.   Neurologic: Patient is moving all extremities without focal deficit, no focal sensory loss.  Genitalia: rectal external hemorrhoids.              Allergy:      Allergies   Allergen Reactions     Polymyxin B Other (See Comments)     Neurotoxicity, 6/5/19         Medications:  Medications that Require Transfusion:     dextrose 5% and 0.45% NaCl 100 mL/hr at 06/20/19 2331     sodium chloride 100 mL/hr at 06/23/19 0655   Scheduled Medications:     acyclovir  800 mg Oral 5x Daily     azithromycin  250 mg Oral Daily     heparin lock flush  5-10 mL Intracatheter Q24H     lidocaine  2 patch Transdermal Q24h    And     lidocaine   Transdermal Q24H    And     lidocaine   Transdermal Q8H     magnesium oxide  400 mg Oral BID     mycophenolate  1,000 mg Oral Q8H BMT     pantoprazole  40 mg Oral BID     [START ON 7/1/2019] sulfamethoxazole-trimethoprim  1 tablet Oral Q Mon Tues BID     tacrolimus  1 mg Oral Q12H ZUNILDA     tamsulosin  0.4 mg Oral Daily     ursodiol  300 mg Oral TID     voriconazole  200 mg Oral Q12H ZUNILDA       Metabolic Studies       Recent Labs   Lab Test 06/23/19  0423 06/22/19  0254 06/21/19  0821 06/21/19  0342 06/20/19  0400 06/19/19  0404  06/18/19  0417  06/17/19  0323  06/10/19  0418  04/29/19  2032 04/29/19  2030  03/31/19  0626    137  --  136 136 138  --  142  --  140   < > 140   < >  --   --    < > 134   POTASSIUM 4.0 4.3  --  4.0 3.8 3.7  --  4.1  --  3.9   < > 3.9   < >  --   --    < > 3.9   CHLORIDE  103 106  --  104 103 104  --  110*  --  109   < > 108   < >  --   --    < > 102   CO2 23 23  --  24 27 27  --  27  --  24   < > 25   < >  --   --    < > 22   ANIONGAP 8 8  --  8 7 6  --  5  --  7   < > 7   < >  --   --    < > 9   BUN 8 7  --  6* 8 8  --  7  --  6*   < > 12   < >  --   --    < > 12   CR 0.58* 0.60*  --  0.52* 0.58* 0.58*  --  0.65*  --  0.64*   < > 0.51*   < >  --   --    < > 0.75   GFRESTIMATED >90 >90  --  >90 >90 >90  --  >90  --  >90   < > >90   < >  --   --    < > >90   GLC 98 126*  --  116* 140* 132*  --  103*  --  94   < > 99   < >  --   --    < > 129*   A1C  --   --   --   --   --   --   --   --   --   --   --   --   --   --   --   --  6.0*   DEBBIE 8.4* 8.5  --  8.1* 8.4* 8.6  --  8.7  --  8.8   < > 8.6   < >  --   --    < > 8.0*   PHOS  --   --   --   --   --   --   --   --   --  3.5  --  3.4   < >  --   --    < > 2.9   MAG  --  1.6 2.3 1.1*  --  1.6   < > 1.5*   < > 1.2*   < > 1.8   < >  --   --    < > 2.2   LACT  --   --   --   --   --   --   --   --   --   --   --   --   --  1.2 1.5   < >  --     < > = values in this interval not displayed.       Hepatic Studies    Recent Labs   Lab Test 06/20/19  0400 06/17/19  0323 06/13/19  0233 06/10/19  0418 06/06/19  0308 06/03/19  0437  04/08/19  0352  04/05/19  0312   BILITOTAL 0.3 0.2 0.5 0.6 0.7 0.9   < > 1.8*   < > 1.8*   ALKPHOS 229* 216* 178* 143 128 125   < > 85   < > 75   ALBUMIN 3.0* 3.2* 3.2* 3.1* 2.8* 3.0*   < > 2.1*   < > 2.0*   AST 17 17 49* 12 19 16   < > 42   < > 422*   ALT 27 34 59 22 30 27   < > 117*   < > 302*   LDH  --   --   --   --   --   --   --  215  --  344*    < > = values in this interval not displayed.       Pancreatitis testing    Recent Labs   Lab Test 04/08/19  0352 04/03/19  0330 03/25/19  0541   LIPASE  --   --  50*   TRIG 203* 106  --        Hematology Studies      Recent Labs   Lab Test 06/23/19  0423 06/22/19  0254 06/21/19  0342 06/20/19  0400 06/19/19  0404 06/18/19  0417   WBC 9.6 10.3 9.1 7.9 9.6 8.5   ANEU 6.2  6.4 4.4 3.4 6.9 6.7   ALYM 0.2* 0.5* 0.3* 0.2* 0.0* 0.1*   TRACIE 2.9* 2.9* 3.8* 3.9* 2.6* 1.6*   AEOS 0.0 0.0 0.0 0.0 0.0 0.0   HGB 9.1* 9.6* 9.6* 10.1* 10.2* 10.0*   HCT 27.2* 28.0* 28.1* 30.1* 29.7* 29.5*   PLT 75* 68* 44* 45* 40* 44*       Arterial Blood Gas Testing    Recent Labs   Lab Test 05/10/19  1343 04/04/19  0201 04/03/19  2204 04/03/19  2037   PH 7.37  --   --   --    PCO2 34*  --   --   --    PO2 100  --   --   --    HCO3 19*  --   --   --    O2PER 21 4 4L 4L        Urine Studies     Recent Labs   Lab Test 06/21/19  1056 06/04/19  0313 05/10/19  1234 05/02/19  1330 04/29/19  2149   URINEPH 6.5 6.5 5.0 6.5 7.0   NITRITE Negative Negative Negative Negative Negative   LEUKEST Negative Negative Negative Negative Negative   WBCU 6* <1 2 1 1       Vancomycin Levels     Recent Labs   Lab Test 04/02/19  2142   VANCOMYCIN 8.4       Tacrolimus levels    Invalid input(s): TACROLIMUS, TAC, TACR  Transplant Immunosuppression Labs Latest Ref Rng & Units 6/23/2019 6/22/2019 6/21/2019 6/20/2019 6/19/2019   Tacro Level 5.0 - 15.0 ug/L - - 12.5 - 20.0(H)   Tacro Level - - - Not Provided - Not Provided   Creat 0.66 - 1.25 mg/dL 0.58(L) 0.60(L) 0.52(L) 0.58(L) 0.58(L)   BUN 7 - 30 mg/dL 8 7 6(L) 8 8   WBC 4.0 - 11.0 10e9/L 9.6 10.3 9.1 7.9 9.6   Neutrophil % 64.1 62.2 48.2 43.3 71.7   ANEU 1.6 - 8.3 10e9/L 6.2 6.4 4.4 3.4 6.9       CSF testing     Recent Labs   Lab Test 05/10/19  1645   CWBC 0   CRBC 1   CGLU 57   CTP 46         Microbiology:  Blood cx 6/4/2019 pending     Last check of C difficile  C Diff Toxin B PCR   Date Value Ref Range Status   03/26/2019 Positive (A) NEG^Negative Final     Comment:     Positive: Toxin producing Clostridium difficile target DNA sequences detected,   presumed positive for Clostridium difficile toxin B.  Clostridium difficile (Requires Enteric Isolation)  FDA approved assay performed using [employer] GeneXpert real-time PCR.  Critical Value/Significant Value called to and read back  by  JATINDER KAUR, RN [id number] 3.26.19 [facility]         Virology:  CMV viral loads  No results found for: 93267, 95602, 45476, 20159  CMV viral loads    Recent Labs   Lab Test 06/22/19  0254 06/15/19  0331   CSPEC EDTA PLASMA EDTA PLASMA   CMVLOG Not Calculated Not Calculated       CMV viral loads    Log IU/mL of CMVQNT   Date Value Ref Range Status   06/22/2019 Not Calculated <2.1 [Log_IU]/mL Final   06/15/2019 Not Calculated <2.1 [Log_IU]/mL Final   06/08/2019 Not Calculated <2.1 [Log_IU]/mL Final   06/01/2019 Not Calculated <2.1 [Log_IU]/mL Final   05/25/2019 Not Calculated <2.1 [Log_IU]/mL Final   05/02/2019 Not Calculated <2.1 [Log_IU]/mL Final   03/31/2019 Not Calculated <2.1 [Log_IU]/mL Final       CMV resistance testing  No lab results found.  No results found for: CMVCID, CMVFOS, CMVGAN     No results found for: H6RES    EBV DNA Copies/mL   Date Value Ref Range Status   05/02/2019 EBV DNA Not Detected EBVNEG^EBV DNA Not Detected [Copies]/mL Final   03/31/2019 1,186 (A) EBVNEG^EBV DNA Not Detected [Copies]/mL Final         Pathology   BMBx 6/19/2019  FINAL DIAGNOSIS:   Bone marrow, right posterior iliac crest, decalcified trephine biopsy,   touch imprint, particle crush, direct   aspirate smear, concentrated aspirate smear, and peripheral blood smear:     - Normocellular marrow (cellularity estimated at 50%) with trilineage   hematopoiesis showing mild myeloid   predominance with left shift, no significant dysplasia, less than 1%   blasts.     - Peripheral blood showing moderate normochromic normocytic anemia, and   marked thrombocytopenia.      Imaging:  CXR 6/23/2019 reviewed by myself  IMPRESSION: Low lung volumes with left retrocardiac airspace opacity  and mild interstitial opacities, infection versus atelectasis versus  pulmonary edema.  CXR 6/4/2019   IMPRESSION: Clear lungs.    MRI brain 6/19/2019  Impression:  1. No evidence for posterior reversible encephalopathy syndrome.  2. Generalized cerebral atrophy  and mild Leukoaraiosis.      CT abdomen and pelvis 5/4/2019   IMPRESSION:   1. Increased soft tissue edema in the perianal soft tissues, raising  the concern for proctitis. No evidence of abscess or fluid collection.  2. Decreased bowel wall thickening of the right colon compared to  3/29/2019.         Rosemary Ugalde  Pager: [phone]

## 2019-06-23 NOTE — PROGRESS NOTES
"BMT Daily Progress Note       Mr. Ace 63 y.o hx of AML. Admitted for VELMA Haplo. Currently day +223    Overnight events: Neck pain back again, spiked another fever.  Notes fatigue.  Working on eating.  Does not feel very well when he walks.     Review of Systems: 10 point ROS negative except as noted above.  Physical Exam:   Blood pressure 138/85, pulse 115, temperature 101.1  F (38.4  C), temperature source Axillary, resp. rate 16, height 1.735 m (5' 8.31\"), weight 89.6 kg (197 lb 9.6 oz), SpO2 97 %.  General: NAD, interactive   Eyes: JEROMY, sclera anicteric   Nose/Mouth/Throat: OP clear, buccal mucosa dry but without ulcerations. No blood noted in the nares.    Lungs: normal effort at rest  Cardiovascular: tachycardic  Abdominal/Rectal: +BS, soft, NT, ND  Lymphatics: no edema  Skin: no rashes or petechaie  Neuro: A&O.  CN II-XII grossly intact, equal  strength bilaterally, smile is symmetric.   Additional Findings: [facility] c/d/i    Labs:  Lab Results   Component Value Date    WBC 9.6 06/23/2019    ANEU 6.2 06/23/2019    HGB 9.1 (L) 06/23/2019    HCT 27.2 (L) 06/23/2019    PLT 75 (L) 06/23/2019     06/23/2019    POTASSIUM 4.0 06/23/2019    CHLORIDE 103 06/23/2019    CO2 23 06/23/2019    GLC 98 06/23/2019    BUN 8 06/23/2019    CR 0.58 (L) 06/23/2019    MAG 1.6 06/22/2019    INR 1.09 06/17/2019     Imaging: Reviewed  Microbiology:  Reviewed    Assessment and Plan:   El Ace is a 63 year old male with hx of AML (IDH2, RUNX1 pos), currently day +23 of flu/cy/TBI and related haplo BMT.     5/25 (day -6): flu/cy  5/26 (day -5): flu  5/27 (day -4): flu   5/28 (day -3): flu  5/29 (day -2): TBI/flu  5/30 (day -1): TBI  5/31 (day 0): Transplant. No ABO mismatch - both A+.      1.  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   - GCSF started d+5 and completed 6/16/19.   - Re-stage per protocol.  -Day +21 bmbx - completed 6/19 ( 2 days early to simplify first clinic visit). In remission and 100% donor.       2.  " HEME: Keep Hgb>8g/dL and plts>10K.   - on MiPlate study- no current evidence of bleeding.   - Needs daily bleeding assessments. No bleeding to date.                              3.  ID: Afebrile  -6/4-6/6  BC negative, Culture negative neutropenic fever- s/p empiric cefepime 6/4-6/16.   -Prophylaxis with HD ACV - 6/15 CMV neg.   Fungal prophy: continue Vfend 200mg BID (does have some flashes of light when falling asleep- currently not bothersome to pt).    Bactrim or appropriate PCP therapy to start d+28.   - Hx of Cdiff had been on PO vanco prophy BID however stopped 6/18 as off broad spectrum abx.   - Recurrent fever along with neck pain, appreciate ID evaluation.  Unclear if these are related but will proceed to MRI neck and CT chest. Cultures have been negative and he is off empiric antibiotics as he has a normal ANC.                     4.  GI: No NVD   -  Prn imodium- controlling loose stools well.   - zofran ODT prn.   -  GI prophy - protonox BID (GERD symptoms)   - Ativan and compazine, zofran available PRN break-through symptoms.   - Ursodiol for VOD prophy.     5.  GVH: S/P post-txp cytoxan,   - tac/MMF were started on day +5. No s/sx GVH to date.  - 6/14 Tacro level 11.7, changed to PO 6/15 2.5 mg BID.   - 6/17 tac high 20.1- dose decreasedt o 1.5mg bid  - 6/19 tac level still high at 20.0 despite skipping AM dose- decreased to 1mg BID.   - 6/21 level: 12.5- keep tac 1mg daily.   - MRI 6/20 (throbbing head/neck pain) negative for PREs   -MMF 1000mg TID through day +35    6.  FEN/Renal: Monitor creat and lytes.   - Cr stable.   - Replete lytes PRN per SS.   - tac induced hypomag: mag ox 400mg BID     7.  CV:   - CI induced HTN:  stopped norvasc d/t orthostasis.   - Orthostatics improved. But given 1L bolus for softer pressures (autonomic dysfunction)  - Afebrile and lactic acid 0.8. Symptoms improved but BP still orthostatic. Maintenance fluids at 125cc per hour.     8. Neuro:  6/19:New occipital throbbing  and ongoing neck pain. Physical exam re-assuring; Head CT 6/19 neg, brain MRI with and without contrast neg for PRES 6/19. Likely musculoskeletal. Flexeril prn.   - continue lidocaine 12 on 12 off.     Had planned for hospital discharge but will keep overnight due to persistent unsteadiness and low-grade fever.  - No true muscle weakness, Is the MSK/arithric in nature with stiffness and deconditioning? Trial of pred 20mg daily yesterday, seems a little better, but will keep monitoring and work more with PT today.  _______________________________________________    BMT ATTENDING NOTE    I have seen and personally evaluated the patient.  I reviewed vitals, medications, laboratory results, and I viewed imaging studies.  After doing so, I formulated a plan as documented in this note with the care team and patient today.     El Ace is a 63 year old male with AML, admitted on 5/24/2019 for VELMA haploidentical BMT, and remains hospitalized pending satisfactory recovery.      El ongoing neck pain and recurrent fevers.  Trying to eat and walk but does not feel well yet.    On exam, he is pleasant, interactive, sclerae anicteric, cranial nerves grossly intact, neck range of motion improving, no oral mucosal lesions, normal respiratory effort, no JVD, normal perfusion, abdomen non-distended, skin without rash, no edema, normal affect.      Overall, our plan is to continue support him as he recovers from his transplant.  CXR and panculture due to fever are essentially negative, but hold empiric antibiotics as ANC adequate. Appreciate ID consult, will proceed to neck MRI and CT chest with multiple fever recurrences over the weekend. He is dehydrated, and will continue intravenous fluids for orthostatic hypotension. Alk phos rising; will fractionate and check CK. His blood counts are recovering. We will assure that he continues to eat well.  Adjust tacrolimus dose.  Remainder of supportive care as above.  Discussed  this assessment and plan in detail with patient and team today.      Roseline Jama MD    This note was created with voice recognition software.  Please notify me of any transcriptional errors.

## 2019-06-24 ENCOUNTER — APPOINTMENT (OUTPATIENT)
Dept: INTERVENTIONAL RADIOLOGY/VASCULAR | Facility: CLINIC | Age: 64
DRG: 014 | End: 2019-06-24
Attending: NURSE PRACTITIONER
Payer: COMMERCIAL

## 2019-06-24 ENCOUNTER — APPOINTMENT (OUTPATIENT)
Dept: PHYSICAL THERAPY | Facility: CLINIC | Age: 64
DRG: 014 | End: 2019-06-24
Attending: INTERNAL MEDICINE
Payer: COMMERCIAL

## 2019-06-24 LAB
ALBUMIN SERPL-MCNC: 2.6 G/DL (ref 3.4–5)
ALP SERPL-CCNC: 156 U/L (ref 40–150)
ALT SERPL W P-5'-P-CCNC: 35 U/L (ref 0–70)
ANION GAP SERPL CALCULATED.3IONS-SCNC: 8 MMOL/L (ref 3–14)
ANISOCYTOSIS BLD QL SMEAR: SLIGHT
AST SERPL W P-5'-P-CCNC: 26 U/L (ref 0–45)
BASOPHILS # BLD AUTO: 0 10E9/L (ref 0–0.2)
BASOPHILS NFR BLD AUTO: 0 %
BILIRUB DIRECT SERPL-MCNC: 0.1 MG/DL (ref 0–0.2)
BILIRUB SERPL-MCNC: 0.4 MG/DL (ref 0.2–1.3)
BUN SERPL-MCNC: 6 MG/DL (ref 7–30)
CALCIUM SERPL-MCNC: 8.3 MG/DL (ref 8.5–10.1)
CHLORIDE SERPL-SCNC: 103 MMOL/L (ref 94–109)
CK SERPL-CCNC: 16 U/L (ref 30–300)
CO2 SERPL-SCNC: 22 MMOL/L (ref 20–32)
CREAT SERPL-MCNC: 0.61 MG/DL (ref 0.66–1.25)
CRYPTOC AG SPEC QL: NORMAL
DIFFERENTIAL METHOD BLD: ABNORMAL
EBV DNA # SPEC NAA+PROBE: NORMAL {COPIES}/ML
EBV DNA SPEC NAA+PROBE-LOG#: NORMAL {LOG_COPIES}/ML
EOSINOPHIL # BLD AUTO: 0 10E9/L (ref 0–0.7)
EOSINOPHIL NFR BLD AUTO: 0 %
ERYTHROCYTE [DISTWIDTH] IN BLOOD BY AUTOMATED COUNT: 15.3 % (ref 10–15)
EV RNA SPEC QL NAA+PROBE: NEGATIVE
GFR SERPL CREATININE-BSD FRML MDRD: >90 ML/MIN/{1.73_M2}
GLUCOSE CSF-MCNC: 67 MG/DL (ref 40–70)
GLUCOSE SERPL-MCNC: 103 MG/DL (ref 70–99)
GRAM STN SPEC: NORMAL
HADV DNA SPEC QL NAA+PROBE: NOT DETECTED
HCT VFR BLD AUTO: 26.4 % (ref 40–53)
HGB BLD-MCNC: 8.9 G/DL (ref 13.3–17.7)
HHV6 DNA # SPEC NAA+PROBE: NORMAL COPIES/ML
HHV6 DNA SPEC NAA+PROBE-LOG#: NORMAL LOG COPIES/ML
INR PPP: 1.15 (ref 0.86–1.14)
LACTATE BLD-SCNC: 0.6 MMOL/L (ref 0.7–2)
LYMPHOCYTES # BLD AUTO: 0.1 10E9/L (ref 0.8–5.3)
LYMPHOCYTES NFR BLD AUTO: 0.9 %
MAGNESIUM SERPL-MCNC: 1.1 MG/DL (ref 1.6–2.3)
MAGNESIUM SERPL-MCNC: 2.3 MG/DL (ref 1.6–2.3)
MCH RBC QN AUTO: 30.7 PG (ref 26.5–33)
MCHC RBC AUTO-ENTMCNC: 33.7 G/DL (ref 31.5–36.5)
MCV RBC AUTO: 91 FL (ref 78–100)
METAMYELOCYTES # BLD: 0.1 10E9/L
METAMYELOCYTES NFR BLD MANUAL: 0.9 %
MONOCYTES # BLD AUTO: 2.9 10E9/L (ref 0–1.3)
MONOCYTES NFR BLD AUTO: 33.9 %
NEUTROPHILS # BLD AUTO: 5.5 10E9/L (ref 1.6–8.3)
NEUTROPHILS NFR BLD AUTO: 64.3 %
OVALOCYTES BLD QL SMEAR: SLIGHT
PHOSPHATE SERPL-MCNC: 3.2 MG/DL (ref 2.5–4.5)
PLATELET # BLD AUTO: 76 10E9/L (ref 150–450)
PLATELET # BLD EST: ABNORMAL 10*3/UL
POIKILOCYTOSIS BLD QL SMEAR: SLIGHT
POTASSIUM SERPL-SCNC: 3.7 MMOL/L (ref 3.4–5.3)
PROT CSF-MCNC: 45 MG/DL (ref 15–60)
PROT SERPL-MCNC: 6.4 G/DL (ref 6.8–8.8)
RBC # BLD AUTO: 2.9 10E12/L (ref 4.4–5.9)
SODIUM SERPL-SCNC: 133 MMOL/L (ref 133–144)
SPECIMEN SOURCE: NORMAL
TACROLIMUS BLD-MCNC: 18.3 UG/L (ref 5–15)
TME LAST DOSE: ABNORMAL H
WBC # BLD AUTO: 8.6 10E9/L (ref 4–11)

## 2019-06-24 PROCEDURE — 80053 COMPREHEN METABOLIC PANEL: CPT | Performed by: PHYSICIAN ASSISTANT

## 2019-06-24 PROCEDURE — 87899 AGENT NOS ASSAY W/OPTIC: CPT | Performed by: PHYSICIAN ASSISTANT

## 2019-06-24 PROCEDURE — 84157 ASSAY OF PROTEIN OTHER: CPT | Performed by: PHYSICIAN ASSISTANT

## 2019-06-24 PROCEDURE — 87798 DETECT AGENT NOS DNA AMP: CPT | Performed by: PHYSICIAN ASSISTANT

## 2019-06-24 PROCEDURE — 82040 ASSAY OF SERUM ALBUMIN: CPT | Performed by: PHYSICIAN ASSISTANT

## 2019-06-24 PROCEDURE — 83605 ASSAY OF LACTIC ACID: CPT | Performed by: PHYSICIAN ASSISTANT

## 2019-06-24 PROCEDURE — 85610 PROTHROMBIN TIME: CPT | Performed by: PHYSICIAN ASSISTANT

## 2019-06-24 PROCEDURE — 89050 BODY FLUID CELL COUNT: CPT | Performed by: PHYSICIAN ASSISTANT

## 2019-06-24 PROCEDURE — 20600000 ZZH R&B BMT

## 2019-06-24 PROCEDURE — 25000131 ZZH RX MED GY IP 250 OP 636 PS 637: Performed by: PHYSICIAN ASSISTANT

## 2019-06-24 PROCEDURE — 87070 CULTURE OTHR SPECIMN AEROBIC: CPT | Performed by: PHYSICIAN ASSISTANT

## 2019-06-24 PROCEDURE — 87205 SMEAR GRAM STAIN: CPT | Performed by: PHYSICIAN ASSISTANT

## 2019-06-24 PROCEDURE — 00000102 ZZHCL STATISTIC CYTO WRIGHT STAIN TC: Performed by: PHYSICIAN ASSISTANT

## 2019-06-24 PROCEDURE — 87103 BLOOD FUNGUS CULTURE: CPT | Performed by: PHYSICIAN ASSISTANT

## 2019-06-24 PROCEDURE — 87529 HSV DNA AMP PROBE: CPT | Performed by: PHYSICIAN ASSISTANT

## 2019-06-24 PROCEDURE — 25000125 ZZHC RX 250: Performed by: STUDENT IN AN ORGANIZED HEALTH CARE EDUCATION/TRAINING PROGRAM

## 2019-06-24 PROCEDURE — 85025 COMPLETE CBC W/AUTO DIFF WBC: CPT | Performed by: PHYSICIAN ASSISTANT

## 2019-06-24 PROCEDURE — 77003 FLUOROGUIDE FOR SPINE INJECT: CPT

## 2019-06-24 PROCEDURE — 25800030 ZZH RX IP 258 OP 636: Performed by: INTERNAL MEDICINE

## 2019-06-24 PROCEDURE — 82042 OTHER SOURCE ALBUMIN QUAN EA: CPT | Performed by: PHYSICIAN ASSISTANT

## 2019-06-24 PROCEDURE — 97116 GAIT TRAINING THERAPY: CPT | Mod: GP

## 2019-06-24 PROCEDURE — 25000132 ZZH RX MED GY IP 250 OP 250 PS 637: Performed by: NURSE PRACTITIONER

## 2019-06-24 PROCEDURE — 87102 FUNGUS ISOLATION CULTURE: CPT | Performed by: PHYSICIAN ASSISTANT

## 2019-06-24 PROCEDURE — 88108 CYTOPATH CONCENTRATE TECH: CPT | Performed by: PHYSICIAN ASSISTANT

## 2019-06-24 PROCEDURE — 25000128 H RX IP 250 OP 636: Performed by: INTERNAL MEDICINE

## 2019-06-24 PROCEDURE — 87015 SPECIMEN INFECT AGNT CONCNTJ: CPT | Performed by: PHYSICIAN ASSISTANT

## 2019-06-24 PROCEDURE — 83916 OLIGOCLONAL BANDS: CPT | Performed by: PHYSICIAN ASSISTANT

## 2019-06-24 PROCEDURE — 97530 THERAPEUTIC ACTIVITIES: CPT | Mod: GP

## 2019-06-24 PROCEDURE — 83735 ASSAY OF MAGNESIUM: CPT | Performed by: PHYSICIAN ASSISTANT

## 2019-06-24 PROCEDURE — 009U3ZX DRAINAGE OF SPINAL CANAL, PERCUTANEOUS APPROACH, DIAGNOSTIC: ICD-10-PCS | Performed by: RADIOLOGY

## 2019-06-24 PROCEDURE — 84080 ASSAY ALKALINE PHOSPHATASES: CPT | Performed by: PHYSICIAN ASSISTANT

## 2019-06-24 PROCEDURE — 87040 BLOOD CULTURE FOR BACTERIA: CPT | Performed by: PHYSICIAN ASSISTANT

## 2019-06-24 PROCEDURE — 25000128 H RX IP 250 OP 636: Performed by: PHYSICIAN ASSISTANT

## 2019-06-24 PROCEDURE — 87799 DETECT AGENT NOS DNA QUANT: CPT | Performed by: PHYSICIAN ASSISTANT

## 2019-06-24 PROCEDURE — 25000132 ZZH RX MED GY IP 250 OP 250 PS 637: Performed by: PHYSICIAN ASSISTANT

## 2019-06-24 PROCEDURE — 87999 UNLISTED MICROBIOLOGY PX: CPT | Performed by: PHYSICIAN ASSISTANT

## 2019-06-24 PROCEDURE — 82784 ASSAY IGA/IGD/IGG/IGM EACH: CPT | Performed by: PHYSICIAN ASSISTANT

## 2019-06-24 PROCEDURE — 80197 ASSAY OF TACROLIMUS: CPT | Performed by: PHYSICIAN ASSISTANT

## 2019-06-24 PROCEDURE — 87498 ENTEROVIRUS PROBE&REVRS TRNS: CPT | Performed by: PHYSICIAN ASSISTANT

## 2019-06-24 PROCEDURE — 27210903 ZZH KIT CR5

## 2019-06-24 PROCEDURE — 82248 BILIRUBIN DIRECT: CPT | Performed by: PHYSICIAN ASSISTANT

## 2019-06-24 PROCEDURE — 25000131 ZZH RX MED GY IP 250 OP 636 PS 637: Performed by: NURSE PRACTITIONER

## 2019-06-24 PROCEDURE — 82550 ASSAY OF CK (CPK): CPT | Performed by: PHYSICIAN ASSISTANT

## 2019-06-24 PROCEDURE — 82945 GLUCOSE OTHER FLUID: CPT | Performed by: PHYSICIAN ASSISTANT

## 2019-06-24 PROCEDURE — 84100 ASSAY OF PHOSPHORUS: CPT | Performed by: PHYSICIAN ASSISTANT

## 2019-06-24 PROCEDURE — 84999 UNLISTED CHEMISTRY PROCEDURE: CPT | Performed by: PHYSICIAN ASSISTANT

## 2019-06-24 PROCEDURE — 83873 ASSAY OF CSF PROTEIN: CPT | Performed by: PHYSICIAN ASSISTANT

## 2019-06-24 PROCEDURE — 87533 HHV-6 DNA QUANT: CPT | Performed by: PHYSICIAN ASSISTANT

## 2019-06-24 PROCEDURE — 25000125 ZZHC RX 250

## 2019-06-24 RX ORDER — HYDROMORPHONE HYDROCHLORIDE 1 MG/ML
.5-1 INJECTION, SOLUTION INTRAMUSCULAR; INTRAVENOUS; SUBCUTANEOUS ONCE
Status: COMPLETED | OUTPATIENT
Start: 2019-06-24 | End: 2019-06-25

## 2019-06-24 RX ORDER — TACROLIMUS 0.5 MG/1
0.5 CAPSULE ORAL EVERY 12 HOURS SCHEDULED
Status: DISCONTINUED | OUTPATIENT
Start: 2019-06-25 | End: 2019-06-27 | Stop reason: HOSPADM

## 2019-06-24 RX ORDER — LIDOCAINE HYDROCHLORIDE 10 MG/ML
5 INJECTION, SOLUTION EPIDURAL; INFILTRATION; INTRACAUDAL; PERINEURAL ONCE
Status: COMPLETED | OUTPATIENT
Start: 2019-06-24 | End: 2019-06-24

## 2019-06-24 RX ORDER — LIDOCAINE HYDROCHLORIDE 10 MG/ML
INJECTION, SOLUTION EPIDURAL; INFILTRATION; INTRACAUDAL; PERINEURAL
Status: COMPLETED
Start: 2019-06-24 | End: 2019-06-24

## 2019-06-24 RX ORDER — LIDOCAINE HYDROCHLORIDE 10 MG/ML
1-30 INJECTION, SOLUTION EPIDURAL; INFILTRATION; INTRACAUDAL; PERINEURAL
Status: COMPLETED | OUTPATIENT
Start: 2019-06-24 | End: 2019-06-24

## 2019-06-24 RX ADMIN — LORAZEPAM 1 MG: 2 INJECTION, SOLUTION INTRAMUSCULAR; INTRAVENOUS at 10:03

## 2019-06-24 RX ADMIN — VORICONAZOLE 200 MG: 200 TABLET, FILM COATED ORAL at 19:46

## 2019-06-24 RX ADMIN — ACYCLOVIR 800 MG: 800 TABLET ORAL at 21:45

## 2019-06-24 RX ADMIN — PANTOPRAZOLE SODIUM 40 MG: 40 TABLET, DELAYED RELEASE ORAL at 07:47

## 2019-06-24 RX ADMIN — VANCOMYCIN HYDROCHLORIDE 1750 MG: 10 INJECTION, POWDER, LYOPHILIZED, FOR SOLUTION INTRAVENOUS at 23:42

## 2019-06-24 RX ADMIN — VORICONAZOLE 200 MG: 200 TABLET, FILM COATED ORAL at 07:47

## 2019-06-24 RX ADMIN — MAGNESIUM SULFATE HEPTAHYDRATE 4 G: 40 INJECTION, SOLUTION INTRAVENOUS at 06:08

## 2019-06-24 RX ADMIN — OXYCODONE HYDROCHLORIDE 5 MG: 5 TABLET ORAL at 21:45

## 2019-06-24 RX ADMIN — LIDOCAINE HYDROCHLORIDE 5 ML: 10 INJECTION, SOLUTION EPIDURAL; INFILTRATION; INTRACAUDAL; PERINEURAL at 16:34

## 2019-06-24 RX ADMIN — MYCOPHENOLATE MOFETIL 1000 MG: 500 TABLET, FILM COATED ORAL at 06:08

## 2019-06-24 RX ADMIN — LIDOCAINE HYDROCHLORIDE 6 ML: 10 INJECTION, SOLUTION EPIDURAL; INFILTRATION; INTRACAUDAL; PERINEURAL at 10:05

## 2019-06-24 RX ADMIN — ACETAMINOPHEN 650 MG: 325 TABLET, FILM COATED ORAL at 11:51

## 2019-06-24 RX ADMIN — MAGNESIUM OXIDE TAB 400 MG (241.3 MG ELEMENTAL MG) 400 MG: 400 (241.3 MG) TAB at 19:46

## 2019-06-24 RX ADMIN — AZITHROMYCIN 250 MG: 250 TABLET, FILM COATED ORAL at 07:47

## 2019-06-24 RX ADMIN — SODIUM CHLORIDE: 9 INJECTION, SOLUTION INTRAVENOUS at 04:52

## 2019-06-24 RX ADMIN — ACYCLOVIR 800 MG: 800 TABLET ORAL at 14:15

## 2019-06-24 RX ADMIN — VANCOMYCIN HYDROCHLORIDE 1750 MG: 10 INJECTION, POWDER, LYOPHILIZED, FOR SOLUTION INTRAVENOUS at 12:28

## 2019-06-24 RX ADMIN — ACYCLOVIR 800 MG: 800 TABLET ORAL at 07:47

## 2019-06-24 RX ADMIN — ACYCLOVIR 800 MG: 800 TABLET ORAL at 11:04

## 2019-06-24 RX ADMIN — SODIUM CHLORIDE, PRESERVATIVE FREE 5 ML: 5 INJECTION INTRAVENOUS at 04:00

## 2019-06-24 RX ADMIN — URSODIOL 300 MG: 300 CAPSULE ORAL at 07:47

## 2019-06-24 RX ADMIN — CEFEPIME 2 G: 2 INJECTION, POWDER, FOR SOLUTION INTRAVENOUS at 11:39

## 2019-06-24 RX ADMIN — PANTOPRAZOLE SODIUM 40 MG: 40 TABLET, DELAYED RELEASE ORAL at 19:45

## 2019-06-24 RX ADMIN — URSODIOL 300 MG: 300 CAPSULE ORAL at 14:15

## 2019-06-24 RX ADMIN — CEFEPIME 2 G: 2 INJECTION, POWDER, FOR SOLUTION INTRAVENOUS at 19:45

## 2019-06-24 RX ADMIN — TACROLIMUS 1 MG: 1 CAPSULE ORAL at 07:47

## 2019-06-24 RX ADMIN — MYCOPHENOLATE MOFETIL 1000 MG: 500 TABLET, FILM COATED ORAL at 14:15

## 2019-06-24 RX ADMIN — URSODIOL 300 MG: 300 CAPSULE ORAL at 19:46

## 2019-06-24 RX ADMIN — ACETAMINOPHEN 650 MG: 325 TABLET, FILM COATED ORAL at 19:45

## 2019-06-24 RX ADMIN — MAGNESIUM OXIDE TAB 400 MG (241.3 MG ELEMENTAL MG) 400 MG: 400 (241.3 MG) TAB at 07:47

## 2019-06-24 RX ADMIN — ACETAMINOPHEN 650 MG: 325 TABLET, FILM COATED ORAL at 04:52

## 2019-06-24 RX ADMIN — MYCOPHENOLATE MOFETIL 1000 MG: 500 TABLET, FILM COATED ORAL at 21:45

## 2019-06-24 RX ADMIN — ACYCLOVIR 800 MG: 800 TABLET ORAL at 18:06

## 2019-06-24 RX ADMIN — TAMSULOSIN HYDROCHLORIDE 0.4 MG: 0.4 CAPSULE ORAL at 07:47

## 2019-06-24 RX ADMIN — SODIUM CHLORIDE: 9 INJECTION, SOLUTION INTRAVENOUS at 19:51

## 2019-06-24 ASSESSMENT — PAIN DESCRIPTION - DESCRIPTORS
DESCRIPTORS: DISCOMFORT;THROBBING
DESCRIPTORS: OTHER (COMMENT)
DESCRIPTORS: DISCOMFORT;THROBBING
DESCRIPTORS: DISCOMFORT;THROBBING

## 2019-06-24 ASSESSMENT — MIFFLIN-ST. JEOR: SCORE: 1673.84

## 2019-06-24 ASSESSMENT — ACTIVITIES OF DAILY LIVING (ADL)
ADLS_ACUITY_SCORE: 14

## 2019-06-24 NOTE — PROGRESS NOTES
Patient Name: El Ace  Medical Record Number: [id number]  Today's Date: 6/24/2019    Procedure: Lumbar Puncture  Proceduralist: Dr. Giraldo and Dr. Noel    Procedure start time: 1625  Puncture time: 1628  Procedure end time: 1644    Report given to: 5C RN    Other Notes: Pt arrived to IR room 5 from . Consent reviewed. Pt denies any questions or concerns regarding procedure. Pt positioned left side down and monitored per protocol. Pt tolerated procedure without any noted complications. Labs sent. Pt transferred back to .

## 2019-06-24 NOTE — PROCEDURES
Interventional Radiology Brief Post Procedure Note    Procedure: IR LUMBAR PUNCTURE    Proceduralist: Chester Giraldo MD    Assistant: Joe Noel MD and None    Time Out: Prior to the start of the procedure and with procedural staff participation, I verbally confirmed the patient s identity using two indicators, relevant allergies, that the procedure was appropriate and matched the consent or emergent situation, and that the correct equipment/implants were available. Immediately prior to starting the procedure I conducted the Time Out with the procedural staff and re-confirmed the patient s name, procedure, and site/side. (The Joint Commission universal protocol was followed.)  Yes    Medications   Medication Event Details Admin User Admin Time   lidocaine (PF) (XYLOCAINE) 1 % injection 1-30 mL Medication Given by Other Clinician Dose: 5 mL; Route: Subcutaneous Dose, IMLO Barroso 6/24/2019  4:34 PM       Sedation: None. Local Anesthestic used    Findings: 11 ml clear CSF aspirated.     Estimated Blood Loss: Minimal    Fluoroscopy Time:  4.3 minute(s)    SPECIMENS: Fluid and/or tissue for laboratory analysis and culture    Complications: 1. None     Condition: Stable    Plan: Return to floor. Bedrest with bathroom privileges the rest of the day.    Comments: See dictated procedure note for full details.    Joe Noel MD

## 2019-06-24 NOTE — PLAN OF CARE
Discharge Planner PT  PT 5C  Patient plan for discharge: Connie Madill with spouse  Current status: Pt reports continued neck/upper back muscle tightness and pain and demonstrates decreased cervical AROM. Pt transferred supine to sitting with SBA of 1 to cue for maintaining spinal precautions. Spouse brought pt's foot orthotics to the hospital so pt wore shoes with orthotics to manage foot pain. This improved his gait pattern so his foot was flat when ambulating and he was not walking on the lateral edges of his feet. Pt ambulated with FWW and SBA of 1 ~ 550'. Legs fatigue during ambulation but he did not need a rest break during ambulation.  Barriers to return to prior living situation: Medical condition, decreased ambulatory ability, decreased functional mobility.  Recommendations for discharge: Connie Madill with spouse and OP PT depending on pt's progress during hospital stay.  Rationale for recommendations: Continued rehab needed to progress with functional mobility, cervical ROM, ambulation, and functional endurance.       Entered by: Remy Pimentel 06/24/2019 3:33 PM

## 2019-06-24 NOTE — PROGRESS NOTES
"BMT Daily Progress Note       Mr. Ace 63 y.o hx of AML. Admitted for VELMA Haplo. Currently day +24    Overnight events: Neck pain, stiffness present - maybe slightly improved from its owrse but still limited ROM in nodding up and down and side to side. He denies any headache. No focal symptom aside from neck pain to explain fevers. He is eating and drinking some. No abdominal pain. Less complaints of feeling whoosy/dizzy on his feed. He has no new complaints today.     Review of Systems: 10 point ROS negative except as noted above.  Physical Exam:   Blood pressure 91/66, pulse 104, temperature 97.7  F (36.5  C), temperature source Axillary, resp. rate 18, height 1.735 m (5' 8.31\"), weight 90.4 kg (199 lb 6.4 oz), SpO2 95 %.  General: NAD, interactive   Eyes: JEROMY, sclera anicteric   Nose/Mouth/Throat: OP clear, buccal mucosa dry but without ulcerations. No blood noted in the nares.    Lungs: normal effort at rest  Cardiovascular: tachycardic  Abdominal/Rectal: +BS, soft, NT, ND  Lymphatics: no edema  Skin: no rashes or petechaie  Neuro: A&O.  CN II-XII grossly intact, equal  strength bilaterally, smile is symmetric.   Additional Findings: [facility] c/d/i    Labs:  Lab Results   Component Value Date    WBC 8.6 06/24/2019    ANEU 5.5 06/24/2019    HGB 8.9 (L) 06/24/2019    HCT 26.4 (L) 06/24/2019    PLT 76 (L) 06/24/2019     06/24/2019    POTASSIUM 3.7 06/24/2019    CHLORIDE 103 06/24/2019    CO2 22 06/24/2019     (H) 06/24/2019    BUN 6 (L) 06/24/2019    CR 0.61 (L) 06/24/2019    MAG 1.1 (L) 06/24/2019    INR 1.15 (H) 06/24/2019     Imaging: Reviewed  Microbiology:  Reviewed    Assessment and Plan:   El Ace is a 63 year old male with hx of AML (IDH2, RUNX1 pos), currently day +23 of flu/cy/TBI and related haplo BMT.     5/25 (day -6): flu/cy  5/26 (day -5): flu  5/27 (day -4): flu   5/28 (day -3): flu  5/29 (day -2): TBI/flu  5/30 (day -1): TBI  5/31 (day 0): Transplant. No ABO mismatch " - both A+.      1.  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   - GCSF started d+5 and completed 6/16/19.   - Re-stage per protocol.  -Day +21 bmbx - completed 6/19 ( 2 days early to simplify first clinic visit). In remission and 100% donor.       2.  HEME: Keep Hgb>8g/dL and plts>10K.   - on MiPlate study- no current evidence of bleeding.   - Needs daily bleeding assessments. No bleeding to date.                              3.  ID: Afebrile  -6/4-6/6  BC negative, Culture negative neutropenic fever- s/p empiric cefepime 6/4-6/16.   -Prophylaxis with HD ACV - 6/15 CMV neg.   Fungal prophy: continue Vfend 200mg BID (does have some flashes of light when falling asleep- currently not bothersome to pt).    Bactrim or appropriate PCP therapy to start d+28.   - Hx of Cdiff had been on PO vanco prophy BID however stopped 6/18 as off broad spectrum abx.   - Recurrent fever along with neck pain, appreciate ID evaluation.  Unclear if these are related but will proceed to MRI neck (failed yesterday- will get today with premed IV dilaudid as too painful to lay flat).   - 6/24 Obtain LP with IR guidance - rule out viral/atypical PNA.  - OBtain vori level.   - 6/24 serum AspGM and fungitell.   - Will start cefepime/vanco once LP completed for possible meningitis though unlikely bacterial as would expect he would be much sicker by now.                     4.  GI: No NVD   -  Prn imodium- controlling loose stools well.   - zofran ODT prn.   -  GI prophy - protonox BID (GERD symptoms)   - Ativan and compazine, zofran available PRN break-through symptoms.   - Ursodiol for VOD prophy.     5.  GVH: S/P post-txp cytoxan,   - tac/MMF were started on day +5. No s/sx GVH to date.  - 6/14 [place] level 11.7, changed to PO 6/15 2.5 mg BID.   - 6/17 tac high 20.1- dose decreasedt o 1.5mg bid  - 6/19 tac level still high at 20.0 despite skipping AM dose- decreased to 1mg BID.   - 6/21 level: 12.5- keep tac 1mg daily. Rechekc level pending.   - MRI 6/20  (throbbing head/neck pain) negative for PREs   -MMF 1000mg TID through day +35    6.  FEN/Renal: Monitor creat and lytes.   - Cr stable.   - Replete lytes PRN per SS.   - tac induced hypomag: mag ox 400mg BID     7.  CV:   - CI induced HTN:  stopped norvasc d/t orthostasis.   - Orthostatics improved.  - Afebrile and lactic acid 0.8. Symptoms improved but BP still orthostatic. Maintenance fluids at 125cc per hour.     8. Neuro:  6/19:New occipital throbbing and ongoing neck pain. Physical exam re-assuring; Head CT 6/19 neg, brain MRI with and without contrast neg for PRES 6/19. Likely musculoskeletal. Flexeril prn.   - continue lidocaine 12 on 12 off.     Val Lindsey PA-C  [phone]  _______________________________________________    BMT ATTENDING NOTE    I have seen and personally evaluated the patient.  I reviewed vitals, medications, laboratory results, and I viewed imaging studies.  After doing so, I formulated a plan as documented in this note with the care team and patient today.     El Ace is a 63 year old male with AML, admitted on 5/24/2019 for VELMA haploidentical BMT, and remains hospitalized pending satisfactory recovery.  Day+ 24 today     El ongoing neck pain and recurrent fevers.  Trying to eat and walk but does not feel well yet.    On exam, he is pleasant, interactive,  neck range of motion improving, no oral mucosal lesions, normal respiratory effort, abdomen non-distended, skin without rash, no edema, normal affect.      Overall, our plan is to continue support him as he recovers from his transplant.  Fever: will resume broad spectrum antibiotics, also obtain LP to determine infection/ disease. MRI pending. Also check HHV-6, CMV and EBV    Dilam Quezada

## 2019-06-24 NOTE — PHARMACY-VANCOMYCIN DOSING SERVICE
Pharmacy Vancomycin Initial Note  Date of Service 2019  Patient's  1955  63 year old, male    Indication: fever of unknown origin    Current estimated CrCl = Estimated Creatinine Clearance: 134.3 mL/min (A) (based on SCr of 0.61 mg/dL (L)).    Creatinine for last 3 days  2019:  2:54 AM Creatinine 0.60 mg/dL  2019:  4:23 AM Creatinine 0.58 mg/dL  2019:  4:42 AM Creatinine 0.61 mg/dL    Recent Vancomycin Level(s) for last 3 days  No results found for requested labs within last 72 hours.      Vancomycin IV Administrations (past 72 hours)      No vancomycin orders with administrations in past 72 hours.                Nephrotoxins and other renal medications (From now, onward)    Start     Dose/Rate Route Frequency Ordered Stop    19 1145  vancomycin (VANCOCIN) 1,750 mg in sodium chloride 0.9 % 500 mL intermittent infusion      1,750 mg  over 2 Hours Intravenous EVERY 12 HOURS 19 1135      19 2000  tacrolimus (GENERIC EQUIVALENT) capsule 1 mg      1 mg Oral EVERY 12 HOURS SCHEDULED 19 1505      19 0000  acyclovir (ZOVIRAX) 800 MG tablet      800 mg Oral 5 TIMES DAILY 19 1351      19 0800  acyclovir (ZOVIRAX) tablet 800 mg      800 mg Oral 5 TIMES DAILY 19 1337            Contrast Orders - past 72 hours (72h ago, onward)    Start     Dose/Rate Route Frequency Ordered Stop    19 1845  gadobutrol (GADAVIST) injection 8.96 mL      0.1 mL/kg × 89.6 kg Intravenous ONCE 19 1843                  Plan:  1.  Start vancomycin  1750 mg IV q12h.   2.  Goal Trough Level: 15-20 mg/L   3.  Pharmacy will check trough levels as appropriate in 1-3 Days.    4. Serum creatinine levels will be ordered a minimum of twice weekly.    5. [place] method utilized to dose vancomycin therapy: Method 2    Lily Leigh PharmD IV Student

## 2019-06-24 NOTE — PLAN OF CARE
"./87 (BP Location: Right arm)   Pulse 115   Temp 98.7  F (37.1  C) (Axillary)   Resp 18   Ht 1.735 m (5' 8.31\")   Wt 89.6 kg (197 lb 9.6 oz)   SpO2 96%   BMI 29.78 kg/m      Pt febrile, tmax 101.0ax. Received prn Tylenol x2, blood Cx done, Md notified. Tachy. Ovss. Denies nausea. On going neck and back pain/stiffness, declined interventions. Pt took prn Oxycodone x1 at bedtime with relief in pain. Mivf-NS infusing at 100ml/hr. 4g Magnesium replaced per scale. Cont to monitor and follow poc.     Problem: Adult Inpatient Plan of Care  Goal: Plan of Care Review  6/24/2019 0628 by German Henderson, RN  Outcome: No Change     Problem: Adult Inpatient Plan of Care  Goal: Optimal Comfort and Wellbeing  Outcome: No Change     Problem: Fatigue (Stem Cell/Bone Marrow Transplant)  Goal: Energy Level Supports Daily Activity  6/24/2019 0628 by German Henderson, RN  Outcome: No Change     Problem: Hematologic Alteration (Stem Cell/Bone Marrow Transplant)  Goal: Blood Counts Within Acceptable Range  Outcome: No Change     Problem: Infection Risk (Stem Cell/Bone Marrow Transplant)  Goal: Absence of Infection Signs/Symptoms  6/24/2019 0628 by German Henderson, RN  Outcome: Declining     Problem: Nausea and Vomiting (Stem Cell/Bone Marrow Transplant)  Goal: Nausea and Vomiting Symptom Relief  Outcome: No Change     "

## 2019-06-24 NOTE — CONSULTS
Patient is on IR schedule 6/24/2019 for a diagnostic lumbar puncture.   Labs WNL for procedure.    No NPO required.  Consent will be done prior to procedure.     Please contact the IR charge RN at [phone] for estimated time of procedure.     Case discussed with Dr. Giraldo from IR and LAWRENCE Tao. This is a 63 year old male with hx of AML now with fevers and a stiff neck. LP requested to rule out CNS infection. Dx LP was attempted on the floor and failed. IR has been asked to do LP with fluoro guidance. Lab orders are in the chart under Val Lindsey PA-C.    Angélica Sanchez DNP, APRN  Interventional Radiology  Pager: [phone]

## 2019-06-24 NOTE — PROCEDURES
BMT ONC Adult Lumbar Puncture Procedure Note    June 24, 2019    Procedure: Lumbar Puncture to obtain CSF     Diagnosis: Hx of AML - now with fevers and stiff neck - rule out cns infection.     Learning needs assessment complete within 12 months: YES    Vitals reviewed:  YES    Informed Consent: Procedure, benefits, risks, and alternatives explained to the patient who verbalized understanding of the information and agreed to proceed with lumbar puncture. Risks include bleeding, headache, and or infection.  Patient safety checklist completed.    Labs: Reviewed:      Lab Results   Component Value Date    INR 1.15 06/24/2019    PLT 76 06/24/2019       Other Plt 77k, ANC 1.15    Description:. The patient was placed in the lateral decubitus position. The L4-5 disc space was prepped and draped in a sterile fashion. Local anesthesia with 3mL 1% preservative-free lidocaine was used. A 22 gauge, 4 inch needle was placed on the l4 and l5 spinal space but unable to obtain csf x2 attempts. Then called Mary De Leon PA-C who attempted x1 again with hitting bone.  No spinal fluid collected.   The needle was removed and a bandage was applied to the site.  Patient tolerated well.    Post-procedure pain assessment: 2 out of 10 on the numeric pain rating scale  Interventions: No    Complications: Yes: Kept hitting bone- unable to obtain CSf despite multiple angles.      Disposition: Did not obtain csf- instructed he did not need to lay flat.     Performed by:   Miranda Lindsey

## 2019-06-24 NOTE — PROGRESS NOTES
TRANSPLANT INFECTIOUS DISEASES PROGRESS NOTE    El Ace  [id number]  06/24/19    Recommendations  - Obtain MRI of the C spine with contrast to rule out OM/discitis/epidural abscess  - Agree with plans for LP/CSF analysis  - Check blood glucan and galactomannan  - Send toxoplasma IgG    - Continue voriconazole  - Continue cefepime for now  - IV vancomycin ok for now; consider discontinuation after 48 if no gram positive source or clinical response  - Stop azithromycin ppx while on cefepime  - Start PO vancomycin 125 mg BID for CDI prophylaxis while on broad spectrum antibiotics    Assessment  63M w/AML s/p 7+3 with persistent disease requiring venetoclax and decitabine. 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     Active Problems  1. Fever  Occurred 5-6 days after engraftment. No clear localization at this time, with exception of chronic/recurring neck pain. With history of P aeruginosa BSI, MRI to rule out OM/discitis is reasonable. Although the CXR showed new LLL atelectasis vs consolidation, the patient is afebrile, on voriconazole, and his ANC has already recovered, it would unusual to develop an opportunistic infection such as fungal infection when his ANC has actually recovered.  Another possibility is a viral illness such as HHV-6 after engraftment.   Recurring AML less likely with <1% blasts in recent BMBx.   I would continue to monitor LFT given possible viral illness or GVHD or voriconazole toxicity.     Rohan Santiago MD PhD  Transplant Infectious Diseases Attending Physician  Pager: [phone]    ---    Interim History  Underwent unsuccessful attempt at LP; IR consulted. Started on cefepime, continuing to have fevers.    Medications  Current Facility-Administered Medications   Medication      acetaminophen (TYLENOL) tablet 325-650 mg     acetaminophen (TYLENOL) tablet 325-650 mg     acyclovir (ZOVIRAX) tablet 800 mg     azithromycin (ZITHROMAX) tablet 250 mg     baclofen (LIORESAL) tablet 10 mg     calcium carbonate (TUMS) chewable tablet 500-1,000 mg     ceFEPIme (MAXIPIME) 2 g vial to attach to  ml bag for ADULTS or 50 ml bag for PEDS     cyclobenzaprine (FLEXERIL) tablet 10 mg     dextrose 5% and 0.45% NaCl infusion     diphenhydrAMINE (BENADRYL) capsule 25 mg    Or     diphenhydrAMINE (BENADRYL) injection 25 mg     gadobutrol (GADAVIST) injection 8.96 mL     heparin lock flush 10 UNIT/ML injection 5-10 mL     heparin lock flush 10 UNIT/ML injection 5-10 mL     heparin lock flush 10 UNIT/ML injection 5-10 mL     hydrALAZINE (APRESOLINE) injection 10 mg     HYDROmorphone (PF) (DILAUDID) injection 0.5-1 mg     Lidocaine (LIDOCARE) 4 % Patch 2 patch    And     lidocaine patch REMOVAL    And     lidocaine patch in PLACE     loperamide (IMODIUM) capsule 2 mg     LORazepam (ATIVAN) injection 0.5-1 mg    Or     LORazepam (ATIVAN) tablet 0.5-1 mg     magnesium oxide (MAG-OX) tablet 400 mg     magnesium sulfate 4 g in 100 mL sterile water (premade)     melatonin tablet 1-3 mg     methyl salicylate-menthol (ARNOLDO-MARRERO) ointment     morphine 0.1% in solosite topical gel 1 g     mycophenolate (CELLCEPT BRAND) tablet 1,000 mg     naloxone (NARCAN) injection 0.1-0.4 mg     oxyCODONE (ROXICODONE) tablet 5 mg     pantoprazole (PROTONIX) EC tablet 40 mg     potassium chloride (KLOR-CON) Packet 20-40 mEq     potassium chloride 10 mEq in 100 mL intermittent infusion with 10 mg lidocaine     potassium chloride 10 mEq in 100 mL sterile water intermittent infusion (premix)     potassium chloride 20 mEq in 50 mL intermittent infusion     potassium chloride ER (K-DUR/KLOR-CON M) CR tablet 20-40 mEq     potassium phosphate 15 mmol in D5W 250 mL intermittent infusion     potassium phosphate 20 mmol in D5W 250 mL  intermittent infusion     potassium phosphate 20 mmol in D5W 500 mL intermittent infusion     potassium phosphate 25 mmol in D5W 500 mL intermittent infusion     pramox-pe-glycerin-petrolatum (PREPARATION H) cream     prochlorperazine (COMPAZINE) injection 5 mg    Or     prochlorperazine (COMPAZINE) tablet 5 mg     sodium chloride (PF) 0.9% PF flush 10-20 mL     sodium chloride 0.9% infusion     sucralfate (CARAFATE) suspension 1 g     [START ON 7/1/2019] sulfamethoxazole-trimethoprim (BACTRIM DS/SEPTRA DS) 800-160 MG per tablet 1 tablet     tacrolimus (GENERIC EQUIVALENT) capsule 1 mg     tamsulosin (FLOMAX) capsule 0.4 mg     ursodiol (ACTIGALL) capsule 300 mg     vancomycin (VANCOCIN) 1,750 mg in sodium chloride 0.9 % 500 mL intermittent infusion     voriconazole (VFEND) tablet 200 mg       Physical Exam  Temp:  [97.7  F (36.5  C)-101.3  F (38.5  C)] 100.7  F (38.2  C)  Pulse:  [] 114  Resp:  [16-18] 16  BP: ()/(66-88) 147/88  SpO2:  [95 %-99 %] 95 %  Well appearing, no distress  No oral lesion, no cervical adenopathy  Mild posterior neck stiffness, poorly localizing, no meningismus  Neuro notable for diffuse tremor, no focal neuro deficits  RRR, s1s2, no mrg  Lungs clear bilaterally  Abd soft, not tender, benign  Ext wwp, no joint swelling or rashes  R chest wall pena site clean and intact    Labs  Last Comprehensive Metabolic Panel:  Sodium   Date Value Ref Range Status   06/24/2019 133 133 - 144 mmol/L Final     Potassium   Date Value Ref Range Status   06/24/2019 3.7 3.4 - 5.3 mmol/L Final     Chloride   Date Value Ref Range Status   06/24/2019 103 94 - 109 mmol/L Final     Carbon Dioxide   Date Value Ref Range Status   06/24/2019 22 20 - 32 mmol/L Final     Anion Gap   Date Value Ref Range Status   06/24/2019 8 3 - 14 mmol/L Final     Glucose   Date Value Ref Range Status   06/24/2019 103 (H) 70 - 99 mg/dL Final     Urea Nitrogen   Date Value Ref Range Status   06/24/2019 6 (L) 7 - 30 mg/dL  Final     Creatinine   Date Value Ref Range Status   06/24/2019 0.61 (L) 0.66 - 1.25 mg/dL Final     GFR Estimate   Date Value Ref Range Status   06/24/2019 >90 >60 mL/min/[1.73_m2] Final     Comment:     Non  GFR Calc  Starting 12/18/2018, serum creatinine based estimated GFR (eGFR) will be   calculated using the Chronic Kidney Disease Epidemiology Collaboration   (CKD-EPI) equation.       Calcium   Date Value Ref Range Status   06/24/2019 8.3 (L) 8.5 - 10.1 mg/dL Final     Liver Function Studies -   Recent Labs   Lab Test 06/24/19  0442   PROTTOTAL 6.4*   ALBUMIN 2.6*   BILITOTAL 0.4   ALKPHOS 156*   AST 26   ALT 35     CBC RESULTS:   Recent Labs   Lab Test 06/24/19 0442   WBC 8.6   RBC 2.90*   HGB 8.9*   HCT 26.4*   MCV 91   MCH 30.7   MCHC 33.7   RDW 15.3*   PLT 76*     Microbiology  6/24 BCx NGTD  6/23 BCx NGTD  6/22 BCx NGTD  6/22 Blood CMV, EBV and HHV6 VLs all negative  5/1 Glucan 52  5/1 galactomannan negative    Radiology  6/23 CT Chest  1. Platelike atelectasis in the lung bases, otherwise the lungs are  clear. No findings to suggest pneumonia.  2. Mild simple pericardial effusion, somewhat increased from  comparison dated 3/29/2019.  3. Moderate coronary artery calcifications.    6/19 MRI Brain  1. No evidence for posterior reversible encephalopathy syndrome.  2. Generalized cerebral atrophy and mild Leukoaraiosis.

## 2019-06-24 NOTE — PLAN OF CARE
A/VSS. Tmax 100.7, Tylenol given x 1. Ativan given for bedside lumbar puncture, ultimately done in IR. Pt on light bedrest for the rest of evening. MRI still needs to be completed, pt to receive IV dilaudid 0.5 mg before MRI. Pt started on 2 different IV abx today, NS at 100 mL continues. Pt hypoactive today, not eating much, pt voiding good, formed BM. Wife at bedside assisting with cares, will continue to monitor per plan of care.        Problem: Adult Inpatient Plan of Care  Goal: Plan of Care Review  6/24/2019 1826 by Odette Grey, RN  Outcome: No Change     Problem: Adult Inpatient Plan of Care  Goal: Patient-Specific Goal (Individualization)  Outcome: No Change     Problem: Adult Inpatient Plan of Care  Goal: Absence of Hospital-Acquired Illness or Injury  Outcome: No Change     Problem: Adult Inpatient Plan of Care  Goal: Optimal Comfort and Wellbeing  6/24/2019 1826 by Odette Grey, RN  Outcome: No Change     Problem: Adult Inpatient Plan of Care  Goal: Readiness for Transition of Care  Outcome: No Change     Problem: Diarrhea (Stem Cell/Bone Marrow Transplant)  Goal: Diarrhea Symptom Control  Outcome: Improving     Problem: Adjustment to Transplant (Stem Cell/Bone Marrow Transplant)  Goal: Optimal Coping with Transplant  Outcome: No Change     Problem: Fatigue (Stem Cell/Bone Marrow Transplant)  Goal: Energy Level Supports Daily Activity  6/24/2019 1826 by Odette Grey, RN  Outcome: Declining

## 2019-06-25 ENCOUNTER — APPOINTMENT (OUTPATIENT)
Dept: MRI IMAGING | Facility: CLINIC | Age: 64
DRG: 014 | End: 2019-06-25
Attending: INTERNAL MEDICINE
Payer: COMMERCIAL

## 2019-06-25 LAB
ABO + RH BLD: NORMAL
ABO + RH BLD: NORMAL
ANION GAP SERPL CALCULATED.3IONS-SCNC: 6 MMOL/L (ref 3–14)
BASOPHILS # BLD AUTO: 0 10E9/L (ref 0–0.2)
BASOPHILS # BLD AUTO: 0 10E9/L (ref 0–0.2)
BASOPHILS NFR BLD AUTO: 0 %
BASOPHILS NFR BLD AUTO: 0 %
BLD GP AB SCN SERPL QL: NORMAL
BLD PROD TYP BPU: NORMAL
BLD PROD TYP BPU: NORMAL
BLD UNIT ID BPU: 0
BLOOD BANK CMNT PATIENT-IMP: NORMAL
BLOOD PRODUCT CODE: NORMAL
BPU ID: NORMAL
BUN SERPL-MCNC: 7 MG/DL (ref 7–30)
CALCIUM SERPL-MCNC: 8.2 MG/DL (ref 8.5–10.1)
CHLORIDE SERPL-SCNC: 104 MMOL/L (ref 94–109)
CO2 SERPL-SCNC: 23 MMOL/L (ref 20–32)
COPATH REPORT: NORMAL
COPATH REPORT: NORMAL
CREAT SERPL-MCNC: 0.68 MG/DL (ref 0.66–1.25)
CRP SERPL-MCNC: 120 MG/L (ref 0–8)
CRYSTALS SNV MICRO: ABNORMAL
DIFFERENTIAL METHOD BLD: ABNORMAL
DIFFERENTIAL METHOD BLD: ABNORMAL
EOSINOPHIL # BLD AUTO: 0 10E9/L (ref 0–0.7)
EOSINOPHIL # BLD AUTO: 0 10E9/L (ref 0–0.7)
EOSINOPHIL NFR BLD AUTO: 0 %
EOSINOPHIL NFR BLD AUTO: 0 %
ERYTHROCYTE [DISTWIDTH] IN BLOOD BY AUTOMATED COUNT: 15.2 % (ref 10–15)
ERYTHROCYTE [DISTWIDTH] IN BLOOD BY AUTOMATED COUNT: 15.3 % (ref 10–15)
ERYTHROCYTE [SEDIMENTATION RATE] IN BLOOD BY WESTERGREN METHOD: 88 MM/H (ref 0–20)
GFR SERPL CREATININE-BSD FRML MDRD: >90 ML/MIN/{1.73_M2}
GLUCOSE SERPL-MCNC: 101 MG/DL (ref 70–99)
GRAM STN SPEC: NORMAL
HCT VFR BLD AUTO: 23.7 % (ref 40–53)
HCT VFR BLD AUTO: 26.5 % (ref 40–53)
HGB BLD-MCNC: 8 G/DL (ref 13.3–17.7)
HGB BLD-MCNC: 9 G/DL (ref 13.3–17.7)
HSV1 DNA CSF QL NAA+PROBE: NOT DETECTED
HSV1 DNA SPEC QL NAA+PROBE: POSITIVE
HSV2 DNA CSF QL NAA+PROBE: NOT DETECTED
HSV2 DNA SPEC QL NAA+PROBE: NEGATIVE
LYMPHOCYTES # BLD AUTO: 0 10E9/L (ref 0.8–5.3)
LYMPHOCYTES # BLD AUTO: 0.3 10E9/L (ref 0.8–5.3)
LYMPHOCYTES NFR BLD AUTO: 0 %
LYMPHOCYTES NFR BLD AUTO: 3.5 %
MAGNESIUM SERPL-MCNC: 1.5 MG/DL (ref 1.6–2.3)
MAGNESIUM SERPL-MCNC: 2.3 MG/DL (ref 1.6–2.3)
MCH RBC QN AUTO: 30.7 PG (ref 26.5–33)
MCH RBC QN AUTO: 30.8 PG (ref 26.5–33)
MCHC RBC AUTO-ENTMCNC: 33.8 G/DL (ref 31.5–36.5)
MCHC RBC AUTO-ENTMCNC: 34 G/DL (ref 31.5–36.5)
MCV RBC AUTO: 90 FL (ref 78–100)
MCV RBC AUTO: 91 FL (ref 78–100)
METAMYELOCYTES # BLD: 0.1 10E9/L
METAMYELOCYTES NFR BLD MANUAL: 0.9 %
MICROBIOLOGIST REVIEW: NORMAL
MISCELLANEOUS TEST: NORMAL
MONOCYTES # BLD AUTO: 2.6 10E9/L (ref 0–1.3)
MONOCYTES # BLD AUTO: 2.8 10E9/L (ref 0–1.3)
MONOCYTES NFR BLD AUTO: 34.5 %
MONOCYTES NFR BLD AUTO: 37.9 %
MYELOCYTES # BLD: 0.1 10E9/L
MYELOCYTES NFR BLD MANUAL: 0.9 %
NEUTROPHILS # BLD AUTO: 4.2 10E9/L (ref 1.6–8.3)
NEUTROPHILS # BLD AUTO: 4.9 10E9/L (ref 1.6–8.3)
NEUTROPHILS NFR BLD AUTO: 60.2 %
NEUTROPHILS NFR BLD AUTO: 61.2 %
NRBC # BLD AUTO: 0.1 10*3/UL
NRBC BLD AUTO-RTO: 1 /100
NUM BPU REQUESTED: 1
PLATELET # BLD AUTO: 84 10E9/L (ref 150–450)
PLATELET # BLD AUTO: 88 10E9/L (ref 150–450)
PLATELET # BLD EST: ABNORMAL 10*3/UL
PLATELET # BLD EST: ABNORMAL 10*3/UL
POTASSIUM SERPL-SCNC: 3.6 MMOL/L (ref 3.4–5.3)
PROMYELOCYTES # BLD MANUAL: 0.1 10E9/L
PROMYELOCYTES NFR BLD MANUAL: 0.9 %
RBC # BLD AUTO: 2.6 10E12/L (ref 4.4–5.9)
RBC # BLD AUTO: 2.93 10E12/L (ref 4.4–5.9)
RBC MORPH BLD: NORMAL
SODIUM SERPL-SCNC: 133 MMOL/L (ref 133–144)
SPECIMEN EXP DATE BLD: NORMAL
SPECIMEN SOURCE SNV: ABNORMAL
SPECIMEN SOURCE: ABNORMAL
SPECIMEN SOURCE: NORMAL
SPECIMEN TYPE: NORMAL
T GONDII IGG SER QL: <3 IU/ML (ref 0–7.1)
TRANSFUSION STATUS PATIENT QL: NORMAL
TRANSFUSION STATUS PATIENT QL: NORMAL
VARICELLA ZOSTER DNA PCR COMMENT: NORMAL
VORICONAZOLE SERPL-MCNC: 2.2 UG/ML (ref 1–5.5)
VZV DNA SPEC QL NAA+PROBE: NORMAL
WBC # BLD AUTO: 6.8 10E9/L (ref 4–11)
WBC # BLD AUTO: 8.1 10E9/L (ref 4–11)

## 2019-06-25 PROCEDURE — 0R9P3ZX DRAINAGE OF LEFT WRIST JOINT, PERCUTANEOUS APPROACH, DIAGNOSTIC: ICD-10-PCS | Performed by: INTERNAL MEDICINE

## 2019-06-25 PROCEDURE — 25000128 H RX IP 250 OP 636: Performed by: INTERNAL MEDICINE

## 2019-06-25 PROCEDURE — 86777 TOXOPLASMA ANTIBODY: CPT | Performed by: PHYSICIAN ASSISTANT

## 2019-06-25 PROCEDURE — 89060 EXAM SYNOVIAL FLUID CRYSTALS: CPT | Performed by: INTERNAL MEDICINE

## 2019-06-25 PROCEDURE — P9040 RBC LEUKOREDUCED IRRADIATED: HCPCS | Performed by: PHYSICIAN ASSISTANT

## 2019-06-25 PROCEDURE — 80048 BASIC METABOLIC PNL TOTAL CA: CPT | Performed by: PHYSICIAN ASSISTANT

## 2019-06-25 PROCEDURE — 25000128 H RX IP 250 OP 636: Performed by: PHYSICIAN ASSISTANT

## 2019-06-25 PROCEDURE — 87070 CULTURE OTHR SPECIMN AEROBIC: CPT | Performed by: INTERNAL MEDICINE

## 2019-06-25 PROCEDURE — 87040 BLOOD CULTURE FOR BACTERIA: CPT | Performed by: PHYSICIAN ASSISTANT

## 2019-06-25 PROCEDURE — 87205 SMEAR GRAM STAIN: CPT | Performed by: INTERNAL MEDICINE

## 2019-06-25 PROCEDURE — 86140 C-REACTIVE PROTEIN: CPT | Performed by: PHYSICIAN ASSISTANT

## 2019-06-25 PROCEDURE — 25800030 ZZH RX IP 258 OP 636: Performed by: INTERNAL MEDICINE

## 2019-06-25 PROCEDURE — 81268 CHIMERISM ANAL W/CELL SELECT: CPT | Performed by: PHYSICIAN ASSISTANT

## 2019-06-25 PROCEDURE — 72156 MRI NECK SPINE W/O & W/DYE: CPT

## 2019-06-25 PROCEDURE — A9585 GADOBUTROL INJECTION: HCPCS | Performed by: RADIOLOGY

## 2019-06-25 PROCEDURE — 85004 AUTOMATED DIFF WBC COUNT: CPT | Performed by: PHYSICIAN ASSISTANT

## 2019-06-25 PROCEDURE — 20600000 ZZH R&B BMT

## 2019-06-25 PROCEDURE — 25000131 ZZH RX MED GY IP 250 OP 636 PS 637: Performed by: NURSE PRACTITIONER

## 2019-06-25 PROCEDURE — 85025 COMPLETE CBC W/AUTO DIFF WBC: CPT | Performed by: PHYSICIAN ASSISTANT

## 2019-06-25 PROCEDURE — 85652 RBC SED RATE AUTOMATED: CPT | Performed by: PHYSICIAN ASSISTANT

## 2019-06-25 PROCEDURE — 83735 ASSAY OF MAGNESIUM: CPT | Performed by: PHYSICIAN ASSISTANT

## 2019-06-25 PROCEDURE — 25000132 ZZH RX MED GY IP 250 OP 250 PS 637: Performed by: PHYSICIAN ASSISTANT

## 2019-06-25 PROCEDURE — 25000131 ZZH RX MED GY IP 250 OP 636 PS 637: Performed by: INTERNAL MEDICINE

## 2019-06-25 PROCEDURE — 87015 SPECIMEN INFECT AGNT CONCNTJ: CPT | Performed by: INTERNAL MEDICINE

## 2019-06-25 PROCEDURE — 87103 BLOOD FUNGUS CULTURE: CPT | Performed by: PHYSICIAN ASSISTANT

## 2019-06-25 PROCEDURE — 80299 QUANTITATIVE ASSAY DRUG: CPT | Performed by: PHYSICIAN ASSISTANT

## 2019-06-25 PROCEDURE — 81268 CHIMERISM ANAL W/CELL SELECT: CPT | Performed by: INTERNAL MEDICINE

## 2019-06-25 PROCEDURE — 25500064 ZZH RX 255 OP 636: Performed by: RADIOLOGY

## 2019-06-25 RX ORDER — LIDOCAINE HYDROCHLORIDE 10 MG/ML
5 INJECTION, SOLUTION EPIDURAL; INFILTRATION; INTRACAUDAL; PERINEURAL ONCE
Status: DISCONTINUED | OUTPATIENT
Start: 2019-06-25 | End: 2019-06-27 | Stop reason: HOSPADM

## 2019-06-25 RX ORDER — METHYLPREDNISOLONE SODIUM SUCCINATE 40 MG/ML
40 INJECTION, POWDER, LYOPHILIZED, FOR SOLUTION INTRAMUSCULAR; INTRAVENOUS ONCE
Status: COMPLETED | OUTPATIENT
Start: 2019-06-25 | End: 2019-06-25

## 2019-06-25 RX ORDER — VORICONAZOLE 50 MG/1
100 TABLET, FILM COATED ORAL EVERY 12 HOURS SCHEDULED
Status: DISCONTINUED | OUTPATIENT
Start: 2019-06-25 | End: 2019-06-26

## 2019-06-25 RX ORDER — VANCOMYCIN HYDROCHLORIDE 125 MG/1
125 CAPSULE ORAL 2 TIMES DAILY
Status: DISCONTINUED | OUTPATIENT
Start: 2019-06-25 | End: 2019-06-27 | Stop reason: HOSPADM

## 2019-06-25 RX ORDER — LIDOCAINE HYDROCHLORIDE 10 MG/ML
1 INJECTION, SOLUTION EPIDURAL; INFILTRATION; INTRACAUDAL; PERINEURAL ONCE
Status: DISCONTINUED | OUTPATIENT
Start: 2019-06-25 | End: 2019-06-27 | Stop reason: HOSPADM

## 2019-06-25 RX ADMIN — ACETAMINOPHEN 650 MG: 325 TABLET, FILM COATED ORAL at 06:06

## 2019-06-25 RX ADMIN — VANCOMYCIN HYDROCHLORIDE 1750 MG: 10 INJECTION, POWDER, LYOPHILIZED, FOR SOLUTION INTRAVENOUS at 23:36

## 2019-06-25 RX ADMIN — MAGNESIUM OXIDE TAB 400 MG (241.3 MG ELEMENTAL MG) 400 MG: 400 (241.3 MG) TAB at 07:58

## 2019-06-25 RX ADMIN — VANCOMYCIN HYDROCHLORIDE 1750 MG: 10 INJECTION, POWDER, LYOPHILIZED, FOR SOLUTION INTRAVENOUS at 13:26

## 2019-06-25 RX ADMIN — PANTOPRAZOLE SODIUM 40 MG: 40 TABLET, DELAYED RELEASE ORAL at 19:38

## 2019-06-25 RX ADMIN — VANCOMYCIN HYDROCHLORIDE 125 MG: 125 CAPSULE ORAL at 19:45

## 2019-06-25 RX ADMIN — MYCOPHENOLATE MOFETIL 1000 MG: 500 TABLET, FILM COATED ORAL at 06:07

## 2019-06-25 RX ADMIN — MYCOPHENOLATE MOFETIL 1000 MG: 500 TABLET, FILM COATED ORAL at 13:25

## 2019-06-25 RX ADMIN — URSODIOL 300 MG: 300 CAPSULE ORAL at 13:25

## 2019-06-25 RX ADMIN — VANCOMYCIN HYDROCHLORIDE 125 MG: 125 CAPSULE ORAL at 08:51

## 2019-06-25 RX ADMIN — METHYLPREDNISOLONE SODIUM SUCCINATE 40 MG: 40 INJECTION, POWDER, FOR SOLUTION INTRAMUSCULAR; INTRAVENOUS at 23:36

## 2019-06-25 RX ADMIN — HYDROMORPHONE HYDROCHLORIDE 0.5 MG: 1 INJECTION, SOLUTION INTRAMUSCULAR; INTRAVENOUS; SUBCUTANEOUS at 10:28

## 2019-06-25 RX ADMIN — MAGNESIUM OXIDE TAB 400 MG (241.3 MG ELEMENTAL MG) 400 MG: 400 (241.3 MG) TAB at 19:38

## 2019-06-25 RX ADMIN — SODIUM CHLORIDE, PRESERVATIVE FREE 5 ML: 5 INJECTION INTRAVENOUS at 03:17

## 2019-06-25 RX ADMIN — ACETAMINOPHEN 650 MG: 325 TABLET, FILM COATED ORAL at 19:45

## 2019-06-25 RX ADMIN — Medication 5 ML: at 12:06

## 2019-06-25 RX ADMIN — VORICONAZOLE 100 MG: 50 TABLET ORAL at 19:38

## 2019-06-25 RX ADMIN — TACROLIMUS 0.5 MG: 0.5 CAPSULE ORAL at 07:58

## 2019-06-25 RX ADMIN — PANTOPRAZOLE SODIUM 40 MG: 40 TABLET, DELAYED RELEASE ORAL at 07:58

## 2019-06-25 RX ADMIN — MYCOPHENOLATE MOFETIL 1000 MG: 500 TABLET, FILM COATED ORAL at 22:18

## 2019-06-25 RX ADMIN — GADOBUTROL 9 ML: 604.72 INJECTION INTRAVENOUS at 11:57

## 2019-06-25 RX ADMIN — CEFEPIME 2 G: 2 INJECTION, POWDER, FOR SOLUTION INTRAVENOUS at 03:16

## 2019-06-25 RX ADMIN — ACYCLOVIR 800 MG: 800 TABLET ORAL at 07:58

## 2019-06-25 RX ADMIN — Medication 1 G: at 08:05

## 2019-06-25 RX ADMIN — ACYCLOVIR 800 MG: 800 TABLET ORAL at 22:18

## 2019-06-25 RX ADMIN — CEFEPIME 2 G: 2 INJECTION, POWDER, FOR SOLUTION INTRAVENOUS at 19:38

## 2019-06-25 RX ADMIN — OXYCODONE HYDROCHLORIDE 5 MG: 5 TABLET ORAL at 22:18

## 2019-06-25 RX ADMIN — TAMSULOSIN HYDROCHLORIDE 0.4 MG: 0.4 CAPSULE ORAL at 07:58

## 2019-06-25 RX ADMIN — CEFEPIME 2 G: 2 INJECTION, POWDER, FOR SOLUTION INTRAVENOUS at 12:06

## 2019-06-25 RX ADMIN — URSODIOL 300 MG: 300 CAPSULE ORAL at 07:58

## 2019-06-25 RX ADMIN — MAGNESIUM SULFATE HEPTAHYDRATE 4 G: 40 INJECTION, SOLUTION INTRAVENOUS at 06:07

## 2019-06-25 RX ADMIN — ACYCLOVIR 800 MG: 800 TABLET ORAL at 13:25

## 2019-06-25 RX ADMIN — TACROLIMUS 0.5 MG: 0.5 CAPSULE ORAL at 19:38

## 2019-06-25 RX ADMIN — ACYCLOVIR 800 MG: 800 TABLET ORAL at 18:14

## 2019-06-25 RX ADMIN — URSODIOL 300 MG: 300 CAPSULE ORAL at 19:38

## 2019-06-25 RX ADMIN — VORICONAZOLE 100 MG: 50 TABLET ORAL at 07:58

## 2019-06-25 RX ADMIN — ACYCLOVIR 800 MG: 800 TABLET ORAL at 12:06

## 2019-06-25 ASSESSMENT — PAIN DESCRIPTION - DESCRIPTORS
DESCRIPTORS: DISCOMFORT;THROBBING
DESCRIPTORS: ACHING

## 2019-06-25 ASSESSMENT — ACTIVITIES OF DAILY LIVING (ADL)
ADLS_ACUITY_SCORE: 14

## 2019-06-25 ASSESSMENT — MIFFLIN-ST. JEOR: SCORE: 1670.67

## 2019-06-25 NOTE — PLAN OF CARE
A/VSS, afebrile today. Morphine gel given this am, IV dilaudid given this am before MRI, fair appetite.  Pt showered today, and is voiding well. Will continue to monitor per plan of care.        Problem: Adult Inpatient Plan of Care  Goal: Plan of Care Review  6/25/2019 1830 by Odette Grey RN  Outcome: No Change     Problem: Adult Inpatient Plan of Care  Goal: Patient-Specific Goal (Individualization)  6/25/2019 1830 by Odette Grey RN  Outcome: No Change     Problem: Adult Inpatient Plan of Care  Goal: Absence of Hospital-Acquired Illness or Injury  6/25/2019 1830 by Odette Grey RN  Outcome: No Change     Problem: Adult Inpatient Plan of Care  Goal: Optimal Comfort and Wellbeing  6/25/2019 1830 by Odette Grey RN  Outcome: No Change     Problem: Adult Inpatient Plan of Care  Goal: Readiness for Transition of Care  6/25/2019 1830 by Odette Grey RN  Outcome: No Change     Problem: Adjustment to Transplant (Stem Cell/Bone Marrow Transplant)  Goal: Optimal Coping with Transplant  6/25/2019 1830 by Odette Grey RN  Outcome: No Change     Problem: Diarrhea (Stem Cell/Bone Marrow Transplant)  Goal: Diarrhea Symptom Control  6/25/2019 1830 by Odette Grey RN  Outcome: No Change

## 2019-06-25 NOTE — PLAN OF CARE
PT 5C: PT cancelled. Pt declined due to fever, not feeling well, and increased neck/upper back pain. Discussed increasing PT frequency with pt and pt agreed. Pt increased to 5 times/week.

## 2019-06-25 NOTE — PROGRESS NOTES
TRANSPLANT INFECTIOUS DISEASES PROGRESS NOTE    El Ace  [id number]  06/25/19    Recommendations  - Recommend ortho/rheum eval of L wrist for swelling, possible joint effusion, and potential arthrocentesis  - Agree with neuroradiology recommendation of 3 phase bone scan of cervical spine  - Discuss with neuro-IR to determine if any feasible way to sample C1 lesion  - Check ESR and CRP now to establish baseline    - Continue cefepime and vancomycin  - If persistent fever despite these antibiotics, will consider broadening coverage to include resistant organisms   - Cont PO vancomycin 125 mg BID for CDI prophylaxis while on broad spectrum antibiotics  - Continue voriconazole    Assessment  63M w/AML s/p 7+3 with persistent disease requiring venetoclax and decitabine. 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     Active Problems  1. Fever/Neck pain  Main localizing symptom is diffuse posterior neck pain. LP reassuring against CNS infection. MRI C spine shows degenerative changes of C1 unable to exclude osteomyelitis of the anterior arch. This will likely be challenging if not impossible to biopsy. Bone scan may provide more definitive diagnosis; however even if negative, given ongoing fever and no other clear cause, it will be hard to exclude osteomyelitis. Given uncertainty about responsible pathogen, would argue for continuation of empiric vancomycin and cefepime at this time. If clinical improvement (fever abatement) on these meds, would suggest continuing them. If persistent fever, would need to broaden antibiotics to include coverage of resistant Pseudomonas and possibly VRE.    2. L wrist pain  Swelling of joint noted on exam. Recommend evaluation for  inflammatory/infectious processes including arthritis or tendinitis.    Rohan Santiago MD PhD  Transplant Infectious Diseases Attending Physician  Pager: [phone]    ---    Interim History  Underwent LP and MRI C spine. Continuing to have fever and neck pain which is localized posteriorly. He also complains of pain in L wrist, worse with flexion and extension.     Medications  Current Facility-Administered Medications   Medication     acetaminophen (TYLENOL) tablet 325-650 mg     acetaminophen (TYLENOL) tablet 325-650 mg     acyclovir (ZOVIRAX) tablet 800 mg     baclofen (LIORESAL) tablet 10 mg     calcium carbonate (TUMS) chewable tablet 500-1,000 mg     ceFEPIme (MAXIPIME) 2 g vial to attach to  ml bag for ADULTS or 50 ml bag for PEDS     cyclobenzaprine (FLEXERIL) tablet 10 mg     diphenhydrAMINE (BENADRYL) capsule 25 mg    Or     diphenhydrAMINE (BENADRYL) injection 25 mg     heparin lock flush 10 UNIT/ML injection 5-10 mL     heparin lock flush 10 UNIT/ML injection 5-10 mL     heparin lock flush 10 UNIT/ML injection 5-10 mL     hydrALAZINE (APRESOLINE) injection 10 mg     loperamide (IMODIUM) capsule 2 mg     LORazepam (ATIVAN) injection 0.5-1 mg    Or     LORazepam (ATIVAN) tablet 0.5-1 mg     magnesium oxide (MAG-OX) tablet 400 mg     magnesium sulfate 4 g in 100 mL sterile water (premade)     melatonin tablet 1-3 mg     methyl salicylate-menthol (ARNOLDO-MARRERO) ointment     morphine 0.1% in solosite topical gel 1 g     mycophenolate (CELLCEPT BRAND) tablet 1,000 mg     naloxone (NARCAN) injection 0.1-0.4 mg     oxyCODONE (ROXICODONE) tablet 5 mg     pantoprazole (PROTONIX) EC tablet 40 mg     potassium chloride (KLOR-CON) Packet 20-40 mEq     potassium chloride 10 mEq in 100 mL intermittent infusion with 10 mg lidocaine     potassium chloride 10 mEq in 100 mL sterile water intermittent infusion (premix)     potassium chloride 20 mEq in 50 mL intermittent infusion     potassium chloride ER (K-DUR/KLOR-CON  M) CR tablet 20-40 mEq     potassium phosphate 15 mmol in D5W 250 mL intermittent infusion     potassium phosphate 20 mmol in D5W 250 mL intermittent infusion     potassium phosphate 20 mmol in D5W 500 mL intermittent infusion     potassium phosphate 25 mmol in D5W 500 mL intermittent infusion     pramox-pe-glycerin-petrolatum (PREPARATION H) cream     prochlorperazine (COMPAZINE) injection 5 mg    Or     prochlorperazine (COMPAZINE) tablet 5 mg     sodium chloride (PF) 0.9% PF flush 10-20 mL     sodium chloride 0.9% infusion     sucralfate (CARAFATE) suspension 1 g     [START ON 7/1/2019] sulfamethoxazole-trimethoprim (BACTRIM DS/SEPTRA DS) 800-160 MG per tablet 1 tablet     tacrolimus (GENERIC EQUIVALENT) capsule 0.5 mg     tamsulosin (FLOMAX) capsule 0.4 mg     ursodiol (ACTIGALL) capsule 300 mg     vancomycin (VANCOCIN) 1,750 mg in sodium chloride 0.9 % 500 mL intermittent infusion     vancomycin (VANCOCIN) capsule 125 mg     voriconazole (VFEND) tablet 100 mg       Physical Exam  Temp:  [98.3  F (36.8  C)-102.3  F (39.1  C)] 98.9  F (37.2  C)  Pulse:  [105-120] 105  Heart Rate:  [] 96  Resp:  [16-18] 16  BP: (115-137)/(70-92) 130/80  SpO2:  [95 %-98 %] 98 %  Well appearing, no distress  No oral lesion, no cervical adenopathy  Mild posterior neck stiffness, poorly localizing, no meningismus  Neuro notable for diffuse tremor, no focal neuro deficits  RRR, s1s2, no mrg  Lungs clear bilaterally  Abd soft, not tender, benign  Ext wwp  L wrist joint mildly swollen and painful to the touch, no erythema   No rashes  R chest wall pena site clean and intact    Labs  Last Comprehensive Metabolic Panel:  Sodium   Date Value Ref Range Status   06/25/2019 133 133 - 144 mmol/L Final     Potassium   Date Value Ref Range Status   06/25/2019 3.6 3.4 - 5.3 mmol/L Final     Chloride   Date Value Ref Range Status   06/25/2019 104 94 - 109 mmol/L Final     Carbon Dioxide   Date Value Ref Range Status   06/25/2019 23 20 - 32  mmol/L Final     Anion Gap   Date Value Ref Range Status   06/25/2019 6 3 - 14 mmol/L Final     Glucose   Date Value Ref Range Status   06/25/2019 101 (H) 70 - 99 mg/dL Final     Urea Nitrogen   Date Value Ref Range Status   06/25/2019 7 7 - 30 mg/dL Final     Creatinine   Date Value Ref Range Status   06/25/2019 0.68 0.66 - 1.25 mg/dL Final     GFR Estimate   Date Value Ref Range Status   06/25/2019 >90 >60 mL/min/[1.73_m2] Final     Comment:     Non  GFR Calc  Starting 12/18/2018, serum creatinine based estimated GFR (eGFR) will be   calculated using the Chronic Kidney Disease Epidemiology Collaboration   (CKD-EPI) equation.       Calcium   Date Value Ref Range Status   06/25/2019 8.2 (L) 8.5 - 10.1 mg/dL Final     Liver Function Studies -   Recent Labs   Lab Test 06/24/19  0442   PROTTOTAL 6.4*   ALBUMIN 2.6*   BILITOTAL 0.4   ALKPHOS 156*   AST 26   ALT 35     CBC RESULTS:   Recent Labs   Lab Test 06/25/19  0309   WBC 8.1   RBC 2.60*   HGB 8.0*   HCT 23.7*   MCV 91   MCH 30.8   MCHC 33.8   RDW 15.2*   PLT 84*       Microbiology  6/25 BCx NGTD  6/24 BCx NGTD  6/23 BCx NGTD  6/22 BCx NGTD  6/22 Blood CMV, EBV and HHV6 VLs all negative  5/1 Glucan 52  5/1 galactomannan negative    Radiology  6/23 CT Chest  1. Platelike atelectasis in the lung bases, otherwise the lungs are  clear. No findings to suggest pneumonia.  2. Mild simple pericardial effusion, somewhat increased from  comparison dated 3/29/2019.  3. Moderate coronary artery calcifications.    6/19 MRI Brain  1. No evidence for posterior reversible encephalopathy syndrome.  2. Generalized cerebral atrophy and mild Leukoaraiosis.

## 2019-06-25 NOTE — PLAN OF CARE
Continues to have fevers, tmax 102.3. MD notified. Tylenol given x2 and BC x2 drawn this morning. Tachy 110's-120's. OVSS. Continues to have back/neck pain, oxycodone given x1. MRI will need to be done this morning, they were not able to fit him onto the schedule last night. Denies n/v/d. Sepsis alert triggered, lactate 0.6. Hgb was 8, 1 unit infusing. Magnesium was 1.5, 4g infusing recheck around 10 am. Voiding adequately. Up independently. Continue plan of care.          Problem: Adult Inpatient Plan of Care  Goal: Plan of Care Review  6/25/2019 0649 by Maryjane Back RN  Outcome: No Change  Flowsheets (Taken 6/25/2019 0649)  Plan of Care Reviewed With: patient  Progress: no change     Problem: Adult Inpatient Plan of Care  Goal: Patient-Specific Goal (Individualization)  6/25/2019 0649 by Maryjane Back RN  Outcome: No Change     Problem: Adult Inpatient Plan of Care  Goal: Absence of Hospital-Acquired Illness or Injury  6/25/2019 0649 by Maryjane Back RN  Outcome: No Change     Problem: Adult Inpatient Plan of Care  Goal: Optimal Comfort and Wellbeing  6/25/2019 0649 by Maryjane Back RN  Outcome: No Change     Problem: Adult Inpatient Plan of Care  Goal: Readiness for Transition of Care  6/25/2019 0649 by Maryjane Back RN  Outcome: No Change     Problem: Adjustment to Transplant (Stem Cell/Bone Marrow Transplant)  Goal: Optimal Coping with Transplant  6/25/2019 0649 by Maryjane Back RN  Outcome: No Change     Problem: Diarrhea (Stem Cell/Bone Marrow Transplant)  Goal: Diarrhea Symptom Control  6/25/2019 0649 by Maryjane Back RN  Outcome: No Change     Problem: Fatigue (Stem Cell/Bone Marrow Transplant)  Goal: Energy Level Supports Daily Activity  6/25/2019 0649 by Maryjane Back RN  Outcome: No Change     Problem: Hematologic Alteration (Stem Cell/Bone Marrow Transplant)  Goal: Blood Counts Within Acceptable Range  6/25/2019 0649 by Maryjane Back, RN  Outcome: No Change     Problem:  Hypersensitivity Reaction (Stem Cell/Bone Marrow Transplant)  Goal: Absence of Hypersensitivity Reaction  Outcome: No Change     Problem: Infection Risk (Stem Cell/Bone Marrow Transplant)  Goal: Absence of Infection Signs/Symptoms  Outcome: No Change     Problem: Nausea and Vomiting (Stem Cell/Bone Marrow Transplant)  Goal: Nausea and Vomiting Symptom Relief  Outcome: No Change     Problem: Nutrition Intake Altered (Stem Cell/Bone Marrow Transplant)  Goal: Optimal Nutrition Intake  Outcome: No Change

## 2019-06-25 NOTE — PROGRESS NOTES
"BMT Daily Progress Note       Mr. Ace 63 y.o hx of AML. Admitted for VELMA Haplo. Currently day +25    Overnight events: Neck pain and stiffness still present. Feels poorly with fever- notes his neck seems the worse with fevers. This is really the only 'symptom' aside from feeling febrile. He denies abdominal pain, cough. He doesn't have much of an appetite but eating well. He has no new complaints today.   Review of Systems: 10 point ROS negative except as noted above.  Physical Exam:   Blood pressure (!) 137/92, pulse 105, temperature 98.3  F (36.8  C), temperature source Oral, resp. rate 16, height 1.735 m (5' 8.31\"), weight 90.1 kg (198 lb 11.2 oz), SpO2 96 %.  General: NAD, interactive   Eyes: JEROMY, sclera anicteric   Nose/Mouth/Throat: OP clear, buccal mucosa dry but without ulcerations. No blood noted in the nares.    Lungs: normal effort at rest  Cardiovascular: tachycardic  Abdominal/Rectal: +BS, soft, NT, ND  Lymphatics: no edema  Skin: no rashes or petechaie  Neuro: A&O.  CN II-XII grossly intact, equal  strength bilaterally, smile is symmetric.   Additional Findings: [place] site c/d/i    Labs:  Lab Results   Component Value Date    WBC 8.1 06/25/2019    ANEU 4.9 06/25/2019    HGB 8.0 (L) 06/25/2019    HCT 23.7 (L) 06/25/2019    PLT 84 (L) 06/25/2019     06/25/2019    POTASSIUM 3.6 06/25/2019    CHLORIDE 104 06/25/2019    CO2 23 06/25/2019     (H) 06/25/2019    BUN 7 06/25/2019    CR 0.68 06/25/2019    MAG 1.5 (L) 06/25/2019    INR 1.15 (H) 06/24/2019     Imaging: Reviewed  Microbiology:  Reviewed    Assessment and Plan:   El Ace is a 63 year old male with hx of AML (IDH2, RUNX1 pos), currently day +23 of flu/cy/TBI and related haplo BMT.     5/25 (day -6): flu/cy  5/26 (day -5): flu  5/27 (day -4): flu   5/28 (day -3): flu  5/29 (day -2): TBI/flu  5/30 (day -1): TBI  5/31 (day 0): Transplant. No ABO mismatch - both A+.      1.  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   - GCSF started " d+5 and completed 6/16/19.   - Re-stage per protocol.  -Day +21 bmbx - completed 6/19 ( 2 days early to simplify first clinic visit). In remission and 100% donor.       2.  HEME: Keep Hgb>8g/dL and plts>10K.   - on MiPlate study- no current evidence of bleeding.   - Needs daily bleeding assessments. No bleeding to date.                              3.  ID: Recurrent Fever - tmax 102.8 (6/22- present)   -6/4-6/6  BC negative, Culture negative neutropenic fever- s/p empiric cefepime 6/4-6/16.   -Prophylaxis with HD ACV - 6/15 CMV neg.   Fungal prophy: continue Vfend 200mg BID (does have some flashes of light when falling asleep- currently not bothersome to pt).    Bactrim or appropriate PCP therapy to start d+28.   - Hx of Cdiff had been on PO vanco prophy BID - resume as Cdiff prophy.   - 6/24 Obtain LP with IR guidance -  CNGTD, NO WBC so unlikely meningitis. Stop vanco. COntinue cefepime.   - 6/25 Vori level pending. Empirically decreased dose to 100mg twice dialy (question if level is high as cause of persistently high tac level despite dose reductions).   - 6/24 serum AspGM and fungitell pending  - 6/24 viruses pending.   - Appreciate ID recs (toxo IgG pending).   - MRI C spine today- awaiting results.                     4.  GI: No NVD   -  Prn imodium- controlling loose stools well.   - zofran ODT prn.   -  GI prophy - protonox BID (GERD symptoms)   - Ativan and compazine, zofran available PRN break-through symptoms.   - Ursodiol for VOD prophy.     5.  GVH: S/P post-txp cytoxan,   - tac/MMF were started on day +5. No s/sx GVH to date.  - 6/14 [place] level 11.7, changed to PO 6/15 2.5 mg BID.   - 6/17 tac high 20.1- dose decreasedt o 1.5mg bid  - 6/19 tac level still high at 20.0 despite skipping AM dose- decreased to 1mg BID.   - 6/24 Tac: 18.3. SKip eveing dose. Resume 0.5mg PO BID today.    - MRI 6/20 (throbbing head/neck pain) negative for PREs   -MMF 1000mg TID through day +35    6.  FEN/Renal: Monitor  creat and lytes.   - Cr stable.   - Replete lytes PRN per SS.   - tac induced hypomag: mag ox 400mg BID     7.  CV:   - CI induced HTN:  stopped norvasc d/t orthostasis.   - Orthostatics improved.    8. Neuro:  6/19:New occipital throbbing and ongoing neck pain. Physical exam re-assuring; Head CT 6/19 neg, brain MRI with and without contrast neg for PRES 6/19. Likely musculoskeletal. Flexeril prn.   - Stop lidocaine oatch has not helpful. PRN morphine gel  - MRI Cspine today -results pending. Given 1mg IV dilaudid premed as too painful to lay when trie Sunday.   - 6/24 LP results pending but no WBC so seems unlikely infectious.     Val Lindsey PA-C  [phone]  _______________________________________________    BMT ATTENDING NOTE    I have seen and personally evaluated the patient.  I reviewed vitals, medications, laboratory results, and I viewed imaging studies.  After doing so, I formulated a plan as documented in this note with the care team and patient today.     El Ace is a 63 year old male with AML, admitted on 5/24/2019 for VELMA haploidentical BMT, and remains hospitalized pending satisfactory recovery.  Day+ 25 today     El has ongoing neck pain and recurrent fevers. Trying to eat and walk but does not feel well yet.    On exam, he is pleasant, interactive,  neck range of motion improving, no oral mucosal lesions, normal respiratory effort, abdomen non-distended, skin without rash, no edema, normal affect.      Overall, our plan is to continue support him as he recovers from his transplant.  Fever: LP done yesterday and is negative. MRI reveals Edema and enhancement about the atlantodental joint including within the bone in the anterior arch of C1, with  prevertebral edema/enhancement. Discussed with ID - no change in treatment plan for now. Also check HHV-6, CMV and EBV    Dilma Quezada

## 2019-06-25 NOTE — CONSULTS
RHEUMATOLOGY CONSULT NOTE - FELLOW    El Ace MRN# [id number]   Age: 63 year old YOB: 1955     Date of Admission:  5/24/2019  Reason for consult: Evaluated left wrist, consider aspiration    Assessment and Plan:   Discussion: Mr Ace is a 62 yo man with history of AML s/p BMT who over the course of 6 days developed sudden onset neck pain and stiffness, left wrist synovitis, and fevers in setting of elevated CRP. Differential includes septic arthritis, osteomyelitis, gout, pseudogout, or systemic inflammatory arthritis. Pseudogout and infection are highest on differential.     Aspirated joint as attempt to try to rule out septic joint. He is certainly immunosuppressed and at risk to develop infection, especially in light of MRI findings in C1. MRI findings may also be consistent with inflammatory arthritis. 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 Patients with CPPD occasionally do present with neck pain, fever, and additional synovitis (wrists are often affected in CPPD). 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    Will await crystal analysis and gram stain. If intracellular crystals are present and gram stain negative, recommend administering 40 mg IV methylprednisolone tonight. We will re-evaluate in the morning. Would agree with continuing antibiotics in the interim.       ** Addendum: wrist aspiration revealing intracellular CPPD crystals. Gram stain negative. 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 Recommending administration of IV methylprednisolone tonight, we will re-evaluate in AM.      Problem list:  #. Left wrist inflammatory arthritis  #. Acute neck pain, stiffness  #. Fever  #. Evidence  of calcification around dens on CT imaging  #. AML s/p BMT  #. Recent pseudomonas bacteremia    Recommendations:  -- Left wrist aspirated, see procedure note below  -- Sending fluid for crystals, gram stain, culture, cell count (prioritize crystal and gram stain due to small fluid aspiration - ordered for you)  -- If intracellular calcium pyrophosphate crystals present and gram stain negative, recommend administering IV methylprednisolone 40 mg once tonight.   -- Would continue antibiotics in the meantime  -- We will re-evaluate tomorrow morning and determine if additional corticosteroid is required.    The patient was seen and staffed with Dr. Olivier.     Leif Jackson  Rheumatology fellow  [phone]    I saw this patient with the Rheumatology Fellow. I agree with the findings and recommendations.    Jaziel Olivier M.D.  Staff Rheumatologist, [facility]  Pager [phone]         Chief Complaint:   Neck pain, left wrist pain         History of Present Illness:   Mr Ace is a 64 yo man with hx of AML s/p BMT on immunosuppression who has been admitted since March who recently developed acute onset of neck pain, stiffness and subsequently left wrist pain/swelling. The neck began last Wednesday, sudden onset, associated with significant pain with movement and stiffness. He began having fevers around 3-4 days ago and 2 days ago, developed pain, swelling, and reduced range of motion of the left wrist. He states the swelling and pain of left wrist continued to worsen but it may have plateaued today. He states he has had similar episodes in the past, both affecting his neck and his wrists. These episodes have been self-limiting, usually lasting about 1 wee before resolving on its own. He has taken ibuprofen in past for this and it has helped. He denied history of gout or pseudogout. Denied hx of podagra.            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             Past Surgical History:     Past Surgical History:   Procedure Laterality Date     ARTHROSCOPY KNEE  1995     CARPAL TUNNEL RELEASE RT/LT       COLONOSCOPY  2000,2014     IR CVC TUNNEL PLACEMENT > 5 YRS OF AGE  5/24/2019     IR LUMBAR PUNCTURE  6/24/2019     ORTHOPEDIC SURGERY  1975    laminectomy     PICC INSERTION Right 03/14/2019    5Fr - 42cm (2cm external), basilic vein, high SVC     vasectomy  2002            Social History:     Social History     Socioeconomic History     Marital status:      Spouse name: Not on file     Number of children: Not on file     Years of education: Not on file     Highest education level: Not on file   Occupational History     Not on file   Social Needs     Financial resource strain: Not on file     Food insecurity:     Worry: Not on file     Inability: Not on file     Transportation needs:     Medical: Not on file     Non-medical: Not on file   Tobacco Use     Smoking status: Former Smoker     Smokeless tobacco: Former User     Tobacco comment: Smoked on and off for about 10 years about 20years ago.    Substance and Sexual Activity     Alcohol use: Not Currently     Drug use: Never     Sexual activity: Not on file   Lifestyle     Physical activity:     Days per week: Not on file     Minutes per session: Not on file     Stress: Not on file   Relationships     Social connections:     Talks on phone: Not on file     Gets together: Not on file     Attends Confucianism service: Not on file     Active member of club or organization: Not on file     Attends meetings of clubs or organizations: Not on file     Relationship status: Not on file     Intimate partner violence:     Fear of current or ex partner: Not on file     Emotionally abused: Not on file     Physically abused: Not on file     Forced sexual activity: Not on file   Other Topics Concern     Parent/sibling w/ CABG, MI or angioplasty before 65F 55M? Not  Asked   Social History Narrative     Not on file             Family History:     Family History   Problem Relation Age of Onset     Colon Cancer Mother      Cerebrovascular Disease Mother      Diabetes Mother      Septicemia Father              Immunizations:     Most Recent Immunizations   Administered Date(s) Administered     DTaP, Unspecified 06/16/2012     FLU 6-35 months 12/18/2006     Influenza (H1N1) 12/11/2009     Influenza (IIV3) PF 11/15/2013     Influenza Vaccine IM 3yrs+ 4 Valent IIV4 12/05/2016     Influenza Vaccine, 3 YRS +, IM (QUADRIVALENT W/PRESERVATIVES) 10/02/2018             Allergies:     Allergies   Allergen Reactions     Polymyxin B Other (See Comments)     Neurotoxicity, 6/5/19     Lactose GI Disturbance     Intolerance due to gas             Medications:     Medications Prior to Admission   Medication Sig Dispense Refill Last Dose     acetaminophen (TYLENOL) 325 MG tablet Take 2 tablets (650 mg) by mouth every 4 hours as needed for mild pain or fever (pre-med before blood products) (Patient not taking: Reported on 4/23/2019)   Unknown at Unknown time     prochlorperazine (COMPAZINE) 5 MG tablet Take 1-2 tablets (5-10 mg) by mouth every 6 hours as needed for nausea or vomiting (Patient not taking: Reported on 4/23/2019) 30 tablet 1 More than a month at Unknown time     [DISCONTINUED] acyclovir (ZOVIRAX) 400 MG tablet Take 1 tablet (400 mg) by mouth 2 times daily 60 tablet 1 5/23/2019 at Unknown time     [DISCONTINUED] atorvastatin (LIPITOR) 20 MG tablet Take 1 tablet (20 mg) by mouth every evening 30 tablet 1 5/23/2019 at Unknown time     [DISCONTINUED] nystatin (MYCOSTATIN) 230886 UNIT/ML suspension Take 5 mLs (500,000 Units) by mouth 4 times daily 400 mL 0 5/23/2019 at Unknown time     [DISCONTINUED] ondansetron (ZOFRAN) 8 MG tablet Take 1 tablet (8 mg) by mouth every 8 hours as needed for nausea (Patient not taking: Reported on 5/10/2019) 30 tablet 1 More than a month at Unknown time      [DISCONTINUED] rOPINIRole (REQUIP) 0.25 MG tablet Take 3 tablets (0.75 mg) by mouth nightly as needed (restless leg syndrome) 60 tablet 1 5/23/2019 at 2200     [DISCONTINUED] sennosides (SENOKOT) 8.6 MG tablet Take 2 tablets by mouth 2 times daily as needed for constipation   Past Month at Unknown time     [DISCONTINUED] vancomycin (FIRVANQ) 50 MG/ML oral solution Take 2.5 mLs (125 mg) by mouth 4 times daily 150 mL 0 Past Week at Unknown time     [DISCONTINUED] voriconazole (VFEND) 50 MG tablet Take 3 tablets (150 mg) by mouth every 12 hours 180 tablet 0 Taking       Current Facility-Administered Medications   Medication     acetaminophen (TYLENOL) tablet 325-650 mg     acetaminophen (TYLENOL) tablet 325-650 mg     acyclovir (ZOVIRAX) tablet 800 mg     baclofen (LIORESAL) tablet 10 mg     calcium carbonate (TUMS) chewable tablet 500-1,000 mg     ceFEPIme (MAXIPIME) 2 g vial to attach to  ml bag for ADULTS or 50 ml bag for PEDS     cyclobenzaprine (FLEXERIL) tablet 10 mg     diphenhydrAMINE (BENADRYL) capsule 25 mg    Or     diphenhydrAMINE (BENADRYL) injection 25 mg     heparin lock flush 10 UNIT/ML injection 5-10 mL     heparin lock flush 10 UNIT/ML injection 5-10 mL     heparin lock flush 10 UNIT/ML injection 5-10 mL     hydrALAZINE (APRESOLINE) injection 10 mg     lidocaine (PF) (XYLOCAINE) 1 % injection 1 mL     loperamide (IMODIUM) capsule 2 mg     LORazepam (ATIVAN) injection 0.5-1 mg    Or     LORazepam (ATIVAN) tablet 0.5-1 mg     magnesium oxide (MAG-OX) tablet 400 mg     magnesium sulfate 4 g in 100 mL sterile water (premade)     melatonin tablet 1-3 mg     methyl salicylate-menthol (ARNOLDO-MARRERO) ointment     morphine 0.1% in solosite topical gel 1 g     mycophenolate (CELLCEPT BRAND) tablet 1,000 mg     naloxone (NARCAN) injection 0.1-0.4 mg     oxyCODONE (ROXICODONE) tablet 5 mg     pantoprazole (PROTONIX) EC tablet 40 mg     potassium chloride (KLOR-CON) Packet 20-40 mEq     potassium chloride 10 mEq  "in 100 mL intermittent infusion with 10 mg lidocaine     potassium chloride 10 mEq in 100 mL sterile water intermittent infusion (premix)     potassium chloride 20 mEq in 50 mL intermittent infusion     potassium chloride ER (K-DUR/KLOR-CON M) CR tablet 20-40 mEq     potassium phosphate 15 mmol in D5W 250 mL intermittent infusion     potassium phosphate 20 mmol in D5W 250 mL intermittent infusion     potassium phosphate 20 mmol in D5W 500 mL intermittent infusion     potassium phosphate 25 mmol in D5W 500 mL intermittent infusion     pramox-pe-glycerin-petrolatum (PREPARATION H) cream     prochlorperazine (COMPAZINE) injection 5 mg    Or     prochlorperazine (COMPAZINE) tablet 5 mg     sodium chloride (PF) 0.9% PF flush 10-20 mL     sodium chloride 0.9% infusion     sucralfate (CARAFATE) suspension 1 g     [START ON 7/1/2019] sulfamethoxazole-trimethoprim (BACTRIM DS/SEPTRA DS) 800-160 MG per tablet 1 tablet     tacrolimus (GENERIC EQUIVALENT) capsule 0.5 mg     tamsulosin (FLOMAX) capsule 0.4 mg     ursodiol (ACTIGALL) capsule 300 mg     vancomycin (VANCOCIN) 1,750 mg in sodium chloride 0.9 % 500 mL intermittent infusion     vancomycin (VANCOCIN) capsule 125 mg     voriconazole (VFEND) tablet 100 mg            Review of Systems:   Complete 12 point ROS completed and negative unless mentioned in HPI.          Physical Exam:   /80 (BP Location: Right arm)   Pulse 105   Temp 98.9  F (37.2  C) (Oral)   Resp 16   Ht 1.735 m (5' 8.31\")   Wt 90.1 kg (198 lb 11.2 oz)   SpO2 98%   BMI 29.94 kg/m      General: appears comfortable at rest  HEENT: nc/at, unable to move neck more than 5-10 degrees in any plane. EOMI, white sclera.  NECK: unable to move neck more than 5-10 degrees in any plane  CV: warm, well perfused  Lung: breathing comfortably on room air  Abdomen: soft, nondistended  Neuro: aox3, nonfocal exam, normal speech  Skin/Hair: very warm skin; no hair on scalp  Ext: left foot with some swelling compared " "to right side  Msk:   -- NECK: see above  -- Left wrist: 3+ swelling of left wrist, obvious from observation; wrist is warm to touch, moderately tender to palpation, with very limited flex/ext  -- Right wrist: wnl  -- Knees: bilateral knees warm to touch, full ROM; no TTP; trace effusion possible in right knee  -- Ankles: left ankle with mild swelling, warmth; no significant tenderness to palpation, normal ROM; right ankle wnl  -- Left foot: mild swelling throughout entire foot with warmth; no significant tenderness to palpation          Data:   Labs and imaging reviewed.     Hgb 8.0, PLT 84, WBC 8.1       Procedure: Left wrist aspiration  After reviewing risks and benefits of procedure, patient agreed to proceed. Left wrist was placed in semi-flexed position. Site was identified, marked, and prepped with chlorhexidine. Using a 27G 1\" needle, the dorsal wrist joint subcutaneous tissue and deep tissue structures were anesthestized with 3 ml 1% lidocaine. Using 22G 1.5\" needle, joint was accessed and aspirated of 0.3 ml of bloody-cloudy fluid. Pt tolerated procedure well. Fluid personally brought down to lab for analysis. Dr. Olivier was present throughout entire procedure.     Leif Jackson MD  Rheumatology fellow  [phone]    I oversaw this procedure with the Rheumatology Fellow.    Jaziel Olivier M.D.  Staff Rheumatologist, [facility]  Pager [phone]       "

## 2019-06-26 ENCOUNTER — APPOINTMENT (OUTPATIENT)
Dept: NUCLEAR MEDICINE | Facility: CLINIC | Age: 64
DRG: 014 | End: 2019-06-26
Attending: PHYSICIAN ASSISTANT
Payer: COMMERCIAL

## 2019-06-26 LAB
ABO + RH BLD: NORMAL
ABO + RH BLD: NORMAL
ALP BONE CFR SERPL: 44 U/L (ref 0–55)
ALP LIVER SERPL-CCNC: 102 U/L (ref 0–94)
ALP OTHER CFR SERPL: 0 U/L
ALP SERPL-CCNC: 146 U/L (ref 40–120)
ANION GAP SERPL CALCULATED.3IONS-SCNC: 8 MMOL/L (ref 3–14)
BASOPHILS # BLD AUTO: 0 10E9/L (ref 0–0.2)
BASOPHILS NFR BLD AUTO: 0.2 %
BLD GP AB SCN SERPL QL: NORMAL
BLOOD BANK CMNT PATIENT-IMP: NORMAL
BUN SERPL-MCNC: 7 MG/DL (ref 7–30)
CALCIUM SERPL-MCNC: 8.6 MG/DL (ref 8.5–10.1)
CHLORIDE SERPL-SCNC: 105 MMOL/L (ref 94–109)
CO2 SERPL-SCNC: 22 MMOL/L (ref 20–32)
COPATH REPORT: NORMAL
COPATH REPORT: NORMAL
CREAT SERPL-MCNC: 0.62 MG/DL (ref 0.66–1.25)
DIFFERENTIAL METHOD BLD: ABNORMAL
EOSINOPHIL # BLD AUTO: 0 10E9/L (ref 0–0.7)
EOSINOPHIL NFR BLD AUTO: 0 %
ERYTHROCYTE [DISTWIDTH] IN BLOOD BY AUTOMATED COUNT: 15.3 % (ref 10–15)
GFR SERPL CREATININE-BSD FRML MDRD: >90 ML/MIN/{1.73_M2}
GLUCOSE SERPL-MCNC: 136 MG/DL (ref 70–99)
HCT VFR BLD AUTO: 28.2 % (ref 40–53)
HGB BLD-MCNC: 9.5 G/DL (ref 13.3–17.7)
IMM GRANULOCYTES # BLD: 0.2 10E9/L (ref 0–0.4)
IMM GRANULOCYTES NFR BLD: 3.3 %
LYMPHOCYTES # BLD AUTO: 0.4 10E9/L (ref 0.8–5.3)
LYMPHOCYTES NFR BLD AUTO: 6.7 %
MAGNESIUM SERPL-MCNC: 1.7 MG/DL (ref 1.6–2.3)
MBP CSF-MCNC: 3.27 NG/ML (ref 0–5.5)
MCH RBC QN AUTO: 30.4 PG (ref 26.5–33)
MCHC RBC AUTO-ENTMCNC: 33.7 G/DL (ref 31.5–36.5)
MCV RBC AUTO: 90 FL (ref 78–100)
MONOCYTES # BLD AUTO: 0.5 10E9/L (ref 0–1.3)
MONOCYTES NFR BLD AUTO: 9.8 %
NEUTROPHILS # BLD AUTO: 4.4 10E9/L (ref 1.6–8.3)
NEUTROPHILS NFR BLD AUTO: 80 %
NRBC # BLD AUTO: 0 10*3/UL
NRBC BLD AUTO-RTO: 0 /100
PLATELET # BLD AUTO: 103 10E9/L (ref 150–450)
POTASSIUM SERPL-SCNC: 4 MMOL/L (ref 3.4–5.3)
RBC # BLD AUTO: 3.12 10E12/L (ref 4.4–5.9)
SODIUM SERPL-SCNC: 134 MMOL/L (ref 133–144)
SPECIMEN EXP DATE BLD: NORMAL
WBC # BLD AUTO: 5.5 10E9/L (ref 4–11)

## 2019-06-26 PROCEDURE — 25000128 H RX IP 250 OP 636: Performed by: INTERNAL MEDICINE

## 2019-06-26 PROCEDURE — 80048 BASIC METABOLIC PNL TOTAL CA: CPT | Performed by: PHYSICIAN ASSISTANT

## 2019-06-26 PROCEDURE — 25000128 H RX IP 250 OP 636: Performed by: PHYSICIAN ASSISTANT

## 2019-06-26 PROCEDURE — 34300033 ZZH RX 343: Performed by: INTERNAL MEDICINE

## 2019-06-26 PROCEDURE — 20600000 ZZH R&B BMT

## 2019-06-26 PROCEDURE — 86850 RBC ANTIBODY SCREEN: CPT | Performed by: PHYSICIAN ASSISTANT

## 2019-06-26 PROCEDURE — 78315 BONE IMAGING 3 PHASE: CPT

## 2019-06-26 PROCEDURE — 25000131 ZZH RX MED GY IP 250 OP 636 PS 637: Performed by: NURSE PRACTITIONER

## 2019-06-26 PROCEDURE — 25000131 ZZH RX MED GY IP 250 OP 636 PS 637: Performed by: INTERNAL MEDICINE

## 2019-06-26 PROCEDURE — 86901 BLOOD TYPING SEROLOGIC RH(D): CPT | Performed by: PHYSICIAN ASSISTANT

## 2019-06-26 PROCEDURE — 25800030 ZZH RX IP 258 OP 636: Performed by: INTERNAL MEDICINE

## 2019-06-26 PROCEDURE — 85025 COMPLETE CBC W/AUTO DIFF WBC: CPT | Performed by: PHYSICIAN ASSISTANT

## 2019-06-26 PROCEDURE — 83735 ASSAY OF MAGNESIUM: CPT | Performed by: PHYSICIAN ASSISTANT

## 2019-06-26 PROCEDURE — 86900 BLOOD TYPING SEROLOGIC ABO: CPT | Performed by: PHYSICIAN ASSISTANT

## 2019-06-26 PROCEDURE — 25000132 ZZH RX MED GY IP 250 OP 250 PS 637: Performed by: PHYSICIAN ASSISTANT

## 2019-06-26 PROCEDURE — A9503 TC99M MEDRONATE: HCPCS | Performed by: INTERNAL MEDICINE

## 2019-06-26 RX ORDER — METHYLPREDNISOLONE SODIUM SUCCINATE 40 MG/ML
40 INJECTION, POWDER, LYOPHILIZED, FOR SOLUTION INTRAMUSCULAR; INTRAVENOUS ONCE
Status: DISCONTINUED | OUTPATIENT
Start: 2019-06-26 | End: 2019-06-26

## 2019-06-26 RX ORDER — PREDNISONE 20 MG/1
40 TABLET ORAL ONCE
Status: COMPLETED | OUTPATIENT
Start: 2019-06-26 | End: 2019-06-26

## 2019-06-26 RX ORDER — VORICONAZOLE 50 MG/1
150 TABLET, FILM COATED ORAL EVERY 12 HOURS SCHEDULED
Status: DISCONTINUED | OUTPATIENT
Start: 2019-06-26 | End: 2019-06-27 | Stop reason: HOSPADM

## 2019-06-26 RX ORDER — TC 99M MEDRONATE 20 MG/10ML
20-30 INJECTION, POWDER, LYOPHILIZED, FOR SOLUTION INTRAVENOUS ONCE
Status: COMPLETED | OUTPATIENT
Start: 2019-06-26 | End: 2019-06-26

## 2019-06-26 RX ADMIN — VANCOMYCIN HYDROCHLORIDE 125 MG: 125 CAPSULE ORAL at 08:22

## 2019-06-26 RX ADMIN — VANCOMYCIN HYDROCHLORIDE 125 MG: 125 CAPSULE ORAL at 19:40

## 2019-06-26 RX ADMIN — VORICONAZOLE 100 MG: 50 TABLET ORAL at 08:22

## 2019-06-26 RX ADMIN — ACYCLOVIR 800 MG: 800 TABLET ORAL at 22:52

## 2019-06-26 RX ADMIN — TAMSULOSIN HYDROCHLORIDE 0.4 MG: 0.4 CAPSULE ORAL at 08:22

## 2019-06-26 RX ADMIN — CEFEPIME 2 G: 2 INJECTION, POWDER, FOR SOLUTION INTRAVENOUS at 04:34

## 2019-06-26 RX ADMIN — ACYCLOVIR 800 MG: 800 TABLET ORAL at 17:24

## 2019-06-26 RX ADMIN — SODIUM CHLORIDE: 9 INJECTION, SOLUTION INTRAVENOUS at 22:57

## 2019-06-26 RX ADMIN — URSODIOL 300 MG: 300 CAPSULE ORAL at 19:40

## 2019-06-26 RX ADMIN — PANTOPRAZOLE SODIUM 40 MG: 40 TABLET, DELAYED RELEASE ORAL at 08:22

## 2019-06-26 RX ADMIN — URSODIOL 300 MG: 300 CAPSULE ORAL at 08:22

## 2019-06-26 RX ADMIN — TACROLIMUS 0.5 MG: 0.5 CAPSULE ORAL at 08:22

## 2019-06-26 RX ADMIN — MAGNESIUM OXIDE TAB 400 MG (241.3 MG ELEMENTAL MG) 400 MG: 400 (241.3 MG) TAB at 08:22

## 2019-06-26 RX ADMIN — ACYCLOVIR 800 MG: 800 TABLET ORAL at 13:46

## 2019-06-26 RX ADMIN — MYCOPHENOLATE MOFETIL 1000 MG: 500 TABLET, FILM COATED ORAL at 07:07

## 2019-06-26 RX ADMIN — ACYCLOVIR 800 MG: 800 TABLET ORAL at 08:22

## 2019-06-26 RX ADMIN — URSODIOL 300 MG: 300 CAPSULE ORAL at 13:46

## 2019-06-26 RX ADMIN — VANCOMYCIN HYDROCHLORIDE 1750 MG: 10 INJECTION, POWDER, LYOPHILIZED, FOR SOLUTION INTRAVENOUS at 13:00

## 2019-06-26 RX ADMIN — SODIUM CHLORIDE: 9 INJECTION, SOLUTION INTRAVENOUS at 17:25

## 2019-06-26 RX ADMIN — CEFEPIME 2 G: 2 INJECTION, POWDER, FOR SOLUTION INTRAVENOUS at 11:56

## 2019-06-26 RX ADMIN — PREDNISONE 40 MG: 20 TABLET ORAL at 17:24

## 2019-06-26 RX ADMIN — MYCOPHENOLATE MOFETIL 1000 MG: 500 TABLET, FILM COATED ORAL at 22:52

## 2019-06-26 RX ADMIN — SODIUM CHLORIDE, PRESERVATIVE FREE 5 ML: 5 INJECTION INTRAVENOUS at 04:46

## 2019-06-26 RX ADMIN — ACYCLOVIR 800 MG: 800 TABLET ORAL at 11:56

## 2019-06-26 RX ADMIN — MYCOPHENOLATE MOFETIL 1000 MG: 500 TABLET, FILM COATED ORAL at 13:46

## 2019-06-26 RX ADMIN — TACROLIMUS 0.5 MG: 0.5 CAPSULE ORAL at 19:40

## 2019-06-26 RX ADMIN — MAGNESIUM OXIDE TAB 400 MG (241.3 MG ELEMENTAL MG) 400 MG: 400 (241.3 MG) TAB at 19:40

## 2019-06-26 RX ADMIN — TC 99M MEDRONATE 24.3 MCI.: 20 INJECTION, POWDER, LYOPHILIZED, FOR SOLUTION INTRAVENOUS at 08:16

## 2019-06-26 RX ADMIN — VORICONAZOLE 150 MG: 50 TABLET ORAL at 19:40

## 2019-06-26 RX ADMIN — PANTOPRAZOLE SODIUM 40 MG: 40 TABLET, DELAYED RELEASE ORAL at 19:40

## 2019-06-26 ASSESSMENT — MIFFLIN-ST. JEOR: SCORE: 1656.61

## 2019-06-26 ASSESSMENT — ACTIVITIES OF DAILY LIVING (ADL)
ADLS_ACUITY_SCORE: 14

## 2019-06-26 NOTE — PROGRESS NOTES
"SPIRITUAL HEALTH SERVICES  SPIRITUAL ASSESSMENT Progress Note  [facility] 5C     REFERRAL SOURCE: Spiritual health follow-up and length of stay      I met with Randy (El).    He is well-supported by his  and his wife.    We briefly explored the challenges of being hospitalized for a significant period of time and he is \"looking forward to possibly getting out of here at the end of the week.\"    He identified no other emotional/spiritual needs at this time.    PLAN: I remain available for ongoing support for the duration of patient's hospitalization per pt request or change in diagnosis/prognosis. If there is a request, please enter a consult.    Vee Yip  Chaplain Resident  Pager [phone]  [facility] remains available 24/7 for emergent requests/referrals, either by having the switchboard page the on-call  or by entering an ASAP/STAT consult in Epic (this will also page the on-call ).    "

## 2019-06-26 NOTE — PLAN OF CARE
"./80 (BP Location: Right arm)   Pulse 87   Temp 97.5  F (36.4  C) (Axillary)   Resp 16   Ht 1.735 m (5' 8.31\")   Wt 90.1 kg (198 lb 11.2 oz)   SpO2 97%   BMI 29.94 kg/m      Pt afebrile. Ovss. Denies nausea. C/o headache, back/neck, and left wrist pain, received prn Tylenol x1 and Oxycodone x1 with relief. Good urine output. Received one dose of MethyPred last night per order. Mivf -NS infusing at 100 ml/hr. Cont to monitor and follow poc.     Problem: Adult Inpatient Plan of Care  Goal: Plan of Care Review  6/26/2019 0641 by German Henderson RN  Outcome: No Change     Problem: Adult Inpatient Plan of Care  Goal: Absence of Hospital-Acquired Illness or Injury  6/26/2019 0641 by German Henderson RN  Outcome: Improving     Problem: Diarrhea (Stem Cell/Bone Marrow Transplant)  Goal: Diarrhea Symptom Control  6/26/2019 0641 by German Henderson RN  Outcome: Improving     Problem: Hematologic Alteration (Stem Cell/Bone Marrow Transplant)  Goal: Blood Counts Within Acceptable Range  6/26/2019 0641 by German Henderson RN  Outcome: Improving     Problem: Infection Risk (Stem Cell/Bone Marrow Transplant)  Goal: Absence of Infection Signs/Symptoms  6/26/2019 0641 by German Henderson RN  Outcome: No Change     Problem: Nausea and Vomiting (Stem Cell/Bone Marrow Transplant)  Goal: Nausea and Vomiting Symptom Relief  Outcome: No Change     Problem: Nutrition Intake Altered (Stem Cell/Bone Marrow Transplant)  Goal: Optimal Nutrition Intake  Outcome: No Change     "

## 2019-06-26 NOTE — PROGRESS NOTES
"BMT Daily Progress Note       Mr. Ace 63 y.o hx of AML. Admitted for VELMA Haplo. Currently day +26    Overnight events: Neck pain and stiffness still present but improved following Methylpred. He is relieved to have a diagnosis of pseudogout. Left wrist and ankle are also still sore but not as bad as they were. He has no new complaints today. Finally afebrile.    Review of Systems: 10 point ROS negative except as noted above.  Physical Exam:   Blood pressure 122/66, pulse 95, temperature 97.6  F (36.4  C), temperature source Axillary, resp. rate 16, height 1.735 m (5' 8.31\"), weight 88.7 kg (195 lb 9.6 oz), SpO2 99 %.  General: NAD, interactive - still appears to have limited ROM/ odd posture just sitting.   Eyes: JEROMY, sclera anicteric   Nose/Mouth/Throat: OP clear, buccal mucosa dry but without ulcerations. No blood noted in the nares.    Lungs: normal effort at rest  Cardiovascular: RRR  Abdominal/Rectal: +BS, soft, NT, ND  Lymphatics: no edema  Skin: no rashes or petechaie  Msk: Full ROM of bilateral ankles but medial malleous tenderness. Increased ROM of neck - now look left/ right about 45 degree (improved from about 15 degrees).   Neuro: A&O.   Additional Findings: [facility] c/d/i    Labs:  Lab Results   Component Value Date    WBC 5.5 06/26/2019    ANEU 4.4 06/26/2019    HGB 9.5 (L) 06/26/2019    HCT 28.2 (L) 06/26/2019     (L) 06/26/2019     06/26/2019    POTASSIUM 4.0 06/26/2019    CHLORIDE 105 06/26/2019    CO2 22 06/26/2019     (H) 06/26/2019    BUN 7 06/26/2019    CR 0.62 (L) 06/26/2019    MAG 1.7 06/26/2019    INR 1.15 (H) 06/24/2019     Imaging: Reviewed  Microbiology:  Reviewed    Assessment and Plan:   El Ace is a 63 year old male with hx of AML (IDH2, RUNX1 pos), currently day +26 of flu/cy/TBI and related haplo BMT.     5/25 (day -6): flu/cy  5/26 (day -5): flu  5/27 (day -4): flu   5/28 (day -3): flu  5/29 (day -2): TBI/flu  5/30 (day -1): TBI  5/31 (day 0): " Transplant. No ABO mismatch - both A+.      1.  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   - GCSF started d+5 and completed 6/16/19.   - Re-stage per protocol.  -Day +21 bmbx - completed 6/19 ( 2 days early to simplify first clinic visit). In remission and 100% donor.       2.  HEME: Keep Hgb>8g/dL and plts>10K.   - on MiPlate study- no current evidence of bleeding.   - Needs daily bleeding assessments. No bleeding to date.                              3.  ID: fever resolved(6/22- 6/25) Likely due to psuedogout.   - Stop empiric cefepime/ vanco if ID agrees (awaiting Id official rec today) as 6/25 wrist culture with crystals, many WBCs and organisms. Unlikely osteomyelitis. Continue steroids as detailed in rheum/msk below.   -6/4-6/6  BC negative, Culture negative neutropenic fever- s/p empiric cefepime 6/4-6/16.    -Prophylaxis with HD ACV - 6/15 CMV neg.   Fungal prophy: continue Vfend  level high 2.2 - only need prophy so start 100mg BID.    Bactrim or appropriate PCP therapy to start d+28.   - Hx of Cdiff had been on PO vanco prophy BID - resume as Cdiff prophy.   - 6/24 LP - negative to date.   - 6/24 serum AspGM neg  - 6/24 HHV6, CMV, EBV all negative in serum. 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     4.) MSK/RHeum: CPPD/psuedogout  6/26 MRI Cpsike with C1 uptake, new left wrist pain -> Rheum consult appreciate recs.   - new unifying dx cddp - as unlikley osteomyelitis without persistent bacteremia.   -6/26 left wrist: Intracellular calcium pyrophosphate crystals present, MANY PMNs and no orgamisms. 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 Improved with MP 40mg IV x1 last night. Plan to continue steroids. Awaiting Rheumo sabino on dose and likely gradually taper over 14 days.                       4.  GI: No NVD   -  Prn imodium- controlling loose stools well.   - zofran ODT prn.   -  GI prophy -  protonox BID (GERD symptoms)   - Ativan and compazine, zofran available PRN break-through symptoms.   - Ursodiol for VOD prophy.     5.  GVH: S/P post-txp cytoxan,   - tac/MMF were started on day +5. No s/sx GVH to date.  - 6/14 Tacro level 11.7, changed to PO 6/15 2.5 mg BID.   - 6/17 tac high 20.1- dose decreasedt o 1.5mg bid  - 6/19 tac level still high at 20.0 despite skipping AM dose- decreased to 1mg BID.   - 6/24 Tac: 18.3. SKip eveing dose. Resume 0.5mg PO BID. Recheck level tomorrow.    - MRI 6/20 (throbbing head/neck pain) negative for PRES  -MMF 1000mg TID through day +35    6.  FEN/Renal: Monitor creat and lytes.   - Cr stable.   - Replete lytes PRN per SS.   - tac induced hypomag: mag ox 400mg BID     7.  CV:   - CI induced HTN:  stopped norvasc d/t orthostasis.   - Orthostatics improved.    8. Neuro:  6/19:New occipital throbbing and ongoing neck pain. Physical exam re-assuring; Head CT 6/19 neg, brain MRI with and without contrast neg for PRES 6/19. Likely psuedogout as above  - Continue daily steroids.    - PRN morphine gel  - MRI Cspine today -results pending. Given 1mg IV dilaudid premed as too painful to lay when trie Sunday.   - 6/24 LP results pending but no WBC so seems unlikely infectious.     Val Lindsey PA-C  [phone]  _______________________________________________    BMT ATTENDING NOTE    I have seen and personally evaluated the patient.  I reviewed vitals, medications, laboratory results, and I viewed imaging studies.  After doing so, I formulated a plan as documented in this note with the care team and patient today.     El Ace is a 63 year old male with AML, admitted on 5/24/2019 for VELMA haploidentical BMT, and remains hospitalized pending satisfactory recovery.  Day+ 2 t6oday     El 's neck pain and fevers are better. Trying to eat     On exam, he is pleasant, interactive,  neck range of motion improving, no oral mucosal lesions, normal respiratory effort, abdomen  non-distended, skin without rash, no edema, normal affect.      Overall, our plan is to continue support him as he recovers from his transplant.  Fever: LP done  is negative. MRI reveals Edema and enhancement about the atlantodental joint including within the bone in the anterior arch of C1, with  prevertebral edema/enhancement. Discussed with ID - no change in treatment plan for now. Also check HHV-6, CMV and EBV  Rheumatology favors pseudogout (crystals on wrist fluid)    Dilma Quezada

## 2019-06-26 NOTE — PROGRESS NOTES
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 Course has been complicated by CDI, febrile neutropenia, tooth abscess, proctitis, and MDR P aeruginosa BSI likely due to proctitis, now admitted for elective VELMA, Haplo-HSCT (son), HSCT done on 5/31/2019 currently with fever and neck pain, with MRI C spine initially concerning for C1 osteomyelitis, but pain improving with treatment of new diagnosis of pseudogout.     Active Problems  1. Fever/Neck pain  Main localizing symptom is diffuse posterior neck pain. MRI C spine showed degenerative changes of C1 unable to exclude osteomyelitis of the anterior arch. 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 Fevers are now improved. I would argue for continuation of empiric vancomycin and cefepime at this time. If continued clinical improvement (fever abatement) on these meds, would suggest trial of discontinuation in coming days    2. L wrist pain  Swelling of joint noted on exam yesterday. Rheumatology made diagnosis of pseudogout and have started steroids.     Rohan Santiago MD PhD  Transplant Infectious Diseases Attending Physician  Pager: [phone]    ---    Interim History  Seen by Rheum yesterday. Underwent aspiration of L wrist effusion with synovial fluid positive for CPPD crystals. Syndrome felt consistent with pseudogout, prompting steroids, which have now improved neck and wrist pains, and resulted in abatement of fevers.    Medications  Current Facility-Administered Medications   Medication     acetaminophen (TYLENOL) tablet 325-650 mg     acetaminophen (TYLENOL) tablet 325-650 mg     acyclovir  (ZOVIRAX) tablet 800 mg     baclofen (LIORESAL) tablet 10 mg     calcium carbonate (TUMS) chewable tablet 500-1,000 mg     cyclobenzaprine (FLEXERIL) tablet 10 mg     diphenhydrAMINE (BENADRYL) capsule 25 mg    Or     diphenhydrAMINE (BENADRYL) injection 25 mg     heparin lock flush 10 UNIT/ML injection 5-10 mL     heparin lock flush 10 UNIT/ML injection 5-10 mL     heparin lock flush 10 UNIT/ML injection 5-10 mL     hydrALAZINE (APRESOLINE) injection 10 mg     lidocaine (PF) (XYLOCAINE) 1 % injection 1 mL     lidocaine (PF) (XYLOCAINE) 1 % injection 5 mL     loperamide (IMODIUM) capsule 2 mg     LORazepam (ATIVAN) injection 0.5-1 mg    Or     LORazepam (ATIVAN) tablet 0.5-1 mg     magnesium oxide (MAG-OX) tablet 400 mg     magnesium sulfate 4 g in 100 mL sterile water (premade)     melatonin tablet 1-3 mg     methyl salicylate-menthol (ARNOLDO-MARRERO) ointment     methylPREDNISolone sodium succinate (solu-MEDROL) injection 40 mg     morphine 0.1% in solosite topical gel 1 g     mycophenolate (CELLCEPT BRAND) tablet 1,000 mg     naloxone (NARCAN) injection 0.1-0.4 mg     oxyCODONE (ROXICODONE) tablet 5 mg     pantoprazole (PROTONIX) EC tablet 40 mg     potassium chloride (KLOR-CON) Packet 20-40 mEq     potassium chloride 10 mEq in 100 mL intermittent infusion with 10 mg lidocaine     potassium chloride 10 mEq in 100 mL sterile water intermittent infusion (premix)     potassium chloride 20 mEq in 50 mL intermittent infusion     potassium chloride ER (K-DUR/KLOR-CON M) CR tablet 20-40 mEq     potassium phosphate 15 mmol in D5W 250 mL intermittent infusion     potassium phosphate 20 mmol in D5W 250 mL intermittent infusion     potassium phosphate 20 mmol in D5W 500 mL intermittent infusion     potassium phosphate 25 mmol in D5W 500 mL intermittent infusion     pramox-pe-glycerin-petrolatum (PREPARATION H) cream     prochlorperazine (COMPAZINE) injection 5 mg    Or     prochlorperazine (COMPAZINE) tablet 5 mg     sodium  chloride (PF) 0.9% PF flush 10-20 mL     sodium chloride 0.9% infusion     sucralfate (CARAFATE) suspension 1 g     [START ON 7/1/2019] sulfamethoxazole-trimethoprim (BACTRIM DS/SEPTRA DS) 800-160 MG per tablet 1 tablet     tacrolimus (GENERIC EQUIVALENT) capsule 0.5 mg     tamsulosin (FLOMAX) capsule 0.4 mg     ursodiol (ACTIGALL) capsule 300 mg     vancomycin (VANCOCIN) capsule 125 mg     voriconazole (VFEND) tablet 150 mg       Physical Exam  Temp:  [97.5  F (36.4  C)-100.3  F (37.9  C)] 97.8  F (36.6  C)  Pulse:  [] 95  Heart Rate:  [101] 101  Resp:  [16-18] 16  BP: (112-126)/(66-82) 112/75  SpO2:  [97 %-100 %] 100 %  Well appearing, no distress  No oral lesion, no cervical adenopathy  Mild posterior neck stiffness improved from prior, poorly localizing, no meningismus  Neuro notable for diffuse tremor, no focal neuro deficits  RRR, s1s2, no mrg  Lungs clear bilaterally  Abd soft, not tender, benign  Ext wwp  L wrist joint less swollen and less painful to the touch, no erythema   No rashes  R chest wall pena site clean and intact    Labs  Last Comprehensive Metabolic Panel:  Sodium   Date Value Ref Range Status   06/26/2019 134 133 - 144 mmol/L Final     Potassium   Date Value Ref Range Status   06/26/2019 4.0 3.4 - 5.3 mmol/L Final     Chloride   Date Value Ref Range Status   06/26/2019 105 94 - 109 mmol/L Final     Carbon Dioxide   Date Value Ref Range Status   06/26/2019 22 20 - 32 mmol/L Final     Anion Gap   Date Value Ref Range Status   06/26/2019 8 3 - 14 mmol/L Final     Glucose   Date Value Ref Range Status   06/26/2019 136 (H) 70 - 99 mg/dL Final     Urea Nitrogen   Date Value Ref Range Status   06/26/2019 7 7 - 30 mg/dL Final     Creatinine   Date Value Ref Range Status   06/26/2019 0.62 (L) 0.66 - 1.25 mg/dL Final     GFR Estimate   Date Value Ref Range Status   06/26/2019 >90 >60 mL/min/[1.73_m2] Final     Comment:     Non  GFR Calc  Starting 12/18/2018, serum creatinine  based estimated GFR (eGFR) will be   calculated using the Chronic Kidney Disease Epidemiology Collaboration   (CKD-EPI) equation.       Calcium   Date Value Ref Range Status   06/26/2019 8.6 8.5 - 10.1 mg/dL Final   Liver Function Studies -   Recent Labs   Lab Test 06/24/19  0442   PROTTOTAL 6.4*   ALBUMIN 2.6*   BILITOTAL 0.4   ALKPHOS 156*   AST 26   ALT 35     CBC RESULTS:   Recent Labs   Lab Test 06/26/19  0438   WBC 5.5   RBC 3.12*   HGB 9.5*   HCT 28.2*   MCV 90   MCH 30.4   MCHC 33.7   RDW 15.3*   *     Microbiology  6/25 BCx NGTD  6/24 BCx NGTD  6/23 BCx NGTD  6/22 BCx NGTD  6/22 Blood CMV, EBV and HHV6 VLs all negative  5/1 Glucan 52  5/1 galactomannan negative    Radiology  6/23 CT Chest  1. Platelike atelectasis in the lung bases, otherwise the lungs are  clear. No findings to suggest pneumonia.  2. Mild simple pericardial effusion, somewhat increased from  comparison dated 3/29/2019.  3. Moderate coronary artery calcifications.    6/19 MRI Brain  1. No evidence for posterior reversible encephalopathy syndrome.  2. Generalized cerebral atrophy and mild Leukoaraiosis.

## 2019-06-26 NOTE — PLAN OF CARE
A/VSS, afebrile. Pt denies pain. No reports of n/v/d. Pt appears to be doing better, ambulated hallways x 2 today, voiding well, increased appetite. Wife at bedside assisting with cares. Will continue to monitor per plan of care.       Problem: Adult Inpatient Plan of Care  Goal: Plan of Care Review  6/26/2019 1843 by Odette Grey, RN  Outcome: No Change     Problem: Adult Inpatient Plan of Care  Goal: Patient-Specific Goal (Individualization)  Outcome: Improving     Problem: Adult Inpatient Plan of Care  Goal: Absence of Hospital-Acquired Illness or Injury  6/26/2019 1843 by Odette Grey, RN  Outcome: Improving     Problem: Adult Inpatient Plan of Care  Goal: Optimal Comfort and Wellbeing  Outcome: Improving     Problem: Adult Inpatient Plan of Care  Goal: Readiness for Transition of Care  Outcome: Improving     Problem: Diarrhea (Stem Cell/Bone Marrow Transplant)  Goal: Diarrhea Symptom Control  6/26/2019 1843 by Odette Grey RN  Outcome: Improving     Problem: Adjustment to Transplant (Stem Cell/Bone Marrow Transplant)  Goal: Optimal Coping with Transplant  6/26/2019 1843 by Odetet Grey, RN  Outcome: Improving  6/26/2019 0641 by German Henderson RN  Outcome: No Change

## 2019-06-27 VITALS
DIASTOLIC BLOOD PRESSURE: 70 MMHG | BODY MASS INDEX: 29.96 KG/M2 | HEIGHT: 68 IN | RESPIRATION RATE: 16 BRPM | OXYGEN SATURATION: 98 % | WEIGHT: 197.7 LBS | SYSTOLIC BLOOD PRESSURE: 97 MMHG | HEART RATE: 86 BPM | TEMPERATURE: 97.6 F

## 2019-06-27 DIAGNOSIS — R53.81 PHYSICAL DECONDITIONING: ICD-10-CM

## 2019-06-27 DIAGNOSIS — C92.01 ACUTE MYELOID LEUKEMIA IN REMISSION (H): ICD-10-CM

## 2019-06-27 DIAGNOSIS — Z94.84 HISTORY OF ALLOGENEIC STEM CELL TRANSPLANT (H): Primary | ICD-10-CM

## 2019-06-27 LAB
ALB CSF/SERPL: 6.1 RATIO (ref 0–9)
ALBUMIN CSF-MCNC: 19 MG/DL (ref 0–35)
ALBUMIN SERPL-MCNC: 2.4 G/DL (ref 3.4–5)
ALBUMIN SERPL-MCNC: 3090 MG/DL (ref 3500–5200)
ALP SERPL-CCNC: 121 U/L (ref 40–150)
ALT SERPL W P-5'-P-CCNC: 30 U/L (ref 0–70)
ANION GAP SERPL CALCULATED.3IONS-SCNC: 8 MMOL/L (ref 3–14)
APPEARANCE CSF: CLEAR
AST SERPL W P-5'-P-CCNC: 21 U/L (ref 0–45)
BACTERIA SPEC CULT: NO GROWTH
BACTERIA SPEC CULT: NO GROWTH
BASOPHILS # BLD AUTO: 0 10E9/L (ref 0–0.2)
BASOPHILS NFR BLD AUTO: 0.2 %
BILIRUB SERPL-MCNC: 0.3 MG/DL (ref 0.2–1.3)
BUN SERPL-MCNC: 13 MG/DL (ref 7–30)
CALCIUM SERPL-MCNC: 8.8 MG/DL (ref 8.5–10.1)
CHLORIDE SERPL-SCNC: 110 MMOL/L (ref 94–109)
CO2 SERPL-SCNC: 21 MMOL/L (ref 20–32)
COLOR CSF: COLORLESS
CREAT SERPL-MCNC: 0.62 MG/DL (ref 0.66–1.25)
CRP SERPL-MCNC: 86 MG/L (ref 0–8)
DIAGNOSTIC IMP SPEC-IMP: NOT DETECTED
DIFFERENTIAL METHOD BLD: ABNORMAL
EBV DNA # SPEC NAA+PROBE: <390 CPY/ML
EBV DNA SPEC NAA+PROBE-LOG#: <2.6 LOG
EOSINOPHIL # BLD AUTO: 0 10E9/L (ref 0–0.7)
EOSINOPHIL NFR BLD AUTO: 0 %
ERYTHROCYTE [DISTWIDTH] IN BLOOD BY AUTOMATED COUNT: 15.2 % (ref 10–15)
GFR SERPL CREATININE-BSD FRML MDRD: >90 ML/MIN/{1.73_M2}
GLUCOSE SERPL-MCNC: 156 MG/DL (ref 70–99)
HADV DNA SPEC QL NAA+PROBE: NOT DETECTED
HCT VFR BLD AUTO: 27.6 % (ref 40–53)
HGB BLD-MCNC: 9.4 G/DL (ref 13.3–17.7)
IGG CSF-MCNC: 2.8 MG/DL (ref 0–6)
IGG SERPL-MCNC: 779 MG/DL (ref 768–1632)
IGG SYNTH RATE SER+CSF CALC-MRATE: 0.6 MG/D
IGG/ALB CLEAR SER+CSF-RTO: 0.58 RATIO (ref 0.28–0.66)
IGG/ALB CSF: 0.15 RATIO (ref 0.09–0.25)
IMM GRANULOCYTES # BLD: 0.1 10E9/L (ref 0–0.4)
IMM GRANULOCYTES NFR BLD: 1.5 %
JCPYV DNA SERPL QL NAA+PROBE: NOT DETECTED
LYMPHOCYTES # BLD AUTO: 0.1 10E9/L (ref 0.8–5.3)
LYMPHOCYTES NFR BLD AUTO: 1.2 %
Lab: NORMAL
Lab: NORMAL
MCH RBC QN AUTO: 31.2 PG (ref 26.5–33)
MCHC RBC AUTO-ENTMCNC: 34.1 G/DL (ref 31.5–36.5)
MCV RBC AUTO: 92 FL (ref 78–100)
MONOCYTES # BLD AUTO: 1.4 10E9/L (ref 0–1.3)
MONOCYTES NFR BLD AUTO: 28.6 %
NEUTROPHILS # BLD AUTO: 3.3 10E9/L (ref 1.6–8.3)
NEUTROPHILS NFR BLD AUTO: 68.5 %
NRBC # BLD AUTO: 0 10*3/UL
NRBC BLD AUTO-RTO: 0 /100
OLIGOCLONAL BANDS CSF ELPH-IMP: ABNORMAL
OLIGOCLONAL BANDS CSF ELPH-IMP: NEGATIVE
OLIGOCLONAL BANDS CSF IEF: 0 BANDS (ref 0–1)
PLATELET # BLD AUTO: 116 10E9/L (ref 150–450)
POTASSIUM SERPL-SCNC: 3.9 MMOL/L (ref 3.4–5.3)
PROT SERPL-MCNC: 6.2 G/DL (ref 6.8–8.8)
RBC # BLD AUTO: 3.01 10E12/L (ref 4.4–5.9)
RBC # CSF MANUAL: 2 /UL (ref 0–2)
SODIUM SERPL-SCNC: 139 MMOL/L (ref 133–144)
SPECIMEN SOURCE: NORMAL
TACROLIMUS BLD-MCNC: 9.1 UG/L (ref 5–15)
TME LAST DOSE: NORMAL H
TUBE # CSF: 4 #
WBC # BLD AUTO: 4.8 10E9/L (ref 4–11)
WBC # CSF MANUAL: 0 /UL (ref 0–5)

## 2019-06-27 PROCEDURE — 25000131 ZZH RX MED GY IP 250 OP 636 PS 637: Performed by: NURSE PRACTITIONER

## 2019-06-27 PROCEDURE — 25000132 ZZH RX MED GY IP 250 OP 250 PS 637: Performed by: PHYSICIAN ASSISTANT

## 2019-06-27 PROCEDURE — 25000131 ZZH RX MED GY IP 250 OP 636 PS 637: Performed by: PHYSICIAN ASSISTANT

## 2019-06-27 PROCEDURE — 25000128 H RX IP 250 OP 636: Performed by: PHYSICIAN ASSISTANT

## 2019-06-27 PROCEDURE — 86140 C-REACTIVE PROTEIN: CPT | Performed by: PHYSICIAN ASSISTANT

## 2019-06-27 PROCEDURE — 25000131 ZZH RX MED GY IP 250 OP 636 PS 637: Performed by: INTERNAL MEDICINE

## 2019-06-27 PROCEDURE — 80197 ASSAY OF TACROLIMUS: CPT | Performed by: PHYSICIAN ASSISTANT

## 2019-06-27 PROCEDURE — 85025 COMPLETE CBC W/AUTO DIFF WBC: CPT | Performed by: PHYSICIAN ASSISTANT

## 2019-06-27 PROCEDURE — 80053 COMPREHEN METABOLIC PANEL: CPT | Performed by: PHYSICIAN ASSISTANT

## 2019-06-27 RX ORDER — TACROLIMUS 0.5 MG/1
0.5 CAPSULE ORAL 2 TIMES DAILY
Qty: 60 CAPSULE | Refills: 0 | Status: SHIPPED | OUTPATIENT
Start: 2019-06-27 | End: 2019-07-03

## 2019-06-27 RX ORDER — PREDNISONE 20 MG/1
40 TABLET ORAL DAILY
Qty: 15 TABLET | Refills: 0 | Status: SHIPPED | OUTPATIENT
Start: 2019-06-27 | End: 2019-07-29

## 2019-06-27 RX ORDER — MYCOPHENOLATE MOFETIL 500 MG/1
1000 TABLET, FILM COATED ORAL EVERY 8 HOURS
Qty: 48 TABLET | Refills: 0 | Status: SHIPPED | OUTPATIENT
Start: 2019-06-27 | End: 2019-07-29

## 2019-06-27 RX ORDER — VORICONAZOLE 50 MG/1
150 TABLET, FILM COATED ORAL EVERY 12 HOURS
Qty: 180 TABLET | Refills: 0 | Status: SHIPPED | OUTPATIENT
Start: 2019-06-27 | End: 2019-07-18

## 2019-06-27 RX ORDER — PREDNISONE 20 MG/1
40 TABLET ORAL DAILY
Status: DISCONTINUED | OUTPATIENT
Start: 2019-06-27 | End: 2019-06-27 | Stop reason: HOSPADM

## 2019-06-27 RX ADMIN — VANCOMYCIN HYDROCHLORIDE 125 MG: 125 CAPSULE ORAL at 07:54

## 2019-06-27 RX ADMIN — Medication 5 ML: at 08:00

## 2019-06-27 RX ADMIN — MAGNESIUM OXIDE TAB 400 MG (241.3 MG ELEMENTAL MG) 400 MG: 400 (241.3 MG) TAB at 10:04

## 2019-06-27 RX ADMIN — TAMSULOSIN HYDROCHLORIDE 0.4 MG: 0.4 CAPSULE ORAL at 07:54

## 2019-06-27 RX ADMIN — LORAZEPAM 0.5 MG: 0.5 TABLET ORAL at 04:01

## 2019-06-27 RX ADMIN — MYCOPHENOLATE MOFETIL 1000 MG: 500 TABLET, FILM COATED ORAL at 07:53

## 2019-06-27 RX ADMIN — TACROLIMUS 0.5 MG: 0.5 CAPSULE ORAL at 07:59

## 2019-06-27 RX ADMIN — ACYCLOVIR 800 MG: 800 TABLET ORAL at 11:38

## 2019-06-27 RX ADMIN — URSODIOL 300 MG: 300 CAPSULE ORAL at 07:53

## 2019-06-27 RX ADMIN — PANTOPRAZOLE SODIUM 40 MG: 40 TABLET, DELAYED RELEASE ORAL at 07:54

## 2019-06-27 RX ADMIN — URSODIOL 300 MG: 300 CAPSULE ORAL at 14:13

## 2019-06-27 RX ADMIN — SODIUM CHLORIDE, PRESERVATIVE FREE 5 ML: 5 INJECTION INTRAVENOUS at 03:56

## 2019-06-27 RX ADMIN — VORICONAZOLE 150 MG: 50 TABLET ORAL at 07:54

## 2019-06-27 RX ADMIN — ACYCLOVIR 800 MG: 800 TABLET ORAL at 07:54

## 2019-06-27 RX ADMIN — PREDNISONE 40 MG: 20 TABLET ORAL at 10:03

## 2019-06-27 RX ADMIN — MYCOPHENOLATE MOFETIL 1000 MG: 500 TABLET, FILM COATED ORAL at 14:13

## 2019-06-27 RX ADMIN — ACYCLOVIR 800 MG: 800 TABLET ORAL at 14:13

## 2019-06-27 ASSESSMENT — ACTIVITIES OF DAILY LIVING (ADL)
ADLS_ACUITY_SCORE: 14

## 2019-06-27 ASSESSMENT — MIFFLIN-ST. JEOR: SCORE: 1666.13

## 2019-06-27 NOTE — PROGRESS NOTES
BMT Clinic Note    Mr. Ace 63 y.o day +28 s/p VELMA Haplo for AML.      Overnight events: Randy was just discharged.  Slept poorly lasts night.  Still very weak & fatigued.  Eating is improving, a little nausea, no emesis or diarrhea.  No stool in 2 days.  Afebrile with no cough or congestion.  Neck pain & stiffness improved, but not back to normal.  No bleeding.  Stable hyperpigmentation to hands & arms.  R wrist & thumb still mildly tender.        Review of Systems: 10 point ROS negative except as noted above.  Physical Exam:   Blood pressure 130/84, pulse 78, temperature 97.7  F (36.5  C), temperature source Oral, weight 90.4 kg (199 lb 4.8 oz), SpO2 100 %.  General: NAD, interactive.    Eyes: JEROMY, sclera anicteric   Nose/Mouth/Throat: OP clear, buccal mucosa dry but without ulcerations.  Lungs: CTA  Cardiovascular: RRR  Abdominal/Rectal: +BS, soft, NT, ND  Lymphatics: no edema  Skin: no rashes or petechaie.  Hyperpigmentation to UE & hands.    Msk: Full ROM of bilateral ankles & wrists.  Decreased ROM to neck, but overall improved.    Neuro: A&O.   Additional Findings: [place] site c/d/i     Labs:  Lab Results   Component Value Date    WBC 8.1 06/28/2019    ANEU 6.3 06/28/2019    HGB 9.8 (L) 06/28/2019    HCT 28.4 (L) 06/28/2019     (L) 06/28/2019     06/28/2019    POTASSIUM 3.5 06/28/2019    CHLORIDE 105 06/28/2019    CO2 23 06/28/2019     (H) 06/28/2019    BUN 14 06/28/2019    CR 0.68 06/28/2019    MAG 1.5 (L) 06/28/2019    INR 1.15 (H) 06/24/2019   Assessment and Plan:  El Ace is a 63 year old male with hx of AML (IDH2, RUNX1 pos),  day +28 of flu/cy/TBI and related haplo BMT.     5/25 (day -6): flu/cy  5/26 (day -5): flu  5/27 (day -4): flu   5/28 (day -3): flu  5/29 (day -2): TBI/flu  5/30 (day -1): TBI  5/31 (day 0): Transplant. No ABO mismatch - both A+.      1.  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   - Last dose GCSF 6/16/19. White count stable.  - Re-stage per protocol.  -Day  +21 bmbx - completed 6/19.  In remission and 100% donor.       2.  HEME: Keep Hgb>8g/dL and plts>10K.   - on MiPlate study- no bleeding. >10days since his last platelet tranfusion.                               3.  ID:  Afebrile, no active infections  - Cont proph HD Acyclovir & V Fend  - 6/22 EBV/CMV/ HHV6 negative  - Bactrim or appropriate PCP therapy to start d+28, 7/1     Hx of fever & neck pain - work up for possible meningitis/C1 osteomyelitis (s/p Cefepime/vanco)  - 6/24 LP - negative to date.   - 6/24 serum AspGM neg  - 6/24 HHV6, CMV, EBV all negative in serum. New HSV1 pcr postive in serum but negative in CSF (will not repeat as no evidence of disseminated HSV infection).      4. MSK/RHeum: CPPD/psuedogout  - 6/26 MRI Cpsike with C1 uptake, new left wrist pain -> Rheum consulted.  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   -6/26 left wrist: Intracellular calcium pyrophosphate crystals present, MANY PMNs and no orgamisms. CRP elevated at 120, SEd 88-  DDx: Crowned Dens Syndrome (CPPD) - psuedogout that involves c1, wrist, bilateral ankles  - Not candidate for Nsaids. Improved with MP 40mg IV x1 (6/25)   - per Rheumo rapid pred taper. Pred 40mg daily x5 days (6/26-6/30),  20mg daily (7/1-7/3) then 10mg daily x2 days (7/4-7/5).      5.  GI: Try Miralax for constipation   -  zofran ODT prn.   -  GI prophy - protonox     - Ursodiol for VOD prophy.     5.  GVH:  No GVH.  On Tac0.5mg BID & MMF.    - Tac level 9.1 on 6/27.    - S/P post-txp cytoxan,   -MMF 1000mg TID through day +35     6.  FEN/Renal: Tac induced hypoMg.  Increase oral supp to 3 tabs BID & replace IV today   - Cr stable.      7. Neuro:  6/19:New occipital throbbing and ongoing neck pain.  Head CT 6/19 neg, brain MRI with and without contrast neg for PRES 6/19. Likely psuedogout as above  - Continue daily steroids.      RTC Monday, sooner PRN    Shreya  Ria

## 2019-06-27 NOTE — PROGRESS NOTES
"Discharge SBAR:    Situation:  El Ace is a 63 year old being discharged to: Local Lodging: [facility]  Admission reason: Scheduled Admission  Is this a readmission? No    Background:  Primary diagnosis: AML  BP 97/70 (BP Location: Right arm)   Pulse 86   Temp 97.6  F (36.4  C) (Axillary)   Resp 16   Ht 1.735 m (5' 8.31\")   Wt 89.7 kg (197 lb 11.2 oz)   SpO2 98%   BMI 29.79 kg/m    Type of donor: Allogeneic  Type of stem cells: BMT  Relapsed? No  Falls Precautions? No  Isolation? Yes  DNR? No  DNI? No  Confidential Patient? No  Positive blood cultures? No    Assessment:  Discharge teaching    Review discharge medications and schedule: Yes    Set up pill box: Yes, wife set up-guided by RN    Discharge instructions reviewed: Yes    Special considerations (think about previous reactions, issues with flushing CVC, premedication needs, etc): No    Patient Concerns: No    Recommendations:  Anticipated needs:    Daily infusions: No    Daily transfusions: No    G-CSF: No    Other: Not Applicable  Verbal report called to clinic: No      "

## 2019-06-27 NOTE — PROGRESS NOTES
BMT SOCIAL WORK DISCHARGE NOTE:    Focus: Discharge Plan    Data: Pt is a 63 year old male with a hx of AML s/p allo BMT per medical record.    Interventions: Per Med Team, pt is medically stable for discharge today. SW met with pt to assess coping, provide psychosocial support and provide discharge packet with post-transplant resources for patient and caregiver. Also present in the room is the pt's wide Bessy. Pt shared that he is feel much better and feels at peace that he know what was causing his pain from last week. The pt and Bessy are both thankful that the pt feels better, but the pt noted that he still feels weak and hopes this improves soon. The pt did note that he is going to start OP PT next week as well. SW provided empathetic listening, validation of concerns, and encouragement. SW encouraged pt to contact SW for support, questions and/or resources.     Education Provided: Discharge Resource Packet, Caregiver Self-Care Education, and Expected Emotional Responses to Transition Home.    Assessment: Pt presented as friendly and open, Bessy was sitting at beside.  Pt appears to be coping well. Pt continues to be supported by his wife Bessy.    Plan: Pt to discharge [facility] with Bessy as primary caregiver. SW will continue to provide psychosocial support and assistance with resources as needed. SW will continue to collaborate with multidisciplinary team regarding pt's plan of care.     EDI John, [facility]  Phone [phone]  Pager [phone]

## 2019-06-27 NOTE — PLAN OF CARE
Physical Therapy Discharge Summary    Reason for therapy discharge:    Discharged to [facility]    Progress towards therapy goal(s). See goals on Care Plan in [facility] electronic health record for goal details.  Goals partially met.  Barriers to achieving goals:   discharge from facility.    Therapy recommendation(s):    Continued therapy is recommended.  Rationale/Recommendations:  OPPT - Continued rehab needed to progress with functional mobility, cervical ROM, ambulation, and functional endurance..

## 2019-06-27 NOTE — PROGRESS NOTES
Outpatient coordinator notified of pt's discharge.    Line Company:  No coverage for line care company. Heparin script ordered by A[facility]IE.  Referral made for line care:  No  IV medications needed at discharge:  None   Pharmacy concerns:  None  PT/OT recommended: [facility] for PT.  Referral made for PT/OT:  Yes  D/c location:  [facility]  Has placement need been communicated to Social Work?  Yes  Teaching time arranged with family:  Teaching completed with patient and wife on 6/20  Notify nurse to schedule line care class/ DM teaching, prior to d/c:  PLC line care class completed 6/20  Medicare form required:  No    Met with patient today, he denies any further teaching needs and states he is ready for discharge. All questions were answered.

## 2019-06-27 NOTE — PLAN OF CARE
"./86 (BP Location: Right arm)   Pulse 83   Temp 97.9  F (36.6  C) (Axillary)   Resp 16   Ht 1.735 m (5' 8.31\")   Wt 88.7 kg (195 lb 9.6 oz)   SpO2 98%   BMI 29.47 kg/m      Pt Avss. Denies nausea. Pain minimal on left wrist and neck/back, declined interventions. Having trouble sleeping up til 04:00 am. Gave prn Ativan 0.5mg for sleep. Mivf infusing 100 ml/hr. Cont to monitor and follow poc.     Problem: Adult Inpatient Plan of Care  Goal: Plan of Care Review  6/27/2019 0558 by German Henderson, RN  Outcome: No Change     Problem: Adjustment to Transplant (Stem Cell/Bone Marrow Transplant)  Goal: Optimal Coping with Transplant  6/27/2019 0558 by German Henderson, RN  Outcome: Improving     Problem: Fatigue (Stem Cell/Bone Marrow Transplant)  Goal: Energy Level Supports Daily Activity  6/27/2019 0558 by German Henderson, RN  Outcome: Improving     Problem: Hematologic Alteration (Stem Cell/Bone Marrow Transplant)  Goal: Blood Counts Within Acceptable Range  6/27/2019 0558 by German Henderson, RN  Outcome: Improving     Problem: Infection Risk (Stem Cell/Bone Marrow Transplant)  Goal: Absence of Infection Signs/Symptoms  Outcome: Improving     Problem: Nutrition Intake Altered (Stem Cell/Bone Marrow Transplant)  Goal: Optimal Nutrition Intake  Outcome: Improving     "

## 2019-06-27 NOTE — PROGRESS NOTES
"BMT Daily Progress Note       Mr. Ace 63 y.o hx of AML. Admitted for VELMA Haplo. Currently day +27    Overnight events: Neck pain and stiffness still present but improved. Randy had a difficulty sleeping but had been getting MP/pred in the evening for pseudogout. Plan to change dosing to the morning. Randy seems a bit anxious at times- he reports he does feel like steroids heightens this but he feels like he can manage it well. He declines antilytic therapy (srri) but may try prn ativan at bedtime to help with sleep.       Review of Systems: 10 point ROS negative except as noted above.  Physical Exam:   Blood pressure 97/70, pulse 86, temperature 97.6  F (36.4  C), temperature source Axillary, resp. rate 16, height 1.735 m (5' 8.31\"), weight 89.7 kg (197 lb 11.2 oz), SpO2 98 %.  General: NAD, interactive - still appears to have limited ROM/ odd posture just sitting.   Eyes: JEROMY, sclera anicteric   Nose/Mouth/Throat: OP clear, buccal mucosa dry but without ulcerations. No blood noted in the nares.    Lungs: normal effort at rest  Cardiovascular: RRR  Abdominal/Rectal: +BS, soft, NT, ND  Lymphatics: no edema  Skin: no rashes or petechaie  Msk: Full ROM of bilateral ankles but medial malleous tenderness. Increased ROM of neck - now look left/ right about 45 degree (improved from about 15 degrees). No noticeable deficit in ROM Of wrist - but still stiff/tender per patient but improving.   Neuro: A&O.   Additional Findings: [facility] [place] c/d/i    Labs:  Lab Results   Component Value Date    WBC 4.8 06/27/2019    ANEU 3.3 06/27/2019    HGB 9.4 (L) 06/27/2019    HCT 27.6 (L) 06/27/2019     (L) 06/27/2019     06/27/2019    POTASSIUM 3.9 06/27/2019    CHLORIDE 110 (H) 06/27/2019    CO2 21 06/27/2019     (H) 06/27/2019    BUN 13 06/27/2019    CR 0.62 (L) 06/27/2019    MAG 1.7 06/26/2019    INR 1.15 (H) 06/24/2019     Imaging: Reviewed  Microbiology:  Reviewed    Assessment and Plan:   El Ace is a " 63 year old male with hx of AML (IDH2, RUNX1 pos), currently day +27 of flu/cy/TBI and related haplo BMT.     5/25 (day -6): flu/cy  5/26 (day -5): flu  5/27 (day -4): flu   5/28 (day -3): flu  5/29 (day -2): TBI/flu  5/30 (day -1): TBI  5/31 (day 0): Transplant. No ABO mismatch - both A+.      1.  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   - GCSF started d+5 and completed 6/16/19.   - Re-stage per protocol.  -Day +21 bmbx - completed 6/19 ( 2 days early to simplify first clinic visit). In remission and 100% donor.       2.  HEME: Keep Hgb>8g/dL and plts>10K.   - on MiPlate study- no current evidence of bleeding. >10days since his last platelet tranfusion.   - Unlikely to need transfusion in clinic.                              3.  ID: fever resolved(6/22- 6/25) Likely due to psuedogout.   Prophy:   -Prophylaxis with HD ACV - 6/22 CMV neg.   Fungal prophy: continue Vfend  level high 2.2 - Vfend decreased to 150mg PO BID.    Bactrim or appropriate PCP therapy to start d+28, 7/1    S/p neutropenic fever  -6/4-6/6  BC negative, Culture negative neutropenic fever- s/p empiric cefepime 6/4-6/16.       Given recurrent fever, neck pain - work up for possible meningitis/C1 osteomyelitis (s/p Cefepime/vanco) and ID consult.   - 6/24 LP - negative to date.   - 6/24 serum AspGM neg  - 6/24 HHV6, CMV, EBV all negative in serum. New HSV1 pcr postive in serum but negative in CSF (will not repeat as no evidence of disseminated HSV infection).     4. MSK/RHeum: CPPD/psuedogout  6/26 MRI Cpsike with C1 uptake, new left wrist pain -> Rheum consult appreciate recs.   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   -6/26 left wrist: Intracellular calcium pyrophosphate crystals present, MANY PMNs and no orgamisms. CRP elevated at 120, SEd 88-  DDx: Crowned Dens Syndrome (CPPD) - psuedogout that involves c1, wrist, bilateral ankles  - Not candidate for  Nsaids. Improved with MP 40mg IV x1 (6/25)   - per Rheumo rapid pred taper. Will slow down slightly as pain improved but still limited ROM in neck (Pred 40mg daily x5 days (6/26-6/30), Pred 20mg daily (7/1-7/3) then pred 10mg daily x2 days (7/4-7/5).                     5.  GI: No NVD   -  Prn imodium- controlling loose stools well.   - zofran ODT prn.   -  GI prophy - protonox  Daily (gerd symptoms early post transplant)  -  compazine, zofran available PRN break-through symptoms.   - Ursodiol for VOD prophy.     5.  GVH: S/P post-txp cytoxan,   - tac/MMF were started on day +5. No s/sx GVH to date.  - Tac has repeatedly been high despite dose reductions (decrease vfend this week to try to help too- given he is an Haplo work hard to continue tac if at all possible).   - 6/24 Tac: 18.3. SKip eveing dose. Resume 0.5mg PO BID. Recheck level pending    - MRI 6/20 (throbbing head/neck pain) negative for PRES  -MMF 1000mg TID through day +35    6.  FEN/Renal: Monitor creat and lytes.   - Cr stable.   - Replete lytes PRN per SS.   - tac induced hypomag: mag ox 400mg BID -titrate up as tolerated.     7.  CV:   - CI induced HTN:  stopped norvasc d/t orthostasis.   - Orthostatics improved.    8. Neuro:  6/19:New occipital throbbing and ongoing neck pain. Physical exam re-assuring; Head CT 6/19 neg, brain MRI with and without contrast neg for PRES 6/19. Likely psuedogout as above  - Continue daily steroids.    - PRN morphine gel      Val Lindsey PA-C  [phone]  _______________________________________________    BMT ATTENDING NOTE    I have seen and personally evaluated the patient.  I reviewed vitals, medications, laboratory results, and I viewed imaging studies.  After doing so, I formulated a plan as documented in this note with the care team and patient today.     El Ace is a 63 year old male with AML, admitted on 5/24/2019 for VELMA haploidentical BMT, and remains hospitalized pending satisfactory recovery.  Day+  27 today     El 's neck pain and fevers are much better. Trying to eat     On exam, he is pleasant, interactive,  neck range of motion improving, no oral mucosal lesions, normal respiratory effort, abdomen non-distended, skin without rash, no edema, normal affect.      Overall, our plan is to continue support him as he recovers from his transplant.  Fever: LP done  is negative. MRI reveals Edema and enhancement about the atlantodental joint including within the bone in the anterior arch of C1, with  prevertebral edema/enhancement.Also check HHV-6, CMV and EBV  Rheumatology favors pseudogout (crystals on wrist fluid). Treat with steroids. Taper as specified    Dilma Quezada

## 2019-06-28 ENCOUNTER — APPOINTMENT (OUTPATIENT)
Dept: LAB | Facility: CLINIC | Age: 64
End: 2019-06-28
Attending: PHYSICIAN ASSISTANT
Payer: COMMERCIAL

## 2019-06-28 ENCOUNTER — INFUSION THERAPY VISIT (OUTPATIENT)
Dept: TRANSPLANT | Facility: CLINIC | Age: 64
End: 2019-06-28
Attending: INTERNAL MEDICINE
Payer: COMMERCIAL

## 2019-06-28 ENCOUNTER — ONCOLOGY VISIT (OUTPATIENT)
Dept: TRANSPLANT | Facility: CLINIC | Age: 64
End: 2019-06-28
Attending: NURSE PRACTITIONER
Payer: COMMERCIAL

## 2019-06-28 VITALS
WEIGHT: 199.3 LBS | OXYGEN SATURATION: 100 % | HEART RATE: 78 BPM | TEMPERATURE: 97.7 F | BODY MASS INDEX: 30.03 KG/M2 | SYSTOLIC BLOOD PRESSURE: 130 MMHG | DIASTOLIC BLOOD PRESSURE: 84 MMHG

## 2019-06-28 DIAGNOSIS — Z94.84 HISTORY OF ALLOGENEIC STEM CELL TRANSPLANT (H): ICD-10-CM

## 2019-06-28 DIAGNOSIS — C92.01 AML (ACUTE MYELOID LEUKEMIA) IN REMISSION (H): ICD-10-CM

## 2019-06-28 DIAGNOSIS — C92.01 AML (ACUTE MYELOID LEUKEMIA) IN REMISSION (H): Primary | ICD-10-CM

## 2019-06-28 LAB
ALBUMIN SERPL-MCNC: 2.8 G/DL (ref 3.4–5)
ALP SERPL-CCNC: 124 U/L (ref 40–150)
ALT SERPL W P-5'-P-CCNC: 34 U/L (ref 0–70)
ANION GAP SERPL CALCULATED.3IONS-SCNC: 7 MMOL/L (ref 3–14)
AST SERPL W P-5'-P-CCNC: 25 U/L (ref 0–45)
BACTERIA SPEC CULT: NO GROWTH
BACTERIA SPEC CULT: NO GROWTH
BASOPHILS # BLD AUTO: 0 10E9/L (ref 0–0.2)
BASOPHILS NFR BLD AUTO: 0.2 %
BILIRUB SERPL-MCNC: 0.3 MG/DL (ref 0.2–1.3)
BUN SERPL-MCNC: 14 MG/DL (ref 7–30)
CALCIUM SERPL-MCNC: 8.4 MG/DL (ref 8.5–10.1)
CHLORIDE SERPL-SCNC: 105 MMOL/L (ref 94–109)
CO2 SERPL-SCNC: 23 MMOL/L (ref 20–32)
CREAT SERPL-MCNC: 0.68 MG/DL (ref 0.66–1.25)
DIFFERENTIAL METHOD BLD: ABNORMAL
EOSINOPHIL # BLD AUTO: 0 10E9/L (ref 0–0.7)
EOSINOPHIL NFR BLD AUTO: 0 %
ERYTHROCYTE [DISTWIDTH] IN BLOOD BY AUTOMATED COUNT: 15.5 % (ref 10–15)
GFR SERPL CREATININE-BSD FRML MDRD: >90 ML/MIN/{1.73_M2}
GLUCOSE SERPL-MCNC: 126 MG/DL (ref 70–99)
HCT VFR BLD AUTO: 28.4 % (ref 40–53)
HGB BLD-MCNC: 9.8 G/DL (ref 13.3–17.7)
IMM GRANULOCYTES # BLD: 0.1 10E9/L (ref 0–0.4)
IMM GRANULOCYTES NFR BLD: 1.5 %
LAB SCANNED RESULT: NORMAL
LYMPHOCYTES # BLD AUTO: 0.5 10E9/L (ref 0.8–5.3)
LYMPHOCYTES NFR BLD AUTO: 6 %
Lab: NORMAL
Lab: NORMAL
MAGNESIUM SERPL-MCNC: 1.5 MG/DL (ref 1.6–2.3)
MCH RBC QN AUTO: 31.5 PG (ref 26.5–33)
MCHC RBC AUTO-ENTMCNC: 34.5 G/DL (ref 31.5–36.5)
MCV RBC AUTO: 91 FL (ref 78–100)
MONOCYTES # BLD AUTO: 1.2 10E9/L (ref 0–1.3)
MONOCYTES NFR BLD AUTO: 14.5 %
NEUTROPHILS # BLD AUTO: 6.3 10E9/L (ref 1.6–8.3)
NEUTROPHILS NFR BLD AUTO: 77.8 %
NRBC # BLD AUTO: 0 10*3/UL
NRBC BLD AUTO-RTO: 0 /100
PLATELET # BLD AUTO: 143 10E9/L (ref 150–450)
POTASSIUM SERPL-SCNC: 3.5 MMOL/L (ref 3.4–5.3)
PROT SERPL-MCNC: 6.8 G/DL (ref 6.8–8.8)
RBC # BLD AUTO: 3.11 10E12/L (ref 4.4–5.9)
RESULT: NORMAL
SEND OUTS MISC TEST CODE: NORMAL
SEND OUTS MISC TEST SPECIMEN: NORMAL
SODIUM SERPL-SCNC: 135 MMOL/L (ref 133–144)
SPECIMEN SOURCE: NORMAL
TEST NAME: NORMAL
WBC # BLD AUTO: 8.1 10E9/L (ref 4–11)
WNV RNA SER QL NAA+PROBE: NOT DETECTED

## 2019-06-28 PROCEDURE — 25000128 H RX IP 250 OP 636: Mod: ZF | Performed by: NURSE PRACTITIONER

## 2019-06-28 PROCEDURE — 85025 COMPLETE CBC W/AUTO DIFF WBC: CPT | Performed by: PHYSICIAN ASSISTANT

## 2019-06-28 PROCEDURE — 80053 COMPREHEN METABOLIC PANEL: CPT | Performed by: PHYSICIAN ASSISTANT

## 2019-06-28 PROCEDURE — G0463 HOSPITAL OUTPT CLINIC VISIT: HCPCS | Mod: ZF

## 2019-06-28 PROCEDURE — 96365 THER/PROPH/DIAG IV INF INIT: CPT

## 2019-06-28 PROCEDURE — 36592 COLLECT BLOOD FROM PICC: CPT

## 2019-06-28 PROCEDURE — 83735 ASSAY OF MAGNESIUM: CPT | Performed by: PHYSICIAN ASSISTANT

## 2019-06-28 RX ORDER — MAGNESIUM SULFATE HEPTAHYDRATE 40 MG/ML
2 INJECTION, SOLUTION INTRAVENOUS ONCE
Status: COMPLETED | OUTPATIENT
Start: 2019-06-28 | End: 2019-06-28

## 2019-06-28 RX ORDER — HEPARIN SODIUM,PORCINE 10 UNIT/ML
5 VIAL (ML) INTRAVENOUS ONCE
Status: COMPLETED | OUTPATIENT
Start: 2019-06-28 | End: 2019-06-28

## 2019-06-28 RX ORDER — MAGNESIUM OXIDE 400 MG/1
800 TABLET ORAL 2 TIMES DAILY
Qty: 120 TABLET | Refills: 0 | Status: SHIPPED | OUTPATIENT
Start: 2019-06-28 | End: 2019-07-09

## 2019-06-28 RX ADMIN — HEPARIN, PORCINE (PF) 10 UNIT/ML INTRAVENOUS SYRINGE 5 ML: at 12:10

## 2019-06-28 RX ADMIN — MAGNESIUM SULFATE HEPTAHYDRATE 2 G: 40 INJECTION, SOLUTION INTRAVENOUS at 13:14

## 2019-06-28 ASSESSMENT — PAIN SCALES - GENERAL: PAINLEVEL: MILD PAIN (2)

## 2019-06-28 NOTE — PROGRESS NOTES
I reviewed David discharge medications with him and his wife. The medication box was filled correctly. He is experiencing headaches with his ondansetron so I recommended changing to kytril if this continues to be a problem. Also, his restless legs syndrome are bothering him. I recommended he resume his requip and avoid compazine if possible. I informed the NP(AF) of my recommendations. I also simplified his med schedule so he does not need to have 6 administration times.      Current Outpatient Medications   Medication Sig Dispense Refill     acetaminophen (TYLENOL) 325 MG tablet Take 2 tablets (650 mg) by mouth every 4 hours as needed for mild pain or fever (pre-med before blood products)       acyclovir (ZOVIRAX) 800 MG tablet Take 1 tablet (800 mg) by mouth 5 times daily 150 tablet 0     CELLCEPT (BRAND) 500 MG tablet Take 2 tablets (1,000 mg) by mouth every 8 hours for 8 days (through day+35) 48 tablet 0     heparin lock flush 10 UNIT/ML SOLN injection 5 mLs by Intracatheter route every 24 hours 60 mL 0     magnesium oxide (MAG-OX) 400 MG tablet Take 2 tablets (800 mg) by mouth 2 times daily Take 3 tabs twice daily 120 tablet 0     ondansetron (ZOFRAN-ODT) 8 MG ODT tab Take 1 tablet (8 mg) by mouth every 8 hours as needed for nausea 30 tablet 0     pantoprazole (PROTONIX) 40 MG EC tablet Take 1 tablet (40 mg) by mouth 2 times daily (before meals) 60 tablet 0     pramox-pe-glycerin-petrolatum (PREPARATION H) 1-0.25-14.4-15 % CREA cream Place rectally 3 times daily as needed for hemorrhoids       predniSONE (DELTASONE) 20 MG tablet Take 2 tablets (40 mg) by mouth daily Take 40mg by mouth daily x 5 days (6/26-6/30)  Take 20mg daily x 3 days (7/1-7/3)  Take 10mg daily x 2 days (7/4-7/5) 15 tablet 0     prochlorperazine (COMPAZINE) 5 MG tablet Take 1-2 tablets (5-10 mg) by mouth every 6 hours as needed for nausea or vomiting (Patient not taking: Reported on 4/23/2019) 30 tablet 1     [START ON 7/1/2019]  sulfamethoxazole-trimethoprim (BACTRIM DS/SEPTRA DS) 800-160 MG tablet Take 1 tablet by mouth Every Mon, Tues two times daily Start taking after day+28 (7/1) and instructed to so by BMT clinic. 16 tablet 0     tacrolimus (GENERIC EQUIVALENT) 0.5 MG capsule Take 1 capsule (0.5 mg) by mouth 2 times daily 60 capsule 0     tamsulosin (FLOMAX) 0.4 MG capsule Take 1 capsule (0.4 mg) by mouth daily 30 capsule 0     ursodiol (ACTIGALL) 300 MG capsule Take 1 capsule (300 mg) by mouth 3 times daily 90 capsule 0     voriconazole (VFEND) 50 MG tablet Take 3 tablets (150 mg) by mouth every 12 hours 180 tablet 0        Pertinent labs considered today:  Lab Results   Component Value Date     06/28/2019                 normal sodium 136-148  Lab Results   Component Value Date    POTASSIUM 3.5 06/28/2019       normal potassium 3.5-5.2   Lab Results   Component Value Date    CHLORIDE 105 06/28/2019           normal chloride    Lab Results   Component Value Date    CO2 23 06/28/2019                normal CO2 20-32  Lab Results   Component Value Date    BUN 14 06/28/2019                 normal BUN 5-24  Lab Results   Component Value Date     06/28/2019                 normal glucose   Lab Results   Component Value Date    CR 0.68 06/28/2019                 normal cr 0.8-1.5  Lab Results   Component Value Date    DEBBIE 8.4 06/28/2019               normal calcium  8.5-10.4  Lab Results   Component Value Date    BILITOTAL 0.3 06/28/2019          normal bilirubin 0.2-1.3  Lab Results   Component Value Date    PROTTOTAL 6.8 06/28/2019          normal total protein 6.0-8.2  Lab Results   Component Value Date    ALBUMIN 2.8 06/28/2019             normal albumin 3.2-4.5  Lab Results   Component Value Date    ALKPHOS 124 06/28/2019            normal alkphos   Lab Results   Component Value Date    ALT 34 06/28/2019         normal ALT 0-65  Lab Results   Component Value Date    AST 25 06/28/2019         normal AST  0-37    Lab Results   Component Value Date    HGB 9.8 06/28/2019     Lab Results   Component Value Date    WBC 8.1 06/28/2019      Lab Results   Component Value Date     06/28/2019       Estimated Creatinine Clearance: 120.5 mL/min (based on SCr of 0.68 mg/dL).    Time spent in this activity: 10 minutes        Abraham Isidro, JesusD

## 2019-06-28 NOTE — NURSING NOTE
Chief Complaint   Patient presents with     Blood Draw     labs drawn via cvc by RN     /84 (BP Location: Left arm, Patient Position: Chair, Cuff Size: Adult Regular)   Pulse 78   Temp 97.7  F (36.5  C) (Oral)   Wt 90.4 kg (199 lb 4.8 oz)   SpO2 100%   BMI 30.03 kg/m      Lines accessed by RN in lab. Labs collected through red lumen and sent. Both lines flushed with NS & Heparin. Pt tolerated well.   Pt checked in for next appointment.    Meena Randhawa RN

## 2019-06-28 NOTE — NURSING NOTE
Infusion Nursing Note:  El Ace presents today for Mg.    Patient seen by provider today: Yes: Shreya Rollins   present during visit today: Not Applicable.    Note: Patient arrives for Mg replacement, administered 2gm over 1 hour and tolerated well.  No further replacement needs today.    Intravenous Access:  [facility].    Treatment Conditions:  Lab Results   Component Value Date    MAG 1.5 06/28/2019           Results reviewed, labs MET treatment parameters, ok to proceed with treatment.      Post Infusion Assessment:  Patient tolerated infusion without incident.  No evidence of extravasations.       Discharge Plan:   Patient discharged in stable condition accompanied by: wife.  Departure Mode: Ambulatory.  Patient and wife aware of follow up appointments.    Albania Olivier RN

## 2019-06-28 NOTE — NURSING NOTE
"Oncology Rooming Note    June 28, 2019 12:25 PM   El Ace is a 63 year old male who presents for:    Chief Complaint   Patient presents with     Blood Draw     labs drawn via cvc by RN     RECHECK     RTN- AML     Initial Vitals: /84 (BP Location: Left arm, Patient Position: Chair, Cuff Size: Adult Regular)   Pulse 78   Temp 97.7  F (36.5  C) (Oral)   Wt 90.4 kg (199 lb 4.8 oz)   SpO2 100%   BMI 30.03 kg/m   Estimated body mass index is 30.03 kg/m  as calculated from the following:    Height as of 5/24/19: 1.735 m (5' 8.31\").    Weight as of this encounter: 90.4 kg (199 lb 4.8 oz). Body surface area is 2.09 meters squared.  Mild Pain (2) Comment: Data Unavailable   No LMP for male patient.  Allergies reviewed: Yes  Medications reviewed: Yes    Medications: Medication refills not needed today.  Pharmacy name entered into [employer]:    [facility] - [facility]  [facility] [place], [place] - [address] [place] [address] [phone]    Clinical concerns:  none    Dian Veloz CMA              "

## 2019-06-29 LAB
BACTERIA SPEC CULT: NO GROWTH
BACTERIA SPEC CULT: NO GROWTH
Lab: NORMAL
SPECIMEN SOURCE: NORMAL
T GONDII DNA SPEC QL NAA+PROBE: NOT DETECTED

## 2019-06-30 LAB
BACTERIA SPEC CULT: NO GROWTH
BACTERIA SPEC CULT: NO GROWTH
Lab: NORMAL
SPECIMEN SOURCE: NORMAL
SPECIMEN SOURCE: NORMAL

## 2019-07-01 LAB
BACTERIA SPEC CULT: NO GROWTH
BACTERIA SPEC CULT: NO GROWTH
Lab: NORMAL
Lab: NORMAL
SPECIMEN SOURCE: NORMAL
SPECIMEN SOURCE: NORMAL

## 2019-07-02 ENCOUNTER — APPOINTMENT (OUTPATIENT)
Dept: LAB | Facility: CLINIC | Age: 64
End: 2019-07-02
Attending: PHYSICIAN ASSISTANT
Payer: COMMERCIAL

## 2019-07-02 ENCOUNTER — ONCOLOGY VISIT (OUTPATIENT)
Dept: TRANSPLANT | Facility: CLINIC | Age: 64
End: 2019-07-02
Attending: NURSE PRACTITIONER
Payer: COMMERCIAL

## 2019-07-02 VITALS
DIASTOLIC BLOOD PRESSURE: 65 MMHG | SYSTOLIC BLOOD PRESSURE: 105 MMHG | WEIGHT: 191.5 LBS | HEART RATE: 97 BPM | TEMPERATURE: 97.9 F | OXYGEN SATURATION: 100 % | RESPIRATION RATE: 16 BRPM | BODY MASS INDEX: 28.86 KG/M2

## 2019-07-02 DIAGNOSIS — C92.01 AML (ACUTE MYELOID LEUKEMIA) IN REMISSION (H): ICD-10-CM

## 2019-07-02 LAB
ALBUMIN SERPL-MCNC: 3.2 G/DL (ref 3.4–5)
ALP SERPL-CCNC: 126 U/L (ref 40–150)
ALT SERPL W P-5'-P-CCNC: 57 U/L (ref 0–70)
ANION GAP SERPL CALCULATED.3IONS-SCNC: 7 MMOL/L (ref 3–14)
ANISOCYTOSIS BLD QL SMEAR: SLIGHT
AST SERPL W P-5'-P-CCNC: 29 U/L (ref 0–45)
BASOPHILS # BLD AUTO: 0 10E9/L (ref 0–0.2)
BASOPHILS NFR BLD AUTO: 0 %
BILIRUB SERPL-MCNC: 0.3 MG/DL (ref 0.2–1.3)
BUN SERPL-MCNC: 13 MG/DL (ref 7–30)
CALCIUM SERPL-MCNC: 8.6 MG/DL (ref 8.5–10.1)
CHLORIDE SERPL-SCNC: 103 MMOL/L (ref 94–109)
CO2 SERPL-SCNC: 24 MMOL/L (ref 20–32)
CREAT SERPL-MCNC: 0.72 MG/DL (ref 0.66–1.25)
DIFFERENTIAL METHOD BLD: ABNORMAL
EOSINOPHIL # BLD AUTO: 0 10E9/L (ref 0–0.7)
EOSINOPHIL NFR BLD AUTO: 0 %
ERYTHROCYTE [DISTWIDTH] IN BLOOD BY AUTOMATED COUNT: 16.3 % (ref 10–15)
GFR SERPL CREATININE-BSD FRML MDRD: >90 ML/MIN/{1.73_M2}
GLUCOSE SERPL-MCNC: 125 MG/DL (ref 70–99)
HCT VFR BLD AUTO: 32.5 % (ref 40–53)
HGB BLD-MCNC: 11 G/DL (ref 13.3–17.7)
LYMPHOCYTES # BLD AUTO: 0.6 10E9/L (ref 0.8–5.3)
LYMPHOCYTES NFR BLD AUTO: 5.2 %
MAGNESIUM SERPL-MCNC: 1.9 MG/DL (ref 1.6–2.3)
MCH RBC QN AUTO: 32.1 PG (ref 26.5–33)
MCHC RBC AUTO-ENTMCNC: 33.8 G/DL (ref 31.5–36.5)
MCV RBC AUTO: 95 FL (ref 78–100)
METAMYELOCYTES # BLD: 0.2 10E9/L
METAMYELOCYTES NFR BLD MANUAL: 1.7 %
MONOCYTES # BLD AUTO: 1.6 10E9/L (ref 0–1.3)
MONOCYTES NFR BLD AUTO: 13.1 %
MYELOCYTES # BLD: 0.4 10E9/L
MYELOCYTES NFR BLD MANUAL: 3.5 %
NEUTROPHILS # BLD AUTO: 9.3 10E9/L (ref 1.6–8.3)
NEUTROPHILS NFR BLD AUTO: 76.5 %
NRBC # BLD AUTO: 0.1 10*3/UL
NRBC BLD AUTO-RTO: 1 /100
OVALOCYTES BLD QL SMEAR: SLIGHT
PLATELET # BLD AUTO: 231 10E9/L (ref 150–450)
PLATELET # BLD EST: ABNORMAL 10*3/UL
POIKILOCYTOSIS BLD QL SMEAR: SLIGHT
POTASSIUM SERPL-SCNC: 4.1 MMOL/L (ref 3.4–5.3)
PROT SERPL-MCNC: 7.2 G/DL (ref 6.8–8.8)
RBC # BLD AUTO: 3.43 10E12/L (ref 4.4–5.9)
SODIUM SERPL-SCNC: 134 MMOL/L (ref 133–144)
TACROLIMUS BLD-MCNC: 4.9 UG/L (ref 5–15)
TME LAST DOSE: ABNORMAL H
WBC # BLD AUTO: 12.1 10E9/L (ref 4–11)

## 2019-07-02 PROCEDURE — 85025 COMPLETE CBC W/AUTO DIFF WBC: CPT | Performed by: NURSE PRACTITIONER

## 2019-07-02 PROCEDURE — 80053 COMPREHEN METABOLIC PANEL: CPT | Performed by: NURSE PRACTITIONER

## 2019-07-02 PROCEDURE — G0463 HOSPITAL OUTPT CLINIC VISIT: HCPCS

## 2019-07-02 PROCEDURE — 25000128 H RX IP 250 OP 636: Mod: ZF | Performed by: NURSE PRACTITIONER

## 2019-07-02 PROCEDURE — 83735 ASSAY OF MAGNESIUM: CPT | Performed by: NURSE PRACTITIONER

## 2019-07-02 PROCEDURE — 36592 COLLECT BLOOD FROM PICC: CPT

## 2019-07-02 PROCEDURE — 80197 ASSAY OF TACROLIMUS: CPT | Performed by: NURSE PRACTITIONER

## 2019-07-02 RX ORDER — HEPARIN SODIUM,PORCINE 10 UNIT/ML
5 VIAL (ML) INTRAVENOUS ONCE
Status: COMPLETED | OUTPATIENT
Start: 2019-07-02 | End: 2019-07-02

## 2019-07-02 RX ADMIN — HEPARIN, PORCINE (PF) 10 UNIT/ML INTRAVENOUS SYRINGE 5 ML: at 10:45

## 2019-07-02 ASSESSMENT — PAIN SCALES - GENERAL: PAINLEVEL: NO PAIN (0)

## 2019-07-02 NOTE — NURSING NOTE
"Oncology Rooming Note    July 2, 2019 11:04 AM   El Ace is a 63 year old male who presents for:    Chief Complaint   Patient presents with     Blood Draw     Labs drawn via CVC by RN in lab. VS taken. Patient checked in for next appt.     RECHECK     Provider visit, hx AML s/p transplant.     Initial Vitals: /65 (BP Location: Right arm, Patient Position: Sitting, Cuff Size: Adult Regular)   Pulse 97   Temp 97.9  F (36.6  C) (Oral)   Resp 16   Wt 86.9 kg (191 lb 8 oz)   SpO2 100%   BMI 28.86 kg/m   Estimated body mass index is 28.86 kg/m  as calculated from the following:    Height as of 5/24/19: 1.735 m (5' 8.31\").    Weight as of this encounter: 86.9 kg (191 lb 8 oz). Body surface area is 2.05 meters squared.  No Pain (0) Comment: Data Unavailable   No LMP for male patient.  Allergies reviewed: Yes  Medications reviewed: Yes    Medications: Medication refills not needed today.  Pharmacy name entered into [employer]:    [facility] - [place], [place] - [phone]  [facility] [place], [place] - [address] [place] [address] [phone]    Clinical concerns: None      Albania Olivier RN              "

## 2019-07-02 NOTE — PROGRESS NOTES
BMT Clinic Note    Mr. Ace 64 yo M day +32 s/p VELMA Haplo for AML.      Overnight events: Randy is here for follow up. Energy level better. Joint pain in neck, left wrist, and arches of both feet is improving, but not totally gone. Continues on prednisone taper, current dose is 20mg daily. We reviewed his meds, he realized he accidentally held his vfend instead of holding his tac this morning, so level will not be accurate. No fevers, dizziness or infectious symptoms. Denies n/v/d/c. No rash.       Review of Systems: 10 point ROS negative except as noted above.    Blood pressure 105/65, pulse 97, temperature 97.9  F (36.6  C), temperature source Oral, resp. rate 16, weight 86.9 kg (191 lb 8 oz), SpO2 100 %.  General: NAD, interactive.    Eyes: JEROMY, sclera anicteric   Nose/Mouth/Throat: OP clear, buccal mucosa dry but without ulcerations.  Lungs: CTA  Cardiovascular: RRR  Abdominal/Rectal: +BS, soft, NT, ND  Lymphatics: no edema  Skin: no rashes or petechaie.  Hyperpigmentation to UE & hands.    Msk: Decreased ROM to neck, but overall improved. Still some pain with movement  Neuro: A&O.   Additional Findings: [place] site c/d/i     Labs:  Lab Results   Component Value Date    WBC 12.1 (H) 07/02/2019    ANEU 9.3 (H) 07/02/2019    HGB 11.0 (L) 07/02/2019    HCT 32.5 (L) 07/02/2019     07/02/2019     07/02/2019    POTASSIUM 4.1 07/02/2019    CHLORIDE 103 07/02/2019    CO2 24 07/02/2019     (H) 07/02/2019    BUN 13 07/02/2019    CR 0.72 07/02/2019    MAG 1.9 07/02/2019    INR 1.15 (H) 06/24/2019   Assessment and Plan:  El Ace is a 63 year old male with hx of AML (IDH2, RUNX1 pos),  day +32 of flu/cy/TBI and related haplo BMT.     5/25 (day -6): flu/cy  5/26 (day -5): flu  5/27 (day -4): flu   5/28 (day -3): flu  5/29 (day -2): TBI/flu  5/30 (day -1): TBI  5/31 (day 0): Transplant. No ABO mismatch - both A+.      1.  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   - Last dose GCSF 6/16/19. White count  stable.  - Re-stage per protocol.  -Day +21 bmbx - completed 6/19.  In remission and 100% donor.       2.  HEME: Keep Hgb>8g/dL and plts>10K.   - on MiPlate study- no bleeding. >10 days since his last platelet tranfusion.                               3.  ID:  Afebrile, no active infections  - leukocytosis from prednisone 7/2, no infectious symptoms  - Cont proph HD Acyclovir & V Fend  - 6/22 EBV/CMV/ HHV6 negative. Repeat CMV ordered for 7/5  - Bactrim or appropriate PCP therapy to start d+28, 7/1 - forgot to start yesterday, will start today 7/2     Hx of fever & neck pain - work up for possible meningitis/C1 osteomyelitis (s/p Cefepime/vanco)  - 6/24 LP - negative to date.   - 6/24 serum AspGM neg  - 6/24 HHV6, CMV, EBV all negative in serum. New HSV1 pcr postive in serum but negative in CSF (will not repeat as no evidence of disseminated HSV infection).      4. MSK/RHeum: CPPD/psuedogout  - 6/26 MRI Cpsike with C1 uptake, new left wrist pain -> Rheum consulted.  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   -6/26 left wrist: Intracellular calcium pyrophosphate crystals present, MANY PMNs and no orgamisms.   DDx: Crowned Dens Syndrome (CPPD) - psuedogout that involves c1, wrist, bilateral ankles  - Not candidate for Nsaids. Improved with MP 40mg IV x1 (6/25)   - per Rheumo rapid pred taper. Pred 40mg daily x5 days (6/26-6/30),  20mg daily (7/1-7/3) then 10mg daily x2 days (7/4-7/5).      5.  GI:   - prn zofran ODT   - GI prophy - protonox     - Ursodiol for VOD prophy.     5.  GVH:  No GVH.  On Tac0.5mg BID & MMF.    - Tac dose 0.5mg BID. Last level 6/27 = 9.1. Level ordered for 7/2, he held vfend instead of tac. Will check level on Friday  - S/P post-txp cytoxan  - MMF 1000mg TID through day +35 (7/5)     6.  FEN/Renal:   - Tac induced hypoMg. On 3 tabs BID. Tolerating well. Mag is 1.9  - Cr stable.      7. Neuro:  6/19:  New occipital throbbing and ongoing neck pain.  Head CT 6/19 neg, brain MRI with and without contrast neg for PRES 6/19. Likely pseudogout as above  - Continue daily steroids.      RTC on Friday for labs and provider visit. Check tac level    Debbie Valadez PA-C  [phone]

## 2019-07-02 NOTE — NURSING NOTE
Chief Complaint   Patient presents with     Blood Draw     Labs drawn via CVC by RN in lab. VS taken. Patient checked in for next appt.     Labs collected from CVC by RN, caps changed, line flushed with saline and heparin.  Vitals taken. Pt checked in for appointment. Dressing change needed today.    Ching Yi RN

## 2019-07-03 ENCOUNTER — TELEPHONE (OUTPATIENT)
Dept: TRANSPLANT | Facility: CLINIC | Age: 64
End: 2019-07-03

## 2019-07-03 DIAGNOSIS — C92.01 AML (ACUTE MYELOID LEUKEMIA) IN REMISSION (H): ICD-10-CM

## 2019-07-03 DIAGNOSIS — Z94.84 HISTORY OF ALLOGENEIC STEM CELL TRANSPLANT (H): ICD-10-CM

## 2019-07-03 RX ORDER — TACROLIMUS 0.5 MG/1
CAPSULE ORAL
Qty: 60 CAPSULE | Refills: 0 | COMMUNITY
Start: 2019-07-03 | End: 2019-07-06

## 2019-07-03 NOTE — TELEPHONE ENCOUNTER
I spoke with El and his wife regarding his Tacrolimus level from his clinic visit dated 7/2/19. Per Dr. Brown, El is to increase his Tac to 0.5mg in the AM and 1mg in the PM. Will recheck level on Friday 7/5, El aware to hold dose prior to this level. El and his wife repeated these directions to me and voiced their understanding.     Marychuy Jasso, JesusD

## 2019-07-04 LAB
CMV DNA SPEC NAA+PROBE-ACNC: NORMAL [IU]/ML
CMV DNA SPEC NAA+PROBE-LOG#: NORMAL {LOG_IU}/ML
SPECIMEN SOURCE: NORMAL

## 2019-07-04 NOTE — PROGRESS NOTES
[facility] B[facility]    Diagnosis:   1. 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     Treatments:   1. 7+3 ([place]), 3/15/19, no response, Day 12 persistent disease with near packed marrow  2. Dec 10d+Ric 3/26/19, CR1 on 4/23/19 but with pos mutations (IDH1 17%, RUNX1 8%)  3. 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     PMH: HLP, BPH, DJD, RLS    Interval history: PT and joints better, no fever/n/v/d/rash. 10-point ROS otherwise negative.     Lab Results   Component Value Date    WBC 13.7 (H) 07/05/2019    HGB 11.0 (L) 07/05/2019    HCT 32.9 (L) 07/05/2019     07/05/2019     07/05/2019    POTASSIUM 4.2 07/05/2019    CHLORIDE 102 07/05/2019    CO2 24 07/05/2019    BUN 13 07/05/2019    CR 0.68 07/05/2019     (H) 07/05/2019    SED 88 (H) 06/25/2019    NTBNPI 2,031 (H) 04/03/2019    TROPI <0.015 04/23/2019    AST 29 07/02/2019    ALT 57 07/02/2019    ALKPHOS 126 07/02/2019    BILITOTAL 0.3 07/02/2019    INR 1.15 (H) 06/24/2019       Current Outpatient Medications   Medication     acetaminophen (TYLENOL) 325 MG tablet     acyclovir (ZOVIRAX) 800 MG tablet     CELLCEPT (BRAND) 500 MG tablet     heparin lock flush 10 UNIT/ML SOLN injection     magnesium oxide (MAG-OX) 400 MG tablet     pantoprazole (PROTONIX) 40 MG EC tablet     pramox-pe-glycerin-petrolatum (PREPARATION H) 1-0.25-14.4-15 % CREA cream     predniSONE (DELTASONE) 20 MG tablet     tacrolimus (GENERIC EQUIVALENT) 0.5 MG capsule     tamsulosin (FLOMAX) 0.4 MG capsule     ursodiol (ACTIGALL) 300 MG capsule     voriconazole (VFEND) 50 MG tablet     ondansetron (ZOFRAN-ODT) 8 MG ODT tab     prochlorperazine (COMPAZINE) 5 MG tablet     sulfamethoxazole-trimethoprim (BACTRIM DS/SEPTRA DS) 800-160 MG tablet     Current Facility-Administered Medications   Medication     heparin lock flush 10 UNIT/ML injection 5 mL     heparin lock flush 10 UNIT/ML injection 5  mL     Ph/E:   Vitals: /72 (BP Location: Right arm, Patient Position: Sitting, Cuff Size: Adult Regular)   Pulse 99   Temp 97.9  F (36.6  C) (Oral)   Resp 16   Wt 86.9 kg (191 lb 8 oz)   SpO2 98%   BMI 28.86 kg/m     ECO  General: NAD; H&N: no mucosal lesions; Lungs clear; Heart RRR; Abdomen; Soft, No organomegaly; Extremities: No edema; Skin: No rash except dark fingers on palmar side; Neuro: Nonfocal; Mood/Affect: appropriate; Lymph: no LAD    A&P:   1. AML: day +34 haplo, CR1. PB T cells 60% donor, myeloids 100%. Stop PPI.   2. ID: acyclovir, vori, bactrim.  3. GVHD: None. Stop MMF. Tac 0.5-1 mg. Level today.   4. Possible pseudogout: Stop pred.

## 2019-07-05 ENCOUNTER — ONCOLOGY VISIT (OUTPATIENT)
Dept: TRANSPLANT | Facility: CLINIC | Age: 64
End: 2019-07-05
Attending: INTERNAL MEDICINE
Payer: COMMERCIAL

## 2019-07-05 ENCOUNTER — APPOINTMENT (OUTPATIENT)
Dept: LAB | Facility: CLINIC | Age: 64
End: 2019-07-05
Attending: PHYSICIAN ASSISTANT
Payer: COMMERCIAL

## 2019-07-05 ENCOUNTER — THERAPY VISIT (OUTPATIENT)
Dept: PHYSICAL THERAPY | Facility: CLINIC | Age: 64
End: 2019-07-05
Attending: INTERNAL MEDICINE
Payer: COMMERCIAL

## 2019-07-05 VITALS
WEIGHT: 191.5 LBS | DIASTOLIC BLOOD PRESSURE: 72 MMHG | OXYGEN SATURATION: 98 % | SYSTOLIC BLOOD PRESSURE: 113 MMHG | HEART RATE: 99 BPM | TEMPERATURE: 97.9 F | RESPIRATION RATE: 16 BRPM | BODY MASS INDEX: 28.86 KG/M2

## 2019-07-05 DIAGNOSIS — M54.2 NECK PAIN: Primary | ICD-10-CM

## 2019-07-05 DIAGNOSIS — C92.01 ACUTE MYELOID LEUKEMIA IN REMISSION (H): ICD-10-CM

## 2019-07-05 DIAGNOSIS — R53.81 PHYSICAL DECONDITIONING: ICD-10-CM

## 2019-07-05 DIAGNOSIS — C92.01 AML (ACUTE MYELOID LEUKEMIA) IN REMISSION (H): ICD-10-CM

## 2019-07-05 DIAGNOSIS — Z94.84 HISTORY OF ALLOGENEIC STEM CELL TRANSPLANT (H): ICD-10-CM

## 2019-07-05 LAB
ANION GAP SERPL CALCULATED.3IONS-SCNC: 8 MMOL/L (ref 3–14)
ANISOCYTOSIS BLD QL SMEAR: SLIGHT
BASOPHILS # BLD AUTO: 0 10E9/L (ref 0–0.2)
BASOPHILS NFR BLD AUTO: 0 %
BUN SERPL-MCNC: 13 MG/DL (ref 7–30)
CALCIUM SERPL-MCNC: 8.4 MG/DL (ref 8.5–10.1)
CHLORIDE SERPL-SCNC: 102 MMOL/L (ref 94–109)
CO2 SERPL-SCNC: 24 MMOL/L (ref 20–32)
CREAT SERPL-MCNC: 0.68 MG/DL (ref 0.66–1.25)
DIFFERENTIAL METHOD BLD: ABNORMAL
EOSINOPHIL # BLD AUTO: 0.1 10E9/L (ref 0–0.7)
EOSINOPHIL NFR BLD AUTO: 0.9 %
ERYTHROCYTE [DISTWIDTH] IN BLOOD BY AUTOMATED COUNT: 17.2 % (ref 10–15)
GFR SERPL CREATININE-BSD FRML MDRD: >90 ML/MIN/{1.73_M2}
GLUCOSE SERPL-MCNC: 152 MG/DL (ref 70–99)
HCT VFR BLD AUTO: 32.9 % (ref 40–53)
HGB BLD-MCNC: 11 G/DL (ref 13.3–17.7)
LYMPHOCYTES # BLD AUTO: 1 10E9/L (ref 0.8–5.3)
LYMPHOCYTES NFR BLD AUTO: 7 %
MAGNESIUM SERPL-MCNC: 2 MG/DL (ref 1.6–2.3)
MCH RBC QN AUTO: 32.1 PG (ref 26.5–33)
MCHC RBC AUTO-ENTMCNC: 33.4 G/DL (ref 31.5–36.5)
MCV RBC AUTO: 96 FL (ref 78–100)
METAMYELOCYTES # BLD: 0.2 10E9/L
METAMYELOCYTES NFR BLD MANUAL: 1.7 %
MONOCYTES # BLD AUTO: 1 10E9/L (ref 0–1.3)
MONOCYTES NFR BLD AUTO: 7 %
MYELOCYTES # BLD: 0.6 10E9/L
MYELOCYTES NFR BLD MANUAL: 4.4 %
NEUTROPHILS # BLD AUTO: 10.7 10E9/L (ref 1.6–8.3)
NEUTROPHILS NFR BLD AUTO: 78.1 %
OVALOCYTES BLD QL SMEAR: SLIGHT
PLATELET # BLD AUTO: 221 10E9/L (ref 150–450)
PLATELET # BLD EST: ABNORMAL 10*3/UL
POIKILOCYTOSIS BLD QL SMEAR: SLIGHT
POLYCHROMASIA BLD QL SMEAR: SLIGHT
POTASSIUM SERPL-SCNC: 4.2 MMOL/L (ref 3.4–5.3)
PROMYELOCYTES # BLD MANUAL: 0.1 10E9/L
PROMYELOCYTES NFR BLD MANUAL: 0.9 %
RBC # BLD AUTO: 3.43 10E12/L (ref 4.4–5.9)
SODIUM SERPL-SCNC: 134 MMOL/L (ref 133–144)
TACROLIMUS BLD-MCNC: 4.7 UG/L (ref 5–15)
TME LAST DOSE: ABNORMAL H
WBC # BLD AUTO: 13.7 10E9/L (ref 4–11)

## 2019-07-05 PROCEDURE — 83735 ASSAY OF MAGNESIUM: CPT | Performed by: PHYSICIAN ASSISTANT

## 2019-07-05 PROCEDURE — 36592 COLLECT BLOOD FROM PICC: CPT

## 2019-07-05 PROCEDURE — 80197 ASSAY OF TACROLIMUS: CPT | Performed by: PHYSICIAN ASSISTANT

## 2019-07-05 PROCEDURE — 25000128 H RX IP 250 OP 636: Mod: ZF | Performed by: INTERNAL MEDICINE

## 2019-07-05 PROCEDURE — G0463 HOSPITAL OUTPT CLINIC VISIT: HCPCS | Mod: ZF

## 2019-07-05 PROCEDURE — 80048 BASIC METABOLIC PNL TOTAL CA: CPT | Performed by: PHYSICIAN ASSISTANT

## 2019-07-05 PROCEDURE — 81268 CHIMERISM ANAL W/CELL SELECT: CPT | Performed by: INTERNAL MEDICINE

## 2019-07-05 PROCEDURE — 85025 COMPLETE CBC W/AUTO DIFF WBC: CPT | Performed by: PHYSICIAN ASSISTANT

## 2019-07-05 RX ORDER — HEPARIN SODIUM,PORCINE 10 UNIT/ML
5 VIAL (ML) INTRAVENOUS
Status: DISCONTINUED | OUTPATIENT
Start: 2019-07-05 | End: 2019-07-05 | Stop reason: HOSPADM

## 2019-07-05 RX ADMIN — HEPARIN, PORCINE (PF) 10 UNIT/ML INTRAVENOUS SYRINGE 5 ML: at 10:53

## 2019-07-05 ASSESSMENT — 6 MINUTE WALK TEST (6MWT)
TOTAL DISTANCE WALKED (METERS): 530
TOTAL DISTANCE WALKED (FT): 2004

## 2019-07-05 ASSESSMENT — PAIN SCALES - GENERAL: PAINLEVEL: NO PAIN (0)

## 2019-07-05 NOTE — NURSING NOTE
"Oncology Rooming Note    July 5, 2019 1:31 PM   El Ace is a 63 year old male who presents for:    Chief Complaint   Patient presents with     Blood Draw     labs drawn from cvc by rn.  vital signs taken.     RECHECK     RTN- AML     Initial Vitals: /72 (BP Location: Right arm, Patient Position: Sitting, Cuff Size: Adult Regular)   Pulse 99   Temp 97.9  F (36.6  C) (Oral)   Resp 16   Wt 86.9 kg (191 lb 8 oz)   SpO2 98%   BMI 28.86 kg/m   Estimated body mass index is 28.86 kg/m  as calculated from the following:    Height as of 5/24/19: 1.735 m (5' 8.31\").    Weight as of this encounter: 86.9 kg (191 lb 8 oz). Body surface area is 2.05 meters squared.  No Pain (0) Comment: Data Unavailable   No LMP for male patient.  Allergies reviewed: Yes  Medications reviewed: Yes    Medications: Medication refills not needed today.  Pharmacy name entered into [employer]:    [facility] - [place], [place] - [phone]  [facility] [place], [place] - [address] [address] [facility] [phone]    Clinical concerns: None       Dayanara Morfin CMA              "

## 2019-07-05 NOTE — NURSING NOTE
Chief Complaint   Patient presents with     Blood Draw     labs drawn from cvc by rn.  vital signs taken.     CVC accessed by RN in lab, labs drawn.  Both lines flushed with heparin.    Vital signs taken.  Pt checked in to next appointment.  Karina Sheehan RN

## 2019-07-05 NOTE — PROGRESS NOTES
07/05/19 1100   Quick Adds   Type of Visit Initial OP PT Evaluation   General Information   Start of Care Date 07/05/19   Referring Physician Dilma Quezada MD    Orders Evaluate and Treat as Indicated   Order Date 06/27/19   Medical Diagnosis History of allogeneic stem cell transplant, Acute myeloid leukemia in remission    Onset of illness/injury or Date of Surgery 05/31/19   Precautions/Limitations immunosuppressed   Surgical/Medical history reviewed Yes   Pertinent history of current problem (include personal factors and/or comorbidities that impact the POC) 5/31/2019  allogeneic stem cell transplant, diagnosed  3/12/2019 Acute myeloid leukemia in remission, Hospitalized 5/24-6/27/19 6/19: New occipital throbbing and ongoing neck pain.  Head CT 6/19 neg, brain MRI  negative, possibly due to psuedogout,Joint pain in neck, left wrist, and arches of both feet. Was unable to walk and on bedrest for one week due to this pain. Relieved in 1 day with steroids.   PMH:'74, laminectomy, carpel tunnel release, L hip OA,Generalized anxiety disorder, Dysthymic disorder, Attention deficit hyperactivity disorder . Currently still having mid back pain. Hands shake at times.   Prior level of function comment used to walk a lot for exercise, go to [place] and do wts and aerobic machines 4 x /w   Previous/Current Treatment Physical Therapy   Improvement after PT Mild   Current Community Support Family/friend caregiver   Patient role/Employment history Disabled   Living environment House/[facility];Other, comments   Home/Community Accessibility Comments split level house, a few stairs to get around the house.    Current Assistive Devices Front Wheeled Walker;Standard Cane   Patient/Family Goals Statement get stronger, less aches and pains   General Information Comments lives with spouse who is at [facility]. short term disability: , living at [facility]   Fall Risk Screen   Fall screen completed by PT   Have you  fallen 2 or more times in the past year? No   Have you fallen and had an injury in the past year? No   Is patient a fall risk? No   Fall screen comments got up too fast in hospital and with medications, felt off balance, now has to take time when going from sit to stand.   Abuse Screen (yes response referral indicated)   Feels Unsafe at Home or Work/School no   Feels Threatened by Someone no   Does Anyone Try to Keep You From Having Contact with Others or Doing Things Outside Your Home? no   Physical Signs of Abuse Present no   System Outcome Measures   Outcome Measures Cancer Rehab   FACIT Fatigue Subscale (score out of 52). The higher the score, the better the QOL. 38   Six Minute Walk (meters). An increase of 70 or more meters indicates statistically significant change. 530   Pain   Patient currently in pain Yes   Pain location midback and neck   Pain rating 8/10   Pain description Throbbing   Additional pain locations? Pain location 2   Pain location 2 feet, L wrist stiffness   Pain rating 2 feet hurt unless he wears orthotics,    Pain comments midback stiffness, worst at 7-8 pm, better lying supine   Vitals Signs   Heart Rate 101   SpO2 96   Weight 188   BMI 28.86   Cognitive Status Examination   Orientation orientation to person, place and time   Level of Consciousness alert   Follows Commands and Answers Questions 100% of the time   Integumentary   Integumentary Comments picc line on R chest   Posture   Posture  ROM Forward head position;Protracted shoulders    decreased cervical extension and B rotation, all increase the pain on R side.     Strength   Strength Comments weak trunk flexion, hip abd and extension, lumbar extension, shoulder flexion L>R   Planned Therapy Interventions   Planned Therapy Interventions IADL retraining;balance training;joint mobilization;ROM;strengthening;stretching;manual therapy   Clinical Impression   Criteria for Skilled Therapeutic Interventions Met yes, treatment indicated    PT Diagnosis deconditioning, neck and back pain, weakness   Influenced by the following impairments unable to complete exercise program, unable to return to work at this time, see facit   Functional limitations due to impairments unable able to walk distances,    Clinical Presentation Stable/Uncomplicated   Clinical Presentation Rationale pt presents with typical weakness and fatigue s/p BMT   Clinical Decision Making (Complexity) Low complexity   Therapy Frequency other (see comments)   Predicted Duration of Therapy Intervention (days/wks) 1-2xwx8 weeks   Risk & Benefits of therapy have been explained Yes   Patient, Family & other staff in agreement with plan of care Yes   Clinical Impression Comments Pt presents with weakness and midback, neck and L wrist and B foot pain s/p BMT with long hospital stay and is motivated to begin PT   Education Assessment   Preferred Learning Style Listening;Reading;Demonstration   Barriers to Learning No barriers   GOALS   PT Eval Goals 1;2;3   Goal 1   Goal Identifier HEP   Goal Description In order to facilitate gains in general strength and endurance, and improve tolerance for functional mobility, ADLs, and recreational activities outside of physical therapy, patient will demonstrate independence with a HEP and report how to safely progress the program.   Target Date 09/15/19   Goal 2   Goal Identifier facit   Goal Description Patient will have less fatigue while performing daily activities and mobility as indicated by improved FACIT-F score of 4 or more points   Target Date 09/28/19   Goal 3   Goal Identifier walking   Goal Description Pt will be able to walk in community for 15 minutes without stopping   Target Date 09/15/19   Total Evaluation Time   PT Eval, Low Complexity Minutes (40857) 20

## 2019-07-06 ENCOUNTER — TELEPHONE (OUTPATIENT)
Dept: TRANSPLANT | Facility: CLINIC | Age: 64
End: 2019-07-06

## 2019-07-06 DIAGNOSIS — Z94.84 HISTORY OF ALLOGENEIC STEM CELL TRANSPLANT (H): ICD-10-CM

## 2019-07-06 DIAGNOSIS — C92.01 AML (ACUTE MYELOID LEUKEMIA) IN REMISSION (H): ICD-10-CM

## 2019-07-06 LAB
CMV DNA SPEC NAA+PROBE-ACNC: NORMAL [IU]/ML
CMV DNA SPEC NAA+PROBE-LOG#: NORMAL {LOG_IU}/ML
Lab: NORMAL
Lab: NORMAL
SPECIMEN SOURCE: NORMAL
YEAST SPEC QL CULT: NO GROWTH
YEAST SPEC QL CULT: NO GROWTH

## 2019-07-06 RX ORDER — TACROLIMUS 0.5 MG/1
1 CAPSULE ORAL 2 TIMES DAILY
Qty: 60 CAPSULE | Refills: 0 | COMMUNITY
Start: 2019-07-06 | End: 2019-07-09

## 2019-07-06 NOTE — PROGRESS NOTES
ORAL CHEMOTHERAPY DISCONTINUATION       Primary Oncologist:  Dr. Brown  Primary Oncology Clinic: [facility]  Cancer Diagnosis:  AML  Therapy History:  Venclexta C1D1=3/29/2019  Dose reduced for voriconazole interaction.  Discharged on 100mg daily 4/15/019.  4/30: hold venclexta for fever  Therapy Ended On:  4/30/2019  Reason For Discontinuation: therapy completed    Additional Notes:  Thank you for the opportunity to be a part in the care of this patient's oral chemotherapy. The oncology pharmacy will no longer be following this patient for oral chemotherapy. If there are any questions or the plan changes, feel free to contact us.    Bety Thornton   Pharmacy Intern  [facility]   [phone]

## 2019-07-06 NOTE — TELEPHONE ENCOUNTER
I spoke with Randy regarding his tacrolimus level from his clinic visit dated 7/5. Per Dr Brown, Eliazar is to increase his dose to 1mg BID. Eliazar repeated these directions to me and voiced his understanding. I told him we would recheck a level at his clinic visit 7/12.    Beto Andujar, PharmD

## 2019-07-07 LAB
Lab: NORMAL
SPECIMEN SOURCE: NORMAL
YEAST SPEC QL CULT: NO GROWTH

## 2019-07-08 LAB
Lab: NORMAL
SPECIMEN SOURCE: NORMAL
YEAST SPEC QL CULT: NO GROWTH

## 2019-07-08 NOTE — PROGRESS NOTES
[facility] BMT [facility]    Diagnosis:   1. 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     Treatments:   1. 7+3 (Dauno), 3/15/19, no response, Day 12 persistent disease with near packed marrow  2. Dec 10d+Ric 3/26/19, CR1 on 4/23/19 but with pos mutations (IDH1 17%, RUNX1 8%)  3. 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     PMH: HLP, BPH, DJD, RLS    Interval history: Some wrist pain and foot pain, appetite good, energy better, 10-point ROS otherwise negative.     Lab Results   Component Value Date    WBC 12.8 (H) 07/09/2019    HGB 11.2 (L) 07/09/2019    HCT 33.5 (L) 07/09/2019     07/09/2019     07/05/2019    POTASSIUM 4.2 07/05/2019    CHLORIDE 102 07/05/2019    CO2 24 07/05/2019    BUN 13 07/05/2019    CR 0.68 07/05/2019     (H) 07/05/2019    SED 88 (H) 06/25/2019    NTBNPI 2,031 (H) 04/03/2019    TROPI <0.015 04/23/2019    AST 29 07/02/2019    ALT 57 07/02/2019    ALKPHOS 126 07/02/2019    BILITOTAL 0.3 07/02/2019    INR 1.15 (H) 06/24/2019       Current Outpatient Medications   Medication     acetaminophen (TYLENOL) 325 MG tablet     acyclovir (ZOVIRAX) 800 MG tablet     heparin lock flush 10 UNIT/ML SOLN injection     magnesium oxide (MAG-OX) 400 MG tablet     pramox-pe-glycerin-petrolatum (PREPARATION H) 1-0.25-14.4-15 % CREA cream     predniSONE (DELTASONE) 20 MG tablet     sulfamethoxazole-trimethoprim (BACTRIM DS/SEPTRA DS) 800-160 MG tablet     tacrolimus (GENERIC EQUIVALENT) 0.5 MG capsule     tamsulosin (FLOMAX) 0.4 MG capsule     ursodiol (ACTIGALL) 300 MG capsule     voriconazole (VFEND) 50 MG tablet     CELLCEPT (BRAND) 500 MG tablet     ondansetron (ZOFRAN-ODT) 8 MG ODT tab     prochlorperazine (COMPAZINE) 5 MG tablet     Current Facility-Administered Medications   Medication     heparin lock flush 10 UNIT/ML injection 5 mL     Ph/E:   Vitals: /71   Pulse 94   Temp 98  F (36.7  C) (Oral)    Resp 16   Wt 86.7 kg (191 lb 2.2 oz)   SpO2 98%   BMI 28.80 kg/m     ECO  General: NAD; H&N: no mucosal lesions; Lungs clear; Heart RRR; Abdomen; Soft, No organomegaly; Extremities: No edema; Skin: No rash except dark fingers on palmar side; Neuro: Nonfocal; Mood/Affect: appropriate; Lymph: no LAD    A&P:   1. AML: day +39 haplo, CR1. PB T cells 61% donor, myeloids 100%.    2. ID: acyclovir, vori, bactrim.  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 Level today.   4. Possible pseudogout: off pred.

## 2019-07-09 ENCOUNTER — APPOINTMENT (OUTPATIENT)
Dept: LAB | Facility: CLINIC | Age: 64
End: 2019-07-09
Attending: INTERNAL MEDICINE
Payer: COMMERCIAL

## 2019-07-09 ENCOUNTER — ONCOLOGY VISIT (OUTPATIENT)
Dept: TRANSPLANT | Facility: CLINIC | Age: 64
End: 2019-07-09
Attending: INTERNAL MEDICINE
Payer: COMMERCIAL

## 2019-07-09 VITALS
SYSTOLIC BLOOD PRESSURE: 107 MMHG | RESPIRATION RATE: 16 BRPM | DIASTOLIC BLOOD PRESSURE: 71 MMHG | HEART RATE: 94 BPM | OXYGEN SATURATION: 98 % | TEMPERATURE: 98 F | BODY MASS INDEX: 28.8 KG/M2 | WEIGHT: 191.14 LBS

## 2019-07-09 DIAGNOSIS — C92.01 AML (ACUTE MYELOID LEUKEMIA) IN REMISSION (H): ICD-10-CM

## 2019-07-09 DIAGNOSIS — Z94.84 HISTORY OF ALLOGENEIC STEM CELL TRANSPLANT (H): ICD-10-CM

## 2019-07-09 DIAGNOSIS — C92.01 ACUTE MYELOID LEUKEMIA IN REMISSION (H): Primary | ICD-10-CM

## 2019-07-09 LAB
ALBUMIN SERPL-MCNC: 3.2 G/DL (ref 3.4–5)
ALP SERPL-CCNC: 130 U/L (ref 40–150)
ALT SERPL W P-5'-P-CCNC: 32 U/L (ref 0–70)
ANION GAP SERPL CALCULATED.3IONS-SCNC: 4 MMOL/L (ref 3–14)
ANISOCYTOSIS BLD QL SMEAR: SLIGHT
AST SERPL W P-5'-P-CCNC: 17 U/L (ref 0–45)
BASOPHILS # BLD AUTO: 0 10E9/L (ref 0–0.2)
BASOPHILS NFR BLD AUTO: 0 %
BILIRUB SERPL-MCNC: 0.3 MG/DL (ref 0.2–1.3)
BUN SERPL-MCNC: 10 MG/DL (ref 7–30)
CALCIUM SERPL-MCNC: 8.6 MG/DL (ref 8.5–10.1)
CHLORIDE SERPL-SCNC: 104 MMOL/L (ref 94–109)
CO2 SERPL-SCNC: 27 MMOL/L (ref 20–32)
COPATH REPORT: NORMAL
COPATH REPORT: NORMAL
CREAT SERPL-MCNC: 0.78 MG/DL (ref 0.66–1.25)
DIFFERENTIAL METHOD BLD: ABNORMAL
EOSINOPHIL # BLD AUTO: 0 10E9/L (ref 0–0.7)
EOSINOPHIL NFR BLD AUTO: 0 %
ERYTHROCYTE [DISTWIDTH] IN BLOOD BY AUTOMATED COUNT: 17.6 % (ref 10–15)
GFR SERPL CREATININE-BSD FRML MDRD: >90 ML/MIN/{1.73_M2}
GLUCOSE SERPL-MCNC: 108 MG/DL (ref 70–99)
HCT VFR BLD AUTO: 33.5 % (ref 40–53)
HGB BLD-MCNC: 11.2 G/DL (ref 13.3–17.7)
LYMPHOCYTES # BLD AUTO: 0.8 10E9/L (ref 0.8–5.3)
LYMPHOCYTES NFR BLD AUTO: 6.1 %
Lab: NORMAL
Lab: NORMAL
MAGNESIUM SERPL-MCNC: 2 MG/DL (ref 1.6–2.3)
MCH RBC QN AUTO: 32.3 PG (ref 26.5–33)
MCHC RBC AUTO-ENTMCNC: 33.4 G/DL (ref 31.5–36.5)
MCV RBC AUTO: 97 FL (ref 78–100)
METAMYELOCYTES # BLD: 0.2 10E9/L
METAMYELOCYTES NFR BLD MANUAL: 1.8 %
MONOCYTES # BLD AUTO: 1 10E9/L (ref 0–1.3)
MONOCYTES NFR BLD AUTO: 7.9 %
MYELOCYTES # BLD: 0.1 10E9/L
MYELOCYTES NFR BLD MANUAL: 0.9 %
NEUTROPHILS # BLD AUTO: 10.7 10E9/L (ref 1.6–8.3)
NEUTROPHILS NFR BLD AUTO: 83.3 %
PLATELET # BLD AUTO: 179 10E9/L (ref 150–450)
PLATELET # BLD EST: ABNORMAL 10*3/UL
POIKILOCYTOSIS BLD QL SMEAR: SLIGHT
POLYCHROMASIA BLD QL SMEAR: SLIGHT
POTASSIUM SERPL-SCNC: 4.3 MMOL/L (ref 3.4–5.3)
PROT SERPL-MCNC: 6.7 G/DL (ref 6.8–8.8)
RBC # BLD AUTO: 3.47 10E12/L (ref 4.4–5.9)
SODIUM SERPL-SCNC: 135 MMOL/L (ref 133–144)
SPECIMEN SOURCE: NORMAL
SPECIMEN SOURCE: NORMAL
WBC # BLD AUTO: 12.8 10E9/L (ref 4–11)
YEAST SPEC QL CULT: NO GROWTH
YEAST SPEC QL CULT: NO GROWTH

## 2019-07-09 PROCEDURE — 80053 COMPREHEN METABOLIC PANEL: CPT | Performed by: INTERNAL MEDICINE

## 2019-07-09 PROCEDURE — 25000128 H RX IP 250 OP 636: Mod: ZF | Performed by: PHYSICIAN ASSISTANT

## 2019-07-09 PROCEDURE — G0463 HOSPITAL OUTPT CLINIC VISIT: HCPCS

## 2019-07-09 PROCEDURE — 83735 ASSAY OF MAGNESIUM: CPT | Performed by: INTERNAL MEDICINE

## 2019-07-09 PROCEDURE — 85025 COMPLETE CBC W/AUTO DIFF WBC: CPT | Performed by: INTERNAL MEDICINE

## 2019-07-09 PROCEDURE — 36592 COLLECT BLOOD FROM PICC: CPT

## 2019-07-09 RX ORDER — MAGNESIUM OXIDE 400 MG/1
800 TABLET ORAL 2 TIMES DAILY
Qty: 180 TABLET | Refills: 1 | Status: SHIPPED | OUTPATIENT
Start: 2019-07-09 | End: 2019-07-09

## 2019-07-09 RX ORDER — HEPARIN SODIUM,PORCINE 10 UNIT/ML
5 VIAL (ML) INTRAVENOUS EVERY 24 HOURS
Qty: 60 ML | Refills: 0 | Status: SHIPPED | OUTPATIENT
Start: 2019-07-09 | End: 2019-08-27

## 2019-07-09 RX ORDER — TACROLIMUS 0.5 MG/1
1 CAPSULE ORAL 2 TIMES DAILY
Qty: 120 CAPSULE | Refills: 3 | Status: SHIPPED | OUTPATIENT
Start: 2019-07-09 | End: 2019-08-05

## 2019-07-09 RX ORDER — HEPARIN SODIUM,PORCINE 10 UNIT/ML
5 VIAL (ML) INTRAVENOUS
Status: DISCONTINUED | OUTPATIENT
Start: 2019-07-09 | End: 2019-07-09 | Stop reason: HOSPADM

## 2019-07-09 RX ORDER — HEPARIN SODIUM (PORCINE) LOCK FLUSH IV SOLN 100 UNIT/ML 100 UNIT/ML
5 SOLUTION INTRAVENOUS
Status: CANCELLED | OUTPATIENT
Start: 2019-07-09

## 2019-07-09 RX ORDER — MAGNESIUM OXIDE 400 MG/1
1200 TABLET ORAL 2 TIMES DAILY
Qty: 180 TABLET | Refills: 1 | COMMUNITY
Start: 2019-07-09 | End: 2019-08-16

## 2019-07-09 RX ORDER — ACYCLOVIR 800 MG/1
800 TABLET ORAL
Qty: 150 TABLET | Refills: 3 | Status: SHIPPED | OUTPATIENT
Start: 2019-07-09 | End: 2019-08-16

## 2019-07-09 RX ORDER — HEPARIN SODIUM,PORCINE 10 UNIT/ML
5 VIAL (ML) INTRAVENOUS
Status: CANCELLED | OUTPATIENT
Start: 2019-07-09

## 2019-07-09 RX ADMIN — HEPARIN, PORCINE (PF) 10 UNIT/ML INTRAVENOUS SYRINGE 5 ML: at 10:49

## 2019-07-09 RX ADMIN — HEPARIN, PORCINE (PF) 10 UNIT/ML INTRAVENOUS SYRINGE 5 ML: at 10:50

## 2019-07-09 ASSESSMENT — PAIN SCALES - GENERAL: PAINLEVEL: NO PAIN (0)

## 2019-07-09 NOTE — NURSING NOTE
Dressing change completed sterile technique used. Site WDL. Patient tolerated dressing change.See Dock Flowsheet.     LILLIE Polo

## 2019-07-09 NOTE — NURSING NOTE
"Oncology Rooming Note    July 9, 2019 11:15 AM   El Ace is a 63 year old male who presents for:    Chief Complaint   Patient presents with     Blood Draw     Labs drawn by RN in Lab from Right Chest [facility] Catheter. Line flushed with Heparin.      Initial Vitals: /71   Pulse 94   Temp 98  F (36.7  C) (Oral)   Resp 16   Wt 86.7 kg (191 lb 2.2 oz)   SpO2 98%   BMI 28.80 kg/m   Estimated body mass index is 28.8 kg/m  as calculated from the following:    Height as of 5/24/19: 1.735 m (5' 8.31\").    Weight as of this encounter: 86.7 kg (191 lb 2.2 oz). Body surface area is 2.04 meters squared.  No Pain (0) Comment: Data Unavailable   No LMP for male patient.  Allergies reviewed: Yes  Medications reviewed: Yes    Medications: Medication refills not needed today.  Pharmacy name entered into [employer]:    [facility] - [facility]  [facility] [place], [place] - [phone]    Clinical concerns:  none    Dian Veloz CMA              "

## 2019-07-09 NOTE — NURSING NOTE
Chief Complaint   Patient presents with     Blood Draw     Labs drawn by RN in Lab from Right Chest [facility] Catheter. Line flushed with Heparin.      Debbie Waite RN

## 2019-07-12 ENCOUNTER — THERAPY VISIT (OUTPATIENT)
Dept: PHYSICAL THERAPY | Facility: CLINIC | Age: 64
End: 2019-07-12
Attending: INTERNAL MEDICINE
Payer: COMMERCIAL

## 2019-07-12 DIAGNOSIS — Z94.84 HISTORY OF ALLOGENEIC STEM CELL TRANSPLANT (H): ICD-10-CM

## 2019-07-12 DIAGNOSIS — M54.6 CHRONIC LEFT-SIDED THORACIC BACK PAIN: ICD-10-CM

## 2019-07-12 DIAGNOSIS — C92.01 AML (ACUTE MYELOID LEUKEMIA) IN REMISSION (H): ICD-10-CM

## 2019-07-12 DIAGNOSIS — C92.01 ACUTE MYELOID LEUKEMIA IN REMISSION (H): Primary | ICD-10-CM

## 2019-07-12 DIAGNOSIS — G89.29 CHRONIC LEFT-SIDED THORACIC BACK PAIN: ICD-10-CM

## 2019-07-12 DIAGNOSIS — M54.2 NECK PAIN: Primary | ICD-10-CM

## 2019-07-12 DIAGNOSIS — R53.81 PHYSICAL DECONDITIONING: ICD-10-CM

## 2019-07-12 LAB
ALBUMIN SERPL-MCNC: 3.2 G/DL (ref 3.4–5)
ALP SERPL-CCNC: 136 U/L (ref 40–150)
ALT SERPL W P-5'-P-CCNC: 28 U/L (ref 0–70)
ANION GAP SERPL CALCULATED.3IONS-SCNC: 6 MMOL/L (ref 3–14)
AST SERPL W P-5'-P-CCNC: 22 U/L (ref 0–45)
BASOPHILS # BLD AUTO: 0 10E9/L (ref 0–0.2)
BASOPHILS NFR BLD AUTO: 0.3 %
BILIRUB SERPL-MCNC: 0.3 MG/DL (ref 0.2–1.3)
BUN SERPL-MCNC: 12 MG/DL (ref 7–30)
CALCIUM SERPL-MCNC: 8.7 MG/DL (ref 8.5–10.1)
CHLORIDE SERPL-SCNC: 107 MMOL/L (ref 94–109)
CO2 SERPL-SCNC: 23 MMOL/L (ref 20–32)
CREAT SERPL-MCNC: 0.78 MG/DL (ref 0.66–1.25)
DIFFERENTIAL METHOD BLD: ABNORMAL
EOSINOPHIL # BLD AUTO: 0.1 10E9/L (ref 0–0.7)
EOSINOPHIL NFR BLD AUTO: 0.8 %
ERYTHROCYTE [DISTWIDTH] IN BLOOD BY AUTOMATED COUNT: 17.9 % (ref 10–15)
GFR SERPL CREATININE-BSD FRML MDRD: >90 ML/MIN/{1.73_M2}
GLUCOSE SERPL-MCNC: 140 MG/DL (ref 70–99)
HCT VFR BLD AUTO: 34.5 % (ref 40–53)
HGB BLD-MCNC: 11.7 G/DL (ref 13.3–17.7)
IMM GRANULOCYTES # BLD: 0.1 10E9/L (ref 0–0.4)
IMM GRANULOCYTES NFR BLD: 1.1 %
LYMPHOCYTES # BLD AUTO: 0.8 10E9/L (ref 0.8–5.3)
LYMPHOCYTES NFR BLD AUTO: 6.8 %
MCH RBC QN AUTO: 32.9 PG (ref 26.5–33)
MCHC RBC AUTO-ENTMCNC: 33.9 G/DL (ref 31.5–36.5)
MCV RBC AUTO: 97 FL (ref 78–100)
MONOCYTES # BLD AUTO: 1.7 10E9/L (ref 0–1.3)
MONOCYTES NFR BLD AUTO: 14.9 %
NEUTROPHILS # BLD AUTO: 8.6 10E9/L (ref 1.6–8.3)
NEUTROPHILS NFR BLD AUTO: 76.1 %
NRBC # BLD AUTO: 0 10*3/UL
NRBC BLD AUTO-RTO: 0 /100
PLATELET # BLD AUTO: 171 10E9/L (ref 150–450)
POTASSIUM SERPL-SCNC: 4.4 MMOL/L (ref 3.4–5.3)
PROT SERPL-MCNC: 6.8 G/DL (ref 6.8–8.8)
RBC # BLD AUTO: 3.56 10E12/L (ref 4.4–5.9)
SODIUM SERPL-SCNC: 136 MMOL/L (ref 133–144)
TACROLIMUS BLD-MCNC: 10.4 UG/L (ref 5–15)
TME LAST DOSE: NORMAL H
WBC # BLD AUTO: 11.3 10E9/L (ref 4–11)

## 2019-07-12 PROCEDURE — 85025 COMPLETE CBC W/AUTO DIFF WBC: CPT | Performed by: PHYSICIAN ASSISTANT

## 2019-07-12 PROCEDURE — 25000128 H RX IP 250 OP 636: Performed by: INTERNAL MEDICINE

## 2019-07-12 PROCEDURE — 80197 ASSAY OF TACROLIMUS: CPT

## 2019-07-12 PROCEDURE — 80053 COMPREHEN METABOLIC PANEL: CPT | Performed by: PHYSICIAN ASSISTANT

## 2019-07-12 RX ORDER — HEPARIN SODIUM,PORCINE 10 UNIT/ML
5 VIAL (ML) INTRAVENOUS
Status: DISCONTINUED | OUTPATIENT
Start: 2019-07-12 | End: 2019-07-20 | Stop reason: HOSPADM

## 2019-07-12 RX ADMIN — HEPARIN, PORCINE (PF) 10 UNIT/ML INTRAVENOUS SYRINGE 5 ML: at 08:06

## 2019-07-12 NOTE — NURSING NOTE
Chief Complaint   Patient presents with     Blood Draw     lab only appointment.  labs drawn from cvc by rn.      CVC accessed by RN in lab, labs drawn.  Both lines flushed with heparin.      Karina Sheeahn RN

## 2019-07-17 NOTE — PROGRESS NOTES
[facility] BMT [facility]    Diagnosis:   1. 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     Treatments:   1. 7+3 (Dauno), 3/15/19, no response, Day 12 persistent disease with near packed marrow  2. Dec 10d+Ric 3/26/19, CR1 on 4/23/19 but with pos mutations (IDH1 17%, RUNX1 8%)  3. 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     PMH: HLP, BPH, DJD, RLS    Interval history: Some epigastric pain with certain food, no diarrhea/n/v/fever, 10-point ROS otherwise negative.     Lab Results   Component Value Date    WBC 8.0 07/18/2019    HGB 11.4 (L) 07/18/2019    HCT 33.5 (L) 07/18/2019     07/18/2019     07/18/2019    POTASSIUM 4.4 07/18/2019    CHLORIDE 106 07/18/2019    CO2 PENDING 07/18/2019    BUN PENDING 07/18/2019    CR PENDING 07/18/2019    GLC PENDING 07/18/2019    SED 88 (H) 06/25/2019    NTBNPI 2,031 (H) 04/03/2019    TROPI <0.015 04/23/2019    AST PENDING 07/18/2019    ALT PENDING 07/18/2019    ALKPHOS PENDING 07/18/2019    BILITOTAL PENDING 07/18/2019    INR 1.15 (H) 06/24/2019       Current Outpatient Medications   Medication     acetaminophen (TYLENOL) 325 MG tablet     acyclovir (ZOVIRAX) 800 MG tablet     heparin lock flush 10 UNIT/ML SOLN injection     magnesium oxide (MAG-OX) 400 MG tablet     pramox-pe-glycerin-petrolatum (PREPARATION H) 1-0.25-14.4-15 % CREA cream     sulfamethoxazole-trimethoprim (BACTRIM DS/SEPTRA DS) 800-160 MG tablet     tacrolimus (GENERIC EQUIVALENT) 0.5 MG capsule     tamsulosin (FLOMAX) 0.4 MG capsule     ursodiol (ACTIGALL) 300 MG capsule     voriconazole (VFEND) 50 MG tablet     CELLCEPT (BRAND) 500 MG tablet     ondansetron (ZOFRAN-ODT) 8 MG ODT tab     predniSONE (DELTASONE) 20 MG tablet     prochlorperazine (COMPAZINE) 5 MG tablet     No current facility-administered medications for this visit.      Facility-Administered Medications Ordered in Other Visits   Medication     heparin  "lock flush 10 UNIT/ML injection 5 mL     heparin lock flush 10 UNIT/ML injection 5 mL     Ph/E:   Vitals: /72 (BP Location: Right arm, Patient Position: Sitting, Cuff Size: Adult Regular)   Pulse 102   Temp 98.7  F (37.1  C) (Oral)   Resp 16   Ht 1.735 m (5' 8.31\")   Wt 87.6 kg (193 lb 3.2 oz)   SpO2 99%   BMI 29.11 kg/m     ECO  General: NAD; H&N: no mucosal lesions; Lungs clear; Heart RRR; Abdomen; Soft, No organomegaly; Extremities: No edema; Skin: No rash except dark fingers on palmar side; Neuro: Nonfocal; Mood/Affect: appropriate; Lymph: no LAD    A&P:   1. AML: day +48 haplo, CR1. RFLP every 2 weeks until 100% chimeric in both compartments. ALT-803 study was introduced including goal (reducing relapse by expanding NK cells and perhaps reducing infections) SEs such as injection site rash, fatigue, fever, and cytopenias. He signed consent and was given a copy. First dose next Thu, then monthly x10 total.   2. ID: acyclovir, vori, bactrim.  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 Level today.   4. Possible pseudogout: off pred.             "

## 2019-07-18 ENCOUNTER — APPOINTMENT (OUTPATIENT)
Dept: LAB | Facility: CLINIC | Age: 64
End: 2019-07-18
Attending: INTERNAL MEDICINE
Payer: COMMERCIAL

## 2019-07-18 ENCOUNTER — RESEARCH ENCOUNTER (OUTPATIENT)
Dept: TRANSPLANT | Facility: CLINIC | Age: 64
End: 2019-07-18

## 2019-07-18 ENCOUNTER — ONCOLOGY VISIT (OUTPATIENT)
Dept: TRANSPLANT | Facility: CLINIC | Age: 64
End: 2019-07-18
Attending: INTERNAL MEDICINE
Payer: COMMERCIAL

## 2019-07-18 VITALS
RESPIRATION RATE: 16 BRPM | DIASTOLIC BLOOD PRESSURE: 72 MMHG | HEIGHT: 68 IN | HEART RATE: 102 BPM | WEIGHT: 193.2 LBS | OXYGEN SATURATION: 99 % | BODY MASS INDEX: 29.28 KG/M2 | TEMPERATURE: 98.7 F | SYSTOLIC BLOOD PRESSURE: 110 MMHG

## 2019-07-18 DIAGNOSIS — C92.01 AML (ACUTE MYELOID LEUKEMIA) IN REMISSION (H): ICD-10-CM

## 2019-07-18 DIAGNOSIS — C92.01 ACUTE MYELOID LEUKEMIA IN REMISSION (H): Primary | ICD-10-CM

## 2019-07-18 DIAGNOSIS — Z94.84 HISTORY OF ALLOGENEIC STEM CELL TRANSPLANT (H): ICD-10-CM

## 2019-07-18 DIAGNOSIS — C95.00 ACUTE LEUKEMIA NOT HAVING ACHIEVED REMISSION (H): ICD-10-CM

## 2019-07-18 LAB
ALBUMIN SERPL-MCNC: 3.5 G/DL (ref 3.4–5)
ALP SERPL-CCNC: 133 U/L (ref 40–150)
ALT SERPL W P-5'-P-CCNC: 26 U/L (ref 0–70)
ANION GAP SERPL CALCULATED.3IONS-SCNC: 5 MMOL/L (ref 3–14)
ANISOCYTOSIS BLD QL SMEAR: ABNORMAL
AST SERPL W P-5'-P-CCNC: 18 U/L (ref 0–45)
BASOPHILS # BLD AUTO: 0 10E9/L (ref 0–0.2)
BASOPHILS NFR BLD AUTO: 0 %
BILIRUB SERPL-MCNC: 0.3 MG/DL (ref 0.2–1.3)
BUN SERPL-MCNC: 13 MG/DL (ref 7–30)
BURR CELLS BLD QL SMEAR: SLIGHT
CALCIUM SERPL-MCNC: 8.7 MG/DL (ref 8.5–10.1)
CHLORIDE SERPL-SCNC: 106 MMOL/L (ref 94–109)
CO2 SERPL-SCNC: 26 MMOL/L (ref 20–32)
CREAT SERPL-MCNC: 0.94 MG/DL (ref 0.66–1.25)
DACRYOCYTES BLD QL SMEAR: SLIGHT
DIFFERENTIAL METHOD BLD: ABNORMAL
EOSINOPHIL # BLD AUTO: 0.1 10E9/L (ref 0–0.7)
EOSINOPHIL NFR BLD AUTO: 1.7 %
ERYTHROCYTE [DISTWIDTH] IN BLOOD BY AUTOMATED COUNT: 18 % (ref 10–15)
GFR SERPL CREATININE-BSD FRML MDRD: 85 ML/MIN/{1.73_M2}
GLUCOSE SERPL-MCNC: 122 MG/DL (ref 70–99)
HCT VFR BLD AUTO: 33.5 % (ref 40–53)
HGB BLD-MCNC: 11.4 G/DL (ref 13.3–17.7)
LYMPHOCYTES # BLD AUTO: 0.2 10E9/L (ref 0.8–5.3)
LYMPHOCYTES NFR BLD AUTO: 2.6 %
MAGNESIUM SERPL-MCNC: 2.1 MG/DL (ref 1.6–2.3)
MCH RBC QN AUTO: 33.1 PG (ref 26.5–33)
MCHC RBC AUTO-ENTMCNC: 34 G/DL (ref 31.5–36.5)
MCV RBC AUTO: 97 FL (ref 78–100)
METAMYELOCYTES # BLD: 0.1 10E9/L
METAMYELOCYTES NFR BLD MANUAL: 0.9 %
MONOCYTES # BLD AUTO: 0.9 10E9/L (ref 0–1.3)
MONOCYTES NFR BLD AUTO: 11.3 %
MYELOCYTES # BLD: 0.1 10E9/L
MYELOCYTES NFR BLD MANUAL: 0.9 %
NEUTROPHILS # BLD AUTO: 6.6 10E9/L (ref 1.6–8.3)
NEUTROPHILS NFR BLD AUTO: 82.6 %
PLATELET # BLD AUTO: 206 10E9/L (ref 150–450)
PLATELET # BLD EST: ABNORMAL 10*3/UL
POIKILOCYTOSIS BLD QL SMEAR: SLIGHT
POTASSIUM SERPL-SCNC: 4.4 MMOL/L (ref 3.4–5.3)
PROT SERPL-MCNC: 7.1 G/DL (ref 6.8–8.8)
RBC # BLD AUTO: 3.44 10E12/L (ref 4.4–5.9)
SODIUM SERPL-SCNC: 137 MMOL/L (ref 133–144)
TRANSF REACT PLASRBC-IMP: NORMAL
WBC # BLD AUTO: 8 10E9/L (ref 4–11)

## 2019-07-18 PROCEDURE — G0463 HOSPITAL OUTPT CLINIC VISIT: HCPCS | Mod: ZF

## 2019-07-18 PROCEDURE — 83735 ASSAY OF MAGNESIUM: CPT | Performed by: INTERNAL MEDICINE

## 2019-07-18 PROCEDURE — 80053 COMPREHEN METABOLIC PANEL: CPT | Performed by: PHYSICIAN ASSISTANT

## 2019-07-18 PROCEDURE — 93010 ELECTROCARDIOGRAM REPORT: CPT | Mod: ZP | Performed by: INTERNAL MEDICINE

## 2019-07-18 PROCEDURE — 81268 CHIMERISM ANAL W/CELL SELECT: CPT | Performed by: INTERNAL MEDICINE

## 2019-07-18 PROCEDURE — 85025 COMPLETE CBC W/AUTO DIFF WBC: CPT | Performed by: PHYSICIAN ASSISTANT

## 2019-07-18 PROCEDURE — 25000128 H RX IP 250 OP 636: Mod: ZF | Performed by: INTERNAL MEDICINE

## 2019-07-18 PROCEDURE — 80197 ASSAY OF TACROLIMUS: CPT | Performed by: INTERNAL MEDICINE

## 2019-07-18 RX ORDER — HEPARIN SODIUM,PORCINE 10 UNIT/ML
5 VIAL (ML) INTRAVENOUS ONCE
Status: COMPLETED | OUTPATIENT
Start: 2019-07-18 | End: 2019-07-18

## 2019-07-18 RX ORDER — TAMSULOSIN HYDROCHLORIDE 0.4 MG/1
0.4 CAPSULE ORAL DAILY
Qty: 30 CAPSULE | Refills: 0 | Status: SHIPPED | OUTPATIENT
Start: 2019-07-18 | End: 2019-08-16

## 2019-07-18 RX ORDER — SULFAMETHOXAZOLE/TRIMETHOPRIM 800-160 MG
1 TABLET ORAL
Qty: 16 TABLET | Refills: 0 | Status: SHIPPED | OUTPATIENT
Start: 2019-07-18 | End: 2019-08-22

## 2019-07-18 RX ORDER — VORICONAZOLE 50 MG/1
150 TABLET, FILM COATED ORAL EVERY 12 HOURS
Qty: 180 TABLET | Refills: 0 | Status: SHIPPED | OUTPATIENT
Start: 2019-07-18 | End: 2019-08-22

## 2019-07-18 RX ORDER — URSODIOL 300 MG/1
300 CAPSULE ORAL 3 TIMES DAILY
Qty: 90 CAPSULE | Refills: 0 | Status: SHIPPED | OUTPATIENT
Start: 2019-07-18 | End: 2019-07-18

## 2019-07-18 RX ORDER — URSODIOL 300 MG/1
300 CAPSULE ORAL 3 TIMES DAILY
Qty: 14 CAPSULE | Refills: 0 | Status: SHIPPED | OUTPATIENT
Start: 2019-07-18 | End: 2019-08-01

## 2019-07-18 RX ADMIN — HEPARIN, PORCINE (PF) 10 UNIT/ML INTRAVENOUS SYRINGE 5 ML: at 13:15

## 2019-07-18 ASSESSMENT — PAIN SCALES - GENERAL: PAINLEVEL: NO PAIN (0)

## 2019-07-18 ASSESSMENT — MIFFLIN-ST. JEOR: SCORE: 1645.77

## 2019-07-18 NOTE — NURSING NOTE
Chief Complaint   Patient presents with     RECHECK     Return: Day 49 ban review- AML (acute myeloid leukemia)     Blood Draw     Labs drawn via CVC by RN in lab. VS taken. Pt checked in for next appt     Labs collected from CVC by RN, line flushed with saline and heparin.  Vitals taken. Pt checked in for appointment(s).    Patricia LIRIANO RN PHN BSN  BMT/Oncology Lab

## 2019-07-18 NOTE — NURSING NOTE
"Oncology Rooming Note    July 18, 2019 1:30 PM   El Ace is a 63 year old male who presents for:    Chief Complaint   Patient presents with     RECHECK     Return: Day 49 ban review- AML (acute myeloid leukemia)     Blood Draw     Labs drawn via CVC by RN in lab. VS taken. Pt checked in for next appt     Initial Vitals: /72 (BP Location: Right arm, Patient Position: Sitting, Cuff Size: Adult Regular)   Pulse 102   Temp 98.7  F (37.1  C) (Oral)   Resp 16   Ht 1.735 m (5' 8.31\")   Wt 87.6 kg (193 lb 3.2 oz)   SpO2 99%   BMI 29.11 kg/m   Estimated body mass index is 29.11 kg/m  as calculated from the following:    Height as of this encounter: 1.735 m (5' 8.31\").    Weight as of this encounter: 87.6 kg (193 lb 3.2 oz). Body surface area is 2.05 meters squared.  No Pain (0) Comment: Data Unavailable   No LMP for male patient.  Allergies reviewed: Yes  Medications reviewed: Yes    Medications: MEDICATION REFILLS NEEDED TODAY. Provider was notified.  Pharmacy name entered into [employer]:    [facility] - [facility]-[address].  [facility] - [place], [place] - [address] [facility] [address]    Clinical concerns: Pt requesting refills on the Bactrim, Tamsulosin, Ursodiol, Voriconazole.  Dr. Brown was notified.      Jennifer Albarran CMA              "

## 2019-07-19 ENCOUNTER — THERAPY VISIT (OUTPATIENT)
Dept: PHYSICAL THERAPY | Facility: CLINIC | Age: 64
End: 2019-07-19
Payer: COMMERCIAL

## 2019-07-19 DIAGNOSIS — C92.01 AML (ACUTE MYELOID LEUKEMIA) IN REMISSION (H): ICD-10-CM

## 2019-07-19 DIAGNOSIS — M54.2 NECK PAIN: Primary | ICD-10-CM

## 2019-07-19 DIAGNOSIS — R53.81 PHYSICAL DECONDITIONING: ICD-10-CM

## 2019-07-19 DIAGNOSIS — M79.671 RIGHT FOOT PAIN: ICD-10-CM

## 2019-07-19 LAB
CMV DNA SPEC NAA+PROBE-ACNC: NORMAL [IU]/ML
CMV DNA SPEC NAA+PROBE-LOG#: NORMAL {LOG_IU}/ML
INTERPRETATION ECG - MUSE: NORMAL
SPECIMEN SOURCE: NORMAL
TACROLIMUS BLD-MCNC: 8.6 UG/L (ref 5–15)
TME LAST DOSE: NORMAL H

## 2019-07-19 NOTE — NURSING NOTE
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 The patient understands that the study involves an active treatment phase as well as a post-treatment follow up phase.     During the discussion, his wife Bessy was present. The patient is 63 years old and his wife is not of child bearing potential.  A copy of the signed form was provided to the patient. No procedures specific to this study were performed prior to the patient signing the consent form.    Consent Version Date: 4/9/19  Consent obtained by: Ellyn Chaudhari RN   Date: 7/18/19  HIPAA authorization signed?:  Yes   HIPAA authorization version date: 7/31/18    Ellyn Chaudhari RN    Form 503.03.01 (Version 2)     Effective date: 01AUG2018     Next Review Date: 01AUG2020

## 2019-07-22 LAB
COPATH REPORT: NORMAL
FUNGUS SPEC CULT: NORMAL
SPECIMEN SOURCE: NORMAL

## 2019-07-23 LAB — COPATH REPORT: NORMAL

## 2019-07-23 RX ORDER — METHYLPREDNISOLONE SODIUM SUCCINATE 125 MG/2ML
125 INJECTION, POWDER, LYOPHILIZED, FOR SOLUTION INTRAMUSCULAR; INTRAVENOUS
Status: CANCELLED
Start: 2019-07-24

## 2019-07-23 RX ORDER — MEPERIDINE HYDROCHLORIDE 25 MG/ML
25 INJECTION INTRAMUSCULAR; INTRAVENOUS; SUBCUTANEOUS EVERY 30 MIN PRN
Status: CANCELLED | OUTPATIENT
Start: 2019-07-24

## 2019-07-23 RX ORDER — ALBUTEROL SULFATE 0.83 MG/ML
2.5 SOLUTION RESPIRATORY (INHALATION)
Status: CANCELLED | OUTPATIENT
Start: 2019-07-24

## 2019-07-23 RX ORDER — DIPHENHYDRAMINE HYDROCHLORIDE 50 MG/ML
50 INJECTION INTRAMUSCULAR; INTRAVENOUS
Status: CANCELLED
Start: 2019-07-24

## 2019-07-23 RX ORDER — DIPHENHYDRAMINE HCL 25 MG
25 CAPSULE ORAL ONCE
Status: CANCELLED | OUTPATIENT
Start: 2019-07-24

## 2019-07-23 RX ORDER — NALOXONE HYDROCHLORIDE 0.4 MG/ML
.1-.4 INJECTION, SOLUTION INTRAMUSCULAR; INTRAVENOUS; SUBCUTANEOUS
Status: CANCELLED | OUTPATIENT
Start: 2019-07-24

## 2019-07-23 RX ORDER — LORAZEPAM 2 MG/ML
0.5 INJECTION INTRAMUSCULAR EVERY 4 HOURS PRN
Status: CANCELLED
Start: 2019-07-24

## 2019-07-23 RX ORDER — EPINEPHRINE 0.3 MG/.3ML
0.3 INJECTION SUBCUTANEOUS EVERY 5 MIN PRN
Status: CANCELLED | OUTPATIENT
Start: 2019-07-24

## 2019-07-23 RX ORDER — EPINEPHRINE 1 MG/ML
0.3 INJECTION, SOLUTION INTRAMUSCULAR; SUBCUTANEOUS EVERY 5 MIN PRN
Status: CANCELLED | OUTPATIENT
Start: 2019-07-24

## 2019-07-23 RX ORDER — SODIUM CHLORIDE 9 MG/ML
1000 INJECTION, SOLUTION INTRAVENOUS CONTINUOUS PRN
Status: CANCELLED
Start: 2019-07-24

## 2019-07-23 RX ORDER — ALBUTEROL SULFATE 90 UG/1
1-2 AEROSOL, METERED RESPIRATORY (INHALATION)
Status: CANCELLED
Start: 2019-07-24

## 2019-07-23 RX ORDER — ACETAMINOPHEN 325 MG/1
650 TABLET ORAL ONCE
Status: CANCELLED | OUTPATIENT
Start: 2019-07-24

## 2019-07-23 NOTE — PROGRESS NOTES
[facility] BMT [facility]    Diagnosis:   1. 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     Treatments:   1. 7+3 (Dauno), 3/15/19, no response, Day 12 persistent disease with near packed marrow  2. Dec 10d+Ric 3/26/19, CR1 on 4/23/19 but with pos mutations (IDH1 17%, RUNX1 8%)  3. 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     PMH: HLP, BPH, DJD, RLS    Interval history: 10-point ROS negative.     Lab Results   Component Value Date    WBC 7.3 07/24/2019    HGB 11.4 (L) 07/24/2019    HCT 32.9 (L) 07/24/2019     07/24/2019     07/24/2019    POTASSIUM 4.1 07/24/2019    CHLORIDE 108 07/24/2019    CO2 25 07/24/2019    BUN 13 07/24/2019    CR 0.94 07/24/2019     (H) 07/24/2019    SED 88 (H) 06/25/2019    NTBNPI 2,031 (H) 04/03/2019    TROPI <0.015 04/23/2019    AST 17 07/24/2019    ALT 22 07/24/2019    ALKPHOS 132 07/24/2019    BILITOTAL 0.3 07/24/2019    INR 1.15 (H) 06/24/2019       Current Outpatient Medications   Medication     acetaminophen (TYLENOL) 325 MG tablet     acyclovir (ZOVIRAX) 800 MG tablet     heparin lock flush 10 UNIT/ML SOLN injection     magnesium oxide (MAG-OX) 400 MG tablet     pramox-pe-glycerin-petrolatum (PREPARATION H) 1-0.25-14.4-15 % CREA cream     prochlorperazine (COMPAZINE) 5 MG tablet     sulfamethoxazole-trimethoprim (BACTRIM DS/SEPTRA DS) 800-160 MG tablet     tacrolimus (GENERIC EQUIVALENT) 0.5 MG capsule     tamsulosin (FLOMAX) 0.4 MG capsule     ursodiol (ACTIGALL) 300 MG capsule     voriconazole (VFEND) 50 MG tablet     CELLCEPT (BRAND) 500 MG tablet     ondansetron (ZOFRAN-ODT) 8 MG ODT tab     predniSONE (DELTASONE) 20 MG tablet     No current facility-administered medications for this visit.      Facility-Administered Medications Ordered in Other Visits   Medication     heparin lock flush 10 UNIT/ML injection 5 mL     Ph/E:   Vitals: BP 98/65   Pulse 109   Temp 98  F (36.7  C)    Resp 16   Wt 88 kg (194 lb)   SpO2 99%   BMI 29.23 kg/m     ECO, %  General: NAD; H&N: no mucosal lesions; Lungs clear; Heart RRR; Abdomen; Soft, No organomegaly; Extremities: No edema; Skin: No rash except dark fingers on palmar side; Neuro: Nonfocal; Mood/Affect: appropriate; Lymph: no LAD    A&P:   1. AML: day +54 haplo, CR1. RFLP every 2 weeks until 100% chimeric in both compartments. Start ALT-803 on study today, repeat monthly x10.   2. ID: acyclovir, vori, bactrim.  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 Level today.   4. Possible pseudogout: off pred.

## 2019-07-24 ENCOUNTER — RESEARCH ENCOUNTER (OUTPATIENT)
Dept: ONCOLOGY | Facility: CLINIC | Age: 64
End: 2019-07-24

## 2019-07-24 ENCOUNTER — ONCOLOGY VISIT (OUTPATIENT)
Dept: TRANSPLANT | Facility: CLINIC | Age: 64
End: 2019-07-24
Attending: INTERNAL MEDICINE
Payer: COMMERCIAL

## 2019-07-24 ENCOUNTER — APPOINTMENT (OUTPATIENT)
Dept: LAB | Facility: CLINIC | Age: 64
End: 2019-07-24
Attending: INTERNAL MEDICINE
Payer: COMMERCIAL

## 2019-07-24 VITALS
HEART RATE: 109 BPM | BODY MASS INDEX: 29.23 KG/M2 | WEIGHT: 194 LBS | TEMPERATURE: 98 F | OXYGEN SATURATION: 99 % | DIASTOLIC BLOOD PRESSURE: 65 MMHG | RESPIRATION RATE: 16 BRPM | SYSTOLIC BLOOD PRESSURE: 98 MMHG

## 2019-07-24 VITALS
DIASTOLIC BLOOD PRESSURE: 70 MMHG | OXYGEN SATURATION: 98 % | RESPIRATION RATE: 16 BRPM | TEMPERATURE: 98 F | HEART RATE: 87 BPM | SYSTOLIC BLOOD PRESSURE: 109 MMHG

## 2019-07-24 DIAGNOSIS — C92.01 AML (ACUTE MYELOID LEUKEMIA) IN REMISSION (H): ICD-10-CM

## 2019-07-24 DIAGNOSIS — C92.01 AML (ACUTE MYELOID LEUKEMIA) IN REMISSION (H): Primary | ICD-10-CM

## 2019-07-24 DIAGNOSIS — C92.01 ACUTE MYELOID LEUKEMIA IN REMISSION (H): ICD-10-CM

## 2019-07-24 DIAGNOSIS — C92.01 ACUTE MYELOID LEUKEMIA IN REMISSION (H): Primary | ICD-10-CM

## 2019-07-24 LAB
ALBUMIN SERPL-MCNC: 3.5 G/DL (ref 3.4–5)
ALP SERPL-CCNC: 132 U/L (ref 40–150)
ALT SERPL W P-5'-P-CCNC: 22 U/L (ref 0–70)
ANION GAP SERPL CALCULATED.3IONS-SCNC: 4 MMOL/L (ref 3–14)
AST SERPL W P-5'-P-CCNC: 17 U/L (ref 0–45)
BASOPHILS # BLD AUTO: 0.1 10E9/L (ref 0–0.2)
BASOPHILS NFR BLD AUTO: 0.7 %
BILIRUB SERPL-MCNC: 0.3 MG/DL (ref 0.2–1.3)
BUN SERPL-MCNC: 13 MG/DL (ref 7–30)
CALCIUM SERPL-MCNC: 8.6 MG/DL (ref 8.5–10.1)
CHLORIDE SERPL-SCNC: 108 MMOL/L (ref 94–109)
CO2 SERPL-SCNC: 25 MMOL/L (ref 20–32)
CREAT SERPL-MCNC: 0.94 MG/DL (ref 0.66–1.25)
DIFFERENTIAL METHOD BLD: ABNORMAL
EOSINOPHIL # BLD AUTO: 0.3 10E9/L (ref 0–0.7)
EOSINOPHIL NFR BLD AUTO: 4 %
ERYTHROCYTE [DISTWIDTH] IN BLOOD BY AUTOMATED COUNT: 17.8 % (ref 10–15)
GFR SERPL CREATININE-BSD FRML MDRD: 85 ML/MIN/{1.73_M2}
GLUCOSE SERPL-MCNC: 105 MG/DL (ref 70–99)
HCT VFR BLD AUTO: 32.9 % (ref 40–53)
HGB BLD-MCNC: 11.4 G/DL (ref 13.3–17.7)
IMM GRANULOCYTES # BLD: 0 10E9/L (ref 0–0.4)
IMM GRANULOCYTES NFR BLD: 0.4 %
LYMPHOCYTES # BLD AUTO: 0.6 10E9/L (ref 0.8–5.3)
LYMPHOCYTES NFR BLD AUTO: 8.5 %
MAGNESIUM SERPL-MCNC: 2 MG/DL (ref 1.6–2.3)
MCH RBC QN AUTO: 33.5 PG (ref 26.5–33)
MCHC RBC AUTO-ENTMCNC: 34.7 G/DL (ref 31.5–36.5)
MCV RBC AUTO: 97 FL (ref 78–100)
MONOCYTES # BLD AUTO: 0.9 10E9/L (ref 0–1.3)
MONOCYTES NFR BLD AUTO: 11.9 %
NEUTROPHILS # BLD AUTO: 5.4 10E9/L (ref 1.6–8.3)
NEUTROPHILS NFR BLD AUTO: 74.5 %
NRBC # BLD AUTO: 0 10*3/UL
NRBC BLD AUTO-RTO: 0 /100
PLATELET # BLD AUTO: 223 10E9/L (ref 150–450)
POTASSIUM SERPL-SCNC: 4.1 MMOL/L (ref 3.4–5.3)
PROT SERPL-MCNC: 6.8 G/DL (ref 6.8–8.8)
RBC # BLD AUTO: 3.4 10E12/L (ref 4.4–5.9)
SODIUM SERPL-SCNC: 137 MMOL/L (ref 133–144)
TACROLIMUS BLD-MCNC: 8.4 UG/L (ref 5–15)
TME LAST DOSE: NORMAL H
WBC # BLD AUTO: 7.3 10E9/L (ref 4–11)

## 2019-07-24 PROCEDURE — 25000128 H RX IP 250 OP 636: Mod: ZF | Performed by: PHYSICIAN ASSISTANT

## 2019-07-24 PROCEDURE — 85025 COMPLETE CBC W/AUTO DIFF WBC: CPT | Performed by: INTERNAL MEDICINE

## 2019-07-24 PROCEDURE — G0463 HOSPITAL OUTPT CLINIC VISIT: HCPCS

## 2019-07-24 PROCEDURE — 83735 ASSAY OF MAGNESIUM: CPT | Performed by: INTERNAL MEDICINE

## 2019-07-24 PROCEDURE — 25000132 ZZH RX MED GY IP 250 OP 250 PS 637: Mod: ZF | Performed by: INTERNAL MEDICINE

## 2019-07-24 PROCEDURE — 80197 ASSAY OF TACROLIMUS: CPT | Performed by: INTERNAL MEDICINE

## 2019-07-24 PROCEDURE — 80053 COMPREHEN METABOLIC PANEL: CPT | Performed by: INTERNAL MEDICINE

## 2019-07-24 RX ORDER — ACETAMINOPHEN 325 MG/1
650 TABLET ORAL ONCE
Status: COMPLETED | OUTPATIENT
Start: 2019-07-24 | End: 2019-07-24

## 2019-07-24 RX ORDER — HEPARIN SODIUM (PORCINE) LOCK FLUSH IV SOLN 100 UNIT/ML 100 UNIT/ML
5 SOLUTION INTRAVENOUS
Status: CANCELLED | OUTPATIENT
Start: 2019-07-24

## 2019-07-24 RX ORDER — DIPHENHYDRAMINE HCL 25 MG
25 CAPSULE ORAL ONCE
Status: COMPLETED | OUTPATIENT
Start: 2019-07-24 | End: 2019-07-24

## 2019-07-24 RX ORDER — HEPARIN SODIUM,PORCINE 10 UNIT/ML
5 VIAL (ML) INTRAVENOUS
Status: CANCELLED | OUTPATIENT
Start: 2019-07-24

## 2019-07-24 RX ORDER — HEPARIN SODIUM,PORCINE 10 UNIT/ML
5 VIAL (ML) INTRAVENOUS
Status: DISCONTINUED | OUTPATIENT
Start: 2019-07-24 | End: 2019-07-24 | Stop reason: HOSPADM

## 2019-07-24 RX ADMIN — HEPARIN, PORCINE (PF) 10 UNIT/ML INTRAVENOUS SYRINGE 5 ML: at 08:32

## 2019-07-24 RX ADMIN — DIPHENHYDRAMINE HYDROCHLORIDE 25 MG: 25 CAPSULE ORAL at 09:32

## 2019-07-24 RX ADMIN — HEPARIN, PORCINE (PF) 10 UNIT/ML INTRAVENOUS SYRINGE 5 ML: at 08:33

## 2019-07-24 RX ADMIN — ACETAMINOPHEN 650 MG: 325 TABLET ORAL at 09:32

## 2019-07-24 ASSESSMENT — PAIN SCALES - GENERAL: PAINLEVEL: NO PAIN (0)

## 2019-07-24 NOTE — PROGRESS NOTES
Infusion Nursing Note:  El Ace presents today for ALT-803.    Patient seen by provider today: Yes: Dr. Brown   present during visit today: Not Applicable.    Note: Patient here for D1, C1 of ALT-803.  He was assessed by provider and research team prior to therapy today.    Pre meds given 30 minutes prior to injection: 25 mg oral benadryl and 650 mg oral tylenol.   He was given ALT-803 injection subcutaneous x1 in right side abdomen.    He was monitored for 2 hours post injection.  Vitals obtained by research staff pre dose, 30 minutes post, 1 hour post and 2 hours post.  Research RN discharged patient after required monitoring time.      Intravenous Access:  [facility].    Treatment Conditions:  Lab Results   Component Value Date    HGB 11.4 07/24/2019     Lab Results   Component Value Date    WBC 7.3 07/24/2019      Lab Results   Component Value Date    ANEU 5.4 07/24/2019     Lab Results   Component Value Date     07/24/2019      Lab Results   Component Value Date     07/24/2019                   Lab Results   Component Value Date    POTASSIUM 4.1 07/24/2019           Lab Results   Component Value Date    MAG 2.0 07/24/2019            Lab Results   Component Value Date    CR 0.94 07/24/2019                   Lab Results   Component Value Date    DEBBIE 8.6 07/24/2019                Lab Results   Component Value Date    BILITOTAL 0.3 07/24/2019           Lab Results   Component Value Date    ALBUMIN 3.5 07/24/2019                    Lab Results   Component Value Date    ALT 22 07/24/2019           Lab Results   Component Value Date    AST 17 07/24/2019           Post Infusion Assessment:  Patient tolerated injection without incident.       Discharge Plan:   AVS to patient via MYCHART.  Patient will return for next appointment.   Patient discharged in stable condition accompanied by: family.  Departure Mode: Ambulatory.    DARLING LEPE RN

## 2019-07-24 NOTE — NURSING NOTE
"Oncology Rooming Note    July 24, 2019 8:45 AM   El Ace is a 63 year old male who presents for:    Chief Complaint   Patient presents with     Labs Only     cvc, heparin locked, caps changed, vitals checked     RECHECK     Pt is here for labs and to see provider for AML     Initial Vitals: Blood Pressure 98/65   Pulse 109   Temperature 98  F (36.7  C)   Respiration 16   Weight 88 kg (194 lb)   Oxygen Saturation 99%   Body Mass Index 29.23 kg/m   Estimated body mass index is 29.23 kg/m  as calculated from the following:    Height as of 7/18/19: 1.735 m (5' 8.31\").    Weight as of this encounter: 88 kg (194 lb). Body surface area is 2.06 meters squared.  No Pain (0) Comment: Data Unavailable   No LMP for male patient.  Allergies reviewed: Yes  Medications reviewed: Yes    Medications: Medication refills not needed today.  Pharmacy name entered into [employer]:    [facility] - [facility]  [facility] [place], [place] - [phone]    Clinical concerns: none       Shana Ballard MA              "

## 2019-07-24 NOTE — NURSING NOTE
Blood pressure 98/65, pulse 109, temperature 98  F (36.7  C), resp. rate 16, weight 88 kg (194 lb), SpO2 99 %.    Needs drsg change  Eunice Frankel RN on 7/24/2019 at 8:38 AM

## 2019-07-24 NOTE — NURSING NOTE
Performed pre and 30 minute, 60 minute, and 120 minute post dose vital signs on pt in clinic for research. Please see flowheet and/or source document for results. Lexis Marie, MIKEA

## 2019-07-24 NOTE — NURSING NOTE
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  Yes     Randy received his first dose of N-803.  He tolerated it well and was discharged after 2 hours of observation. There was no injection site reaction at the time of discharge. Pre and post Targeted Toxicity Worksheets were completed per protocol.  Pt was given a diary to complete along with a tape measure to document the size of any site reaction.     I have personally interviewed El Ace and reviewed his medical record for adverse events and concomitant medications and these have been recorded on the corresponding logs in El RAMIREZ Ace's research file.     El Ace was given the opportunity to ask any trial related questions.  Please see provider progress note for physical exam and other clinical information. Labs were reviewed - any significant lab values were addressed and reviewed.    Ellyn Chaudhari RN

## 2019-07-26 ENCOUNTER — THERAPY VISIT (OUTPATIENT)
Dept: PHYSICAL THERAPY | Facility: CLINIC | Age: 64
End: 2019-07-26
Payer: COMMERCIAL

## 2019-07-26 DIAGNOSIS — M79.671 RIGHT FOOT PAIN: ICD-10-CM

## 2019-07-26 DIAGNOSIS — Z94.84 HISTORY OF ALLOGENEIC STEM CELL TRANSPLANT (H): ICD-10-CM

## 2019-07-26 DIAGNOSIS — M54.2 NECK PAIN: Primary | ICD-10-CM

## 2019-07-26 DIAGNOSIS — R53.81 PHYSICAL DECONDITIONING: ICD-10-CM

## 2019-07-29 ENCOUNTER — APPOINTMENT (OUTPATIENT)
Dept: LAB | Facility: CLINIC | Age: 64
End: 2019-07-29
Attending: INTERNAL MEDICINE
Payer: COMMERCIAL

## 2019-07-29 ENCOUNTER — ONCOLOGY VISIT (OUTPATIENT)
Dept: TRANSPLANT | Facility: CLINIC | Age: 64
End: 2019-07-29
Attending: INTERNAL MEDICINE
Payer: COMMERCIAL

## 2019-07-29 VITALS
BODY MASS INDEX: 28.78 KG/M2 | DIASTOLIC BLOOD PRESSURE: 63 MMHG | OXYGEN SATURATION: 98 % | SYSTOLIC BLOOD PRESSURE: 90 MMHG | TEMPERATURE: 98.7 F | WEIGHT: 191 LBS | RESPIRATION RATE: 18 BRPM | HEART RATE: 153 BPM

## 2019-07-29 VITALS
TEMPERATURE: 99.8 F | DIASTOLIC BLOOD PRESSURE: 66 MMHG | SYSTOLIC BLOOD PRESSURE: 96 MMHG | OXYGEN SATURATION: 98 % | HEART RATE: 124 BPM

## 2019-07-29 DIAGNOSIS — C92.01 ACUTE MYELOID LEUKEMIA IN REMISSION (H): Primary | ICD-10-CM

## 2019-07-29 DIAGNOSIS — C92.01 AML (ACUTE MYELOID LEUKEMIA) IN REMISSION (H): ICD-10-CM

## 2019-07-29 LAB
ALBUMIN SERPL-MCNC: 3.4 G/DL (ref 3.4–5)
ALP SERPL-CCNC: 131 U/L (ref 40–150)
ALT SERPL W P-5'-P-CCNC: 44 U/L (ref 0–70)
ANION GAP SERPL CALCULATED.3IONS-SCNC: 5 MMOL/L (ref 3–14)
AST SERPL W P-5'-P-CCNC: 45 U/L (ref 0–45)
BASOPHILS # BLD AUTO: 0 10E9/L (ref 0–0.2)
BASOPHILS NFR BLD AUTO: 0.5 %
BILIRUB SERPL-MCNC: 0.3 MG/DL (ref 0.2–1.3)
BUN SERPL-MCNC: 13 MG/DL (ref 7–30)
CALCIUM SERPL-MCNC: 8.4 MG/DL (ref 8.5–10.1)
CHLORIDE SERPL-SCNC: 100 MMOL/L (ref 94–109)
CO2 SERPL-SCNC: 25 MMOL/L (ref 20–32)
CREAT SERPL-MCNC: 0.87 MG/DL (ref 0.66–1.25)
DIFFERENTIAL METHOD BLD: ABNORMAL
EOSINOPHIL # BLD AUTO: 0 10E9/L (ref 0–0.7)
EOSINOPHIL NFR BLD AUTO: 0.7 %
ERYTHROCYTE [DISTWIDTH] IN BLOOD BY AUTOMATED COUNT: 17.5 % (ref 10–15)
GFR SERPL CREATININE-BSD FRML MDRD: >90 ML/MIN/{1.73_M2}
GLUCOSE SERPL-MCNC: 108 MG/DL (ref 70–99)
HCT VFR BLD AUTO: 35.2 % (ref 40–53)
HGB BLD-MCNC: 12.3 G/DL (ref 13.3–17.7)
IMM GRANULOCYTES # BLD: 0.1 10E9/L (ref 0–0.4)
IMM GRANULOCYTES NFR BLD: 0.9 %
LYMPHOCYTES # BLD AUTO: 0.8 10E9/L (ref 0.8–5.3)
LYMPHOCYTES NFR BLD AUTO: 14 %
MCH RBC QN AUTO: 33.2 PG (ref 26.5–33)
MCHC RBC AUTO-ENTMCNC: 34.9 G/DL (ref 31.5–36.5)
MCV RBC AUTO: 95 FL (ref 78–100)
MONOCYTES # BLD AUTO: 1.3 10E9/L (ref 0–1.3)
MONOCYTES NFR BLD AUTO: 22 %
NEUTROPHILS # BLD AUTO: 3.6 10E9/L (ref 1.6–8.3)
NEUTROPHILS NFR BLD AUTO: 61.9 %
NRBC # BLD AUTO: 0 10*3/UL
NRBC BLD AUTO-RTO: 0 /100
PLATELET # BLD AUTO: 141 10E9/L (ref 150–450)
PLATELET # BLD EST: ABNORMAL 10*3/UL
POTASSIUM SERPL-SCNC: 4 MMOL/L (ref 3.4–5.3)
PROT SERPL-MCNC: 7.2 G/DL (ref 6.8–8.8)
RBC # BLD AUTO: 3.7 10E12/L (ref 4.4–5.9)
SODIUM SERPL-SCNC: 130 MMOL/L (ref 133–144)
WBC # BLD AUTO: 5.9 10E9/L (ref 4–11)

## 2019-07-29 PROCEDURE — 96365 THER/PROPH/DIAG IV INF INIT: CPT

## 2019-07-29 PROCEDURE — 25000125 ZZHC RX 250: Mod: ZF | Performed by: INTERNAL MEDICINE

## 2019-07-29 PROCEDURE — 93010 ELECTROCARDIOGRAM REPORT: CPT | Performed by: INTERNAL MEDICINE

## 2019-07-29 PROCEDURE — 87040 BLOOD CULTURE FOR BACTERIA: CPT | Performed by: INTERNAL MEDICINE

## 2019-07-29 PROCEDURE — 25800030 ZZH RX IP 258 OP 636: Mod: ZF | Performed by: INTERNAL MEDICINE

## 2019-07-29 PROCEDURE — 25000128 H RX IP 250 OP 636: Mod: ZF | Performed by: PHYSICIAN ASSISTANT

## 2019-07-29 PROCEDURE — 25000128 H RX IP 250 OP 636: Mod: ZF | Performed by: INTERNAL MEDICINE

## 2019-07-29 PROCEDURE — 80053 COMPREHEN METABOLIC PANEL: CPT | Performed by: INTERNAL MEDICINE

## 2019-07-29 PROCEDURE — 25800030 ZZH RX IP 258 OP 636: Mod: ZF | Performed by: PHYSICIAN ASSISTANT

## 2019-07-29 PROCEDURE — 96375 TX/PRO/DX INJ NEW DRUG ADDON: CPT

## 2019-07-29 PROCEDURE — 96366 THER/PROPH/DIAG IV INF ADDON: CPT

## 2019-07-29 PROCEDURE — 87086 URINE CULTURE/COLONY COUNT: CPT | Performed by: INTERNAL MEDICINE

## 2019-07-29 PROCEDURE — 85025 COMPLETE CBC W/AUTO DIFF WBC: CPT | Performed by: INTERNAL MEDICINE

## 2019-07-29 RX ORDER — CEFTRIAXONE SODIUM 2 G
2 VIAL (EA) INJECTION EVERY 24 HOURS
Status: CANCELLED
Start: 2019-07-29

## 2019-07-29 RX ORDER — CEFTRIAXONE SODIUM 2 G
2 VIAL (EA) INJECTION EVERY 24 HOURS
Status: DISCONTINUED | OUTPATIENT
Start: 2019-07-29 | End: 2019-07-29 | Stop reason: HOSPADM

## 2019-07-29 RX ORDER — DIPHENHYDRAMINE HCL 25 MG
25-50 CAPSULE ORAL EVERY 6 HOURS PRN
Status: CANCELLED
Start: 2019-07-29

## 2019-07-29 RX ORDER — LEVOFLOXACIN 500 MG/1
500 TABLET, FILM COATED ORAL DAILY
Qty: 10 TABLET | Refills: 0 | Status: SHIPPED | OUTPATIENT
Start: 2019-07-29 | End: 2019-08-01 | Stop reason: ALTCHOICE

## 2019-07-29 RX ORDER — HEPARIN SODIUM,PORCINE 10 UNIT/ML
5 VIAL (ML) INTRAVENOUS
Status: DISCONTINUED | OUTPATIENT
Start: 2019-07-29 | End: 2019-07-29 | Stop reason: HOSPADM

## 2019-07-29 RX ORDER — HEPARIN SODIUM (PORCINE) LOCK FLUSH IV SOLN 100 UNIT/ML 100 UNIT/ML
5 SOLUTION INTRAVENOUS
Status: CANCELLED | OUTPATIENT
Start: 2019-07-29

## 2019-07-29 RX ORDER — HEPARIN SODIUM,PORCINE 10 UNIT/ML
5 VIAL (ML) INTRAVENOUS
Status: CANCELLED | OUTPATIENT
Start: 2019-07-29

## 2019-07-29 RX ADMIN — HEPARIN, PORCINE (PF) 10 UNIT/ML INTRAVENOUS SYRINGE 5 ML: at 13:46

## 2019-07-29 RX ADMIN — DIPHENHYDRAMINE HYDROCHLORIDE: 50 INJECTION INTRAMUSCULAR; INTRAVENOUS at 16:25

## 2019-07-29 RX ADMIN — SODIUM CHLORIDE 1000 ML: 9 INJECTION, SOLUTION INTRAVENOUS at 15:51

## 2019-07-29 RX ADMIN — CEFTRIAXONE SODIUM 2 G: 1 INJECTION, POWDER, FOR SOLUTION INTRAMUSCULAR; INTRAVENOUS at 15:07

## 2019-07-29 RX ADMIN — VANCOMYCIN HYDROCHLORIDE 2000 MG: 1 INJECTION, POWDER, LYOPHILIZED, FOR SOLUTION INTRAVENOUS at 15:07

## 2019-07-29 RX ADMIN — SODIUM CHLORIDE 1000 ML: 9 INJECTION, SOLUTION INTRAVENOUS at 14:34

## 2019-07-29 ASSESSMENT — PAIN SCALES - GENERAL: PAINLEVEL: NO PAIN (0)

## 2019-07-29 NOTE — PROGRESS NOTES
Infusion Nursing Note:  El RAMIREZ Db presents today for add on fluids/antibiotics.    Patient seen by provider today: Yes: Dr. Zeng   present during visit today: Not Applicable.    Note: Patient arrives very orthostatic and endorses lightheadedness.  Administered 1L NS over 1 hour, continued to have orthostatic BP/HR following 1st liter so 2nd liter administered per Jory Carrier.  Administered rocephin IVP over 3 minutes.  About 1 hour into vancomycin infusion patient reports feeling hot and flushed with increased redness in his face/neck and arms.  Vancomycin stopped and 25mg benadryl given IV.  Drug restarted and report given to closing RN to complete infusion.    Intravenous Access:  [facility].    Treatment Conditions:  Results reviewed, labs MET treatment parameters, ok to proceed with treatment.    Post infusion: Pt denies lightheadedness or dizziness upon standing. Pt is still orthostatic with little responds to fluid challenge. Provider assessed pt post infusion. Pt will discharge to [facility] and will notify on-call provider if fevers continue or not feeling well.       Albania Olivier RN, BMTCN

## 2019-07-29 NOTE — NURSING NOTE
"Oncology Rooming Note    July 29, 2019 2:04 PM   El Ace is a 63 year old male who presents for:    Chief Complaint   Patient presents with     Blood Draw     LAbs drawn via CVC by RN in lab. Lines ada changed and flushed wtih saline and heparin. VS taken.      RECHECK     Pt is here for a rtn for AML     Initial Vitals: Blood Pressure 109/76 (BP Location: Right arm, Patient Position: Sitting, Cuff Size: Adult Regular)   Pulse 135   Temperature 100.2  F (37.9  C) (Oral)   Respiration 18   Weight 86.6 kg (191 lb)   Oxygen Saturation 98%   Body Mass Index 28.78 kg/m   Estimated body mass index is 28.78 kg/m  as calculated from the following:    Height as of 7/18/19: 1.735 m (5' 8.31\").    Weight as of this encounter: 86.6 kg (191 lb). Body surface area is 2.04 meters squared.  No Pain (0) Comment: Data Unavailable   No LMP for male patient.  Allergies reviewed: Yes  Medications reviewed: Yes    Medications: Medication refills not needed today.  Pharmacy name entered into E[employer]:    [facility] - [place], [place] - [phone]  [facility] [place], [place] - [phone]    Clinical concerns: Pt said since his injection he has been very fatigues and had some fever.        Shanaravinder Ballard MA              "

## 2019-07-29 NOTE — PATIENT INSTRUCTIONS
Return to clinic with a provider visit 7/30/2019, with labs  Infusion appointment for vancomycin and ceftriaxone and possible IV fluids  Measure vital signs and check orthostasis upon arrival  Patient and wife understand to call and come sooner if necessary

## 2019-07-29 NOTE — NURSING NOTE
Chief Complaint   Patient presents with     Blood Draw     LAbs drawn via CVC by RN in lab. Lines ada changed and flushed wtih saline and heparin. VS taken.      Estephania Mcguire RN

## 2019-07-29 NOTE — PROGRESS NOTES
[facility]    Diagnosis:   1. 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     Treatments:   1. 7+3 (Dauno), 3/15/19, no response, Day 12 persistent disease with near packed marrow  2. Dec 10d+Ric 3/26/19, CR1 on 4/23/19 but with pos mutations (IDH1 17%, RUNX1 8%)  3. 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     PMH: HLP, BPH, DJD, RLS    Chief complaint: Fevers    HPI: The patient called earlier this morning that he had a fever of 101.8 yesterday.  He was not toxemic and was asked to call this morning by the doctor he spoke to last night.  He has been feeling much more tired since he got the first dose of N803 on Thursday of last week.  The first day he slept a lot.  He also had been walking about 2 miles a day, but he is much more fatigued and has less energy now.  He was running low-grade temperatures until yesterday when he had a one 1.8.  He took the last dose of Tylenol approximately 11:00 last night before coming to clinic today.  He is oral intake is decreased, his mouth is dry, but he does not have dizziness, headaches, or lightheadedness.  He has no visual changes.  He reports no nausea or vomiting but he had one episode of watery diarrhea today.  None before.  The rash on his abdomen is the angry asked today.  He is not short of breath, and he does not have a cough.  His chest does not have pain, pressure, or discomfort.  He has no mental status changes.    Interval history: 10-point ROS negative.     Lab Results   Component Value Date    WBC 5.9 07/29/2019    HGB 12.3 (L) 07/29/2019    HCT 35.2 (L) 07/29/2019     (L) 07/29/2019     (L) 07/29/2019    POTASSIUM 4.0 07/29/2019    CHLORIDE 100 07/29/2019    CO2 25 07/29/2019    BUN 13 07/29/2019    CR 0.87 07/29/2019     (H) 07/29/2019    SED 88 (H) 06/25/2019    NTBNPI 2,031 (H) 04/03/2019    TROPI <0.015 04/23/2019    AST 45 07/29/2019     ALT 44 2019    ALKPHOS 131 2019    BILITOTAL 0.3 2019    INR 1.15 (H) 2019     Blood culture sent today 2019    Current Outpatient Medications   Medication     acetaminophen (TYLENOL) 325 MG tablet     acyclovir (ZOVIRAX) 800 MG tablet     CELLCEPT (BRAND) 500 MG tablet     heparin lock flush 10 UNIT/ML SOLN injection     magnesium oxide (MAG-OX) 400 MG tablet     ondansetron (ZOFRAN-ODT) 8 MG ODT tab     pramox-pe-glycerin-petrolatum (PREPARATION H) 1-0.25-14.4-15 % CREA cream     predniSONE (DELTASONE) 20 MG tablet     prochlorperazine (COMPAZINE) 5 MG tablet     sulfamethoxazole-trimethoprim (BACTRIM DS/SEPTRA DS) 800-160 MG tablet     tacrolimus (GENERIC EQUIVALENT) 0.5 MG capsule     tamsulosin (FLOMAX) 0.4 MG capsule     ursodiol (ACTIGALL) 300 MG capsule     voriconazole (VFEND) 50 MG tablet     No current facility-administered medications for this visit.      Ph/E:   Vitals: BP 90/63 (Patient Position: Standing)   Pulse 153   Temp 98.7  F (37.1  C) (Oral)   Resp 18   Wt 86.6 kg (191 lb)   SpO2 98%   BMI 28.78 kg/m   We checked orthostatic blood pressures and his heart rate went greater than 150 and his blood pressure dropped from 110/70 to 90/60.  He did not feel unsteady, dizzy, or lightheaded.  ECO, KPS 80%  General: NAD;   H&N: no mucosal lesions but mouth was dry.  Lungs clear with no crackles or wheezes  Heart RRR; very tachycardic.  (EKG showed sinus tachycardia)   Abdomen: Soft, No organomegaly; bowel sounds present and not increased   extremities: No edema;   Skin: He has the area of erythema from the injection of N803 that measures 21 x 11 cm.  Please see picture in epic under media with today's date 2019.    Neuro: Nonfocal; Mood/Affect: appropriate;   Lymph: no LAD    A&P:   1. AML: day +59 haplo, CR1. RFLP every 2 weeks until 100% chimeric in both compartments. Start ALT-803 on study today, repeat monthly x10.     2. ID: New onset fever.  While this may  be secondary to the experimental treatment we cannot rule out infection at this time.  We deepa blood cultures from both ports and started him on vancomycin and ceftriaxone.  If stable with negative cultures he may be able to switch to levofloxacin in 2 or 3 days.   Continue prophylaxis with acyclovir, vori, bactrim.    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 Level today.     4. Possible pseudogout: off pred.       5.  GI: Patient had one episode of.watery diarrhea.  If it worsens may need to be evaluated for possible hllvo-xfdoik-gzss disease.    6.  Rash: The rash is secondary to the experimental medication.  We will follow the rash to resolution/improvement.    7.  Cardiovascular: The patient was tachycardic and orthostatic today.  While this could be dehydration is a combination of the watery diarrhea, reduced oral intake, and capillary leak, this last one secondary to the experimental drug, we cannot rule out an infection.  The patient responded to volume.  He received 2 L in clinic.  Vital signs were improved.    8.  FEN: The patient was dehydrated upon arrival today.  He was orthostatic and tachycardic.  He received 2 L of normal saline with improvement of his vital signs.  Sodium is 130 and needs to be monitored.  The patient's creatinine and other electrolytes were normal today.  We will reassess tomorrow.  Encouraged oral intake.    9.  Hematology: The patient's blood counts are largely stable with exception of the platelets are 140 down from over 200.  Still, there is a very good blood counts, and the hemoglobin suggest some hemoconcentration.      Plan:  Return to clinic with a provider visit 7/30/2019, with labs  Infusion appointment for vancomycin and ceftriaxone and possible IV fluids  Measure vital signs and check orthostasis upon arrival  Patient and wife understand to call and come sooner if necessary

## 2019-07-29 NOTE — LETTER
7/29/2019      RE: El Ace  [address] [facility] [id number]       [facility]    Diagnosis:   1. 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     Treatments:   1. 7+3 (Dauno), 3/15/19, no response, Day 12 persistent disease with near packed marrow  2. Dec 10d+Ric 3/26/19, CR1 on 4/23/19 but with pos mutations (IDH1 17%, RUNX1 8%)  3. 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     PMH: HLP, BPH, DJD, RLS    Chief complaint: Fevers    HPI: The patient called earlier this morning that he had a fever of 101.8 yesterday.  He was not toxemic and was asked to call this morning by the doctor he spoke to last night.  He has been feeling much more tired since he got the first dose of N803 on Thursday of last week.  The first day he slept a lot.  He also had been walking about 2 miles a day, but he is much more fatigued and has less energy now.  He was running low-grade temperatures until yesterday when he had a one 1.8.  He took the last dose of Tylenol approximately 11:00 last night before coming to clinic today.  He is oral intake is decreased, his mouth is dry, but he does not have dizziness, headaches, or lightheadedness.  He has no visual changes.  He reports no nausea or vomiting but he had one episode of watery diarrhea today.  None before.  The rash on his abdomen is the angry asked today.  He is not short of breath, and he does not have a cough.  His chest does not have pain, pressure, or discomfort.  He has no mental status changes.    Interval history: 10-point ROS negative.     Lab Results   Component Value Date    WBC 5.9 07/29/2019    HGB 12.3 (L) 07/29/2019    HCT 35.2 (L) 07/29/2019     (L) 07/29/2019     (L) 07/29/2019    POTASSIUM 4.0 07/29/2019    CHLORIDE 100 07/29/2019    CO2 25 07/29/2019    BUN 13 07/29/2019    CR 0.87 07/29/2019     (H) 07/29/2019    SED 88 (H) 06/25/2019     NTBNPI 2,031 (H) 2019    TROPI <0.015 2019    AST 45 2019    ALT 44 2019    ALKPHOS 131 2019    BILITOTAL 0.3 2019    INR 1.15 (H) 2019     Blood culture sent today 2019    Current Outpatient Medications   Medication     acetaminophen (TYLENOL) 325 MG tablet     acyclovir (ZOVIRAX) 800 MG tablet     CELLCEPT (BRAND) 500 MG tablet     heparin lock flush 10 UNIT/ML SOLN injection     magnesium oxide (MAG-OX) 400 MG tablet     ondansetron (ZOFRAN-ODT) 8 MG ODT tab     pramox-pe-glycerin-petrolatum (PREPARATION H) 1-0.25-14.4-15 % CREA cream     predniSONE (DELTASONE) 20 MG tablet     prochlorperazine (COMPAZINE) 5 MG tablet     sulfamethoxazole-trimethoprim (BACTRIM DS/SEPTRA DS) 800-160 MG tablet     tacrolimus (GENERIC EQUIVALENT) 0.5 MG capsule     tamsulosin (FLOMAX) 0.4 MG capsule     ursodiol (ACTIGALL) 300 MG capsule     voriconazole (VFEND) 50 MG tablet     No current facility-administered medications for this visit.      Ph/E:   Vitals: BP 90/63 (Patient Position: Standing)   Pulse 153   Temp 98.7  F (37.1  C) (Oral)   Resp 18   Wt 86.6 kg (191 lb)   SpO2 98%   BMI 28.78 kg/m    We checked orthostatic blood pressures and his heart rate went greater than 150 and his blood pressure dropped from 110/70 to 90/60.  He did not feel unsteady, dizzy, or lightheaded.  ECO, KPS 80%  General: NAD;   H&N: no mucosal lesions but mouth was dry.  Lungs clear with no crackles or wheezes  Heart RRR; very tachycardic.  (EKG showed sinus tachycardia)   Abdomen: Soft, No organomegaly; bowel sounds present and not increased   extremities: No edema;   Skin: He has the area of erythema from the injection of N803 that measures 21 x 11 cm.  Please see picture in epic under media with today's date 2019.    Neuro: Nonfocal; Mood/Affect: appropriate;   Lymph: no LAD    A&P:   1. AML: day +59 haplo, CR1. RFLP every 2 weeks until 100% chimeric in both compartments. Start ALT-803  on study today, repeat monthly x10.     2. ID: New onset fever.  While this may be secondary to the experimental treatment we cannot rule out infection at this time.  We deepa blood cultures from both ports and started him on vancomycin and ceftriaxone.  If stable with negative cultures he may be able to switch to levofloxacin in 2 or 3 days.   Continue prophylaxis with acyclovir, vori, bactrim.    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 Level today.     4. Possible pseudogout: off pred.       5.  GI: Patient had one episode of.watery diarrhea.  If it worsens may need to be evaluated for possible jxham-jmhsvo-hefj disease.    6.  Rash: The rash is secondary to the experimental medication.  We will follow the rash to resolution/improvement.    7.  Cardiovascular: The patient was tachycardic and orthostatic today.  While this could be dehydration is a combination of the watery diarrhea, reduced oral intake, and capillary leak, this last one secondary to the experimental drug, we cannot rule out an infection.  The patient responded to volume.  He received 2 L in clinic.  Vital signs were improved.    8.  FEN: The patient was dehydrated upon arrival today.  He was orthostatic and tachycardic.  He received 2 L of normal saline with improvement of his vital signs.  Sodium is 130 and needs to be monitored.  The patient's creatinine and other electrolytes were normal today.  We will reassess tomorrow.  Encouraged oral intake.    9.  Hematology: The patient's blood counts are largely stable with exception of the platelets are 140 down from over 200.  Still, there is a very good blood counts, and the hemoglobin suggest some hemoconcentration.      Plan:  Return to clinic with a provider visit 7/30/2019, with labs  Infusion appointment for vancomycin and ceftriaxone and possible IV fluids  Measure vital signs and check orthostasis upon arrival  Patient and wife understand to call and come sooner if necessary      BMT DOM

## 2019-07-29 NOTE — NURSING NOTE
Chief Complaint   Patient presents with     Infusion     Add on IVF/antibiotics, hx AML s/p transplant.

## 2019-07-30 ENCOUNTER — INFUSION THERAPY VISIT (OUTPATIENT)
Dept: TRANSPLANT | Facility: CLINIC | Age: 64
End: 2019-07-30
Attending: INTERNAL MEDICINE
Payer: COMMERCIAL

## 2019-07-30 ENCOUNTER — APPOINTMENT (OUTPATIENT)
Dept: LAB | Facility: CLINIC | Age: 64
End: 2019-07-30
Attending: INTERNAL MEDICINE
Payer: COMMERCIAL

## 2019-07-30 VITALS
OXYGEN SATURATION: 99 % | DIASTOLIC BLOOD PRESSURE: 73 MMHG | HEART RATE: 117 BPM | TEMPERATURE: 98 F | SYSTOLIC BLOOD PRESSURE: 106 MMHG | BODY MASS INDEX: 29.26 KG/M2 | WEIGHT: 194.2 LBS

## 2019-07-30 DIAGNOSIS — C92.01 AML (ACUTE MYELOID LEUKEMIA) IN REMISSION (H): Primary | ICD-10-CM

## 2019-07-30 DIAGNOSIS — C92.01 ACUTE MYELOID LEUKEMIA IN REMISSION (H): Primary | ICD-10-CM

## 2019-07-30 LAB
ALBUMIN SERPL-MCNC: 3.2 G/DL (ref 3.4–5)
ALP SERPL-CCNC: 116 U/L (ref 40–150)
ALT SERPL W P-5'-P-CCNC: 48 U/L (ref 0–70)
ANION GAP SERPL CALCULATED.3IONS-SCNC: 5 MMOL/L (ref 3–14)
AST SERPL W P-5'-P-CCNC: 48 U/L (ref 0–45)
BACTERIA SPEC CULT: NO GROWTH
BASOPHILS # BLD AUTO: 0 10E9/L (ref 0–0.2)
BASOPHILS NFR BLD AUTO: 0.4 %
BILIRUB SERPL-MCNC: 0.2 MG/DL (ref 0.2–1.3)
BUN SERPL-MCNC: 8 MG/DL (ref 7–30)
CALCIUM SERPL-MCNC: 8.3 MG/DL (ref 8.5–10.1)
CHLORIDE SERPL-SCNC: 105 MMOL/L (ref 94–109)
CO2 SERPL-SCNC: 24 MMOL/L (ref 20–32)
CREAT SERPL-MCNC: 0.78 MG/DL (ref 0.66–1.25)
DIFFERENTIAL METHOD BLD: ABNORMAL
EOSINOPHIL # BLD AUTO: 0.1 10E9/L (ref 0–0.7)
EOSINOPHIL NFR BLD AUTO: 1.5 %
ERYTHROCYTE [DISTWIDTH] IN BLOOD BY AUTOMATED COUNT: 17.2 % (ref 10–15)
GFR SERPL CREATININE-BSD FRML MDRD: >90 ML/MIN/{1.73_M2}
GLUCOSE SERPL-MCNC: 139 MG/DL (ref 70–99)
HCT VFR BLD AUTO: 32.4 % (ref 40–53)
HGB BLD-MCNC: 11.2 G/DL (ref 13.3–17.7)
IMM GRANULOCYTES # BLD: 0.1 10E9/L (ref 0–0.4)
IMM GRANULOCYTES NFR BLD: 1.1 %
INTERPRETATION ECG - MUSE: NORMAL
LYMPHOCYTES # BLD AUTO: 0.7 10E9/L (ref 0.8–5.3)
LYMPHOCYTES NFR BLD AUTO: 14.8 %
Lab: NORMAL
MAGNESIUM SERPL-MCNC: 1.9 MG/DL (ref 1.6–2.3)
MCH RBC QN AUTO: 33.1 PG (ref 26.5–33)
MCHC RBC AUTO-ENTMCNC: 34.6 G/DL (ref 31.5–36.5)
MCV RBC AUTO: 96 FL (ref 78–100)
MONOCYTES # BLD AUTO: 1.1 10E9/L (ref 0–1.3)
MONOCYTES NFR BLD AUTO: 24.8 %
NEUTROPHILS # BLD AUTO: 2.6 10E9/L (ref 1.6–8.3)
NEUTROPHILS NFR BLD AUTO: 57.4 %
NRBC # BLD AUTO: 0 10*3/UL
NRBC BLD AUTO-RTO: 0 /100
PLATELET # BLD AUTO: 131 10E9/L (ref 150–450)
PLATELET # BLD EST: ABNORMAL 10*3/UL
POTASSIUM SERPL-SCNC: 4 MMOL/L (ref 3.4–5.3)
PROT SERPL-MCNC: 6.4 G/DL (ref 6.8–8.8)
RBC # BLD AUTO: 3.38 10E12/L (ref 4.4–5.9)
RBC MORPH BLD: ABNORMAL
SODIUM SERPL-SCNC: 133 MMOL/L (ref 133–144)
SPECIMEN SOURCE: NORMAL
WBC # BLD AUTO: 4.5 10E9/L (ref 4–11)

## 2019-07-30 PROCEDURE — 80053 COMPREHEN METABOLIC PANEL: CPT | Performed by: PHYSICIAN ASSISTANT

## 2019-07-30 PROCEDURE — G0463 HOSPITAL OUTPT CLINIC VISIT: HCPCS | Mod: ZF

## 2019-07-30 PROCEDURE — 96361 HYDRATE IV INFUSION ADD-ON: CPT

## 2019-07-30 PROCEDURE — 25000125 ZZHC RX 250: Mod: ZF | Performed by: INTERNAL MEDICINE

## 2019-07-30 PROCEDURE — 85025 COMPLETE CBC W/AUTO DIFF WBC: CPT | Performed by: PHYSICIAN ASSISTANT

## 2019-07-30 PROCEDURE — 25000128 H RX IP 250 OP 636: Mod: ZF | Performed by: INTERNAL MEDICINE

## 2019-07-30 PROCEDURE — 96375 TX/PRO/DX INJ NEW DRUG ADDON: CPT

## 2019-07-30 PROCEDURE — 96366 THER/PROPH/DIAG IV INF ADDON: CPT

## 2019-07-30 PROCEDURE — 25000128 H RX IP 250 OP 636: Mod: ZF | Performed by: PHYSICIAN ASSISTANT

## 2019-07-30 PROCEDURE — 25800030 ZZH RX IP 258 OP 636: Mod: ZF | Performed by: INTERNAL MEDICINE

## 2019-07-30 PROCEDURE — 83735 ASSAY OF MAGNESIUM: CPT | Performed by: PHYSICIAN ASSISTANT

## 2019-07-30 PROCEDURE — 25000132 ZZH RX MED GY IP 250 OP 250 PS 637: Mod: ZF | Performed by: INTERNAL MEDICINE

## 2019-07-30 PROCEDURE — 96365 THER/PROPH/DIAG IV INF INIT: CPT

## 2019-07-30 PROCEDURE — 87799 DETECT AGENT NOS DNA QUANT: CPT | Performed by: PHYSICIAN ASSISTANT

## 2019-07-30 RX ORDER — DIPHENHYDRAMINE HCL 25 MG
25-50 CAPSULE ORAL EVERY 6 HOURS PRN
Status: CANCELLED
Start: 2019-07-30

## 2019-07-30 RX ORDER — HEPARIN SODIUM,PORCINE 10 UNIT/ML
5 VIAL (ML) INTRAVENOUS
Status: DISCONTINUED | OUTPATIENT
Start: 2019-07-30 | End: 2019-07-30 | Stop reason: HOSPADM

## 2019-07-30 RX ORDER — DIPHENHYDRAMINE HCL 25 MG
25-50 CAPSULE ORAL EVERY 6 HOURS PRN
Status: DISCONTINUED | OUTPATIENT
Start: 2019-07-30 | End: 2019-07-30 | Stop reason: HOSPADM

## 2019-07-30 RX ORDER — CEFTRIAXONE SODIUM 2 G
2 VIAL (EA) INJECTION EVERY 24 HOURS
Status: DISCONTINUED | OUTPATIENT
Start: 2019-07-30 | End: 2019-07-30 | Stop reason: HOSPADM

## 2019-07-30 RX ORDER — HEPARIN SODIUM,PORCINE 10 UNIT/ML
5 VIAL (ML) INTRAVENOUS
Status: CANCELLED | OUTPATIENT
Start: 2019-07-30

## 2019-07-30 RX ORDER — HEPARIN SODIUM (PORCINE) LOCK FLUSH IV SOLN 100 UNIT/ML 100 UNIT/ML
5 SOLUTION INTRAVENOUS
Status: CANCELLED | OUTPATIENT
Start: 2019-07-30

## 2019-07-30 RX ORDER — CEFTRIAXONE SODIUM 2 G
2 VIAL (EA) INJECTION EVERY 24 HOURS
Status: CANCELLED
Start: 2019-07-30

## 2019-07-30 RX ADMIN — HEPARIN, PORCINE (PF) 10 UNIT/ML INTRAVENOUS SYRINGE 5 ML: at 10:05

## 2019-07-30 RX ADMIN — SODIUM CHLORIDE 1000 ML: 9 INJECTION, SOLUTION INTRAVENOUS at 10:29

## 2019-07-30 RX ADMIN — DIPHENHYDRAMINE HYDROCHLORIDE 25 MG: 25 CAPSULE ORAL at 11:07

## 2019-07-30 RX ADMIN — VANCOMYCIN HYDROCHLORIDE 2000 MG: 1 INJECTION, POWDER, LYOPHILIZED, FOR SOLUTION INTRAVENOUS at 11:15

## 2019-07-30 RX ADMIN — CEFTRIAXONE SODIUM 2 G: 2 INJECTION, POWDER, FOR SOLUTION INTRAMUSCULAR; INTRAVENOUS at 10:34

## 2019-07-30 ASSESSMENT — PAIN SCALES - GENERAL: PAINLEVEL: NO PAIN (0)

## 2019-07-30 NOTE — PROGRESS NOTES
[facility]    Diagnosis:   1. 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     Treatments:   1. 7+3 (Dauno), 3/15/19, no response, Day 12 persistent disease with near packed marrow  2. Dec 10d+Ric 3/26/19, CR1 on 4/23/19 but with pos mutations (IDH1 17%, RUNX1 8%)  3. 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     PMH: HLP, BPH, DJD, RLS      HPI: Randy was seen for follow up. He feels better this morning, but still fatigued. No fevers last night or this morning. Hasn't taken any tylenol. No further diarrhea. Not much of an appetite. Didn't eat dinner last night. Had cereal and coffee this morning. Doesn't feel dizzy. Just fatigued. Used to walk 2 miles a day and since he had the MXA430 he hasn't gone on a walk. Localized injection site reaction RLQ larger today than yesterday at [id number]. No other rash. No other new symptoms today. Did feel better at IV fluids yesterday, so will give along with IV antibiotics again today.    Interval history: 10-point ROS negative.     Lab Results   Component Value Date    WBC 4.5 07/30/2019    HGB 11.2 (L) 07/30/2019    HCT 32.4 (L) 07/30/2019     (L) 07/30/2019     (L) 07/29/2019    POTASSIUM 4.0 07/29/2019    CHLORIDE 100 07/29/2019    CO2 25 07/29/2019    BUN 13 07/29/2019    CR 0.87 07/29/2019     (H) 07/29/2019    SED 88 (H) 06/25/2019    NTBNPI 2,031 (H) 04/03/2019    TROPI <0.015 04/23/2019    AST 45 07/29/2019    ALT 44 07/29/2019    ALKPHOS 131 07/29/2019    BILITOTAL 0.3 07/29/2019    INR 1.15 (H) 06/24/2019     Blood culture sent today 7/29/2019 = no growth 7/30    Current Outpatient Medications   Medication     acetaminophen (TYLENOL) 325 MG tablet     acyclovir (ZOVIRAX) 800 MG tablet     heparin lock flush 10 UNIT/ML SOLN injection     levofloxacin (LEVAQUIN) 500 MG tablet     magnesium oxide (MAG-OX) 400 MG tablet     ondansetron (ZOFRAN-ODT) 8 MG ODT  tab     pramox-pe-glycerin-petrolatum (PREPARATION H) 1-0.25-14.4-15 % CREA cream     prochlorperazine (COMPAZINE) 5 MG tablet     sulfamethoxazole-trimethoprim (BACTRIM DS/SEPTRA DS) 800-160 MG tablet     tacrolimus (GENERIC EQUIVALENT) 0.5 MG capsule     tamsulosin (FLOMAX) 0.4 MG capsule     ursodiol (ACTIGALL) 300 MG capsule     voriconazole (VFEND) 50 MG tablet     No current facility-administered medications for this visit.      Ph/E:   Vitals: /73   Pulse 117   Temp 98  F (36.7  C) (Oral)   Wt 88.1 kg (194 lb 3.2 oz)   SpO2 99%   BMI 29.26 kg/m      ECO, KPS 80%  General: NAD;   H&N: no mucosal lesions but mouth was dry.  Lungs: CTAB, breathing comfortably on room air  Heart Tachy to 110s but regular rhythm  Abdomen: +BS, soft, nontender  extremities: No edema;   Skin: He has the area of erythema from the injection of [id number] that measures 26 x 13cm.  Picture in epic under media with from    Neuro: Nonfocal; Mood/Affect: appropriate;     Labs  Lab Results   Component Value Date    WBC 4.5 2019    ANEU 3.6 2019    HGB 11.2 (L) 2019    HCT 32.4 (L) 2019     (L) 2019     (L) 2019    POTASSIUM 4.0 2019    CHLORIDE 100 2019    CO2 25 2019     (H) 2019    BUN 13 2019    CR 0.87 2019    MAG 2.0 2019    INR 1.15 (H) 2019    BILITOTAL 0.3 2019    AST 45 2019    ALT 44 2019    ALKPHOS 131 2019    PROTTOTAL 7.2 2019    ALBUMIN 3.4 2019     A&P:   1. AML: day +60 haplo, CR1. RFLP every 2 weeks until 100% chimeric in both compartments. Start ALT-803 on study , repeat monthly x10.     2. ID:   - non-neutropenic fever on  post [id number]. While this may be secondary to the experimental treatment we cannot rule out infection at this time. Blood cultures drawn from both ports  and remain negative . Started him on vancomycin and ceftriaxone .  If stable  with negative cultures he may be able to switch to levofloxacin in 2 or 3 days. BP soft, tachy and still very fatigued with minimal appetite. Give IV antibiotics 7/30, consider changing to PO tomorrow if his vitals/symptoms better  Continue prophylaxis with acyclovir, vori, bactrim.    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 Level 7/24=8.4. Repeat level 7/31 - will hold drug before labs tomorrow    4. Possible pseudogout: off pred.       5.  GI:   - poor appetite  - soft stools, no diarrhea last 24 hours    6.  Rash:  - localized rash is secondary to the UCK901.      7.  FEN:   - IVF for soft BP and poor PO intake. Give 1L NS 7/30  - hyponatremia resolved    8.  Hematology:   - counts stable, no transfusion needs. Down slightly due to hemodilution from IVF    Plan:  RTC tomorrow to follow up blood cx, vitals and symptoms. Infusion for poss IVF    Debbie Valadez PA-C  [phone]

## 2019-07-30 NOTE — PROGRESS NOTES
Infusion Nursing Note:  El RAMIREZ Ace presents today for add-on infusion.    Patient seen by provider today: Yes: Debbie Valadez   present during visit today: Not Applicable.    Note: Labs were monitored.    Intravenous Access:  [facility].    Treatment Conditions:  Per Provider order, Patient received an add-on IV fluid infusion over one hour.  Per Provider order, Patient received 25 mg oral Benadryl prior to receiving an add-on Vanco infusion (over 2 hours) and a Rocephin push.      Post Infusion Assessment:  Patient tolerated infusion without incident.       Discharge Plan:   Patient was ambulatory and discharged in stable condition accompanied by: wife.    SIVAN BOND RN

## 2019-07-30 NOTE — NURSING NOTE
"Oncology Rooming Note    July 30, 2019 9:58 AM   El Ace is a 63 year old male who presents for:    Chief Complaint   Patient presents with     RECHECK     RTN- AML     Initial Vitals: /73   Pulse 117   Temp 98  F (36.7  C) (Oral)   Wt 88.1 kg (194 lb 3.2 oz)   SpO2 99%   BMI 29.26 kg/m   Estimated body mass index is 29.26 kg/m  as calculated from the following:    Height as of 7/18/19: 1.735 m (5' 8.31\").    Weight as of this encounter: 88.1 kg (194 lb 3.2 oz). Body surface area is 2.06 meters squared.  No Pain (0) Comment: Data Unavailable   No LMP for male patient.  Allergies reviewed: Yes  Medications reviewed: Yes    Medications: Medication refills not needed today.  Pharmacy name entered into [employer]:    [facility] - [facility].  [facility] [place], [place] - [address] [facility] [phone]    Clinical concerns: None       Dayanara Morfin CMA              "

## 2019-07-30 NOTE — NURSING NOTE
Chief Complaint   Patient presents with     RECHECK     RTN- AML     CVC accessed by RN in clinic, labs drawn.  Both lines flushed with heparin.      Karina Sheehan RN

## 2019-07-30 NOTE — PROGRESS NOTES
[facility]    Diagnosis:   1. 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     Treatments:   1. 7+3 (Dauno), 3/15/19, no response, Day 12 persistent disease with near packed marrow  2. Dec 10d+Ric 3/26/19, CR1 on 4/23/19 but with pos mutations (IDH1 17%, RUNX1 8%)  3. 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     PMH: HLP, BPH, DJD, RLS    Interval history: Fatigue and dizziness better, morning soft BMs, no fever or fall, no rash otherthan the faint injection rash that's spread out on the lower half of belly. 10-point ROS otherwise negative.     Lab Results   Component Value Date    WBC 5.4 08/01/2019    HGB 11.1 (L) 08/01/2019    HCT 32.2 (L) 08/01/2019     (L) 08/01/2019     08/01/2019    POTASSIUM 4.1 08/01/2019    CHLORIDE 106 08/01/2019    CO2 24 08/01/2019    BUN 10 08/01/2019    CR 0.97 08/01/2019     (H) 08/01/2019    SED 88 (H) 06/25/2019    NTBNPI 2,031 (H) 04/03/2019    TROPI <0.015 04/23/2019    AST 40 08/01/2019    ALT 47 08/01/2019    ALKPHOS 130 08/01/2019    BILITOTAL 0.3 08/01/2019    INR 1.15 (H) 06/24/2019       Current Outpatient Medications   Medication     acetaminophen (TYLENOL) 325 MG tablet     acyclovir (ZOVIRAX) 800 MG tablet     heparin lock flush 10 UNIT/ML SOLN injection     levofloxacin (LEVAQUIN) 500 MG tablet     magnesium oxide (MAG-OX) 400 MG tablet     pramox-pe-glycerin-petrolatum (PREPARATION H) 1-0.25-14.4-15 % CREA cream     prochlorperazine (COMPAZINE) 5 MG tablet     sulfamethoxazole-trimethoprim (BACTRIM DS/SEPTRA DS) 800-160 MG tablet     tacrolimus (GENERIC EQUIVALENT) 0.5 MG capsule     tamsulosin (FLOMAX) 0.4 MG capsule     ursodiol (ACTIGALL) 300 MG capsule     voriconazole (VFEND) 50 MG tablet     ondansetron (ZOFRAN-ODT) 8 MG ODT tab     Current Facility-Administered Medications   Medication     heparin lock flush 10 UNIT/ML injection 5 mL     heparin  lock flush 10 UNIT/ML injection 5 mL     sodium chloride (PF) 0.9% PF flush 20 mL     Facility-Administered Medications Ordered in Other Visits   Medication     0.9% sodium chloride BOLUS     heparin lock flush 10 UNIT/ML injection 5 mL     Ph/E:   Vitals: Temp 98  F (36.7  C) (Oral)   Resp 18   Wt 88 kg (194 lb)   SpO2 98%   BMI 29.23 kg/m     ECO  General: NAD; H&N: no mucosal lesions; Lungs clear; Heart RRR; Abdomen; Soft, No organomegaly; Extremities: No edema; Skin:Spread out pink faint rash on abdomen like a band; Neuro: Nonfocal; Mood/Affect: appropriate; Lymph: no LAD    A&P:   1. AML: day +62 haplo, CR1. On ALT-803 study, SEs included rash, fever, and mild hypotension with dose 1. Will check on the grade of hypotension and along with Randy will decide on the next dose and its timing. Seems grade 2 hypotension and 1 fever. Improving.   2. ID: acyclovir, vori, bactrim. Stop IV ABx.   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 Stop ursodiol.   4. Possible pseudogout: off pred.

## 2019-07-31 ENCOUNTER — ONCOLOGY VISIT (OUTPATIENT)
Dept: TRANSPLANT | Facility: CLINIC | Age: 64
End: 2019-07-31
Attending: PHYSICIAN ASSISTANT
Payer: COMMERCIAL

## 2019-07-31 ENCOUNTER — APPOINTMENT (OUTPATIENT)
Dept: LAB | Facility: CLINIC | Age: 64
End: 2019-07-31
Attending: PHYSICIAN ASSISTANT
Payer: COMMERCIAL

## 2019-07-31 VITALS
BODY MASS INDEX: 29.4 KG/M2 | WEIGHT: 195.1 LBS | TEMPERATURE: 98.2 F | DIASTOLIC BLOOD PRESSURE: 55 MMHG | RESPIRATION RATE: 18 BRPM | HEART RATE: 118 BPM | OXYGEN SATURATION: 98 % | SYSTOLIC BLOOD PRESSURE: 78 MMHG

## 2019-07-31 VITALS — SYSTOLIC BLOOD PRESSURE: 119 MMHG | HEART RATE: 103 BPM | DIASTOLIC BLOOD PRESSURE: 80 MMHG

## 2019-07-31 DIAGNOSIS — Z94.84 HISTORY OF ALLOGENEIC STEM CELL TRANSPLANT (H): Primary | ICD-10-CM

## 2019-07-31 DIAGNOSIS — C92.01 AML (ACUTE MYELOID LEUKEMIA) IN REMISSION (H): ICD-10-CM

## 2019-07-31 DIAGNOSIS — C92.01 ACUTE MYELOID LEUKEMIA IN REMISSION (H): Primary | ICD-10-CM

## 2019-07-31 LAB
ALBUMIN SERPL-MCNC: 3.2 G/DL (ref 3.4–5)
ALP SERPL-CCNC: 120 U/L (ref 40–150)
ALT SERPL W P-5'-P-CCNC: 51 U/L (ref 0–70)
ANION GAP SERPL CALCULATED.3IONS-SCNC: 5 MMOL/L (ref 3–14)
AST SERPL W P-5'-P-CCNC: 48 U/L (ref 0–45)
BASOPHILS # BLD AUTO: 0 10E9/L (ref 0–0.2)
BASOPHILS NFR BLD AUTO: 0.3 %
BILIRUB SERPL-MCNC: 0.2 MG/DL (ref 0.2–1.3)
BUN SERPL-MCNC: 7 MG/DL (ref 7–30)
CALCIUM SERPL-MCNC: 8.3 MG/DL (ref 8.5–10.1)
CHLORIDE SERPL-SCNC: 105 MMOL/L (ref 94–109)
CMV DNA SPEC NAA+PROBE-ACNC: NORMAL [IU]/ML
CMV DNA SPEC NAA+PROBE-LOG#: NORMAL {LOG_IU}/ML
CO2 SERPL-SCNC: 25 MMOL/L (ref 20–32)
CREAT SERPL-MCNC: 1.01 MG/DL (ref 0.66–1.25)
DIFFERENTIAL METHOD BLD: ABNORMAL
EBV DNA # SPEC NAA+PROBE: NORMAL {COPIES}/ML
EBV DNA SPEC NAA+PROBE-LOG#: NORMAL {LOG_COPIES}/ML
EOSINOPHIL # BLD AUTO: 0.1 10E9/L (ref 0–0.7)
EOSINOPHIL NFR BLD AUTO: 2.7 %
ERYTHROCYTE [DISTWIDTH] IN BLOOD BY AUTOMATED COUNT: 17.2 % (ref 10–15)
GFR SERPL CREATININE-BSD FRML MDRD: 78 ML/MIN/{1.73_M2}
GLUCOSE SERPL-MCNC: 141 MG/DL (ref 70–99)
HCT VFR BLD AUTO: 31.6 % (ref 40–53)
HGB BLD-MCNC: 10.9 G/DL (ref 13.3–17.7)
IMM GRANULOCYTES # BLD: 0 10E9/L (ref 0–0.4)
IMM GRANULOCYTES NFR BLD: 1.1 %
LYMPHOCYTES # BLD AUTO: 0.6 10E9/L (ref 0.8–5.3)
LYMPHOCYTES NFR BLD AUTO: 15.4 %
MCH RBC QN AUTO: 32.7 PG (ref 26.5–33)
MCHC RBC AUTO-ENTMCNC: 34.5 G/DL (ref 31.5–36.5)
MCV RBC AUTO: 95 FL (ref 78–100)
MONOCYTES # BLD AUTO: 0.9 10E9/L (ref 0–1.3)
MONOCYTES NFR BLD AUTO: 22.6 %
NEUTROPHILS # BLD AUTO: 2.2 10E9/L (ref 1.6–8.3)
NEUTROPHILS NFR BLD AUTO: 57.9 %
NRBC # BLD AUTO: 0 10*3/UL
NRBC BLD AUTO-RTO: 0 /100
PLATELET # BLD AUTO: 126 10E9/L (ref 150–450)
PLATELET # BLD EST: ABNORMAL 10*3/UL
POTASSIUM SERPL-SCNC: 4.2 MMOL/L (ref 3.4–5.3)
PROT SERPL-MCNC: 6.7 G/DL (ref 6.8–8.8)
RBC # BLD AUTO: 3.33 10E12/L (ref 4.4–5.9)
SODIUM SERPL-SCNC: 135 MMOL/L (ref 133–144)
SPECIMEN SOURCE: NORMAL
TACROLIMUS BLD-MCNC: 10.6 UG/L (ref 5–15)
TME LAST DOSE: NORMAL H
WBC # BLD AUTO: 3.8 10E9/L (ref 4–11)

## 2019-07-31 PROCEDURE — 25800030 ZZH RX IP 258 OP 636: Mod: ZF | Performed by: INTERNAL MEDICINE

## 2019-07-31 PROCEDURE — 85025 COMPLETE CBC W/AUTO DIFF WBC: CPT | Performed by: PHYSICIAN ASSISTANT

## 2019-07-31 PROCEDURE — 25000125 ZZHC RX 250: Mod: ZF | Performed by: INTERNAL MEDICINE

## 2019-07-31 PROCEDURE — 25000128 H RX IP 250 OP 636: Mod: ZF | Performed by: PHYSICIAN ASSISTANT

## 2019-07-31 PROCEDURE — 80197 ASSAY OF TACROLIMUS: CPT | Performed by: PHYSICIAN ASSISTANT

## 2019-07-31 PROCEDURE — 25000128 H RX IP 250 OP 636: Mod: ZF | Performed by: INTERNAL MEDICINE

## 2019-07-31 PROCEDURE — 81268 CHIMERISM ANAL W/CELL SELECT: CPT | Performed by: PHYSICIAN ASSISTANT

## 2019-07-31 PROCEDURE — 96365 THER/PROPH/DIAG IV INF INIT: CPT

## 2019-07-31 PROCEDURE — 80053 COMPREHEN METABOLIC PANEL: CPT | Performed by: PHYSICIAN ASSISTANT

## 2019-07-31 PROCEDURE — 96375 TX/PRO/DX INJ NEW DRUG ADDON: CPT

## 2019-07-31 PROCEDURE — 96366 THER/PROPH/DIAG IV INF ADDON: CPT

## 2019-07-31 PROCEDURE — 96361 HYDRATE IV INFUSION ADD-ON: CPT

## 2019-07-31 PROCEDURE — 25000132 ZZH RX MED GY IP 250 OP 250 PS 637: Mod: ZF | Performed by: INTERNAL MEDICINE

## 2019-07-31 PROCEDURE — 87040 BLOOD CULTURE FOR BACTERIA: CPT | Performed by: INTERNAL MEDICINE

## 2019-07-31 RX ORDER — HEPARIN SODIUM (PORCINE) LOCK FLUSH IV SOLN 100 UNIT/ML 100 UNIT/ML
5 SOLUTION INTRAVENOUS
Status: CANCELLED | OUTPATIENT
Start: 2019-07-31

## 2019-07-31 RX ORDER — DIPHENHYDRAMINE HCL 25 MG
25-50 CAPSULE ORAL EVERY 6 HOURS PRN
Status: CANCELLED
Start: 2019-07-31

## 2019-07-31 RX ORDER — DIPHENHYDRAMINE HCL 25 MG
25-50 CAPSULE ORAL EVERY 6 HOURS PRN
Status: DISCONTINUED | OUTPATIENT
Start: 2019-07-31 | End: 2019-07-31 | Stop reason: HOSPADM

## 2019-07-31 RX ORDER — HEPARIN SODIUM,PORCINE 10 UNIT/ML
5 VIAL (ML) INTRAVENOUS
Status: DISCONTINUED | OUTPATIENT
Start: 2019-07-31 | End: 2019-08-01 | Stop reason: HOSPADM

## 2019-07-31 RX ORDER — CEFTRIAXONE SODIUM 2 G
2 VIAL (EA) INJECTION EVERY 24 HOURS
Status: DISCONTINUED | OUTPATIENT
Start: 2019-07-31 | End: 2019-07-31 | Stop reason: HOSPADM

## 2019-07-31 RX ORDER — HEPARIN SODIUM,PORCINE 10 UNIT/ML
5 VIAL (ML) INTRAVENOUS
Status: CANCELLED | OUTPATIENT
Start: 2019-07-31

## 2019-07-31 RX ORDER — CEFTRIAXONE SODIUM 2 G
2 VIAL (EA) INJECTION EVERY 24 HOURS
Status: CANCELLED
Start: 2019-07-31

## 2019-07-31 RX ORDER — HEPARIN SODIUM,PORCINE 10 UNIT/ML
5 VIAL (ML) INTRAVENOUS
Status: DISCONTINUED | OUTPATIENT
Start: 2019-07-31 | End: 2019-07-31 | Stop reason: HOSPADM

## 2019-07-31 RX ORDER — HEPARIN SODIUM (PORCINE) LOCK FLUSH IV SOLN 100 UNIT/ML 100 UNIT/ML
5 SOLUTION INTRAVENOUS
Status: DISCONTINUED | OUTPATIENT
Start: 2019-07-31 | End: 2019-07-31 | Stop reason: HOSPADM

## 2019-07-31 RX ADMIN — SODIUM CHLORIDE 1000 ML: 9 INJECTION, SOLUTION INTRAVENOUS at 14:19

## 2019-07-31 RX ADMIN — DIPHENHYDRAMINE HYDROCHLORIDE 25 MG: 25 CAPSULE ORAL at 14:19

## 2019-07-31 RX ADMIN — VANCOMYCIN HYDROCHLORIDE 2000 MG: 1 INJECTION, POWDER, LYOPHILIZED, FOR SOLUTION INTRAVENOUS at 14:52

## 2019-07-31 RX ADMIN — HEPARIN, PORCINE (PF) 10 UNIT/ML INTRAVENOUS SYRINGE 5 ML: at 13:30

## 2019-07-31 RX ADMIN — WATER 2 G: 1 INJECTION INTRAMUSCULAR; INTRAVENOUS; SUBCUTANEOUS at 14:28

## 2019-07-31 RX ADMIN — HEPARIN, PORCINE (PF) 10 UNIT/ML INTRAVENOUS SYRINGE 5 ML: at 13:29

## 2019-07-31 ASSESSMENT — PAIN SCALES - GENERAL: PAINLEVEL: NO PAIN (0)

## 2019-07-31 NOTE — PROGRESS NOTES
[facility]    Diagnosis:   1. 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     Treatments:   1. 7+3 (Dauno), 3/15/19, no response, Day 12 persistent disease with near packed marrow  2. Dec 10d+Ric 3/26/19, CR1 on 4/23/19 but with pos mutations (IDH1 17%, RUNX1 8%)  3. 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     PMH: HLP, BPH, DJD, RLS      HPI: Randy was seen for follow up. He feels better this morning, but still fatigued. No fevers last night or this morning. Hasn't taken any tylenol. Did have an episode of diarrhea last evening and again thsi mroning- no abdominal pain or cramping. Eating and drinking well- actually drank a lot of fluids yesterday (6 bottles of water plus meals). He denies feeling dizzy with standing though quite orthostatic today. He has no focal symptoms aside form fatigue. Rash from Alt infection is less indurated but still present, less tender to palpation. No new rashes. No n/v.     Interval history: 10-point ROS negative.     Lab Results   Component Value Date    WBC 3.8 (L) 07/31/2019    HGB 10.9 (L) 07/31/2019    HCT 31.6 (L) 07/31/2019     (L) 07/31/2019     07/30/2019    POTASSIUM 4.0 07/30/2019    CHLORIDE 105 07/30/2019    CO2 24 07/30/2019    BUN 8 07/30/2019    CR 0.78 07/30/2019     (H) 07/30/2019    SED 88 (H) 06/25/2019    NTBNPI 2,031 (H) 04/03/2019    TROPI <0.015 04/23/2019    AST 48 (H) 07/30/2019    ALT 48 07/30/2019    ALKPHOS 116 07/30/2019    BILITOTAL 0.2 07/30/2019    INR 1.15 (H) 06/24/2019     Blood culture sent today 7/29/2019 = no growth 7/30    Current Outpatient Medications   Medication     acetaminophen (TYLENOL) 325 MG tablet     acyclovir (ZOVIRAX) 800 MG tablet     heparin lock flush 10 UNIT/ML SOLN injection     levofloxacin (LEVAQUIN) 500 MG tablet     magnesium oxide (MAG-OX) 400 MG tablet     ondansetron (ZOFRAN-ODT) 8 MG ODT tab      Problem: Patient Care Overview  Goal: Plan of Care Review  Outcome: Ongoing (interventions implemented as appropriate)  POC reviewed with pt 0900. Pt verbalized understanding. Questions and concerns addressed. No acute events today. Pt progressing toward goals. Will continue to monitor. See flowsheets for full assessment and VS info.               pramox-pe-glycerin-petrolatum (PREPARATION H) 1-0.25-14.4-15 % CREA cream     prochlorperazine (COMPAZINE) 5 MG tablet     sulfamethoxazole-trimethoprim (BACTRIM DS/SEPTRA DS) 800-160 MG tablet     tacrolimus (GENERIC EQUIVALENT) 0.5 MG capsule     tamsulosin (FLOMAX) 0.4 MG capsule     ursodiol (ACTIGALL) 300 MG capsule     voriconazole (VFEND) 50 MG tablet     Current Facility-Administered Medications   Medication     heparin lock flush 10 UNIT/ML injection 5 mL     Facility-Administered Medications Ordered in Other Visits   Medication     0.9% sodium chloride BOLUS     Ph/E:   Vitals: BP (!) 78/55 (Patient Position: Standing)   Pulse 118   Temp 98.2  F (36.8  C)   Resp 18   Wt 88.5 kg (195 lb 1.6 oz)   SpO2 98%   BMI 29.40 kg/m      ECO, KPS 80%  General: NAD;   H&N: no mucosal lesions but mouth was dry.  Lungs: CTAB, breathing comfortably on room air  Heart Tachy to 110s but regular rhythm  Abdomen: +BS, soft, nontender  extremities: No edema;   Skin: He has the area of erythema from the injection of [id number] that measures 26 x 13cm.  Picture in epic under media with from    Neuro: Nonfocal; Mood/Affect: appropriate;     Labs  Lab Results   Component Value Date    WBC 3.8 (L) 2019    ANEU 2.6 2019    HGB 10.9 (L) 2019    HCT 31.6 (L) 2019     (L) 2019     2019    POTASSIUM 4.0 2019    CHLORIDE 105 2019    CO2 24 2019     (H) 2019    BUN 8 2019    CR 0.78 2019    MAG 1.9 2019    INR 1.15 (H) 2019    BILITOTAL 0.2 2019    AST 48 (H) 2019    ALT 48 2019    ALKPHOS 116 2019    PROTTOTAL 6.4 (L) 2019    ALBUMIN 3.2 (L) 2019     A&P:   1. AML: day +60 haplo, CR1. RFLP every 2 weeks until 100% chimeric in both compartments. Start ALT-803 on study , repeat monthly x10.   Due for Dhk000 19    2. ID:   - non-neutropenic fever on  post [id number]. While this may  be secondary to the experimental treatment we cannot rule out infection at this time. Blood cultures drawn from both ports 7/29, 7/30 NGTD  - clinically he feels better and is no longer febrile . Started him on vancomycin and ceftriaxone 7/29.  If stable with negative cultures he may be able to switch to levofloxacin in 2 or 3 days. BP soft, tachy and still very fatigued  - Give 3rd day of vancomycin/ROcephin today (7/31).   Plan to start levaquin 500mg PO daily 8/1-8/5 as long as feeling better and hemodynamically stable.   Continue prophylaxis with acyclovir, vori, bactrim.    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 Level 7/24=8.4. Repeat level 7/31 - will hold drug before labs tomorrow    4. Possible pseudogout: off pred.       5.  GI:   - poor appetite  - diarrhea x1 yesterday and again this Morning- IV abx? Per pt not like cdiff. If increases will send stool studies.      6.  Rash:  - localized rash is secondary to the VJA429.      7.  FEN:   - IVF as significant orthrostasis 100/63 sitting, BP dropped to 78/55 standing- given 1L NS bolus with improvement in BP.    Cr is up - likely due to vanco. Give final dose of vanco today.   - hyponatremia resolved    8.  Hematology:   - counts stable, no transfusion needs. Down slightly due to hemodilution from IVF    Plan:  RTC tomorrow   - Still impressively orthostatic. Odd timing as great IVF and fluid intake yesterday so unclear why he'd be so orthostatic today. Give 1L bolus and repeat IV abx as overall Randy feels better.   - plan to start levaquin tomorrow AM. RTC for labs, provider visit - f/u diarrhea and determine if additional IVF needed.     Val Lindsey PA-C  [phone]

## 2019-07-31 NOTE — NURSING NOTE
Chief Complaint   Patient presents with     Blood Draw     VS done, labs collected via CVC by lab RN, heparin locked x 2.  Hx AML s/p transplant.     Patient orthostatic again today despite reports that he feels much improved.  BP dropped from 100/63 to 78/55 upon standing.  Provider notified and patient brought to infusion for IVF. Blood cultures drawn x 2.

## 2019-08-01 ENCOUNTER — APPOINTMENT (OUTPATIENT)
Dept: LAB | Facility: CLINIC | Age: 64
End: 2019-08-01
Attending: INTERNAL MEDICINE
Payer: COMMERCIAL

## 2019-08-01 ENCOUNTER — OFFICE VISIT (OUTPATIENT)
Dept: TRANSPLANT | Facility: CLINIC | Age: 64
End: 2019-08-01
Attending: INTERNAL MEDICINE
Payer: COMMERCIAL

## 2019-08-01 ENCOUNTER — TELEPHONE (OUTPATIENT)
Dept: ONCOLOGY | Facility: CLINIC | Age: 64
End: 2019-08-01

## 2019-08-01 ENCOUNTER — RESEARCH ENCOUNTER (OUTPATIENT)
Dept: TRANSPLANT | Facility: CLINIC | Age: 64
End: 2019-08-01

## 2019-08-01 VITALS
RESPIRATION RATE: 18 BRPM | HEART RATE: 106 BPM | DIASTOLIC BLOOD PRESSURE: 60 MMHG | TEMPERATURE: 98 F | SYSTOLIC BLOOD PRESSURE: 90 MMHG | BODY MASS INDEX: 29.23 KG/M2 | WEIGHT: 194 LBS | OXYGEN SATURATION: 98 %

## 2019-08-01 DIAGNOSIS — C92.01 ACUTE MYELOID LEUKEMIA IN REMISSION (H): ICD-10-CM

## 2019-08-01 DIAGNOSIS — C92.01 ACUTE MYELOID LEUKEMIA IN REMISSION (H): Primary | ICD-10-CM

## 2019-08-01 LAB
ALBUMIN SERPL-MCNC: 3.1 G/DL (ref 3.4–5)
ALP SERPL-CCNC: 130 U/L (ref 40–150)
ALT SERPL W P-5'-P-CCNC: 47 U/L (ref 0–70)
ANION GAP SERPL CALCULATED.3IONS-SCNC: 6 MMOL/L (ref 3–14)
AST SERPL W P-5'-P-CCNC: 40 U/L (ref 0–45)
BASOPHILS # BLD AUTO: 0 10E9/L (ref 0–0.2)
BASOPHILS NFR BLD AUTO: 0.6 %
BILIRUB SERPL-MCNC: 0.3 MG/DL (ref 0.2–1.3)
BUN SERPL-MCNC: 10 MG/DL (ref 7–30)
CALCIUM SERPL-MCNC: 8.3 MG/DL (ref 8.5–10.1)
CHLORIDE SERPL-SCNC: 106 MMOL/L (ref 94–109)
CO2 SERPL-SCNC: 24 MMOL/L (ref 20–32)
CREAT SERPL-MCNC: 0.97 MG/DL (ref 0.66–1.25)
DIFFERENTIAL METHOD BLD: ABNORMAL
EOSINOPHIL # BLD AUTO: 0.1 10E9/L (ref 0–0.7)
EOSINOPHIL NFR BLD AUTO: 1.8 %
ERYTHROCYTE [DISTWIDTH] IN BLOOD BY AUTOMATED COUNT: 17.3 % (ref 10–15)
GFR SERPL CREATININE-BSD FRML MDRD: 82 ML/MIN/{1.73_M2}
GLUCOSE SERPL-MCNC: 117 MG/DL (ref 70–99)
HCT VFR BLD AUTO: 32.2 % (ref 40–53)
HGB BLD-MCNC: 11.1 G/DL (ref 13.3–17.7)
IMM GRANULOCYTES # BLD: 0 10E9/L (ref 0–0.4)
IMM GRANULOCYTES NFR BLD: 0.4 %
LYMPHOCYTES # BLD AUTO: 0.8 10E9/L (ref 0.8–5.3)
LYMPHOCYTES NFR BLD AUTO: 15.3 %
MAGNESIUM SERPL-MCNC: 1.8 MG/DL (ref 1.6–2.3)
MCH RBC QN AUTO: 33.1 PG (ref 26.5–33)
MCHC RBC AUTO-ENTMCNC: 34.5 G/DL (ref 31.5–36.5)
MCV RBC AUTO: 96 FL (ref 78–100)
MONOCYTES # BLD AUTO: 0.9 10E9/L (ref 0–1.3)
MONOCYTES NFR BLD AUTO: 16 %
NEUTROPHILS # BLD AUTO: 3.6 10E9/L (ref 1.6–8.3)
NEUTROPHILS NFR BLD AUTO: 65.9 %
NRBC # BLD AUTO: 0 10*3/UL
NRBC BLD AUTO-RTO: 0 /100
PLATELET # BLD AUTO: 140 10E9/L (ref 150–450)
POTASSIUM SERPL-SCNC: 4.1 MMOL/L (ref 3.4–5.3)
PROT SERPL-MCNC: 6.6 G/DL (ref 6.8–8.8)
RBC # BLD AUTO: 3.35 10E12/L (ref 4.4–5.9)
SODIUM SERPL-SCNC: 136 MMOL/L (ref 133–144)
WBC # BLD AUTO: 5.4 10E9/L (ref 4–11)

## 2019-08-01 PROCEDURE — G0463 HOSPITAL OUTPT CLINIC VISIT: HCPCS | Mod: ZF

## 2019-08-01 PROCEDURE — 81268 CHIMERISM ANAL W/CELL SELECT: CPT | Performed by: PEDIATRICS

## 2019-08-01 PROCEDURE — 83735 ASSAY OF MAGNESIUM: CPT | Performed by: PHYSICIAN ASSISTANT

## 2019-08-01 PROCEDURE — 36592 COLLECT BLOOD FROM PICC: CPT

## 2019-08-01 PROCEDURE — 80048 BASIC METABOLIC PNL TOTAL CA: CPT | Performed by: PHYSICIAN ASSISTANT

## 2019-08-01 PROCEDURE — 25000128 H RX IP 250 OP 636: Mod: ZF | Performed by: INTERNAL MEDICINE

## 2019-08-01 PROCEDURE — 81268 CHIMERISM ANAL W/CELL SELECT: CPT | Performed by: PHYSICIAN ASSISTANT

## 2019-08-01 PROCEDURE — 85025 COMPLETE CBC W/AUTO DIFF WBC: CPT | Performed by: PHYSICIAN ASSISTANT

## 2019-08-01 PROCEDURE — 80053 COMPREHEN METABOLIC PANEL: CPT | Performed by: PHYSICIAN ASSISTANT

## 2019-08-01 RX ORDER — HEPARIN SODIUM,PORCINE 10 UNIT/ML
5 VIAL (ML) INTRAVENOUS
Status: DISCONTINUED | OUTPATIENT
Start: 2019-08-01 | End: 2019-08-01 | Stop reason: HOSPADM

## 2019-08-01 RX ADMIN — HEPARIN, PORCINE (PF) 10 UNIT/ML INTRAVENOUS SYRINGE 5 ML: at 11:28

## 2019-08-01 RX ADMIN — HEPARIN, PORCINE (PF) 10 UNIT/ML INTRAVENOUS SYRINGE 5 ML: at 11:29

## 2019-08-01 ASSESSMENT — PAIN SCALES - GENERAL: PAINLEVEL: NO PAIN (0)

## 2019-08-01 NOTE — NURSING NOTE
"Oncology Rooming Note    August 1, 2019 11:44 AM   El Ace is a 63 year old male who presents for:    Chief Complaint   Patient presents with     Blood Draw     Labs drawn via CVC by RN in lab. Line flushed with saline and heparin. VS taken.     Initial Vitals: Temp 98  F (36.7  C) (Oral)   Resp 18   Wt 88 kg (194 lb)   SpO2 98%   BMI 29.23 kg/m   Estimated body mass index is 29.23 kg/m  as calculated from the following:    Height as of 7/18/19: 1.735 m (5' 8.31\").    Weight as of this encounter: 88 kg (194 lb). Body surface area is 2.06 meters squared.  No Pain (0) Comment: Data Unavailable   No LMP for male patient.  Allergies reviewed: Yes  Medications reviewed: Yes    Medications: Medication refills not needed today.  Pharmacy name entered into [employer]:    [facility] - [id number].  [facility] [place], [place] - [address] [place] [address] [phone]    Clinical concerns: None    Dayanara Morfin CMA              "

## 2019-08-01 NOTE — NURSING NOTE
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  Yes     Since the last study visit he has been feeling better.  He is afebrile and has less fatigue.  Targeted Toxicity Worksheet completed per protocol.  Patient diary was reviewed.      I have personally interviewed El Ace and reviewed his medical record for adverse events and concomitant medications and these have been recorded on the corresponding logs in El Ace's research file.     El Ace was given the opportunity to ask any trial related questions.  Please see provider progress note for physical exam and other clinical information. Labs were reviewed - any significant lab values were addressed and reviewed.    Ellyn Chaudhari RN  "

## 2019-08-01 NOTE — NURSING NOTE
Chief Complaint   Patient presents with     Infusion     Poss infusion, hx AML s/p tranpslant.

## 2019-08-01 NOTE — NURSING NOTE
Chief Complaint   Patient presents with     Blood Draw     Labs drawn via CVC by RN in lab. Line flushed with saline and heparin. VS taken.     Estephania Mcguire RN

## 2019-08-02 ENCOUNTER — INFUSION THERAPY VISIT (OUTPATIENT)
Dept: TRANSPLANT | Facility: CLINIC | Age: 64
End: 2019-08-02
Attending: STUDENT IN AN ORGANIZED HEALTH CARE EDUCATION/TRAINING PROGRAM
Payer: COMMERCIAL

## 2019-08-02 ENCOUNTER — THERAPY VISIT (OUTPATIENT)
Dept: PHYSICAL THERAPY | Facility: CLINIC | Age: 64
End: 2019-08-02
Payer: COMMERCIAL

## 2019-08-02 ENCOUNTER — APPOINTMENT (OUTPATIENT)
Dept: LAB | Facility: CLINIC | Age: 64
End: 2019-08-02
Attending: STUDENT IN AN ORGANIZED HEALTH CARE EDUCATION/TRAINING PROGRAM
Payer: COMMERCIAL

## 2019-08-02 ENCOUNTER — DOCUMENTATION ONLY (OUTPATIENT)
Dept: TRANSPLANT | Facility: CLINIC | Age: 64
End: 2019-08-02

## 2019-08-02 ENCOUNTER — ONCOLOGY VISIT (OUTPATIENT)
Dept: TRANSPLANT | Facility: CLINIC | Age: 64
End: 2019-08-02
Attending: PHYSICIAN ASSISTANT
Payer: COMMERCIAL

## 2019-08-02 VITALS
SYSTOLIC BLOOD PRESSURE: 79 MMHG | TEMPERATURE: 97.6 F | DIASTOLIC BLOOD PRESSURE: 56 MMHG | BODY MASS INDEX: 28.75 KG/M2 | HEART RATE: 116 BPM | RESPIRATION RATE: 24 BRPM | OXYGEN SATURATION: 98 % | WEIGHT: 190.8 LBS

## 2019-08-02 VITALS — HEART RATE: 78 BPM | SYSTOLIC BLOOD PRESSURE: 115 MMHG | DIASTOLIC BLOOD PRESSURE: 77 MMHG

## 2019-08-02 DIAGNOSIS — Z94.84 HISTORY OF ALLOGENEIC STEM CELL TRANSPLANT (H): ICD-10-CM

## 2019-08-02 DIAGNOSIS — R53.81 PHYSICAL DECONDITIONING: Primary | ICD-10-CM

## 2019-08-02 DIAGNOSIS — C92.01 ACUTE MYELOID LEUKEMIA IN REMISSION (H): ICD-10-CM

## 2019-08-02 DIAGNOSIS — C92.01 ACUTE MYELOID LEUKEMIA IN REMISSION (H): Primary | ICD-10-CM

## 2019-08-02 LAB
ALBUMIN SERPL-MCNC: 3.5 G/DL (ref 3.4–5)
ALP SERPL-CCNC: 133 U/L (ref 40–150)
ALT SERPL W P-5'-P-CCNC: 49 U/L (ref 0–70)
ANION GAP SERPL CALCULATED.3IONS-SCNC: 5 MMOL/L (ref 3–14)
AST SERPL W P-5'-P-CCNC: 46 U/L (ref 0–45)
BASOPHILS # BLD AUTO: 0 10E9/L (ref 0–0.2)
BASOPHILS NFR BLD AUTO: 0.5 %
BILIRUB SERPL-MCNC: 0.4 MG/DL (ref 0.2–1.3)
BUN SERPL-MCNC: 10 MG/DL (ref 7–30)
CALCIUM SERPL-MCNC: 8.6 MG/DL (ref 8.5–10.1)
CHLORIDE SERPL-SCNC: 106 MMOL/L (ref 94–109)
CO2 SERPL-SCNC: 26 MMOL/L (ref 20–32)
COPATH REPORT: NORMAL
COPATH REPORT: NORMAL
CREAT SERPL-MCNC: 1.01 MG/DL (ref 0.66–1.25)
DIFFERENTIAL METHOD BLD: ABNORMAL
EOSINOPHIL # BLD AUTO: 0.1 10E9/L (ref 0–0.7)
EOSINOPHIL NFR BLD AUTO: 1.2 %
ERYTHROCYTE [DISTWIDTH] IN BLOOD BY AUTOMATED COUNT: 16.9 % (ref 10–15)
GFR SERPL CREATININE-BSD FRML MDRD: 78 ML/MIN/{1.73_M2}
GLUCOSE SERPL-MCNC: 115 MG/DL (ref 70–99)
HCT VFR BLD AUTO: 34.5 % (ref 40–53)
HGB BLD-MCNC: 11.8 G/DL (ref 13.3–17.7)
IMM GRANULOCYTES # BLD: 0 10E9/L (ref 0–0.4)
IMM GRANULOCYTES NFR BLD: 0.5 %
LYMPHOCYTES # BLD AUTO: 0.7 10E9/L (ref 0.8–5.3)
LYMPHOCYTES NFR BLD AUTO: 13.1 %
MAGNESIUM SERPL-MCNC: 2 MG/DL (ref 1.6–2.3)
MCH RBC QN AUTO: 32.9 PG (ref 26.5–33)
MCHC RBC AUTO-ENTMCNC: 34.2 G/DL (ref 31.5–36.5)
MCV RBC AUTO: 96 FL (ref 78–100)
MONOCYTES # BLD AUTO: 0.8 10E9/L (ref 0–1.3)
MONOCYTES NFR BLD AUTO: 13.3 %
NEUTROPHILS # BLD AUTO: 4 10E9/L (ref 1.6–8.3)
NEUTROPHILS NFR BLD AUTO: 71.4 %
NRBC # BLD AUTO: 0 10*3/UL
NRBC BLD AUTO-RTO: 0 /100
PLATELET # BLD AUTO: 152 10E9/L (ref 150–450)
POTASSIUM SERPL-SCNC: 4.1 MMOL/L (ref 3.4–5.3)
PROT SERPL-MCNC: 6.9 G/DL (ref 6.8–8.8)
RBC # BLD AUTO: 3.59 10E12/L (ref 4.4–5.9)
SODIUM SERPL-SCNC: 136 MMOL/L (ref 133–144)
WBC # BLD AUTO: 5.6 10E9/L (ref 4–11)

## 2019-08-02 PROCEDURE — 96360 HYDRATION IV INFUSION INIT: CPT

## 2019-08-02 PROCEDURE — 83735 ASSAY OF MAGNESIUM: CPT | Performed by: INTERNAL MEDICINE

## 2019-08-02 PROCEDURE — 87040 BLOOD CULTURE FOR BACTERIA: CPT | Performed by: STUDENT IN AN ORGANIZED HEALTH CARE EDUCATION/TRAINING PROGRAM

## 2019-08-02 PROCEDURE — 25000128 H RX IP 250 OP 636: Mod: ZF | Performed by: STUDENT IN AN ORGANIZED HEALTH CARE EDUCATION/TRAINING PROGRAM

## 2019-08-02 PROCEDURE — 80053 COMPREHEN METABOLIC PANEL: CPT | Performed by: INTERNAL MEDICINE

## 2019-08-02 PROCEDURE — 85025 COMPLETE CBC W/AUTO DIFF WBC: CPT | Performed by: INTERNAL MEDICINE

## 2019-08-02 RX ORDER — HEPARIN SODIUM,PORCINE 10 UNIT/ML
5 VIAL (ML) INTRAVENOUS
Status: DISCONTINUED | OUTPATIENT
Start: 2019-08-02 | End: 2019-08-02 | Stop reason: HOSPADM

## 2019-08-02 RX ORDER — TRIAMCINOLONE ACETONIDE 1 MG/G
OINTMENT TOPICAL 2 TIMES DAILY
Qty: 80 G | Refills: 0 | Status: SHIPPED | OUTPATIENT
Start: 2019-08-02 | End: 2019-10-30

## 2019-08-02 RX ADMIN — SODIUM CHLORIDE 1000 ML: 9 INJECTION, SOLUTION INTRAVENOUS at 11:40

## 2019-08-02 ASSESSMENT — PAIN SCALES - GENERAL: PAINLEVEL: NO PAIN (0)

## 2019-08-02 NOTE — PROGRESS NOTES
Mr. Ace called to report he developed a rash to his elbows & HA yesterday evening.  This morning his HA is gone, but he still has a rash on his elbows.  He recently started Levaquin.  He is afebrile & not neutropenic.  His rash has not spread beyond his elbows.  He has no other symptoms, no N/V/D.  Instructed him to continue to hold Levaquin.  Call if rash spreads or he develops other symptoms.      Shreya Rollins

## 2019-08-02 NOTE — PROGRESS NOTES
Infusion Nursing Note:  El Ace presents today for IVF.   Patient seen by provider today: Yes   present during visit today: Not Applicable.    Note: Pt given 1L NS.  VS improved.  Pt tolerated well..    Intravenous Access:  [facility].    Treatment Conditions:  Not Applicable.      Post Infusion Assessment:  Patient tolerated infusion without incident.  Blood return noted pre and post infusion.  Site patent and intact, free from redness, edema or discomfort.  No evidence of extravasations.       Discharge Plan:   Discharge instructions reviewed with: Patient and Family.  Patient and/or family verbalized understanding of discharge instructions and all questions answered.  Patient discharged in stable condition accompanied by: wife.  Departure Mode: Ambulatory.    Dolores Macario RN

## 2019-08-02 NOTE — PROGRESS NOTES
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 Now 63 days s/p VELMA haplo (son)    CC: low blood pressure    HPI: Randy had called this morning to discuss a rash. It was determined with phone traige call to stop levaquin. He proceeded to his physical therapy appointment today and called while still in the building as he was feeling poorly in PT. The session was cut short by Randy's fatigue and low blood pressures. He continues to describe poor appetite and poor intake the last week. No en vomiting. He notes three stools, looser throughout the day ending in watery diarrhea. He denies any belly pain or cramping. No fevers. Dizzy occasionally. Headache last night, worse after he laid flat after getting up to use the bathroom. No missed tac doses. The rash he called to describe this morning is less red to his wife, looks better to her today than yesterday. No fevers.     ROS 10-point ROS otherwise negative.     Lab Results   Component Value Date    WBC 5.6 08/02/2019    HGB 11.8 (L) 08/02/2019    HCT 34.5 (L) 08/02/2019     08/02/2019     08/02/2019    POTASSIUM 4.1 08/02/2019    CHLORIDE 106 08/02/2019    CO2 26 08/02/2019    BUN 10 08/02/2019    CR 1.01 08/02/2019     (H) 08/02/2019    SED 88 (H) 06/25/2019    NTBNPI 2,031 (H) 04/03/2019    TROPI <0.015 04/23/2019    AST 46 (H) 08/02/2019    ALT 49 08/02/2019    ALKPHOS 133 08/02/2019    BILITOTAL 0.4 08/02/2019    INR 1.15 (H) 06/24/2019       Current Outpatient Medications   Medication     triamcinolone (KENALOG) 0.1 % external ointment     acetaminophen (TYLENOL) 325 MG tablet     acyclovir (ZOVIRAX) 800 MG tablet     heparin lock flush 10 UNIT/ML SOLN injection     magnesium oxide (MAG-OX) 400 MG tablet     ondansetron (ZOFRAN-ODT) 8 MG ODT tab     pramox-pe-glycerin-petrolatum (PREPARATION H) 1-0.25-14.4-15 %  CREA cream     prochlorperazine (COMPAZINE) 5 MG tablet     sulfamethoxazole-trimethoprim (BACTRIM DS/SEPTRA DS) 800-160 MG tablet     tacrolimus (GENERIC EQUIVALENT) 0.5 MG capsule     tamsulosin (FLOMAX) 0.4 MG capsule     voriconazole (VFEND) 50 MG tablet     Current Facility-Administered Medications   Medication     heparin lock flush 10 UNIT/ML injection 5 mL     Ph/E:   Vitals: BP (!) 79/56 (BP Location: Right arm, Patient Position: Standing)   Pulse 116   Temp 97.6  F (36.4  C) (Oral)   Resp 24   Wt 86.5 kg (190 lb 12.8 oz)   SpO2 98%   BMI 28.75 kg/m     Repeat bps 90s/60s  ECO  General: NAD; H&N: no mucosal lesions; Lungs clear; Heart RRR; Abdomen; Soft, No organomegaly; Extremities: No edema; Skin:Spread out pink faint rash on abdomen like a band, fading. Not warm to touch, no fluctuance or induration. Bilateral elbows with macular erythematous rash. Neuro: Nonfocal; Mood/Affect: appropriate; Lymph: no LAD    A&P:   1. AML: day +62 haplo, CR1. On ALT-803 study, SEs included rash, fever, and mild hypotension with dose 1. Will check on the grade of hypotension and along with Randy will decide on the next dose and its timing. Seems grade 2 hypotension and 1 fever. Improving.   2. ID: acyclovir, vori, bactrim. Trial levaquin x1 dose. Rash/no sx other than soft BP (bld cx NGTD). Off levaquin .  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 Stop ursodiol.  4. Possible pseudogout: off pred.     5. GI: anorexia, weight loss, diarrhea  - Weight loss/anorexia biggest issues, will try to address with zofran, increase calories, monitor weight. In setting of low BP, headache, diarrhea (3 stools , no stool yet today ) some concern for GVHD.   6. CV: HoTN: blood cx ngtd, address with daily fluids, consider florinef if not improving   7. Skin: rash to bilat elbows, topical steroids (likely 2/2 levaquin). In setting of GI sx, monitor carefully    Plan:  Anorexia: try zofran this weekend  Diarrhea: if worsens, obtain c  dif cx  Rash: topical triamcinolone and stop levaquin; pt will call if increases in area  Headache: tylenol just twice daily prn  Soft BP: salt foods/fluids with electrolytes, daily IV fluids and call with any new/worsening symptoms  Monitor weight, will request EGD/flex sig in case these symptoms worsen for next Fri.     Renee Seaman PAC  [phone]

## 2019-08-02 NOTE — TELEPHONE ENCOUNTER
"Contacted by Mrs. Ace with concern for new elbow rash; patient was also present in room with her.  They report that the patient received levaquin earlier today and this afternoon patient developed new bilateral elbow rash.  Wife stated \"it looks like hives.\"  Patient has not had rash like this before.  Patient denied any mouth, eye or penis lesions.    On chart review patient has hx AML now s/p BMT  D+62.  Patient was seen by Dr. Brown this AM and was noted to have an abdominal rash.  Patient states this rash is stable and does not look like his elbow rash.  He notes he did not have his elbow rash when seen by Dr. Brown.  Review of labs shows normal WBC, no neutropenia.  Asked patient to check his temp while I was on the phone, he reported 97.1F.    Patient will be coming to the [facility] for PT tomorrow.  Advised patient's wife to take photos of the rash.  Rash may be drug reaction but warrants evaluation within the next few days.  Will message scheduling and Dr. Brown to see if patient can be seen soon.  Reviewed with patient/wife to call me again tonight if any new lesions, significant worsening or fevers develop.  "

## 2019-08-02 NOTE — PROGRESS NOTES
c  [facility]    Diagnosis:   1. 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     Treatments:   1. 7+3 (Dauno), 3/15/19, no response, Day 12 persistent disease with near packed marrow  2. Dec 10d+Ric 3/26/19, CR1 on 4/23/19 but with pos mutations (IDH1 17%, RUNX1 8%)  3. 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     PMH: HLP, BPH, DJD, RLS    Interval history: Fatigue and dizziness better, morning soft BMs, no fever or fall, no rash otherthan the faint injection rash that's spread out on the lower half of belly. 10-point ROS otherwise negative.     Lab Results   Component Value Date    WBC 5.4 08/01/2019    HGB 11.1 (L) 08/01/2019    HCT 32.2 (L) 08/01/2019     (L) 08/01/2019     08/01/2019    POTASSIUM 4.1 08/01/2019    CHLORIDE 106 08/01/2019    CO2 24 08/01/2019    BUN 10 08/01/2019    CR 0.97 08/01/2019     (H) 08/01/2019    SED 88 (H) 06/25/2019    NTBNPI 2,031 (H) 04/03/2019    TROPI <0.015 04/23/2019    AST 40 08/01/2019    ALT 47 08/01/2019    ALKPHOS 130 08/01/2019    BILITOTAL 0.3 08/01/2019    INR 1.15 (H) 06/24/2019       Current Outpatient Medications   Medication     acetaminophen (TYLENOL) 325 MG tablet     acyclovir (ZOVIRAX) 800 MG tablet     heparin lock flush 10 UNIT/ML SOLN injection     magnesium oxide (MAG-OX) 400 MG tablet     ondansetron (ZOFRAN-ODT) 8 MG ODT tab     pramox-pe-glycerin-petrolatum (PREPARATION H) 1-0.25-14.4-15 % CREA cream     prochlorperazine (COMPAZINE) 5 MG tablet     sulfamethoxazole-trimethoprim (BACTRIM DS/SEPTRA DS) 800-160 MG tablet     tacrolimus (GENERIC EQUIVALENT) 0.5 MG capsule     tamsulosin (FLOMAX) 0.4 MG capsule     voriconazole (VFEND) 50 MG tablet     Current Facility-Administered Medications   Medication     0.9% sodium chloride BOLUS     Facility-Administered Medications Ordered in Other Visits   Medication     heparin lock flush 10 UNIT/ML  injection 5 mL     Ph/E:   Vitals: There were no vitals taken for this visit.   ECO  General: NAD; H&N: no mucosal lesions; Lungs clear; Heart RRR; Abdomen; Soft, No organomegaly; Extremities: No edema; Skin:Spread out pink faint rash on abdomen like a band; Neuro: Nonfocal; Mood/Affect: appropriate; Lymph: no LAD    A&P:   1. AML: day +63 haplo, CR1. On ALT-803 study, SEs included rash, fever, and mild hypotension with dose 1. Will check on the grade of hypotension and along with Randy will decide on the next dose and its timing. Seems grade 2 hypotension and 1 fever. Improving.   2. ID: acyclovir, vori, bactrim. Stop IV ABx.   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 Stop ursodiol.   4. Possible pseudogout: off pred.

## 2019-08-02 NOTE — NURSING NOTE
Chief Complaint   Patient presents with     Blood Draw     Central line labs collected by RN.      JIC blood cxs collected.

## 2019-08-02 NOTE — PROGRESS NOTES
Outpatient Physical Therapy Discharge Note     Patient: El Ace  : 1955    Beginning/End Dates of Reporting Period:  19 to 2019    Referring Provider: Dilma Quezada MD    Therapy Diagnosis: deconditioning, neck, back and foot pain.     Client Self Report: Neck, back and foot pain much better.Pt continues to have reactions from last week's injection: rash, fatigue, difficult to do exercises, been getting fluids when BP low. He was feeling a lot stronger and less fatigued until last week.         Outcome Measures (most recent score): FACIT: 23          Goals:  Goal Identifier HEP   Goal Description In order to facilitate gains in general strength and endurance, and improve tolerance for functional mobility, ADLs, and recreational activities outside of physical therapy, patient will demonstrate independence with a HEP and report how to safely progress the program.   Target Date 09/15/19   Date Met  19   Progress:     Goal Identifier facit   Goal Description Patient will have less fatigue while performing daily activities and mobility as indicated by improved FACIT-F score of 4 or more points   Target Date 19   Date Met      Progress:not met     Goal Identifier walking   Goal Description Pt will be able to walk in community for 15 minutes without stopping   Target Date 09/15/19   Date Met  19   Progress:         Progress Toward Goals:   Progress this reporting period: Randy was seen 5 times. Randy was making very good progress until last week when he had an injection as part of a study. He feels this has set him back and now more fatigued. He knows his exercises and is committed to doing them. Foot, neck and back pain are resolved.     Plan:  Discharge from therapy.    Discharge:    Reason for Discharge: Patient has met 2 of 3  goals.    Equipment Issued: theraband    Discharge Plan: Patient to continue home program.        Discharge:Yes

## 2019-08-03 ENCOUNTER — APPOINTMENT (OUTPATIENT)
Dept: LAB | Facility: CLINIC | Age: 64
End: 2019-08-03
Attending: INTERNAL MEDICINE
Payer: COMMERCIAL

## 2019-08-03 ENCOUNTER — INFUSION THERAPY VISIT (OUTPATIENT)
Dept: TRANSPLANT | Facility: CLINIC | Age: 64
End: 2019-08-03
Attending: INTERNAL MEDICINE
Payer: COMMERCIAL

## 2019-08-03 VITALS
TEMPERATURE: 97.3 F | WEIGHT: 190.7 LBS | OXYGEN SATURATION: 97 % | SYSTOLIC BLOOD PRESSURE: 70 MMHG | BODY MASS INDEX: 28.73 KG/M2 | HEART RATE: 97 BPM | RESPIRATION RATE: 16 BRPM | DIASTOLIC BLOOD PRESSURE: 45 MMHG

## 2019-08-03 DIAGNOSIS — C92.01 ACUTE MYELOID LEUKEMIA IN REMISSION (H): ICD-10-CM

## 2019-08-03 LAB
ANION GAP SERPL CALCULATED.3IONS-SCNC: 5 MMOL/L (ref 3–14)
BASOPHILS # BLD AUTO: 0 10E9/L (ref 0–0.2)
BASOPHILS NFR BLD AUTO: 0.3 %
BUN SERPL-MCNC: 9 MG/DL (ref 7–30)
CALCIUM SERPL-MCNC: 8 MG/DL (ref 8.5–10.1)
CHLORIDE SERPL-SCNC: 112 MMOL/L (ref 94–109)
CO2 SERPL-SCNC: 24 MMOL/L (ref 20–32)
CREAT SERPL-MCNC: 0.89 MG/DL (ref 0.66–1.25)
DIFFERENTIAL METHOD BLD: ABNORMAL
EOSINOPHIL # BLD AUTO: 0.1 10E9/L (ref 0–0.7)
EOSINOPHIL NFR BLD AUTO: 2.4 %
ERYTHROCYTE [DISTWIDTH] IN BLOOD BY AUTOMATED COUNT: 17.2 % (ref 10–15)
GFR SERPL CREATININE-BSD FRML MDRD: 90 ML/MIN/{1.73_M2}
GLUCOSE SERPL-MCNC: 109 MG/DL (ref 70–99)
HCT VFR BLD AUTO: 32.1 % (ref 40–53)
HGB BLD-MCNC: 11 G/DL (ref 13.3–17.7)
IMM GRANULOCYTES # BLD: 0 10E9/L (ref 0–0.4)
IMM GRANULOCYTES NFR BLD: 0.7 %
LYMPHOCYTES # BLD AUTO: 0.7 10E9/L (ref 0.8–5.3)
LYMPHOCYTES NFR BLD AUTO: 11.7 %
MCH RBC QN AUTO: 33.1 PG (ref 26.5–33)
MCHC RBC AUTO-ENTMCNC: 34.3 G/DL (ref 31.5–36.5)
MCV RBC AUTO: 97 FL (ref 78–100)
MONOCYTES # BLD AUTO: 0.7 10E9/L (ref 0–1.3)
MONOCYTES NFR BLD AUTO: 12.4 %
NEUTROPHILS # BLD AUTO: 4.3 10E9/L (ref 1.6–8.3)
NEUTROPHILS NFR BLD AUTO: 72.5 %
NRBC # BLD AUTO: 0 10*3/UL
NRBC BLD AUTO-RTO: 0 /100
PLATELET # BLD AUTO: 160 10E9/L (ref 150–450)
POTASSIUM SERPL-SCNC: 4.1 MMOL/L (ref 3.4–5.3)
RBC # BLD AUTO: 3.32 10E12/L (ref 4.4–5.9)
SODIUM SERPL-SCNC: 140 MMOL/L (ref 133–144)
WBC # BLD AUTO: 5.9 10E9/L (ref 4–11)

## 2019-08-03 PROCEDURE — 96360 HYDRATION IV INFUSION INIT: CPT

## 2019-08-03 PROCEDURE — 80048 BASIC METABOLIC PNL TOTAL CA: CPT | Performed by: STUDENT IN AN ORGANIZED HEALTH CARE EDUCATION/TRAINING PROGRAM

## 2019-08-03 PROCEDURE — 85025 COMPLETE CBC W/AUTO DIFF WBC: CPT | Performed by: STUDENT IN AN ORGANIZED HEALTH CARE EDUCATION/TRAINING PROGRAM

## 2019-08-03 PROCEDURE — 25000128 H RX IP 250 OP 636: Mod: ZF | Performed by: INTERNAL MEDICINE

## 2019-08-03 RX ADMIN — SODIUM CHLORIDE 1000 ML: 9 INJECTION, SOLUTION INTRAVENOUS at 08:13

## 2019-08-03 ASSESSMENT — PAIN SCALES - GENERAL: PAINLEVEL: MILD PAIN (3)

## 2019-08-03 NOTE — PROGRESS NOTES
Infusion Nursing Note:  El Ace presents today for IVF.    Patient seen by provider today: No   present during visit today: Not Applicable.    Note: Labs monitored, low b/ps. 1L NS bolus administered over 1hr. Pt's b/p recovered post infusion. States he feels well. Pt encouraged to continue to keep up with fluids.     Intravenous Access:  [facility].    Treatment Conditions:  Results reviewed, labs MET treatment parameters, ok to proceed with treatment.      Post Infusion Assessment:  Patient tolerated infusion without incident.       Discharge Plan:   [facility]  Patient will return Monday for next appointment.   Patient discharged in stable condition accompanied by: self.  Departure Mode: Ambulatory.    Efren Moraes RN

## 2019-08-05 ENCOUNTER — ONCOLOGY VISIT (OUTPATIENT)
Dept: TRANSPLANT | Facility: CLINIC | Age: 64
End: 2019-08-05
Attending: INTERNAL MEDICINE
Payer: COMMERCIAL

## 2019-08-05 ENCOUNTER — APPOINTMENT (OUTPATIENT)
Dept: LAB | Facility: CLINIC | Age: 64
End: 2019-08-05
Attending: INTERNAL MEDICINE
Payer: COMMERCIAL

## 2019-08-05 VITALS
SYSTOLIC BLOOD PRESSURE: 122 MMHG | OXYGEN SATURATION: 100 % | HEART RATE: 86 BPM | TEMPERATURE: 98 F | RESPIRATION RATE: 16 BRPM | DIASTOLIC BLOOD PRESSURE: 82 MMHG | BODY MASS INDEX: 29.44 KG/M2 | WEIGHT: 195.4 LBS

## 2019-08-05 DIAGNOSIS — Z94.84 HISTORY OF ALLOGENEIC STEM CELL TRANSPLANT (H): ICD-10-CM

## 2019-08-05 DIAGNOSIS — C92.01 AML (ACUTE MYELOID LEUKEMIA) IN REMISSION (H): ICD-10-CM

## 2019-08-05 DIAGNOSIS — C92.01 ACUTE MYELOID LEUKEMIA IN REMISSION (H): ICD-10-CM

## 2019-08-05 LAB
ANION GAP SERPL CALCULATED.3IONS-SCNC: 4 MMOL/L (ref 3–14)
BASOPHILS # BLD AUTO: 0 10E9/L (ref 0–0.2)
BASOPHILS NFR BLD AUTO: 0.4 %
BUN SERPL-MCNC: 8 MG/DL (ref 7–30)
CALCIUM SERPL-MCNC: 8.3 MG/DL (ref 8.5–10.1)
CHLORIDE SERPL-SCNC: 106 MMOL/L (ref 94–109)
CO2 SERPL-SCNC: 26 MMOL/L (ref 20–32)
COPATH REPORT: NORMAL
COPATH REPORT: NORMAL
CREAT SERPL-MCNC: 0.92 MG/DL (ref 0.66–1.25)
DIFFERENTIAL METHOD BLD: ABNORMAL
EOSINOPHIL # BLD AUTO: 0.2 10E9/L (ref 0–0.7)
EOSINOPHIL NFR BLD AUTO: 1.9 %
ERYTHROCYTE [DISTWIDTH] IN BLOOD BY AUTOMATED COUNT: 17.5 % (ref 10–15)
GFR SERPL CREATININE-BSD FRML MDRD: 86 ML/MIN/{1.73_M2}
GLUCOSE SERPL-MCNC: 122 MG/DL (ref 70–99)
HCT VFR BLD AUTO: 33 % (ref 40–53)
HGB BLD-MCNC: 11.2 G/DL (ref 13.3–17.7)
IMM GRANULOCYTES # BLD: 0.1 10E9/L (ref 0–0.4)
IMM GRANULOCYTES NFR BLD: 1.1 %
LYMPHOCYTES # BLD AUTO: 0.8 10E9/L (ref 0.8–5.3)
LYMPHOCYTES NFR BLD AUTO: 8.5 %
MCH RBC QN AUTO: 32.9 PG (ref 26.5–33)
MCHC RBC AUTO-ENTMCNC: 33.9 G/DL (ref 31.5–36.5)
MCV RBC AUTO: 97 FL (ref 78–100)
MONOCYTES # BLD AUTO: 1 10E9/L (ref 0–1.3)
MONOCYTES NFR BLD AUTO: 10.5 %
NEUTROPHILS # BLD AUTO: 7.6 10E9/L (ref 1.6–8.3)
NEUTROPHILS NFR BLD AUTO: 77.6 %
NRBC # BLD AUTO: 0 10*3/UL
NRBC BLD AUTO-RTO: 0 /100
PLATELET # BLD AUTO: 183 10E9/L (ref 150–450)
POTASSIUM SERPL-SCNC: 4.5 MMOL/L (ref 3.4–5.3)
RBC # BLD AUTO: 3.4 10E12/L (ref 4.4–5.9)
SODIUM SERPL-SCNC: 137 MMOL/L (ref 133–144)
WBC # BLD AUTO: 9.9 10E9/L (ref 4–11)

## 2019-08-05 PROCEDURE — 85025 COMPLETE CBC W/AUTO DIFF WBC: CPT | Performed by: STUDENT IN AN ORGANIZED HEALTH CARE EDUCATION/TRAINING PROGRAM

## 2019-08-05 PROCEDURE — 25000128 H RX IP 250 OP 636: Mod: ZF | Performed by: INTERNAL MEDICINE

## 2019-08-05 PROCEDURE — 80048 BASIC METABOLIC PNL TOTAL CA: CPT | Performed by: STUDENT IN AN ORGANIZED HEALTH CARE EDUCATION/TRAINING PROGRAM

## 2019-08-05 PROCEDURE — G0463 HOSPITAL OUTPT CLINIC VISIT: HCPCS | Mod: ZF

## 2019-08-05 PROCEDURE — 36592 COLLECT BLOOD FROM PICC: CPT

## 2019-08-05 RX ORDER — HEPARIN SODIUM,PORCINE 10 UNIT/ML
5 VIAL (ML) INTRAVENOUS
Status: DISCONTINUED | OUTPATIENT
Start: 2019-08-05 | End: 2019-08-05 | Stop reason: HOSPADM

## 2019-08-05 RX ORDER — TACROLIMUS 0.5 MG/1
1 CAPSULE ORAL 2 TIMES DAILY
Qty: 120 CAPSULE | Refills: 3 | Status: SHIPPED | OUTPATIENT
Start: 2019-08-05 | End: 2019-09-05

## 2019-08-05 RX ORDER — ONDANSETRON 8 MG/1
8 TABLET, ORALLY DISINTEGRATING ORAL EVERY 8 HOURS
Qty: 100 TABLET | Refills: 0 | Status: SHIPPED | OUTPATIENT
Start: 2019-08-05 | End: 2021-04-19

## 2019-08-05 RX ADMIN — HEPARIN, PORCINE (PF) 10 UNIT/ML INTRAVENOUS SYRINGE 5 ML: at 11:00

## 2019-08-05 ASSESSMENT — PAIN SCALES - GENERAL: PAINLEVEL: NO PAIN (0)

## 2019-08-05 NOTE — NURSING NOTE
Chief Complaint   Patient presents with     Blood Draw     Labs drawn via CVC by RN in lab. VS taken.      CVC accessed, labs drawn. Caps changed.  Line flushed and Heparin locked. Vital signs taken. Checked into next appointment.   Vee Quiñonez RN

## 2019-08-05 NOTE — PROGRESS NOTES
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  Still limited to b/l elbows and maybe scant rash on L forearm.  He is using TCM bid.  No other areas of new rash.  No fever.  No bleeding.  Had some diarrhea over the weekend but formed (normal) stool this AM.  No abd pain.  No n/v.  HA is better.  Not quite as fatigued as he was last week.  No lightheadedness.  PO intake improving but he has to work to get in enough liquids.      ROS 10-point ROS otherwise negative.     Lab Results   Component Value Date    WBC 5.9 08/03/2019    HGB 11.0 (L) 08/03/2019    HCT 32.1 (L) 08/03/2019     08/03/2019     08/03/2019    POTASSIUM 4.1 08/03/2019    CHLORIDE 112 (H) 08/03/2019    CO2 24 08/03/2019    BUN 9 08/03/2019    CR 0.89 08/03/2019     (H) 08/03/2019    SED 88 (H) 06/25/2019    NTBNPI 2,031 (H) 04/03/2019    TROPI <0.015 04/23/2019    AST 46 (H) 08/02/2019    ALT 49 08/02/2019    ALKPHOS 133 08/02/2019    BILITOTAL 0.4 08/02/2019    INR 1.15 (H) 06/24/2019       Current Outpatient Medications   Medication     acetaminophen (TYLENOL) 325 MG tablet     acyclovir (ZOVIRAX) 800 MG tablet     heparin lock flush 10 UNIT/ML SOLN injection     magnesium oxide (MAG-OX) 400 MG tablet     ondansetron (ZOFRAN-ODT) 8 MG ODT tab     pramox-pe-glycerin-petrolatum (PREPARATION H) 1-0.25-14.4-15 % CREA cream     prochlorperazine (COMPAZINE) 5 MG tablet     sulfamethoxazole-trimethoprim (BACTRIM DS/SEPTRA DS) 800-160 MG tablet     tacrolimus (GENERIC EQUIVALENT) 0.5 MG capsule     tamsulosin (FLOMAX) 0.4 MG capsule     triamcinolone (KENALOG) 0.1 % external ointment     voriconazole (VFEND) 50 MG tablet     No current facility-administered medications for this visit.      Ph/E:   Vitals: Blood  pressure 122/82, pulse 86, temperature 98  F (36.7  C), temperature source Oral, resp. rate 16, weight 88.6 kg (195 lb 6.4 oz), SpO2 100 %.     Wt Readings from Last 4 Encounters:   19 88.6 kg (195 lb 6.4 oz)   19 86.5 kg (190 lb 11.2 oz)   19 86.5 kg (190 lb 12.8 oz)   19 88 kg (194 lb)     ECO  General: NAD; H&N: no mucosal lesions; Lungs clear; Heart RRR; Abdomen; Soft, No organomegaly; Extremities: No edema; Skin: confluent papular erythamtous rash on elbows (posterior aspect ofarm only).  One small area of left forearm (faint).  Appears less erythematous when compared to picture from last week.  No new areas of rash. Neuro: Nonfocal; Mood/Affect: appropriate; Lymph: no LAD    A&P:   1. AML: day +66 haplo, CR1. On ALT-803 study (for relapse prophylaxis started ; next due on ), SEs included rash, fever, and mild hypotension with dose 1. Will check on the grade of hypotension and along with Randy will decide on the next dose and its timing. Seems grade 2 hypotension and 1 fever. Improving.   2. ID: acyclovir, vori, bactrim. Trial levaquin x1 dose but rash developed.  no sx other than soft BP (bld cx NGTD). Off levaquin 8/.  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  Tac level at next visit.  - off ursodiol.  4. Possible pseudogout: off pred.     5. GI: anorexia, weight loss, diarrhea--all improved today.  - if recur, will consider scopes.    6. CV: HoTN--better today.  7. Skin: rash to bilat elbows, topical steroids (likely 2/2 levaquin). Still present but may be a bit better today.      Plan:  rtc Thurs as planned    Tish Vaca pa-c  [phone]

## 2019-08-05 NOTE — NURSING NOTE
"Oncology Rooming Note    August 5, 2019 11:21 AM   El Ace is a 63 year old male who presents for:    Chief Complaint   Patient presents with     Blood Draw     Labs drawn via CVC by RN in lab. VS taken.      RECHECK     RTN- AML     Initial Vitals: /82   Pulse 86   Temp 98  F (36.7  C) (Oral)   Resp 16   Wt 88.6 kg (195 lb 6.4 oz)   SpO2 100%   BMI 29.44 kg/m   Estimated body mass index is 29.44 kg/m  as calculated from the following:    Height as of 7/18/19: 1.735 m (5' 8.31\").    Weight as of this encounter: 88.6 kg (195 lb 6.4 oz). Body surface area is 2.07 meters squared.  No Pain (0) Comment: Data Unavailable   No LMP for male patient.  Allergies reviewed: Yes  Medications reviewed: Yes    Medications: MEDICATION REFILLS NEEDED TODAY. Provider was notified.  Pharmacy name entered into [employer]:    [facility] - [place], [place] - [phone]  [facility] [place], [place] - [address] [facility] [phone]    Clinical concerns: Tacrolimius refill needed       Dayanara Morfin CMA              "

## 2019-08-07 ENCOUNTER — CARE COORDINATION (OUTPATIENT)
Dept: TRANSPLANT | Facility: CLINIC | Age: 64
End: 2019-08-07

## 2019-08-07 NOTE — PROGRESS NOTES
[facility]MT [facility]    Diagnosis:   1. 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     Treatments:   1. 7+3 (Dauno), 3/15/19, no response, Day 12 persistent disease with near packed marrow  2. Dec 10d+Ric 3/26/19, CR1 on 4/23/19 but with pos mutations (IDH1 17%, RUNX1 8%)  3. 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   4. Maintenance ALT-803 study, 1 dose, stopped due to profound fatigue, moderate to severe hypotension, and fever    PMH: HLP, BPH, DJD, RLS    Interval history: 2 episodes of watery BMs today, no n/v/pain, fatigue improving, no dizziness, appetite improving. 10-point ROS otherwise negative.     Lab Results   Component Value Date    WBC 11.1 (H) 08/08/2019    HGB 11.0 (L) 08/08/2019    HCT 32.5 (L) 08/08/2019     08/08/2019     08/05/2019    POTASSIUM 4.5 08/05/2019    CHLORIDE 106 08/05/2019    CO2 26 08/05/2019    BUN 8 08/05/2019    CR 0.92 08/05/2019     (H) 08/05/2019    SED 88 (H) 06/25/2019    NTBNPI 2,031 (H) 04/03/2019    TROPI <0.015 04/23/2019    AST 46 (H) 08/02/2019    ALT 49 08/02/2019    ALKPHOS 133 08/02/2019    BILITOTAL 0.4 08/02/2019    INR 1.15 (H) 06/24/2019       Current Outpatient Medications   Medication     acetaminophen (TYLENOL) 325 MG tablet     acyclovir (ZOVIRAX) 800 MG tablet     heparin lock flush 10 UNIT/ML SOLN injection     magnesium oxide (MAG-OX) 400 MG tablet     ondansetron (ZOFRAN-ODT) 8 MG ODT tab     pramox-pe-glycerin-petrolatum (PREPARATION H) 1-0.25-14.4-15 % CREA cream     prochlorperazine (COMPAZINE) 5 MG tablet     sulfamethoxazole-trimethoprim (BACTRIM DS/SEPTRA DS) 800-160 MG tablet     tacrolimus (GENERIC EQUIVALENT) 0.5 MG capsule     tamsulosin (FLOMAX) 0.4 MG capsule     triamcinolone (KENALOG) 0.1 % external ointment     voriconazole (VFEND) 50 MG tablet     Current Facility-Administered Medications   Medication     heparin lock flush 10  UNIT/ML injection 5 mL     Ph/E:   Vitals: BP (!) 87/55 (Patient Position: Standing)   Pulse 114   Temp 98.1  F (36.7  C)   Resp 16   Wt 87.2 kg (192 lb 3.2 oz)   SpO2 98%   BMI 28.96 kg/m     KPS 90%  General: NAD; H&N: no mucosal lesions; Lungs clear; Heart RRR; Abdomen; Soft, No organomegaly; Extremities: No edema; Skin rash on abdomen is gone, the one on elbows is peeling and improving; Neuro: Nonfocal; Mood/Affect: appropriate; Lymph: no LAD    A&P:   1. AML: day +69 haplo, CR1. Got 1 dose of ALT-803 on study, c/w with recurrent hypotension and 1 fever, almost requiring admission, and profound fatigue. Discussed with patient and will not give further doses. All improved.   2. ID: acyclovir, vori, bactrim.   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 If diarrhea continues for more than 1 day, he will need enteric panel and C diff test. If no nausea, stop zofran.   4. Possible pseudogout: off pred.

## 2019-08-07 NOTE — PROGRESS NOTES
Notified Randy that Dr. Brown would like him to hold his tacrolimus tomorrow for his lab appointment tomorrow for a level. He stated understanding of this and will hold his dose.

## 2019-08-08 ENCOUNTER — INFUSION THERAPY VISIT (OUTPATIENT)
Dept: TRANSPLANT | Facility: CLINIC | Age: 64
End: 2019-08-08
Attending: INTERNAL MEDICINE
Payer: COMMERCIAL

## 2019-08-08 ENCOUNTER — MEDICAL CORRESPONDENCE (OUTPATIENT)
Dept: TRANSPLANT | Facility: CLINIC | Age: 64
End: 2019-08-08

## 2019-08-08 ENCOUNTER — APPOINTMENT (OUTPATIENT)
Dept: LAB | Facility: CLINIC | Age: 64
End: 2019-08-08
Attending: INTERNAL MEDICINE
Payer: COMMERCIAL

## 2019-08-08 ENCOUNTER — RESEARCH ENCOUNTER (OUTPATIENT)
Dept: TRANSPLANT | Facility: CLINIC | Age: 64
End: 2019-08-08

## 2019-08-08 VITALS
HEART RATE: 114 BPM | TEMPERATURE: 98.1 F | RESPIRATION RATE: 16 BRPM | DIASTOLIC BLOOD PRESSURE: 55 MMHG | OXYGEN SATURATION: 98 % | SYSTOLIC BLOOD PRESSURE: 87 MMHG | WEIGHT: 192.2 LBS | BODY MASS INDEX: 28.96 KG/M2

## 2019-08-08 DIAGNOSIS — C92.01 ACUTE MYELOID LEUKEMIA IN REMISSION (H): Primary | ICD-10-CM

## 2019-08-08 DIAGNOSIS — C92.01 AML (ACUTE MYELOID LEUKEMIA) IN REMISSION (H): ICD-10-CM

## 2019-08-08 LAB
ALBUMIN SERPL-MCNC: 3.5 G/DL (ref 3.4–5)
ALP SERPL-CCNC: 127 U/L (ref 40–150)
ALT SERPL W P-5'-P-CCNC: 37 U/L (ref 0–70)
ANION GAP SERPL CALCULATED.3IONS-SCNC: 5 MMOL/L (ref 3–14)
AST SERPL W P-5'-P-CCNC: 25 U/L (ref 0–45)
BASOPHILS # BLD AUTO: 0 10E9/L (ref 0–0.2)
BASOPHILS NFR BLD AUTO: 0.2 %
BILIRUB SERPL-MCNC: 0.5 MG/DL (ref 0.2–1.3)
BUN SERPL-MCNC: 15 MG/DL (ref 7–30)
CALCIUM SERPL-MCNC: 8.5 MG/DL (ref 8.5–10.1)
CHLORIDE SERPL-SCNC: 103 MMOL/L (ref 94–109)
CO2 SERPL-SCNC: 25 MMOL/L (ref 20–32)
CREAT SERPL-MCNC: 1.22 MG/DL (ref 0.66–1.25)
DIFFERENTIAL METHOD BLD: ABNORMAL
EOSINOPHIL # BLD AUTO: 0.1 10E9/L (ref 0–0.7)
EOSINOPHIL NFR BLD AUTO: 0.9 %
ERYTHROCYTE [DISTWIDTH] IN BLOOD BY AUTOMATED COUNT: 18.2 % (ref 10–15)
GFR SERPL CREATININE-BSD FRML MDRD: 62 ML/MIN/{1.73_M2}
GLUCOSE SERPL-MCNC: 118 MG/DL (ref 70–99)
HCT VFR BLD AUTO: 32.5 % (ref 40–53)
HGB BLD-MCNC: 11 G/DL (ref 13.3–17.7)
IMM GRANULOCYTES # BLD: 0.1 10E9/L (ref 0–0.4)
IMM GRANULOCYTES NFR BLD: 0.9 %
LYMPHOCYTES # BLD AUTO: 0.9 10E9/L (ref 0.8–5.3)
LYMPHOCYTES NFR BLD AUTO: 8.2 %
MAGNESIUM SERPL-MCNC: 2.1 MG/DL (ref 1.6–2.3)
MCH RBC QN AUTO: 33.2 PG (ref 26.5–33)
MCHC RBC AUTO-ENTMCNC: 33.8 G/DL (ref 31.5–36.5)
MCV RBC AUTO: 98 FL (ref 78–100)
MONOCYTES # BLD AUTO: 1.3 10E9/L (ref 0–1.3)
MONOCYTES NFR BLD AUTO: 11.6 %
NEUTROPHILS # BLD AUTO: 8.7 10E9/L (ref 1.6–8.3)
NEUTROPHILS NFR BLD AUTO: 78.2 %
NRBC # BLD AUTO: 0 10*3/UL
NRBC BLD AUTO-RTO: 0 /100
PLATELET # BLD AUTO: 190 10E9/L (ref 150–450)
POTASSIUM SERPL-SCNC: 4.8 MMOL/L (ref 3.4–5.3)
PROT SERPL-MCNC: 6.9 G/DL (ref 6.8–8.8)
RBC # BLD AUTO: 3.31 10E12/L (ref 4.4–5.9)
SODIUM SERPL-SCNC: 133 MMOL/L (ref 133–144)
TACROLIMUS BLD-MCNC: 11.4 UG/L (ref 5–15)
TME LAST DOSE: NORMAL H
WBC # BLD AUTO: 11.1 10E9/L (ref 4–11)

## 2019-08-08 PROCEDURE — 80197 ASSAY OF TACROLIMUS: CPT | Performed by: PHYSICIAN ASSISTANT

## 2019-08-08 PROCEDURE — G0463 HOSPITAL OUTPT CLINIC VISIT: HCPCS | Mod: 25

## 2019-08-08 PROCEDURE — 80053 COMPREHEN METABOLIC PANEL: CPT | Performed by: PHYSICIAN ASSISTANT

## 2019-08-08 PROCEDURE — 25000128 H RX IP 250 OP 636: Mod: ZF | Performed by: INTERNAL MEDICINE

## 2019-08-08 PROCEDURE — 96360 HYDRATION IV INFUSION INIT: CPT

## 2019-08-08 PROCEDURE — 83735 ASSAY OF MAGNESIUM: CPT | Performed by: PHYSICIAN ASSISTANT

## 2019-08-08 PROCEDURE — 85025 COMPLETE CBC W/AUTO DIFF WBC: CPT | Performed by: PHYSICIAN ASSISTANT

## 2019-08-08 RX ORDER — HEPARIN SODIUM,PORCINE 10 UNIT/ML
5 VIAL (ML) INTRAVENOUS
Status: DISCONTINUED | OUTPATIENT
Start: 2019-08-08 | End: 2019-08-08 | Stop reason: HOSPADM

## 2019-08-08 RX ADMIN — HEPARIN, PORCINE (PF) 10 UNIT/ML INTRAVENOUS SYRINGE 5 ML: at 14:16

## 2019-08-08 RX ADMIN — SODIUM CHLORIDE 1000 ML: 9 INJECTION, SOLUTION INTRAVENOUS at 15:01

## 2019-08-08 RX ADMIN — HEPARIN, PORCINE (PF) 10 UNIT/ML INTRAVENOUS SYRINGE 5 ML: at 14:17

## 2019-08-08 ASSESSMENT — PAIN SCALES - GENERAL: PAINLEVEL: NO PAIN (0)

## 2019-08-08 NOTE — PROGRESS NOTES
Infusion Nursing Note:  El Ace presents today for IVF.    Patient seen by provider today: Yes: Dr. Brown   present during visit today: Not Applicable.    Note: Patient arrives for 1L NS related to orthostatic BP noted today in lab.  Administered over 1 hour and tolerated well.  No further replacement needs today.    Intravenous Access:  [facility].    Treatment Conditions:  Results reviewed, labs MET treatment parameters, ok to proceed with treatment.      Post Infusion Assessment:  Patient tolerated infusion without incident.       Discharge Plan:   Patient discharged in stable condition accompanied by: wife.  Departure Mode: Ambulatory.  Patient and wife aware of follow up appointments.    Albania Olivier RN

## 2019-08-08 NOTE — NURSING NOTE
"Oncology Rooming Note    August 8, 2019 2:22 PM   El Ace is a 64 year old male who presents for:    Chief Complaint   Patient presents with     RECHECK     Return: AML     Initial Vitals: BP (!) 87/55 (Patient Position: Standing)   Pulse 114   Temp 98.1  F (36.7  C)   Resp 16   Wt 87.2 kg (192 lb 3.2 oz)   SpO2 98%   BMI 28.96 kg/m   Estimated body mass index is 28.96 kg/m  as calculated from the following:    Height as of 7/18/19: 1.735 m (5' 8.31\").    Weight as of this encounter: 87.2 kg (192 lb 3.2 oz). Body surface area is 2.05 meters squared.  No Pain (0) Comment: Data Unavailable   No LMP for male patient.  Allergies reviewed: Yes  Medications reviewed: Yes    Medications: Medication refills not needed today.  Pharmacy name entered into [employer]:    [facility] - [place] [phone]  [facility] [place], [place] - [address] [facility] [phone]    Clinical concerns: None       Dayanara Morfin CMA              "

## 2019-08-09 LAB
BACTERIA SPEC CULT: NO GROWTH
BACTERIA SPEC CULT: NO GROWTH
CMV DNA SPEC NAA+PROBE-ACNC: NORMAL [IU]/ML
CMV DNA SPEC NAA+PROBE-LOG#: NORMAL {LOG_IU}/ML
Lab: NORMAL
Lab: NORMAL
SPECIMEN SOURCE: NORMAL

## 2019-08-09 NOTE — NURSING NOTE
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 This was explained to the patient and his wife.  All of their questions were answered. The patient verbalized understanding.  He also understands that if he withdraws from the study there is a \"end of treatment\" visit 4 weeks after the last dose. The patient was provided with a signed copy of the IRB-approved consent form.    Consent Version Date: 8/5/19  Consent obtained by: Ellyn Chaudhari RN     [id number] Study Visit Note   Subject name: El Ace     Visit: 8/8/19   [id number]     Did the study visit occur within the appropriate window allowed by the protocol? Yes     Since the last study visit has has been gradually feeling better. Karnofsky PS = 90%.    I have personally interviewed El Ace and reviewed his medical record for adverse events  and these have been recorded on the corresponding logs in El RAMIREZ Db's research file. The Targeted Toxicity Worksheet was completed.     El RAMIREZ Ace was given the opportunity to ask any trial related questions.  Please see provider progress note for physical exam and other clinical information. Labs were reviewed - any significant lab values were addressed and reviewed.    Ellyn Chaudhari RN  "

## 2019-08-12 ENCOUNTER — INFUSION THERAPY VISIT (OUTPATIENT)
Dept: TRANSPLANT | Facility: CLINIC | Age: 64
End: 2019-08-12
Attending: INTERNAL MEDICINE
Payer: COMMERCIAL

## 2019-08-12 ENCOUNTER — OFFICE VISIT (OUTPATIENT)
Dept: ORTHOPEDICS | Facility: CLINIC | Age: 64
End: 2019-08-12
Payer: COMMERCIAL

## 2019-08-12 ENCOUNTER — APPOINTMENT (OUTPATIENT)
Dept: LAB | Facility: CLINIC | Age: 64
End: 2019-08-12
Attending: INTERNAL MEDICINE
Payer: COMMERCIAL

## 2019-08-12 ENCOUNTER — TELEPHONE (OUTPATIENT)
Dept: RHEUMATOLOGY | Facility: CLINIC | Age: 64
End: 2019-08-12

## 2019-08-12 VITALS
RESPIRATION RATE: 16 BRPM | DIASTOLIC BLOOD PRESSURE: 80 MMHG | WEIGHT: 193.3 LBS | SYSTOLIC BLOOD PRESSURE: 116 MMHG | BODY MASS INDEX: 29.13 KG/M2 | HEART RATE: 98 BPM | TEMPERATURE: 98.2 F | OXYGEN SATURATION: 99 %

## 2019-08-12 VITALS
RESPIRATION RATE: 16 BRPM | DIASTOLIC BLOOD PRESSURE: 80 MMHG | SYSTOLIC BLOOD PRESSURE: 116 MMHG | WEIGHT: 193 LBS | BODY MASS INDEX: 29.08 KG/M2

## 2019-08-12 DIAGNOSIS — M25.532 LEFT WRIST PAIN: Primary | ICD-10-CM

## 2019-08-12 DIAGNOSIS — C92.01 AML (ACUTE MYELOID LEUKEMIA) IN REMISSION (H): Primary | ICD-10-CM

## 2019-08-12 DIAGNOSIS — Z94.84 HISTORY OF ALLOGENEIC STEM CELL TRANSPLANT (H): ICD-10-CM

## 2019-08-12 LAB
ALBUMIN SERPL-MCNC: 3.2 G/DL (ref 3.4–5)
ALP SERPL-CCNC: 119 U/L (ref 40–150)
ALT SERPL W P-5'-P-CCNC: 28 U/L (ref 0–70)
ANION GAP SERPL CALCULATED.3IONS-SCNC: 5 MMOL/L (ref 3–14)
AST SERPL W P-5'-P-CCNC: 17 U/L (ref 0–45)
BASOPHILS # BLD AUTO: 0 10E9/L (ref 0–0.2)
BASOPHILS NFR BLD AUTO: 0.2 %
BILIRUB SERPL-MCNC: 0.6 MG/DL (ref 0.2–1.3)
BUN SERPL-MCNC: 15 MG/DL (ref 7–30)
CALCIUM SERPL-MCNC: 8.3 MG/DL (ref 8.5–10.1)
CHLORIDE SERPL-SCNC: 98 MMOL/L (ref 94–109)
CO2 SERPL-SCNC: 26 MMOL/L (ref 20–32)
CREAT SERPL-MCNC: 0.96 MG/DL (ref 0.66–1.25)
DIFFERENTIAL METHOD BLD: ABNORMAL
EOSINOPHIL # BLD AUTO: 0.1 10E9/L (ref 0–0.7)
EOSINOPHIL NFR BLD AUTO: 0.6 %
ERYTHROCYTE [DISTWIDTH] IN BLOOD BY AUTOMATED COUNT: 17.2 % (ref 10–15)
GFR SERPL CREATININE-BSD FRML MDRD: 83 ML/MIN/{1.73_M2}
GLUCOSE SERPL-MCNC: 115 MG/DL (ref 70–99)
HCT VFR BLD AUTO: 30.1 % (ref 40–53)
HGB BLD-MCNC: 10.3 G/DL (ref 13.3–17.7)
IMM GRANULOCYTES # BLD: 0.1 10E9/L (ref 0–0.4)
IMM GRANULOCYTES NFR BLD: 0.5 %
LYMPHOCYTES # BLD AUTO: 0.8 10E9/L (ref 0.8–5.3)
LYMPHOCYTES NFR BLD AUTO: 6.1 %
MAGNESIUM SERPL-MCNC: 2 MG/DL (ref 1.6–2.3)
MCH RBC QN AUTO: 33.6 PG (ref 26.5–33)
MCHC RBC AUTO-ENTMCNC: 34.2 G/DL (ref 31.5–36.5)
MCV RBC AUTO: 98 FL (ref 78–100)
MONOCYTES # BLD AUTO: 1.6 10E9/L (ref 0–1.3)
MONOCYTES NFR BLD AUTO: 12.6 %
NEUTROPHILS # BLD AUTO: 9.8 10E9/L (ref 1.6–8.3)
NEUTROPHILS NFR BLD AUTO: 80 %
NRBC # BLD AUTO: 0 10*3/UL
NRBC BLD AUTO-RTO: 0 /100
PLATELET # BLD AUTO: 183 10E9/L (ref 150–450)
POTASSIUM SERPL-SCNC: 4 MMOL/L (ref 3.4–5.3)
PROT SERPL-MCNC: 7 G/DL (ref 6.8–8.8)
RBC # BLD AUTO: 3.07 10E12/L (ref 4.4–5.9)
SODIUM SERPL-SCNC: 129 MMOL/L (ref 133–144)
TACROLIMUS BLD-MCNC: 8.7 UG/L (ref 5–15)
TME LAST DOSE: NORMAL H
WBC # BLD AUTO: 12.3 10E9/L (ref 4–11)

## 2019-08-12 PROCEDURE — 80053 COMPREHEN METABOLIC PANEL: CPT | Performed by: INTERNAL MEDICINE

## 2019-08-12 PROCEDURE — 36592 COLLECT BLOOD FROM PICC: CPT

## 2019-08-12 PROCEDURE — 80197 ASSAY OF TACROLIMUS: CPT | Performed by: INTERNAL MEDICINE

## 2019-08-12 PROCEDURE — 83735 ASSAY OF MAGNESIUM: CPT | Performed by: INTERNAL MEDICINE

## 2019-08-12 PROCEDURE — 85025 COMPLETE CBC W/AUTO DIFF WBC: CPT | Performed by: INTERNAL MEDICINE

## 2019-08-12 PROCEDURE — 25000128 H RX IP 250 OP 636: Mod: ZF | Performed by: PHYSICIAN ASSISTANT

## 2019-08-12 PROCEDURE — G0463 HOSPITAL OUTPT CLINIC VISIT: HCPCS

## 2019-08-12 RX ORDER — OXYCODONE HYDROCHLORIDE 5 MG/1
5 TABLET ORAL EVERY 6 HOURS PRN
Qty: 30 TABLET | Refills: 0 | Status: SHIPPED | OUTPATIENT
Start: 2019-08-12 | End: 2019-11-14

## 2019-08-12 RX ORDER — DIPHENHYDRAMINE HCL 25 MG
25-50 CAPSULE ORAL EVERY 6 HOURS PRN
Status: CANCELLED
Start: 2019-08-12

## 2019-08-12 RX ORDER — CYCLOBENZAPRINE HCL 5 MG
10 TABLET ORAL 3 TIMES DAILY PRN
Qty: 30 TABLET | Refills: 0 | Status: SHIPPED | OUTPATIENT
Start: 2019-08-12 | End: 2021-04-19

## 2019-08-12 RX ORDER — HEPARIN SODIUM,PORCINE 10 UNIT/ML
5 VIAL (ML) INTRAVENOUS
Status: CANCELLED | OUTPATIENT
Start: 2019-08-12

## 2019-08-12 RX ORDER — CEFTRIAXONE SODIUM 2 G
2 VIAL (EA) INJECTION EVERY 24 HOURS
Status: CANCELLED
Start: 2019-08-12

## 2019-08-12 RX ORDER — HEPARIN SODIUM,PORCINE 10 UNIT/ML
5 VIAL (ML) INTRAVENOUS
Status: DISCONTINUED | OUTPATIENT
Start: 2019-08-12 | End: 2019-08-12 | Stop reason: HOSPADM

## 2019-08-12 RX ORDER — HEPARIN SODIUM (PORCINE) LOCK FLUSH IV SOLN 100 UNIT/ML 100 UNIT/ML
5 SOLUTION INTRAVENOUS
Status: CANCELLED | OUTPATIENT
Start: 2019-08-12

## 2019-08-12 RX ADMIN — HEPARIN, PORCINE (PF) 10 UNIT/ML INTRAVENOUS SYRINGE 5 ML: at 11:51

## 2019-08-12 RX ADMIN — HEPARIN, PORCINE (PF) 10 UNIT/ML INTRAVENOUS SYRINGE 5 ML: at 11:50

## 2019-08-12 RX ADMIN — LIDOCAINE HYDROCHLORIDE 3 ML: 10 INJECTION, SOLUTION EPIDURAL; INFILTRATION; INTRACAUDAL; PERINEURAL at 20:19

## 2019-08-12 ASSESSMENT — PAIN SCALES - GENERAL: PAINLEVEL: SEVERE PAIN (7)

## 2019-08-12 NOTE — NURSING NOTE
"Oncology Rooming Note    August 12, 2019 11:54 AM   El Ace is a 64 year old male who presents for:    Chief Complaint   Patient presents with     RECHECK     PT is here for a rtn for AML S/P BMT      Initial Vitals: Blood Pressure 116/80   Pulse 98   Temperature 98.2  F (36.8  C)   Respiration 16   Weight 87.7 kg (193 lb 4.8 oz)   Oxygen Saturation 99%   Body Mass Index 29.13 kg/m   Estimated body mass index is 29.13 kg/m  as calculated from the following:    Height as of 7/18/19: 1.735 m (5' 8.31\").    Weight as of this encounter: 87.7 kg (193 lb 4.8 oz). Body surface area is 2.06 meters squared.  Severe Pain (7) Comment: Data Unavailable   No LMP for male patient.  Allergies reviewed: Yes  Medications reviewed: Yes    Medications: Medication refills not needed today.  Pharmacy name entered into [employer]:    [facility] - [facility].  [facility] [place], [place] - [address] [facility] [phone]    Clinical concerns: none       Shana Ballard MA              "

## 2019-08-12 NOTE — PROGRESS NOTES
[facility]  Orthopedics  Emiliano Dumont MD  2019     Name: El Ace  MRN: [id number]  Age: 64 year old  : 1955  Referring provider: Bmt Clinic     Chief Complaint: Pain of the Left Wrist    History of Present Illness:   El cAe is a 64 year old, right handed male who presents today for evaluation of left wrist pain.  The patient reports he had issues with polyarthralgias while he was in the hospital for a month for treatment of his CML.  Rheumatology was consulted and he had fluid aspirated from his wrist which showed calcium pyrophosphate dihydrate crystals.  He was treated with steroids for pseudogout with marked improvement in his symptoms.  He has recurrent left wrist pain which is reminiscent of the joint pain he had in the hospital.  He is also experiencing neck and back pain, similar to his pattern in the hospital.  However over the last 2 days swelling has improved markedly, particularly since this morning.  He has been referred here by BMT as they would like his wrist aspirated to confirm CPPD crystals prior to initiating course of prednisone.    Review of Systems:   A 10-point review of systems was obtained and is negative except for as noted in the HPI.     Medications:      acetaminophen (TYLENOL) 325 MG tablet, Take 2 tablets (650 mg) by mouth every 4 hours as needed for mild pain or fever (pre-med before blood products), Disp: , Rfl:      acyclovir (ZOVIRAX) 800 MG tablet, Take 1 tablet (800 mg) by mouth 5 times daily, Disp: 150 tablet, Rfl: 3     cyclobenzaprine (FLEXERIL) 5 MG tablet, Take 2 tablets (10 mg) by mouth 3 times daily as needed for muscle spasms, Disp: 30 tablet, Rfl: 0     heparin lock flush 10 UNIT/ML SOLN injection, 5 mLs by Intracatheter route every 24 hours, Disp: 60 mL, Rfl: 0     magnesium oxide (MAG-OX) 400 MG tablet, Take 3 tablets (1,200 mg) by mouth 2 times daily, Disp: 180 tablet, Rfl: 1     ondansetron (ZOFRAN-ODT) 8 MG ODT tab, Take 1 tablet (8 mg) by  mouth every 8 hours, Disp: 100 tablet, Rfl: 0     oxyCODONE (ROXICODONE) 5 MG tablet, Take 1 tablet (5 mg) by mouth every 6 hours as needed for severe pain, Disp: 30 tablet, Rfl: 0     pramox-pe-glycerin-petrolatum (PREPARATION H) 1-0.25-14.4-15 % CREA cream, Place rectally 3 times daily as needed for hemorrhoids, Disp: , Rfl:      prochlorperazine (COMPAZINE) 5 MG tablet, Take 1-2 tablets (5-10 mg) by mouth every 6 hours as needed for nausea or vomiting, Disp: 30 tablet, Rfl: 1     sulfamethoxazole-trimethoprim (BACTRIM DS/SEPTRA DS) 800-160 MG tablet, Take 1 tablet by mouth Every Mon, Tues two times daily Start taking after day+28 (7/1) and instructed to so by [facility] clinic., Disp: 16 tablet, Rfl: 0     tacrolimus (GENERIC EQUIVALENT) 0.5 MG capsule, Take 2 capsules (1 mg) by mouth 2 times daily, Disp: 120 capsule, Rfl: 3     tamsulosin (FLOMAX) 0.4 MG capsule, Take 1 capsule (0.4 mg) by mouth daily, Disp: 30 capsule, Rfl: 0     triamcinolone (KENALOG) 0.1 % external ointment, Apply topically 2 times daily, Disp: 80 g, Rfl: 0     voriconazole (VFEND) 50 MG tablet, Take 3 tablets (150 mg) by mouth every 12 hours, Disp: 180 tablet, Rfl: 0    Allergies:  Polymyxin b; Lactose; and Vancomycin     Past Medical History:  Acute myeloid leukemia   Generalized anxiety disorder   Dysthymic disorder  Attention deficit and hyperactivity disorder   Gastroesophageal reflux disease     Past Surgical History:  Vasectomy 2002   Knee arthroscopy 1995  Carpal tunnel release   Laminectomy      Social History:  He admits to former tobacco use and denies alcohol use.     Family History:  Colon cancer - mother  Stroke - mother  Diabetes - mother    Physical Examination:  Blood pressure 116/80, resp. rate 16, weight 87.5 kg (193 lb).    General: Alert, pleasant, no distress  Left wrist: Mild warmth and trace effusion of the left wrist.  There is slight discomfort to palpation of the dorsal radiocarpal joint.  Has some pain with extremes of R  OM testing, but no significant limitation is noted.  Strength is intact about the wrist and hand.  Sensation is intact.  Cap refill brisk.    Assessment:   64 year old, right handed male with left wrist pain.  He has previously diagnosed with pseudogout based on synovial fluid analysis and responded well to steroids.  He is here today for attempted repeat aspiration prior to treatment with steroids, per BMT team.    Plan:   Discussed assessment as well as aspiration procedure with the patient in detail.  See procedure note  for details.  Unfortunately aspiration was difficult today and resulted in no significant fluid aspiration, despite imaging confirmation of intra-articular placement of the needle.  This may be secondary to lack of redundant synovial fluid or synovitis seen on ultrasound.  Patient will discuss next steps with BMT team, as they would prefer not to treat with prednisone without  crystal confirmation.  Should his effusion return, he can return to the clinic for repeated attempt at aspiration.  Additionally patient does have follow-up scheduled with rheumatology for later this month.    Emiliano Dumont MD    Scribe Disclosure:  I, Mary Guadarrama, am serving as a scribe to document services personally performed by Emiliano Dumont MD at this visit, based upon the provider's statements to me. All documentation has been reviewed by the aforementioned provider prior to being entered into the official medical record.

## 2019-08-12 NOTE — PROGRESS NOTES
[facility] BMT Clinic    Diagnosis:   1. 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     Treatments:   1. 7+3 (Dauno), 3/15/19, no response, Day 12 persistent disease with near packed marrow  2. Dec 10d+Ric 3/26/19, CR1 on 4/23/19 but with pos mutations (IDH1 17%, RUNX1 8%)  3. 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   4. Maintenance ALT-803 study, 1 dose, stopped due to profound fatigue, moderate to severe hypotension, and fever    PMH: HLP, BPH, DJD, RLS    Interval history: 2 episodes of watery BMs today, no n/v, fatigue improving, no dizziness, appetite improving. New sudden onset neck, and low back pain Saturday night. He notes that today he also has new left wrist pain. He does not have a fever. He has tried tylenol without any improvement in pain.      10-point ROS otherwise negative.     Lab Results   Component Value Date    WBC 12.3 (H) 08/12/2019    HGB 10.3 (L) 08/12/2019    HCT 30.1 (L) 08/12/2019     08/12/2019     08/08/2019    POTASSIUM 4.8 08/08/2019    CHLORIDE 103 08/08/2019    CO2 25 08/08/2019    BUN 15 08/08/2019    CR 1.22 08/08/2019     (H) 08/08/2019    SED 88 (H) 06/25/2019    NTBNPI 2,031 (H) 04/03/2019    TROPI <0.015 04/23/2019    AST 25 08/08/2019    ALT 37 08/08/2019    ALKPHOS 127 08/08/2019    BILITOTAL 0.5 08/08/2019    INR 1.15 (H) 06/24/2019       Current Outpatient Medications   Medication     acetaminophen (TYLENOL) 325 MG tablet     acyclovir (ZOVIRAX) 800 MG tablet     heparin lock flush 10 UNIT/ML SOLN injection     magnesium oxide (MAG-OX) 400 MG tablet     ondansetron (ZOFRAN-ODT) 8 MG ODT tab     pramox-pe-glycerin-petrolatum (PREPARATION H) 1-0.25-14.4-15 % CREA cream     prochlorperazine (COMPAZINE) 5 MG tablet     sulfamethoxazole-trimethoprim (BACTRIM DS/SEPTRA DS) 800-160 MG tablet     tacrolimus (GENERIC EQUIVALENT) 0.5 MG capsule     tamsulosin (FLOMAX) 0.4 MG  "capsule     triamcinolone (KENALOG) 0.1 % external ointment     voriconazole (VFEND) 50 MG tablet     Current Facility-Administered Medications   Medication     heparin lock flush 10 UNIT/ML injection 5 mL     Ph/E:   Vitals: /80   Pulse 98   Temp 98.2  F (36.8  C)   Resp 16   Wt 87.7 kg (193 lb 4.8 oz)   SpO2 99%   BMI 29.13 kg/m     KPS 90%  General: NAD; H&N: no mucosal lesions;   Lungs clear; Heart RRR; Abdomen; Soft, No organomegaly; Extremities: N  o edema; Skin rash on abdomen is gone, the one on elbows is peeling and improving;   MSK: left wrist mildly swollen, pt tenderness at the anatomical snuff box, Pain with flexion.   Neuro: Nonfocal; Mood/Affect: appropriate; Lymph: no LAD    A&P:   1. AML: day +60 haplo, CR1. RFLP every 2 weeks until 100% chimeric in both compartments. Start ALT-803 on study 7/24, repeat monthly x10. Stopped due to severe fatigue and fevers.      2. ID: Afebrile.  Had IV abx recently due to Uen693- cultures negative.   Continue prophylaxis with acyclovir, vori, bactrim.     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 Level 8/8:11.4      4. Possible pseudogout: off pred. Hx of Crown-Denz syndrome. (left wrist aspiration 6/24, MRI cpsine 6/25)   Discussed with Dr Brown who favors taht pt be seen by Rheumatology/ortho for joint aspiration and no tx empirically with prednisone.   - Dsichssed with on call residient as pt prefers to continue to see Rheumatology as \"they know me\" from his recent hospitalization. BMT  working on getting appt made this week for eval and possible left wrist aspiration.   Given script for oxycodone 5mg q4hrs prn and flexeril to manage pain in the mean time.      5.  GI:   - poor appetite  - diarrhea x1 yesterday and again this Morning- IV abx? Per pt not like cdiff. If increases will send stool studies.       6.  Rash:  - rash on elbows is resovled but now desquaminating.       7.  FEN:    Cr improved   - hyponatremia - Back again mOnitor      8.  " Hematology:   - counts stable    RTC Wednesday for labs and provider visit  Working on Rheumatology f/u this week.   PT referral should he be markedly improved and just MSK issue.

## 2019-08-12 NOTE — LETTER
8/12/2019       RE: El Ace  [address] [facility] [address]     Dear Colleague,    Thank you for referring your patient, El Ace, to the [facility] SPORTS AND ORTHOPAEDIC WALK IN CLINIC at [facility]. Please see a copy of my visit note below.          SPORTS & ORTHOPEDIC WALK-IN VISIT 8/12/2019    Primary Care Physician: Dr. Vizcaino    Has Leukemia. When in hospital had some problems with pain in wrist and feet. Had a number of treatments, Rheumatologist concluded pseudogout. Prescribed steroid which helped a lot. Hadn't had an issue for several months. Has now had a reoccurrence of pain for the last several days. Was trying to set up rheumatology appointment. More pain a few nights ago, had a hard time sleeping. Now having pain in neck and back too which is the main problem when sleeping. Unsure if this episode is exactly the same as the previous episode.     Reason for visit:     What part of your body is injured / painful?  left wrist    What caused the injury /pain? Unsure    How long ago did your injury occur or pain begin? several days ago    What are your most bothersome symptoms? Pain and Swelling    How would you characterize your symptom?  Throbbing, aching    What makes your symptoms better? Nothing    What makes your symptoms worse? rest    Have you been previously seen for this problem? No    Medical History:    Any recent changes to your medical history? No    Any new medication prescribed since last visit? No    Have you had surgery on this body part before? No    Social History:    Occupation: [employer]    Handedness: Right    Review of Systems:    Do you have fever, chills, weight loss? No    Do you have any vision problems? No    Do you have any chest pain or edema? No    Do you have any shortness of breath or wheezing?  No    Do you have stomach problems? Struggles with diet, loose stools    Do you have any numbness or focal weakness?  No    Do you have diabetes? No    Do you have problems with bleeding or clotting? No    Do you have an rashes or other skin lesions? Yes, on elbow       Medium Joint Injection/Arthrocentesis: L radiocarpal  Date/Time: 2019 8:19 PM  Performed by: Emiliano Dumont MD  Authorized by: Emiliano Dumont MD     Indications:  Diagnostic evaluation  Needle Size:  22 G  Guidance: ultrasound    Approach:  Dorsal  Location:  Wrist  Site:  L radiocarpal  Medications:  3 mL lidocaine (PF) 1 %  Procedure discussed: discussed risks, benefits, and alternatives    Consent Given by:  Patient  Timeout: timeout called immediately prior to procedure    Prep: patient was prepped and draped in usual sterile fashion       Patient was positioned seated with left hand on exam table in a pronated and slightly flexed position.  The area overlying the dorsal aspect of the radiocarpal joint was cleaned with a chlorhexidine swab.  Next using sterile technique the radiocarpal joint was visualized with ultrasound.  The skin was anesthetized with 3 mL 1% lidocaine.  A 22-gauge needle was then inserted under direct and continuous ultrasound guidance into the radiocarpal joint and aspiration was attempted.  Unfortunately no meaningful synovial fluid was able to be aspirated. Images were captured and saved to the permanent record.  The patient tolerated the procedure well and there were no immediate complications.  Patient was given routine postinjection instructions and advised to follow-up with any increase in pain, redness, swelling or drainage from the injection site.    Emiliano Dumont MD      [facility]  Orthopedics  Emiliano Dumont MD  2019     Name: El Ace  MRN: [id number]  Age: 64 year old  : 1955  Referring provider: [facility]     Chief Complaint: Pain of the Left Wrist    History of Present Illness:   El Ace is a 64 year old, right handed male who presents today for evaluation of left wrist pain.   The patient reports he had issues with polyarthralgias while he was in the hospital for a month for treatment of his CML.  Rheumatology was consulted and he had fluid aspirated from his wrist which showed calcium pyrophosphate dihydrate crystals.  He was treated with steroids for pseudogout with marked improvement in his symptoms.  He has recurrent left wrist pain which is reminiscent of the joint pain he had in the hospital.  He is also experiencing neck and back pain, similar to his pattern in the hospital.  However over the last 2 days swelling has improved markedly, particularly since this morning.  He has been referred here by [facility] as they would like his wrist aspirated to confirm CPPD crystals prior to initiating course of prednisone.    Review of Systems:   A 10-point review of systems was obtained and is negative except for as noted in the HPI.     Medications:      acetaminophen (TYLENOL) 325 MG tablet, Take 2 tablets (650 mg) by mouth every 4 hours as needed for mild pain or fever (pre-med before blood products), Disp: , Rfl:      acyclovir (ZOVIRAX) 800 MG tablet, Take 1 tablet (800 mg) by mouth 5 times daily, Disp: 150 tablet, Rfl: 3     cyclobenzaprine (FLEXERIL) 5 MG tablet, Take 2 tablets (10 mg) by mouth 3 times daily as needed for muscle spasms, Disp: 30 tablet, Rfl: 0     heparin lock flush 10 UNIT/ML SOLN injection, 5 mLs by Intracatheter route every 24 hours, Disp: 60 mL, Rfl: 0     magnesium oxide (MAG-OX) 400 MG tablet, Take 3 tablets (1,200 mg) by mouth 2 times daily, Disp: 180 tablet, Rfl: 1     ondansetron (ZOFRAN-ODT) 8 MG ODT tab, Take 1 tablet (8 mg) by mouth every 8 hours, Disp: 100 tablet, Rfl: 0     oxyCODONE (ROXICODONE) 5 MG tablet, Take 1 tablet (5 mg) by mouth every 6 hours as needed for severe pain, Disp: 30 tablet, Rfl: 0     pramox-pe-glycerin-petrolatum (PREPARATION H) 1-0.25-14.4-15 % CREA cream, Place rectally 3 times daily as needed for hemorrhoids, Disp: , Rfl:       prochlorperazine (COMPAZINE) 5 MG tablet, Take 1-2 tablets (5-10 mg) by mouth every 6 hours as needed for nausea or vomiting, Disp: 30 tablet, Rfl: 1     sulfamethoxazole-trimethoprim (BACTRIM DS/SEPTRA DS) 800-160 MG tablet, Take 1 tablet by mouth Every Mon, Tues two times daily Start taking after day+28 (7/1) and instructed to so by [facility] clinic., Disp: 16 tablet, Rfl: 0     tacrolimus (GENERIC EQUIVALENT) 0.5 MG capsule, Take 2 capsules (1 mg) by mouth 2 times daily, Disp: 120 capsule, Rfl: 3     tamsulosin (FLOMAX) 0.4 MG capsule, Take 1 capsule (0.4 mg) by mouth daily, Disp: 30 capsule, Rfl: 0     triamcinolone (KENALOG) 0.1 % external ointment, Apply topically 2 times daily, Disp: 80 g, Rfl: 0     voriconazole (VFEND) 50 MG tablet, Take 3 tablets (150 mg) by mouth every 12 hours, Disp: 180 tablet, Rfl: 0    Allergies:  Polymyxin b; Lactose; and Vancomycin     Past Medical History:  Acute myeloid leukemia   Generalized anxiety disorder   Dysthymic disorder  Attention deficit and hyperactivity disorder   Gastroesophageal reflux disease     Past Surgical History:  Vasectomy 2002   Knee arthroscopy 1995  Carpal tunnel release   Laminectomy      Social History:  He admits to former tobacco use and denies alcohol use.     Family History:  Colon cancer - mother  Stroke - mother  Diabetes - mother    Physical Examination:  Blood pressure 116/80, resp. rate 16, weight 87.5 kg (193 lb).    General: Alert, pleasant, no distress  Left wrist: Mild warmth and trace effusion of the left wrist.  There is slight discomfort to palpation of the dorsal radiocarpal joint.  Has some pain with extremes of R OM testing, but no significant limitation is noted.  Strength is intact about the wrist and hand.  Sensation is intact.  Cap refill brisk.    Assessment:   64 year old, right handed male with left wrist pain.  He has previously diagnosed with pseudogout based on synovial fluid analysis and responded well to steroids.  He is here  today for attempted repeat aspiration prior to treatment with steroids, per [facility] team.    Plan:   Discussed assessment as well as aspiration procedure with the patient in detail.  See procedure note  for details.  Unfortunately aspiration was difficult today and resulted in no significant fluid aspiration, despite imaging confirmation of intra-articular placement of the needle.  This may be secondary to lack of redundant synovial fluid or synovitis seen on ultrasound.  Patient will discuss next steps with [facility] team, as they would prefer not to treat with prednisone without  crystal confirmation.  Should his effusion return, he can return to the clinic for repeated attempt at aspiration.  Additionally patient does have follow-up scheduled with rheumatology for later this month.    Emiliano Dumont MD    Scribe Disclosure:  I, Mary Guadarrama, am serving as a scribe to document services personally performed by Emiliano Dumont MD at this visit, based upon the provider's statements to me. All documentation has been reviewed by the aforementioned provider prior to being entered into the official medical record.

## 2019-08-12 NOTE — TELEPHONE ENCOUNTER
----- Message from Jaziel Olivier MD sent at 8/12/2019  3:26 PM CDT -----  Regarding: RE: urgent follow up  Great, I will be running over from a dental appt in [facility], so may be a little late.  ----- Message -----  From: Leif Jackson MD  Sent: 8/12/2019   2:54 PM  To: Jaziel Olivier MD, Lucie Reed, RN, #  Subject: RE: urgent follow up                             Yep, I can come over and see him at 10AM.    Samu    ----- Message -----  From: Jaziel Olivier MD  Sent: 8/12/2019   2:42 PM  To: Leif Jackson MD, Lucie Reed, RN, #  Subject: RE: urgent follow up                             If patient could come at 1000 tomorrow, and if Dr. Jackson could come then also, I will be glad to add him on.     ----- Message -----  From: Lucie Reed RN  Sent: 8/12/2019   2:38 PM  To: Jaziel Olivier MD, Leif Jackson MD, #  Subject: FW: urgent follow up                             Hi Dr. Olivier,      Is that slot for 8/13/19 tomorrow at 3:00 PM still available? Would that time frame be appropriate for this patient? I have cc'd Sarika so she can also follow up with patient as needed.         Thank you,        Lucie Reed RN, BSN, PHN  [facility]  RN Care Coordinator Medicine Specialty Pool Nurse   (Nephrology, Rheumatology, Infectious Disease & Hepatology)    ----- Message -----  From: Leif Jackson MD  Sent: 8/12/2019   1:47 PM  To: Jaziel Olivier MD, #  Subject: urgent follow up                                 Hi,    We received a phone call from [facility] today that Mr. Ace is experiencing recurrent neck and left wrist pain, similar to his presentation in June when we diagnosed him with pseudogout/crowned dens. He has appointment 8/29 but they ask that this get moved up to this week, are we able to fit him into clinic in next few days? I know Dr. Olivier has clinic tomorrow - potential overbook? I'm sure one of us could come over from hospital to see that  patient for him.     We mentioned treating empirically but they prefer he be evaluated and possibly have joint aspiration first given risk of GVHD with steroid use? I wasn't aware of that potential side effect of prednisone use. So they want him seen before any treatment is prescribed.     If able to be fit in, please call patient to schedule tomorrow or Wednesday. Thank you!    Ernestina Jackson

## 2019-08-12 NOTE — PROGRESS NOTES
Chief Complaint   Patient presents with     Infusion     possible infusion for AML     Labs were monitored and no infusion/transfusion needed.

## 2019-08-12 NOTE — PROGRESS NOTES
SPORTS & ORTHOPEDIC WALK-IN VISIT 8/12/2019    Primary Care Physician: Dr. Vizcaino    Has Leukemia. When in hospital had some problems with pain in wrist and feet. Had a number of treatments, Rheumatologist concluded pseudogout. Prescribed steroid which helped a lot. Hadn't had an issue for several months. Has now had a reoccurrence of pain for the last several days. Was trying to set up rheumatology appointment. More pain a few nights ago, had a hard time sleeping. Now having pain in neck and back too which is the main problem when sleeping. Unsure if this episode is exactly the same as the previous episode.     Reason for visit:     What part of your body is injured / painful?  left wrist    What caused the injury /pain? Unsure    How long ago did your injury occur or pain begin? several days ago    What are your most bothersome symptoms? Pain and Swelling    How would you characterize your symptom?  Throbbing, aching    What makes your symptoms better? Nothing    What makes your symptoms worse? rest    Have you been previously seen for this problem? No    Medical History:    Any recent changes to your medical history? No    Any new medication prescribed since last visit? No    Have you had surgery on this body part before? No    Social History:    Occupation: [employer]    Handedness: Right    Review of Systems:    Do you have fever, chills, weight loss? No    Do you have any vision problems? No    Do you have any chest pain or edema? No    Do you have any shortness of breath or wheezing?  No    Do you have stomach problems? Struggles with diet, loose stools    Do you have any numbness or focal weakness? No    Do you have diabetes? No    Do you have problems with bleeding or clotting? No    Do you have an rashes or other skin lesions? Yes, on elbow       Medium Joint Injection/Arthrocentesis: L radiocarpal  Date/Time: 8/12/2019 8:19 PM  Performed by: Emiliano Dumont MD  Authorized by: Tim  Emiliano Hammond MD     Indications:  Diagnostic evaluation  Needle Size:  22 G  Guidance: ultrasound    Approach:  Dorsal  Location:  Wrist  Site:  L radiocarpal  Medications:  3 mL lidocaine (PF) 1 %  Procedure discussed: discussed risks, benefits, and alternatives    Consent Given by:  Patient  Timeout: timeout called immediately prior to procedure    Prep: patient was prepped and draped in usual sterile fashion       Patient was positioned seated with left hand on exam table in a pronated and slightly flexed position.  The area overlying the dorsal aspect of the radiocarpal joint was cleaned with a chlorhexidine swab.  Next using sterile technique the radiocarpal joint was visualized with ultrasound.  The skin was anesthetized with 3 mL 1% lidocaine.  A 22-gauge needle was then inserted under direct and continuous ultrasound guidance into the radiocarpal joint and aspiration was attempted.  Unfortunately no meaningful synovial fluid was able to be aspirated. Images were captured and saved to the permanent record.  The patient tolerated the procedure well and there were no immediate complications.  Patient was given routine postinjection instructions and advised to follow-up with any increase in pain, redness, swelling or drainage from the injection site.    Emiliano Dumont MD

## 2019-08-12 NOTE — TELEPHONE ENCOUNTER
8/12/2019  4:06 PM      RN Care Coordinator Encounter   Attempted to connect with patient to schedule with Dr. Olivier tomorrow 8/13/19 at 10:00 am, see notes below. No answer. LVM for patient to please contact the clinic at [phone] and follow the prompts to reach the Rheumatology clinic and ask to speak with one of the nurses to confirm add on appointment. Will route to Sarika THOMAS RN CC for further f/u with patient.           Lucie Reed RN, BSN, PHN  [facility]  RN Care Coordinator Medicine Specialty Pool Nurse   (Nephrology, Rheumatology, Infectious Disease & Hepatology)

## 2019-08-13 ENCOUNTER — ONCOLOGY VISIT (OUTPATIENT)
Dept: TRANSPLANT | Facility: CLINIC | Age: 64
End: 2019-08-13
Attending: PHYSICIAN ASSISTANT
Payer: COMMERCIAL

## 2019-08-13 ENCOUNTER — OFFICE VISIT (OUTPATIENT)
Dept: RHEUMATOLOGY | Facility: CLINIC | Age: 64
End: 2019-08-13
Attending: INTERNAL MEDICINE
Payer: COMMERCIAL

## 2019-08-13 VITALS
TEMPERATURE: 98.4 F | SYSTOLIC BLOOD PRESSURE: 86 MMHG | HEART RATE: 116 BPM | WEIGHT: 190.8 LBS | DIASTOLIC BLOOD PRESSURE: 55 MMHG | HEIGHT: 70 IN | BODY MASS INDEX: 27.32 KG/M2

## 2019-08-13 VITALS — SYSTOLIC BLOOD PRESSURE: 118 MMHG | DIASTOLIC BLOOD PRESSURE: 68 MMHG | HEART RATE: 90 BPM

## 2019-08-13 DIAGNOSIS — Z94.84 HISTORY OF ALLOGENEIC STEM CELL TRANSPLANT (H): ICD-10-CM

## 2019-08-13 DIAGNOSIS — C92.01 AML (ACUTE MYELOID LEUKEMIA) IN REMISSION (H): ICD-10-CM

## 2019-08-13 DIAGNOSIS — Z87.39 HISTORY OF CALCIUM PYROPHOSPHATE DEPOSITION DISEASE (CPPD): Primary | ICD-10-CM

## 2019-08-13 DIAGNOSIS — M11.232 PSEUDOGOUT OF LEFT WRIST: ICD-10-CM

## 2019-08-13 DIAGNOSIS — C92.01 AML (ACUTE MYELOID LEUKEMIA) IN REMISSION (H): Primary | ICD-10-CM

## 2019-08-13 LAB
ANION GAP SERPL CALCULATED.3IONS-SCNC: 8 MMOL/L (ref 3–14)
BASOPHILS # BLD AUTO: 0 10E9/L (ref 0–0.2)
BASOPHILS NFR BLD AUTO: 0.2 %
BUN SERPL-MCNC: 13 MG/DL (ref 7–30)
CALCIUM SERPL-MCNC: 8.4 MG/DL (ref 8.5–10.1)
CHLORIDE SERPL-SCNC: 97 MMOL/L (ref 94–109)
CO2 SERPL-SCNC: 22 MMOL/L (ref 20–32)
CREAT SERPL-MCNC: 1.13 MG/DL (ref 0.66–1.25)
CRP SERPL-MCNC: 79.6 MG/L (ref 0–8)
DIFFERENTIAL METHOD BLD: ABNORMAL
EOSINOPHIL # BLD AUTO: 0 10E9/L (ref 0–0.7)
EOSINOPHIL NFR BLD AUTO: 0.3 %
ERYTHROCYTE [DISTWIDTH] IN BLOOD BY AUTOMATED COUNT: 17.2 % (ref 10–15)
GFR SERPL CREATININE-BSD FRML MDRD: 68 ML/MIN/{1.73_M2}
GLUCOSE SERPL-MCNC: 151 MG/DL (ref 70–99)
HCT VFR BLD AUTO: 28.7 % (ref 40–53)
HGB BLD-MCNC: 9.9 G/DL (ref 13.3–17.7)
IMM GRANULOCYTES # BLD: 0.1 10E9/L (ref 0–0.4)
IMM GRANULOCYTES NFR BLD: 0.7 %
LYMPHOCYTES # BLD AUTO: 0.7 10E9/L (ref 0.8–5.3)
LYMPHOCYTES NFR BLD AUTO: 6.8 %
MAGNESIUM SERPL-MCNC: 1.9 MG/DL (ref 1.6–2.3)
MCH RBC QN AUTO: 33.6 PG (ref 26.5–33)
MCHC RBC AUTO-ENTMCNC: 34.5 G/DL (ref 31.5–36.5)
MCV RBC AUTO: 97 FL (ref 78–100)
MONOCYTES # BLD AUTO: 1.4 10E9/L (ref 0–1.3)
MONOCYTES NFR BLD AUTO: 13.3 %
NEUTROPHILS # BLD AUTO: 8.4 10E9/L (ref 1.6–8.3)
NEUTROPHILS NFR BLD AUTO: 78.7 %
NRBC # BLD AUTO: 0 10*3/UL
NRBC BLD AUTO-RTO: 0 /100
PLATELET # BLD AUTO: 180 10E9/L (ref 150–450)
POTASSIUM SERPL-SCNC: 4.1 MMOL/L (ref 3.4–5.3)
RBC # BLD AUTO: 2.95 10E12/L (ref 4.4–5.9)
SODIUM SERPL-SCNC: 127 MMOL/L (ref 133–144)
WBC # BLD AUTO: 10.7 10E9/L (ref 4–11)

## 2019-08-13 PROCEDURE — 80048 BASIC METABOLIC PNL TOTAL CA: CPT | Performed by: PHYSICIAN ASSISTANT

## 2019-08-13 PROCEDURE — 96360 HYDRATION IV INFUSION INIT: CPT

## 2019-08-13 PROCEDURE — 83735 ASSAY OF MAGNESIUM: CPT | Performed by: PHYSICIAN ASSISTANT

## 2019-08-13 PROCEDURE — 25000128 H RX IP 250 OP 636: Mod: ZF | Performed by: PHYSICIAN ASSISTANT

## 2019-08-13 PROCEDURE — 87799 DETECT AGENT NOS DNA QUANT: CPT | Performed by: PHYSICIAN ASSISTANT

## 2019-08-13 PROCEDURE — 85025 COMPLETE CBC W/AUTO DIFF WBC: CPT | Performed by: PHYSICIAN ASSISTANT

## 2019-08-13 PROCEDURE — 86140 C-REACTIVE PROTEIN: CPT | Performed by: PHYSICIAN ASSISTANT

## 2019-08-13 PROCEDURE — G0463 HOSPITAL OUTPT CLINIC VISIT: HCPCS | Mod: ZF

## 2019-08-13 RX ORDER — COLCHICINE 0.6 MG/1
0.6 TABLET ORAL 2 TIMES DAILY
Qty: 60 TABLET | Refills: 3 | Status: SHIPPED | OUTPATIENT
Start: 2019-08-13 | End: 2019-08-27 | Stop reason: ALTCHOICE

## 2019-08-13 RX ORDER — PREDNISONE 10 MG/1
TABLET ORAL
Qty: 17 TABLET | Refills: 0 | Status: SHIPPED | OUTPATIENT
Start: 2019-08-13 | End: 2019-09-12

## 2019-08-13 RX ORDER — MAGNESIUM OXIDE 400 MG/1
1200 TABLET ORAL 2 TIMES DAILY
Qty: 180 TABLET | Refills: 1 | Status: CANCELLED | OUTPATIENT
Start: 2019-08-13

## 2019-08-13 RX ORDER — LIDOCAINE HYDROCHLORIDE 10 MG/ML
3 INJECTION, SOLUTION EPIDURAL; INFILTRATION; INTRACAUDAL; PERINEURAL
Status: DISCONTINUED | OUTPATIENT
Start: 2019-08-12 | End: 2019-11-08

## 2019-08-13 RX ADMIN — SODIUM CHLORIDE 1000 ML: 9 INJECTION, SOLUTION INTRAVENOUS at 12:03

## 2019-08-13 ASSESSMENT — PAIN SCALES - GENERAL: PAINLEVEL: SEVERE PAIN (6)

## 2019-08-13 ASSESSMENT — MIFFLIN-ST. JEOR: SCORE: 1648.77

## 2019-08-13 NOTE — NURSING NOTE
[address] [address]  Dept: [phone]  ______________________________________________________________________________    Patient: El Ace   : 1955   MRN: [id number]   2019    INVASIVE PROCEDURE SAFETY CHECKLIST    Date: 2019   Procedure:Left wrist aspiration   Patient Name: El Ace  MRN: [id number]  YOB: 1955    Action: Complete sections as appropriate. Any discrepancy results in a HARD COPY until resolved.     PRE PROCEDURE:  Patient ID verified with 2 identifiers (name and  or MRN): Yes  Procedure and site verified with patient/designee (when able): Yes  Accurate consent documentation in medical record: Yes  H&P (or appropriate assessment) documented in medical record: Yes  H&P must be up to 20 days prior to procedure and updates within 24 hours of procedure as applicable: NA  Relevant diagnostic and radiology test results appropriately labeled and displayed as applicable: Yes  Procedure site(s) marked with provider initials: NA    TIMEOUT:  Time-Out performed immediately prior to starting procedure, including verbal and active participation of all team members addressing the following:Yes  * Correct patient identify  * Confirmed that the correct side and site are marked  * An accurate procedure consent form  * Agreement on the procedure to be done  * Correct patient position  * Relevant images and results are properly labeled and appropriately displayed  * The need to administer antibiotics or fluids for irrigation purposes during the procedure as applicable   * Safety precautions based on patient history or medication use    DURING PROCEDURE: Verification of correct person, site, and procedures any time the responsibility for care of the patient is transferred to another member of the care team.       Prior to injection, verified patient identity using patient's name and date of  birth.  Due to injection administration, patient instructed to remain in clinic for 15 minutes  afterwards, and to report any adverse reaction to me immediately.    Wrist aspiration     Drug Amount Wasted:  Yes: 2 mg/ml   Vial/Syringe: Single dose vial  Expiration Date:  1/1/23      Hilda Parra ATC  August 12, 2019

## 2019-08-13 NOTE — TELEPHONE ENCOUNTER
8/13/2019  8:22 AM      RN Care Coordinator Encounter   Connected with patient who states he will be able to make it to the clinic today 8/13/19 at 10:00 am for further follow up as recommended below (see Epic message). This RN CC, contacted Dr. Olivier by phone, to confirm that patient will be arriving at 10:00 am today 8/13/19. Message also routed to Dr. Ernestina Jackson for confirmation who will meet with patient first at the [facility] clinics. Message routed to Clinic Coordinators to add patient to provider schedule for today 8/13/19 at 10:00 am per provider request.                 Lucie Reed RN, BSN, PHN  [facility]  RN Care Coordinator Medicine Specialty Pool Nurse   (Nephrology, Rheumatology, Infectious Disease & Hepatology)

## 2019-08-13 NOTE — PATIENT INSTRUCTIONS
Neck/Wrist pain  - We suspect this is pseudogout flare. You do not have enough fluid to aspirate from your wrist today (and aspiration attempt failed yesterday)  - Recommend treating with Prednisone 40 mg daily for 2 days, then 20 mg daily for 3 days, then 10 mg daily for 3 days, then stop.   - After you have completed prednisone, start COLCHICINE 0.6 mg in morning and evening for PREVENTION of pseudogout flares.   - Follow up with Dr. Olivier in 2 months.      Low blood pressure, fast heart rate, dizziness  - We would like you to see BMT in clinic today for consideration of IV fluids. Dr. Brown or a clinic staff member will call you shortly. Please go to their clinic after your appointment with us today.   - You do not have any obvious infectious symptoms today    Rash  - Does not appear infective, may be related to the oxycodone that you took last night.  - Would monitor, try holding off on oxycodone for now and see if it improves. BMT team may have other thoughts about your rash.

## 2019-08-13 NOTE — NURSING NOTE
"BP (!) 86/55   Pulse 116   Temp 98.4  F (36.9  C) (Oral)   Ht 1.765 m (5' 9.5\")   Wt 86.5 kg (190 lb 12.8 oz)   BMI 27.77 kg/m    Chief Complaint   Patient presents with     Consult     consult with osteoarthritis, tzimmer cma       "

## 2019-08-13 NOTE — LETTER
8/13/2019       RE: El Ace  [address] [facility] [address]     Dear Colleague,    Thank you for referring your patient, El Ace, to the [facility] RHEUMATOLOGY at [facility]. Please see a copy of my visit note below.    RHEUMATOLOGY FOLLOW UP NOTE     El Ace MRN# [id number]   Age: 64 year old YOB: 1955     Date of initial consult: 6/25/19 INPATIENT    Assessment and Plan:   Mr. Ace returns for urgent follow up today. 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 He was treated for CPPD flare involving neck and wrist in hospital with dramatic response. He was discharged on steroid taper and did well until recently when he developed similar symptoms again. He is presenting today with neck pain/stiffnes and left wrist pain. Of note, he had attempted aspiration yesterday in ortho/sports med clinic but no fluid was obtained.     Based on his presentation today, we suspect his symptoms today are due to flare of pseudogout/ CPPD. He does not have large enough effusion today for aspiration. But clinically, feel his presentation is consistent with pseudogout. We favor treating empirically for CPPD flare with prednisone burst/taper, followed by colchicine 0.6 daily for flare prevention. Would agree with continued magnesium supplementation, as hypomagnesemia can predispose one to CPPD.     He has rash on his anterior upper extremities that developed overnight. Only new medication was oxycodone. He had rash recently as well after taking new study medication. BMT to assess whether related to study drug. Rash does not appear cellulitic.    His vitals are abnormal today, hypotensive and tachycardic. Afebrile. On ROS, no localizing source for infection, though he is  immunosuppressed and at risk for atypical infections given his BMT/medication regimen. However, he has had episodes of hypotension and tachycardic since he took IL-15 agonist (study medication) on 7/24. I spoke with Dr. Brown who recommended patient come to BMT clinic for evaluation and IV fluids. Patient and family comfortable with plan.     Problem list:  #. CPPD with Crowned Dens syndrome  #. Acute flare of CPPD with left wrist synovitis and neck pain/stiffness  #. Orthostatic hypotension, tachycardia  #. Flexor elbow rash, pruritic  #. Hx of hypomagnesemia   #. AML s/p BMT, follows with heme/onc    Recommendations:  -- Prednisone 40 mg daily x3 days, 20 mg daily x3 days, 10 mg daily x3 days, then stop  -- After completing prednisone, start colchicine 0.6 mg BID for pseudogout flare prophylaxis; if not tolerating due to diarrhea, decrease to 0.6 mg daily.   -- Continue with magnesium supplementation  -- Follow up in 2 months with Dr. Olivier  -- Patient to go to BMT clinic for evaluation and IV fluids after our visit today (see above)    The patient was seen and staffed with Dr. Jaziel Olivier.     Leif Jackson  Rheumatology fellow  [phone]    I saw this patient with the Rheumatology Fellow. I agree with the findings and recommendations.    Jaziel Olivier M.D.  Staff Rheumatologist, [facility]  Pager [phone]    History of Present Illness/Subjective:   Mr Ace is a 63 yo man with history of AML s/p BMT and recent diagnosis of CPPD after acute pseudogout with crowned dens syndrome flare in hospital. Rheumatology was initially consulted on 6/25 after patient had 6 days of sudden onset neck pain/stiffness and left wrist pain, swelling, and reduced ROM. There was concern that he had osteomyelitis of C1 vertebrae and there was consideration for aspiration of the C1 to rule this out. It was noted that on prior CT scans, he had calcification around C1, that is seen in crowned dens syndrome (CPPD affecting  C1). Given swelling of left wrist, we aspirated joint and found many calcium pyrophosphate crystals with negative cultures, consistent with CPPD flare/pseudogout. He responded drastically to one dose of IV methylprednisolone, supporting the diagnosis. He was discharged soon thereafter and has continued his AML treatment in Heme/onc clinic. He had scheduled follow up for 8/29 but due to recurrent neck pain/stiffness, and left wrist pain/swelling, he was scheduled for urgent follow up today.     Of note, he had aspiration attempt yesterday in ortho/sports med clinic but no fluid obtained. Today, he states he began having neck pain and left wrist pain last Friday. This continued to worsen until about yesterday afternoon when his left wrist began to feel a little better. His neck remains quite stiff today and he has very limited range of motion. He says his lower left back is a little painful as well, worse with movement. This has been bothersome prior to onset of neck and wrist symptoms. Overall, he feels his symptoms are not as severe as in past but still feels similar.    His vitals are concerning today. His BP was 86/55, on manual recheck, it was 80/50. He is tachycardic in 110s. In past month, he has had similar vitals during Oncology visits and has received IV hydration in BMT clinic. On ROS, he denied chest pain, shortness of breath, cough, burning with urination, worsening diarrhea (has some baseline diarrhea but none today, and no worse than normal). He does mention new rash on flexor elbows, started overnight after taking oxycodone; it is itchy but not painful.             Medications:     Current Outpatient Medications   Medication     acetaminophen (TYLENOL) 325 MG tablet     acyclovir (ZOVIRAX) 800 MG tablet     colchicine (COLCYRS) 0.6 MG tablet     cyclobenzaprine (FLEXERIL) 5 MG tablet     heparin lock flush 10 UNIT/ML SOLN injection     magnesium oxide (MAG-OX) 400 MG tablet     ondansetron (ZOFRAN-ODT)  "8 MG ODT tab     oxyCODONE (ROXICODONE) 5 MG tablet     pramox-pe-glycerin-petrolatum (PREPARATION H) 1-0.25-14.4-15 % CREA cream     predniSONE (DELTASONE) 10 MG tablet     prochlorperazine (COMPAZINE) 5 MG tablet     sulfamethoxazole-trimethoprim (BACTRIM DS/SEPTRA DS) 800-160 MG tablet     tacrolimus (GENERIC EQUIVALENT) 0.5 MG capsule     tamsulosin (FLOMAX) 0.4 MG capsule     triamcinolone (KENALOG) 0.1 % external ointment     voriconazole (VFEND) 50 MG tablet     Current Facility-Administered Medications   Medication     lidocaine (PF) (XYLOCAINE) 1 % injection 3 mL     Facility-Administered Medications Ordered in Other Visits   Medication     0.9% sodium chloride BOLUS            Review of Systems:   Complete 8 point ROS completed and negative unless mentioned in subjective.          Physical Exam:   BP (!) 86/55   Pulse 116   Temp 98.4  F (36.9  C) (Oral)   Ht 1.765 m (5' 9.5\")   Wt 86.5 kg (190 lb 12.8 oz)   BMI 27.77 kg/m       General: appears comfortable, no medical distress  HEENT: nc/at, eomi, white sclera, mmm  CV: tachycardic, reg rhythm. No m/r/g. Pulses intact bilaterally./  Lung: ctab, normal effort; not coughing  Abdomen: soft, nontender  Neuro: aox3, ambulates slowly but independently. Normal speech.   Skin/Hair: warm, dry; he has faint pink papular rash diffusely on anterior/flexor elbows/forearms. Has desquamation of skin on extensor elbows.   Ext: no significant peripheral edema.  Msk: examined neck, back, shoulders, elbows, wrists, hands, knees, ankles, feet. Unremarkable/normal exam except:  -- Neck: very limited lateral, flexion/ext ROM. No TTP of cervical spine.   -- Back: no TTP over entire spine  -- Left wrist: some limitation in flex/ext ROM; no visible effusion but on palpation, there is some fullness to left wrist. Wrist not overly warm to touch but is tender to palpation. Right wrist was normal.           Data:   Labs and imaging reviewed.   Mg 2.0    6/25 L wrist aspiration: " negative gram stain, culture; crystals + CPPD; not enough fluid for cell count  6/25 ESR 88,  --> 87 after dose of steroids    Leif Jackson MD  Rheumatology fellow  [phone]     RHEUM RESULTS Latest Ref Rng & Units 8/8/2019 8/12/2019 8/13/2019   SED RATE 0 - 20 mm/h - - -   CRP, INFLAMMATION 0.0 - 8.0 mg/L - - 79.6(H)   CK TOTAL 30 - 300 U/L - - -   AST 0 - 45 U/L 25 17 -   ALT 0 - 70 U/L 37 28 -   ALBUMIN 3.4 - 5.0 g/dL 3.5 3.2(L) -   WBC 4.0 - 11.0 10e9/L 11.1(H) 12.3(H) 10.7   RBC 4.4 - 5.9 10e12/L 3.31(L) 3.07(L) 2.95(L)   HGB 13.3 - 17.7 g/dL 11.0(L) 10.3(L) 9.9(L)   HCT 40.0 - 53.0 % 32.5(L) 30.1(L) 28.7(L)   MCV 78 - 100 fl 98 98 97   MCHC 31.5 - 36.5 g/dL 33.8 34.2 34.5   RDW 10.0 - 15.0 % 18.2(H) 17.2(H) 17.2(H)    - 450 10e9/L 190 183 180   CREATININE 0.66 - 1.25 mg/dL 1.22 0.96 1.13   GFR ESTIMATE, IF BLACK >60 mL/min/[1.73:m2] 72 >90 79   GFR ESTIMATE >60 mL/min/[1.73:m2] 62 83 68   HEPATITIS C ANTIBODY NR:Nonreactive - - -        ,  ,  ,  ,  ,  ,  ,  ,  ,  ,  ,  ,  ,  ,   Hepatitis B Core Olinda   Date Value Ref Range Status   05/10/2019 Nonreactive NR^Nonreactive Final   ,   Hep B Surface Agn   Date Value Ref Range Status   05/10/2019 Nonreactive NR^Nonreactive Final   ,  ,  ,  ,  ,  ,  ,  ,  ,  ,  ,  ,  ,  ,  ,  ,  ,  ,  ,  ,  ,  ,  ,  ,  ,  ,  ,  ,  ,        Again, thank you for allowing me to participate in the care of your patient.      Sincerely,    Jaziel Olivier MD

## 2019-08-13 NOTE — PROGRESS NOTES
Infusion Nursing Note:  El Ace presents today for fluids.    Patient seen by provider today: Yes: Val   present during visit today: Not Applicable.    Note: Patient received 1L NS for low BP.  BP improved after fluids.    Intravenous Access:  [facility].    Treatment Conditions:  Lab Results   Component Value Date    HGB 9.9 08/13/2019     Lab Results   Component Value Date    WBC 10.7 08/13/2019      Lab Results   Component Value Date    ANEU 8.4 08/13/2019     Lab Results   Component Value Date     08/13/2019      Lab Results   Component Value Date     08/13/2019                   Lab Results   Component Value Date    POTASSIUM 4.1 08/13/2019           Lab Results   Component Value Date    MAG 1.9 08/13/2019            Lab Results   Component Value Date    CR 1.13 08/13/2019                   Lab Results   Component Value Date    DEBBIE 8.4 08/13/2019                Lab Results   Component Value Date    BILITOTAL 0.6 08/12/2019           Lab Results   Component Value Date    ALBUMIN 3.2 08/12/2019                    Lab Results   Component Value Date    ALT 28 08/12/2019           Lab Results   Component Value Date    AST 17 08/12/2019       Results reviewed, labs MET treatment parameters, ok to proceed with treatment.      Post Infusion Assessment:  Patient tolerated infusion without incident.  Site patent and intact, free from redness, edema or discomfort.       Discharge Plan:   Discharge instructions reviewed with: Patient.  Patient and/or family verbalized understanding of discharge instructions and all questions answered.  Patient discharged in stable condition accompanied by: wife.  Departure Mode: Ambulatory.    Lynn Fletcher RN

## 2019-08-13 NOTE — PROGRESS NOTES
[facility] BMT Clinic    Diagnosis:   1. 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     Treatments:   1. 7+3 ([place]), 3/15/19, no response, Day 12 persistent disease with near packed marrow  2. Dec 10d+Ric 3/26/19, CR1 on 4/23/19 but with pos mutations (IDH1 17%, RUNX1 8%)  3. 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   4. Maintenance ALT-803 study, 1 dose, stopped due to profound fatigue, moderate to severe hypotension, and fever    PMH: HLP, BPH, DJD, RLS    Interval history: Seen as add on today as hypotensive in Rheumatology clinic. He did get joint tapped yesterday however unable to obtain any synovial fluid. Both Orthopedic physician and Rheumatology consultants agree this is clearly recurrence of psuedogout and both strongly favor treating it as such.   Randy notes that he did finally sleep with the help  Of oxycodone last night. His neck and wrist are still stiff. He thinks his lower back feels better. He thought when he was a little dizzy this morning it maybe 'hangover' effect of oxycodone. Feels much better after getting IVF. No n/v/d.      10-point ROS otherwise negative.     Lab Results   Component Value Date    WBC 10.7 08/13/2019    HGB 9.9 (L) 08/13/2019    HCT 28.7 (L) 08/13/2019     08/13/2019     (L) 08/13/2019    POTASSIUM 4.1 08/13/2019    CHLORIDE 97 08/13/2019    CO2 22 08/13/2019    BUN 13 08/13/2019    CR 1.13 08/13/2019     (H) 08/13/2019    SED 88 (H) 06/25/2019    NTBNPI 2,031 (H) 04/03/2019    TROPI <0.015 04/23/2019    AST 17 08/12/2019    ALT 28 08/12/2019    ALKPHOS 119 08/12/2019    BILITOTAL 0.6 08/12/2019    INR 1.15 (H) 06/24/2019       Current Outpatient Medications   Medication     acetaminophen (TYLENOL) 325 MG tablet     acyclovir (ZOVIRAX) 800 MG tablet     colchicine (COLCYRS) 0.6 MG tablet     cyclobenzaprine (FLEXERIL) 5 MG tablet     heparin lock flush 10 UNIT/ML  SOLN injection     magnesium oxide (MAG-OX) 400 MG tablet     ondansetron (ZOFRAN-ODT) 8 MG ODT tab     oxyCODONE (ROXICODONE) 5 MG tablet     pramox-pe-glycerin-petrolatum (PREPARATION H) 1-0.25-14.4-15 % CREA cream     predniSONE (DELTASONE) 10 MG tablet     prochlorperazine (COMPAZINE) 5 MG tablet     sulfamethoxazole-trimethoprim (BACTRIM DS/SEPTRA DS) 800-160 MG tablet     tacrolimus (GENERIC EQUIVALENT) 0.5 MG capsule     tamsulosin (FLOMAX) 0.4 MG capsule     triamcinolone (KENALOG) 0.1 % external ointment     voriconazole (VFEND) 50 MG tablet     Current Facility-Administered Medications   Medication     lidocaine (PF) (XYLOCAINE) 1 % injection 3 mL     Ph/E:   Vitals: There were no vitals taken for this visit.   KPS 90%  General: NAD; H&N: no mucosal lesions;   Lungs clear; Heart RRR; Abdomen; Soft, No organomegaly; Extremities: N  Etx: No lower extremity edema.   MSK: still unable to turn his head.   Neuro: Nonfocal; Mood/Affect: appropriate; Lymph: no LAD    A&P:   1. AML: day +60 haplo, CR1. RFLP every 2 weeks until 100% chimeric in both compartments. Start ALT-803 on study 7/24, repeat monthly x10. Stopped due to severe fatigue and fevers.      2. ID: Afebrile.  Had IV abx recently due to [id number]- cultures negative.   Continue prophylaxis with acyclovir, vori, bactrim.     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 Level 8/12 8.7  - no change to dose.      4. Possible pseudogout: off pred. 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   - 8/13 seen by Rheumonatology- same physician that had seen in inpt 6/2019. They also feel this is recurrent pseudogout. Start pred 40mg daily (8/13 and 8/14)., 20mg daily x3 days (8/15-8/17), 10mg daily x 3 days (8/18-8/20).   - Obtain baseline CRP 79.   - Also per rheum - start cholochine 0.6mg daily (interacts with tac so will not dose BID). DIscussed with pharm D who  notes his is appropriate dose. Potential SE of diarrhea. Monitor for this. Randy is to start chlochine when he completes pred (8/21).   -continue supportive care with  oxycodone 5mg q4hrs prn and flexeril to manage pain in the mean time.      5.  GI: No complaints today.       6.  Rash: very slight rash on bilateral biceps. Could be GVHD- however monitor as starting pred for pseudogout above.   - rash on elbows is resovled but now desquaminating.       7.  FEN:    Cr improved. Give 1L NS for hypotension which improved with fluids. Suspect pred will also help this. Maybe we should work up for adrenal insufficiency when he is off pred.   - hyponatremia - Back again mOnitor      8.  Hematology:   - counts stable    RTC Wednesday for labs and provider visit - okay to cancel if he feels great tomorrow  RTC Friday - f/u joint pain/stiffness and f/u very slight rash on biceps    NITHIN ArangoC  [phone]

## 2019-08-13 NOTE — PROGRESS NOTES
RHEUMATOLOGY FOLLOW UP NOTE     El Ace MRN# [id number]   Age: 64 year old YOB: 1955     Date of initial consult: 6/25/19 INPATIENT    Assessment and Plan:   Mr. Ace returns for urgent follow up today. 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 He was treated for CPPD flare involving neck and wrist in hospital with dramatic response. He was discharged on steroid taper and did well until recently when he developed similar symptoms again. He is presenting today with neck pain/stiffnes and left wrist pain. Of note, he had attempted aspiration yesterday in ortho/sports med clinic but no fluid was obtained.     Based on his presentation today, we suspect his symptoms today are due to flare of pseudogout/ CPPD. He does not have large enough effusion today for aspiration. But clinically, feel his presentation is consistent with pseudogout. We favor treating empirically for CPPD flare with prednisone burst/taper, followed by colchicine 0.6 daily for flare prevention. Would agree with continued magnesium supplementation, as hypomagnesemia can predispose one to CPPD.     He has rash on his anterior upper extremities that developed overnight. Only new medication was oxycodone. He had rash recently as well after taking new study medication. BMT to assess whether related to study drug. Rash does not appear cellulitic.    His vitals are abnormal today, hypotensive and tachycardic. Afebrile. On ROS, no localizing source for infection, though he is immunosuppressed and at risk for atypical infections given his BMT/medication regimen. However, he has had episodes of hypotension and tachycardic since he took IL-15 agonist (study medication) on 7/24. I spoke with Dr. Brown who recommended patient come to BMT clinic for evaluation  and IV fluids. Patient and family comfortable with plan.     Problem list:  #. CPPD with Crowned Dens syndrome  #. Acute flare of CPPD with left wrist synovitis and neck pain/stiffness  #. Orthostatic hypotension, tachycardia  #. Flexor elbow rash, pruritic  #. Hx of hypomagnesemia   #. AML s/p BMT, follows with heme/onc    Recommendations:  -- Prednisone 40 mg daily x3 days, 20 mg daily x3 days, 10 mg daily x3 days, then stop  -- After completing prednisone, start colchicine 0.6 mg BID for pseudogout flare prophylaxis; if not tolerating due to diarrhea, decrease to 0.6 mg daily.   -- Continue with magnesium supplementation  -- Follow up in 2 months with Dr. Olivier  -- Patient to go to BMT clinic for evaluation and IV fluids after our visit today (see above)    The patient was seen and staffed with Dr. Jaziel Olivier.     Leif Jackson  Rheumatology fellow  [phone]    I saw this patient with the Rheumatology Fellow. I agree with the findings and recommendations.    Jaziel Olivier M.D.  Staff Rheumatologist, [facility]  Pager [phone]    History of Present Illness/Subjective:   Mr Ace is a 63 yo man with history of AML s/p BMT and recent diagnosis of CPPD after acute pseudogout with crowned dens syndrome flare in hospital. Rheumatology was initially consulted on 6/25 after patient had 6 days of sudden onset neck pain/stiffness and left wrist pain, swelling, and reduced ROM. There was concern that he had osteomyelitis of C1 vertebrae and there was consideration for aspiration of the C1 to rule this out. It was noted that on prior CT scans, he had calcification around C1, that is seen in crowned dens syndrome (CPPD affecting C1). Given swelling of left wrist, we aspirated joint and found many calcium pyrophosphate crystals with negative cultures, consistent with CPPD flare/pseudogout. He responded drastically to one dose of IV methylprednisolone, supporting the diagnosis. He was discharged soon thereafter  and has continued his AML treatment in Heme/onc clinic. He had scheduled follow up for 8/29 but due to recurrent neck pain/stiffness, and left wrist pain/swelling, he was scheduled for urgent follow up today.     Of note, he had aspiration attempt yesterday in ortho/sports med clinic but no fluid obtained. Today, he states he began having neck pain and left wrist pain last Friday. This continued to worsen until about yesterday afternoon when his left wrist began to feel a little better. His neck remains quite stiff today and he has very limited range of motion. He says his lower left back is a little painful as well, worse with movement. This has been bothersome prior to onset of neck and wrist symptoms. Overall, he feels his symptoms are not as severe as in past but still feels similar.    His vitals are concerning today. His BP was 86/55, on manual recheck, it was 80/50. He is tachycardic in 110s. In past month, he has had similar vitals during Oncology visits and has received IV hydration in BMT clinic. On ROS, he denied chest pain, shortness of breath, cough, burning with urination, worsening diarrhea (has some baseline diarrhea but none today, and no worse than normal). He does mention new rash on flexor elbows, started overnight after taking oxycodone; it is itchy but not painful.             Medications:     Current Outpatient Medications   Medication     acetaminophen (TYLENOL) 325 MG tablet     acyclovir (ZOVIRAX) 800 MG tablet     colchicine (COLCYRS) 0.6 MG tablet     cyclobenzaprine (FLEXERIL) 5 MG tablet     heparin lock flush 10 UNIT/ML SOLN injection     magnesium oxide (MAG-OX) 400 MG tablet     ondansetron (ZOFRAN-ODT) 8 MG ODT tab     oxyCODONE (ROXICODONE) 5 MG tablet     pramox-pe-glycerin-petrolatum (PREPARATION H) 1-0.25-14.4-15 % CREA cream     predniSONE (DELTASONE) 10 MG tablet     prochlorperazine (COMPAZINE) 5 MG tablet     sulfamethoxazole-trimethoprim (BACTRIM DS/SEPTRA DS) 800-160 MG  "tablet     tacrolimus (GENERIC EQUIVALENT) 0.5 MG capsule     tamsulosin (FLOMAX) 0.4 MG capsule     triamcinolone (KENALOG) 0.1 % external ointment     voriconazole (VFEND) 50 MG tablet     Current Facility-Administered Medications   Medication     lidocaine (PF) (XYLOCAINE) 1 % injection 3 mL     Facility-Administered Medications Ordered in Other Visits   Medication     0.9% sodium chloride BOLUS            Review of Systems:   Complete 8 point ROS completed and negative unless mentioned in subjective.          Physical Exam:   BP (!) 86/55   Pulse 116   Temp 98.4  F (36.9  C) (Oral)   Ht 1.765 m (5' 9.5\")   Wt 86.5 kg (190 lb 12.8 oz)   BMI 27.77 kg/m      General: appears comfortable, no medical distress  HEENT: nc/at, eomi, white sclera, mmm  CV: tachycardic, reg rhythm. No m/r/g. Pulses intact bilaterally./  Lung: ctab, normal effort; not coughing  Abdomen: soft, nontender  Neuro: aox3, ambulates slowly but independently. Normal speech.   Skin/Hair: warm, dry; he has faint pink papular rash diffusely on anterior/flexor elbows/forearms. Has desquamation of skin on extensor elbows.   Ext: no significant peripheral edema.  Msk: examined neck, back, shoulders, elbows, wrists, hands, knees, ankles, feet. Unremarkable/normal exam except:  -- Neck: very limited lateral, flexion/ext ROM. No TTP of cervical spine.   -- Back: no TTP over entire spine  -- Left wrist: some limitation in flex/ext ROM; no visible effusion but on palpation, there is some fullness to left wrist. Wrist not overly warm to touch but is tender to palpation. Right wrist was normal.           Data:   Labs and imaging reviewed.   Mg 2.0    6/25 L wrist aspiration: negative gram stain, culture; crystals + CPPD; not enough fluid for cell count  6/25 ESR 88,  --> 87 after dose of steroids    Leif Jackson MD  Rheumatology fellow  [phone]     RHEUM RESULTS Latest Ref Rng & Units 8/8/2019 8/12/2019 8/13/2019   SED RATE 0 - 20 mm/h - - " -   CRP, INFLAMMATION 0.0 - 8.0 mg/L - - 79.6(H)   CK TOTAL 30 - 300 U/L - - -   AST 0 - 45 U/L 25 17 -   ALT 0 - 70 U/L 37 28 -   ALBUMIN 3.4 - 5.0 g/dL 3.5 3.2(L) -   WBC 4.0 - 11.0 10e9/L 11.1(H) 12.3(H) 10.7   RBC 4.4 - 5.9 10e12/L 3.31(L) 3.07(L) 2.95(L)   HGB 13.3 - 17.7 g/dL 11.0(L) 10.3(L) 9.9(L)   HCT 40.0 - 53.0 % 32.5(L) 30.1(L) 28.7(L)   MCV 78 - 100 fl 98 98 97   MCHC 31.5 - 36.5 g/dL 33.8 34.2 34.5   RDW 10.0 - 15.0 % 18.2(H) 17.2(H) 17.2(H)    - 450 10e9/L 190 183 180   CREATININE 0.66 - 1.25 mg/dL 1.22 0.96 1.13   GFR ESTIMATE, IF BLACK >60 mL/min/[1.73:m2] 72 >90 79   GFR ESTIMATE >60 mL/min/[1.73:m2] 62 83 68   HEPATITIS C ANTIBODY NR:Nonreactive - - -        ,  ,  ,  ,  ,  ,  ,  ,  ,  ,  ,  ,  ,  ,   Hepatitis B Core [place]   Date Value Ref Range Status   05/10/2019 Nonreactive NR^Nonreactive Final   ,   Hep B Surface Agn   Date Value Ref Range Status   05/10/2019 Nonreactive NR^Nonreactive Final   ,  ,  ,  ,  ,  ,  ,  ,  ,  ,  ,  ,  ,  ,  ,  ,  ,  ,  ,  ,  ,  ,  ,  ,  ,  ,  ,  ,  ,

## 2019-08-14 LAB
CMV DNA SPEC NAA+PROBE-ACNC: NORMAL [IU]/ML
CMV DNA SPEC NAA+PROBE-LOG#: NORMAL {LOG_IU}/ML
EBV DNA # SPEC NAA+PROBE: NORMAL {COPIES}/ML
EBV DNA SPEC NAA+PROBE-LOG#: NORMAL {LOG_COPIES}/ML
SPECIMEN SOURCE: NORMAL

## 2019-08-15 ENCOUNTER — THERAPY VISIT (OUTPATIENT)
Dept: PHYSICAL THERAPY | Facility: CLINIC | Age: 64
End: 2019-08-15
Attending: PHYSICIAN ASSISTANT
Payer: COMMERCIAL

## 2019-08-15 DIAGNOSIS — C92.01 AML (ACUTE MYELOID LEUKEMIA) IN REMISSION (H): ICD-10-CM

## 2019-08-15 DIAGNOSIS — M54.50 ACUTE MIDLINE LOW BACK PAIN WITHOUT SCIATICA: ICD-10-CM

## 2019-08-15 DIAGNOSIS — M54.2 NECK PAIN: Primary | ICD-10-CM

## 2019-08-15 ASSESSMENT — 6 MINUTE WALK TEST (6MWT)
TOTAL DISTANCE WALKED (FT): 2040
TOTAL DISTANCE WALKED (METERS): 622

## 2019-08-15 NOTE — PROGRESS NOTES
08/15/19 1400   Quick Adds   Type of Visit Initial OP PT Evaluation   General Information   Start of Care Date 08/15/19   Referring Physician Miranda Lindsey PA-C   Orders Evaluate and Treat as Indicated   Order Date 08/12/19   Medical Diagnosis History of allogeneic stem cell transplant, Acute myeloid leukemia in remission. BMT 5/31/19   Precautions/Limitations immunosuppressed   Surgical/Medical history reviewed Yes   Pertinent history of current problem (include personal factors and/or comorbidities that impact the POC) History of allogeneic stem cell transplant, Acute myeloid leukemia in remission. BMT 5/31/19. was seen in cancer rehab recently and had increased his strength and aerobic capacity, now diagnosed with pseudogout last week with increased LB and neck and wrist pain. Since starting steroids x 2 days is feeling a bit better. Has returned to walking but not other exercises. Prior lumbar laminectomy and fusion   Prior level of function comment worked full time   Previous/Current Treatment Physical Therapy   Improvement after PT Significant   Current Community Support Family/friend caregiver   Patient role/Employment history Employed   Living environment [facility]   Patient/Family Goals Statement get back to where I was before with strength and have less pain   General Information Comments living at [facility],    Fall Risk Screen   Fall screen completed by PT   Have you fallen 2 or more times in the past year? No   Have you fallen and had an injury in the past year? No   Is patient a fall risk? No   Abuse Screen (yes response referral indicated)   Feels Unsafe at Home or Work/School no   Feels Threatened by Someone no   Does Anyone Try to Keep You From Having Contact with Others or Doing Things Outside Your Home? no   Physical Signs of Abuse Present no   System Outcome Measures   Outcome Measures Cancer Rehab   FACIT Fatigue Subscale (score out of 52). The higher the score, the better the  "QOL. 30   Six Minute Walk (meters). An increase of 70 or more meters indicates statistically significant change. 622   Pain   Patient currently in pain Yes   Pain location LB   Pain rating 1-2/10   Additional pain locations? Pain location 2;Pain location 3   Pain location 2 wrist: hurts with twisting on L   Pain location 3 neck   Pain rating 3 no pain at rest, but increases with rotation, flexion and extension.    Pain comments LBP : worse with twisting, sitting is ok   Vitals Signs   Heart Rate 84   Weight 86.5  (kg)   BMI 27.8   Cognitive Status Examination   Orientation orientation to person, place and time   Level of Consciousness alert   Follows Commands and Answers Questions 100% of the time   Cognitive Comment memory is \"good\"   Integumentary   Integumentary Comments flaking skin on B elbows   Posture   Posture Forward head position;Protracted shoulders   Palpation   Palpation increased muscle tone at L c3-c5 parapspinals    Range of Motion (ROM)   ROM Comment CROM: extn and B rotation -50% with pain on extn and flexion, R SB + on L;, L wrist full ROM, LB: -50% flex and extn and SB, with pain on flex and extn   Strength   Strength Comments weak L wrist flexion and extension, weak hip and knee extension, shoulder elevation   Gait Special Tests   Gait Special Tests SIX MINUTE WALK TEST   Gait Special Tests Six Minute Walk Test   Feet 2040 Feet   Comments HR: 84 to 140   Planned Therapy Interventions   Planned Therapy Interventions joint mobilization;neuromuscular re-education;ROM;strengthening;stretching;manual therapy   Clinical Impression   Criteria for Skilled Therapeutic Interventions Met yes, treatment indicated   PT Diagnosis weakness, neck, back and L wrist pain   Influenced by the following impairments weakness, decreased ROM   Functional limitations due to impairments unable to do exercises, difficulty sleeping   Clinical Presentation Stable/Uncomplicated   Clinical Presentation Rationale Pt has had " prior success with PT for strengthening and relief of pain   Clinical Decision Making (Complexity) Low complexity   Therapy Frequency other (see comments)   Predicted Duration of Therapy Intervention (days/wks) 8 visits over 3 months   Risk & Benefits of therapy have been explained Yes   Patient, Family & other staff in agreement with plan of care Yes   Clinical Impression Comments Pt had good increase in strength and decrease in neck and back pain after PT in July, but had return of pseudogout and now with increased pain, decreased ROM and weakness and would benefit from PT   Education Assessment   Preferred Learning Style Listening;Demonstration;Reading   Barriers to Learning No barriers   GOALS   PT Eval Goals 1;2;3   Goal 1   Goal Identifier home program   Goal Description In order to facilitate gains in general strength and endurance, and improve tolerance for functional mobility, ADLs, and recreational activities outside of physical therapy, patient will demonstrate independence with a HEP and report how to safely progress the program.   Target Date 10/15/19   Goal 2   Goal Identifier neck and back pain   Goal Description Pt will report 50-% decrease in pain so that he is able to return to prior exercise program and sleep pattern.   Target Date 10/15/19   Total Evaluation Time   PT Eval, Low Complexity Minutes (03669) 18

## 2019-08-16 ENCOUNTER — OFFICE VISIT (OUTPATIENT)
Dept: TRANSPLANT | Facility: CLINIC | Age: 64
End: 2019-08-16
Attending: STUDENT IN AN ORGANIZED HEALTH CARE EDUCATION/TRAINING PROGRAM
Payer: COMMERCIAL

## 2019-08-16 ENCOUNTER — APPOINTMENT (OUTPATIENT)
Dept: LAB | Facility: CLINIC | Age: 64
End: 2019-08-16
Attending: STUDENT IN AN ORGANIZED HEALTH CARE EDUCATION/TRAINING PROGRAM
Payer: COMMERCIAL

## 2019-08-16 VITALS
DIASTOLIC BLOOD PRESSURE: 70 MMHG | TEMPERATURE: 98 F | HEART RATE: 83 BPM | SYSTOLIC BLOOD PRESSURE: 108 MMHG | OXYGEN SATURATION: 99 % | RESPIRATION RATE: 18 BRPM | BODY MASS INDEX: 28.05 KG/M2 | WEIGHT: 192.7 LBS

## 2019-08-16 DIAGNOSIS — Z94.84 HISTORY OF ALLOGENEIC STEM CELL TRANSPLANT (H): ICD-10-CM

## 2019-08-16 DIAGNOSIS — C92.01 AML (ACUTE MYELOID LEUKEMIA) IN REMISSION (H): ICD-10-CM

## 2019-08-16 DIAGNOSIS — C95.00 ACUTE LEUKEMIA NOT HAVING ACHIEVED REMISSION (H): ICD-10-CM

## 2019-08-16 DIAGNOSIS — C92.01 ACUTE MYELOID LEUKEMIA IN REMISSION (H): Primary | ICD-10-CM

## 2019-08-16 LAB
ALBUMIN SERPL-MCNC: 3.1 G/DL (ref 3.4–5)
ALP SERPL-CCNC: 105 U/L (ref 40–150)
ALT SERPL W P-5'-P-CCNC: 45 U/L (ref 0–70)
ANION GAP SERPL CALCULATED.3IONS-SCNC: 6 MMOL/L (ref 3–14)
AST SERPL W P-5'-P-CCNC: 26 U/L (ref 0–45)
BASOPHILS # BLD AUTO: 0 10E9/L (ref 0–0.2)
BASOPHILS NFR BLD AUTO: 0.2 %
BILIRUB SERPL-MCNC: 0.2 MG/DL (ref 0.2–1.3)
BUN SERPL-MCNC: 17 MG/DL (ref 7–30)
CALCIUM SERPL-MCNC: 8.2 MG/DL (ref 8.5–10.1)
CHLORIDE SERPL-SCNC: 101 MMOL/L (ref 94–109)
CO2 SERPL-SCNC: 24 MMOL/L (ref 20–32)
CREAT SERPL-MCNC: 0.94 MG/DL (ref 0.66–1.25)
DIFFERENTIAL METHOD BLD: ABNORMAL
EOSINOPHIL # BLD AUTO: 0 10E9/L (ref 0–0.7)
EOSINOPHIL NFR BLD AUTO: 0.2 %
ERYTHROCYTE [DISTWIDTH] IN BLOOD BY AUTOMATED COUNT: 17 % (ref 10–15)
GFR SERPL CREATININE-BSD FRML MDRD: 85 ML/MIN/{1.73_M2}
GLUCOSE SERPL-MCNC: 129 MG/DL (ref 70–99)
HCT VFR BLD AUTO: 29.7 % (ref 40–53)
HGB BLD-MCNC: 10.2 G/DL (ref 13.3–17.7)
IMM GRANULOCYTES # BLD: 0.1 10E9/L (ref 0–0.4)
IMM GRANULOCYTES NFR BLD: 1 %
LYMPHOCYTES # BLD AUTO: 0.6 10E9/L (ref 0.8–5.3)
LYMPHOCYTES NFR BLD AUTO: 5.2 %
MAGNESIUM SERPL-MCNC: 1.6 MG/DL (ref 1.6–2.3)
MCH RBC QN AUTO: 33.9 PG (ref 26.5–33)
MCHC RBC AUTO-ENTMCNC: 34.3 G/DL (ref 31.5–36.5)
MCV RBC AUTO: 99 FL (ref 78–100)
MONOCYTES # BLD AUTO: 1 10E9/L (ref 0–1.3)
MONOCYTES NFR BLD AUTO: 9.1 %
NEUTROPHILS # BLD AUTO: 9.1 10E9/L (ref 1.6–8.3)
NEUTROPHILS NFR BLD AUTO: 84.3 %
NRBC # BLD AUTO: 0 10*3/UL
NRBC BLD AUTO-RTO: 0 /100
PLATELET # BLD AUTO: 225 10E9/L (ref 150–450)
POTASSIUM SERPL-SCNC: 4.2 MMOL/L (ref 3.4–5.3)
PROT SERPL-MCNC: 6.6 G/DL (ref 6.8–8.8)
RBC # BLD AUTO: 3.01 10E12/L (ref 4.4–5.9)
SODIUM SERPL-SCNC: 131 MMOL/L (ref 133–144)
TACROLIMUS BLD-MCNC: 8.4 UG/L (ref 5–15)
TME LAST DOSE: NORMAL H
WBC # BLD AUTO: 10.8 10E9/L (ref 4–11)

## 2019-08-16 PROCEDURE — 25000128 H RX IP 250 OP 636: Mod: ZF | Performed by: STUDENT IN AN ORGANIZED HEALTH CARE EDUCATION/TRAINING PROGRAM

## 2019-08-16 PROCEDURE — 80197 ASSAY OF TACROLIMUS: CPT | Performed by: STUDENT IN AN ORGANIZED HEALTH CARE EDUCATION/TRAINING PROGRAM

## 2019-08-16 PROCEDURE — G0463 HOSPITAL OUTPT CLINIC VISIT: HCPCS | Mod: ZF

## 2019-08-16 PROCEDURE — 80197 ASSAY OF TACROLIMUS: CPT | Performed by: PHYSICIAN ASSISTANT

## 2019-08-16 PROCEDURE — 83735 ASSAY OF MAGNESIUM: CPT | Performed by: PHYSICIAN ASSISTANT

## 2019-08-16 PROCEDURE — 80053 COMPREHEN METABOLIC PANEL: CPT | Performed by: PHYSICIAN ASSISTANT

## 2019-08-16 PROCEDURE — 36592 COLLECT BLOOD FROM PICC: CPT

## 2019-08-16 PROCEDURE — 85025 COMPLETE CBC W/AUTO DIFF WBC: CPT | Performed by: PHYSICIAN ASSISTANT

## 2019-08-16 RX ORDER — HEPARIN SODIUM,PORCINE 10 UNIT/ML
5 VIAL (ML) INTRAVENOUS
Status: DISCONTINUED | OUTPATIENT
Start: 2019-08-16 | End: 2019-08-16 | Stop reason: HOSPADM

## 2019-08-16 RX ORDER — ACYCLOVIR 800 MG/1
800 TABLET ORAL
Qty: 150 TABLET | Refills: 3 | Status: SHIPPED | OUTPATIENT
Start: 2019-08-16 | End: 2019-09-19

## 2019-08-16 RX ORDER — LANOLIN ALCOHOL/MO/W.PET/CERES
3-6 CREAM (GRAM) TOPICAL
Qty: 60 TABLET | Refills: 1 | Status: SHIPPED | OUTPATIENT
Start: 2019-08-16 | End: 2021-04-19

## 2019-08-16 RX ORDER — MAGNESIUM OXIDE 400 MG/1
1200 TABLET ORAL 2 TIMES DAILY
Qty: 180 TABLET | Refills: 1 | Status: SHIPPED | OUTPATIENT
Start: 2019-08-16 | End: 2019-09-05

## 2019-08-16 RX ORDER — TAMSULOSIN HYDROCHLORIDE 0.4 MG/1
0.4 CAPSULE ORAL DAILY
Qty: 30 CAPSULE | Refills: 0 | Status: SHIPPED | OUTPATIENT
Start: 2019-08-16 | End: 2019-09-19

## 2019-08-16 RX ADMIN — HEPARIN, PORCINE (PF) 10 UNIT/ML INTRAVENOUS SYRINGE 5 ML: at 10:39

## 2019-08-16 RX ADMIN — HEPARIN, PORCINE (PF) 10 UNIT/ML INTRAVENOUS SYRINGE 5 ML: at 10:40

## 2019-08-16 ASSESSMENT — PAIN SCALES - GENERAL: PAINLEVEL: MODERATE PAIN (5)

## 2019-08-16 NOTE — NURSING NOTE
"Oncology Rooming Note    August 16, 2019 11:01 AM   El Ace is a 64 year old male who presents for:    Chief Complaint   Patient presents with     Blood Draw     Labs drawn via CVC by RN in lab. Lines flushed and hep locked. Caps changed. VS taken.     RECHECK     Return: AML     Initial Vitals: /70 (BP Location: Right arm, Patient Position: Sitting, Cuff Size: Adult Regular)   Pulse 83   Temp 98  F (36.7  C) (Oral)   Resp 18   Wt 87.4 kg (192 lb 11.2 oz)   SpO2 99%   BMI 28.05 kg/m   Estimated body mass index is 28.05 kg/m  as calculated from the following:    Height as of 8/13/19: 1.765 m (5' 9.5\").    Weight as of this encounter: 87.4 kg (192 lb 11.2 oz). Body surface area is 2.07 meters squared.  Moderate Pain (5) Comment: Data Unavailable   No LMP for male patient.  Allergies reviewed: Yes  Medications reviewed: Yes    Medications: Refills are needed today, provider has been notified.  Pharmacy name entered into [employer]:    [facility] - [place], [place] - [phone]  [facility] [place], [place] - [phone]    Clinical concerns: Refills of Acyclovir, magnesium and tamsulosin needed.      Dayanara Morfin CMA              "

## 2019-08-16 NOTE — NURSING NOTE
Chief Complaint   Patient presents with     Blood Draw     Labs drawn via CVC by RN in lab. Lines flushed and hep locked. Caps changed. VS taken.     Connie Lewis RN

## 2019-08-16 NOTE — NURSING NOTE
Sterile technique was used to change patient's pena dressing. Patient tolerated dressing change with no incident.     Anna Ferrell [facility] August 16, 2019

## 2019-08-16 NOTE — PROGRESS NOTES
[facility] BMT Clinic    Diagnosis:   1. 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     Treatments:   1. 7+3 (Dauno), 3/15/19, no response, Day 12 persistent disease with near packed marrow  2. Dec 10d+Ric 3/26/19, CR1 on 4/23/19 but with pos mutations (IDH1 17%, RUNX1 8%)  3. 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   4. Maintenance ALT-803 study, 1 dose, stopped due to profound fatigue, moderate to severe hypotension, and fever    PMH: HLP, BPH, DJD, RLS    Interval history:   Randy returns for follow up. Joint pain resolved. Didn't sleep well last night and thinks he fell asleep in poor position which caused some back pain this morning. Took one dose tylenol and brought pain down from 5 to 3/10. No n/v/d. No new rashes, elbows are peeling, upper arm rash gone today. No new areas. No fevers, chills, cough or cold symptoms.     10-point ROS otherwise negative.     Lab Results   Component Value Date    WBC 10.8 08/16/2019    HGB 10.2 (L) 08/16/2019    HCT 29.7 (L) 08/16/2019     08/16/2019     (L) 08/16/2019    POTASSIUM 4.2 08/16/2019    CHLORIDE 101 08/16/2019    CO2 24 08/16/2019    BUN 17 08/16/2019    CR 0.94 08/16/2019     (H) 08/16/2019    SED 88 (H) 06/25/2019    NTBNPI 2,031 (H) 04/03/2019    TROPI <0.015 04/23/2019    AST 26 08/16/2019    ALT 45 08/16/2019    ALKPHOS 105 08/16/2019    BILITOTAL 0.2 08/16/2019    INR 1.15 (H) 06/24/2019       Current Outpatient Medications   Medication     acetaminophen (TYLENOL) 325 MG tablet     acyclovir (ZOVIRAX) 800 MG tablet     colchicine (COLCYRS) 0.6 MG tablet     cyclobenzaprine (FLEXERIL) 5 MG tablet     heparin lock flush 10 UNIT/ML SOLN injection     magnesium oxide (MAG-OX) 400 MG tablet     ondansetron (ZOFRAN-ODT) 8 MG ODT tab     oxyCODONE (ROXICODONE) 5 MG tablet     pramox-pe-glycerin-petrolatum (PREPARATION H) 1-0.25-14.4-15 % CREA cream      predniSONE (DELTASONE) 10 MG tablet     prochlorperazine (COMPAZINE) 5 MG tablet     sulfamethoxazole-trimethoprim (BACTRIM DS/SEPTRA DS) 800-160 MG tablet     tacrolimus (GENERIC EQUIVALENT) 0.5 MG capsule     tamsulosin (FLOMAX) 0.4 MG capsule     triamcinolone (KENALOG) 0.1 % external ointment     voriconazole (VFEND) 50 MG tablet     Current Facility-Administered Medications   Medication     heparin lock flush 10 UNIT/ML injection 5 mL     heparin lock flush 10 UNIT/ML injection 5 mL     lidocaine (PF) (XYLOCAINE) 1 % injection 3 mL     sodium chloride (PF) 0.9% PF flush 20 mL     Ph/E:   Vitals: /70 (BP Location: Right arm, Patient Position: Sitting, Cuff Size: Adult Regular)   Pulse 83   Temp 98  F (36.7  C) (Oral)   Resp 18   Wt 87.4 kg (192 lb 11.2 oz)   SpO2 99%   BMI 28.05 kg/m     KPS 90%  General: NAD; H&N: no mucosal lesions;   Lungs clear; Heart RRR  Abdomen; Soft, No organomegaly  Etx: No lower extremity edema.   MSK: full ROM of neck arms and gate wnl  Neuro: Nonfocal; Mood/Affect: appropriate;   Lymph: no LAD  Access: right upper chest [facility], dressing change today 8/16    A&P:   1. AML: day +77 haplo, CR1. RFLP every 2 weeks until 100% chimeric in both compartments. Start ALT-803 on study 7/24, repeat monthly x10. Stopped due to severe fatigue and fevers.      2. ID: Afebrile.  Had IV abx recently due to Fed026- cultures negative.   Continue prophylaxis with acyclovir, vori, bactrim.     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 Level 8/12 8.7  - no change to dose. Pt held dose this am, will add on level      4. Possible pseudogout: off pred. 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   - 8/13 seen by Rheumonatology- same physician that had seen in inpt 6/2019. They also feel this is recurrent pseudogout. Start pred 40mg daily (8/13 and 8/14)., 20mg daily x3 days (8/15-8/17), 10mg  daily x 3 days (8/18-8/20).   - Obtain baseline CRP 79.   - Also per rheum - start cholochine 0.6mg daily (interacts with tac so will not dose BID). DIscussed with pharm D who notes his is appropriate dose. Potential SE of diarrhea. Monitor for this. Randy is to start chlochine when he completes pred (8/21).   -continue supportive care with  oxycodone 5mg q4hrs prn and flexeril to manage pain in the mean time.      5.  GI: No complaints today.       6.  Rash: very slight rash on bilateral biceps. Could be GVHD- however monitor as starting pred for pseudogout above. Resolved, unable to visualize 8/16  - rash on elbows is resovled but now desquaminating.       7.  FEN:   Cr improved at 0.9 8/16. Sodium also closer to normal 131, encouraged half free water, half Gatorade/juices   HoTN 8/13: Maybe we should work up for adrenal insufficiency when he is off pred.   - hyponatremia - Back again mOnitor      8.  Hematology:   - counts stable    9. Insomnia: 2/2 pred, melatonin/benadryl at night       Plan: continue w steroid taper as outlined above  Tac level added on for longer weekend away    RTC: 8/22 for lbs, provider visit and tac level; sooner for any worsening rash, joint pain, n/v/d, fevers, etc    Renee Seaman PAC  [phone]

## 2019-08-21 NOTE — PROGRESS NOTES
[facility] BMT Clinic    Diagnosis:   1. 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     Treatments:   1. 7+3 (Dauno), 3/15/19, no response, Day 12 persistent disease with near packed marrow  2. Dec 10d+Ric 3/26/19, CR1 on 4/23/19 but with pos mutations (IDH1 17%, RUNX1 8%)  3. 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   4. Maintenance ALT-803 study, 1 dose, stopped due to profound fatigue, moderate to severe hypotension, and fever    PMH: HLP, BPH, DJD, RLS    Interval history: Symptoms resolved. No n/v/d/fever/rash. 10-point ROS otherwise negative.     Lab Results   Component Value Date    WBC 8.1 08/22/2019    HGB 10.9 (L) 08/22/2019    HCT 32.6 (L) 08/22/2019     08/22/2019     (L) 08/16/2019    POTASSIUM 4.2 08/16/2019    CHLORIDE 101 08/16/2019    CO2 24 08/16/2019    BUN 17 08/16/2019    CR 0.94 08/16/2019     (H) 08/16/2019    SED 88 (H) 06/25/2019    NTBNPI 2,031 (H) 04/03/2019    TROPI <0.015 04/23/2019    AST 26 08/16/2019    ALT 45 08/16/2019    ALKPHOS 105 08/16/2019    BILITOTAL 0.2 08/16/2019    INR 1.15 (H) 06/24/2019       Current Outpatient Medications   Medication     acetaminophen (TYLENOL) 325 MG tablet     acyclovir (ZOVIRAX) 800 MG tablet     colchicine (COLCYRS) 0.6 MG tablet     cyclobenzaprine (FLEXERIL) 5 MG tablet     heparin lock flush 10 UNIT/ML SOLN injection     magnesium oxide (MAG-OX) 400 MG tablet     melatonin 3 MG tablet     ondansetron (ZOFRAN-ODT) 8 MG ODT tab     oxyCODONE (ROXICODONE) 5 MG tablet     pramox-pe-glycerin-petrolatum (PREPARATION H) 1-0.25-14.4-15 % CREA cream     prochlorperazine (COMPAZINE) 5 MG tablet     sulfamethoxazole-trimethoprim (BACTRIM DS/SEPTRA DS) 800-160 MG tablet     tacrolimus (GENERIC EQUIVALENT) 0.5 MG capsule     tamsulosin (FLOMAX) 0.4 MG capsule     triamcinolone (KENALOG) 0.1 % external ointment     voriconazole (VFEND) 50 MG tablet      Current Facility-Administered Medications   Medication     lidocaine (PF) (XYLOCAINE) 1 % injection 3 mL     Ph/E:   Vitals: /72 (BP Location: Left arm, Patient Position: Chair, Cuff Size: Adult Regular)   Pulse 98   Temp 97.2  F (36.2  C) (Oral)   Wt 86.5 kg (190 lb 9.6 oz)   SpO2 98%   BMI 27.74 kg/m     %  General: NAD; H&N: no mucosal lesions; Lungs clear; Heart RRR; Abdomen; Soft, No organomegaly; Extremities: No edema; Skin no rash; Neuro: Nonfocal; Mood/Affect: appropriate; Lymph: no LAD    A&P:   1. AML: day +83 haplo, CR1. Got 1 dose of ALT-803 on study.   2. ID: acyclovir, vori, bactrim.   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 Level today.   4. Recurrent pseudogout: off pred for 2nd time. On colchicine.

## 2019-08-22 ENCOUNTER — ONCOLOGY VISIT (OUTPATIENT)
Dept: TRANSPLANT | Facility: CLINIC | Age: 64
End: 2019-08-22
Attending: INTERNAL MEDICINE
Payer: COMMERCIAL

## 2019-08-22 ENCOUNTER — APPOINTMENT (OUTPATIENT)
Dept: LAB | Facility: CLINIC | Age: 64
End: 2019-08-22
Attending: INTERNAL MEDICINE
Payer: COMMERCIAL

## 2019-08-22 VITALS
SYSTOLIC BLOOD PRESSURE: 104 MMHG | OXYGEN SATURATION: 98 % | BODY MASS INDEX: 27.74 KG/M2 | TEMPERATURE: 97.2 F | HEART RATE: 98 BPM | WEIGHT: 190.6 LBS | DIASTOLIC BLOOD PRESSURE: 72 MMHG

## 2019-08-22 DIAGNOSIS — C92.01 AML (ACUTE MYELOID LEUKEMIA) IN REMISSION (H): ICD-10-CM

## 2019-08-22 DIAGNOSIS — C92.01 ACUTE MYELOID LEUKEMIA IN REMISSION (H): ICD-10-CM

## 2019-08-22 LAB
ALBUMIN SERPL-MCNC: 3.4 G/DL (ref 3.4–5)
ALP SERPL-CCNC: 112 U/L (ref 40–150)
ALT SERPL W P-5'-P-CCNC: 32 U/L (ref 0–70)
ANION GAP SERPL CALCULATED.3IONS-SCNC: 5 MMOL/L (ref 3–14)
AST SERPL W P-5'-P-CCNC: 12 U/L (ref 0–45)
BASOPHILS # BLD AUTO: 0 10E9/L (ref 0–0.2)
BASOPHILS NFR BLD AUTO: 0.2 %
BILIRUB SERPL-MCNC: 0.3 MG/DL (ref 0.2–1.3)
BUN SERPL-MCNC: 12 MG/DL (ref 7–30)
CALCIUM SERPL-MCNC: 8.3 MG/DL (ref 8.5–10.1)
CHLORIDE SERPL-SCNC: 105 MMOL/L (ref 94–109)
CO2 SERPL-SCNC: 26 MMOL/L (ref 20–32)
CREAT SERPL-MCNC: 0.82 MG/DL (ref 0.66–1.25)
DIFFERENTIAL METHOD BLD: ABNORMAL
EOSINOPHIL # BLD AUTO: 0.1 10E9/L (ref 0–0.7)
EOSINOPHIL NFR BLD AUTO: 1.2 %
ERYTHROCYTE [DISTWIDTH] IN BLOOD BY AUTOMATED COUNT: 17.2 % (ref 10–15)
GFR SERPL CREATININE-BSD FRML MDRD: >90 ML/MIN/{1.73_M2}
GLUCOSE SERPL-MCNC: 119 MG/DL (ref 70–99)
HCT VFR BLD AUTO: 32.6 % (ref 40–53)
HGB BLD-MCNC: 10.9 G/DL (ref 13.3–17.7)
IMM GRANULOCYTES # BLD: 0.1 10E9/L (ref 0–0.4)
IMM GRANULOCYTES NFR BLD: 1.5 %
LYMPHOCYTES # BLD AUTO: 0.6 10E9/L (ref 0.8–5.3)
LYMPHOCYTES NFR BLD AUTO: 7.6 %
MAGNESIUM SERPL-MCNC: 2.2 MG/DL (ref 1.6–2.3)
MCH RBC QN AUTO: 34 PG (ref 26.5–33)
MCHC RBC AUTO-ENTMCNC: 33.4 G/DL (ref 31.5–36.5)
MCV RBC AUTO: 102 FL (ref 78–100)
MONOCYTES # BLD AUTO: 0.9 10E9/L (ref 0–1.3)
MONOCYTES NFR BLD AUTO: 11.4 %
NEUTROPHILS # BLD AUTO: 6.4 10E9/L (ref 1.6–8.3)
NEUTROPHILS NFR BLD AUTO: 78.1 %
NRBC # BLD AUTO: 0 10*3/UL
NRBC BLD AUTO-RTO: 0 /100
PLATELET # BLD AUTO: 239 10E9/L (ref 150–450)
POTASSIUM SERPL-SCNC: 4.4 MMOL/L (ref 3.4–5.3)
PROT SERPL-MCNC: 6.7 G/DL (ref 6.8–8.8)
RBC # BLD AUTO: 3.21 10E12/L (ref 4.4–5.9)
SODIUM SERPL-SCNC: 135 MMOL/L (ref 133–144)
TACROLIMUS BLD-MCNC: 5.8 UG/L (ref 5–15)
TME LAST DOSE: NORMAL H
WBC # BLD AUTO: 8.1 10E9/L (ref 4–11)

## 2019-08-22 PROCEDURE — 25000128 H RX IP 250 OP 636: Mod: ZF | Performed by: INTERNAL MEDICINE

## 2019-08-22 PROCEDURE — 83735 ASSAY OF MAGNESIUM: CPT | Performed by: STUDENT IN AN ORGANIZED HEALTH CARE EDUCATION/TRAINING PROGRAM

## 2019-08-22 PROCEDURE — 85025 COMPLETE CBC W/AUTO DIFF WBC: CPT | Performed by: STUDENT IN AN ORGANIZED HEALTH CARE EDUCATION/TRAINING PROGRAM

## 2019-08-22 PROCEDURE — 36592 COLLECT BLOOD FROM PICC: CPT

## 2019-08-22 PROCEDURE — 80197 ASSAY OF TACROLIMUS: CPT | Performed by: STUDENT IN AN ORGANIZED HEALTH CARE EDUCATION/TRAINING PROGRAM

## 2019-08-22 PROCEDURE — 80053 COMPREHEN METABOLIC PANEL: CPT | Performed by: STUDENT IN AN ORGANIZED HEALTH CARE EDUCATION/TRAINING PROGRAM

## 2019-08-22 PROCEDURE — G0463 HOSPITAL OUTPT CLINIC VISIT: HCPCS | Mod: ZF

## 2019-08-22 RX ORDER — SULFAMETHOXAZOLE/TRIMETHOPRIM 800-160 MG
1 TABLET ORAL
Qty: 16 TABLET | Refills: 1 | Status: SHIPPED | OUTPATIENT
Start: 2019-08-22 | End: 2019-09-19

## 2019-08-22 RX ORDER — HEPARIN SODIUM,PORCINE 10 UNIT/ML
5 VIAL (ML) INTRAVENOUS ONCE
Status: COMPLETED | OUTPATIENT
Start: 2019-08-22 | End: 2019-08-22

## 2019-08-22 RX ORDER — VORICONAZOLE 50 MG/1
150 TABLET, FILM COATED ORAL EVERY 12 HOURS
Qty: 180 TABLET | Refills: 1 | Status: SHIPPED | OUTPATIENT
Start: 2019-08-22 | End: 2019-10-24

## 2019-08-22 RX ADMIN — HEPARIN, PORCINE (PF) 10 UNIT/ML INTRAVENOUS SYRINGE 5 ML: at 10:57

## 2019-08-22 ASSESSMENT — PAIN SCALES - GENERAL: PAINLEVEL: NO PAIN (0)

## 2019-08-22 NOTE — NURSING NOTE
Chief Complaint   Patient presents with     Blood Draw     dressing change needed; labs drawn via cvc by RN in lab     /72 (BP Location: Left arm, Patient Position: Chair, Cuff Size: Adult Regular)   Pulse 98   Temp 97.2  F (36.2  C) (Oral)   Wt 86.5 kg (190 lb 9.6 oz)   SpO2 98%   BMI 27.74 kg/m      Lines accessed by RN in lab. Labs collected through red lumen and sent. Both caps changed, lines flushed with NS & Heparin. Pt tolerated well.   Pt checked in for next appointment.    Meena Randhawa RN

## 2019-08-22 NOTE — NURSING NOTE
Pt had dressing changed using Sterile technique. Site looks clean and dry. New dressing was applied.       Shana Ballard MA

## 2019-08-22 NOTE — NURSING NOTE
"Oncology Rooming Note    August 22, 2019 11:20 AM   El Ace is a 64 year old male who presents for:    Chief Complaint   Patient presents with     Blood Draw     dressing change needed; labs drawn via cvc by RN in lab     RECHECK     Return: Acute Myeloid Leukemia     Initial Vitals: /72 (BP Location: Left arm, Patient Position: Chair, Cuff Size: Adult Regular)   Pulse 98   Temp 97.2  F (36.2  C) (Oral)   Wt 86.5 kg (190 lb 9.6 oz)   SpO2 98%   BMI 27.74 kg/m   Estimated body mass index is 27.74 kg/m  as calculated from the following:    Height as of 8/13/19: 1.765 m (5' 9.5\").    Weight as of this encounter: 86.5 kg (190 lb 9.6 oz). Body surface area is 2.06 meters squared.  No Pain (0) Comment: Data Unavailable   No LMP for male patient.  Allergies reviewed: Yes  Medications reviewed: Yes    Medications: MEDICATION REFILLS NEEDED TODAY. Provider was notified.  Pharmacy name entered into [employer]:    [facility] - [place], [place] - [phone]  [facility] [place], [place] - [phone]    Clinical concerns: Bactrim and Voriconazole refills needed       Dayanara Morfin CMA              "

## 2019-08-24 ENCOUNTER — TELEPHONE (OUTPATIENT)
Dept: ONCOLOGY | Facility: CLINIC | Age: 64
End: 2019-08-24

## 2019-08-24 DIAGNOSIS — C92.01 AML (ACUTE MYELOID LEUKEMIA) IN REMISSION (H): Primary | ICD-10-CM

## 2019-08-24 RX ORDER — TRIAMCINOLONE ACETONIDE 1 MG/G
CREAM TOPICAL 2 TIMES DAILY
Qty: 453 G | Refills: 1 | Status: SHIPPED | OUTPATIENT
Start: 2019-08-24 | End: 2019-10-30

## 2019-08-24 NOTE — TELEPHONE ENCOUNTER
The patient called reporting that he had a maculopapular rash on his legs below his knees and on his arms this morning when he woke up.  It is not itchy.  There is no rash elsewhere.  He has no other symptoms.  In particular he has no fever, nausea, or vomiting.  We will start the patient on triamcinolone cream 3 times a day.  Prescription was sent to the [facility] pharmacy where the patient preferred.  He will be seen in the BMT clinic I will send a message to the schedulers right now.

## 2019-08-25 ENCOUNTER — APPOINTMENT (OUTPATIENT)
Dept: LAB | Facility: CLINIC | Age: 64
End: 2019-08-25
Attending: INTERNAL MEDICINE
Payer: COMMERCIAL

## 2019-08-25 ENCOUNTER — ONCOLOGY VISIT (OUTPATIENT)
Dept: TRANSPLANT | Facility: CLINIC | Age: 64
End: 2019-08-25
Attending: INTERNAL MEDICINE
Payer: COMMERCIAL

## 2019-08-25 VITALS
DIASTOLIC BLOOD PRESSURE: 68 MMHG | RESPIRATION RATE: 16 BRPM | OXYGEN SATURATION: 98 % | SYSTOLIC BLOOD PRESSURE: 104 MMHG | TEMPERATURE: 97.9 F | HEART RATE: 86 BPM

## 2019-08-25 DIAGNOSIS — C92.01 AML (ACUTE MYELOID LEUKEMIA) IN REMISSION (H): Primary | ICD-10-CM

## 2019-08-25 LAB
ALBUMIN SERPL-MCNC: 3.2 G/DL (ref 3.4–5)
ALP SERPL-CCNC: 105 U/L (ref 40–150)
ALT SERPL W P-5'-P-CCNC: 25 U/L (ref 0–70)
ANION GAP SERPL CALCULATED.3IONS-SCNC: 4 MMOL/L (ref 3–14)
AST SERPL W P-5'-P-CCNC: 11 U/L (ref 0–45)
BASOPHILS # BLD AUTO: 0 10E9/L (ref 0–0.2)
BASOPHILS NFR BLD AUTO: 0.3 %
BILIRUB SERPL-MCNC: 0.2 MG/DL (ref 0.2–1.3)
BUN SERPL-MCNC: 16 MG/DL (ref 7–30)
CALCIUM SERPL-MCNC: 8.4 MG/DL (ref 8.5–10.1)
CHLORIDE SERPL-SCNC: 109 MMOL/L (ref 94–109)
CO2 SERPL-SCNC: 26 MMOL/L (ref 20–32)
CREAT SERPL-MCNC: 0.8 MG/DL (ref 0.66–1.25)
DIFFERENTIAL METHOD BLD: ABNORMAL
EOSINOPHIL # BLD AUTO: 0.1 10E9/L (ref 0–0.7)
EOSINOPHIL NFR BLD AUTO: 1.3 %
ERYTHROCYTE [DISTWIDTH] IN BLOOD BY AUTOMATED COUNT: 17.2 % (ref 10–15)
GFR SERPL CREATININE-BSD FRML MDRD: >90 ML/MIN/{1.73_M2}
GLUCOSE SERPL-MCNC: 117 MG/DL (ref 70–99)
HCT VFR BLD AUTO: 31.7 % (ref 40–53)
HGB BLD-MCNC: 10.5 G/DL (ref 13.3–17.7)
IMM GRANULOCYTES # BLD: 0.1 10E9/L (ref 0–0.4)
IMM GRANULOCYTES NFR BLD: 0.7 %
LYMPHOCYTES # BLD AUTO: 0.6 10E9/L (ref 0.8–5.3)
LYMPHOCYTES NFR BLD AUTO: 8.2 %
MCH RBC QN AUTO: 34 PG (ref 26.5–33)
MCHC RBC AUTO-ENTMCNC: 33.1 G/DL (ref 31.5–36.5)
MCV RBC AUTO: 103 FL (ref 78–100)
MONOCYTES # BLD AUTO: 0.7 10E9/L (ref 0–1.3)
MONOCYTES NFR BLD AUTO: 9.8 %
NEUTROPHILS # BLD AUTO: 5.4 10E9/L (ref 1.6–8.3)
NEUTROPHILS NFR BLD AUTO: 79.7 %
NRBC # BLD AUTO: 0 10*3/UL
NRBC BLD AUTO-RTO: 0 /100
PLATELET # BLD AUTO: 199 10E9/L (ref 150–450)
POTASSIUM SERPL-SCNC: 4.5 MMOL/L (ref 3.4–5.3)
PROT SERPL-MCNC: 6.5 G/DL (ref 6.8–8.8)
RBC # BLD AUTO: 3.09 10E12/L (ref 4.4–5.9)
SODIUM SERPL-SCNC: 138 MMOL/L (ref 133–144)
WBC # BLD AUTO: 6.7 10E9/L (ref 4–11)

## 2019-08-25 PROCEDURE — 80053 COMPREHEN METABOLIC PANEL: CPT | Performed by: INTERNAL MEDICINE

## 2019-08-25 PROCEDURE — 85025 COMPLETE CBC W/AUTO DIFF WBC: CPT | Performed by: INTERNAL MEDICINE

## 2019-08-25 PROCEDURE — 36592 COLLECT BLOOD FROM PICC: CPT

## 2019-08-25 ASSESSMENT — PAIN SCALES - GENERAL: PAINLEVEL: NO PAIN (0)

## 2019-08-25 NOTE — PROGRESS NOTES
64 years old male, day + 83 s/p Haplo transplant, in CR 1.    Has recently had a rash which disapeared with creams (however, was also on a short period of prednisone for pseudogout - tapered off prednisone on 8/20.    Called yesterday with rash on his legs, some itching    ROS: Rash on dorsal aspect of lower legs, slight rash on forearms and a little on lower back.     No nausea or diarrhea. No new meds (besides colchicine which was started on 8/16), No fever.    Remainder of 10 pt ROS was negative    On Exam:      Blood pressure 104/68, pulse 86, temperature 97.9  F (36.6  C), temperature source Oral, resp. rate 16, SpO2 98 %.    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 Instructed to apply to affected areas tid. Continue Tac.  RTC on Tuesday for labs and provider visit.    If extending - may need biopsy and prednisone    Dilma Quezada

## 2019-08-27 ENCOUNTER — TELEPHONE (OUTPATIENT)
Dept: TRANSPLANT | Facility: CLINIC | Age: 64
End: 2019-08-27

## 2019-08-27 ENCOUNTER — ONCOLOGY VISIT (OUTPATIENT)
Dept: TRANSPLANT | Facility: CLINIC | Age: 64
End: 2019-08-27
Attending: INTERNAL MEDICINE
Payer: COMMERCIAL

## 2019-08-27 ENCOUNTER — ONCOLOGY VISIT (OUTPATIENT)
Dept: TRANSPLANT | Facility: CLINIC | Age: 64
End: 2019-08-27
Attending: STUDENT IN AN ORGANIZED HEALTH CARE EDUCATION/TRAINING PROGRAM
Payer: COMMERCIAL

## 2019-08-27 VITALS
DIASTOLIC BLOOD PRESSURE: 76 MMHG | HEIGHT: 70 IN | RESPIRATION RATE: 18 BRPM | WEIGHT: 192 LBS | OXYGEN SATURATION: 98 % | BODY MASS INDEX: 27.49 KG/M2 | HEART RATE: 86 BPM | TEMPERATURE: 97.2 F | SYSTOLIC BLOOD PRESSURE: 117 MMHG

## 2019-08-27 DIAGNOSIS — C92.01 AML (ACUTE MYELOID LEUKEMIA) IN REMISSION (H): ICD-10-CM

## 2019-08-27 DIAGNOSIS — C92.01 AML (ACUTE MYELOID LEUKEMIA) IN REMISSION (H): Primary | ICD-10-CM

## 2019-08-27 DIAGNOSIS — Z94.84 HISTORY OF ALLOGENEIC STEM CELL TRANSPLANT (H): ICD-10-CM

## 2019-08-27 LAB
ALBUMIN SERPL-MCNC: 3.4 G/DL (ref 3.4–5)
ALP SERPL-CCNC: 105 U/L (ref 40–150)
ALT SERPL W P-5'-P-CCNC: 25 U/L (ref 0–70)
ANION GAP SERPL CALCULATED.3IONS-SCNC: 7 MMOL/L (ref 3–14)
AST SERPL W P-5'-P-CCNC: 14 U/L (ref 0–45)
BASOPHILS # BLD AUTO: 0 10E9/L (ref 0–0.2)
BASOPHILS NFR BLD AUTO: 0.3 %
BILIRUB SERPL-MCNC: 0.3 MG/DL (ref 0.2–1.3)
BUN SERPL-MCNC: 16 MG/DL (ref 7–30)
CALCIUM SERPL-MCNC: 8.9 MG/DL (ref 8.5–10.1)
CHLORIDE SERPL-SCNC: 104 MMOL/L (ref 94–109)
CO2 SERPL-SCNC: 23 MMOL/L (ref 20–32)
CREAT SERPL-MCNC: 0.92 MG/DL (ref 0.66–1.25)
DIFFERENTIAL METHOD BLD: ABNORMAL
EOSINOPHIL # BLD AUTO: 0.1 10E9/L (ref 0–0.7)
EOSINOPHIL NFR BLD AUTO: 0.9 %
ERYTHROCYTE [DISTWIDTH] IN BLOOD BY AUTOMATED COUNT: 17.2 % (ref 10–15)
GFR SERPL CREATININE-BSD FRML MDRD: 87 ML/MIN/{1.73_M2}
GLUCOSE SERPL-MCNC: 92 MG/DL (ref 70–99)
HCT VFR BLD AUTO: 30.6 % (ref 40–53)
HGB BLD-MCNC: 10.3 G/DL (ref 13.3–17.7)
IMM GRANULOCYTES # BLD: 0 10E9/L (ref 0–0.4)
IMM GRANULOCYTES NFR BLD: 0.5 %
LYMPHOCYTES # BLD AUTO: 0.6 10E9/L (ref 0.8–5.3)
LYMPHOCYTES NFR BLD AUTO: 9 %
MAGNESIUM SERPL-MCNC: 2 MG/DL (ref 1.6–2.3)
MCH RBC QN AUTO: 34.4 PG (ref 26.5–33)
MCHC RBC AUTO-ENTMCNC: 33.7 G/DL (ref 31.5–36.5)
MCV RBC AUTO: 102 FL (ref 78–100)
MONOCYTES # BLD AUTO: 0.7 10E9/L (ref 0–1.3)
MONOCYTES NFR BLD AUTO: 10.5 %
NEUTROPHILS # BLD AUTO: 5.2 10E9/L (ref 1.6–8.3)
NEUTROPHILS NFR BLD AUTO: 78.8 %
NRBC # BLD AUTO: 0 10*3/UL
NRBC BLD AUTO-RTO: 0 /100
PLATELET # BLD AUTO: 184 10E9/L (ref 150–450)
POTASSIUM SERPL-SCNC: 4.2 MMOL/L (ref 3.4–5.3)
PROT SERPL-MCNC: 6.8 G/DL (ref 6.8–8.8)
RBC # BLD AUTO: 2.99 10E12/L (ref 4.4–5.9)
SODIUM SERPL-SCNC: 135 MMOL/L (ref 133–144)
WBC # BLD AUTO: 6.6 10E9/L (ref 4–11)

## 2019-08-27 PROCEDURE — 88305 TISSUE EXAM BY PATHOLOGIST: CPT | Mod: TC | Performed by: STUDENT IN AN ORGANIZED HEALTH CARE EDUCATION/TRAINING PROGRAM

## 2019-08-27 PROCEDURE — 85025 COMPLETE CBC W/AUTO DIFF WBC: CPT | Performed by: INTERNAL MEDICINE

## 2019-08-27 PROCEDURE — 11104 PUNCH BX SKIN SINGLE LESION: CPT

## 2019-08-27 PROCEDURE — 83735 ASSAY OF MAGNESIUM: CPT | Performed by: INTERNAL MEDICINE

## 2019-08-27 PROCEDURE — G0463 HOSPITAL OUTPT CLINIC VISIT: HCPCS | Mod: ZF

## 2019-08-27 PROCEDURE — 80053 COMPREHEN METABOLIC PANEL: CPT | Performed by: INTERNAL MEDICINE

## 2019-08-27 PROCEDURE — 25000128 H RX IP 250 OP 636: Mod: ZF | Performed by: INTERNAL MEDICINE

## 2019-08-27 RX ORDER — HEPARIN SODIUM,PORCINE 10 UNIT/ML
5 VIAL (ML) INTRAVENOUS EVERY 24 HOURS
Qty: 28 VIAL | Refills: 0 | Status: SHIPPED | OUTPATIENT
Start: 2019-08-27 | End: 2019-09-19

## 2019-08-27 RX ORDER — HEPARIN SODIUM (PORCINE) LOCK FLUSH IV SOLN 100 UNIT/ML 100 UNIT/ML
5 SOLUTION INTRAVENOUS EVERY 8 HOURS
Status: DISCONTINUED | OUTPATIENT
Start: 2019-08-27 | End: 2019-08-27 | Stop reason: HOSPADM

## 2019-08-27 RX ORDER — HEPARIN SODIUM,PORCINE 10 UNIT/ML
5 VIAL (ML) INTRAVENOUS
Status: DISCONTINUED | OUTPATIENT
Start: 2019-08-27 | End: 2019-08-27 | Stop reason: HOSPADM

## 2019-08-27 RX ORDER — LIDOCAINE HYDROCHLORIDE AND EPINEPHRINE 10; 10 MG/ML; UG/ML
5 INJECTION, SOLUTION INFILTRATION; PERINEURAL ONCE
Status: DISCONTINUED | OUTPATIENT
Start: 2019-08-27 | End: 2019-08-27 | Stop reason: HOSPADM

## 2019-08-27 RX ADMIN — HEPARIN, PORCINE (PF) 10 UNIT/ML INTRAVENOUS SYRINGE 5 ML: at 11:35

## 2019-08-27 ASSESSMENT — PAIN SCALES - GENERAL: PAINLEVEL: NO PAIN (0)

## 2019-08-27 ASSESSMENT — MIFFLIN-ST. JEOR: SCORE: 1654.22

## 2019-08-27 NOTE — PROGRESS NOTES
BMT Skin Biopsy Procedure Note  August 27, 2019 1:31 PM    Indications: rash    After informed consent, including risks of procedure, infection and/or bleeding, patient was prepped in the usual sterile manner. Local anesthesia was given with 2 ml of 1% lidocaine with epi. A 4 mm punch biopsy was taken from medial left thigh. 1 stitch was placed with a 4-0 prolene, nonabsorbable suture. Wound was dressed with triple antibiotic ointment and a bandaid.  Patient tolerated the procedure without complications. Patient given Biopsy Information pamphlet.      Renee Seaman PA-C

## 2019-08-27 NOTE — NURSING NOTE
"Oncology Rooming Note    August 27, 2019 11:45 AM   El Ace is a 64 year old male who presents for:    Chief Complaint   Patient presents with     Blood Draw     Labs drawn via CVC by RN in lab. Line  flushed with saline and heparin. VS taken.      RECHECK     Return: AML (acute myeloid leukemia)     Initial Vitals: /76 (BP Location: Left arm, Patient Position: Sitting, Cuff Size: Adult Regular)   Pulse 86   Temp 97.2  F (36.2  C) (Oral)   Resp 18   Ht 1.765 m (5' 9.5\")   Wt 87.1 kg (192 lb)   SpO2 98%   BMI 27.95 kg/m   Estimated body mass index is 27.95 kg/m  as calculated from the following:    Height as of this encounter: 1.765 m (5' 9.5\").    Weight as of this encounter: 87.1 kg (192 lb). Body surface area is 2.07 meters squared.  No Pain (0) Comment: Data Unavailable   No LMP for male patient.  Allergies reviewed: Yes  Medications reviewed: Yes    Medications: Medication refills not needed today.  Pharmacy name entered into [employer]:    [facility] - [place], [place] - [phone].  [facility] [place], [place] - [phone]    Clinical concerns: Pt complains of rash all over his body.  Jory  was notified.      Jennifer Albarran CMA              "

## 2019-08-27 NOTE — PROGRESS NOTES
[facility] BMT Clinic    Diagnosis:   1. 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  Day +88 s/p haplo matched stem cell transplant.    Treatments:   1. 7+3 (Dauno), 3/15/19, no response, Day 12 persistent disease with near packed marrow  2. Dec 10d+Ric 3/26/19, CR1 on 4/23/19 but with pos mutations (IDH1 17%, RUNX1 8%)  3. 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   4. Maintenance ALT-803 study, 1 dose, stopped due to profound fatigue, moderate to severe hypotension, and fever    PMH: HLP, BPH, DJD, RLS    Interval history:   Randy returns for follow up. Joint pain resolved. He has a rash that started on his legs on 8/24. He put steroid cream on it twice that day.  On 8/25, he was seen in clinic and instructed to put it on three times per day, so he has been doing that.  Nonetheless, it has spread to the small of his back, his arms and his anterior torso.        10-point ROS otherwise negative.     Lab Results   Component Value Date    WBC 6.6 08/27/2019    HGB 10.3 (L) 08/27/2019    HCT 30.6 (L) 08/27/2019     08/27/2019     08/27/2019    POTASSIUM 4.2 08/27/2019    CHLORIDE 104 08/27/2019    CO2 23 08/27/2019    BUN 16 08/27/2019    CR 0.92 08/27/2019    GLC 92 08/27/2019    SED 88 (H) 06/25/2019    NTBNPI 2,031 (H) 04/03/2019    TROPI <0.015 04/23/2019    AST 14 08/27/2019    ALT 25 08/27/2019    ALKPHOS 105 08/27/2019    BILITOTAL 0.3 08/27/2019    INR 1.15 (H) 06/24/2019       Current Outpatient Medications   Medication     acetaminophen (TYLENOL) 325 MG tablet     acyclovir (ZOVIRAX) 800 MG tablet     colchicine (COLCYRS) 0.6 MG tablet     cyclobenzaprine (FLEXERIL) 5 MG tablet     heparin lock flush 10 UNIT/ML SOLN injection     magnesium oxide (MAG-OX) 400 MG tablet     melatonin 3 MG tablet     ondansetron (ZOFRAN-ODT) 8 MG ODT tab     oxyCODONE (ROXICODONE) 5 MG tablet     pramox-pe-glycerin-petrolatum  "(PREPARATION H) 1-0.25-14.4-15 % CREA cream     prochlorperazine (COMPAZINE) 5 MG tablet     sulfamethoxazole-trimethoprim (BACTRIM DS/SEPTRA DS) 800-160 MG tablet     tacrolimus (GENERIC EQUIVALENT) 0.5 MG capsule     tamsulosin (FLOMAX) 0.4 MG capsule     triamcinolone (KENALOG) 0.1 % external cream     triamcinolone (KENALOG) 0.1 % external ointment     voriconazole (VFEND) 50 MG tablet     Current Facility-Administered Medications   Medication     lidocaine (PF) (XYLOCAINE) 1 % injection 3 mL     Date of [facility] Score: 8/27/2019             KPS: 90    S 3, LGI 0, UGI 0, Liver 0  Skin 0. No rash  1.Erythematous maculopapular rash < 25% BSA  2.Erythematous maculopapular rash 25-50% BSA  3.Generalized erythroderma  4.Generalized erythroderma with bullous formation or desquamation   Lower GI 0. No diarrhea Outpatient: no change from baseline  1. 500-1000 ml Outpatient: 3-4 loose stools/d, not interfering with ADL  2. 0026-1516 ml Outpatient: 5-7 loose stools/d, not interfering with ADL, IV fluids <24 hours  3. >1500 ml Outpatient:8+ loose stools/d, interfering with ADL, IV fluids >24 hours  4. Severe abd bpain +/-ileus +/-en blood   Upper GI 0. No prolonged nausea or vomiting  1. Persistent nausea, vomiting, or anorexia   Liver 0. Bili 2.0 or less  1. Bili 2.1 -3.0  2. Bili 3.1 -6.0  3. Bili 6.1 -15.0  4. Bili >15.1         Ph/E:   Vitals: /76 (BP Location: Left arm, Patient Position: Sitting, Cuff Size: Adult Regular)   Pulse 86   Temp 97.2  F (36.2  C) (Oral)   Resp 18   Ht 1.765 m (5' 9.5\")   Wt 87.1 kg (192 lb)   SpO2 98%   BMI 27.95 kg/m     KPS 90%  General: NAD; H&N: no mucosal lesions;   Lungs clear; Heart RRR  Abdomen; Soft, No organomegaly  Etx: No lower extremity edema.   MSK: full ROM of neck arms and gate wnl  Neuro: Nonfocal; Mood/Affect: appropriate;   Lymph: no LAD  Access: right upper chest [facility]  Skin: maculopapular rash on about 75% of BSA; spares face, mid and upper back.     A&P: "   1. AML: day +88 s/p haplo, CR1. RFLP every 2 weeks until 100% chimeric in both compartments. Started ALT-803 on study 7/24, repeat monthly x10. Stopped due to severe fatigue and fevers.      2. ID: Afebrile.  Continue prophylaxis with acyclovir, vori, bactrim.     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 Level 5.8 on 8/22.      4. Possible pseudogout: off pred. 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   - 8/13 seen by Rheumonatology- same physician that had seen in inpt 6/2019. They also feel this is recurrent pseudogout. Start pred 40mg daily (8/13 and 8/14)., 20mg daily x3 days (8/15-8/17), 10mg daily x 3 days (8/18-8/20).   - Obtain baseline CRP 79.   - Also per rheum - start cholochine 0.6mg daily (interacts with tac so will not dose BID). DIscussed with pharm D who notes his is appropriate dose. Potential SE of diarrhea. Monitor for this. Randy is to start chlochine when he completes pred (8/21).   -continue supportive care with  oxycodone 5mg q4hrs prn and flexeril to manage pain in the mean time.      5.  GI: No complaints today.       6.  Rash: v  - rash as above.  Biopsy done today by Renee Seaman.  GVHD team notified.  Hoping to avoid systemic steroids if possible; however, it is on 75% of BSA.  Thick application of TMC cream tid with saran wrap as tolerated; if not improving 8/29, will need systemic steroids.     7.  FEN:   - eating well  - previous hyponatremia is resolved     8.  Hematology:   - counts stable    9. Insomnia: melatonin/benadryl at night when he was on prednisone for pseudogout; if he needs systemic steroids again, consider resuming these for sleep.     RTC 8/29; sooner if rash worsens    Jory Espino PA-C  8/27/2019

## 2019-08-27 NOTE — TELEPHONE ENCOUNTER
Per Dr. Brown pt to stop taking colchicine.  Called pt who verbalized understanding, MAR updated appropriately.

## 2019-08-28 NOTE — PROGRESS NOTES
[facility] BMT Clinic    Diagnosis:   1. 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     Treatments:   1. 7+3 (Dauno), 3/15/19, no response, Day 12 persistent disease with near packed marrow  2. Dec 10d+Ric 3/26/19, CR1 on 4/23/19 but with pos mutations (IDH1 17%, RUNX1 8%)  3. 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   4. Maintenance ALT-803 study, 1 dose, stopped due to profound fatigue, moderate to severe hypotension, and fever    PMH: HLP, BPH, DJD, RLS    Interval history: Rash is stable to slightly fading, more scaly on the lower back, not itchy, no fever/n/v/d/pain. 10-point ROS otherwise negative.     Lab Results   Component Value Date    WBC 6.6 08/27/2019    HGB 10.3 (L) 08/27/2019    HCT 30.6 (L) 08/27/2019     08/27/2019     08/27/2019    POTASSIUM 4.2 08/27/2019    CHLORIDE 104 08/27/2019    CO2 23 08/27/2019    BUN 16 08/27/2019    CR 0.92 08/27/2019    GLC 92 08/27/2019    SED 88 (H) 06/25/2019    NTBNPI 2,031 (H) 04/03/2019    TROPI <0.015 04/23/2019    AST 14 08/27/2019    ALT 25 08/27/2019    ALKPHOS 105 08/27/2019    BILITOTAL 0.3 08/27/2019    INR 1.15 (H) 06/24/2019       Current Outpatient Medications   Medication     acetaminophen (TYLENOL) 325 MG tablet     acyclovir (ZOVIRAX) 800 MG tablet     cyclobenzaprine (FLEXERIL) 5 MG tablet     heparin lock flush 10 UNIT/ML SOLN injection     magnesium oxide (MAG-OX) 400 MG tablet     melatonin 3 MG tablet     ondansetron (ZOFRAN-ODT) 8 MG ODT tab     oxyCODONE (ROXICODONE) 5 MG tablet     pramox-pe-glycerin-petrolatum (PREPARATION H) 1-0.25-14.4-15 % CREA cream     prochlorperazine (COMPAZINE) 5 MG tablet     sulfamethoxazole-trimethoprim (BACTRIM DS/SEPTRA DS) 800-160 MG tablet     tacrolimus (GENERIC EQUIVALENT) 0.5 MG capsule     tamsulosin (FLOMAX) 0.4 MG capsule     triamcinolone (KENALOG) 0.1 % external cream     triamcinolone (KENALOG) 0.1 %  external ointment     voriconazole (VFEND) 50 MG tablet     Current Facility-Administered Medications   Medication     heparin lock flush 10 UNIT/ML injection 5 mL     lidocaine (PF) (XYLOCAINE) 1 % injection 3 mL     Ph/E:   Vitals: /66 (BP Location: Left arm, Patient Position: Sitting)   Pulse 90   Temp 98  F (36.7  C) (Oral)   Resp 20   Wt 86.7 kg (191 lb 3.2 oz)   SpO2 99%   BMI 27.83 kg/m     %  General: NAD; H&N: no mucosal lesions; Lungs clear; Heart RRR; Abdomen; Soft, No organomegaly; Extremities: No edema; Skin: maculopapular erythematous rash covering about 20% of BSA with some scaling on the back; Neuro: Nonfocal; Mood/Affect: appropriate; Lymph: no LAD    A&P:   1. AML: day +90 haplo, CR1. Got 1 dose of ALT-803 on study.   2. ID: acyclovir, vori, bactrim.   3. GVHD:  Tac 1 mg bid. Level today. Possible skin aGVHD vs. Drug rash (colchicine now on hold) vs virus, on topical GCs and perhaps slightly better.   4. Recurrent pseudogout: off pred for 2nd time. Off colchicine.

## 2019-08-29 ENCOUNTER — OFFICE VISIT (OUTPATIENT)
Dept: TRANSPLANT | Facility: CLINIC | Age: 64
End: 2019-08-29
Attending: PHYSICIAN ASSISTANT
Payer: COMMERCIAL

## 2019-08-29 ENCOUNTER — APPOINTMENT (OUTPATIENT)
Dept: LAB | Facility: CLINIC | Age: 64
End: 2019-08-29
Attending: PHYSICIAN ASSISTANT
Payer: COMMERCIAL

## 2019-08-29 VITALS
BODY MASS INDEX: 27.37 KG/M2 | SYSTOLIC BLOOD PRESSURE: 100 MMHG | DIASTOLIC BLOOD PRESSURE: 66 MMHG | RESPIRATION RATE: 20 BRPM | WEIGHT: 191.2 LBS | HEIGHT: 70 IN | HEART RATE: 90 BPM | TEMPERATURE: 98 F | OXYGEN SATURATION: 99 %

## 2019-08-29 DIAGNOSIS — C92.01 AML (ACUTE MYELOID LEUKEMIA) IN REMISSION (H): ICD-10-CM

## 2019-08-29 LAB
ALBUMIN SERPL-MCNC: 3.5 G/DL (ref 3.4–5)
ALP SERPL-CCNC: 106 U/L (ref 40–150)
ALT SERPL W P-5'-P-CCNC: 22 U/L (ref 0–70)
ANION GAP SERPL CALCULATED.3IONS-SCNC: 4 MMOL/L (ref 3–14)
AST SERPL W P-5'-P-CCNC: 18 U/L (ref 0–45)
BASOPHILS # BLD AUTO: 0 10E9/L (ref 0–0.2)
BASOPHILS NFR BLD AUTO: 0.3 %
BILIRUB SERPL-MCNC: 0.4 MG/DL (ref 0.2–1.3)
BUN SERPL-MCNC: 15 MG/DL (ref 7–30)
CALCIUM SERPL-MCNC: 8.4 MG/DL (ref 8.5–10.1)
CHLORIDE SERPL-SCNC: 107 MMOL/L (ref 94–109)
CO2 SERPL-SCNC: 24 MMOL/L (ref 20–32)
COPATH REPORT: NORMAL
CREAT SERPL-MCNC: 0.9 MG/DL (ref 0.66–1.25)
DIFFERENTIAL METHOD BLD: ABNORMAL
EOSINOPHIL # BLD AUTO: 0 10E9/L (ref 0–0.7)
EOSINOPHIL NFR BLD AUTO: 0.7 %
ERYTHROCYTE [DISTWIDTH] IN BLOOD BY AUTOMATED COUNT: 17 % (ref 10–15)
GFR SERPL CREATININE-BSD FRML MDRD: 89 ML/MIN/{1.73_M2}
GLUCOSE SERPL-MCNC: 107 MG/DL (ref 70–99)
HCT VFR BLD AUTO: 30.1 % (ref 40–53)
HGB BLD-MCNC: 10.2 G/DL (ref 13.3–17.7)
IMM GRANULOCYTES # BLD: 0 10E9/L (ref 0–0.4)
IMM GRANULOCYTES NFR BLD: 0.3 %
LYMPHOCYTES # BLD AUTO: 0.5 10E9/L (ref 0.8–5.3)
LYMPHOCYTES NFR BLD AUTO: 8.5 %
MAGNESIUM SERPL-MCNC: 2.1 MG/DL (ref 1.6–2.3)
MCH RBC QN AUTO: 34.5 PG (ref 26.5–33)
MCHC RBC AUTO-ENTMCNC: 33.9 G/DL (ref 31.5–36.5)
MCV RBC AUTO: 102 FL (ref 78–100)
MONOCYTES # BLD AUTO: 0.7 10E9/L (ref 0–1.3)
MONOCYTES NFR BLD AUTO: 11.2 %
NEUTROPHILS # BLD AUTO: 4.7 10E9/L (ref 1.6–8.3)
NEUTROPHILS NFR BLD AUTO: 79 %
NRBC # BLD AUTO: 0 10*3/UL
NRBC BLD AUTO-RTO: 0 /100
PLATELET # BLD AUTO: 186 10E9/L (ref 150–450)
POTASSIUM SERPL-SCNC: 4.6 MMOL/L (ref 3.4–5.3)
PROT SERPL-MCNC: 6.7 G/DL (ref 6.8–8.8)
RBC # BLD AUTO: 2.96 10E12/L (ref 4.4–5.9)
SODIUM SERPL-SCNC: 135 MMOL/L (ref 133–144)
TACROLIMUS BLD-MCNC: 6.8 UG/L (ref 5–15)
TME LAST DOSE: NORMAL H
WBC # BLD AUTO: 5.9 10E9/L (ref 4–11)

## 2019-08-29 PROCEDURE — 80197 ASSAY OF TACROLIMUS: CPT | Performed by: PHYSICIAN ASSISTANT

## 2019-08-29 PROCEDURE — 80053 COMPREHEN METABOLIC PANEL: CPT | Performed by: PHYSICIAN ASSISTANT

## 2019-08-29 PROCEDURE — 36592 COLLECT BLOOD FROM PICC: CPT

## 2019-08-29 PROCEDURE — G0463 HOSPITAL OUTPT CLINIC VISIT: HCPCS | Mod: ZF

## 2019-08-29 PROCEDURE — 85025 COMPLETE CBC W/AUTO DIFF WBC: CPT | Performed by: PHYSICIAN ASSISTANT

## 2019-08-29 PROCEDURE — 83735 ASSAY OF MAGNESIUM: CPT | Performed by: PHYSICIAN ASSISTANT

## 2019-08-29 RX ORDER — HEPARIN SODIUM,PORCINE 10 UNIT/ML
5 VIAL (ML) INTRAVENOUS
Status: DISCONTINUED | OUTPATIENT
Start: 2019-08-29 | End: 2019-08-29 | Stop reason: HOSPADM

## 2019-08-29 ASSESSMENT — PAIN SCALES - GENERAL: PAINLEVEL: NO PAIN (0)

## 2019-08-29 ASSESSMENT — MIFFLIN-ST. JEOR: SCORE: 1650.59

## 2019-08-29 NOTE — NURSING NOTE
"Oncology Rooming Note    August 29, 2019 1:58 PM   El Ace is a 64 year old male who presents for:    Chief Complaint   Patient presents with     RECHECK     Return: Day 91 ban review- Acute myeloid leukemia      Blood Draw     Central line labs collected by RN.      Initial Vitals: /66 (BP Location: Left arm, Patient Position: Sitting)   Pulse 90   Temp 98  F (36.7  C) (Oral)   Resp 20   Ht 1.765 m (5' 9.5\")   Wt 86.7 kg (191 lb 3.2 oz)   SpO2 99%   BMI 27.83 kg/m   Estimated body mass index is 27.83 kg/m  as calculated from the following:    Height as of this encounter: 1.765 m (5' 9.5\").    Weight as of this encounter: 86.7 kg (191 lb 3.2 oz). Body surface area is 2.06 meters squared.  No Pain (0) Comment: Data Unavailable   No LMP for male patient.  Allergies reviewed: Yes  Medications reviewed: Yes    Medications: MEDICATION REFILLS NEEDED TODAY. Provider was notified.  Pharmacy name entered into [employer]:    [facility] - [place], [place] - -[phone] [place] [address] [address].  [facility] [place], [place] - [phone]    Clinical concerns: Pt requesting refills on the Triamcinolone cream.  Dr. Brown was notified.      Jennifer Albarran CMA              "

## 2019-08-29 NOTE — NURSING NOTE
Chief Complaint   Patient presents with     RECHECK     Return: Day 91 ban review- Acute myeloid leukemia      Blood Draw     Central line labs collected by RN.

## 2019-08-30 ENCOUNTER — APPOINTMENT (OUTPATIENT)
Dept: LAB | Facility: CLINIC | Age: 64
End: 2019-08-30
Payer: COMMERCIAL

## 2019-09-02 DIAGNOSIS — C92.01 AML (ACUTE MYELOID LEUKEMIA) IN REMISSION (H): ICD-10-CM

## 2019-09-02 RX ORDER — LORAZEPAM 2 MG/ML
0.5 INJECTION INTRAMUSCULAR EVERY 4 HOURS PRN
Status: CANCELLED
Start: 2019-09-02

## 2019-09-04 NOTE — PROGRESS NOTES
[facility] BMT Clinic    Diagnosis:   1. 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     Treatments:   1. 7+3 (Dauno), 3/15/19, no response, Day 12 persistent disease with near packed marrow  2. Dec 10d+Ric 3/26/19, CR1 on 4/23/19 but with pos mutations (IDH1 17%, RUNX1 8%)  3. 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   4. Maintenance ALT-803 study, 1 dose, stopped due to profound fatigue, moderate to severe hypotension, and fever    PMH: HLP, BPH, DJD, RLS    Interval history: Rash fading. 10-point ROS otherwise negative.     Lab Results   Component Value Date    WBC 5.2 09/05/2019    HGB 10.3 (L) 09/05/2019    HCT 30.0 (L) 09/05/2019     09/05/2019     09/05/2019    POTASSIUM 4.3 09/05/2019    CHLORIDE 105 09/05/2019    CO2 23 09/05/2019    BUN 11 09/05/2019    CR 0.90 09/05/2019     (H) 09/05/2019    SED 88 (H) 06/25/2019    NTBNPI 2,031 (H) 04/03/2019    TROPI <0.015 04/23/2019    AST 14 09/05/2019    ALT 21 09/05/2019    ALKPHOS 117 09/05/2019    BILITOTAL 0.3 09/05/2019    INR 1.15 (H) 06/24/2019       Current Outpatient Medications   Medication     acetaminophen (TYLENOL) 325 MG tablet     acyclovir (ZOVIRAX) 800 MG tablet     cyclobenzaprine (FLEXERIL) 5 MG tablet     heparin lock flush 10 UNIT/ML SOLN injection     magnesium oxide (MAG-OX) 400 MG tablet     melatonin 3 MG tablet     ondansetron (ZOFRAN-ODT) 8 MG ODT tab     oxyCODONE (ROXICODONE) 5 MG tablet     pramox-pe-glycerin-petrolatum (PREPARATION H) 1-0.25-14.4-15 % CREA cream     prochlorperazine (COMPAZINE) 5 MG tablet     sulfamethoxazole-trimethoprim (BACTRIM DS/SEPTRA DS) 800-160 MG tablet     tacrolimus (GENERIC EQUIVALENT) 0.5 MG capsule     tamsulosin (FLOMAX) 0.4 MG capsule     triamcinolone (KENALOG) 0.1 % external cream     triamcinolone (KENALOG) 0.1 % external ointment     voriconazole (VFEND) 50 MG tablet     Current  Facility-Administered Medications   Medication     heparin lock flush 10 UNIT/ML injection 5 mL     lidocaine (PF) (XYLOCAINE) 1 % injection 3 mL     Ph/E:   Vitals: /61   Pulse 107   Temp 98.8  F (37.1  C) (Oral)   Wt 88.2 kg (194 lb 8 oz)   SpO2 98%   BMI 28.31 kg/m     %  General: NAD; H&N: no mucosal lesions; Lungs clear; Heart RRR; Abdomen; Soft, No organomegaly; Extremities: No edema; Skin: maculopapular erythematous rash covering about 20% of BSA with some scaling on the back, all less prominent; Neuro: Nonfocal; Mood/Affect: appropriate; Lymph: no LAD    A&P:   1. AML: day +97 haplo, CR1. Got 1 dose of ALT-803 on study. BM tomorrow. See us weekly for now.   2. ID: acyclovir, vori, bactrim. CMV weekly.   3. GVHD:  Tac 1 mg bid. Level today. Possible skin aGVHD vs. Drug rash (colchicine now on hold) vs virus, on topical GCs and improving. No tac taper until I see him for follow up.   4. Recurrent pseudogout: off pred for 2nd time. Off colchicine.

## 2019-09-05 ENCOUNTER — INFUSION THERAPY VISIT (OUTPATIENT)
Dept: TRANSPLANT | Facility: CLINIC | Age: 64
End: 2019-09-05
Attending: INTERNAL MEDICINE
Payer: COMMERCIAL

## 2019-09-05 ENCOUNTER — APPOINTMENT (OUTPATIENT)
Dept: LAB | Facility: CLINIC | Age: 64
End: 2019-09-05
Attending: INTERNAL MEDICINE
Payer: COMMERCIAL

## 2019-09-05 VITALS
HEART RATE: 107 BPM | OXYGEN SATURATION: 98 % | BODY MASS INDEX: 28.31 KG/M2 | TEMPERATURE: 98.8 F | SYSTOLIC BLOOD PRESSURE: 100 MMHG | DIASTOLIC BLOOD PRESSURE: 61 MMHG | WEIGHT: 194.5 LBS

## 2019-09-05 DIAGNOSIS — Z94.84 HISTORY OF ALLOGENEIC STEM CELL TRANSPLANT (H): ICD-10-CM

## 2019-09-05 DIAGNOSIS — C92.01 AML (ACUTE MYELOID LEUKEMIA) IN REMISSION (H): Primary | ICD-10-CM

## 2019-09-05 DIAGNOSIS — C92.01 AML (ACUTE MYELOID LEUKEMIA) IN REMISSION (H): ICD-10-CM

## 2019-09-05 LAB
ALBUMIN SERPL-MCNC: 3.7 G/DL (ref 3.4–5)
ALP SERPL-CCNC: 117 U/L (ref 40–150)
ALT SERPL W P-5'-P-CCNC: 21 U/L (ref 0–70)
ANION GAP SERPL CALCULATED.3IONS-SCNC: 6 MMOL/L (ref 3–14)
AST SERPL W P-5'-P-CCNC: 14 U/L (ref 0–45)
BASOPHILS # BLD AUTO: 0 10E9/L (ref 0–0.2)
BASOPHILS NFR BLD AUTO: 0.4 %
BILIRUB SERPL-MCNC: 0.3 MG/DL (ref 0.2–1.3)
BUN SERPL-MCNC: 11 MG/DL (ref 7–30)
CALCIUM SERPL-MCNC: 8.3 MG/DL (ref 8.5–10.1)
CHLORIDE SERPL-SCNC: 105 MMOL/L (ref 94–109)
CO2 SERPL-SCNC: 23 MMOL/L (ref 20–32)
CREAT SERPL-MCNC: 0.9 MG/DL (ref 0.66–1.25)
DIFFERENTIAL METHOD BLD: ABNORMAL
EOSINOPHIL # BLD AUTO: 0 10E9/L (ref 0–0.7)
EOSINOPHIL NFR BLD AUTO: 0.6 %
ERYTHROCYTE [DISTWIDTH] IN BLOOD BY AUTOMATED COUNT: 16.2 % (ref 10–15)
GFR SERPL CREATININE-BSD FRML MDRD: 89 ML/MIN/{1.73_M2}
GLUCOSE SERPL-MCNC: 129 MG/DL (ref 70–99)
HCT VFR BLD AUTO: 30 % (ref 40–53)
HGB BLD-MCNC: 10.3 G/DL (ref 13.3–17.7)
IMM GRANULOCYTES # BLD: 0 10E9/L (ref 0–0.4)
IMM GRANULOCYTES NFR BLD: 0.4 %
LYMPHOCYTES # BLD AUTO: 0.5 10E9/L (ref 0.8–5.3)
LYMPHOCYTES NFR BLD AUTO: 9 %
MAGNESIUM SERPL-MCNC: 2.2 MG/DL (ref 1.6–2.3)
MCH RBC QN AUTO: 35.4 PG (ref 26.5–33)
MCHC RBC AUTO-ENTMCNC: 34.3 G/DL (ref 31.5–36.5)
MCV RBC AUTO: 103 FL (ref 78–100)
MONOCYTES # BLD AUTO: 0.7 10E9/L (ref 0–1.3)
MONOCYTES NFR BLD AUTO: 13.6 %
NEUTROPHILS # BLD AUTO: 4 10E9/L (ref 1.6–8.3)
NEUTROPHILS NFR BLD AUTO: 76 %
NRBC # BLD AUTO: 0 10*3/UL
NRBC BLD AUTO-RTO: 0 /100
PLATELET # BLD AUTO: 207 10E9/L (ref 150–450)
POTASSIUM SERPL-SCNC: 4.3 MMOL/L (ref 3.4–5.3)
PROT SERPL-MCNC: 6.7 G/DL (ref 6.8–8.8)
RBC # BLD AUTO: 2.91 10E12/L (ref 4.4–5.9)
SODIUM SERPL-SCNC: 134 MMOL/L (ref 133–144)
WBC # BLD AUTO: 5.2 10E9/L (ref 4–11)

## 2019-09-05 PROCEDURE — G0463 HOSPITAL OUTPT CLINIC VISIT: HCPCS

## 2019-09-05 PROCEDURE — 80053 COMPREHEN METABOLIC PANEL: CPT | Performed by: INTERNAL MEDICINE

## 2019-09-05 PROCEDURE — 80197 ASSAY OF TACROLIMUS: CPT | Performed by: INTERNAL MEDICINE

## 2019-09-05 PROCEDURE — 25000128 H RX IP 250 OP 636: Mod: ZF | Performed by: INTERNAL MEDICINE

## 2019-09-05 PROCEDURE — 85025 COMPLETE CBC W/AUTO DIFF WBC: CPT | Performed by: INTERNAL MEDICINE

## 2019-09-05 PROCEDURE — 36592 COLLECT BLOOD FROM PICC: CPT

## 2019-09-05 PROCEDURE — 83735 ASSAY OF MAGNESIUM: CPT | Performed by: INTERNAL MEDICINE

## 2019-09-05 RX ORDER — TACROLIMUS 0.5 MG/1
1 CAPSULE ORAL 2 TIMES DAILY
Qty: 60 CAPSULE | Refills: 3 | Status: SHIPPED | OUTPATIENT
Start: 2019-09-05 | End: 2019-09-19

## 2019-09-05 RX ORDER — MAGNESIUM OXIDE 400 MG/1
1200 TABLET ORAL 2 TIMES DAILY
Qty: 180 TABLET | Refills: 1 | Status: SHIPPED | OUTPATIENT
Start: 2019-09-05 | End: 2019-11-08

## 2019-09-05 RX ORDER — HEPARIN SODIUM,PORCINE 10 UNIT/ML
5 VIAL (ML) INTRAVENOUS
Status: DISCONTINUED | OUTPATIENT
Start: 2019-09-05 | End: 2019-09-05 | Stop reason: HOSPADM

## 2019-09-05 RX ADMIN — HEPARIN, PORCINE (PF) 10 UNIT/ML INTRAVENOUS SYRINGE 5 ML: at 13:42

## 2019-09-05 RX ADMIN — HEPARIN, PORCINE (PF) 10 UNIT/ML INTRAVENOUS SYRINGE 5 ML: at 13:43

## 2019-09-05 ASSESSMENT — PAIN SCALES - GENERAL: PAINLEVEL: NO PAIN (0)

## 2019-09-05 NOTE — NURSING NOTE
"Oncology Rooming Note    September 5, 2019 2:22 PM   El Ace is a 64 year old male who presents for:    Chief Complaint   Patient presents with     Lab Only     labs drawn via cvc by RN in lab, saline and heparin locked, vitals completed     RECHECK     PT is here for a rtn for S/P BMT for AML     Initial Vitals: Blood Pressure 100/61   Pulse 107   Temperature 98.8  F (37.1  C) (Oral)   Weight 88.2 kg (194 lb 8 oz)   Oxygen Saturation 98%   Body Mass Index 28.31 kg/m   Estimated body mass index is 28.31 kg/m  as calculated from the following:    Height as of 8/29/19: 1.765 m (5' 9.5\").    Weight as of this encounter: 88.2 kg (194 lb 8 oz). Body surface area is 2.08 meters squared.  No Pain (0) Comment: Data Unavailable   No LMP for male patient.  Allergies reviewed: Yes  Medications reviewed: Yes    Medications: Medication refills not needed today.  Pharmacy name entered into [employer]:    [facility] - [place], [place] - [phone]  [facility] [place], [place] - [phone]    Clinical concerns: none       Shana Ballard MA              "

## 2019-09-06 ENCOUNTER — ONCOLOGY SURVIVORSHIP VISIT (OUTPATIENT)
Dept: TRANSPLANT | Facility: CLINIC | Age: 64
End: 2019-09-06
Attending: PHYSICIAN ASSISTANT
Payer: COMMERCIAL

## 2019-09-06 VITALS
TEMPERATURE: 97.9 F | SYSTOLIC BLOOD PRESSURE: 122 MMHG | DIASTOLIC BLOOD PRESSURE: 81 MMHG | OXYGEN SATURATION: 100 % | HEART RATE: 92 BPM | RESPIRATION RATE: 16 BRPM

## 2019-09-06 VITALS
TEMPERATURE: 97.9 F | WEIGHT: 196 LBS | BODY MASS INDEX: 28.53 KG/M2 | HEART RATE: 100 BPM | DIASTOLIC BLOOD PRESSURE: 73 MMHG | OXYGEN SATURATION: 100 % | SYSTOLIC BLOOD PRESSURE: 110 MMHG

## 2019-09-06 DIAGNOSIS — C92.01 ACUTE MYELOID LEUKEMIA IN REMISSION (H): Primary | ICD-10-CM

## 2019-09-06 DIAGNOSIS — C92.01 AML (ACUTE MYELOID LEUKEMIA) IN REMISSION (H): ICD-10-CM

## 2019-09-06 LAB
BASOPHILS # BLD AUTO: 0 10E9/L (ref 0–0.2)
BASOPHILS NFR BLD AUTO: 0.2 %
CMV DNA SPEC NAA+PROBE-ACNC: NORMAL [IU]/ML
CMV DNA SPEC NAA+PROBE-LOG#: NORMAL {LOG_IU}/ML
COPATH REPORT: NORMAL
DIFFERENTIAL METHOD BLD: ABNORMAL
EOSINOPHIL # BLD AUTO: 0.1 10E9/L (ref 0–0.7)
EOSINOPHIL NFR BLD AUTO: 1.3 %
ERYTHROCYTE [DISTWIDTH] IN BLOOD BY AUTOMATED COUNT: 16.2 % (ref 10–15)
HCT VFR BLD AUTO: 30.2 % (ref 40–53)
HGB BLD-MCNC: 10.4 G/DL (ref 13.3–17.7)
IMM GRANULOCYTES # BLD: 0 10E9/L (ref 0–0.4)
IMM GRANULOCYTES NFR BLD: 0.7 %
LYMPHOCYTES # BLD AUTO: 0.4 10E9/L (ref 0.8–5.3)
LYMPHOCYTES NFR BLD AUTO: 7.6 %
MCH RBC QN AUTO: 35.5 PG (ref 26.5–33)
MCHC RBC AUTO-ENTMCNC: 34.4 G/DL (ref 31.5–36.5)
MCV RBC AUTO: 103 FL (ref 78–100)
MONOCYTES # BLD AUTO: 0.6 10E9/L (ref 0–1.3)
MONOCYTES NFR BLD AUTO: 11.6 %
NEUTROPHILS # BLD AUTO: 4.3 10E9/L (ref 1.6–8.3)
NEUTROPHILS NFR BLD AUTO: 78.6 %
NRBC # BLD AUTO: 0 10*3/UL
NRBC BLD AUTO-RTO: 0 /100
PLATELET # BLD AUTO: 200 10E9/L (ref 150–450)
RBC # BLD AUTO: 2.93 10E12/L (ref 4.4–5.9)
SPECIMEN SOURCE: NORMAL
TACROLIMUS BLD-MCNC: 6 UG/L (ref 5–15)
TME LAST DOSE: NORMAL H
WBC # BLD AUTO: 5.4 10E9/L (ref 4–11)

## 2019-09-06 PROCEDURE — 88311 DECALCIFY TISSUE: CPT | Performed by: INTERNAL MEDICINE

## 2019-09-06 PROCEDURE — 81121 IDH2 COMMON VARIANTS: CPT | Performed by: PHYSICIAN ASSISTANT

## 2019-09-06 PROCEDURE — 00000161 ZZHCL STATISTIC H-SPHEME PROCESS B/S: Performed by: INTERNAL MEDICINE

## 2019-09-06 PROCEDURE — 88275 CYTOGENETICS 100-300: CPT | Performed by: PHYSICIAN ASSISTANT

## 2019-09-06 PROCEDURE — 88184 FLOWCYTOMETRY/ TC 1 MARKER: CPT | Performed by: PHYSICIAN ASSISTANT

## 2019-09-06 PROCEDURE — 25000128 H RX IP 250 OP 636: Mod: ZF | Performed by: PHYSICIAN ASSISTANT

## 2019-09-06 PROCEDURE — 85025 COMPLETE CBC W/AUTO DIFF WBC: CPT | Performed by: PHYSICIAN ASSISTANT

## 2019-09-06 PROCEDURE — 88271 CYTOGENETICS DNA PROBE: CPT | Performed by: PHYSICIAN ASSISTANT

## 2019-09-06 PROCEDURE — 38222 DX BONE MARROW BX & ASPIR: CPT | Mod: ZF

## 2019-09-06 PROCEDURE — 40000951 ZZHCL STATISTIC BONE MARROW INTERP TC 85097: Performed by: INTERNAL MEDICINE

## 2019-09-06 PROCEDURE — 88341 IMHCHEM/IMCYTCHM EA ADD ANTB: CPT | Performed by: INTERNAL MEDICINE

## 2019-09-06 PROCEDURE — 88342 IMHCHEM/IMCYTCHM 1ST ANTB: CPT | Performed by: INTERNAL MEDICINE

## 2019-09-06 PROCEDURE — 81120 IDH1 COMMON VARIANTS: CPT | Performed by: PHYSICIAN ASSISTANT

## 2019-09-06 PROCEDURE — 81267 CHIMERISM ANAL NO CELL SELEC: CPT | Performed by: PHYSICIAN ASSISTANT

## 2019-09-06 PROCEDURE — 81479 UNLISTED MOLECULAR PATHOLOGY: CPT | Performed by: PHYSICIAN ASSISTANT

## 2019-09-06 PROCEDURE — 88185 FLOWCYTOMETRY/TC ADD-ON: CPT | Performed by: PHYSICIAN ASSISTANT

## 2019-09-06 PROCEDURE — 40000424 ZZHCL STATISTIC BONE MARROW CORE PERF TC 38221: Performed by: INTERNAL MEDICINE

## 2019-09-06 PROCEDURE — 40001005 ZZHCL STATISTIC FLOW >15 ABY TC 88189: Performed by: PHYSICIAN ASSISTANT

## 2019-09-06 PROCEDURE — G0463 HOSPITAL OUTPT CLINIC VISIT: HCPCS | Mod: ZF

## 2019-09-06 PROCEDURE — 88305 TISSUE EXAM BY PATHOLOGIST: CPT | Performed by: INTERNAL MEDICINE

## 2019-09-06 PROCEDURE — 88161 CYTOPATH SMEAR OTHER SOURCE: CPT | Performed by: INTERNAL MEDICINE

## 2019-09-06 PROCEDURE — 88237 TISSUE CULTURE BONE MARROW: CPT | Performed by: PHYSICIAN ASSISTANT

## 2019-09-06 PROCEDURE — 00000058 ZZHCL STATISTIC BONE MARROW ASP PERF TC 38220: Performed by: INTERNAL MEDICINE

## 2019-09-06 PROCEDURE — 88264 CHROMOSOME ANALYSIS 20-25: CPT | Performed by: PHYSICIAN ASSISTANT

## 2019-09-06 PROCEDURE — 88280 CHROMOSOME KARYOTYPE STUDY: CPT | Performed by: PHYSICIAN ASSISTANT

## 2019-09-06 PROCEDURE — 40000611 ZZHCL STATISTIC MORPHOLOGY W/INTERP HEMEPATH TC 85060: Performed by: INTERNAL MEDICINE

## 2019-09-06 RX ORDER — HEPARIN SODIUM,PORCINE 10 UNIT/ML
5 VIAL (ML) INTRAVENOUS
Status: DISCONTINUED | OUTPATIENT
Start: 2019-09-06 | End: 2019-09-06 | Stop reason: HOSPADM

## 2019-09-06 RX ADMIN — HEPARIN, PORCINE (PF) 10 UNIT/ML INTRAVENOUS SYRINGE 5 ML: at 10:55

## 2019-09-06 RX ADMIN — MIDAZOLAM HYDROCHLORIDE 2 MG: 1 INJECTION, SOLUTION INTRAMUSCULAR; INTRAVENOUS at 10:07

## 2019-09-06 ASSESSMENT — PAIN SCALES - GENERAL
PAINLEVEL: NO PAIN (0)
PAINLEVEL: NO PAIN (0)

## 2019-09-06 NOTE — NURSING NOTE
BMT Teaching Flowsheet   Teaching Topic: post biopsy instructions    Person(s) involved in teaching: Patient  Motivation Level  Asks Questions: Yes  Eager to Learn: Yes  Cooperative: Yes  Receptive (willing/able to accept information): Yes    Patient demonstrates understanding of the following:   - Reason for the appointment, diagnosis and treatment plan: Yes  - Knowledge of proper use of medications and conditions for which they are ordered (with special attention to potential side effects or drug interactions): Yes  - Which situations necessitate calling provider and whom to contact: Yes    Teaching concerns addressed: reviewed activity restrictions if received premeds, potential for bleeding and actions to take if develops any of the issues below    Proper use and care of (medical equipment, care aids, etc.) Yes  Pain management techniques: Yes  Patient instructed on hand hygiene: Yes  How and/when to access community resources: Yes    Infection Control:  Patient demonstrates understanding of the following:   Surgical procedure site care taught NA  Signs and symptoms of infection taught Yes  Wound care taught Yes  Central venous catheter care taught NA    Instructional Materials Used/Given: verbal, print out of post biopsy instructions.     Time spent with patient: 0 minutes.    Specific Concerns: NA   Universal Safety Interventions

## 2019-09-06 NOTE — PROGRESS NOTES
BMT ONC Adult Bone Marrow Biopsy Procedure Note  September 6, 2019  There were no vitals taken for this visit.     Learning needs assessment complete within 12 months? YES    DIAGNOSIS: AML s/p haplo    PROCEDURE: Unilateral Bone Marrow Biopsy and Unilateral Aspirate    LOCATION: [facility] 2nd Floor    Patient s identification was positively verified by verbal identification and invasive procedure safety checklist was completed. Informed consent was obtained. Following the administration of Midazolam as pre-medication, patient was placed in the prone position and prepped and draped in a sterile manner. Approximately 15 cc of 1% Lidocaine was used over the left posterior iliac spine. Following this a 3 mm incision was made. Trephine bone marrow core(s) was (were) obtained from the [facility]. Bone marrow aspirates were obtained from the [facility]. Aspirates were sent for morphology, immunophenotyping, cytogenetics and molecular diagnostics RFLP. A total of approximately 16 ml of marrow was aspirated. Following this procedure a sterile dressing was applied to the bone marrow biopsy site(s). The patient was placed in the supine position to maintain pressure on the biopsy site. Post-procedure wound care instructions were given.     Complications: NO    Pre-procedural pain: 0 out of 10 on the numeric pain rating scale.     Procedural pain: 8 out of 10 on the numeric pain rating scale.     Post-procedural pain assessment: 0 out of 10 on the numeric pain rating scale.     Interventions: NO, pain dissipated immediately one aspirates were completed    Length of procedure:20 minutes or less      Procedure performed by: Tish Vaca pa-c  [phone]

## 2019-09-06 NOTE — NURSING NOTE
Patient supine for 30 minutes following biopsy. After 30 minutes, dressing clean, dry and intact. Vital signs stable. See DOC flow sheets for details. Left ambulatory with family member.    MADI LOWERY RN

## 2019-09-06 NOTE — NURSING NOTE
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    Weight as of this encounter: 88.9 kg (196 lb). Body surface area is 2.09 meters squared.  No Pain (0) Comment: Data Unavailable   No LMP for male patient.  Allergies reviewed: Yes  Medications reviewed: Yes    Medications: Medication refills not needed today.  Pharmacy name entered into [employer]:    [facility] - [place], [place] - [phone].  [facility] [place], [place] - [address] [place] [address] [phone]    Clinical concerns: None      Dayanara Morfin CMA              "

## 2019-09-06 NOTE — PROGRESS NOTES
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   Survivorship Care Plan  Date: September 6, 2019    Treatment Team:  Patient Care Team:  Bre Vizcaino MD as PCP - General (Speciality Unknown)  Rivera Cuadra MD as Referring Physician (Internal Medicine - Hematology)  Mike Coronado MD as BMT Physician (BMT - Adult)  Soot Brown MD as BMT Physician (BMT - Adult)  Christina Braxton, MILO as Specialty Care Coordinator (Hematology & Oncology)  Vivienne Cevallos MSW as  (BMT - Adult)  Debbie Joyner LICSW as  (BMT - Adult)  Jerri Payton RN as Specialty Care Coordinator (BMT - Adult)    Date of Transplant: 5/31/2019    Treatment/Chemotherapy Number of Cycles Date Range Outcomes & Complications   7+3   3/15/19     Decitabine/venetoclax   3/26, venetoclax added 3/29 and d/yanna 4/30 pseuodomonas bacteremia, septic shock (odontic source)  C.diff colitis     Morphologic/immunophenotypic remission      Survivorship Self-Management Goals:  No concerns identified on questionnaire    General Recommendations:     Ask your physician if you can return to work and usual activity. If you need more time for recovery, please let your physician know.    Vaccinations will be given at your 1- and 2-year anniversary visits in the BMT clinic.  An exception is the influenza vaccine, which can be given after day +60 post-transplant during influenza season.    For general health concerns you can be seen by your primary care provider.     If you have questions about your transplant or follow up tests contact your BMT RN coordinator.    Call the BMT clinic if you develop a skin rash, oral sores, eye pain or excessive dryness, shortness of breath, new cough, bleeding, diarrhea, nausea, or vomiting.     You can resume a regular diet.    You can stop wearing your blue N95 mask after your first day + 100 anniversary visit as long as you are no longer taking steroids and your ANC is higher than 1.0      Avoid large crowds and exposure to friends or family members with cold like symptoms while your immune system continues to rebuild.    You can resume sexual activity with your partner, although condoms should be used until immunosuppressive medications, such as cyclosporine, tacrolimus, or sirolimus, are discontinued.    You can drive without limitations as long as:  o Your platelet count is > 50,000 and your neutrophil count is >1,000.  o You are not taking any narcotics or sedative medications  o You feel well enough to preform driving activities     Survivorship Care Plan:     This individualized care plan is designed to inform you and your healthcare team of the recommended follow-up visits, tests, health maintenance activities, and cancer screening you should receive after transplant.     Possible Late-Term Effects:  Possible late complications include infections, ushlt-cy-cttk disease, cataracts, other cancers (mouth, skin, blood, solid tumors), cavities, changes in lung function, pneumonia, heart failure, low thyroid and other gland function, cancer relapse, graft failure, kidney and liver disease, nerve pain/neuropathy, altered cognitive function, muscle weakness, osteoporosis/weak bones, stress, depression, anxiety, sexual dysfunction, and infertility.    Health Monitoring: In the months following allogeneic stem cell transplant you may experience health conditions that require further evaluation by your healthcare team.     Contact your provider if you experience any of the following conditions or symptoms:   ? Cold symptoms or fever higher than 100.5 F  ? Skin rash or change in texture/firmness  ? Nausea, vomiting, or diarrhea   ? Mouth pain, oral lesions, bleeding gums, or chronic dry mouth   ? Red, dry, or irritated eyes  ? Blurry or double vision, eye pain  ? Persistent or recurrent headaches  ? High blood pressure or high blood sugar   ? Difficulty breathing, cough, shortness of breath  ? Chest  pain or palpitations  ? Swelling in legs or extremities    ? Unusual bruising or bleeding  ? Symptoms of cancer recurrence   ? Changes in moles or new skin lesions   ? Increased or worsening fatigue  ? Heat or cold intolerance  ? Loss of interest in sex or erectile dysfunction  ? Muscle weakness or increasing difficulty with daily activates   ? Pain or tingling in hands or feet  ? Changes in memory or difficulty concentrating (chemo brain) that does not improve within 3 months after your transplant  ? Abdominal pain on the upper right side or yellowing of the skin  ? Stress, depression, or anxiety  ? Inability to stop using narcotic pain medications or addictive substances    Healthy Behaviors & Lifestyle:    Schedule a full dental exam. Your mouth should be evaluated for oral cancer yearly. Follow your dentist s recommendations for cleanings. If you are on immunosuppression or still have your central line in place a prophylactic dose of antibiotics is recommended prior to your procedure.    Schedule an eye exam yearly. After transplant your risk for cataracts is higher. Let your eye doctor know you have had cancer treatment.    Avoid smoking or tobacco products.    Wear sunscreen and protect skin from sunburns.     Limit daily alcohol intake to less than 1 drink for women and 2 for men.    Follow general guidelines for physical activity as recommended by the [place] Office of Disease Prevention & Health Promotion:    Avoid Inactivity: Some physical activity is better than none -- any amount has benefits.  Do Aerobic Activity: Do aerobic physical activity in episodes of at least 10 minutes, as many times as possible per day. This could include going for walks or using the elliptical or stationary bike.  Ask your doctor what aerobic activities would be safe and helpful for you, and set a goal for yourself!  Strengthen Muscles: Do muscle-strengthening activities (such as lifting light weights or using resistance  bands and/or going up and down stairs) that are moderate or high intensity and involve all major muscle groups at least 4 days a week.      Recommendations & Follow-Up:    Referrals & tests placed during this visit: No orders of the defined types were placed in this encounter.      When to see your BMT Provider: next week (will be called by Dr. Brown & will formally meet w/ DOM)    When to see your Primary Care Provider or Local Hematologist/Oncologist: as directed to by Dr. Stephanie Vaca      I spent 40 minutes with the patient and family, over half of which was spent discussing preventative care strategies, self-management practices, and potential complications after transplant.      Information used for these recommendations was obtained from: Shandra NMichelle S., DISHA Monge., LUKE Mejia., DOMENICA Valdovinos., JERICHO Oneal., GRACE Mayers.,   Kelsey, K. M. (2013). Prevalence of Hematopoietic Cell Transplant Survivors in the [place]. Biology of Blood and Marrow Transplantation?: Journal of the American Society for Blood and Marrow Transplantation, 19(10), 6860-3316. doi 10.1016/j.bbmt.2013.07.020

## 2019-09-09 LAB
COPATH REPORT: NORMAL
COPATH REPORT: NORMAL

## 2019-09-10 ENCOUNTER — TELEPHONE (OUTPATIENT)
Dept: TRANSPLANT | Facility: CLINIC | Age: 64
End: 2019-09-10

## 2019-09-10 LAB
COPATH REPORT: NORMAL
COPATH REPORT: NORMAL

## 2019-09-10 NOTE — TELEPHONE ENCOUNTER
Updated patient on his day 100 remission marrow. He will stay on full dose tacro until we meet in 2-3 weeks and if the rash is completely resolved, will start a standard taper.     Soto Brown MD

## 2019-09-12 ENCOUNTER — INFUSION THERAPY VISIT (OUTPATIENT)
Dept: TRANSPLANT | Facility: CLINIC | Age: 64
End: 2019-09-12
Attending: INTERNAL MEDICINE
Payer: COMMERCIAL

## 2019-09-12 ENCOUNTER — APPOINTMENT (OUTPATIENT)
Dept: LAB | Facility: CLINIC | Age: 64
End: 2019-09-12
Attending: INTERNAL MEDICINE
Payer: COMMERCIAL

## 2019-09-12 VITALS
OXYGEN SATURATION: 100 % | HEART RATE: 82 BPM | BODY MASS INDEX: 28.82 KG/M2 | TEMPERATURE: 97.7 F | WEIGHT: 198 LBS | SYSTOLIC BLOOD PRESSURE: 121 MMHG | DIASTOLIC BLOOD PRESSURE: 77 MMHG | RESPIRATION RATE: 16 BRPM

## 2019-09-12 DIAGNOSIS — C92.01 AML (ACUTE MYELOID LEUKEMIA) IN REMISSION (H): ICD-10-CM

## 2019-09-12 DIAGNOSIS — Z94.84 HISTORY OF ALLOGENEIC STEM CELL TRANSPLANT (H): ICD-10-CM

## 2019-09-12 DIAGNOSIS — C92.01 ACUTE MYELOID LEUKEMIA IN REMISSION (H): Primary | ICD-10-CM

## 2019-09-12 LAB
ALBUMIN SERPL-MCNC: 3.5 G/DL (ref 3.4–5)
ALP SERPL-CCNC: 113 U/L (ref 40–150)
ALT SERPL W P-5'-P-CCNC: 23 U/L (ref 0–70)
ANION GAP SERPL CALCULATED.3IONS-SCNC: 7 MMOL/L (ref 3–14)
AST SERPL W P-5'-P-CCNC: 15 U/L (ref 0–45)
BASOPHILS # BLD AUTO: 0 10E9/L (ref 0–0.2)
BASOPHILS NFR BLD AUTO: 0.3 %
BILIRUB SERPL-MCNC: 0.3 MG/DL (ref 0.2–1.3)
BUN SERPL-MCNC: 14 MG/DL (ref 7–30)
CALCIUM SERPL-MCNC: 8.4 MG/DL (ref 8.5–10.1)
CHLORIDE SERPL-SCNC: 106 MMOL/L (ref 94–109)
CO2 SERPL-SCNC: 22 MMOL/L (ref 20–32)
CREAT SERPL-MCNC: 0.86 MG/DL (ref 0.66–1.25)
DIFFERENTIAL METHOD BLD: ABNORMAL
EOSINOPHIL # BLD AUTO: 0.1 10E9/L (ref 0–0.7)
EOSINOPHIL NFR BLD AUTO: 0.8 %
ERYTHROCYTE [DISTWIDTH] IN BLOOD BY AUTOMATED COUNT: 15.5 % (ref 10–15)
GFR SERPL CREATININE-BSD FRML MDRD: >90 ML/MIN/{1.73_M2}
GLUCOSE SERPL-MCNC: 123 MG/DL (ref 70–99)
HCT VFR BLD AUTO: 31.7 % (ref 40–53)
HGB BLD-MCNC: 10.7 G/DL (ref 13.3–17.7)
IMM GRANULOCYTES # BLD: 0 10E9/L (ref 0–0.4)
IMM GRANULOCYTES NFR BLD: 0.5 %
LYMPHOCYTES # BLD AUTO: 0.5 10E9/L (ref 0.8–5.3)
LYMPHOCYTES NFR BLD AUTO: 8.6 %
MAGNESIUM SERPL-MCNC: 2.2 MG/DL (ref 1.6–2.3)
MCH RBC QN AUTO: 35.5 PG (ref 26.5–33)
MCHC RBC AUTO-ENTMCNC: 33.8 G/DL (ref 31.5–36.5)
MCV RBC AUTO: 105 FL (ref 78–100)
MONOCYTES # BLD AUTO: 0.7 10E9/L (ref 0–1.3)
MONOCYTES NFR BLD AUTO: 12.1 %
NEUTROPHILS # BLD AUTO: 4.7 10E9/L (ref 1.6–8.3)
NEUTROPHILS NFR BLD AUTO: 77.7 %
NRBC # BLD AUTO: 0 10*3/UL
NRBC BLD AUTO-RTO: 0 /100
PLATELET # BLD AUTO: 196 10E9/L (ref 150–450)
POTASSIUM SERPL-SCNC: 4.6 MMOL/L (ref 3.4–5.3)
PROT SERPL-MCNC: 6.7 G/DL (ref 6.8–8.8)
RBC # BLD AUTO: 3.01 10E12/L (ref 4.4–5.9)
SODIUM SERPL-SCNC: 135 MMOL/L (ref 133–144)
TACROLIMUS BLD-MCNC: 8.4 UG/L (ref 5–15)
TME LAST DOSE: NORMAL H
WBC # BLD AUTO: 6 10E9/L (ref 4–11)

## 2019-09-12 PROCEDURE — 83735 ASSAY OF MAGNESIUM: CPT | Performed by: INTERNAL MEDICINE

## 2019-09-12 PROCEDURE — 80053 COMPREHEN METABOLIC PANEL: CPT | Performed by: INTERNAL MEDICINE

## 2019-09-12 PROCEDURE — G0463 HOSPITAL OUTPT CLINIC VISIT: HCPCS

## 2019-09-12 PROCEDURE — 85025 COMPLETE CBC W/AUTO DIFF WBC: CPT | Performed by: INTERNAL MEDICINE

## 2019-09-12 PROCEDURE — 80197 ASSAY OF TACROLIMUS: CPT | Performed by: INTERNAL MEDICINE

## 2019-09-12 PROCEDURE — 25000128 H RX IP 250 OP 636: Mod: ZF | Performed by: INTERNAL MEDICINE

## 2019-09-12 PROCEDURE — 36592 COLLECT BLOOD FROM PICC: CPT

## 2019-09-12 RX ORDER — HEPARIN SODIUM,PORCINE 10 UNIT/ML
5 VIAL (ML) INTRAVENOUS ONCE
Status: COMPLETED | OUTPATIENT
Start: 2019-09-12 | End: 2019-09-12

## 2019-09-12 RX ADMIN — HEPARIN, PORCINE (PF) 10 UNIT/ML INTRAVENOUS SYRINGE 5 ML: at 11:12

## 2019-09-12 ASSESSMENT — PAIN SCALES - GENERAL: PAINLEVEL: NO PAIN (0)

## 2019-09-12 NOTE — NURSING NOTE
Chief Complaint   Patient presents with     Infusion     Poss transfusion, hx AML s/p transplant.

## 2019-09-12 NOTE — NURSING NOTE
Chief Complaint   Patient presents with     Blood Draw     labs drawn via cvc by rn. vs taken caps changed     CVC accessed and labs drawn by RN in lab. Flushed with heparin. VS taken. Pt checked in for next appointment. Caps changed.   Aby Wright RN

## 2019-09-12 NOTE — NURSING NOTE
"Oncology Rooming Note    September 12, 2019 11:41 AM   El Ace is a 64 year old male who presents for:    Chief Complaint   Patient presents with     Blood Draw     labs drawn via cvc by rn. vs taken caps changed     RECHECK     PT is here for a rtn for AML S/P BMT     Initial Vitals: Blood Pressure 121/77 (BP Location: Right arm, Patient Position: Sitting, Cuff Size: Adult Regular)   Pulse 82   Temperature 97.7  F (36.5  C) (Oral)   Respiration 16   Weight 89.8 kg (198 lb)   Oxygen Saturation 100%   Body Mass Index 28.82 kg/m   Estimated body mass index is 28.82 kg/m  as calculated from the following:    Height as of 8/29/19: 1.765 m (5' 9.5\").    Weight as of this encounter: 89.8 kg (198 lb). Body surface area is 2.1 meters squared.  No Pain (0) Comment: Data Unavailable   No LMP for male patient.  Allergies reviewed: Yes  Medications reviewed: Yes    Medications: Medication refills not needed today.  Pharmacy name entered into [employer]:    [facility] - [place], [place] - [phone].  [facility] [place], [place] - [address] [facility] [phone]    Clinical concerns: none       Shana Ballard MA              "

## 2019-09-12 NOTE — PROGRESS NOTES
[facility] BMT Clinic    Diagnosis:   1. 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     Treatments:   1. 7+3 (Dauno), 3/15/19, no response, Day 12 persistent disease with near packed marrow  2. Dec 10d+Ric 3/26/19, CR1 on 4/23/19 but with pos mutations (IDH1 17%, RUNX1 8%)  3. 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   4. Maintenance ALT-803 study, 1 dose, stopped due to profound fatigue, moderate to severe hypotension, and fever    PMH: HLP, BPH, DJD, RLS    Interval history: Overall doing okay. Skin stuff in general better. No longer routinely using steroid creams. Discussed covering from sun with vori and his face being red and some peeling.     10-point ROS otherwise negative.     Ph/E:   Vitals: /77 (BP Location: Right arm, Patient Position: Sitting, Cuff Size: Adult Regular)   Pulse 82   Temp 97.7  F (36.5  C) (Oral)   Resp 16   Wt 89.8 kg (198 lb)   SpO2 100%   BMI 28.82 kg/m     %  General: NAD; H&N: no mucosal lesions; Lungs clear; Heart RRR; Abdomen; Soft, No organomegaly; Extremities: No edema; Skin: maculopapular erythematous rash covering about 20% of BSA with some scaling on the lower legs and face, all less prominent per patient; Neuro: Nonfocal; Mood/Affect: appropriate; Lymph: no LAD    A&P:   1. AML: day +104 haplo, CR. 100% donor in BM. Will order repeat peripheral chimerisms at next visit as I don't see peripheral at day +100 but he is 100% in the marrow. This way we'll have documentation that they are 100% as his last peripheral weren't. So going forward would know if they are dropping.   2. ID: acyclovir, vori, bactrim. CMV weekly.   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 Drug rash (colchicine now on hold) vs virus, on topicals and improving. No tac taper until Stephanie sees for f/u on 9/26.  4. Recurrent pseudogout: off pred for 2nd time. Off colchicine.      RTC: Weekly for  now.    Mitzi Celis PA-C  #[id number]

## 2019-09-18 DIAGNOSIS — C92.01 AML (ACUTE MYELOID LEUKEMIA) IN REMISSION (H): ICD-10-CM

## 2019-09-18 RX ORDER — LORAZEPAM 2 MG/ML
0.5 INJECTION INTRAMUSCULAR EVERY 4 HOURS PRN
Status: CANCELLED
Start: 2019-09-18

## 2019-09-18 NOTE — PROGRESS NOTES
[facility] BMT Clinic    Diagnosis:   1. 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     Treatments:   1. 7+3 (Dauno), 3/15/19, no response, Day 12 persistent disease with near packed marrow  2. Dec 10d+Ric 3/26/19, CR1 on 4/23/19 but with pos mutations (IDH1 17%, RUNX1 8%)  3. 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   4. Maintenance ALT-803 study, 1 dose, stopped due to profound fatigue, moderate to severe hypotension, and fever    PMH: HLP, BPH, DJD, RLS    Interval history: Returns for follow up. Feeling overall well. No new medical complaints. Energy is improving. No new nausea, vomiting or diarrhea. Rash is much improved, .  Skin stuff in general better. No longer routinely using steroid creams. Discussed covering from sun with vori and his face being red and some peeling.     10-point ROS otherwise negative.     Ph/E:   Vitals: /81   Pulse 91   Temp 98.1  F (36.7  C)   Resp 16   Wt 90.2 kg (198 lb 14.4 oz)   SpO2 98%   BMI 28.95 kg/m     %  General: NAD  H&N: no mucosal lesions, no LAD  Lungs: CTAB  Heart RRR without murmurs  Abdomen; Soft, NT, ND  Extremities: No edema  Skin: hyperpigmented rash on lower extremities, back, and arms. Area on chest is still slightly erythematous.   Neuro: Nonfocal    A&P:   1. AML: day +111 haplo, CR. 100% donor in BM. Repeat chimerism drawn today as none drawn at day 100 from peripheral blood.   2. ID: acyclovir, vori, bactrim. CMV weekly, neg thus far.   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 Drug rash (colchicine now on hold) vs virus, on topicals and improving. No tac taper until Stephanie sees for f/u on 9/26.  4. Recurrent pseudogout: off pred for 2nd time. Off colchicine.    5. Renal: hypokalemia secondary to tacrolimus- taking 1200mg TID, mag WNL.     RTC: 9/26 with Dr. Brown  Ordered line removal as counts stable and not needing transfusions.     Johnnie  ERASTO Horan  [phone]

## 2019-09-19 ENCOUNTER — INFUSION THERAPY VISIT (OUTPATIENT)
Dept: TRANSPLANT | Facility: CLINIC | Age: 64
End: 2019-09-19
Attending: INTERNAL MEDICINE
Payer: COMMERCIAL

## 2019-09-19 ENCOUNTER — APPOINTMENT (OUTPATIENT)
Dept: LAB | Facility: CLINIC | Age: 64
End: 2019-09-19
Attending: INTERNAL MEDICINE
Payer: COMMERCIAL

## 2019-09-19 VITALS
RESPIRATION RATE: 16 BRPM | BODY MASS INDEX: 28.95 KG/M2 | SYSTOLIC BLOOD PRESSURE: 128 MMHG | DIASTOLIC BLOOD PRESSURE: 81 MMHG | HEART RATE: 91 BPM | TEMPERATURE: 98.1 F | OXYGEN SATURATION: 98 % | WEIGHT: 198.9 LBS

## 2019-09-19 DIAGNOSIS — Z94.84 HISTORY OF ALLOGENEIC STEM CELL TRANSPLANT (H): ICD-10-CM

## 2019-09-19 DIAGNOSIS — C92.01 ACUTE MYELOID LEUKEMIA IN REMISSION (H): ICD-10-CM

## 2019-09-19 DIAGNOSIS — C92.01 AML (ACUTE MYELOID LEUKEMIA) IN REMISSION (H): Primary | ICD-10-CM

## 2019-09-19 DIAGNOSIS — C95.00 ACUTE LEUKEMIA NOT HAVING ACHIEVED REMISSION (H): ICD-10-CM

## 2019-09-19 LAB
ALBUMIN SERPL-MCNC: 3.6 G/DL (ref 3.4–5)
ALP SERPL-CCNC: 116 U/L (ref 40–150)
ALT SERPL W P-5'-P-CCNC: 20 U/L (ref 0–70)
ANION GAP SERPL CALCULATED.3IONS-SCNC: 4 MMOL/L (ref 3–14)
AST SERPL W P-5'-P-CCNC: 13 U/L (ref 0–45)
BASOPHILS # BLD AUTO: 0 10E9/L (ref 0–0.2)
BASOPHILS NFR BLD AUTO: 0.3 %
BILIRUB SERPL-MCNC: 0.3 MG/DL (ref 0.2–1.3)
BUN SERPL-MCNC: 17 MG/DL (ref 7–30)
CALCIUM SERPL-MCNC: 8.5 MG/DL (ref 8.5–10.1)
CHLORIDE SERPL-SCNC: 109 MMOL/L (ref 94–109)
CO2 SERPL-SCNC: 24 MMOL/L (ref 20–32)
CREAT SERPL-MCNC: 0.86 MG/DL (ref 0.66–1.25)
DIFFERENTIAL METHOD BLD: ABNORMAL
EOSINOPHIL # BLD AUTO: 0 10E9/L (ref 0–0.7)
EOSINOPHIL NFR BLD AUTO: 0.6 %
ERYTHROCYTE [DISTWIDTH] IN BLOOD BY AUTOMATED COUNT: 14.6 % (ref 10–15)
GFR SERPL CREATININE-BSD FRML MDRD: >90 ML/MIN/{1.73_M2}
GLUCOSE SERPL-MCNC: 112 MG/DL (ref 70–99)
HCT VFR BLD AUTO: 31.8 % (ref 40–53)
HGB BLD-MCNC: 11 G/DL (ref 13.3–17.7)
IMM GRANULOCYTES # BLD: 0 10E9/L (ref 0–0.4)
IMM GRANULOCYTES NFR BLD: 0.3 %
LYMPHOCYTES # BLD AUTO: 0.5 10E9/L (ref 0.8–5.3)
LYMPHOCYTES NFR BLD AUTO: 8.7 %
MAGNESIUM SERPL-MCNC: 2.1 MG/DL (ref 1.6–2.3)
MCH RBC QN AUTO: 36.8 PG (ref 26.5–33)
MCHC RBC AUTO-ENTMCNC: 34.6 G/DL (ref 31.5–36.5)
MCV RBC AUTO: 106 FL (ref 78–100)
MONOCYTES # BLD AUTO: 0.8 10E9/L (ref 0–1.3)
MONOCYTES NFR BLD AUTO: 13.1 %
NEUTROPHILS # BLD AUTO: 4.8 10E9/L (ref 1.6–8.3)
NEUTROPHILS NFR BLD AUTO: 77 %
NRBC # BLD AUTO: 0 10*3/UL
NRBC BLD AUTO-RTO: 0 /100
PLATELET # BLD AUTO: 203 10E9/L (ref 150–450)
POTASSIUM SERPL-SCNC: 4.8 MMOL/L (ref 3.4–5.3)
PROT SERPL-MCNC: 7 G/DL (ref 6.8–8.8)
RBC # BLD AUTO: 2.99 10E12/L (ref 4.4–5.9)
SODIUM SERPL-SCNC: 137 MMOL/L (ref 133–144)
TACROLIMUS BLD-MCNC: 8.9 UG/L (ref 5–15)
TME LAST DOSE: NORMAL H
WBC # BLD AUTO: 6.2 10E9/L (ref 4–11)

## 2019-09-19 PROCEDURE — 80053 COMPREHEN METABOLIC PANEL: CPT | Performed by: PHYSICIAN ASSISTANT

## 2019-09-19 PROCEDURE — 25000128 H RX IP 250 OP 636: Mod: ZF | Performed by: PHYSICIAN ASSISTANT

## 2019-09-19 PROCEDURE — G0463 HOSPITAL OUTPT CLINIC VISIT: HCPCS | Mod: ZF

## 2019-09-19 PROCEDURE — 83735 ASSAY OF MAGNESIUM: CPT | Performed by: PHYSICIAN ASSISTANT

## 2019-09-19 PROCEDURE — 85025 COMPLETE CBC W/AUTO DIFF WBC: CPT | Performed by: PHYSICIAN ASSISTANT

## 2019-09-19 PROCEDURE — 81268 CHIMERISM ANAL W/CELL SELECT: CPT | Performed by: PHYSICIAN ASSISTANT

## 2019-09-19 PROCEDURE — 80197 ASSAY OF TACROLIMUS: CPT | Performed by: PHYSICIAN ASSISTANT

## 2019-09-19 PROCEDURE — 36592 COLLECT BLOOD FROM PICC: CPT

## 2019-09-19 RX ORDER — DIPHENHYDRAMINE HCL 25 MG
25-50 CAPSULE ORAL EVERY 6 HOURS PRN
Status: CANCELLED
Start: 2019-09-19

## 2019-09-19 RX ORDER — ACYCLOVIR 800 MG/1
800 TABLET ORAL
Qty: 150 TABLET | Refills: 3 | Status: SHIPPED | OUTPATIENT
Start: 2019-09-19 | End: 2020-01-23

## 2019-09-19 RX ORDER — TACROLIMUS 0.5 MG/1
1 CAPSULE ORAL 2 TIMES DAILY
Qty: 60 CAPSULE | Refills: 3 | Status: SHIPPED | OUTPATIENT
Start: 2019-09-19 | End: 2019-10-24

## 2019-09-19 RX ORDER — TAMSULOSIN HYDROCHLORIDE 0.4 MG/1
0.4 CAPSULE ORAL DAILY
Qty: 30 CAPSULE | Refills: 0 | Status: SHIPPED | OUTPATIENT
Start: 2019-09-19 | End: 2019-10-10

## 2019-09-19 RX ORDER — HEPARIN SODIUM,PORCINE 10 UNIT/ML
5 VIAL (ML) INTRAVENOUS EVERY 24 HOURS
Qty: 9 VIAL | Refills: 0 | Status: SHIPPED | OUTPATIENT
Start: 2019-09-19 | End: 2019-10-10

## 2019-09-19 RX ORDER — HEPARIN SODIUM,PORCINE 10 UNIT/ML
5 VIAL (ML) INTRAVENOUS
Status: DISCONTINUED | OUTPATIENT
Start: 2019-09-19 | End: 2019-09-19 | Stop reason: HOSPADM

## 2019-09-19 RX ORDER — SULFAMETHOXAZOLE/TRIMETHOPRIM 800-160 MG
1 TABLET ORAL
Qty: 16 TABLET | Refills: 1 | Status: SHIPPED | OUTPATIENT
Start: 2019-09-19 | End: 2019-11-14

## 2019-09-19 RX ORDER — HEPARIN SODIUM (PORCINE) LOCK FLUSH IV SOLN 100 UNIT/ML 100 UNIT/ML
5 SOLUTION INTRAVENOUS
Status: CANCELLED | OUTPATIENT
Start: 2019-09-19

## 2019-09-19 RX ORDER — CEFTRIAXONE SODIUM 2 G
2 VIAL (EA) INJECTION EVERY 24 HOURS
Status: CANCELLED
Start: 2019-09-19

## 2019-09-19 RX ORDER — HEPARIN SODIUM,PORCINE 10 UNIT/ML
5 VIAL (ML) INTRAVENOUS
Status: CANCELLED | OUTPATIENT
Start: 2019-09-19

## 2019-09-19 RX ADMIN — HEPARIN, PORCINE (PF) 10 UNIT/ML INTRAVENOUS SYRINGE 5 ML: at 11:31

## 2019-09-19 RX ADMIN — HEPARIN, PORCINE (PF) 10 UNIT/ML INTRAVENOUS SYRINGE 5 ML: at 11:32

## 2019-09-19 ASSESSMENT — PAIN SCALES - GENERAL: PAINLEVEL: NO PAIN (0)

## 2019-09-19 NOTE — NURSING NOTE
Chief Complaint   Patient presents with     Lab Only     cvc, hearpin locked, caps changed, vitals checked     Eunice Frankel RN on 9/19/2019 at 11:33 AM

## 2019-09-19 NOTE — NURSING NOTE
"Oncology Rooming Note    September 19, 2019 11:52 AM   El Ace is a 64 year old male who presents for:    Chief Complaint   Patient presents with     Lab Only     cvc, hearpin locked, caps changed, vitals checked     Initial Vitals: /81   Pulse 91   Temp 98.1  F (36.7  C)   Resp 16   Wt 90.2 kg (198 lb 14.4 oz)   SpO2 98%   BMI 28.95 kg/m   Estimated body mass index is 28.95 kg/m  as calculated from the following:    Height as of 8/29/19: 1.765 m (5' 9.5\").    Weight as of this encounter: 90.2 kg (198 lb 14.4 oz). Body surface area is 2.1 meters squared.  No Pain (0) Comment: Data Unavailable   No LMP for male patient.  Allergies reviewed: Yes  Medications reviewed: Yes    Medications: MEDICATION REFILLS NEEDED TODAY. Provider was notified.  Pharmacy name entered into [employer]:    [facility].  [facility] [place], [place] - [address] [facility] [facility] [phone]    Clinical concerns: Acyclovir, Bactrim, Tacrolimus, Tamsulosin, and #9 Heparin flushes needed today        Dayanara Morfin CMA              "

## 2019-09-19 NOTE — NURSING NOTE
Chief Complaint   Patient presents with     Infusion     Poss infusion, hx AML s/p transplant.

## 2019-09-20 LAB
CMV DNA SPEC NAA+PROBE-ACNC: NORMAL [IU]/ML
CMV DNA SPEC NAA+PROBE-LOG#: NORMAL {LOG_IU}/ML
COPATH REPORT: NORMAL
SPECIMEN SOURCE: NORMAL

## 2019-09-20 NOTE — PLAN OF CARE
AVSS. Denies pain, nausea or vomiting. Up independently and voiding freely. No replacement needed.  Problem: Adult Inpatient Plan of Care  Goal: Plan of Care Review  6/2/2019 0503 by Princess Eliana Park RN  Outcome: No Change     Problem: Adult Inpatient Plan of Care  Goal: Patient-Specific Goal (Individualization)  6/2/2019 0503 by Princess Eliana Park RN  Outcome: No Change     Problem: Adult Inpatient Plan of Care  Goal: Absence of Hospital-Acquired Illness or Injury  6/2/2019 0503 by Princess Eliana Park RN  Outcome: No Change     Problem: Adult Inpatient Plan of Care  Goal: Optimal Comfort and Wellbeing  6/2/2019 0503 by Princess Eliana Park RN  Outcome: No Change     Problem: Adjustment to Transplant (Stem Cell/Bone Marrow Transplant)  Goal: Optimal Coping with Transplant  6/2/2019 0503 by Princess Eliana Park RN  Outcome: No Change     Problem: Bladder Irritation (Stem Cell/Bone Marrow Transplant)  Goal: Symptom-Free Urinary Elimination  6/2/2019 0503 by Princess Eliana Park RN  Outcome: No Change     Problem: Diarrhea (Stem Cell/Bone Marrow Transplant)  Goal: Diarrhea Symptom Control  6/2/2019 0503 by Princess Eliana Park RN  Outcome: No Change     Problem: Fatigue (Stem Cell/Bone Marrow Transplant)  Goal: Energy Level Supports Daily Activity  6/2/2019 0503 by Princess Eliana Park RN  Outcome: No Change     Problem: Hematologic Alteration (Stem Cell/Bone Marrow Transplant)  Goal: Blood Counts Within Acceptable Range  6/2/2019 0503 by Princess Eliana Park RN  Outcome: No Change     Problem: Infection Risk (Stem Cell/Bone Marrow Transplant)  Goal: Absence of Infection Signs/Symptoms  6/2/2019 0503 by Princess Eliana Park RN  Outcome: No Change     Problem: Mucositis (Stem Cell/Bone Marrow Transplant)  Goal: Mucous Membrane Health and Integrity  6/2/2019 0503 by Princess Eilana Park RN  Outcome: No Change     Problem: Nausea and Vomiting (Stem Cell/Bone Marrow Transplant)  Goal: Nausea  Appeal form received. Filled out form online. Automatically sent. Will await reply.     LVM for patient to inform and Vomiting Symptom Relief  6/2/2019 0503 by Princess Eliana Park, RN  Outcome: No Change     Problem: Nutrition Intake Altered (Stem Cell/Bone Marrow Transplant)  Goal: Optimal Nutrition Intake  6/2/2019 0503 by Princess Eliana Park, RN  Outcome: No Change

## 2019-09-25 DIAGNOSIS — Z94.84 HISTORY OF ALLOGENEIC STEM CELL TRANSPLANT (H): Primary | ICD-10-CM

## 2019-09-25 NOTE — PROGRESS NOTES
[facility] BMT Clinic    Diagnosis:   1. 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     Treatments:   1. 7+3 (Dauno), 3/15/19, no response, Day 12 persistent disease with near packed marrow  2. Dec 10d+Ric 3/26/19, CR1 on 4/23/19 but with pos mutations (IDH1 17%, RUNX1 8%)  3. 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   4. Maintenance ALT-803 study, 1 dose, stopped due to profound fatigue, moderate to severe hypotension, and fever    PMH: HLP, BPH, DJD, RLS    Interval history: Doing well. 10-point ROS negative. Rash resolved.     Lab Results   Component Value Date    WBC 5.5 09/26/2019    HGB 11.6 (L) 09/26/2019    HCT 33.8 (L) 09/26/2019     09/26/2019     09/19/2019    POTASSIUM 4.8 09/19/2019    CHLORIDE 109 09/19/2019    CO2 24 09/19/2019    BUN 17 09/19/2019    CR 0.86 09/19/2019     (H) 09/19/2019    SED 88 (H) 06/25/2019    NTBNPI 2,031 (H) 04/03/2019    TROPI <0.015 04/23/2019    AST 13 09/19/2019    ALT 20 09/19/2019    ALKPHOS 116 09/19/2019    BILITOTAL 0.3 09/19/2019    INR 1.15 (H) 06/24/2019       Current Outpatient Medications   Medication     acetaminophen (TYLENOL) 325 MG tablet     acyclovir (ZOVIRAX) 800 MG tablet     cyclobenzaprine (FLEXERIL) 5 MG tablet     heparin lock flush 10 UNIT/ML SOLN injection     magnesium oxide (MAG-OX) 400 MG tablet     melatonin 3 MG tablet     ondansetron (ZOFRAN-ODT) 8 MG ODT tab     oxyCODONE (ROXICODONE) 5 MG tablet     pramox-pe-glycerin-petrolatum (PREPARATION H) 1-0.25-14.4-15 % CREA cream     prochlorperazine (COMPAZINE) 5 MG tablet     sulfamethoxazole-trimethoprim (BACTRIM DS/SEPTRA DS) 800-160 MG tablet     tacrolimus (GENERIC EQUIVALENT) 0.5 MG capsule     tacrolimus (GENERIC EQUIVALENT) 1 mg/mL suspension     tamsulosin (FLOMAX) 0.4 MG capsule     triamcinolone (KENALOG) 0.1 % external cream     triamcinolone (KENALOG) 0.1 % external ointment      voriconazole (VFEND) 50 MG tablet     Current Facility-Administered Medications   Medication     lidocaine (PF) (XYLOCAINE) 1 % injection 3 mL     Ph/E:   Vitals: /82 (BP Location: Left arm, Patient Position: Chair, Cuff Size: Adult Regular)   Pulse 82   Temp 97.6  F (36.4  C) (Oral)   Wt 90.1 kg (198 lb 9.6 oz)   SpO2 99%   BMI 28.91 kg/m     %  General: NAD; H&N: no mucosal lesions; Lungs clear; Heart RRR; Abdomen; Soft, No organomegaly; Extremities: No edema; Skin: no rash; Neuro: Nonfocal; Mood/Affect: appropriate; Lymph: no LAD    A&P:   1. AML: day +118 haplo, CR1. Got 1 dose of ALT-803 on study. See us weekly. RFLP every 2 weeks unti T cells 100% donor. Line out tomorrow   2. ID: acyclovir, vori, bactrim. Flu shot when available.   3. GVHD:  Tac 1 mg bid. Taper starting next week when he received liquid tacro.    4. Recurrent pseudogout: Observation. Pred pulse like last time if flares; no colchicine (allergic).

## 2019-09-26 ENCOUNTER — OFFICE VISIT (OUTPATIENT)
Dept: TRANSPLANT | Facility: CLINIC | Age: 64
End: 2019-09-26
Attending: INTERNAL MEDICINE
Payer: COMMERCIAL

## 2019-09-26 ENCOUNTER — APPOINTMENT (OUTPATIENT)
Dept: LAB | Facility: CLINIC | Age: 64
End: 2019-09-26
Attending: INTERNAL MEDICINE
Payer: COMMERCIAL

## 2019-09-26 VITALS
WEIGHT: 198.6 LBS | BODY MASS INDEX: 28.91 KG/M2 | DIASTOLIC BLOOD PRESSURE: 82 MMHG | HEART RATE: 82 BPM | OXYGEN SATURATION: 99 % | SYSTOLIC BLOOD PRESSURE: 128 MMHG | TEMPERATURE: 97.6 F

## 2019-09-26 DIAGNOSIS — C92.01 AML (ACUTE MYELOID LEUKEMIA) IN REMISSION (H): ICD-10-CM

## 2019-09-26 LAB
ALBUMIN SERPL-MCNC: 3.8 G/DL (ref 3.4–5)
ALP SERPL-CCNC: 122 U/L (ref 40–150)
ALT SERPL W P-5'-P-CCNC: 22 U/L (ref 0–70)
ANION GAP SERPL CALCULATED.3IONS-SCNC: 7 MMOL/L (ref 3–14)
AST SERPL W P-5'-P-CCNC: 20 U/L (ref 0–45)
BASOPHILS # BLD AUTO: 0 10E9/L (ref 0–0.2)
BASOPHILS NFR BLD AUTO: 0.4 %
BILIRUB SERPL-MCNC: 0.4 MG/DL (ref 0.2–1.3)
BUN SERPL-MCNC: 14 MG/DL (ref 7–30)
CALCIUM SERPL-MCNC: 8.8 MG/DL (ref 8.5–10.1)
CHLORIDE SERPL-SCNC: 105 MMOL/L (ref 94–109)
CO2 SERPL-SCNC: 22 MMOL/L (ref 20–32)
CREAT SERPL-MCNC: 0.94 MG/DL (ref 0.66–1.25)
DIFFERENTIAL METHOD BLD: ABNORMAL
EOSINOPHIL # BLD AUTO: 0 10E9/L (ref 0–0.7)
EOSINOPHIL NFR BLD AUTO: 0.5 %
ERYTHROCYTE [DISTWIDTH] IN BLOOD BY AUTOMATED COUNT: 13.4 % (ref 10–15)
GFR SERPL CREATININE-BSD FRML MDRD: 85 ML/MIN/{1.73_M2}
GLUCOSE SERPL-MCNC: 106 MG/DL (ref 70–99)
HCT VFR BLD AUTO: 33.8 % (ref 40–53)
HGB BLD-MCNC: 11.6 G/DL (ref 13.3–17.7)
IMM GRANULOCYTES # BLD: 0 10E9/L (ref 0–0.4)
IMM GRANULOCYTES NFR BLD: 0.2 %
INR PPP: 1 (ref 0.86–1.14)
LYMPHOCYTES # BLD AUTO: 0.6 10E9/L (ref 0.8–5.3)
LYMPHOCYTES NFR BLD AUTO: 10.5 %
MCH RBC QN AUTO: 36 PG (ref 26.5–33)
MCHC RBC AUTO-ENTMCNC: 34.3 G/DL (ref 31.5–36.5)
MCV RBC AUTO: 105 FL (ref 78–100)
MONOCYTES # BLD AUTO: 0.7 10E9/L (ref 0–1.3)
MONOCYTES NFR BLD AUTO: 12.4 %
NEUTROPHILS # BLD AUTO: 4.2 10E9/L (ref 1.6–8.3)
NEUTROPHILS NFR BLD AUTO: 76 %
NRBC # BLD AUTO: 0 10*3/UL
NRBC BLD AUTO-RTO: 0 /100
PLATELET # BLD AUTO: 182 10E9/L (ref 150–450)
POTASSIUM SERPL-SCNC: 4.8 MMOL/L (ref 3.4–5.3)
PROT SERPL-MCNC: 7.1 G/DL (ref 6.8–8.8)
RBC # BLD AUTO: 3.22 10E12/L (ref 4.4–5.9)
SODIUM SERPL-SCNC: 134 MMOL/L (ref 133–144)
WBC # BLD AUTO: 5.5 10E9/L (ref 4–11)

## 2019-09-26 PROCEDURE — 85610 PROTHROMBIN TIME: CPT | Performed by: RADIOLOGY

## 2019-09-26 PROCEDURE — 25000128 H RX IP 250 OP 636: Mod: ZF | Performed by: INTERNAL MEDICINE

## 2019-09-26 PROCEDURE — 85025 COMPLETE CBC W/AUTO DIFF WBC: CPT | Performed by: RADIOLOGY

## 2019-09-26 PROCEDURE — G0463 HOSPITAL OUTPT CLINIC VISIT: HCPCS | Mod: ZF

## 2019-09-26 PROCEDURE — 80053 COMPREHEN METABOLIC PANEL: CPT | Performed by: RADIOLOGY

## 2019-09-26 PROCEDURE — 36592 COLLECT BLOOD FROM PICC: CPT

## 2019-09-26 RX ORDER — HEPARIN SODIUM,PORCINE 10 UNIT/ML
5 VIAL (ML) INTRAVENOUS ONCE
Status: COMPLETED | OUTPATIENT
Start: 2019-09-26 | End: 2019-09-26

## 2019-09-26 RX ADMIN — HEPARIN, PORCINE (PF) 10 UNIT/ML INTRAVENOUS SYRINGE 5 ML: at 11:51

## 2019-09-26 RX ADMIN — HEPARIN, PORCINE (PF) 10 UNIT/ML INTRAVENOUS SYRINGE 5 ML: at 11:50

## 2019-09-26 ASSESSMENT — PAIN SCALES - GENERAL: PAINLEVEL: NO PAIN (0)

## 2019-09-26 NOTE — NURSING NOTE
Chief Complaint   Patient presents with     Blood Draw     Dressing change needed; labs drawn via cvc by RN in lab     /82 (BP Location: Left arm, Patient Position: Chair, Cuff Size: Adult Regular)   Pulse 82   Temp 97.6  F (36.4  C) (Oral)   Wt 90.1 kg (198 lb 9.6 oz)   SpO2 99%   BMI 28.91 kg/m      Lines accessed by RN in lab. Labs collected through red lumen and sent. Both caps changed, lines flushed with NS & Heparin. Pt tolerated well.   Pt checked in for next appointment.    Meena Randhawa RN

## 2019-09-26 NOTE — NURSING NOTE
"Oncology Rooming Note    September 26, 2019 12:12 PM   El Ace is a 64 year old male who presents for:    Chief Complaint   Patient presents with     Blood Draw     Dressing change needed; labs drawn via cvc by RN in lab     Oncology Clinic Visit     Initial Vitals: /82 (BP Location: Left arm, Patient Position: Chair, Cuff Size: Adult Regular)   Pulse 82   Temp 97.6  F (36.4  C) (Oral)   Wt 90.1 kg (198 lb 9.6 oz)   SpO2 99%   BMI 28.91 kg/m   Estimated body mass index is 28.91 kg/m  as calculated from the following:    Height as of 8/29/19: 1.765 m (5' 9.5\").    Weight as of this encounter: 90.1 kg (198 lb 9.6 oz). Body surface area is 2.1 meters squared.  No Pain (0) Comment: Data Unavailable   No LMP for male patient.  Allergies reviewed: Yes  Medications reviewed: Yes    Medications: Medication refills not needed today.  Pharmacy name entered into [employer]:    [facility] - [place], [place] - [phone].  [place], [place] - [address] [facility] [phone]  [facility] - [place], [place] - [address] [place] [address]    Clinical concerns: No new concerns. Provider was notified.      Shyla Lima LPN            "

## 2019-09-27 ENCOUNTER — ANCILLARY PROCEDURE (OUTPATIENT)
Dept: ULTRASOUND IMAGING | Facility: CLINIC | Age: 64
End: 2019-09-27
Attending: PHYSICIAN ASSISTANT
Payer: COMMERCIAL

## 2019-09-27 DIAGNOSIS — Z94.84 HISTORY OF ALLOGENEIC STEM CELL TRANSPLANT (H): ICD-10-CM

## 2019-09-27 DIAGNOSIS — C92.01 AML (ACUTE MYELOID LEUKEMIA) IN REMISSION (H): ICD-10-CM

## 2019-09-27 NOTE — DISCHARGE INSTRUCTIONS
A collaboration between [facility]  Experts in minimally invasive, targeted treatments performed using imaging guidance    [facility]  Discharge Instructions For   Removal of Tunneled Central Line    Today you had your existing venous access device removed, either because it was no longer needed or because there was malfunction or infection issues.    One of our Radiologist PAs performed this procedure for you today:  ? Bassam Griffiths, [facility], PA-C  ? Manish Campa MPAS, PA-C  ? Irwin Rolle, MS, PA-C  ? Burke Miranda, MS, PA-C  ? Rebecca Lawler, MS, PA-C    After you go home:  - Resume your regular diet unless otherwise ordered by a medical provider.  - Keep any applied tape/gauze dressings clean and dry.  Change tape/gauze dressings if they get wet or soiled.  - You may shower the following day after procedure, however cover and protect from moisture any tape/gauze dressings.    - You may remove tape/gauze dressings after 5 days if the site looks closed and in the process of healing.  - Do not perform strenuous activities or lift greater than 10 pounds for the next three days.  - If there is bleeding or oozing from the procedure site, apply firm pressure to the area for 5-10 minutes.  If the bleeding continues seek medical advice at the numbers below.  - Mild procedure site discomfort can be treated with an ice pack and over-the-counter pain relievers.    Call our Interventional Radiology (IR) service if:  - If you start bleeding from the procedure site.  If you do start to bleed from the site hold some pressure on the site.  Our radiology provider can help you decide if you need to return to the hospital.  - If you have new or worsening pain related to the procedure.  - If you have concerning swelling at the procedure site.  - Any other concerns related to your procedure.             Page 1 of 2          [facility]  [facility]  Interventional Radiology (IR)  [address]  [address]nd Floor, [place] [address]    Contact Number:  [phone]  (IR )  - Monday - Friday 8:00 am - 4:30 pm    After hours for urgent concerns:  [phone]  - After 4:30 pm Monday - Friday, Weekends and Holidays.   - Ask for Interventional Radiology on-call.  Someone is available 24 hours a day.  - [facility] toll free number:  [phone]           Page 2 of 2

## 2019-09-27 NOTE — PROGRESS NOTES
PRE-PROCEDURE DIAGNOSIS: Acute myeloid leukemia    PROCEDURE: IR CVC Tunnel Removal   IMPRESSION: Completed removal of tunneled central venous catheter in its entirety. There were no complications.    ----------    CLINICAL HISTORY: The patient has a right IJ 9.5 French double-lumen tunneled central venous catheter placed 5/24/2019 for BMT to treat AML. Therapy was complete and catheter removal was requested.    PERFORMED BY: Dionte Rolle PA-C    MEDICATIONS: 5 mL 1% lidocaine for local anesthesia    DESCRIPTION: Physical examination demonstrated no erythema, tenderness, fluctuance, or discharge at the catheter exit site or along the tract. The catheter and its entry site into the skin was prepped and draped in usual sterile fashion.     Lidocaine mixture and blunt dissection were used around the catheter and subcutaneous cuff.      Using steady gentle traction, the catheter and cuff were freed from the subcutaneous tissue, and the entire catheter was removed without difficulty. Compression was applied over the venipuncture site, as well as along the tract, until good hemostasis was achieved. A sterile dressing was applied to the skin at the exit site.    COMPLICATIONS:  No immediate concerns; the patient remained stable throughout the procedure and tolerated it well.    ESTIMATED BLOOD LOSS:  Minimal    SPECIMENS: None    TIME:  A total of 15 minutes was spent with the patient. Greater than 50% of the time was spent in counseling, education, and coordination of care.    --------    INSTRUCTIONS GIVEN TO PATIENT: Keep site clean, dry, and covered until scab forms at skin exit site and change the dressing as needed. If the site bleeds, sit upright and hold pressure at the neck for 10 minutes.  If it continues to bleed, continue holding pressure and call Interventional Radiology.    BRITTNI ROLLE PA-C

## 2019-10-03 ENCOUNTER — TELEPHONE (OUTPATIENT)
Dept: TRANSPLANT | Facility: CLINIC | Age: 64
End: 2019-10-03

## 2019-10-03 NOTE — TELEPHONE ENCOUNTER
Mr. Ace called c/o a KEN today & fatigue since Saturday.  No fever, chills, cough, congestion, vision changes, N/v/D.  Will come to clinic tomorrow    Shreya Rollins    I informed pt that I will page an AUGIE to give Randy a call @ [phone] and if pt does not get a call back within half an hour to call [phone] again. Pt agreed. AUGIE paged.

## 2019-10-04 ENCOUNTER — ONCOLOGY VISIT (OUTPATIENT)
Dept: TRANSPLANT | Facility: CLINIC | Age: 64
End: 2019-10-04
Attending: INTERNAL MEDICINE
Payer: COMMERCIAL

## 2019-10-04 VITALS
WEIGHT: 199.9 LBS | BODY MASS INDEX: 28.62 KG/M2 | SYSTOLIC BLOOD PRESSURE: 115 MMHG | TEMPERATURE: 97.8 F | HEIGHT: 70 IN | OXYGEN SATURATION: 99 % | RESPIRATION RATE: 16 BRPM | DIASTOLIC BLOOD PRESSURE: 75 MMHG | HEART RATE: 105 BPM

## 2019-10-04 DIAGNOSIS — C92.01 ACUTE MYELOID LEUKEMIA IN REMISSION (H): Primary | ICD-10-CM

## 2019-10-04 DIAGNOSIS — R51.9 HEADACHE DISORDER: ICD-10-CM

## 2019-10-04 LAB
ALBUMIN SERPL-MCNC: 3.8 G/DL (ref 3.4–5)
ALP SERPL-CCNC: 115 U/L (ref 40–150)
ALT SERPL W P-5'-P-CCNC: 24 U/L (ref 0–70)
ANION GAP SERPL CALCULATED.3IONS-SCNC: 6 MMOL/L (ref 3–14)
AST SERPL W P-5'-P-CCNC: 17 U/L (ref 0–45)
BASOPHILS # BLD AUTO: 0 10E9/L (ref 0–0.2)
BASOPHILS NFR BLD AUTO: 0.5 %
BILIRUB DIRECT SERPL-MCNC: <0.1 MG/DL (ref 0–0.2)
BILIRUB SERPL-MCNC: 0.2 MG/DL (ref 0.2–1.3)
BUN SERPL-MCNC: 19 MG/DL (ref 7–30)
CALCIUM SERPL-MCNC: 8.6 MG/DL (ref 8.5–10.1)
CHLORIDE SERPL-SCNC: 110 MMOL/L (ref 94–109)
CO2 SERPL-SCNC: 22 MMOL/L (ref 20–32)
CREAT SERPL-MCNC: 0.92 MG/DL (ref 0.66–1.25)
CRYPTOC AG SPEC QL: NORMAL
DIFFERENTIAL METHOD BLD: ABNORMAL
EOSINOPHIL # BLD AUTO: 0 10E9/L (ref 0–0.7)
EOSINOPHIL NFR BLD AUTO: 0.6 %
ERYTHROCYTE [DISTWIDTH] IN BLOOD BY AUTOMATED COUNT: 13.2 % (ref 10–15)
EV RNA SPEC QL NAA+PROBE: NEGATIVE
GFR SERPL CREATININE-BSD FRML MDRD: 87 ML/MIN/{1.73_M2}
GLUCOSE CSF-MCNC: 60 MG/DL (ref 40–70)
GLUCOSE SERPL-MCNC: 141 MG/DL (ref 70–99)
HCT VFR BLD AUTO: 35 % (ref 40–53)
HGB BLD-MCNC: 12.1 G/DL (ref 13.3–17.7)
IMM GRANULOCYTES # BLD: 0 10E9/L (ref 0–0.4)
IMM GRANULOCYTES NFR BLD: 0.5 %
LYMPHOCYTES # BLD AUTO: 0.7 10E9/L (ref 0.8–5.3)
LYMPHOCYTES NFR BLD AUTO: 10.5 %
MCH RBC QN AUTO: 36.8 PG (ref 26.5–33)
MCHC RBC AUTO-ENTMCNC: 34.6 G/DL (ref 31.5–36.5)
MCV RBC AUTO: 106 FL (ref 78–100)
MONOCYTES # BLD AUTO: 0.8 10E9/L (ref 0–1.3)
MONOCYTES NFR BLD AUTO: 12.3 %
NEUTROPHILS # BLD AUTO: 5 10E9/L (ref 1.6–8.3)
NEUTROPHILS NFR BLD AUTO: 75.6 %
NRBC # BLD AUTO: 0 10*3/UL
NRBC BLD AUTO-RTO: 0 /100
PLATELET # BLD AUTO: 213 10E9/L (ref 150–450)
POTASSIUM SERPL-SCNC: 4.8 MMOL/L (ref 3.4–5.3)
PROT CSF-MCNC: 55 MG/DL (ref 15–60)
PROT SERPL-MCNC: 7 G/DL (ref 6.8–8.8)
RBC # BLD AUTO: 3.29 10E12/L (ref 4.4–5.9)
SODIUM SERPL-SCNC: 138 MMOL/L (ref 133–144)
SPECIMEN SOURCE: NORMAL
SPECIMEN SOURCE: NORMAL
WBC # BLD AUTO: 6.6 10E9/L (ref 4–11)

## 2019-10-04 PROCEDURE — 84157 ASSAY OF PROTEIN OTHER: CPT | Performed by: INTERNAL MEDICINE

## 2019-10-04 PROCEDURE — 85025 COMPLETE CBC W/AUTO DIFF WBC: CPT | Performed by: NURSE PRACTITIONER

## 2019-10-04 PROCEDURE — 62270 DX LMBR SPI PNXR: CPT | Mod: ZF

## 2019-10-04 PROCEDURE — 87498 ENTEROVIRUS PROBE&REVRS TRNS: CPT | Performed by: INTERNAL MEDICINE

## 2019-10-04 PROCEDURE — G0008 ADMIN INFLUENZA VIRUS VAC: HCPCS

## 2019-10-04 PROCEDURE — 82945 GLUCOSE OTHER FLUID: CPT | Performed by: INTERNAL MEDICINE

## 2019-10-04 PROCEDURE — 87999 UNLISTED MICROBIOLOGY PX: CPT | Performed by: INTERNAL MEDICINE

## 2019-10-04 PROCEDURE — 87533 HHV-6 DNA QUANT: CPT

## 2019-10-04 PROCEDURE — 88185 FLOWCYTOMETRY/TC ADD-ON: CPT | Performed by: INTERNAL MEDICINE

## 2019-10-04 PROCEDURE — 88108 CYTOPATH CONCENTRATE TECH: CPT | Performed by: INTERNAL MEDICINE

## 2019-10-04 PROCEDURE — 89050 BODY FLUID CELL COUNT: CPT | Performed by: INTERNAL MEDICINE

## 2019-10-04 PROCEDURE — 90662 IIV NO PRSV INCREASED AG IM: CPT | Mod: ZF | Performed by: INTERNAL MEDICINE

## 2019-10-04 PROCEDURE — 80048 BASIC METABOLIC PNL TOTAL CA: CPT | Performed by: NURSE PRACTITIONER

## 2019-10-04 PROCEDURE — 25000128 H RX IP 250 OP 636: Mod: ZF | Performed by: INTERNAL MEDICINE

## 2019-10-04 PROCEDURE — 80076 HEPATIC FUNCTION PANEL: CPT | Performed by: INTERNAL MEDICINE

## 2019-10-04 PROCEDURE — 87529 HSV DNA AMP PROBE: CPT | Performed by: INTERNAL MEDICINE

## 2019-10-04 PROCEDURE — 87533 HHV-6 DNA QUANT: CPT | Performed by: INTERNAL MEDICINE

## 2019-10-04 PROCEDURE — 40001004 ZZHCL STATISTIC FLOW INT 9-15 ABY TC 88188: Performed by: INTERNAL MEDICINE

## 2019-10-04 PROCEDURE — 87102 FUNGUS ISOLATION CULTURE: CPT | Performed by: INTERNAL MEDICINE

## 2019-10-04 PROCEDURE — 80053 COMPREHEN METABOLIC PANEL: CPT | Performed by: INTERNAL MEDICINE

## 2019-10-04 PROCEDURE — 88184 FLOWCYTOMETRY/ TC 1 MARKER: CPT | Performed by: INTERNAL MEDICINE

## 2019-10-04 PROCEDURE — 87799 DETECT AGENT NOS DNA QUANT: CPT | Performed by: INTERNAL MEDICINE

## 2019-10-04 PROCEDURE — 36415 COLL VENOUS BLD VENIPUNCTURE: CPT

## 2019-10-04 PROCEDURE — 00000102 ZZHCL STATISTIC CYTO WRIGHT STAIN TC: Performed by: INTERNAL MEDICINE

## 2019-10-04 PROCEDURE — 84999 UNLISTED CHEMISTRY PROCEDURE: CPT | Performed by: INTERNAL MEDICINE

## 2019-10-04 PROCEDURE — G0463 HOSPITAL OUTPT CLINIC VISIT: HCPCS | Mod: 25

## 2019-10-04 PROCEDURE — 87899 AGENT NOS ASSAY W/OPTIC: CPT | Performed by: INTERNAL MEDICINE

## 2019-10-04 RX ADMIN — INFLUENZA A VIRUS A/MICHIGAN/45/2015 X-275 (H1N1) ANTIGEN (FORMALDEHYDE INACTIVATED), INFLUENZA A VIRUS A/SINGAPORE/INFIMH-16-0019/2016 IVR-186 (H3N2) ANTIGEN (FORMALDEHYDE INACTIVATED), AND INFLUENZA B VIRUS B/MARYLAND/15/2016 BX-69A (A B/COLORADO/6/2017-LIKE VIRUS) ANTIGEN (FORMALDEHYDE INACTIVATED) 0.5 ML: 60; 60; 60 INJECTION, SUSPENSION INTRAMUSCULAR at 12:40

## 2019-10-04 ASSESSMENT — MIFFLIN-ST. JEOR: SCORE: 1690.05

## 2019-10-04 ASSESSMENT — PAIN SCALES - GENERAL: PAINLEVEL: NO PAIN (0)

## 2019-10-04 NOTE — NURSING NOTE
"Oncology Rooming Note    October 4, 2019 10:29 AM   El Ace is a 64 year old male who presents for:    Chief Complaint   Patient presents with     Blood Draw     Labs drawn via VPT by RN in lab. VS taken. Pt checked in for next appt     RECHECK     Return: AML (acute myeloid leukemia)     Initial Vitals: /75 (BP Location: Right arm, Patient Position: Sitting, Cuff Size: Adult Regular)   Pulse 105   Temp 97.8  F (36.6  C) (Oral)   Resp 16   Ht 1.765 m (5' 9.5\")   Wt 90.7 kg (199 lb 14.4 oz)   SpO2 99%   BMI 29.10 kg/m   Estimated body mass index is 29.1 kg/m  as calculated from the following:    Height as of this encounter: 1.765 m (5' 9.5\").    Weight as of this encounter: 90.7 kg (199 lb 14.4 oz). Body surface area is 2.11 meters squared.  No Pain (0) Comment: Data Unavailable   No LMP for male patient.  Allergies reviewed: Yes  Medications reviewed: Yes    Medications: Medication refills not needed today.  Pharmacy name entered into [employer]:    [facility] - [place], [place] - [id number]  [facility] [place], [place] - [id number]  [facility] - [place], [place] - [address] [place] [address]    Clinical concerns: Pt complains of headache and fatigue starting Saturday.  Dr. Sanford was notified.      Jennifer Albarran CMA              "

## 2019-10-04 NOTE — PROCEDURES
[facility] Procedure Note          Lumbar Puncture:      Time: 11:00 AM  Performed by: Chano aBrone MD  Authorized by: Chano Barone MD    Indications: headache    Consent given by: Patient who states understanding of the procedure being performed after discussing the risks, benefits and alternatives.    Prior to the start of the procedure and with procedural staff participation, I verbally confirmed the patient s identity using two indicators, relevant allergies, that the procedure was appropriate and matched the consent or emergent situation, and that the correct equipment/implants were available. Immediately prior to starting the procedure I conducted the Time Out with the procedural staff and re-confirmed the patient s name, procedure, and site/side. (The Joint Commission universal protocol was followed.) Yes    Under sterile conditions the patient was positioned Sitting, bent forward. Betadine solution and sterile drapes were utilized.  Local anesthetic at the site: 2 ml of lidocaine 1% without epinephrine from the LP tray  A 21 G  spinal needle was inserted at the L 3-4 interspace.  Opening Pressurewas not checked.  A total of 10mL of clear and colorless spinal fluid was obtained and sent to the laboratory.   After the needle was removed, a bandaid and pressure were applied and the patient was instructed to stay horizontal until the results were back.    Complications:  None    Patient tolerance: Patient tolerated the procedure well with no immediate complications.

## 2019-10-04 NOTE — PROGRESS NOTES
[facility]    Diagnosis:   1. 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     Treatments:   1. 7+3 (Dauno), 3/15/19, no response, Day 12 persistent disease with near packed marrow  2. Dec 10d+Ric 3/26/19, CR1 on 4/23/19 but with pos mutations (IDH1 17%, RUNX1 8%)  3. 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   4. Maintenance ALT-803 study, 1 dose, stopped due to profound fatigue, moderate to severe hypotension, and fever    PMH: HLP, BPH, DJD, RLS    Interval history:     Randy comes in today because he called yesterday complaining of a headache. He reports that he has an occipital headache, throbbing that started on Saturday (6 days ago) and has been persistent. The headache is accompanied by significant fatigue. For example, on Wednesday prior to this starting, he was able to walk for 2.5 miles. On Saturday, he was significantly fatigued. He also started taking naps but for about 10 mins. He also reported a stiff neck. The headache has been constant. In fact, it woke him up 3 times from sleep - twice on the same night and another night. It decreases in severity after acetaminophen but is pretty constant. He did not have the headache today. There are no associated neurological symptoms. No blurry vision, no muscle weakness, no dysphagia, slurred speech, facial asymmetry. No bowel/bladder symptoms. No other complaints. This is all happening in the setting of immunosuppression withdrawal.    Lab Results   Component Value Date    WBC 5.5 09/26/2019    HGB 11.6 (L) 09/26/2019    HCT 33.8 (L) 09/26/2019     09/26/2019     09/26/2019    POTASSIUM 4.8 09/26/2019    CHLORIDE 105 09/26/2019    CO2 22 09/26/2019    BUN 14 09/26/2019    CR 0.94 09/26/2019     (H) 09/26/2019    SED 88 (H) 06/25/2019    NTBNPI 2,031 (H) 04/03/2019    TROPI <0.015 04/23/2019    AST 20 09/26/2019    ALT 22 09/26/2019    ALKPHOS  "122 09/26/2019    BILITOTAL 0.4 09/26/2019    INR 1.00 09/26/2019       Current Outpatient Medications   Medication     acetaminophen (TYLENOL) 325 MG tablet     acyclovir (ZOVIRAX) 800 MG tablet     cyclobenzaprine (FLEXERIL) 5 MG tablet     heparin lock flush 10 UNIT/ML SOLN injection     magnesium oxide (MAG-OX) 400 MG tablet     melatonin 3 MG tablet     ondansetron (ZOFRAN-ODT) 8 MG ODT tab     oxyCODONE (ROXICODONE) 5 MG tablet     pramox-pe-glycerin-petrolatum (PREPARATION H) 1-0.25-14.4-15 % CREA cream     prochlorperazine (COMPAZINE) 5 MG tablet     sulfamethoxazole-trimethoprim (BACTRIM DS/SEPTRA DS) 800-160 MG tablet     tacrolimus (GENERIC EQUIVALENT) 0.5 MG capsule     tacrolimus (GENERIC EQUIVALENT) 1 mg/mL suspension     tamsulosin (FLOMAX) 0.4 MG capsule     triamcinolone (KENALOG) 0.1 % external cream     triamcinolone (KENALOG) 0.1 % external ointment     voriconazole (VFEND) 50 MG tablet     Current Facility-Administered Medications   Medication     lidocaine (PF) (XYLOCAINE) 1 % injection 3 mL     Ph/E:   Vitals: /75 (BP Location: Right arm, Patient Position: Sitting, Cuff Size: Adult Regular)   Pulse 105   Temp 97.8  F (36.6  C) (Oral)   Resp 16   Ht 1.765 m (5' 9.5\")   Wt 90.7 kg (199 lb 14.4 oz)   SpO2 99%   BMI 29.10 kg/m     KPS 90%    General: NAD;   H&N: no mucosal lesions;  Lungs clear; Heart RRR; Abdomen; Soft, No organomegaly; Extremities: No edema; Skin: no rash;  Neuro: AAO x3, CN 2-12 normal, physiologic nystagmus with lateral gaze, muscle power 5/5 x4, normal heel to toe.  MSK: No pressure points over shoulder/upper back   Mood/Affect: appropriate; Lymph: no LAD    A&P:   #Headache  - Differential includes the usual headache syndromes (tension, cluster, migraine) but character is not typical. It has been constant for one week, it is occipital in location, it is not accompanied by any visual symptoms and he has never had this before. PRESS is also possible but his he has " been on tacrolimus and it is being tapered now. Given the prolonged symptoms and the accompanying fatigue, I think ruling out CNS relapse and opportunistic viral infections is prudent. Therefore, I will do an LP and send for the usual studies in addition to viruses, cytology and flow.    1. AML: day +126 haplo, CR1. Got 1 dose of ALT-803 on study. See us weekly. RFLP every 2 weeks unti T cells 100% donor.  2. ID: acyclovir, vori, bactrim. Flu shot given today  3. GVHD:  Tac 1 being tapered  4. Recurrent pseudogout: Observation. Pred pulse like last time if flares; no colchicine (allergic).     He is scheduled to see Dr. Brown on Thursday.

## 2019-10-04 NOTE — NURSING NOTE
Flu vaccination given today in right deltoid. VIS given to pt. Patient tolerated well. See DALJIT Albarran MA

## 2019-10-05 LAB
HSV1 DNA CSF QL NAA+PROBE: NOT DETECTED
HSV2 DNA CSF QL NAA+PROBE: NOT DETECTED
MICROBIOLOGIST REVIEW: NORMAL

## 2019-10-07 LAB
COPATH REPORT: NORMAL
COPATH REPORT: NORMAL
DIAGNOSTIC IMP SPEC-IMP: NOT DETECTED
EBV DNA # SPEC NAA+PROBE: <390 CPY/ML
EBV DNA SPEC NAA+PROBE-LOG#: <2.6 LOG
LAB SCANNED RESULT: NORMAL
SPECIMEN SOURCE: NORMAL

## 2019-10-08 DIAGNOSIS — R51.9 ACUTE INTRACTABLE HEADACHE, UNSPECIFIED HEADACHE TYPE: Primary | ICD-10-CM

## 2019-10-08 LAB
APPEARANCE CSF: CLEAR
COLOR CSF: COLORLESS
RBC # CSF MANUAL: 0 /UL (ref 0–2)
TUBE # CSF: 3 #
WBC # CSF MANUAL: 3 /UL (ref 0–5)

## 2019-10-09 LAB
HHV6 DNA # SPEC NAA+PROBE: NORMAL COPIES/ML
HHV6 DNA SPEC NAA+PROBE-LOG#: NORMAL LOG COPIES/ML
MISCELLANEOUS TEST: NORMAL
SPECIMEN SOURCE: NORMAL

## 2019-10-09 NOTE — PROGRESS NOTES
[facility]    Diagnosis:   1. 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     Treatments:   1. 7+3 (Dauno), 3/15/19, no response, Day 12 persistent disease with near packed marrow  2. Dec 10d+Ric 3/26/19, CR1 on 4/23/19 but with pos mutations (IDH1 17%, RUNX1 8%)  3. 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   4. Maintenance ALT-803 study, 1 dose, stopped due to profound fatigue, moderate to severe hypotension, and fever    PMH: HLP, BPH, DJD, RLS    Interval history: Fatigue. HA resolved. Itch with activity. 10-point ROS otherwise negative.      Lab Results   Component Value Date    WBC 5.8 10/10/2019    HGB 11.4 (L) 10/10/2019    HCT 32.2 (L) 10/10/2019     10/10/2019     10/04/2019    POTASSIUM 4.8 10/04/2019    CHLORIDE 110 (H) 10/04/2019    CO2 22 10/04/2019    BUN 19 10/04/2019    CR 0.92 10/04/2019     (H) 10/04/2019    SED 88 (H) 06/25/2019    NTBNPI 2,031 (H) 04/03/2019    TROPI <0.015 04/23/2019    AST 17 10/04/2019    ALT 24 10/04/2019    ALKPHOS 115 10/04/2019    BILITOTAL 0.2 10/04/2019    INR 1.00 09/26/2019       Current Outpatient Medications   Medication     acetaminophen (TYLENOL) 325 MG tablet     acyclovir (ZOVIRAX) 800 MG tablet     cyclobenzaprine (FLEXERIL) 5 MG tablet     magnesium oxide (MAG-OX) 400 MG tablet     melatonin 3 MG tablet     ondansetron (ZOFRAN-ODT) 8 MG ODT tab     oxyCODONE (ROXICODONE) 5 MG tablet     pramox-pe-glycerin-petrolatum (PREPARATION H) 1-0.25-14.4-15 % CREA cream     prochlorperazine (COMPAZINE) 5 MG tablet     sulfamethoxazole-trimethoprim (BACTRIM DS/SEPTRA DS) 800-160 MG tablet     tacrolimus (GENERIC EQUIVALENT) 0.5 MG capsule     tacrolimus (GENERIC EQUIVALENT) 1 mg/mL suspension     tamsulosin (FLOMAX) 0.4 MG capsule     triamcinolone (KENALOG) 0.1 % external cream     triamcinolone (KENALOG) 0.1 % external ointment     voriconazole (VFEND)  50 MG tablet     Current Facility-Administered Medications   Medication     lidocaine (PF) (XYLOCAINE) 1 % injection 3 mL     Ph/E:   Vitals: /71   Pulse 98   Temp 98.5  F (36.9  C) (Oral)   Resp 16   Wt 92.8 kg (204 lb 9.6 oz)   SpO2 98%   BMI 29.78 kg/m     KPS 80  General: NAD; H&N: no mucosal lesions; Lungs clear; Heart RRR; Abdomen; Soft, No organomegaly; Extremities: No edema; Skin: no rash; Neuro: Nonfocal; Mood/Affect: appropriate; Lymph: no LAD    A&P:   1. AML: day +132 haplo, CR1. Got 1 dose of ALT-803 on study. See us every 2 weeks with. RFLP unti T cells 100% donor.   2. ID: acyclovir, vori, bactrim. Flu shot utd.    3. GVHD:  Tac taper.   4. Recurrent pseudogout: Observation. Pred pulse like last time if flares; no colchicine (allergic).

## 2019-10-10 ENCOUNTER — ONCOLOGY VISIT (OUTPATIENT)
Dept: TRANSPLANT | Facility: CLINIC | Age: 64
End: 2019-10-10
Attending: INTERNAL MEDICINE
Payer: COMMERCIAL

## 2019-10-10 ENCOUNTER — APPOINTMENT (OUTPATIENT)
Dept: LAB | Facility: CLINIC | Age: 64
End: 2019-10-10
Attending: INTERNAL MEDICINE
Payer: COMMERCIAL

## 2019-10-10 VITALS
SYSTOLIC BLOOD PRESSURE: 112 MMHG | DIASTOLIC BLOOD PRESSURE: 71 MMHG | BODY MASS INDEX: 29.78 KG/M2 | RESPIRATION RATE: 16 BRPM | HEART RATE: 98 BPM | WEIGHT: 204.6 LBS | TEMPERATURE: 98.5 F | OXYGEN SATURATION: 98 %

## 2019-10-10 DIAGNOSIS — R51.9 HEADACHE DISORDER: ICD-10-CM

## 2019-10-10 DIAGNOSIS — C92.01 ACUTE MYELOID LEUKEMIA IN REMISSION (H): Primary | ICD-10-CM

## 2019-10-10 DIAGNOSIS — C95.00 ACUTE LEUKEMIA NOT HAVING ACHIEVED REMISSION (H): ICD-10-CM

## 2019-10-10 LAB
ANION GAP SERPL CALCULATED.3IONS-SCNC: 7 MMOL/L (ref 3–14)
BASOPHILS # BLD AUTO: 0 10E9/L (ref 0–0.2)
BASOPHILS NFR BLD AUTO: 0.3 %
BUN SERPL-MCNC: 19 MG/DL (ref 7–30)
CALCIUM SERPL-MCNC: 8.1 MG/DL (ref 8.5–10.1)
CHLORIDE SERPL-SCNC: 108 MMOL/L (ref 94–109)
CO2 SERPL-SCNC: 21 MMOL/L (ref 20–32)
CREAT SERPL-MCNC: 1.11 MG/DL (ref 0.66–1.25)
DIFFERENTIAL METHOD BLD: ABNORMAL
EOSINOPHIL # BLD AUTO: 0 10E9/L (ref 0–0.7)
EOSINOPHIL NFR BLD AUTO: 0.7 %
ERYTHROCYTE [DISTWIDTH] IN BLOOD BY AUTOMATED COUNT: 12.8 % (ref 10–15)
GFR SERPL CREATININE-BSD FRML MDRD: 69 ML/MIN/{1.73_M2}
GLUCOSE SERPL-MCNC: 135 MG/DL (ref 70–99)
HCT VFR BLD AUTO: 32.2 % (ref 40–53)
HGB BLD-MCNC: 11.4 G/DL (ref 13.3–17.7)
IMM GRANULOCYTES # BLD: 0 10E9/L (ref 0–0.4)
IMM GRANULOCYTES NFR BLD: 0.3 %
LYMPHOCYTES # BLD AUTO: 1 10E9/L (ref 0.8–5.3)
LYMPHOCYTES NFR BLD AUTO: 17.8 %
MCH RBC QN AUTO: 37.4 PG (ref 26.5–33)
MCHC RBC AUTO-ENTMCNC: 35.4 G/DL (ref 31.5–36.5)
MCV RBC AUTO: 106 FL (ref 78–100)
MONOCYTES # BLD AUTO: 0.6 10E9/L (ref 0–1.3)
MONOCYTES NFR BLD AUTO: 10 %
NEUTROPHILS # BLD AUTO: 4.1 10E9/L (ref 1.6–8.3)
NEUTROPHILS NFR BLD AUTO: 70.9 %
NRBC # BLD AUTO: 0 10*3/UL
NRBC BLD AUTO-RTO: 0 /100
PLATELET # BLD AUTO: 198 10E9/L (ref 150–450)
POTASSIUM SERPL-SCNC: 4.5 MMOL/L (ref 3.4–5.3)
RBC # BLD AUTO: 3.05 10E12/L (ref 4.4–5.9)
SODIUM SERPL-SCNC: 136 MMOL/L (ref 133–144)
WBC # BLD AUTO: 5.8 10E9/L (ref 4–11)

## 2019-10-10 PROCEDURE — 36415 COLL VENOUS BLD VENIPUNCTURE: CPT

## 2019-10-10 PROCEDURE — 85025 COMPLETE CBC W/AUTO DIFF WBC: CPT | Performed by: INTERNAL MEDICINE

## 2019-10-10 PROCEDURE — G0463 HOSPITAL OUTPT CLINIC VISIT: HCPCS

## 2019-10-10 PROCEDURE — 81268 CHIMERISM ANAL W/CELL SELECT: CPT | Performed by: INTERNAL MEDICINE

## 2019-10-10 PROCEDURE — 80048 BASIC METABOLIC PNL TOTAL CA: CPT | Performed by: INTERNAL MEDICINE

## 2019-10-10 RX ORDER — TAMSULOSIN HYDROCHLORIDE 0.4 MG/1
0.4 CAPSULE ORAL DAILY
Qty: 30 CAPSULE | Refills: 11 | Status: SHIPPED | OUTPATIENT
Start: 2019-10-10 | End: 2019-11-26

## 2019-10-10 ASSESSMENT — PAIN SCALES - GENERAL: PAINLEVEL: NO PAIN (0)

## 2019-10-10 NOTE — NURSING NOTE
"Oncology Rooming Note    October 10, 2019 3:17 PM   El Ace is a 64 year old male who presents for:    Chief Complaint   Patient presents with     Blood Draw     Labs drawn via  by RN in lab. VS taken.      RECHECK     AML post txp here for providerv visit     Initial Vitals: /71   Pulse 98   Temp 98.5  F (36.9  C) (Oral)   Resp 16   Wt 92.8 kg (204 lb 9.6 oz)   SpO2 98%   BMI 29.78 kg/m   Estimated body mass index is 29.78 kg/m  as calculated from the following:    Height as of 10/4/19: 1.765 m (5' 9.5\").    Weight as of this encounter: 92.8 kg (204 lb 9.6 oz). Body surface area is 2.13 meters squared.  No Pain (0) Comment: Data Unavailable   No LMP for male patient.  Allergies reviewed: Yes  Medications reviewed: Yes    Medications: MEDICATION REFILLS NEEDED TODAY. Provider was notified.  Pharmacy name entered into [facility]:    [facility] - [place] [facility].  [facility] [place], [place] - [address] [place] [address] [id number]  [facility] - [place], [place] - [address] [place] [address]    Clinical concerns: None    Efren Moraes RN              "

## 2019-10-10 NOTE — NURSING NOTE
Chief Complaint   Patient presents with     Blood Draw     Labs drawn via  by RN in lab. VS taken.      Labs drawn via venipuncture. Vital signs taken. Checked into next appointment.   Vee Quiñonez RN

## 2019-10-11 ENCOUNTER — OFFICE VISIT (OUTPATIENT)
Dept: RHEUMATOLOGY | Facility: CLINIC | Age: 64
End: 2019-10-11
Attending: INTERNAL MEDICINE
Payer: COMMERCIAL

## 2019-10-11 VITALS
SYSTOLIC BLOOD PRESSURE: 142 MMHG | WEIGHT: 204.1 LBS | BODY MASS INDEX: 29.71 KG/M2 | OXYGEN SATURATION: 99 % | DIASTOLIC BLOOD PRESSURE: 93 MMHG | HEART RATE: 86 BPM | TEMPERATURE: 97.9 F

## 2019-10-11 DIAGNOSIS — Z87.39 HISTORY OF CALCIUM PYROPHOSPHATE DEPOSITION DISEASE (CPPD): ICD-10-CM

## 2019-10-11 DIAGNOSIS — Z23 NEED FOR ZOSTER VACCINATION: Primary | ICD-10-CM

## 2019-10-11 PROCEDURE — G0463 HOSPITAL OUTPT CLINIC VISIT: HCPCS | Mod: ZF

## 2019-10-11 RX ORDER — PREDNISONE 5 MG/1
TABLET ORAL
Qty: 15 TABLET | Refills: 2 | Status: SHIPPED | OUTPATIENT
Start: 2019-10-11 | End: 2019-11-14

## 2019-10-11 ASSESSMENT — PAIN SCALES - GENERAL: PAINLEVEL: NO PAIN (0)

## 2019-10-11 NOTE — PATIENT INSTRUCTIONS
DX:  - CPPD/pseudogout, inactive    Plan:  - Contact our office if experiencing impending gout flare. We will provide a prednisone taper for as needed use (4 days total course with 20 mg prednisone/day maximum)  - discuss shingrix with Dr. Brown.

## 2019-10-11 NOTE — NURSING NOTE
Chief Complaint   Patient presents with     RECHECK     OA of Left hip         BP (!) 142/93 (BP Location: Right arm, Patient Position: Sitting, Cuff Size: Adult Regular)   Pulse 86   Temp 97.9  F (36.6  C) (Oral)   Wt 92.6 kg (204 lb 1.6 oz)   SpO2 99%   BMI 29.71 kg/m          Ariela Reed CMA CMA    10/11/2019 11:05 AM

## 2019-10-11 NOTE — LETTER
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 He reports a recent episode of posterior headache/neck pain. Workup was negative for infection and pain resolved spontaneously in the past week. Exam shows no inflammatory joint changes. CSF cultures were negative for cryptococcus. Bacterial cultures and viral serologies were negative. CMV, EBV, and HSV in the CSF were negative.     Pseudogout is quiescent. We discussed the unpredictable nature of pseudogout and the propensity to flare inflammatory joints such as the wrists, elbows, shoulders, knees, and ankles. I recommend the patient keep on hand a course of prednisone that he may use for abortive therapy at the first sign of recurrent inflammatory arthritis in one of those joints. He may then complete a 4-day course of prednisone with 2 days 20mg daily and 15mg for 2 days then off. I strongly urged him to contact our office at the same time as he begins a course of prednisone to confirm the nature of symptoms and arthritis. Otherwise, no chronic therapy will be needed for management of pseudogout.    # Acute myeloid leukemia s/p allogeneic BMT 2019:      Orders:  - predniSONE (DELTASONE) 5 MG tablet  Dispense: 15 tablet; Refill: 2        Follow-up: Return in about 6 months (around 2020).     HPI:   Mr Ace is a 63 yo man with history of AML s/p BMT and CPPD who presents for follow-up. 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 I recommended he begin a prednisone taper starting at 40mg and start colchicine 0.6mg BID for pseudogout flare.    Today, he reports no recent pseudogout flares or joint pain. Left wrist pain has improved and he does not localize any pinpoint pain. He does report worsening symptoms of headaches that keep him awake at night. Localizes headaches on the posterior aspect of his head. Spinal tap was performed one week ago. He also reports symptoms of fatigue. He does report some foot pain localized near the instep that occurs while walking. Otherwise, he has no other concerns.       Review of Systems:   Pertinent items are noted in HPI or as below, remainder of complete ROS is negative.      No recent problems with hearing or vision. No swallowing problems.   No breathing difficulty, shortness of breath, coughing, or wheezing  No chest pain or palpitations  No heart burn, indigestion, abdominal pain, nausea, vomiting, diarrhea  No urination problems, no bloody, cloudy urine, no dysuria  No numbing, tingling, weakness  No headaches or confusion  No rashes. No easy bleeding or bruising.     Active Medications:   Current Outpatient Medications:      acetaminophen (TYLENOL) 325 MG tablet, Take 2 tablets (650 mg) by mouth every 4 hours as needed for mild pain or fever (pre-med before blood products), Disp: , Rfl:      acyclovir (ZOVIRAX) 800 MG tablet, Take 1 tablet (800 mg) by mouth 5 times daily, Disp: 150 tablet, Rfl: 3     cyclobenzaprine (FLEXERIL) 5 MG tablet, Take 2 tablets (10 mg) by mouth 3 times daily as needed for muscle spasms, Disp: 30 tablet, Rfl: 0     magnesium oxide (MAG-OX) 400 MG tablet, Take 3 tablets (1,200 mg) by mouth 2 times daily, Disp: 180 tablet, Rfl: 1     melatonin 3 MG tablet, Take 1-2 tablets (3-6 mg) by mouth nightly as needed for sleep, Disp: 60 tablet, Rfl: 1     ondansetron  (ZOFRAN-ODT) 8 MG ODT tab, Take 1 tablet (8 mg) by mouth every 8 hours, Disp: 100 tablet, Rfl: 0     oxyCODONE (ROXICODONE) 5 MG tablet, Take 1 tablet (5 mg) by mouth every 6 hours as needed for severe pain, Disp: 30 tablet, Rfl: 0     pramox-pe-glycerin-petrolatum (PREPARATION H) 1-0.25-14.4-15 % CREA cream, Place rectally 3 times daily as needed for hemorrhoids, Disp: , Rfl:      predniSONE (DELTASONE) 5 MG tablet, At the first sign of pseudogout attack, use 4 tabs. Take 4 tabs for 2 day, then 3 tabs for 2 days, then off., Disp: 15 tablet, Rfl: 2     prochlorperazine (COMPAZINE) 5 MG tablet, Take 1-2 tablets (5-10 mg) by mouth every 6 hours as needed for nausea or vomiting, Disp: 30 tablet, Rfl: 1     sulfamethoxazole-trimethoprim (BACTRIM DS/SEPTRA DS) 800-160 MG tablet, Take 1 tablet by mouth Every Mon, Tues two times daily Start taking after day+28 (7/1) and instructed to so by BMT clinic., Disp: 16 tablet, Rfl: 1     tacrolimus (GENERIC EQUIVALENT) 1 mg/mL suspension, Take by mouth as directed according to taper calendar (Patient taking differently: Take by mouth as directed according to taper calendar. As of 10/11/19 0.8 in AM, and 0.9 in PM), Disp: 100 mL, Rfl: 0     tamsulosin (FLOMAX) 0.4 MG capsule, Take 1 capsule (0.4 mg) by mouth daily, Disp: 30 capsule, Rfl: 11     triamcinolone (KENALOG) 0.1 % external cream, Apply topically 2 times daily (Patient taking differently: Apply topically as needed ), Disp: 453 g, Rfl: 1     voriconazole (VFEND) 50 MG tablet, Take 3 tablets (150 mg) by mouth every 12 hours, Disp: 180 tablet, Rfl: 1     tacrolimus (GENERIC EQUIVALENT) 0.5 MG capsule, Take 2 capsules (1 mg) by mouth 2 times daily (Patient not taking: Reported on 10/11/2019), Disp: 60 capsule, Rfl: 3     triamcinolone (KENALOG) 0.1 % external ointment, Apply topically 2 times daily (Patient not taking: Reported on 10/11/2019), Disp: 80 g, Rfl: 0    Current Facility-Administered Medications:      lidocaine (PF)  (XYLOCAINE) 1 % injection 3 mL, 3 mL, , , Emiliano Dumont MD, 3 mL at 08/12/19 2019     zoster vaccine recombinant adjuvanted (SHINGRIX) injection 0.5 mL, 0.5 mL, Intramuscular, Once, Jaziel Olivier MD      Allergies:   Polymyxin b; Lactose; Vancomycin; and Vancomycin      Past Medical History:  Acute leukemia  Acute myeloid leukemia in remission  Septic shock  Primary osteoarthritis of left hip  Generalized anxiety disorder  Family history of malignant neoplasm of gastrointestinal tract  Esophageal reflux  Dysthymic disorder  Attention deficit hyperactivity disorder (ADHD)  Neutropenia with fever  AML (acute myeloid leukemia) in remission  History of allogeneic stem cell transplant     Past Surgical History:  Knee arthroscopy; 1995  Bilateral carpal tunnel release; left in 2000, right in 2014  Colonoscopy; 6964007  IR lumbar puncture; 6/24/2019  Laminectomy; 1975  Picc insertion; 3/14/2019  Vasectomy; 2002    Family History:   Mother: Colon cancer, cerebrovascular disease, diabetes  Father: Septicemia      Social History:   Former smoker  No current alcohol use     Physical Exam:   BP (!) 142/93 (BP Location: Right arm, Patient Position: Sitting, Cuff Size: Adult Regular)   Pulse 86   Temp 97.9  F (36.6  C) (Oral)   Wt 92.6 kg (204 lb 1.6 oz)   SpO2 99%   BMI 29.71 kg/m      Wt Readings from Last 4 Encounters:   10/11/19 92.6 kg (204 lb 1.6 oz)   10/10/19 92.8 kg (204 lb 9.6 oz)   10/04/19 90.7 kg (199 lb 14.4 oz)   09/26/19 90.1 kg (198 lb 9.6 oz)     Constitutional: Well-developed, appearing stated age; cooperative  Eyes: Normal EOM, PERRLA, vision, conjunctiva, sclera  ENT: Normal external ears, nose, hearing, lips, teeth, gums, throat. No mucous membrane lesions, normal saliva pool  Neck: No mass or thyroid enlargement  Resp: Lungs clear to auscultation, nl to palpation  CV: RRR, no murmurs, rubs or gallops, no edema  GI: No ABD mass or tenderness, no HSM  : Not tested  Lymph: No cervical,  supraclavicular, inguinal or epitrochlear nodes  MS: The TMJ, neck, elbow, MCP/PIP/DIP, hip, knee, ankle, and foot MTP/IP joints were examined and found normal. No active synovitis or altered joint anatomy. Full joint ROM. Normal  strength. No dactylitis,  tenosynovitis, enthesopathy. At the left wrist, there is no evidence of altered range of motion, swelling, or tenderness. No tenderness in cervical spine. Shoulder motion is normal.   Skin: No nail pitting, alopecia, rash, nodules or lesions  Neuro: Normal cranial nerves, strength, sensation, DTRs.   Psych: Normal judgement, orientation, memory, affect.     Laboratory:   RHEUM RESULTS Latest Ref Rng & Units 9/26/2019 10/4/2019 10/10/2019   SED RATE 0 - 20 mm/h - - -   CRP, INFLAMMATION 0.0 - 8.0 mg/L - - -   CK TOTAL 30 - 300 U/L - - -   AST 0 - 45 U/L 20 17 -   ALT 0 - 70 U/L 22 24 -   ALBUMIN 3.4 - 5.0 g/dL 3.8 3.8 -   WBC 4.0 - 11.0 10e9/L 5.5 6.6 5.8   RBC 4.4 - 5.9 10e12/L 3.22(L) 3.29(L) 3.05(L)   HGB 13.3 - 17.7 g/dL 11.6(L) 12.1(L) 11.4(L)   HCT 40.0 - 53.0 % 33.8(L) 35.0(L) 32.2(L)   MCV 78 - 100 fl 105(H) 106(H) 106(H)   MCHC 31.5 - 36.5 g/dL 34.3 34.6 35.4   RDW 10.0 - 15.0 % 13.4 13.2 12.8    - 450 10e9/L 182 213 198   CREATININE 0.66 - 1.25 mg/dL 0.94 0.92 1.11   GFR ESTIMATE, IF BLACK >60 mL/min/[1.73:m2] >90 >90 80   GFR ESTIMATE >60 mL/min/[1.73:m2] 85 87 69   HEPATITIS C ANTIBODY NR:Nonreactive - - -         Hepatitis B Core Olinda   Date Value Ref Range Status   05/10/2019 Nonreactive NR^Nonreactive Final     Hep B Surface Agn   Date Value Ref Range Status   05/10/2019 Nonreactive NR^Nonreactive Final              Scribe Disclosure:  I, Kevon Welch, am serving as a scribe to document services personally performed by Jaziel Olivier MD at this visit, based upon the provider's statements to me. All documentation has been reviewed by the aforementioned provider prior to being entered into the official medical record.    Jaziel Olivier,  MD

## 2019-10-11 NOTE — PROGRESS NOTES
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 He reports a recent episode of posterior headache/neck pain. Workup was negative for infection and pain resolved spontaneously in the past week. Exam shows no inflammatory joint changes. CSF cultures were negative for cryptococcus. Bacterial cultures and viral serologies were negative. CMV, EBV, and HSV in the CSF were negative.     Pseudogout is quiescent. We discussed the unpredictable nature of pseudogout and the propensity to flare inflammatory joints such as the wrists, elbows, shoulders, knees, and ankles. I recommend the patient keep on hand a course of prednisone that he may use for abortive therapy at the first sign of recurrent inflammatory arthritis in one of those joints. He may then complete a 4-day course of prednisone with 2 days 20mg daily and 15mg for 2 days then off. I strongly urged him to contact our office at the same time as he begins a course of prednisone to confirm the nature of symptoms and arthritis. Otherwise, no chronic therapy will be needed for management of pseudogout.    # Acute myeloid leukemia s/p allogeneic BMT 2019:      Orders:  - predniSONE (DELTASONE) 5 MG tablet  Dispense: 15 tablet; Refill: 2        Follow-up: Return in about 6 months (around 2020).     HPI:   Mr Ace is a 65 yo man with history of AML s/p BMT and CPPD who presents for follow-up. 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 I recommended he begin a prednisone taper starting at 40mg and start colchicine 0.6mg BID for pseudogout flare.    Today, he reports no recent pseudogout flares or joint pain. Left wrist pain has improved and he does not localize any pinpoint pain. He does report worsening symptoms of headaches that keep him awake at night. Localizes headaches on the posterior aspect of his head. Spinal tap was performed one week ago. He also reports symptoms of fatigue. He does report some foot pain localized near the instep that occurs while walking. Otherwise, he has no other concerns.       Review of Systems:   Pertinent items are noted in HPI or as below, remainder of complete ROS is negative.      No recent problems with hearing or vision. No swallowing problems.   No breathing difficulty, shortness of breath, coughing, or wheezing  No chest pain or palpitations  No heart burn, indigestion, abdominal pain, nausea, vomiting, diarrhea  No urination problems, no bloody, cloudy urine, no dysuria  No numbing, tingling, weakness  No headaches or confusion  No rashes. No easy bleeding or bruising.     Active Medications:   Current Outpatient Medications:      acetaminophen (TYLENOL) 325 MG tablet, Take 2 tablets (650 mg) by mouth every 4 hours as needed for mild pain or fever (pre-med before blood products), Disp: , Rfl:      acyclovir (ZOVIRAX) 800 MG tablet, Take 1 tablet (800 mg) by mouth 5 times daily, Disp: 150 tablet, Rfl: 3     cyclobenzaprine (FLEXERIL) 5 MG tablet, Take 2 tablets (10 mg) by mouth 3 times daily as needed for muscle spasms, Disp: 30 tablet, Rfl: 0     magnesium oxide (MAG-OX) 400 MG tablet, Take 3 tablets (1,200 mg) by mouth 2 times daily, Disp: 180 tablet, Rfl: 1     melatonin 3 MG tablet, Take 1-2 tablets (3-6 mg) by mouth nightly as needed for sleep, Disp: 60 tablet, Rfl: 1     ondansetron (ZOFRAN-ODT) 8 MG ODT tab, Take 1 tablet (8 mg) by mouth every 8 hours, Disp: 100 tablet, Rfl:  0     oxyCODONE (ROXICODONE) 5 MG tablet, Take 1 tablet (5 mg) by mouth every 6 hours as needed for severe pain, Disp: 30 tablet, Rfl: 0     pramox-pe-glycerin-petrolatum (PREPARATION H) 1-0.25-14.4-15 % CREA cream, Place rectally 3 times daily as needed for hemorrhoids, Disp: , Rfl:      predniSONE (DELTASONE) 5 MG tablet, At the first sign of pseudogout attack, use 4 tabs. Take 4 tabs for 2 day, then 3 tabs for 2 days, then off., Disp: 15 tablet, Rfl: 2     prochlorperazine (COMPAZINE) 5 MG tablet, Take 1-2 tablets (5-10 mg) by mouth every 6 hours as needed for nausea or vomiting, Disp: 30 tablet, Rfl: 1     sulfamethoxazole-trimethoprim (BACTRIM DS/SEPTRA DS) 800-160 MG tablet, Take 1 tablet by mouth Every Mon, Tues two times daily Start taking after day+28 (7/1) and instructed to so by BMT clinic., Disp: 16 tablet, Rfl: 1     tacrolimus (GENERIC EQUIVALENT) 1 mg/mL suspension, Take by mouth as directed according to taper calendar (Patient taking differently: Take by mouth as directed according to taper calendar. As of 10/11/19 0.8 in AM, and 0.9 in PM), Disp: 100 mL, Rfl: 0     tamsulosin (FLOMAX) 0.4 MG capsule, Take 1 capsule (0.4 mg) by mouth daily, Disp: 30 capsule, Rfl: 11     triamcinolone (KENALOG) 0.1 % external cream, Apply topically 2 times daily (Patient taking differently: Apply topically as needed ), Disp: 453 g, Rfl: 1     voriconazole (VFEND) 50 MG tablet, Take 3 tablets (150 mg) by mouth every 12 hours, Disp: 180 tablet, Rfl: 1     tacrolimus (GENERIC EQUIVALENT) 0.5 MG capsule, Take 2 capsules (1 mg) by mouth 2 times daily (Patient not taking: Reported on 10/11/2019), Disp: 60 capsule, Rfl: 3     triamcinolone (KENALOG) 0.1 % external ointment, Apply topically 2 times daily (Patient not taking: Reported on 10/11/2019), Disp: 80 g, Rfl: 0    Current Facility-Administered Medications:      lidocaine (PF) (XYLOCAINE) 1 % injection 3 mL, 3 mL, , , Emiliano Dumont MD, 3 mL at 08/12/19  2019     zoster vaccine recombinant adjuvanted (SHINGRIX) injection 0.5 mL, 0.5 mL, Intramuscular, Once, Jaziel Olivier MD      Allergies:   Polymyxin b; Lactose; Vancomycin; and Vancomycin      Past Medical History:  Acute leukemia  Acute myeloid leukemia in remission  Septic shock  Primary osteoarthritis of left hip  Generalized anxiety disorder  Family history of malignant neoplasm of gastrointestinal tract  Esophageal reflux  Dysthymic disorder  Attention deficit hyperactivity disorder (ADHD)  Neutropenia with fever  AML (acute myeloid leukemia) in remission  History of allogeneic stem cell transplant     Past Surgical History:  Knee arthroscopy; 1995  Bilateral carpal tunnel release; left in 2000, right in 2014  Colonoscopy; 5558746  IR lumbar puncture; 6/24/2019  Laminectomy; 1975  Picc insertion; 3/14/2019  Vasectomy; 2002    Family History:   Mother: Colon cancer, cerebrovascular disease, diabetes  Father: Septicemia      Social History:   Former smoker  No current alcohol use     Physical Exam:   BP (!) 142/93 (BP Location: Right arm, Patient Position: Sitting, Cuff Size: Adult Regular)   Pulse 86   Temp 97.9  F (36.6  C) (Oral)   Wt 92.6 kg (204 lb 1.6 oz)   SpO2 99%   BMI 29.71 kg/m     Wt Readings from Last 4 Encounters:   10/11/19 92.6 kg (204 lb 1.6 oz)   10/10/19 92.8 kg (204 lb 9.6 oz)   10/04/19 90.7 kg (199 lb 14.4 oz)   09/26/19 90.1 kg (198 lb 9.6 oz)     Constitutional: Well-developed, appearing stated age; cooperative  Eyes: Normal EOM, PERRLA, vision, conjunctiva, sclera  ENT: Normal external ears, nose, hearing, lips, teeth, gums, throat. No mucous membrane lesions, normal saliva pool  Neck: No mass or thyroid enlargement  Resp: Lungs clear to auscultation, nl to palpation  CV: RRR, no murmurs, rubs or gallops, no edema  GI: No ABD mass or tenderness, no HSM  : Not tested  Lymph: No cervical, supraclavicular, inguinal or epitrochlear nodes  MS: The TMJ, neck, elbow, MCP/PIP/DIP,  hip, knee, ankle, and foot MTP/IP joints were examined and found normal. No active synovitis or altered joint anatomy. Full joint ROM. Normal  strength. No dactylitis,  tenosynovitis, enthesopathy. At the left wrist, there is no evidence of altered range of motion, swelling, or tenderness. No tenderness in cervical spine. Shoulder motion is normal.   Skin: No nail pitting, alopecia, rash, nodules or lesions  Neuro: Normal cranial nerves, strength, sensation, DTRs.   Psych: Normal judgement, orientation, memory, affect.     Laboratory:   RHEUM RESULTS Latest Ref Rng & Units 9/26/2019 10/4/2019 10/10/2019   SED RATE 0 - 20 mm/h - - -   CRP, INFLAMMATION 0.0 - 8.0 mg/L - - -   CK TOTAL 30 - 300 U/L - - -   AST 0 - 45 U/L 20 17 -   ALT 0 - 70 U/L 22 24 -   ALBUMIN 3.4 - 5.0 g/dL 3.8 3.8 -   WBC 4.0 - 11.0 10e9/L 5.5 6.6 5.8   RBC 4.4 - 5.9 10e12/L 3.22(L) 3.29(L) 3.05(L)   HGB 13.3 - 17.7 g/dL 11.6(L) 12.1(L) 11.4(L)   HCT 40.0 - 53.0 % 33.8(L) 35.0(L) 32.2(L)   MCV 78 - 100 fl 105(H) 106(H) 106(H)   MCHC 31.5 - 36.5 g/dL 34.3 34.6 35.4   RDW 10.0 - 15.0 % 13.4 13.2 12.8    - 450 10e9/L 182 213 198   CREATININE 0.66 - 1.25 mg/dL 0.94 0.92 1.11   GFR ESTIMATE, IF BLACK >60 mL/min/[1.73:m2] >90 >90 80   GFR ESTIMATE >60 mL/min/[1.73:m2] 85 87 69   HEPATITIS C ANTIBODY NR:Nonreactive - - -         Hepatitis B Core Olinda   Date Value Ref Range Status   05/10/2019 Nonreactive NR^Nonreactive Final     Hep B Surface Agn   Date Value Ref Range Status   05/10/2019 Nonreactive NR^Nonreactive Final              Scribe Disclosure:  I, Kevon Welch, am serving as a scribe to document services personally performed by Jaziel Olivier MD at this visit, based upon the provider's statements to me. All documentation has been reviewed by the aforementioned provider prior to being entered into the official medical record.

## 2019-10-12 LAB
CMV DNA SPEC NAA+PROBE-ACNC: NORMAL [IU]/ML
CMV DNA SPEC NAA+PROBE-LOG#: NORMAL {LOG_IU}/ML
RESULT: NORMAL
SEND OUTS MISC TEST CODE: NORMAL
SEND OUTS MISC TEST SPECIMEN: NORMAL
SPECIMEN SOURCE: NORMAL
TEST NAME: NORMAL

## 2019-10-14 LAB
COPATH REPORT: NORMAL
COPATH REPORT: NORMAL

## 2019-10-24 ENCOUNTER — ONCOLOGY VISIT (OUTPATIENT)
Dept: TRANSPLANT | Facility: CLINIC | Age: 64
End: 2019-10-24
Attending: INTERNAL MEDICINE
Payer: COMMERCIAL

## 2019-10-24 ENCOUNTER — APPOINTMENT (OUTPATIENT)
Dept: LAB | Facility: CLINIC | Age: 64
End: 2019-10-24
Attending: INTERNAL MEDICINE
Payer: COMMERCIAL

## 2019-10-24 VITALS
SYSTOLIC BLOOD PRESSURE: 123 MMHG | TEMPERATURE: 97.6 F | OXYGEN SATURATION: 97 % | WEIGHT: 201 LBS | DIASTOLIC BLOOD PRESSURE: 89 MMHG | RESPIRATION RATE: 18 BRPM | HEART RATE: 74 BPM | BODY MASS INDEX: 29.26 KG/M2

## 2019-10-24 DIAGNOSIS — C92.01 ACUTE MYELOID LEUKEMIA IN REMISSION (H): ICD-10-CM

## 2019-10-24 DIAGNOSIS — C92.01 AML (ACUTE MYELOID LEUKEMIA) IN REMISSION (H): ICD-10-CM

## 2019-10-24 DIAGNOSIS — Z94.84 HISTORY OF ALLOGENEIC STEM CELL TRANSPLANT (H): ICD-10-CM

## 2019-10-24 LAB
ALBUMIN SERPL-MCNC: 3.6 G/DL (ref 3.4–5)
ALP SERPL-CCNC: 114 U/L (ref 40–150)
ALT SERPL W P-5'-P-CCNC: 25 U/L (ref 0–70)
ANION GAP SERPL CALCULATED.3IONS-SCNC: 6 MMOL/L (ref 3–14)
AST SERPL W P-5'-P-CCNC: 18 U/L (ref 0–45)
BASOPHILS # BLD AUTO: 0 10E9/L (ref 0–0.2)
BASOPHILS NFR BLD AUTO: 0.3 %
BILIRUB SERPL-MCNC: 0.3 MG/DL (ref 0.2–1.3)
BUN SERPL-MCNC: 18 MG/DL (ref 7–30)
CALCIUM SERPL-MCNC: 8.6 MG/DL (ref 8.5–10.1)
CHLORIDE SERPL-SCNC: 109 MMOL/L (ref 94–109)
CO2 SERPL-SCNC: 23 MMOL/L (ref 20–32)
CREAT SERPL-MCNC: 0.94 MG/DL (ref 0.66–1.25)
DIFFERENTIAL METHOD BLD: ABNORMAL
EOSINOPHIL # BLD AUTO: 0.1 10E9/L (ref 0–0.7)
EOSINOPHIL NFR BLD AUTO: 1.1 %
ERYTHROCYTE [DISTWIDTH] IN BLOOD BY AUTOMATED COUNT: 12.5 % (ref 10–15)
GFR SERPL CREATININE-BSD FRML MDRD: 85 ML/MIN/{1.73_M2}
GLUCOSE SERPL-MCNC: 112 MG/DL (ref 70–99)
HCT VFR BLD AUTO: 35.8 % (ref 40–53)
HGB BLD-MCNC: 12.3 G/DL (ref 13.3–17.7)
IMM GRANULOCYTES # BLD: 0 10E9/L (ref 0–0.4)
IMM GRANULOCYTES NFR BLD: 0.3 %
LYMPHOCYTES # BLD AUTO: 1 10E9/L (ref 0.8–5.3)
LYMPHOCYTES NFR BLD AUTO: 16.6 %
MCH RBC QN AUTO: 35.8 PG (ref 26.5–33)
MCHC RBC AUTO-ENTMCNC: 34.4 G/DL (ref 31.5–36.5)
MCV RBC AUTO: 104 FL (ref 78–100)
MONOCYTES # BLD AUTO: 0.7 10E9/L (ref 0–1.3)
MONOCYTES NFR BLD AUTO: 10.5 %
NEUTROPHILS # BLD AUTO: 4.5 10E9/L (ref 1.6–8.3)
NEUTROPHILS NFR BLD AUTO: 71.2 %
NRBC # BLD AUTO: 0 10*3/UL
NRBC BLD AUTO-RTO: 0 /100
PLATELET # BLD AUTO: 201 10E9/L (ref 150–450)
POTASSIUM SERPL-SCNC: 4.8 MMOL/L (ref 3.4–5.3)
PROT SERPL-MCNC: 6.6 G/DL (ref 6.8–8.8)
RBC # BLD AUTO: 3.44 10E12/L (ref 4.4–5.9)
SODIUM SERPL-SCNC: 138 MMOL/L (ref 133–144)
WBC # BLD AUTO: 6.3 10E9/L (ref 4–11)

## 2019-10-24 PROCEDURE — 80053 COMPREHEN METABOLIC PANEL: CPT | Performed by: INTERNAL MEDICINE

## 2019-10-24 PROCEDURE — 85025 COMPLETE CBC W/AUTO DIFF WBC: CPT | Performed by: INTERNAL MEDICINE

## 2019-10-24 PROCEDURE — G0463 HOSPITAL OUTPT CLINIC VISIT: HCPCS

## 2019-10-24 PROCEDURE — 36415 COLL VENOUS BLD VENIPUNCTURE: CPT

## 2019-10-24 RX ORDER — VORICONAZOLE 50 MG/1
150 TABLET, FILM COATED ORAL EVERY 12 HOURS
Qty: 180 TABLET | Refills: 1 | Status: SHIPPED | OUTPATIENT
Start: 2019-10-24 | End: 2019-11-08

## 2019-10-24 NOTE — PROGRESS NOTES
[facility] BMT Clinic    Diagnosis:   1. 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     Treatments:   1. 7+3 ([place]), 3/15/19, no response, Day 12 persistent disease with near packed marrow  2. Dec 10d+Ric 3/26/19, CR1 on 4/23/19 but with pos mutations (IDH1 17%, RUNX1 8%)  3. 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   4. Maintenance ALT-803 study, 1 dose, stopped due to profound fatigue, moderate to severe hypotension, and fever    PMH: HLP, BPH, DJD, RLS    Interval history: Randy returns for follow up and labs. He offers no focal complaints. No rashes, no n/v/d. No cough, fevers or infectious symptoms. No gout flare.    10-point ROS otherwise negative.      Lab Results   Component Value Date    WBC 6.3 10/24/2019    HGB 12.3 (L) 10/24/2019    HCT 35.8 (L) 10/24/2019     10/24/2019     10/10/2019    POTASSIUM 4.5 10/10/2019    CHLORIDE 108 10/10/2019    CO2 21 10/10/2019    BUN 19 10/10/2019    CR 1.11 10/10/2019     (H) 10/10/2019    SED 88 (H) 06/25/2019    NTBNPI 2,031 (H) 04/03/2019    TROPI <0.015 04/23/2019    AST 17 10/04/2019    ALT 24 10/04/2019    ALKPHOS 115 10/04/2019    BILITOTAL 0.2 10/04/2019    INR 1.00 09/26/2019       Current Outpatient Medications   Medication     acetaminophen (TYLENOL) 325 MG tablet     acyclovir (ZOVIRAX) 800 MG tablet     cyclobenzaprine (FLEXERIL) 5 MG tablet     magnesium oxide (MAG-OX) 400 MG tablet     melatonin 3 MG tablet     ondansetron (ZOFRAN-ODT) 8 MG ODT tab     oxyCODONE (ROXICODONE) 5 MG tablet     pramox-pe-glycerin-petrolatum (PREPARATION H) 1-0.25-14.4-15 % CREA cream     predniSONE (DELTASONE) 5 MG tablet     prochlorperazine (COMPAZINE) 5 MG tablet     sulfamethoxazole-trimethoprim (BACTRIM DS/SEPTRA DS) 800-160 MG tablet     tacrolimus (GENERIC EQUIVALENT) 0.5 MG capsule     tacrolimus (GENERIC EQUIVALENT) 1 mg/mL suspension     tamsulosin (FLOMAX)  0.4 MG capsule     triamcinolone (KENALOG) 0.1 % external cream     triamcinolone (KENALOG) 0.1 % external ointment     voriconazole (VFEND) 50 MG tablet     Current Facility-Administered Medications   Medication     lidocaine (PF) (XYLOCAINE) 1 % injection 3 mL     Ph/E:   Vitals: /89   Pulse 74   Temp 97.6  F (36.4  C) (Oral)   Resp 18   Wt 91.2 kg (201 lb)   SpO2 97%   BMI 29.26 kg/m     KPS 90  General: NAD;   H&N: no mucosal lesions  Lungs clear  Heart RRR  Abdomen; Soft, NT, ND, No palpable organomegaly  Extremities: No edema  Skin: no rash on back, limited exam  Neuro: Nonfocal  Mood/Affect: appropriate  Lymph: no LAD  Access: peripheral     A&P:   1. AML: day +146 haplo, CR1. Got 1 dose of ALT-803 on study. See us every 2 weeks with. RFLP unti T cells 100% donor. Up from 85% (9/19) to 90% on 10/10  2. ID: acyclovir, vori, bactrim. Flu shot utd.    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 Recurrent pseudogout: Observation. Pred pulse like last time if flares; no colchicine (allergic).   5. Heme: counts stable  6. GI: no sx  7. Fen/renal: Cr/lytes wnl    Plan: cont tac taper  RTC in 2 weeks for bmbx/labs 11/8, 11/14 with Dr Stephanie Seaman PAC  [phone]

## 2019-10-24 NOTE — NURSING NOTE
Chief Complaint   Patient presents with     Blood Draw     Labs drawn via  by RN in lab.      Estephania Mcguire RN

## 2019-10-24 NOTE — NURSING NOTE
"Oncology Rooming Note    October 24, 2019 11:21 AM   El Ace is a 64 year old male who presents for:    Chief Complaint   Patient presents with     Blood Draw     Labs drawn via  by RN in lab.      RECHECK     PT is here for a rtn for S/P BMT AML     Initial Vitals: Blood Pressure 123/89   Pulse 74   Temperature 97.6  F (36.4  C) (Oral)   Respiration 18   Weight 91.2 kg (201 lb)   Oxygen Saturation 97%   Body Mass Index 29.26 kg/m   Estimated body mass index is 29.26 kg/m  as calculated from the following:    Height as of 10/4/19: 1.765 m (5' 9.5\").    Weight as of this encounter: 91.2 kg (201 lb). Body surface area is 2.11 meters squared.  Data Unavailable Comment: Data Unavailable   No LMP for male patient.  Allergies reviewed: Yes  Medications reviewed: Yes    Medications: Medication refills not needed today.  Pharmacy name entered into [employer]:    [facility] - [place], [place] - [phone]  [facility] [place], [place] - [phone]  [facility] - [place], [place] - [address] [place] [address] AVE     Clinical concerns: none       Shana Ballard MA              "

## 2019-10-29 ENCOUNTER — TELEPHONE (OUTPATIENT)
Dept: TRANSPLANT | Facility: CLINIC | Age: 64
End: 2019-10-29

## 2019-10-29 NOTE — TELEPHONE ENCOUNTER
"I informed pt that I will page an AUGIE to give Randy a call @ [phone] and if pt does not get a call back within half an hour to call [phone] again. Pt agreed. AUGIE paged.    Called patient back. He notes \"red bumps\" that appeared a few days ago on his upper back and he has now noticed them on his arms and abdomen. He describes them as \"localized and clustered\". They are not itchy. Denies any other symptoms such as fevers, chills, URI symptoms, throat tightness or swelling. No new medications but remains on a tac taper. No hx of GVHD. Recommended pt be evaluated by a provider and consider a skin bx depending on the rash appearance. Will have the schedulers call to arrange on 10/30.     Johnnie Horan PA-C  [phone]  "

## 2019-10-30 ENCOUNTER — OFFICE VISIT (OUTPATIENT)
Dept: TRANSPLANT | Facility: CLINIC | Age: 64
End: 2019-10-30
Attending: PHYSICIAN ASSISTANT
Payer: COMMERCIAL

## 2019-10-30 VITALS
WEIGHT: 202.8 LBS | HEART RATE: 95 BPM | BODY MASS INDEX: 29.52 KG/M2 | DIASTOLIC BLOOD PRESSURE: 75 MMHG | TEMPERATURE: 97.9 F | RESPIRATION RATE: 18 BRPM | OXYGEN SATURATION: 96 % | SYSTOLIC BLOOD PRESSURE: 111 MMHG

## 2019-10-30 DIAGNOSIS — C92.01 AML (ACUTE MYELOID LEUKEMIA) IN REMISSION (H): ICD-10-CM

## 2019-10-30 DIAGNOSIS — Z53.9 ERRONEOUS ENCOUNTER--DISREGARD: Primary | ICD-10-CM

## 2019-10-30 PROCEDURE — G0463 HOSPITAL OUTPT CLINIC VISIT: HCPCS

## 2019-10-30 RX ORDER — TRIAMCINOLONE ACETONIDE 1 MG/G
CREAM TOPICAL 3 TIMES DAILY
Qty: 453.6 G | Refills: 1 | Status: SHIPPED | OUTPATIENT
Start: 2019-10-30 | End: 2021-04-19

## 2019-10-30 ASSESSMENT — PAIN SCALES - GENERAL: PAINLEVEL: NO PAIN (0)

## 2019-10-30 NOTE — NURSING NOTE
"Oncology Rooming Note    October 30, 2019 10:54 AM   El Ace is a 64 year old male who presents for:    Chief Complaint   Patient presents with     RECHECK     AML post txp here for provider visit     Initial Vitals: /75 (BP Location: Right arm, Patient Position: Sitting)   Pulse 95   Temp 97.9  F (36.6  C) (Oral)   Resp 18   Wt 92 kg (202 lb 12.8 oz)   SpO2 96%   BMI 29.52 kg/m   Estimated body mass index is 29.52 kg/m  as calculated from the following:    Height as of 10/4/19: 1.765 m (5' 9.5\").    Weight as of this encounter: 92 kg (202 lb 12.8 oz). Body surface area is 2.12 meters squared.  No Pain (0) Comment: Data Unavailable   No LMP for male patient.  Allergies reviewed: Yes  Medications reviewed: Yes    Medications: Medication refills not needed today.  Pharmacy name entered into [employer]:    [facility] - [place], [place] - [phone]  [facility] [place], [place] - [address] [facility] [phone]  [facility] - [place], [place] - [facility] [address]     Clinical concerns: None      Efren Moraes RN                "

## 2019-10-30 NOTE — PROGRESS NOTES
Biopsy not needed 13% BSA. Looked more like pityriasis rosea to me. Given TCM BID-TID. See other encounter for full progress note.    Mitzi Celis PA-C  #[phone]  This encounter was opened in error. Please disregard.

## 2019-11-01 LAB
FUNGUS SPEC CULT: NORMAL
SPECIMEN SOURCE: NORMAL

## 2019-11-08 ENCOUNTER — ONCOLOGY VISIT (OUTPATIENT)
Dept: TRANSPLANT | Facility: CLINIC | Age: 64
End: 2019-11-08
Attending: NURSE PRACTITIONER
Payer: COMMERCIAL

## 2019-11-08 VITALS
SYSTOLIC BLOOD PRESSURE: 114 MMHG | RESPIRATION RATE: 16 BRPM | TEMPERATURE: 97.9 F | OXYGEN SATURATION: 100 % | DIASTOLIC BLOOD PRESSURE: 73 MMHG | HEART RATE: 68 BPM

## 2019-11-08 DIAGNOSIS — Z94.84 HISTORY OF ALLOGENEIC STEM CELL TRANSPLANT (H): ICD-10-CM

## 2019-11-08 DIAGNOSIS — C92.01 AML (ACUTE MYELOID LEUKEMIA) IN REMISSION (H): ICD-10-CM

## 2019-11-08 DIAGNOSIS — C92.01 ACUTE MYELOID LEUKEMIA IN REMISSION (H): Primary | ICD-10-CM

## 2019-11-08 LAB
ALBUMIN SERPL-MCNC: 3.7 G/DL (ref 3.4–5)
ALP SERPL-CCNC: 119 U/L (ref 40–150)
ALT SERPL W P-5'-P-CCNC: 25 U/L (ref 0–70)
ANION GAP SERPL CALCULATED.3IONS-SCNC: 6 MMOL/L (ref 3–14)
AST SERPL W P-5'-P-CCNC: 18 U/L (ref 0–45)
BASOPHILS # BLD AUTO: 0 10E9/L (ref 0–0.2)
BASOPHILS NFR BLD AUTO: 0.5 %
BILIRUB SERPL-MCNC: 0.4 MG/DL (ref 0.2–1.3)
BUN SERPL-MCNC: 14 MG/DL (ref 7–30)
CALCIUM SERPL-MCNC: 8.4 MG/DL (ref 8.5–10.1)
CHLORIDE SERPL-SCNC: 108 MMOL/L (ref 94–109)
CMV DNA SPEC NAA+PROBE-ACNC: NORMAL [IU]/ML
CMV DNA SPEC NAA+PROBE-LOG#: NORMAL {LOG_IU}/ML
CO2 SERPL-SCNC: 22 MMOL/L (ref 20–32)
CREAT SERPL-MCNC: 0.86 MG/DL (ref 0.66–1.25)
DIFFERENTIAL METHOD BLD: ABNORMAL
EOSINOPHIL # BLD AUTO: 0.1 10E9/L (ref 0–0.7)
EOSINOPHIL NFR BLD AUTO: 1 %
ERYTHROCYTE [DISTWIDTH] IN BLOOD BY AUTOMATED COUNT: 12.4 % (ref 10–15)
GFR SERPL CREATININE-BSD FRML MDRD: >90 ML/MIN/{1.73_M2}
GLUCOSE SERPL-MCNC: 105 MG/DL (ref 70–99)
HCT VFR BLD AUTO: 37.3 % (ref 40–53)
HGB BLD-MCNC: 12.9 G/DL (ref 13.3–17.7)
IMM GRANULOCYTES # BLD: 0 10E9/L (ref 0–0.4)
IMM GRANULOCYTES NFR BLD: 0.5 %
LYMPHOCYTES # BLD AUTO: 1 10E9/L (ref 0.8–5.3)
LYMPHOCYTES NFR BLD AUTO: 15.7 %
MCH RBC QN AUTO: 36.4 PG (ref 26.5–33)
MCHC RBC AUTO-ENTMCNC: 34.6 G/DL (ref 31.5–36.5)
MCV RBC AUTO: 105 FL (ref 78–100)
MONOCYTES # BLD AUTO: 0.6 10E9/L (ref 0–1.3)
MONOCYTES NFR BLD AUTO: 10 %
NEUTROPHILS # BLD AUTO: 4.6 10E9/L (ref 1.6–8.3)
NEUTROPHILS NFR BLD AUTO: 72.3 %
NRBC # BLD AUTO: 0 10*3/UL
NRBC BLD AUTO-RTO: 0 /100
PLATELET # BLD AUTO: 199 10E9/L (ref 150–450)
POTASSIUM SERPL-SCNC: 4.5 MMOL/L (ref 3.4–5.3)
PROT SERPL-MCNC: 6.7 G/DL (ref 6.8–8.8)
RBC # BLD AUTO: 3.54 10E12/L (ref 4.4–5.9)
SODIUM SERPL-SCNC: 136 MMOL/L (ref 133–144)
SPECIMEN SOURCE: NORMAL
WBC # BLD AUTO: 6.3 10E9/L (ref 4–11)

## 2019-11-08 PROCEDURE — 88185 FLOWCYTOMETRY/TC ADD-ON: CPT | Performed by: PHYSICIAN ASSISTANT

## 2019-11-08 PROCEDURE — 80053 COMPREHEN METABOLIC PANEL: CPT | Performed by: PHYSICIAN ASSISTANT

## 2019-11-08 PROCEDURE — 88264 CHROMOSOME ANALYSIS 20-25: CPT | Performed by: PHYSICIAN ASSISTANT

## 2019-11-08 PROCEDURE — 00000161 ZZHCL STATISTIC H-SPHEME PROCESS B/S: Performed by: INTERNAL MEDICINE

## 2019-11-08 PROCEDURE — 88280 CHROMOSOME KARYOTYPE STUDY: CPT | Performed by: PHYSICIAN ASSISTANT

## 2019-11-08 PROCEDURE — 88275 CYTOGENETICS 100-300: CPT | Performed by: PHYSICIAN ASSISTANT

## 2019-11-08 PROCEDURE — 81120 IDH1 COMMON VARIANTS: CPT | Performed by: PHYSICIAN ASSISTANT

## 2019-11-08 PROCEDURE — 81268 CHIMERISM ANAL W/CELL SELECT: CPT | Performed by: STUDENT IN AN ORGANIZED HEALTH CARE EDUCATION/TRAINING PROGRAM

## 2019-11-08 PROCEDURE — 81121 IDH2 COMMON VARIANTS: CPT | Performed by: PHYSICIAN ASSISTANT

## 2019-11-08 PROCEDURE — 88161 CYTOPATH SMEAR OTHER SOURCE: CPT | Performed by: INTERNAL MEDICINE

## 2019-11-08 PROCEDURE — 88311 DECALCIFY TISSUE: CPT | Performed by: INTERNAL MEDICINE

## 2019-11-08 PROCEDURE — 40000611 ZZHCL STATISTIC MORPHOLOGY W/INTERP HEMEPATH TC 85060: Performed by: INTERNAL MEDICINE

## 2019-11-08 PROCEDURE — 88184 FLOWCYTOMETRY/ TC 1 MARKER: CPT | Performed by: PHYSICIAN ASSISTANT

## 2019-11-08 PROCEDURE — 88305 TISSUE EXAM BY PATHOLOGIST: CPT | Performed by: INTERNAL MEDICINE

## 2019-11-08 PROCEDURE — 88271 CYTOGENETICS DNA PROBE: CPT | Performed by: PHYSICIAN ASSISTANT

## 2019-11-08 PROCEDURE — 38222 DX BONE MARROW BX & ASPIR: CPT | Mod: ZF

## 2019-11-08 PROCEDURE — 40001005 ZZHCL STATISTIC FLOW >15 ABY TC 88189: Performed by: PHYSICIAN ASSISTANT

## 2019-11-08 PROCEDURE — 40000951 ZZHCL STATISTIC BONE MARROW INTERP TC 85097: Performed by: INTERNAL MEDICINE

## 2019-11-08 PROCEDURE — 88237 TISSUE CULTURE BONE MARROW: CPT | Performed by: PHYSICIAN ASSISTANT

## 2019-11-08 PROCEDURE — 81267 CHIMERISM ANAL NO CELL SELEC: CPT | Performed by: PHYSICIAN ASSISTANT

## 2019-11-08 PROCEDURE — 85025 COMPLETE CBC W/AUTO DIFF WBC: CPT | Performed by: PHYSICIAN ASSISTANT

## 2019-11-08 RX ORDER — VORICONAZOLE 50 MG/1
150 TABLET, FILM COATED ORAL EVERY 12 HOURS
Qty: 180 TABLET | Refills: 1 | Status: SHIPPED | OUTPATIENT
Start: 2019-11-08 | End: 2019-12-12

## 2019-11-08 RX ORDER — MAGNESIUM OXIDE 400 MG/1
1200 TABLET ORAL 2 TIMES DAILY
Qty: 180 TABLET | Refills: 1 | Status: SHIPPED | OUTPATIENT
Start: 2019-11-08 | End: 2019-11-08

## 2019-11-08 ASSESSMENT — PAIN SCALES - GENERAL: PAINLEVEL: NO PAIN (0)

## 2019-11-08 NOTE — PROGRESS NOTES
BMT ONC Adult Bone Marrow Biopsy Procedure Note  November 8, 2019  There were no vitals taken for this visit.     Learning needs assessment complete within 12 months? YES    DIAGNOSIS: AML     PROCEDURE: Unilateral Bone Marrow Biopsy and Unilateral Aspirate    [place] [facility] [place]    Patient s identification was positively verified by verbal identification and invasive procedure safety checklist was completed. Informed consent was obtained. Following the administration of nonr as pre-medication, patient was placed in the prone position and prepped and draped in a sterile manner. Approximately 10 cc of 1% Lidocaine was used over the left posterior iliac spine. Following this a 3 mm incision was made. Trephine bone marrow core(s) was (were) obtained from the [facility]. Bone marrow aspirates were obtained from the IC. Aspirates were sent for morphology, immunophenotyping, cytogenetics and molecular diagnostics. A total of approximately 20 ml of marrow was aspirated. Following this procedure a sterile dressing was applied to the bone marrow biopsy site(s). The patient was placed in the supine position to maintain pressure on the biopsy site. Post-procedure wound care instructions were given.     Complications: NO    Pre-procedural pain: 0 out of 10 on the numeric pain rating scale.     Procedural pain: 0 out of 10 on the numeric pain rating scale.     Post-procedural pain assessment: 0 out of 10 on the numeric pain rating scale.     Interventions: NO    Length of procedure:20 minutes or less      Procedure performed by: Sima Watson NP

## 2019-11-08 NOTE — PROGRESS NOTES
[facility]    Diagnosis:   1. 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     Treatments:   1. 7+3 (Dauno), 3/15/19, no response, Day 12 persistent disease with near packed marrow  2. Dec 10d+Ric 3/26/19, CR1 on 4/23/19 but with pos mutations (IDH1 17%, RUNX1 8%)  3. 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   4. Maintenance ALT-803 study, 1 dose, stopped due to profound fatigue, moderate to severe hypotension, and fever    PMH: HLP, BPH, DJD, RLS    Interval history: Randy is here for f/u and bone marrow biopsy.  He presented last week with a new rash presumed to be pityriasis rosea and prescribed TMC ointment BID. Today some areas are improved but now on forearms as well. Still presenting in scaly patches on the back but more of papules on forearms. Excoriations noted on back. None on LE or face. Some fatigue. No n/v/d/c. Eating/drinking well. No other complaints.       10-point ROS otherwise negative.      Lab Results   Component Value Date    WBC 6.3 11/08/2019    HGB 12.9 (L) 11/08/2019    HCT 37.3 (L) 11/08/2019     11/08/2019     11/08/2019    POTASSIUM 4.5 11/08/2019    CHLORIDE 108 11/08/2019    CO2 22 11/08/2019    BUN 14 11/08/2019    CR 0.86 11/08/2019     (H) 11/08/2019    SED 88 (H) 06/25/2019    NTBNPI 2,031 (H) 04/03/2019    TROPI <0.015 04/23/2019    AST 18 11/08/2019    ALT 25 11/08/2019    ALKPHOS 119 11/08/2019    BILITOTAL 0.4 11/08/2019    INR 1.00 09/26/2019     Ph/E:   Vitals: There were no vitals taken for this visit.   KPS 90  General: NAD   Extremities: No edema  Skin: approximately 27%(13%) BSA pinkish/scaly rash on upper back coming a little down on upper deltoid region on arms; now on forearms 11/8. Very sparse on anterior upper torso (~5 spots)  Neuro: Nonfocal  Mood/Affect: appropriate  Lymph: no LAD    A&P:   1. AML: day +161 haplo, CR1. Got 1 dose of ALT-803 on  study. See us every 2 weeks with RFLP unti T cells 100% donor. Up from 85% (9/19) to 90% on 10/10. Day +180 re-stage 11/8-today.  2. ID: acyclovir, vori, bactrim.   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 Presumed to be more like pityriasis rosea (more pink and some  larger patches). Although BSA now 27%(~13%). Inc TMC to TID and schedule bx next week if rash continues.  Continue tac taper.  No GI symptoms feeling great otherwise! Counts stable  4. Recurrent pseudogout: Observation. Pred pulse like last time if flares; no colchicine (allergic).   5. Heme: counts stable  6. GI: no sx    RTC    11/14 with Dr Brown or sooner if rash worsens. Will add on skin biopsy 11/14 in case rash worsens.    Sima Watson NP

## 2019-11-08 NOTE — NURSING NOTE
BMT Teaching Flowsheet        Teaching Topic: bmbx    Person(s) involved in teaching: Patient  Motivation Level  Asks Questions: Yes  Eager to Learn: Yes  Cooperative: Yes  Receptive (willing/able to accept information): Yes  Any cultural factors/Denominational beliefs that may influence understanding or compliance? No    Patient demonstrates understanding of the following:  - Reason for the appointment, diagnosis and treatment plan: Yes  - Knowledge of proper use of medications and conditions for which they are ordered (with special attention to potential side effects or drug interactions): Yes  - Which situations necessitate calling provider and whom to contact: Yes    Teaching concerns addressed: activity level, pain management, site care      Time spent with patient: 30 minutes.    Specific Concerns: No, explain: Patient did not received Versed.  Tolerated well.  Patient had no complaints of pain post procedure.  Dressing is clean, dry, and intact.  Vital signs are stable.

## 2019-11-11 LAB
COPATH REPORT: NORMAL

## 2019-11-12 LAB — COPATH REPORT: NORMAL

## 2019-11-13 NOTE — PROGRESS NOTES
[facility]    Diagnosis:   1. 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     Treatments:   1. 7+3 (Dauno), 3/15/19, no response, Day 12 persistent disease with near packed marrow  2. Dec 10d+Ric 3/26/19, CR1 on 4/23/19 but with pos mutations (IDH1 17%, RUNX1 8%)  3. 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   4. Maintenance ALT-803 study, 1 dose, stopped due to profound fatigue, moderate to severe hypotension, and fever    PMH: HLP, BPH, DJD, RLS    Interval history: Rash almost gone, no n/v/dirrrhea or fever, back to work. 10-point ROS otherwise negative.      Lab Results   Component Value Date    WBC 6.3 11/08/2019    HGB 12.9 (L) 11/08/2019    HCT 37.3 (L) 11/08/2019     11/08/2019     11/08/2019    POTASSIUM 4.5 11/08/2019    CHLORIDE 108 11/08/2019    CO2 22 11/08/2019    BUN 14 11/08/2019    CR 0.86 11/08/2019     (H) 11/08/2019    SED 88 (H) 06/25/2019    NTBNPI 2,031 (H) 04/03/2019    TROPI <0.015 04/23/2019    AST 18 11/08/2019    ALT 25 11/08/2019    ALKPHOS 119 11/08/2019    BILITOTAL 0.4 11/08/2019    INR 1.00 09/26/2019       Current Outpatient Medications   Medication     [START ON 11/18/2019] sulfamethoxazole-trimethoprim (BACTRIM DS/SEPTRA DS) 800-160 MG tablet     acetaminophen (TYLENOL) 325 MG tablet     acyclovir (ZOVIRAX) 800 MG tablet     cyclobenzaprine (FLEXERIL) 5 MG tablet     melatonin 3 MG tablet     ondansetron (ZOFRAN-ODT) 8 MG ODT tab     pramox-pe-glycerin-petrolatum (PREPARATION H) 1-0.25-14.4-15 % CREA cream     prochlorperazine (COMPAZINE) 5 MG tablet     tacrolimus (GENERIC EQUIVALENT) 1 mg/mL suspension     tamsulosin (FLOMAX) 0.4 MG capsule     triamcinolone (KENALOG) 0.1 % external cream     voriconazole (VFEND) 50 MG tablet     No current facility-administered medications for this visit.      Ph/E:   Vitals: /84   Pulse 82   Temp 98.1  F (36.7  C)  (Oral)   Resp 18   Wt 92.4 kg (203 lb 9.6 oz)   SpO2 99%   BMI 29.64 kg/m     KPS 80  General: NAD; H&N: no mucosal lesions; Lungs clear; Heart RRR; Abdomen; Soft, No organomegaly; Extremities: No edema; Skin: no rash; Neuro: Nonfocal; Mood/Affect: appropriate; Lymph: no LAD    A&P:   1. AML: day +167 haplo, CR1. NGS pending. See us every 2 weeks with RFLP unti T cells 100% donor. I will see him myself every 2nd or 3rd time.   2. ID: acyclovir, vori, bactrim. Flu shot utd.    3. GVHD:  None, off tacro in ~2 weeks. Rash is nearly resolved.   4. Recurrent pseudogout: Observation. Pred pulse like last time if flares; no colchicine (allergic).

## 2019-11-14 ENCOUNTER — OFFICE VISIT (OUTPATIENT)
Dept: TRANSPLANT | Facility: CLINIC | Age: 64
End: 2019-11-14
Attending: INTERNAL MEDICINE
Payer: COMMERCIAL

## 2019-11-14 ENCOUNTER — OFFICE VISIT (OUTPATIENT)
Dept: TRANSPLANT | Facility: CLINIC | Age: 64
End: 2019-11-14
Attending: NURSE PRACTITIONER
Payer: COMMERCIAL

## 2019-11-14 VITALS
TEMPERATURE: 98.1 F | SYSTOLIC BLOOD PRESSURE: 129 MMHG | RESPIRATION RATE: 18 BRPM | HEART RATE: 82 BPM | BODY MASS INDEX: 29.64 KG/M2 | DIASTOLIC BLOOD PRESSURE: 84 MMHG | OXYGEN SATURATION: 99 % | WEIGHT: 203.6 LBS

## 2019-11-14 DIAGNOSIS — Z53.9 ERRONEOUS ENCOUNTER--DISREGARD: Primary | ICD-10-CM

## 2019-11-14 DIAGNOSIS — C92.01 AML (ACUTE MYELOID LEUKEMIA) IN REMISSION (H): Primary | ICD-10-CM

## 2019-11-14 DIAGNOSIS — C92.01 AML (ACUTE MYELOID LEUKEMIA) IN REMISSION (H): ICD-10-CM

## 2019-11-14 PROCEDURE — G0463 HOSPITAL OUTPT CLINIC VISIT: HCPCS

## 2019-11-14 RX ORDER — SULFAMETHOXAZOLE/TRIMETHOPRIM 800-160 MG
1 TABLET ORAL
Qty: 16 TABLET | Refills: 3 | Status: SHIPPED | OUTPATIENT
Start: 2019-11-18 | End: 2019-11-25

## 2019-11-14 ASSESSMENT — PAIN SCALES - GENERAL: PAINLEVEL: NO PAIN (0)

## 2019-11-14 NOTE — NURSING NOTE
"Oncology Rooming Note    November 14, 2019 12:54 PM   El Ace is a 64 year old male who presents for:    Chief Complaint   Patient presents with     RECHECK     Pt is here for a rtn for AML     Initial Vitals: Blood Pressure 129/84   Pulse 82   Temperature 98.1  F (36.7  C) (Oral)   Respiration 18   Weight 92.4 kg (203 lb 9.6 oz)   Oxygen Saturation 99%   Body Mass Index 29.64 kg/m   Estimated body mass index is 29.64 kg/m  as calculated from the following:    Height as of 10/4/19: 1.765 m (5' 9.5\").    Weight as of this encounter: 92.4 kg (203 lb 9.6 oz). Body surface area is 2.13 meters squared.  No Pain (0) Comment: Data Unavailable   No LMP for male patient.  Allergies reviewed: Yes  Medications reviewed: Yes    Medications: Medication refills not needed today.  Pharmacy name entered into [employer]:    [facility] - [facility].  [facility] [place], [place] - [address] [place] [address] [id number]  [facility] - [place], [place] - [address] [place] [address]    Clinical concerns: none       Shana Ballard MA              "

## 2019-11-15 LAB — COPATH REPORT: NORMAL

## 2019-11-19 LAB — COPATH REPORT: NORMAL

## 2019-11-19 NOTE — TELEPHONE ENCOUNTER
Bessy (wife) calls and  Says that her  has cancer and has a fever. Fever = 101.9-oral. Pt. Has been getting chemotherapy for 3 weeks. Bessy says that she will take pt. To the [facility] ER now and wants this nurse to call that ER to let them know that pt. Is coming in. RN then called that ER and spoke to Ashley (nurse). RN told Ashley about pt. And how he is coming to the ER. Ashley voiced understanding.    Reason for Disposition    [1] Neutropenia known or suspected (e.g., recent cancer chemotherapy) AND [2] fever > 100.4 F (38.0 C)    Additional Information    Negative: Shock suspected (e.g., cold/pale/clammy skin, too weak to stand, low BP, rapid pulse)    Negative: Difficult to awaken or acting confused  (e.g., disoriented, slurred speech)    Negative: Bluish lips, tongue, or face now    Negative: New onset rash with many purple (or blood-colored) spots or dots    Negative: Sounds like a life-threatening emergency to the triager    Negative: Other symptom is present, see that guideline  (e.g., symptoms of cough, runny nose, sore throat, earache, abdominal pain, diarrhea, vomiting)    Negative: Fever > 104 F (40 C)    Protocols used: CANCER - FEVER-ADULT-       5

## 2019-11-20 LAB — COPATH REPORT: NORMAL

## 2019-11-25 DIAGNOSIS — C92.01 AML (ACUTE MYELOID LEUKEMIA) IN REMISSION (H): ICD-10-CM

## 2019-11-25 RX ORDER — SULFAMETHOXAZOLE/TRIMETHOPRIM 800-160 MG
1 TABLET ORAL
Qty: 16 TABLET | Refills: 3 | Status: SHIPPED | OUTPATIENT
Start: 2019-11-25 | End: 2019-11-26

## 2019-11-26 ENCOUNTER — ONCOLOGY VISIT (OUTPATIENT)
Dept: TRANSPLANT | Facility: CLINIC | Age: 64
End: 2019-11-26
Attending: INTERNAL MEDICINE
Payer: COMMERCIAL

## 2019-11-26 ENCOUNTER — APPOINTMENT (OUTPATIENT)
Dept: LAB | Facility: CLINIC | Age: 64
End: 2019-11-26
Attending: INTERNAL MEDICINE
Payer: COMMERCIAL

## 2019-11-26 VITALS
RESPIRATION RATE: 16 BRPM | HEART RATE: 82 BPM | SYSTOLIC BLOOD PRESSURE: 123 MMHG | TEMPERATURE: 97.5 F | DIASTOLIC BLOOD PRESSURE: 78 MMHG | OXYGEN SATURATION: 97 % | BODY MASS INDEX: 29.49 KG/M2 | WEIGHT: 202.6 LBS

## 2019-11-26 DIAGNOSIS — Z94.84 HISTORY OF ALLOGENEIC STEM CELL TRANSPLANT (H): ICD-10-CM

## 2019-11-26 DIAGNOSIS — C95.00 ACUTE LEUKEMIA NOT HAVING ACHIEVED REMISSION (H): ICD-10-CM

## 2019-11-26 DIAGNOSIS — C92.01 AML (ACUTE MYELOID LEUKEMIA) IN REMISSION (H): ICD-10-CM

## 2019-11-26 LAB
ALBUMIN SERPL-MCNC: 3.6 G/DL (ref 3.4–5)
ALP SERPL-CCNC: 107 U/L (ref 40–150)
ALT SERPL W P-5'-P-CCNC: 30 U/L (ref 0–70)
ANION GAP SERPL CALCULATED.3IONS-SCNC: 5 MMOL/L (ref 3–14)
AST SERPL W P-5'-P-CCNC: 19 U/L (ref 0–45)
BASOPHILS # BLD AUTO: 0 10E9/L (ref 0–0.2)
BASOPHILS NFR BLD AUTO: 0.3 %
BILIRUB SERPL-MCNC: 0.3 MG/DL (ref 0.2–1.3)
BUN SERPL-MCNC: 12 MG/DL (ref 7–30)
CALCIUM SERPL-MCNC: 8.5 MG/DL (ref 8.5–10.1)
CHLORIDE SERPL-SCNC: 110 MMOL/L (ref 94–109)
CO2 SERPL-SCNC: 22 MMOL/L (ref 20–32)
CREAT SERPL-MCNC: 0.88 MG/DL (ref 0.66–1.25)
DIFFERENTIAL METHOD BLD: ABNORMAL
EOSINOPHIL # BLD AUTO: 0.1 10E9/L (ref 0–0.7)
EOSINOPHIL NFR BLD AUTO: 1.1 %
ERYTHROCYTE [DISTWIDTH] IN BLOOD BY AUTOMATED COUNT: 12.2 % (ref 10–15)
GFR SERPL CREATININE-BSD FRML MDRD: 90 ML/MIN/{1.73_M2}
GLUCOSE SERPL-MCNC: 99 MG/DL (ref 70–99)
HCT VFR BLD AUTO: 36 % (ref 40–53)
HGB BLD-MCNC: 12.5 G/DL (ref 13.3–17.7)
IMM GRANULOCYTES # BLD: 0 10E9/L (ref 0–0.4)
IMM GRANULOCYTES NFR BLD: 0.3 %
LYMPHOCYTES # BLD AUTO: 1.1 10E9/L (ref 0.8–5.3)
LYMPHOCYTES NFR BLD AUTO: 17.2 %
MAGNESIUM SERPL-MCNC: 2.1 MG/DL (ref 1.6–2.3)
MCH RBC QN AUTO: 36.1 PG (ref 26.5–33)
MCHC RBC AUTO-ENTMCNC: 34.7 G/DL (ref 31.5–36.5)
MCV RBC AUTO: 104 FL (ref 78–100)
MONOCYTES # BLD AUTO: 0.5 10E9/L (ref 0–1.3)
MONOCYTES NFR BLD AUTO: 8.7 %
NEUTROPHILS # BLD AUTO: 4.4 10E9/L (ref 1.6–8.3)
NEUTROPHILS NFR BLD AUTO: 72.4 %
NRBC # BLD AUTO: 0 10*3/UL
NRBC BLD AUTO-RTO: 0 /100
PLATELET # BLD AUTO: 177 10E9/L (ref 150–450)
POTASSIUM SERPL-SCNC: 4.3 MMOL/L (ref 3.4–5.3)
PROT SERPL-MCNC: 6.6 G/DL (ref 6.8–8.8)
RBC # BLD AUTO: 3.46 10E12/L (ref 4.4–5.9)
SODIUM SERPL-SCNC: 136 MMOL/L (ref 133–144)
WBC # BLD AUTO: 6.1 10E9/L (ref 4–11)

## 2019-11-26 PROCEDURE — G0463 HOSPITAL OUTPT CLINIC VISIT: HCPCS | Mod: ZF

## 2019-11-26 PROCEDURE — 83735 ASSAY OF MAGNESIUM: CPT | Performed by: INTERNAL MEDICINE

## 2019-11-26 PROCEDURE — 36415 COLL VENOUS BLD VENIPUNCTURE: CPT

## 2019-11-26 PROCEDURE — 80053 COMPREHEN METABOLIC PANEL: CPT | Performed by: INTERNAL MEDICINE

## 2019-11-26 PROCEDURE — 85025 COMPLETE CBC W/AUTO DIFF WBC: CPT | Performed by: INTERNAL MEDICINE

## 2019-11-26 RX ORDER — MAGNESIUM OXIDE 400 MG/1
400 TABLET ORAL 2 TIMES DAILY
COMMUNITY
Start: 2019-11-26 | End: 2019-12-12

## 2019-11-26 RX ORDER — SULFAMETHOXAZOLE/TRIMETHOPRIM 800-160 MG
1 TABLET ORAL
Qty: 30 TABLET | Refills: 3 | Status: SHIPPED | OUTPATIENT
Start: 2019-11-26 | End: 2020-01-02

## 2019-11-26 RX ORDER — TAMSULOSIN HYDROCHLORIDE 0.4 MG/1
0.4 CAPSULE ORAL DAILY
Qty: 30 CAPSULE | Refills: 11 | Status: SHIPPED | OUTPATIENT
Start: 2019-11-26 | End: 2020-11-20

## 2019-11-26 ASSESSMENT — PAIN SCALES - GENERAL: PAINLEVEL: NO PAIN (0)

## 2019-11-26 NOTE — PROGRESS NOTES
[facility] BMT [facility]    Diagnosis:   1. 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     Treatments:   1. 7+3 (Dauno), 3/15/19, no response, Day 12 persistent disease with near packed marrow  2. Dec 10d+Ric 3/26/19, CR1 on 4/23/19 but with pos mutations (IDH1 17%, RUNX1 8%)  3. 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   4. Maintenance ALT-803 study, 1 dose, stopped due to profound fatigue, moderate to severe hypotension, and fever    PMH: HLP, BPH, DJD, RLS    Interval history: Randy presents for follow up. Feeling well today. Skin rash gone, now just dry skin. No longer applying topical steroids. Continues on tac taper, will be off 12/13. Almost out of suspension, so ordered a small supply to get him through end of taper. No n/v/d/c. Labs stable. No infectious concerns. Refilled meds. Tapering down on PO mag, thinks he is taking maybe 3-4 tabs daily. Has returned to work as a  which is going well, administrative duty only until off tac    ROS: 10-point ROS otherwise negative.      Lab Results   Component Value Date    WBC 6.1 11/26/2019    HGB 12.5 (L) 11/26/2019    HCT 36.0 (L) 11/26/2019     11/26/2019     11/26/2019    POTASSIUM 4.3 11/26/2019    CHLORIDE 110 (H) 11/26/2019    CO2 22 11/26/2019    BUN 12 11/26/2019    CR 0.88 11/26/2019    GLC 99 11/26/2019    SED 88 (H) 06/25/2019    NTBNPI 2,031 (H) 04/03/2019    TROPI <0.015 04/23/2019    AST 19 11/26/2019    ALT 30 11/26/2019    ALKPHOS 107 11/26/2019    BILITOTAL 0.3 11/26/2019    INR 1.00 09/26/2019       Current Outpatient Medications   Medication     acetaminophen (TYLENOL) 325 MG tablet     acyclovir (ZOVIRAX) 800 MG tablet     cyclobenzaprine (FLEXERIL) 5 MG tablet     melatonin 3 MG tablet     ondansetron (ZOFRAN-ODT) 8 MG ODT tab     pramox-pe-glycerin-petrolatum (PREPARATION H) 1-0.25-14.4-15 % CREA cream     prochlorperazine  (COMPAZINE) 5 MG tablet     sulfamethoxazole-trimethoprim (BACTRIM DS/SEPTRA DS) 800-160 MG tablet     tacrolimus (GENERIC EQUIVALENT) 1 mg/mL suspension     tamsulosin (FLOMAX) 0.4 MG capsule     triamcinolone (KENALOG) 0.1 % external cream     voriconazole (VFEND) 50 MG tablet     No current facility-administered medications for this visit.      Ph/E:   Vitals: /78 (BP Location: Right arm, Patient Position: Sitting, Cuff Size: Adult Regular)   Pulse 82   Temp 97.5  F (36.4  C) (Oral)   Resp 16   Wt 91.9 kg (202 lb 9.6 oz)   SpO2 97%   BMI 29.49 kg/m     KPS 80  General: NAD; H&N: no mucosal lesions; Lungs clear; Heart RRR; Abdomen; Soft, No organomegaly; Extremities: No edema; Skin: diffuse dryness but no rash Neuro: Nonfocal; Mood/Affect: appropriate; Lymph: no LAD    A&P:   1. AML: day +179 haplo, CR1. NGS shows no mutations. See us every 2 weeks with RFLP unti T cells 100% donor.  - ordered RFLPs for next visit  2. ID: acyclovir, vori, bactrim. Flu shot utd.    3. GVHD:  None, off tacro in ~2 week.   4. Recurrent pseudogout: Observation. Pred pulse like last time if flares; no colchicine (allergic).   5. Derm: treated for pityriasis rosea with topical steroids, rash now resolved. Was not biopsied.  6. FEN/renal  - mag is 2.1. Still taking 3-4 tabs/d. Drop to 2 tabs daily. If mag ok next visit ok to stop mag since he will be nearly off tac    RTC in 2 weeks with Dr. Brown with RFLPs    Debbie Valadez PA-C  [phone]

## 2019-11-26 NOTE — NURSING NOTE
"Oncology Rooming Note    November 26, 2019 11:51 AM   El Ace is a 64 year old male who presents for:    Chief Complaint   Patient presents with     Blood Draw     labs drawn with vpt by rn.  vs taken     RECHECK     Return: AML     Initial Vitals: /78 (BP Location: Right arm, Patient Position: Sitting, Cuff Size: Adult Regular)   Pulse 82   Temp 97.5  F (36.4  C) (Oral)   Resp 16   Wt 91.9 kg (202 lb 9.6 oz)   SpO2 97%   BMI 29.49 kg/m   Estimated body mass index is 29.49 kg/m  as calculated from the following:    Height as of 10/4/19: 1.765 m (5' 9.5\").    Weight as of this encounter: 91.9 kg (202 lb 9.6 oz). Body surface area is 2.12 meters squared.  No Pain (0) Comment: Data Unavailable   No LMP for male patient.  Allergies reviewed: Yes  Medications reviewed: Yes    Medications: Medication refills not needed today.  Pharmacy name entered into [employer]:    [facility] - [place], [place] - [phone].  [place], [place] - [address] [facility] [phone]  [facility] - [place], [place] - [facility] [address]     Clinical concerns: None       Dayanara Morfin CMA              "

## 2019-11-26 NOTE — NURSING NOTE
Chief Complaint   Patient presents with     Blood Draw     labs drawn with vpt by rn.  vs taken     Labs drawn with vpt by rn.  Pt tolerated well.  VS taken.  Pt checked in for next appt.    Ella Friend RN

## 2019-12-11 NOTE — PROGRESS NOTES
"[facility]    Diagnosis:   1. 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     Treatments:   1. 7+3 (Dauno), 3/15/19, no response, Day 12 persistent disease with near packed marrow  2. Dec 10d+Ric 3/26/19, CR1 on 4/23/19 but with pos mutations (IDH1 17%, RUNX1 8%)  3. 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   4. Maintenance ALT-803 study, 1 dose, stopped due to profound fatigue, moderate to severe hypotension, and fever    PMH: HLP, BPH, DJD, RLS    Interval history: Randy presents for follow up. Feeling good.  C/O intermittant skin sensitivity/itching.  Using lotion.  No actual rash. Eating well with no N/V/D.  Afebrile with no cough or congestion.  No pain or bleeding.  No skin thickening.     ROS: 8-point ROS otherwise negative.      Lab Results   Component Value Date    WBC 6.0 12/12/2019    HGB 12.6 (L) 12/12/2019    HCT 36.8 (L) 12/12/2019     12/12/2019     12/12/2019    POTASSIUM 4.2 12/12/2019    CHLORIDE 109 12/12/2019    CO2 23 12/12/2019    BUN 14 12/12/2019    CR 0.87 12/12/2019     (H) 12/12/2019    SED 88 (H) 06/25/2019    NTBNPI 2,031 (H) 04/03/2019    TROPI <0.015 04/23/2019    AST 18 12/12/2019    ALT 30 12/12/2019    ALKPHOS 104 12/12/2019    BILITOTAL 0.4 12/12/2019    INR 1.00 09/26/2019       Ph/E:   Vitals: /70   Pulse 72   Temp 98.5  F (36.9  C) (Temporal)   Resp 18   Ht 1.765 m (5' 9.5\")   Wt 91.8 kg (202 lb 6.4 oz)   SpO2 98%   BMI 29.46 kg/m     KPS 80  General: NAD; H&N: no mucosal lesions; Lungs clear; Heart RRR; Abdomen; Soft, No organomegaly; Extremities: No edema; Skin: diffuse dryness but no rash Neuro: Nonfocal; Mood/Affect: appropriate; Lymph: no LAD    A&P:   1. AML: day +195 s/p haplo, CR1. NGS shows no mutations. RFLP 100% on 11/8, repeat pending today.   2. ID: acyclovir, vori, bactrim. Flu shot utd.    - CMV neg 11/8  3. GVHD:  None, off tacro end " of week  4. Recurrent pseudogout:  Pred pulse if flares; no colchicine (allergic).   5. Derm: treated for pityriasis rosea with topical steroids, rash now resolved. Was not biopsied.  6. FEN/renal:  Mg 2.4, discontinue oral Mg supp    RTC 1/2, sooner PRN    Shreya Rollins

## 2019-12-12 ENCOUNTER — ONCOLOGY VISIT (OUTPATIENT)
Dept: TRANSPLANT | Facility: CLINIC | Age: 64
End: 2019-12-12
Attending: INTERNAL MEDICINE
Payer: COMMERCIAL

## 2019-12-12 VITALS
HEART RATE: 72 BPM | HEIGHT: 70 IN | RESPIRATION RATE: 18 BRPM | SYSTOLIC BLOOD PRESSURE: 123 MMHG | WEIGHT: 202.4 LBS | BODY MASS INDEX: 28.98 KG/M2 | TEMPERATURE: 98.5 F | DIASTOLIC BLOOD PRESSURE: 70 MMHG | OXYGEN SATURATION: 98 %

## 2019-12-12 DIAGNOSIS — C92.01 AML (ACUTE MYELOID LEUKEMIA) IN REMISSION (H): ICD-10-CM

## 2019-12-12 DIAGNOSIS — Z94.84 HISTORY OF ALLOGENEIC STEM CELL TRANSPLANT (H): ICD-10-CM

## 2019-12-12 LAB
ALBUMIN SERPL-MCNC: 3.9 G/DL (ref 3.4–5)
ALP SERPL-CCNC: 104 U/L (ref 40–150)
ALT SERPL W P-5'-P-CCNC: 30 U/L (ref 0–70)
ANION GAP SERPL CALCULATED.3IONS-SCNC: 6 MMOL/L (ref 3–14)
AST SERPL W P-5'-P-CCNC: 18 U/L (ref 0–45)
BASOPHILS # BLD AUTO: 0 10E9/L (ref 0–0.2)
BASOPHILS NFR BLD AUTO: 0.5 %
BILIRUB SERPL-MCNC: 0.4 MG/DL (ref 0.2–1.3)
BUN SERPL-MCNC: 14 MG/DL (ref 7–30)
CALCIUM SERPL-MCNC: 8.6 MG/DL (ref 8.5–10.1)
CHLORIDE SERPL-SCNC: 109 MMOL/L (ref 94–109)
CO2 SERPL-SCNC: 23 MMOL/L (ref 20–32)
CREAT SERPL-MCNC: 0.87 MG/DL (ref 0.66–1.25)
DIFFERENTIAL METHOD BLD: ABNORMAL
EOSINOPHIL # BLD AUTO: 0.1 10E9/L (ref 0–0.7)
EOSINOPHIL NFR BLD AUTO: 1.5 %
ERYTHROCYTE [DISTWIDTH] IN BLOOD BY AUTOMATED COUNT: 11.9 % (ref 10–15)
GFR SERPL CREATININE-BSD FRML MDRD: >90 ML/MIN/{1.73_M2}
GLUCOSE SERPL-MCNC: 102 MG/DL (ref 70–99)
HCT VFR BLD AUTO: 36.8 % (ref 40–53)
HGB BLD-MCNC: 12.6 G/DL (ref 13.3–17.7)
IMM GRANULOCYTES # BLD: 0 10E9/L (ref 0–0.4)
IMM GRANULOCYTES NFR BLD: 0.3 %
LYMPHOCYTES # BLD AUTO: 1.1 10E9/L (ref 0.8–5.3)
LYMPHOCYTES NFR BLD AUTO: 17.8 %
MAGNESIUM SERPL-MCNC: 2.4 MG/DL (ref 1.6–2.3)
MCH RBC QN AUTO: 35.8 PG (ref 26.5–33)
MCHC RBC AUTO-ENTMCNC: 34.2 G/DL (ref 31.5–36.5)
MCV RBC AUTO: 105 FL (ref 78–100)
MONOCYTES # BLD AUTO: 0.7 10E9/L (ref 0–1.3)
MONOCYTES NFR BLD AUTO: 11 %
NEUTROPHILS # BLD AUTO: 4.1 10E9/L (ref 1.6–8.3)
NEUTROPHILS NFR BLD AUTO: 68.9 %
NRBC # BLD AUTO: 0 10*3/UL
NRBC BLD AUTO-RTO: 0 /100
PLATELET # BLD AUTO: 191 10E9/L (ref 150–450)
POTASSIUM SERPL-SCNC: 4.2 MMOL/L (ref 3.4–5.3)
PROT SERPL-MCNC: 6.8 G/DL (ref 6.8–8.8)
RBC # BLD AUTO: 3.52 10E12/L (ref 4.4–5.9)
SODIUM SERPL-SCNC: 138 MMOL/L (ref 133–144)
WBC # BLD AUTO: 6 10E9/L (ref 4–11)

## 2019-12-12 PROCEDURE — 36415 COLL VENOUS BLD VENIPUNCTURE: CPT

## 2019-12-12 PROCEDURE — 81268 CHIMERISM ANAL W/CELL SELECT: CPT | Performed by: PHYSICIAN ASSISTANT

## 2019-12-12 PROCEDURE — G0463 HOSPITAL OUTPT CLINIC VISIT: HCPCS | Mod: ZF

## 2019-12-12 PROCEDURE — 83735 ASSAY OF MAGNESIUM: CPT | Performed by: PHYSICIAN ASSISTANT

## 2019-12-12 PROCEDURE — 80053 COMPREHEN METABOLIC PANEL: CPT | Performed by: PHYSICIAN ASSISTANT

## 2019-12-12 PROCEDURE — 85025 COMPLETE CBC W/AUTO DIFF WBC: CPT | Performed by: PHYSICIAN ASSISTANT

## 2019-12-12 RX ORDER — VORICONAZOLE 50 MG/1
150 TABLET, FILM COATED ORAL EVERY 12 HOURS
Qty: 180 TABLET | Refills: 1 | Status: SHIPPED | OUTPATIENT
Start: 2019-12-12 | End: 2020-01-16 | Stop reason: ALTCHOICE

## 2019-12-12 ASSESSMENT — PAIN SCALES - GENERAL: PAINLEVEL: NO PAIN (0)

## 2019-12-12 ASSESSMENT — MIFFLIN-ST. JEOR: SCORE: 1701.39

## 2019-12-12 NOTE — NURSING NOTE
"Oncology Rooming Note    December 12, 2019 12:07 PM   El Ace is a 64 year old male who presents for:    Chief Complaint   Patient presents with     Blood Draw     Vitals, blood draw, research kit by LPN. Pt checked into appt.      RECHECK     Return: AML (acute myeloid leukemia)      Initial Vitals: /70   Pulse 72   Temp 98.5  F (36.9  C) (Temporal)   Resp 18   Ht 1.765 m (5' 9.5\")   Wt 91.8 kg (202 lb 6.4 oz)   SpO2 98%   BMI 29.46 kg/m   Estimated body mass index is 29.46 kg/m  as calculated from the following:    Height as of this encounter: 1.765 m (5' 9.5\").    Weight as of this encounter: 91.8 kg (202 lb 6.4 oz). Body surface area is 2.12 meters squared.  No Pain (0) Comment: Data Unavailable   No LMP for male patient.  Allergies reviewed: Yes  Medications reviewed: Yes    Medications: MEDICATION REFILLS NEEDED TODAY. Provider was notified.  Pharmacy name entered into [employer]:    [facility] - [place], [place] - [id number]  [facility] [place], [place] - [address] [facility] [phone]  [facility] - [place], [place] - [address] [place], [place] [address]     Clinical concerns: Refill Voriconazole.  Shreya THAO was notified.      Jennifer Albarran CMA              "

## 2019-12-12 NOTE — PROGRESS NOTES
Chief Complaint   Patient presents with     Blood Draw     Vitals, blood draw, research kit by LPN. Pt checked into appt.      MATT Adames LPN

## 2019-12-16 LAB
COPATH REPORT: NORMAL
COPATH REPORT: NORMAL

## 2020-01-01 NOTE — PROGRESS NOTES
[facility] BMT [facility]    Diagnosis:   1. 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     Treatments:   1. 7+3 (Dauno), 3/15/19, no response, Day 12 persistent disease with near packed marrow  2. Dec 10d+Ric 3/26/19, CR1 on 4/23/19 but with pos mutations (IDH1 17%, RUNX1 8%)  3. 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   4. Maintenance ALT-803 study, 1 dose, stopped due to profound fatigue, moderate to severe hypotension, and fever    PMH: HLP, BPH, DJD, RLS    Interval history: Some intermittent itch. 10-point ROS otherwise negative.      Lab Results   Component Value Date    WBC 6.6 01/02/2020    HGB 12.0 (L) 01/02/2020    HCT 35.0 (L) 01/02/2020     01/02/2020     12/12/2019    POTASSIUM 4.2 12/12/2019    CHLORIDE 109 12/12/2019    CO2 23 12/12/2019    BUN 14 12/12/2019    CR 0.87 12/12/2019     (H) 12/12/2019    SED 88 (H) 06/25/2019    NTBNPI 2,031 (H) 04/03/2019    TROPI <0.015 04/23/2019    AST 18 12/12/2019    ALT 30 12/12/2019    ALKPHOS 104 12/12/2019    BILITOTAL 0.4 12/12/2019    INR 1.00 09/26/2019       Current Outpatient Medications   Medication     acetaminophen (TYLENOL) 325 MG tablet     acyclovir (ZOVIRAX) 800 MG tablet     cyclobenzaprine (FLEXERIL) 5 MG tablet     melatonin 3 MG tablet     ondansetron (ZOFRAN-ODT) 8 MG ODT tab     pramox-pe-glycerin-petrolatum (PREPARATION H) 1-0.25-14.4-15 % CREA cream     prochlorperazine (COMPAZINE) 5 MG tablet     sulfamethoxazole-trimethoprim (BACTRIM DS/SEPTRA DS) 800-160 MG tablet     tamsulosin (FLOMAX) 0.4 MG capsule     triamcinolone (KENALOG) 0.1 % external cream     voriconazole (VFEND) 50 MG tablet     tacrolimus (GENERIC EQUIVALENT) 1 mg/mL suspension     No current facility-administered medications for this visit.      Ph/E:   Vitals: /72 (BP Location: Right arm, Patient Position: Sitting, Cuff Size: Adult Regular)   Pulse 73   Temp  97.7  F (36.5  C) (Oral)   Resp 16   Wt 95.5 kg (210 lb 9.6 oz)   SpO2 98%   BMI 30.65 kg/m     ECO  General: NAD; H&N: no mucosal lesions; Lungs clear; Heart RRR; Abdomen; Soft, No organomegaly; Extremities: No edema; Skin: no rash; Neuro: Nonfocal; Mood/Affect: appropriate; Lymph: no LAD    A&P:   1. AML: day +216 haplo, CR. See us every 4 weeks.   2. ID: acyclovir, vori, bactrim. Flu shot utd.    3. GVHD:  None, off IST.   4. Recurrent pseudogout: Observation. Pred pulse like last time if flares; no colchicine (allergic).

## 2020-01-02 ENCOUNTER — ONCOLOGY VISIT (OUTPATIENT)
Dept: TRANSPLANT | Facility: CLINIC | Age: 65
End: 2020-01-02
Attending: INTERNAL MEDICINE
Payer: COMMERCIAL

## 2020-01-02 ENCOUNTER — APPOINTMENT (OUTPATIENT)
Dept: LAB | Facility: CLINIC | Age: 65
End: 2020-01-02
Attending: INTERNAL MEDICINE
Payer: COMMERCIAL

## 2020-01-02 VITALS
OXYGEN SATURATION: 98 % | BODY MASS INDEX: 30.65 KG/M2 | TEMPERATURE: 97.7 F | RESPIRATION RATE: 16 BRPM | SYSTOLIC BLOOD PRESSURE: 122 MMHG | WEIGHT: 210.6 LBS | HEART RATE: 73 BPM | DIASTOLIC BLOOD PRESSURE: 72 MMHG

## 2020-01-02 DIAGNOSIS — C92.01 AML (ACUTE MYELOID LEUKEMIA) IN REMISSION (H): Primary | ICD-10-CM

## 2020-01-02 DIAGNOSIS — C92.01 AML (ACUTE MYELOID LEUKEMIA) IN REMISSION (H): ICD-10-CM

## 2020-01-02 LAB
ALBUMIN SERPL-MCNC: 3.6 G/DL (ref 3.4–5)
ALP SERPL-CCNC: 92 U/L (ref 40–150)
ALT SERPL W P-5'-P-CCNC: 30 U/L (ref 0–70)
ANION GAP SERPL CALCULATED.3IONS-SCNC: 4 MMOL/L (ref 3–14)
AST SERPL W P-5'-P-CCNC: 20 U/L (ref 0–45)
BASOPHILS # BLD AUTO: 0 10E9/L (ref 0–0.2)
BASOPHILS NFR BLD AUTO: 0.5 %
BILIRUB SERPL-MCNC: 0.4 MG/DL (ref 0.2–1.3)
BUN SERPL-MCNC: 13 MG/DL (ref 7–30)
CALCIUM SERPL-MCNC: 8.4 MG/DL (ref 8.5–10.1)
CHLORIDE SERPL-SCNC: 110 MMOL/L (ref 94–109)
CO2 SERPL-SCNC: 24 MMOL/L (ref 20–32)
CREAT SERPL-MCNC: 0.92 MG/DL (ref 0.66–1.25)
DIFFERENTIAL METHOD BLD: ABNORMAL
EOSINOPHIL # BLD AUTO: 0.1 10E9/L (ref 0–0.7)
EOSINOPHIL NFR BLD AUTO: 2 %
ERYTHROCYTE [DISTWIDTH] IN BLOOD BY AUTOMATED COUNT: 12.1 % (ref 10–15)
GFR SERPL CREATININE-BSD FRML MDRD: 86 ML/MIN/{1.73_M2}
GLUCOSE SERPL-MCNC: 94 MG/DL (ref 70–99)
HCT VFR BLD AUTO: 35 % (ref 40–53)
HGB BLD-MCNC: 12 G/DL (ref 13.3–17.7)
IMM GRANULOCYTES # BLD: 0 10E9/L (ref 0–0.4)
IMM GRANULOCYTES NFR BLD: 0.3 %
LYMPHOCYTES # BLD AUTO: 1 10E9/L (ref 0.8–5.3)
LYMPHOCYTES NFR BLD AUTO: 14.8 %
MAGNESIUM SERPL-MCNC: 2.3 MG/DL (ref 1.6–2.3)
MCH RBC QN AUTO: 36.4 PG (ref 26.5–33)
MCHC RBC AUTO-ENTMCNC: 34.3 G/DL (ref 31.5–36.5)
MCV RBC AUTO: 106 FL (ref 78–100)
MONOCYTES # BLD AUTO: 0.7 10E9/L (ref 0–1.3)
MONOCYTES NFR BLD AUTO: 10.5 %
NEUTROPHILS # BLD AUTO: 4.7 10E9/L (ref 1.6–8.3)
NEUTROPHILS NFR BLD AUTO: 71.9 %
NRBC # BLD AUTO: 0 10*3/UL
NRBC BLD AUTO-RTO: 0 /100
PLATELET # BLD AUTO: 179 10E9/L (ref 150–450)
POTASSIUM SERPL-SCNC: 4.2 MMOL/L (ref 3.4–5.3)
PROT SERPL-MCNC: 6.3 G/DL (ref 6.8–8.8)
RBC # BLD AUTO: 3.3 10E12/L (ref 4.4–5.9)
SODIUM SERPL-SCNC: 139 MMOL/L (ref 133–144)
WBC # BLD AUTO: 6.6 10E9/L (ref 4–11)

## 2020-01-02 PROCEDURE — 80053 COMPREHEN METABOLIC PANEL: CPT | Performed by: NURSE PRACTITIONER

## 2020-01-02 PROCEDURE — 83735 ASSAY OF MAGNESIUM: CPT | Performed by: NURSE PRACTITIONER

## 2020-01-02 PROCEDURE — G0463 HOSPITAL OUTPT CLINIC VISIT: HCPCS

## 2020-01-02 PROCEDURE — 85025 COMPLETE CBC W/AUTO DIFF WBC: CPT | Performed by: NURSE PRACTITIONER

## 2020-01-02 PROCEDURE — 81268 CHIMERISM ANAL W/CELL SELECT: CPT | Performed by: INTERNAL MEDICINE

## 2020-01-02 PROCEDURE — 81268 CHIMERISM ANAL W/CELL SELECT: CPT | Performed by: NURSE PRACTITIONER

## 2020-01-02 PROCEDURE — 36415 COLL VENOUS BLD VENIPUNCTURE: CPT

## 2020-01-02 RX ORDER — SULFAMETHOXAZOLE/TRIMETHOPRIM 800-160 MG
1 TABLET ORAL
Qty: 30 TABLET | Refills: 3 | Status: SHIPPED | OUTPATIENT
Start: 2020-01-06 | End: 2020-05-28

## 2020-01-02 ASSESSMENT — PAIN SCALES - GENERAL: PAINLEVEL: NO PAIN (0)

## 2020-01-02 NOTE — NURSING NOTE
Chief Complaint   Patient presents with     Blood Draw     labs drawn via vpt by rn. vs taken      Blood drawn via vpt by RN in lab. VS taken. Pt checked into next appointment.   Aby Wright RN

## 2020-01-02 NOTE — NURSING NOTE
"Oncology Rooming Note    January 2, 2020 10:22 AM   El Ace is a 64 year old male who presents for:    Chief Complaint   Patient presents with     Blood Draw     labs drawn via vpt by rn. vs taken      RECHECK     Return: AML      Initial Vitals: /72 (BP Location: Right arm, Patient Position: Sitting, Cuff Size: Adult Regular)   Pulse 73   Temp 97.7  F (36.5  C) (Oral)   Resp 16   Wt 95.5 kg (210 lb 9.6 oz)   SpO2 98%   BMI 30.65 kg/m   Estimated body mass index is 30.65 kg/m  as calculated from the following:    Height as of 12/12/19: 1.765 m (5' 9.5\").    Weight as of this encounter: 95.5 kg (210 lb 9.6 oz). Body surface area is 2.16 meters squared.  No Pain (0) Comment: Data Unavailable   No LMP for male patient.  Allergies reviewed: Yes  Medications reviewed: Yes    Medications: MEDICATION REFILLS NEEDED TODAY. Provider was notified.  Pharmacy name entered into [employer]:    [facility] - [place], [place] - [address]  [facility] [place], [place] - [address]  [facility] [place], [place] - [facility] [address]    Clinical concerns: Refill of Bactrim needed       Dayanara Morfin CMA              "

## 2020-01-03 LAB
COPATH REPORT: NORMAL
COPATH REPORT: NORMAL

## 2020-01-14 ENCOUNTER — TELEPHONE (OUTPATIENT)
Dept: ONCOLOGY | Facility: CLINIC | Age: 65
End: 2020-01-14

## 2020-01-14 ENCOUNTER — TELEPHONE (OUTPATIENT)
Dept: TRANSPLANT | Facility: CLINIC | Age: 65
End: 2020-01-14

## 2020-01-14 NOTE — TELEPHONE ENCOUNTER
PA Initiation    Medication: Voriconazole - Submitted  Insurance Company: [employer] - Phone [phone] Fax [phone]  Pharmacy Filling the Rx:    Filling Pharmacy Phone:    Filling Pharmacy Fax:    Start Date: 1/14/2020        Ondina Giraldo CPhT  [facility]  Oncology Pharmacy [email or web address]  [email or web address]  Phone: [phone]  Fax: [phone]

## 2020-01-14 NOTE — TELEPHONE ENCOUNTER
[facility] Prior Authorization Team Request    Medication: voriconazole 50 mg tabs  Dosing: take three tablets by mouth every 12 hours  Qty: 180  Day Supply: 30  NDC (required for Medicaid members):      Insurance   BIN: [id number]  PCN:   Grp: promise  ID: [id number]    CoverMyMeds Key (if applicable):     Additional documentation:       Filling Pharmacy: [facility]  Phone Number: [phone]  Contact:    Pharmacy NPI (required for Medicaid members):

## 2020-01-15 NOTE — TELEPHONE ENCOUNTER
Central Prior Authorization Team   Phone: [phone]      PA Initiation    Medication: voriconazole 50 mg tabs  Insurance Company: [employer] - Phone [phone] Fax [phone]  Pharmacy Filling the Rx: [facility] [place], [place] - [address] [place] [address] [id number]  Filling Pharmacy Phone: [phone]  Filling Pharmacy Fax:    Start Date: 1/15/2020

## 2020-01-16 DIAGNOSIS — Z94.84 HISTORY OF ALLOGENEIC STEM CELL TRANSPLANT (H): Primary | ICD-10-CM

## 2020-01-16 DIAGNOSIS — C92.01 AML (ACUTE MYELOID LEUKEMIA) IN REMISSION (H): ICD-10-CM

## 2020-01-16 RX ORDER — FLUCONAZOLE 100 MG/1
100 TABLET ORAL DAILY
Qty: 30 TABLET | Refills: 3 | Status: SHIPPED | OUTPATIENT
Start: 2020-01-16 | End: 2020-05-14

## 2020-01-16 NOTE — TELEPHONE ENCOUNTER
PRIOR AUTHORIZATION DENIED    Medication: Voriconazole - Denied (appeal started)    Denial Date: 1/16/2020    Denial Rational: Not prescribed by an ID doctor    Appeal Information: I called and talked with [employer], they said that they will override the denial given the situation and will have the medication set up and ready to be processed by the end of the day. In the future they said that an Oncology MD can prescribe the medication as long as they consult with an Infectious Disease provider.         Ondina Giraldo CPhT  [facility]  Oncology Pharmacy Liaison  [email or web address]  Phone: [phone]  Fax: [phone]

## 2020-01-16 NOTE — TELEPHONE ENCOUNTER
Prior Authorization Approval    Authorization Effective Date:    Authorization Expiration Date:    Medication: voriconazole 50 mg tabs- APPEAL Approved  Approved Dose/Quantity:  Reference #:     Insurance Company: [employer] - Phone [phone] Fax [phone]  Expected CoPay:       CoPay Card Available:      Foundation Assistance Needed:    Which Pharmacy is filling the prescription (Not needed for infusion/clinic administered): [facility] [place] [id number] [facility] [phone]  Pharmacy Notified: Yes  Patient Notified: No    See encounter above.

## 2020-01-23 DIAGNOSIS — C92.01 AML (ACUTE MYELOID LEUKEMIA) IN REMISSION (H): ICD-10-CM

## 2020-01-23 RX ORDER — ACYCLOVIR 800 MG/1
800 TABLET ORAL
Qty: 150 TABLET | Refills: 3 | Status: SHIPPED | OUTPATIENT
Start: 2020-01-23 | End: 2020-05-28

## 2020-01-30 ENCOUNTER — ONCOLOGY VISIT (OUTPATIENT)
Dept: TRANSPLANT | Facility: CLINIC | Age: 65
End: 2020-01-30
Attending: PHYSICIAN ASSISTANT
Payer: COMMERCIAL

## 2020-01-30 ENCOUNTER — APPOINTMENT (OUTPATIENT)
Dept: LAB | Facility: CLINIC | Age: 65
End: 2020-01-30
Attending: PHYSICIAN ASSISTANT
Payer: COMMERCIAL

## 2020-01-30 VITALS
TEMPERATURE: 97.2 F | SYSTOLIC BLOOD PRESSURE: 117 MMHG | WEIGHT: 206.9 LBS | RESPIRATION RATE: 16 BRPM | DIASTOLIC BLOOD PRESSURE: 75 MMHG | BODY MASS INDEX: 30.12 KG/M2 | OXYGEN SATURATION: 99 % | HEART RATE: 89 BPM

## 2020-01-30 DIAGNOSIS — C92.01 AML (ACUTE MYELOID LEUKEMIA) IN REMISSION (H): ICD-10-CM

## 2020-01-30 LAB
ALBUMIN SERPL-MCNC: 3.6 G/DL (ref 3.4–5)
ALP SERPL-CCNC: 88 U/L (ref 40–150)
ALT SERPL W P-5'-P-CCNC: 33 U/L (ref 0–70)
ANION GAP SERPL CALCULATED.3IONS-SCNC: 4 MMOL/L (ref 3–14)
AST SERPL W P-5'-P-CCNC: 17 U/L (ref 0–45)
BASOPHILS # BLD AUTO: 0 10E9/L (ref 0–0.2)
BASOPHILS NFR BLD AUTO: 0.5 %
BILIRUB SERPL-MCNC: 0.4 MG/DL (ref 0.2–1.3)
BUN SERPL-MCNC: 15 MG/DL (ref 7–30)
CALCIUM SERPL-MCNC: 8.5 MG/DL (ref 8.5–10.1)
CHLORIDE SERPL-SCNC: 108 MMOL/L (ref 94–109)
CO2 SERPL-SCNC: 26 MMOL/L (ref 20–32)
CREAT SERPL-MCNC: 0.86 MG/DL (ref 0.66–1.25)
DIFFERENTIAL METHOD BLD: ABNORMAL
EOSINOPHIL # BLD AUTO: 0.1 10E9/L (ref 0–0.7)
EOSINOPHIL NFR BLD AUTO: 1.3 %
ERYTHROCYTE [DISTWIDTH] IN BLOOD BY AUTOMATED COUNT: 12.1 % (ref 10–15)
GFR SERPL CREATININE-BSD FRML MDRD: >90 ML/MIN/{1.73_M2}
GLUCOSE SERPL-MCNC: 96 MG/DL (ref 70–99)
HCT VFR BLD AUTO: 37.8 % (ref 40–53)
HGB BLD-MCNC: 13 G/DL (ref 13.3–17.7)
IMM GRANULOCYTES # BLD: 0 10E9/L (ref 0–0.4)
IMM GRANULOCYTES NFR BLD: 0.3 %
LYMPHOCYTES # BLD AUTO: 1.1 10E9/L (ref 0.8–5.3)
LYMPHOCYTES NFR BLD AUTO: 17.7 %
MCH RBC QN AUTO: 36.1 PG (ref 26.5–33)
MCHC RBC AUTO-ENTMCNC: 34.4 G/DL (ref 31.5–36.5)
MCV RBC AUTO: 105 FL (ref 78–100)
MONOCYTES # BLD AUTO: 0.7 10E9/L (ref 0–1.3)
MONOCYTES NFR BLD AUTO: 11.2 %
NEUTROPHILS # BLD AUTO: 4.1 10E9/L (ref 1.6–8.3)
NEUTROPHILS NFR BLD AUTO: 69 %
NRBC # BLD AUTO: 0 10*3/UL
NRBC BLD AUTO-RTO: 0 /100
PLATELET # BLD AUTO: 184 10E9/L (ref 150–450)
POTASSIUM SERPL-SCNC: 4.3 MMOL/L (ref 3.4–5.3)
PROT SERPL-MCNC: 6.7 G/DL (ref 6.8–8.8)
RBC # BLD AUTO: 3.6 10E12/L (ref 4.4–5.9)
SODIUM SERPL-SCNC: 139 MMOL/L (ref 133–144)
WBC # BLD AUTO: 6 10E9/L (ref 4–11)

## 2020-01-30 PROCEDURE — 36415 COLL VENOUS BLD VENIPUNCTURE: CPT

## 2020-01-30 PROCEDURE — G0463 HOSPITAL OUTPT CLINIC VISIT: HCPCS

## 2020-01-30 PROCEDURE — 85025 COMPLETE CBC W/AUTO DIFF WBC: CPT | Performed by: INTERNAL MEDICINE

## 2020-01-30 PROCEDURE — 80053 COMPREHEN METABOLIC PANEL: CPT | Performed by: INTERNAL MEDICINE

## 2020-01-30 ASSESSMENT — PAIN SCALES - GENERAL: PAINLEVEL: NO PAIN (0)

## 2020-01-30 NOTE — PROGRESS NOTES
[facility] BMT [facility]    Diagnosis:   1. 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     Treatments:   1. 7+3 (Dauno), 3/15/19, no response, Day 12 persistent disease with near packed marrow  2. Dec 10d+Ric 3/26/19, CR1 on 4/23/19 but with pos mutations (IDH1 17%, RUNX1 8%)  3. 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   4. Maintenance ALT-803 study, 1 dose, stopped due to profound fatigue, moderate to severe hypotension, and fever    PMH: HLP, BPH, DJD, RLS    Interval history: Returns for follow up. Feeling great. No new medical complaints. Occasional itching is improving. No new GVHD symptoms or concerns. Will be traveling in the upcoming months to see family members.     8-point ROS otherwise negative.      Lab Results   Component Value Date    WBC 6.0 01/30/2020    HGB 13.0 (L) 01/30/2020    HCT 37.8 (L) 01/30/2020     01/30/2020     01/30/2020    POTASSIUM 4.3 01/30/2020    CHLORIDE 108 01/30/2020    CO2 26 01/30/2020    BUN 15 01/30/2020    CR 0.86 01/30/2020    GLC 96 01/30/2020    SED 88 (H) 06/25/2019    NTBNPI 2,031 (H) 04/03/2019    TROPI <0.015 04/23/2019    AST 17 01/30/2020    ALT 33 01/30/2020    ALKPHOS 88 01/30/2020    BILITOTAL 0.4 01/30/2020    INR 1.00 09/26/2019       Current Outpatient Medications   Medication     acetaminophen (TYLENOL) 325 MG tablet     acyclovir (ZOVIRAX) 800 MG tablet     cyclobenzaprine (FLEXERIL) 5 MG tablet     fluconazole (DIFLUCAN) 100 MG tablet     melatonin 3 MG tablet     ondansetron (ZOFRAN-ODT) 8 MG ODT tab     pramox-pe-glycerin-petrolatum (PREPARATION H) 1-0.25-14.4-15 % CREA cream     prochlorperazine (COMPAZINE) 5 MG tablet     sulfamethoxazole-trimethoprim (BACTRIM DS/SEPTRA DS) 800-160 MG tablet     tamsulosin (FLOMAX) 0.4 MG capsule     triamcinolone (KENALOG) 0.1 % external cream     No current facility-administered medications for this visit.      Ph/E:    Vitals: /75 (BP Location: Right arm, Patient Position: Sitting, Cuff Size: Adult Regular)   Pulse 89   Temp 97.2  F (36.2  C) (Oral)   Resp 16   Wt 93.8 kg (206 lb 14.4 oz)   SpO2 99%   BMI 30.12 kg/m     ECO  General: NAD  H&N: no mucosal lesions or lichenoid changes  Lungs CTAB without rales or wheezes   Heart RRR, no m/r/g  Abdomen; Soft, NT, ND  Extremities: No edema  Skin/MSK: no rash, full ROM noted in all joints.   Neuro: Nonfocal;     A&P:   1. AML: day +244 haplo, CR. See us every 4 weeks.   2. ID: acyclovir, vori, bactrim. Flu shot utd.    3. GVHD:  None, off IST.   4. Recurrent pseudogout: Observation. Pred pulse like last time if flares; no colchicine (allergic).     RTC: [id number].  (pt will be traveling at the end of February). Sooner PRN.     Johnnie Horan PA-C  [phone]

## 2020-01-30 NOTE — NURSING NOTE
"Oncology Rooming Note    January 30, 2020 10:13 AM   El Ace is a 64 year old male who presents for:    Chief Complaint   Patient presents with     Blood Draw     labs drawn via vpt by rn. vs taken      RECHECK     Pt is here for a rtn for S/P BMT for AML     Initial Vitals: Blood Pressure 117/75 (BP Location: Right arm, Patient Position: Sitting, Cuff Size: Adult Regular)   Pulse 89   Temperature 97.2  F (36.2  C) (Oral)   Respiration 16   Weight 93.8 kg (206 lb 14.4 oz)   Oxygen Saturation 99%   Body Mass Index 30.12 kg/m   Estimated body mass index is 30.12 kg/m  as calculated from the following:    Height as of 12/12/19: 1.765 m (5' 9.5\").    Weight as of this encounter: 93.8 kg (206 lb 14.4 oz). Body surface area is 2.14 meters squared.  No Pain (0) Comment: Data Unavailable   No LMP for male patient.  Allergies reviewed: Yes  Medications reviewed: Yes    Medications: Medication refills not needed today.  Pharmacy name entered into [employer]:    [facility] - [place], [place] - [address]  [facility] [place], [place] - [address]  [facility] [place], [place] - [address] [place] [address] PHARMACY [id number] [place], [place] - [address]    Clinical concerns: none       Shana Ballard MA            "

## 2020-03-11 ENCOUNTER — HEALTH MAINTENANCE LETTER (OUTPATIENT)
Age: 65
End: 2020-03-11

## 2020-03-12 ENCOUNTER — ONCOLOGY VISIT (OUTPATIENT)
Dept: TRANSPLANT | Facility: CLINIC | Age: 65
End: 2020-03-12
Attending: PHYSICIAN ASSISTANT
Payer: COMMERCIAL

## 2020-03-12 ENCOUNTER — APPOINTMENT (OUTPATIENT)
Dept: LAB | Facility: CLINIC | Age: 65
End: 2020-03-12
Attending: PHYSICIAN ASSISTANT
Payer: COMMERCIAL

## 2020-03-12 VITALS
TEMPERATURE: 97.1 F | WEIGHT: 213.9 LBS | BODY MASS INDEX: 31.13 KG/M2 | SYSTOLIC BLOOD PRESSURE: 106 MMHG | OXYGEN SATURATION: 97 % | DIASTOLIC BLOOD PRESSURE: 66 MMHG | HEART RATE: 79 BPM | RESPIRATION RATE: 16 BRPM

## 2020-03-12 DIAGNOSIS — C92.01 AML (ACUTE MYELOID LEUKEMIA) IN REMISSION (H): ICD-10-CM

## 2020-03-12 LAB
ALBUMIN SERPL-MCNC: 3.8 G/DL (ref 3.4–5)
ALP SERPL-CCNC: 73 U/L (ref 40–150)
ALT SERPL W P-5'-P-CCNC: 29 U/L (ref 0–70)
ANION GAP SERPL CALCULATED.3IONS-SCNC: 6 MMOL/L (ref 3–14)
AST SERPL W P-5'-P-CCNC: 21 U/L (ref 0–45)
BASOPHILS # BLD AUTO: 0 10E9/L (ref 0–0.2)
BASOPHILS NFR BLD AUTO: 0.4 %
BILIRUB SERPL-MCNC: 0.5 MG/DL (ref 0.2–1.3)
BUN SERPL-MCNC: 13 MG/DL (ref 7–30)
CALCIUM SERPL-MCNC: 8.6 MG/DL (ref 8.5–10.1)
CHLORIDE SERPL-SCNC: 110 MMOL/L (ref 94–109)
CO2 SERPL-SCNC: 24 MMOL/L (ref 20–32)
CREAT SERPL-MCNC: 0.84 MG/DL (ref 0.66–1.25)
DIFFERENTIAL METHOD BLD: ABNORMAL
EOSINOPHIL # BLD AUTO: 0.1 10E9/L (ref 0–0.7)
EOSINOPHIL NFR BLD AUTO: 1.3 %
ERYTHROCYTE [DISTWIDTH] IN BLOOD BY AUTOMATED COUNT: 12.1 % (ref 10–15)
GFR SERPL CREATININE-BSD FRML MDRD: >90 ML/MIN/{1.73_M2}
GLUCOSE SERPL-MCNC: 106 MG/DL (ref 70–99)
HCT VFR BLD AUTO: 37.8 % (ref 40–53)
HGB BLD-MCNC: 13 G/DL (ref 13.3–17.7)
IMM GRANULOCYTES # BLD: 0 10E9/L (ref 0–0.4)
IMM GRANULOCYTES NFR BLD: 0.4 %
LYMPHOCYTES # BLD AUTO: 1.4 10E9/L (ref 0.8–5.3)
LYMPHOCYTES NFR BLD AUTO: 19.5 %
MCH RBC QN AUTO: 35.7 PG (ref 26.5–33)
MCHC RBC AUTO-ENTMCNC: 34.4 G/DL (ref 31.5–36.5)
MCV RBC AUTO: 104 FL (ref 78–100)
MONOCYTES # BLD AUTO: 0.8 10E9/L (ref 0–1.3)
MONOCYTES NFR BLD AUTO: 11.4 %
NEUTROPHILS # BLD AUTO: 4.7 10E9/L (ref 1.6–8.3)
NEUTROPHILS NFR BLD AUTO: 67 %
NRBC # BLD AUTO: 0 10*3/UL
NRBC BLD AUTO-RTO: 0 /100
PLATELET # BLD AUTO: 177 10E9/L (ref 150–450)
POTASSIUM SERPL-SCNC: 4.2 MMOL/L (ref 3.4–5.3)
PROT SERPL-MCNC: 6.6 G/DL (ref 6.8–8.8)
RBC # BLD AUTO: 3.64 10E12/L (ref 4.4–5.9)
SODIUM SERPL-SCNC: 140 MMOL/L (ref 133–144)
WBC # BLD AUTO: 7 10E9/L (ref 4–11)

## 2020-03-12 PROCEDURE — 80053 COMPREHEN METABOLIC PANEL: CPT | Performed by: PHYSICIAN ASSISTANT

## 2020-03-12 PROCEDURE — 85025 COMPLETE CBC W/AUTO DIFF WBC: CPT | Performed by: PHYSICIAN ASSISTANT

## 2020-03-12 PROCEDURE — 36415 COLL VENOUS BLD VENIPUNCTURE: CPT

## 2020-03-12 PROCEDURE — G0463 HOSPITAL OUTPT CLINIC VISIT: HCPCS | Mod: ZF

## 2020-03-12 ASSESSMENT — PAIN SCALES - GENERAL: PAINLEVEL: NO PAIN (0)

## 2020-03-12 NOTE — NURSING NOTE
"Oncology Rooming Note    March 12, 2020 9:28 AM   El Ace is a 64 year old male who presents for:    Chief Complaint   Patient presents with     Blood Draw     labs drawn via vpt by rn. vs taken      RECHECK     Return: AML      Initial Vitals: /66 (BP Location: Right arm, Patient Position: Sitting, Cuff Size: Adult Regular)   Pulse 79   Temp 97.1  F (36.2  C) (Oral)   Resp 16   Wt 97 kg (213 lb 14.4 oz)   SpO2 97%   BMI 31.13 kg/m   Estimated body mass index is 31.13 kg/m  as calculated from the following:    Height as of 12/12/19: 1.765 m (5' 9.5\").    Weight as of this encounter: 97 kg (213 lb 14.4 oz). Body surface area is 2.18 meters squared.  No Pain (0) Comment: Data Unavailable   No LMP for male patient.  Allergies reviewed: Yes  Medications reviewed: Yes    Medications: Medication refills not needed today.  Pharmacy name entered into [employer]:    [facility] - [facility]  [facility] [place], [place] - [address]  [facility] [place], [place] - [address] [place] [address] PHARMACY [id number] [place], [place] - [address]    Clinical concerns: None       Dayanara Morfin CMA              "

## 2020-04-10 ENCOUNTER — VIRTUAL VISIT (OUTPATIENT)
Dept: RHEUMATOLOGY | Facility: CLINIC | Age: 65
End: 2020-04-10
Attending: INTERNAL MEDICINE
Payer: COMMERCIAL

## 2020-04-10 DIAGNOSIS — Z87.39 HISTORY OF CALCIUM PYROPHOSPHATE DEPOSITION DISEASE (CPPD): Primary | ICD-10-CM

## 2020-04-10 ASSESSMENT — PAIN SCALES - GENERAL: PAINLEVEL: NO PAIN (0)

## 2020-04-10 NOTE — PATIENT INSTRUCTIONS
Diagnosis:  1. Pseudogout: controlled.    Plan:  Continue expectant monitoring; 4 day course of low dose prednisone (20 mg per day) would be appropriate for recurrent symptoms.

## 2020-04-10 NOTE — PROGRESS NOTES
"El Ace is a 64 year old male who is being evaluated via a billable video visit.      The patient has been notified of following:     \"This video visit will be conducted via a call between you and your physician/provider. We have found that certain health care needs can be provided without the need for an in-person physical exam.  This service lets us provide the care you need with a video conversation.  If a prescription is necessary we can send it directly to your pharmacy.  If lab work is needed we can place an order for that and you can then stop by our lab to have the test done at a later time.    Video visits are billed at different rates depending on your insurance coverage.  Please reach out to your insurance provider with any questions.    If during the course of the call the physician/provider feels a video visit is not appropriate, you will not be charged for this service.\"    Patient has given verbal consent for Video visit? Yes    How would you like to obtain your AVS? [facility]    Patient would like the video invitation sent by: Text to cell phone: [phone]      Video Start Time: 12:38 PM    El Ace complains of    Chief Complaint   Patient presents with     Video Visit     Primary osteoarthritis of left hip       I have reviewed and updated the patient's Past Medical History, Social History, Family History and Medication List.    ALLERGIES  Polymyxin b; Lactose; Vancomycin; and Vancomycin        Video-Visit Details    Type of service:  Video Visit    Video End Time (time video stopped): duration 19 minutes    Originating Location (pt. Location): Home    Distant Location (provider location):  [facility] RHEUMATOLOGY     Mode of Communication:  Video Conference via [facility]      Jaziel Olivier MD    [facility]  Rheumatology Clinic  Jaziel Olivier MD--televisit  04/10/2020     Name: El Ace  MRN: [id number]  Age: 64 year old  : 1955  Referring provider: Bre RAMIREZ" "[place]     Assessment and Plan:  # CPPD/pseudogout diagnosed summer 2019 with \"crowned dens\" and left wrist synovitis:  Patient relates no recurrence of episodic inflammatory oligoarthritis.Blood work from March 12, 2020 revealed electrolytes, creatinine, transaminases, and complete blood count all normal or negative    Pseudogout is quiescent. We discussed the unpredictable nature of pseudogout and the propensity to flare inflammatory joints such as the wrists, elbows, shoulders, knees, and ankles. I recommend the patient keep on hand a course of prednisone that he may use for abortive therapy at the first sign of recurrent inflammatory arthritis in one of those joints. He may then complete a 4-day course of prednisone with 2 days 20mg daily and 15mg for 2 days then off. I strongly urged him to contact our office at the same time as he begins a course of prednisone to confirm the nature of symptoms and arthritis. Otherwise, no chronic therapy will be needed for management of pseudogout.    # Acute myeloid leukemia s/p allogeneic BMT 2019: followed by       Orders:  - predniSONE (DELTASONE) 5 MG tablet  Dispense: 15 tablet; Refill: 2        Follow-up: No follow-ups on file.   HPI: Mr Ace is a 63 yo man with history of AML s/p BMT and CPPD who presents for follow-up.   Interval history 4-10-20:    He has not had recurrence of severe joint pain similar to that which he had at the time of hospitalization in Fall 2019. He does have knee pain with prolonged exertion (> 3 mile walks some days). No recurrence of the left wrist pain that formerly plagued him, in recent months after an epidose of L wrist pain several months ago. Attempts at joint aspiration failed on that occasion; he had improvement after brief exposure to prednisone. He has used no prednisone since then.  Patient saw oncology in late March 2020 in follow-up of acute myelogenous leukemia.  Patient was judged to be in remission at day 285 status " post hematopoietic cell 7 stem cell transplant.    Prior history 1-20:    Today, he reports no recent pseudogout flares or joint pain. Left wrist pain has improved and he does not localize any pinpoint pain. He does report worsening symptoms of headaches that keep him awake at night. Localizes headaches on the posterior aspect of his head. Spinal tap was performed one week ago. He also reports symptoms of fatigue. He does report some foot pain localized near the instep that occurs while walking. Otherwise, he has no other concerns.    Review of Systems:   Pertinent items are noted in HPI or as below, remainder of complete ROS is negative.      No recent problems with hearing or vision. No swallowing problems.   No breathing difficulty, shortness of breath, coughing, or wheezing  No chest pain or palpitations  No heart burn, indigestion, abdominal pain, nausea, vomiting, diarrhea  No urination problems, no bloody, cloudy urine, no dysuria  No numbing, tingling, weakness  No headaches or confusion  No rashes. No easy bleeding or bruising.     Active Medications:   Current Outpatient Medications:      acetaminophen (TYLENOL) 325 MG tablet, Take 2 tablets (650 mg) by mouth every 4 hours as needed for mild pain or fever (pre-med before blood products), Disp: , Rfl:      acyclovir (ZOVIRAX) 800 MG tablet, Take 1 tablet (800 mg) by mouth 5 times daily, Disp: 150 tablet, Rfl: 3     cyclobenzaprine (FLEXERIL) 5 MG tablet, Take 2 tablets (10 mg) by mouth 3 times daily as needed for muscle spasms, Disp: 30 tablet, Rfl: 0     magnesium oxide (MAG-OX) 400 MG tablet, Take 3 tablets (1,200 mg) by mouth 2 times daily, Disp: 180 tablet, Rfl: 1     melatonin 3 MG tablet, Take 1-2 tablets (3-6 mg) by mouth nightly as needed for sleep, Disp: 60 tablet, Rfl: 1     ondansetron (ZOFRAN-ODT) 8 MG ODT tab, Take 1 tablet (8 mg) by mouth every 8 hours, Disp: 100 tablet, Rfl: 0     oxyCODONE (ROXICODONE) 5 MG tablet, Take 1 tablet (5 mg) by  mouth every 6 hours as needed for severe pain, Disp: 30 tablet, Rfl: 0     pramox-pe-glycerin-petrolatum (PREPARATION H) 1-0.25-14.4-15 % CREA cream, Place rectally 3 times daily as needed for hemorrhoids, Disp: , Rfl:      predniSONE (DELTASONE) 5 MG tablet, At the first sign of pseudogout attack, use 4 tabs. Take 4 tabs for 2 day, then 3 tabs for 2 days, then off., Disp: 15 tablet, Rfl: 2     prochlorperazine (COMPAZINE) 5 MG tablet, Take 1-2 tablets (5-10 mg) by mouth every 6 hours as needed for nausea or vomiting, Disp: 30 tablet, Rfl: 1     sulfamethoxazole-trimethoprim (BACTRIM DS/SEPTRA DS) 800-160 MG tablet, Take 1 tablet by mouth Every Mon, Tues two times daily Start taking after day+28 (7/1) and instructed to so by BMT clinic., Disp: 16 tablet, Rfl: 1     tacrolimus (GENERIC EQUIVALENT) 1 mg/mL suspension, Take by mouth as directed according to taper calendar (Patient taking differently: Take by mouth as directed according to taper calendar. As of 10/11/19 0.8 in AM, and 0.9 in PM), Disp: 100 mL, Rfl: 0     tamsulosin (FLOMAX) 0.4 MG capsule, Take 1 capsule (0.4 mg) by mouth daily, Disp: 30 capsule, Rfl: 11     triamcinolone (KENALOG) 0.1 % external cream, Apply topically 2 times daily (Patient taking differently: Apply topically as needed ), Disp: 453 g, Rfl: 1     voriconazole (VFEND) 50 MG tablet, Take 3 tablets (150 mg) by mouth every 12 hours, Disp: 180 tablet, Rfl: 1     tacrolimus (GENERIC EQUIVALENT) 0.5 MG capsule, Take 2 capsules (1 mg) by mouth 2 times daily (Patient not taking: Reported on 10/11/2019), Disp: 60 capsule, Rfl: 3     triamcinolone (KENALOG) 0.1 % external ointment, Apply topically 2 times daily (Patient not taking: Reported on 10/11/2019), Disp: 80 g, Rfl: 0    Current Facility-Administered Medications:      lidocaine (PF) (XYLOCAINE) 1 % injection 3 mL, 3 mL, , , Emiliano Dumont MD, 3 mL at 08/12/19 2019     zoster vaccine recombinant adjuvanted (SHINGRIX) injection 0.5  mL, 0.5 mL, Intramuscular, Once, Jaziel Olivier MD      Allergies:   Polymyxin b; Lactose; Vancomycin; and Vancomycin      Past Medical History:  Acute leukemia  Acute myeloid leukemia in remission  Septic shock  Primary osteoarthritis of left hip  Generalized anxiety disorder  Family history of malignant neoplasm of gastrointestinal tract  Esophageal reflux  Dysthymic disorder  Attention deficit hyperactivity disorder (ADHD)  Neutropenia with fever  AML (acute myeloid leukemia) in remission  History of allogeneic stem cell transplant     Past Surgical History:  Knee arthroscopy; 1995  Bilateral carpal tunnel release; left in 2000, right in 2014  Colonoscopy; [id number]  IR lumbar puncture; 6/24/2019  Laminectomy; 1975  Picc insertion; 3/14/2019  Vasectomy; 2002    Family History:   Mother: Colon cancer, cerebrovascular disease, diabetes  Father: Septicemia      Social History:   Former smoker  No current alcohol use     Physical Exam:   There were no vitals taken for this visit.   Wt Readings from Last 4 Encounters:   03/12/20 97 kg (213 lb 14.4 oz)   01/30/20 93.8 kg (206 lb 14.4 oz)   01/02/20 95.5 kg (210 lb 9.6 oz)   12/12/19 91.8 kg (202 lb 6.4 oz)     Constitutional: Well-developed, appearing stated age; cooperative  Eyes: Normal EOM, PERRLA, vision, conjunctiva, sclera  ENT: Normal external ears, nose, hearing, lips, teeth, gums  Psych: Normal judgement, orientation, memory, affect.     Laboratory:   RHEUM RESULTS Latest Ref Rng & Units 1/2/2020 1/30/2020 3/12/2020   SED RATE 0 - 20 mm/h - - -   CRP, INFLAMMATION 0.0 - 8.0 mg/L - - -   CK TOTAL 30 - 300 U/L - - -   AST 0 - 45 U/L 20 17 21   ALT 0 - 70 U/L 30 33 29   ALBUMIN 3.4 - 5.0 g/dL 3.6 3.6 3.8   WBC 4.0 - 11.0 10e9/L 6.6 6.0 7.0   RBC 4.4 - 5.9 10e12/L 3.30(L) 3.60(L) 3.64(L)   HGB 13.3 - 17.7 g/dL 12.0(L) 13.0(L) 13.0(L)   HCT 40.0 - 53.0 % 35.0(L) 37.8(L) 37.8(L)   MCV 78 - 100 fl 106(H) 105(H) 104(H)   MCHC 31.5 - 36.5 g/dL 34.3 34.4 34.4   RDW  10.0 - 15.0 % 12.1 12.1 12.1    - 450 10e9/L 179 184 177   CREATININE 0.66 - 1.25 mg/dL 0.92 0.86 0.84   GFR ESTIMATE, IF BLACK >60 mL/min/[1.73:m2] >90 >90 >90   GFR ESTIMATE >60 mL/min/[1.73:m2] 86 >90 >90   HEPATITIS C ANTIBODY NR:Nonreactive - - -         Hepatitis B Core Olinda   Date Value Ref Range Status   05/10/2019 Nonreactive NR^Nonreactive Final     Hep B Surface Agn   Date Value Ref Range Status   05/10/2019 Nonreactive NR^Nonreactive Final

## 2020-04-15 VITALS
HEART RATE: 84 BPM | DIASTOLIC BLOOD PRESSURE: 84 MMHG | OXYGEN SATURATION: 99 % | SYSTOLIC BLOOD PRESSURE: 120 MMHG | RESPIRATION RATE: 16 BRPM | TEMPERATURE: 97.9 F

## 2020-04-15 DIAGNOSIS — C92.01 AML (ACUTE MYELOID LEUKEMIA) IN REMISSION (H): ICD-10-CM

## 2020-04-15 LAB
ALBUMIN SERPL-MCNC: 3.7 G/DL (ref 3.4–5)
ALP SERPL-CCNC: 76 U/L (ref 40–150)
ALT SERPL W P-5'-P-CCNC: 32 U/L (ref 0–70)
ANION GAP SERPL CALCULATED.3IONS-SCNC: 6 MMOL/L (ref 3–14)
AST SERPL W P-5'-P-CCNC: 23 U/L (ref 0–45)
BASOPHILS # BLD AUTO: 0 10E9/L (ref 0–0.2)
BASOPHILS NFR BLD AUTO: 0.4 %
BILIRUB SERPL-MCNC: 0.4 MG/DL (ref 0.2–1.3)
BUN SERPL-MCNC: 13 MG/DL (ref 7–30)
CALCIUM SERPL-MCNC: 8.7 MG/DL (ref 8.5–10.1)
CHLORIDE SERPL-SCNC: 110 MMOL/L (ref 94–109)
CO2 SERPL-SCNC: 23 MMOL/L (ref 20–32)
CREAT SERPL-MCNC: 0.94 MG/DL (ref 0.66–1.25)
DIFFERENTIAL METHOD BLD: ABNORMAL
EOSINOPHIL # BLD AUTO: 0.1 10E9/L (ref 0–0.7)
EOSINOPHIL NFR BLD AUTO: 0.8 %
ERYTHROCYTE [DISTWIDTH] IN BLOOD BY AUTOMATED COUNT: 12.2 % (ref 10–15)
GFR SERPL CREATININE-BSD FRML MDRD: 85 ML/MIN/{1.73_M2}
GLUCOSE SERPL-MCNC: 111 MG/DL (ref 70–99)
HCT VFR BLD AUTO: 38.7 % (ref 40–53)
HGB BLD-MCNC: 13.3 G/DL (ref 13.3–17.7)
IMM GRANULOCYTES # BLD: 0 10E9/L (ref 0–0.4)
IMM GRANULOCYTES NFR BLD: 0.3 %
LYMPHOCYTES # BLD AUTO: 1.7 10E9/L (ref 0.8–5.3)
LYMPHOCYTES NFR BLD AUTO: 22.5 %
MCH RBC QN AUTO: 35.7 PG (ref 26.5–33)
MCHC RBC AUTO-ENTMCNC: 34.4 G/DL (ref 31.5–36.5)
MCV RBC AUTO: 104 FL (ref 78–100)
MONOCYTES # BLD AUTO: 0.7 10E9/L (ref 0–1.3)
MONOCYTES NFR BLD AUTO: 9.3 %
NEUTROPHILS # BLD AUTO: 5.1 10E9/L (ref 1.6–8.3)
NEUTROPHILS NFR BLD AUTO: 66.7 %
NRBC # BLD AUTO: 0 10*3/UL
NRBC BLD AUTO-RTO: 0 /100
PLATELET # BLD AUTO: 220 10E9/L (ref 150–450)
POTASSIUM SERPL-SCNC: 4.2 MMOL/L (ref 3.4–5.3)
PROT SERPL-MCNC: 7 G/DL (ref 6.8–8.8)
RBC # BLD AUTO: 3.73 10E12/L (ref 4.4–5.9)
SODIUM SERPL-SCNC: 138 MMOL/L (ref 133–144)
WBC # BLD AUTO: 7.6 10E9/L (ref 4–11)

## 2020-04-15 PROCEDURE — 80053 COMPREHEN METABOLIC PANEL: CPT | Performed by: PHYSICIAN ASSISTANT

## 2020-04-15 PROCEDURE — 85025 COMPLETE CBC W/AUTO DIFF WBC: CPT | Performed by: PHYSICIAN ASSISTANT

## 2020-04-15 ASSESSMENT — PAIN SCALES - GENERAL: PAINLEVEL: NO PAIN (0)

## 2020-04-15 NOTE — NURSING NOTE
Chief Complaint   Patient presents with     Blood Draw     Labs drawn via  by RN in lab. VS taken.     Connie Lewis RN

## 2020-04-15 NOTE — PROGRESS NOTES
"El Ace is a 64 year old male who is being evaluated via a billable video visit.      The patient has been notified of following:     \"This video visit will be conducted via a call between you and your physician/provider. We have found that certain health care needs can be provided without the need for an in-person physical exam.  This service lets us provide the care you need with a video conversation.  If a prescription is necessary we can send it directly to your pharmacy.  If lab work is needed we can place an order for that and you can then stop by our lab to have the test done at a later time.    Video visits are billed at different rates depending on your insurance coverage.  Please reach out to your insurance provider with any questions.    If during the course of the call the physician/provider feels a video visit is not appropriate, you will not be charged for this service.\"    Patient has given verbal consent for Video visit? Yes    How would you like to obtain your AVS? [facility]    Diagnosis:   1. 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     Treatments:   1. 7+3 (Dauno), 3/15/19, no response, Day 12 persistent disease with near packed marrow  2. Dec 10d+Ric 3/26/19, CR1 on 4/23/19 but with pos mutations (IDH1 17%, RUNX1 8%)  3. 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   4. Maintenance ALT-803 study, 1 dose, stopped due to profound fatigue, moderate to severe hypotension, and fever    PMH: HLP, BPH, DJD, RLS    Interval history: Some itch, one episode of BRBPR for 2 days and resolvwed without intervention. 10-point ROS otherwise negative.      Lab Results   Component Value Date    WBC 7.0 03/12/2020    HGB 13.0 (L) 03/12/2020    HCT 37.8 (L) 03/12/2020     03/12/2020     03/12/2020    POTASSIUM 4.2 03/12/2020    CHLORIDE 110 (H) 03/12/2020    CO2 24 03/12/2020    BUN 13 " 03/12/2020    CR 0.84 03/12/2020     (H) 03/12/2020    SED 88 (H) 06/25/2019    NTBNPI 2,031 (H) 04/03/2019    TROPI <0.015 04/23/2019    AST 21 03/12/2020    ALT 29 03/12/2020    ALKPHOS 73 03/12/2020    BILITOTAL 0.5 03/12/2020    INR 1.00 09/26/2019     Current Outpatient Medications   Medication     acetaminophen (TYLENOL) 325 MG tablet     acyclovir (ZOVIRAX) 800 MG tablet     cyclobenzaprine (FLEXERIL) 5 MG tablet     fluconazole (DIFLUCAN) 100 MG tablet     melatonin 3 MG tablet     ondansetron (ZOFRAN-ODT) 8 MG ODT tab     pramox-pe-glycerin-petrolatum (PREPARATION H) 1-0.25-14.4-15 % CREA cream     prochlorperazine (COMPAZINE) 5 MG tablet     sulfamethoxazole-trimethoprim (BACTRIM DS/SEPTRA DS) 800-160 MG tablet     tamsulosin (FLOMAX) 0.4 MG capsule     triamcinolone (KENALOG) 0.1 % external cream     No current facility-administered medications for this visit.      A&P:   1. AML: day +321 haplo, CR. Anniversary next visit. Then every 2 months here until 2y.    2. ID: acyclovir, vori, bactrim. Vaccines next time.    3. GVHD:  None, off IST.   4. Recurrent pseudogout: Observation. Pred pulse like last time if flares; no colchicine (allergic).

## 2020-04-16 ENCOUNTER — VIRTUAL VISIT (OUTPATIENT)
Dept: TRANSPLANT | Facility: CLINIC | Age: 65
End: 2020-04-16
Attending: PHYSICIAN ASSISTANT
Payer: COMMERCIAL

## 2020-04-16 DIAGNOSIS — C92.01 AML (ACUTE MYELOID LEUKEMIA) IN REMISSION (H): Primary | ICD-10-CM

## 2020-05-14 DIAGNOSIS — C92.01 AML (ACUTE MYELOID LEUKEMIA) IN REMISSION (H): ICD-10-CM

## 2020-05-14 DIAGNOSIS — Z94.84 HISTORY OF ALLOGENEIC STEM CELL TRANSPLANT (H): ICD-10-CM

## 2020-05-14 RX ORDER — FLUCONAZOLE 100 MG/1
100 TABLET ORAL DAILY
Qty: 30 TABLET | Refills: 3 | Status: SHIPPED | OUTPATIENT
Start: 2020-05-14 | End: 2020-05-28

## 2020-05-26 ENCOUNTER — OFFICE VISIT (OUTPATIENT)
Dept: TRANSPLANT | Facility: CLINIC | Age: 65
End: 2020-05-26
Attending: PHYSICIAN ASSISTANT
Payer: COMMERCIAL

## 2020-05-26 VITALS
RESPIRATION RATE: 18 BRPM | BODY MASS INDEX: 31.29 KG/M2 | OXYGEN SATURATION: 99 % | HEART RATE: 60 BPM | WEIGHT: 215 LBS | SYSTOLIC BLOOD PRESSURE: 124 MMHG | DIASTOLIC BLOOD PRESSURE: 74 MMHG

## 2020-05-26 DIAGNOSIS — C92.01 ACUTE MYELOID LEUKEMIA IN REMISSION (H): Primary | ICD-10-CM

## 2020-05-26 DIAGNOSIS — C92.01 AML (ACUTE MYELOID LEUKEMIA) IN REMISSION (H): ICD-10-CM

## 2020-05-26 LAB
ALBUMIN SERPL-MCNC: 3.8 G/DL (ref 3.4–5)
ALP SERPL-CCNC: 78 U/L (ref 40–150)
ALT SERPL W P-5'-P-CCNC: 30 U/L (ref 0–70)
ANION GAP SERPL CALCULATED.3IONS-SCNC: 8 MMOL/L (ref 3–14)
AST SERPL W P-5'-P-CCNC: 18 U/L (ref 0–45)
BASOPHILS # BLD AUTO: 0 10E9/L (ref 0–0.2)
BASOPHILS NFR BLD AUTO: 0.5 %
BILIRUB SERPL-MCNC: 0.4 MG/DL (ref 0.2–1.3)
BUN SERPL-MCNC: 10 MG/DL (ref 7–30)
CALCIUM SERPL-MCNC: 8.8 MG/DL (ref 8.5–10.1)
CHLORIDE SERPL-SCNC: 110 MMOL/L (ref 94–109)
CO2 SERPL-SCNC: 23 MMOL/L (ref 20–32)
COPATH REPORT: NORMAL
CREAT SERPL-MCNC: 0.9 MG/DL (ref 0.66–1.25)
DIFFERENTIAL METHOD BLD: ABNORMAL
EOSINOPHIL # BLD AUTO: 0.1 10E9/L (ref 0–0.7)
EOSINOPHIL NFR BLD AUTO: 1 %
ERYTHROCYTE [DISTWIDTH] IN BLOOD BY AUTOMATED COUNT: 12.1 % (ref 10–15)
GFR SERPL CREATININE-BSD FRML MDRD: 89 ML/MIN/{1.73_M2}
GLUCOSE SERPL-MCNC: 96 MG/DL (ref 70–99)
HCT VFR BLD AUTO: 38.5 % (ref 40–53)
HGB BLD-MCNC: 13.3 G/DL (ref 13.3–17.7)
IMM GRANULOCYTES # BLD: 0 10E9/L (ref 0–0.4)
IMM GRANULOCYTES NFR BLD: 0.5 %
LYMPHOCYTES # BLD AUTO: 1.5 10E9/L (ref 0.8–5.3)
LYMPHOCYTES NFR BLD AUTO: 23.8 %
MCH RBC QN AUTO: 35.8 PG (ref 26.5–33)
MCHC RBC AUTO-ENTMCNC: 34.5 G/DL (ref 31.5–36.5)
MCV RBC AUTO: 104 FL (ref 78–100)
MONOCYTES # BLD AUTO: 0.6 10E9/L (ref 0–1.3)
MONOCYTES NFR BLD AUTO: 10.3 %
NEUTROPHILS # BLD AUTO: 3.9 10E9/L (ref 1.6–8.3)
NEUTROPHILS NFR BLD AUTO: 63.9 %
NRBC # BLD AUTO: 0 10*3/UL
NRBC BLD AUTO-RTO: 0 /100
PLATELET # BLD AUTO: 191 10E9/L (ref 150–450)
POTASSIUM SERPL-SCNC: 4.2 MMOL/L (ref 3.4–5.3)
PROT SERPL-MCNC: 7 G/DL (ref 6.8–8.8)
RBC # BLD AUTO: 3.72 10E12/L (ref 4.4–5.9)
SODIUM SERPL-SCNC: 141 MMOL/L (ref 133–144)
WBC # BLD AUTO: 6.1 10E9/L (ref 4–11)

## 2020-05-26 PROCEDURE — 88184 FLOWCYTOMETRY/ TC 1 MARKER: CPT | Performed by: INTERNAL MEDICINE

## 2020-05-26 PROCEDURE — 88275 CYTOGENETICS 100-300: CPT | Performed by: INTERNAL MEDICINE

## 2020-05-26 PROCEDURE — 40001005 ZZHCL STATISTIC FLOW >15 ABY TC 88189: Performed by: INTERNAL MEDICINE

## 2020-05-26 PROCEDURE — 88161 CYTOPATH SMEAR OTHER SOURCE: CPT | Performed by: INTERNAL MEDICINE

## 2020-05-26 PROCEDURE — 81121 IDH2 COMMON VARIANTS: CPT | Performed by: INTERNAL MEDICINE

## 2020-05-26 PROCEDURE — 38222 DX BONE MARROW BX & ASPIR: CPT | Mod: ZF

## 2020-05-26 PROCEDURE — 88237 TISSUE CULTURE BONE MARROW: CPT | Performed by: INTERNAL MEDICINE

## 2020-05-26 PROCEDURE — 88305 TISSUE EXAM BY PATHOLOGIST: CPT | Performed by: INTERNAL MEDICINE

## 2020-05-26 PROCEDURE — 88280 CHROMOSOME KARYOTYPE STUDY: CPT | Performed by: INTERNAL MEDICINE

## 2020-05-26 PROCEDURE — 81120 IDH1 COMMON VARIANTS: CPT | Performed by: INTERNAL MEDICINE

## 2020-05-26 PROCEDURE — 40000951 ZZHCL STATISTIC BONE MARROW INTERP TC 85097: Performed by: INTERNAL MEDICINE

## 2020-05-26 PROCEDURE — 88264 CHROMOSOME ANALYSIS 20-25: CPT | Performed by: INTERNAL MEDICINE

## 2020-05-26 PROCEDURE — 80053 COMPREHEN METABOLIC PANEL: CPT | Performed by: INTERNAL MEDICINE

## 2020-05-26 PROCEDURE — 88311 DECALCIFY TISSUE: CPT | Performed by: INTERNAL MEDICINE

## 2020-05-26 PROCEDURE — 00000161 ZZHCL STATISTIC H-SPHEME PROCESS B/S: Performed by: INTERNAL MEDICINE

## 2020-05-26 PROCEDURE — 85025 COMPLETE CBC W/AUTO DIFF WBC: CPT | Performed by: INTERNAL MEDICINE

## 2020-05-26 PROCEDURE — 40000611 ZZHCL STATISTIC MORPHOLOGY W/INTERP HEMEPATH TC 85060: Performed by: INTERNAL MEDICINE

## 2020-05-26 PROCEDURE — 88185 FLOWCYTOMETRY/TC ADD-ON: CPT | Performed by: INTERNAL MEDICINE

## 2020-05-26 PROCEDURE — 81267 CHIMERISM ANAL NO CELL SELEC: CPT | Performed by: INTERNAL MEDICINE

## 2020-05-26 PROCEDURE — 88271 CYTOGENETICS DNA PROBE: CPT | Performed by: INTERNAL MEDICINE

## 2020-05-26 RX ORDER — ROPINIROLE 0.25 MG/1
TABLET, FILM COATED ORAL
COMMUNITY
Start: 2020-04-14

## 2020-05-26 ASSESSMENT — PAIN SCALES - GENERAL: PAINLEVEL: NO PAIN (0)

## 2020-05-26 NOTE — PROGRESS NOTES
BMT Teaching Flowsheet        Teaching Topic: bmbx    Person(s) involved in teaching: Patient  Motivation Level  Asks Questions: Yes  Eager to Learn: Yes  Cooperative: Yes  Receptive (willing/able to accept information): Yes  Any cultural factors/[employer] beliefs that may influence understanding or compliance? No    Patient demonstrates understanding of the following:  - Reason for the appointment, diagnosis and treatment plan: Yes  - Knowledge of proper use of medications and conditions for which they are ordered (with special attention to potential side effects or drug interactions): Yes  - Which situations necessitate calling provider and whom to contact: Yes    Teaching concerns addressed: activity level, pain management, site care      Time spent with patient: 30 minutes.    Specific Concerns: No, explain: Patient did not received Versed.  Tolerated well.  Patient had no complaints of pain post procedure.  Dressing is clean, dry, and intact.  Vital signs are stable.

## 2020-05-26 NOTE — LETTER
5/26/2020         RE: El Ace  [address] [facility] [id number]        Dear Colleague,    Thank you for referring your patient, El Ace, to the [facility]. Please see a copy of my visit note below.    BMT Teaching Flowsheet        Teaching Topic: bmbx    Person(s) involved in teaching: Patient  Motivation Level  Asks Questions: Yes  Eager to Learn: Yes  Cooperative: Yes  Receptive (willing/able to accept information): Yes  Any cultural factors/Worship beliefs that may influence understanding or compliance? No    Patient demonstrates understanding of the following:  - Reason for the appointment, diagnosis and treatment plan: Yes  - Knowledge of proper use of medications and conditions for which they are ordered (with special attention to potential side effects or drug interactions): Yes  - Which situations necessitate calling provider and whom to contact: Yes    Teaching concerns addressed: activity level, pain management, site care      Time spent with patient: 30 minutes.    Specific Concerns: No, explain: Patient did not received Versed.  Tolerated well.  Patient had no complaints of pain post procedure.  Dressing is clean, dry, and intact.  Vital signs are stable.      BMT ONC Adult Bone Marrow Biopsy Procedure Note  May 26, 2020  /74   Pulse 60   Resp 18   Wt 97.5 kg (215 lb)   SpO2 99%   BMI 31.29 kg/m       Learning needs assessment complete within 12 months? YES    DIAGNOSIS: AML    PROCEDURE: Unilateral Bone Marrow Biopsy and Unilateral Aspirate    LOCATION: [facility] [place] 2nd [facility]    Patient s identification was positively verified by verbal identification and invasive procedure safety checklist was completed. Informed consent was obtained. Following the administration of no as pre-medication, patient was placed in the prone position and prepped and draped in a sterile manner. Approximately 15 cc of 1% Lidocaine was used over the right posterior iliac  spine. Following this a 3 mm incision was made. Trephine bone marrow core(s) was (were) obtained from the [facility]. Bone marrow aspirates were obtained from the [facility]. Aspirates were sent for morphology, immunophenotyping, cytogenetics, molecular diagnostics RFLP and research studies. A total of approximately 50 ml of marrow was aspirated. Following this procedure a sterile dressing was applied to the bone marrow biopsy site(s). The patient was placed in the supine position to maintain pressure on the biopsy site. Post-procedure wound care instructions were given.     Complications: NO    Pre-procedural pain: 0 out of 10 on the numeric pain rating scale.     Procedural pain: 0 out of 10 on the numeric pain rating scale.     Post-procedural pain assessment: 0 out of 10 on the numeric pain rating scale.     Interventions: NO    Length of procedure:20 minutes or less      Procedure performed by: Sima Watson NP      Again, thank you for allowing me to participate in the care of your patient.        Sincerely,        [facility]

## 2020-05-26 NOTE — PROGRESS NOTES
BMT ONC Adult Bone Marrow Biopsy Procedure Note  May 26, 2020  /74   Pulse 60   Resp 18   Wt 97.5 kg (215 lb)   SpO2 99%   BMI 31.29 kg/m       Learning needs assessment complete within 12 months? YES    DIAGNOSIS: AML    PROCEDURE: Unilateral Bone Marrow Biopsy and Unilateral Aspirate    LOCATION: [facility] [place] 2nd Floor    Patient s identification was positively verified by verbal identification and invasive procedure safety checklist was completed. Informed consent was obtained. Following the administration of no as pre-medication, patient was placed in the prone position and prepped and draped in a sterile manner. Approximately 15 cc of 1% Lidocaine was used over the right posterior iliac spine. Following this a 3 mm incision was made. Trephine bone marrow core(s) was (were) obtained from the [facility]. Bone marrow aspirates were obtained from the [facility]. Aspirates were sent for morphology, immunophenotyping, cytogenetics, molecular diagnostics RFLP and research studies. A total of approximately 50 ml of marrow was aspirated. Following this procedure a sterile dressing was applied to the bone marrow biopsy site(s). The patient was placed in the supine position to maintain pressure on the biopsy site. Post-procedure wound care instructions were given.     Complications: NO    Pre-procedural pain: 0 out of 10 on the numeric pain rating scale.     Procedural pain: 0 out of 10 on the numeric pain rating scale.     Post-procedural pain assessment: 0 out of 10 on the numeric pain rating scale.     Interventions: NO    Length of procedure:20 minutes or less      Procedure performed by: Sima Watson NP     08-Apr-2020

## 2020-05-27 LAB — COPATH REPORT: NORMAL

## 2020-05-27 NOTE — PROGRESS NOTES
"El Ace is a 64 year old male who is being evaluated via a billable video visit.      The patient has been notified of following:     \"This video visit will be conducted via a call between you and your physician/provider. We have found that certain health care needs can be provided without the need for an in-person physical exam.  This service lets us provide the care you need with a video conversation.  If a prescription is necessary we can send it directly to your pharmacy.  If lab work is needed we can place an order for that and you can then stop by our lab to have the test done at a later time.    Video visits are billed at different rates depending on your insurance coverage.  Please reach out to your insurance provider with any questions.    If during the course of the call the physician/provider feels a video visit is not appropriate, you will not be charged for this service.\"    Patient has given verbal consent for Video visit? Yes    How would you like to obtain your AVS? Mail a copy    Patient would like the video invitation sent by: Send to e-mail at: [email or web address]  [email or web address]    Will anyone else be joining your video visit? No      Ashley Ng [facility]    Video-Visit Details    Type of service:  Video Visit    Video Start Time: 2:50 pm   Video End Time: 3:15 pm     Originating Location (pt. Location): Home    Distant Location (provider location):  [facility] BLOOD [facility] MARROW [facility]ANSPL[facility]     Platform used for Video Visit: [facility]    Diagnosis:   1. 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     Treatments:   1. 7+3 ([place]), 3/15/19, no response, Day 12 persistent disease with near packed marrow  2. Dec 10d+Ric 3/26/19, CR1 on 4/23/19 but with pos mutations (IDH1 17%, RUNX1 8%)  3. 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   4. Maintenance ALT-803 study, 1 dose, stopped due to " profound fatigue, moderate to severe hypotension, and fever    PMH: HLP, BPH, DJD, RLS    Interval history: 10-point ROS negative.  Working from home.     Lab Results   Component Value Date    WBC 6.1 05/26/2020    HGB 13.3 05/26/2020    HCT 38.5 (L) 05/26/2020     05/26/2020     05/26/2020    POTASSIUM 4.2 05/26/2020    CHLORIDE 110 (H) 05/26/2020    CO2 23 05/26/2020    BUN 10 05/26/2020    CR 0.90 05/26/2020    GLC 96 05/26/2020    SED 88 (H) 06/25/2019    NTBNPI 2,031 (H) 04/03/2019    TROPI <0.015 04/23/2019    AST 18 05/26/2020    ALT 30 05/26/2020    ALKPHOS 78 05/26/2020    BILITOTAL 0.4 05/26/2020    INR 1.00 09/26/2019     Current Outpatient Medications   Medication     acetaminophen (TYLENOL) 325 MG tablet     cyclobenzaprine (FLEXERIL) 5 MG tablet     melatonin 3 MG tablet     ondansetron (ZOFRAN-ODT) 8 MG ODT tab     pramox-pe-glycerin-petrolatum (PREPARATION H) 1-0.25-14.4-15 % CREA cream     prochlorperazine (COMPAZINE) 5 MG tablet     rOPINIRole (REQUIP) 0.25 MG tablet     tamsulosin (FLOMAX) 0.4 MG capsule     triamcinolone (KENALOG) 0.1 % external cream     No current facility-administered medications for this visit.      A&P:   1. AML: 1y post-haplo, CR, chimerism and NGS pending. See us every 2 months here until 2y.    2. ID: Stop abx, vaccines today and in 2m.     3. GVHD:  None, off IST.   4. Recurrent pseudogout: Observation. Pred pulse like last time if flares; no colchicine (allergic).

## 2020-05-28 ENCOUNTER — VIRTUAL VISIT (OUTPATIENT)
Dept: TRANSPLANT | Facility: CLINIC | Age: 65
End: 2020-05-28
Attending: INTERNAL MEDICINE
Payer: COMMERCIAL

## 2020-05-28 ENCOUNTER — VIRTUAL VISIT (OUTPATIENT)
Dept: TRANSPLANT | Facility: CLINIC | Age: 65
End: 2020-05-28
Attending: NURSE PRACTITIONER
Payer: COMMERCIAL

## 2020-05-28 VITALS — BODY MASS INDEX: 30.78 KG/M2 | WEIGHT: 215 LBS | HEIGHT: 70 IN

## 2020-05-28 DIAGNOSIS — C92.01 AML (ACUTE MYELOID LEUKEMIA) IN REMISSION (H): ICD-10-CM

## 2020-05-28 DIAGNOSIS — C92.01 ACUTE MYELOID LEUKEMIA IN REMISSION (H): Primary | ICD-10-CM

## 2020-05-28 LAB — COPATH REPORT: NORMAL

## 2020-05-28 RX ORDER — ACYCLOVIR 800 MG/1
800 TABLET ORAL
Qty: 150 TABLET | Refills: 3 | Status: SHIPPED | OUTPATIENT
Start: 2020-05-28 | End: 2020-05-28

## 2020-05-28 ASSESSMENT — PAIN SCALES - GENERAL
PAINLEVEL: NO PAIN (0)
PAINLEVEL: NO PAIN (0)

## 2020-05-28 ASSESSMENT — MIFFLIN-ST. JEOR: SCORE: 1758.54

## 2020-05-28 NOTE — PROGRESS NOTES
"El Ace is a 64 year old male who is being evaluated via a billable telephone visit.      The patient has been notified of following:     \"This telephone visit will be conducted via a call between you and your physician/provider. We have found that certain health care needs can be provided without the need for a physical exam.  This service lets us provide the care you need with a short phone conversation.  If a prescription is necessary we can send it directly to your pharmacy.  If lab work is needed we can place an order for that and you can then stop by our lab to have the test done at a later time.    Telephone visits are billed at different rates depending on your insurance coverage. During this emergency period, for some insurers they may be billed the same as an in-person visit.  Please reach out to your insurance provider with any questions.    If during the course of the call the physician/provider feels a telephone visit is not appropriate, you will not be charged for this service.\"    Patient has given verbal consent for Telephone visit?  Yes    What phone number would you like to be contacted at? [phone]    How would you like to obtain your AVS? MyChart     Refill on Acyclovir.     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   Survivorship Care Plan    Date: May 28, 2020    Treatment Team:   Patient Care Team:  Bre Vizcaino MD as PCP - General (Speciality Unknown)  Rivera Cuadra MD as Referring Physician (Internal Medicine - Hematology)  Mike Coronado MD as BMT Physician (BMT - Adult)  Soto Brown MD as BMT Physician (BMT - Adult)  Christina Braxton RN as Specialty Care Coordinator (Hematology & Oncology)  Vivienne Cevallos MSW as  (BMT - Adult)  Debbie Joyner LICSW as  (BMT - Adult)  Jerri Payton RN as Specialty Care Coordinator (BMT - Adult)    Date of " Transplant: 5/31/19    Transplant Essential Data:  Diagnosis AMLU Acute myelogeneous leukemia, Unknown  HCT Type Allogeneic    Prep Regimen Cytoxan  Fludarabine  TBI  Donor Source Haplo BM    GVHD Prophylaxis Mycophenolate    [facility]      Oncology Treatment History: El Ace is a 63 year old diagnosed with AML in March, 2019.   He underwent 7+3 (cytarabine and daunorubicin) D1=3/15/19 and reinduction with decitabaine and venetoclax 3/29.  Induction course notable for febrile neutropenia.  Source of fevers is felt to be 2/2 odontogenic source (mandibular cellulitis) s/p OMFS procedure.  Patient was transferred to the MICU for further evaluation. Of note he also tested positive for C.diff on 3/27 and has completed oral vancomycin.   He had a confirmed pseudomonas bacteremia and has since completed treatment for this.       Treatment/Chemotherapy Number of Cycles Date Range Outcomes & Complications   7+3   3/15/19     Decitabine/venetoclax   3/26, venetoclax added 3/29 and d/yanna 4/30 pseuodomonas bacteremia, septic shock (odontic source)  C.diff colitis     Morphologic/immunophenotypic remission                         General Health Maintenance:     Call the BMT clinic if you develop a skin rash, oral sores, eye pain or excessive dryness, shortness of breath, new cough, bleeding, diarrhea, nausea, or vomiting. Vaccinations should be given at 1 and 2 years after your transplant, these may be given at your annual anniversary visits in the BMT clinic, by your primary care provider, or your local oncologist. An exception is the influenza vaccine, which can be given after day +60 post-transplant during influenza season. See table below for more information.     For general health concerns you can be seen by your primary care provider.     If you have questions about your transplant or follow up tests contact your BMT RN coordinator.    Vaccinations for All Patients:  Vaccine 12 Months 14 Months    Flubok /Fluzon  Seasonal administration is recommended annually for all patients at Day +60 or later.  Delay of administration is at the discretion of the provider.   Pediarix  Pediarix  Pediarix     ActHI  ActHIB  ActHI    Pneumococcal Prevnar1  Pceipnd20    Havrix  Havrix   (peds only)    Menve  Menveo    (all patients up to 17 yoa, then see special populations) Menveo    (all patients up to 17 yoa, then see special populations)   *Additional vaccinations may apply to special populations. Please see printed vaccination schedule provided during visit or contact the [facility] BMT office for questions.    Survivorship Care Plan:     This individualized care plan is designed to inform you and your healthcare team of the recommended follow-up visits, tests, health maintenance activities, and cancer screening you should receive after transplant.     Immune System:  Risks Preventative Measures Recommendations   Infections, viral reactivations   Continue to take prescribed medications to prevent infection if you are treated for zuaei-ve-xvvb disease    Symptoms of a cold such as fever, cough, congestion, and shortness of breath should be reported to your primary care provider    Immunizations will be administered at 1 & 2 years after transplant. See schedule above. Vaccines at anniversary visit next week     Eyes:   Risks Preventative Measures Recommendations   Cataracts, dry eyes, GVH of the eyes, viral infections, and other eye changes   Yearly eye exam     Screening and treatment for high blood pressure or diabetes    Call if you have eye pain, visual changes, or floaters immediately    Call If you are experiencing dry eyes and symptoms do not improve with Refresh eye drops  Ophthalmology Referral ([facility] specialist for ductal plugs/contacts/sicca/cGVH)     Mouth:  Risks Preventative Measures Recommendations   Dry mouth, oral GVH, cavities, and oral cancer   Report any new symptoms such as dry mouth or painful  sores     You can use over the counter Biotene for dry mouth or try sucking on sour candy before meals    Get a dental checkup every year and a cleaning every 6 months    If you have a prosthetic heart valve, central venous catheter or  port , or are still taking immunosuppression you may need to take an antibiotic before your dental visits. None at this time, patient has dental provider and will schedule routine exam       Lungs  Risks Preventative Measures Recommendations   Changes in function from chemotherapy or radiation; lung infections (pneumonia)   Tell your provider about difficulty breathing, a cough, or new shortness of breath     Avoid use of tobacco products or smoking    Routine lung exams   None at this time       Heart and Blood Vessels  Heart Disease Risk Score: The ASCVD Risk score (Ramandeep PINEDA Jr., et al., 2013) failed to calculate for the following reasons:    The patient has a prior MI or stroke diagnosis  Risks Preventative Measures Recommendations   Damage from chemotherapy and/or radiation; early development of heart valve disease    Ask your provider if you should receive consultation from a cardiologist (heart doctor) or have special screening    Follow a  heart healthy  lifestyle. For more information visit the National Heart, Lung, and Blood [place] website at: https://www.nhlbi.nih.gov/health/health-topics/topics/heart-healthy-lifestyle-changes     Don't smoke. If you currently smoke and are ready to quit, we can help you find ways to quit    Maintain a healthy weight    Exercise regularly    Avoid foods that have high amounts of:  o Salt/sodium (less than 2,300 mg of sodium per day)  o Saturated and trans fats  o Limit alcohol to less than 1 drink for women and 2 drinks for men per day  o Sugar such as soft drinks or sugary snacks Fasting Lipid Profile or Direct LDL (Recommended annually)       Hormones  Risks Preventative Measures Recommendations   Low thyroid function, low function of  other glands (adrenals and others)   Certain endocrine/hormone disorders are more common after transplant. For this reason, talk to your provider about:  o Fatigue  o Muscle weakness  o Changes in cold tolerance  o Reduced interest in sex  o Erectile dysfunction     Certain tests may be used to monitor for hormone changes if you are experiencing symptoms. These tests are done at 1 year    If you have been on steroids you will need to slowly taper or decrease the dose as recommended by your provider/pharmacist. Do not stop taking steroids without consulting with your provider.     If you have been on steroids in the past, you may need steroids during periods of illness TSH with T4 reflex (Recommended 1 & 2 years post-transplant), Testosterone/FSH/LH (Men, Ordered based on symptoms), Fasting Glucose (Recommended 6 mos & annually post-transplant) and Hgb A1C (Recommended annually post-transplant)       Liver:  Risks Preventative Measures Recommendations      Damage from chemotherapy or other drugs, buildup of iron from blood transfusions, infections, and GVHD     Certain tests may be ordered at your next survivorship visit to evaluate liver function based on your individual risk factors.    Talk to your provider before taking herbal supplements or over the counter drugs like Tylenol.    Ask your doctor if you should be treated for iron overload    Avoid alcohol in excess     None at all       Kidneys and Bladder:  Risks Preventative Measures Recommendations   Damage from chemotherapy or other drugs, infections, high blood pressure   Monitoring blood tests of kidney function during follow up visits    Treating high blood pressure and diabetes     Drink adequate amounts of water    Report symptoms of infection such as frequent urination, pain with urination, foul odor to urine, or blood in the urine    Talk to your provider before taking herbal supplements or over the counter drugs like Ibuprofen None at this time        Nervous System:  Risks Preventative Measures Recommendations   Neuropathy from chemotherapy, changes in cognitive function   Report changes in sensation or discomfort in feet or hands    Tell your provider about ongoing changes in memory, ability to concentrate, or inability to make decisions   None at this time       Muscle & Connective Tissue  Risks Preventative Measures Recommendations   Reduced muscle strength, reduced stamina, GVHD causing hardening of muscle tissues (scleroderma) or inflammation of tissue over muscles (fascitis)  Let your provider know if:    You notice changes in your muscle strength    Require extra assistance with daily activities    Experience pain or difficult bending or moving joints    If you have been on steroids and are experiencing weakness particularly when standing up from a chair     Need help creating an exercise routine    Notice reduced range of motion of the arms, hips, or legs  Follow general guidelines for physical activity as recommended by the Office of Disease Prevention & Health Promotion:    Avoid Inactivity  Some physical activity is better than none -- any amount has benefits.    Do Aerobic Activity  Do aerobic physical activity in episodes of at least 10 minutes, as many times as possible per day. This could include going for walks or using the elliptical or stationary bike.  Ask your doctor what aerobic activities would be safe and helpful for you, and set a goal for yourself!    Strengthen Muscles  Do muscle-strengthening activities (such as lifting light weights or using resistance bands and/or going up and down stairs) that are moderate or high intensity and involve all major muscle groups at least 4 days a week. Calcium & Vitamin D Levels (Recommended annually)     Emotional Health  Risks Preventative Measures Recommendations   Stress, depression, anxiety Talk to your provider about:    Changes in feelings, mood, or emotional wellbeing    Interest in support  groups    If you are concerned about your caregivers emotional wellbeing    Desire to speak with a counselor for ongoing support  None at this time       Sexual Health  Risks Preventative Measures Recommendations   Reduced libido due to hormonal changes, erectile dysfunction, vaginal dryness, vaginal qckob-sp-shqe disease, vaginal infections, sexually transmitted diseases Talk to your provider about:    Reduced libido or concerns regarding your sexual health    Men: Erectile dysfunction     Women: Vaginal dryness, pain during intercourse, vaginal bleeding after intercourse, changes in vaginal discharge that may indicate infection (green/white/foul odor)     General Recommendations:    It is safe to have sex if your platelet count is > 50,000 and you feel physically and emotionally ready     Women can use water-based lubricants to reduce discomfort from dryness. Prescription topical estrogen may help as well.    Use barrier protection such as condoms to prevent sexually transmitted diseases, you are at higher risk for infections due to a weakened immune system    For more information check out the National Marrow Donor Program web page on this topic:  https://bethematch.org/patients-and-families/life-after-transplant/coping-with-life-after-transplant/relationships-and-sexual-health/  FSH/LH/Testosterone (Men experiencing ED or reduced libido)         Fertility (delete if N/A)  Risks Preventative Measures Recommendations   Difficulty with sexual intercourse; difficulty having a baby   Women should avoid becoming pregnant for 2 years    Birth control should be used to prevent pregnancy    If you are interested in having a baby, discuss this with your provider None at this time       Cancer Screening:  Risks Preventative Measures Recommendations   Higher risk for the development of solid tumors, PTLD, and blood cancers   Preform a full self skin exam monthly to assess for any changes in moles or signs of skin  cancer    Minimize excessive sun exposure and wear sunscreen    Women should preform breast-self exams and report any changes in such as a new lump, discharge from nipples, and red or dimpled areas of the breast.     Imaging for breast cancer known as mammography is recommended for women starting at age 40 or 8 years after radiation exposure.     Screening for colorectal cancer should begin at age 50.     All women should have a pap smear every 1-3 years beginning at age 21    Men should perform a monthly testicular self-exam if they have been exposed to radiation as part of their cancer treatment.    Colonoscopy (Recommended every 10 years after the age of 50) and Dermatology Referral (Annual skin exam or evaluation of lesion)         Recommended Tests & Follow-Up:  Referrals & Tests Ordered Today:  Your BMT physician will review these tests and referral results. If you require treatment based on the results, a member of the BMT team will contact you.  Orders placed today:  No orders of the defined types were placed in this encounter.    (Note to providers: After signing orders use the refresh tool in the note window to display results)   Future Tests/Referrals:   All recommendations above should be completed within 2 months either by your primary care provider or local oncologist (cancer doctor).   Recommendations that were not ordered today should be completed by your:  Primary Care Provider:  Please check fasting Lipid panel, Vit D, Calcium, TSH, Testosterone, Glucose, Hgb A1c..    Please schedule a colonoscopy & Dermatology for skin eval.     Follow Up Care:  Continue to see your care team members routinely after transplant.  BMT Provider: Dr. Brown  Hematologist/Oncologist:  Primary Care Provider:      Shreya Rollins    I spent 30 minutes with the patient and family, over half of which was spent discussing preventative care strategies, self-management practices, and potential complications after  transplant.      Information used for these recommendations was obtained from: AIDA Devi., CHAD Monge, MALCOM Mejia, DOMENICA Valdovinos., JERICHO Oneal., GRACE Mayers.,   Kelsey, KMichelle YOUNG. (2013). Prevalence of Hematopoietic Cell Transplant Survivors in the [place]. Biology of Blood and Marrow Transplantation?: Journal of the American Society for Blood and Marrow Transplantation, 19(10), 3950-9697. doi 10.1016/j.bbmt.2013.07.020

## 2020-05-28 NOTE — LETTER
"  5/28/2020       RE: El Ace  [address] [facility] [id number]        Dear Colleague,    Thank you for referring your patient, El Ace, to the [facility]PL[facility]. Please see a copy of my visit note below.    El Ace is a 64 year old male who is being evaluated via a billable video visit.      The patient has been notified of following:     \"This video visit will be conducted via a call between you and your physician/provider. We have found that certain health care needs can be provided without the need for an in-person physical exam.  This service lets us provide the care you need with a video conversation.  If a prescription is necessary we can send it directly to your pharmacy.  If lab work is needed we can place an order for that and you can then stop by our lab to have the test done at a later time.    Video visits are billed at different rates depending on your insurance coverage.  Please reach out to your insurance provider with any questions.    If during the course of the call the physician/provider feels a video visit is not appropriate, you will not be charged for this service.\"    Patient has given verbal consent for Video visit? Yes    How would you like to obtain your AVS? Mail a copy    Patient would like the video invitation sent by: Send to e-mail at: [email or web address]  [email or web address]    Will anyone else be joining your video visit? No      Ashley Ng CMA    Video-Visit Details    Type of service:  Video Visit    Video Start Time: 2:50 pm   Video End Time: 3:15 pm     Originating Location (pt. Location): Home    Distant Location (provider location):  [facility]ARROW [facility]PL[facility]     Platform used for Video Visit: [facility]    Diagnosis:   1. 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     Treatments:   1. 7+3 ([place]), 3/15/19, no response, " Day 12 persistent disease with near packed marrow  2. Dec 10d+Ric 3/26/19, CR1 on 4/23/19 but with pos mutations (IDH1 17%, RUNX1 8%)  3. 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   4. Maintenance ALT-803 study, 1 dose, stopped due to profound fatigue, moderate to severe hypotension, and fever    PMH: HLP, BPH, DJD, RLS    Interval history: 10-point ROS negative.  Working from home.     Lab Results   Component Value Date    WBC 6.1 05/26/2020    HGB 13.3 05/26/2020    HCT 38.5 (L) 05/26/2020     05/26/2020     05/26/2020    POTASSIUM 4.2 05/26/2020    CHLORIDE 110 (H) 05/26/2020    CO2 23 05/26/2020    BUN 10 05/26/2020    CR 0.90 05/26/2020    GLC 96 05/26/2020    SED 88 (H) 06/25/2019    NTBNPI 2,031 (H) 04/03/2019    TROPI <0.015 04/23/2019    AST 18 05/26/2020    ALT 30 05/26/2020    ALKPHOS 78 05/26/2020    BILITOTAL 0.4 05/26/2020    INR 1.00 09/26/2019     Current Outpatient Medications   Medication     acetaminophen (TYLENOL) 325 MG tablet     cyclobenzaprine (FLEXERIL) 5 MG tablet     melatonin 3 MG tablet     ondansetron (ZOFRAN-ODT) 8 MG ODT tab     pramox-pe-glycerin-petrolatum (PREPARATION H) 1-0.25-14.4-15 % CREA cream     prochlorperazine (COMPAZINE) 5 MG tablet     rOPINIRole (REQUIP) 0.25 MG tablet     tamsulosin (FLOMAX) 0.4 MG capsule     triamcinolone (KENALOG) 0.1 % external cream     No current facility-administered medications for this visit.      A&P:   1. AML: 1y post-haplo, CR, chimerism and NGS pending. See us every 2 months here until 2y.    2. ID: Stop abx, vaccines today and in 2m.     3. GVHD:  None, off IST.   4. Recurrent pseudogout: Observation. Pred pulse like last time if flares; no colchicine (allergic).      Again, thank you for allowing me to participate in the care of your patient.        Sincerely,        BMT DOM

## 2020-05-28 NOTE — LETTER
"  5/28/2020       RE: El Ace  [address] [place] [id number]        Dear Colleague,    Thank you for referring your patient, El Ace, to the [facility] BLOOD AND MARROW TRANSPLANT. Please see a copy of my visit note below.    El Ace is a 64 year old male who is being evaluated via a billable telephone visit.      The patient has been notified of following:     \"This telephone visit will be conducted via a call between you and your physician/provider. We have found that certain health care needs can be provided without the need for a physical exam.  This service lets us provide the care you need with a short phone conversation.  If a prescription is necessary we can send it directly to your pharmacy.  If lab work is needed we can place an order for that and you can then stop by our lab to have the test done at a later time.    Telephone visits are billed at different rates depending on your insurance coverage. During this emergency period, for some insurers they may be billed the same as an in-person visit.  Please reach out to your insurance provider with any questions.    If during the course of the call the physician/provider feels a telephone visit is not appropriate, you will not be charged for this service.\"    Patient has given verbal consent for Telephone visit?  Yes    What phone number would you like to be contacted at? [phone]    How would you like to obtain your AVS? MyChart     Refill on Acyclovir.     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   Survivorship Care Plan    Date: May 28, 2020    Treatment Team:   Patient Care Team:  Bre Vizcaino MD as PCP - General (Speciality Unknown)  Rivera Cuadra MD as Referring Physician (Internal Medicine - Hematology)  Mike Coronado MD as BMT Physician (BMT - Adult)  Soto Brown MD as BMT Physician (BMT - Adult)  Christina Braxton RN as " Specialty Care Coordinator (Hematology & Oncology)  Vivienne Cevallos MSW as  (BMT - Adult)  Debbie Joyner LICSW as  (BMT - Adult)  Jerri Payton RN as Specialty Care Coordinator (BMT - Adult)    Date of Transplant: 5/31/19    Transplant Essential Data:  Diagnosis AMLU Acute myelogeneous leukemia, Unknown  HCT Type Allogeneic    Prep Regimen Cytoxan  Fludarabine  TBI  Donor Source Haplo BM    GVHD Prophylaxis Mycophenolate    Clinical Trials MiPlate      Oncology Treatment History: El Ace is a 63 year old diagnosed with AML in March, 2019.   He underwent 7+3 (cytarabine and daunorubicin) D1=3/15/19 and reinduction with decitabaine and venetoclax 3/29.  Induction course notable for febrile neutropenia.  Source of fevers is felt to be 2/2 odontogenic source (mandibular cellulitis) s/p OMFS procedure.  Patient was transferred to the MICU for further evaluation. Of note he also tested positive for C.diff on 3/27 and has completed oral vancomycin.   He had a confirmed pseudomonas bacteremia and has since completed treatment for this.       Treatment/Chemotherapy Number of Cycles Date Range Outcomes & Complications   7+3   3/15/19     Decitabine/venetoclax   3/26, venetoclax added 3/29 and d/yanna 4/30 pseuodomonas bacteremia, septic shock (odontic source)  C.diff colitis     Morphologic/immunophenotypic remission                         General Health Maintenance:     Call the BMT clinic if you develop a skin rash, oral sores, eye pain or excessive dryness, shortness of breath, new cough, bleeding, diarrhea, nausea, or vomiting. Vaccinations should be given at 1 and 2 years after your transplant, these may be given at your annual anniversary visits in the BMT clinic, by your primary care provider, or your local oncologist. An exception is the influenza vaccine, which can be given after day +60 post-transplant during influenza season. See table below for more information.      For general health concerns you can be seen by your primary care provider.     If you have questions about your transplant or follow up tests contact your BMT RN coordinator.    Vaccinations for All Patients:  Vaccine 12 Months 14 Months   Flubok /Fluzon  Seasonal administration is recommended annually for all patients at Day +60 or later.  Delay of administration is at the discretion of the provider.   Pediarix  Pediarix  Pediarix     ActHI  ActHIB  ActHI    Pneumococcal Prevnar1  Zbcujly10    Havrix  Havrix   (peds only)    Menve  Menveo    (all patients up to 17 yoa, then see special populations) Menveo    (all patients up to 17 yoa, then see special populations)   *Additional vaccinations may apply to special populations. Please see printed vaccination schedule provided during visit or contact the [facility] [place] [facility] office for questions.    Survivorship Care Plan:     This individualized care plan is designed to inform you and your healthcare team of the recommended follow-up visits, tests, health maintenance activities, and cancer screening you should receive after transplant.     Immune System:  Risks Preventative Measures Recommendations   Infections, viral reactivations   Continue to take prescribed medications to prevent infection if you are treated for pxfwe-uh-tjlc disease    Symptoms of a cold such as fever, cough, congestion, and shortness of breath should be reported to your primary care provider    Immunizations will be administered at 1 & 2 years after transplant. See schedule above. Vaccines at anniversary visit next week     Eyes:   Risks Preventative Measures Recommendations   Cataracts, dry eyes, GVH of the eyes, viral infections, and other eye changes   Yearly eye exam     Screening and treatment for high blood pressure or diabetes    Call if you have eye pain, visual changes, or floaters immediately    Call If you are experiencing dry eyes and symptoms do not improve with Refresh eye  drops  Ophthalmology Referral ([facility] specialist for ductal plugs/contacts/sicca/cGVH)     Mouth:  Risks Preventative Measures Recommendations   Dry mouth, oral GVH, cavities, and oral cancer   Report any new symptoms such as dry mouth or painful sores     You can use over the counter Biotene for dry mouth or try sucking on sour candy before meals    Get a dental checkup every year and a cleaning every 6 months    If you have a prosthetic heart valve, central venous catheter or  port , or are still taking immunosuppression you may need to take an antibiotic before your dental visits. None at this time, patient has dental provider and will schedule routine exam       Lungs  Risks Preventative Measures Recommendations   Changes in function from chemotherapy or radiation; lung infections (pneumonia)   Tell your provider about difficulty breathing, a cough, or new shortness of breath     Avoid use of tobacco products or smoking    Routine lung exams   None at this time       Heart and Blood Vessels  Heart Disease Risk Score: The ASCVD Risk score (Ramandeep PINEDA Jr., et al., 2013) failed to calculate for the following reasons:    The patient has a prior MI or stroke diagnosis  Risks Preventative Measures Recommendations   Damage from chemotherapy and/or radiation; early development of heart valve disease    Ask your provider if you should receive consultation from a cardiologist (heart doctor) or have special screening    Follow a  heart healthy  lifestyle. For more information visit the National Heart, Lung, and Blood Kevin website at: https://www.nhlbi.nih.gov/health/health-topics/topics/heart-healthy-lifestyle-changes     Don't smoke. If you currently smoke and are ready to quit, we can help you find ways to quit    Maintain a healthy weight    Exercise regularly    Avoid foods that have high amounts of:  o Salt/sodium (less than 2,300 mg of sodium per day)  o Saturated and trans fats  o Limit alcohol to less than 1 drink  for women and 2 drinks for men per day  o Sugar such as soft drinks or sugary snacks Fasting Lipid Profile or Direct LDL (Recommended annually)       Hormones  Risks Preventative Measures Recommendations   Low thyroid function, low function of other glands (adrenals and others)   Certain endocrine/hormone disorders are more common after transplant. For this reason, talk to your provider about:  o Fatigue  o Muscle weakness  o Changes in cold tolerance  o Reduced interest in sex  o Erectile dysfunction     Certain tests may be used to monitor for hormone changes if you are experiencing symptoms. These tests are done at 1 year    If you have been on steroids you will need to slowly taper or decrease the dose as recommended by your provider/pharmacist. Do not stop taking steroids without consulting with your provider.     If you have been on steroids in the past, you may need steroids during periods of illness TSH with T4 reflex (Recommended 1 & 2 years post-transplant), Testosterone/FSH/LH (Men, Ordered based on symptoms), Fasting Glucose (Recommended 6 mos & annually post-transplant) and Hgb A1C (Recommended annually post-transplant)       Liver:  Risks Preventative Measures Recommendations      Damage from chemotherapy or other drugs, buildup of iron from blood transfusions, infections, and GVHD     Certain tests may be ordered at your next survivorship visit to evaluate liver function based on your individual risk factors.    Talk to your provider before taking herbal supplements or over the counter drugs like Tylenol.    Ask your doctor if you should be treated for iron overload    Avoid alcohol in excess     None at all       Kidneys and Bladder:  Risks Preventative Measures Recommendations   Damage from chemotherapy or other drugs, infections, high blood pressure   Monitoring blood tests of kidney function during follow up visits    Treating high blood pressure and diabetes     Drink adequate amounts of  water    Report symptoms of infection such as frequent urination, pain with urination, foul odor to urine, or blood in the urine    Talk to your provider before taking herbal supplements or over the counter drugs like Ibuprofen None at this time       Nervous System:  Risks Preventative Measures Recommendations   Neuropathy from chemotherapy, changes in cognitive function   Report changes in sensation or discomfort in feet or hands    Tell your provider about ongoing changes in memory, ability to concentrate, or inability to make decisions   None at this time       Muscle & Connective Tissue  Risks Preventative Measures Recommendations   Reduced muscle strength, reduced stamina, GVHD causing hardening of muscle tissues (scleroderma) or inflammation of tissue over muscles (fascitis)  Let your provider know if:    You notice changes in your muscle strength    Require extra assistance with daily activities    Experience pain or difficult bending or moving joints    If you have been on steroids and are experiencing weakness particularly when standing up from a chair     Need help creating an exercise routine    Notice reduced range of motion of the arms, hips, or legs  Follow general guidelines for physical activity as recommended by the Office of Disease Prevention & Health Promotion:    Avoid Inactivity  Some physical activity is better than none -- any amount has benefits.    Do Aerobic Activity  Do aerobic physical activity in episodes of at least 10 minutes, as many times as possible per day. This could include going for walks or using the elliptical or stationary bike.  Ask your doctor what aerobic activities would be safe and helpful for you, and set a goal for yourself!    Strengthen Muscles  Do muscle-strengthening activities (such as lifting light weights or using resistance bands and/or going up and down stairs) that are moderate or high intensity and involve all major muscle groups at least 4 days a week.  Calcium & Vitamin D Levels (Recommended annually)     Emotional Health  Risks Preventative Measures Recommendations   Stress, depression, anxiety Talk to your provider about:    Changes in feelings, mood, or emotional wellbeing    Interest in support groups    If you are concerned about your caregivers emotional wellbeing    Desire to speak with a counselor for ongoing support  None at this time       Sexual Health  Risks Preventative Measures Recommendations   Reduced libido due to hormonal changes, erectile dysfunction, vaginal dryness, vaginal nccwe-he-qeqb disease, vaginal infections, sexually transmitted diseases Talk to your provider about:    Reduced libido or concerns regarding your sexual health    Men: Erectile dysfunction     Women: Vaginal dryness, pain during intercourse, vaginal bleeding after intercourse, changes in vaginal discharge that may indicate infection (green/white/foul odor)     General Recommendations:    It is safe to have sex if your platelet count is > 50,000 and you feel physically and emotionally ready     Women can use water-based lubricants to reduce discomfort from dryness. Prescription topical estrogen may help as well.    Use barrier protection such as condoms to prevent sexually transmitted diseases, you are at higher risk for infections due to a weakened immune system    For more information check out the National Marrow Donor Program web page on this topic:  https://bethematch.org/patients-and-families/life-after-transplant/coping-with-life-after-transplant/relationships-and-sexual-health/  FSH/LH/Testosterone (Men experiencing ED or reduced libido)         Fertility (delete if N/A)  Risks Preventative Measures Recommendations   Difficulty with sexual intercourse; difficulty having a baby   Women should avoid becoming pregnant for 2 years    Birth control should be used to prevent pregnancy    If you are interested in having a baby, discuss this with your provider None at this  time       Cancer Screening:  Risks Preventative Measures Recommendations   Higher risk for the development of solid tumors, PTLD, and blood cancers   Preform a full self skin exam monthly to assess for any changes in moles or signs of skin cancer    Minimize excessive sun exposure and wear sunscreen    Women should preform breast-self exams and report any changes in such as a new lump, discharge from nipples, and red or dimpled areas of the breast.     Imaging for breast cancer known as mammography is recommended for women starting at age 40 or 8 years after radiation exposure.     Screening for colorectal cancer should begin at age 50.     All women should have a pap smear every 1-3 years beginning at age 21    Men should perform a monthly testicular self-exam if they have been exposed to radiation as part of their cancer treatment.    Colonoscopy (Recommended every 10 years after the age of 50) and Dermatology Referral (Annual skin exam or evaluation of lesion)         Recommended Tests & Follow-Up:  Referrals & Tests Ordered Today:  Your BMT physician will review these tests and referral results. If you require treatment based on the results, a member of the BMT team will contact you.  Orders placed today:  No orders of the defined types were placed in this encounter.    (Note to providers: After signing orders use the refresh tool in the note window to display results)   Future Tests/Referrals:   All recommendations above should be completed within 2 months either by your primary care provider or local oncologist (cancer doctor).   Recommendations that were not ordered today should be completed by your:  Primary Care Provider:  Please check fasting Lipid panel, Vit D, Calcium, TSH, Testosterone, Glucose, Hgb A1c..    Please schedule a colonoscopy & Dermatology for skin eval.     Follow Up Care:  Continue to see your care team members routinely after transplant.  BMT Provider:   Stephanie  Hematologist/Oncologist:  Primary Care Provider:      Shreya Rollins    I spent 30 minutes with the patient and family, over half of which was spent discussing preventative care strategies, self-management practices, and potential complications after transplant.    Information used for these recommendations was obtained from: AIDA Devi., CHAD Monge, MALCOM Mejia, DEANDRA Valdovinos, GRACE Oneal, GRACE Mayers.,   Kelsey, PAMELA YOUNG. (2013). Prevalence of Hematopoietic Cell Transplant Survivors in the [place]. Biology of Blood and Marrow Transplantation?: Journal of the American Society for Blood and Marrow Transplantation, 19(10), 8529-5311. doi 10.1016/j.bbmt.2013.07.020

## 2020-05-29 ENCOUNTER — TELEPHONE (OUTPATIENT)
Dept: OPHTHALMOLOGY | Facility: CLINIC | Age: 65
End: 2020-05-29

## 2020-06-01 LAB — COPATH REPORT: NORMAL

## 2020-06-15 LAB
COPATH REPORT: NORMAL
COPATH REPORT: NORMAL

## 2020-08-05 NOTE — PROGRESS NOTES
[facility]    Diagnosis:   1. 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     Treatments:   1. 7+3 (Dauno), 3/15/19, no response, Day 12 persistent disease with near packed marrow  2. Dec 10d+Ric 3/26/19, CR1 on 19 but with pos mutations (IDH1 17%, RUNX1 8%)  3. VELMA haplo 19 from son,  CMV neg to pos, A to A.   4. Maintenance ALT-803 study, 1 dose, stopped due to profound fatigue, moderate to severe hypotension, and fever    PMH: HLP, BPH, DJD, RLS    Interval history: 10-point ROS negative.    Ph/E:   /79   Pulse 76   Temp 98.3  F (36.8  C) (Oral)   Resp 16   Wt 100.7 kg (221 lb 14.4 oz)   SpO2 97%   BMI 32.30 kg/m    ECO  General: NAD; H&N: no mucosal lesions; Lungs clear; Heart RRR; Abdomen; Soft, No organomegaly; Extremities: No edema; Skin: no rash; Neuro: Nonfocal; Mood/Affect: appropriate; Lymph: no LAD       Lab Results   Component Value Date    WBC 6.1 2020    HGB 13.3 2020    HCT 38.5 (L) 2020     2020     2020    POTASSIUM 4.2 2020    CHLORIDE 110 (H) 2020    CO2 23 2020    BUN 10 2020    CR 0.90 2020    GLC 96 2020    SED 88 (H) 2019    NTBNPI 2,031 (H) 2019    TROPI <0.015 2019    AST 18 2020    ALT 30 2020    ALKPHOS 78 2020    BILITOTAL 0.4 2020    INR 1.00 2019     Current Outpatient Medications   Medication     acetaminophen (TYLENOL) 325 MG tablet     cyclobenzaprine (FLEXERIL) 5 MG tablet     melatonin 3 MG tablet     ondansetron (ZOFRAN-ODT) 8 MG ODT tab     pramox-pe-glycerin-petrolatum (PREPARATION H) 1-0.25-14.4-15 % CREA cream     prochlorperazine (COMPAZINE) 5 MG tablet     rOPINIRole (REQUIP) 0.25 MG tablet     tamsulosin (FLOMAX) 0.4 MG capsule     triamcinolone (KENALOG) 0.1 % external cream     No current facility-administered medications for this  visit.      A&P:   1. AML: 1y2m post-haplo, CR. See us every 2 months here until 2y.    2. ID: off abx, vaccine today.     3. GVHD:  None, off IST.

## 2020-08-06 ENCOUNTER — ONCOLOGY VISIT (OUTPATIENT)
Dept: TRANSPLANT | Facility: CLINIC | Age: 65
End: 2020-08-06
Attending: INTERNAL MEDICINE
Payer: COMMERCIAL

## 2020-08-06 ENCOUNTER — APPOINTMENT (OUTPATIENT)
Dept: LAB | Facility: CLINIC | Age: 65
End: 2020-08-06
Attending: INTERNAL MEDICINE
Payer: COMMERCIAL

## 2020-08-06 VITALS
DIASTOLIC BLOOD PRESSURE: 79 MMHG | HEART RATE: 76 BPM | OXYGEN SATURATION: 97 % | SYSTOLIC BLOOD PRESSURE: 114 MMHG | WEIGHT: 221.9 LBS | BODY MASS INDEX: 32.3 KG/M2 | TEMPERATURE: 98.3 F | RESPIRATION RATE: 16 BRPM

## 2020-08-06 DIAGNOSIS — C92.01 AML (ACUTE MYELOID LEUKEMIA) IN REMISSION (H): Primary | ICD-10-CM

## 2020-08-06 LAB
ALBUMIN SERPL-MCNC: 3.6 G/DL (ref 3.4–5)
ALP SERPL-CCNC: 76 U/L (ref 40–150)
ALT SERPL W P-5'-P-CCNC: 30 U/L (ref 0–70)
ANION GAP SERPL CALCULATED.3IONS-SCNC: 5 MMOL/L (ref 3–14)
AST SERPL W P-5'-P-CCNC: 18 U/L (ref 0–45)
BASOPHILS # BLD AUTO: 0 10E9/L (ref 0–0.2)
BASOPHILS NFR BLD AUTO: 0.2 %
BILIRUB SERPL-MCNC: 0.5 MG/DL (ref 0.2–1.3)
BUN SERPL-MCNC: 14 MG/DL (ref 7–30)
CALCIUM SERPL-MCNC: 8.2 MG/DL (ref 8.5–10.1)
CHLORIDE SERPL-SCNC: 111 MMOL/L (ref 94–109)
CO2 SERPL-SCNC: 23 MMOL/L (ref 20–32)
CREAT SERPL-MCNC: 0.77 MG/DL (ref 0.66–1.25)
DIFFERENTIAL METHOD BLD: ABNORMAL
EOSINOPHIL # BLD AUTO: 0.1 10E9/L (ref 0–0.7)
EOSINOPHIL NFR BLD AUTO: 0.5 %
ERYTHROCYTE [DISTWIDTH] IN BLOOD BY AUTOMATED COUNT: 11.6 % (ref 10–15)
GFR SERPL CREATININE-BSD FRML MDRD: >90 ML/MIN/{1.73_M2}
GLUCOSE SERPL-MCNC: 92 MG/DL (ref 70–99)
HCT VFR BLD AUTO: 38.6 % (ref 40–53)
HGB BLD-MCNC: 13.6 G/DL (ref 13.3–17.7)
IMM GRANULOCYTES # BLD: 0 10E9/L (ref 0–0.4)
IMM GRANULOCYTES NFR BLD: 0.4 %
LYMPHOCYTES # BLD AUTO: 1.7 10E9/L (ref 0.8–5.3)
LYMPHOCYTES NFR BLD AUTO: 18.2 %
MCH RBC QN AUTO: 34 PG (ref 26.5–33)
MCHC RBC AUTO-ENTMCNC: 35.2 G/DL (ref 31.5–36.5)
MCV RBC AUTO: 97 FL (ref 78–100)
MONOCYTES # BLD AUTO: 0.7 10E9/L (ref 0–1.3)
MONOCYTES NFR BLD AUTO: 6.9 %
NEUTROPHILS # BLD AUTO: 6.9 10E9/L (ref 1.6–8.3)
NEUTROPHILS NFR BLD AUTO: 73.8 %
NRBC # BLD AUTO: 0 10*3/UL
NRBC BLD AUTO-RTO: 0 /100
PLATELET # BLD AUTO: 185 10E9/L (ref 150–450)
POTASSIUM SERPL-SCNC: 3.9 MMOL/L (ref 3.4–5.3)
PROT SERPL-MCNC: 7 G/DL (ref 6.8–8.8)
RBC # BLD AUTO: 4 10E12/L (ref 4.4–5.9)
SODIUM SERPL-SCNC: 140 MMOL/L (ref 133–144)
WBC # BLD AUTO: 9.4 10E9/L (ref 4–11)

## 2020-08-06 PROCEDURE — 90648 HIB PRP-T VACCINE 4 DOSE IM: CPT | Mod: ZF | Performed by: INTERNAL MEDICINE

## 2020-08-06 PROCEDURE — G0009 ADMIN PNEUMOCOCCAL VACCINE: HCPCS

## 2020-08-06 PROCEDURE — 85025 COMPLETE CBC W/AUTO DIFF WBC: CPT | Performed by: INTERNAL MEDICINE

## 2020-08-06 PROCEDURE — 25000581 ZZH RX MED A9270 GY (STAT IND- M) 250: Mod: ZF | Performed by: INTERNAL MEDICINE

## 2020-08-06 PROCEDURE — 90750 HZV VACC RECOMBINANT IM: CPT | Mod: ZF | Performed by: INTERNAL MEDICINE

## 2020-08-06 PROCEDURE — 25000128 H RX IP 250 OP 636: Mod: ZF | Performed by: INTERNAL MEDICINE

## 2020-08-06 PROCEDURE — 90723 DTAP-HEP B-IPV VACCINE IM: CPT | Mod: ZF | Performed by: INTERNAL MEDICINE

## 2020-08-06 PROCEDURE — 36415 COLL VENOUS BLD VENIPUNCTURE: CPT

## 2020-08-06 PROCEDURE — 80053 COMPREHEN METABOLIC PANEL: CPT | Performed by: INTERNAL MEDICINE

## 2020-08-06 PROCEDURE — 90472 IMMUNIZATION ADMIN EACH ADD: CPT

## 2020-08-06 PROCEDURE — G0463 HOSPITAL OUTPT CLINIC VISIT: HCPCS | Mod: 25

## 2020-08-06 PROCEDURE — 90670 PCV13 VACCINE IM: CPT | Mod: ZF | Performed by: INTERNAL MEDICINE

## 2020-08-06 PROCEDURE — 96372 THER/PROPH/DIAG INJ SC/IM: CPT | Mod: ZF

## 2020-08-06 RX ADMIN — PNEUMOCOCCAL 13-VALENT CONJUGATE VACCINE 0.5 ML: 2.2; 2.2; 2.2; 2.2; 2.2; 4.4; 2.2; 2.2; 2.2; 2.2; 2.2; 2.2; 2.2 INJECTION, SUSPENSION INTRAMUSCULAR at 14:10

## 2020-08-06 RX ADMIN — DIPHTHERIA AND TETANUS TOXOIDS AND ACELLULAR PERTUSSIS ADSORBED, HEPATITIS B (RECOMBINANT) AND INACTIVATED POLIOVIRUS VACCINE COMBINED 0.5 ML: 25; 10; 25; 25; 8; 10; 40; 8; 32 INJECTION, SUSPENSION INTRAMUSCULAR at 14:09

## 2020-08-06 RX ADMIN — HAEMOPHILUS B POLYSACCHARIDE CONJUGATE VACCINE FOR INJ 0.5 ML: RECON SOLN at 14:08

## 2020-08-06 RX ADMIN — ZOSTER VACCINE RECOMBINANT, ADJUVANTED 0.5 ML: KIT at 14:11

## 2020-08-06 ASSESSMENT — PAIN SCALES - GENERAL: PAINLEVEL: NO PAIN (0)

## 2020-08-06 NOTE — NURSING NOTE
"Oncology Rooming Note    August 6, 2020 1:26 PM   El Ace is a 64 year old male who presents for:    Chief Complaint   Patient presents with     Blood Draw     Labs drawn via  by RN in lab. VS taken.      RECHECK     Pt is here for a rtn for AML     Initial Vitals: Blood Pressure 114/79   Pulse 76   Temperature 98.3  F (36.8  C) (Oral)   Respiration 16   Weight 100.7 kg (221 lb 14.4 oz)   Oxygen Saturation 97%   Body Mass Index 32.30 kg/m   Estimated body mass index is 32.3 kg/m  as calculated from the following:    Height as of 5/28/20: 1.765 m (5' 9.5\").    Weight as of this encounter: 100.7 kg (221 lb 14.4 oz). Body surface area is 2.22 meters squared.  No Pain (0) Comment: Data Unavailable   No LMP for male patient.  Allergies reviewed: Yes  Medications reviewed: Yes    Medications: Medication refills not needed today.  Pharmacy name entered into [employer]:    [facility] - [place], [place] - [phone]  [facility] [place], [place] - [address] [place] [address] [phone]  [facility] - [place], [place] - [address] [place] [address] PHARMACY [id number] [place], [place] - [facility] [address]    Clinical concerns: none       Shana Ballard MA            "

## 2020-08-06 NOTE — LETTER
2020         RE: El Ace  [address]  [facility] [id number]        Dear Colleague,    Thank you for referring your patient, El Ace, to the [facility] BLOOD AND MARROW TRANSPLANT. Please see a copy of my visit note below.    [facility]    Diagnosis:   1. 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     Treatments:   1. 7+3 (Dauno), 3/15/19, no response, Day 12 persistent disease with near packed marrow  2. Dec 10d+Ric 3/26/19, CR1 on 19 but with pos mutations (IDH1 17%, RUNX1 8%)  3. VELMA haplo 19 from son,  CMV neg to pos, A to A.   4. Maintenance ALT-803 study, 1 dose, stopped due to profound fatigue, moderate to severe hypotension, and fever    PMH: HLP, BPH, DJD, RLS    Interval history: 10-point ROS negative.    Ph/E:   /79   Pulse 76   Temp 98.3  F (36.8  C) (Oral)   Resp 16   Wt 100.7 kg (221 lb 14.4 oz)   SpO2 97%   BMI 32.30 kg/m    ECO  General: NAD; H&N: no mucosal lesions; Lungs clear; Heart RRR; Abdomen; Soft, No organomegaly; Extremities: No edema; Skin: no rash; Neuro: Nonfocal; Mood/Affect: appropriate; Lymph: no LAD       Lab Results   Component Value Date    WBC 6.1 2020    HGB 13.3 2020    HCT 38.5 (L) 2020     2020     2020    POTASSIUM 4.2 2020    CHLORIDE 110 (H) 2020    CO2 23 2020    BUN 10 2020    CR 0.90 2020    GLC 96 2020    SED 88 (H) 2019    NTBNPI 2,031 (H) 2019    TROPI <0.015 2019    AST 18 2020    ALT 30 2020    ALKPHOS 78 2020    BILITOTAL 0.4 2020    INR 1.00 2019     Current Outpatient Medications   Medication     acetaminophen (TYLENOL) 325 MG tablet     cyclobenzaprine (FLEXERIL) 5 MG tablet     melatonin 3 MG tablet     ondansetron (ZOFRAN-ODT) 8 MG ODT tab     pramox-pe-glycerin-petrolatum (PREPARATION H)  1-0.25-14.4-15 % CREA cream     prochlorperazine (COMPAZINE) 5 MG tablet     rOPINIRole (REQUIP) 0.25 MG tablet     tamsulosin (FLOMAX) 0.4 MG capsule     triamcinolone (KENALOG) 0.1 % external cream     No current facility-administered medications for this visit.      A&P:   1. AML: 1y2m post-haplo, CR. See us every 2 months here until 2y.    2. ID: off abx, vaccine today.     3. GVHD:  None, off IST.         Again, thank you for allowing me to participate in the care of your patient.        Sincerely,        Soto Brown MD

## 2020-09-08 RX ORDER — HEPARIN SODIUM (PORCINE) LOCK FLUSH IV SOLN 100 UNIT/ML 100 UNIT/ML
5 SOLUTION INTRAVENOUS
Status: CANCELLED | OUTPATIENT
Start: 2020-09-08

## 2020-09-08 RX ORDER — HEPARIN SODIUM,PORCINE 10 UNIT/ML
5 VIAL (ML) INTRAVENOUS
Status: CANCELLED | OUTPATIENT
Start: 2020-09-08

## 2020-09-08 RX ORDER — CEFTRIAXONE SODIUM 2 G
2 VIAL (EA) INJECTION EVERY 24 HOURS
Status: CANCELLED
Start: 2020-09-08

## 2020-09-08 RX ORDER — DIPHENHYDRAMINE HCL 25 MG
25-50 CAPSULE ORAL EVERY 6 HOURS PRN
Status: CANCELLED
Start: 2020-09-08

## 2020-10-13 NOTE — PROGRESS NOTES
"El Ace is a 65 year old male who is being evaluated via a billable video visit.      The patient has been notified of following:     \"This video visit will be conducted via a call between you and your physician/provider. We have found that certain health care needs can be provided without the need for an in-person physical exam.  This service lets us provide the care you need with a video conversation.  If a prescription is necessary we can send it directly to your pharmacy.  If lab work is needed we can place an order for that and you can then stop by our lab to have the test done at a later time.    Video visits are billed at different rates depending on your insurance coverage.  Please reach out to your insurance provider with any questions.    If during the course of the call the physician/provider feels a video visit is not appropriate, you will not be charged for this service.\"    Patient has given verbal consent for Video visit? Yes  How would you like to obtain your AVS? MyChart  If you are dropped from the video visit, the video invite should be resent to: Send to e-mail at: [email or web address]  Will anyone else be joining your video visit? No      Vitals - Patient Reported  Weight (Patient Reported): 96.2 kg (212 lb)  Height (Patient Reported): 174 cm (5' 8.5\")  BMI (Based on Pt Reported Ht/Wt): 31.77  Pain Score: No Pain (0)    Arina Soria, [facility] October 15, 2020  12:33 PM       Video-Visit Details    Type of service:  Video Visit    Video Start Time: 1 pm   Video End Time: 1: 25 pm    Originating Location (pt. Location): Home    Distant Location (provider location):  [facility] BLOOD AND MARROW TRANSPLANT PROGRAM [place]     Platform used for Video Visit: [facility]    Diagnosis:   1. 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     Treatments:   1. 7+3 (Dauno), 3/15/19, no response, " Day 12 persistent disease with near packed marrow  2. Dec 10d+Ric 3/26/19, CR1 on 4/23/19 but with pos mutations (IDH1 17%, RUNX1 8%)  3. 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   4. Maintenance ALT-803 study, 1 dose, stopped due to profound fatigue, moderate to severe hypotension, and fever    PMH: HLP, BPH, DJD, RLS    Interval history: 10-point ROS negative today. He had n/v 3 weeks ago, likely food poisoning, all resolved.       Lab Results   Component Value Date    WBC 6.7 10/14/2020    HGB 13.5 10/14/2020    HCT 40.1 10/14/2020     10/14/2020     10/14/2020    POTASSIUM 3.9 10/14/2020    CHLORIDE 109 10/14/2020    CO2 24 10/14/2020    BUN 10 10/14/2020    CR 0.82 10/14/2020     (H) 10/14/2020    SED 88 (H) 06/25/2019    NTBNPI 2,031 (H) 04/03/2019    TROPI <0.015 04/23/2019    AST 19 10/14/2020    ALT 35 10/14/2020    ALKPHOS 90 10/14/2020    BILITOTAL 0.4 10/14/2020    INR 1.00 09/26/2019     Current Outpatient Medications   Medication     acetaminophen (TYLENOL) 325 MG tablet     cyclobenzaprine (FLEXERIL) 5 MG tablet     melatonin 3 MG tablet     ondansetron (ZOFRAN-ODT) 8 MG ODT tab     pramox-pe-glycerin-petrolatum (PREPARATION H) 1-0.25-14.4-15 % CREA cream     prochlorperazine (COMPAZINE) 5 MG tablet     rOPINIRole (REQUIP) 0.25 MG tablet     tamsulosin (FLOMAX) 0.4 MG capsule     triamcinolone (KENALOG) 0.1 % external cream     No current facility-administered medications for this visit.      A&P:   1. AML: 1y4m post-haplo, CR. See us every 2 months here until 2y.    2. ID: off abx, flu shot (if he didn't get it here last time, he will get locally; we will figure out today if he got it here).     3. GVHD:  None, off IST.

## 2020-10-14 VITALS
WEIGHT: 212.6 LBS | TEMPERATURE: 97.7 F | RESPIRATION RATE: 18 BRPM | OXYGEN SATURATION: 97 % | BODY MASS INDEX: 30.95 KG/M2 | SYSTOLIC BLOOD PRESSURE: 113 MMHG | DIASTOLIC BLOOD PRESSURE: 73 MMHG | HEART RATE: 76 BPM

## 2020-10-14 DIAGNOSIS — C92.01 AML (ACUTE MYELOID LEUKEMIA) IN REMISSION (H): ICD-10-CM

## 2020-10-14 LAB
ALBUMIN SERPL-MCNC: 3.6 G/DL (ref 3.4–5)
ALP SERPL-CCNC: 90 U/L (ref 40–150)
ALT SERPL W P-5'-P-CCNC: 35 U/L (ref 0–70)
ANION GAP SERPL CALCULATED.3IONS-SCNC: 6 MMOL/L (ref 3–14)
AST SERPL W P-5'-P-CCNC: 19 U/L (ref 0–45)
BASOPHILS # BLD AUTO: 0 10E9/L (ref 0–0.2)
BASOPHILS NFR BLD AUTO: 0.4 %
BILIRUB SERPL-MCNC: 0.4 MG/DL (ref 0.2–1.3)
BUN SERPL-MCNC: 10 MG/DL (ref 7–30)
CALCIUM SERPL-MCNC: 8.7 MG/DL (ref 8.5–10.1)
CHLORIDE SERPL-SCNC: 109 MMOL/L (ref 94–109)
CO2 SERPL-SCNC: 24 MMOL/L (ref 20–32)
CREAT SERPL-MCNC: 0.82 MG/DL (ref 0.66–1.25)
DIFFERENTIAL METHOD BLD: ABNORMAL
EOSINOPHIL # BLD AUTO: 0.1 10E9/L (ref 0–0.7)
EOSINOPHIL NFR BLD AUTO: 1.2 %
ERYTHROCYTE [DISTWIDTH] IN BLOOD BY AUTOMATED COUNT: 12.2 % (ref 10–15)
GFR SERPL CREATININE-BSD FRML MDRD: >90 ML/MIN/{1.73_M2}
GLUCOSE SERPL-MCNC: 102 MG/DL (ref 70–99)
HCT VFR BLD AUTO: 40.1 % (ref 40–53)
HGB BLD-MCNC: 13.5 G/DL (ref 13.3–17.7)
IMM GRANULOCYTES # BLD: 0 10E9/L (ref 0–0.4)
IMM GRANULOCYTES NFR BLD: 0.3 %
LYMPHOCYTES # BLD AUTO: 1.7 10E9/L (ref 0.8–5.3)
LYMPHOCYTES NFR BLD AUTO: 24.8 %
MCH RBC QN AUTO: 32.5 PG (ref 26.5–33)
MCHC RBC AUTO-ENTMCNC: 33.7 G/DL (ref 31.5–36.5)
MCV RBC AUTO: 96 FL (ref 78–100)
MONOCYTES # BLD AUTO: 0.6 10E9/L (ref 0–1.3)
MONOCYTES NFR BLD AUTO: 8.4 %
NEUTROPHILS # BLD AUTO: 4.3 10E9/L (ref 1.6–8.3)
NEUTROPHILS NFR BLD AUTO: 64.9 %
NRBC # BLD AUTO: 0 10*3/UL
NRBC BLD AUTO-RTO: 0 /100
PLATELET # BLD AUTO: 203 10E9/L (ref 150–450)
POTASSIUM SERPL-SCNC: 3.9 MMOL/L (ref 3.4–5.3)
PROT SERPL-MCNC: 7.2 G/DL (ref 6.8–8.8)
RBC # BLD AUTO: 4.16 10E12/L (ref 4.4–5.9)
SODIUM SERPL-SCNC: 139 MMOL/L (ref 133–144)
WBC # BLD AUTO: 6.7 10E9/L (ref 4–11)

## 2020-10-14 PROCEDURE — 85025 COMPLETE CBC W/AUTO DIFF WBC: CPT | Performed by: PATHOLOGY

## 2020-10-14 PROCEDURE — 80053 COMPREHEN METABOLIC PANEL: CPT | Performed by: PATHOLOGY

## 2020-10-14 ASSESSMENT — PAIN SCALES - GENERAL: PAINLEVEL: NO PAIN (0)

## 2020-10-14 NOTE — NURSING NOTE
Chief Complaint   Patient presents with     Blood Draw     labs drawn via venipunctuere by RN in lab     /73 (BP Location: Left arm, Patient Position: Chair, Cuff Size: Adult Large)   Pulse 76   Temp 97.7  F (36.5  C) (Oral)   Resp 18   Wt 96.4 kg (212 lb 9.6 oz)   SpO2 97%   BMI 30.95 kg/m      Labs collected and sent from left antecubital venipuncture in lab by RN. Pt tolerated well.     Meena Randhawa RN

## 2020-10-15 ENCOUNTER — VIRTUAL VISIT (OUTPATIENT)
Dept: TRANSPLANT | Facility: CLINIC | Age: 65
End: 2020-10-15
Attending: INTERNAL MEDICINE
Payer: COMMERCIAL

## 2020-10-15 DIAGNOSIS — Z94.84 HISTORY OF ALLOGENEIC STEM CELL TRANSPLANT (H): Primary | ICD-10-CM

## 2020-10-15 PROCEDURE — 999N001193 HC VIDEO/TELEPHONE VISIT; NO CHARGE

## 2020-10-15 PROCEDURE — 99214 OFFICE O/P EST MOD 30 MIN: CPT | Mod: 95 | Performed by: INTERNAL MEDICINE

## 2020-10-15 NOTE — LETTER
"    10/15/2020         RE: El Ace  [address] [facility] [address]        Dear Colleague,    Thank you for referring your patient, El Ace, to the [facility] BLOOD AND MARROW TRANSPLANT PROGRAM [place]. Please see a copy of my visit note below.    El Ace is a 65 year old male who is being evaluated via a billable video visit.      The patient has been notified of following:     \"This video visit will be conducted via a call between you and your physician/provider. We have found that certain health care needs can be provided without the need for an in-person physical exam.  This service lets us provide the care you need with a video conversation.  If a prescription is necessary we can send it directly to your pharmacy.  If lab work is needed we can place an order for that and you can then stop by our lab to have the test done at a later time.    Video visits are billed at different rates depending on your insurance coverage.  Please reach out to your insurance provider with any questions.    If during the course of the call the physician/provider feels a video visit is not appropriate, you will not be charged for this service.\"    Patient has given verbal consent for Video visit? Yes  How would you like to obtain your AVS? MyChart  If you are dropped from the video visit, the video invite should be resent to: Send to e-mail at: [email or web address]  Will anyone else be joining your video visit? No      Vitals - Patient Reported  Weight (Patient Reported): 96.2 kg (212 lb)  Height (Patient Reported): 174 cm (5' 8.5\")  BMI (Based on Pt Reported Ht/Wt): 31.77  Pain Score: No Pain (0)    Arina Soria CMA October 15, 2020  12:33 PM       Video-Visit Details    Type of service:  Video Visit    Video Start Time: 1 pm   Video End Time: 1: 25 pm    Originating Location (pt. Location): Home    Distant Location (provider location):  [facility] BLOOD AND MARROW TRANSPLANT " PROGRAM [employer]     Platform used for Video Visit: [facility]    Diagnosis:   1. 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     Treatments:   1. 7+3 (Dauno), 3/15/19, no response, Day 12 persistent disease with near packed marrow  2. Dec 10d+Ric 3/26/19, CR1 on 4/23/19 but with pos mutations (IDH1 17%, RUNX1 8%)  3. 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   4. Maintenance ALT-803 study, 1 dose, stopped due to profound fatigue, moderate to severe hypotension, and fever    PMH: HLP, BPH, DJD, RLS    Interval history: 10-point ROS negative today. He had n/v 3 weeks ago, likely food poisoning, all resolved.       Lab Results   Component Value Date    WBC 6.7 10/14/2020    HGB 13.5 10/14/2020    HCT 40.1 10/14/2020     10/14/2020     10/14/2020    POTASSIUM 3.9 10/14/2020    CHLORIDE 109 10/14/2020    CO2 24 10/14/2020    BUN 10 10/14/2020    CR 0.82 10/14/2020     (H) 10/14/2020    SED 88 (H) 06/25/2019    NTBNPI 2,031 (H) 04/03/2019    TROPI <0.015 04/23/2019    AST 19 10/14/2020    ALT 35 10/14/2020    ALKPHOS 90 10/14/2020    BILITOTAL 0.4 10/14/2020    INR 1.00 09/26/2019     Current Outpatient Medications   Medication     acetaminophen (TYLENOL) 325 MG tablet     cyclobenzaprine (FLEXERIL) 5 MG tablet     melatonin 3 MG tablet     ondansetron (ZOFRAN-ODT) 8 MG ODT tab     pramox-pe-glycerin-petrolatum (PREPARATION H) 1-0.25-14.4-15 % CREA cream     prochlorperazine (COMPAZINE) 5 MG tablet     rOPINIRole (REQUIP) 0.25 MG tablet     tamsulosin (FLOMAX) 0.4 MG capsule     triamcinolone (KENALOG) 0.1 % external cream     No current facility-administered medications for this visit.      A&P:   1. AML: 1y4m post-haplo, CR. See us every 2 months here until 2y.    2. ID: off abx, flu shot (if he didn't get it here last time, he will get locally; we will figure out today if he got it  here).     3. GVHD:  None, off IST.         Sincerely,        Soto Brown MD

## 2020-11-20 DIAGNOSIS — C95.00 ACUTE LEUKEMIA NOT HAVING ACHIEVED REMISSION (H): ICD-10-CM

## 2020-11-20 RX ORDER — TAMSULOSIN HYDROCHLORIDE 0.4 MG/1
0.4 CAPSULE ORAL DAILY
Qty: 30 CAPSULE | Refills: 11 | Status: SHIPPED | OUTPATIENT
Start: 2020-11-20 | End: 2021-04-19

## 2020-12-09 NOTE — PROGRESS NOTES
[facility] BMT [facility]    Diagnosis:   1. 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     Treatments:   1. 7+3 (Dauno), 3/15/19, no response, Day 12 persistent disease with near packed marrow  2. Dec 10d+Ric 3/26/19, CR1 on 19 but with pos mutations (IDH1 17%, RUNX1 8%)  3. VELMA haplo 19 from son,  CMV neg to pos, A to A.   4. Maintenance ALT-803 study, 1 dose, stopped due to profound fatigue, moderate to severe hypotension, and fever    PMH: HLP, BPH, DJD, RLS    Interval history: 10-point ROS negative.    Ph/E:  /72   Pulse 72   Temp 97.8  F (36.6  C) (Oral)   Resp 18   Wt 100.3 kg (221 lb 1.6 oz)   SpO2 96%   BMI 32.18 kg/m    ECO  General: NAD; H&N: no mucosal lesions; Lungs clear; Heart RRR; Abdomen; Soft, No organomegaly; Extremities: No edema; Skin: no rash; Neuro: Nonfocal; Mood/Affect: appropriate; Lymph: no LAD     Lab Results   Component Value Date    WBC 7.0 12/10/2020    HGB 13.5 12/10/2020    HCT 39.9 (L) 12/10/2020     12/10/2020     10/14/2020    POTASSIUM 3.9 10/14/2020    CHLORIDE 109 10/14/2020    CO2 24 10/14/2020    BUN 10 10/14/2020    CR 0.82 10/14/2020     (H) 10/14/2020    SED 88 (H) 2019    NTBNPI 2,031 (H) 2019    TROPI <0.015 2019    AST 19 10/14/2020    ALT 35 10/14/2020    ALKPHOS 90 10/14/2020    BILITOTAL 0.4 10/14/2020    INR 1.00 2019     Current Outpatient Medications   Medication     acetaminophen (TYLENOL) 325 MG tablet     cyclobenzaprine (FLEXERIL) 5 MG tablet     melatonin 3 MG tablet     ondansetron (ZOFRAN-ODT) 8 MG ODT tab     pramox-pe-glycerin-petrolatum (PREPARATION H) 1-0.25-14.4-15 % CREA cream     prochlorperazine (COMPAZINE) 5 MG tablet     rOPINIRole (REQUIP) 0.25 MG tablet     triamcinolone (KENALOG) 0.1 % external cream     tamsulosin (FLOMAX) 0.4 MG capsule     No current facility-administered medications for this visit.       A&P:   1. AML: 1y6m post-haplo, CR. See us every 2 months here until 2y.    2. ID: off abx, flu shot today.    3. GVHD:  None, off IST.

## 2020-12-10 ENCOUNTER — APPOINTMENT (OUTPATIENT)
Dept: LAB | Facility: CLINIC | Age: 65
End: 2020-12-10
Attending: INTERNAL MEDICINE
Payer: COMMERCIAL

## 2020-12-10 ENCOUNTER — ONCOLOGY VISIT (OUTPATIENT)
Dept: TRANSPLANT | Facility: CLINIC | Age: 65
End: 2020-12-10
Attending: INTERNAL MEDICINE
Payer: COMMERCIAL

## 2020-12-10 VITALS
DIASTOLIC BLOOD PRESSURE: 72 MMHG | RESPIRATION RATE: 18 BRPM | OXYGEN SATURATION: 96 % | WEIGHT: 221.1 LBS | HEART RATE: 72 BPM | BODY MASS INDEX: 32.18 KG/M2 | SYSTOLIC BLOOD PRESSURE: 113 MMHG | TEMPERATURE: 97.8 F

## 2020-12-10 DIAGNOSIS — C95.00 ACUTE LEUKEMIA NOT HAVING ACHIEVED REMISSION (H): Primary | ICD-10-CM

## 2020-12-10 DIAGNOSIS — Z94.84 HISTORY OF ALLOGENEIC STEM CELL TRANSPLANT (H): ICD-10-CM

## 2020-12-10 LAB
ALBUMIN SERPL-MCNC: 3.8 G/DL (ref 3.4–5)
ALP SERPL-CCNC: 71 U/L (ref 40–150)
ALT SERPL W P-5'-P-CCNC: 54 U/L (ref 0–70)
ANION GAP SERPL CALCULATED.3IONS-SCNC: 6 MMOL/L (ref 3–14)
AST SERPL W P-5'-P-CCNC: 30 U/L (ref 0–45)
BASOPHILS # BLD AUTO: 0 10E9/L (ref 0–0.2)
BASOPHILS NFR BLD AUTO: 0.4 %
BILIRUB SERPL-MCNC: 0.8 MG/DL (ref 0.2–1.3)
BUN SERPL-MCNC: 14 MG/DL (ref 7–30)
CALCIUM SERPL-MCNC: 8.4 MG/DL (ref 8.5–10.1)
CHLORIDE SERPL-SCNC: 109 MMOL/L (ref 94–109)
CO2 SERPL-SCNC: 24 MMOL/L (ref 20–32)
CREAT SERPL-MCNC: 0.78 MG/DL (ref 0.66–1.25)
DIFFERENTIAL METHOD BLD: ABNORMAL
EOSINOPHIL # BLD AUTO: 0.1 10E9/L (ref 0–0.7)
EOSINOPHIL NFR BLD AUTO: 0.7 %
ERYTHROCYTE [DISTWIDTH] IN BLOOD BY AUTOMATED COUNT: 12.8 % (ref 10–15)
GFR SERPL CREATININE-BSD FRML MDRD: >90 ML/MIN/{1.73_M2}
GLUCOSE SERPL-MCNC: 138 MG/DL (ref 70–99)
HCT VFR BLD AUTO: 39.9 % (ref 40–53)
HGB BLD-MCNC: 13.5 G/DL (ref 13.3–17.7)
IMM GRANULOCYTES # BLD: 0 10E9/L (ref 0–0.4)
IMM GRANULOCYTES NFR BLD: 0.3 %
LYMPHOCYTES # BLD AUTO: 2 10E9/L (ref 0.8–5.3)
LYMPHOCYTES NFR BLD AUTO: 28.6 %
MCH RBC QN AUTO: 32.8 PG (ref 26.5–33)
MCHC RBC AUTO-ENTMCNC: 33.8 G/DL (ref 31.5–36.5)
MCV RBC AUTO: 97 FL (ref 78–100)
MONOCYTES # BLD AUTO: 0.5 10E9/L (ref 0–1.3)
MONOCYTES NFR BLD AUTO: 6.8 %
NEUTROPHILS # BLD AUTO: 4.4 10E9/L (ref 1.6–8.3)
NEUTROPHILS NFR BLD AUTO: 63.2 %
NRBC # BLD AUTO: 0 10*3/UL
NRBC BLD AUTO-RTO: 0 /100
PLATELET # BLD AUTO: 187 10E9/L (ref 150–450)
POTASSIUM SERPL-SCNC: 3.7 MMOL/L (ref 3.4–5.3)
PROT SERPL-MCNC: 6.9 G/DL (ref 6.8–8.8)
RBC # BLD AUTO: 4.11 10E12/L (ref 4.4–5.9)
SODIUM SERPL-SCNC: 140 MMOL/L (ref 133–144)
WBC # BLD AUTO: 7 10E9/L (ref 4–11)

## 2020-12-10 PROCEDURE — 250N000011 HC RX IP 250 OP 636: Performed by: INTERNAL MEDICINE

## 2020-12-10 PROCEDURE — 99214 OFFICE O/P EST MOD 30 MIN: CPT | Performed by: INTERNAL MEDICINE

## 2020-12-10 PROCEDURE — G0008 ADMIN INFLUENZA VIRUS VAC: HCPCS | Performed by: INTERNAL MEDICINE

## 2020-12-10 PROCEDURE — 90662 IIV NO PRSV INCREASED AG IM: CPT | Performed by: INTERNAL MEDICINE

## 2020-12-10 PROCEDURE — G0463 HOSPITAL OUTPT CLINIC VISIT: HCPCS

## 2020-12-10 PROCEDURE — G0463 HOSPITAL OUTPT CLINIC VISIT: HCPCS | Mod: 25

## 2020-12-10 PROCEDURE — 85025 COMPLETE CBC W/AUTO DIFF WBC: CPT | Performed by: INTERNAL MEDICINE

## 2020-12-10 PROCEDURE — 36415 COLL VENOUS BLD VENIPUNCTURE: CPT

## 2020-12-10 PROCEDURE — 80053 COMPREHEN METABOLIC PANEL: CPT | Performed by: INTERNAL MEDICINE

## 2020-12-10 RX ADMIN — INFLUENZA A VIRUS A/MICHIGAN/45/2015 X-275 (H1N1) ANTIGEN (FORMALDEHYDE INACTIVATED), INFLUENZA A VIRUS A/SINGAPORE/INFIMH-16-0019/2016 IVR-186 (H3N2) ANTIGEN (FORMALDEHYDE INACTIVATED), INFLUENZA B VIRUS B/PHUKET/3073/2013 ANTIGEN (FORMALDEHYDE INACTIVATED), AND INFLUENZA B VIRUS B/MARYLAND/15/2016 BX-69A ANTIGEN (FORMALDEHYDE INACTIVATED) 0.7 ML: 60; 60; 60; 60 INJECTION, SUSPENSION INTRAMUSCULAR at 14:49

## 2020-12-10 ASSESSMENT — PAIN SCALES - GENERAL: PAINLEVEL: NO PAIN (0)

## 2020-12-10 NOTE — NURSING NOTE
"Oncology Rooming Note    December 10, 2020 2:18 PM   El Ace is a 65 year old male who presents for:    Chief Complaint   Patient presents with     Blood Draw     Labs drawn via  by RN in lab. VS taken.      Oncology Clinic Visit     Patient with Acute Leukemia here for provider visit      Initial Vitals: /72   Pulse 72   Temp 97.8  F (36.6  C) (Oral)   Resp 18   Wt 100.3 kg (221 lb 1.6 oz)   SpO2 96%   BMI 32.18 kg/m   Estimated body mass index is 32.18 kg/m  as calculated from the following:    Height as of 5/28/20: 1.765 m (5' 9.5\").    Weight as of this encounter: 100.3 kg (221 lb 1.6 oz). Body surface area is 2.22 meters squared.  No Pain (0) Comment: Data Unavailable   No LMP for male patient.  Allergies reviewed: Yes  Medications reviewed: Yes    Medications: Medication refills not needed today.  Pharmacy name entered into [employer]:    [facility] - [place], [place] - [phone]  [facility] [place], [place] - [address] [place] [address] [id number]  [facility] - [place], [place] - [address] [place] [address] PHARM[id number] [place] [address] - [address]    Clinical concerns:       Anna Ferrell CMA              "

## 2021-02-16 NOTE — PROGRESS NOTES
[facility] BMT Clinic    Diagnosis:   1. 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     Treatments:   1. 7+3 (Dauno), 3/15/19, no response, Day 12 persistent disease with near packed marrow  2. Dec 10d+Ric 3/26/19, CR1 on 4/23/19 but with pos mutations (IDH1 17%, RUNX1 8%)  3. 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   4. Maintenance ALT-803 study, 1 dose, stopped due to profound fatigue, moderate to severe hypotension, and fever    PMH: HLP, BPH, DJD, RLS    Interval history: ***10-point ROS otherwise negative.    Lab Results   Component Value Date    WBC 7.0 12/10/2020    HGB 13.5 12/10/2020    HCT 39.9 (L) 12/10/2020     12/10/2020     12/10/2020    POTASSIUM 3.7 12/10/2020    CHLORIDE 109 12/10/2020    CO2 24 12/10/2020    BUN 14 12/10/2020    CR 0.78 12/10/2020     (H) 12/10/2020    SED 88 (H) 06/25/2019    NTBNPI 2,031 (H) 04/03/2019    TROPI <0.015 04/23/2019    AST 30 12/10/2020    ALT 54 12/10/2020    ALKPHOS 71 12/10/2020    BILITOTAL 0.8 12/10/2020    INR 1.00 09/26/2019     Current Outpatient Medications   Medication     acetaminophen (TYLENOL) 325 MG tablet     cyclobenzaprine (FLEXERIL) 5 MG tablet     melatonin 3 MG tablet     ondansetron (ZOFRAN-ODT) 8 MG ODT tab     pramox-pe-glycerin-petrolatum (PREPARATION H) 1-0.25-14.4-15 % CREA cream     prochlorperazine (COMPAZINE) 5 MG tablet     rOPINIRole (REQUIP) 0.25 MG tablet     triamcinolone (KENALOG) 0.1 % external cream     tamsulosin (FLOMAX) 0.4 MG capsule     No current facility-administered medications for this visit.      A&P:   1. AML: 1y8m post-haplo, CR. See us every 2 months here until 2y.    2. ID: off abx, flu shot utd. Can get covid vaccine***   3. GVHD:  None, off IST.

## 2021-02-17 VITALS
HEART RATE: 70 BPM | BODY MASS INDEX: 33.04 KG/M2 | SYSTOLIC BLOOD PRESSURE: 128 MMHG | WEIGHT: 227 LBS | TEMPERATURE: 97.6 F | RESPIRATION RATE: 18 BRPM | OXYGEN SATURATION: 97 % | DIASTOLIC BLOOD PRESSURE: 80 MMHG

## 2021-02-17 DIAGNOSIS — Z94.84 HISTORY OF ALLOGENEIC STEM CELL TRANSPLANT (H): ICD-10-CM

## 2021-02-17 LAB
ALBUMIN SERPL-MCNC: 3.8 G/DL (ref 3.4–5)
ALP SERPL-CCNC: 73 U/L (ref 40–150)
ALT SERPL W P-5'-P-CCNC: 50 U/L (ref 0–70)
ANION GAP SERPL CALCULATED.3IONS-SCNC: 4 MMOL/L (ref 3–14)
AST SERPL W P-5'-P-CCNC: 26 U/L (ref 0–45)
BASOPHILS # BLD AUTO: 0 10E9/L (ref 0–0.2)
BASOPHILS NFR BLD AUTO: 0.4 %
BILIRUB SERPL-MCNC: 0.6 MG/DL (ref 0.2–1.3)
BUN SERPL-MCNC: 14 MG/DL (ref 7–30)
CALCIUM SERPL-MCNC: 8.4 MG/DL (ref 8.5–10.1)
CHLORIDE SERPL-SCNC: 109 MMOL/L (ref 94–109)
CO2 SERPL-SCNC: 29 MMOL/L (ref 20–32)
CREAT SERPL-MCNC: 0.78 MG/DL (ref 0.66–1.25)
DIFFERENTIAL METHOD BLD: ABNORMAL
EOSINOPHIL # BLD AUTO: 0.1 10E9/L (ref 0–0.7)
EOSINOPHIL NFR BLD AUTO: 0.7 %
ERYTHROCYTE [DISTWIDTH] IN BLOOD BY AUTOMATED COUNT: 12.3 % (ref 10–15)
GFR SERPL CREATININE-BSD FRML MDRD: >90 ML/MIN/{1.73_M2}
GLUCOSE SERPL-MCNC: 106 MG/DL (ref 70–99)
HCT VFR BLD AUTO: 42 % (ref 40–53)
HGB BLD-MCNC: 14 G/DL (ref 13.3–17.7)
IMM GRANULOCYTES # BLD: 0 10E9/L (ref 0–0.4)
IMM GRANULOCYTES NFR BLD: 0.4 %
LYMPHOCYTES # BLD AUTO: 2 10E9/L (ref 0.8–5.3)
LYMPHOCYTES NFR BLD AUTO: 24.8 %
MCH RBC QN AUTO: 32.6 PG (ref 26.5–33)
MCHC RBC AUTO-ENTMCNC: 33.3 G/DL (ref 31.5–36.5)
MCV RBC AUTO: 98 FL (ref 78–100)
MONOCYTES # BLD AUTO: 0.7 10E9/L (ref 0–1.3)
MONOCYTES NFR BLD AUTO: 8.7 %
NEUTROPHILS # BLD AUTO: 5.3 10E9/L (ref 1.6–8.3)
NEUTROPHILS NFR BLD AUTO: 65 %
NRBC # BLD AUTO: 0 10*3/UL
NRBC BLD AUTO-RTO: 0 /100
PLATELET # BLD AUTO: 185 10E9/L (ref 150–450)
POTASSIUM SERPL-SCNC: 4.1 MMOL/L (ref 3.4–5.3)
PROT SERPL-MCNC: 7.1 G/DL (ref 6.8–8.8)
RBC # BLD AUTO: 4.29 10E12/L (ref 4.4–5.9)
SODIUM SERPL-SCNC: 142 MMOL/L (ref 133–144)
WBC # BLD AUTO: 8.2 10E9/L (ref 4–11)

## 2021-02-17 PROCEDURE — 85025 COMPLETE CBC W/AUTO DIFF WBC: CPT | Performed by: INTERNAL MEDICINE

## 2021-02-17 PROCEDURE — 80053 COMPREHEN METABOLIC PANEL: CPT | Performed by: INTERNAL MEDICINE

## 2021-02-17 ASSESSMENT — PAIN SCALES - GENERAL: PAINLEVEL: NO PAIN (0)

## 2021-02-17 NOTE — PLAN OF CARE
Afebrile; VSS.  No c/o nausea.  C/o pain; abdominal, stomach and back pain.  Gave tums and tylenol for pain.  No further complaints.  Pt had one BM yesterday.  Mg replaced and will need a recheck at 0930.  No other replacements needed from morning labs.  Continue with plan of cares.     Problem: Adult Inpatient Plan of Care  Goal: Plan of Care Review  6/13/2019 0520 by Sara Morillo RN  Outcome: No Change     Problem: Adult Inpatient Plan of Care  Goal: Patient-Specific Goal (Individualization)  6/13/2019 0520 by Sara Morillo RN  Outcome: No Change     Problem: Adult Inpatient Plan of Care  Goal: Absence of Hospital-Acquired Illness or Injury  Outcome: No Change     Problem: Adult Inpatient Plan of Care  Goal: Optimal Comfort and Wellbeing  6/13/2019 0520 by Sara Morillo RN  Outcome: No Change     Problem: Adult Inpatient Plan of Care  Goal: Readiness for Transition of Care  Outcome: No Change     Problem: Adult Inpatient Plan of Care  Goal: Rounds/Family Conference  Outcome: No Change     Problem: Adjustment to Transplant (Stem Cell/Bone Marrow Transplant)  Goal: Optimal Coping with Transplant  Outcome: No Change     Problem: Bladder Irritation (Stem Cell/Bone Marrow Transplant)  Goal: Symptom-Free Urinary Elimination  Outcome: No Change     Problem: Diarrhea (Stem Cell/Bone Marrow Transplant)  Goal: Diarrhea Symptom Control  6/13/2019 0520 by Sara Morillo RN  Outcome: No Change     Problem: Fatigue (Stem Cell/Bone Marrow Transplant)  Goal: Energy Level Supports Daily Activity  Outcome: No Change     Problem: Hematologic Alteration (Stem Cell/Bone Marrow Transplant)  Goal: Blood Counts Within Acceptable Range  6/13/2019 0520 by Sara Morillo RN  Outcome: No Change     Problem: Hypersensitivity Reaction (Stem Cell/Bone Marrow Transplant)  Goal: Absence of Hypersensitivity Reaction  Outcome: No Change     Problem: Infection Risk (Stem Cell/Bone Marrow Transplant)  Goal: Absence  Patient had 19 beats of V-tach, asymptomatic.  notified, no new orders. Assisted back to chair with walker and 2 assist. Pad alarm on. Evening meds given, medicated with tylenol for RLE pain.   of Infection Signs/Symptoms  Outcome: No Change     Problem: Mucositis (Stem Cell/Bone Marrow Transplant)  Goal: Mucous Membrane Health and Integrity  Outcome: No Change     Problem: Nausea and Vomiting (Stem Cell/Bone Marrow Transplant)  Goal: Nausea and Vomiting Symptom Relief  6/13/2019 0520 by Sara Morillo RN  Outcome: No Change     Problem: Nutrition Intake Altered (Stem Cell/Bone Marrow Transplant)  Goal: Optimal Nutrition Intake  Outcome: No Change

## 2021-02-18 ENCOUNTER — VIRTUAL VISIT (OUTPATIENT)
Dept: TRANSPLANT | Facility: CLINIC | Age: 66
End: 2021-02-18
Attending: INTERNAL MEDICINE
Payer: COMMERCIAL

## 2021-02-18 DIAGNOSIS — Z94.84 HISTORY OF ALLOGENEIC STEM CELL TRANSPLANT (H): Primary | ICD-10-CM

## 2021-02-18 PROCEDURE — 99213 OFFICE O/P EST LOW 20 MIN: CPT | Mod: 95 | Performed by: INTERNAL MEDICINE

## 2021-02-18 NOTE — PROGRESS NOTES
Video didn't work and we switched to phone.  Start: 2:30 pm, end: 2:50 pm, inc. Review of results and discussing follow up and vaccination.      [facility] B[facility]    Diagnosis:   1. 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     Treatments:   1. 7+3 (Dauno), 3/15/19, no response, Day 12 persistent disease with near packed marrow  2. Dec 10d+Ric 3/26/19, CR1 on 4/23/19 but with pos mutations (IDH1 17%, RUNX1 8%)  3. 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   4. Maintenance ALT-803 study, 1 dose, stopped due to profound fatigue, moderate to severe hypotension, and fever    PMH: HLP, BPH, DJD, RLS    Interval history: 10-point ROS negative.    Lab Results   Component Value Date    WBC 7.0 12/10/2020    HGB 13.5 12/10/2020    HCT 39.9 (L) 12/10/2020     12/10/2020     12/10/2020    POTASSIUM 3.7 12/10/2020    CHLORIDE 109 12/10/2020    CO2 24 12/10/2020    BUN 14 12/10/2020    CR 0.78 12/10/2020     (H) 12/10/2020    SED 88 (H) 06/25/2019    NTBNPI 2,031 (H) 04/03/2019    TROPI <0.015 04/23/2019    AST 30 12/10/2020    ALT 54 12/10/2020    ALKPHOS 71 12/10/2020    BILITOTAL 0.8 12/10/2020    INR 1.00 09/26/2019     Current Outpatient Medications   Medication     acetaminophen (TYLENOL) 325 MG tablet     cyclobenzaprine (FLEXERIL) 5 MG tablet     melatonin 3 MG tablet     ondansetron (ZOFRAN-ODT) 8 MG ODT tab     pramox-pe-glycerin-petrolatum (PREPARATION H) 1-0.25-14.4-15 % CREA cream     prochlorperazine (COMPAZINE) 5 MG tablet     rOPINIRole (REQUIP) 0.25 MG tablet     triamcinolone (KENALOG) 0.1 % external cream     tamsulosin (FLOMAX) 0.4 MG capsule     No current facility-administered medications for this visit.      A&P:   1. AML: 1y8m post-haplo, CR. See us every 2 months here until 2y.    2. ID: off abx, flu shot utd. Can get covid vaccine.    3. GVHD:  None, off IST.

## 2021-02-18 NOTE — LETTER
2/18/2021         RE: El Ace  [address] [facility] [address]        Dear Colleague,    Thank you for referring your patient, El Ace, to the [facility] BLOOD AND MARROW TRANSPLANT PROGRAM [place]. Please see a copy of my visit note below.    Video didn't work and we switched to phone.  Start: 2:30 pm, end: 2:50 pm, inc. Review of results and discussing follow up and vaccination.      [facility] BMT [facility]    Diagnosis:   1. 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     Treatments:   1. 7+3 (Dauno), 3/15/19, no response, Day 12 persistent disease with near packed marrow  2. Dec 10d+Ric 3/26/19, CR1 on 4/23/19 but with pos mutations (IDH1 17%, RUNX1 8%)  3. 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   4. Maintenance ALT-803 study, 1 dose, stopped due to profound fatigue, moderate to severe hypotension, and fever    PMH: HLP, BPH, DJD, RLS    Interval history: 10-point ROS negative.    Lab Results   Component Value Date    WBC 7.0 12/10/2020    HGB 13.5 12/10/2020    HCT 39.9 (L) 12/10/2020     12/10/2020     12/10/2020    POTASSIUM 3.7 12/10/2020    CHLORIDE 109 12/10/2020    CO2 24 12/10/2020    BUN 14 12/10/2020    CR 0.78 12/10/2020     (H) 12/10/2020    SED 88 (H) 06/25/2019    NTBNPI 2,031 (H) 04/03/2019    TROPI <0.015 04/23/2019    AST 30 12/10/2020    ALT 54 12/10/2020    ALKPHOS 71 12/10/2020    BILITOTAL 0.8 12/10/2020    INR 1.00 09/26/2019     Current Outpatient Medications   Medication     acetaminophen (TYLENOL) 325 MG tablet     cyclobenzaprine (FLEXERIL) 5 MG tablet     melatonin 3 MG tablet     ondansetron (ZOFRAN-ODT) 8 MG ODT tab     pramox-pe-glycerin-petrolatum (PREPARATION H) 1-0.25-14.4-15 % CREA cream     prochlorperazine (COMPAZINE) 5 MG tablet     rOPINIRole (REQUIP) 0.25 MG tablet     triamcinolone (KENALOG) 0.1 % external cream     tamsulosin  (FLOMAX) 0.4 MG capsule     No current facility-administered medications for this visit.      A&P:   1. AML: 1y8m post-haplo, CR. See us every 2 months here until 2y.    2. ID: off abx, flu shot utd. Can get covid vaccine.    3. GVHD:  None, off IST.         Again, thank you for allowing me to participate in the care of your patient.        Sincerely,        Soto Brown MD

## 2021-03-09 DIAGNOSIS — C92.01 AML (ACUTE MYELOID LEUKEMIA) IN REMISSION (H): Primary | ICD-10-CM

## 2021-04-12 NOTE — PROGRESS NOTES
"[facility]  Rheumatology Clinic  Jaziel Olivier MD--remote  2021     Name: El Ace  MRN: [id number]  Age: 65 year old  : 1955  Referring provider: Bre Vizcaino     Assessment and Plan:  # CPPD/pseudogout diagnosed summer 2019 with \"crowned dens\" and left wrist synovitis:  Patient relates no recurrence of episodic inflammatory oligoarthritis. Laboratory evaluation on 2021 showed comprehensive metabolic panel, CBC, and glucose all normal or negative.    Pseudogout is quiescent. We discussed the unpredictable nature of pseudogout and the propensity to flare inflammatory joints such as the wrists, elbows, shoulders, knees, and ankles. I recommend the patient keep on hand a course of prednisone that he may use for abortive therapy at the first sign of recurrent inflammatory arthritis in one of those joints. He may then complete a 4-day course of prednisone with 2 days 20mg daily and 15mg for 2 days then off. I strongly urged him to contact our office at the same time as he begins a course of prednisone to confirm the nature of symptoms and arthritis. Otherwise, no chronic therapy will be needed for management of pseudogout.    #Chronic, intermittent sole of foot pain, right greater than left: No symptoms or signs of inflammation attend chronic foot discomfort.  I doubt that crystalline arthropathy is the cause.  Ligamentous injury versus stress fracture may be considered.  I recommend plain x-ray of the right foot, followed by podiatry consultation.    # Acute myeloid leukemia s/p allogeneic BMT 2019: followed by     I spent >50% of this 35 minute encounter in counseling and coordination of care regarding the patient's complex medical problem of pseudogout and chronic right foot pain.    Follow-up: RTC prn    Jaziel Olivier MD  Staff Rheumatologist, [facility]    HPI: Mr Ace has a history of AML s/p BMT and CPPD who presents for follow-up.  He was last seen on April " 10, 2020.  At that time, he related no recurrence of acute severe joint pain that led to a diagnosis of pseudogout in fall 2019.    Interval history 04-:    He has not had recurrence of severe joint pain similar to that which he had at the time of hospitalization in Fall 2019.  No recurrence of the left wrist pain that formerly plagued him. He has used no prednisone since last visit. He is back at work, he is active physically. Appetite is good.     He does have knee pain with prolonged exertion.    He has chronic long-standing foot pain that was present before hospitalization in 2019. Pain is episodic, hard to localize, but often near the heel. R foot pain is not associated with swelling; it is intermittent. Not always associated with walking. No warmth, redness, or swelling associated. He does exercises prescribed by his spouse; he notes modest improvement, but pain does not completely resolve.    He was seen by oncology in February 2021 in follow-up of bone marrow transplant.  Impression was of acute myelogenous leukemia, in complete remission after BMT in 2019. He will be gettting a BMBx 2 years after transplant.    Interval history 4-10-20:    He has not had recurrence of severe joint pain similar to that which he had at the time of hospitalization in Fall 2019. He does have knee pain with prolonged exertion (> 3 mile walks some days). No recurrence of the left wrist pain that formerly plagued him, in recent months after an epidose of L wrist pain several months ago. Attempts at joint aspiration failed on that occasion; he had improvement after brief exposure to prednisone. He has used no prednisone since then.  Patient saw oncology in late March 2020 in follow-up of acute myelogenous leukemia.  Patient was judged to be in remission at day 285 status post hematopoietic cell 7 stem cell transplant.    Prior history 1-20:    Today, he reports no recent pseudogout flares or joint pain. Left wrist pain has  improved and he does not localize any pinpoint pain. He does report worsening symptoms of headaches that keep him awake at night. Localizes headaches on the posterior aspect of his head. Spinal tap was performed one week ago. He also reports symptoms of fatigue. He does report some foot pain localized near the instep that occurs while walking. Otherwise, he has no other concerns.    Review of Systems:   Pertinent items are noted in HPI or as below, remainder of complete ROS is negative.      No recent problems with hearing or vision. No swallowing problems.   No breathing difficulty, shortness of breath, coughing, or wheezing  No chest pain or palpitations  No heart burn, indigestion, abdominal pain, nausea, vomiting, diarrhea  No urination problems, no bloody, cloudy urine, no dysuria  No numbing, tingling, weakness  No headaches or confusion  No rashes. No easy bleeding or bruising.     Active Medications:   Current Outpatient Medications:      acetaminophen (TYLENOL) 325 MG tablet, Take 2 tablets (650 mg) by mouth every 4 hours as needed for mild pain or fever (pre-med before blood products), Disp: , Rfl:      acyclovir (ZOVIRAX) 800 MG tablet, Take 1 tablet (800 mg) by mouth 5 times daily, Disp: 150 tablet, Rfl: 3     cyclobenzaprine (FLEXERIL) 5 MG tablet, Take 2 tablets (10 mg) by mouth 3 times daily as needed for muscle spasms, Disp: 30 tablet, Rfl: 0     magnesium oxide (MAG-OX) 400 MG tablet, Take 3 tablets (1,200 mg) by mouth 2 times daily, Disp: 180 tablet, Rfl: 1     melatonin 3 MG tablet, Take 1-2 tablets (3-6 mg) by mouth nightly as needed for sleep, Disp: 60 tablet, Rfl: 1     ondansetron (ZOFRAN-ODT) 8 MG ODT tab, Take 1 tablet (8 mg) by mouth every 8 hours, Disp: 100 tablet, Rfl: 0     oxyCODONE (ROXICODONE) 5 MG tablet, Take 1 tablet (5 mg) by mouth every 6 hours as needed for severe pain, Disp: 30 tablet, Rfl: 0     pramox-pe-glycerin-petrolatum (PREPARATION H) 1-0.25-14.4-15 % CREA cream, Place  rectally 3 times daily as needed for hemorrhoids, Disp: , Rfl:      predniSONE (DELTASONE) 5 MG tablet, At the first sign of pseudogout attack, use 4 tabs. Take 4 tabs for 2 day, then 3 tabs for 2 days, then off., Disp: 15 tablet, Rfl: 2     prochlorperazine (COMPAZINE) 5 MG tablet, Take 1-2 tablets (5-10 mg) by mouth every 6 hours as needed for nausea or vomiting, Disp: 30 tablet, Rfl: 1     sulfamethoxazole-trimethoprim (BACTRIM DS/SEPTRA DS) 800-160 MG tablet, Take 1 tablet by mouth Every Mon, Tues two times daily Start taking after day+28 (7/1) and instructed to so by BMT clinic., Disp: 16 tablet, Rfl: 1     tacrolimus (GENERIC EQUIVALENT) 1 mg/mL suspension, Take by mouth as directed according to taper calendar (Patient taking differently: Take by mouth as directed according to taper calendar. As of 10/11/19 0.8 in AM, and 0.9 in PM), Disp: 100 mL, Rfl: 0     tamsulosin (FLOMAX) 0.4 MG capsule, Take 1 capsule (0.4 mg) by mouth daily, Disp: 30 capsule, Rfl: 11     triamcinolone (KENALOG) 0.1 % external cream, Apply topically 2 times daily (Patient taking differently: Apply topically as needed ), Disp: 453 g, Rfl: 1     voriconazole (VFEND) 50 MG tablet, Take 3 tablets (150 mg) by mouth every 12 hours, Disp: 180 tablet, Rfl: 1     tacrolimus (GENERIC EQUIVALENT) 0.5 MG capsule, Take 2 capsules (1 mg) by mouth 2 times daily (Patient not taking: Reported on 10/11/2019), Disp: 60 capsule, Rfl: 3     triamcinolone (KENALOG) 0.1 % external ointment, Apply topically 2 times daily (Patient not taking: Reported on 10/11/2019), Disp: 80 g, Rfl: 0    Current Facility-Administered Medications:      lidocaine (PF) (XYLOCAINE) 1 % injection 3 mL, 3 mL, , , Emiliano Dumont MD, 3 mL at 08/12/19 2019     zoster vaccine recombinant adjuvanted (SHINGRIX) injection 0.5 mL, 0.5 mL, Intramuscular, Once, Jaziel Olivier MD      Allergies:   Polymyxin b; Lactose; Vancomycin; and Vancomycin      Past Medical History:  Acute  leukemia  Acute myeloid leukemia in remission  Septic shock  Primary osteoarthritis of left hip  Generalized anxiety disorder  Family history of malignant neoplasm of gastrointestinal tract  Esophageal reflux  Dysthymic disorder  Attention deficit hyperactivity disorder (ADHD)  Neutropenia with fever  AML (acute myeloid leukemia) in remission  History of allogeneic stem cell transplant     Past Surgical History:  Knee arthroscopy; 1995  Bilateral carpal tunnel release; left in 2000, right in 2014  Colonoscopy; 7826981  IR lumbar puncture; 6/24/2019  Laminectomy; 1975  Picc insertion; 3/14/2019  Vasectomy; 2002    Family History:   Mother: Colon cancer, cerebrovascular disease, diabetes  Father: Septicemia      Social History:   Former smoker  No current alcohol use     Physical Exam:   There were no vitals taken for this visit.   Wt Readings from Last 4 Encounters:   02/17/21 103 kg (227 lb)   12/10/20 100.3 kg (221 lb 1.6 oz)   10/14/20 96.4 kg (212 lb 9.6 oz)   08/06/20 100.7 kg (221 lb 14.4 oz)     Constitutional: Well-developed, appearing stated age; cooperative  Eyes: Normal EOM, PERRLA, vision, conjunctiva, sclera  ENT: Normal external ears, nose, hearing, lips, teeth, gums  Psych: Normal judgement, orientation, memory, affect.     Laboratory:   RHEUM RESULTS Latest Ref Rng & Units 10/14/2020 12/10/2020 2/17/2021   SED RATE 0 - 20 mm/h - - -   CRP, INFLAMMATION 0.0 - 8.0 mg/L - - -   CK TOTAL 30 - 300 U/L - - -   AST 0 - 45 U/L 19 30 26   ALT 0 - 70 U/L 35 54 50   ALBUMIN 3.4 - 5.0 g/dL 3.6 3.8 3.8   WBC 4.0 - 11.0 10e9/L 6.7 7.0 8.2   RBC 4.4 - 5.9 10e12/L 4.16(L) 4.11(L) 4.29(L)   HGB 13.3 - 17.7 g/dL 13.5 13.5 14.0   HCT 40.0 - 53.0 % 40.1 39.9(L) 42.0   MCV 78 - 100 fl 96 97 98   MCHC 31.5 - 36.5 g/dL 33.7 33.8 33.3   RDW 10.0 - 15.0 % 12.2 12.8 12.3    - 450 10e9/L 203 187 185   CREATININE 0.66 - 1.25 mg/dL 0.82 0.78 0.78   GFR ESTIMATE, IF BLACK >60 mL/min/[1.73:m2] >90 >90 >90   GFR ESTIMATE >60  mL/min/[1.73:m2] >90 >90 >90   HEPATITIS C ANTIBODY NR:Nonreactive - - -         Hepatitis B Core Olinda   Date Value Ref Range Status   05/10/2019 Nonreactive NR^Nonreactive Final     Hep B Surface Agn   Date Value Ref Range Status   05/10/2019 Nonreactive NR^Nonreactive Final     Randy is a 65 year old who is being evaluated via a billable video visit.      How would you like to obtain your AVS? MyChart    Will anyone else be joining your video visit? No      Video Start Time: 2:20 PM  Video-Visit Details    Type of service:  Video Visit    Video End Time:3:00 PM    Originating Location (pt. Location): Home    Distant Location (provider location):  [facility] RHEUMATOLOGY CLINIC [place]     Platform used for Video Visit: [employer]

## 2021-04-13 ENCOUNTER — VIRTUAL VISIT (OUTPATIENT)
Dept: RHEUMATOLOGY | Facility: CLINIC | Age: 66
End: 2021-04-13
Attending: INTERNAL MEDICINE
Payer: COMMERCIAL

## 2021-04-13 DIAGNOSIS — Z87.39 HISTORY OF CALCIUM PYROPHOSPHATE DEPOSITION DISEASE (CPPD): Primary | ICD-10-CM

## 2021-04-13 PROCEDURE — 99214 OFFICE O/P EST MOD 30 MIN: CPT | Mod: 95 | Performed by: INTERNAL MEDICINE

## 2021-04-13 ASSESSMENT — PAIN SCALES - GENERAL: PAINLEVEL: NO PAIN (0)

## 2021-04-13 NOTE — LETTER
"2021       RE: El Ace  [address]  [facility] [address]     Dear Colleague,    Thank you for referring your patient, El Ace, to the [facility] RHEUMATOLOGY CLINIC [place] at [facility]. Please see a copy of my visit note below.        [facility]  Rheumatology Clinic  Jaziel Olivier MD--remote  2021     Name: El Ace  MRN: [id number]  Age: 65 year old  : 1955  Referring provider: Bre Vizcaino     Assessment and Plan:  # CPPD/pseudogout diagnosed summer 2019 with \"crowned dens\" and left wrist synovitis:  Patient relates no recurrence of episodic inflammatory oligoarthritis. Laboratory evaluation on 2021 showed comprehensive metabolic panel, CBC, and glucose all normal or negative.    Pseudogout is quiescent. We discussed the unpredictable nature of pseudogout and the propensity to flare inflammatory joints such as the wrists, elbows, shoulders, knees, and ankles. I recommend the patient keep on hand a course of prednisone that he may use for abortive therapy at the first sign of recurrent inflammatory arthritis in one of those joints. He may then complete a 4-day course of prednisone with 2 days 20mg daily and 15mg for 2 days then off. I strongly urged him to contact our office at the same time as he begins a course of prednisone to confirm the nature of symptoms and arthritis. Otherwise, no chronic therapy will be needed for management of pseudogout.    #Chronic, intermittent sole of foot pain, right greater than left: No symptoms or signs of inflammation attend chronic foot discomfort.  I doubt that crystalline arthropathy is the cause.  Ligamentous injury versus stress fracture may be considered.  I recommend plain x-ray of the right foot, followed by podiatry consultation.    # Acute myeloid leukemia s/p allogeneic BMT 2019: followed by     I spent >50% of this 35 minute encounter in " counseling and coordination of care regarding the patient's complex medical problem of pseudogout and chronic right foot pain.    Follow-up: RTC prn    Jaziel Olivier MD  Staff Rheumatologist, [facility]: Mr Ace has a history of AML s/p BMT and CPPD who presents for follow-up.  He was last seen on April 10, 2020.  At that time, he related no recurrence of acute severe joint pain that led to a diagnosis of pseudogout in fall 2019.    Interval history 04-:    He has not had recurrence of severe joint pain similar to that which he had at the time of hospitalization in Fall 2019.  No recurrence of the left wrist pain that formerly plagued him. He has used no prednisone since last visit. He is back at work, he is active physically. Appetite is good.     He does have knee pain with prolonged exertion.    He has chronic long-standing foot pain that was present before hospitalization in 2019. Pain is episodic, hard to localize, but often near the heel. R foot pain is not associated with swelling; it is intermittent. Not always associated with walking. No warmth, redness, or swelling associated. He does exercises prescribed by his spouse; he notes modest improvement, but pain does not completely resolve.    He was seen by oncology in February 2021 in follow-up of bone marrow transplant.  Impression was of acute myelogenous leukemia, in complete remission after BMT in 2019. He will be gettting a BMBx 2 years after transplant.    Interval history 4-10-20:    He has not had recurrence of severe joint pain similar to that which he had at the time of hospitalization in Fall 2019. He does have knee pain with prolonged exertion (> 3 mile walks some days). No recurrence of the left wrist pain that formerly plagued him, in recent months after an epidose of L wrist pain several months ago. Attempts at joint aspiration failed on that occasion; he had improvement after brief exposure to prednisone. He has used no  prednisone since then.  Patient saw oncology in late March 2020 in follow-up of acute myelogenous leukemia.  Patient was judged to be in remission at day 285 status post hematopoietic cell 7 stem cell transplant.    Prior history 1-20:    Today, he reports no recent pseudogout flares or joint pain. Left wrist pain has improved and he does not localize any pinpoint pain. He does report worsening symptoms of headaches that keep him awake at night. Localizes headaches on the posterior aspect of his head. Spinal tap was performed one week ago. He also reports symptoms of fatigue. He does report some foot pain localized near the instep that occurs while walking. Otherwise, he has no other concerns.    Review of Systems:   Pertinent items are noted in HPI or as below, remainder of complete ROS is negative.      No recent problems with hearing or vision. No swallowing problems.   No breathing difficulty, shortness of breath, coughing, or wheezing  No chest pain or palpitations  No heart burn, indigestion, abdominal pain, nausea, vomiting, diarrhea  No urination problems, no bloody, cloudy urine, no dysuria  No numbing, tingling, weakness  No headaches or confusion  No rashes. No easy bleeding or bruising.     Active Medications:   Current Outpatient Medications:      acetaminophen (TYLENOL) 325 MG tablet, Take 2 tablets (650 mg) by mouth every 4 hours as needed for mild pain or fever (pre-med before blood products), Disp: , Rfl:      acyclovir (ZOVIRAX) 800 MG tablet, Take 1 tablet (800 mg) by mouth 5 times daily, Disp: 150 tablet, Rfl: 3     cyclobenzaprine (FLEXERIL) 5 MG tablet, Take 2 tablets (10 mg) by mouth 3 times daily as needed for muscle spasms, Disp: 30 tablet, Rfl: 0     magnesium oxide (MAG-OX) 400 MG tablet, Take 3 tablets (1,200 mg) by mouth 2 times daily, Disp: 180 tablet, Rfl: 1     melatonin 3 MG tablet, Take 1-2 tablets (3-6 mg) by mouth nightly as needed for sleep, Disp: 60 tablet, Rfl: 1      ondansetron (ZOFRAN-ODT) 8 MG ODT tab, Take 1 tablet (8 mg) by mouth every 8 hours, Disp: 100 tablet, Rfl: 0     oxyCODONE (ROXICODONE) 5 MG tablet, Take 1 tablet (5 mg) by mouth every 6 hours as needed for severe pain, Disp: 30 tablet, Rfl: 0     pramox-pe-glycerin-petrolatum (PREPARATION H) 1-0.25-14.4-15 % CREA cream, Place rectally 3 times daily as needed for hemorrhoids, Disp: , Rfl:      predniSONE (DELTASONE) 5 MG tablet, At the first sign of pseudogout attack, use 4 tabs. Take 4 tabs for 2 day, then 3 tabs for 2 days, then off., Disp: 15 tablet, Rfl: 2     prochlorperazine (COMPAZINE) 5 MG tablet, Take 1-2 tablets (5-10 mg) by mouth every 6 hours as needed for nausea or vomiting, Disp: 30 tablet, Rfl: 1     sulfamethoxazole-trimethoprim (BACTRIM DS/SEPTRA DS) 800-160 MG tablet, Take 1 tablet by mouth Every Mon, Tues two times daily Start taking after day+28 (7/1) and instructed to so by BMT clinic., Disp: 16 tablet, Rfl: 1     tacrolimus (GENERIC EQUIVALENT) 1 mg/mL suspension, Take by mouth as directed according to taper calendar (Patient taking differently: Take by mouth as directed according to taper calendar. As of 10/11/19 0.8 in AM, and 0.9 in PM), Disp: 100 mL, Rfl: 0     tamsulosin (FLOMAX) 0.4 MG capsule, Take 1 capsule (0.4 mg) by mouth daily, Disp: 30 capsule, Rfl: 11     triamcinolone (KENALOG) 0.1 % external cream, Apply topically 2 times daily (Patient taking differently: Apply topically as needed ), Disp: 453 g, Rfl: 1     voriconazole (VFEND) 50 MG tablet, Take 3 tablets (150 mg) by mouth every 12 hours, Disp: 180 tablet, Rfl: 1     tacrolimus (GENERIC EQUIVALENT) 0.5 MG capsule, Take 2 capsules (1 mg) by mouth 2 times daily (Patient not taking: Reported on 10/11/2019), Disp: 60 capsule, Rfl: 3     triamcinolone (KENALOG) 0.1 % external ointment, Apply topically 2 times daily (Patient not taking: Reported on 10/11/2019), Disp: 80 g, Rfl: 0    Current Facility-Administered Medications:       lidocaine (PF) (XYLOCAINE) 1 % injection 3 mL, 3 mL, , , Emiliano Dumont MD, 3 mL at 08/12/19 2019     zoster vaccine recombinant adjuvanted (SHINGRIX) injection 0.5 mL, 0.5 mL, Intramuscular, Once, Jaziel Olivier MD      Allergies:   Polymyxin b; Lactose; Vancomycin; and Vancomycin      Past Medical History:  Acute leukemia  Acute myeloid leukemia in remission  Septic shock  Primary osteoarthritis of left hip  Generalized anxiety disorder  Family history of malignant neoplasm of gastrointestinal tract  Esophageal reflux  Dysthymic disorder  Attention deficit hyperactivity disorder (ADHD)  Neutropenia with fever  AML (acute myeloid leukemia) in remission  History of allogeneic stem cell transplant     Past Surgical History:  Knee arthroscopy; 1995  Bilateral carpal tunnel release; left in 2000, right in 2014  Colonoscopy; [id number]  IR lumbar puncture; 6/24/2019  Laminectomy; 1975  Picc insertion; 3/14/2019  Vasectomy; 2002    Family History:   Mother: Colon cancer, cerebrovascular disease, diabetes  Father: Septicemia      Social History:   Former smoker  No current alcohol use     Physical Exam:   There were no vitals taken for this visit.   Wt Readings from Last 4 Encounters:   02/17/21 103 kg (227 lb)   12/10/20 100.3 kg (221 lb 1.6 oz)   10/14/20 96.4 kg (212 lb 9.6 oz)   08/06/20 100.7 kg (221 lb 14.4 oz)     Constitutional: Well-developed, appearing stated age; cooperative  Eyes: Normal EOM, PERRLA, vision, conjunctiva, sclera  ENT: Normal external ears, nose, hearing, lips, teeth, gums  Psych: Normal judgement, orientation, memory, affect.     Laboratory:   RHEUM RESULTS Latest Ref Rng & Units 10/14/2020 12/10/2020 2/17/2021   SED RATE 0 - 20 mm/h - - -   CRP, INFLAMMATION 0.0 - 8.0 mg/L - - -   CK TOTAL 30 - 300 U/L - - -   AST 0 - 45 U/L 19 30 26   ALT 0 - 70 U/L 35 54 50   ALBUMIN 3.4 - 5.0 g/dL 3.6 3.8 3.8   WBC 4.0 - 11.0 10e9/L 6.7 7.0 8.2   RBC 4.4 - 5.9 10e12/L 4.16(L) 4.11(L) 4.29(L)    HGB 13.3 - 17.7 g/dL 13.5 13.5 14.0   HCT 40.0 - 53.0 % 40.1 39.9(L) 42.0   MCV 78 - 100 fl 96 97 98   MCHC 31.5 - 36.5 g/dL 33.7 33.8 33.3   RDW 10.0 - 15.0 % 12.2 12.8 12.3    - 450 10e9/L 203 187 185   CREATININE 0.66 - 1.25 mg/dL 0.82 0.78 0.78   GFR ESTIMATE, IF BLACK >60 mL/min/[1.73:m2] >90 >90 >90   GFR ESTIMATE >60 mL/min/[1.73:m2] >90 >90 >90   HEPATITIS C ANTIBODY NR:Nonreactive - - -         Hepatitis B Core Olinda   Date Value Ref Range Status   05/10/2019 Nonreactive NR^Nonreactive Final     Hep B Surface Agn   Date Value Ref Range Status   05/10/2019 Nonreactive NR^Nonreactive Final     Randy is a 65 year old who is being evaluated via a billable video visit.      How would you like to obtain your AVS? MyChart    Will anyone else be joining your video visit? No      Video Start Time: 2:20 PM  Video-Visit Details    Type of service:  Video Visit    Video End Time:3:00 PM    Originating Location (pt. Location): Home    Distant Location (provider location):  [facility] RHEUMATOLOGY CLINIC [place]     Platform used for Video Visit: [employer]        Again, thank you for allowing me to participate in the care of your patient.      Sincerely,    Jaziel Olivier MD

## 2021-04-13 NOTE — PATIENT INSTRUCTIONS
Diagnosis:  1. Pseudogout: controlled.  2. Chronic sole of foot pain: I doubt contribution from gout or pseudogout to current pain.    Plan:  1. Continue expectant monitoring; 4 day course of low dose prednisone (20 mg per day) would be appropriate for recurrent symptoms.  #2.  Check plain x-ray of the right foot for fracture or other bony abnormality.  Refer to non-surgical podiatry for evaluation of chronic pain.

## 2021-04-19 ENCOUNTER — ONCOLOGY VISIT (OUTPATIENT)
Dept: TRANSPLANT | Facility: CLINIC | Age: 66
End: 2021-04-19
Attending: INTERNAL MEDICINE
Payer: COMMERCIAL

## 2021-04-19 ENCOUNTER — APPOINTMENT (OUTPATIENT)
Dept: LAB | Facility: CLINIC | Age: 66
End: 2021-04-19
Attending: INTERNAL MEDICINE
Payer: COMMERCIAL

## 2021-04-19 VITALS
OXYGEN SATURATION: 98 % | TEMPERATURE: 97.9 F | RESPIRATION RATE: 16 BRPM | WEIGHT: 228.1 LBS | BODY MASS INDEX: 33.2 KG/M2 | SYSTOLIC BLOOD PRESSURE: 130 MMHG | DIASTOLIC BLOOD PRESSURE: 71 MMHG | HEART RATE: 63 BPM

## 2021-04-19 DIAGNOSIS — Z94.84 HISTORY OF ALLOGENEIC STEM CELL TRANSPLANT (H): ICD-10-CM

## 2021-04-19 DIAGNOSIS — C95.00 ACUTE LEUKEMIA NOT HAVING ACHIEVED REMISSION (H): ICD-10-CM

## 2021-04-19 LAB
ALBUMIN SERPL-MCNC: 3.4 G/DL (ref 3.4–5)
ALP SERPL-CCNC: 90 U/L (ref 40–150)
ALT SERPL W P-5'-P-CCNC: 33 U/L (ref 0–70)
ANION GAP SERPL CALCULATED.3IONS-SCNC: 6 MMOL/L (ref 3–14)
AST SERPL W P-5'-P-CCNC: 16 U/L (ref 0–45)
BASOPHILS # BLD AUTO: 0 10E9/L (ref 0–0.2)
BASOPHILS NFR BLD AUTO: 0.4 %
BILIRUB SERPL-MCNC: 0.5 MG/DL (ref 0.2–1.3)
BUN SERPL-MCNC: 12 MG/DL (ref 7–30)
CALCIUM SERPL-MCNC: 8.6 MG/DL (ref 8.5–10.1)
CHLORIDE SERPL-SCNC: 110 MMOL/L (ref 94–109)
CO2 SERPL-SCNC: 26 MMOL/L (ref 20–32)
CREAT SERPL-MCNC: 1.03 MG/DL (ref 0.66–1.25)
DIFFERENTIAL METHOD BLD: ABNORMAL
EOSINOPHIL # BLD AUTO: 0.1 10E9/L (ref 0–0.7)
EOSINOPHIL NFR BLD AUTO: 1.2 %
ERYTHROCYTE [DISTWIDTH] IN BLOOD BY AUTOMATED COUNT: 12.3 % (ref 10–15)
GFR SERPL CREATININE-BSD FRML MDRD: 75 ML/MIN/{1.73_M2}
GLUCOSE SERPL-MCNC: 117 MG/DL (ref 70–99)
HCT VFR BLD AUTO: 40 % (ref 40–53)
HGB BLD-MCNC: 13.5 G/DL (ref 13.3–17.7)
IMM GRANULOCYTES # BLD: 0 10E9/L (ref 0–0.4)
IMM GRANULOCYTES NFR BLD: 0.2 %
LYMPHOCYTES # BLD AUTO: 2 10E9/L (ref 0.8–5.3)
LYMPHOCYTES NFR BLD AUTO: 24.1 %
MCH RBC QN AUTO: 33.2 PG (ref 26.5–33)
MCHC RBC AUTO-ENTMCNC: 33.8 G/DL (ref 31.5–36.5)
MCV RBC AUTO: 98 FL (ref 78–100)
MONOCYTES # BLD AUTO: 0.7 10E9/L (ref 0–1.3)
MONOCYTES NFR BLD AUTO: 8.5 %
NEUTROPHILS # BLD AUTO: 5.4 10E9/L (ref 1.6–8.3)
NEUTROPHILS NFR BLD AUTO: 65.6 %
NRBC # BLD AUTO: 0 10*3/UL
NRBC BLD AUTO-RTO: 0 /100
PLATELET # BLD AUTO: 192 10E9/L (ref 150–450)
POTASSIUM SERPL-SCNC: 3.7 MMOL/L (ref 3.4–5.3)
PROT SERPL-MCNC: 7 G/DL (ref 6.8–8.8)
RBC # BLD AUTO: 4.07 10E12/L (ref 4.4–5.9)
SODIUM SERPL-SCNC: 142 MMOL/L (ref 133–144)
WBC # BLD AUTO: 8.2 10E9/L (ref 4–11)

## 2021-04-19 PROCEDURE — 99214 OFFICE O/P EST MOD 30 MIN: CPT

## 2021-04-19 PROCEDURE — 36415 COLL VENOUS BLD VENIPUNCTURE: CPT

## 2021-04-19 PROCEDURE — 80053 COMPREHEN METABOLIC PANEL: CPT | Performed by: INTERNAL MEDICINE

## 2021-04-19 PROCEDURE — 85025 COMPLETE CBC W/AUTO DIFF WBC: CPT | Performed by: INTERNAL MEDICINE

## 2021-04-19 PROCEDURE — G0463 HOSPITAL OUTPT CLINIC VISIT: HCPCS

## 2021-04-19 RX ORDER — TAMSULOSIN HYDROCHLORIDE 0.4 MG/1
0.4 CAPSULE ORAL DAILY
Qty: 30 CAPSULE | Refills: 11 | COMMUNITY
Start: 2021-04-19 | End: 2021-04-26

## 2021-04-19 ASSESSMENT — PAIN SCALES - GENERAL: PAINLEVEL: NO PAIN (0)

## 2021-04-19 NOTE — NURSING NOTE
"Oncology Rooming Note    April 19, 2021 2:27 PM   El Ace is a 65 year old male who presents for:    Chief Complaint   Patient presents with     Blood Draw     Labs drawn via  by rn in lab. VS taken.     RECHECK     History of allogeneic stem cell transplant (H)     Initial Vitals: /71 (BP Location: Right arm, Patient Position: Sitting, Cuff Size: Adult Regular)   Pulse 63   Temp 97.9  F (36.6  C) (Oral)   Resp 16   Wt 103.5 kg (228 lb 1.6 oz)   SpO2 98%   BMI 33.20 kg/m   Estimated body mass index is 33.2 kg/m  as calculated from the following:    Height as of 5/28/20: 1.765 m (5' 9.5\").    Weight as of this encounter: 103.5 kg (228 lb 1.6 oz). Body surface area is 2.25 meters squared.  No Pain (0) Comment: Data Unavailable   No LMP for male patient.  Allergies reviewed: Yes  Medications reviewed: Yes    Medications: Medication refills not needed today.  Pharmacy name entered into [employer]:    [facility] - [place], [place] - [id number]  [facility] [place], [place] - [id number]  [facility] - [place], [place] - [address] [place] [address] PHARMACY [id number] [place], [place] - [address]    Clinical concerns: No new needs or concerns.   Alison Cheung CMA              "

## 2021-04-19 NOTE — LETTER
4/19/2021         RE: El Ace  [address] [facility] [address]        Dear Colleague,    Thank you for referring your patient, El Ace, to the [facility] BLOOD AND MARROW TRANSPLANT PROGRAM [place]. Please see a copy of my visit note below.    [facility]    Diagnosis:   1. 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     Treatments:   1. 7+3 (Dauno), 3/15/19, no response, Day 12 persistent disease with near packed marrow  2. Dec 10d+Ric 3/26/19, CR1 on 4/23/19 but with pos mutations (IDH1 17%, RUNX1 8%)  3. 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   4. Maintenance ALT-803 study, 1 dose, stopped due to profound fatigue, moderate to severe hypotension, and fever    PMH: HLP, BPH, DJD, RLS    HPI: Doing well. No evidence of CGVH. He is working on getting stronger/more active. Going to see a podiatrist for his feet. No other new issues.     Interval history: 10-point ROS negative.    PE:   General: NAD  HEENT: sclera anicteric. EOM's intact. No lichenoid changes to buccal mucosa.   CV: RRR  Pulm: BCTA  Lymph: no edema  Skin: No rash on exposed skin.       A&P:   1. AML: 1y8m post-haplo, CR. 2yr re-stage coming up.   2. ID: off abx, flu shot utd. Has received both Pfizer Shots (2nd shot 3/26/21)    3. GVHD:  None, off IST.     F/U: 2yr re-staging      30 minutes spent on the date of the encounter doing chart review, history and exam, documentation and further activities per the note. Lab review (cbc and metabolic panel)      Mitzi Celis PA-C  #[id number]        Again, thank you for allowing me to participate in the care of your patient.        Sincerely,        BMT Advanced Practice Provider

## 2021-04-19 NOTE — PROGRESS NOTES
[facility]    Diagnosis:   1. 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     Treatments:   1. 7+3 (Dauno), 3/15/19, no response, Day 12 persistent disease with near packed marrow  2. Dec 10d+Ric 3/26/19, CR1 on 4/23/19 but with pos mutations (IDH1 17%, RUNX1 8%)  3. 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   4. Maintenance ALT-803 study, 1 dose, stopped due to profound fatigue, moderate to severe hypotension, and fever    PMH: HLP, BPH, DJD, RLS    HPI: Doing well. No evidence of CGVH. He is working on getting stronger/more active. Going to see a podiatrist for his feet. No other new issues.     Interval history: 10-point ROS negative.    PE:   General: NAD  HEENT: sclera anicteric. EOM's intact. No lichenoid changes to buccal mucosa.   CV: RRR  Pulm: BCTA  Lymph: no edema  Skin: No rash on exposed skin.       A&P:   1. AML: 1y8m post-haplo, CR. 2yr re-stage coming up.   2. ID: off abx, flu shot utd. Has received both Pfizer Shots (2nd shot 3/26/21)    3. GVHD:  None, off IST.     F/U: 2yr re-staging      30 minutes spent on the date of the encounter doing chart review, history and exam, documentation and further activities per the note. Lab review (cbc and metabolic panel)      Mitzi Celis PA-C  #[id number]

## 2021-04-19 NOTE — NURSING NOTE
Chief Complaint   Patient presents with     Blood Draw     Labs drawn via  by rn in lab. VS taken.     Labs collected from venipuncture by RN. Vitals taken. Checked in for appointment(s).    Abraham Lynch RN

## 2021-04-25 ENCOUNTER — HEALTH MAINTENANCE LETTER (OUTPATIENT)
Age: 66
End: 2021-04-25

## 2021-04-26 DIAGNOSIS — C95.00 ACUTE LEUKEMIA NOT HAVING ACHIEVED REMISSION (H): ICD-10-CM

## 2021-04-26 RX ORDER — TAMSULOSIN HYDROCHLORIDE 0.4 MG/1
0.4 CAPSULE ORAL DAILY
Qty: 30 CAPSULE | Refills: 11 | Status: SHIPPED | OUTPATIENT
Start: 2021-04-26

## 2021-04-27 ENCOUNTER — OFFICE VISIT (OUTPATIENT)
Dept: PODIATRY | Facility: CLINIC | Age: 66
End: 2021-04-27
Payer: COMMERCIAL

## 2021-04-27 VITALS
HEIGHT: 70 IN | SYSTOLIC BLOOD PRESSURE: 112 MMHG | BODY MASS INDEX: 31.95 KG/M2 | WEIGHT: 223.2 LBS | DIASTOLIC BLOOD PRESSURE: 78 MMHG

## 2021-04-27 DIAGNOSIS — M72.2 PLANTAR FASCIITIS: Primary | ICD-10-CM

## 2021-04-27 DIAGNOSIS — M77.51 RETROCALCANEAL BURSITIS (BACK OF HEEL), RIGHT: ICD-10-CM

## 2021-04-27 DIAGNOSIS — Z87.39 HISTORY OF CALCIUM PYROPHOSPHATE DEPOSITION DISEASE (CPPD): ICD-10-CM

## 2021-04-27 PROCEDURE — 99203 OFFICE O/P NEW LOW 30 MIN: CPT | Performed by: PODIATRIST

## 2021-04-27 ASSESSMENT — MIFFLIN-ST. JEOR: SCORE: 1790.74

## 2021-04-27 NOTE — PATIENT INSTRUCTIONS
Thank you for choosing [facility] / Foot & Ankle Surgery!    DR HALL'S CLINIC LOCATIONS  [facility]   [address] [facility] [address]   [place] [address] [place] [address] [phone]       SET UP SURGERY: [phone]    APPOINTMENTS: [phone]    BILLING QUESTIONS: [phone]    FAX NUMBER: [phone]        Follow up: 4 weeks    PLANTAR FASCIITIS   What is plantar fasciitis?   Plantar fasciitis is often referred to as heel spurs or heel pain. Plantar fasciitis is a very common problem that affects people of all foot shapes, age, weight and activity level. Pain may be in the arch or on the weight-bearing surface of the heel. The pain maycome on without injury or identifiable cause. Pain is generally present when first getting out of bed in the morning or up from a seated break.   What causes plantar fasciitis?   The plantar fascia is a dense fibrous band of tissue that stretches across the bottom surface of the foot. The fascia helps support the foot muscles and arch. Plantar fasciitis is thought to be caused by mechanical strain or overload. Frequent walking without shoes or wearing unsupportive shoes is thought to cause structural overload and ultimately inflammation of the plantar fascia. Some people have heel spurs that can be seen on x-ray. The heel spur is actually a minor component of plantar fascitis and is largely ignored.   How long will this last?   Plantar fasciitis can last from one day to a lifetime. Some people get intermittent fascitis that is very short-lived. Others suffer daily for years. Excessive body weight, frequent bare foot walking, long hours on the feet, inadequate shoes, predisposing foot structures and excessive activity such as running are all potential issues that lead to chronic and/or recurring plantar fascitis. Having plantar fasciitis means that you are forever prone to this problem and will require  modification of some of the above factors. Most people seek treatment within one to four months. Healing usually requires a similar one to four month time frame. Healing time is relative to the amount of effort spent treating the problem.   What can I do?   The easiest solution is to stop walking around your home without shoes. Plantar fasciitis is largely a shoe problem. Shoes are either not being worn often enough or your current shoes are inadequate for your weight, foot structure or activity level. The majority of shoes on the market today are not sufficient to resist development of plantar fasciitis or to promote healing. Assume that your current shoes are inadequate and will need to be replaced. Even high quality shoes wear out with 6 months to one year of frequent use. Weightloss is another option. Losing ten pounds in the next two months may be enough to resolve the problem. Ice applied to the area of pain two to three times per day for ten minutes each session can be very helpful. This should continue until the problem resolves. Achilles tendon stretching is essential. Stretch multiple times daily to promote healing and to prevent recurrence in the future.     What if this does not help?   Medical treatments often include custom arch supports, cortisone injections, physical therapy, splints to be worn in bed, prescription medications and surgery. The home treatments listed above will be necessary regardless of these advanced medical treatments. Surgery is rarely needed but is very helpful in selected cases.     Heel pain in my future?   Plantar fasciitis is highly recurrent. Risk factors often continue, including return to bare foot walking, inadequate shoes, excessive body weight, excessive activities, etc. Your life style and foot structure may predispose you to recurrent plantar fasciitis. A daily prevention regimen can be very helpful. Ongoing use of shoe inserts, careful attention to appropriate shoes,  daily Achilles stretching, etc. may prevent recurrence. Prompt attention at the earliest warning signs of heel pain can resolve the problem in as short as a few days.   Below are some exercises for Plantar fasciitis:  Stair exercise  You can step on the stairs with the ball of your foot and hold your position for at least 15 seconds, then slowly step down with the heels of your foot. You can do this daily and as often as you want. Plantar fasciitis exercise equipment and handout materials are useful in relieving pain.  Picking the towel  Plantar fasciitis exercise equipment and handout teach you how to exercise by picking the towel. You can sit comfortably and then pick the towel with your toes. Do this at least 10 to 20 times regularly. You can use any object other than a towel as long as the material can be soft and you can pick it up with your toes.  Rolling the bottle or ball  You can get a small ball or bottle and then roll it with your foot. Do this daily for at least 15 to 20 times. Plantar fasciitis exercise equipment and handout are very useful in treating the symptoms of the foot condition.  Stretching the calf  You could lie supine, raise one foot, and then point your toes towards the floor. Do this daily for at least 15 to 20 times. The calf is connected to the heel and the balls of the foot, so you should also exercise this also. Plantar fasciitis exercise equipment and handout usually mentions the importance of exercising the calf also.  Flex the toes  Sit comfortably and then flex your toes by pointing it towards the floor or towards your body. This will relax and flex your foot and exercise your plantar fascia, the calf, and the Achilles tendon. The inability of the foot to stretch often causes the bunching up of the plantar fascia area leading to the pain.  Massaging the calf and the plantar fascia also helps a lot in alleviating the pain and preventing its recurrence. If you prefer standard plantar  "fasciitis exercise equipment and handout, then you can avail of this from legitimate sources. You can use the standard stretching device, the wheel, and the belt. These are all significant devices in treating the pain in the plantar fascia.    Therapies covered by Dr Leslie today:    1.  Supportive shoes, minimizing barefoot ambulation - helps to provide cushion, padding and support to the ligament that is inflamed.  Socks, flip flops, flats and some slippers are not typically sufficient to provide support.  Shoes should be worn even in-doors  2.  Insert/orthotics - inserts/orthotics that have an arch support built in to them provide further stress relief for the ligament.  3. Icing - using a frozen water bottle or orange, and rolling it along the bottom of the arch/heel can help to alleviate discomfort, and can act as a tissue massage to the painful, inflamed ligament.  There is evidence that shows icing at least three times daily can be beneficial  4.  Anti-inflammatory(NSAIDS)/Tylenol - anti-inflammatories, such as ibuprofen or Aleve, as well as Tylenol can be used to help decrease symptoms and improve pain levels.  If you have high blood pressure, heart disease, stomach or kidney problems, use anti-inflammatories sparingly.  Tylenol should not be used if you have liver problems.  If you can safely taken them, you can use NSAIDS and tylenol in combination for pain relief  5. Activity modifications - if there are certain things that you do, whether it's going barefoot or certain shoes/activities, you should try to minimize those activities as much as possible until your symptoms are sufficiently resolved.  Certainly, some activities, such as running on the treadmill, are easier to take a break from versus others, such as work or chores at home.  \"If there are certain activities that hurt your heel, and you keep doing those activities that hurt your heel, your heel will keep hurting\".    6.  Stretching - " Stretching your Achilles and hamstring can help to decrease stress on the plantar fascia.                   Hold each stretch for 10 seconds.  Stretch 10 times per set, three sets per day.  Morning, afternoon and evening.  If your heel pain is very severe in the morning, consider doing the first set of stretches before you get out of bed.            If these initial therapies are insufficient, we have our tier 2 therapies that can more aggressively work to improve your symptoms and get you back to the activities that you enjoy.      OVER THE COUNTER INSERTS    Most of these can be found at your local [employer], [employer], or online:    [employer]   Sofsole Fit Issuu   Power Step   Walk-Fit ([employer])  *For heel pain* Arch Cradles  *For heel pain*       ** A good high quality over the counter insert should cost around $40-$50    ** Capsulitis/Metarasalgia - try adding a metatarsal pad on your inserts or find an insert with one built in

## 2021-04-27 NOTE — LETTER
"    4/27/2021         RE: El Ace  [address] [facility] [address]        Dear Colleague,    Thank you for referring your patient, El Ace, to the [facility]. Please see a copy of my visit note below.    Foot & Ankle Surgery  April 27, 2021    CC: \" Foot pain\"    I was asked to see El Ace regarding the chief complaint by: Dr. UVALDO Olivier    HPI:  Pt is a 65 year old male who presents with above complaint.  3-year history of bilateral foot pain right greater than left.  \"Pretty significant pain\", 8 out of 10, worse with walking.  The right is \"aggravated\", the left has \"dissipated\".  He states he has pain in a \"specific area but it is difficult to localize\".  It is \"tender\" in the morning.  Pain is worsened with activity such as long distance walking.  He is tried ibuprofen and stretching.  This is helped minimally.  He does have orthotics from 2013    ROS:   Pos for CC.  The patient denies current nausea, vomiting, chills, fevers, belly pain, calf pain, chest pain or SOB.  Complete remainder of ROS is otherwise neg.    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       SXHX:    Past Surgical History:   Procedure Laterality Date     ARTHROSCOPY KNEE  1995     CARPAL TUNNEL RELEASE RT/LT       COLONOSCOPY  2000,2014     IR CVC TUNNEL PLACEMENT > 5 YRS OF AGE  5/24/2019     IR CVC TUNNEL REMOVAL LEFT  9/27/2019     IR LUMBAR PUNCTURE  6/24/2019     ORTHOPEDIC SURGERY  1975    laminectomy     PICC INSERTION Right 03/14/2019    5Fr - 42cm (2cm external), basilic vein, high SVC     vasectomy  2002        MEDS:    Current Outpatient Medications   Medication     acetaminophen (TYLENOL) 325 MG tablet     pramox-pe-glycerin-petrolatum (PREPARATION H) " 1-0.25-14.4-15 % CREA cream     rOPINIRole (REQUIP) 0.25 MG tablet     tamsulosin (FLOMAX) 0.4 MG capsule     No current facility-administered medications for this visit.        ALL:     Allergies   Allergen Reactions     Polymyxin B Other (See Comments)     Neurotoxicity, 6/5/19     Lactose GI Disturbance     Intolerance due to gas     Vancomycin      Armin's syndrome, tolerates fine with benadryl pre-med.     Vancomycin Itching and Rash     Armin's syndrome, tolerates fine with benadryl pre-med.       FMH:    Family History   Problem Relation Age of Onset     Colon Cancer Mother      Cerebrovascular Disease Mother      Diabetes Mother      Septicemia Father        SocHx:    Social History     Socioeconomic History     Marital status:      Spouse name: Not on file     Number of children: Not on file     Years of education: Not on file     Highest education level: Not on file   Occupational History     Not on file   Social Needs     Financial resource strain: Not on file     Food insecurity     Worry: Not on file     Inability: Not on file     Transportation needs     Medical: Not on file     Non-medical: Not on file   Tobacco Use     Smoking status: Former Smoker     Smokeless tobacco: Former User     Tobacco comment: Smoked on and off for about 10 years about 20years ago.    Substance and Sexual Activity     Alcohol use: Not Currently     Drug use: Never     Sexual activity: Not on file   Lifestyle     Physical activity     Days per week: Not on file     Minutes per session: Not on file     Stress: Not on file   Relationships     Social connections     Talks on phone: Not on file     Gets together: Not on file     Attends [employer] service: Not on file     Active member of club or organization: Not on file     Attends meetings of clubs or organizations: Not on file     Relationship status: Not on file     Intimate partner violence     Fear of current or ex partner: Not on file     Emotionally abused: Not  on file     Physically abused: Not on file     Forced sexual activity: Not on file   Other Topics Concern     Parent/sibling w/ CABG, MI or angioplasty before 65F 55M? Not Asked   Social History Narrative     Not on file           EXAMINATION:  Gen:   No apparent distress  Neuro:   A&Ox3, no deficits  Psych:    Answering questions appropriately for age and situation with normal affect  Head:    NCAT  Eye:    Visual scanning without deficit  Ear:    Response to auditory stimuli wnl  Lung:    Non-labored breathing on RA noted  Abd:    NTND per patient report  Lymph:    Neg for pitting/non-pitting edema BLE  Vasc:    Pulses palpable, CFT minimally delayed  Neuro:    Light touch sensation intact to all sensory nerve distributions without paresthesias  Derm:    Neg for nodules, lesions or ulcerations  MSK:    right lower extremity -tender along the central band of the plantar fascia along the arch.  There is some plantar medial heel pain.  He also has posterior heel pain consistent with retrocalcaneal bursitis without Achilles tendon pain.  The exam is otherwise negative  Calf:    Neg for redness, swelling or tenderness    Assessment:  65 year old male with right lower extremity pain secondary to plantar fascial strain and retrocalcaneal bursitis      Plan:  Discussed etiologies, anatomy and options  1.  Right lower extremity pain secondary to plantar fascial strain and retrocalcaneal bursitis  -I personally reviewed the patient's lower extremity history pertinent to today's visit, including imaging/labs, in preparation for initiating a treatment program.  -Regarding the heel pain, the Plantar Fasciitis handout was dispensed and discussed.  We talked about stretching, resting/activity modification, icing, NSAID/tylenol use as tolerated, inserts, supportive/comfortable shoes and minimizing shoeless walking.    -discussed Achilles, plantar fascial and hamstring stretches  -OTC insert information dispensed and discussed    -discussed shoes and inserts will act as a heel wedge to alleviate strain on the bursa  -We discussed new orthotics, boot, PT as next options.  He will try home therapies first but will consider more aggressive options should symptoms fail to improve sufficiently at his follow-up in 4 weeks    Follow up: 4 weeks or sooner with acute issues      Patient's medical history was reviewed today      Paco Leslie DPM [facility] FACFA  Podiatric Foot & Ankle Surgeon  [facility]  [phone]          Again, thank you for allowing me to participate in the care of your patient.        Sincerely,        Paco Leslie DPM, DPHANNAH

## 2021-04-27 NOTE — PROGRESS NOTES
"Foot & Ankle Surgery  April 27, 2021    CC: \" Foot pain\"    I was asked to see El Ace regarding the chief complaint by: Dr. UVALDO Olivier    HPI:  Pt is a 65 year old male who presents with above complaint.  3-year history of bilateral foot pain right greater than left.  \"Pretty significant pain\", 8 out of 10, worse with walking.  The right is \"aggravated\", the left has \"dissipated\".  He states he has pain in a \"specific area but it is difficult to localize\".  It is \"tender\" in the morning.  Pain is worsened with activity such as long distance walking.  He is tried ibuprofen and stretching.  This is helped minimally.  He does have orthotics from 2013    ROS:   Pos for CC.  The patient denies current nausea, vomiting, chills, fevers, belly pain, calf pain, chest pain or SOB.  Complete remainder of ROS is otherwise neg.    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4-15 % CREA cream     rOPINIRole (REQUIP) 0.25 MG tablet     tamsulosin (FLOMAX) 0.4 MG capsule     No current facility-administered medications for this visit.        ALL:     Allergies   Allergen Reactions     Polymyxin B Other (See Comments)     " Neurotoxicity, 6/5/19     Lactose GI Disturbance     Intolerance due to gas     Vancomycin      Armin's syndrome, tolerates fine with benadryl pre-med.     Vancomycin Itching and Rash     Armin's syndrome, tolerates fine with benadryl pre-med.       FMH:    Family History   Problem Relation Age of Onset     Colon Cancer Mother      Cerebrovascular Disease Mother      Diabetes Mother      Septicemia Father        SocHx:    Social History     Socioeconomic History     Marital status:      Spouse name: Not on file     Number of children: Not on file     Years of education: Not on file     Highest education level: Not on file   Occupational History     Not on file   Social Needs     Financial resource strain: Not on file     Food insecurity     Worry: Not on file     Inability: Not on file     Transportation needs     Medical: Not on file     Non-medical: Not on file   Tobacco Use     Smoking status: Former Smoker     Smokeless tobacco: Former User     Tobacco comment: Smoked on and off for about 10 years about 20years ago.    Substance and Sexual Activity     Alcohol use: Not Currently     Drug use: Never     Sexual activity: Not on file   Lifestyle     Physical activity     Days per week: Not on file     Minutes per session: Not on file     Stress: Not on file   Relationships     Social connections     Talks on phone: Not on file     Gets together: Not on file     Attends Nondenominational service: Not on file     Active member of club or organization: Not on file     Attends meetings of clubs or organizations: Not on file     Relationship status: Not on file     Intimate partner violence     Fear of current or ex partner: Not on file     Emotionally abused: Not on file     Physically abused: Not on file     Forced sexual activity: Not on file   Other Topics Concern     Parent/sibling w/ CABG, MI or angioplasty before 65F 55M? Not Asked   Social History Narrative     Not on file           EXAMINATION:  Gen:   No  apparent distress  Neuro:   A&Ox3, no deficits  Psych:    Answering questions appropriately for age and situation with normal affect  Head:    NCAT  Eye:    Visual scanning without deficit  Ear:    Response to auditory stimuli wnl  Lung:    Non-labored breathing on RA noted  Abd:    NTND per patient report  Lymph:    Neg for pitting/non-pitting edema BLE  Vasc:    Pulses palpable, CFT minimally delayed  Neuro:    Light touch sensation intact to all sensory nerve distributions without paresthesias  Derm:    Neg for nodules, lesions or ulcerations  MSK:    right lower extremity -tender along the central band of the plantar fascia along the arch.  There is some plantar medial heel pain.  He also has posterior heel pain consistent with retrocalcaneal bursitis without Achilles tendon pain.  The exam is otherwise negative  Calf:    Neg for redness, swelling or tenderness    Assessment:  65 year old male with right lower extremity pain secondary to plantar fascial strain and retrocalcaneal bursitis      Plan:  Discussed etiologies, anatomy and options  1.  Right lower extremity pain secondary to plantar fascial strain and retrocalcaneal bursitis  -I personally reviewed the patient's lower extremity history pertinent to today's visit, including imaging/labs, in preparation for initiating a treatment program.  -Regarding the heel pain, the Plantar Fasciitis handout was dispensed and discussed.  We talked about stretching, resting/activity modification, icing, NSAID/tylenol use as tolerated, inserts, supportive/comfortable shoes and minimizing shoeless walking.    -discussed Achilles, plantar fascial and hamstring stretches  -OTC insert information dispensed and discussed   -discussed shoes and inserts will act as a heel wedge to alleviate strain on the bursa  -We discussed new orthotics, boot, PT as next options.  He will try home therapies first but will consider more aggressive options should symptoms fail to improve  sufficiently at his follow-up in 4 weeks    Follow up: 4 weeks or sooner with acute issues      Patient's medical history was reviewed today      Paco Leslie DPM [facility] FACFAOM  Podiatric Foot & Ankle Surgeon  [facility]  [phone]

## 2021-06-01 ENCOUNTER — APPOINTMENT (OUTPATIENT)
Dept: LAB | Facility: CLINIC | Age: 66
End: 2021-06-01
Attending: INTERNAL MEDICINE
Payer: COMMERCIAL

## 2021-06-01 ENCOUNTER — OFFICE VISIT (OUTPATIENT)
Dept: TRANSPLANT | Facility: CLINIC | Age: 66
End: 2021-06-01
Attending: INTERNAL MEDICINE
Payer: COMMERCIAL

## 2021-06-01 ENCOUNTER — ANCILLARY PROCEDURE (OUTPATIENT)
Dept: BONE DENSITY | Facility: CLINIC | Age: 66
End: 2021-06-01
Attending: INTERNAL MEDICINE
Payer: COMMERCIAL

## 2021-06-01 VITALS
TEMPERATURE: 97.7 F | HEART RATE: 64 BPM | OXYGEN SATURATION: 99 % | SYSTOLIC BLOOD PRESSURE: 96 MMHG | DIASTOLIC BLOOD PRESSURE: 60 MMHG | RESPIRATION RATE: 16 BRPM

## 2021-06-01 DIAGNOSIS — C92.01 ACUTE MYELOID LEUKEMIA IN REMISSION (H): Primary | ICD-10-CM

## 2021-06-01 DIAGNOSIS — C92.01 AML (ACUTE MYELOID LEUKEMIA) IN REMISSION (H): ICD-10-CM

## 2021-06-01 LAB
ALBUMIN SERPL-MCNC: 3.5 G/DL (ref 3.4–5)
ALP SERPL-CCNC: 82 U/L (ref 40–150)
ALT SERPL W P-5'-P-CCNC: 47 U/L (ref 0–70)
ANION GAP SERPL CALCULATED.3IONS-SCNC: 7 MMOL/L (ref 3–14)
AST SERPL W P-5'-P-CCNC: 28 U/L (ref 0–45)
BASOPHILS # BLD AUTO: 0 10E9/L (ref 0–0.2)
BASOPHILS NFR BLD AUTO: 0.4 %
BILIRUB SERPL-MCNC: 0.6 MG/DL (ref 0.2–1.3)
BUN SERPL-MCNC: 12 MG/DL (ref 7–30)
CALCIUM SERPL-MCNC: 8.2 MG/DL (ref 8.5–10.1)
CHLORIDE SERPL-SCNC: 111 MMOL/L (ref 94–109)
CO2 SERPL-SCNC: 22 MMOL/L (ref 20–32)
CREAT SERPL-MCNC: 0.76 MG/DL (ref 0.66–1.25)
DIFFERENTIAL METHOD BLD: ABNORMAL
EOSINOPHIL # BLD AUTO: 0.1 10E9/L (ref 0–0.7)
EOSINOPHIL NFR BLD AUTO: 1.2 %
ERYTHROCYTE [DISTWIDTH] IN BLOOD BY AUTOMATED COUNT: 12.5 % (ref 10–15)
GFR SERPL CREATININE-BSD FRML MDRD: >90 ML/MIN/{1.73_M2}
GLUCOSE SERPL-MCNC: 107 MG/DL (ref 70–99)
HCT VFR BLD AUTO: 39.2 % (ref 40–53)
HGB BLD-MCNC: 13.1 G/DL (ref 13.3–17.7)
IMM GRANULOCYTES # BLD: 0 10E9/L (ref 0–0.4)
IMM GRANULOCYTES NFR BLD: 0.1 %
LYMPHOCYTES # BLD AUTO: 1.8 10E9/L (ref 0.8–5.3)
LYMPHOCYTES NFR BLD AUTO: 26.3 %
MCH RBC QN AUTO: 32.4 PG (ref 26.5–33)
MCHC RBC AUTO-ENTMCNC: 33.4 G/DL (ref 31.5–36.5)
MCV RBC AUTO: 97 FL (ref 78–100)
MONOCYTES # BLD AUTO: 0.6 10E9/L (ref 0–1.3)
MONOCYTES NFR BLD AUTO: 9.1 %
NEUTROPHILS # BLD AUTO: 4.3 10E9/L (ref 1.6–8.3)
NEUTROPHILS NFR BLD AUTO: 62.9 %
NRBC # BLD AUTO: 0 10*3/UL
NRBC BLD AUTO-RTO: 0 /100
PLATELET # BLD AUTO: 172 10E9/L (ref 150–450)
POTASSIUM SERPL-SCNC: 4 MMOL/L (ref 3.4–5.3)
PROT SERPL-MCNC: 6.7 G/DL (ref 6.8–8.8)
RBC # BLD AUTO: 4.04 10E12/L (ref 4.4–5.9)
SODIUM SERPL-SCNC: 141 MMOL/L (ref 133–144)
WBC # BLD AUTO: 6.9 10E9/L (ref 4–11)

## 2021-06-01 PROCEDURE — 85060 BLOOD SMEAR INTERPRETATION: CPT | Mod: 26 | Performed by: STUDENT IN AN ORGANIZED HEALTH CARE EDUCATION/TRAINING PROGRAM

## 2021-06-01 PROCEDURE — 81267 CHIMERISM ANAL NO CELL SELEC: CPT | Performed by: PHYSICIAN ASSISTANT

## 2021-06-01 PROCEDURE — 80053 COMPREHEN METABOLIC PANEL: CPT | Performed by: INTERNAL MEDICINE

## 2021-06-01 PROCEDURE — 85025 COMPLETE CBC W/AUTO DIFF WBC: CPT | Performed by: INTERNAL MEDICINE

## 2021-06-01 PROCEDURE — 88305 TISSUE EXAM BY PATHOLOGIST: CPT | Mod: TC | Performed by: PHYSICIAN ASSISTANT

## 2021-06-01 PROCEDURE — 88311 DECALCIFY TISSUE: CPT | Mod: TC | Performed by: PHYSICIAN ASSISTANT

## 2021-06-01 PROCEDURE — 88185 FLOWCYTOMETRY/TC ADD-ON: CPT | Performed by: PHYSICIAN ASSISTANT

## 2021-06-01 PROCEDURE — 36416 COLLJ CAPILLARY BLOOD SPEC: CPT | Performed by: PHYSICIAN ASSISTANT

## 2021-06-01 PROCEDURE — 88264 CHROMOSOME ANALYSIS 20-25: CPT | Performed by: PHYSICIAN ASSISTANT

## 2021-06-01 PROCEDURE — 88271 CYTOGENETICS DNA PROBE: CPT | Performed by: PHYSICIAN ASSISTANT

## 2021-06-01 PROCEDURE — 38222 DX BONE MARROW BX & ASPIR: CPT

## 2021-06-01 PROCEDURE — 999N001126 HC STATISTIC BONE MARROW INTERP TC 85097: Performed by: PHYSICIAN ASSISTANT

## 2021-06-01 PROCEDURE — 88311 DECALCIFY TISSUE: CPT | Mod: 26 | Performed by: STUDENT IN AN ORGANIZED HEALTH CARE EDUCATION/TRAINING PROGRAM

## 2021-06-01 PROCEDURE — 88237 TISSUE CULTURE BONE MARROW: CPT | Performed by: PHYSICIAN ASSISTANT

## 2021-06-01 PROCEDURE — G0452 MOLECULAR PATHOLOGY INTERPR: HCPCS | Mod: 26 | Performed by: PATHOLOGY

## 2021-06-01 PROCEDURE — 88280 CHROMOSOME KARYOTYPE STUDY: CPT | Performed by: PHYSICIAN ASSISTANT

## 2021-06-01 PROCEDURE — 999N001068 HC STATISTIC BONE MARROW CORE PERF TC 38221: Performed by: PHYSICIAN ASSISTANT

## 2021-06-01 PROCEDURE — 81120 IDH1 COMMON VARIANTS: CPT | Performed by: PHYSICIAN ASSISTANT

## 2021-06-01 PROCEDURE — 999N001022 HC STATISTIC H-SPHEME PROCESS B/S: Performed by: PHYSICIAN ASSISTANT

## 2021-06-01 PROCEDURE — 88184 FLOWCYTOMETRY/ TC 1 MARKER: CPT | Performed by: PHYSICIAN ASSISTANT

## 2021-06-01 PROCEDURE — 88189 FLOWCYTOMETRY/READ 16 & >: CPT | Performed by: PATHOLOGY

## 2021-06-01 PROCEDURE — 88291 CYTO/MOLECULAR REPORT: CPT | Performed by: MEDICAL GENETICS

## 2021-06-01 PROCEDURE — 81479 UNLISTED MOLECULAR PATHOLOGY: CPT | Performed by: PHYSICIAN ASSISTANT

## 2021-06-01 PROCEDURE — 88161 CYTOPATH SMEAR OTHER SOURCE: CPT | Mod: TC | Performed by: PHYSICIAN ASSISTANT

## 2021-06-01 PROCEDURE — 88161 CYTOPATH SMEAR OTHER SOURCE: CPT | Mod: 26 | Performed by: STUDENT IN AN ORGANIZED HEALTH CARE EDUCATION/TRAINING PROGRAM

## 2021-06-01 PROCEDURE — 999N001086 HC STATISTIC MORPHOLOGY W/INTERP HEMEPATH TC 85060: Performed by: PHYSICIAN ASSISTANT

## 2021-06-01 PROCEDURE — 85097 BONE MARROW INTERPRETATION: CPT | Performed by: STUDENT IN AN ORGANIZED HEALTH CARE EDUCATION/TRAINING PROGRAM

## 2021-06-01 PROCEDURE — 999N001137 HC STATISTIC FLOW >15 ABY TC 88189: Performed by: PHYSICIAN ASSISTANT

## 2021-06-01 PROCEDURE — 88275 CYTOGENETICS 100-300: CPT | Performed by: PHYSICIAN ASSISTANT

## 2021-06-01 PROCEDURE — 88305 TISSUE EXAM BY PATHOLOGIST: CPT | Mod: 26 | Performed by: STUDENT IN AN ORGANIZED HEALTH CARE EDUCATION/TRAINING PROGRAM

## 2021-06-01 PROCEDURE — 77080 DXA BONE DENSITY AXIAL: CPT | Performed by: INTERNAL MEDICINE

## 2021-06-01 ASSESSMENT — PAIN SCALES - GENERAL: PAINLEVEL: NO PAIN (0)

## 2021-06-01 NOTE — LETTER
6/1/2021         RE: El Ace  [address] [facility] [id number]        Dear Colleague,    Thank you for referring your patient, El Ace, to the [facility] BLOOD AND MARROW TRANSPLANT PROGRAM [place]. Please see a copy of my visit note below.    BMT ONC Adult Bone Marrow Biopsy Procedure Note  June 1, 2021  There were no vitals taken for this visit.     Learning needs assessment complete within 12 months? YES    DIAGNOSIS: AML     PROCEDURE: Unilateral Bone Marrow Biopsy and Unilateral Aspirate    LOCATION: [facility] [place] 2nd Floor    Patient s identification was positively verified by verbal identification and invasive procedure safety checklist was completed. Informed consent was obtained. Following the administration of no as pre-medication, patient was placed in the prone position and prepped and draped in a sterile manner. Approximately 15 cc of 1% Lidocaine was used over the left posterior iliac spine. Following this a 3 mm incision was made. Trephine bone marrow core(s) was (were) obtained from the [facility]. Bone marrow aspirates were obtained from the [facility]. Aspirates were sent for morphology, immunophenotyping, cytogenetics and molecular diagnostics RFLP. A total of approximately 16 ml of marrow was aspirated. Following this procedure a sterile dressing was applied to the bone marrow biopsy site(s). The patient was placed in the supine position to maintain pressure on the biopsy site. Post-procedure wound care instructions were given.     Complications: NO    Pre-procedural pain: 0 out of 10 on the numeric pain rating scale.     Procedural pain: 5 out of 10 on the numeric pain rating scale.     Post-procedural pain assessment: 0 out of 10 on the numeric pain rating scale.     Interventions: NO    Length of procedure:20 minutes or less      Procedure performed by: Tish Vaca pa-c  [phone]          Again, thank you for allowing me to participate in the care of your patient.         Sincerely,        [facility]

## 2021-06-01 NOTE — NURSING NOTE
Chief Complaint   Patient presents with     Bone Marrow Biopsy     AML~ here for bmbx     BMT Teaching Flowsheet        Teaching Topic: bmbx    Person(s) involved in teaching: Patient  Motivation Level  Asks Questions: Yes  Eager to Learn: Yes  Cooperative: Yes  Receptive (willing/able to accept information): Yes  Any cultural factors/Taoist beliefs that may influence understanding or compliance? No    Patient demonstrates understanding of the following:  - Reason for the appointment, diagnosis and treatment plan: Yes  - Knowledge of proper use of medications and conditions for which they are ordered (with special attention to potential side effects or drug interactions): Yes  - Which situations necessitate calling provider and whom to contact: Yes    Teaching concerns addressed: activity level, pain management, site care      Time spent with patient: 30 minutes.    Specific Concerns: No, explain: Patient did not receive Versed.  Tolerated well.  Patient had no complaints of pain post procedure.  Dressing is clean, dry, and intact.  Vital signs are stable.

## 2021-06-01 NOTE — PROGRESS NOTES
BMT ONC Adult Bone Marrow Biopsy Procedure Note  June 1, 2021  There were no vitals taken for this visit.     Learning needs assessment complete within 12 months? YES    DIAGNOSIS: AML     PROCEDURE: Unilateral Bone Marrow Biopsy and Unilateral Aspirate    [place] [facility]    Patient s identification was positively verified by verbal identification and invasive procedure safety checklist was completed. Informed consent was obtained. Following the administration of no as pre-medication, patient was placed in the prone position and prepped and draped in a sterile manner. Approximately 15 cc of 1% Lidocaine was used over the left posterior iliac spine. Following this a 3 mm incision was made. Trephine bone marrow core(s) was (were) obtained from the [facility]. Bone marrow aspirates were obtained from the [facility]. Aspirates were sent for morphology, immunophenotyping, cytogenetics and molecular diagnostics RFLP. A total of approximately 16 ml of marrow was aspirated. Following this procedure a sterile dressing was applied to the bone marrow biopsy site(s). The patient was placed in the supine position to maintain pressure on the biopsy site. Post-procedure wound care instructions were given.     Complications: NO    Pre-procedural pain: 0 out of 10 on the numeric pain rating scale.     Procedural pain: 5 out of 10 on the numeric pain rating scale.     Post-procedural pain assessment: 0 out of 10 on the numeric pain rating scale.     Interventions: NO    Length of procedure:20 minutes or less      Procedure performed by: Tish Vaca pa-c  [phone]

## 2021-06-02 LAB — COPATH REPORT: NORMAL

## 2021-06-02 NOTE — PROGRESS NOTES
[facility] BMT Clinic    Diagnosis:   1. 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     Treatments:   1. 7+3 (Dauno), 3/15/19, no response, Day 12 persistent disease with near packed marrow  2. Dec 10d+Ric 3/26/19, CR1 on 4/23/19 but with pos mutations (IDH1 17%, RUNX1 8%)  3. 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   4. Maintenance ALT-803 study, 1 dose, stopped due to profound fatigue, moderate to severe hypotension, and fever    PMH: HLP, BPH, DJD, RLS    Interval history: 10-point ROS otherwise negative. Some fatigue, intermittently worse.     Current Outpatient Medications   Medication     acetaminophen (TYLENOL) 325 MG tablet     pramox-pe-glycerin-petrolatum (PREPARATION H) 1-0.25-14.4-15 % CREA cream     rOPINIRole (REQUIP) 0.25 MG tablet     tamsulosin (FLOMAX) 0.4 MG capsule     No current facility-administered medications for this visit.      Lab Results   Component Value Date    WBC 6.9 06/01/2021    HGB 13.1 (L) 06/01/2021    HCT 39.2 (L) 06/01/2021     06/01/2021     06/01/2021    POTASSIUM 4.0 06/01/2021    CHLORIDE 111 (H) 06/01/2021    CO2 22 06/01/2021    BUN 12 06/01/2021    CR 0.76 06/01/2021     (H) 06/01/2021    SED 88 (H) 06/25/2019    NTBNPI 2,031 (H) 04/03/2019    TROPI <0.015 04/23/2019    AST 28 06/01/2021    ALT 47 06/01/2021    ALKPHOS 82 06/01/2021    BILITOTAL 0.6 06/01/2021    INR 1.00 09/26/2019       A&P:   1. AML: 2y post-haplo. Discharge from clinic if morphology neg and 100% chimeric and NGS unremarkable. See Dr. Blanca Kotiso every 3m for another 2-3 years. He will get booster vaccines in 2m locally; Jerri will give him a list. TSH and Vit D today to work up fatigue.    2. ID: off abx, s/p covid vaccine. Vaccines today and will give him a list for boosters to get locally in 2m.    3. GVHD:  None, off IST.   4. Osteopenia: check Vit D today and depending on level, fix at  50K weekly x8 vs. 1000 U daily.

## 2021-06-03 ENCOUNTER — OFFICE VISIT (OUTPATIENT)
Dept: TRANSPLANT | Facility: CLINIC | Age: 66
End: 2021-06-03
Attending: INTERNAL MEDICINE
Payer: COMMERCIAL

## 2021-06-03 DIAGNOSIS — C92.01 ACUTE MYELOID LEUKEMIA IN REMISSION (H): Primary | ICD-10-CM

## 2021-06-03 DIAGNOSIS — C92.01 AML (ACUTE MYELOID LEUKEMIA) IN REMISSION (H): Primary | ICD-10-CM

## 2021-06-03 DIAGNOSIS — C92.01 AML (ACUTE MYELOID LEUKEMIA) IN REMISSION (H): ICD-10-CM

## 2021-06-03 LAB
COPATH REPORT: NORMAL
COPATH REPORT: NORMAL
DEPRECATED CALCIDIOL+CALCIFEROL SERPL-MC: 8 UG/L (ref 20–75)
TSH SERPL DL<=0.005 MIU/L-ACNC: 3.1 MU/L (ref 0.4–4)

## 2021-06-03 PROCEDURE — 90750 HZV VACC RECOMBINANT IM: CPT | Performed by: INTERNAL MEDICINE

## 2021-06-03 PROCEDURE — 90648 HIB PRP-T VACCINE 4 DOSE IM: CPT | Performed by: INTERNAL MEDICINE

## 2021-06-03 PROCEDURE — 90723 DTAP-HEP B-IPV VACCINE IM: CPT | Performed by: INTERNAL MEDICINE

## 2021-06-03 PROCEDURE — G0009 ADMIN PNEUMOCOCCAL VACCINE: HCPCS | Performed by: INTERNAL MEDICINE

## 2021-06-03 PROCEDURE — 99214 OFFICE O/P EST MOD 30 MIN: CPT | Performed by: INTERNAL MEDICINE

## 2021-06-03 PROCEDURE — 84443 ASSAY THYROID STIM HORMONE: CPT | Performed by: INTERNAL MEDICINE

## 2021-06-03 PROCEDURE — 250N000021 HC RX MED A9270 GY (STAT IND- M) 250: Performed by: INTERNAL MEDICINE

## 2021-06-03 PROCEDURE — 90707 MMR VACCINE SC: CPT | Performed by: INTERNAL MEDICINE

## 2021-06-03 PROCEDURE — 250N000011 HC RX IP 250 OP 636: Performed by: INTERNAL MEDICINE

## 2021-06-03 PROCEDURE — 90472 IMMUNIZATION ADMIN EACH ADD: CPT | Performed by: INTERNAL MEDICINE

## 2021-06-03 PROCEDURE — G0463 HOSPITAL OUTPT CLINIC VISIT: HCPCS | Mod: 25

## 2021-06-03 PROCEDURE — 82306 VITAMIN D 25 HYDROXY: CPT | Performed by: INTERNAL MEDICINE

## 2021-06-03 PROCEDURE — 90670 PCV13 VACCINE IM: CPT | Performed by: INTERNAL MEDICINE

## 2021-06-03 RX ADMIN — HAEMOPHILUS B POLYSACCHARIDE CONJUGATE VACCINE FOR INJ 0.5 ML: RECON SOLN at 14:32

## 2021-06-03 RX ADMIN — MEASLES, MUMPS, AND RUBELLA VIRUS VACCINE LIVE 0.5 ML: 1000; 12500; 1000 INJECTION, POWDER, LYOPHILIZED, FOR SUSPENSION SUBCUTANEOUS at 14:33

## 2021-06-03 RX ADMIN — DIPHTHERIA AND TETANUS TOXOIDS AND ACELLULAR PERTUSSIS ADSORBED, HEPATITIS B (RECOMBINANT) AND INACTIVATED POLIOVIRUS VACCINE COMBINED 0.5 ML: 25; 10; 25; 25; 8; 10; 40; 8; 32 INJECTION, SUSPENSION INTRAMUSCULAR at 14:32

## 2021-06-03 RX ADMIN — ZOSTER VACCINE RECOMBINANT, ADJUVANTED 0.5 ML: KIT at 14:33

## 2021-06-03 RX ADMIN — PNEUMOCOCCAL 13-VALENT CONJUGATE VACCINE 0.5 ML: 2.2; 2.2; 2.2; 2.2; 2.2; 4.4; 2.2; 2.2; 2.2; 2.2; 2.2; 2.2; 2.2 INJECTION, SUSPENSION INTRAMUSCULAR at 14:31

## 2021-06-03 NOTE — LETTER
6/3/2021         RE: El Ace  [address] [facility] [id number]        Dear Colleague,    Thank you for referring your patient, El Ace, to the [facility] BLOOD AND MARROW TRANSPLANT PROGRAM [place]. Please see a copy of my visit note below.    [facility] BMT Clinic    Diagnosis:   1. 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     Treatments:   1. 7+3 (Dauno), 3/15/19, no response, Day 12 persistent disease with near packed marrow  2. Dec 10d+Ric 3/26/19, CR1 on 4/23/19 but with pos mutations (IDH1 17%, RUNX1 8%)  3. 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   4. Maintenance ALT-803 study, 1 dose, stopped due to profound fatigue, moderate to severe hypotension, and fever    PMH: HLP, BPH, DJD, RLS    Interval history: 10-point ROS otherwise negative. Some fatigue, intermittently worse.     Current Outpatient Medications   Medication     acetaminophen (TYLENOL) 325 MG tablet     pramox-pe-glycerin-petrolatum (PREPARATION H) 1-0.25-14.4-15 % CREA cream     rOPINIRole (REQUIP) 0.25 MG tablet     tamsulosin (FLOMAX) 0.4 MG capsule     No current facility-administered medications for this visit.      Lab Results   Component Value Date    WBC 6.9 06/01/2021    HGB 13.1 (L) 06/01/2021    HCT 39.2 (L) 06/01/2021     06/01/2021     06/01/2021    POTASSIUM 4.0 06/01/2021    CHLORIDE 111 (H) 06/01/2021    CO2 22 06/01/2021    BUN 12 06/01/2021    CR 0.76 06/01/2021     (H) 06/01/2021    SED 88 (H) 06/25/2019    NTBNPI 2,031 (H) 04/03/2019    TROPI <0.015 04/23/2019    AST 28 06/01/2021    ALT 47 06/01/2021    ALKPHOS 82 06/01/2021    BILITOTAL 0.6 06/01/2021    INR 1.00 09/26/2019       A&P:   1. AML: 2y post-haplo. Discharge from clinic if morphology neg and 100% chimeric and NGS unremarkable. See Dr. Blanca Kenney every 3m for another 2-3 years. He will get booster vaccines in 2m  locally; Jerri will give him a list. TSH and Vit D today to work up fatigue.    2. ID: off abx, s/p covid vaccine. Vaccines today and will give him a list for boosters to get locally in 2m.    3. GVHD:  None, off IST.   4. Osteopenia: check Vit D today and depending on level, fix at 50K weekly x8 vs. 1000 U daily.         Again, thank you for allowing me to participate in the care of your patient.        Sincerely,        Soto Brown MD

## 2021-06-03 NOTE — NURSING NOTE
"Oncology Rooming Note    Gilma 3, 2021 1:35 PM   El Ace is a 65 year old male who presents for:    Chief Complaint   Patient presents with     RECHECK     AML     Initial Vitals: There were no vitals taken for this visit. Estimated body mass index is 32.49 kg/m  as calculated from the following:    Height as of 4/27/21: 1.765 m (5' 9.5\").    Weight as of 4/27/21: 101.2 kg (223 lb 3.2 oz). There is no height or weight on file to calculate BSA.  Data Unavailable Comment: Data Unavailable   No LMP for male patient.  Allergies reviewed: Yes  Medications reviewed: Yes    Medications: Medication refills not needed today.  Pharmacy name entered into tritrue:    [facility] - [facility]  [facility] [place], [place] - [address] [place] [address] [phone]  [facility] - [place], [place] - [address] [place] [address] PHARMACY [address] [place], [place] - [address]    Clinical concerns: NONE       Dayanara Morfin CMA              "

## 2021-06-04 ENCOUNTER — VIRTUAL VISIT (OUTPATIENT)
Dept: TRANSPLANT | Facility: CLINIC | Age: 66
End: 2021-06-04
Attending: PHYSICIAN ASSISTANT
Payer: COMMERCIAL

## 2021-06-04 ENCOUNTER — CARE COORDINATION (OUTPATIENT)
Dept: TRANSPLANT | Facility: CLINIC | Age: 66
End: 2021-06-04

## 2021-06-04 DIAGNOSIS — C92.01 AML (ACUTE MYELOID LEUKEMIA) IN REMISSION (H): Primary | ICD-10-CM

## 2021-06-04 PROCEDURE — 99212 OFFICE O/P EST SF 10 MIN: CPT | Mod: 95

## 2021-06-04 RX ORDER — ERGOCALCIFEROL 1.25 MG/1
50000 CAPSULE, LIQUID FILLED ORAL WEEKLY
Qty: 8 CAPSULE | Refills: 0 | Status: SHIPPED | OUTPATIENT
Start: 2021-06-04 | End: 2021-07-24

## 2021-06-04 NOTE — PROGRESS NOTES
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 Care Plan        Date: June 4, 2021    Treatment Team:  Patient Care Team:  Bre Vizcaino MD as PCP - General (Speciality Unknown)  Rivera Cuadra MD as Referring Physician (Internal Medicine - Hematology)  Mike Coronado MD as BMT Physician (BMT - Adult)  Soto Brown MD as BMT Physician (BMT - Adult)  Christina Braxton, MILO as Specialty Care Coordinator (Hematology & Oncology)  Vivienne Cevallos MSW as  (BMT - Adult)  Debbie Joyner LICSW as  (BMT - Adult)  Jerri Payton RN as Specialty Care Coordinator (BMT - Adult)  Soto Brown MD as Assigned Cancer Care Provider  Jaziel Olivier MD as Assigned Rheumatology Provider  Paco Lselie DPM as Assigned Musculoskeletal Provider    Date of Transplant: 5/31/19     Transplant Essential Data:  Diagnosis AMLU Acute myelogeneous leukemia, Unknown  HCT Type Allogeneic    Prep Regimen Cytoxan  Fludarabine  TBI  Donor Source [facility]    GVHD Prophylaxis Mycophenolate    Clinical Trials MiPlate      Oncology Treatment History: El Ace is a 63 year old diagnosed with AML in March, 2019.   He underwent 7+3 (cytarabine and daunorubicin) D1=3/15/19 and reinduction with decitabaine and venetoclax 3/29.  Induction course notable for febrile neutropenia.  Source of fevers is felt to be 2/2 odontogenic source (mandibular cellulitis) s/p OMFS procedure.  Patient was transferred to the MICU for further evaluation. Of note he also tested positive for C.diff on 3/27 and has completed oral vancomycin.   He had a confirmed pseudomonas bacteremia and has since completed treatment for this.       Treatment/Chemotherapy Number of Cycles Date Range Outcomes & Complications   7+3   3/15/19     Decitabine/venetoclax   3/26, venetoclax added 3/29 and d/yanna 4/30 pseuodomonas bacteremia, septic shock (odontic source)  C.diff colitis     Morphologic/immunophenotypic  remission                               Survivorship Self-Management Goals:  Working to overcome fatigue; still working full time    General Health Maintenance:     Call the BMT clinic if you develop a skin rash, oral sores, eye pain or excessive dryness, shortness of breath, new cough, bleeding, diarrhea, nausea, or vomiting.Vaccinations should be given at 1 and 2 years after your transplant, these may be given at your annual anniversary visits in the BMT clinic, by your primary care provider, or your local oncologist. An exception is the influenza vaccine, which can be given after day +60 post-transplant during influenza season. See table below for more information.     You can receive the COVID19 vaccine if you have not already, for more information on how to sign up for an appointment you can the [employer] vaccine website at [email or web address] or the        [place] Department of Health Vaccine Connector portal at https://[place].gov/covid19/vaccine/connector/connector.jsp    For general health concerns you can be seen by your primary care provider.     If you have questions about your transplant or follow up tests contact your BMT RN coordinator.      Vaccinations for All Patients:    Vaccine Administration Schedule     Vaccinations Post-BMT: Please refer to our [employer] [place] BMT Vaccination Guidelines updated in March of 2021.           Survivorship Care Plan:     This individualized care plan is designed to inform you and your healthcare team of the recommended follow-up visits, tests, health maintenance activities, and cancer screening you should receive after transplant.     Immune System:  Risks Preventative Measures Recommendations   Infections, viral reactivations   Continue to take prescribed medications to prevent infection if you are treated for vxayh-bx-xxja disease    Symptoms of a cold such as fever, cough, congestion, and shortness of breath should be reported to your  primary care provider    Immunizations will be administered at 1 & 2 years after transplant. See schedule above. Vaccines to be administered by PCP or local oncologist     Eyes:   Risks Preventative Measures Recommendations   Cataracts, dry eyes, GVH of the eyes, viral infections, and other eye changes   Yearly eye exam     Screening and treatment for high blood pressure or diabetes    Call if you have eye pain, visual changes, or floaters immediately    Call If you are experiencing dry eyes and symptoms do not improve with Refresh eye drops  Patient will schedule an annual routine exam with community ophthalmologist     Mouth:  Risks Preventative Measures Recommendations   Dry mouth, oral GVH, cavities, and oral cancer   Report any new symptoms such as dry mouth or painful sores     You can use over the counter Biotene for dry mouth or try sucking on sour candy before meals    Get a dental checkup every year and a cleaning every 6 months    If you have a prosthetic heart valve, central venous catheter or  port , or are still taking immunosuppression you may need to take an antibiotic before your dental visits. None at this time, patient has dental provider and will schedule routine exam       Lungs  Risks Preventative Measures Recommendations   Changes in function from chemotherapy or radiation; lung infections (pneumonia)   Tell your provider about difficulty breathing, a cough, or new shortness of breath     Avoid use of tobacco products or smoking    Routine lung exams   None at this time       Heart and Blood Vessels  Heart Disease Risk Score: The ASCVD Risk score (Basile EDWIN Jr., et al., 2013) failed to calculate for the following reasons:    The patient has a prior MI or stroke diagnosis  Risks Preventative Measures Recommendations   Damage from chemotherapy and/or radiation; early development of heart valve disease    Ask your provider if you should receive consultation from a cardiologist (heart doctor) or  have special screening    Follow a  heart healthy  lifestyle. For more information visit the National Heart, Lung, and Blood [place] website at: https://www.nhlbi.nih.gov/health/health-topics/topics/heart-healthy-lifestyle-changes     Don t smoke. If you currently smoke and are ready to quit, we can help you find ways to quit    Maintain a healthy weight    Exercise regularly    Avoid foods that have high amounts of:  o Salt/sodium (less than 2,300 mg of sodium per day)  o Saturated and trans fats  o Limit alcohol to less than 1 drink for women and 2 drinks for men per day  o Sugar such as soft drinks or sugary snacks None at this time       Hormones  Risks Preventative Measures Recommendations   Low thyroid function, low function of other glands (adrenals and others)   Certain endocrine/hormone disorders are more common after transplant. For this reason, talk to your provider about:  o Fatigue  o Muscle weakness  o Changes in cold tolerance  o Reduced interest in sex  o Erectile dysfunction     Certain tests may be used to monitor for hormone changes if you are experiencing symptoms. These tests are done at 1 year    If you have been on steroids you will need to slowly taper or decrease the dose as recommended by your provider/pharmacist. Do not stop taking steroids without consulting with your provider.     If you have been on steroids in the past, you may need steroids during periods of illness None at this time       Liver:  Risks Preventative Measures Recommendations      Damage from chemotherapy or other drugs, buildup of iron from blood transfusions, infections, and GVHD     Certain tests may be ordered at your next survivorship visit to evaluate liver function based on your individual risk factors.    Talk to your provider before taking herbal supplements or over the counter drugs like Tylenol.    Ask your doctor if you should be treated for iron overload    Avoid alcohol in excess     None at all        Kidneys and Bladder:  Risks Preventative Measures Recommendations   Damage from chemotherapy or other drugs, infections, high blood pressure   Monitoring blood tests of kidney function during follow up visits    Treating high blood pressure and diabetes     Drink adequate amounts of water    Report symptoms of infection such as frequent urination, pain with urination, foul odor to urine, or blood in the urine    Talk to your provider before taking herbal supplements or over the counter drugs like Ibuprofen None at this time       Nervous System:  Risks Preventative Measures Recommendations   Neuropathy from chemotherapy, changes in cognitive function   Report changes in sensation or discomfort in feet or hands    Tell your provider about ongoing changes in memory, ability to concentrate, or inability to make decisions   None at this time       Muscle & Connective Tissue  Risks Preventative Measures Recommendations   Reduced muscle strength, reduced stamina, GVHD causing hardening of muscle tissues (scleroderma) or inflammation of tissue over muscles (fascitis)  Let your provider know if:    You notice changes in your muscle strength    Require extra assistance with daily activities    Experience pain or difficult bending or moving joints    If you have been on steroids and are experiencing weakness particularly when standing up from a chair     Need help creating an exercise routine    Notice reduced range of motion of the arms, hips, or legs  Follow general guidelines for physical activity as recommended by the Office of Disease Prevention & Health Promotion:    Avoid Inactivity  Some physical activity is better than none -- any amount has benefits.    Do Aerobic Activity  Do aerobic physical activity in episodes of at least 10 minutes, as many times as possible per day. This could include going for walks or using the elliptical or stationary bike.  Ask your doctor what aerobic activities would be safe and  helpful for you, and set a goal for yourself!    Strengthen Muscles  Do muscle-strengthening activities (such as lifting light weights or using resistance bands and/or going up and down stairs) that are moderate or high intensity and involve all major muscle groups at least 4 days a week. Calcium & Vitamin D Levels (Recommended annually)       Emotional Health  Risks Preventative Measures Recommendations   Stress, depression, anxiety Talk to your provider about:    Changes in feelings, mood, or emotional wellbeing    Interest in support groups    If you are concerned about your caregivers emotional wellbeing    Desire to speak with a counselor for ongoing support  None at this time       Sexual Health  Risks Preventative Measures Recommendations   Reduced libido due to hormonal changes, erectile dysfunction, vaginal dryness, vaginal gvsvj-lg-okxe disease, vaginal infections, sexually transmitted diseases Talk to your provider about:    Reduced libido or concerns regarding your sexual health    Men: Erectile dysfunction     Women: Vaginal dryness, pain during intercourse, vaginal bleeding after intercourse, changes in vaginal discharge that may indicate infection (green/white/foul odor)     General Recommendations:    It is safe to have sex if your platelet count is > 50,000 and you feel physically and emotionally ready     Women can use water-based lubricants to reduce discomfort from dryness. Prescription topical estrogen may help as well.    Use barrier protection such as condoms to prevent sexually transmitted diseases, you are at higher risk for infections due to a weakened immune system    For more information check out the National Mar[employer]or Program web page on this topic:  https://bethem[facility].org/patients-and-families/life-after-transplant/coping-with-life-after-transplant/relationships-and-sexual-health/  None at this time         Fertility (delete if N/A)  Risks Preventative Measures Recommendations    Difficulty with sexual intercourse; difficulty having a baby   Women should avoid becoming pregnant for 2 years    Birth control should be used to prevent pregnancy    If you are interested in having a baby, discuss this with your provider None at this time       Cancer Screening:  Risks Preventative Measures Recommendations   Higher risk for the development of solid tumors, PTLD, and blood cancers   Perform a full self skin exam monthly to assess for any changes in moles or signs of skin cancer    Minimize excessive sun exposure and wear sunscreen    Women should preform breast-self exams and report any changes in such as a new lump, discharge from nipples, and red or dimpled areas of the breast.     Imaging for breast cancer known as mammography is recommended for women starting at age 40 or 8 years after radiation exposure.     Screening for colorectal cancer should begin at age 50.     All women should have a pap smear every 1-3 years beginning at age 21    Men should perform a monthly testicular self-exam if they have been exposed to radiation as part of their cancer treatment.    Colonoscopy (Recommended every 10 years after the age of 50) and Other skin care, monitor for changing lesions; PSA/GALINA per PCP       Recommended Tests & Follow-Up:  Referrals & Tests Ordered Today:  Your BMT physician will review these tests and referral results. If you require treatment based on the results, a member of the BMT team will contact you.  Orders placed today:  No orders of the defined types were placed in this encounter.    (Note to providers: After signing orders use the refresh tool in the note window to display results)   Future Tests/Referrals:   All recommendations above should be completed within 2 months either by your primary care provider or local oncologist (cancer doctor).   Recommendations that were not ordered today should be completed by your:  Local Oncologist   Follow Up Care:  Continue to see your  care team members routinely after transplant.  BMT Provider: prn  Hematologist/Oncologist: first visit then prn  Primary Care Provider: prn   Referral to Transplant Late-Effects Clinic (TLC):  Our TLC clinic is designed to meet the needs of survivors more than 2 years post-transplant. You can call [phone] at anytime in the future to self-refer into this clinic if you feel like you need ongoing survivorship support. Recommend Future TLC Visit:no    (Note to providers: To place a TLC referral send an in-basket to the BMT Lead )     Tish Vaca I spent 14 minutes with the patient and family, over half of which was spent discussing preventative care strategies, self-management practices, and potential complications after transplant.      Information used for these recommendations was obtained from: Shandra N. S., CHAD Monge, MALCOM Mejia, DOMENICA Valdovinos., JERICHO Oneal., GRACE Mayers.,   Kelsey, K. M. (2013). Prevalence of Hematopoietic Cell Transplant Survivors in the [place]. Biology of Blood and Marrow Transplantation?: Journal of the American Society for Blood and Marrow Transplantation, 19(10), 0633-9315. doi 10.1016/j.bbmt.2013.07.020

## 2021-06-04 NOTE — PROGRESS NOTES
Per Dr. Brown, pt to take 50,000 units vitamin D once weekly for 8 weeks, then take 2,000 units vitamin D OTC daily.  Called patient with instructions, pt verbalized understanding.  Prescription for 50,000 units vitamin D sent to patient's preferred pharmacy.  Pt also requested list of vaccines for 26 months post BMT be mailed to him so he can receive them locally, sent list to his address.

## 2021-06-04 NOTE — LETTER
6/4/2021         RE: El Ace  [address] [facility] [id number]        Dear Colleague,    Thank you for referring your patient, El Ace, to the [facility] BLOOD AND MARROW TRANSPLANT PROGRAM [place]. Please see a copy of my visit note below.          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 Care Plan        Date: June 4, 2021    Treatment Team:  Patient Care Team:  Bre Vizcaino MD as PCP - General (Speciality Unknown)  Rivera Cuadra MD as Referring Physician (Internal Medicine - Hematology)  Mike Coronado MD as BMT Physician (BMT - Adult)  Soto Brown MD as BMT Physician (BMT - Adult)  Christina Braxton RN as Specialty Care Coordinator (Hematology & Oncology)  Vivienne Cevallos MSW as  (BMT - Adult)  Debbie Joyner LICSW as  (BMT - Adult)  Jerri Payton RN as Specialty Care Coordinator (BMT - Adult)  Soto Brown MD as Assigned Cancer Care Provider  Jaziel Olivier MD as Assigned Rheumatology Provider  Paco Leslie DPM as Assigned Musculoskeletal Provider    Date of Transplant: 5/31/19     Transplant Essential Data:  Diagnosis AMLU Acute myelogeneous leukemia, Unknown  HCT Type Allogeneic    Prep Regimen Cytoxan  Fludarabine  TBI  Donor Source Haplo BM    GVHD Prophylaxis Mycophenolate    Clinical Trials MiPlate      Oncology Treatment History: El Ace is a 63 year old diagnosed with AML in March, 2019.   He underwent 7+3 (cytarabine and daunorubicin) D1=3/15/19 and reinduction with decitabaine and venetoclax 3/29.  Induction course notable for febrile neutropenia.  Source of fevers is felt to be 2/2 odontogenic source (mandibular cellulitis) s/p OMFS procedure.  Patient was transferred to the MICU for further evaluation. Of note he also tested positive for C.diff on 3/27 and has completed oral vancomycin.   He had a confirmed pseudomonas bacteremia and has since completed treatment for this.        Treatment/Chemotherapy Number of Cycles Date Range Outcomes & Complications   7+3   3/15/19     Decitabine/venetoclax   3/26, venetoclax added 3/29 and d/yanna 4/30 pseuodomonas bacteremia, septic shock (odontic source)  C.diff colitis     Morphologic/immunophenotypic remission                               Survivorship Self-Management Goals:  Working to overcome fatigue; still working full time    General Health Maintenance:     Call the BMT clinic if you develop a skin rash, oral sores, eye pain or excessive dryness, shortness of breath, new cough, bleeding, diarrhea, nausea, or vomiting.Vaccinations should be given at 1 and 2 years after your transplant, these may be given at your annual anniversary visits in the BMT clinic, by your primary care provider, or your local oncologist. An exception is the influenza vaccine, which can be given after day +60 post-transplant during influenza season. See table below for more information.     You can receive the COVID19 vaccine if you have not already, for more information on how to sign up for an appointment you can the NPTV vaccine website at [email or web address]/[email or web address] or the        [place] Department of Health Vaccine Connector portal at https://[place].gov/covid19/vaccine/connector/connector.jsp    For general health concerns you can be seen by your primary care provider.     If you have questions about your transplant or follow up tests contact your BMT RN coordinator.      Vaccinations for All Patients:    Vaccine Administration Schedule     Vaccinations Post-BMT: Please refer to our [facility] BMT Vaccination Guidelines updated in March of 2021.           Survivorship Care Plan:     This individualized care plan is designed to inform you and your healthcare team of the recommended follow-up visits, tests, health maintenance activities, and cancer screening you should receive after transplant.     Immune System:  Risks Preventative  Measures Recommendations   Infections, viral reactivations   Continue to take prescribed medications to prevent infection if you are treated for yplbb-zy-iqip disease    Symptoms of a cold such as fever, cough, congestion, and shortness of breath should be reported to your primary care provider    Immunizations will be administered at 1 & 2 years after transplant. See schedule above. Vaccines to be administered by PCP or local oncologist     Eyes:   Risks Preventative Measures Recommendations   Cataracts, dry eyes, GVH of the eyes, viral infections, and other eye changes   Yearly eye exam     Screening and treatment for high blood pressure or diabetes    Call if you have eye pain, visual changes, or floaters immediately    Call If you are experiencing dry eyes and symptoms do not improve with Refresh eye drops  Patient will schedule an annual routine exam with community ophthalmologist     Mouth:  Risks Preventative Measures Recommendations   Dry mouth, oral GVH, cavities, and oral cancer   Report any new symptoms such as dry mouth or painful sores     You can use over the counter Biotene for dry mouth or try sucking on sour candy before meals    Get a dental checkup every year and a cleaning every 6 months    If you have a prosthetic heart valve, central venous catheter or  port , or are still taking immunosuppression you may need to take an antibiotic before your dental visits. None at this time, patient has dental provider and will schedule routine exam       Lungs  Risks Preventative Measures Recommendations   Changes in function from chemotherapy or radiation; lung infections (pneumonia)   Tell your provider about difficulty breathing, a cough, or new shortness of breath     Avoid use of tobacco products or smoking    Routine lung exams   None at this time       Heart and Blood Vessels  Heart Disease Risk Score: The ASCVD Risk score (Ramandeep PINEDA Jr., et al., 2013) failed to calculate for the following reasons:     The patient has a prior MI or stroke diagnosis  Risks Preventative Measures Recommendations   Damage from chemotherapy and/or radiation; early development of heart valve disease    Ask your provider if you should receive consultation from a cardiologist (heart doctor) or have special screening    Follow a  heart healthy  lifestyle. For more information visit the National Heart, Lung, and Blood [place] website at: https://www.nhlbi.nih.gov/health/health-topics/topics/heart-healthy-lifestyle-changes     Don t smoke. If you currently smoke and are ready to quit, we can help you find ways to quit    Maintain a healthy weight    Exercise regularly    Avoid foods that have high amounts of:  o Salt/sodium (less than 2,300 mg of sodium per day)  o Saturated and trans fats  o Limit alcohol to less than 1 drink for women and 2 drinks for men per day  o Sugar such as soft drinks or sugary snacks None at this time       Hormones  Risks Preventative Measures Recommendations   Low thyroid function, low function of other glands (adrenals and others)   Certain endocrine/hormone disorders are more common after transplant. For this reason, talk to your provider about:  o Fatigue  o Muscle weakness  o Changes in cold tolerance  o Reduced interest in sex  o Erectile dysfunction     Certain tests may be used to monitor for hormone changes if you are experiencing symptoms. These tests are done at 1 year    If you have been on steroids you will need to slowly taper or decrease the dose as recommended by your provider/pharmacist. Do not stop taking steroids without consulting with your provider.     If you have been on steroids in the past, you may need steroids during periods of illness None at this time       Liver:  Risks Preventative Measures Recommendations      Damage from chemotherapy or other drugs, buildup of iron from blood transfusions, infections, and GVHD     Certain tests may be ordered at your next survivorship visit to  evaluate liver function based on your individual risk factors.    Talk to your provider before taking herbal supplements or over the counter drugs like Tylenol.    Ask your doctor if you should be treated for iron overload    Avoid alcohol in excess     None at all       Kidneys and Bladder:  Risks Preventative Measures Recommendations   Damage from chemotherapy or other drugs, infections, high blood pressure   Monitoring blood tests of kidney function during follow up visits    Treating high blood pressure and diabetes     Drink adequate amounts of water    Report symptoms of infection such as frequent urination, pain with urination, foul odor to urine, or blood in the urine    Talk to your provider before taking herbal supplements or over the counter drugs like Ibuprofen None at this time       Nervous System:  Risks Preventative Measures Recommendations   Neuropathy from chemotherapy, changes in cognitive function   Report changes in sensation or discomfort in feet or hands    Tell your provider about ongoing changes in memory, ability to concentrate, or inability to make decisions   None at this time       Muscle & Connective Tissue  Risks Preventative Measures Recommendations   Reduced muscle strength, reduced stamina, GVHD causing hardening of muscle tissues (scleroderma) or inflammation of tissue over muscles (fascitis)  Let your provider know if:    You notice changes in your muscle strength    Require extra assistance with daily activities    Experience pain or difficult bending or moving joints    If you have been on steroids and are experiencing weakness particularly when standing up from a chair     Need help creating an exercise routine    Notice reduced range of motion of the arms, hips, or legs  Follow general guidelines for physical activity as recommended by the Office of Disease Prevention & Health Promotion:    Avoid Inactivity  Some physical activity is better than none -- any amount has  benefits.    Do Aerobic Activity  Do aerobic physical activity in episodes of at least 10 minutes, as many times as possible per day. This could include going for walks or using the elliptical or stationary bike.  Ask your doctor what aerobic activities would be safe and helpful for you, and set a goal for yourself!    Strengthen Muscles  Do muscle-strengthening activities (such as lifting light weights or using resistance bands and/or going up and down stairs) that are moderate or high intensity and involve all major muscle groups at least 4 days a week. Calcium & Vitamin D Levels (Recommended annually)       Emotional Health  Risks Preventative Measures Recommendations   Stress, depression, anxiety Talk to your provider about:    Changes in feelings, mood, or emotional wellbeing    Interest in support groups    If you are concerned about your caregivers emotional wellbeing    Desire to speak with a counselor for ongoing support  None at this time       Sexual Health  Risks Preventative Measures Recommendations   Reduced libido due to hormonal changes, erectile dysfunction, vaginal dryness, vaginal ucaog-xf-qukv disease, vaginal infections, sexually transmitted diseases Talk to your provider about:    Reduced libido or concerns regarding your sexual health    Men: Erectile dysfunction     Women: Vaginal dryness, pain during intercourse, vaginal bleeding after intercourse, changes in vaginal discharge that may indicate infection (green/white/foul odor)     General Recommendations:    It is safe to have sex if your platelet count is > 50,000 and you feel physically and emotionally ready     Women can use water-based lubricants to reduce discomfort from dryness. Prescription topical estrogen may help as well.    Use barrier protection such as condoms to prevent sexually transmitted diseases, you are at higher risk for infections due to a weakened immune system    For more information check out the National Marrow  Donor Program web page on this topic:  https://bethematch.org/patients-and-families/life-after-transplant/coping-with-life-after-transplant/relationships-and-sexual-health/  None at this time         Fertility (delete if N/A)  Risks Preventative Measures Recommendations   Difficulty with sexual intercourse; difficulty having a baby   Women should avoid becoming pregnant for 2 years    Birth control should be used to prevent pregnancy    If you are interested in having a baby, discuss this with your provider None at this time       Cancer Screening:  Risks Preventative Measures Recommendations   Higher risk for the development of solid tumors, PTLD, and blood cancers   Perform a full self skin exam monthly to assess for any changes in moles or signs of skin cancer    Minimize excessive sun exposure and wear sunscreen    Women should preform breast-self exams and report any changes in such as a new lump, discharge from nipples, and red or dimpled areas of the breast.     Imaging for breast cancer known as mammography is recommended for women starting at age 40 or 8 years after radiation exposure.     Screening for colorectal cancer should begin at age 50.     All women should have a pap smear every 1-3 years beginning at age 21    Men should perform a monthly testicular self-exam if they have been exposed to radiation as part of their cancer treatment.    Colonoscopy (Recommended every 10 years after the age of 50) and Other skin care, monitor for changing lesions; PSA/GALINA per PCP       Recommended Tests & Follow-Up:  Referrals & Tests Ordered Today:  Your BMT physician will review these tests and referral results. If you require treatment based on the results, a member of the BMT team will contact you.  Orders placed today:  No orders of the defined types were placed in this encounter.    (Note to providers: After signing orders use the refresh tool in the note window to display results)   Future Tests/Referrals:    All recommendations above should be completed within 2 months either by your primary care provider or local oncologist (cancer doctor).   Recommendations that were not ordered today should be completed by your:  Local Oncologist   Follow Up Care:  Continue to see your care team members routinely after transplant.  BMT Provider: prn  Hematologist/Oncologist: first visit then prn  Primary Care Provider: prn   Referral to Transplant Late-Effects Clinic (TLC):  Our TLC clinic is designed to meet the needs of survivors more than 2 years post-transplant. You can call [phone] at anytime in the future to self-refer into this clinic if you feel like you need ongoing survivorship support. Recommend Future TLC Visit:no    (Note to providers: To place a TLC referral send an in-basket to the BMT Lead )     Tish Vaca I spent 14 minutes with the patient and family, over half of which was spent discussing preventative care strategies, self-management practices, and potential complications after transplant.      Information used for these recommendations was obtained from: AIDA Devi., CHAD Monge, MALCOM Mejia, DOMENICA Valdovinos., JERICHO Oneal., GRACE Mayers.,   Kelsey, KMichelle YOUNG. (2013). Prevalence of Hematopoietic Cell Transplant Survivors in the [place]. Biology of Blood and Marrow Transplantation?: Journal of the American Society for Blood and Marrow Transplantation, 19(10), 6164-8084. doi 10.1016/j.bbmt.2013.07.020        Randy is a 65 year old who is being evaluated via a billable video visit.      How would you like to obtain your AVS? Anyi    Patient in [place]. If call dropped, please call [phone]    Will anyone else be joining your video visit? No      Video Start Time:   Video-Visit Details    Type of service:  Video Visit    Video End Time:    Originating Location (pt. Location): Home    Distant Location (provider location):  [facility] BLOOD AND MARROW TRANSPLANT  PROGRAM [place]     Platform used for Video Visit: Jamie      Again, thank you for allowing me to participate in the care of your patient.        Sincerely,        BMT Advanced Practice Provider

## 2021-06-04 NOTE — PROGRESS NOTES
Randy is a 65 year old who is being evaluated via a billable video visit.      How would you like to obtain your AVS? [employer]    Patient in virtual lobby. If call dropped, please call [phone]    Will anyone else be joining your video visit? No      Video Start Time:   Video-Visit Details    Type of service:  Video Visit    Video End Time:    Originating Location (pt. Location): Home    Distant Location (provider location):  [facility] BLOOD AND MARROW TRANSPLANT PROGRAM [place]     Platform used for Video Visit: Sharypic

## 2021-06-08 LAB — COPATH REPORT: NORMAL

## 2021-06-22 LAB — COPATH REPORT: NORMAL

## 2021-06-23 LAB — COPATH REPORT: NORMAL

## 2021-09-12 NOTE — PROGRESS NOTES
[facility]                                                           Orthopedic Spine Progress Note      POST OPERATIVE DAY #: 2  STATUS POST: L5-S1 laminectomy, fusion                Pre-Op Dx: Spondylolisthesis at L4-L5 level    Lumbar stenosis with neurogenic claudication      Post-Op Dx:  Spondylolisthesis at L4-L5 level    Lumbar stenosis with neurogenic claudication      SUBJECTIVE: Patient seen and examined, ambulated well w/PT yesterday, gilmore removed yesterday, pain well-controlled, voiding, +flatus    Current Pain Management:  [ ] PCA   [x] Po Analgesics [x] IM /IV Anagesics     Vital Signs Last 24 Hrs  T(C): 36.3 (11 Sep 2021 23:47), Max: 36.8 (11 Sep 2021 11:00)  T(F): 97.4 (11 Sep 2021 23:47), Max: 98.3 (11 Sep 2021 11:00)  HR: 66 (11 Sep 2021 23:47) (57 - 66)  BP: 113/67 (11 Sep 2021 23:47) (95/61 - 113/67)  BP(mean): 69 (11 Sep 2021 11:00) (69 - 69)  RR: 18 (11 Sep 2021 23:47) (18 - 18)  SpO2: 93% (11 Sep 2021 23:47) (93% - 97%)  I&O's Detail    11 Sep 2021 07:01  -  12 Sep 2021 07:00  --------------------------------------------------------  IN:  Total IN: 0 mL    OUT:    Accordian (mL): 120 mL    Indwelling Catheter - Urethral (mL): 350 mL    Voided (mL): 300 mL  Total OUT: 770 mL    Total NET: -770 mL          OBJECTIVE:       Wound /Dressing: dressing clean, dry, intact  Drain: 90cc - kept  Lumbar ROM: not tested  Neurological: A/O x 3              Sensation: [x] intact to light touch  [ ] decreased:          Motor exam: [x]            [x] Lower ext.        Hip Flx    Quad   Hamstrg        TA        EHL        GS                              R        5/5        5/5        5/5             5/5        5/5        5/5                                      L         5/5        5/5        5/5             5/5        5/5        5/5                                                               [x] Vascular: intact           Tension Signs:           Long Tract Findings:                                              LABS:                        13.6   12.00 )-----------( 168      ( 11 Sep 2021 06:47 )             40.1     09-11    141  |  107  |  15  ----------------------------<  106<H>  4.4   |  29  |  1.06    Ca    8.5      11 Sep 2021 06:47                    BMT Daily Bleeding Assessment   [facility] Study Daily Hemostatic Assessment Form   June 21, 2019      In the last 24 hours:      Oral and Nasal:  B5.  New petechiae of oral mucosa? No  B6.  Oropharyngeal bleeding? No  B7.  Epistaxis during assessment period? No               Skin, Soft Tissue, Musculoskeletal:  B8.  New petechiae present on skin? No  B9.  New purpura present? No  B10.  One or more spontaneous hematomas >1 inch in soft tissue or muscle? No  B11.  Spontaneous hematoma in deeper tissues? No  B12.  Joint bleeding? No    Gastrointestinal/Genitourinary/Gynecologic:  B13.  Was there a stool specimen? No  B14.  Was there emesis? No  B15.  Does patient have a nasogastric drainage tube? No  B16.  Visible blood in urine? No  B17.  Is the patient female? No            Pulmonary:   B18.  Hemoptysis? No  B20.  Blood tinged sputum? No    Opthalmic:  B. Scleral bleed? No    Invasive Sites:  B27.  Active oozing at invasive site for cumulative total             of >1 hour during assessment period? No - minimal bleeding at bmbx site.     C2.  Comments on Assessment:  (Record approximate date/time onset of bleeding,   if applicable; Description (location/size). Include reason if any component of the assessment was not completed): N/A    Val Lindsey PA-C  [phone]    Seen at 7am and again at 10am                                                                     [facility]                                                           Orthopedic Spine Progress Note      POST OPERATIVE DAY #: 2  STATUS POST: L4-5 laminectomy, fusion                Pre-Op Dx: Spondylolisthesis at L4-L5 level    Lumbar stenosis with neurogenic claudication      Post-Op Dx:  Spondylolisthesis at L4-L5 level    Lumbar stenosis with neurogenic claudication      SUBJECTIVE: Patient seen and examined, ambulated well w/PT yesterday, gilmore removed yesterday, pain well-controlled, voiding, +flatus    Current Pain Management:  [ ] PCA   [x] Po Analgesics [x] IM /IV Anagesics     Vital Signs Last 24 Hrs  T(C): 36.3 (11 Sep 2021 23:47), Max: 36.8 (11 Sep 2021 11:00)  T(F): 97.4 (11 Sep 2021 23:47), Max: 98.3 (11 Sep 2021 11:00)  HR: 66 (11 Sep 2021 23:47) (57 - 66)  BP: 113/67 (11 Sep 2021 23:47) (95/61 - 113/67)  BP(mean): 69 (11 Sep 2021 11:00) (69 - 69)  RR: 18 (11 Sep 2021 23:47) (18 - 18)  SpO2: 93% (11 Sep 2021 23:47) (93% - 97%)  I&O's Detail    11 Sep 2021 07:01  -  12 Sep 2021 07:00  --------------------------------------------------------  IN:  Total IN: 0 mL    OUT:    Accordian (mL): 120 mL    Indwelling Catheter - Urethral (mL): 350 mL    Voided (mL): 300 mL  Total OUT: 770 mL    Total NET: -770 mL          OBJECTIVE:       Wound /Dressing: dressing clean, dry, intact  Drain: 90cc - kept  Lumbar ROM: not tested  Neurological: A/O x 3              Sensation: [x] intact to light touch  [ ] decreased:          Motor exam: [x]            [x] Lower ext.        Hip Flx    Quad   Hamstrg        TA        EHL        GS                              R        5/5        5/5        5/5             5/5        5/5        5/5                                      L         5/5        5/5        5/5             5/5        5/5        5/5                                                               [x] Vascular: intact           Tension Signs:           Long Tract Findings:                                              LABS:                        13.6   12.00 )-----------( 168      ( 11 Sep 2021 06:47 )             40.1     09-11    141  |  107  |  15  ----------------------------<  106<H>  4.4   |  29  |  1.06    Ca    8.5      11 Sep 2021 06:47                                                                             [facility]                                                           Orthopedic Spine Progress Note      POST OPERATIVE DAY #: 2  STATUS POST: L4-5 laminectomy, fusion                Pre-Op Dx: Spondylolisthesis at L4-L5 level    Lumbar stenosis with neurogenic claudication      Post-Op Dx:  Spondylolisthesis at L4-L5 level    Lumbar stenosis with neurogenic claudication      SUBJECTIVE: Patient seen and examined, ambulated well w/PT yesterday, gilmore removed yesterday, pain well-controlled, voiding, +flatus    Current Pain Management:  [ ] PCA   [x] Po Analgesics [x] IM /IV Anagesics     Vital Signs Last 24 Hrs  T(C): 36.3 (11 Sep 2021 23:47), Max: 36.8 (11 Sep 2021 11:00)  T(F): 97.4 (11 Sep 2021 23:47), Max: 98.3 (11 Sep 2021 11:00)  HR: 66 (11 Sep 2021 23:47) (57 - 66)  BP: 113/67 (11 Sep 2021 23:47) (95/61 - 113/67)  BP(mean): 69 (11 Sep 2021 11:00) (69 - 69)  RR: 18 (11 Sep 2021 23:47) (18 - 18)  SpO2: 93% (11 Sep 2021 23:47) (93% - 97%)  I&O's Detail    11 Sep 2021 07:01  -  12 Sep 2021 07:00  --------------------------------------------------------  IN:  Total IN: 0 mL    OUT:    Accordian (mL): 120 mL    Indwelling Catheter - Urethral (mL): 350 mL    Voided (mL): 300 mL  Total OUT: 770 mL    Total NET: -770 mL          OBJECTIVE:       Wound /Dressing: dressing clean, dry, intact  Drain: 120cc - kept  Lumbar ROM: not tested  Neurological: A/O x 3              Sensation: [x] intact to light touch  [ ] decreased:          Motor exam: [x]            [x] Lower ext.        Hip Flx    Quad   Hamstrg        TA        EHL        GS                              R        5/5        5/5        5/5             5/5        5/5        5/5                                      L         5/5        5/5        5/5             5/5        5/5        5/5                                                               [x] Vascular: intact           Tension Signs:           Long Tract Findings:                                              LABS:                        13.6   12.00 )-----------( 168      ( 11 Sep 2021 06:47 )             40.1     09-11    141  |  107  |  15  ----------------------------<  106<H>  4.4   |  29  |  1.06    Ca    8.5      11 Sep 2021 06:47

## 2021-10-10 ENCOUNTER — HEALTH MAINTENANCE LETTER (OUTPATIENT)
Age: 66
End: 2021-10-10

## 2021-12-01 NOTE — PLAN OF CARE
[id number]: Tmax 100.9, mildly tachycardic. Other VSS on RA. Dose #1 decitabine infused without incident, tolerated well. Awaiting insurance authorization for venetoclax. Tylenol given x1 for LL toothache. Afrin spray given x1 for congestion; no nosebleed noted this shift. Intermittent nausea managed adequately with scheduled zofran and compazine. Potassium 3.3, replaced with recheck this AM. Phosphorus 1.8, replaced with recheck ordered for 0830 (2 hours post-infusion). Voiding adequately. C diff positive, started on PO vanco; education provided re: infection, isolation precautions, and handwashing. Pt reported discomfort from hemorrhoids and diarrhea, tucks pads provided. Up independently. Continue with POC.   Statement Selected

## 2022-05-21 ENCOUNTER — HEALTH MAINTENANCE LETTER (OUTPATIENT)
Age: 67
End: 2022-05-21

## 2022-09-18 ENCOUNTER — HEALTH MAINTENANCE LETTER (OUTPATIENT)
Age: 67
End: 2022-09-18

## 2022-10-28 NOTE — PROGRESS NOTES
[id number] : Informed Consent Note/ Treatment screening consent   Open Label Dose Escalation rial of an Adaptive NK cell infusion (FATE - ) with Subcutaneous IL-2 in Adults with Refractory or relapsed AML  The consent form, including purpose, risks and benefits, was reviewed with El Ace, and all questions were answered before he signed the consent form. The patient understands that this consent is for screening purposes only. That an additional study consent would need to be signed for the actual consent, if eligible.      Present during the discussion were his wife Bessy and daughter.  A copy of the signed form was provided to the patient. No procedures specific to this study were performed prior to the patient signing the consent form.    Consent Version Date: February 11, 2019  Consent obtained by: Shala Brandt RN    Date: 4/23/19  HIPAA authorization signed?: yes  HIPAA authorization version date: Feb 11, 2019    Shala Brandt RN    Form 503.03.01 (Version 2)     Effective date: 01AUG2018     Next Review Date: 01AUG2020   Medical Records sent

## 2023-07-10 NOTE — PROGRESS NOTES
RHEUMATOLOGY PROGRESS NOTE - RESIDENT     El Ace MRN# [id number]   Age: 63 year old YOB: 1955     Date of Admission:  5/24/2019  Date of initial consult: 6/25/19    Assessment and Plan:   Summary: Mr Ace is a 62 yo man with history of AML s/p BMT who over the course of 6 days developed sudden onset neck pain and stiffness, left wrist synovitis, and fevers in setting of elevated CRP.  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    Interval discussion: Crystal analysis consistent with CPPD; given IV methylprednisolone overnight with significant improvement in symptoms today.  We would recommend continuing steroids for psuedogout with taper as outlined below.  We would also recommend monitoring magnesium intermittently going forward and continuing magnesium supplementation as low magnesium can promote CPPD flares.    Problem list:  #. 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 Acute neck pain, stiffness  #. Fever  #. Evidence of calcification around dens on CT imaging  #. AML s/p BMT  #. Recent pseudomonas bacteremia    Recommendations:  -- Start prednisone 40 mg PO Qday for 5 days with first dose now; then 20 mg PO Qday x2 days, then 10 mg PO Qday x 2 days; then stop  -- Recommend patient follow up with rheumatology within 2-4 weeks of discharge  -- Recommend monitoring magnesium weekly as outpatient, given low magnesium can promote CPPD flares  -- agree with continuing PO magnesium supplementation  -- Rheumatology will sign off at this time; please feel free to contact us if new questions/concerns arise  -- f/u 2-4 weeks in Dr. Olivier's clinic    The patient was seen and staffed with Dr. Olivier.     Brian Chino  Internal Medicine PGY-3  [phone]    I saw this patient with the medical resident. I agree with the findings and  2    1    8         5 Minute:    1    2    2    2    2    9                                        Apgars Assigned By: Ana Jackson RN              [place] Measurements                   Precipitous delivery of LBFI. Baby out and nurse holding baby at perineum on my entry into the room. Baby was placed on maternal abdomen and delayed cord clamping and cutting took place. Placenta delivered within 10 minutes. Two small sulcal lacerations were noted and repaired with 3-0 vicryl in a running fashion. Hemostasis was excellent. Fundus was firm.       Ishmael Aschoff, MD recommendations.    Jaziel Olivier M.D.  Staff Rheumatologist,  Health  Pager [phone]      Subjective/24 hour events:   Overnight no acute events.  Received methylpred around midnight, notes substantial improvement in joint pain today.  Wonders when he will be able to leave the hospital.            Medications:     Current Facility-Administered Medications   Medication     acetaminophen (TYLENOL) tablet 325-650 mg     acetaminophen (TYLENOL) tablet 325-650 mg     acyclovir (ZOVIRAX) tablet 800 mg     baclofen (LIORESAL) tablet 10 mg     calcium carbonate (TUMS) chewable tablet 500-1,000 mg     cyclobenzaprine (FLEXERIL) tablet 10 mg     diphenhydrAMINE (BENADRYL) capsule 25 mg    Or     diphenhydrAMINE (BENADRYL) injection 25 mg     heparin lock flush 10 UNIT/ML injection 5-10 mL     heparin lock flush 10 UNIT/ML injection 5-10 mL     heparin lock flush 10 UNIT/ML injection 5-10 mL     hydrALAZINE (APRESOLINE) injection 10 mg     lidocaine (PF) (XYLOCAINE) 1 % injection 1 mL     lidocaine (PF) (XYLOCAINE) 1 % injection 5 mL     loperamide (IMODIUM) capsule 2 mg     LORazepam (ATIVAN) injection 0.5-1 mg    Or     LORazepam (ATIVAN) tablet 0.5-1 mg     magnesium oxide (MAG-OX) tablet 400 mg     magnesium sulfate 4 g in 100 mL sterile water (premade)     melatonin tablet 1-3 mg     methyl salicylate-menthol (ARNOLDO-MARRERO) ointment     morphine 0.1% in solosite topical gel 1 g     mycophenolate (CELLCEPT BRAND) tablet 1,000 mg     naloxone (NARCAN) injection 0.1-0.4 mg     oxyCODONE (ROXICODONE) tablet 5 mg     pantoprazole (PROTONIX) EC tablet 40 mg     potassium chloride (KLOR-CON) Packet 20-40 mEq     potassium chloride 10 mEq in 100 mL intermittent infusion with 10 mg lidocaine     potassium chloride 10 mEq in 100 mL sterile water intermittent infusion (premix)     potassium chloride 20 mEq in 50 mL intermittent infusion     potassium chloride ER (K-DUR/KLOR-CON M) CR tablet 20-40 mEq     potassium phosphate 15  "mmol in D5W 250 mL intermittent infusion     potassium phosphate 20 mmol in D5W 250 mL intermittent infusion     potassium phosphate 20 mmol in D5W 500 mL intermittent infusion     potassium phosphate 25 mmol in D5W 500 mL intermittent infusion     pramox-pe-glycerin-petrolatum (PREPARATION H) cream     prochlorperazine (COMPAZINE) injection 5 mg    Or     prochlorperazine (COMPAZINE) tablet 5 mg     sodium chloride (PF) 0.9% PF flush 10-20 mL     sodium chloride 0.9% infusion     sucralfate (CARAFATE) suspension 1 g     [START ON 7/1/2019] sulfamethoxazole-trimethoprim (BACTRIM DS/SEPTRA DS) 800-160 MG per tablet 1 tablet     tacrolimus (GENERIC EQUIVALENT) capsule 0.5 mg     tamsulosin (FLOMAX) capsule 0.4 mg     ursodiol (ACTIGALL) capsule 300 mg     vancomycin (VANCOCIN) capsule 125 mg     voriconazole (VFEND) tablet 150 mg            Review of Systems:   Complete 8 point ROS completed and negative unless mentioned in subjective.          Physical Exam:   /79 (BP Location: Right arm)   Pulse 95   Temp 97.9  F (36.6  C) (Axillary)   Resp 16   Ht 1.735 m (5' 8.31\")   Wt 88.7 kg (195 lb 9.6 oz)   SpO2 98%   BMI 29.47 kg/m      General: Sitting up in bed, no acute distress  CV: Regular rate and rhythm  Lungs: CTAB  Integumentary: No rashes apprecaited  Msk: Demonstrates nearly full ROM with neck without significant pain.  L wrist with significantly increased ROM, endorses only very mild pain.  Bilateral MCPS/PIPS/DIPS without swelling;/tenderness.  Able to flex/extend bilateral LE without pain.  No point tenderness or effusion over bilateral knees.  Full ROM without tenderness of bilateral ankles.          Data:   Labs and imaging reviewed.          "

## 2023-07-30 ENCOUNTER — HEALTH MAINTENANCE LETTER (OUTPATIENT)
Age: 68
End: 2023-07-30

## 2023-08-04 NOTE — PROGRESS NOTES
[facility]    Diagnosis:   1. 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     Treatments:   1. 7+3 ([place]), 3/15/19, no response, Day 12 persistent disease with near packed marrow  2. Dec 10d+Ric 3/26/19, CR1 on 4/23/19 but with pos mutations (IDH1 17%, RUNX1 8%)  3. 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 Day 286   4. Maintenance ALT-803 study, 1 dose, stopped due to profound fatigue, moderate to severe hypotension, and fever    PMH: HLP, BPH, DJD, RLS    Interval history: Returns for follow up. Continues to feel very well. No new medical complaints. Denies any new or ongoing stigmata of GVHD. Appetite stable. Inquires today about a family trip to [place] this week.     8-point ROS otherwise negative.      Lab Results   Component Value Date    WBC 7.0 03/12/2020    HGB 13.0 (L) 03/12/2020    HCT 37.8 (L) 03/12/2020     03/12/2020     03/12/2020    POTASSIUM 4.2 03/12/2020    CHLORIDE 110 (H) 03/12/2020    CO2 24 03/12/2020    BUN 13 03/12/2020    CR 0.84 03/12/2020     (H) 03/12/2020    SED 88 (H) 06/25/2019    NTBNPI 2,031 (H) 04/03/2019    TROPI <0.015 04/23/2019    AST 21 03/12/2020    ALT 29 03/12/2020    ALKPHOS 73 03/12/2020    BILITOTAL 0.5 03/12/2020    INR 1.00 09/26/2019       Current Outpatient Medications   Medication     acetaminophen (TYLENOL) 325 MG tablet     acyclovir (ZOVIRAX) 800 MG tablet     cyclobenzaprine (FLEXERIL) 5 MG tablet     fluconazole (DIFLUCAN) 100 MG tablet     melatonin 3 MG tablet     ondansetron (ZOFRAN-ODT) 8 MG ODT tab     pramox-pe-glycerin-petrolatum (PREPARATION H) 1-0.25-14.4-15 % CREA cream     prochlorperazine (COMPAZINE) 5 MG tablet     sulfamethoxazole-trimethoprim (BACTRIM DS/SEPTRA DS) 800-160 MG tablet     tamsulosin (FLOMAX) 0.4 MG capsule     triamcinolone (KENALOG) 0.1 % external cream     No current facility-administered medications for this  visit.      Ph/E:   Vitals: /66 (BP Location: Right arm, Patient Position: Sitting, Cuff Size: Adult Regular)   Pulse 79   Temp 97.1  F (36.2  C) (Oral)   Resp 16   Wt 97 kg (213 lb 14.4 oz)   SpO2 97%   BMI 31.13 kg/m     ECO  General: NAD  H&N: no mucosal lesions or lichenoid changes  Lungs CTAB without rales or wheezes   Heart RRR, no m/r/g  Abdomen; Soft, NT, ND  Extremities: No edema  Skin/MSK: no rash, full ROM noted in all joints.   Neuro: Nonfocal;     A&P:   1. AML: day +285  haplo, CR. See us every 4 weeks Per Dr. Brown.   2. ID: acyclovir, bactrim. Flu shot utd.    3. GVHD:  None, off IST.   4. Recurrent pseudogout: Observation. Pred pulse like last time if flares; no colchicine (allergic).     Dicussed family trip at length. Relayed to pt that the [place] is currently recommending no unnecessary travel due to the coronavirus outbreak. Also educated pt that although he is doing very well after transplant, his risk is higher than the rest of his family. Should pt choose to go, I recommend he practice preventative measures such as good hand washing, wearing mask when out and about, and minimizing interactions in large crowds.     RTC: [id number]. Sooner PRN.     Johnnie Horan PA-C  [phone]     08-04    141  |  106  |  14  ----------------------------<  97  4.0   |  27  |  0.82    Ca    8.7      04 Aug 2023 05:43  Phos  2.7     08-03  Mg     2.3     08-03    TPro  7.4  /  Alb  2.5<L>  /  TBili  0.5  /  DBili  x   /  AST  52<H>  /  ALT  77<H>  /  AlkPhos  89  08-04  A1C with Estimated Average Glucose Result: 5.6 % (07-26-23 @ 03:00)

## 2024-09-18 NOTE — PLAN OF CARE
Afebrile. BPs continue to be soft, but are improving. On continuous pulse ox, sating in the upper 90's. SOB on exertion and wheezy. MD notified. Breathing treatment ordered, awaiting RT to come administer. OVSS. Intermittently confused, but is orientated x4 this morning. Denies pain. Denies n/v. Potassium was 3.3, 20 mEq infusing now, will need another 20. K phos was 2.4, will need 15 mmol this morning. TPN/lipids infusing. Voiding, incontinent x2. 1 large watery stool overnight. Moving with 1 assist to commode. Continue plan of care.       Adult Inpatient Plan of Care  Plan of Care Review  4/5/2019 0726 - No Change by Maryjane Back RN  Flowsheets  Taken 4/5/2019 0726   Plan of Care Reviewed With  patient  Progress  no change     Adult Inpatient Plan of Care  Patient-Specific Goal (Individualization)  4/5/2019 0726 - No Change by Maryjane Back RN     Adult Inpatient Plan of Care  Absence of Hospital-Acquired Illness or Injury  4/5/2019 0726 - No Change by Maryjane Back RN     Adult Inpatient Plan of Care  Optimal Comfort and Wellbeing  4/5/2019 0726 - No Change by Maryjane Back RN     Adult Inpatient Plan of Care  Readiness for Transition of Care  4/5/2019 0726 - No Change by Maryjane Back RN     Adult Inpatient Plan of Care  Rounds/Family Conference  4/5/2019 0726 - No Change by Maryjane Back RN     Bleeding Risk or Actual  Absence of Bleeding  4/5/2019 0726 - No Change by Maryjane Back RN     Anemia (Chemotherapy Effects)  Anemia Symptom Improvement  4/5/2019 0726 - No Change by Maryjane Back RN     Nausea and Vomiting (Chemotherapy Effects)  Fluid and Electrolyte Balance  4/5/2019 0726 - No Change by Maryjane Back RN     Neutropenia (Chemotherapy Effects)  Absence of Infection  4/5/2019 0726 - No Change by Maryjane Back RN     Oral Mucositis (Chemotherapy Effects)  Improved Oral Mucous Membrane Integrity  4/5/2019 0726 - No Change by Maryjane Back RN     Thrombocytopenia Bleeding  Risk (Chemotherapy Effects)  Absence of Bleeding  4/5/2019 0726 - No Change by Maryjane Back RN     Hematological Disorder  Hematological Disorder  Description  Patient comorbidity will be monitored for signs and symptoms of Hematogical condition.  Problems will be absent, minimized or managed by discharge/transition of care.  4/5/2019 0726 - No Change by Maryjane Back, RN        GENERAL: elderly male, alert and oriented today, laying in bed, NAD  HEAD:  Atraumatic, Normocephalic  EYES: EOMI, PERRLA, conjunctiva and sclera clear  ENMT: Moist mucous membranes  NECK: Supple   NERVOUS SYSTEM:  Alert & Oriented X2-3, generalized weakness  CHEST/LUNG: coarse breath sounds  HEART: Regular rate and rhythm; + S1/S2  ABDOMEN: Soft, Nontender, Nondistended   EXTREMITIES: no pedal edema

## 2025-01-07 NOTE — PLAN OF CARE
[id number]  Tmax 101.1, . Other VSS. Tylenol given for fever. C/o neck and back pain. Topical morphine given x1 with some relief. Pt declined flexeril, lidocaine patches and icy hot cream. Denies nausea, tolerating reg diet, fair appetite. NS@100ml/hr via pena. Up independently, voiding. No BM this shift. Chest CT done. Plan for MRI of c spine to work up fevers. MRI checklist sent.    Yes

## (undated) RX ORDER — LIDOCAINE HYDROCHLORIDE 10 MG/ML
INJECTION, SOLUTION EPIDURAL; INFILTRATION; INTRACAUDAL; PERINEURAL
Status: DISPENSED
Start: 2019-05-24

## (undated) RX ORDER — LIDOCAINE HYDROCHLORIDE 10 MG/ML
INJECTION, SOLUTION EPIDURAL; INFILTRATION; INTRACAUDAL; PERINEURAL
Status: DISPENSED
Start: 2019-08-12

## (undated) RX ORDER — LIDOCAINE HYDROCHLORIDE 10 MG/ML
INJECTION, SOLUTION EPIDURAL; INFILTRATION; INTRACAUDAL; PERINEURAL
Status: DISPENSED
Start: 2019-09-27

## (undated) RX ORDER — LIDOCAINE HYDROCHLORIDE 10 MG/ML
INJECTION, SOLUTION EPIDURAL; INFILTRATION; INTRACAUDAL; PERINEURAL
Status: DISPENSED
Start: 2019-05-06

## (undated) RX ORDER — HEPARIN SODIUM,PORCINE 10 UNIT/ML
VIAL (ML) INTRAVENOUS
Status: DISPENSED
Start: 2019-05-24

## (undated) RX ORDER — FENTANYL CITRATE 50 UG/ML
INJECTION, SOLUTION INTRAMUSCULAR; INTRAVENOUS
Status: DISPENSED
Start: 2019-05-24

## (undated) RX ORDER — LIDOCAINE HYDROCHLORIDE 10 MG/ML
INJECTION, SOLUTION EPIDURAL; INFILTRATION; INTRACAUDAL; PERINEURAL
Status: DISPENSED
Start: 2019-03-14

## (undated) RX ORDER — SODIUM CHLORIDE 9 MG/ML
INJECTION, SOLUTION INTRAVENOUS
Status: DISPENSED
Start: 2019-05-24

## (undated) RX ORDER — CEFAZOLIN SODIUM 2 G/100ML
INJECTION, SOLUTION INTRAVENOUS
Status: DISPENSED
Start: 2019-05-24

## (undated) RX ORDER — LIDOCAINE HYDROCHLORIDE 10 MG/ML
INJECTION, SOLUTION EPIDURAL; INFILTRATION; INTRACAUDAL; PERINEURAL
Status: DISPENSED
Start: 2019-06-24